# Patient Record
Sex: FEMALE | Race: WHITE | NOT HISPANIC OR LATINO | Employment: UNEMPLOYED | URBAN - METROPOLITAN AREA
[De-identification: names, ages, dates, MRNs, and addresses within clinical notes are randomized per-mention and may not be internally consistent; named-entity substitution may affect disease eponyms.]

---

## 2021-11-13 ENCOUNTER — HOSPITAL ENCOUNTER (EMERGENCY)
Facility: HOSPITAL | Age: 46
Discharge: HOME | End: 2021-11-13
Attending: EMERGENCY MEDICINE
Payer: COMMERCIAL

## 2021-11-13 VITALS
WEIGHT: 155 LBS | DIASTOLIC BLOOD PRESSURE: 81 MMHG | OXYGEN SATURATION: 99 % | HEART RATE: 89 BPM | TEMPERATURE: 97.8 F | SYSTOLIC BLOOD PRESSURE: 137 MMHG | RESPIRATION RATE: 20 BRPM

## 2021-11-13 DIAGNOSIS — F41.9 ANXIETY: Primary | ICD-10-CM

## 2021-11-13 PROCEDURE — 99284 EMERGENCY DEPT VISIT MOD MDM: CPT | Performed by: EMERGENCY MEDICINE

## 2021-11-13 PROCEDURE — 99283 EMERGENCY DEPT VISIT LOW MDM: CPT

## 2021-11-13 RX ORDER — LORAZEPAM 1 MG/1
1 TABLET ORAL ONCE
Status: COMPLETED | OUTPATIENT
Start: 2021-11-13 | End: 2021-11-13

## 2021-11-13 RX ORDER — BUPROPION HYDROCHLORIDE 300 MG/1
300 TABLET ORAL DAILY
Status: ON HOLD | COMMUNITY
End: 2022-01-05 | Stop reason: SDUPTHER

## 2021-11-13 RX ORDER — SERTRALINE HYDROCHLORIDE 100 MG/1
TABLET, FILM COATED ORAL DAILY
COMMUNITY
End: 2022-01-05 | Stop reason: HOSPADM

## 2021-11-13 RX ORDER — BUPRENORPHINE 2 MG/1
2 TABLET SUBLINGUAL DAILY
COMMUNITY
End: 2022-01-05 | Stop reason: HOSPADM

## 2021-11-13 RX ADMIN — LORAZEPAM 1 MG: 1 TABLET ORAL at 14:23

## 2021-12-22 ENCOUNTER — HOSPITAL ENCOUNTER (EMERGENCY)
Facility: HOSPITAL | Age: 46
End: 2021-12-23
Attending: EMERGENCY MEDICINE | Admitting: EMERGENCY MEDICINE
Payer: COMMERCIAL

## 2021-12-22 ENCOUNTER — APPOINTMENT (EMERGENCY)
Dept: RADIOLOGY | Facility: HOSPITAL | Age: 46
End: 2021-12-22
Payer: COMMERCIAL

## 2021-12-22 ENCOUNTER — HOSPITAL ENCOUNTER (EMERGENCY)
Facility: HOSPITAL | Age: 46
Discharge: HOME | End: 2021-12-22
Attending: EMERGENCY MEDICINE | Admitting: EMERGENCY MEDICINE
Payer: COMMERCIAL

## 2021-12-22 VITALS
SYSTOLIC BLOOD PRESSURE: 121 MMHG | RESPIRATION RATE: 16 BRPM | OXYGEN SATURATION: 99 % | TEMPERATURE: 98.1 F | DIASTOLIC BLOOD PRESSURE: 79 MMHG | HEART RATE: 81 BPM

## 2021-12-22 DIAGNOSIS — F32.A DEPRESSION: ICD-10-CM

## 2021-12-22 DIAGNOSIS — F41.9 ANXIETY: Primary | ICD-10-CM

## 2021-12-22 DIAGNOSIS — R45.851 SUICIDAL IDEATION: ICD-10-CM

## 2021-12-22 DIAGNOSIS — Z00.8 MEDICAL CLEARANCE FOR PSYCHIATRIC ADMISSION: ICD-10-CM

## 2021-12-22 DIAGNOSIS — Z59.00 HOMELESS SINGLE PERSON: ICD-10-CM

## 2021-12-22 LAB
AMPHETAMINES SERPL QL SCN: NEGATIVE
ANION GAP SERPL CALCULATED.3IONS-SCNC: 9 MMOL/L (ref 4–13)
ATRIAL RATE: 80 BPM
BARBITURATES UR QL: NEGATIVE
BASOPHILS # BLD AUTO: 0.06 THOUSANDS/ΜL (ref 0–0.1)
BASOPHILS NFR BLD AUTO: 1 % (ref 0–1)
BENZODIAZ UR QL: POSITIVE
BUN SERPL-MCNC: 15 MG/DL (ref 5–25)
CALCIUM SERPL-MCNC: 8.4 MG/DL (ref 8.3–10.1)
CARDIAC TROPONIN I PNL SERPL HS: <2 NG/L
CHLORIDE SERPL-SCNC: 107 MMOL/L (ref 100–108)
CK MB SERPL-MCNC: 1.6 % (ref 0–2.5)
CK MB SERPL-MCNC: 12.5 NG/ML (ref 0–5)
CK SERPL-CCNC: 789 U/L (ref 26–192)
CO2 SERPL-SCNC: 24 MMOL/L (ref 21–32)
COCAINE UR QL: NEGATIVE
CREAT SERPL-MCNC: 0.75 MG/DL (ref 0.6–1.3)
EOSINOPHIL # BLD AUTO: 0 THOUSAND/ΜL (ref 0–0.61)
EOSINOPHIL NFR BLD AUTO: 0 % (ref 0–6)
ERYTHROCYTE [DISTWIDTH] IN BLOOD BY AUTOMATED COUNT: 12.4 % (ref 11.6–15.1)
EXT PREG TEST URINE: NORMAL
EXT. CONTROL ED NAV: NORMAL
FLUAV RNA RESP QL NAA+PROBE: NEGATIVE
FLUBV RNA RESP QL NAA+PROBE: NEGATIVE
GFR SERPL CREATININE-BSD FRML MDRD: 97 ML/MIN/1.73SQ M
GLUCOSE SERPL-MCNC: 126 MG/DL (ref 65–140)
HCT VFR BLD AUTO: 36.6 % (ref 34.8–46.1)
HGB BLD-MCNC: 12.4 G/DL (ref 11.5–15.4)
IMM GRANULOCYTES # BLD AUTO: 0.01 THOUSAND/UL (ref 0–0.2)
IMM GRANULOCYTES NFR BLD AUTO: 0 % (ref 0–2)
LYMPHOCYTES # BLD AUTO: 2.2 THOUSANDS/ΜL (ref 0.6–4.47)
LYMPHOCYTES NFR BLD AUTO: 33 % (ref 14–44)
MCH RBC QN AUTO: 30.4 PG (ref 26.8–34.3)
MCHC RBC AUTO-ENTMCNC: 33.9 G/DL (ref 31.4–37.4)
MCV RBC AUTO: 90 FL (ref 82–98)
METHADONE UR QL: NEGATIVE
MONOCYTES # BLD AUTO: 0.55 THOUSAND/ΜL (ref 0.17–1.22)
MONOCYTES NFR BLD AUTO: 8 % (ref 4–12)
NEUTROPHILS # BLD AUTO: 3.84 THOUSANDS/ΜL (ref 1.85–7.62)
NEUTS SEG NFR BLD AUTO: 58 % (ref 43–75)
NRBC BLD AUTO-RTO: 0 /100 WBCS
OPIATES UR QL SCN: NEGATIVE
OXYCODONE+OXYMORPHONE UR QL SCN: NEGATIVE
P AXIS: 59 DEGREES
PCP UR QL: NEGATIVE
PLATELET # BLD AUTO: 244 THOUSANDS/UL (ref 149–390)
PMV BLD AUTO: 8.9 FL (ref 8.9–12.7)
POTASSIUM SERPL-SCNC: 4.2 MMOL/L (ref 3.5–5.3)
PR INTERVAL: 192 MS
QRS AXIS: 66 DEGREES
QRSD INTERVAL: 90 MS
QT INTERVAL: 388 MS
QTC INTERVAL: 412 MS
RBC # BLD AUTO: 4.08 MILLION/UL (ref 3.81–5.12)
RSV RNA RESP QL NAA+PROBE: NEGATIVE
SARS-COV-2 RNA RESP QL NAA+PROBE: NEGATIVE
SODIUM SERPL-SCNC: 140 MMOL/L (ref 136–145)
T WAVE AXIS: 32 DEGREES
THC UR QL: NEGATIVE
VENTRICULAR RATE: 68 BPM
WBC # BLD AUTO: 6.66 THOUSAND/UL (ref 4.31–10.16)

## 2021-12-22 PROCEDURE — 81025 URINE PREGNANCY TEST: CPT | Performed by: EMERGENCY MEDICINE

## 2021-12-22 PROCEDURE — 99283 EMERGENCY DEPT VISIT LOW MDM: CPT

## 2021-12-22 PROCEDURE — 82550 ASSAY OF CK (CPK): CPT | Performed by: EMERGENCY MEDICINE

## 2021-12-22 PROCEDURE — 82553 CREATINE MB FRACTION: CPT | Performed by: EMERGENCY MEDICINE

## 2021-12-22 PROCEDURE — 93005 ELECTROCARDIOGRAM TRACING: CPT

## 2021-12-22 PROCEDURE — 99285 EMERGENCY DEPT VISIT HI MDM: CPT | Performed by: EMERGENCY MEDICINE

## 2021-12-22 PROCEDURE — 0241U HB NFCT DS VIR RESP RNA 4 TRGT: CPT | Performed by: EMERGENCY MEDICINE

## 2021-12-22 PROCEDURE — 80307 DRUG TEST PRSMV CHEM ANLYZR: CPT | Performed by: EMERGENCY MEDICINE

## 2021-12-22 PROCEDURE — 85025 COMPLETE CBC W/AUTO DIFF WBC: CPT | Performed by: EMERGENCY MEDICINE

## 2021-12-22 PROCEDURE — 99284 EMERGENCY DEPT VISIT MOD MDM: CPT | Performed by: EMERGENCY MEDICINE

## 2021-12-22 PROCEDURE — 80048 BASIC METABOLIC PNL TOTAL CA: CPT | Performed by: EMERGENCY MEDICINE

## 2021-12-22 PROCEDURE — 93010 ELECTROCARDIOGRAM REPORT: CPT | Performed by: INTERNAL MEDICINE

## 2021-12-22 PROCEDURE — 71045 X-RAY EXAM CHEST 1 VIEW: CPT

## 2021-12-22 PROCEDURE — 36415 COLL VENOUS BLD VENIPUNCTURE: CPT | Performed by: EMERGENCY MEDICINE

## 2021-12-22 PROCEDURE — 96372 THER/PROPH/DIAG INJ SC/IM: CPT

## 2021-12-22 PROCEDURE — 84484 ASSAY OF TROPONIN QUANT: CPT | Performed by: EMERGENCY MEDICINE

## 2021-12-22 PROCEDURE — 99285 EMERGENCY DEPT VISIT HI MDM: CPT

## 2021-12-22 RX ORDER — TRAZODONE HYDROCHLORIDE 100 MG/1
100 TABLET ORAL
COMMUNITY
Start: 2021-12-14 | End: 2022-01-05 | Stop reason: HOSPADM

## 2021-12-22 RX ORDER — ZIPRASIDONE MESYLATE 20 MG/ML
20 INJECTION, POWDER, LYOPHILIZED, FOR SOLUTION INTRAMUSCULAR ONCE
Status: COMPLETED | OUTPATIENT
Start: 2021-12-22 | End: 2021-12-22

## 2021-12-22 RX ORDER — BUPRENORPHINE 2 MG/1
2 TABLET SUBLINGUAL DAILY
Status: DISCONTINUED | OUTPATIENT
Start: 2021-12-23 | End: 2021-12-23

## 2021-12-22 RX ORDER — DIAZEPAM 5 MG/ML
10 INJECTION, SOLUTION INTRAMUSCULAR; INTRAVENOUS ONCE
Status: DISCONTINUED | OUTPATIENT
Start: 2021-12-22 | End: 2021-12-22

## 2021-12-22 RX ORDER — MECLIZINE HYDROCHLORIDE 25 MG/1
25 TABLET ORAL 3 TIMES DAILY PRN
COMMUNITY
Start: 2021-12-14 | End: 2022-01-05 | Stop reason: HOSPADM

## 2021-12-22 RX ORDER — BUPRENORPHINE HYDROCHLORIDE AND NALOXONE HYDROCHLORIDE DIHYDRATE 8; 2 MG/1; MG/1
1 TABLET SUBLINGUAL 2 TIMES DAILY
COMMUNITY
Start: 2021-12-14 | End: 2022-01-05 | Stop reason: HOSPADM

## 2021-12-22 RX ORDER — BUPROPION HYDROCHLORIDE 150 MG/1
300 TABLET ORAL DAILY
Status: DISCONTINUED | OUTPATIENT
Start: 2021-12-23 | End: 2021-12-23 | Stop reason: HOSPADM

## 2021-12-22 RX ORDER — CLONAZEPAM 0.5 MG/1
2 TABLET ORAL 2 TIMES DAILY
Status: DISCONTINUED | OUTPATIENT
Start: 2021-12-22 | End: 2021-12-23 | Stop reason: HOSPADM

## 2021-12-22 RX ORDER — ALPRAZOLAM 0.5 MG/1
2 TABLET ORAL ONCE
Status: COMPLETED | OUTPATIENT
Start: 2021-12-22 | End: 2021-12-22

## 2021-12-22 RX ORDER — ALPRAZOLAM 0.5 MG/1
4 TABLET ORAL ONCE
Status: COMPLETED | OUTPATIENT
Start: 2021-12-22 | End: 2021-12-22

## 2021-12-22 RX ORDER — LORAZEPAM 2 MG/ML
2 INJECTION INTRAMUSCULAR ONCE
Status: COMPLETED | OUTPATIENT
Start: 2021-12-22 | End: 2021-12-22

## 2021-12-22 RX ADMIN — NICOTINE POLACRILEX 2 MG: 2 GUM, CHEWING BUCCAL at 12:12

## 2021-12-22 RX ADMIN — LORAZEPAM 2 MG: 2 INJECTION INTRAMUSCULAR; INTRAVENOUS at 15:30

## 2021-12-22 RX ADMIN — ZIPRASIDONE MESYLATE 20 MG: 20 INJECTION, POWDER, LYOPHILIZED, FOR SOLUTION INTRAMUSCULAR at 15:10

## 2021-12-22 RX ADMIN — ALPRAZOLAM 4 MG: 0.5 TABLET ORAL at 12:11

## 2021-12-22 RX ADMIN — ALPRAZOLAM 2 MG: 0.5 TABLET ORAL at 00:54

## 2021-12-22 RX ADMIN — WATER 1.2 ML: 1 INJECTION INTRAMUSCULAR; INTRAVENOUS; SUBCUTANEOUS at 15:10

## 2021-12-22 SDOH — ECONOMIC STABILITY - HOUSING INSECURITY: HOMELESSNESS UNSPECIFIED: Z59.00

## 2021-12-23 ENCOUNTER — HOSPITAL ENCOUNTER (INPATIENT)
Facility: HOSPITAL | Age: 46
LOS: 13 days | Discharge: HOME | DRG: 753 | End: 2022-01-05
Attending: STUDENT IN AN ORGANIZED HEALTH CARE EDUCATION/TRAINING PROGRAM | Admitting: STUDENT IN AN ORGANIZED HEALTH CARE EDUCATION/TRAINING PROGRAM
Payer: MEDICAID

## 2021-12-23 VITALS
SYSTOLIC BLOOD PRESSURE: 113 MMHG | TEMPERATURE: 98.7 F | DIASTOLIC BLOOD PRESSURE: 67 MMHG | RESPIRATION RATE: 16 BRPM | OXYGEN SATURATION: 100 % | HEART RATE: 73 BPM

## 2021-12-23 DIAGNOSIS — R10.13 DYSPEPSIA: ICD-10-CM

## 2021-12-23 DIAGNOSIS — F31.10 BIPOLAR AFFECTIVE DISORDER, CURRENT EPISODE MANIC (HCC): Primary | ICD-10-CM

## 2021-12-23 DIAGNOSIS — F11.90 OPIOID USE DISORDER: ICD-10-CM

## 2021-12-23 DIAGNOSIS — M79.673 FOOT PAIN: ICD-10-CM

## 2021-12-23 DIAGNOSIS — Z00.8 MEDICAL CLEARANCE FOR PSYCHIATRIC ADMISSION: ICD-10-CM

## 2021-12-23 PROBLEM — M62.82 RHABDOMYOLYSIS: Status: ACTIVE | Noted: 2021-12-23

## 2021-12-23 LAB — ETHANOL EXG-MCNC: 0 MG/DL

## 2021-12-23 PROCEDURE — 99252 IP/OBS CONSLTJ NEW/EST SF 35: CPT | Performed by: INTERNAL MEDICINE

## 2021-12-23 PROCEDURE — 82075 ASSAY OF BREATH ETHANOL: CPT | Performed by: EMERGENCY MEDICINE

## 2021-12-23 RX ORDER — IBUPROFEN 600 MG/1
600 TABLET ORAL EVERY 8 HOURS PRN
Status: DISCONTINUED | OUTPATIENT
Start: 2021-12-23 | End: 2021-12-25

## 2021-12-23 RX ORDER — OLANZAPINE 10 MG/1
10 INJECTION, POWDER, LYOPHILIZED, FOR SOLUTION INTRAMUSCULAR
Status: DISCONTINUED | OUTPATIENT
Start: 2021-12-23 | End: 2022-01-05 | Stop reason: HOSPADM

## 2021-12-23 RX ORDER — IBUPROFEN 400 MG/1
400 TABLET ORAL ONCE
Status: COMPLETED | OUTPATIENT
Start: 2021-12-23 | End: 2021-12-23

## 2021-12-23 RX ORDER — OLANZAPINE 2.5 MG/1
2.5 TABLET ORAL
Status: DISCONTINUED | OUTPATIENT
Start: 2021-12-23 | End: 2022-01-05 | Stop reason: HOSPADM

## 2021-12-23 RX ORDER — OLANZAPINE 5 MG/1
5 TABLET ORAL
Status: CANCELLED | OUTPATIENT
Start: 2021-12-23

## 2021-12-23 RX ORDER — ALPRAZOLAM 0.5 MG/1
2 TABLET ORAL ONCE
Status: COMPLETED | OUTPATIENT
Start: 2021-12-23 | End: 2021-12-23

## 2021-12-23 RX ORDER — MINERAL OIL AND PETROLATUM 150; 830 MG/G; MG/G
1 OINTMENT OPHTHALMIC
Status: DISCONTINUED | OUTPATIENT
Start: 2021-12-23 | End: 2022-01-05 | Stop reason: HOSPADM

## 2021-12-23 RX ORDER — OLANZAPINE 10 MG/1
5 INJECTION, POWDER, LYOPHILIZED, FOR SOLUTION INTRAMUSCULAR
Status: CANCELLED | OUTPATIENT
Start: 2021-12-23

## 2021-12-23 RX ORDER — BENZTROPINE MESYLATE 1 MG/1
1 TABLET ORAL
Status: DISCONTINUED | OUTPATIENT
Start: 2021-12-23 | End: 2022-01-05 | Stop reason: HOSPADM

## 2021-12-23 RX ORDER — AMOXICILLIN 250 MG
1 CAPSULE ORAL DAILY PRN
Status: CANCELLED | OUTPATIENT
Start: 2021-12-23

## 2021-12-23 RX ORDER — HYDROXYZINE HYDROCHLORIDE 25 MG/1
100 TABLET, FILM COATED ORAL
Status: CANCELLED | OUTPATIENT
Start: 2021-12-23

## 2021-12-23 RX ORDER — OLANZAPINE 5 MG/1
5 TABLET ORAL
Status: DISCONTINUED | OUTPATIENT
Start: 2021-12-23 | End: 2022-01-05 | Stop reason: HOSPADM

## 2021-12-23 RX ORDER — BUPRENORPHINE AND NALOXONE 8; 2 MG/1; MG/1
8 FILM, SOLUBLE BUCCAL; SUBLINGUAL 2 TIMES DAILY
Status: DISCONTINUED | OUTPATIENT
Start: 2021-12-23 | End: 2021-12-27

## 2021-12-23 RX ORDER — POLYETHYLENE GLYCOL 3350 17 G/17G
17 POWDER, FOR SOLUTION ORAL DAILY PRN
Status: CANCELLED | OUTPATIENT
Start: 2021-12-23

## 2021-12-23 RX ORDER — HYDROXYZINE 50 MG/1
100 TABLET, FILM COATED ORAL
Status: DISCONTINUED | OUTPATIENT
Start: 2021-12-23 | End: 2022-01-05 | Stop reason: HOSPADM

## 2021-12-23 RX ORDER — MINERAL OIL AND PETROLATUM 150; 830 MG/G; MG/G
1 OINTMENT OPHTHALMIC
Status: CANCELLED | OUTPATIENT
Start: 2021-12-23

## 2021-12-23 RX ORDER — HYDROXYZINE 50 MG/1
50 TABLET, FILM COATED ORAL
Status: DISCONTINUED | OUTPATIENT
Start: 2021-12-23 | End: 2022-01-05 | Stop reason: HOSPADM

## 2021-12-23 RX ORDER — IBUPROFEN 400 MG/1
400 TABLET ORAL EVERY 6 HOURS PRN
Status: CANCELLED | OUTPATIENT
Start: 2021-12-23

## 2021-12-23 RX ORDER — POLYETHYLENE GLYCOL 3350 17 G/17G
17 POWDER, FOR SOLUTION ORAL DAILY PRN
Status: DISCONTINUED | OUTPATIENT
Start: 2021-12-23 | End: 2022-01-05 | Stop reason: HOSPADM

## 2021-12-23 RX ORDER — OLANZAPINE 10 MG/1
10 INJECTION, POWDER, LYOPHILIZED, FOR SOLUTION INTRAMUSCULAR
Status: CANCELLED | OUTPATIENT
Start: 2021-12-23

## 2021-12-23 RX ORDER — LORAZEPAM 2 MG/ML
2 INJECTION INTRAMUSCULAR EVERY 6 HOURS PRN
Status: DISCONTINUED | OUTPATIENT
Start: 2021-12-23 | End: 2021-12-28

## 2021-12-23 RX ORDER — NICOTINE 21 MG/24HR
14 PATCH, TRANSDERMAL 24 HOURS TRANSDERMAL ONCE
Status: DISCONTINUED | OUTPATIENT
Start: 2021-12-23 | End: 2021-12-23 | Stop reason: HOSPADM

## 2021-12-23 RX ORDER — BENZTROPINE MESYLATE 1 MG/ML
1 INJECTION INTRAMUSCULAR; INTRAVENOUS
Status: DISCONTINUED | OUTPATIENT
Start: 2021-12-23 | End: 2022-01-05 | Stop reason: HOSPADM

## 2021-12-23 RX ORDER — HYDROXYZINE HYDROCHLORIDE 25 MG/1
25 TABLET, FILM COATED ORAL
Status: DISCONTINUED | OUTPATIENT
Start: 2021-12-23 | End: 2022-01-05 | Stop reason: HOSPADM

## 2021-12-23 RX ORDER — IBUPROFEN 600 MG/1
600 TABLET ORAL EVERY 8 HOURS PRN
Status: CANCELLED | OUTPATIENT
Start: 2021-12-23

## 2021-12-23 RX ORDER — BUPRENORPHINE AND NALOXONE 8; 2 MG/1; MG/1
8 FILM, SOLUBLE BUCCAL; SUBLINGUAL 2 TIMES DAILY
Status: DISCONTINUED | OUTPATIENT
Start: 2021-12-23 | End: 2021-12-23 | Stop reason: HOSPADM

## 2021-12-23 RX ORDER — MAGNESIUM HYDROXIDE/ALUMINUM HYDROXICE/SIMETHICONE 120; 1200; 1200 MG/30ML; MG/30ML; MG/30ML
30 SUSPENSION ORAL EVERY 4 HOURS PRN
Status: DISCONTINUED | OUTPATIENT
Start: 2021-12-23 | End: 2022-01-05 | Stop reason: HOSPADM

## 2021-12-23 RX ORDER — OLANZAPINE 10 MG/1
5 INJECTION, POWDER, LYOPHILIZED, FOR SOLUTION INTRAMUSCULAR
Status: DISCONTINUED | OUTPATIENT
Start: 2021-12-23 | End: 2022-01-05 | Stop reason: HOSPADM

## 2021-12-23 RX ORDER — LORAZEPAM 1 MG/1
2 TABLET ORAL ONCE
Status: DISCONTINUED | OUTPATIENT
Start: 2021-12-23 | End: 2021-12-23

## 2021-12-23 RX ORDER — ACETAMINOPHEN 325 MG/1
650 TABLET ORAL EVERY 6 HOURS PRN
Status: DISCONTINUED | OUTPATIENT
Start: 2021-12-23 | End: 2021-12-24

## 2021-12-23 RX ORDER — BUPRENORPHINE AND NALOXONE 8; 2 MG/1; MG/1
8 FILM, SOLUBLE BUCCAL; SUBLINGUAL 2 TIMES DAILY
Status: CANCELLED | OUTPATIENT
Start: 2021-12-23

## 2021-12-23 RX ORDER — BENZTROPINE MESYLATE 1 MG/1
1 TABLET ORAL
Status: CANCELLED | OUTPATIENT
Start: 2021-12-23

## 2021-12-23 RX ORDER — NICOTINE 21 MG/24HR
1 PATCH, TRANSDERMAL 24 HOURS TRANSDERMAL DAILY
Status: CANCELLED | OUTPATIENT
Start: 2021-12-23

## 2021-12-23 RX ORDER — DIPHENHYDRAMINE HYDROCHLORIDE 50 MG/ML
50 INJECTION INTRAMUSCULAR; INTRAVENOUS EVERY 6 HOURS PRN
Status: DISCONTINUED | OUTPATIENT
Start: 2021-12-23 | End: 2022-01-05 | Stop reason: HOSPADM

## 2021-12-23 RX ORDER — OLANZAPINE 5 MG/1
10 TABLET ORAL
Status: CANCELLED | OUTPATIENT
Start: 2021-12-23

## 2021-12-23 RX ORDER — IBUPROFEN 400 MG/1
400 TABLET ORAL EVERY 6 HOURS PRN
Status: DISCONTINUED | OUTPATIENT
Start: 2021-12-23 | End: 2021-12-25

## 2021-12-23 RX ORDER — ACETAMINOPHEN 325 MG/1
650 TABLET ORAL EVERY 6 HOURS PRN
Status: CANCELLED | OUTPATIENT
Start: 2021-12-23

## 2021-12-23 RX ORDER — HYDROXYZINE HYDROCHLORIDE 25 MG/1
25 TABLET, FILM COATED ORAL
Status: CANCELLED | OUTPATIENT
Start: 2021-12-23

## 2021-12-23 RX ORDER — GABAPENTIN 300 MG/1
600 CAPSULE ORAL ONCE
Status: COMPLETED | OUTPATIENT
Start: 2021-12-23 | End: 2021-12-23

## 2021-12-23 RX ORDER — OLANZAPINE 10 MG/1
10 TABLET ORAL
Status: DISCONTINUED | OUTPATIENT
Start: 2021-12-23 | End: 2022-01-05 | Stop reason: HOSPADM

## 2021-12-23 RX ORDER — DIPHENHYDRAMINE HYDROCHLORIDE 50 MG/ML
50 INJECTION INTRAMUSCULAR; INTRAVENOUS EVERY 6 HOURS PRN
Status: CANCELLED | OUTPATIENT
Start: 2021-12-23

## 2021-12-23 RX ORDER — LORAZEPAM 2 MG/ML
2 INJECTION INTRAMUSCULAR EVERY 6 HOURS PRN
Status: CANCELLED | OUTPATIENT
Start: 2021-12-23

## 2021-12-23 RX ORDER — LANOLIN ALCOHOL/MO/W.PET/CERES
3 CREAM (GRAM) TOPICAL
Status: CANCELLED | OUTPATIENT
Start: 2021-12-23

## 2021-12-23 RX ORDER — NICOTINE 21 MG/24HR
1 PATCH, TRANSDERMAL 24 HOURS TRANSDERMAL DAILY
Status: DISCONTINUED | OUTPATIENT
Start: 2021-12-24 | End: 2021-12-24

## 2021-12-23 RX ORDER — HYDROXYZINE HYDROCHLORIDE 25 MG/1
50 TABLET, FILM COATED ORAL
Status: CANCELLED | OUTPATIENT
Start: 2021-12-23

## 2021-12-23 RX ORDER — OLANZAPINE 5 MG/1
2.5 TABLET ORAL
Status: CANCELLED | OUTPATIENT
Start: 2021-12-23

## 2021-12-23 RX ORDER — AMOXICILLIN 250 MG
1 CAPSULE ORAL DAILY PRN
Status: DISCONTINUED | OUTPATIENT
Start: 2021-12-23 | End: 2021-12-28

## 2021-12-23 RX ORDER — LANOLIN ALCOHOL/MO/W.PET/CERES
3 CREAM (GRAM) TOPICAL
Status: DISCONTINUED | OUTPATIENT
Start: 2021-12-23 | End: 2022-01-05 | Stop reason: HOSPADM

## 2021-12-23 RX ORDER — MAGNESIUM HYDROXIDE/ALUMINUM HYDROXICE/SIMETHICONE 120; 1200; 1200 MG/30ML; MG/30ML; MG/30ML
30 SUSPENSION ORAL EVERY 4 HOURS PRN
Status: CANCELLED | OUTPATIENT
Start: 2021-12-23

## 2021-12-23 RX ORDER — BENZTROPINE MESYLATE 1 MG/ML
1 INJECTION INTRAMUSCULAR; INTRAVENOUS
Status: CANCELLED | OUTPATIENT
Start: 2021-12-23

## 2021-12-23 RX ADMIN — GABAPENTIN 600 MG: 300 CAPSULE ORAL at 14:07

## 2021-12-23 RX ADMIN — ALPRAZOLAM 2 MG: 0.5 TABLET ORAL at 08:44

## 2021-12-23 RX ADMIN — BUPRENORPHINE AND NALOXONE 8 MG: 8; 2 FILM BUCCAL; SUBLINGUAL at 10:34

## 2021-12-23 RX ADMIN — IBUPROFEN 400 MG: 400 TABLET, FILM COATED ORAL at 13:08

## 2021-12-23 RX ADMIN — NICOTINE POLACRILEX 2 MG: 2 GUM, CHEWING BUCCAL at 09:54

## 2021-12-23 RX ADMIN — Medication 14 MG: at 13:11

## 2021-12-23 RX ADMIN — ALPRAZOLAM 2 MG: 0.5 TABLET ORAL at 13:08

## 2021-12-23 RX ADMIN — CLONAZEPAM 2 MG: 0.5 TABLET ORAL at 08:45

## 2021-12-23 RX ADMIN — OLANZAPINE 10 MG: 10 INJECTION, POWDER, FOR SOLUTION INTRAMUSCULAR at 20:10

## 2021-12-23 RX ADMIN — CLONAZEPAM 2 MG: 0.5 TABLET ORAL at 18:11

## 2021-12-23 RX ADMIN — BUPROPION HYDROCHLORIDE 300 MG: 150 TABLET, FILM COATED, EXTENDED RELEASE ORAL at 08:45

## 2021-12-24 PROBLEM — Z51.81 ENCOUNTER FOR MONITORING OPIOID MAINTENANCE THERAPY: Status: ACTIVE | Noted: 2021-12-24

## 2021-12-24 PROBLEM — Z79.891 ENCOUNTER FOR MONITORING OPIOID MAINTENANCE THERAPY: Status: ACTIVE | Noted: 2021-12-24

## 2021-12-24 PROBLEM — F31.10 BIPOLAR AFFECTIVE DISORDER, CURRENT EPISODE MANIC (HCC): Status: ACTIVE | Noted: 2021-12-24

## 2021-12-24 PROBLEM — Z72.0 TOBACCO ABUSE: Status: ACTIVE | Noted: 2021-12-24

## 2021-12-24 PROCEDURE — 99222 1ST HOSP IP/OBS MODERATE 55: CPT | Performed by: PSYCHIATRY & NEUROLOGY

## 2021-12-24 RX ORDER — ONDANSETRON 4 MG/1
4 TABLET, ORALLY DISINTEGRATING ORAL EVERY 6 HOURS PRN
Status: DISCONTINUED | OUTPATIENT
Start: 2021-12-24 | End: 2022-01-05 | Stop reason: HOSPADM

## 2021-12-24 RX ORDER — RISPERIDONE 1 MG/1
1 TABLET, ORALLY DISINTEGRATING ORAL DAILY
Status: DISCONTINUED | OUTPATIENT
Start: 2021-12-25 | End: 2021-12-25

## 2021-12-24 RX ORDER — FLUOXETINE HYDROCHLORIDE 20 MG/1
20 CAPSULE ORAL DAILY
Status: DISCONTINUED | OUTPATIENT
Start: 2021-12-25 | End: 2021-12-24

## 2021-12-24 RX ORDER — ACETAMINOPHEN 325 MG/1
650 TABLET ORAL EVERY 4 HOURS PRN
Status: DISCONTINUED | OUTPATIENT
Start: 2021-12-24 | End: 2022-01-05 | Stop reason: HOSPADM

## 2021-12-24 RX ORDER — CHLORPROMAZINE HYDROCHLORIDE 25 MG/ML
50 INJECTION INTRAMUSCULAR EVERY 4 HOURS PRN
Status: DISCONTINUED | OUTPATIENT
Start: 2021-12-24 | End: 2022-01-05 | Stop reason: HOSPADM

## 2021-12-24 RX ORDER — TOPIRAMATE 25 MG/1
75 TABLET ORAL
Status: DISCONTINUED | OUTPATIENT
Start: 2021-12-24 | End: 2021-12-29

## 2021-12-24 RX ORDER — BENZTROPINE MESYLATE 1 MG/1
1 TABLET ORAL 2 TIMES DAILY
Status: DISCONTINUED | OUTPATIENT
Start: 2021-12-24 | End: 2022-01-05 | Stop reason: HOSPADM

## 2021-12-24 RX ORDER — BENZTROPINE MESYLATE 1 MG/1
1 TABLET ORAL 2 TIMES DAILY
Status: DISCONTINUED | OUTPATIENT
Start: 2021-12-24 | End: 2021-12-24

## 2021-12-24 RX ORDER — ACETAMINOPHEN 325 MG/1
975 TABLET ORAL EVERY 6 HOURS PRN
Status: DISCONTINUED | OUTPATIENT
Start: 2021-12-24 | End: 2022-01-05 | Stop reason: HOSPADM

## 2021-12-24 RX ORDER — FLUOXETINE 10 MG/1
10 CAPSULE ORAL ONCE
Status: DISCONTINUED | OUTPATIENT
Start: 2021-12-24 | End: 2021-12-27

## 2021-12-24 RX ORDER — RISPERIDONE 2 MG/1
2 TABLET, ORALLY DISINTEGRATING ORAL
Status: DISCONTINUED | OUTPATIENT
Start: 2021-12-24 | End: 2021-12-25

## 2021-12-24 RX ORDER — NICOTINE 21 MG/24HR
1 PATCH, TRANSDERMAL 24 HOURS TRANSDERMAL DAILY
Status: DISCONTINUED | OUTPATIENT
Start: 2021-12-24 | End: 2022-01-05 | Stop reason: HOSPADM

## 2021-12-24 RX ORDER — TRAZODONE HYDROCHLORIDE 50 MG/1
50 TABLET ORAL
Status: DISCONTINUED | OUTPATIENT
Start: 2021-12-24 | End: 2021-12-30

## 2021-12-24 RX ORDER — ACETAMINOPHEN 325 MG/1
650 TABLET ORAL EVERY 6 HOURS PRN
Status: DISCONTINUED | OUTPATIENT
Start: 2021-12-24 | End: 2022-01-05 | Stop reason: HOSPADM

## 2021-12-24 RX ORDER — FLUOXETINE 10 MG/1
10 CAPSULE ORAL DAILY
Status: DISCONTINUED | OUTPATIENT
Start: 2021-12-25 | End: 2021-12-25

## 2021-12-24 RX ORDER — GABAPENTIN 400 MG/1
400 CAPSULE ORAL 3 TIMES DAILY
Status: DISCONTINUED | OUTPATIENT
Start: 2021-12-24 | End: 2021-12-25

## 2021-12-24 RX ORDER — CHLORPROMAZINE HYDROCHLORIDE 100 MG/1
100 TABLET, FILM COATED ORAL EVERY 4 HOURS PRN
Status: DISCONTINUED | OUTPATIENT
Start: 2021-12-24 | End: 2022-01-05 | Stop reason: HOSPADM

## 2021-12-24 RX ADMIN — IBUPROFEN 600 MG: 600 TABLET, FILM COATED ORAL at 09:42

## 2021-12-24 RX ADMIN — GABAPENTIN 400 MG: 400 CAPSULE ORAL at 21:48

## 2021-12-24 RX ADMIN — CHLORPROMAZINE HYDROCHLORIDE 50 MG: 25 INJECTION INTRAMUSCULAR at 14:20

## 2021-12-24 RX ADMIN — BENZTROPINE MESYLATE 1 MG: 1 TABLET ORAL at 17:08

## 2021-12-24 RX ADMIN — BUPRENORPHINE AND NALOXONE 8 MG: 8; 2 FILM BUCCAL; SUBLINGUAL at 09:28

## 2021-12-24 RX ADMIN — DIPHENHYDRAMINE HYDROCHLORIDE 50 MG: 50 INJECTION, SOLUTION INTRAMUSCULAR; INTRAVENOUS at 22:00

## 2021-12-24 RX ADMIN — TRAZODONE HYDROCHLORIDE 50 MG: 50 TABLET ORAL at 21:48

## 2021-12-24 RX ADMIN — GABAPENTIN 400 MG: 400 CAPSULE ORAL at 17:08

## 2021-12-24 RX ADMIN — LORAZEPAM 2 MG: 2 INJECTION INTRAMUSCULAR; INTRAVENOUS at 13:42

## 2021-12-24 RX ADMIN — OLANZAPINE 10 MG: 10 INJECTION, POWDER, FOR SOLUTION INTRAMUSCULAR at 22:00

## 2021-12-24 RX ADMIN — NICOTINE 1 PATCH: 14 PATCH, EXTENDED RELEASE TRANSDERMAL at 09:28

## 2021-12-24 RX ADMIN — TOPIRAMATE 75 MG: 25 TABLET, FILM COATED ORAL at 21:48

## 2021-12-24 RX ADMIN — BUPRENORPHINE AND NALOXONE 8 MG: 8; 2 FILM BUCCAL; SUBLINGUAL at 21:48

## 2021-12-24 RX ADMIN — OLANZAPINE 10 MG: 10 INJECTION, POWDER, FOR SOLUTION INTRAMUSCULAR at 07:52

## 2021-12-24 NOTE — PLAN OF CARE
Problem: DEPRESSION  Goal: Will be euthymic at discharge  Description: INTERVENTIONS:  - Administer medication as ordered  - Provide emotional support via 1:1 interaction with staff  - Encourage involvement in milieu/groups/activities  - Monitor for social isolation  Outcome: Not Progressing     Problem: ANXIETY  Goal: Will report anxiety at manageable levels  Description: INTERVENTIONS:  - Administer medication as ordered  - Teach and encourage coping skills  - Provide emotional support  - Assess patient/family for anxiety and ability to cope  Outcome: Not Progressing     Problem: ANXIETY  Goal: By discharge: Patient will verbalize 2 strategies to deal with anxiety  Description: Interventions:  - Identify any obvious source/trigger to anxiety  - Staff will assist patient in applying identified coping technique/skills  - Encourage attendance of scheduled groups and activities  Outcome: Not Progressing     Problem: INVOLUNTARY ADMIT  Goal: Will cooperate with staff recommendations and doctor's orders and will demonstrate appropriate behavior  Description: INTERVENTIONS:  - Treat underlying conditions and offer medication as ordered  - Educate regarding involuntary admission procedures and rules  - Utilize positive consistent limit setting strategies to support patient and staff safety  Outcome: Not Progressing

## 2021-12-24 NOTE — QUICK NOTE
Attempted to see patient this evening for medical clearance consult, however patient is restrained at this time  Thorough chart review performed  Patient with h/o opioid use disorder, currently on suboxone 8 mg BID  PDMP reviewed:   12/15/2021  2  12/14/2021  BUPRENORPHINE-NALOX 8-2 MG TAB  28 0  14  AB LET  [id number]  PENNS (1007)  0  16 0 mg  [place]      U admission labs pending  Labs from 12/22/2021 reviewed  CK noted to be elevated at 789 (reflex 12 5), likely rhabdo  Patient has subsequently been restrained on multiple occasions, will continue to monitor and repeat CK in AM  Encourage fluids  Medical to re-attempt consult tomorrow

## 2021-12-24 NOTE — RESTRAINT FACE TO FACE
Restraint Face to Face   Evelyn Middleton 42 y o  female MRN: [id number]  Unit/Bed#: [facility] 342-02 Encounter: [id number]      Physical Evaluation Unremarkable  Purpose for Restraints/ Seclusion High risk for self harm and High risk for harm to others  Patient's reaction to the intervention: Verbally aggressive towards staff members, screaming  Patient's medical condition Stable  Patient's Behavioral condition Labile, aggressive, poor insight, unable to be redirected  Restraints to be Continued

## 2021-12-24 NOTE — H&P
Psychiatric Evaluation - Behavioral Health     Identification Data:Carey Quinn 42 y o  female MRN: [id number]  Unit/Bed#: [facility] 342-02 Encounter: [id number]    Chief Complaint: depression, anxiety, manic symptoms, unstable mood, agitation and inability to care for self    History of Present Illness     Joan Blunt is a 42 y o  female with a history of Bipolar Disorder and substance use who was admitted to the inpatient psychiatric unit on a involuntary 302 commitment basis due to mixed symptoms of bipolar disorder, unstable mood, increased agitation and inability to care for self because of mental illness  Symptoms prior to admission included feeling depressed, mood swings, increased irritability, decreased need for sleep, erratic behavior, difficulty controlling anger, agitation, disorganized thinking process and obsessive thoughts  Onset of symptoms was gradual starting several days ago with progressively worsening course since that time  Stressors preceding admission included anniversary of father's death  On initial evaluation after admission to the inpatient psychiatric unit the patient  was  Initially cooperating well was interview, she admitted having self harmful but not lethal acts of cutting 2 weeks ago because of feeling angry, she stated that her anger was related to the fact that the  in the program she had been staying, did not pay respect to other clients  Patient also admitted that anniversary of her father death year ago contributed to her depressive as she self describes state, the patient stated that she agreed with diagnosis of mood disorder, however she denied taking any mood stabilizers or FDA approved 2nd generation antipsychotics medications to treat her mood disorder    When we discussed the previous treatment, the patient stated that the Zyprexa and Seroquel made her gain weight, and the same was true for Depakote, Tegretol also led to some side effects and the patient did not want to "experiment" with new FDA approved medications to treat bipolar mood disorder such as Reynold Fithian which is not associated with weight gain  The patient was not the best historian, because of her flight of idea and dysphoric mood with frequent but short lived  S of crying when she talked about her father and angry feelings when she describes the recent events in her life  The the patient stated she was on several antidepressants such as Prozac Wellbutrin, Mary Eckert also stated she was on Neurontin 600 mg twice a day, and she admitted having history of opioid dependence but telling Suboxone 8 mg twice a day help her to stay clean  The as the interview progressed, the patient more more started to concentrate on her Gavi Magic to receive benzodiazepines including clonazepam   The writer reviews the [facility] our record and indeed the patient prescribed clonazepam for 1 months by an outpatient provider, there was also history she was prescribed Adderall and Suboxone  The writer had concerns about several antidepressants prescribed to the patient's was symptoms of mixed affect most likely associated with bipolar disorder, and also ex had concerns about clonazepam that was prescribed to gather with Suboxone  The writer suggested to reduce number of antidepressants to Prozac just 1 antidepressant and provide lower dose, at the same time starting mood stabilizers  As a response the patient became more and more hyperverbal, at times agitated, intrusive, demanding clonazepam   And necessity for p r n  medications and seclusion was evident because of the patient starts screaming, yelling, running in the whole disruptive environment of our treatment unit, and after providing with p r n  Zyprexa and after that Thorazine the patient condition significantly improved       the patient stated that she had her mother living in [place] that she herself used to live in [place] and recently moved to [facility]  There are not some any available records in our electronic medical system in relation to patient's history of bipolar mood disorder  However the patient knowledge of multiple mood stabilizers and antipsychotics showed that the patient most likely had multiple previous at episodes of her mood instability and treatment with a variety of medications helping patients was bipolar disorder  Psychiatric Review Of Systems:   Unobtainable due to patient's acute manic state with dysphoric mood    Historical Information     Past Psychiatric History:  Very limited because of the patient he was a poor historian  Substance Abuse History:  Social History     Tobacco History     Smoking Status  Current Every Day Smoker Smoking Frequency  1 pack/day Smoking Tobacco Type  Cigarettes    Smokeless Tobacco Use  Current User          Alcohol History     Alcohol Use Status  Never          Drug Use     Drug Use Status  Not Currently Comment  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             Sexual Activity     Sexually Active  Not Asked          Activities of Daily Living    Not Asked                 I have assessed this patient for substance use within the past 12 months    History of Inpatient/Outpatient rehabilitation program: yes  Smoking history: 1 pack per day    Family Psychiatric History:     Psychiatric Illness:  Sister - mental illness and autistic disorder  Substance Abuse:  unknown  Suicide Attempts:  patient denies    Social History:    Education: high school diploma/GED  Marital History: single      Traumatic History:     Abuse: not willing to provide details    Past Medical History:    History of Seizures: no    Past Medical History:   Diagnosis Date    Lower back pain      Past Surgical History:   Procedure Laterality Date    CHOLECYSTECTOMY         Medical Review Of Systems:    EFO Review Of Systems: Review of systems not obtained due to patient factors  Allergies: Allergies   Allergen Reactions    Haloperidol Other (See Comments)     oculogyr crisis       Medications: All current active medications have been reviewed  Objective     Vital signs in last 24 hours:    Temp:  [98 8 °F (37 1 °C)] 98 8 °F (37 1 °C)  HR:  [73-74] 74  Resp:  [16] 16  BP: (109-113)/(62-67) 109/62    No intake or output data in the 24 hours ending 12/24/21 1732     Mental Status Evaluation:      Appearance:  dressed in hospital attire   Behavior:  pleasant   Mood:  improved, depressed, dysphoric   Affect: constricted    Speech:  increased rate, pressured, hypertalkative   Language: appropriate   Thought Process:  flight of ideas, illogical and loose associations   Associations: loose associations, flight of ideas   Thought Content:  paranoid ideation   Perceptual Disturbances: no auditory hallucinations, no visual hallucinations, denies auditory hallucinations when asked, does not appear responding to internal stimuli   Risk Potential: Suicidal ideation - None  Homicidal ideation - None  Potential for aggression - Yes, due to agitation   Sensorium:  oriented to person and place only   Memory:  recent and remote memory grossly intact   Consciousness:  alert and awake   Attention: decreased attention span   Fund of Knowledge: past history: unable to assess due to lack of cooperation   Insight:  poor   Judgment: poor   Muscle Tone: normal   Gait/Station: normal balance   Motor Activity: no abnormal movements               Laboratory Results:   I have personally reviewed all pertinent laboratory/tests results    Most Recent Labs:   Lab Results   Component Value Date    WBC 6 66 12/22/2021    RBC 4 08 12/22/2021    HGB 12 4 12/22/2021    HCT 36 6 12/22/2021     12/22/2021    RDW 12 4 12/22/2021    NEUTROABS 3 84 12/22/2021    SODIUM 140 12/22/2021    K 4 2 12/22/2021     12/22/2021    CO2 24 12/22/2021    BUN 15 12/22/2021    CREATININE 0 75 12/22/2021    GLUC 126 12/22/2021    CALCIUM 8 4 12/22/2021    PREGUR Negative (-) 12/22/2021       Imaging Studies: XR chest 1 view portable    Result Date: 12/22/2021  Narrative: CHEST INDICATION:   chest pain  COMPARISON:  None EXAM PERFORMED/VIEWS:  XR CHEST PORTABLE FINDINGS: Cardiomediastinal silhouette appears unremarkable  Low lung volumes with left greater than right groundglass opacity  No effusion or pneumothorax  Osseous structures appear within normal limits for patient age  Impression: Low lung volumes with left greater than right groundglass opacity  While this may in part be due to vascular crowding from suboptimal inspiration, pulmonary edema or atypical pneumonia cannot be excluded  Workstation performed: [id number]       Code Status: Level 1 - Full Code    Assessment/Plan   Principal Problem:    Bipolar affective disorder, current episode manic ([facility] [address] )  Active Problems:    Medical clearance for psychiatric admission    Rhabdomyolysis      Treatment Plan:     Planned Treatment and Medication Changes: All current active medications have been reviewed  Encourage group therapy, milieu therapy and occupational therapy  Behavioral Health checks every 7 minutes    Carey's most recent urinary drug screen was positive for benzodiazepines and negative for opioids, reflecting the fact that the the patient Suboxone help her with opioid dependence  Prescribing of Suboxone and benzodiazepines  is not considered to be a safe combination, the writer made a decision to continue patient's Neurontin instead of prescribing clonazepam because Neurontin may help with potential benzodiazepine withdrawal, including benzodiazepine withdrawal related seizure and because Neurontin  Is not associated with severe addiction that may be related to misuse or overuse of benzodiazepines  Risperidone was selected as FDA approved medications to treat patient's manic state    Patient did not report any side effects associated with risperidone  Because of the patient history of acute dystonic reaction associated with Haldol, Cogentin will be started twice a day to prevent potential side effects associated with antipsychotics and Haldol will not be prescribed to the patient even as p r n       Current Facility-Administered Medications   Medication Dose Route Frequency Provider Last Rate    acetaminophen  650 mg Oral Q6H PRN Eri Carvajal MD      aluminum-magnesium hydroxide-simethicone  30 mL Oral Q4H PRN Eri Carvajal MD      artificial tear  1 application Both Eyes X5N PRN Eri Carvajal MD      benztropine  1 mg Intramuscular Q4H PRN Max 6/day Eri Carvajal MD      benztropine  1 mg Oral Q4H PRN Max 6/day Eri Carvajal MD      benztropine  1 mg Oral BID Miguel Aparicio MD      buprenorphine-naloxone  8 mg Sublingual BID Eri Carvajal MD      chlorproMAZINE  50 mg Intramuscular Q4H PRN Miguel Aparicio MD      chlorproMAZINE  100 mg Oral Q4H PRN Miguel Aparicio MD      hydrOXYzine HCL  50 mg Oral Q6H PRN Max 4/day Eri Carvajal MD      Or   Josh Bedoya diphenhydrAMINE  50 mg Intramuscular Q6H PRN Eri Carvajal MD      FLUoxetine  10 mg Oral Once MD Lexy Linares ON 12/25/2021] FLUoxetine  10 mg Oral Daily Miguel Aparicio MD      gabapentin  400 mg Oral TID Miguel Aparicio MD      hydrOXYzine HCL  100 mg Oral Q6H PRN Max 4/day Eri Carvajal MD      Or   Josh Bedoya LORazepam  2 mg Intramuscular Q6H PRN Eri Carvajal MD      hydrOXYzine HCL  25 mg Oral Q6H PRN Max 4/day Eri Carvajal MD      ibuprofen  400 mg Oral Q6H PRN Eri Carvajal MD      ibuprofen  600 mg Oral Q8H PRN Eri Carvajal MD      melatonin  3 mg Oral HS PRN Eri Carvajal MD      nicotine  1 patch Transdermal Daily Eri Carvajal MD      nicotine polacrilex  4 mg Oral Q2H PRN Miguel Aparicio MD      OLANZapine  10 mg Oral Q3H PRN Max 3/day Eri Carvajal MD      Or    OLANZapine  10 mg Intramuscular Q3H PRN Max 3/day Eri Carvajal MD      OLANZapine  5 mg Oral Q3H PRN Max 6/day Ana Fong MD      Or    OLANZapine  5 mg Intramuscular Q3H PRN Max 6/day Ana Fong MD      OLANZapine  2 5 mg Oral Q3H PRN Max 8/day Ana Fong MD      ondansetron  4 mg Oral Q6H PRN Griselda Quinones MD      polyethylene glycol  17 g Oral Daily PRN Ana Fong MD     Eloina Gladys Madrid ON 12/25/2021] risperiDONE  1 mg Oral Daily Griselda Quinones MD      risperiDONE  2 mg Oral HS Griselda Quinones MD      senna-docusate sodium  1 tablet Oral Daily PRN Ana Fong MD      sterile water           sterile water           topiramate  75 mg Oral HS Griselda Quinones MD      traZODone  50 mg Oral HS Griselda Quinones MD         Risks / Benefits of Treatment:    Risks, benefits, and possible side effects of medications explained to patient  Patient has limited understanding of risks and benefits of treatment at this time, but agrees to take medications as prescribed  Counseling / Coordination of Care:    Patient's presentation on admission and proposed treatment plan discussed with staff  Diagnosis, medication changes and treatment plan reviewed with patient  Inpatient Psychiatric Certification:    Estimated length of stay: 7 midnights    Based upon physical, mental and social evaluations, I certify that inpatient psychiatric services are medically necessary for this patient for a duration of 7 midnights for the treatment of Bipolar affective disorder, current episode manic ([address] )    Available alternative community resources do not meet the patient's mental health care needs  I further attest that an established written individualized plan of care has been implemented and is outlined in the patient's medical records  ** Please Note: This note has been constructed using a voice recognition system   **

## 2021-12-24 NOTE — PROGRESS NOTES
12/24/21 1000   Activity/Group Checklist   Group Other (Comment)  (OPEN STUDIO Art Therapy/Social, Free-Expression)   Attendance Attended   Attendance Duration (min) Greater than 60   Interactions Interacted appropriately   Affect/Mood Appropriate   Goals Achieved Able to listen to others; Able to engage in interactions

## 2021-12-24 NOTE — NURSING NOTE
Pt in behavior control and out in milieu  Can be demanding and entitled but is redirectable  Pt preoccupied with belongings, techs did allow pt to go through them at this time  Pt speech is rapid, pressured and disorganized at times  Pt fixated on klonopin  Denies psych symptoms at this time, reports depression and is tearful at times  Denies any other unmet needs or complaints at this time  Will monitor

## 2021-12-24 NOTE — ASSESSMENT & PLAN NOTE
Patient seen and examined today, medically clear for admission to [address] [place] [address] morning labs, will review results  ECG was reviewed

## 2021-12-24 NOTE — RESTRAINT FACE TO FACE
Restraint Face to Face   Horacio Celaya 42 y o  female MRN: [id number]  Unit/Bed#: [facility] 342-02 Encounter: [id number]      Physical Evaluation: Unremarkable, non distress noted  Purpose for Restraints/ Seclusion High risk for self harm, High risk for causing significant disruption of treatment environment  and High risk for harm to others  Patient's reaction to the intervention Patient screamed at staff, attempted to bite staff, blaming others  Patient's medical condition Stable  Patient's Behavioral condition: Screaming, tearful, patient screamed at this RN upon first meeting her  Restraints to be Continued

## 2021-12-24 NOTE — NURSING NOTE
Patient was in 4 point restraints at 2014 and the restraints were removed at 2053 following a debriefing

## 2021-12-24 NOTE — NURSING NOTE
Patient is calm and reading  Patient is able to verbalize how her actions affect the unit and herself  Patient is also able to verbalize the reasons for the seclusion room and how to prevent the behavioral issues in the future

## 2021-12-24 NOTE — NURSING NOTE
Patient admitted on a 36 from [facility] ER  She was arguing, demanding, accusing and angry as soon as she arrived  She escalated further as soon as her possessions were given to staff and when she was directed to her room she was throwing objects around, not able to be verbally redirected, refused po meds and ultimately with security present she was given IM Zyprexa  She became combative and was placed in 4 point restraints for aggressive, violent behavior

## 2021-12-24 NOTE — ED NOTES
Insurance Authorization for admission:   Phone call received from [employer]  Phone number: [phone]  Spoke to [employer]  7 days approved, LCD 12/29  Level of care: IP  Review on 12/29  Concurrent review will likely be Terry Hanley  Authorization # [id number]

## 2021-12-24 NOTE — CONSULTS
[address] [facility], [place] 1975, 42 y o  female MRN: [id number]  Unit/Bed#: Joe Alford 953-30 Encounter: [id number]  Primary Care Provider: No primary care provider on file  Date and time admitted to hospital: 12/23/2021  7:22 PM    Inpatient consult for Medical Clearance for [facility] patient  Consult performed by: Trini Romero PA-C  Consult ordered by: Maikol Camarillo MD          Medical clearance for psychiatric admission  Assessment & Plan  Patient seen and examined today, medically clear for admission to [address] [place] [address] labs, will review results  ECG was reviewed    Rhabdomyolysis  Assessment & Plan  · CK 12/22/2021 789, reflex 12 5  · Likely rhabdo  · Patient has been restrained on multiple occasions  · Continue to monitor, repeat CK in AM  · Encourage fluids    Encounter for monitoring opioid maintenance therapy  Assessment & Plan  · PDMP reviewed  · Continue Suboxone 8mg BID    Tobacco abuse  Assessment & Plan  · Current everyday smoker  · Supplementation with nicotine patch  · Encouraged cessation    * Bipolar affective disorder, current episode manic ([facility] [address] )  Assessment & Plan  · 302 signed for inpatient admission  · Patient currently in seclusion room for aggressive behavior  · Management per Psychiatry    ECT Clearance:   History of recent seizure or stroke:  No   History of pheochromocytoma:  No   History of active bleeding (Intracranial hemorrhage, aneurysm or AVM):  No   History of metallic implants in the head or neck:  No   History of increased intracranial pressure with mass effect:  No   Does the patient have a current arrhythmia? No      Based on above criteria, Patient is medically cleared for ECT should it be recommended  Counseling / Coordination of Care Time: 45 minutes  Greater than 50% of total time spent on patient counseling and coordination of care  Collaboration of Care:  Were Recommendations Directly Discussed with Primary Treatment Team? - Yes     History of Present Illness:    Susan Chowdhury is a 42 y o  female who is originally admitted to the psychiatry service due to increasing anxiety and jeff  We are consulted for medical clearance for admission to [facility] Unit and treatment of underlying psychiatric illness  Patient has a past medical history significant for bipolar affective disorder, tobacco abuse, opioid maintenance therapy  On evaluation patient denies any physical complaints such as headaches, dizziness, chest pain, shortness of breath, nausea/vomiting/diarrhea, urinary symptoms at this time  Patient appears very manic with rapid and pressured speech  She was very concerned about her anxiety issues  Patient was nonviolent during time of examination and was redirectable to answer examiners questions appropriately  During examination patient was in the seclusion room due to earlier aggressive behavior towards self and staff  Inpatient admission for psychiatric evaluation at this time  Morning labs ordered, will review results  ECG was reviewed  Review of Systems:    Review of Systems   Constitutional: Negative for chills and fever  HENT: Negative for congestion, ear pain, rhinorrhea and sore throat  Eyes: Negative for pain and visual disturbance  Respiratory: Negative for cough, shortness of breath and wheezing  Cardiovascular: Negative for chest pain and palpitations  Gastrointestinal: Negative for abdominal pain, constipation, diarrhea, nausea and vomiting  Genitourinary: Negative for dysuria, hematuria and urgency  Musculoskeletal: Negative for arthralgias, back pain and myalgias  Skin: Negative for color change and rash  Neurological: Negative for dizziness, seizures, syncope and headaches  Psychiatric/Behavioral: Positive for agitation  The patient is nervous/anxious  All other systems reviewed and are negative        Past Medical and Surgical History:     Past Medical History: Diagnosis Date    Lower back pain        Past Surgical History:   Procedure Laterality Date    CHOLECYSTECTOMY         Meds/Allergies:    all medications and allergies reviewed    Allergies: Allergies   Allergen Reactions    Haloperidol Other (See Comments)     oculogyr crisis       Social History:     Marital Status: Single    Substance Use History:   Social History     Substance and Sexual Activity   Alcohol Use Never     Social History     Tobacco Use   Smoking Status Current Every Day Smoker    Packs/day: 1 00    Types: Cigarettes   Smokeless Tobacco Current User     Social History     Substance and Sexual Activity   Drug Use Not Currently    Comment: 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  Family History:    History reviewed  No pertinent family history  Physical Exam:     Vitals:   Blood Pressure: 109/62 (12/23/21 1957)  Pulse: 74 (12/23/21 1957)  Temperature: 98 8 °F (37 1 °C) (12/23/21 1957)  Temp Source: Temporal (12/23/21 1957)  Respirations: 16 (12/23/21 1957)  Height: 5' 1" (154 9 cm) (12/23/21 1957)  SpO2: 97 % (12/23/21 1957)    Physical Exam  Vitals reviewed  Constitutional:       General: She is not in acute distress  HENT:      Head: Normocephalic and atraumatic  Nose: No congestion or rhinorrhea  Mouth/Throat:      Mouth: Mucous membranes are moist       Pharynx: Oropharynx is clear  Eyes:      General: No scleral icterus  Pupils: Pupils are equal, round, and reactive to light  Cardiovascular:      Rate and Rhythm: Normal rate and regular rhythm  Pulses: Normal pulses  Heart sounds: No murmur heard  Pulmonary:      Effort: Pulmonary effort is normal       Breath sounds: Normal breath sounds  No wheezing or rales  Abdominal:      General: Bowel sounds are normal  There is no distension  Palpations: Abdomen is soft  Tenderness: There is no abdominal tenderness  Musculoskeletal:         General: Normal range of motion        Right lower leg: No edema  Left lower leg: No edema  Skin:     General: Skin is warm and dry  Findings: No rash  Neurological:      Mental Status: She is alert  Psychiatric:         Mood and Affect: Mood is anxious  Affect is tearful  Speech: Speech is rapid and pressured  Behavior: Behavior is agitated  Additional Data:     Lab Results: I have personally reviewed pertinent reports  Results from last 7 days   Lab Units 12/22/21  0930   WBC Thousand/uL 6 66   HEMOGLOBIN g/dL 12 4   HEMATOCRIT % 36 6   PLATELETS Thousands/uL 244   NEUTROS PCT % 58   LYMPHS PCT % 33   MONOS PCT % 8   EOS PCT % 0     Results from last 7 days   Lab Units 12/22/21  0930   SODIUM mmol/L 140   POTASSIUM mmol/L 4 2   CHLORIDE mmol/L 107   CO2 mmol/L 24   BUN mg/dL 15   CREATININE mg/dL 0 75   ANION GAP mmol/L 9   CALCIUM mg/dL 8 4   GLUCOSE RANDOM mg/dL 126             No results found for: HGBA1C        EKG, Pathology, and Other Studies Reviewed on Admission:   · EKG (12/22/21): NSR  · CXR (12/22/21): Low lung volumes with left greater than right ground-glass opacity  Of this may in part be due to vascular crowding from suboptimal inspiration, pulmonary edema or atypical pneumonia cannot be excluded  ** Please Note: This note has been constructed using a voice recognition system   **

## 2021-12-24 NOTE — ASSESSMENT & PLAN NOTE
· CK 12/22/2021 789, reflex 12 5  · Likely rhabdo  · Patient has been restrained on multiple occasions  · Continue to monitor, repeat CK in AM  · Encourage fluids

## 2021-12-24 NOTE — NURSING NOTE
Pt taken out of restraints at 0857  Debriefing occurred at this time, pt was able to verbalize why they'd been put in restraints and stated she is now able to remain in behavior control  PRN zyprexa IM 10mg effective  Pt offered breakfast and morning medications

## 2021-12-24 NOTE — RESTRAINT FACE TO FACE
Restraint Face to Face   [place] 42 y o  female MRN: [id number]  Unit/Bed#: [facility] 342-02 Encounter: [id number]      Physical Evaluation - Agitated, screaming, threatening  Purpose for Restraints/ Seclusion High risk for self harm, High risk for causing significant disruption of treatment environment  and High risk for harm to others  Patient's reaction to the intervention - escalated agitation  Patient's medical condition stable  Patient's Behavioral condition - angry, agitated, paranoid, accusing  Restraints to be Continued

## 2021-12-24 NOTE — NURSING NOTE
Patient making statements about cutting her throat  Patient also stating "I cut mu throat and I got 13 stiches  If I had locked the bathroom door my boyfriend wouldn't have been able to come in and I would be dead! Patient continued talking about harming herself by " "  Patient continued to make suicidal threats  Patient was placed in paper scrubs and seclusion room

## 2021-12-24 NOTE — RESTRAINT FACE TO FACE
Restraint Face to Face   Susan Chowdhury 42 y o  female MRN: [id number]  Unit/Bed#: [facility] 342-02 Encounter: [id number]      Physical Evaluation: stable, pt in no acute physical distress  Respirations wnl     Purpose for Restraints/ Seclusion High risk for self harm, High risk for causing significant disruption of treatment environment  and High risk for harm to others  Patient's reaction to the intervention: tolerated  Patient's medical condition: stable, unchanged  Patient's Behavioral condition: agitated, yelling  Restraints to be Continued

## 2021-12-24 NOTE — NURSING NOTE
Patient refused blood work  Patient educated in importance of blood work  Patient continue to refused

## 2021-12-24 NOTE — NURSING NOTE
Pt came up to nurses station yelling she needed "everything you have for anxiety" Pt offered PRN Zyprexa 10 mg po and atarax 100 mg, she then threw these medications at this writer yelling "that's not what I nabil asked for I need my klonopin"  Pt redirected to room and security called to floor to assist  Pt initially refused to take PO meds, but then agreed  During mouth check pt spit the medications at this writer  IM zyprexa 10 mg given in R deltoid @ 3516  Pt began threatening staff and their families yelling "you will all die in a fire, I hope your kids die, you will all burn in hell for what youre doing here"  Pt trying to bite security guards and attempting to [address] [facility] staff  Pt placed in 4 point restraints @ [phone]  Physician notified by tiger text  Attempted to debrief with pt but she was unwilling to speak with this writer  Continued to threaten violence against staff and yell racial slurs  Will attempt to debrief at a later time

## 2021-12-24 NOTE — NURSING NOTE
Pt out on unit following physician around demanding medications while doctor was speaking with other patients, waiting outside the door yelling about medications  Pt not redirectable and began cursing at staff threatening staff's family members stating "I hope all your kids and families die so you will know what I am going through" pt demanding klonopin  Throwing belongings around dayroom  Redirected to room where pt began stomping feet and screaming at staff that "this is a group home, my dad build [employer], hes a millionaire I will own all your asses!" IM ativan 2mg given in L deltoid @ 1342, not effective  PRN thorazine 50mg IM given in R thigh  Pt slamming chair, and desk drawers trying to push past staff  Pt continued to be verbally threatening, Not able to follow directions  Pt placed in seclusion @ 1420  Physician notified  Unable to debrief with patient at this time, will attempt again later

## 2021-12-24 NOTE — TREATMENT PLAN
TREATMENT PLAN REVIEW - [address] 42 y o  1975 female MRN: [id number]    [address] [address] Room / Bed: Reginal Duverney 033-67 Encounter: [id number]          Admit Date/Time:  12/23/2021  7:22 PM    Treatment Team: Attending Provider: Ana Fong MD; Patient Care Technician: Carol Bell; Patient Care Assistant: Estella Juan;  Patient Care Assistant: Gisella Salvador; Registered Nurse: Geena Welsh RN; Patient Care Assistant: Crystal Sands; : Shannan Jacques    Diagnosis: Principal Problem:    Bipolar affective disorder, current episode manic [facility]  Active Problems:    Medical clearance for psychiatric admission    Rhabdomyolysis    Patient Strengths: average or above intelligence, capable of independent living, communication skills, family ties, general fund of knowledge, motivation for treatment/growth, negotiates basic needs     Patient Barriers: difficulty adapting, limited support system    Short Term Goals: decrease in depressive symptoms, decrease in manic symptoms, decrease in frequency of aggressive outbursts, ability to stay safe on the unit, ability to stay free from restraints, improvement in impulse control, tolerate medications, mood stabilization, increase in socialization with peers on the unit, acceptance of need for psychiatric treatment, acceptance of psychiatric medications    Long Term Goals: stabilization of mood, free of suicidal thoughts, free of homicidal thoughts, no self abusive behavior, improved impulse control, improved insight, no agitation on the unit, no aggressive behavior on the unit, acceptance of need for psychiatric medications, acceptance of need for psychiatric treatment, acceptance of need for psychiatric follow up after discharge, acceptance of psychiatric diagnosis, adequate self care, adequate sleep, adequate appetite, adequate oral intake, appropriate interaction with peers    Progress Towards Goals: starting psychitric medications as prescribed, continue psychiatric medications as prescribed    Recommended Treatment: medication management, patient medication education, group therapy, milieu therapy, continued Behavioral Health psychiatric evaluation/assessment process     Treatment Frequency: daily medication monitoring, group and milieu therapy daily, monitoring through interdisciplinary rounds, monitoring through weekly patient care conferences    Expected Discharge Date:  tbd    Discharge Plan: referral for outpatient medication management with a psychiatrist, referral for outpatient psychotherapy    Treatment Plan Created/Updated By: Stefani Torres MD

## 2021-12-25 LAB
25(OH)D3 SERPL-MCNC: 22.6 NG/ML (ref 30–100)
ALBUMIN SERPL BCP-MCNC: 4 G/DL (ref 3–5.2)
ALP SERPL-CCNC: 68 U/L (ref 43–122)
ALT SERPL W P-5'-P-CCNC: 37 U/L
ANION GAP SERPL CALCULATED.3IONS-SCNC: 4 MMOL/L (ref 5–14)
AST SERPL W P-5'-P-CCNC: 52 U/L (ref 14–36)
BASOPHILS # BLD AUTO: 0 THOUSANDS/ΜL (ref 0–0.1)
BASOPHILS NFR BLD AUTO: 0 % (ref 0–1)
BILIRUB SERPL-MCNC: 0.84 MG/DL
BUN SERPL-MCNC: 10 MG/DL (ref 5–25)
CALCIUM SERPL-MCNC: 8.6 MG/DL (ref 8.4–10.2)
CHLORIDE SERPL-SCNC: 108 MMOL/L (ref 97–108)
CHOLEST SERPL-MCNC: 196 MG/DL
CK MB SERPL-MCNC: 6.7 NG/ML (ref 0–2.4)
CK MB SERPL-MCNC: <1 % (ref 0–2.5)
CK SERPL-CCNC: 903 U/L (ref 30–135)
CO2 SERPL-SCNC: 25 MMOL/L (ref 22–30)
CREAT SERPL-MCNC: 0.77 MG/DL (ref 0.6–1.2)
EOSINOPHIL # BLD AUTO: 0 THOUSAND/ΜL (ref 0–0.4)
EOSINOPHIL NFR BLD AUTO: 0 % (ref 0–6)
ERYTHROCYTE [DISTWIDTH] IN BLOOD BY AUTOMATED COUNT: 13.3 %
EST. AVERAGE GLUCOSE BLD GHB EST-MCNC: 103 MG/DL
GFR SERPL CREATININE-BSD FRML MDRD: 94 ML/MIN/1.73SQ M
GLUCOSE P FAST SERPL-MCNC: 123 MG/DL (ref 70–99)
GLUCOSE SERPL-MCNC: 123 MG/DL (ref 70–99)
HBA1C MFR BLD: 5.2 %
HCT VFR BLD AUTO: 42.3 % (ref 36–46)
HDLC SERPL-MCNC: 43 MG/DL
HGB BLD-MCNC: 14 G/DL (ref 12–16)
LDLC SERPL CALC-MCNC: 135 MG/DL
LYMPHOCYTES # BLD AUTO: 2 THOUSANDS/ΜL (ref 0.5–4)
LYMPHOCYTES NFR BLD AUTO: 26 % (ref 25–45)
MAGNESIUM SERPL-MCNC: 2.1 MG/DL (ref 1.6–2.3)
MCH RBC QN AUTO: 29.1 PG (ref 26–34)
MCHC RBC AUTO-ENTMCNC: 33 G/DL (ref 31–36)
MCV RBC AUTO: 88 FL (ref 80–100)
MONOCYTES # BLD AUTO: 0.6 THOUSAND/ΜL (ref 0.2–0.9)
MONOCYTES NFR BLD AUTO: 8 % (ref 1–10)
NEUTROPHILS # BLD AUTO: 5.1 THOUSANDS/ΜL (ref 1.8–7.8)
NEUTS SEG NFR BLD AUTO: 66 % (ref 45–65)
NONHDLC SERPL-MCNC: 153 MG/DL
PHOSPHATE SERPL-MCNC: 3.3 MG/DL (ref 2.5–4.8)
PLATELET # BLD AUTO: 264 THOUSANDS/UL (ref 150–450)
PMV BLD AUTO: 6.8 FL (ref 8.9–12.7)
POTASSIUM SERPL-SCNC: 3.6 MMOL/L (ref 3.6–5)
PROT SERPL-MCNC: 7.4 G/DL (ref 5.9–8.4)
RBC # BLD AUTO: 4.8 MILLION/UL (ref 4–5.2)
SODIUM SERPL-SCNC: 137 MMOL/L (ref 137–147)
TRIGL SERPL-MCNC: 92 MG/DL
TSH SERPL DL<=0.05 MIU/L-ACNC: 2.63 UIU/ML (ref 0.47–4.68)
WBC # BLD AUTO: 7.8 THOUSAND/UL (ref 4.5–11)

## 2021-12-25 PROCEDURE — 84100 ASSAY OF PHOSPHORUS: CPT | Performed by: STUDENT IN AN ORGANIZED HEALTH CARE EDUCATION/TRAINING PROGRAM

## 2021-12-25 PROCEDURE — 80053 COMPREHEN METABOLIC PANEL: CPT | Performed by: STUDENT IN AN ORGANIZED HEALTH CARE EDUCATION/TRAINING PROGRAM

## 2021-12-25 PROCEDURE — 80061 LIPID PANEL: CPT | Performed by: STUDENT IN AN ORGANIZED HEALTH CARE EDUCATION/TRAINING PROGRAM

## 2021-12-25 PROCEDURE — 82550 ASSAY OF CK (CPK): CPT | Performed by: STUDENT IN AN ORGANIZED HEALTH CARE EDUCATION/TRAINING PROGRAM

## 2021-12-25 PROCEDURE — 84443 ASSAY THYROID STIM HORMONE: CPT | Performed by: STUDENT IN AN ORGANIZED HEALTH CARE EDUCATION/TRAINING PROGRAM

## 2021-12-25 PROCEDURE — 83735 ASSAY OF MAGNESIUM: CPT | Performed by: STUDENT IN AN ORGANIZED HEALTH CARE EDUCATION/TRAINING PROGRAM

## 2021-12-25 PROCEDURE — 83036 HEMOGLOBIN GLYCOSYLATED A1C: CPT | Performed by: STUDENT IN AN ORGANIZED HEALTH CARE EDUCATION/TRAINING PROGRAM

## 2021-12-25 PROCEDURE — NC001 PR NO CHARGE: Performed by: STUDENT IN AN ORGANIZED HEALTH CARE EDUCATION/TRAINING PROGRAM

## 2021-12-25 PROCEDURE — 82306 VITAMIN D 25 HYDROXY: CPT | Performed by: STUDENT IN AN ORGANIZED HEALTH CARE EDUCATION/TRAINING PROGRAM

## 2021-12-25 PROCEDURE — 85025 COMPLETE CBC W/AUTO DIFF WBC: CPT | Performed by: STUDENT IN AN ORGANIZED HEALTH CARE EDUCATION/TRAINING PROGRAM

## 2021-12-25 PROCEDURE — 86592 SYPHILIS TEST NON-TREP QUAL: CPT | Performed by: STUDENT IN AN ORGANIZED HEALTH CARE EDUCATION/TRAINING PROGRAM

## 2021-12-25 PROCEDURE — 82553 CREATINE MB FRACTION: CPT | Performed by: STUDENT IN AN ORGANIZED HEALTH CARE EDUCATION/TRAINING PROGRAM

## 2021-12-25 PROCEDURE — 99232 SBSQ HOSP IP/OBS MODERATE 35: CPT | Performed by: PSYCHIATRY & NEUROLOGY

## 2021-12-25 RX ORDER — RISPERIDONE 2 MG/1
2 TABLET, ORALLY DISINTEGRATING ORAL DAILY
Status: DISCONTINUED | OUTPATIENT
Start: 2021-12-26 | End: 2021-12-27

## 2021-12-25 RX ORDER — RISPERIDONE 2 MG/1
2 TABLET, ORALLY DISINTEGRATING ORAL
Status: DISCONTINUED | OUTPATIENT
Start: 2021-12-25 | End: 2021-12-27

## 2021-12-25 RX ORDER — OLANZAPINE 10 MG/1
10 INJECTION, POWDER, LYOPHILIZED, FOR SOLUTION INTRAMUSCULAR
Status: DISCONTINUED | OUTPATIENT
Start: 2021-12-25 | End: 2021-12-27

## 2021-12-25 RX ORDER — OLANZAPINE 10 MG/1
10 INJECTION, POWDER, LYOPHILIZED, FOR SOLUTION INTRAMUSCULAR DAILY
Status: DISCONTINUED | OUTPATIENT
Start: 2021-12-26 | End: 2021-12-27

## 2021-12-25 RX ORDER — IBUPROFEN 600 MG/1
600 TABLET ORAL EVERY 8 HOURS PRN
Status: DISCONTINUED | OUTPATIENT
Start: 2021-12-25 | End: 2022-01-05 | Stop reason: HOSPADM

## 2021-12-25 RX ORDER — IBUPROFEN 400 MG/1
400 TABLET ORAL EVERY 6 HOURS PRN
Status: DISCONTINUED | OUTPATIENT
Start: 2021-12-25 | End: 2022-01-05 | Stop reason: HOSPADM

## 2021-12-25 RX ADMIN — HYDROXYZINE HYDROCHLORIDE 100 MG: 50 TABLET, FILM COATED ORAL at 17:30

## 2021-12-25 RX ADMIN — NICOTINE POLACRILEX 4 MG: 4 GUM, CHEWING ORAL at 23:43

## 2021-12-25 RX ADMIN — TOPIRAMATE 75 MG: 25 TABLET, FILM COATED ORAL at 23:27

## 2021-12-25 RX ADMIN — ACETAMINOPHEN 975 MG: 325 TABLET ORAL at 07:47

## 2021-12-25 RX ADMIN — RISPERIDONE 2 MG: 2 TABLET, ORALLY DISINTEGRATING ORAL at 23:28

## 2021-12-25 RX ADMIN — CHLORPROMAZINE HYDROCHLORIDE 50 MG: 25 INJECTION INTRAMUSCULAR at 18:02

## 2021-12-25 RX ADMIN — OLANZAPINE 10 MG: 10 TABLET, FILM COATED ORAL at 17:30

## 2021-12-25 RX ADMIN — FLUOXETINE 10 MG: 10 CAPSULE ORAL at 08:33

## 2021-12-25 RX ADMIN — BUPRENORPHINE AND NALOXONE 8 MG: 8; 2 FILM BUCCAL; SUBLINGUAL at 08:32

## 2021-12-25 RX ADMIN — TRAZODONE HYDROCHLORIDE 50 MG: 50 TABLET ORAL at 23:27

## 2021-12-25 RX ADMIN — DIPHENHYDRAMINE HYDROCHLORIDE 50 MG: 50 INJECTION, SOLUTION INTRAMUSCULAR; INTRAVENOUS at 18:02

## 2021-12-25 RX ADMIN — LORAZEPAM 2 MG: 2 INJECTION INTRAMUSCULAR; INTRAVENOUS at 13:37

## 2021-12-25 RX ADMIN — BUPRENORPHINE AND NALOXONE 8 MG: 8; 2 FILM BUCCAL; SUBLINGUAL at 23:28

## 2021-12-25 RX ADMIN — IBUPROFEN 600 MG: 600 TABLET, FILM COATED ORAL at 23:42

## 2021-12-25 RX ADMIN — GABAPENTIN 400 MG: 400 CAPSULE ORAL at 08:33

## 2021-12-25 RX ADMIN — GABAPENTIN 500 MG: 400 CAPSULE ORAL at 23:27

## 2021-12-25 RX ADMIN — OLANZAPINE 10 MG: 10 INJECTION, POWDER, FOR SOLUTION INTRAMUSCULAR at 11:31

## 2021-12-25 RX ADMIN — DIPHENHYDRAMINE HYDROCHLORIDE 50 MG: 50 INJECTION, SOLUTION INTRAMUSCULAR; INTRAVENOUS at 11:31

## 2021-12-25 RX ADMIN — NICOTINE POLACRILEX 4 MG: 4 GUM, CHEWING ORAL at 14:55

## 2021-12-25 RX ADMIN — GABAPENTIN 500 MG: 400 CAPSULE ORAL at 15:00

## 2021-12-25 NOTE — NURSING NOTE
Pt continues to pull at restrained limbs and intermittently yell paranoid and threatening comments at continual observation staff, disrupting unit  When nurse approaches pt, she is able to be redirected from behavior temporarily and lays back down to rest  The behavior repeats again periodically  Pt actions show little evidence of learning

## 2021-12-25 NOTE — NURSING NOTE
PRNs partially effective  Pt remains agitated and threatening upon approach, but is no longer excessively loud or violent

## 2021-12-25 NOTE — NURSING NOTE
Pt decreased down to alternating locked limb restraints  Pt continues to be agitated, labile, yelling, but less so  Pt demanding suboxone which writer observed her taking earlier as scheduled  Sitter in place

## 2021-12-25 NOTE — PROGRESS NOTES
SECOND OPINION FOR INJECTABLE PSYCHIATRIC MEDICATIONS    Madeleine Coello 42 y o  female MRN: [id number]  Unit/Bed#: [facility] 912-42 Encounter: [id number]      Reason for Admission/Current Symptoms:    Madeleine Coello is a 42 y o   female with a history of Bipolar Disorder and substance abuse (on Suboxone) who was admitted to the inpatient psychiatric unit on a involuntary 302 commitment basis due to unstable mood, agitation and inability to function safely in a less structured setting  The patient presented irritable, agitated, w/ poor insight and judgement, and required several IM STAT meds over past 24 hours including Zyprexa 10 mg IM yesterday at 752 and 2200  The patient has been non-compliant with her schedule po mood stabilizers  Second opinion for injectable psychotropic medications was requested by Dr Deion Ghosh due to Carey's refusal to take oral medications  On evaluation today Simran Benz presented irritable w/ limited cooperation, dressed in hospital attire, disheveled, w/ dysphoric and irritable mood, hyperintense and at times labile affect, pressured speech and perseverative, w/ circumstantial thought process and ruminating on receiving Ativan or Klonopin, grandiose ideations, limited insight, impaired judgement and poor impulse control  The patient is a poor historian, and was not able to provide a linear history  She reported that his father who was "the creator of [employer]"  last  due to [place]", and theb she has been very anxious and stressed out as getting close to the [place]  She denied A/VH, and denied SI/HI, intent or plan at this time  She noted that "only Klonopin and Ativan" works for her and she is not willing to take any other medication  Psycho-education regarding indications, benefits, risks, side effects, and alternative options provided to the patient, and the importance of the compliance with psychiatric treatment reiterated   The patient does not have a factual understanding on her current sxs, and the information provided to her w/ limited insight and poor judgement, and then became more irritable, loud, walked out the interview room, became loud and agitated, asking for PRN medication  Mental Status Evaluation:  Appearance and attitude: appeared as stated age, disheveled, dressed in hospital attire  Eye contact: fair  Motor Function: within normal limits, intact gait, No PMA/PMR  Gait/station: normal gait/station and normal balance  Speech: talking loud and pressured speech  Language: No overt abnormality  Mood/affect: dysphoric, irritable / Affect was labile, hyper-intense  Thought Processes: disorganized, racing of thoughts, preoccupied with benzodiazpines  Thought content: denied suicidal ideations or homicidal ideations, paranoid ideation, grandiose ideas, preoccupied with meds  Associations: circumstantial associations, perseverative  Perceptual disturbances: denies Auditory/Visual/Tactile Hallucinations  Orientation: oriented to place and person  Cognitive Function: intact  Memory: not cooperative with formal MMSE  Intellect: unable to assess  Fund of knowledge: aware of past history and vocabulary average  Impulse control: poor  Insight/judgment: limited/impaired      Vital signs in last 24 hours:    Temp:  [97 1 °F (36 2 °C)] 97 1 °F (36 2 °C)  HR:  [94] 94  Resp:  [16] 16  BP: (116)/(74) 116/74    Labs: I have personally reviewed all pertinent laboratory/tests results  Medications: All current active medications have been reviewed      Current medications:  Current Facility-Administered Medications   Medication Dose Route Frequency Provider Last Rate    acetaminophen  650 mg Oral Q6H PRN Wei Raygoza PA-C      acetaminophen  650 mg Oral Q4H PRN Wei Raygoza PA-C      acetaminophen  975 mg Oral Q6H PRN Anabelle Hernandez PA-C      aluminum-magnesium hydroxide-simethicone  30 mL Oral Q4H PRN Tegan Ellsworth MD      artificial tear  1 application Both Eyes Q3H PRN Paula Solorio MD      benztropine  1 mg Intramuscular Q4H PRN Max 6/day Paula Solorio MD      benztropine  1 mg Oral Q4H PRN Max 6/day Paula Solorio MD      benztropine  1 mg Oral BID Benjamin Pang MD      buprenorphine-naloxone  8 mg Sublingual BID Paula Solorio MD      chlorproMAZINE  50 mg Intramuscular Q4H PRN Benjamin Pang MD      chlorproMAZINE  100 mg Oral Q4H PRN Benjamin Pang MD      Diclofenac Sodium  2 g Topical 4x Daily Anabelle Hernandez PA-C      hydrOXYzine HCL  50 mg Oral Q6H PRN Max 4/day Paula Solorio MD      Or   [facility] diphenhydrAMINE  50 mg Intramuscular Q6H PRN Paula Solorio MD      FLUoxetine  10 mg Oral Once Benjamin Pang MD      FLUoxetine  10 mg Oral Daily Benjamin Pang MD      gabapentin  400 mg Oral TID Benjamin Pang MD      hydrOXYzine HCL  100 mg Oral Q6H PRN Max 4/day Paula Solorio MD      Or   [facility] LORazepam  2 mg Intramuscular Q6H PRN Paula Solorio MD      hydrOXYzine HCL  25 mg Oral Q6H PRN Max 4/day Paula Solorio MD      ibuprofen  400 mg Oral Q6H PRN Paula Solorio MD      ibuprofen  600 mg Oral Q8H PRN Paula Solorio MD      melatonin  3 mg Oral HS PRN Paula Solorio MD      nicotine  1 patch Transdermal Daily Manju Marin PA-C      nicotine polacrilex  4 mg Oral Q2H PRN Benjamin Pang MD      OLANZapine  10 mg Oral Q3H PRN Max 3/day Paula Solorio MD      Or    OLANZapine  10 mg Intramuscular Q3H PRN Max 3/day Paula Solorio MD      OLANZapine  5 mg Oral Q3H PRN Max 6/day Paula Solorio MD      Or    OLANZapine  5 mg Intramuscular Q3H PRN Max 6/day Paula Solorio MD      OLANZapine  2 5 mg Oral Q3H PRN Max 8/day Paula Solorio MD      ondansetron  4 mg Oral Q6H PRN Benjamin Pang MD      polyethylene glycol  17 g Oral Daily PRN Paula Solorio MD      risperiDONE  1 mg Oral Daily Benjamin Pang MD      risperiDONE  2 mg Oral HS Benjamin Pang MD      senna-docusate sodium  1 tablet Oral Daily PRN Paula Solorio MD      sterile water           sterile water           sterile water           topiramate  75 mg Oral HS Orlando Davis MD      traZODone  50 mg Oral HS Orlando Davis MD         Assessment/Plan     Principal Problem:    Bipolar affective disorder, current episode manic [facility])  Active Problems:    Medical clearance for psychiatric admission    Rhabdomyolysis    Tobacco abuse    Encounter for monitoring opioid maintenance therapy      Recommended Treatment:     I agree with the need for injectable antipsychotic medications if she refuses oral medications  At this time, the patient does not have the capacity to refuse po medication due to lack of factual understanding of her current presentation, treatment options, benefits / risks, alternative options and indications of the proposed medications, and is not able to function safely and appropriately in a less structured setting which warrants inpatient psychiatric care, and medication over objection in case of refusing the po meds       Laurel Millard MD 12/25/21

## 2021-12-25 NOTE — RESTRAINT FACE TO FACE
Restraint Face to Face   Merly Gomes 42 y o  female MRN: [id number]  Unit/Bed#: Juliette Short 342-02 Encounter: [id number]      Physical Evaluation: Patient remains combative and tries to hit staff while in restraints  Purpose for Restraints/ Seclusion High risk for causing significant disruption of treatment environment , High risk for harm to others and high risk for flight  Patient's reaction to the intervention: Combative and unable to maintain behavioral control as seen by her attempts to hit staff    Patient's medical condition: Medically Stable  Patient's Behavioral condition:Psychotic/ Combative  Restraints to be Continued

## 2021-12-25 NOTE — NURSING NOTE
Patient verbalized understanding  Restraints were d/c at this time  Pt was given snacks, fluids and vital signs were taken and are stable

## 2021-12-25 NOTE — ASSESSMENT & PLAN NOTE
· 302 signed for inpatient admission  · Patient currently in seclusion room for aggressive behavior  · Management per Psychiatry

## 2021-12-25 NOTE — NURSING NOTE
Patient remains agitated, labile, and is having difficulty concentrating  She is pacing the unit and yelling demanding to be given klonopin or xanax  She is focused on benzos  Pt would not follow redirection today and continued to curse at the doctors and demand to be transferred to another facility  Security was called for a walkthrough and pt was given IM Zyprexa 10 mg and IM benadryl 50 mg which was tolerated  Will continue to monitor closely

## 2021-12-25 NOTE — PLAN OF CARE
Problem: SAFETY, RESTRAINT - VIOLENT/SELF-DESTRUCTIVE  Goal: Remains free of harm/injury from restraints (Restraint for Violent/Self-Destructive Behavior)  Description: INTERVENTIONS:  - Instruct patient/family regarding restraint use   - Assess and monitor physiologic and psychological status   - Provide interventions and comfort measures to meet assessed patient needs   - Ensure continuous in person monitoring is provided   - Identify and implement measures to help patient regain control  - Assess readiness for release of restraint  12/25/2021 1813 by Neva East RN  Outcome: Not Progressing  12/25/2021 1149 by Neva East RN  Outcome: Not Progressing  Goal: Returns to optimal restraint-free functioning  Description: INTERVENTIONS:  - Assess the patient's behavior and symptoms that indicate continued need for restraint  - Identify and implement measures to help patient regain control  - Assess readiness for release of restraint   12/25/2021 1813 by Neva East RN  Outcome: Not Progressing  12/25/2021 1149 by Neva East RN  Outcome: Not Progressing

## 2021-12-25 NOTE — NURSING NOTE
Pt was screaming threats of HI toward staff and demanding medications and "guns to shoot myself in the head " Pt refused PO zyprexa  Pt was attempting to break window in seclusion room  Pt unable to be redirected or de-escalated  Pt placed in 4-point locked limb restraints at 1000 with security assist  Pt refusing education presently, screaming "fuck you, I hope your family dies!" Order obtained from Dr Venkat Castanon via [id number] doo  PRN IM zyprexa and benadryl given  Pt appears in no outward distress, circulation adequate, breathing normal, regular, and non-labored  Continual observation staff present, nursing round frequency increased  Pt continues to be restless, agitated, and screaming intermittently

## 2021-12-25 NOTE — NURSING NOTE
PRN ativan 2 mg IM given for severe anxiety  Pt refused po medication to assist with anxiety   Will monitor

## 2021-12-25 NOTE — RESTRAINT FACE TO FACE
Restraint Face to Face   Lyn Due 42 y o  female MRN: [id number]  Unit/Bed#: [facility] 342-02 Encounter: [id number]      Physical Evaluation unremarkable   Purpose for Restraints/ Seclusion high risk for self harm and high risk for harm other   Patient's reaction to the intervention patient verbally aggressive towards staff member   Patient's medical condition Stable   Patient's Behavioral condition labil aggressive poor insight, unable to be redirected   Restraints to be continued

## 2021-12-25 NOTE — NURSING NOTE
Patient became increasingly agitated during dinner and began to posture at [facility] on her continual observation  Pt threatened [facility] and would not follow redirection or take any po prn medications offered  IM thorazine 50 mg given and 50 mg of benadryl IM given  Medication was tolerated  Pt was placed in seclusion room for safety of pt and staff  Will monitor closely and reassess medications

## 2021-12-25 NOTE — PLAN OF CARE
Problem: DEPRESSION  Goal: Will be euthymic at discharge  Description: INTERVENTIONS:  - Administer medication as ordered  - Provide emotional support via 1:1 interaction with staff  - Encourage involvement in milieu/groups/activities  - Monitor for social isolation  Outcome: Not Progressing     Problem: ANXIETY  Goal: Will report anxiety at manageable levels  Description: INTERVENTIONS:  - Administer medication as ordered  - Teach and encourage coping skills  - Provide emotional support  - Assess patient/family for anxiety and ability to cope  Outcome: Not Progressing  Goal: By discharge: Patient will verbalize 2 strategies to deal with anxiety  Description: Interventions:  - Identify any obvious source/trigger to anxiety  - Staff will assist patient in applying identified coping technique/skills  - Encourage attendance of scheduled groups and activities  Outcome: Not Progressing     Problem: INVOLUNTARY ADMIT  Goal: Will cooperate with staff recommendations and doctor's orders and will demonstrate appropriate behavior  Description: INTERVENTIONS:  - Treat underlying conditions and offer medication as ordered  - Educate regarding involuntary admission procedures and rules  - Utilize positive consistent limit setting strategies to support patient and staff safety  Outcome: Not Progressing     Problem: SAFETY, RESTRAINT - VIOLENT/SELF-DESTRUCTIVE  Goal: Remains free of harm/injury from restraints (Restraint for Violent/Self-Destructive Behavior)  Description: INTERVENTIONS:  - Instruct patient/family regarding restraint use   - Assess and monitor physiologic and psychological status   - Provide interventions and comfort measures to meet assessed patient needs   - Ensure continuous in person monitoring is provided   - Identify and implement measures to help patient regain control  - Assess readiness for release of restraint  Outcome: Not Progressing  Goal: Returns to optimal restraint-free functioning  Description: INTERVENTIONS:  - Assess the patient's behavior and symptoms that indicate continued need for restraint  - Identify and implement measures to help patient regain control  - Assess readiness for release of restraint   Outcome: Not Progressing     Problem: DISCHARGE PLANNING  Goal: Discharge to home or other facility with appropriate resources  Description: INTERVENTIONS:  - Identify barriers to discharge w/patient and caregiver  - Arrange for needed discharge resources and transportation as appropriate  - Identify discharge learning needs (meds, wound care, etc )  - Refer to Case Management Department for coordinating discharge planning if the patient needs post-hospital services based on physician/advanced practitioner order or complex needs related to functional status, cognitive ability, or social support system  Outcome: Not Progressing

## 2021-12-26 LAB — RPR SER QL: NORMAL

## 2021-12-26 PROCEDURE — 99232 SBSQ HOSP IP/OBS MODERATE 35: CPT | Performed by: PSYCHIATRY & NEUROLOGY

## 2021-12-26 RX ADMIN — RISPERIDONE 2 MG: 2 TABLET, ORALLY DISINTEGRATING ORAL at 08:41

## 2021-12-26 RX ADMIN — MELATONIN TAB 3 MG 3 MG: 3 TAB at 20:47

## 2021-12-26 RX ADMIN — ONDANSETRON 4 MG: 4 TABLET, ORALLY DISINTEGRATING ORAL at 12:00

## 2021-12-26 RX ADMIN — OLANZAPINE 10 MG: 10 TABLET, FILM COATED ORAL at 15:10

## 2021-12-26 RX ADMIN — LORAZEPAM 2 MG: 2 INJECTION INTRAMUSCULAR; INTRAVENOUS at 20:48

## 2021-12-26 RX ADMIN — GABAPENTIN 500 MG: 400 CAPSULE ORAL at 21:11

## 2021-12-26 RX ADMIN — BUPRENORPHINE AND NALOXONE 8 MG: 8; 2 FILM BUCCAL; SUBLINGUAL at 21:10

## 2021-12-26 RX ADMIN — NICOTINE POLACRILEX 4 MG: 4 GUM, CHEWING ORAL at 15:50

## 2021-12-26 RX ADMIN — TOPIRAMATE 75 MG: 25 TABLET, FILM COATED ORAL at 21:10

## 2021-12-26 RX ADMIN — NICOTINE POLACRILEX 4 MG: 4 GUM, CHEWING ORAL at 11:51

## 2021-12-26 RX ADMIN — BENZTROPINE MESYLATE 1 MG: 1 TABLET ORAL at 08:41

## 2021-12-26 RX ADMIN — RISPERIDONE 2 MG: 2 TABLET, ORALLY DISINTEGRATING ORAL at 21:11

## 2021-12-26 RX ADMIN — GABAPENTIN 500 MG: 400 CAPSULE ORAL at 15:49

## 2021-12-26 RX ADMIN — HYDROXYZINE HYDROCHLORIDE 100 MG: 50 TABLET, FILM COATED ORAL at 04:15

## 2021-12-26 RX ADMIN — GABAPENTIN 500 MG: 400 CAPSULE ORAL at 08:42

## 2021-12-26 RX ADMIN — TRAZODONE HYDROCHLORIDE 50 MG: 50 TABLET ORAL at 21:10

## 2021-12-26 RX ADMIN — BUPRENORPHINE AND NALOXONE 8 MG: 8; 2 FILM BUCCAL; SUBLINGUAL at 08:42

## 2021-12-26 RX ADMIN — NICOTINE POLACRILEX 4 MG: 4 GUM, CHEWING ORAL at 19:45

## 2021-12-26 RX ADMIN — LORAZEPAM 2 MG: 2 INJECTION INTRAMUSCULAR; INTRAVENOUS at 10:36

## 2021-12-26 NOTE — NURSING NOTE
Patient awake  Stated she is starving and requested a snack and drink  Both given  Patient requested a prn for anxiety  Patient became very upset about not having a blanket  Pillow cases were already given  No paper sheets available  [employer] given to patient, as she is on a 1:1  Patient contracted for safety "I swear to God I am not going to try anything  I am freezing  This is not fair "    Patient heard snoring and observed sleeping by 0427  Will monitor

## 2021-12-26 NOTE — PLAN OF CARE
Problem: DEPRESSION  Goal: Will be euthymic at discharge  Description: INTERVENTIONS:  - Administer medication as ordered  - Provide emotional support via 1:1 interaction with staff  - Encourage involvement in milieu/groups/activities  - Monitor for social isolation  Outcome: Progressing     Problem: ANXIETY  Goal: Will report anxiety at manageable levels  Description: INTERVENTIONS:  - Administer medication as ordered  - Teach and encourage coping skills  - Provide emotional support  - Assess patient/family for anxiety and ability to cope  Outcome: Progressing  Goal: By discharge: Patient will verbalize 2 strategies to deal with anxiety  Description: Interventions:  - Identify any obvious source/trigger to anxiety  - Staff will assist patient in applying identified coping technique/skills  - Encourage attendance of scheduled groups and activities  Outcome: Progressing     Problem: INVOLUNTARY ADMIT  Goal: Will cooperate with staff recommendations and doctor's orders and will demonstrate appropriate behavior  Description: INTERVENTIONS:  - Treat underlying conditions and offer medication as ordered  - Educate regarding involuntary admission procedures and rules  - Utilize positive consistent limit setting strategies to support patient and staff safety  Outcome: Progressing     Problem: SAFETY, RESTRAINT - VIOLENT/SELF-DESTRUCTIVE  Goal: Remains free of harm/injury from restraints (Restraint for Violent/Self-Destructive Behavior)  Description: INTERVENTIONS:  - Instruct patient/family regarding restraint use   - Assess and monitor physiologic and psychological status   - Provide interventions and comfort measures to meet assessed patient needs   - Ensure continuous in person monitoring is provided   - Identify and implement measures to help patient regain control  - Assess readiness for release of restraint  Outcome: Progressing  Goal: Returns to optimal restraint-free functioning  Description: INTERVENTIONS:  - Assess the patient's behavior and symptoms that indicate continued need for restraint  - Identify and implement measures to help patient regain control  - Assess readiness for release of restraint   Outcome: Progressing     Problem: DISCHARGE PLANNING  Goal: Discharge to home or other facility with appropriate resources  Description: INTERVENTIONS:  - Identify barriers to discharge w/patient and caregiver  - Arrange for needed discharge resources and transportation as appropriate  - Identify discharge learning needs (meds, wound care, etc )  - Refer to Case Management Department for coordinating discharge planning if the patient needs post-hospital services based on physician/advanced practitioner order or complex needs related to functional status, cognitive ability, or social support system  Outcome: Progressing

## 2021-12-26 NOTE — PROGRESS NOTES
Progress Note - Behavioral Health [facility] 42 y o  female MRN: @MRN   Unit/Bed#: [facility] [place] 052-80 Encounter: [id number]      Report from staff regarding this patient received and discussed, and records reviewed prior to seeing this patient  Behavior over the last 24 hours: minimal improvement  The patient initially was agitated in the morning but after received p r n  medications was calming down, when evaluated by the writer in early afternoon she still had disorganized pattern of thoughts, fast speech, still concentrated on benzodiazepines but could be redirected temporary when the writer explained to her that Suboxone and benzodiazepine together not indicated and may even be harmful  The because of the patient refusal to take FDA approved medication risperidone that the patient did not have allergy to, the writer asked for 2nd opinion to start medication over objection  Because of the patient poor insight such medication over objection is required to help the patient with manic state with aggressive acts and frequent agitations which create danger to herself and potentially other people  Patient remains agitated at times, anxious, argumentative, bizarre, distracted and distressed today      Sleep: insomnia  Appetite: fair  Medication side effects: No     Mental Status Evaluation:    Appearance:  dressed in hospital attire   Mood:  dysphoric, anxious   Affect: constricted    Speech:  increased rate, pressured, hypertalkative   Thought Content:  paranoid ideation, intrusive thoughts, negative thinking   Perceptual Disturbances: does not appear responding to internal stimuli   Risk Potential: Suicidal ideation - None, contracts for safety on the unit  Homicidal ideation - angry, hostile feelings with no homicidal plan  Potential for aggression - Yes, due to agitation   Insight:  poor   Judgment: poor   Motor Activity: no abnormal movements         Laboratory results:  I have personally reviewed all pertinent laboratory results  Progress Toward Goals: no significant improvement today    Assessment/Plan   Principal Problem:    Bipolar affective disorder, current episode manic (HCC)  Active Problems:    Medical clearance for psychiatric admission    Rhabdomyolysis    Tobacco abuse    Encounter for monitoring opioid maintenance therapy    Recommended Treatment:    Will initiate medication objection protocol  The patient will be receiving initial dose 2 mg of risperidone twice a day and if the patient refuse p o  risperidone IM Zyprexa will be provided  Planned medication and treatment changes: All current active medications have been reviewed  Continue treatment with group therapy, milieu therapy, occupational therapy and medication management  ** Please Note: This note has been constructed using a voice recognition system   **      BMP:   Recent Labs     12/25/21  0809   K 3 6      CO2 25   BUN 10     Vitals:    12/25/21 1546   BP: 116/80   Pulse: (!) 115   Resp: 16   Temp: 97 9 °F (36 6 °C)   SpO2: 100%        Medication Administration - last 24 hours from 12/24/2021 2133 to 12/25/2021 2133       Date/Time Order Dose Route Action Action by     12/25/2021 1802 hydrOXYzine HCL (ATARAX) tablet 50 mg   Oral See Alternative Aidee Conception, RN     12/25/2021 1131 hydrOXYzine HCL (ATARAX) tablet 50 mg   Oral See Alternative Aidee Conception, RN     12/24/2021 2200 hydrOXYzine HCL (ATARAX) tablet 50 mg   Oral See Alternative Fernando Saeed, RN     12/25/2021 1802 diphenhydrAMINE (BENADRYL) injection 50 mg 50 mg Intramuscular Given Aidee Conception, RN     12/25/2021 1131 diphenhydrAMINE (BENADRYL) injection 50 mg 50 mg Intramuscular Given Aidee Conception, RN     12/24/2021 2200 diphenhydrAMINE (BENADRYL) injection 50 mg 50 mg Intramuscular Given Fernando Cashing, RN     12/25/2021 1730 hydrOXYzine HCL (ATARAX) tablet 100 mg 100 mg Oral Given Aidee Conception, RN     12/25/2021 1337 hydrOXYzine HCL (ATARAX) tablet 100 mg   Oral See Alternative Gem Cervantes, PORFIRIO     12/25/2021 1730 LORazepam (ATIVAN) injection 2 mg   Intramuscular See Alternative Gem Cervantes, PORFIRIO     12/25/2021 1337 LORazepam (ATIVAN) injection 2 mg 2 mg Intramuscular Given Gem Cervantes, RN     12/25/2021 1730 OLANZapine (ZyPREXA) tablet 10 mg 10 mg Oral Given Gem Cervantes, PORFIRIO     12/25/2021 1131 OLANZapine (ZyPREXA) tablet 10 mg   Oral See Alternative Gem Cervantes, RN     12/25/2021 0712 OLANZapine (ZyPREXA) tablet 10 mg 10 mg Oral Not Given Reyes Seitz, LPN     44/39/1823 0844 OLANZapine (ZyPREXA) tablet 10 mg   Oral See Alternative Bharati Lang, PORFIRIO     12/24/2021 2157 OLANZapine (ZyPREXA) tablet 10 mg 10 mg Oral Not Given Bharati Lang, PORFIRIO     12/25/2021 1730 OLANZapine (ZyPREXA) IM injection 10 mg   Intramuscular See Alternative Gem Cervantes, PORFIRIO     12/25/2021 1131 OLANZapine (ZyPREXA) IM injection 10 mg 10 mg Intramuscular Given Gem Cervantes, RN     12/25/2021 0712 OLANZapine (ZyPREXA) IM injection 10 mg   Intramuscular See Alternative Reyes Seitz, [facility]     82/51/2657 3011 OLANZapine (ZyPREXA) IM injection 10 mg 10 mg Intramuscular Given Bharati Lang, PORFIRIO     12/24/2021 2157 OLANZapine (ZyPREXA) IM injection 10 mg   Intramuscular See Alternative Bharati Lang RN     12/25/2021 8319 buprenorphine-naloxone (Suboxone) film 8 mg 8 mg Sublingual Given Gem Cervantes, PORFIRIO     12/24/2021 2148 buprenorphine-naloxone (Suboxone) film 8 mg 8 mg Sublingual Given Bharati Lang RN     12/24/2021 2148 topiramate (TOPAMAX) tablet 75 mg 75 mg Oral Given Bharati Lang, PORFIRIO     12/25/2021 1455 nicotine polacrilex (NICORETTE) gum 4 mg 4 mg Oral Given Gem Cervantes, RN     12/25/2021 6576 gabapentin (NEURONTIN) capsule 400 mg 400 mg Oral Given Gem Cervanets RN     12/24/2021 2148 gabapentin (NEURONTIN) capsule 400 mg 400 mg Oral Given Bharati Lang RN     12/24/2021 2148 traZODone (DESYREL) tablet 50 mg 50 mg Oral Given [facility] Renny Kussmaul, RN     12/24/2021 2239 risperiDONE (RisperDAL M-TAB) disintegrating tablet 2 mg 2 mg Oral Not Given Melissa Walker, RN     12/25/2021 6193 FLUoxetine (PROzac) capsule 10 mg 10 mg Oral Given Anna Slater, RN     12/25/2021 1802 chlorproMAZINE (THORAZINE) injection SOLN 50 mg 50 mg Intramuscular Given Anna Slater, RN     12/25/2021 1717 benztropine (COGENTIN) tablet 1 mg 1 mg Oral Refused Anna Slater, RN     12/25/2021 0002 benztropine (COGENTIN) tablet 1 mg 1 mg Oral Refused Anna Slater, RN     12/25/2021 0233 risperiDONE (RisperDAL M-TAB) disintegrating tablet 1 mg 1 mg Oral Refused Anna Slater, RN     12/25/2021 0747 acetaminophen (TYLENOL) tablet 975 mg 975 mg Oral Given Anna Slater, RN     12/25/2021 0834 nicotine (NICODERM CQ) 14 mg/24hr TD 24 hr patch 1 patch 1 patch Transdermal Not Given Anna Slater, RN     12/24/2021 2201 nicotine (NICODERM CQ) 14 mg/24hr TD 24 hr patch 1 patch 1 patch Transdermal Not Given Melissa Walker, RN     12/24/2021 2200 nicotine (NICODERM CQ) 14 mg/24hr TD 24 hr patch 1 patch 1 patch Transdermal Patch Removed Melissa Walker, RN     12/25/2021 1717 Diclofenac Sodium (VOLTAREN) 1 % topical gel 2 g 2 g Topical Refused Anna Slatre, RN     12/25/2021 1131 Diclofenac Sodium (VOLTAREN) 1 % topical gel 2 g 2 g Topical Refused Anna Slater, RN     12/25/2021 0812 Diclofenac Sodium (VOLTAREN) 1 % topical gel 2 g 2 g Topical Refused Anna Slater, RN     12/24/2021 2200 Diclofenac Sodium (VOLTAREN) 1 % topical gel 2 g 2 g Topical Not Given Melissa Walker, RN     12/25/2021 1500 gabapentin (NEURONTIN) capsule 500 mg 500 mg Oral Given Anna Slater, RN

## 2021-12-26 NOTE — PLAN OF CARE
Problem: DEPRESSION  Goal: Will be euthymic at discharge  Description: INTERVENTIONS:  - Administer medication as ordered  - Provide emotional support via 1:1 interaction with staff  - Encourage involvement in milieu/groups/activities  - Monitor for social isolation  12/26/2021 1147 by Albert Oliva RN  Outcome: Not Progressing  12/26/2021 1139 by Albert Oliva RN       Problem: ANXIETY  Goal: Will report anxiety at manageable levels  Description: INTERVENTIONS:  - Administer medication as ordered  - Teach and encourage coping skills  - Provide emotional support  - Assess patient/family for anxiety and ability to cope  12/26/2021 1147 by Albert Oliva RN  Outcome: Not Progressing     Problem: ANXIETY  Goal: By discharge: Patient will verbalize 2 strategies to deal with anxiety  Description: Interventions:  - Identify any obvious source/trigger to anxiety  - Staff will assist patient in applying identified coping technique/skills  - Encourage attendance of scheduled groups and activities  12/26/2021 1147 by Albert Oliva RN  Outcome: Not Progressing     Problem: INVOLUNTARY ADMIT  Goal: Will cooperate with staff recommendations and doctor's orders and will demonstrate appropriate behavior  Description: INTERVENTIONS:  - Treat underlying conditions and offer medication as ordered  - Educate regarding involuntary admission procedures and rules  - Utilize positive consistent limit setting strategies to support patient and staff safety  12/26/2021 1147 by Albert Oliva RN  Outcome: Not Progressing     Problem: SAFETY, RESTRAINT - VIOLENT/SELF-DESTRUCTIVE  Goal: Returns to optimal restraint-free functioning  Description: INTERVENTIONS:  - Assess the patient's behavior and symptoms that indicate continued need for restraint  - Identify and implement measures to help patient regain control  - Assess readiness for release of restraint   12/26/2021 1147 by Albert Oliva RN  Outcome: Not Progressing  12/26/2021 1139 by Yamilex Baugh RN  Outcome: Progressing     Problem: SAFETY, RESTRAINT - VIOLENT/SELF-DESTRUCTIVE  Goal: Remains free of harm/injury from restraints (Restraint for Violent/Self-Destructive Behavior)  Description: INTERVENTIONS:  - Instruct patient/family regarding restraint use   - Assess and monitor physiologic and psychological status   - Provide interventions and comfort measures to meet assessed patient needs   - Ensure continuous in person monitoring is provided   - Identify and implement measures to help patient regain control  - Assess readiness for release of restraint  12/26/2021 1147 by Yamilex Baugh RN  Outcome: Not Progressing     Problem: DISCHARGE PLANNING  Goal: Discharge to home or other facility with appropriate resources  Description: INTERVENTIONS:  - Identify barriers to discharge w/patient and caregiver  - Arrange for needed discharge resources and transportation as appropriate  - Identify discharge learning needs (meds, wound care, etc )  - Refer to Case Management Department for coordinating discharge planning if the patient needs post-hospital services based on physician/advanced practitioner order or complex needs related to functional status, cognitive ability, or social support system  12/26/2021 1147 by Yamilex Baugh RN  Outcome: Not Progressing    Problem: DISCHARGE PLANNING  Goal: Discharge to home or other facility with appropriate resources  Description: INTERVENTIONS:  - Identify barriers to discharge w/patient and caregiver  - Arrange for needed discharge resources and transportation as appropriate  - Identify discharge learning needs (meds, wound care, etc )  - Refer to Case Management Department for coordinating discharge planning if the patient needs post-hospital services based on physician/advanced practitioner order or complex needs related to functional status, cognitive ability, or social support system  12/26/2021 1147 by Virginia Evans Ted Dan, RN  Outcome: Not Progressing

## 2021-12-26 NOTE — NURSING NOTE
Pt is accusing staff members of stealing her belongings (pt's belongings are charted and in her locker)  She is agitated and continues to accuse others

## 2021-12-26 NOTE — NURSING NOTE
Patient reported 10/10 back pain  Motrin requested and recived  Compliant with all scheduled HS medications

## 2021-12-26 NOTE — NURSING NOTE
Patient is agitated, labile, uncooperative at times and has poor concentration  She has been following redirection better today  Pt received IM ativan today at 10:36 for severe anxiety and refused po alt  Denies SI,HI,AH,VH  Pt was cooperative with all AM medications including the risperidone (meds over obj)  Pt was also taken off continual observation  Will continue to monitor

## 2021-12-26 NOTE — NURSING NOTE
Patient's asleep and restraints (seclusion) d/c at this time  Continual 1:1 close proximity continues  Staff unable to assess at this time  Will continue to monitor

## 2021-12-26 NOTE — NURSING NOTE
Patient is pleasant and cooperative  She reports improvement in mood  Denies SI, HI, AH, VH  Med and meal complaint  Pt Is hopeful for discharge this week  Does not report any unmet needs

## 2021-12-26 NOTE — NURSING NOTE
Patient received PRN Zyprexa 10 mg po which was effective  She has been less agitated in the evening and more cooperative  Pt is in the milieu socializing with select peers

## 2021-12-26 NOTE — NURSING NOTE
Patient was hiding make up supplies in her pocket  When [facility] asked pt to return item after using it, she became agitated and stated "she hid personal belongings in her room" Security was called to assist with room check and body search  Pt complied

## 2021-12-27 PROCEDURE — 99232 SBSQ HOSP IP/OBS MODERATE 35: CPT | Performed by: PSYCHIATRY & NEUROLOGY

## 2021-12-27 RX ORDER — FLUOXETINE 10 MG/1
10 CAPSULE ORAL DAILY
Status: DISCONTINUED | OUTPATIENT
Start: 2021-12-27 | End: 2021-12-31

## 2021-12-27 RX ORDER — OLANZAPINE 10 MG/1
10 INJECTION, POWDER, LYOPHILIZED, FOR SOLUTION INTRAMUSCULAR
Status: DISCONTINUED | OUTPATIENT
Start: 2021-12-27 | End: 2022-01-05 | Stop reason: HOSPADM

## 2021-12-27 RX ORDER — BUPRENORPHINE AND NALOXONE 8; 2 MG/1; MG/1
8 FILM, SOLUBLE BUCCAL; SUBLINGUAL 2 TIMES DAILY
Status: DISCONTINUED | OUTPATIENT
Start: 2021-12-27 | End: 2022-01-05 | Stop reason: HOSPADM

## 2021-12-27 RX ORDER — OLANZAPINE 10 MG/1
10 TABLET ORAL DAILY
Status: DISCONTINUED | OUTPATIENT
Start: 2021-12-28 | End: 2022-01-05 | Stop reason: HOSPADM

## 2021-12-27 RX ORDER — OLANZAPINE 10 MG/1
10 TABLET ORAL
Status: DISCONTINUED | OUTPATIENT
Start: 2021-12-27 | End: 2022-01-05 | Stop reason: HOSPADM

## 2021-12-27 RX ORDER — OLANZAPINE 10 MG/1
10 INJECTION, POWDER, LYOPHILIZED, FOR SOLUTION INTRAMUSCULAR DAILY
Status: DISCONTINUED | OUTPATIENT
Start: 2021-12-28 | End: 2022-01-05 | Stop reason: HOSPADM

## 2021-12-27 RX ADMIN — TOPIRAMATE 75 MG: 25 TABLET, FILM COATED ORAL at 21:42

## 2021-12-27 RX ADMIN — GABAPENTIN 500 MG: 400 CAPSULE ORAL at 08:51

## 2021-12-27 RX ADMIN — DICLOFENAC SODIUM 2 G: 10 GEL TOPICAL at 08:58

## 2021-12-27 RX ADMIN — DICLOFENAC SODIUM 2 G: 10 GEL TOPICAL at 12:17

## 2021-12-27 RX ADMIN — NICOTINE POLACRILEX 4 MG: 4 GUM, CHEWING ORAL at 04:48

## 2021-12-27 RX ADMIN — BUPRENORPHINE AND NALOXONE 8 MG: 8; 2 FILM BUCCAL; SUBLINGUAL at 17:00

## 2021-12-27 RX ADMIN — NICOTINE POLACRILEX 4 MG: 4 GUM, CHEWING ORAL at 15:52

## 2021-12-27 RX ADMIN — IBUPROFEN 600 MG: 600 TABLET, FILM COATED ORAL at 10:03

## 2021-12-27 RX ADMIN — NICOTINE 1 PATCH: 14 PATCH, EXTENDED RELEASE TRANSDERMAL at 08:51

## 2021-12-27 RX ADMIN — HYDROXYZINE HYDROCHLORIDE 100 MG: 50 TABLET, FILM COATED ORAL at 04:48

## 2021-12-27 RX ADMIN — BENZTROPINE MESYLATE 1 MG: 1 TABLET ORAL at 17:00

## 2021-12-27 RX ADMIN — OLANZAPINE 10 MG: 10 TABLET, FILM COATED ORAL at 21:42

## 2021-12-27 RX ADMIN — NICOTINE POLACRILEX 4 MG: 4 GUM, CHEWING ORAL at 21:28

## 2021-12-27 RX ADMIN — GABAPENTIN 500 MG: 400 CAPSULE ORAL at 21:41

## 2021-12-27 RX ADMIN — GABAPENTIN 500 MG: 400 CAPSULE ORAL at 15:52

## 2021-12-27 RX ADMIN — DICLOFENAC SODIUM 2 G: 10 GEL TOPICAL at 21:40

## 2021-12-27 RX ADMIN — FLUOXETINE 10 MG: 10 CAPSULE ORAL at 11:30

## 2021-12-27 RX ADMIN — BUPRENORPHINE AND NALOXONE 8 MG: 8; 2 FILM BUCCAL; SUBLINGUAL at 08:52

## 2021-12-27 RX ADMIN — RISPERIDONE 2 MG: 2 TABLET, ORALLY DISINTEGRATING ORAL at 08:51

## 2021-12-27 RX ADMIN — LORAZEPAM 2 MG: 2 INJECTION INTRAMUSCULAR; INTRAVENOUS at 09:44

## 2021-12-27 RX ADMIN — BENZTROPINE MESYLATE 1 MG: 1 TABLET ORAL at 08:51

## 2021-12-27 RX ADMIN — NICOTINE POLACRILEX 4 MG: 4 GUM, CHEWING ORAL at 10:02

## 2021-12-27 RX ADMIN — SENNOSIDES AND DOCUSATE SODIUM 1 TABLET: 8.6; 5 TABLET ORAL at 10:31

## 2021-12-27 RX ADMIN — TRAZODONE HYDROCHLORIDE 50 MG: 50 TABLET ORAL at 21:41

## 2021-12-27 NOTE — NURSING NOTE
Patient approached the nurses station and requested prn atarax for severe anxiety and gum  Will monitor

## 2021-12-27 NOTE — CASE MANAGEMENT
Psychosocial Assessment 1:1   Cm met with pt privately in consult room; reviewed role of CM  Pt was cooperative although unable to provide all intake info needed due to rapid and tangential speech with disorganized thoughts  Pt ended meeting quickly as she remained focused on her need to find out status of her food stamps  Cm attempted to review housing options upon d/c and aftercare MH treatment  Pt reports unable to discuss that due to needing to know status of food stamps  Pt reports "I'll take care of that when I get out "     Admission / Details: 302 admission from [facility] ED, [facility]  Pt admitted due to increased anxiety, rapid and tangential speech, increased depression related to one year anniversary of pt's father's death due to [facility]: [place]  Commitment Status: 302  Insurance: Misc Medicaid, [employer]  Rx coverage: Medicaid  Marital Status: single  Children: denies  Family: mother Anh May, tel# [phone]  Pt refused to sign MINNA  Residence: reports being homeless and has been "couch surfing"  Level of Ed: reports some college  Work History: denies  Income/Source: reports she has not applied for disability  Pt reports she is supposed to be getting food stamps  Pt focused on contacting "the center off [facility]" to check on status of her food stamps  Pt reports her address on chart is the [place] off [facility]  Pt unable to provide any further information  Mandaeism: unale to answer  Transportation: walks  Legal Issues: Reports awaiting charges to be filed in [address] [place] [address] 289 for possession of drugs and paraphernalia   Pharmacy:  unable to answer   Tx HX: unable to answer due to focused on discussing food stamps  Trauma HX:  unable to answer due to focused on discussing food stamps    Family hx:  unable to answer due to focused on discussing food stamps    D&A HX:  Suboxone maintenance through [facility]  Pt unable to recall location of clinic   Reports having been to [facility] in [place]; unable to state when attended rehab  Pt reports being in multiple rehab facilities but refused to discuss stating "too many to talk about  If it didn't happen in this year then it's not relevant "  Medical: Rhabdomyolysis  DME: none  Tobacco: smokes daily   HX: denies  Access to firearms:denies  UDS Results: + Benzos   Pt currently on suboxone maintenance  PCP: no PCP listed; pt unable to provide info  Psych: unknown, pt unable to provide info  Therapist: unknown, pt unable to provide info  ICM/ACT:  unknown, pt unable to provide info  Community Supports: reports no supports  Stressors: homelessness and no income  Strengths:  unknown, pt unable to provide info  Coping Skills: unknown, pt unable to provide info  ROIS Signed: refused to sign  Treatment Plan Signed: yes  IMM Signed: NA  Upcoming Appointments: unknown  Covid vaccine received: unknown, pt unable to provide info

## 2021-12-27 NOTE — NURSING NOTE
Pt denies SI/HI/AH/VH but appears internally preoccupied  Pt observed talking to herself while walking in the milieu  Pt approaching nursing station frequenrly and crying about needing all of her supplies ( permanent, markers, make up, etc)  Informed pt staff would look for it once we have time later this morning  Pt then approached multiple other staff and began asking the same questions  Pt became upset about losing a make up bag  Pt was not able to deescalate  Pt refused PO when offered  Pt requested Ativan  0944 PRN Ativan 2 mg IM given in left ventrogluteal region per request  Pt c/o 9/10 back pain  1003 PRN Ibuprofen 600 mg Po given  Pt c/o constipation  PRN Senokot Given at 1031  Pt requested Bisacodyl after taking medication  Informed pt that she received a laxative  Pt then stated " great now I am going to get sick"  Pt contineud to approach nurses station demanding that her Dr put in the medication the way she asked to have it put in  Reassured pt that I would let her know once he puts the orders in  Pt became upset and began demanding that the Dr put them in right now  Pt educated on coping skills and was then redirected away from nursing station  1103 Pt states PRN Ibuprofen was effective  Pt appears more relaxed this afternoon  Pt remained in behavioral control  Will continue to monitor

## 2021-12-27 NOTE — NURSING NOTE
Patient's at the nurses station agitated, labile, irritated, tearful over her make up bag taking away  Patient requested and given prn IM ativan 2 mg at 2048 for severe anxiety and refused po  Denies all  Patient's compliant with meds  Will continue to monitor

## 2021-12-27 NOTE — TREATMENT TEAM
12/27/21 1550   Team Meeting   Meeting Type Tx Team Meeting   Initial Conference Date 12/27/21   Team Members Present   Team Members Present Physician;Nurse;   Physician Team Member Dr Jade Rendon Team Member [id number] Management Team Member Lubna Marie   Patient/Family Present   Patient Present Yes   Patient's Family Present No     Treatment plan and goals reviewed with pt  Pt in agreement and signed treatment plan

## 2021-12-27 NOTE — NURSING NOTE
Pt intrusive and liable  Pt is very focused on her food stamps and wanting case workers to " find out where my food stamps are because they belong to me '  Pt also very focused on Subuxone   Pt also suspicious about the medications and needed to see medications coming out from the package before she takes it   Pt denies SI/HI and AVH

## 2021-12-27 NOTE — PROGRESS NOTES
Progress Note - Behavioral Health [facility] 42 y o  female MRN: @MRN   Unit/Bed#: [facility] 699-60 Encounter: [id number]      Report from staff regarding this patient received and discussed, and records reviewed prior to seeing this patient  Behavior over the last 24 hours: minimal improvement  Patient was less aggressive less agitated,  No longer demand clonazepam, but still has significant symptoms of manic state with rapid mood changes, fast although slightly slowing speech, and residual symptoms of psychomotor agitation  The patient was more redirectable today  She did not have any inappropriate actions and 1 on 1 was not continued  The patient had concerns regarding the writer's decision to stop Prozac and writer explained to her that in her state now Prozac is not indicated because it may contribute to manic state  The patient had poor insight into her condition and unlikely accepted the writer explanation but did not become more aggressive or agitated as a result our discussion  She stated her sleep improved which may be 1st sign of her improvement  Patient remains hypertalkative, irritable, labile, manic, somatically preoccupied, uncooperative with session and unmotivated today      Sleep: decreased  Appetite: normal  Medication side effects: No     Mental Status Evaluation:    Appearance:  dressed in hospital attire   Mood:  dysphoric, anxious, irritable, angry   Affect: reactive, labile    Speech:  pressured, hypertalkative   Thought Content:  paranoid ideation, grandiose ideas, obsessions, intrusive thoughts   Perceptual Disturbances: denies auditory hallucinations when asked, does not appear responding to internal stimuli   Risk Potential: Suicidal ideation - None  Homicidal ideation - None  Potential for aggression - Yes, due to poor impulse control   Insight:  poor   Judgment: poor   Motor Activity: no abnormal movements         Laboratory results:  I have personally reviewed all pertinent laboratory results  Progress Toward Goals: no significant improvement today    Assessment/Plan   Principal Problem:    Bipolar affective disorder, current episode manic (HCC)  Active Problems:    Medical clearance for psychiatric admission    Rhabdomyolysis    Tobacco abuse    Encounter for monitoring opioid maintenance therapy    Recommended Treatment:  The patient is on medication over objection, will continue risperidone 2 mg twice a day  Risperidone was selected because it is less associated with weight gain than Zyprexa Seroquel the patient was on before, and also indicated her and approved by the FDA to treat manic state that the patient experienced presently  Will not make a changes in the patient does because there was some 1st sign of decreasing intensity of patient's manic symptoms  Planned medication and treatment changes: All current active medications have been reviewed  Continue treatment with group therapy, milieu therapy, occupational therapy and medication management  ** Please Note: This note has been constructed using a voice recognition system   **      BMP:   Recent Labs     12/25/21  0809   K 3 6      CO2 25   BUN 10     Vitals:    12/26/21 1615   BP: 105/64   Pulse: (!) 107   Resp: 18   Temp: 97 5 °F (36 4 °C)   SpO2: 97%        Medication Administration - last 24 hours from 12/25/2021 1929 to 12/26/2021 1929       Date/Time Order Dose Route Action Action by     12/26/2021 1036 hydrOXYzine HCL (ATARAX) tablet 100 mg   Oral See Alternative Andrew Thibodeaux RN     12/26/2021 0415 hydrOXYzine HCL (ATARAX) tablet 100 mg 100 mg Oral Given Cherelle Watt LPN     57/15/2071 7130 LORazepam (ATIVAN) injection 2 mg 2 mg Intramuscular Given Andrew Thibodeaux RN     12/26/2021 0415 LORazepam (ATIVAN) injection 2 mg   Intramuscular See Alternative Cherelle Watt LPN     46/23/4487 2341 OLANZapine (ZyPREXA) tablet 10 mg 10 mg Oral Given Papi Vasques RN     12/26/2021 6540 OLANZapine (ZyPREXA) IM injection 10 mg   Intramuscular See Alternative Jillian Wade RN     12/26/2021 6545 buprenorphine-naloxone (Suboxone) film 8 mg 8 mg Sublingual Given Jillian Wade RN     12/25/2021 2328 buprenorphine-naloxone (Suboxone) film 8 mg 8 mg Sublingual Given Ernestine Ruiz RN     12/25/2021 2245 buprenorphine-naloxone (Suboxone) film 8 mg 8 mg Sublingual Not Given Jonna Christopher RN     12/25/2021 2327 topiramate (TOPAMAX) tablet 75 mg 75 mg Oral Given Ernestine Ruiz RN     12/25/2021 2246 topiramate (TOPAMAX) tablet 75 mg 75 mg Oral Not Given Jonna Christopher RN     12/26/2021 1550 nicotine polacrilex (NICORETTE) gum 4 mg 4 mg Oral Given Jillian Wade RN     12/26/2021 1151 nicotine polacrilex (NICORETTE) gum 4 mg 4 mg Oral Given Jillian Wade RN     12/25/2021 2343 nicotine polacrilex (NICORETTE) gum 4 mg 4 mg Oral Given Ernestine Ruiz RN     12/26/2021 1200 ondansetron (ZOFRAN-ODT) dispersible tablet 4 mg 4 mg Oral Given Jillian Wade RN     12/25/2021 2327 traZODone (DESYREL) tablet 50 mg 50 mg Oral Given Ernestine Ruiz RN     12/25/2021 2247 traZODone (DESYREL) tablet 50 mg 50 mg Oral Not Given Jonna Christopher RN     12/26/2021 1824 benztropine (COGENTIN) tablet 1 mg 1 mg Oral Refused Jillian Wade RN     12/26/2021 0841 benztropine (COGENTIN) tablet 1 mg 1 mg Oral Given Jillian Wade RN     12/26/2021 1732 acetaminophen (TYLENOL) tablet 975 mg   Oral Canceled Entry Jillian Wade RN     12/26/2021 0813 nicotine (NICODERM CQ) 14 mg/24hr TD 24 hr patch 1 patch 1 patch Transdermal Not Given Jillina Wade RN     12/26/2021 1824 Diclofenac Sodium (VOLTAREN) 1 % topical gel 2 g 2 g Topical Refused Jillian Wade RN     12/26/2021 1218 Diclofenac Sodium (VOLTAREN) 1 % topical gel 2 g 2 g Topical Refused Jillian Wade RN     12/26/2021 5066 Diclofenac Sodium (VOLTAREN) 1 % topical gel 2 g 2 g Topical Refused Jillian Wade RN     12/25/2021 2249 Diclofenac Sodium (VOLTAREN) 1 % topical gel 2 g 2 g Topical Not Given Mo Laureano, RN     12/26/2021 1549 gabapentin (NEURONTIN) capsule 500 mg 500 mg Oral Given Leidy Smith, RN     12/26/2021 8956 gabapentin (NEURONTIN) capsule 500 mg 500 mg Oral Given Leidy Smith, RN     12/25/2021 2327 gabapentin (NEURONTIN) capsule 500 mg 500 mg Oral Given Gina Ramirez, RN     12/25/2021 2245 gabapentin (NEURONTIN) capsule 500 mg 500 mg Oral Not Given Mo Laureano, RN     12/25/2021 2342 ibuprofen (MOTRIN) tablet 600 mg 600 mg Oral Given Gina Ramirez, RN     12/25/2021 2328 risperiDONE (RisperDAL M-TAB) disintegrating tablet 2 mg 2 mg Oral Given Gina Ramirez, RN     12/25/2021 2246 risperiDONE (RisperDAL M-TAB) disintegrating tablet 2 mg 2 mg Oral Not Given Mo Laureano, RN     12/25/2021 2328 OLANZapine (ZyPREXA) IM injection 10 mg   Intramuscular See Alternative Gina Ramirez, RN     12/25/2021 2246 OLANZapine (ZyPREXA) IM injection 10 mg   Intramuscular See Alternative Mo Laureano, RN     12/26/2021 0841 risperiDONE (RisperDAL M-TAB) disintegrating tablet 2 mg 2 mg Oral Given Leidy Smith, RN     12/26/2021 0841 OLANZapine (ZyPREXA) IM injection 10 mg   Intramuscular See Alternative Leidy Smith, RN

## 2021-12-27 NOTE — PROGRESS NOTES
Progress Note - Behavioral Health     Violetta Castanon 42 y o  female MRN: [id number]   Unit/Bed#: [place] 172924-46 Encounter: [id number]    Patient was seen and evaluated for continuity of care  On review of records and as per treatment team report, patient is a 46-year-old  female with a history of bipolar disorder and substance use who was admitted to the inpatient psychiatric unit on an involuntary 302 commitment due to mixed symptoms of bipolar disorder, unstable mood, increased agitation, and inability to care take care of self  On admission on 12/24/2021 at 8:21 a m , patient attempted to bite staff and began screaming at staff and was subsequently placed into 4 point restraints  Patient later in the afternoon at 2:44 p m  went into restraints for agitation and significant disruption of the milieu  Then at 6:11 p m, patient went into restraints for being aggressive towards staff, screaming and being unable to be redirected  Patient then went into restraints at 8:00 p m for being verbally aggressive towards staff  Patient is currently medications over objection and 2nd opinion was provided by Dr Adelaida Elias  Patient states that she has been having emotional problems, since the death of her father 1 year ago  She remains focused on telling this writer that her father was the [address] [place] [address]  "Patient states that she has been out of medications, which led up to her current hospitalization  She remains focused on being prescribed lithium, however, patient is currently not on lithium and has not been prescribed lithium, but she remains convinced that the "white pill they gave me is lithium, I have been through this before with Gabapentin, they make a 500mg  "During the interview today, patient was difficult to follow in terms of her thought process and was rapid, bordering on pressured in terms of her speech    During the interview today, patient describes her mood as angry, and notes that she has been having difficulty overall in terms of her motivation, energy, and appetite  Patient does report a slight improvement to her sleep but is unable to state how many hours she slept overnight  She denies SI/HI/AVH/delusions and denies any plan or intent to harm herself or others  Patient did have multiple requests during the interview in terms of her medications  She was in agreement with being switched to Zyprexa (as the patient states that the Risperdal has been making her feel more agitated), which will be changed to Zyprexa 10 mg BID for mood and started on Prozac 10 mg daily for anxiety/racing thoughts  I discussed with the patient to monitor for mood symptoms with being started on Prozac and was explained that her manic symptoms could potentially get worse with Prozac and she expressed understanding of this  She denies SI/HI/AVH/delusions and denies any plan or intent to harm herself or others  She continues to have paranoid ideation towards staff and continues to be intrusive and demanding with nursing  Patient will have a 303 hearing tomorrow      Sleep: slept better  Appetite: decreased  Medication side effects: No   ROS: no complaints, all other systems are negative    Mental Status Evaluation:    Appearance:   female, dressed casually, disheveled, no acute distress, appears older than stated age   Behavior:  guarded, psychomotor agitation, restless and fidgety, limited cooperativity   Speech:  increased rate, articulation error, talkative, at times difficult to interrupt   Mood:  "angry"   Affect:  constricted, irritable   Thought Process:  disorganized, tangential, bordering on flight of ideas   Associations: tangential associations   Thought Content:  some paranoia   Perceptual Disturbances: no auditory hallucinations, no visual hallucinations, denies auditory hallucinations when asked, does not appear responding to internal stimuli, appears distracted, appears preoccupied   Risk Potential: Suicidal ideation - None  Homicidal ideation - None  Potential for aggression - Yes, due to poor impulse control   Sensorium:  unable to assess   Memory:  unable to assess   Consciousness:  alert and awake   Attention/Concentration: decreased concentration and decreased attention span   Insight:  poor   Judgment: poor   Gait/Station: normal gait/station   Motor Activity: no abnormal movements     Vital signs in last 24 hours:    Temp:  [97 5 °F (36 4 °C)-98 4 °F (36 9 °C)] 98 4 °F (36 9 °C)  HR:  [] 99  Resp:  [16-18] 16  BP: ()/(59-64) 99/59    Laboratory results: I have personally reviewed all pertinent laboratory/tests results    Most Recent Labs:   Lab Results   Component Value Date    WBC 7 80 12/25/2021    RBC 4 80 12/25/2021    HGB 14 0 12/25/2021    HCT 42 3 12/25/2021     12/25/2021    RDW 13 3 12/25/2021    NEUTROABS 5 10 12/25/2021    SODIUM 137 12/25/2021    K 3 6 12/25/2021     12/25/2021    CO2 25 12/25/2021    BUN 10 12/25/2021    CREATININE 0 77 12/25/2021    GLUC 123 (H) 12/25/2021    CALCIUM 8 6 12/25/2021    AST 52 (H) 12/25/2021    ALT 37 (H) 12/25/2021    ALKPHOS 68 12/25/2021    TP 7 4 12/25/2021    ALB 4 0 12/25/2021    TBILI 0 84 12/25/2021    CHOLESTEROL 196 12/25/2021    HDL 43 (L) 12/25/2021    TRIG 92 12/25/2021    LDLCALC 135 (H) 12/25/2021    NONHDLC 153 12/25/2021    [id number] 2 630 12/25/2021    PREGUR Negative (-) 12/22/2021    RPR Non-Reactive 12/25/2021    HGBA1C 5 2 12/25/2021     12/25/2021     Progress Toward Goals: progressing slowly    Assessment/Plan   Principal Problem:    Bipolar affective disorder, current episode manic ([address] )  Active Problems:    Medical clearance for psychiatric admission    Rhabdomyolysis    Tobacco abuse    Encounter for monitoring opioid maintenance therapy      Recommended Treatment:      Planned medication and treatment changes:     All current active medications have been reviewed  Encourage group therapy, milieu therapy and occupational therapy  Behavioral Health checks every 7 minutes    1) Switch from Risperdal 2mg BID to Zyprexa 10mg BID for mood stabilization as patient has been reporting agitation/side effects with Zyprexa (Patient is medications over objection-Zyprexa 10mg BID IM)  2) Start Prozac 10mg daily for patient's symptoms of decreased appetite, motivation, and energy; although this medication can cause mood changes/mood swings/induction of hypomania/jeff, patient is currently on mood stabilizing agent of Zyprexa, which will reduce the risk of manic switch, however, I will continue to monitor    3) Continue Suboxone 8mg BID for opioid withdrawal (swithced to 6pm dosing per patient request)  4) Continue Trazodone 50mg HS for insomnia  5) Continue Topamax 75mg HS as adjunct to mood  6) Encourage groups  7) Encourage patient to be visible on unit  8) Encourage patient to communicate thoughts and feelings with nursing/staff  9) CM to coordinate care  10) Continue with SLIM medical management  11) 303 hearing scheduled for tomorrow    Current Facility-Administered Medications   Medication Dose Route Frequency Provider Last Rate    acetaminophen  650 mg Oral Q6H PRN Ethan Rodriguez PA-C      acetaminophen  650 mg Oral Q4H PRN Ethan Rodriguez PA-C      acetaminophen  975 mg Oral Q6H PRN Anabelle Hernandez PA-C      aluminum-magnesium hydroxide-simethicone  30 mL Oral Q4H PRN Ana Fong MD      artificial tear  1 application Both Eyes C3P PRN Ana Fong MD      benztropine  1 mg Intramuscular Q4H PRN Max 6/day Ana Fong MD      benztropine  1 mg Oral Q4H PRN Max 6/day Ana Fong MD      benztropine  1 mg Oral BID Griselda Quinones MD      buprenorphine-naloxone  8 mg Sublingual BID Aashish Morrow MD      chlorproMAZINE  50 mg Intramuscular Q4H PRN Griselda Quinones MD      chlorproMAZINE  100 mg Oral Q4H PRN Griselda Quinones MD      Diclofenac Sodium  2 g Topical 4x Daily Ethan Rodriguez PA-C      hydrOXYzine HCL  50 mg Oral Q6H PRN Max 4/day uclorena Paris MD      Or    diphenhydrAMINE  50 mg Intramuscular Q6H PRN ucilla Wellington, MD      FLUoxetine  10 mg Oral Daily Odessa Walker MD      gabapentin  500 mg Oral TID Hailey Merlos, MD      hydrOXYzine HCL  100 mg Oral Q6H PRN Max 4/day Drucilla Wellington, MD      Or   Alla Hilario LORazepam  2 mg Intramuscular Q6H PRN Drucilla Grain, MD      hydrOXYzine HCL  25 mg Oral Q6H PRN Max 4/day Drucilla Grain, MD      ibuprofen  400 mg Oral Q6H PRN Drucilla Grain, MD      ibuprofen  600 mg Oral Q8H PRN Drucilla Grain, MD      melatonin  3 mg Oral HS PRN Drucilla Grain, MD      nicotine  1 patch Transdermal Daily Pansy Rushing, PA-C      nicotine polacrilex  4 mg Oral Q2H PRN Hailey Merlos MD      OLANZapine  10 mg Oral Q3H PRN Max 3/day Edwin Paris MD      Or    OLANZapine  10 mg Intramuscular Q3H PRN Max 3/day uclorena Grain, MD      OLANZapine  10 mg Oral HS Odessa Walker MD      Or    OLANZapine  10 mg Intramuscular HS Odessa Walker MD      [START ON 12/28/2021] OLANZapine  10 mg Oral Daily Odessa Walker MD      Or   Exie [place] Whitingham Shivers ON 12/28/2021] OLANZapine  10 mg Intramuscular Daily Odessa Walker MD      OLANZapine  5 mg Oral Q3H PRN Max 6/day Edwin Paris MD      Or    OLANZapine  5 mg Intramuscular Q3H PRN Max 6/day Edwin Paris MD      OLANZapine  2 5 mg Oral Q3H PRN Max 8/day Drucilla Grain, MD      ondansetron  4 mg Oral Q6H PRN Hailey Merlos MD      polyethylene glycol  17 g Oral Daily PRN Druclorena Grain, MD      senna-docusate sodium  1 tablet Oral Daily PRN Edwin Grain, MD      sterile water           sterile water           sterile water           sterile water           topiramate  75 mg Oral HS Hailey Merlos MD      traZODone  50 mg Oral HS Hailey Merlos MD       Risks / Benefits of Treatment:    Risks, benefits, and possible side effects of medications explained to patient   Patient has limited understanding of risks and benefits of treatment at this time, but agrees to take medications as prescribed  Counseling / Coordination of Care: Total floor / unit time spent today 20 minutes  Greater than 50% of total time was spent with the patient and / or family counseling and / or coordination of care  A description of counseling / coordination of care:  Patient's progress discussed with staff in treatment team meeting  Medications, treatment progress and treatment plan reviewed with patient  Importance of medication and treatment compliance reviewed with patient  Reassurance and supportive therapy provided  Encouraged participation in milieu and group therapy on the unit      Mary Rosales MD 12/27/21

## 2021-12-27 NOTE — PLAN OF CARE
GARCIA Group Note  Pt attended to groups and was respectful while peers spoke, but when she was asked to speak became pressured and tangential and required Max redirection      Problem: Ineffective Coping  Goal: Participates in unit activities  Description: Interventions:  - Provide therapeutic environment   - Provide required programming   - Redirect inappropriate behaviors   Outcome: Progressing

## 2021-12-27 NOTE — PROGRESS NOTES
12/27/21 1300   Activity/Group Checklist   Group   (recovery group)   Attendance Attended   Attendance Duration (min) 46-60   Interactions Disorganized interaction   Affect/Mood Appropriate   Goals Achieved Able to self-disclose; Able to engage in interactions   Topic: recovery principles- patient has flight of ideas and has difficulty following the discussion

## 2021-12-27 NOTE — PROGRESS NOTES
12/27/21 1122   Team Meeting   Meeting Type Daily Rounds   Team Members Present   Team Members Present Physician;Nurse;   Physician Team Member Dr Jonny Davidson Team Member APPLE KAHN [facility] Management Team Member Adriel   Patient/Family Present   Patient Present No   Patient's Family Present No   Pt is a new admission from 12/23  Pt was in [facility] for severe anxiety  Pt tangential and with pressured speech  Pt was in restraints several times over the weekend  Pt is medication over objection    Readmit score 23

## 2021-12-27 NOTE — PLAN OF CARE
303 hearing scheduled for tomorrow 12/28 at 8:00am      Problem: DISCHARGE PLANNING  Goal: Discharge to home or other facility with appropriate resources  Description: INTERVENTIONS:  - Identify barriers to discharge w/patient and caregiver  - Arrange for needed discharge resources and transportation as appropriate  - Identify discharge learning needs (meds, wound care, etc )  - Refer to Case Management Department for coordinating discharge planning if the patient needs post-hospital services based on physician/advanced practitioner order or complex needs related to functional status, cognitive ability, or social support system  Outcome: Progressing

## 2021-12-27 NOTE — NURSING NOTE
Medicine not effective  Patient's still tearful, hyperverbal, drowsy, intrusive, unsteady gait and disorganized  This nurse asked staff to sit with patient until she's asleep for precautions  Patient's accusatory of staff losing her belongings (make up bag) and peer of pushing her  Patient's reminded that her belongings are locked away in her locker on the unit  Also witnessed her peer helped carried her books to the front of her room and not pushed her  Patient quickly stated "that's not what I meant" while tapping her forehead and becoming increasingly agitated  Sat with pt until she's calmed and verbal encouragement given  Will continue to monitor

## 2021-12-28 LAB
FLUAV RNA RESP QL NAA+PROBE: NEGATIVE
FLUBV RNA RESP QL NAA+PROBE: NEGATIVE
RSV RNA RESP QL NAA+PROBE: NEGATIVE
SARS-COV-2 RNA RESP QL NAA+PROBE: NEGATIVE

## 2021-12-28 PROCEDURE — 99232 SBSQ HOSP IP/OBS MODERATE 35: CPT | Performed by: PSYCHIATRY & NEUROLOGY

## 2021-12-28 PROCEDURE — 0241U HB NFCT DS VIR RESP RNA 4 TRGT: CPT

## 2021-12-28 RX ORDER — DOCUSATE SODIUM 100 MG/1
100 CAPSULE, LIQUID FILLED ORAL 2 TIMES DAILY PRN
Status: DISCONTINUED | OUTPATIENT
Start: 2021-12-28 | End: 2022-01-05 | Stop reason: HOSPADM

## 2021-12-28 RX ADMIN — TOPIRAMATE 75 MG: 25 TABLET, FILM COATED ORAL at 21:20

## 2021-12-28 RX ADMIN — OLANZAPINE 10 MG: 10 TABLET, FILM COATED ORAL at 08:27

## 2021-12-28 RX ADMIN — DICLOFENAC SODIUM 2 G: 10 GEL TOPICAL at 21:22

## 2021-12-28 RX ADMIN — DICLOFENAC SODIUM 2 G: 10 GEL TOPICAL at 08:37

## 2021-12-28 RX ADMIN — DICLOFENAC SODIUM 2 G: 10 GEL TOPICAL at 17:12

## 2021-12-28 RX ADMIN — GABAPENTIN 500 MG: 400 CAPSULE ORAL at 08:27

## 2021-12-28 RX ADMIN — ACETAMINOPHEN 650 MG: 325 TABLET ORAL at 21:31

## 2021-12-28 RX ADMIN — NICOTINE POLACRILEX 4 MG: 4 GUM, CHEWING ORAL at 20:01

## 2021-12-28 RX ADMIN — DOCUSATE SODIUM 100 MG: 100 CAPSULE, LIQUID FILLED ORAL at 14:01

## 2021-12-28 RX ADMIN — HYDROXYZINE HYDROCHLORIDE 100 MG: 50 TABLET, FILM COATED ORAL at 00:25

## 2021-12-28 RX ADMIN — NICOTINE POLACRILEX 4 MG: 4 GUM, CHEWING ORAL at 11:00

## 2021-12-28 RX ADMIN — BENZTROPINE MESYLATE 1 MG: 1 TABLET ORAL at 17:06

## 2021-12-28 RX ADMIN — TRAZODONE HYDROCHLORIDE 50 MG: 50 TABLET ORAL at 21:20

## 2021-12-28 RX ADMIN — OLANZAPINE 10 MG: 10 TABLET, FILM COATED ORAL at 12:58

## 2021-12-28 RX ADMIN — ACETAMINOPHEN 650 MG: 325 TABLET ORAL at 06:09

## 2021-12-28 RX ADMIN — GABAPENTIN 500 MG: 400 CAPSULE ORAL at 21:20

## 2021-12-28 RX ADMIN — NICOTINE POLACRILEX 4 MG: 4 GUM, CHEWING ORAL at 00:26

## 2021-12-28 RX ADMIN — BENZTROPINE MESYLATE 1 MG: 1 TABLET ORAL at 08:27

## 2021-12-28 RX ADMIN — NICOTINE POLACRILEX 4 MG: 4 GUM, CHEWING ORAL at 06:09

## 2021-12-28 RX ADMIN — OLANZAPINE 10 MG: 10 TABLET, FILM COATED ORAL at 21:20

## 2021-12-28 RX ADMIN — BUPRENORPHINE AND NALOXONE 8 MG: 8; 2 FILM BUCCAL; SUBLINGUAL at 17:06

## 2021-12-28 RX ADMIN — BUPRENORPHINE AND NALOXONE 8 MG: 8; 2 FILM BUCCAL; SUBLINGUAL at 08:28

## 2021-12-28 RX ADMIN — NICOTINE 1 PATCH: 14 PATCH, EXTENDED RELEASE TRANSDERMAL at 08:29

## 2021-12-28 RX ADMIN — FLUOXETINE 10 MG: 10 CAPSULE ORAL at 08:27

## 2021-12-28 RX ADMIN — HYDROXYZINE HYDROCHLORIDE 100 MG: 50 TABLET, FILM COATED ORAL at 13:02

## 2021-12-28 RX ADMIN — OLANZAPINE 10 MG: 10 INJECTION, POWDER, FOR SOLUTION INTRAMUSCULAR at 15:10

## 2021-12-28 NOTE — NURSING NOTE
Patient medication and meal compliant  Patient questioning medications and appears paranoid   Patient's speech is pressured, but can carry a conversation

## 2021-12-28 NOTE — NURSING NOTE
1505-pt became agitated after conversation with staff member and stated "im going to cut myself with the toothpaste"  Room searched completed  Agitation score 47-Pt refused PO medication and PRN IM Zyprexa 10 mg administered in R deltoid  Will monitor for effectiveness

## 2021-12-28 NOTE — PLAN OF CARE
303 hearing was upheld to 15 days       Problem: DISCHARGE PLANNING - CARE MANAGEMENT  Goal: Discharge to post-acute care or home with appropriate resources  Description: INTERVENTIONS:  - Conduct assessment to determine patient/family and health care team treatment goals, and need for post-acute services based on payer coverage, community resources, and patient preferences, and barriers to discharge  - Address psychosocial, clinical, and financial barriers to discharge as identified in assessment in conjunction with the patient/family and health care team  - Arrange appropriate level of post-acute services according to patients   needs and preference and payer coverage in collaboration with the physician and health care team  - Communicate with and update the patient/family, physician, and health care team regarding progress on the discharge plan  - Arrange appropriate transportation to post-acute venues  Outcome: Progressing

## 2021-12-28 NOTE — NURSING NOTE
Pt approached nurse's station saying: "I feel drugged up  I'm seeing trails right now " Pt seeking Tylenol and Nicorette at this time  Pt also woke up with her menstrual cycle this AM, which is causing headaches  She was given 650mg of Tylenol  Pt disorganized and needy  Speefch is rapid and garbled  Pt rambling

## 2021-12-28 NOTE — NURSING NOTE
Pt came up to nursing station, upset over not being able to sleep due to anxiety about being here  Pt demanded IM Ativan, this writer explained patient must take PO Atarax first as per order  Pt received PO Atarax 100mg  Pt directed to go lay down and let medication work

## 2021-12-28 NOTE — PLAN OF CARE
Problem: DEPRESSION  Goal: Will be euthymic at discharge  Description: INTERVENTIONS:  - Administer medication as ordered  - Provide emotional support via 1:1 interaction with staff  - Encourage involvement in milieu/groups/activities  - Monitor for social isolation  Outcome: Progressing     Problem: ANXIETY  Goal: Will report anxiety at manageable levels  Description: INTERVENTIONS:  - Administer medication as ordered  - Teach and encourage coping skills  - Provide emotional support  - Assess patient/family for anxiety and ability to cope  Outcome: Progressing  Goal: By discharge: Patient will verbalize 2 strategies to deal with anxiety  Description: Interventions:  - Identify any obvious source/trigger to anxiety  - Staff will assist patient in applying identified coping technique/skills  - Encourage attendance of scheduled groups and activities  Outcome: Progressing     Problem: INVOLUNTARY ADMIT  Goal: Will cooperate with staff recommendations and doctor's orders and will demonstrate appropriate behavior  Description: INTERVENTIONS:  - Treat underlying conditions and offer medication as ordered  - Educate regarding involuntary admission procedures and rules  - Utilize positive consistent limit setting strategies to support patient and staff safety  Outcome: Progressing     Problem: SAFETY, RESTRAINT - VIOLENT/SELF-DESTRUCTIVE  Goal: Remains free of harm/injury from restraints (Restraint for Violent/Self-Destructive Behavior)  Description: INTERVENTIONS:  - Instruct patient/family regarding restraint use   - Assess and monitor physiologic and psychological status   - Provide interventions and comfort measures to meet assessed patient needs   - Ensure continuous in person monitoring is provided   - Identify and implement measures to help patient regain control  - Assess readiness for release of restraint  Outcome: Progressing  Goal: Returns to optimal restraint-free functioning  Description: INTERVENTIONS:  - Assess the patient's behavior and symptoms that indicate continued need for restraint  - Identify and implement measures to help patient regain control  - Assess readiness for release of restraint   Outcome: Progressing     Problem: DISCHARGE PLANNING  Goal: Discharge to home or other facility with appropriate resources  Description: INTERVENTIONS:  - Identify barriers to discharge w/patient and caregiver  - Arrange for needed discharge resources and transportation as appropriate  - Identify discharge learning needs (meds, wound care, etc )  - Refer to Case Management Department for coordinating discharge planning if the patient needs post-hospital services based on physician/advanced practitioner order or complex needs related to functional status, cognitive ability, or social support system  Outcome: Progressing     Problem: DISCHARGE PLANNING - CARE MANAGEMENT  Goal: Discharge to post-acute care or home with appropriate resources  Description: INTERVENTIONS:  - Conduct assessment to determine patient/family and health care team treatment goals, and need for post-acute services based on payer coverage, community resources, and patient preferences, and barriers to discharge  - Address psychosocial, clinical, and financial barriers to discharge as identified in assessment in conjunction with the patient/family and health care team  - Arrange appropriate level of post-acute services according to patients   needs and preference and payer coverage in collaboration with the physician and health care team  - Communicate with and update the patient/family, physician, and health care team regarding progress on the discharge plan  - Arrange appropriate transportation to post-acute venues  Outcome: Progressing

## 2021-12-28 NOTE — PLAN OF CARE
GARCIA Group Note    Problem: Ineffective Coping  Goal: Participates in unit activities  Description: Interventions:  - Provide therapeutic environment   - Provide required programming   - Redirect inappropriate behaviors   Outcome: Progressing

## 2021-12-28 NOTE — PROGRESS NOTES
12/28/21 0949   Team Meeting   Meeting Type Daily Rounds   Team Members Present   Team Members Present Physician;Nurse;   Physician Team Member Dr Rafael Boyd Team Member APPLE KAHN [facility] Management Team Member Adriel   Patient/Family Present   Patient Present No   Patient's Family Present No   Pt medication compliant  Pt had 303 hearing this morning  Pt tangential, intrusive, internally preoccupied  Discharge to be determined

## 2021-12-28 NOTE — NURSING NOTE
Pt is constantly seeking out IM Ativan  Pt said she would refuse all of her nightly meds if it meant getting IM Ativan  Pt did end up taking HS meds

## 2021-12-28 NOTE — PROGRESS NOTES
Progress Note - Behavioral Health     Gregor Gill 42 y o  female MRN: [id number]   Unit/Bed#: [facility] 874-54 Encounter: [id number]    Patient was seen and evaluated for continuity of care  As per staff last evening, patient told nurse that she would refuse all of her nightly medications in order to obtain IM Ativan  As per nursing, patient ended up taking HS medications last evening  Patient received Atarax at 12:29 a m  At 6:07 a m , patient reported needing Tylenol for headache  Patient was noted to be disorganized and by nursing this morning  During the interview today, patient describes her mood as emotional, and she did discuss her circumstances leading up to hospitalization  She continues to be difficult to follow in terms of her thought process and continues to be tangential and disorganized  Patient continues to be rapid in terms of her speech but is slightly more interruptible today  Patient states that she slept approximately 5-6 hours with 4-5 nighttime awakenings  She states that she is going to groups and plans on going to groups later on today  She notes some improvement to her appetite  She denies SI/HI/AVH/delusions and denies any plan or intent to harm herself or others  I recommend discontinuing the patient's Ativan IM PRN for now  Patient reports being medication adherent and tolerating the switch from Risperdal to Zyprexa without issues  She is requesting Colace for constipation  As per nursing today, patient appeared to be paranoid and during the interview today, she continues to be focused on her belongings and appears to be distracted and preoccupied during session      Sleep: frequent awakenings, slept 5-6 hours  Appetite: improving  Medication side effects: No   ROS: all other systems are negative, but does report constipation, requesting Colace    Mental Status Evaluation:    Appearance:   female, disheveled, casually dressed, appears to be older than stated age Behavior:  guarded, restless and fidgety, slightly more cooperative today   Speech:  clear, coherent, talkative, rapid, more interruptible today   Mood:  "emotional"   Affect:  constricted, irritable at times   Thought Process:  disorganized, tangential   Associations: tangential associations   Thought Content:  mild paranoia, preoccupied   Perceptual Disturbances: no auditory hallucinations, no visual hallucinations, denies auditory hallucinations when asked, does not appear responding to internal stimuli, appears distracted, appears preoccupied   Risk Potential: Suicidal ideation - None  Homicidal ideation - None  Potential for aggression - Yes, due to poor impulse control   Sensorium:  unable to assess   Memory:  recent and remote memory grossly intact   Consciousness:  alert and awake   Attention/Concentration: decreased concentration and decreased attention span   Insight:  Poor but starting to improve slightly   Judgment: poor but starting to improve slightly   Gait/Station: normal gait/station   Motor Activity: no abnormal movements     Vital signs in last 24 hours:    Temp:  [97 7 °F (36 5 °C)-98 5 °F (36 9 °C)] 97 7 °F (36 5 °C)  HR:  [66-92] 66  Resp:  [16] 16  BP: (106-124)/(55-84) 106/55    Laboratory results: I have personally reviewed all pertinent laboratory/tests results    Most Recent Labs:   Lab Results   Component Value Date    WBC 7 80 12/25/2021    RBC 4 80 12/25/2021    HGB 14 0 12/25/2021    HCT 42 3 12/25/2021     12/25/2021    RDW 13 3 12/25/2021    NEUTROABS 5 10 12/25/2021    SODIUM 137 12/25/2021    K 3 6 12/25/2021     12/25/2021    CO2 25 12/25/2021    BUN 10 12/25/2021    CREATININE 0 77 12/25/2021    GLUC 123 (H) 12/25/2021    CALCIUM 8 6 12/25/2021    AST 52 (H) 12/25/2021    ALT 37 (H) 12/25/2021    ALKPHOS 68 12/25/2021    TP 7 4 12/25/2021    ALB 4 0 12/25/2021    TBILI 0 84 12/25/2021    CHOLESTEROL 196 12/25/2021    HDL 43 (L) 12/25/2021    TRIG 92 12/25/2021 [address] 135 (H) 12/25/2021    [place] 153 12/25/2021    [id number] 2 630 12/25/2021    PREGUR Negative (-) 12/22/2021    RPR Non-Reactive 12/25/2021    HGBA1C 5 2 12/25/2021     12/25/2021     Progress Toward Goals: Patient continues to be disorganized with rapid speech but appears to be more interruptible today    Assessment/Plan   Principal Problem:    Bipolar affective disorder, current episode manic ([address] )  Active Problems:    Medical clearance for psychiatric admission    Rhabdomyolysis    Tobacco abuse    Encounter for monitoring opioid maintenance therapy      Recommended Treatment:     Planned medication and treatment changes:     All current active medications have been reviewed  Encourage group therapy, milieu therapy and occupational therapy  Behavioral Health checks every 7 minutes    1) Continue Zyprexa 10mg BID for mood stabilization (Patient is medications over objection-Zyprexa 10mg BID IM for mood)  2) Continue Prozac 10mg daily for anxiety/mood (no mood swings noted)  3) Continue Suboxone 8mg BID for opioid withdrawal   4) Continue Trazodone 50mg HS for insomnia  5) Continue Topamax 75mg HS as adjunct to mood  6) Continue encouraging patient to attend groups  7) Continue encouraging patient to stay visible in milieu  8) Continue encouraging patient to verbalize needs and emotions for staff/nusring  9) CM to continue coordinating patient care  10) Continue SLIM medical recommendations  11) 303 completed today    Current Facility-Administered Medications   Medication Dose Route Frequency Provider Last Rate    acetaminophen  650 mg Oral Q6H PRN Saintclair Levee, PA-C      acetaminophen  650 mg Oral Q4H PRN Saintclair Levee, PA-C      acetaminophen  975 mg Oral Q6H PRN Anabelle Hernandez PA-C      aluminum-magnesium hydroxide-simethicone  30 mL Oral Q4H PRN Cris Sumner MD      artificial tear  1 application Both Eyes P8L PRN Cris Sumner MD      benztropine  1 mg Intramuscular Q4H PRN Max 6/day Cammy Rivas Lori Bravo MD      benztropine  1 mg Oral Q4H PRN Max 6/day Bill Hall MD      benztropine  1 mg Oral BID Tez Peña MD      buprenorphine-naloxone  8 mg Sublingual BID Connie Silverman MD      chlorproMAZINE  50 mg Intramuscular Q4H PRN Tez Peña MD      chlorproMAZINE  100 mg Oral Q4H PRN Tez Peña MD      Diclofenac Sodium  2 g Topical 4x Daily Anabelle Hernandez PA-C      hydrOXYzine HCL  50 mg Oral Q6H PRN Max 4/day Bill Hall MD      Or   [facility] diphenhydrAMINE  50 mg Intramuscular Q6H PRN Bill Hall MD      docusate sodium  100 mg Oral BID PRN Connie Silverman MD      FLUoxetine  10 mg Oral Daily Connie Silverman MD      gabapentin  500 mg Oral TID Tez Peña MD      hydrOXYzine HCL  100 mg Oral Q6H PRN Max 4/day Bill Hall MD      hydrOXYzine HCL  25 mg Oral Q6H PRN Max 4/day Bill Hall MD      ibuprofen  400 mg Oral Q6H PRN Bill Hall MD      ibuprofen  600 mg Oral Q8H PRN Bill Hall MD      melatonin  3 mg Oral HS PRN Bill Hall MD      nicotine  1 patch Transdermal Daily Virginia Walker PA-C      nicotine polacrilex  4 mg Oral Q2H PRN Tez Peña MD      OLANZapine  10 mg Oral Q3H PRN Max 3/day Bill Hall MD      Or    OLANZapine  10 mg Intramuscular Q3H PRN Max 3/day Bill Hall MD      OLANZapine  10 mg Oral HS Connie Silverman MD      Or   [facility] OLANZapine  10 mg Intramuscular HS Connie Silverman MD      OLANZapine  10 mg Oral Daily Connie Silverman MD      Or    OLANZapine  10 mg Intramuscular Daily Connie Silverman MD      OLANZapine  5 mg Oral Q3H PRN Max 6/day Bill Hall MD      Or    OLANZapine  5 mg Intramuscular Q3H PRN Max 6/day Bill Hall MD      OLANZapine  2 5 mg Oral Q3H PRN Max 8/day Bill Hall MD      ondansetron  4 mg Oral Q6H PRN Tez Peña MD      polyethylene glycol  17 g Oral Daily PRN Bill Hall MD      sterile water           sterile water           sterile water           sterile water           topiramate 75 mg Oral HS Malgorzata Florence MD      traZODone  50 mg Oral HS Malgorzata Florence MD       Risks / Benefits of Treatment:    Risks, benefits, and possible side effects of medications explained to patient  Patient has limited understanding of risks and benefits of treatment at this time, but agrees to take medications as prescribed  Counseling / Coordination of Care: Total floor / unit time spent today 20 minutes  Greater than 50% of total time was spent with the patient and / or family counseling and / or coordination of care  A description of counseling / coordination of care:  Patient's progress discussed with staff in treatment team meeting  Medications, treatment progress and treatment plan reviewed with patient  Importance of medication and treatment compliance reviewed with patient  Reassurance and supportive therapy provided  Encouraged participation in milieu and group therapy on the unit      Magy Lorenzo MD 12/28/21

## 2021-12-29 PROCEDURE — 99232 SBSQ HOSP IP/OBS MODERATE 35: CPT | Performed by: PSYCHIATRY & NEUROLOGY

## 2021-12-29 RX ORDER — TOPIRAMATE 100 MG/1
100 TABLET, FILM COATED ORAL
Status: DISCONTINUED | OUTPATIENT
Start: 2021-12-29 | End: 2022-01-05 | Stop reason: HOSPADM

## 2021-12-29 RX ORDER — LORAZEPAM 0.5 MG/1
0.5 TABLET ORAL DAILY PRN
Status: DISCONTINUED | OUTPATIENT
Start: 2021-12-29 | End: 2022-01-02

## 2021-12-29 RX ADMIN — TOPIRAMATE 100 MG: 100 TABLET, FILM COATED ORAL at 21:06

## 2021-12-29 RX ADMIN — GABAPENTIN 500 MG: 400 CAPSULE ORAL at 17:05

## 2021-12-29 RX ADMIN — LORAZEPAM 0.5 MG: 0.5 TABLET ORAL at 11:17

## 2021-12-29 RX ADMIN — MELATONIN TAB 3 MG 3 MG: 3 TAB at 21:33

## 2021-12-29 RX ADMIN — BUPRENORPHINE AND NALOXONE 8 MG: 8; 2 FILM BUCCAL; SUBLINGUAL at 17:06

## 2021-12-29 RX ADMIN — ACETAMINOPHEN 975 MG: 325 TABLET ORAL at 19:29

## 2021-12-29 RX ADMIN — HYDROXYZINE HYDROCHLORIDE 100 MG: 50 TABLET, FILM COATED ORAL at 21:33

## 2021-12-29 RX ADMIN — OLANZAPINE 10 MG: 10 TABLET, FILM COATED ORAL at 21:06

## 2021-12-29 RX ADMIN — DICLOFENAC SODIUM 2 G: 10 GEL TOPICAL at 08:27

## 2021-12-29 RX ADMIN — IBUPROFEN 600 MG: 600 TABLET, FILM COATED ORAL at 08:31

## 2021-12-29 RX ADMIN — NICOTINE POLACRILEX 4 MG: 4 GUM, CHEWING ORAL at 19:29

## 2021-12-29 RX ADMIN — DICLOFENAC SODIUM 2 G: 10 GEL TOPICAL at 21:10

## 2021-12-29 RX ADMIN — BENZTROPINE MESYLATE 1 MG: 1 TABLET ORAL at 08:25

## 2021-12-29 RX ADMIN — TRAZODONE HYDROCHLORIDE 50 MG: 50 TABLET ORAL at 21:06

## 2021-12-29 RX ADMIN — NICOTINE 1 PATCH: 14 PATCH, EXTENDED RELEASE TRANSDERMAL at 08:26

## 2021-12-29 RX ADMIN — HYDROXYZINE HYDROCHLORIDE 100 MG: 50 TABLET, FILM COATED ORAL at 09:14

## 2021-12-29 RX ADMIN — GABAPENTIN 500 MG: 400 CAPSULE ORAL at 08:25

## 2021-12-29 RX ADMIN — GABAPENTIN 500 MG: 400 CAPSULE ORAL at 21:06

## 2021-12-29 RX ADMIN — FLUOXETINE 10 MG: 10 CAPSULE ORAL at 08:25

## 2021-12-29 RX ADMIN — NICOTINE POLACRILEX 4 MG: 4 GUM, CHEWING ORAL at 09:14

## 2021-12-29 RX ADMIN — DICLOFENAC SODIUM 2 G: 10 GEL TOPICAL at 17:06

## 2021-12-29 RX ADMIN — BENZTROPINE MESYLATE 1 MG: 1 TABLET ORAL at 17:05

## 2021-12-29 RX ADMIN — NICOTINE POLACRILEX 4 MG: 4 GUM, CHEWING ORAL at 15:25

## 2021-12-29 RX ADMIN — IBUPROFEN 600 MG: 600 TABLET, FILM COATED ORAL at 16:25

## 2021-12-29 RX ADMIN — BUPRENORPHINE AND NALOXONE 8 MG: 8; 2 FILM BUCCAL; SUBLINGUAL at 08:25

## 2021-12-29 RX ADMIN — DICLOFENAC SODIUM 2 G: 10 GEL TOPICAL at 11:05

## 2021-12-29 RX ADMIN — OLANZAPINE 10 MG: 10 TABLET, FILM COATED ORAL at 08:25

## 2021-12-29 NOTE — PROGRESS NOTES
12/29/21 0935   Team Meeting   Meeting Type Daily Rounds   Team Members Present   Team Members Present Physician;Nurse;   Physician Team Member Dr Juanjose Sanchez Team Member APPLE KAHN [facility] Management Team Member Adriel   Patient/Family Present   Patient Present No   Patient's Family Present No   Pt medication compliant  Pt was placed in paper clothes yesterday after being found with spool of thread  Pt pressured, tangential and internally preoccupied  Discharge to be determined

## 2021-12-29 NOTE — PROGRESS NOTES
Progress Note - Behavioral Health     Jarad Harper 42 y o  female MRN: [id number]   Unit/Bed#: [facility] 023-84 Encounter: [id number]    Patient was seen and evaluated for continuity of care  As per staff yesterday in the afternoon, patient became agitated after conversation with the staff member and reported that she was going to cut herself with toothpaste  Her a room search was completed and was found to have a spinal of threat in her drawer     She refused p o  Medication for agitation and was given Zyprexa 10 mg IM  COVID test was performed and was negative  As per nursing overnight, patient appeared to be paranoid about staff members and her belongings  She requested that her belongings be returned immediately with nursing  She was explained regarding safety protocols by nursing  During the interview today, patient states that she is appalled, and was irritable throughout the interview  Patient was perseverative about her circumstances leading up to hospitalization and express her irritability and frustration about her room search and having her be switched to paper scrubs and paper sheets  I reviewed the hospital policy with the patient and encouraged to maintain behavioral control  During the interview today, patient states that she got 3-4 hours of sleep  She states that she has been staying mostly to herself and was observed by this writer using her tablet  She describes her energy level as ok" and reports that she did not eat breakfast today  She did report having dinner last evening  She denies SI/HI/AVH/delusions and denies any plan or intent to harm herself or others  She did report paranoia about staff as well as her belongings during the interview today  Patient continues to have rapid speech and is disorganized and tangential in her thought process  She continues to be preoccupied regarding her belongings as well as with paranoia towards staff    She continues to appear distracted and preoccupied during the interview  She continues to be constricted and irritable in terms of her affect  We reviewed medication options and treatment alternatives during the appointment today  Patient is in agreement to have her Topamax increased to 100 mg HS to better control mood  She is also in agreement to have Ativan 0 5 mg daily PRN only for severe anxiety  Patient did abruptly and interview today and left interview room      Sleep: decreased, slept 3-4 hours  Appetite: did not eat breakfast today but ate dinner last night  Medication side effects: No   ROS: no complaints, all other systems are negative    Mental Status Evaluation:    Appearance:   female, disheveled, wearing hospital scrubs, and no acute distress, appears to be older than stated age   Behavior:  limited cooperation in interview, restless and fidgety, guarded   Speech:  Less rapid overall today but continues to be talkative, clear, coherent   Mood:  "appalled"   Affect:  constricted, anxious, irritable   Thought Process:  disorganized, tangential   Associations: tangential associations   Thought Content:  paranoid ideation, preoccupied   Perceptual Disturbances: no auditory hallucinations, no visual hallucinations, denies auditory hallucinations when asked, does not appear responding to internal stimuli, appears distracted, appears preoccupied   Risk Potential: Suicidal ideation - None  Homicidal ideation - None  Potential for aggression - Yes, due to poor impulse control   Sensorium:  unable to assess   Memory:  recent and remote memory: unable to assess due to lack of cooperation   Consciousness:  alert and awake   Attention/Concentration: decreased concentration and decreased attention span   Insight:  Poor but improved overall since admission   Judgment: Poor but improved overall since admission   Gait/Station: normal gait/station   Motor Activity: no abnormal movements     Vital signs in last 24 hours:    HR:  [76] 76  Resp:  [16] 16  BP: (125)/(60) 125/60    Laboratory results: I have personally reviewed all pertinent laboratory/tests results    Most Recent Labs:   Lab Results   Component Value Date    WBC 7 80 12/25/2021    RBC 4 80 12/25/2021    HGB 14 0 12/25/2021    HCT 42 3 12/25/2021     12/25/2021    RDW 13 3 12/25/2021    NEUTROABS 5 10 12/25/2021    SODIUM 137 12/25/2021    K 3 6 12/25/2021     12/25/2021    CO2 25 12/25/2021    BUN 10 12/25/2021    CREATININE 0 77 12/25/2021    GLUC 123 (H) 12/25/2021    CALCIUM 8 6 12/25/2021    AST 52 (H) 12/25/2021    ALT 37 (H) 12/25/2021    ALKPHOS 68 12/25/2021    TP 7 4 12/25/2021    ALB 4 0 12/25/2021    TBILI 0 84 12/25/2021    CHOLESTEROL 196 12/25/2021    HDL 43 (L) 12/25/2021    TRIG 92 12/25/2021    LDLCALC 135 (H) 12/25/2021    NONHDLC 153 12/25/2021    [id number] 2 630 12/25/2021    PREGUR Negative (-) 12/22/2021    RPR Non-Reactive 12/25/2021    HGBA1C 5 2 12/25/2021     12/25/2021     Progress Toward Goals: Continues to be rapid in terms of her speech, continues to be paranoid about staff and preoccupied with her belongings, mostly unchanged overnight but continues to be medication adherent    Assessment/Plan   Principal Problem:    Bipolar affective disorder, current episode manic ([address] )  Active Problems:    Medical clearance for psychiatric admission    Rhabdomyolysis    Tobacco abuse    Encounter for monitoring opioid maintenance therapy      Recommended Treatment:     Planned medication and treatment changes:     All current active medications have been reviewed  Encourage group therapy, milieu therapy and occupational therapy  Behavioral Health checks every 7 minutes    1) Continue Zyprexa 10mg BID for mood stabilization (Patient is medications over objection-Zyprexa 10mg BID IM for mood)  2) Continue Prozac 10mg daily for anxiety/mood  3) Continue Suboxone 8mg BID for opioid withdrawal   4) Continue Trazodone 50mg HS for insomnia  5) Increase Topamax to 100mg HS to better control mood  6) Continue to encourage patient for group attendance  7) Continue to encourage patient to remain visible on the unit  8) Continue to encourage patient to communicate thoughts and feelings to nursing/staff  9) CM to continue care coordination for patient  10) Continue medical management as per SLIM  11) Start Ativan 0 5mg daily PRN severe anxiety    Current Facility-Administered Medications   Medication Dose Route Frequency Provider Last Rate    acetaminophen  650 mg Oral Q6H PRN Silvio Camara PA-C      acetaminophen  650 mg Oral Q4H PRN Silvio Camara PA-C      acetaminophen  975 mg Oral Q6H PRN Anabelle Hernandez PA-C      aluminum-magnesium hydroxide-simethicone  30 mL Oral Q4H PRN Ho East MD      artificial tear  1 application Both Eyes Z4H PRN Ho East MD      benztropine  1 mg Intramuscular Q4H PRN Max 6/day Ho East MD      benztropine  1 mg Oral Q4H PRN Max 6/day Ho East MD      benztropine  1 mg Oral BID Prateek Dent MD      buprenorphine-naloxone  8 mg Sublingual BID Peewee Muir MD      chlorproMAZINE  50 mg Intramuscular Q4H PRN Prateek Dent MD      chlorproMAZINE  100 mg Oral Q4H PRN Prateek Dent MD      Diclofenac Sodium  2 g Topical 4x Daily Anabelle Hernandez PA-C      hydrOXYzine HCL  50 mg Oral Q6H PRN Max 4/day MD Dimitri Pratt diphenhydrAMINE  50 mg Intramuscular Q6H PRN Ho East MD      docusate sodium  100 mg Oral BID PRN Peewee Muir MD      FLUoxetine  10 mg Oral Daily Peewee Muir MD      gabapentin  500 mg Oral TID Prateek Dent MD      hydrOXYzine HCL  100 mg Oral Q6H PRN Max 4/day Ho East MD      hydrOXYzine HCL  25 mg Oral Q6H PRN Max 4/day Ho East MD      ibuprofen  400 mg Oral Q6H PRN Ho East MD      ibuprofen  600 mg Oral Q8H PRN Ho East MD      LORazepam  0 5 mg Oral Daily PRN Peewee Muir MD      melatonin  3 mg Oral HS PRN Ho East MD  nicotine  1 patch Transdermal Daily Anabelle Hernandez PA-C      nicotine polacrilex  4 mg Oral Q2H PRN Arden Cotter MD      OLANZapine  10 mg Oral Q3H PRN Max 3/day Jalil Banda MD      Or    OLANZapine  10 mg Intramuscular Q3H PRN Max 3/day Jalil Banda MD      OLANZapine  10 mg Oral HS Lesa Almeida MD      Or   Ankit Pascual OLANZapine  10 mg Intramuscular HS Lesa Almeida MD      OLANZapine  10 mg Oral Daily Lesa Almeida MD      Or    OLANZapine  10 mg Intramuscular Daily Lesa Almeida MD      OLANZapine  5 mg Oral Q3H PRN Max 6/day Jalil Banda MD      Or    OLANZapine  5 mg Intramuscular Q3H PRN Max 6/day Jalil Banda MD      OLANZapine  2 5 mg Oral Q3H PRN Max 8/day Jalil Banda MD      ondansetron  4 mg Oral Q6H PRN Arden Cotter MD      polyethylene glycol  17 g Oral Daily PRN Jalil Banda MD      sterile water           sterile water           sterile water           sterile water           sterile water           topiramate  100 mg Oral HS Lesa Almeida MD      traZODone  50 mg Oral HS Arden Cotter MD       Risks / Benefits of Treatment:    Risks, benefits, and possible side effects of medications explained to patient  Patient has limited understanding of risks and benefits of treatment at this time, but agrees to take medications as prescribed  Counseling / Coordination of Care: Total floor / unit time spent today 20 minutes  Greater than 50% of total time was spent with the patient and / or family counseling and / or coordination of care  A description of counseling / coordination of care:  Patient's progress discussed with staff in treatment team meeting  Medications, treatment progress and treatment plan reviewed with patient  Importance of medication and treatment compliance reviewed with patient  Reassurance and supportive therapy provided  Encouraged participation in milieu and group therapy on the unit      Lesa Almeida MD 12/29/21

## 2021-12-29 NOTE — NURSING NOTE
Pt denies SI/HI/AVH  Pt med compliant without issue  Pt appears paranoid about staff members stating that "You are all out for me, you magically find that spool of thread today and think that gives you the right to treat me like this  I want my stuff back now" explained to pt when inappropriate items are found in patients room there are safety protocols in place  Pt stated "Yeah you might have your protocols but you also have your favorites"  Pt demonstrates persecutory delusions

## 2021-12-29 NOTE — NURSING NOTE
Patient is calm, frequents nurses station  She denies SI/HI/AVH at this time  Pt appears paranoid, speech is rapid and garbled  She endorses severe anxiety and depression  Requested and received PRN Atarax 100mg at 0914  Pt c/o back pain 9/10, requested and received PRN Motrin at 0831  She is visible on the unit, watching TV  Compliant with scheduled medications and meals  Patient remains in paper scrubs for patient safety, patient is compliant  No behavioral issues to be noted  Will monitor

## 2021-12-29 NOTE — PLAN OF CARE
Problem: DEPRESSION  Goal: Will be euthymic at discharge  Description: INTERVENTIONS:  - Administer medication as ordered  - Provide emotional support via 1:1 interaction with staff  - Encourage involvement in milieu/groups/activities  - Monitor for social isolation  12/29/2021 1513 by Jessica Ratliff LPN  Outcome: Progressing  12/29/2021 0927 by Jessica Ratliff LPN  Outcome: Progressing     Problem: ANXIETY  Goal: Will report anxiety at manageable levels  Description: INTERVENTIONS:  - Administer medication as ordered  - Teach and encourage coping skills  - Provide emotional support  - Assess patient/family for anxiety and ability to cope  12/29/2021 1513 by Jessica Ratliff LPN  Outcome: Progressing  12/29/2021 0927 by Jessica Ratliff LPN  Outcome: Progressing  Goal: By discharge: Patient will verbalize 2 strategies to deal with anxiety  Description: Interventions:  - Identify any obvious source/trigger to anxiety  - Staff will assist patient in applying identified coping technique/skills  - Encourage attendance of scheduled groups and activities  12/29/2021 1513 by Jessica Ratliff LPN  Outcome: Progressing  12/29/2021 0927 by Jessica Ratliff LPN  Outcome: Progressing     Problem: INVOLUNTARY ADMIT  Goal: Will cooperate with staff recommendations and doctor's orders and will demonstrate appropriate behavior  Description: INTERVENTIONS:  - Treat underlying conditions and offer medication as ordered  - Educate regarding involuntary admission procedures and rules  - Utilize positive consistent limit setting strategies to support patient and staff safety  12/29/2021 1513 by Jessica Ratliff LPN  Outcome: Progressing  12/29/2021 0927 by Jessica Ratliff LPN  Outcome: Progressing     Problem: SAFETY, RESTRAINT - VIOLENT/SELF-DESTRUCTIVE  Goal: Remains free of harm/injury from restraints (Restraint for Violent/Self-Destructive Behavior)  Description: INTERVENTIONS:  - Instruct patient/family regarding restraint use   - Assess and monitor physiologic and psychological status   - Provide interventions and comfort measures to meet assessed patient needs   - Ensure continuous in person monitoring is provided   - Identify and implement measures to help patient regain control  - Assess readiness for release of restraint  12/29/2021 1513 by Patricia Chand LPN  Outcome: Progressing  12/29/2021 0927 by Patricia Chand LPN  Outcome: Progressing  Goal: Returns to optimal restraint-free functioning  Description: INTERVENTIONS:  - Assess the patient's behavior and symptoms that indicate continued need for restraint  - Identify and implement measures to help patient regain control  - Assess readiness for release of restraint   12/29/2021 1513 by Patricia Chand LPN  Outcome: Progressing  12/29/2021 0927 by Patricia Chand LPN  Outcome: Progressing

## 2021-12-29 NOTE — PROGRESS NOTES
12/29/21 1000   Activity/Group Checklist   Group Other (Comment)  (Group Art Therapy/Psychodynamic, Open Choice)   Attendance Attended   Attendance Duration (min) 46-60   Interactions Interacted appropriately   Affect/Mood Appropriate   Goals Achieved Able to listen to others; Able to engage in interactions; Able to recieve feedback; Able to give feedback to another  (Able to engage materials; partial participation)

## 2021-12-29 NOTE — NURSING NOTE
Patient states PRN Atarax was ineffective, continues to c/o severe anxiety  PRN Ativan 0 5mg requested and received at 1117  Will monitor for effectiveness of medication

## 2021-12-30 PROCEDURE — 99232 SBSQ HOSP IP/OBS MODERATE 35: CPT

## 2021-12-30 RX ORDER — TRAZODONE HYDROCHLORIDE 100 MG/1
100 TABLET ORAL
Status: DISCONTINUED | OUTPATIENT
Start: 2021-12-30 | End: 2022-01-05 | Stop reason: HOSPADM

## 2021-12-30 RX ORDER — IBUPROFEN 600 MG/1
600 TABLET ORAL EVERY 6 HOURS SCHEDULED
Status: DISCONTINUED | OUTPATIENT
Start: 2021-12-30 | End: 2022-01-05 | Stop reason: HOSPADM

## 2021-12-30 RX ADMIN — OLANZAPINE 10 MG: 10 TABLET, FILM COATED ORAL at 08:08

## 2021-12-30 RX ADMIN — TOPIRAMATE 100 MG: 100 TABLET, FILM COATED ORAL at 21:04

## 2021-12-30 RX ADMIN — FLUOXETINE 10 MG: 10 CAPSULE ORAL at 08:08

## 2021-12-30 RX ADMIN — NICOTINE POLACRILEX 4 MG: 4 GUM, CHEWING ORAL at 15:31

## 2021-12-30 RX ADMIN — GABAPENTIN 500 MG: 400 CAPSULE ORAL at 08:08

## 2021-12-30 RX ADMIN — GABAPENTIN 500 MG: 400 CAPSULE ORAL at 15:31

## 2021-12-30 RX ADMIN — NICOTINE 1 PATCH: 14 PATCH, EXTENDED RELEASE TRANSDERMAL at 08:09

## 2021-12-30 RX ADMIN — ACETAMINOPHEN 975 MG: 325 TABLET ORAL at 08:29

## 2021-12-30 RX ADMIN — TRAZODONE HYDROCHLORIDE 100 MG: 100 TABLET ORAL at 21:04

## 2021-12-30 RX ADMIN — DICLOFENAC SODIUM 2 G: 10 GEL TOPICAL at 17:00

## 2021-12-30 RX ADMIN — GABAPENTIN 500 MG: 400 CAPSULE ORAL at 21:04

## 2021-12-30 RX ADMIN — MELATONIN TAB 3 MG 3 MG: 3 TAB at 21:09

## 2021-12-30 RX ADMIN — NICOTINE POLACRILEX 4 MG: 4 GUM, CHEWING ORAL at 11:25

## 2021-12-30 RX ADMIN — HYDROXYZINE HYDROCHLORIDE 100 MG: 50 TABLET, FILM COATED ORAL at 04:03

## 2021-12-30 RX ADMIN — LORAZEPAM 0.5 MG: 0.5 TABLET ORAL at 13:44

## 2021-12-30 RX ADMIN — DICLOFENAC SODIUM 2 G: 10 GEL TOPICAL at 13:27

## 2021-12-30 RX ADMIN — HYDROXYZINE HYDROCHLORIDE 100 MG: 50 TABLET, FILM COATED ORAL at 21:09

## 2021-12-30 RX ADMIN — BENZTROPINE MESYLATE 1 MG: 1 TABLET ORAL at 08:08

## 2021-12-30 RX ADMIN — OLANZAPINE 10 MG: 10 TABLET, FILM COATED ORAL at 21:04

## 2021-12-30 RX ADMIN — IBUPROFEN 600 MG: 600 TABLET, FILM COATED ORAL at 15:34

## 2021-12-30 RX ADMIN — BUPRENORPHINE AND NALOXONE 8 MG: 8; 2 FILM BUCCAL; SUBLINGUAL at 08:08

## 2021-12-30 RX ADMIN — HYDROXYZINE HYDROCHLORIDE 100 MG: 50 TABLET, FILM COATED ORAL at 12:53

## 2021-12-30 RX ADMIN — NICOTINE POLACRILEX 4 MG: 4 GUM, CHEWING ORAL at 04:03

## 2021-12-30 RX ADMIN — DICLOFENAC SODIUM 2 G: 10 GEL TOPICAL at 08:10

## 2021-12-30 RX ADMIN — DOCUSATE SODIUM 100 MG: 100 CAPSULE, LIQUID FILLED ORAL at 14:27

## 2021-12-30 RX ADMIN — BUPRENORPHINE AND NALOXONE 8 MG: 8; 2 FILM BUCCAL; SUBLINGUAL at 17:00

## 2021-12-30 RX ADMIN — POLYETHYLENE GLYCOL 3350 17 G: 17 POWDER, FOR SOLUTION ORAL at 17:24

## 2021-12-30 RX ADMIN — NICOTINE POLACRILEX 4 MG: 4 GUM, CHEWING ORAL at 18:50

## 2021-12-30 NOTE — PLAN OF CARE
Pt remains tangential and disorganized  Discharge to be determined       Problem: DISCHARGE PLANNING - CARE MANAGEMENT  Goal: Discharge to post-acute care or home with appropriate resources  Description: INTERVENTIONS:  - Conduct assessment to determine patient/family and health care team treatment goals, and need for post-acute services based on payer coverage, community resources, and patient preferences, and barriers to discharge  - Address psychosocial, clinical, and financial barriers to discharge as identified in assessment in conjunction with the patient/family and health care team  - Arrange appropriate level of post-acute services according to patients   needs and preference and payer coverage in collaboration with the physician and health care team  - Communicate with and update the patient/family, physician, and health care team regarding progress on the discharge plan  - Arrange appropriate transportation to post-acute venues  Outcome: Progressing

## 2021-12-30 NOTE — NURSING NOTE
Patient compliant with morning medications  Mouth check complete  Remains tangential, labile, paranoid  Disorganized  At the nurses station frequently and requires redirection  Patient preoccupied with taking the Zyprexa and having weight gain  " I lost 40 pounds and I'm going to gain it all back"

## 2021-12-30 NOTE — NURSING NOTE
Patient reports that PRN of Atarax 100 mg po has not been effective  Given PRN of Ativan 0 5 mg po  Will monitor

## 2021-12-30 NOTE — PLAN OF CARE
Nephrology Problem: DEPRESSION  Goal: Will be euthymic at discharge  Description: INTERVENTIONS:  - Administer medication as ordered  - Provide emotional support via 1:1 interaction with staff  - Encourage involvement in milieu/groups/activities  - Monitor for social isolation  Outcome: Progressing     Problem: ANXIETY  Goal: Will report anxiety at manageable levels  Description: INTERVENTIONS:  - Administer medication as ordered  - Teach and encourage coping skills  - Provide emotional support  - Assess patient/family for anxiety and ability to cope  Outcome: Progressing  Goal: By discharge: Patient will verbalize 2 strategies to deal with anxiety  Description: Interventions:  - Identify any obvious source/trigger to anxiety  - Staff will assist patient in applying identified coping technique/skills  - Encourage attendance of scheduled groups and activities  Outcome: Progressing     Problem: INVOLUNTARY ADMIT  Goal: Will cooperate with staff recommendations and doctor's orders and will demonstrate appropriate behavior  Description: INTERVENTIONS:  - Treat underlying conditions and offer medication as ordered  - Educate regarding involuntary admission procedures and rules  - Utilize positive consistent limit setting strategies to support patient and staff safety  Outcome: Progressing     Problem: SAFETY, RESTRAINT - VIOLENT/SELF-DESTRUCTIVE  Goal: Remains free of harm/injury from restraints (Restraint for Violent/Self-Destructive Behavior)  Description: INTERVENTIONS:  - Instruct patient/family regarding restraint use   - Assess and monitor physiologic and psychological status   - Provide interventions and comfort measures to meet assessed patient needs   - Ensure continuous in person monitoring is provided   - Identify and implement measures to help patient regain control  - Assess readiness for release of restraint  Outcome: Progressing  Goal: Returns to optimal restraint-free functioning  Description: INTERVENTIONS:  - Assess the patient's behavior and symptoms that indicate continued need for restraint  - Identify and implement measures to help patient regain control  - Assess readiness for release of restraint   Outcome: Progressing     Problem: DISCHARGE PLANNING  Goal: Discharge to home or other facility with appropriate resources  Description: INTERVENTIONS:  - Identify barriers to discharge w/patient and caregiver  - Arrange for needed discharge resources and transportation as appropriate  - Identify discharge learning needs (meds, wound care, etc )  - Refer to Case Management Department for coordinating discharge planning if the patient needs post-hospital services based on physician/advanced practitioner order or complex needs related to functional status, cognitive ability, or social support system  Outcome: Progressing     Problem: DISCHARGE PLANNING - CARE MANAGEMENT  Goal: Discharge to post-acute care or home with appropriate resources  Description: INTERVENTIONS:  - Conduct assessment to determine patient/family and health care team treatment goals, and need for post-acute services based on payer coverage, community resources, and patient preferences, and barriers to discharge  - Address psychosocial, clinical, and financial barriers to discharge as identified in assessment in conjunction with the patient/family and health care team  - Arrange appropriate level of post-acute services according to patients   needs and preference and payer coverage in collaboration with the physician and health care team  - Communicate with and update the patient/family, physician, and health care team regarding progress on the discharge plan  - Arrange appropriate transportation to post-acute venues  Outcome: Progressing     Problem: Ineffective Coping  Goal: Participates in unit activities  Description: Interventions:  - Provide therapeutic environment   - Provide required programming   - Redirect inappropriate behaviors   Outcome: Progressing

## 2021-12-30 NOTE — PROGRESS NOTES
Progress Note - Behavioral Health     Lyn Gan 42 y o  female MRN: [id number]   Unit/Bed#: [facility] 887-87 Encounter: [id number]    Behavior over the last 24 hours: slowly improving  [facility] was seen today for follow up evaluation with Dr Librado Telles  Patient was seen in the conference room  She appeared less irritable, less labile, and speech was less rapid than yesterday  She reports "I am ok" and complains of foot pain with her chronic bunions and poor sleep  She estimates she got 6 hours of sleep last night with multiple awakenings  She states her nicotine use is a contributing factor for her poor sleep as she is used to waking up early in the morning around 5am for a cigarette  She has been utilizing her PRN nicotine patch and gum  She also reports "vivid dreams" but denies they are negative in nature/ nightmares  Patient reports nighttime cravings and complains of weight gain with her current medication  She researched and made a list of alternative antipsychotic medications that would be less likely to gain weight  Patient remains circumferential and required redirection throughout the interview  She denies suicidal ideations, homicidal ideations, auditory/ visual hallucinations and does not appear to be responding to internal stimuli  She remains preoccupied with receiving her clothes and belongings back after they were taken away 2 days ago for finding a spool of thread on a room search  Per staff report, patient is tangential, labile, paranoid, disorganized, and required frequent redirection at the nurses station  She has been utilizing PRN Atarax 3x/ 24hrs and Ativan 0 5mg p o  x1 for severe anxiety  She is attending groups and is medication compliant  Patient agrees with plan to increase HS Trazodone to 100mg for sleep disturbance  Patient informed that she will be added to the medical list to address her shoulder pain and constipation  She was in agreement with this      Sleep: slept off and on, frequent awakenings, vivid dreams, slept 6 hours  Appetite: slightly increased, patient reports cravings at night, reports weight gain  Medication side effects: Yes - constipation   ROS: reports constipation, dry mouth and foot pain, all other systems are negative    Mental Status Evaluation:    Appearance:   female, age appropriate, wearing hospital scrubs, disheveled, no acute distress   Behavior:  guarded, good eye contact, restless and fidgety, anxious   Speech:  increased rate but slightly less rapid than previous days, clear, coherent, spontaneous   Mood:  "ok"   Affect:  less labile, irritable, constricted, anxious   Thought Process:  disorganized, circumstantial, perseverative   Associations: circumstantial associations, perseverative   Thought Content:  paranoid ideation, preoccupied   Perceptual Disturbances: no auditory hallucinations, no visual hallucinations, does not appear responding to internal stimuli, appears preoccupied, denies auditory or visual hallucinations when asked   Risk Potential: Suicidal ideation - None  Homicidal ideation - None  Potential for aggression - Not at present    Sensorium:  oriented to person, place and situation   Memory:  recent and remote memory grossly intact   Consciousness:  alert and awake   Attention/Concentration: decreased concentration and decreased attention span   Insight:  poor but overall improving   Judgment: poor but overall improving   Gait/Station: normal gait/station   Motor Activity: no abnormal movements     Vital signs in last 24 hours:    Temp:  [97 5 °F (36 4 °C)-99 °F (37 2 °C)] 97 5 °F (36 4 °C)  HR:  [65-80] 65  Resp:  [16-18] 16  BP: (112-113)/(51-63) 113/51    Laboratory results: I have personally reviewed all pertinent laboratory/tests results    Most Recent Labs:   Lab Results   Component Value Date    WBC 7 80 12/25/2021    RBC 4 80 12/25/2021    HGB 14 0 12/25/2021    HCT 42 3 12/25/2021     12/25/2021    RDW 13 3 12/25/2021    NEUTROABS 5 10 12/25/2021    SODIUM 137 12/25/2021    K 3 6 12/25/2021     12/25/2021    CO2 25 12/25/2021    BUN 10 12/25/2021    CREATININE 0 77 12/25/2021    GLUC 123 (H) 12/25/2021    CALCIUM 8 6 12/25/2021    AST 52 (H) 12/25/2021    ALT 37 (H) 12/25/2021    ALKPHOS 68 12/25/2021    TP 7 4 12/25/2021    ALB 4 0 12/25/2021    TBILI 0 84 12/25/2021    CHOLESTEROL 196 12/25/2021    HDL 43 (L) 12/25/2021    TRIG 92 12/25/2021    LDLCALC 135 (H) 12/25/2021    NONHDLC 153 12/25/2021    [id number] 2 630 12/25/2021    PREGUR Negative (-) 12/22/2021    RPR Non-Reactive 12/25/2021    HGBA1C 5 2 12/25/2021     12/25/2021     CBC:   Lab Results   Component Value Date    WBC 7 80 12/25/2021    RBC 4 80 12/25/2021    HGB 14 0 12/25/2021    HCT 42 3 12/25/2021    MCV 88 12/25/2021     12/25/2021    MCH 29 1 12/25/2021    MCHC 33 0 12/25/2021    RDW 13 3 12/25/2021    MPV 6 8 (L) 12/25/2021    NEUTROABS 5 10 12/25/2021     CMP:   Lab Results   Component Value Date    SODIUM 137 12/25/2021    K 3 6 12/25/2021     12/25/2021    CO2 25 12/25/2021    AGAP 4 (L) 12/25/2021    BUN 10 12/25/2021    CREATININE 0 77 12/25/2021    GLUC 123 (H) 12/25/2021    GLUF 123 (H) 12/25/2021    CALCIUM 8 6 12/25/2021    AST 52 (H) 12/25/2021    ALT 37 (H) 12/25/2021    ALKPHOS 68 12/25/2021    TP 7 4 12/25/2021    ALB 4 0 12/25/2021    TBILI 0 84 12/25/2021    EGFR 94 12/25/2021       Progress Toward Goals: progressing, mood is stabilizing slowly    Assessment/Plan   Principal Problem:    Bipolar affective disorder, current episode manic ([facility] [address] )  Active Problems:    Medical clearance for psychiatric admission    Rhabdomyolysis    Tobacco abuse    Encounter for monitoring opioid maintenance therapy      Recommended Treatment:     Planned medication and treatment changes:     All current active medications have been reviewed  Encourage group therapy, milieu therapy and occupational therapy  [facility] checks every 7 minutes  Mouth checks to assure compliance with medications     Continue current medications:    1  Increase Trazodone to 100mg p o  HS for insomnia   2  Podiatry consult for chronic bunions and foot pain  3  SLIM consult for constipation and right shoulder pain  4  Discussed with nursing staff patient may have clothing back and shower  5  Continue Zyprexa 10mg p o  BID for mood stability (Patient is medications over objection Zyprexa 10mg BID IM)  6  Continue Prozac 10 mg p o  daily for anxiety/mood  7  Continue Suboxone 8mg p o  BID for opioid withdrawal  8  Continue Topamax 100mg p o  HS as adjunct for mood  9  Continue to encourage individual and group therapy  10  Continue to communicate feelings with nursing/ staff   11   CM to continue patient care coordination      Current Facility-Administered Medications   Medication Dose Route Frequency Provider Last Rate    acetaminophen  650 mg Oral Q6H PRN Virginia Lighter, KALEIGH      acetaminophen  650 mg Oral Q4H PRN Virginia LighterKALEIGH      acetaminophen  975 mg Oral Q6H PRN Anabelle Hernandez PA-C      aluminum-magnesium hydroxide-simethicone  30 mL Oral Q4H PRN Bill Hall MD      artificial tear  1 application Both Eyes I8N PRN Bill Hall MD      benztropine  1 mg Intramuscular Q4H PRN Max 6/day Bill Hall MD      benztropine  1 mg Oral Q4H PRN Max 6/day Bill Hall MD      benztropine  1 mg Oral BID Tez Peña MD      buprenorphine-naloxone  8 mg Sublingual BID Connie Silverman MD      chlorproMAZINE  50 mg Intramuscular Q4H PRN Tez Peña MD      chlorproMAZINE  100 mg Oral Q4H PRN Tez Peña MD      Diclofenac Sodium  2 g Topical 4x Daily Anabelle Hernandez PA-C      hydrOXYzine HCL  50 mg Oral Q6H PRN Max 4/day MD Dimitri Lal diphenhydrAMINE  50 mg Intramuscular Q6H PRN Bill Hall MD      docusate sodium  100 mg Oral BID PRN Connie Silverman MD      FLUoxetine  10 mg Oral Daily MD Maliha Valadez gabapentin  500 mg Oral TID Arden Cotter MD      hydrOXYzine HCL  100 mg Oral Q6H PRN Max 4/day Jalil Banda MD      hydrOXYzine HCL  25 mg Oral Q6H PRN Max 4/day Jalil Banda MD      ibuprofen  400 mg Oral Q6H PRN Jalil Banda MD      ibuprofen  600 mg Oral Q8H PRN Jalil Banda MD      LORazepam  0 5 mg Oral Daily PRN Lesa Almeida MD      melatonin  3 mg Oral HS PRN Jalil Banda MD      nicotine  1 patch Transdermal Daily Vinayak Covington PA-C      nicotine polacrilex  4 mg Oral Q2H PRN Arden Cotter MD      OLANZapine  10 mg Oral Q3H PRN Max 3/day Jalil Banda MD      Or    OLANZapine  10 mg Intramuscular Q3H PRN Max 3/day Jalil Banda MD      OLANZapine  10 mg Oral HS Lesa Almeida MD      Or    OLANZapine  10 mg Intramuscular HS Lesa Almeida MD      OLANZapine  10 mg Oral Daily Lesa Almeida MD      Or    OLANZapine  10 mg Intramuscular Daily Lesa Almeida MD      OLANZapine  5 mg Oral Q3H PRN Max 6/day Jalil Banda MD      Or    OLANZapine  5 mg Intramuscular Q3H PRN Max 6/day Jalil Banda MD      OLANZapine  2 5 mg Oral Q3H PRN Max 8/day Jalil Banda MD      ondansetron  4 mg Oral Q6H PRN Arden Cotter MD      polyethylene glycol  17 g Oral Daily PRN Jalil Banda MD      sterile water           sterile water           sterile water           sterile water           sterile water           topiramate  100 mg Oral HS Lesa Almeida MD      traZODone  100 mg Oral HS NETTE Bowens       Risks / Benefits of Treatment:    Risks, benefits, and possible side effects of medications explained to patient and patient verbalizes understanding and agreement for treatment  Counseling / Coordination of Care: Total floor / unit time spent today 20 minutes  Greater than 50% of total time was spent with the patient and / or family counseling and / or coordination of care   A description of counseling / coordination of care:  Patient's progress discussed with staff in treatment team meeting  Medications, treatment progress and treatment plan reviewed with patient  Medication changes discussed with patient  Importance of medication and treatment compliance reviewed with patient  Supportive therapy provided to patient  Reassurance and supportive therapy provided  Group attendance encouraged  Encouraged participation in milieu and group therapy on the unit      [address] 12/30/21

## 2021-12-30 NOTE — PROGRESS NOTES
12/30/21 1300   Activity/Group Checklist   Group   (recovery group)   Attendance Attended   Attendance Duration (min) 31-45   Interactions Disorganized interaction   Affect/Mood Appropriate   Goals Achieved Able to listen to others; Able to engage in interactions; Able to self-disclose   Patient verbalized feeling angry  Her thoughts were tangential and had difficulty staying on the topic   Chose to separate herself from the group process

## 2021-12-30 NOTE — NURSING NOTE
Patient states no relief from PRN colace  Requested and received PRN Miralax at 1724  States she has not had a BM for one week  Pt fixated on weight and weight gain  Refused to get weighed, offered blind weight and pt still refused  She states "I'm 160lbs, just put that, I cant do it,  I cant, I am gaining too much weight " Pt refused 1800 dose of cogentin, states she does not want to take any medications until she has a BM  Will monitor

## 2021-12-30 NOTE — CONSULTS
Consult Note- Podiatry   Essie Tirado 42 y o  female MRN: [id number]  Unit/Bed#: [facility] 454-57 Encounter: [id number]    Assessment/Plan     Assessment:  1  Pain foot b/l  2  Hallux valgus b/l     Plan:  - -pt eval and managed    - Number and complexity of problems addressed:  1 undiagnosed new problem with uncertain prognosis   as shown    - Amount/complexity of data reviewed and analyzed:     Category 1: prior patient notes were analyzed today before evaluating and managing patient  All PMH were discussed with pt today  - Risk of complications: moderate risk of morbidity from additional testing or treatment involved with this patient, which includes but not limited to:    - discussed anatomy, condition, treatment plan and options  They were instructed on proper foot care  The patient was seen today for greater than total of  45-59 minutes     This is total time spent today involving both face-to-face time and non face-to-face time  This time spent includes  reviewing their past medical history  , performing a medically appropriate examination and evaluation of the patient, counseling and educating the patient,  documenting all findings in EMR, and independently interpreting results and communicating results with  patient     and discussing their condition and treatment options, risks, and potential complications  I have discussed the findings of this examination with the patient  The discussion included a complete verbal explanation of the examination results, diagnosis and planned treatment(s)  A schedule for future care needs was explained  The patient has verbalized the understanding of these instructions at this time  If any questions should arise after returning home I have encouraged the patient to feel free to call the office     - we will start patient on anti-inflammatory for discomfort  - pt to follow up with Podiatry as outpt for proper conservative or surgical management     - Podiatry signing off, thank you for the consult  History of Present Illness     HPI:  Madeleine Coello is a 42 y o  female who presents with painful feet on both sides  Pt states pain is to big toe joints  No trauma  Has had this for several years  Pt states she does have bunions  Pain worse with shoe gear  Only crocks are comfortable  Pain is 4/10  Inpatient consult to Podiatry  Consult performed by: Danae Damian DPM  Consult ordered by: NETTE May        Review of Systems   Constitutional: Negative  HENT: Negative  Eyes: Negative  Respiratory: Negative  Cardiovascular: Negative  Gastrointestinal: Negative  Musculoskeletal: b/l foot pain  Skin:Negatvie  Neurological: Negative  Historical Information   Past Medical History:   Diagnosis Date    Lower back pain      Past Surgical History:   Procedure Laterality Date    CHOLECYSTECTOMY       Social History   Social History     Substance and Sexual Activity   Alcohol Use Never     Social History     Substance and Sexual Activity   Drug Use Not Currently    Comment: 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  Social History     Tobacco Use   Smoking Status Current Every Day Smoker    Packs/day: 1 00    Types: Cigarettes   Smokeless Tobacco Current User     Family History: History reviewed  No pertinent family history      Meds/Allergies   Medications Prior to Admission   Medication    Aspirin-Acetaminophen-Caffeine (EXCEDRIN PO)    buprenorphine (SUBUTEX) 2 mg    [] buprenorphine-naloxone (SUBOXONE) 8-2 mg per SL tablet    buPROPion (WELLBUTRIN XL) 300 mg 24 hr tablet    meclizine (ANTIVERT) 25 mg tablet    sertraline (ZOLOFT) 100 mg tablet    traZODone (DESYREL) 100 mg tablet     Allergies   Allergen Reactions    Haloperidol Other (See Comments)     oculogyr crisis       Objective   First Vitals:   Blood Pressure: 109/62 (21)  Pulse: 74 (21)  Temperature: 98 8 °F (37 1 °C) (21)  Temp Source: Temporal (12/23/21 1957)  Respirations: 16 (12/23/21 1957)  Height: 5' 1" (154 9 cm) (12/23/21 1957)  SpO2: 97 % (12/23/21 1957)    Current Vitals:   Blood Pressure: 113/51 (12/30/21 0741)  Pulse: 65 (12/30/21 0741)  Temperature: 97 5 °F (36 4 °C) (12/30/21 0741)  Temp Source: Temporal (12/30/21 0741)  Respirations: 16 (12/30/21 0741)  Height: 5' 1" (154 9 cm) (12/23/21 1957)  SpO2: 98 % (12/30/21 0741)    /51 (BP Location: Left arm)   Pulse 65   Temp 97 5 °F (36 4 °C) (Temporal)   Resp 16   Ht 5' 1" (1 549 m)   LMP 12/01/2021   SpO2 98%   BMI 29 29 kg/m²     General Appearance:    Alert, cooperative, no distress   Head:    Normocephalic, without obvious abnormality, atraumatic   Eyes:    PERRL, conjunctiva/corneas clear, EOM's intact            Nose:   Moist mucous membranes, no drainage or sinus tenderness   Throat:   No tenderness, no exudates   Neck:   Supple, symmetrical, trachea midline, no JVD   Back:     Symmetric, no CVA tenderness   Lungs:     Clear to auscultation bilaterally, respirations unlabored   Chest wall:    No tenderness or deformity   Heart:    Regular rate and rhythm, S1 and S2 normal, no murmur, rub   or gallop   Abdomen:     Soft, non-tender, bowel sounds active all four quadrants,     no masses, no organomegaly     Extremities:   MMT is 4/5 to all compartments of the LE, +1/4 edema B/L, Digital ROM is intact,       B/l bunion deformity with diminished ROM of b/l 1st MPJ with crepitus noted     Pulses:   R DP is +2/4, R PT is +2/4, L DP is +2/4, L PT is +2/4, CFT< 3sec to all digits  Skin:    No open Lesions  Neurologic:   CNII-XII intact  Normal strength, sensation and reflexes       Throughout  Gross sensation is intact   Protective sensation is intact       Lab Results:   Admission on 12/23/2021   Component Date Value    WBC 12/25/2021 7 80     RBC 12/25/2021 4 80     Hemoglobin 12/25/2021 14 0     Hematocrit 12/25/2021 42 3     MCV 12/25/2021 88     MCH 12/25/2021 29 1     MCHC 12/25/2021 33 0     RDW 12/25/2021 13 3     MPV 12/25/2021 6 8*    Platelets 21/31/2586 264     Neutrophils Relative 12/25/2021 66*    Lymphocytes Relative 12/25/2021 26     Monocytes Relative 12/25/2021 8     Eosinophils Relative 12/25/2021 0     Basophils Relative 12/25/2021 0     Neutrophils Absolute 12/25/2021 5 10     Lymphocytes Absolute 12/25/2021 2 00     Monocytes Absolute 12/25/2021 0 60     Eosinophils Absolute 12/25/2021 0 00     Basophils Absolute 12/25/2021 0 00     Total CK 12/25/2021 903*    Sodium 12/25/2021 137     Potassium 12/25/2021 3 6     Chloride 12/25/2021 108     CO2 12/25/2021 25     ANION GAP 12/25/2021 4*    BUN 12/25/2021 10     Creatinine 12/25/2021 0 77     Glucose 12/25/2021 123*    Glucose, Fasting 12/25/2021 123*    Calcium 12/25/2021 8 6     AST 12/25/2021 52*    ALT 12/25/2021 37*    Alkaline Phosphatase 12/25/2021 68     Total Protein 12/25/2021 7 4     Albumin 12/25/2021 4 0     Total Bilirubin 12/25/2021 0 84     eGFR 12/25/2021 94     Hemoglobin A1C 12/25/2021 5 2     EAG 12/25/2021 103     Cholesterol 12/25/2021 196     Triglycerides 12/25/2021 92     HDL, Direct 12/25/2021 43*    LDL Calculated 12/25/2021 135*    Non-HDL-Chol (CHOL-HDL) 12/25/2021 153     Magnesium 12/25/2021 2 1     Phosphorus 12/25/2021 3 3     RPR 12/25/2021 Non-Reactive     TSH 3RD GENERATON 12/25/2021 2 630     Vit D, 25-Hydroxy 12/25/2021 22 6*    CK-MB Index 12/25/2021 <1 0     CK-MB 12/25/2021 6 7*    SARS-CoV-2 12/28/2021 Negative     INFLUENZA A PCR 12/28/2021 Negative     INFLUENZA B PCR 12/28/2021 Negative     RSV PCR 12/28/2021 Negative      Imaging: I have personally reviewed pertinent films in PACS  EKG, Pathology, and Other Studies: I have personally reviewed pertinent reports        Code Status: Level 1 - Full Code  Advance Directive and Living Will:      Power of :    POLST:

## 2021-12-30 NOTE — NURSING NOTE
Patient PRN Tylenol effective  Patient requested PRN for severe anxiety and sleep aide  Patient speech very fast and patient still upset about shoes and personal belongings  Patient pacing up and down the hallway really fast  This nurse administered PRN Atarax 100mg and PRN melatonin 3mg @2133  Patient calmed and went into her room

## 2021-12-30 NOTE — PROGRESS NOTES
12/30/21 6422   Team Meeting   Meeting Type Daily Rounds   Team Members Present   Team Members Present Physician;Nurse;   Physician Team Member Dr Gisele Hannah Team Member [address] Management Team Member Adriel   Patient/Family Present   Patient Present No   Patient's Family Present No   Pt medication compliant  Pt still disorganized and internally preoccupied  Pt focused on her belongings  Discharge to be determined

## 2021-12-30 NOTE — NURSING NOTE
1252-pt severly anxious requesting PRN medication  Anxiety score 25  PRN Atarax 100 mg administered  Will monitor for effectiveness

## 2021-12-30 NOTE — NURSING NOTE
Patient request PRN Tylenol for severe back pain 9/10  Patient also request nicotine gum 4mg  Patient rambling about shoes and facility taking them  Upset about not having them, stating " this is illegal I should be be able to have my shoes"  Patient interacting with others, appears calm

## 2021-12-31 PROCEDURE — 99232 SBSQ HOSP IP/OBS MODERATE 35: CPT | Performed by: PSYCHIATRY & NEUROLOGY

## 2021-12-31 RX ORDER — BUPROPION HYDROCHLORIDE 150 MG/1
150 TABLET ORAL DAILY
Status: DISCONTINUED | OUTPATIENT
Start: 2021-12-31 | End: 2022-01-03

## 2021-12-31 RX ADMIN — IBUPROFEN 600 MG: 600 TABLET, FILM COATED ORAL at 16:56

## 2021-12-31 RX ADMIN — NICOTINE POLACRILEX 4 MG: 4 GUM, CHEWING ORAL at 06:43

## 2021-12-31 RX ADMIN — NICOTINE POLACRILEX 4 MG: 4 GUM, CHEWING ORAL at 01:15

## 2021-12-31 RX ADMIN — HYDROXYZINE HYDROCHLORIDE 100 MG: 50 TABLET, FILM COATED ORAL at 12:22

## 2021-12-31 RX ADMIN — NICOTINE 1 PATCH: 14 PATCH, EXTENDED RELEASE TRANSDERMAL at 08:21

## 2021-12-31 RX ADMIN — IBUPROFEN 600 MG: 600 TABLET, FILM COATED ORAL at 12:22

## 2021-12-31 RX ADMIN — BUPRENORPHINE AND NALOXONE 8 MG: 8; 2 FILM BUCCAL; SUBLINGUAL at 16:56

## 2021-12-31 RX ADMIN — ACETAMINOPHEN 975 MG: 325 TABLET ORAL at 21:23

## 2021-12-31 RX ADMIN — OLANZAPINE 10 MG: 10 TABLET, FILM COATED ORAL at 08:22

## 2021-12-31 RX ADMIN — MELATONIN TAB 3 MG 3 MG: 3 TAB at 21:16

## 2021-12-31 RX ADMIN — IBUPROFEN 600 MG: 600 TABLET, FILM COATED ORAL at 05:28

## 2021-12-31 RX ADMIN — BUPRENORPHINE AND NALOXONE 8 MG: 8; 2 FILM BUCCAL; SUBLINGUAL at 08:21

## 2021-12-31 RX ADMIN — TOPIRAMATE 100 MG: 100 TABLET, FILM COATED ORAL at 21:14

## 2021-12-31 RX ADMIN — NICOTINE POLACRILEX 4 MG: 4 GUM, CHEWING ORAL at 20:06

## 2021-12-31 RX ADMIN — IBUPROFEN 400 MG: 400 TABLET ORAL at 01:14

## 2021-12-31 RX ADMIN — ALUMINUM HYDROXIDE, MAGNESIUM HYDROXIDE, AND SIMETHICONE 30 ML: 200; 200; 20 SUSPENSION ORAL at 19:29

## 2021-12-31 RX ADMIN — BENZTROPINE MESYLATE 1 MG: 1 TABLET ORAL at 16:56

## 2021-12-31 RX ADMIN — ONDANSETRON 4 MG: 4 TABLET, ORALLY DISINTEGRATING ORAL at 08:42

## 2021-12-31 RX ADMIN — FLUOXETINE 10 MG: 10 CAPSULE ORAL at 08:21

## 2021-12-31 RX ADMIN — GABAPENTIN 500 MG: 400 CAPSULE ORAL at 21:14

## 2021-12-31 RX ADMIN — ACETAMINOPHEN 975 MG: 325 TABLET ORAL at 08:42

## 2021-12-31 RX ADMIN — LORAZEPAM 0.5 MG: 0.5 TABLET ORAL at 17:45

## 2021-12-31 RX ADMIN — TRAZODONE HYDROCHLORIDE 100 MG: 100 TABLET ORAL at 21:14

## 2021-12-31 RX ADMIN — GABAPENTIN 500 MG: 400 CAPSULE ORAL at 08:22

## 2021-12-31 RX ADMIN — NICOTINE POLACRILEX 4 MG: 4 GUM, CHEWING ORAL at 23:53

## 2021-12-31 RX ADMIN — HYDROXYZINE HYDROCHLORIDE 100 MG: 50 TABLET, FILM COATED ORAL at 21:23

## 2021-12-31 RX ADMIN — BUPROPION HYDROCHLORIDE 150 MG: 150 TABLET, FILM COATED, EXTENDED RELEASE ORAL at 12:22

## 2021-12-31 RX ADMIN — GABAPENTIN 500 MG: 400 CAPSULE ORAL at 16:54

## 2021-12-31 RX ADMIN — OLANZAPINE 10 MG: 10 TABLET, FILM COATED ORAL at 21:14

## 2021-12-31 RX ADMIN — BENZTROPINE MESYLATE 1 MG: 1 TABLET ORAL at 08:22

## 2021-12-31 NOTE — NURSING NOTE
Patient appears less labile and agitated  Speech is less rapid and pressured  She is visible in the milieu and social with some peers and staff  Denies SI, HI, AH, VH  Med and meal complaint  Does not report any unmet needs

## 2021-12-31 NOTE — PROGRESS NOTES
12/30/21 1000   Activity/Group Checklist   Group Other (Comment)  (Group Art Therapy/Psychodynamic, Recognizing Options)   Attendance Attended   Attendance Duration (min) 31-45  (patient arrived late)   Interactions Interacted appropriately   Affect/Mood Appropriate   Goals Achieved Able to engage in interactions; Able to listen to others; Able to recieve feedback; Able to give feedback to another  (Able to engage materials; full participation)

## 2021-12-31 NOTE — PLAN OF CARE
Problem: DEPRESSION  Goal: Will be euthymic at discharge  Description: INTERVENTIONS:  - Administer medication as ordered  - Provide emotional support via 1:1 interaction with staff  - Encourage involvement in milieu/groups/activities  - Monitor for social isolation  Outcome: Progressing     Problem: ANXIETY  Goal: Will report anxiety at manageable levels  Description: INTERVENTIONS:  - Administer medication as ordered  - Teach and encourage coping skills  - Provide emotional support  - Assess patient/family for anxiety and ability to cope  Outcome: Progressing  Goal: By discharge: Patient will verbalize 2 strategies to deal with anxiety  Description: Interventions:  - Identify any obvious source/trigger to anxiety  - Staff will assist patient in applying identified coping technique/skills  - Encourage attendance of scheduled groups and activities  Outcome: Progressing     Problem: INVOLUNTARY ADMIT  Goal: Will cooperate with staff recommendations and doctor's orders and will demonstrate appropriate behavior  Description: INTERVENTIONS:  - Treat underlying conditions and offer medication as ordered  - Educate regarding involuntary admission procedures and rules  - Utilize positive consistent limit setting strategies to support patient and staff safety  Outcome: Progressing     Problem: SAFETY, RESTRAINT - VIOLENT/SELF-DESTRUCTIVE  Goal: Remains free of harm/injury from restraints (Restraint for Violent/Self-Destructive Behavior)  Description: INTERVENTIONS:  - Instruct patient/family regarding restraint use   - Assess and monitor physiologic and psychological status   - Provide interventions and comfort measures to meet assessed patient needs   - Ensure continuous in person monitoring is provided   - Identify and implement measures to help patient regain control  - Assess readiness for release of restraint  Outcome: Progressing  Goal: Returns to optimal restraint-free functioning  Description: INTERVENTIONS:  - Assess the patient's behavior and symptoms that indicate continued need for restraint  - Identify and implement measures to help patient regain control  - Assess readiness for release of restraint   Outcome: Progressing     Problem: DISCHARGE PLANNING  Goal: Discharge to home or other facility with appropriate resources  Description: INTERVENTIONS:  - Identify barriers to discharge w/patient and caregiver  - Arrange for needed discharge resources and transportation as appropriate  - Identify discharge learning needs (meds, wound care, etc )  - Refer to Case Management Department for coordinating discharge planning if the patient needs post-hospital services based on physician/advanced practitioner order or complex needs related to functional status, cognitive ability, or social support system  Outcome: Progressing     Problem: DISCHARGE PLANNING - CARE MANAGEMENT  Goal: Discharge to post-acute care or home with appropriate resources  Description: INTERVENTIONS:  - Conduct assessment to determine patient/family and health care team treatment goals, and need for post-acute services based on payer coverage, community resources, and patient preferences, and barriers to discharge  - Address psychosocial, clinical, and financial barriers to discharge as identified in assessment in conjunction with the patient/family and health care team  - Arrange appropriate level of post-acute services according to patients   needs and preference and payer coverage in collaboration with the physician and health care team  - Communicate with and update the patient/family, physician, and health care team regarding progress on the discharge plan  - Arrange appropriate transportation to post-acute venues  Outcome: Progressing     Problem: Ineffective Coping  Goal: Participates in unit activities  Description: Interventions:  - Provide therapeutic environment   - Provide required programming   - Redirect inappropriate behaviors   Outcome: Progressing

## 2021-12-31 NOTE — PROGRESS NOTES
12/31/21 1000   Activity/Group Checklist   Group Other (Comment)  (OPEN STUDIO Art Therapy/Social, Free-Expression)   Attendance Attended   Attendance Duration (min) 46-60   Interactions Interacted appropriately   Affect/Mood Appropriate   Goals Achieved Able to listen to others; Able to engage in interactions

## 2021-12-31 NOTE — PROGRESS NOTES
Progress Note - Behavioral Health     Amalia [facility] 42 y o  female MRN: [id number]   Unit/Bed#: [facility] [place] [address] Encounter: [id number]    Patient was seen and evaluated for continuity of care  As per staff yesterday, patient received Atarax yesterday in the afternoon which was not effective and then was given Ativan 0 5 mg p o  For anxiety  Patient also complained of constipation yesterday in the afternoon and was given PRN of Colace  Patient was also seen by podiatrist and was recommended to be started on anti-inflammatories for discomfort and was referred to follow up with Podiatry as outpatient  As per staff, patient refused evening Cogentin as she did not want to take any medications until she has a bowel movement  As per staff last evening, patient was noted to be visible on the unit and was noted to be disorganized, rambling, and fixated on her weight  As per nursing today, patient appeared to be less labile and less agitated  Here her speech was noted to be less rapid and pressured  She was noted by nursing during the day today to be visible in the milieu and social with some peers and staff  During the interview today, patient describes her mood as ok" and states that she did get 4 hours of sleep last night and felt as though she sleep walked    I allowed the patient to vent her feelings and emotions in the session as I addressed her affect  She agrees to continue monitoring this  Patient states that she has been attending groups and was able to play on the tablet on the unit  She describes her appetite as ok" but continues to be concerned that she is not passing her stools  I discussed with the patient that I will have medical follow-up with her and she was in agreement with this  She denies SI/HI/AVH/delusions and denies any plan or intent to harm herself or others  We reviewed medication options and treatment alternatives during the appointment    Patient requested to be switched off Prozac and started on Wellbutrin  mg daily as she reports that has been more helpful for her cravings for smoking and mood  She agrees to switch and I will make this switch for her  She agrees to continue with her other medications as prescribed and was in agreement to be evaluated by medical today  Patient added to medical list with nursing  Patient appears to be less irritable, less labile, less agitated, and less rapid in terms of her speech today  She was more cooperative overall during session today  Patient denies any medication side effects and reports tolerating the increase in trazodone without issues; will continue to monitor patient's sleepwalking      Sleep: slept 4 hours  Appetite: "ok"  Medication side effects: No   ROS: all other systems are negative, constipation    Mental Status Evaluation:    Appearance:   female, dressed casually, fair hygiene, no acute distress   Behavior:  less guarded and less restless, more cooperative, less anxious appearing   Speech:  less rapid, clear, coherent, spontaneous   Mood:  "ok"   Affect:  constricted, anxious   Thought Process:  circumstantial, perseverative, less disorganized than previous days   Associations: circumstantial associations   Thought Content:  mild paranoia, preoccupied   Perceptual Disturbances: no auditory hallucinations, no visual hallucinations, denies auditory hallucinations when asked, does not appear responding to internal stimuli, appears distracted, appears less preoccupied overall   Risk Potential: Suicidal ideation - None  Homicidal ideation - None  Potential for aggression - No   Sensorium:  oriented to person, place and time/date   Memory:  recent and remote memory grossly intact   Consciousness:  alert and awake   Attention/Concentration: attention span and concentration appear shorter than expected for age   Insight:  Poor but improving   Judgment: Poor but improving   Gait/Station: normal gait/station   Motor Activity: no abnormal movements     Vital signs in last 24 hours:    Temp:  [97 4 °F (36 3 °C)-98 °F (36 7 °C)] 97 4 °F (36 3 °C)  HR:  [70-96] 70  Resp:  [18] 18  BP: (111-137)/(62-74) 111/62    Laboratory results: I have personally reviewed all pertinent laboratory/tests results    Most Recent Labs:   Lab Results   Component Value Date    WBC 7 80 12/25/2021    RBC 4 80 12/25/2021    HGB 14 0 12/25/2021    HCT 42 3 12/25/2021     12/25/2021    RDW 13 3 12/25/2021    NEUTROABS 5 10 12/25/2021    SODIUM 137 12/25/2021    K 3 6 12/25/2021     12/25/2021    CO2 25 12/25/2021    BUN 10 12/25/2021    CREATININE 0 77 12/25/2021    GLUC 123 (H) 12/25/2021    CALCIUM 8 6 12/25/2021    AST 52 (H) 12/25/2021    ALT 37 (H) 12/25/2021    ALKPHOS 68 12/25/2021    TP 7 4 12/25/2021    ALB 4 0 12/25/2021    TBILI 0 84 12/25/2021    CHOLESTEROL 196 12/25/2021    HDL 43 (L) 12/25/2021    TRIG 92 12/25/2021    LDLCALC 135 (H) 12/25/2021    NONHDLC 153 12/25/2021    BVR4CIXDLNYJ 2 630 12/25/2021    PREGUR Negative (-) 12/22/2021    RPR Non-Reactive 12/25/2021    HGBA1C 5 2 12/25/2021     12/25/2021     Progress Toward Goals: progressing slowly    Assessment/Plan   Principal Problem:    Bipolar affective disorder, current episode manic ([address] )  Active Problems:    Medical clearance for psychiatric admission    Rhabdomyolysis    Tobacco abuse    Encounter for monitoring opioid maintenance therapy      Recommended Treatment:     Planned medication and treatment changes:     All current active medications have been reviewed  Encourage group therapy, milieu therapy and occupational therapy  Behavioral Health checks every 7 minutes    1) Continue Zyprexa 10mg BID for mood stabilization (Patient is medications over objection-Zyprexa 10mg BID IM for mood)  2) D/C Prozac and begin Wellbutrin  mg daily for mood and smoking cravings  3) Continue Suboxone 8mg BID for opioid withdrawal   4) Continue Trazodone to 100mg HS for insomnia  5) Continue Topamax 100mg HS for mood  6) Continue encouraging patient to attend groups  7) Continue encouraging patient to stay visible while on the unit  8) Continue encouraging patient to verbalize needs and feelings to staff/nursing  9) CM to continue coordinating patient care  10) Continue SLIM medical recommendations   11) Podiatry consult completed 12/30/2021    Current Facility-Administered Medications   Medication Dose Route Frequency Provider Last Rate    acetaminophen  650 mg Oral Q6H PRN Otis Adams PA-C      acetaminophen  650 mg Oral Q4H PRN Otis Adams PA-C      acetaminophen  975 mg Oral Q6H PRN Anabelle Hernandez PA-C      aluminum-magnesium hydroxide-simethicone  30 mL Oral Q4H PRN Darrel Calvo MD      artificial tear  1 application Both Eyes K9T PRN Darrel Calvo MD      benztropine  1 mg Intramuscular Q4H PRN Max 6/day Darrel Calvo MD      benztropine  1 mg Oral Q4H PRN Max 6/day Darrel Calvo MD      benztropine  1 mg Oral BID Rg Espinoza MD      buprenorphine-naloxone  8 mg Sublingual BID Adele Means MD      buPROPion  150 mg Oral Daily Adele Means MD      chlorproMAZINE  50 mg Intramuscular Q4H PRN Rg Espinoza MD      chlorproMAZINE  100 mg Oral Q4H PRN Rg Espinoza MD      Diclofenac Sodium  2 g Topical 4x Daily Anabelle Hernandez PA-C      hydrOXYzine HCL  50 mg Oral Q6H PRN Max 4/day MD Dimitri Mtz diphenhydrAMINE  50 mg Intramuscular Q6H PRN Darrel Calvo MD      docusate sodium  100 mg Oral BID PRN Adele Means MD      gabapentin  500 mg Oral TID Rg Espinoza MD      hydrOXYzine HCL  100 mg Oral Q6H PRN Max 4/day Darrel Calvo MD      hydrOXYzine HCL  25 mg Oral Q6H PRN Max 4/day Darrel Calvo MD      ibuprofen  400 mg Oral Q6H PRN Darrel Calvo MD      ibuprofen  600 mg Oral Q8H PRN Darrel Calvo MD      ibuprofen  600 mg Oral Q6H [address] DPM      LORazepam  0 5 mg Oral Daily PRN Adele Means MD      melatonin 3 mg Oral HS PRN Jensen Loomis MD      nicotine  1 patch Transdermal Daily Selwyn Castellon PA-C      nicotine polacrilex  4 mg Oral Q2H PRN López Snowden MD      OLANZapine  10 mg Oral Q3H PRN Max 3/day Jensen Loomis MD      Or    OLANZapine  10 mg Intramuscular Q3H PRN Max 3/day Jensen Loomis MD      OLANZapine  10 mg Oral HS Jeronimo Burger MD      Or    OLANZapine  10 mg Intramuscular HS Jeronimo Burger MD      OLANZapine  10 mg Oral Daily Jeronimo Burger MD      Or    OLANZapine  10 mg Intramuscular Daily Jeronimo Burger MD      OLANZapine  5 mg Oral Q3H PRN Max 6/day Jensen Loomis MD      Or    OLANZapine  5 mg Intramuscular Q3H PRN Max 6/day Jensen Loomis MD      OLANZapine  2 5 mg Oral Q3H PRN Max 8/day Jensen Loomis MD      ondansetron  4 mg Oral Q6H PRN López Snowden MD      polyethylene glycol  17 g Oral Daily PRN Jensen Loomis MD      sterile water           sterile water           sterile water           sterile water           sterile water           topiramate  100 mg Oral HS Jeronimo Burger MD      traZODone  100 mg Oral HS NETTE Valera       Risks / Benefits of Treatment:    Risks, benefits, and possible side effects of medications explained to patient and patient verbalizes understanding and agreement for treatment  Counseling / Coordination of Care: Total floor / unit time spent today 20 minutes  Greater than 50% of total time was spent with the patient and / or family counseling and / or coordination of care  A description of counseling / coordination of care:  Patient's progress discussed with staff in treatment team meeting  Medications, treatment progress and treatment plan reviewed with patient  Importance of medication and treatment compliance reviewed with patient  Reassurance and supportive therapy provided  Encouraged participation in milieu and group therapy on the unit      Jeronimo Burger MD 12/31/21

## 2021-12-31 NOTE — QUICK NOTE
Patient complaining of constipation  States she has not had a bowel movement in 3 days  Requesting dulcolax  On physical exam, no abdominal distention or tenderness   Bowel sounds x 4  Will order dulcolax

## 2021-12-31 NOTE — NURSING NOTE
Patient's visible on the unit and social with all peers  Patient's labile, disorganized, rambling and fixated on her wt  Patient states "I'm just getting fat in here  I used to be able to see my collar bones (while pulling her shirt down) to show rn her neck  Now look at me and i'll be damn before gaining all these wts around  I'll start vomiting all over this place  Nursing encouragement given and was able to settle down  Compliant with all HS meds  addititional prn atarax 100 mg and melatonin given  Denies all  Will monitor

## 2022-01-01 PROCEDURE — 99232 SBSQ HOSP IP/OBS MODERATE 35: CPT | Performed by: PSYCHIATRY & NEUROLOGY

## 2022-01-01 RX ADMIN — OLANZAPINE 10 MG: 10 TABLET, FILM COATED ORAL at 08:59

## 2022-01-01 RX ADMIN — DICLOFENAC SODIUM 2 G: 10 GEL TOPICAL at 09:22

## 2022-01-01 RX ADMIN — IBUPROFEN 600 MG: 600 TABLET, FILM COATED ORAL at 17:12

## 2022-01-01 RX ADMIN — GABAPENTIN 500 MG: 400 CAPSULE ORAL at 08:58

## 2022-01-01 RX ADMIN — OLANZAPINE 10 MG: 10 TABLET, FILM COATED ORAL at 21:04

## 2022-01-01 RX ADMIN — NICOTINE POLACRILEX 4 MG: 4 GUM, CHEWING ORAL at 11:58

## 2022-01-01 RX ADMIN — ONDANSETRON 4 MG: 4 TABLET, ORALLY DISINTEGRATING ORAL at 12:18

## 2022-01-01 RX ADMIN — IBUPROFEN 600 MG: 600 TABLET, FILM COATED ORAL at 23:42

## 2022-01-01 RX ADMIN — BENZTROPINE MESYLATE 1 MG: 1 TABLET ORAL at 17:11

## 2022-01-01 RX ADMIN — NICOTINE POLACRILEX 4 MG: 4 GUM, CHEWING ORAL at 20:17

## 2022-01-01 RX ADMIN — ACETAMINOPHEN 975 MG: 325 TABLET ORAL at 10:41

## 2022-01-01 RX ADMIN — GABAPENTIN 500 MG: 400 CAPSULE ORAL at 21:05

## 2022-01-01 RX ADMIN — HYDROXYZINE HYDROCHLORIDE 100 MG: 50 TABLET, FILM COATED ORAL at 18:28

## 2022-01-01 RX ADMIN — IBUPROFEN 600 MG: 600 TABLET, FILM COATED ORAL at 11:31

## 2022-01-01 RX ADMIN — TOPIRAMATE 100 MG: 100 TABLET, FILM COATED ORAL at 21:05

## 2022-01-01 RX ADMIN — NICOTINE 1 PATCH: 14 PATCH, EXTENDED RELEASE TRANSDERMAL at 08:58

## 2022-01-01 RX ADMIN — NICOTINE POLACRILEX 4 MG: 4 GUM, CHEWING ORAL at 05:56

## 2022-01-01 RX ADMIN — BUPRENORPHINE AND NALOXONE 8 MG: 8; 2 FILM BUCCAL; SUBLINGUAL at 08:58

## 2022-01-01 RX ADMIN — IBUPROFEN 600 MG: 600 TABLET, FILM COATED ORAL at 05:53

## 2022-01-01 RX ADMIN — HYDROXYZINE HYDROCHLORIDE 100 MG: 50 TABLET, FILM COATED ORAL at 09:43

## 2022-01-01 RX ADMIN — BUPRENORPHINE AND NALOXONE 8 MG: 8; 2 FILM BUCCAL; SUBLINGUAL at 17:11

## 2022-01-01 RX ADMIN — BUPROPION HYDROCHLORIDE 150 MG: 150 TABLET, FILM COATED, EXTENDED RELEASE ORAL at 08:58

## 2022-01-01 RX ADMIN — DICLOFENAC SODIUM 2 G: 10 GEL TOPICAL at 21:03

## 2022-01-01 RX ADMIN — DICLOFENAC SODIUM 2 G: 10 GEL TOPICAL at 17:56

## 2022-01-01 RX ADMIN — TRAZODONE HYDROCHLORIDE 100 MG: 100 TABLET ORAL at 21:04

## 2022-01-01 RX ADMIN — NICOTINE POLACRILEX 4 MG: 4 GUM, CHEWING ORAL at 17:56

## 2022-01-01 RX ADMIN — NICOTINE POLACRILEX 4 MG: 4 GUM, CHEWING ORAL at 15:18

## 2022-01-01 RX ADMIN — NICOTINE POLACRILEX 4 MG: 4 GUM, CHEWING ORAL at 23:45

## 2022-01-01 RX ADMIN — LORAZEPAM 0.5 MG: 0.5 TABLET ORAL at 11:31

## 2022-01-01 RX ADMIN — GABAPENTIN 500 MG: 400 CAPSULE ORAL at 15:18

## 2022-01-01 RX ADMIN — BISACODYL 5 MG: 5 TABLET, COATED ORAL at 11:56

## 2022-01-01 RX ADMIN — HYDROXYZINE HYDROCHLORIDE 100 MG: 50 TABLET, FILM COATED ORAL at 05:54

## 2022-01-01 NOTE — POST FALL NOTE
Post Fall Note    Date of Fall: 01/01/22  Observer/Reported time of Fall: 1020  Name of Provider Notified: Javon Haynes Provider Notified: [id number]  Assessment of Patient Injury: No injury noted  Post Fall Interventions: Physician notified; Circumstances of fall reviewed and documented; Need for additional safety measures intiated if necessary  Family/Next of kin notified?: No      Brief Description of Events  Pt sat down on table in dayroom  Table flipped to its side, resulting in patient landing on floor on her buttocks  Pt observed laughing, waving hands, and saying "I'm okay I'm okay, it was my fault " Scene secured, engineering and medical provider notified  Last Recorded Vitals  Blood pressure 120/57, pulse 71, temperature 98 7 °F (37 1 °C), temperature source Temporal, resp  rate 18, height 5' 1" (1 549 m), weight 72 6 kg (160 lb), SpO2 97 %        Principal Problem:    Bipolar affective disorder, current episode manic ([facility] [address] )  Active Problems:    Medical clearance for psychiatric admission    Rhabdomyolysis    Tobacco abuse    Encounter for monitoring opioid maintenance therapy

## 2022-01-01 NOTE — NURSING NOTE
Pt's speech is rapid and pressured  Pt labile at times, but has been pleasant  She is visible and social in the milieu  Med/meal compliant

## 2022-01-01 NOTE — PLAN OF CARE
Problem: DEPRESSION  Goal: Will be euthymic at discharge  Description: INTERVENTIONS:  - Administer medication as ordered  - Provide emotional support via 1:1 interaction with staff  - Encourage involvement in milieu/groups/activities  - Monitor for social isolation  Outcome: Progressing     Problem: INVOLUNTARY ADMIT  Goal: Will cooperate with staff recommendations and doctor's orders and will demonstrate appropriate behavior  Description: INTERVENTIONS:  - Treat underlying conditions and offer medication as ordered  - Educate regarding involuntary admission procedures and rules  - Utilize positive consistent limit setting strategies to support patient and staff safety  Outcome: Progressing     Problem: SAFETY, RESTRAINT - VIOLENT/SELF-DESTRUCTIVE  Goal: Remains free of harm/injury from restraints (Restraint for Violent/Self-Destructive Behavior)  Description: INTERVENTIONS:  - Instruct patient/family regarding restraint use   - Assess and monitor physiologic and psychological status   - Provide interventions and comfort measures to meet assessed patient needs   - Ensure continuous in person monitoring is provided   - Identify and implement measures to help patient regain control  - Assess readiness for release of restraint  Outcome: Progressing  Goal: Returns to optimal restraint-free functioning  Description: INTERVENTIONS:  - Assess the patient's behavior and symptoms that indicate continued need for restraint  - Identify and implement measures to help patient regain control  - Assess readiness for release of restraint   Outcome: Progressing     Problem: DISCHARGE PLANNING  Goal: Discharge to home or other facility with appropriate resources  Description: INTERVENTIONS:  - Identify barriers to discharge w/patient and caregiver  - Arrange for needed discharge resources and transportation as appropriate  - Identify discharge learning needs (meds, wound care, etc )  - Refer to Case Management Department for coordinating discharge planning if the patient needs post-hospital services based on physician/advanced practitioner order or complex needs related to functional status, cognitive ability, or social support system  Outcome: Progressing     Problem: DISCHARGE PLANNING - CARE MANAGEMENT  Goal: Discharge to post-acute care or home with appropriate resources  Description: INTERVENTIONS:  - Conduct assessment to determine patient/family and health care team treatment goals, and need for post-acute services based on payer coverage, community resources, and patient preferences, and barriers to discharge  - Address psychosocial, clinical, and financial barriers to discharge as identified in assessment in conjunction with the patient/family and health care team  - Arrange appropriate level of post-acute services according to patients   needs and preference and payer coverage in collaboration with the physician and health care team  - Communicate with and update the patient/family, physician, and health care team regarding progress on the discharge plan  - Arrange appropriate transportation to post-acute venues  Outcome: Progressing     Problem: Ineffective Coping  Goal: Participates in unit activities  Description: Interventions:  - Provide therapeutic environment   - Provide required programming   - Redirect inappropriate behaviors   Outcome: Progressing     Problem: ANXIETY  Goal: Will report anxiety at manageable levels  Description: INTERVENTIONS:  - Administer medication as ordered  - Teach and encourage coping skills  - Provide emotional support  - Assess patient/family for anxiety and ability to cope  Outcome: Not Progressing  Goal: By discharge: Patient will verbalize 2 strategies to deal with anxiety  Description: Interventions:  - Identify any obvious source/trigger to anxiety  - Staff will assist patient in applying identified coping technique/skills  - Encourage attendance of scheduled groups and activities  Outcome: Not Progressing

## 2022-01-01 NOTE — NURSING NOTE
Pt approached nurse's station reporting heightened anxiety and agitation  Offered Zyprexa 5mg PO for moderate agitation, however, pt refused "because it is too close to haldol and I lock up and I am just afraid that it will do the same thing " Administered Atarax 100mg PO at 1828 for severe anxiety  Pt has otherwise been cooperative and compliant with staff  She is impulsive and loud  Has been visible on the unit  Labile  Preoccupied with the consistency of her bowel movements  Has been easily redirectable  Denies any other needs at this time

## 2022-01-01 NOTE — NURSING NOTE
Pt approached nurses station, reporting that she now has pain in back  PRN Tylenol 975 given at 1041

## 2022-01-01 NOTE — NURSING NOTE
Patient tangential and loud  Reports anxiety and can be tearful at times  Following peers around and loudly telling them stories about her past  Lacks insight  Pt scored 26 on Lundberg Anxiety Scale  Administered Atarax 100mg at 0943, was ineffective  Ativan 0 5mg administered PO at 1131 for same  Pt currently calmly speaking with peers  PRN appears effective

## 2022-01-01 NOTE — PROGRESS NOTES
Progress Note - Behavioral Health     Jose Barron 42 y o  female MRN: [id number]   Unit/Bed#: [facility]-39 Encounter: [id number]    Patient was seen and evaluated for continuity of care  As per staff, patient was given Atarax yesterday in the afternoon which was ineffective and then given Ativan 0 5 mg for severe anxiety  Patient was seen by medical and reported that she had not had a bowel movement in 3 days; Dulcolax was ordered by medical   As per staff on evening shift, nursing reported that patient's speech is rapid and pressured and she was noted to be labile at times  Nursing reported however that she has been pleasant, visible, and social in the milieu  As per staff today at 10:20 a m , patient sat down on the table in the day room and of table flipped to [place], resulting in the patient landing on her buttocks  Patient was observed laughing and waving her hands stating to nursing that she was okay  Medical was notified by nursing  During the interview today, patient describes her mood as ok" and states that she got 6 hours of sleep overnight  She states that she has been spend GERD time reading, talking, and completing art work  She states that she has been doing better overall in terms of her energy level and appetite  She continues to complain of constipation and now back pain due to falling and landing on her buttocks as above  Medical was notified by nursing and patient is in agreement to follow up with the medical team   She denies SI/HI/AVH/delusions and denies any plan or intent to harm herself or others  She is less pressured and less talkative during the interview and appears to be more organized in terms of her thought process  She reports tolerating the start of Wellbutrin and discontinuation of Prozac without any issues  She agrees to continue taking her medications as prescribed      Sleep: slept better, slept 6 hours  Appetite: normal  Medication side effects: No   ROS: all other systems are negative, but patient complains of constipation and lower back pain    Mental Status Evaluation:    Appearance:   female, dressed casually, made up, improved hygiene, no acute distress   Behavior:  Less anxious appearing overall, less guarded, less restless, cooperative   Speech:  Spontaneous, clear, coherent, easier to interrupt, and less rapid overall   Mood:  "ok"   Affect:  anxious, constricted, slightly more reactive   Thought Process:  perseverative, less circumstantial, more organized   Associations: circumstantial associations   Thought Content:  mild paranoia   Perceptual Disturbances: no auditory hallucinations, no visual hallucinations, denies auditory hallucinations when asked, does not appear responding to internal stimuli, appears less distracted and less preoccupied   Risk Potential: Suicidal ideation - None  Homicidal ideation - None  Potential for aggression - No   Sensorium:  oriented to person, place, time/date and situation   Memory:  recent and remote memory grossly intact   Consciousness:  alert and awake   Attention/Concentration: attention span and concentration appear shorter than expected for age   Insight:  limited   Judgment: limited   Gait/Station: normal gait/station   Motor Activity: no abnormal movements     Vital signs in last 24 hours:    Temp:  [97 6 °F (36 4 °C)-98 7 °F (37 1 °C)] 98 4 °F (36 9 °C)  HR:  [71-82] 82  Resp:  [16-18] 16  BP: (108-120)/(57-74) 120/60    Laboratory results: I have personally reviewed all pertinent laboratory/tests results    Most Recent Labs:   Lab Results   Component Value Date    WBC 7 80 12/25/2021    RBC 4 80 12/25/2021    HGB 14 0 12/25/2021    HCT 42 3 12/25/2021     12/25/2021    RDW 13 3 12/25/2021    NEUTROABS 5 10 12/25/2021    SODIUM 137 12/25/2021    K 3 6 12/25/2021     12/25/2021    CO2 25 12/25/2021    BUN 10 12/25/2021    CREATININE 0 77 12/25/2021    GLUC 123 (H) 12/25/2021    CALCIUM 8 6 12/25/2021    AST 52 (H) 12/25/2021    ALT 37 (H) 12/25/2021    ALKPHOS 68 12/25/2021    TP 7 4 12/25/2021    ALB 4 0 12/25/2021    TBILI 0 84 12/25/2021    CHOLESTEROL 196 12/25/2021    HDL 43 (L) 12/25/2021    TRIG 92 12/25/2021    LDLCALC 135 (H) 12/25/2021    NONHDLC 153 12/25/2021    [id number] 2 630 12/25/2021    PREGUR Negative (-) 12/22/2021    RPR Non-Reactive 12/25/2021    HGBA1C 5 2 12/25/2021     12/25/2021     Progress Toward Goals: progressing slowly    Assessment/Plan   Principal Problem:    Bipolar affective disorder, current episode manic ([facility] [address] )  Active Problems:    Medical clearance for psychiatric admission    Rhabdomyolysis    Tobacco abuse    Encounter for monitoring opioid maintenance therapy      Recommended Treatment:     Planned medication and treatment changes:     All current active medications have been reviewed  Encourage group therapy, milieu therapy and occupational therapy  Behavioral Health checks every 7 minutes    1) Continue Zyprexa 10mg BID for mood stabilization (Patient is medications over objection-Zyprexa 10mg BID IM for mood)  2) Continue Wellbutrin  mg daily for mood and smoking cravings (tolerating well)  3) Continue Suboxone 8mg BID for opioid withdrawal   4) Continue Trazodone to 100mg HS for insomnia  5) Continue Topamax 100mg HS for mood  6) Continue to encourage patient for group attendance  7) Continue to encourage patient to remain visible in the milieu  8) Continue to encourage patient to communicate thoughts and emotions with nursing/staff  9) CM to continue care coordination for patient  10) Continue medical management as per SLIM    Current Facility-Administered Medications   Medication Dose Route Frequency Provider Last Rate    acetaminophen  650 mg Oral Q6H PRN Wei Raygoza PA-C      acetaminophen  650 mg Oral Q4H PRN Wei Raygoza PA-C      acetaminophen  975 mg Oral Q6H PRN Wei Raygoza PA-C      aluminum-magnesium hydroxide-simethicone 30 mL Oral Q4H PRN Lindy Valladares MD      artificial tear  1 application Both Eyes S4Q PRN Lindy Valladares MD      benztropine  1 mg Intramuscular Q4H PRN Max 6/day Lindy Valladares MD      benztropine  1 mg Oral Q4H PRN Max 6/day Lindy Valladares MD      benztropine  1 mg Oral BID Di MD Kandi      buprenorphine-naloxone  8 mg Sublingual BID Wang Pollock MD      buPROPion  150 mg Oral Daily Wang Pollock MD      chlorproMAZINE  50 mg Intramuscular Q4H PRN Hina Chau MD      chlorproMAZINE  100 mg Oral Q4H PRN Hina Chau MD      Diclofenac Sodium  2 g Topical 4x Daily Anabelle Hernandez PA-C      hydrOXYzine HCL  50 mg Oral Q6H PRN Max 4/day Lindy Valladares MD      Or   Ingris Oliver diphenhydrAMINE  50 mg Intramuscular Q6H PRN Lindy Valladares MD      docusate sodium  100 mg Oral BID PRN Wang Pollock MD      gabapentin  500 mg Oral TID Di MD Kandi      hydrOXYzine HCL  100 mg Oral Q6H PRN Max 4/day Lindy Valladares MD      hydrOXYzine HCL  25 mg Oral Q6H PRN Max 4/day Lindy Valladares MD      ibuprofen  400 mg Oral Q6H PRN Lindy Valladares MD      ibuprofen  600 mg Oral Q8H PRN Lindy Valladares MD      ibuprofen  600 mg Oral Q6H [address] Eric Arce, DPM      LORazepam  0 5 mg Oral Daily PRN Wang Pollock MD      melatonin  3 mg Oral HS PRN Lindy Valladares MD      nicotine  1 patch Transdermal Daily [facility] PA-C      nicotine polacrilex  4 mg Oral Q2H PRN Hina Chau MD      OLANZapine  10 mg Oral Q3H PRN Max 3/day Lindy Valladares MD      Or    OLANZapine  10 mg Intramuscular Q3H PRN Max 3/day Lindy Valladares MD      OLANZapine  10 mg Oral HS Wang Pollock MD      Or   Ingris Oliver OLANZapine  10 mg Intramuscular HS Wang Pollock MD      OLANZapine  10 mg Oral Daily Wang Pollock MD      Or    OLANZapine  10 mg Intramuscular Daily Wang Pollock MD      OLANZapine  5 mg Oral Q3H PRN Max 6/day Lindy Valladares MD      Or    OLANZapine  5 mg Intramuscular Q3H PRN Max 6/day Lindy Valladares MD      OLANZapine  2 5 mg Oral Q3H PRN Max 8/day Ines Lawson MD      ondansetron  4 mg Oral Q6H PRN Abhi Thorpe MD      polyethylene glycol  17 g Oral Daily PRN Iens Lawson MD      sterile water           sterile water           sterile water           sterile water           sterile water           topiramate  100 mg Oral HS Theresa Velasco MD      traZODone  100 mg Oral HS NETTE Rueda       Risks / Benefits of Treatment:    Risks, benefits, and possible side effects of medications explained to patient and patient verbalizes understanding and agreement for treatment  Counseling / Coordination of Care: Total floor / unit time spent today 20 minutes  Greater than 50% of total time was spent with the patient and / or family counseling and / or coordination of care  A description of counseling / coordination of care:  Patient's progress discussed with staff in treatment team meeting  Medications, treatment progress and treatment plan reviewed with patient  Importance of medication and treatment compliance reviewed with patient  Reassurance and supportive therapy provided  Encouraged participation in milieu and group therapy on the unit      Theresa Velasco MD 01/01/22

## 2022-01-01 NOTE — NURSING NOTE
Pt attempted to walk away from medication administration with cogentin in hand  States that she was planning to throw it away  Cogentin was disposed of and pt was reminded to let nursing staff know that she is refusing meications if she does not want to take them

## 2022-01-02 PROBLEM — M62.838 MUSCLE SPASM OF SHOULDER REGION: Status: ACTIVE | Noted: 2022-01-02

## 2022-01-02 PROBLEM — R10.13 DYSPEPSIA: Status: ACTIVE | Noted: 2022-01-02

## 2022-01-02 PROCEDURE — 99232 SBSQ HOSP IP/OBS MODERATE 35: CPT | Performed by: PSYCHIATRY & NEUROLOGY

## 2022-01-02 PROCEDURE — 99231 SBSQ HOSP IP/OBS SF/LOW 25: CPT | Performed by: PHYSICIAN ASSISTANT

## 2022-01-02 RX ORDER — LORAZEPAM 1 MG/1
1 TABLET ORAL DAILY PRN
Status: DISCONTINUED | OUTPATIENT
Start: 2022-01-02 | End: 2022-01-05 | Stop reason: HOSPADM

## 2022-01-02 RX ORDER — CYCLOBENZAPRINE HCL 10 MG
5 TABLET ORAL 2 TIMES DAILY PRN
Status: DISCONTINUED | OUTPATIENT
Start: 2022-01-02 | End: 2022-01-05 | Stop reason: HOSPADM

## 2022-01-02 RX ORDER — FAMOTIDINE 20 MG/1
20 TABLET, FILM COATED ORAL 2 TIMES DAILY
Status: DISCONTINUED | OUTPATIENT
Start: 2022-01-02 | End: 2022-01-05 | Stop reason: HOSPADM

## 2022-01-02 RX ADMIN — NICOTINE POLACRILEX 4 MG: 4 GUM, CHEWING ORAL at 17:47

## 2022-01-02 RX ADMIN — LORAZEPAM 1 MG: 1 TABLET ORAL at 14:25

## 2022-01-02 RX ADMIN — HYDROXYZINE HYDROCHLORIDE 100 MG: 50 TABLET, FILM COATED ORAL at 01:54

## 2022-01-02 RX ADMIN — NICOTINE POLACRILEX 4 MG: 4 GUM, CHEWING ORAL at 12:19

## 2022-01-02 RX ADMIN — CYCLOBENZAPRINE HYDROCHLORIDE 5 MG: 10 TABLET, FILM COATED ORAL at 16:37

## 2022-01-02 RX ADMIN — BUPROPION HYDROCHLORIDE 150 MG: 150 TABLET, FILM COATED, EXTENDED RELEASE ORAL at 08:32

## 2022-01-02 RX ADMIN — GABAPENTIN 500 MG: 400 CAPSULE ORAL at 16:37

## 2022-01-02 RX ADMIN — TRAZODONE HYDROCHLORIDE 100 MG: 100 TABLET ORAL at 21:18

## 2022-01-02 RX ADMIN — BENZTROPINE MESYLATE 1 MG: 1 TABLET ORAL at 17:46

## 2022-01-02 RX ADMIN — NICOTINE 1 PATCH: 14 PATCH, EXTENDED RELEASE TRANSDERMAL at 08:33

## 2022-01-02 RX ADMIN — IBUPROFEN 600 MG: 600 TABLET, FILM COATED ORAL at 06:20

## 2022-01-02 RX ADMIN — OLANZAPINE 10 MG: 10 TABLET, FILM COATED ORAL at 09:24

## 2022-01-02 RX ADMIN — NICOTINE POLACRILEX 4 MG: 4 GUM, CHEWING ORAL at 09:30

## 2022-01-02 RX ADMIN — BUPRENORPHINE AND NALOXONE 8 MG: 8; 2 FILM BUCCAL; SUBLINGUAL at 08:33

## 2022-01-02 RX ADMIN — DICLOFENAC SODIUM 2 G: 10 GEL TOPICAL at 12:19

## 2022-01-02 RX ADMIN — OLANZAPINE 10 MG: 10 TABLET, FILM COATED ORAL at 21:16

## 2022-01-02 RX ADMIN — TOPIRAMATE 100 MG: 100 TABLET, FILM COATED ORAL at 21:18

## 2022-01-02 RX ADMIN — NICOTINE POLACRILEX 4 MG: 4 GUM, CHEWING ORAL at 01:54

## 2022-01-02 RX ADMIN — BENZTROPINE MESYLATE 1 MG: 1 TABLET ORAL at 09:24

## 2022-01-02 RX ADMIN — GABAPENTIN 500 MG: 400 CAPSULE ORAL at 21:16

## 2022-01-02 RX ADMIN — BUPRENORPHINE AND NALOXONE 8 MG: 8; 2 FILM BUCCAL; SUBLINGUAL at 17:06

## 2022-01-02 RX ADMIN — MELATONIN TAB 3 MG 3 MG: 3 TAB at 21:18

## 2022-01-02 RX ADMIN — DICLOFENAC SODIUM 2 G: 10 GEL TOPICAL at 08:33

## 2022-01-02 RX ADMIN — MELATONIN TAB 3 MG 3 MG: 3 TAB at 01:54

## 2022-01-02 RX ADMIN — NICOTINE POLACRILEX 4 MG: 4 GUM, CHEWING ORAL at 20:03

## 2022-01-02 RX ADMIN — FAMOTIDINE 20 MG: 20 TABLET ORAL at 17:06

## 2022-01-02 RX ADMIN — GABAPENTIN 500 MG: 400 CAPSULE ORAL at 08:32

## 2022-01-02 RX ADMIN — IBUPROFEN 600 MG: 600 TABLET, FILM COATED ORAL at 09:24

## 2022-01-02 RX ADMIN — NICOTINE POLACRILEX 4 MG: 4 GUM, CHEWING ORAL at 04:55

## 2022-01-02 RX ADMIN — IBUPROFEN 600 MG: 600 TABLET, FILM COATED ORAL at 12:19

## 2022-01-02 RX ADMIN — HYDROXYZINE HYDROCHLORIDE 100 MG: 50 TABLET, FILM COATED ORAL at 11:04

## 2022-01-02 NOTE — PROGRESS NOTES
Progress Note - Behavioral Health     Lynne Fernando 42 y o  female MRN: [id number]   Unit/Bed#: [facility]22 Encounter: [id number]    Patient was seen and evaluated for continuity of care  As per staff, patient yesterday in the afternoon was noted to be tangential on loud and was reporting anxiety to nursing  She received Atarax 100 mg at 9:43 a m  and then was given Ativan 0 5 mg at 11:31 a m  Patient also received Atarax 100 mg at 628 for anxiety  She was noted by nursing on evening shift to be visible on the unit but impulsive and loud  She was noted to be easily redirected  She continues to be focused on her bowel movements  During the interview today, patient describes her mood as ok" and reports that she did have difficulty with her sleep  She states that she got approximately 4 hours of sleep last night and notes multiple nighttime awakenings  She states that she has been attending groups and socializing with other patients  She describes her energy as nervous energy    She denies SI/HI/AVH/delusions and denies any plan or intent to harm herself or others  She continues to be circumstantial at times in terms of her thought process but is less rapid overall in her speech  She continues to be more easily redirected  We reviewed medication options and treatment alternatives during the session  Patient agrees to continue taking her medications as prescribed  Patient is focused on receiving Ativan and finds that helps her eat and stay calm throughout the day  I discussed with her that I will increase it to Ativan 1 mg daily p r n  Only for severe anxiety to which the patient was in agreement      Sleep: decreased, slept 3-4 hours  Appetite: normal  Medication side effects: No   ROS: no complaints, all other systems are negative    Mental Status Evaluation:    Appearance:  CF, dressed casually, improved hygiene, no acute distress, sitting comfortably   Behavior:  cooperative, calmer, less restless, less guarded   Speech:  clear, coherent, spontaneous, talkative but less rapid overall   Mood:  "ok"   Affect:  constricted, more reactive, anxious   Thought Process:  perseverative, circumstantial at times, continues to be more organized   Associations: intact associations, perseverative   Thought Content:  normal, no overt delusions   Perceptual Disturbances: no auditory hallucinations, no visual hallucinations, denies auditory hallucinations when asked, does not appear responding to internal stimuli, appears less distracted today    Risk Potential: Suicidal ideation - None  Homicidal ideation - None  Potential for aggression - No   Sensorium:  oriented to person, place, time/date and situation   Memory:  recent and remote memory grossly intact   Consciousness:  alert and awake   Attention/Concentration: attention span and concentration appear shorter than expected for age   Insight:  limited, improving slightly   Judgment: limited, improving slightly   Gait/Station: normal gait/station   Motor Activity: no abnormal movements     Vital signs in last 24 hours:    Temp:  [97 8 °F (36 6 °C)-98 9 °F (37 2 °C)] 98 9 °F (37 2 °C)  HR:  [72-75] 75  Resp:  [16] 16  BP: (108-115)/(52-73) 115/73    Laboratory results: I have personally reviewed all pertinent laboratory/tests results    Most Recent Labs:   Lab Results   Component Value Date    WBC 7 80 12/25/2021    RBC 4 80 12/25/2021    HGB 14 0 12/25/2021    HCT 42 3 12/25/2021     12/25/2021    RDW 13 3 12/25/2021    NEUTROABS 5 10 12/25/2021    SODIUM 137 12/25/2021    K 3 6 12/25/2021     12/25/2021    CO2 25 12/25/2021    BUN 10 12/25/2021    CREATININE 0 77 12/25/2021    GLUC 123 (H) 12/25/2021    CALCIUM 8 6 12/25/2021    AST 52 (H) 12/25/2021    ALT 37 (H) 12/25/2021    ALKPHOS 68 12/25/2021    TP 7 4 12/25/2021    ALB 4 0 12/25/2021    TBILI 0 84 12/25/2021    CHOLESTEROL 196 12/25/2021    HDL 43 (L) 12/25/2021    TRIG 92 12/25/2021 [address] 135 (H) 12/25/2021    [place] 153 12/25/2021    [id number] 2 630 12/25/2021    PREGUR Negative (-) 12/22/2021    RPR Non-Reactive 12/25/2021    HGBA1C 5 2 12/25/2021     12/25/2021     Progress Toward Goals: progressing gradually    Assessment/Plan   Principal Problem:    Bipolar affective disorder, current episode manic ([address] )  Active Problems:    Medical clearance for psychiatric admission    Rhabdomyolysis    Tobacco abuse    Encounter for monitoring opioid maintenance therapy      Recommended Treatment:     Planned medication and treatment changes:     All current active medications have been reviewed  Encourage group therapy, milieu therapy and occupational therapy  Behavioral Health checks every 7 minutes    1) Continue Zyprexa 10mg BID for mood stabilization (Patient is medications over objection-Zyprexa 10mg BID IM for mood)  2) Continue Wellbutrin  mg daily for mood and smoking cravings   3) Continue Suboxone 8mg BID for opioid withdrawal   4) Continue Trazodone 100mg HS for insomnia  5) Continue Topamax 100mg HS for mood  6) Continue encouraging patient to attend groups  7) Continue encouraging patient to stay visible on unit  8) Continue encourage patient to verbalize needs and feelings with staff/nursing  9) CM to continue patient care coordination   10) Continue SLIM medical recommendations  11) Increase Ativan to 1mg daily PRN ONLY FOR SEVERE ANXIETY    Current Facility-Administered Medications   Medication Dose Route Frequency Provider Last Rate    acetaminophen  650 mg Oral Q6H PRN Jeffry Fischer PA-C      acetaminophen  650 mg Oral Q4H PRN Jeffry MooseKALEIGH      acetaminophen  975 mg Oral Q6H PRN Anabelle Hernandez PA-C      aluminum-magnesium hydroxide-simethicone  30 mL Oral Q4H PRN Raine Wiggins MD      artificial tear  1 application Both Eyes D7U PRN Raine Wiggins MD      benztropine  1 mg Intramuscular Q4H PRN Max 6/day Raine Wiggins MD      benztropine  1 mg Oral Q4H PRN Max 6/day Cris Sumner MD      benztropine  1 mg Oral BID Malgorzata Florence MD      buprenorphine-naloxone  8 mg Sublingual BID Magy Lorenzo MD      buPROPion  150 mg Oral Daily Magy Lorenzo MD      chlorproMAZINE  50 mg Intramuscular Q4H PRN Malgorzata Florence MD      chlorproMAZINE  100 mg Oral Q4H PRN Malgorzata Florence MD      Diclofenac Sodium  2 g Topical 4x Daily Anabelle Hernandez PA-C      hydrOXYzine HCL  50 mg Oral Q6H PRN Max 4/day Cris Sumner MD      Or   [place] diphenhydrAMINE  50 mg Intramuscular Q6H PRN Cris Sumner MD      docusate sodium  100 mg Oral BID PRN Magy Lorenzo MD      gabapentin  500 mg Oral TID Malgorzata Florence MD      hydrOXYzine HCL  100 mg Oral Q6H PRN Max 4/day Cris Sumner MD      hydrOXYzine HCL  25 mg Oral Q6H PRN Max 4/day Cris Sumner MD      ibuprofen  400 mg Oral Q6H PRN Cris Sumner MD      ibuprofen  600 mg Oral Q8H PRN Cris Sumner MD      ibuprofen  600 mg Oral Q6H [address] Eric Yazmin, DPM      LORazepam  1 mg Oral Daily PRN Magy Lorenzo MD      melatonin  3 mg Oral HS PRN Cris Sumner MD      nicotine  1 patch Transdermal Daily Saintclair Levee, PA-C      nicotine polacrilex  4 mg Oral Q2H PRN Malgorzata Florence MD      OLANZapine  10 mg Oral Q3H PRN Max 3/day Cris Sumner MD      Or    OLANZapine  10 mg Intramuscular Q3H PRN Max 3/day Cris Sumner MD      OLANZapine  10 mg Oral HS Magy Lorenzo MD      Or   [place] Bone OLANZapine  10 mg Intramuscular HS Magy Lorenzo MD      OLANZapine  10 mg Oral Daily Magy Lorenzo MD      Or    OLANZapine  10 mg Intramuscular Daily Magy Lorenzo MD      OLANZapine  5 mg Oral Q3H PRN Max 6/day Cris Sumner MD      Or    OLANZapine  5 mg Intramuscular Q3H PRN Max 6/day Cris Sumner MD      OLANZapine  2 5 mg Oral Q3H PRN Max 8/day Cris Sumner MD      ondansetron  4 mg Oral Q6H PRN Malgorzata Florence MD      polyethylene glycol  17 g Oral Daily PRN Cris Sumner MD      sterile water           sterile water          [place] sterile water           sterile water           sterile water           topiramate  100 mg Oral HS Peewee Muir MD      traZODone  100 mg Oral HS NETTE Wilder       Risks / Benefits of Treatment:    Risks, benefits, and possible side effects of medications explained to patient and patient verbalizes understanding and agreement for treatment  Counseling / Coordination of Care: Total floor / unit time spent today 20 minutes  Greater than 50% of total time was spent with the patient and / or family counseling and / or coordination of care  A description of counseling / coordination of care:  Patient's progress discussed with staff in treatment team meeting  Medications, treatment progress and treatment plan reviewed with patient  Importance of medication and treatment compliance reviewed with patient  Reassurance and supportive therapy provided  Encouraged participation in milieu and group therapy on the unit      Peewee Muir MD 01/02/22

## 2022-01-02 NOTE — NURSING NOTE
Pt requested and given Ativan prn for c/o severe anxiety at 1425  Prn effective for use  Calmer upon reassessment

## 2022-01-02 NOTE — NURSING NOTE
Pt with rapid, pressured speech  Rambling and tangential  Mood has been good with no irritability  Compliant with scheduled medications  Behaviorally appropriate  No SI/HI  Hesitant to take psychiatric medications in AM  Asked to take after breakfast, then did not eat breakfast and requested to take meds later  Pt then said she would take them and was compliant with meds as well as mouth check  Focused on GI complaints of dyspepsia (attritutes to nicotine gum but prefers to continue with prn gum) and the consistency of her stools  Requesting to see medical provider for complaints  Received prn Motrin at [phone] for c/o back pain with good effect  Requested and given Atarax prn at 1104 for severe anxiety  Pt states prn was ineffective  No further complaints at this time

## 2022-01-02 NOTE — PROGRESS NOTES
[facility]  Progress Note Kourtney Calabrese 1975, 42 y o  female MRN: [id number]  Unit/Bed#: [place] [address] Encounter: [id number]  Primary Care Provider: No primary care provider on file  Date and time admitted to hospital: 2021  7:22 PM    Dyspepsia  Assessment & Plan  · Patient complaining of "heart burn"  · Has used pepcid in the past  · Will order Pepcid 20mg BID    Muscle spasm of shoulder region  Assessment & Plan  · Patient complaining of muscle pain/spasms of right shoulder - chronic  · Has used flexeril in the past  · Will order flexeril 5mg bid prn      Nurse Coordination of Care Discussion: Discussed assessment and plan with primary RN    Discussions with Specialists or Other Care Team Provider: None    Education and Discussions with Family / Patient: Answered patients questions to the best of my abilities    Time Spent for Care: 15 minutes  More than 50% of total time spent on counseling and coordination of care as described above  Current Length of Stay: 10 day(s)    Code Status: Level 1 - Full Code      Subjective:   Patient complaining of dyspepsia and chronic right shoulder pain with spasms  States she feels "heartburn" is related to change in diet here in the hospital  Has used prevacid in the past but has never been on long term PPI  Also complaining of right shoulder pain  States the pain is chronic  States pain/tingling radiates down the right upper extremity  Has used flexeril in the past with significant relief  Objective:     Vitals:   Temp (24hrs), Av 4 °F (36 9 °C), Min:97 8 °F (36 6 °C), Max:98 9 °F (37 2 °C)    Temp:  [97 8 °F (36 6 °C)-98 9 °F (37 2 °C)] 98 9 °F (37 2 °C)  HR:  [72-75] 75  Resp:  [16] 16  BP: (108-115)/(52-73) 115/73  SpO2:  [98 %-99 %] 98 %  Body mass index is 30 23 kg/m²  Physical Exam:     Physical Exam  Constitutional:       General: She is not in acute distress  Appearance: Normal appearance  Musculoskeletal:         General: Normal range of motion  Neurological:      General: No focal deficit present  Mental Status: She is alert  Additional Data:     Labs:                            * I Have Reviewed All Lab Data Listed Above  * Additional Pertinent Lab Tests Reviewed:  All Labs Within Last 24 Hours Reviewed    Imaging:    Imaging Reports Reviewed Today Include: No imaging reviewed today      Last 24 Hours Medication List:   Current Facility-Administered Medications   Medication Dose Route Frequency Provider Last Rate    acetaminophen  650 mg Oral Q6H PRN [facility], PA-C      acetaminophen  650 mg Oral Q4H PRN [facility]      acetaminophen  975 mg Oral Q6H PRN JAE Purvis-C      aluminum-magnesium hydroxide-simethicone  30 mL Oral Q4H PRN Nghia Roberson MD      artificial tear  1 application Both Eyes L0A PRN Nghia Roberson MD      benztropine  1 mg Intramuscular Q4H PRN Max 6/day Nghia Roberson MD      benztropine  1 mg Oral Q4H PRN Max 6/day Nghia Roberson MD      benztropine  1 mg Oral BID Adonay Lopez MD      buprenorphine-naloxone  8 mg Sublingual BID Burt Graham MD      buPROPion  150 mg Oral Daily Burt Graham MD      chlorproMAZINE  50 mg Intramuscular Q4H PRN Adonay Lopez MD      chlorproMAZINE  100 mg Oral Q4H PRN Adonay Lopez MD      cyclobenzaprine  5 mg Oral BID PRN JAE Jones-SIDNEY      Diclofenac Sodium  2 g Topical 4x Daily Anabelle Hernandez PA-C      hydrOXYzine HCL  50 mg Oral Q6H PRN Max 4/day Nghia Roberson MD      Or    diphenhydrAMINE  50 mg Intramuscular Q6H PRN Nghia Roberson MD      docusate sodium  100 mg Oral BID PRN Burt Graham MD      famotidine  20 mg Oral BID MAIKEL JonesC      gabapentin  500 mg Oral TID Adonay Lopez MD      hydrOXYzine HCL  100 mg Oral Q6H PRN Max 4/day Nghia Roberson MD      hydrOXYzine HCL  25 mg Oral Q6H PRN Max 4/day Nghia Roberson MD      ibuprofen  400 mg Oral Q6H PRN Micky Beltrán MD      ibuprofen  600 mg Oral Q8H PRN Micky Beltrán MD      ibuprofen  600 mg Oral Q6H [address] Katemichael Watt, DPM      LORazepam  1 mg Oral Daily PRN Maikel Jara MD      melatonin  3 mg Oral HS PRN Micky Beltrán MD      nicotine  1 patch Transdermal Daily Oneal Escalante PA-C      nicotine polacrilex  4 mg Oral Q2H PRN Yunior Freire MD      OLANZapine  10 mg Oral Q3H PRN Max 3/day Micky Beltrán MD      Or    OLANZapine  10 mg Intramuscular Q3H PRN Max 3/day Micky Beltrán MD      OLANZapine  10 mg Oral HS Maikel Jara MD      Or    OLANZapine  10 mg Intramuscular HS Maikel Jara MD      OLANZapine  10 mg Oral Daily Maikel Jara MD      Or    OLANZapine  10 mg Intramuscular Daily Maikel Jara MD      OLANZapine  5 mg Oral Q3H PRN Max 6/day Micky Beltrán MD      Or    OLANZapine  5 mg Intramuscular Q3H PRN Max 6/day Micky Beltrán MD      OLANZapine  2 5 mg Oral Q3H PRN Max 8/day Micky Beltrán MD      ondansetron  4 mg Oral Q6H PRN Yunior Freire MD      polyethylene glycol  17 g Oral Daily PRN Micky Beltrán MD      sterile water           sterile water           sterile water           sterile water           sterile water           topiramate  100 mg Oral HS Maikel Jara MD      traZODone  100 mg Oral HS NETTE Dee          Today, Patient Was Seen By: Lynette Malin PA-C      ** Please Note: Dictation voice to text software may have been used in the creation of this document   **

## 2022-01-02 NOTE — NURSING NOTE
Patient requested some medication to help with her severe anxiety and her inability to sleep  Patient received PRN hydroxyzine 100mg for anxiety and melatonin 3mg for sleep

## 2022-01-02 NOTE — NURSING NOTE
Patient's visible on the unit and social with peers  Compliant with her night meds  Denies all  Will monitor

## 2022-01-03 PROCEDURE — 99232 SBSQ HOSP IP/OBS MODERATE 35: CPT | Performed by: PSYCHIATRY & NEUROLOGY

## 2022-01-03 RX ORDER — BUPROPION HYDROCHLORIDE 300 MG/1
300 TABLET ORAL DAILY
Status: DISCONTINUED | OUTPATIENT
Start: 2022-01-04 | End: 2022-01-05 | Stop reason: HOSPADM

## 2022-01-03 RX ADMIN — GABAPENTIN 500 MG: 400 CAPSULE ORAL at 15:29

## 2022-01-03 RX ADMIN — HYDROXYZINE HYDROCHLORIDE 100 MG: 50 TABLET, FILM COATED ORAL at 13:40

## 2022-01-03 RX ADMIN — NICOTINE POLACRILEX 4 MG: 4 GUM, CHEWING ORAL at 10:17

## 2022-01-03 RX ADMIN — GABAPENTIN 500 MG: 400 CAPSULE ORAL at 21:00

## 2022-01-03 RX ADMIN — NICOTINE POLACRILEX 4 MG: 4 GUM, CHEWING ORAL at 16:56

## 2022-01-03 RX ADMIN — GABAPENTIN 500 MG: 400 CAPSULE ORAL at 08:09

## 2022-01-03 RX ADMIN — BENZTROPINE MESYLATE 1 MG: 1 TABLET ORAL at 08:09

## 2022-01-03 RX ADMIN — NICOTINE POLACRILEX 4 MG: 4 GUM, CHEWING ORAL at 22:31

## 2022-01-03 RX ADMIN — OLANZAPINE 10 MG: 10 TABLET, FILM COATED ORAL at 21:00

## 2022-01-03 RX ADMIN — IBUPROFEN 600 MG: 600 TABLET, FILM COATED ORAL at 05:41

## 2022-01-03 RX ADMIN — NICOTINE POLACRILEX 4 MG: 4 GUM, CHEWING ORAL at 15:29

## 2022-01-03 RX ADMIN — DICLOFENAC SODIUM 2 G: 10 GEL TOPICAL at 17:02

## 2022-01-03 RX ADMIN — IBUPROFEN 600 MG: 600 TABLET, FILM COATED ORAL at 10:16

## 2022-01-03 RX ADMIN — DICLOFENAC SODIUM 2 G: 10 GEL TOPICAL at 08:10

## 2022-01-03 RX ADMIN — FAMOTIDINE 20 MG: 20 TABLET ORAL at 17:02

## 2022-01-03 RX ADMIN — FAMOTIDINE 20 MG: 20 TABLET ORAL at 08:09

## 2022-01-03 RX ADMIN — OLANZAPINE 10 MG: 10 TABLET, FILM COATED ORAL at 08:09

## 2022-01-03 RX ADMIN — NICOTINE POLACRILEX 4 MG: 4 GUM, CHEWING ORAL at 04:14

## 2022-01-03 RX ADMIN — BUPRENORPHINE AND NALOXONE 8 MG: 8; 2 FILM BUCCAL; SUBLINGUAL at 08:10

## 2022-01-03 RX ADMIN — ONDANSETRON 4 MG: 4 TABLET, ORALLY DISINTEGRATING ORAL at 12:30

## 2022-01-03 RX ADMIN — NICOTINE POLACRILEX 4 MG: 4 GUM, CHEWING ORAL at 06:24

## 2022-01-03 RX ADMIN — TRAZODONE HYDROCHLORIDE 100 MG: 100 TABLET ORAL at 21:00

## 2022-01-03 RX ADMIN — NICOTINE 1 PATCH: 14 PATCH, EXTENDED RELEASE TRANSDERMAL at 08:02

## 2022-01-03 RX ADMIN — NICOTINE POLACRILEX 4 MG: 4 GUM, CHEWING ORAL at 20:36

## 2022-01-03 RX ADMIN — BUPRENORPHINE AND NALOXONE 8 MG: 8; 2 FILM BUCCAL; SUBLINGUAL at 17:01

## 2022-01-03 RX ADMIN — NICOTINE POLACRILEX 4 MG: 4 GUM, CHEWING ORAL at 00:36

## 2022-01-03 RX ADMIN — LORAZEPAM 1 MG: 1 TABLET ORAL at 15:29

## 2022-01-03 RX ADMIN — BUPROPION HYDROCHLORIDE 150 MG: 150 TABLET, FILM COATED, EXTENDED RELEASE ORAL at 08:09

## 2022-01-03 RX ADMIN — BENZTROPINE MESYLATE 1 MG: 1 TABLET ORAL at 17:02

## 2022-01-03 RX ADMIN — TOPIRAMATE 100 MG: 100 TABLET, FILM COATED ORAL at 21:00

## 2022-01-03 RX ADMIN — MELATONIN TAB 3 MG 3 MG: 3 TAB at 20:59

## 2022-01-03 RX ADMIN — ALUMINUM HYDROXIDE, MAGNESIUM HYDROXIDE, AND SIMETHICONE 30 ML: 200; 200; 20 SUSPENSION ORAL at 05:40

## 2022-01-03 RX ADMIN — IBUPROFEN 600 MG: 600 TABLET, FILM COATED ORAL at 17:01

## 2022-01-03 NOTE — DISCHARGE INSTR - APPOINTMENTS
Eyad Palmer RN, our [employer], will be calling you after your discharge, on the phone number that you provided  She will be available as an additional support, if needed  If you wish to speak with her, you may contact Conrado Irwin at [phone]

## 2022-01-03 NOTE — PROGRESS NOTES
Progress Note - Behavioral Health   Gregor Gill 42 y o  female MRN: [id number]  Unit/Bed#: U 342-02 Encounter: [id number]    Assessment/Plan   Principal Problem:    Bipolar affective disorder, current episode manic ([address] )  Active Problems:    Medical clearance for psychiatric admission    Rhabdomyolysis    Tobacco abuse    Encounter for monitoring opioid maintenance therapy    Muscle spasm of shoulder region    Dyspepsia      Recommended Treatment:     1  Continue with medications:  a  Zyprexa 10mg BID for mood stabilization  b  Trazodone 100mg QHS for insomnia  c  Topalaz 100mg QHS for mood  d  Suboxone 8mg BID for opioid withdrawal  e  Ativan 1mg daily PRN for severe anxiety   2  Increase Wellbutrin XL 300mg daily for mood/cravings  3  Continue with group therapy, milieu therapy and occupational therapy  4  Continue frequent safety checks and vitals per unit protocol  5  Continue with SLIM medical management as indicated    Case discussed with treatment team   Risks, benefits and possible side effects of Medications: Risks, benefits, and possible side effects of medications have previously been explained  No new medications at this time  Legal Status: 303  Diet: Regular  Disposition: pending, coordinating with case management    ------------------------------------------------------------    Subjective: Per nursing report, Judah Tanner has been cooperative on the unit and compliant with medications  Overnight, she has been visible on the unit, social with peers, and has remained in behavioral control  Her speech has been less rapid than in previous days  On evaluation, Judah Tanner is talkative but interruptible, and less disorganized than noted previously  She states her mood is "alright " She states she has been spending her time reading, watching movies, and socializing  She reports sleeping has been "sporadic" and believes she has about 5 hours of sleep last night   She states she has been craving cigarettes and this has been disrupting her sleep  Her energy level today is "low"  Her appetite has been adequate and she reports eating enough  She denies any adverse effects from medications  She appears focused on discharge and trying to plan for discharge, and appears focused on cigarettes  Today, Simran Benz is daina for safety on the unit  She denies suicidal ideation, homicidal ideation, auditory hallucinations, and visual hallucinations  Patient reports feeling safe on the unit  PRNs overnight: mylanta , ibuprofen 600mg, nicorette gum  VS: stable    Progress Toward Goals: slow improvement    Psychiatric Review of Systems:  Behavior over the last 24 hours: improved  Sleep: baseline low  Appetite: adequate  Medication side effects: none verbalized  ROS: Complete review of systems is negative except as noted above      Vital signs in last 24 hours:  Temp:  [97 6 °F (36 4 °C)-97 9 °F (36 6 °C)] 97 6 °F (36 4 °C)  HR:  [70-75] 70  Resp:  [16] 16  BP: (107-126)/(63-74) 126/74    Mental Status Exam:  Appearance:  alert, good eye contact, appears stated age, casually dressed, appropriate grooming and hygiene and with visible shallow horizontal scars on left wrist,  female with red hair   Behavior:  cooperative and sitting comfortably, mostly calm but anxious appearing at times   Motor: no abnormal movements and normal gait and balance   Speech:  spontaneous, coherent and talkative but interruptible   Mood:  "alright"   Affect:  appropriate range and euthymic   Thought Process:  generally linear and goal-directed but perseverative at times   Thought Content: no verbalized delusions or overt paranoia   Perceptual disturbances: no reported hallucinations and does not appear to be responding to internal stimuli at this time   Risk Potential: No active or passive suicidal or homicidal ideation was verbalized during interview   Cognition: oriented to self and situation, appears to be of average intelligence and cognition not formally tested   Insight:  Limited but improving   Judgment: Limited but improving     Current Medications:  Current Facility-Administered Medications   Medication Dose Route Frequency Provider Last Rate    acetaminophen  650 mg Oral Q6H PRN Lexx Malin PA-C      acetaminophen  650 mg Oral Q4H PRN Lexx Malin PA-C      acetaminophen  975 mg Oral Q6H PRN Anabelle Hernandez PA-C      aluminum-magnesium hydroxide-simethicone  30 mL Oral Q4H PRN Vola Ace, MD      artificial tear  1 application Both Eyes X8Z PRN Vola Ace, MD      benztropine  1 mg Intramuscular Q4H PRN Max 6/day Vola Ace, MD      benztropine  1 mg Oral Q4H PRN Max 6/day Vola Ace, MD      benztropine  1 mg Oral BID Oralia Sagastume MD      buprenorphine-naloxone  8 mg Sublingual BID Mildred Bonilla MD      buPROPion  150 mg Oral Daily Mildred Bonilla MD      chlorproMAZINE  50 mg Intramuscular Q4H PRN Oralia Sagastume MD      chlorproMAZINE  100 mg Oral Q4H PRN Oralia Sagastume MD      cyclobenzaprine  5 mg Oral BID PRN Javan Castro PA-C      Diclofenac Sodium  2 g Topical 4x Daily Anabelle Hernandez PA-C      hydrOXYzine HCL  50 mg Oral Q6H PRN Max 4/day Vola MD Dimitri Reynolds diphenhydrAMINE  50 mg Intramuscular Q6H PRN Cristiana MD Rodolfo      docusate sodium  100 mg Oral BID PRN Mildred Bonilla MD      famotidine  20 mg Oral BID Javan Castro PA-C      gabapentin  500 mg Oral TID Oralia Sagastume MD      hydrOXYzine HCL  100 mg Oral Q6H PRN Max 4/day Vola Ace, MD      hydrOXYzine HCL  25 mg Oral Q6H PRN Max 4/day Vola Ace, MD      ibuprofen  400 mg Oral Q6H PRN Vola Ace, MD      ibuprofen  600 mg Oral Q8H PRN Vola Ace, MD      ibuprofen  600 mg Oral Q6H [address] Ar[address] DPM      LORazepam  1 mg Oral Daily PRN Mildred Bonilla MD      melatonin  3 mg Oral HS PRN Cristiana Ace, MD      nicotine  1 patch Transdermal Daily Lexx Malin PA-C      nicotine polacrilex 4 mg Oral Q2H PRN Debbie Brown MD      OLANZapine  10 mg Oral Q3H PRN Max 3/day Almaz Kaufman MD      Or    OLANZapine  10 mg Intramuscular Q3H PRN Max 3/day Almza Breath, MD      OLANZapine  10 mg Oral HS Yola Rooney MD      Or    OLANZapine  10 mg Intramuscular HS Yola Rooney MD      OLANZapine  10 mg Oral Daily Massievilledada Rooney MD      Or    OLANZapine  10 mg Intramuscular Daily [place] MD Toribio      OLANZapine  5 mg Oral Q3H PRN Max 6/day Parthenia Breath, MD      Or    OLANZapine  5 mg Intramuscular Q3H PRN Max 6/day Parthenia Breath, MD      OLANZapine  2 5 mg Oral Q3H PRN Max 8/day Parthenia Breath, MD      ondansetron  4 mg Oral Q6H PRN Debbie Brown MD      polyethylene glycol  17 g Oral Daily PRN Partreid Breath, MD      sterile water           sterile water           sterile water           sterile water           sterile water           topiramate  100 mg Oral HS Yola Rooney MD      traZODone  100 mg Oral HS NETTE Underwood         Behavioral Health Medications: all current active meds have been reviewed  Changes as in plan section above  Laboratory results:  I have personally reviewed all pertinent laboratory/tests results  No results found for this or any previous visit (from the past 48 hour(s))  This note has been constructed using a voice recognition system  There may be translation, syntax, or grammatical errors  If you have any questions, please contact the dictating provider      Xander Ireland MD

## 2022-01-03 NOTE — SOCIAL WORK
Worker spoke with pt regarding discharge for Wednesday, she plans to return to the [facility] and follow-up with Jade Mcadams  Pt signed [facility] for [facility] for outpatient mental health

## 2022-01-03 NOTE — PLAN OF CARE
Worker emailed Zainab Simeon at [facility] regarding intake, pt has an intake on 1/10 at 1400 virtually        Problem: DISCHARGE PLANNING - CARE MANAGEMENT  Goal: Discharge to post-acute care or home with appropriate resources  Description: INTERVENTIONS:  - Conduct assessment to determine patient/family and health care team treatment goals, and need for post-acute services based on payer coverage, community resources, and patient preferences, and barriers to discharge  - Address psychosocial, clinical, and financial barriers to discharge as identified in assessment in conjunction with the patient/family and health care team  - Arrange appropriate level of post-acute services according to patients   needs and preference and payer coverage in collaboration with the physician and health care team  - Communicate with and update the patient/family, physician, and health care team regarding progress on the discharge plan  - Arrange appropriate transportation to post-acute venues  Outcome: Progressing

## 2022-01-03 NOTE — NURSING NOTE
Patient about the unit  Denies symptoms as of this time and is hopeful for discharge soon  In behavioral control  Speech is less rapid, less labile  Compliant with morning medications and mouth checks

## 2022-01-03 NOTE — PROGRESS NOTES
01/03/22 0816   Team Meeting   Meeting Type Daily Rounds   Team Members Present   Team Members Present Physician;Nurse;   Physician Team Member Dr Susan Barone Team Member [address] Management Team Member Adriel   Patient/Family Present   Patient Present No   Patient's Family Present No   Pt medication compliant  Pt still disorganized and labile, but improving  Pt received PRN medication for severe anxiety  Discharge pending for Wednesday/Thursday

## 2022-01-03 NOTE — NURSING NOTE
Pt c/o severe anxiety and stated " I tried to calm myself down but I cant  With all the activity this morning I am stressing out "  Lundberg anxiety scaled was 35 and PO PRN 1 MG Ativan adm  Will monitor effectiveness  Pt denies SI/HI and AVH  Pt Is visible and social in the milieu

## 2022-01-03 NOTE — NURSING NOTE
Patient's visible on the unit and social with peers  Compliant with her night meds  Requested and given prn gum  multiple times  Denies all  Will monitor

## 2022-01-03 NOTE — DISCHARGE INSTR - OTHER ORDERS
Crisis Information  If you are experiencing a mental health emergency, you may call the [address] [facility] 24 hours a day, 7 days per week at [phone]  In [place], call [phone]  When you need someone to listen, the [place] is available for 16 hours a day, 7 days a week, from the time of 7-10am and 2pm-2am   It is not available from the hours of 2am-6am and 10am-2pm  A representative can be reached at [phone] [address]  Season runs November 1, 2021 through April 30, 2022  From November 1 through November 30   shelter will only be operating when it is 32 degrees or below  Visit website for status update  Winter shelter for single men and single women  Registration 7:00pm to 9:00pm (Only employed   guests with written proof of employment may enter station later)  First come, first served  Lights   out at 10:30pm  Lights on at 5:45am  Meals will be served Monday through Friday for clients   staying there only  Clients are required to wear a mask with the exception of eating and sleeping  Address:  [address] [place] [address], [address] [place] [address]  Eligibility: Homeless single men and women 18 years and older who have no other shelter   options; Unable to serve families Must have no open criminal warrants or sexual offender status   found on background check through [employer],  and [employer]  Hours: Monday through Sunday, 7:00pm to 7:00am  Phone: [phone]      [address] [place], [place] [address]  Operates November 15, 2021 - April 15, 2022 Provides overnight shelter, evening meal served   5:30pm to 7:30pm, and breakfast to go  Guests are required to wear masks at all times  Showers   will be available daily  Address:  [address] [facility], [address]  Eligibility: Homeless men and women who have no other shelter options;  Unable to serve families  Hours: Monday through Sunday, 5:00pm to 7:00am  Phone: [phone]  Website: www [facility]  [address]  December 1, 2021 through March 31, 2022 An emergency, cold-weather shelter for adult men and   women, available on a walk-in basis before posted curfew hours  Snacks served, hot beverages,   hygiene kits, clothing closet, showers, case management available  Covid-19 Protocols include   temperature screenings, testing if symptoms present, quarantine protocols and masking protocols  Address:  [address], [address] [employer]  Eligibility: Homeless men and women who have no other shelter options; unable to serve families  Hours: Every day of the week  Opens: 7:00pm Closes: 7:00am (December 1, 2021 through March 31, 2022)  No entry past 9:00pm   Phone: [phone]  Website: www [employer]     STREET MEDICINE RESOURCE GUIDE     Sasha Mejia is the main contact from [address] and her direct number is [phone], her email contact is [email or web address]  MEDICAL CLINIC OPERATIONS:    [facility]  [facility], [address] [place] [address]  Medical Clinic Operations: 4th Monday of the month  12:30-1:30 PM     [address]  [address] [facility], [address] [place] [address]  Enter on the left of the [facility] building off [place] [address] Operations : Every Wednesday of the month 11:00am-2:00 pm     21 [facility] 212 [facility], [address] [facility]  Medical Clinic Operations : 1st and 3rd Monday of the month from [address] [facility], [address]  Medical Clinic Operations 2nd and 4th Tuesday of the month from 11:00am-2:00pm     [facility] [address] Operations 2nd and 4th Friday of the month from 11:00am-2:00pm  AND every Tuesday 6-8 pm        HOW TO GET SUBSTANCE ABUSE HELP:  If you or someone you know has a drug or alcohol problem, there is help:  [address]: [address]: [phone]  An assessment is the first step  In addition to those listed there are other programs available in the area but assessment is best to determine an appropriate level of care  If you [address], an assessment can be scheduled at one of these providers:  [place] [facility]  [address] , [place], [facility]  [address]  [address], [address] [place]  [address]  D&A Intake Unit  [address] [place] [address] [address] , 1st Floor, [place], [facility] [address]  [phone] [address], 300 [facility],6Th Floor, [place], [address] [place] [address] [place] [address] Pending sale to [facility]  [address] , [place], [address] [place]  [address]  [address]  [facility]  [phone]   [facility])  [address]  57 [facility], [place], [address]  197 [facility]  2 [facility], 105 [facility]   [address] , [place], [facility]  [address] - [facility]  [address] [facility] , [place], [facility]  [address] , [address], [place], 10 [facility]

## 2022-01-04 PROCEDURE — 99232 SBSQ HOSP IP/OBS MODERATE 35: CPT | Performed by: PSYCHIATRY & NEUROLOGY

## 2022-01-04 PROCEDURE — U0005 INFEC AGEN DETEC AMPLI PROBE: HCPCS | Performed by: EMERGENCY MEDICINE

## 2022-01-04 PROCEDURE — U0003 INFECTIOUS AGENT DETECTION BY NUCLEIC ACID (DNA OR RNA); SEVERE ACUTE RESPIRATORY SYNDROME CORONAVIRUS 2 (SARS-COV-2) (CORONAVIRUS DISEASE [COVID-19]), AMPLIFIED PROBE TECHNIQUE, MAKING USE OF HIGH THROUGHPUT TECHNOLOGIES AS DESCRIBED BY CMS-2020-01-R: HCPCS | Performed by: EMERGENCY MEDICINE

## 2022-01-04 RX ADMIN — IBUPROFEN 600 MG: 600 TABLET, FILM COATED ORAL at 11:06

## 2022-01-04 RX ADMIN — LORAZEPAM 1 MG: 1 TABLET ORAL at 11:20

## 2022-01-04 RX ADMIN — BENZTROPINE MESYLATE 1 MG: 1 TABLET ORAL at 08:09

## 2022-01-04 RX ADMIN — NICOTINE POLACRILEX 4 MG: 4 GUM, CHEWING ORAL at 14:11

## 2022-01-04 RX ADMIN — IBUPROFEN 600 MG: 600 TABLET, FILM COATED ORAL at 05:00

## 2022-01-04 RX ADMIN — CYCLOBENZAPRINE HYDROCHLORIDE 5 MG: 10 TABLET, FILM COATED ORAL at 15:54

## 2022-01-04 RX ADMIN — GABAPENTIN 500 MG: 400 CAPSULE ORAL at 08:09

## 2022-01-04 RX ADMIN — NICOTINE 1 PATCH: 14 PATCH, EXTENDED RELEASE TRANSDERMAL at 08:10

## 2022-01-04 RX ADMIN — NICOTINE POLACRILEX 4 MG: 4 GUM, CHEWING ORAL at 11:06

## 2022-01-04 RX ADMIN — NICOTINE POLACRILEX 4 MG: 4 GUM, CHEWING ORAL at 17:17

## 2022-01-04 RX ADMIN — NICOTINE POLACRILEX 4 MG: 4 GUM, CHEWING ORAL at 06:44

## 2022-01-04 RX ADMIN — FAMOTIDINE 20 MG: 20 TABLET ORAL at 18:02

## 2022-01-04 RX ADMIN — IBUPROFEN 600 MG: 600 TABLET, FILM COATED ORAL at 18:01

## 2022-01-04 RX ADMIN — ALUMINUM HYDROXIDE, MAGNESIUM HYDROXIDE, AND SIMETHICONE 30 ML: 200; 200; 20 SUSPENSION ORAL at 06:54

## 2022-01-04 RX ADMIN — DICLOFENAC SODIUM 2 G: 10 GEL TOPICAL at 08:10

## 2022-01-04 RX ADMIN — TRAZODONE HYDROCHLORIDE 100 MG: 100 TABLET ORAL at 21:05

## 2022-01-04 RX ADMIN — NICOTINE POLACRILEX 4 MG: 4 GUM, CHEWING ORAL at 01:48

## 2022-01-04 RX ADMIN — DICLOFENAC SODIUM 2 G: 10 GEL TOPICAL at 21:09

## 2022-01-04 RX ADMIN — GABAPENTIN 500 MG: 400 CAPSULE ORAL at 21:05

## 2022-01-04 RX ADMIN — NICOTINE POLACRILEX 4 MG: 4 GUM, CHEWING ORAL at 09:11

## 2022-01-04 RX ADMIN — NICOTINE POLACRILEX 4 MG: 4 GUM, CHEWING ORAL at 17:18

## 2022-01-04 RX ADMIN — NICOTINE POLACRILEX 4 MG: 4 GUM, CHEWING ORAL at 20:26

## 2022-01-04 RX ADMIN — HYDROXYZINE HYDROCHLORIDE 50 MG: 50 TABLET, FILM COATED ORAL at 06:49

## 2022-01-04 RX ADMIN — BUPROPION HYDROCHLORIDE 300 MG: 300 TABLET, EXTENDED RELEASE ORAL at 08:09

## 2022-01-04 RX ADMIN — FAMOTIDINE 20 MG: 20 TABLET ORAL at 08:09

## 2022-01-04 RX ADMIN — BUPRENORPHINE AND NALOXONE 8 MG: 8; 2 FILM BUCCAL; SUBLINGUAL at 17:17

## 2022-01-04 RX ADMIN — MELATONIN TAB 3 MG 3 MG: 3 TAB at 21:05

## 2022-01-04 RX ADMIN — OLANZAPINE 10 MG: 10 TABLET, FILM COATED ORAL at 08:09

## 2022-01-04 RX ADMIN — OLANZAPINE 10 MG: 10 TABLET, FILM COATED ORAL at 21:04

## 2022-01-04 RX ADMIN — TOPIRAMATE 100 MG: 100 TABLET, FILM COATED ORAL at 21:05

## 2022-01-04 RX ADMIN — HYDROXYZINE HYDROCHLORIDE 50 MG: 50 TABLET, FILM COATED ORAL at 18:36

## 2022-01-04 RX ADMIN — NICOTINE POLACRILEX 4 MG: 4 GUM, CHEWING ORAL at 04:01

## 2022-01-04 RX ADMIN — GABAPENTIN 500 MG: 400 CAPSULE ORAL at 15:48

## 2022-01-04 RX ADMIN — DICLOFENAC SODIUM 2 G: 10 GEL TOPICAL at 18:07

## 2022-01-04 RX ADMIN — BUPRENORPHINE AND NALOXONE 8 MG: 8; 2 FILM BUCCAL; SUBLINGUAL at 08:09

## 2022-01-04 NOTE — PROGRESS NOTES
Progress Note - Behavioral Health   Lynne Fernando 42 y o  female MRN: [id number]  Unit/Bed#: [facility] 342-02 Encounter: [id number]    Assessment/Plan   Principal Problem:    Bipolar affective disorder, current episode manic ([address] )  Active Problems:    Medical clearance for psychiatric admission    Rhabdomyolysis    Tobacco abuse    Encounter for monitoring opioid maintenance therapy    Muscle spasm of shoulder region    Dyspepsia      Subjective: Patient was seen, chart reviewed and case discussed with team    Pt seen in room  She is mostly calm and cooperative  She is focused on getting ativan prescription  Says that psychiatrist told her she wont be getting prescription from here  Pt has been requiring ativan PRNs as atarax not as helpful  Pt reports that she is doing well overall  She is with rapid and pressured at times  Pt seems to thing she did not her increase in Wellbutrin to 300 mg and would like it increased  Pt informed that it was increased and she got it this morning  Pt had been more social with peers  Pt does not endorse any thoughts to harm self or others  Denies any AH/VH  Pt has been meal and medication compliant and without any serious side effects    No overt delusions noted or reported                Psychiatric Review of Systems:  Behavior over the last 24 hours:  improving  Sleep: normal  Appetite: adequate  Medication side effects: No  ROS: no complaints, all others negative    Current Medications:  Current Facility-Administered Medications   Medication Dose Route Frequency    acetaminophen (TYLENOL) tablet 650 mg  650 mg Oral Q6H PRN    acetaminophen (TYLENOL) tablet 650 mg  650 mg Oral Q4H PRN    acetaminophen (TYLENOL) tablet 975 mg  975 mg Oral Q6H PRN    aluminum-magnesium hydroxide-simethicone (MYLANTA) oral suspension 30 mL  30 mL Oral Q4H PRN    artificial tear (LUBRIFRESH P M ) ophthalmic ointment 1 application  1 application Both Eyes X1P PRN    benztropine (COGENTIN) injection 1 mg  1 mg Intramuscular Q4H PRN Max 6/day    benztropine (COGENTIN) tablet 1 mg  1 mg Oral Q4H PRN Max 6/day    benztropine (COGENTIN) tablet 1 mg  1 mg Oral BID    buprenorphine-naloxone (Suboxone) film 8 mg  8 mg Sublingual BID    buPROPion (WELLBUTRIN XL) 24 hr tablet 300 mg  300 mg Oral Daily    chlorproMAZINE (THORAZINE) injection SOLN 50 mg  50 mg Intramuscular Q4H PRN    chlorproMAZINE (THORAZINE) tablet 100 mg  100 mg Oral Q4H PRN    cyclobenzaprine (FLEXERIL) tablet 5 mg  5 mg Oral BID PRN    Diclofenac Sodium (VOLTAREN) 1 % topical gel 2 g  2 g Topical 4x Daily    hydrOXYzine HCL (ATARAX) tablet 50 mg  50 mg Oral Q6H PRN Max 4/day    Or    diphenhydrAMINE (BENADRYL) injection 50 mg  50 mg Intramuscular Q6H PRN    docusate sodium (COLACE) capsule 100 mg  100 mg Oral BID PRN    famotidine (PEPCID) tablet 20 mg  20 mg Oral BID    gabapentin (NEURONTIN) capsule 500 mg  500 mg Oral TID    hydrOXYzine HCL (ATARAX) tablet 100 mg  100 mg Oral Q6H PRN Max 4/day    hydrOXYzine HCL (ATARAX) tablet 25 mg  25 mg Oral Q6H PRN Max 4/day    ibuprofen (MOTRIN) tablet 400 mg  400 mg Oral Q6H PRN    ibuprofen (MOTRIN) tablet 600 mg  600 mg Oral Q8H PRN    ibuprofen (MOTRIN) tablet 600 mg  600 mg Oral Q6H [address]    LORazepam (ATIVAN) tablet 1 mg  1 mg Oral Daily PRN    melatonin tablet 3 mg  3 mg Oral HS PRN    nicotine (NICODERM CQ) 14 mg/24hr TD 24 hr patch 1 patch  1 patch Transdermal Daily    nicotine polacrilex (NICORETTE) gum 4 mg  4 mg Oral Q2H PRN    OLANZapine (ZyPREXA) tablet 10 mg  10 mg Oral Q3H PRN Max 3/day    Or    OLANZapine (ZyPREXA) IM injection 10 mg  10 mg Intramuscular Q3H PRN Max 3/day    OLANZapine (ZyPREXA) tablet 10 mg  10 mg Oral HS    Or    OLANZapine (ZyPREXA) IM injection 10 mg  10 mg Intramuscular HS    OLANZapine (ZyPREXA) tablet 10 mg  10 mg Oral Daily    Or    OLANZapine (ZyPREXA) IM injection 10 mg  10 mg Intramuscular Daily    OLANZapine (ZyPREXA) tablet 5 mg  5 mg Oral Q3H PRN Max 6/day    Or    OLANZapine (ZyPREXA) IM injection 5 mg  5 mg Intramuscular Q3H PRN Max 6/day    OLANZapine (ZyPREXA) tablet 2 5 mg  2 5 mg Oral Q3H PRN Max 8/day    ondansetron (ZOFRAN-ODT) dispersible tablet 4 mg  4 mg Oral Q6H PRN    polyethylene glycol (MIRALAX) packet 17 g  17 g Oral Daily PRN    sterile water injection **ADS Override Pull**        sterile water injection **ADS Override Pull**        sterile water injection **ADS Override Pull**        sterile water injection **ADS Override Pull**        sterile water injection **ADS Override Pull**        topiramate (TOPAMAX) tablet 100 mg  100 mg Oral HS    traZODone (DESYREL) tablet 100 mg  100 mg Oral HS       Behavioral Health Medications: all current active meds have been reviewed  Vitals:  Vitals:    01/04/22 0735   BP: 120/69   Pulse: 67   Resp: 16   Temp: (!) 97 3 °F (36 3 °C)   SpO2: 100%       Laboratory results:    I have personally reviewed all pertinent laboratory/tests results    Most Recent Labs:   Lab Results   Component Value Date    WBC 7 80 12/25/2021    RBC 4 80 12/25/2021    HGB 14 0 12/25/2021    HCT 42 3 12/25/2021     12/25/2021    RDW 13 3 12/25/2021    NEUTROABS 5 10 12/25/2021    SODIUM 137 12/25/2021    K 3 6 12/25/2021     12/25/2021    CO2 25 12/25/2021    BUN 10 12/25/2021    CREATININE 0 77 12/25/2021    GLUC 123 (H) 12/25/2021    GLUF 123 (H) 12/25/2021    CALCIUM 8 6 12/25/2021    AST 52 (H) 12/25/2021    ALT 37 (H) 12/25/2021    ALKPHOS 68 12/25/2021    TP 7 4 12/25/2021    ALB 4 0 12/25/2021    TBILI 0 84 12/25/2021    CHOLESTEROL 196 12/25/2021    HDL 43 (L) 12/25/2021    TRIG 92 12/25/2021    LDLCALC 135 (H) 12/25/2021    NONHDLC 153 12/25/2021    XBW8OXUWDODB 2 630 12/25/2021    PREGUR Negative (-) 12/22/2021    RPR Non-Reactive 12/25/2021    HGBA1C 5 2 12/25/2021     12/25/2021       Mental Status Evaluation:  Appearance:  casually dressed and adequate grooming and hygiene Behavior:  restless and fidgety and calm mostly and cooperative but seem anxious at times  Speech:  talkative   Mood:  anxious   Affect:  mood-congruent   Language Appropriate   Thought Process:  goal directed and perserverative   Thought Content:  no overt delusions    Perceptual Disturbances: None   Risk Potential: Suicidal Ideations none and Homicidal Ideations none   Sensorium:  person, place and time/date   Cognition:  recent and remote memory grossly intact   Consciousness:  alert and awake    Attention: attention span appeared shorter than expected for age   Insight:  limited   Judgment: limited   Gait/Station: normal gait/station and normal balance   Motor Activity: no abnormal movements     Progress Toward Goals: progressing    Recommended Treatment: Continue with pharmacotherapy, group therapy, milieu therapy and occupational therapy  1   Continue current medications and treatments for now  2   DC planning    Risks, benefits and possible side effects of Medications:   Risks, benefits, and possible side effects of medications explained to patient and patient verbalizes understanding  Risks of medications in pregnancy explained if female patient  Patient verbalizes understanding and agrees to notify her doctor if she becomes pregnant

## 2022-01-04 NOTE — NURSING NOTE
Patient about the unit  Intrusive and loud at times; follows redirection  Speech remains rapid and pressured at times  Denies symptoms  Compliant with medications; mouth check complete  Hopeful for discharge tomorrow

## 2022-01-04 NOTE — PROGRESS NOTES
01/04/22 0902   Team Meeting   Meeting Type Daily Rounds   Team Members Present   Team Members Present Physician;Nurse;   Physician Team Member Dr Francisco Javier Fay Team Member [address] Management Team Member Adriel   Patient/Family Present   Patient Present No   Patient's Family Present No   Pt medication compliant  Pt denies symptoms  Discharge pending for tomorrow

## 2022-01-05 ENCOUNTER — HOSPITAL ENCOUNTER (EMERGENCY)
Facility: HOSPITAL | Age: 47
Discharge: HOME | End: 2022-01-05
Attending: EMERGENCY MEDICINE
Payer: COMMERCIAL

## 2022-01-05 VITALS
WEIGHT: 160 LBS | SYSTOLIC BLOOD PRESSURE: 113 MMHG | HEART RATE: 62 BPM | TEMPERATURE: 97.5 F | BODY MASS INDEX: 30.21 KG/M2 | RESPIRATION RATE: 16 BRPM | HEIGHT: 61 IN | DIASTOLIC BLOOD PRESSURE: 63 MMHG | OXYGEN SATURATION: 98 %

## 2022-01-05 VITALS
HEART RATE: 97 BPM | RESPIRATION RATE: 16 BRPM | TEMPERATURE: 98.6 F | SYSTOLIC BLOOD PRESSURE: 134 MMHG | OXYGEN SATURATION: 97 % | DIASTOLIC BLOOD PRESSURE: 87 MMHG

## 2022-01-05 DIAGNOSIS — F41.9 ANXIETY: Primary | ICD-10-CM

## 2022-01-05 LAB — SARS-COV-2 RNA RESP QL NAA+PROBE: NEGATIVE

## 2022-01-05 PROCEDURE — 99238 HOSP IP/OBS DSCHRG MGMT 30/<: CPT | Performed by: PSYCHIATRY & NEUROLOGY

## 2022-01-05 PROCEDURE — 99284 EMERGENCY DEPT VISIT MOD MDM: CPT | Performed by: EMERGENCY MEDICINE

## 2022-01-05 PROCEDURE — 99282 EMERGENCY DEPT VISIT SF MDM: CPT

## 2022-01-05 RX ORDER — BUPROPION HYDROCHLORIDE 300 MG/1
300 TABLET ORAL DAILY
Qty: 30 TABLET | Refills: 1 | Status: SHIPPED | OUTPATIENT
Start: 2022-01-05 | End: 2022-01-20

## 2022-01-05 RX ORDER — CLONAZEPAM 0.5 MG/1
1 TABLET ORAL
Qty: 5 TABLET | Refills: 0 | Status: SHIPPED | OUTPATIENT
Start: 2022-01-05

## 2022-01-05 RX ORDER — IBUPROFEN 600 MG/1
600 TABLET ORAL EVERY 6 HOURS SCHEDULED
Qty: 41 TABLET | Refills: 0 | Status: SHIPPED | OUTPATIENT
Start: 2022-01-05 | End: 2022-01-20

## 2022-01-05 RX ORDER — GABAPENTIN 100 MG/1
100 CAPSULE ORAL 3 TIMES DAILY
Qty: 90 CAPSULE | Refills: 1 | Status: SHIPPED | OUTPATIENT
Start: 2022-01-05 | End: 2022-01-20

## 2022-01-05 RX ORDER — GABAPENTIN 100 MG/1
500 CAPSULE ORAL 3 TIMES DAILY
Qty: 90 CAPSULE | Refills: 1 | Status: SHIPPED | OUTPATIENT
Start: 2022-01-05 | End: 2022-01-05

## 2022-01-05 RX ORDER — BUPRENORPHINE AND NALOXONE 8; 2 MG/1; MG/1
8 FILM, SOLUBLE BUCCAL; SUBLINGUAL 2 TIMES DAILY
Qty: 20 FILM | Refills: 0 | Status: SHIPPED | OUTPATIENT
Start: 2022-01-05 | End: 2022-01-15

## 2022-01-05 RX ORDER — TRAZODONE HYDROCHLORIDE 100 MG/1
100 TABLET ORAL
Qty: 30 TABLET | Refills: 1 | Status: SHIPPED | OUTPATIENT
Start: 2022-01-05

## 2022-01-05 RX ORDER — TOPIRAMATE 100 MG/1
100 TABLET, FILM COATED ORAL
Qty: 30 TABLET | Refills: 1 | Status: SHIPPED | OUTPATIENT
Start: 2022-01-05

## 2022-01-05 RX ORDER — CLONAZEPAM 0.5 MG/1
1 TABLET ORAL
Qty: 5 TABLET | Refills: 0 | Status: SHIPPED | OUTPATIENT
Start: 2022-01-05 | End: 2022-01-05 | Stop reason: ALTCHOICE

## 2022-01-05 RX ORDER — BENZTROPINE MESYLATE 1 MG/1
1 TABLET ORAL 2 TIMES DAILY
Qty: 60 TABLET | Refills: 1 | Status: SHIPPED | OUTPATIENT
Start: 2022-01-05 | End: 2022-01-20

## 2022-01-05 RX ORDER — GABAPENTIN 400 MG/1
400 CAPSULE ORAL 3 TIMES DAILY
Qty: 90 CAPSULE | Refills: 1 | Status: SHIPPED | OUTPATIENT
Start: 2022-01-05 | End: 2022-03-06

## 2022-01-05 RX ORDER — OLANZAPINE 20 MG/1
10 TABLET ORAL 2 TIMES DAILY
Qty: 30 TABLET | Refills: 1 | Status: SHIPPED | OUTPATIENT
Start: 2022-01-05 | End: 2022-01-20

## 2022-01-05 RX ORDER — CLONAZEPAM 0.5 MG/1
1 TABLET ORAL ONCE
Status: COMPLETED | OUTPATIENT
Start: 2022-01-05 | End: 2022-01-05

## 2022-01-05 RX ORDER — FAMOTIDINE 20 MG/1
20 TABLET, FILM COATED ORAL 2 TIMES DAILY
Qty: 60 TABLET | Refills: 1 | Status: SHIPPED | OUTPATIENT
Start: 2022-01-05 | End: 2022-01-20

## 2022-01-05 RX ADMIN — BUPROPION HYDROCHLORIDE 300 MG: 300 TABLET, EXTENDED RELEASE ORAL at 08:11

## 2022-01-05 RX ADMIN — NICOTINE POLACRILEX 4 MG: 4 GUM, CHEWING ORAL at 01:51

## 2022-01-05 RX ADMIN — OLANZAPINE 10 MG: 10 TABLET, FILM COATED ORAL at 08:11

## 2022-01-05 RX ADMIN — BUPRENORPHINE AND NALOXONE 8 MG: 8; 2 FILM BUCCAL; SUBLINGUAL at 08:11

## 2022-01-05 RX ADMIN — NICOTINE POLACRILEX 4 MG: 4 GUM, CHEWING ORAL at 08:45

## 2022-01-05 RX ADMIN — NICOTINE 1 PATCH: 14 PATCH, EXTENDED RELEASE TRANSDERMAL at 08:12

## 2022-01-05 RX ADMIN — NICOTINE POLACRILEX 4 MG: 4 GUM, CHEWING ORAL at 06:31

## 2022-01-05 RX ADMIN — BENZTROPINE MESYLATE 1 MG: 1 TABLET ORAL at 08:11

## 2022-01-05 RX ADMIN — HYDROXYZINE HYDROCHLORIDE 100 MG: 50 TABLET, FILM COATED ORAL at 03:39

## 2022-01-05 RX ADMIN — CLONAZEPAM 1 MG: 0.5 TABLET ORAL at 17:07

## 2022-01-05 RX ADMIN — HYDROXYZINE HYDROCHLORIDE 100 MG: 50 TABLET, FILM COATED ORAL at 11:05

## 2022-01-05 RX ADMIN — GABAPENTIN 500 MG: 400 CAPSULE ORAL at 08:11

## 2022-01-05 RX ADMIN — NICOTINE POLACRILEX 4 MG: 4 GUM, CHEWING ORAL at 11:37

## 2022-01-05 RX ADMIN — LORAZEPAM 1 MG: 1 TABLET ORAL at 12:09

## 2022-01-05 RX ADMIN — IBUPROFEN 600 MG: 600 TABLET, FILM COATED ORAL at 06:31

## 2022-01-05 RX ADMIN — IBUPROFEN 600 MG: 600 TABLET, FILM COATED ORAL at 11:05

## 2022-01-05 RX ADMIN — FAMOTIDINE 20 MG: 20 TABLET ORAL at 08:11

## 2022-01-05 NOTE — NURSING NOTE
Patient present within the unit throughout this shift,  Talkative with peer and staff, cooperative/pleasuant on approached, PRN medication seeking, as  PRN Atarax 50mg administered per levels of anxiety scale, when PT stated "That will not be ok, it will do nothing they always given me 2 tables", writer went on to educate PT that, the med was given base on the scale received from PT and encouraged PT to relax to give med Time to work  PT denies SI/HI/AH/VH, anxiety about Discharge on 01/05/22  Will continue to monitor at this time

## 2022-01-05 NOTE — CASE MANAGEMENT
Transportation were called and scheduled discharge Lyft to [facility] located at [address] [place] [address]

## 2022-01-05 NOTE — SOCIAL WORK
Pt is being discharged today, escorted via Lyft to [facility]  Discharge address [address] [facility], [address] [place] [address]  Pt has an intake at [facility] on 1/10 at 2:00pm via phone  Pt to follow-up with [facility] for Suboxone maintenance  Pt left with medications  Pt denies all symptoms and in agreement with discharge

## 2022-01-05 NOTE — NURSING NOTE
ANTICOAGULATION FOLLOW-UP CLINIC VISIT    Patient Name:  Babak Moran  Date:  10/22/2020  Contact Type:  Face to Face    SUBJECTIVE:  Patient Findings     Positives:  Change in medications (completed antibiotic 10 days ago)        Clinical Outcomes     Negatives:  Major bleeding event, Thromboembolic event, Anticoagulation-related hospital admission, Anticoagulation-related ED visit, Anticoagulation-related fatality           OBJECTIVE    Recent labs: (last 7 days)     10/22/20   INR 2.6*       ASSESSMENT / PLAN  INR assessment THER    Recheck INR In: 3 WEEKS    INR Location Clinic      Anticoagulation Summary  As of 10/22/2020    INR goal:  2.0-3.0   TTR:  58.4 % (1 y)   INR used for dosin.6 (10/22/2020)   Warfarin maintenance plan:  5 mg (5 mg x 1) every Mon; 7.5 mg (5 mg x 1.5) all other days   Full warfarin instructions:  5 mg every Mon; 7.5 mg all other days   Weekly warfarin total:  50 mg   Plan last modified:  Shelley Mercado RN (2020)   Next INR check:  2020   Priority:  Maintenance   Target end date:  Indefinite    Indications    Unspecified atrial fibrillation (Resolved) [I48.91]  Anticoagulation monitoring  INR range 2-3 [Z79.01]  Atrial fibrillation with RVR (H) [I48.91]             Anticoagulation Episode Summary     INR check location:      Preferred lab:      Send INR reminders to:  [facility]    Comments:  Babak prefers to be closer to 3.0 d/t previous CVA check Q 4 weeks.Declines to reduce dose when slightly over 3.0  Needs to change PCP      Anticoagulation Care Providers     Provider Role Specialty Phone number    Teo Funes MD Referring [facility] [phone]            See the Encounter Report to view Anticoagulation Flowsheet and Dosing Calendar (Go to Encounters tab in chart review, and find the Anticoagulation Therapy Visit)        Xochitl Pavon RN                  Patient c/o anxiety  Given PRN of Atarax 100 mg po  Will monitor

## 2022-01-05 NOTE — BH TRANSITION RECORD
Contact Information: If you have any questions, concerns, pended studies, tests and/or procedures, or emergencies regarding your inpatient behavioral health visit  Please contact [facility] behavioral health unit 3B [phone] and ask to speak to a , nurse or physician  A contact is available 24 hours/ 7 days a week at this number  Summary of Procedures Performed During your Stay:  Below is a list of major procedures performed during your hospital stay and a summary of results:  - No major procedures performed  If studies are pending at discharge, follow up with your PCP and/or referring provider

## 2022-01-05 NOTE — DISCHARGE INSTR - LAB
If you smoke, use tobacco or nicotine, and/or are exposed to second hand smoke, you are encouraged to stop to improve your health    If you need help quitting, please talk to your health care provider or call:  Lai Burns ([phone])  [facility] [phone] Fernandez Mims ([phone])

## 2022-01-05 NOTE — PLAN OF CARE
Problem: DEPRESSION  Goal: Will be euthymic at discharge  Description: INTERVENTIONS:  - Administer medication as ordered  - Provide emotional support via 1:1 interaction with staff  - Encourage involvement in milieu/groups/activities  - Monitor for social isolation  Outcome: Completed     Problem: ANXIETY  Goal: Will report anxiety at manageable levels  Description: INTERVENTIONS:  - Administer medication as ordered  - Teach and encourage coping skills  - Provide emotional support  - Assess patient/family for anxiety and ability to cope  Outcome: Completed  Goal: By discharge: Patient will verbalize 2 strategies to deal with anxiety  Description: Interventions:  - Identify any obvious source/trigger to anxiety  - Staff will assist patient in applying identified coping technique/skills  - Encourage attendance of scheduled groups and activities  Outcome: Completed     Problem: INVOLUNTARY ADMIT  Goal: Will cooperate with staff recommendations and doctor's orders and will demonstrate appropriate behavior  Description: INTERVENTIONS:  - Treat underlying conditions and offer medication as ordered  - Educate regarding involuntary admission procedures and rules  - Utilize positive consistent limit setting strategies to support patient and staff safety  Outcome: Completed     Problem: SAFETY, RESTRAINT - VIOLENT/SELF-DESTRUCTIVE  Goal: Remains free of harm/injury from restraints (Restraint for Violent/Self-Destructive Behavior)  Description: INTERVENTIONS:  - Instruct patient/family regarding restraint use   - Assess and monitor physiologic and psychological status   - Provide interventions and comfort measures to meet assessed patient needs   - Ensure continuous in person monitoring is provided   - Identify and implement measures to help patient regain control  - Assess readiness for release of restraint  Outcome: Completed  Goal: Returns to optimal restraint-free functioning  Description: INTERVENTIONS:  - Assess the patient's behavior and symptoms that indicate continued need for restraint  - Identify and implement measures to help patient regain control  - Assess readiness for release of restraint   Outcome: Completed     Problem: DISCHARGE PLANNING  Goal: Discharge to home or other facility with appropriate resources  Description: INTERVENTIONS:  - Identify barriers to discharge w/patient and caregiver  - Arrange for needed discharge resources and transportation as appropriate  - Identify discharge learning needs (meds, wound care, etc )  - Refer to Case Management Department for coordinating discharge planning if the patient needs post-hospital services based on physician/advanced practitioner order or complex needs related to functional status, cognitive ability, or social support system  Outcome: Completed     Problem: DISCHARGE PLANNING - CARE MANAGEMENT  Goal: Discharge to post-acute care or home with appropriate resources  Description: INTERVENTIONS:  - Conduct assessment to determine patient/family and health care team treatment goals, and need for post-acute services based on payer coverage, community resources, and patient preferences, and barriers to discharge  - Address psychosocial, clinical, and financial barriers to discharge as identified in assessment in conjunction with the patient/family and health care team  - Arrange appropriate level of post-acute services according to patients   needs and preference and payer coverage in collaboration with the physician and health care team  - Communicate with and update the patient/family, physician, and health care team regarding progress on the discharge plan  - Arrange appropriate transportation to post-acute venues  Outcome: Completed     Problem: Ineffective Coping  Goal: Participates in unit activities  Description: Interventions:  - Provide therapeutic environment   - Provide required programming   - Redirect inappropriate behaviors   Outcome: Completed

## 2022-01-05 NOTE — NURSING NOTE
Patient approached the nursing station around 0335 stating that she was feeling anxious and requested her PRN of atarax  Patient received her PRN of atarax around 1101  PRN appeared to be effective

## 2022-01-05 NOTE — NURSING NOTE
Patient about the unit  Pleasant and cooperative  Denies symptoms and is looking forward to discharge today

## 2022-01-05 NOTE — ED PROVIDER NOTES
History  Chief Complaint   Patient presents with    Medication Refill     pt reports was seen here for "mental breakdown" Pt reports father  hill [place] and "fell apart" - Pt reports just got out of [facility] facility today - Pt reports doctor cant see her until next monday and is out of clozepam     Anxiety     53-year-old female with anxiety  She was discharged from the [facility] hospital today on new meds but she says that they discontinued her Klonopin  She usually takes Klonopin 2 milligrams by mouth at night  She has not had and she is feeling very anxious now  No chest pain, no shortness of breath, no SI/HI          Prior to Admission Medications   Prescriptions Last Dose Informant Patient Reported? Taking?    Diclofenac Sodium (VOLTAREN) 1 %   No No   Sig: Apply 2 g topically 4 (four) times a day   OLANZapine (ZyPREXA) 20 MG tablet   No No   Sig: Take 0 5 tablets (10 mg total) by mouth 2 (two) times a day   benztropine (COGENTIN) 1 mg tablet   No No   Sig: Take 1 tablet (1 mg total) by mouth 2 (two) times a day   buPROPion (WELLBUTRIN XL) 300 mg 24 hr tablet   No No   Sig: Take 1 tablet (300 mg total) by mouth daily   buprenorphine-naloxone (Suboxone) 8-2 mg   No No   Sig: Place 1 Film (8 mg total) under the tongue 2 (two) times a day for 10 days   famotidine (PEPCID) 20 mg tablet   No No   Sig: Take 1 tablet (20 mg total) by mouth 2 (two) times a day   gabapentin (NEURONTIN) 400 mg capsule   No No   Sig: Take 1 capsule (400 mg total) by mouth 3 (three) times a day   gabapentin (Neurontin) 100 mg capsule   No No   Sig: Take 1 capsule (100 mg total) by mouth 3 (three) times a day   ibuprofen (MOTRIN) 600 mg tablet   No No   Sig: Take 1 tablet (600 mg total) by mouth every 6 (six) hours for 41 doses   topiramate (TOPAMAX) 100 mg tablet   No No   Sig: Take 1 tablet (100 mg total) by mouth daily at bedtime   traZODone (DESYREL) 100 mg tablet   No No   Sig: Take 1 tablet (100 mg total) by mouth daily at bedtime      Facility-Administered Medications: None       Past Medical History:   Diagnosis Date    Lower back pain        Past Surgical History:   Procedure Laterality Date    CHOLECYSTECTOMY         History reviewed  No pertinent family history  I have reviewed and agree with the history as documented  E-Cigarette/Vaping    E-Cigarette Use Never User      E-Cigarette/Vaping Substances    Nicotine No     THC No     CBD No     Flavoring No     Other No     Unknown No      Social History     Tobacco Use    Smoking status: Current Every Day Smoker     Packs/day: 1 00     Types: Cigarettes    Smokeless tobacco: Current User   Vaping Use    Vaping Use: Never used   Substance Use Topics    Alcohol use: Never    Drug use: Not Currently     Comment: 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  Review of Systems   Constitutional: Negative for appetite change, fatigue and fever  HENT: Negative for rhinorrhea and sore throat  Respiratory: Negative for cough, shortness of breath and wheezing  Cardiovascular: Negative for chest pain and leg swelling  Gastrointestinal: Negative for abdominal pain, diarrhea and vomiting  Genitourinary: Negative for dysuria and flank pain  Musculoskeletal: Negative for back pain and neck pain  Skin: Negative for rash  Neurological: Negative for syncope and headaches  Psychiatric/Behavioral:        Anxious, no SI/HI       Physical Exam  Physical Exam  Vitals and nursing note reviewed  Constitutional:       Appearance: She is well-developed  HENT:      Head: Normocephalic and atraumatic  Right Ear: External ear normal       Left Ear: External ear normal    Eyes:      General: No scleral icterus  Extraocular Movements: Extraocular movements intact  Cardiovascular:      Rate and Rhythm: Normal rate and regular rhythm  Pulmonary:      Effort: Pulmonary effort is normal  No respiratory distress  Breath sounds: Normal breath sounds     Abdominal: Palpations: Abdomen is soft  Tenderness: There is no abdominal tenderness  Musculoskeletal:         General: No deformity or signs of injury  Normal range of motion  Cervical back: Normal range of motion and neck supple  Skin:     General: Skin is warm and dry  Coloration: Skin is not jaundiced or pale  Neurological:      General: No focal deficit present  Mental Status: She is alert and oriented to person, place, and time  Psychiatric:         Behavior: Behavior normal       Comments: Anxious, no SI/HI         Vital Signs  ED Triage Vitals [01/05/22 1609]   Temperature Pulse Respirations Blood Pressure SpO2   98 6 °F (37 °C) 97 16 134/87 97 %      Temp Source Heart Rate Source Patient Position - Orthostatic VS BP Location FiO2 (%)   Oral Monitor Sitting Right arm --      Pain Score       --           Vitals:    01/05/22 1609   BP: 134/87   Pulse: 97   Patient Position - Orthostatic VS: Sitting         Visual Acuity      ED Medications  Medications   clonazePAM (KlonoPIN) tablet 1 mg (has no administration in time range)       Diagnostic Studies  Results Reviewed     None                 No orders to display              Procedures  Procedures         ED Course                                             MDM    Disposition  Final diagnoses:   Anxiety     Time reflects when diagnosis was documented in both MDM as applicable and the Disposition within this note     Time User Action Codes Description Comment    1/5/2022  4:48 PM Donovan Hutchins Add [F41 9] Anxiety       ED Disposition     ED Disposition Condition Date/Time Comment    Discharge Stable Wed Jan 5, 2022  4:48 PM Evalene Cuff discharge to home/self care              Follow-up Information     Follow up With Specialties Details Why Contact Info Additional [address]   [address], [address] [facility], [address], [address] [facility], [place], [place], [address]          Patient's Medications   Discharge Prescriptions    CLONAZEPAM (KLONOPIN) 0 5 MG TABLET    Take 2 tablets (1 mg total) by mouth daily at bedtime       Start Date: 1/5/2022  End Date: --       Order Dose: 1 mg       Quantity: 5 tablet    Refills: 0       No discharge procedures on file      PDMP Review       Value Time User    PDMP Reviewed  Yes 1/5/2022  9:26 AM Eliana Torres MD          ED Provider  Electronically Signed by           Ivon Oro MD  01/05/22 0981

## 2022-01-19 ENCOUNTER — HOSPITAL ENCOUNTER (EMERGENCY)
Facility: HOSPITAL | Age: 47
End: 2022-01-20
Attending: EMERGENCY MEDICINE
Payer: COMMERCIAL

## 2022-01-19 DIAGNOSIS — F31.10 BIPOLAR AFFECTIVE DISORDER, CURRENT EPISODE MANIC (HCC): ICD-10-CM

## 2022-01-19 DIAGNOSIS — R45.851 SUICIDAL IDEATIONS: Primary | ICD-10-CM

## 2022-01-19 LAB
AMPHETAMINES SERPL QL SCN: NEGATIVE
BARBITURATES UR QL: NEGATIVE
BENZODIAZ UR QL: NEGATIVE
COCAINE UR QL: NEGATIVE
ETHANOL EXG-MCNC: 0 MG/DL
EXT PREG TEST URINE: NEGATIVE
EXT. CONTROL ED NAV: NORMAL
FLUAV RNA RESP QL NAA+PROBE: NEGATIVE
FLUBV RNA RESP QL NAA+PROBE: NEGATIVE
METHADONE UR QL: NEGATIVE
OPIATES UR QL SCN: NEGATIVE
OXYCODONE+OXYMORPHONE UR QL SCN: NEGATIVE
PCP UR QL: NEGATIVE
RSV RNA RESP QL NAA+PROBE: NEGATIVE
SARS-COV-2 RNA RESP QL NAA+PROBE: NEGATIVE
THC UR QL: NEGATIVE

## 2022-01-19 PROCEDURE — 99285 EMERGENCY DEPT VISIT HI MDM: CPT

## 2022-01-19 PROCEDURE — 80307 DRUG TEST PRSMV CHEM ANLYZR: CPT | Performed by: PHYSICIAN ASSISTANT

## 2022-01-19 PROCEDURE — 0241U HB NFCT DS VIR RESP RNA 4 TRGT: CPT | Performed by: PHYSICIAN ASSISTANT

## 2022-01-19 PROCEDURE — 82075 ASSAY OF BREATH ETHANOL: CPT | Performed by: PHYSICIAN ASSISTANT

## 2022-01-19 PROCEDURE — 99284 EMERGENCY DEPT VISIT MOD MDM: CPT | Performed by: PHYSICIAN ASSISTANT

## 2022-01-19 PROCEDURE — 81025 URINE PREGNANCY TEST: CPT | Performed by: PHYSICIAN ASSISTANT

## 2022-01-19 RX ORDER — BACITRACIN, NEOMYCIN, POLYMYXIN B 400; 3.5; 5 [USP'U]/G; MG/G; [USP'U]/G
1 OINTMENT TOPICAL ONCE
Status: COMPLETED | OUTPATIENT
Start: 2022-01-19 | End: 2022-01-19

## 2022-01-19 RX ORDER — ALPRAZOLAM 0.5 MG/1
2 TABLET ORAL ONCE
Status: COMPLETED | OUTPATIENT
Start: 2022-01-19 | End: 2022-01-19

## 2022-01-19 RX ORDER — LORAZEPAM 1 MG/1
1 TABLET ORAL ONCE
Status: DISCONTINUED | OUTPATIENT
Start: 2022-01-19 | End: 2022-01-19

## 2022-01-19 RX ADMIN — POLYMYXIN B SULFATE, BACITRACIN ZINC, NEOMYCIN SULFATE 1 LARGE APPLICATION: 5000; 3.5; 4 OINTMENT TOPICAL at 21:25

## 2022-01-19 RX ADMIN — ALPRAZOLAM 2 MG: 0.5 TABLET ORAL at 21:57

## 2022-01-20 VITALS
BODY MASS INDEX: 34.7 KG/M2 | DIASTOLIC BLOOD PRESSURE: 78 MMHG | RESPIRATION RATE: 18 BRPM | WEIGHT: 183.64 LBS | OXYGEN SATURATION: 98 % | SYSTOLIC BLOOD PRESSURE: 124 MMHG | TEMPERATURE: 97.8 F | HEART RATE: 76 BPM

## 2022-01-20 RX ORDER — ALPRAZOLAM 0.5 MG/1
1 TABLET ORAL ONCE
Status: COMPLETED | OUTPATIENT
Start: 2022-01-20 | End: 2022-01-20

## 2022-01-20 RX ORDER — BUPROPION HYDROCHLORIDE 300 MG/1
300 TABLET ORAL EVERY MORNING
COMMUNITY

## 2022-01-20 RX ORDER — BUPROPION HYDROCHLORIDE 150 MG/1
150 TABLET ORAL DAILY
Status: DISCONTINUED | OUTPATIENT
Start: 2022-01-20 | End: 2022-01-20 | Stop reason: HOSPADM

## 2022-01-20 RX ORDER — BUPRENORPHINE AND NALOXONE 8; 2 MG/1; MG/1
8 FILM, SOLUBLE BUCCAL; SUBLINGUAL DAILY
Status: DISCONTINUED | OUTPATIENT
Start: 2022-01-20 | End: 2022-01-20 | Stop reason: HOSPADM

## 2022-01-20 RX ORDER — IBUPROFEN 400 MG/1
400 TABLET ORAL ONCE
Status: COMPLETED | OUTPATIENT
Start: 2022-01-20 | End: 2022-01-20

## 2022-01-20 RX ORDER — BUPRENORPHINE HYDROCHLORIDE AND NALOXONE HYDROCHLORIDE DIHYDRATE 8; 2 MG/1; MG/1
1 TABLET SUBLINGUAL DAILY
COMMUNITY

## 2022-01-20 RX ADMIN — IBUPROFEN 400 MG: 400 TABLET, FILM COATED ORAL at 10:08

## 2022-01-20 RX ADMIN — GABAPENTIN 400 MG: 300 CAPSULE ORAL at 09:04

## 2022-01-20 RX ADMIN — ALPRAZOLAM 1 MG: 0.5 TABLET ORAL at 09:38

## 2022-01-20 RX ADMIN — ALPRAZOLAM 1 MG: 0.5 TABLET ORAL at 09:05

## 2022-01-20 RX ADMIN — BUPRENORPHINE AND NALOXONE 8 MG: 8; 2 FILM BUCCAL; SUBLINGUAL at 11:51

## 2022-01-20 RX ADMIN — NICOTINE POLACRILEX 2 MG: 2 GUM, CHEWING BUCCAL at 10:08

## 2022-01-20 NOTE — ED NOTES
Patient signed 12, rights explained- Original on chart- Faxed to [address] [facility]  Worker, [facility]  01/19/22

## 2022-01-20 NOTE — ED PROVIDER NOTES
History  Chief Complaint   Patient presents with    Psychiatric Evaluation     Pt states she is having bad anxiety and cut herself to attempt suicide  Patient is a 41 y/o female with hx of bipolar affective disorder, anxiety, presenting to the ED for evaluation of suicidal ideations  Patient with previous admission to psychiatric inpatient, most recent 12/23/21  Pt states this helped her a lot, but once she got discharged she began with worsening anxiety and thoughts of harming herself  Pt states recently the anniversary of her father's death, which caused her to feel this way  Pt states she has plans to cut her throat, but states she cut her wrists yesterday instead  Pt with multiple superficial lacerations to left wrist  Pt denies drug or alcohol use  Pt states she believes she needs help again, willing to sign 201  Pt states she lives in [facility]  Pt reports her family does not care about her  Pt denies chest pain, SOB, abdominal pain, lightheadedness/dizziness  Prior to Admission Medications   Prescriptions Last Dose Informant Patient Reported? Taking?    Diclofenac Sodium (VOLTAREN) 1 % 1/19/2022 at Unknown time  No Yes   Sig: Apply 2 g topically 4 (four) times a day   OLANZapine (ZyPREXA) 20 MG tablet Unknown at Unknown time  No No   Sig: Take 0 5 tablets (10 mg total) by mouth 2 (two) times a day   benztropine (COGENTIN) 1 mg tablet Not Taking at Unknown time  No No   Sig: Take 1 tablet (1 mg total) by mouth 2 (two) times a day   Patient not taking: Reported on 1/19/2022    buPROPion (WELLBUTRIN XL) 300 mg 24 hr tablet Unknown at Unknown time  No No   Sig: Take 1 tablet (300 mg total) by mouth daily   Patient not taking: Reported on 1/19/2022    clonazePAM (KlonoPIN) 0 5 mg tablet 1/19/2022 at Unknown time  No Yes   Sig: Take 2 tablets (1 mg total) by mouth daily at bedtime   famotidine (PEPCID) 20 mg tablet 1/19/2022 at Unknown time  No Yes   Sig: Take 1 tablet (20 mg total) by mouth 2 (two) times a day   gabapentin (NEURONTIN) 400 mg capsule 1/19/2022 at Unknown time  No Yes   Sig: Take 1 capsule (400 mg total) by mouth 3 (three) times a day   gabapentin (Neurontin) 100 mg capsule 1/19/2022 at Unknown time  No Yes   Sig: Take 1 capsule (100 mg total) by mouth 3 (three) times a day   ibuprofen (MOTRIN) 600 mg tablet   No No   Sig: Take 1 tablet (600 mg total) by mouth every 6 (six) hours for 41 doses   topiramate (TOPAMAX) 100 mg tablet 1/18/2022 at Unknown time  No Yes   Sig: Take 1 tablet (100 mg total) by mouth daily at bedtime   traZODone (DESYREL) 100 mg tablet 1/18/2022 at Unknown time  No Yes   Sig: Take 1 tablet (100 mg total) by mouth daily at bedtime      Facility-Administered Medications: None       Past Medical History:   Diagnosis Date    Lower back pain        Past Surgical History:   Procedure Laterality Date    CHOLECYSTECTOMY         No family history on file  I have reviewed and agree with the history as documented  E-Cigarette/Vaping    E-Cigarette Use Never User      E-Cigarette/Vaping Substances    Nicotine No     THC No     CBD No     Flavoring No     Other No     Unknown No      Social History     Tobacco Use    Smoking status: Current Every Day Smoker     Packs/day: 1 00     Types: Cigarettes    Smokeless tobacco: Current User   Vaping Use    Vaping Use: Never used   Substance Use Topics    Alcohol use: Never    Drug use: Not Currently     Comment: 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  Review of Systems   Constitutional: Negative for chills and fever  HENT: Negative for ear pain and sore throat  Eyes: Negative for visual disturbance  Respiratory: Negative for cough and shortness of breath  Cardiovascular: Negative for chest pain, palpitations and leg swelling  Gastrointestinal: Negative for abdominal pain, diarrhea, nausea and vomiting  Genitourinary: Negative for dysuria and hematuria  Musculoskeletal: Negative for back pain and neck pain  Skin: Positive for wound  Neurological: Negative for speech difficulty and headaches  Psychiatric/Behavioral: Positive for dysphoric mood, self-injury, sleep disturbance and suicidal ideas  Negative for hallucinations  The patient is nervous/anxious  The patient is not hyperactive  Physical Exam  Physical Exam  Constitutional:       General: She is not in acute distress  Appearance: She is well-developed  She is not ill-appearing or toxic-appearing  HENT:      Head: Normocephalic and atraumatic  Right Ear: External ear normal       Left Ear: External ear normal       Nose: Nose normal       Mouth/Throat:      Lips: Pink  Mouth: Mucous membranes are moist    Eyes:      Conjunctiva/sclera: Conjunctivae normal    Cardiovascular:      Rate and Rhythm: Normal rate and regular rhythm  Pulmonary:      Effort: Pulmonary effort is normal       Breath sounds: Normal breath sounds  No decreased breath sounds, wheezing, rhonchi or rales  Musculoskeletal:         General: Normal range of motion  Cervical back: Normal range of motion and neck supple  Skin:     General: Skin is warm and dry  Capillary Refill: Capillary refill takes less than 2 seconds  Neurological:      Mental Status: She is alert and oriented to person, place, and time  Psychiatric:         Mood and Affect: Mood is anxious and depressed  Affect is tearful  Speech: Speech normal          Behavior: Behavior normal  Behavior is cooperative  Thought Content: Thought content includes suicidal ideation  Thought content does not include homicidal ideation  Thought content includes suicidal plan  Thought content does not include homicidal plan           Vital Signs  ED Triage Vitals   Temperature Pulse Respirations Blood Pressure SpO2   01/19/22 1949 01/19/22 1949 01/19/22 1949 01/19/22 1949 01/19/22 1949   98 2 °F (36 8 °C) 78 20 128/69 100 %      Temp Source Heart Rate Source Patient Position - Orthostatic VS BP Location FiO2 (%)   01/19/22 1949 01/19/22 1949 01/19/22 1949 01/20/22 0000 --   Oral Monitor Sitting Right arm       Pain Score       --                  Vitals:    01/19/22 1949 01/20/22 0000   BP: 128/69 (!) 89/64   Pulse: 78 64   Patient Position - Orthostatic VS: Sitting Lying         Visual Acuity      ED Medications  Medications   neomycin-bacitracin-polymyxin b (NEOSPORIN) ointment 1 large application (1 large application Topical Given 1/19/22 2125)   ALPRAZolam (XANAX) tablet 2 mg (2 mg Oral Given 1/19/22 2157)       Diagnostic Studies  Results Reviewed     Procedure Component Value Units Date/Time    COVID/FLU/RSV - 2 hour TAT [[id number]]  (Normal) Collected: 01/19/22 2106    Lab Status: Final result Specimen: Nasopharyngeal Swab Updated: 01/19/22 2158     SARS-CoV-2 Negative     INFLUENZA A PCR Negative     INFLUENZA B PCR Negative     RSV PCR Negative    Narrative:      FOR PEDIATRIC PATIENTS - copy/paste COVID Guidelines URL to browser: [email or web address]  [employer]    SARS-CoV-2 assay is a Nucleic Acid Amplification assay intended for the  qualitative detection of nucleic acid from SARS-CoV-2 in nasopharyngeal  swabs  Results are for the presumptive identification of SARS-CoV-2 RNA  Positive results are indicative of infection with SARS-CoV-2, the virus  causing COVID-19, but do not rule out bacterial infection or co-infection  with other viruses  Laboratories within the [place] and its  territories are required to report all positive results to the appropriate  public health authorities  Negative results do not preclude SARS-CoV-2  infection and should not be used as the sole basis for treatment or other  patient management decisions  Negative results must be combined with  clinical observations, patient history, and epidemiological information  This test has not been FDA cleared or approved      This test has been authorized by FDA under an Emergency Use Authorization  (EUA)  This test is only authorized for the duration of time the  declaration that circumstances exist justifying the authorization of the  emergency use of an in vitro diagnostic tests for detection of SARS-CoV-2  virus and/or diagnosis of COVID-19 infection under section 564(b)(1) of  the Act, 21 U  S C  972PWT-7(E)(1), unless the authorization is terminated  or revoked sooner  The test has been validated but independent review by FDA  and CLIA is pending  Test performed using [employer] GeneXpert: This RT-PCR assay targets N2,  a region unique to SARS-CoV-2  A conserved region in the E-gene was chosen  for pan-Sarbecovirus detection which includes SARS-CoV-2  Rapid drug screen, urine [[id number]]  (Normal) Collected: 01/19/22 2106    Lab Status: Final result Specimen: Urine, Clean Catch Updated: 01/19/22 2138     Amph/Meth UR Negative     Barbiturate Ur Negative     Benzodiazepine Urine Negative     Cocaine Urine Negative     Methadone Urine Negative     Opiate Urine Negative     PCP Ur Negative     THC Urine Negative     Oxycodone Urine Negative    Narrative:      FOR MEDICAL PURPOSES ONLY  IF CONFIRMATION NEEDED PLEASE CONTACT THE LAB WITHIN 5 DAYS      Drug Screen Cutoff Levels:  AMPHETAMINE/METHAMPHETAMINES  1000 ng/mL  BARBITURATES     200 ng/mL  BENZODIAZEPINES     200 ng/mL  COCAINE      300 ng/mL  METHADONE      300 ng/mL  OPIATES      300 ng/mL  PHENCYCLIDINE     25 ng/mL  THC       50 ng/mL  OXYCODONE      100 ng/mL    POCT pregnancy, urine [[id number]]  (Normal) Resulted: 01/19/22 2125    Lab Status: Final result Updated: 01/19/22 2125     EXT PREG TEST UR (Ref: Negative) negative     Control valid    POCT alcohol breath test [[id number]]  (Normal) Resulted: 01/19/22 2050    Lab Status: Final result Updated: 01/19/22 2050     EXTBreath Alcohol 0 00                 No orders to display              Procedures  Procedures         ED Course  ED Course as of 01/20/22 0055   Wed Jan 19, 2022   2325 Patient signed 12                               SBIRT 20yo+      Most Recent Value   SBIRT (23 yo +)    In order to provide better care to our patients, we are screening all of our patients for alcohol and drug use  Would it be okay to ask you these screening questions? Yes Filed at: 01/19/2022 2133   Initial Alcohol Screen: US AUDIT-C     1  How often do you have a drink containing alcohol? 0 Filed at: 01/19/2022 2133   2  How many drinks containing alcohol do you have on a typical day you are drinking? 0 Filed at: 01/19/2022 2133   3a  Male UNDER 65: How often do you have five or more drinks on one occasion? 0 Filed at: 01/19/2022 2133   3b  FEMALE Any Age, or MALE 65+: How often do you have 4 or more drinks on one occassion? 0 Filed at: 01/19/2022 2133   Audit-C Score 0 Filed at: 01/19/2022 2133   LYNN: How many times in the past year have you    Used an illegal drug or used a prescription medication for non-medical reasons? Never Filed at: 01/19/2022 2133                    MDM  Number of Diagnoses or Management Options  Diagnosis management comments: Patient is a 41 y/o female with hx of bipolar affective disorder, anxiety, presenting to the ED for evaluation of suicidal ideations       Patient medically cleared for psychiatric evaluation  Patient agitated, anxious and tearful, states she does take xanax PRN - reviewed chart from previous visits, patient has got this medication in past  Patient calm and cooperative  Patient signed 12  Sign out to Whitley FARRIS pending bed placement       Disposition  Final diagnoses:   Bipolar affective disorder, current episode manic ([facility] [address] )   Suicidal ideations     Time reflects when diagnosis was documented in both MDM as applicable and the Disposition within this note     Time User Action Codes Description Comment    1/20/2022 12:55 AM Ev Pierce Add [F31 10] Bipolar affective disorder, current episode manic ([facility] [address] ) 1/20/2022 12:55 AM Belita Corolla Add [R45 851] Suicidal ideations     1/20/2022 12:55 AM Belita Corolla Modify [F31 10] Bipolar affective disorder, current episode manic ([facility] [address] )     1/20/2022 12:55 AM Belita Corolla Modify [Z20 127] Suicidal ideations       ED Disposition     None      Follow-up Information    None         Patient's Medications   Discharge Prescriptions    No medications on file       No discharge procedures on file      PDMP Review       Value Time User    PDMP Reviewed  Yes 1/5/2022  9:26 AM Quan Leal MD          ED Provider  Electronically Signed by           Briana Mo PA-C  01/20/22 4266

## 2022-01-20 NOTE — ED NOTES
Patient presents to the Emergency Department with suicidal ideation with a plan and recent self harm  Patient reports she was recently discharged from inpatient mental health treatment, and things have spiraled out of control since then  Patient reports suicidal ideation with a plan to cut her own throat  Patient presents with several self inflicted lacerations on her left wrist  Patient reports feelings of despair, helplessness and hopelessness  Patient reports having bouts of extreme anger that present without cause  Patient reports she has not been taking her medications as of late  Patient reports recent anniversary of the loss of her father as a trigger to the way she has been feeling, as well as a lack of support from her family  Patient does not currently have a permanent residence  Patient feels she needs inpatient mental health treatment at this time and is willing to sign a 201      Wale Let [employer]  Crisis Worker, [place]  01/19/22

## 2022-01-20 NOTE — ED NOTES
Patient refused ativan, she states it does not work  RN asked if she was refusing medication and she stated "yes"  Pt states she takes xanax 2mg and that is the only thing that works for her  Provider notified        Bradford Major RN  01/19/22 4183

## 2022-01-20 NOTE — EMTALA/ACUTE CARE TRANSFER
[facility] [address]  [address] [facility] [address]  Dept: [phone]      EMTALA TRANSFER CONSENT    NAME Ileana Hunter                                         1975                              MRN [id number]    I have been informed of my rights regarding examination, treatment, and transfer   by Dr Tan Hamlin DO    Benefits: Specialized equipment and/or services available at the receiving facility (Include comment)________________________    Risks: Potential for delay in receiving treatment,Potential deterioration of medical condition,Loss of IV,Possible worsening of condition or death during transfer,Increased discomfort during transfer      Consent for Transfer:  I acknowledge that my medical condition has been evaluated and explained to me by the emergency department physician or other qualified medical person and/or my attending physician, who has recommended that I be transferred to the service of    at    The above potential benefits of such transfer, the potential risks associated with such transfer, and the probable risks of not being transferred have been explained to me, and I fully understand them  The doctor has explained that, in my case, the benefits of transfer outweigh the risks  I agree to be transferred  I authorize the performance of emergency medical procedures and treatments upon me in both transit and upon arrival at the receiving facility  Additionally, I authorize the release of any and all medical records to the receiving facility and request they be transported with me, if possible  I understand that the safest mode of transportation during a medical emergency is an ambulance and that the Hospital advocates the use of this mode of transport   Risks of traveling to the receiving facility by car, including absence of medical control, life sustaining equipment, such as oxygen, and medical personnel has been explained to me and I fully understand them  (BRUCE CORRECT BOX BELOW)  [  ]  I consent to the stated transfer and to be transported by ambulance/helicopter  [  ]  I consent to the stated transfer, but refuse transportation by ambulance and accept full responsibility for my transportation by car  I understand the risks of non-ambulance transfers and I exonerate the Hospital and its staff from any deterioration in my condition that results from this refusal     X___________________________________________    DATE  22  TIME________  Signature of patient or legally responsible individual signing on patient behalf           RELATIONSHIP TO PATIENT_________________________          Provider Certification    NAME Cindy SANTOS 1975                              MRN [id number]    A medical screening exam was performed on the above named patient  Based on the examination:    Condition Necessitating Transfer The primary encounter diagnosis was Suicidal ideations  A diagnosis of Bipolar affective disorder, current episode manic (HCC) was also pertinent to this visit      Patient Condition: The patient has been stabilized such that within reasonable medical probability, no material deterioration of the patient condition or the condition of the unborn child(ricky) is likely to result from the transfer    Reason for Transfer: Level of Care needed not available at this facility    Transfer Requirements: Facility     · Space available and qualified personnel available for treatment as acknowledged by    · Agreed to accept transfer and to provide appropriate medical treatment as acknowledged by          · Appropriate medical records of the examination and treatment of the patient are provided at the time of transfer   [address], [address] _______  · Transfer will be performed by qualified personnel from    and appropriate transfer equipment as required, including the use of necessary and appropriate life support measures  Provider Certification: I have examined the patient and explained the following risks and benefits of being transferred/refusing transfer to the patient/family:         Based on these reasonable risks and benefits to the patient and/or the unborn child(ricky), and based upon the information available at the time of the patients examination, I certify that the medical benefits reasonably to be expected from the provision of appropriate medical treatments at another medical facility outweigh the increasing risks, if any, to the individuals medical condition, and in the case of labor to the unborn child, from effecting the transfer      X____________________________________________ DATE 01/20/22        TIME_______      ORIGINAL - SEND TO MEDICAL RECORDS   COPY - SEND WITH PATIENT DURING TRANSFER

## 2022-01-20 NOTE — ED NOTES
All of patient belongings placed behind zone 2 nurse station at this time     [facility]  01/19/22 4177

## 2022-01-20 NOTE — ED NOTES
Assumed care of patient at this time  Patient resting comfortably, 1:1 observation continued        Magdalena Fam RN  01/20/22 0465

## 2022-01-20 NOTE — ED TRIAGE NOTES
Pt poor historian - she changes topics frequently and is difficult to get a full story out of her  Pt has superficial lacerations on left forearm, bleeding controlled at this time  Pt rocking back and forth  Denies homicidal ideations at this time

## 2022-01-20 NOTE — ED NOTES
Insurance Authorization:   Phone call placed to [facility]  Phone number: [phone]     Spoke to: Ronaldo Munson approved-3  Level of care: Inpatient treatment  Review on 1/22/22  Authorization # Facility to call on arrival      EVS (Eligibility Verification System) called [phone]  Automated system indicates: [facility]

## 2022-01-20 NOTE — ED NOTES
Patient is accepted at [facility]  Patient is accepted by Dr Meza Rack is arranged with [facility]   Transportation is scheduled for TBD

## 2022-09-21 ENCOUNTER — HOSPITAL ENCOUNTER (INPATIENT)
Facility: HOSPITAL | Age: 47
DRG: 720 | End: 2022-09-21
Attending: EMERGENCY MEDICINE
Payer: COMMERCIAL

## 2022-09-21 ENCOUNTER — APPOINTMENT (EMERGENCY)
Dept: CT IMAGING | Facility: HOSPITAL | Age: 47
DRG: 720 | End: 2022-09-21
Payer: COMMERCIAL

## 2022-09-21 ENCOUNTER — APPOINTMENT (OUTPATIENT)
Dept: RADIOLOGY | Facility: HOSPITAL | Age: 47
DRG: 720 | End: 2022-09-21
Payer: COMMERCIAL

## 2022-09-21 DIAGNOSIS — J96.90 RESPIRATORY FAILURE (HCC): ICD-10-CM

## 2022-09-21 DIAGNOSIS — J96.01 ACUTE RESPIRATORY FAILURE WITH HYPOXIA (HCC): ICD-10-CM

## 2022-09-21 DIAGNOSIS — R09.02 HYPOXIA: Primary | ICD-10-CM

## 2022-09-21 DIAGNOSIS — Z00.8 MEDICAL CLEARANCE FOR PSYCHIATRIC ADMISSION: ICD-10-CM

## 2022-09-21 DIAGNOSIS — J18.9 BILATERAL PNEUMONIA: ICD-10-CM

## 2022-09-21 DIAGNOSIS — F31.9 BIPOLAR AFFECTIVE DISORDER, CURRENTLY ACTIVE (HCC): ICD-10-CM

## 2022-09-21 PROBLEM — R41.82 ALTERED MENTAL STATUS: Status: ACTIVE | Noted: 2022-09-21

## 2022-09-21 PROBLEM — R79.89 ELEVATED LFTS: Status: ACTIVE | Noted: 2022-09-21

## 2022-09-21 PROBLEM — F17.200 HEAVY TOBACCO SMOKER: Status: ACTIVE | Noted: 2022-09-21

## 2022-09-21 PROBLEM — A41.9 SEPSIS (HCC): Status: ACTIVE | Noted: 2022-09-21

## 2022-09-21 PROBLEM — D64.9 ANEMIA: Status: ACTIVE | Noted: 2022-09-21

## 2022-09-21 PROBLEM — F19.10 SUBSTANCE ABUSE (HCC): Status: ACTIVE | Noted: 2022-09-21

## 2022-09-21 LAB
2HR DELTA HS TROPONIN: -3 NG/L
4HR DELTA HS TROPONIN: -1 NG/L
ALBUMIN SERPL BCP-MCNC: 2.6 G/DL (ref 3.5–5)
ALP SERPL-CCNC: 105 U/L (ref 46–116)
ALT SERPL W P-5'-P-CCNC: 142 U/L (ref 12–78)
AMPHETAMINES SERPL QL SCN: NEGATIVE
ANION GAP SERPL CALCULATED.3IONS-SCNC: 12 MMOL/L (ref 4–13)
APAP SERPL-MCNC: <2 UG/ML (ref 10–20)
ARTERIAL PATENCY WRIST A: YES
AST SERPL W P-5'-P-CCNC: 101 U/L (ref 5–45)
ATRIAL RATE: 83 BPM
ATRIAL RATE: 88 BPM
BACTERIA UR QL AUTO: ABNORMAL /HPF
BARBITURATES UR QL: NEGATIVE
BASE EX.OXY STD BLDV CALC-SCNC: 85.6 % (ref 60–80)
BASE EXCESS BLDA CALC-SCNC: -5.8 MMOL/L
BASE EXCESS BLDV CALC-SCNC: -6.4 MMOL/L
BASOPHILS # BLD MANUAL: 0 THOUSAND/UL (ref 0–0.1)
BASOPHILS NFR MAR MANUAL: 0 % (ref 0–1)
BENZODIAZ UR QL: NEGATIVE
BILIRUB SERPL-MCNC: 1.1 MG/DL (ref 0.2–1)
BILIRUB UR QL STRIP: ABNORMAL
BUN SERPL-MCNC: 17 MG/DL (ref 5–25)
CALCIUM ALBUM COR SERPL-MCNC: 9.5 MG/DL (ref 8.3–10.1)
CALCIUM SERPL-MCNC: 8.4 MG/DL (ref 8.3–10.1)
CARDIAC TROPONIN I PNL SERPL HS: 10 NG/L
CARDIAC TROPONIN I PNL SERPL HS: 7 NG/L
CARDIAC TROPONIN I PNL SERPL HS: 9 NG/L
CHLORIDE SERPL-SCNC: 108 MMOL/L (ref 96–108)
CLARITY UR: CLEAR
CO2 SERPL-SCNC: 21 MMOL/L (ref 21–32)
COCAINE UR QL: NEGATIVE
COLOR UR: ABNORMAL
CREAT SERPL-MCNC: 0.77 MG/DL (ref 0.6–1.3)
D DIMER PPP FEU-MCNC: 1.98 UG/ML FEU
EOSINOPHIL # BLD MANUAL: 0 THOUSAND/UL (ref 0–0.4)
EOSINOPHIL NFR BLD MANUAL: 0 % (ref 0–6)
ERYTHROCYTE [DISTWIDTH] IN BLOOD BY AUTOMATED COUNT: 12.5 % (ref 11.6–15.1)
ETHANOL SERPL-MCNC: <3 MG/DL (ref 0–3)
EXT PREG TEST URINE: NEGATIVE
EXT. CONTROL ED NAV: NORMAL
FERRITIN SERPL-MCNC: 104 NG/ML (ref 8–388)
FLUAV RNA RESP QL NAA+PROBE: NEGATIVE
FLUBV RNA RESP QL NAA+PROBE: NEGATIVE
FOLATE SERPL-MCNC: 6.2 NG/ML (ref 3.1–17.5)
GFR SERPL CREATININE-BSD FRML MDRD: 93 ML/MIN/1.73SQ M
GLUCOSE SERPL-MCNC: 95 MG/DL (ref 65–140)
GLUCOSE UR STRIP-MCNC: NEGATIVE MG/DL
HCO3 BLDA-SCNC: 18.8 MMOL/L (ref 22–28)
HCO3 BLDV-SCNC: 19.1 MMOL/L (ref 24–30)
HCT VFR BLD AUTO: 32.2 % (ref 34.8–46.1)
HFNC FLOW LPM: 55
HGB BLD-MCNC: 10.4 G/DL (ref 11.5–15.4)
HGB UR QL STRIP.AUTO: ABNORMAL
IRON SATN MFR SERPL: 6 % (ref 15–50)
IRON SERPL-MCNC: 16 UG/DL (ref 50–170)
KETONES UR STRIP-MCNC: ABNORMAL MG/DL
LACTATE SERPL-SCNC: 1.1 MMOL/L (ref 0.5–2)
LEUKOCYTE ESTERASE UR QL STRIP: NEGATIVE
LYMPHOCYTES # BLD AUTO: 0.34 THOUSAND/UL (ref 0.6–4.47)
LYMPHOCYTES # BLD AUTO: 2 % (ref 14–44)
MCH RBC QN AUTO: 29.6 PG (ref 26.8–34.3)
MCHC RBC AUTO-ENTMCNC: 32.3 G/DL (ref 31.4–37.4)
MCV RBC AUTO: 92 FL (ref 82–98)
METHADONE UR QL: NEGATIVE
MONOCYTES # BLD AUTO: 0.51 THOUSAND/UL (ref 0–1.22)
MONOCYTES NFR BLD: 3 % (ref 4–12)
NEUTROPHILS # BLD MANUAL: 16.21 THOUSAND/UL (ref 1.85–7.62)
NEUTS BAND NFR BLD MANUAL: 12 % (ref 0–8)
NEUTS SEG NFR BLD AUTO: 83 % (ref 43–75)
NITRITE UR QL STRIP: NEGATIVE
NON VENT HFNC FIO2: 100
NON VENT TYPE HFNC: ABNORMAL
NON-SQ EPI CELLS URNS QL MICRO: ABNORMAL /HPF
NT-PROBNP SERPL-MCNC: 2492 PG/ML
O2 CT BLDA-SCNC: 18.6 ML/DL (ref 16–23)
O2 CT BLDV-SCNC: 12.6 ML/DL
OPIATES UR QL SCN: NEGATIVE
OXYCODONE+OXYMORPHONE UR QL SCN: NEGATIVE
OXYHGB MFR BLDA: 91.1 % (ref 94–97)
P AXIS: 27 DEGREES
P AXIS: 71 DEGREES
PCO2 BLDA: 34.6 MM HG (ref 36–44)
PCO2 BLDV: 37.8 MM HG (ref 42–50)
PCP UR QL: NEGATIVE
PH BLDA: 7.35 [PH] (ref 7.35–7.45)
PH BLDV: 7.32 [PH] (ref 7.3–7.4)
PH UR STRIP.AUTO: 7 [PH] (ref 4.5–8)
PLATELET # BLD AUTO: 248 THOUSANDS/UL (ref 149–390)
PLATELET BLD QL SMEAR: ADEQUATE
PMV BLD AUTO: 8.6 FL (ref 8.9–12.7)
PO2 BLDA: 73.7 MM HG (ref 75–129)
PO2 BLDV: 59.5 MM HG (ref 35–45)
POTASSIUM SERPL-SCNC: 3.8 MMOL/L (ref 3.5–5.3)
PR INTERVAL: 156 MS
PR INTERVAL: 170 MS
PROCALCITONIN SERPL-MCNC: 3.79 NG/ML
PROT SERPL-MCNC: 6.7 G/DL (ref 6.4–8.4)
PROT UR STRIP-MCNC: ABNORMAL MG/DL
QRS AXIS: 78 DEGREES
QRS AXIS: 79 DEGREES
QRSD INTERVAL: 88 MS
QRSD INTERVAL: 90 MS
QT INTERVAL: 332 MS
QT INTERVAL: 342 MS
QTC INTERVAL: 401 MS
QTC INTERVAL: 401 MS
RBC # BLD AUTO: 3.51 MILLION/UL (ref 3.81–5.12)
RBC #/AREA URNS AUTO: ABNORMAL /HPF
RBC MORPH BLD: NORMAL
RSV RNA RESP QL NAA+PROBE: NEGATIVE
S PNEUM AG UR QL: NEGATIVE
SALICYLATES SERPL-MCNC: 5.7 MG/DL (ref 3–20)
SARS-COV-2 RNA RESP QL NAA+PROBE: NEGATIVE
SODIUM SERPL-SCNC: 141 MMOL/L (ref 135–147)
SP GR UR STRIP.AUTO: 1.02 (ref 1–1.03)
SPECIMEN SOURCE: ABNORMAL
T WAVE AXIS: -29 DEGREES
T WAVE AXIS: 4 DEGREES
THC UR QL: NEGATIVE
TIBC SERPL-MCNC: 249 UG/DL (ref 250–450)
UROBILINOGEN UR QL STRIP.AUTO: >=8 E.U./DL
VENTRICULAR RATE: 83 BPM
VENTRICULAR RATE: 88 BPM
VIT B12 SERPL-MCNC: 387 PG/ML (ref 100–900)
WBC # BLD AUTO: 17.06 THOUSAND/UL (ref 4.31–10.16)
WBC #/AREA URNS AUTO: ABNORMAL /HPF

## 2022-09-21 PROCEDURE — 71045 X-RAY EXAM CHEST 1 VIEW: CPT

## 2022-09-21 PROCEDURE — 71275 CT ANGIOGRAPHY CHEST: CPT

## 2022-09-21 PROCEDURE — 87389 HIV-1 AG W/HIV-1&-2 AB AG IA: CPT

## 2022-09-21 PROCEDURE — G1004 CDSM NDSC: HCPCS

## 2022-09-21 PROCEDURE — 80143 DRUG ASSAY ACETAMINOPHEN: CPT

## 2022-09-21 PROCEDURE — 83540 ASSAY OF IRON: CPT

## 2022-09-21 PROCEDURE — 80053 COMPREHEN METABOLIC PANEL: CPT | Performed by: PHYSICIAN ASSISTANT

## 2022-09-21 PROCEDURE — 87340 HEPATITIS B SURFACE AG IA: CPT

## 2022-09-21 PROCEDURE — 96365 THER/PROPH/DIAG IV INF INIT: CPT

## 2022-09-21 PROCEDURE — 83550 IRON BINDING TEST: CPT

## 2022-09-21 PROCEDURE — 86430 RHEUMATOID FACTOR TEST QUAL: CPT

## 2022-09-21 PROCEDURE — 36600 WITHDRAWAL OF ARTERIAL BLOOD: CPT

## 2022-09-21 PROCEDURE — 86037 ANCA TITER EACH ANTIBODY: CPT

## 2022-09-21 PROCEDURE — 82607 VITAMIN B-12: CPT

## 2022-09-21 PROCEDURE — 82077 ASSAY SPEC XCP UR&BREATH IA: CPT

## 2022-09-21 PROCEDURE — 81001 URINALYSIS AUTO W/SCOPE: CPT

## 2022-09-21 PROCEDURE — 83520 IMMUNOASSAY QUANT NOS NONAB: CPT

## 2022-09-21 PROCEDURE — 86803 HEPATITIS C AB TEST: CPT

## 2022-09-21 PROCEDURE — 81025 URINE PREGNANCY TEST: CPT | Performed by: PHYSICIAN ASSISTANT

## 2022-09-21 PROCEDURE — 74177 CT ABD & PELVIS W/CONTRAST: CPT

## 2022-09-21 PROCEDURE — 0241U HB NFCT DS VIR RESP RNA 4 TRGT: CPT | Performed by: PHYSICIAN ASSISTANT

## 2022-09-21 PROCEDURE — 99291 CRITICAL CARE FIRST HOUR: CPT | Performed by: PHYSICIAN ASSISTANT

## 2022-09-21 PROCEDURE — 86704 HEP B CORE ANTIBODY TOTAL: CPT

## 2022-09-21 PROCEDURE — 80179 DRUG ASSAY SALICYLATE: CPT

## 2022-09-21 PROCEDURE — 84145 PROCALCITONIN (PCT): CPT

## 2022-09-21 PROCEDURE — 82728 ASSAY OF FERRITIN: CPT

## 2022-09-21 PROCEDURE — 93010 ELECTROCARDIOGRAM REPORT: CPT | Performed by: INTERNAL MEDICINE

## 2022-09-21 PROCEDURE — 85027 COMPLETE CBC AUTOMATED: CPT | Performed by: PHYSICIAN ASSISTANT

## 2022-09-21 PROCEDURE — 99285 EMERGENCY DEPT VISIT HI MDM: CPT

## 2022-09-21 PROCEDURE — 83605 ASSAY OF LACTIC ACID: CPT | Performed by: PHYSICIAN ASSISTANT

## 2022-09-21 PROCEDURE — 86705 HEP B CORE ANTIBODY IGM: CPT

## 2022-09-21 PROCEDURE — 83880 ASSAY OF NATRIURETIC PEPTIDE: CPT

## 2022-09-21 PROCEDURE — 86431 RHEUMATOID FACTOR QUANT: CPT

## 2022-09-21 PROCEDURE — 85007 BL SMEAR W/DIFF WBC COUNT: CPT | Performed by: PHYSICIAN ASSISTANT

## 2022-09-21 PROCEDURE — 93005 ELECTROCARDIOGRAM TRACING: CPT

## 2022-09-21 PROCEDURE — 85025 COMPLETE CBC W/AUTO DIFF WBC: CPT | Performed by: PHYSICIAN ASSISTANT

## 2022-09-21 PROCEDURE — 87040 BLOOD CULTURE FOR BACTERIA: CPT | Performed by: PHYSICIAN ASSISTANT

## 2022-09-21 PROCEDURE — 87449 NOS EACH ORGANISM AG IA: CPT

## 2022-09-21 PROCEDURE — 82746 ASSAY OF FOLIC ACID SERUM: CPT

## 2022-09-21 PROCEDURE — 85379 FIBRIN DEGRADATION QUANT: CPT | Performed by: PHYSICIAN ASSISTANT

## 2022-09-21 PROCEDURE — 36415 COLL VENOUS BLD VENIPUNCTURE: CPT | Performed by: PHYSICIAN ASSISTANT

## 2022-09-21 PROCEDURE — 82805 BLOOD GASES W/O2 SATURATION: CPT

## 2022-09-21 PROCEDURE — 80307 DRUG TEST PRSMV CHEM ANLYZR: CPT

## 2022-09-21 PROCEDURE — NC001 PR NO CHARGE: Performed by: INTERNAL MEDICINE

## 2022-09-21 PROCEDURE — 86038 ANTINUCLEAR ANTIBODIES: CPT

## 2022-09-21 PROCEDURE — 84484 ASSAY OF TROPONIN QUANT: CPT | Performed by: PHYSICIAN ASSISTANT

## 2022-09-21 RX ORDER — DEXMEDETOMIDINE HYDROCHLORIDE 4 UG/ML
.1-.7 INJECTION, SOLUTION INTRAVENOUS
Status: DISCONTINUED | OUTPATIENT
Start: 2022-09-21 | End: 2022-09-25

## 2022-09-21 RX ORDER — ACETAMINOPHEN 325 MG/1
650 TABLET ORAL EVERY 6 HOURS PRN
Status: DISCONTINUED | OUTPATIENT
Start: 2022-09-21 | End: 2022-10-05 | Stop reason: HOSPADM

## 2022-09-21 RX ORDER — BENZONATATE 100 MG/1
100 CAPSULE ORAL 3 TIMES DAILY PRN
Status: DISCONTINUED | OUTPATIENT
Start: 2022-09-21 | End: 2022-09-25

## 2022-09-21 RX ORDER — ALBUTEROL SULFATE 2.5 MG/3ML
5 SOLUTION RESPIRATORY (INHALATION) ONCE
Status: COMPLETED | OUTPATIENT
Start: 2022-09-21 | End: 2022-09-21

## 2022-09-21 RX ORDER — BUPRENORPHINE AND NALOXONE 8; 2 MG/1; MG/1
8 FILM, SOLUBLE BUCCAL; SUBLINGUAL 2 TIMES DAILY
Status: DISCONTINUED | OUTPATIENT
Start: 2022-09-21 | End: 2022-09-26

## 2022-09-21 RX ORDER — ALPRAZOLAM 0.5 MG/1
2 TABLET ORAL ONCE
Status: COMPLETED | OUTPATIENT
Start: 2022-09-21 | End: 2022-09-21

## 2022-09-21 RX ORDER — FUROSEMIDE 10 MG/ML
20 INJECTION INTRAMUSCULAR; INTRAVENOUS ONCE
Status: DISCONTINUED | OUTPATIENT
Start: 2022-09-21 | End: 2022-09-21

## 2022-09-21 RX ORDER — SODIUM CHLORIDE, SODIUM GLUCONATE, SODIUM ACETATE, POTASSIUM CHLORIDE, MAGNESIUM CHLORIDE, SODIUM PHOSPHATE, DIBASIC, AND POTASSIUM PHOSPHATE .53; .5; .37; .037; .03; .012; .00082 G/100ML; G/100ML; G/100ML; G/100ML; G/100ML; G/100ML; G/100ML
1000 INJECTION, SOLUTION INTRAVENOUS ONCE
Status: COMPLETED | OUTPATIENT
Start: 2022-09-21 | End: 2022-09-22

## 2022-09-21 RX ORDER — ENOXAPARIN SODIUM 100 MG/ML
40 INJECTION SUBCUTANEOUS DAILY
Status: DISCONTINUED | OUTPATIENT
Start: 2022-09-22 | End: 2022-10-05 | Stop reason: HOSPADM

## 2022-09-21 RX ORDER — NICOTINE 21 MG/24HR
21 PATCH, TRANSDERMAL 24 HOURS TRANSDERMAL DAILY
Status: DISCONTINUED | OUTPATIENT
Start: 2022-09-22 | End: 2022-10-05 | Stop reason: HOSPADM

## 2022-09-21 RX ADMIN — BENZONATATE 100 MG: 100 CAPSULE ORAL at 21:45

## 2022-09-21 RX ADMIN — ALBUTEROL SULFATE 5 MG: 2.5 SOLUTION RESPIRATORY (INHALATION) at 12:23

## 2022-09-21 RX ADMIN — IPRATROPIUM BROMIDE 0.5 MG: 0.5 SOLUTION RESPIRATORY (INHALATION) at 12:24

## 2022-09-21 RX ADMIN — DEXMEDETOMIDINE HYDROCHLORIDE 0.2 MCG/KG/HR: 400 INJECTION INTRAVENOUS at 21:45

## 2022-09-21 RX ADMIN — ALPRAZOLAM 2 MG: 0.5 TABLET ORAL at 15:03

## 2022-09-21 RX ADMIN — IOHEXOL 85 ML: 350 INJECTION, SOLUTION INTRAVENOUS at 14:53

## 2022-09-21 RX ADMIN — BUPRENORPHINE AND NALOXONE 8 MG: 8; 2 FILM BUCCAL; SUBLINGUAL at 18:09

## 2022-09-21 RX ADMIN — AZITHROMYCIN MONOHYDRATE 500 MG: 500 INJECTION, POWDER, LYOPHILIZED, FOR SOLUTION INTRAVENOUS at 18:53

## 2022-09-21 RX ADMIN — SODIUM CHLORIDE, SODIUM GLUCONATE, SODIUM ACETATE, POTASSIUM CHLORIDE, MAGNESIUM CHLORIDE, SODIUM PHOSPHATE, DIBASIC, AND POTASSIUM PHOSPHATE 1000 ML: .53; .5; .37; .037; .03; .012; .00082 INJECTION, SOLUTION INTRAVENOUS at 18:28

## 2022-09-21 RX ADMIN — ACETAMINOPHEN 650 MG: 325 TABLET, FILM COATED ORAL at 19:59

## 2022-09-21 RX ADMIN — DEXTROSE MONOHYDRATE 2000 MG: 5 INJECTION, SOLUTION INTRAVENOUS at 14:10

## 2022-09-21 NOTE — ASSESSMENT & PLAN NOTE
· Currently smokes pack or more daily  · Ordered nicotine patch  · Encouraged cessation when appropriate

## 2022-09-21 NOTE — ASSESSMENT & PLAN NOTE
· AST: 101/ALT: 142     · Endorse alcohol use in ED - unclear if she has a history of such  · Were not elevated on 09/16/2022  · No cirrhotic changes on CT scan  · COMA panel pending  · Will trend CMP daily

## 2022-09-21 NOTE — ASSESSMENT & PLAN NOTE
· New onset yesterday with worsening SOB on  - endorsed pleuritic chest pain, reported bloody sputum at home and calf pain  · Oxygen sats on presentation were 50% on 4L  · CXR: Development of bilateral opacities likely representing multifocal pneumonia  · CT Chest:  Negative for PE, Extensive groundglass airspace opacity in the lungs with peripheral sparing  Mediastinal and bilateral hilar lymphadenopathy - concerns for noncardiogenic pulmonary edema    · COVID, RSV, flu panel - negative  · Patient placed on high-flow however, after ambulation she was unable to reach even oxygen saturations greater than 83% on max high-flow settings - patient is able to have complete conversation without conversational dyspnea however pulse ox reading still remained in the 80s on examination  · VB 32/37 8/SVO2: 85 6  · ABG ordered  · ECHO pending    · Pro-BNP ordered  · Procal pending  · Lactic acid: 1 1- WBC: 17 0  · Continue ceftriaxone q 24hrs   · Wean hiflo as able to maintain pulse ox > 90%   · ANCA, MARYLIN, Hep C, HIV panel - pending

## 2022-09-21 NOTE — ASSESSMENT & PLAN NOTE
· Patient has unstable housing, presented to the ED with hypoxia, shortness of breath, and cough  · See plan as noted above in Sepsis

## 2022-09-21 NOTE — ED NOTES
Pt requesting anxiety medication  Pt appears restless and anxious  Dr Brinda Badillo made aware        Howard Johnson, RN  09/21/22 9700

## 2022-09-21 NOTE — H&P
Emanuel 35 1975, 41 y o  female MRN: [id number]  Unit/Bed#: ICU 01 Encounter: [id number]  Primary Care Provider: No primary care provider on file  Date and time admitted to hospital: 2022 11:50 AM    * Acute respiratory failure with hypoxia ([facility] )  Assessment & Plan  · New onset yesterday with worsening SOB on  - endorsed pleuritic chest pain, reported bloody sputum at home and calf pain  · Oxygen sats on presentation were 50% on 4L  · CXR: Development of bilateral opacities likely representing multifocal pneumonia  · CT Chest:  Negative for PE, Extensive groundglass airspace opacity in the lungs with peripheral sparing  Mediastinal and bilateral hilar lymphadenopathy - concerns for noncardiogenic pulmonary edema    · COVID, RSV, flu panel - negative  · Patient placed on high-flow however, after ambulation she was unable to reach even oxygen saturations greater than 83% on max high-flow settings - patient is able to have complete conversation without conversational dyspnea however pulse ox reading still remained in the 80s on examination  · VB 32/37 8/SVO2: 85 6  · ABG ordered  · ECHO pending    · Pro-BNP ordered  · Procal pending  · Lactic acid: 1 1- WBC: 17 0  · Continue ceftriaxone q 24hrs   · Wean hiflo as able to maintain pulse ox > 90%   · ANCA, M[place], Hep C, HIV panel - pending     Sepsis ([facility] )  Assessment & Plan  · Criteria on Admission: Tachycardia, tachypnea, hypoxia requiring BiPAP, elevated wbc's with 12% bandemia  · Continuing ceftriaxone adding azithromycin  · Blood cultures x2 pending  · UA:  Negative for leukocytes or nitrates, 0-1 wbc's, positive for blood  · Did not reflex to culture  · Sputum culture pending  · Legionella urine, strep pneumonia pending  · Lactic: 1 1  · Procal pending  · Heme hypotensive upon presentation to the ICU - fluid resuscitation initiated    Altered mental status  Assessment & Plan  · Patient appropriately answers questions and then has running thoughts that are inappropriate to the conversation  · Notable history for bipolar  · Unclear if change in mental status is hypoxia or in relation to her bipolar episodes  · Pending COMA panel and UDS   · q 2 neuro check   · Awaiting ABG results     R/O CAP (community acquired pneumonia)  Assessment & Plan  · Patient has unstable housing, presented to the ED with hypoxia, shortness of breath, and cough  · See plan as noted above in Sepsis     Anemia  Assessment & Plan  · Baseline hemoglobin-12  · Iron panel, folate, B12 pending  · Trend hemoglobin and hematocrit  · Patient did endorse coughing up blood however, cough in the ED was nonproductive  · Monitor for signs of bleeding  · Denies bloody emesis or diarrhea    Elevated LFTs  Assessment & Plan  · AST: 101/ALT: 142     · Endorse alcohol use in ED - unclear if she has a history of such  · Were not elevated on 09/16/2022  · No cirrhotic changes on CT scan  · COMA panel pending  · Will trend CMP daily    Substance Abuse Disorder   Assessment & Plan  · Currently on Suboxone therapy  · Will reorder to continue during admission  · Pending UDS  · Patient also endorsed alcohol use - unclear how much     Heavy tobacco smoker  Assessment & Plan  · Currently smokes pack or more daily  · Ordered nicotine patch  · Encouraged cessation when appropriate    Bipolar affective disorder, currently active ([facility] )  Assessment & Plan  · Last street medicine note reports on Wellbutrin daily and trazodone HS  · PRN Clonazepam for anxiety  · Holding medication in the setting of NPO  · Might require Precedex drip initiation     -------------------------------------------------------------------------------------------------------------  Chief Complaint: Shortness of breath    History of Present Illness   HX and PE limited by: Mental status, which is confused, tangential, poor historian  Yu Raygoza is a 41 y o  female who presents with SOB starting the day prior to presentation, significantly worsened today prompting her to seek care  C/o SOB w cough, pleuritic CP, two discrete episodes of hemoptysis but generally non-productive cough  Endorses subjective fever last night, denies chills, denies sweats  Current everyday smoker, endorses 1 PPD, current marijuana smoker  Endorses history of poly-substance abuse and EtOH abuse, reports she is s/p incarceration and rehab, denies current or recent substance use  Does endorse having "a sip" of beer yesterday  Pt's baseline mentation is unknown, but current mental status is notably abnormal, as obtaining an answer to any question is complicated by confusion, tangential thought process, mumbled speech, frequent cursing  Attempted to obtain additional history from pt's mother over phone, who reports that pt sounded "very good" when she spoke to her last on Friday or Saturday of the previous week  Otherwise reports a history of mental health and substance abuse problems, in and out of rehab, believes she is currently taking Suboxone and Prozac  Unable to provide additional details  In the ED, pt satting 60% on RA  Sats improved to mid 80s on max HFNC  Pulse , RR 19-24, normotensive  COVID/Flu/RSV negative, WBC 17 06, LA wnl, D-dimer elevated, CT PE study C/A/P w con negative for PE, did demonstrate extensive ground-glass airspace opacity, mediastinal bilateral hilar lymphadenopathy  Received albuterol and ipratropium nebulizers, ceftriaxone, and alprazolam     History obtained from chart review and the patient   -------------------------------------------------------------------------------------------------------------  Dispo:  Admit to Critical Care     Code Status: Level 1 - Full Code  --------------------------------------------------------------------------------------------------------------  Review of Systems   Unable to perform ROS: Mental status change   Pt is alert but confused, tangential speech, poor historian  Baseline mentation unknown  Review of systems was unable to be performed secondary to mental status  Physical Exam  Vitals and nursing note reviewed  Constitutional:       General: She is in acute distress  Appearance: She is obese  She is ill-appearing  She is not toxic-appearing or diaphoretic  HENT:      Head: Normocephalic and atraumatic  Eyes:      General: No scleral icterus  Right eye: No discharge  Left eye: No discharge  Conjunctiva/sclera: Conjunctivae normal    Cardiovascular:      Rate and Rhythm: Regular rhythm  Tachycardia present  Pulses: Normal pulses  Heart sounds: Normal heart sounds  No murmur heard  No friction rub  No gallop  Pulmonary:      Effort: No respiratory distress  Breath sounds: No stridor  Rales (coarse rales on inspiration and expiration) present  No wheezing or rhonchi  Comments: RR 40  Chest:      Chest wall: No tenderness  Abdominal:      General: Bowel sounds are normal  There is no distension  Palpations: Abdomen is soft  Tenderness: There is no abdominal tenderness  There is no guarding or rebound  Musculoskeletal:      Right lower leg: No edema  Left lower leg: No edema  Skin:     General: Skin is warm and dry  Neurological:      Mental Status: She is alert        Comments: Confusion/tangential thoughts   Psychiatric:      Comments: Tangential pattern of thoughts, confusion, agitation       --------------------------------------------------------------------------------------------------------------  Vitals:   Vitals:    09/21/22 1430 09/21/22 1515 09/21/22 1545 09/21/22 1712   BP: 103/61 107/59 106/63 (!) 87/56   BP Location: Right arm   Right arm   Pulse: 94 84 104 86   Resp: 20 (!) 24 (!) 24 (!) 33   Temp:    98 1 °F (36 7 °C)   TempSrc:    Tympanic   SpO2: 93% 97% (!) 85% 96%   Weight:    74 2 kg (163 lb 9 3 oz)   Height:    5' 2" (1 575 m)     Temp  Min: 98 1 °F (36 7 °C)  Max: 98 4 °F (36 9 °C)     Height: 5' 2" (157 5 cm)  Body mass index is 29 92 kg/m²  Laboratory and Diagnostics:  Results from last 7 days   Lab Units 09/21/22  1215   WBC Thousand/uL 17 06*   HEMOGLOBIN g/dL 10 4*   HEMATOCRIT % 32 2*   PLATELETS Thousands/uL 248   BANDS PCT % 12*   MONO PCT % 3*     Results from last 7 days   Lab Units 09/21/22  1215   SODIUM mmol/L 141   POTASSIUM mmol/L 3 8   CHLORIDE mmol/L 108   CO2 mmol/L 21   ANION GAP mmol/L 12   BUN mg/dL 17   CREATININE mg/dL 0 77   CALCIUM mg/dL 8 4   GLUCOSE RANDOM mg/dL 95   ALT U/L 142*   AST U/L 101*   ALK PHOS U/L 105   ALBUMIN g/dL 2 6*   TOTAL BILIRUBIN mg/dL 1 10*                   Results from last 7 days   Lab Units 09/21/22  1233   LACTIC ACID mmol/L 1 1     ABG:  Results from last 7 days   Lab Units 09/21/22  1710   PH ART  7 353   PCO2 ART mm Hg 34 6*   PO2 ART mm Hg 73 7*   HCO3 ART mmol/L 18 8*   BASE EXC ART mmol/L -5 8   ABG SOURCE  Radial, Right     VBG:  Results from last 7 days   Lab Units 09/21/22  1710 09/21/22  1623   PH LILIAM   --  7 322   PCO2 LILIAM mm Hg  --  37 8*   PO2 LILIAM mm Hg  --  59 5*   HCO3 LILIAM mmol/L  --  19 1*   BASE EXC LILIAM mmol/L  --  -6 4   ABG SOURCE  Radial, Right  --            Micro:        EKG: NSR HR 83, T wave inversion V2 and V3  Imaging: I have personally reviewed pertinent reports  and I have personally reviewed pertinent films in PACS      Historical Information   Past Medical History:   Diagnosis Date    Lower back pain      Past Surgical History:   Procedure Laterality Date    CHOLECYSTECTOMY       Social History   Social History     Substance and Sexual Activity   Alcohol Use Never     Social History     Substance and Sexual Activity   Drug Use Not Currently    Comment: 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        Social History     Tobacco Use   Smoking Status Current Every Day Smoker    Packs/day: 1 00    Types: Cigarettes   Smokeless Tobacco Current User     Exercise History: Not assessed  Family History:   Unable to assess family history, as pt is currently poor historian  Medications:  Current Facility-Administered Medications   Medication Dose Route Frequency    azithromycin (ZITHROMAX) 500 mg in sodium chloride 0 9% 250mL IVPB 500 mg  500 mg Intravenous Q24H    buprenorphine-naloxone (Suboxone) film 8 mg  8 mg Sublingual BID    [START ON 9/22/2022] ceftriaxone (ROCEPHIN) 2 g/50 mL in dextrose IVPB  2,000 mg Intravenous Q24H    [START ON 9/22/2022] enoxaparin (LOVENOX) subcutaneous injection 40 mg  40 mg Subcutaneous Daily    multi-electrolyte (ISOLYTE-S PH 7 4) bolus 1,000 mL  1,000 mL Intravenous Once    [START ON 9/22/2022] nicotine (NICODERM CQ) 21 mg/24 hr TD 24 hr patch 21 mg  21 mg Transdermal Daily     Home medications:  Prior to Admission Medications   Prescriptions Last Dose Informant Patient Reported? Taking?    Diclofenac Sodium (VOLTAREN) 1 % 9/21/2022 at Unknown time  No Yes   Sig: Apply 2 g topically 4 (four) times a day   buPROPion (WELLBUTRIN XL) 300 mg 24 hr tablet 9/21/2022 at Unknown time  Yes Yes   Sig: Take 300 mg by mouth every morning   buprenorphine-naloxone (SUBOXONE) 8-2 mg per SL tablet 9/21/2022 at Unknown time Pharmacy (Specify) Yes Yes   Sig: Place 1 tablet under the tongue daily   clonazePAM (KlonoPIN) 0 5 mg tablet 9/20/2022 at Unknown time  No Yes   Sig: Take 2 tablets (1 mg total) by mouth daily at bedtime   gabapentin (NEURONTIN) 400 mg capsule 9/21/2022 at Unknown time Self No Yes   Sig: Take 1 capsule (400 mg total) by mouth 3 (three) times a day   Patient taking differently: Take 400 mg by mouth 2 (two) times a day   topiramate (TOPAMAX) 100 mg tablet 9/21/2022 at Unknown time  No Yes   Sig: Take 1 tablet (100 mg total) by mouth daily at bedtime   traZODone (DESYREL) 100 mg tablet 9/20/2022 at Unknown time  No Yes   Sig: Take 1 tablet (100 mg total) by mouth daily at bedtime      Facility-Administered Medications: None Allergies: Allergies   Allergen Reactions    Haloperidol Other (See Comments)     oculogyr crisis       ------------------------------------------------------------------------------------------------------------  Advance Directive and Living Will:      Power of :    POLST:    ------------------------------------------------------------------------------------------------------------  Anticipated Length of Stay is > 2 midnights    Care Time Delivered:   No Critical Care time spent       Dell Agosto MD        Portions of the record may have been created with voice recognition software  Occasional wrong word or "sound a like" substitutions may have occurred due to the inherent limitations of voice recognition software    Read the chart carefully and recognize, using context, where substitutions have occurred 7

## 2022-09-21 NOTE — SEPSIS NOTE
Sepsis Note   Susan Chowdhury 41 y o  female MRN: [id number]  Unit/Bed#: ICU 01 Encounter: [id number]       qSOFA     Row Name 09/21/22 1712 09/21/22 1545 09/21/22 1515 09/21/22 1430 09/21/22 1358    Altered mental status GCS < 15 -- -- -- -- --    Respiratory Rate > / =22 1 1 1 0 0    Systolic BP < / =453 1 0 0 0 0    Q Sofa Score 2 1 1 0 0    Row Name 09/21/22 1147                Altered mental status GCS < 15 --        Respiratory Rate > / =63 0        Systolic BP < / =794 0        Q Sofa Score 0                   Initial Sepsis Screening     Row Name 09/21/22 0716                Is the patient's history suggestive of a new or worsening infection? Yes (Proceed)  -SS        Suspected source of infection pneumonia  -SS        Are two or more of the following signs & symptoms of infection both present and new to the patient? Yes (Proceed)  -SS        Indicate SIRS criteria Leukocytosis (WBC > 06863 IJL); Tachypnea > 20 resp per min  -SS        If the answer is yes to both questions, suspicion of sepsis is present --        If severe sepsis is present AND tissue hypoperfusion perists in the hour after fluid resuscitation or lactate > 4, the patient meets criteria for SEPTIC SHOCK --        Are any of the following organ dysfunction criteria present within 6 hours of suspected infection and SIRS criteria that are NOT considered to be chronic conditions? Yes  -SS        Organ dysfunction Acute respiratory failure (new need for invasive or non-invasive mechanical ventilation)  -SS        Date of presentation of severe sepsis 09/21/22  -SS        Time of presentation of severe sepsis 1600  -SS        Tissue hypoperfusion persists in the hour after crystalloid fluid administration, evidenced, by either: --        Was hypotension present within one hour of the conclusion of crystalloid fluid administration?  No  -SS        Date of presentation of septic shock --        Time of presentation of septic shock -- User Key  (r) = Recorded By, (t) = Taken By, (c) = Cosigned By    [address] Name Provider Type    Huong August Nurse Practitioner

## 2022-09-21 NOTE — ED PROVIDER NOTES
History  Chief Complaint   Patient presents with    Shortness of Breath     Pt reports SOB and cough since yesterday  Denies fevers, N/V/D     Pt presents to the ED not feeling well, states she feels SOB and wheezsing and chest tightness  No fevers  But has some body aches, had calf pain earlier this week  Feeling weak  Admits to smoking a lot  No hx of asthma or COPD  Prior to Admission Medications   Prescriptions Last Dose Informant Patient Reported? Taking? Diclofenac Sodium (VOLTAREN) 1 % 9/21/2022 at Unknown time  No Yes   Sig: Apply 2 g topically 4 (four) times a day   buPROPion (WELLBUTRIN XL) 300 mg 24 hr tablet 9/21/2022 at Unknown time  Yes Yes   Sig: Take 300 mg by mouth every morning   buprenorphine-naloxone (SUBOXONE) 8-2 mg per SL tablet 9/21/2022 at Unknown time Pharmacy ([address] [place] [address]) Yes Yes   Sig: Place 1 tablet under the tongue daily   clonazePAM (KlonoPIN) 0 5 mg tablet 9/20/2022 at Unknown time  No Yes   Sig: Take 2 tablets (1 mg total) by mouth daily at bedtime   gabapentin (NEURONTIN) 400 mg capsule 9/21/2022 at Unknown time Self No Yes   Sig: Take 1 capsule (400 mg total) by mouth 3 (three) times a day   Patient taking differently: Take 400 mg by mouth 2 (two) times a day   topiramate (TOPAMAX) 100 mg tablet 9/21/2022 at Unknown time  No Yes   Sig: Take 1 tablet (100 mg total) by mouth daily at bedtime   traZODone (DESYREL) 100 mg tablet 9/20/2022 at Unknown time  No Yes   Sig: Take 1 tablet (100 mg total) by mouth daily at bedtime      Facility-Administered Medications: None       Past Medical History:   Diagnosis Date    Lower back pain        Past Surgical History:   Procedure Laterality Date    CHOLECYSTECTOMY         History reviewed  No pertinent family history  I have reviewed and agree with the history as documented      E-Cigarette/Vaping    E-Cigarette Use Never User      E-Cigarette/Vaping Substances    Nicotine No     THC No     CBD No     Flavoring No     Other No  Unknown No      Social History     Tobacco Use    Smoking status: Current Every Day Smoker     Packs/day: 1 00     Types: Cigarettes    Smokeless tobacco: Current User   Vaping Use    Vaping Use: Never used   Substance Use Topics    Alcohol use: Never    Drug use: Not Currently     Comment: 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  Review of Systems   Constitutional: Negative for fever  HENT: Positive for congestion  Respiratory: Positive for cough, shortness of breath and wheezing  Cardiovascular: Positive for chest pain  Gastrointestinal: Negative  Genitourinary: Negative  Neurological: Positive for weakness  All other systems reviewed and are negative  Physical Exam  Physical Exam  Vitals and nursing note reviewed  Constitutional:       Appearance: She is well-developed  HENT:      Head: Normocephalic and atraumatic  Right Ear: External ear normal       Left Ear: External ear normal    Eyes:      Conjunctiva/sclera: Conjunctivae normal    Cardiovascular:      Rate and Rhythm: Normal rate and regular rhythm  Heart sounds: Normal heart sounds  Pulmonary:      Effort: Pulmonary effort is normal       Breath sounds: Wheezing present  Abdominal:      General: Bowel sounds are normal       Palpations: Abdomen is soft  Musculoskeletal:         General: Normal range of motion  Cervical back: Neck supple  Lymphadenopathy:      Cervical: No cervical adenopathy  Skin:     General: Skin is warm  Findings: No rash  Neurological:      Mental Status: She is alert     Psychiatric:         Behavior: Behavior normal          Vital Signs  ED Triage Vitals [09/21/22 1147]   Temperature Pulse Respirations Blood Pressure SpO2   98 4 °F (36 9 °C) 92 19 115/58 (S) (!) 60 %      Temp Source Heart Rate Source Patient Position - Orthostatic VS BP Location FiO2 (%)   Oral Monitor Sitting Left arm --      Pain Score       --           Vitals:    09/21/22 1358 09/21/22 1430 09/21/22 1515 09/21/22 1545   BP: 115/70 103/61 107/59 106/63   Pulse: 88 94 84 104   Patient Position - Orthostatic VS: Sitting Lying           Visual Acuity      ED Medications  Medications   albuterol inhalation solution 5 mg (5 mg Nebulization Given 9/21/22 1223)   ipratropium (ATROVENT) 0 02 % inhalation solution 0 5 mg (0 5 mg Nebulization Given 9/21/22 1224)   ceftriaxone (ROCEPHIN) 2 g/50 mL in dextrose IVPB (0 mg Intravenous Stopped 9/21/22 1440)   ALPRAZolam (XANAX) tablet 2 mg (2 mg Oral Given 9/21/22 1503)   iohexol (OMNIPAQUE) 350 MG/ML injection (MULTI-DOSE) 85 mL (85 mL Intravenous Given 9/21/22 1453)       Diagnostic Studies  Results Reviewed     Procedure Component Value Units Date/Time    Sputum culture and Gram stain [[id number]]     Lab Status: No result Specimen: Expectorated Sputum     Rapid drug screen, urine [[id number]]     Lab Status: No result Specimen: Urine     Blood gas, venous [[id number]]     Lab Status: No result Specimen: Blood     Blood gas, arterial [[id number]]     Lab Status: No result Specimen: Blood, Arterial     Ethanol [[id number]]     Lab Status: No result Specimen: Blood     Salicylate level [[id number]]     Lab Status: No result Specimen: Blood     Acetaminophen level-If concentration is detectable, please discuss with medical  on call   [[id number]]     Lab Status: No result Specimen: Blood     HS Troponin I 2hr [[id number]]  (Normal) Collected: 09/21/22 1421    Lab Status: Final result Specimen: Blood from Arm, Left Updated: 09/21/22 1453     hs TnI 2hr 7 ng/L      Delta 2hr hsTnI -3 ng/L     HS Troponin I 4hr [[id number]]     Lab Status: No result Specimen: Blood     Urine Microscopic [[id number]]  (Abnormal) Collected: 09/21/22 1325    Lab Status: Final result Specimen: Urine, Clean Catch Updated: 09/21/22 1408     RBC, UA 2-4 /hpf      WBC, UA 0-1 /hpf      Epithelial Cells Occasional /hpf      Bacteria, UA Occasional /hpf     FLU/RSV/COVID - if FLU/RSV clinically relevant [[id number]]  (Normal) Collected: 09/21/22 1233    Lab Status: Final result Specimen: Nares from Nose Updated: 09/21/22 1332     SARS-CoV-2 Negative     INFLUENZA A PCR Negative     INFLUENZA B PCR Negative     RSV PCR Negative    Narrative:      FOR PEDIATRIC PATIENTS - copy/paste COVID Guidelines URL to browser: [email or web address]    SARS-CoV-2 assay is a Nucleic Acid Amplification assay intended for the  qualitative detection of nucleic acid from SARS-CoV-2 in nasopharyngeal  swabs  Results are for the presumptive identification of SARS-CoV-2 RNA  Positive results are indicative of infection with SARS-CoV-2, the virus  causing COVID-19, but do not rule out bacterial infection or co-infection  with other viruses  Laboratories within the [place] and its  territories are required to report all positive results to the appropriate  public health authorities  Negative results do not preclude SARS-CoV-2  infection and should not be used as the sole basis for treatment or other  patient management decisions  Negative results must be combined with  clinical observations, patient history, and epidemiological information  This test has not been FDA cleared or approved  This test has been authorized by FDA under an Emergency Use Authorization  (EUA)  This test is only authorized for the duration of time the  declaration that circumstances exist justifying the authorization of the  emergency use of an in vitro diagnostic tests for detection of SARS-CoV-2  virus and/or diagnosis of COVID-19 infection under section 564(b)(1) of  the Act, 21 U  S C  732RAE-2(K)(2), unless the authorization is terminated  or revoked sooner  The test has been validated but independent review by FDA  and CLIA is pending  Test performed using [employer]: This RT-PCR assay targets N2,  a region unique to SARS-CoV-2   A conserved region in the E-gene was chosen  for pan-Sarbecovirus detection which includes SARS-CoV-2  According to CMS-2020-01-R, this platform meets the definition of high-throughput technology  POCT pregnancy, urine [[id number]]  (Normal) Resulted: 09/21/22 1328    Lab Status: Final result Updated: 09/21/22 1328     EXT PREG TEST UR (Ref: Negative) negative     Control valid    Urine Macroscopic, POC [[id number]]  (Abnormal) Collected: 09/21/22 1325    Lab Status: Final result Specimen: Urine Updated: 09/21/22 1327     Color, UA Leilani     Clarity, UA Clear     pH, UA 7 0     Leukocytes, UA Negative     Nitrite, UA Negative     Protein,  (2+) mg/dl      Glucose, UA Negative mg/dl      Ketones, UA 15 (1+) mg/dl      Urobilinogen, UA >=8 0 E U /dl      Bilirubin, UA Small     Occult Blood, UA Moderate     Specific Gravity, UA 1 025    Narrative:      CLINITEK RESULT    Lactic acid [[id number]]  (Normal) Collected: 09/21/22 1233    Lab Status: Final result Specimen: Blood from Hand, Right Updated: 09/21/22 1314     LACTIC ACID 1 1 mmol/L     Narrative:      Result may be elevated if tourniquet was used during collection      Manual Differential(PHLEBS Do Not Order) [[id number]]  (Abnormal) Collected: 09/21/22 1215    Lab Status: Final result Specimen: Blood from Hand, Left Updated: 09/21/22 1309     Segmented % 83 %      Bands % 12 %      Lymphocytes % 2 %      Monocytes % 3 %      Eosinophils, % 0 %      Basophils % 0 %      Absolute Neutrophils 16 21 Thousand/uL      Lymphocytes Absolute 0 34 Thousand/uL      Monocytes Absolute 0 51 Thousand/uL      Eosinophils Absolute 0 00 Thousand/uL      Basophils Absolute 0 00 Thousand/uL      Total Counted --     RBC Morphology Normal     Platelet Estimate Adequate    HS Troponin 0hr (reflex protocol) [[id number]]  (Normal) Collected: 09/21/22 1215    Lab Status: Final result Specimen: Blood from Hand, Left Updated: 09/21/22 1253     hs TnI 0hr 10 ng/L     CBC and differential [[id number]]  (Abnormal) Collected: 09/21/22 1215    Lab Status: Final result Specimen: Blood from Hand, Left Updated: 09/21/22 1249     WBC 17 06 Thousand/uL      RBC 3 51 Million/uL      Hemoglobin 10 4 g/dL      Hematocrit 32 2 %      MCV 92 fL      MCH 29 6 pg      MCHC 32 3 g/dL      RDW 12 5 %      MPV 8 6 fL      Platelets 356 Thousands/uL     Narrative: This is an appended report  These results have been appended to a previously verified report  Blood culture #2 [[id number]] Collected: 09/21/22 1233    Lab Status: In process Specimen: Blood from Hand, Right Updated: 09/21/22 1248    Blood culture #1 [[id number]] Collected: 09/21/22 1233    Lab Status:  In process Specimen: Blood from Hand, Left Updated: 09/21/22 1248    Comprehensive metabolic panel [[id number]]  (Abnormal) Collected: 09/21/22 1215    Lab Status: Final result Specimen: Blood from Hand, Left Updated: 09/21/22 1247     Sodium 141 mmol/L      Potassium 3 8 mmol/L      Chloride 108 mmol/L      CO2 21 mmol/L      ANION GAP 12 mmol/L      BUN 17 mg/dL      Creatinine 0 77 mg/dL      Glucose 95 mg/dL      Calcium 8 4 mg/dL      Corrected Calcium 9 5 mg/dL       U/L       U/L      Alkaline Phosphatase 105 U/L      Total Protein 6 7 g/dL      Albumin 2 6 g/dL      Total Bilirubin 1 10 mg/dL      eGFR 93 ml/min/1 73sq m     Narrative:      [place] guidelines for Chronic Kidney Disease (CKD):     Stage 1 with normal or high GFR (GFR > 90 mL/min/1 73 square meters)    Stage 2 Mild CKD (GFR = 60-89 mL/min/1 73 square meters)    Stage 3A Moderate CKD (GFR = 45-59 mL/min/1 73 square meters)    Stage 3B Moderate CKD (GFR = 30-44 mL/min/1 73 square meters)    Stage 4 Severe CKD (GFR = 15-29 mL/min/1 73 square meters)    Stage 5 End Stage CKD (GFR <15 mL/min/1 73 square meters)  Note: GFR calculation is accurate only with a steady state creatinine    D-dimer, quantitative [[id number]]  (Abnormal) Collected: 09/21/22 1215    Lab Status: Final result Specimen: Blood from Arm, Left Updated: 09/21/22 1246     D-Dimer, Quant 1 98 ug/ml FEU                  PE Study with CT Abdomen and Pelvis with contrast   Final Result by Zahra Maldonado MD (09/21 1540)      No pulmonary embolus  Extensive groundglass airspace opacity in the lungs with peripheral sparing  Mediastinal and bilateral hilar lymphadenopathy  The findings are most suspicious for noncardiogenic pulmonary edema  Acute diffuse alveolar hemorrhage could produce this appearance but would be expected to present with severe hemoptysis  Pulmonary vascular congestive changes related to heart failure is also possible but would be expected to present with pleural    effusions  No acute abnormality in the abdomen or pelvis  This examination was marked "immediate notification" in Epic in order to begin the standard process by which the radiology reading room liaison alerts the referring practitioner                 Workstation performed: [id number]         XR chest 1 view portable   Final Result by Gerhardt Brier, MD (09/21 1305)      Development of bilateral opacities likely representing multifocal pneumonia                  Workstation performed: [id number]                    Procedures  ECG 12 Lead Documentation Only    Date/Time: 9/21/2022 2:38 PM  Performed by: Emliy Barnes PA-C  Authorized by: Emily Barnes PA-C     Patient location:  ED  Previous ECG:     Previous ECG:  Compared to current    Comparison ECG info:  Dec 22, 21    Similarity:  Changes noted  Rate:     ECG rate:  83    ECG rate assessment: normal    Rhythm:     Rhythm: sinus rhythm    Ectopy:     Ectopy: none    QRS:     QRS axis:  Normal  Conduction:     Conduction: normal    ST segments:     ST segments:  Normal  T waves:     T waves: inverted      Inverted:  V2 and V3  CriticalCare Time  Performed by: Emily Barnes PA-C  Authorized by: Emily Barnes PA-C     Critical care provider statement:     Critical care time (minutes):  45    Critical care was necessary to treat or prevent imminent or life-threatening deterioration of the following conditions:  Respiratory failure    Critical care was time spent personally by me on the following activities:  Obtaining history from patient or surrogate, development of treatment plan with patient or surrogate, discussions with consultants, evaluation of patient's response to treatment, examination of patient, review of old charts, ordering and review of radiographic studies, re-evaluation of patient's condition, ordering and performing treatments and interventions and ordering and review of laboratory studies  Comments:      DR Camilo Dooley also saw pt  And I discussed case              ED Course  ED Course as of 09/21/22 1603   Wed Sep 21, 2022   1200 Hypoxia 56-60 on 4 L nasal canula - increased to 10 L - and now at 83% - will place on high flow nasal canula   1226 Now on mid flow and albuterol oxygen continues to increase 88-92%- then rapidly dropped back into the 70s unable to maintain in the upper 80s and so will place on high flow   1311 On high flow nasal canula and pt is now oxygneating in the mid 90s  1436 Pt is on 55 L high flow nasal canula - and maintaining it mid 90s   1440 Going to CT now   1543 Critical care AP at bedside     1558 Will need BIPAP - she got up on her own with out telling anyone and unable to get over 80% on room air - going up to the unit                HEART Risk Score    Flowsheet Row Most Recent Value   Heart Score Risk Calculator    History 0 Filed at: 09/21/2022 1253   ECG 0 Filed at: 09/21/2022 1253   Age 0 Filed at: 09/21/2022 1253   Risk Factors 1 Filed at: 09/21/2022 1253   Troponin 0 Filed at: 09/21/2022 1253   HEART Score 1 Filed at: 09/21/2022 1253                        SBIRT 22yo+    Flowsheet Row Most Recent Value   SBIRT (23 yo +)    In order to provide better care to our patients, we are screening all of our patients for alcohol and drug use  Would it be okay to ask you these screening questions? No Filed at: 09/21/2022 1147          Wells' Criteria for PE    Flowsheet Row Most Recent Value   Wells' Criteria for PE    Clinical signs and symptoms of DVT 0 Filed at: 09/21/2022 1253   PE is primary diagnosis or equally likely 3 Filed at: 09/21/2022 1253   HR >100 0 Filed at: 09/21/2022 1253   Immobilization at least 3 days or Surgery in the previous 4 weeks 0 Filed at: 09/21/2022 1253   Previous, objectively diagnosed PE or DVT 0 Filed at: 09/21/2022 1253   Hemoptysis 1 Filed at: 09/21/2022 1253   Malignancy with treatment within 6 months or palliative 0 Filed at: 09/21/2022 1253   Filippo Zendejas Criteria Total 4 Filed at: 09/21/2022 1253                MDM  Number of Diagnoses or Management Options  Bilateral pneumonia: new and requires workup  Hypoxia: new and requires workup  Respiratory failure ([address] ): new and requires workup  Diagnosis management comments: Patient was started on room oxygen transitioned to inflamed and then high-flow nasal cannula at 55 L will patient is sitting she is stable and not hypoxic with the high-flow nasal cannula  Patient with how asking for help her high flow nasal cannula pain loss she to 10 use to be hypoxic mass sitting out in the 80s critical care is at bedside will start her on BiPAP reviewed CT with her care  They will continue to manage her and she is going to the unit  And S2 patient to now be NPO on she may end up needing to be intubated  Amount and/or Complexity of Data Reviewed  Clinical lab tests: reviewed  Tests in the radiology section of CPT®: reviewed  Discuss the patient with other providers: yes (DR Colin, and Critical care- Giovany Sesay and DR Jose Eduardo Childers)  Independent visualization of images, tracings, or specimens: yes    Risk of Complications, Morbidity, and/or Mortality  General comments: DR colin also saw pt           Disposition  Final diagnoses:   Hypoxia   Bilateral pneumonia   Respiratory failure ([facility] [address] )     Time reflects when diagnosis was documented in both MDM as applicable and the Disposition within this note     Time User Action Codes Description Comment    9/21/2022  3:35 PM Shikha Tony [R09 02] Hypoxia     9/21/2022  3:36 PM Shikha Tony [J18 9] Bilateral pneumonia     9/21/2022  3:36 PM Shikha Tony [J96 90] Respiratory failure [facility])       ED Disposition     ED Disposition   Admit    Condition   Stable    Date/Time   Wed Sep 21, 2022  4:00 PM    Comment   Case was discussed with [facility] and the patient's admission status was agreed to be admision to the service of Dr Waite           Follow-up Information    None         Patient's Medications   Discharge Prescriptions    No medications on file       No discharge procedures on file      PDMP Review       Value Time User    PDMP Reviewed  Yes 1/5/2022  9:26 AM Salomon Michaud MD          ED Provider  Electronically Signed by           Emily Barnes PA-C  09/21/22 1991

## 2022-09-21 NOTE — ASSESSMENT & PLAN NOTE
· Baseline hemoglobin-12  · Iron panel, folate, B12 pending  · Trend hemoglobin and hematocrit  · Patient did endorse coughing up blood however, cough in the ED was nonproductive  · Monitor for signs of bleeding  · Denies bloody emesis or diarrhea

## 2022-09-21 NOTE — ASSESSMENT & PLAN NOTE
· Last street medicine note reports on Wellbutrin daily and trazodone HS  · PRN Clonazepam for anxiety  · Holding medication in the setting of NPO  · Might require Precedex drip initiation

## 2022-09-21 NOTE — ED ATTENDING ATTESTATION
9/21/2022  I, Narcisa James DO, saw and evaluated the patient  I have discussed the patient with the resident/non-physician practitioner and agree with the resident's/non-physician practitioner's findings, Plan of Care, and MDM as documented in the resident's/non-physician practitioner's note, except where noted  All available labs and Radiology studies were reviewed  I was present for key portions of any procedure(s) performed by the resident/non-physician practitioner and I was immediately available to provide assistance  At this point I agree with the current assessment done in the Emergency Department  I have conducted an independent evaluation of this patient a history and physical is as follows:    42 yo F smoker presenting for evaluation of SOB  States sx started 2 days ago  Progressive, severe SOB  Chest discomfort when trying to breathe  Cough and 1 episode of small amount of blood in sputum today  No known fevers  No known chronic lung disease     O2 sat 60% on arrival- initially on midflow NC then escalated to HFNC    MDM: 42 yo F with cough/SOB/hypoxia- O2 supplementation, cardiac workup, ddimer, septic workup, CXR        ED Course         Critical Care Time  Procedures

## 2022-09-21 NOTE — ASSESSMENT & PLAN NOTE
· Currently on Suboxone therapy  · Will reorder to continue during admission  · Pending UDS  · Patient also endorsed alcohol use - unclear how much

## 2022-09-21 NOTE — ASSESSMENT & PLAN NOTE
· Patient appropriately answers questions and then has running thoughts that are inappropriate to the conversation  · Notable history for bipolar  · Unclear if change in mental status is hypoxia or in relation to her bipolar episodes  · Pending COMA panel and UDS   · q 2 neuro check   · Awaiting ABG results

## 2022-09-21 NOTE — ASSESSMENT & PLAN NOTE
· Criteria on Admission: Tachycardia, tachypnea, hypoxia requiring BiPAP, elevated wbc's with 12% bandemia  · Continuing ceftriaxone adding azithromycin  · Blood cultures x2 pending  · UA:  Negative for leukocytes or nitrates, 0-1 wbc's, positive for blood  · Did not reflex to culture  · Sputum culture pending  · Legionella urine, strep pneumonia pending  · Lactic: 1 1  · Procal pending  · Heme hypotensive upon presentation to the ICU - fluid resuscitation initiated

## 2022-09-22 ENCOUNTER — APPOINTMENT (INPATIENT)
Dept: NON INVASIVE DIAGNOSTICS | Facility: HOSPITAL | Age: 47
DRG: 720 | End: 2022-09-22
Payer: COMMERCIAL

## 2022-09-22 ENCOUNTER — APPOINTMENT (OUTPATIENT)
Dept: RADIOLOGY | Facility: HOSPITAL | Age: 47
DRG: 720 | End: 2022-09-22
Payer: COMMERCIAL

## 2022-09-22 LAB
ALBUMIN SERPL BCP-MCNC: 2.2 G/DL (ref 3.5–5)
ALP SERPL-CCNC: 107 U/L (ref 46–116)
ALT SERPL W P-5'-P-CCNC: 97 U/L (ref 12–78)
ANION GAP SERPL CALCULATED.3IONS-SCNC: 10 MMOL/L (ref 4–13)
AORTIC ROOT: 2.6 CM
APICAL FOUR CHAMBER EJECTION FRACTION: 66 %
ARTERIAL PATENCY WRIST A: YES
ASCENDING AORTA: 3 CM
AST SERPL W P-5'-P-CCNC: 62 U/L (ref 5–45)
B PARAP IS1001 DNA NPH QL NAA+NON-PROBE: NOT DETECTED
B PERT.PT PRMT NPH QL NAA+NON-PROBE: NOT DETECTED
BASE EXCESS BLDA CALC-SCNC: -3.4 MMOL/L
BILIRUB SERPL-MCNC: 0.82 MG/DL (ref 0.2–1)
BUN SERPL-MCNC: 13 MG/DL (ref 5–25)
C PNEUM DNA NPH QL NAA+NON-PROBE: NOT DETECTED
CA-I BLD-SCNC: 1.05 MMOL/L (ref 1.12–1.32)
CALCIUM ALBUM COR SERPL-MCNC: 9.2 MG/DL (ref 8.3–10.1)
CALCIUM SERPL-MCNC: 7.8 MG/DL (ref 8.3–10.1)
CHLORIDE SERPL-SCNC: 106 MMOL/L (ref 96–108)
CO2 SERPL-SCNC: 22 MMOL/L (ref 21–32)
CREAT SERPL-MCNC: 0.72 MG/DL (ref 0.6–1.3)
CRYOGLOB RF SER-ACNC: ABNORMAL [IU]/ML
E WAVE DECELERATION TIME: 173 MS
E/A RATIO: 1.32
ERYTHROCYTE [DISTWIDTH] IN BLOOD BY AUTOMATED COUNT: 12.7 % (ref 11.6–15.1)
FLUAV RNA NPH QL NAA+NON-PROBE: NOT DETECTED
FLUBV RNA NPH QL NAA+NON-PROBE: NOT DETECTED
FRACTIONAL SHORTENING: 38 (ref 28–44)
GFR SERPL CREATININE-BSD FRML MDRD: 101 ML/MIN/1.73SQ M
GLUCOSE SERPL-MCNC: 106 MG/DL (ref 65–140)
HADV DNA NPH QL NAA+NON-PROBE: NOT DETECTED
HBV CORE AB SER QL: ABNORMAL
HBV CORE IGM SER QL: ABNORMAL
HBV SURFACE AG SER QL: ABNORMAL
HCO3 BLDA-SCNC: 21.4 MMOL/L (ref 22–28)
HCOV 229E RNA NPH QL NAA+NON-PROBE: NOT DETECTED
HCOV HKU1 RNA NPH QL NAA+NON-PROBE: NOT DETECTED
HCOV NL63 RNA NPH QL NAA+NON-PROBE: NOT DETECTED
HCOV OC43 RNA NPH QL NAA+NON-PROBE: NOT DETECTED
HCT VFR BLD AUTO: 29 % (ref 34.8–46.1)
HCV AB SER QL: ABNORMAL
HGB BLD-MCNC: 9 G/DL (ref 11.5–15.4)
HIV 1+2 AB+HIV1 P24 AG SERPL QL IA: NORMAL
HMPV RNA NPH QL NAA+NON-PROBE: NOT DETECTED
HPIV1 RNA NPH QL NAA+NON-PROBE: NOT DETECTED
HPIV2 RNA NPH QL NAA+NON-PROBE: NOT DETECTED
HPIV3 RNA NPH QL NAA+NON-PROBE: NOT DETECTED
HPIV4 RNA NPH QL NAA+NON-PROBE: NOT DETECTED
INTERVENTRICULAR SEPTUM IN DIASTOLE (PARASTERNAL SHORT AXIS VIEW): 0.9 CM
INTERVENTRICULAR SEPTUM: 0.9 CM (ref 0.6–1.1)
IPAP: 15
L PNEUMO1 AG UR QL IA.RAPID: NEGATIVE
LAAS-AP2: 19 CM2
LAAS-AP4: 14.1 CM2
LEFT ATRIUM AREA SYSTOLE SINGLE PLANE A4C: 13.3 CM2
LEFT ATRIUM SIZE: 3.1 CM
LEFT ATRIUM VOLUME INDEX (MOD BIPLANE): 17.6
LEFT INTERNAL DIMENSION IN SYSTOLE: 2.6 CM (ref 2.1–4)
LEFT VENTRICULAR INTERNAL DIMENSION IN DIASTOLE: 4.2 CM (ref 3.5–6)
LEFT VENTRICULAR POSTERIOR WALL IN END DIASTOLE: 1 CM
LEFT VENTRICULAR STROKE VOLUME: 55 ML
LVSV (TEICH): 55 ML
M PNEUMO DNA NPH QL NAA+NON-PROBE: NOT DETECTED
MAGNESIUM SERPL-MCNC: 2.2 MG/DL (ref 1.6–2.6)
MCH RBC QN AUTO: 29.3 PG (ref 26.8–34.3)
MCHC RBC AUTO-ENTMCNC: 31 G/DL (ref 31.4–37.4)
MCV RBC AUTO: 95 FL (ref 82–98)
MV E'TISSUE VEL-LAT: 20 CM/S
MV E'TISSUE VEL-SEP: 16 CM/S
MV PEAK A VEL: 0.95 M/S
MV PEAK E VEL: 125 CM/S
MV STENOSIS PRESSURE HALF TIME: 50 MS
MV VALVE AREA P 1/2 METHOD: 4.4
NON VENT- BIPAP: ABNORMAL
NRBC BLD AUTO-RTO: 0 /100 WBCS
O2 CT BLDA-SCNC: 15.3 ML/DL (ref 16–23)
OXYHGB MFR BLDA: 96.2 % (ref 94–97)
PA SYSTOLIC PRESSURE: 50 MMHG
PCO2 BLDA: 37.6 MM HG (ref 36–44)
PEEP MAX SETTING VENT: 6 CM[H2O]
PH BLDA: 7.37 [PH] (ref 7.35–7.45)
PHOSPHATE SERPL-MCNC: 3.2 MG/DL (ref 2.7–4.5)
PLATELET # BLD AUTO: 230 THOUSANDS/UL (ref 149–390)
PMV BLD AUTO: 9.3 FL (ref 8.9–12.7)
PO2 BLDA: 101.3 MM HG (ref 75–129)
POTASSIUM SERPL-SCNC: 3.6 MMOL/L (ref 3.5–5.3)
PROT SERPL-MCNC: 6.2 G/DL (ref 6.4–8.4)
RBC # BLD AUTO: 3.07 MILLION/UL (ref 3.81–5.12)
RHEUMATOID FACT SER QL LA: POSITIVE
RIGHT ATRIUM AREA SYSTOLE A4C: 12.9 CM2
RIGHT VENTRICLE ID DIMENSION: 3.9 CM
RSV RNA NPH QL NAA+NON-PROBE: NOT DETECTED
RV+EV RNA NPH QL NAA+NON-PROBE: NOT DETECTED
S PNEUM AG UR QL: NEGATIVE
SARS-COV-2 RNA NPH QL NAA+NON-PROBE: NOT DETECTED
SL CV LEFT ATRIUM LENGTH A2C: 5 CM
SL CV LV EF: 60
SL CV PED ECHO LEFT VENTRICLE DIASTOLIC VOLUME (MOD BIPLANE) 2D: 79 ML
SL CV PED ECHO LEFT VENTRICLE SYSTOLIC VOLUME (MOD BIPLANE) 2D: 24 ML
SODIUM SERPL-SCNC: 138 MMOL/L (ref 135–147)
SPECIMEN SOURCE: ABNORMAL
TR MAX PG: 36 MMHG
TR PEAK VELOCITY: 3 M/S
TRICUSPID VALVE PEAK REGURGITATION VELOCITY: 3.01 M/S
VENT BIPAP FIO2: 100 %
WBC # BLD AUTO: 15.32 THOUSAND/UL (ref 4.31–10.16)

## 2022-09-22 PROCEDURE — 84100 ASSAY OF PHOSPHORUS: CPT

## 2022-09-22 PROCEDURE — 87449 NOS EACH ORGANISM AG IA: CPT

## 2022-09-22 PROCEDURE — 36600 WITHDRAWAL OF ARTERIAL BLOOD: CPT

## 2022-09-22 PROCEDURE — 71045 X-RAY EXAM CHEST 1 VIEW: CPT

## 2022-09-22 PROCEDURE — 99291 CRITICAL CARE FIRST HOUR: CPT | Performed by: INTERNAL MEDICINE

## 2022-09-22 PROCEDURE — 85027 COMPLETE CBC AUTOMATED: CPT

## 2022-09-22 PROCEDURE — 82595 ASSAY OF CRYOGLOBULIN: CPT | Performed by: PHYSICIAN ASSISTANT

## 2022-09-22 PROCEDURE — 94002 VENT MGMT INPAT INIT DAY: CPT

## 2022-09-22 PROCEDURE — 83735 ASSAY OF MAGNESIUM: CPT

## 2022-09-22 PROCEDURE — 80053 COMPREHEN METABOLIC PANEL: CPT

## 2022-09-22 PROCEDURE — 93306 TTE W/DOPPLER COMPLETE: CPT | Performed by: INTERNAL MEDICINE

## 2022-09-22 PROCEDURE — 0202U NFCT DS 22 TRGT SARS-COV-2: CPT | Performed by: INTERNAL MEDICINE

## 2022-09-22 PROCEDURE — 82330 ASSAY OF CALCIUM: CPT

## 2022-09-22 PROCEDURE — 87449 NOS EACH ORGANISM AG IA: CPT | Performed by: PHYSICIAN ASSISTANT

## 2022-09-22 PROCEDURE — 93306 TTE W/DOPPLER COMPLETE: CPT

## 2022-09-22 PROCEDURE — 82805 BLOOD GASES W/O2 SATURATION: CPT | Performed by: PHYSICIAN ASSISTANT

## 2022-09-22 RX ORDER — FUROSEMIDE 10 MG/ML
40 INJECTION INTRAMUSCULAR; INTRAVENOUS ONCE
Status: COMPLETED | OUTPATIENT
Start: 2022-09-22 | End: 2022-09-22

## 2022-09-22 RX ORDER — BUPROPION HYDROCHLORIDE 150 MG/1
300 TABLET ORAL DAILY
Status: DISCONTINUED | OUTPATIENT
Start: 2022-09-23 | End: 2022-09-27

## 2022-09-22 RX ORDER — GABAPENTIN 400 MG/1
400 CAPSULE ORAL 3 TIMES DAILY
Status: DISCONTINUED | OUTPATIENT
Start: 2022-09-22 | End: 2022-10-03

## 2022-09-22 RX ORDER — FLUOXETINE HYDROCHLORIDE 20 MG/1
40 CAPSULE ORAL DAILY
Status: DISCONTINUED | OUTPATIENT
Start: 2022-09-23 | End: 2022-09-27

## 2022-09-22 RX ORDER — GABAPENTIN 400 MG/1
400 CAPSULE ORAL 2 TIMES DAILY
Status: DISCONTINUED | OUTPATIENT
Start: 2022-09-22 | End: 2022-09-22

## 2022-09-22 RX ORDER — FLUOXETINE HYDROCHLORIDE 20 MG/1
40 CAPSULE ORAL DAILY
Status: DISCONTINUED | OUTPATIENT
Start: 2022-09-23 | End: 2022-09-22

## 2022-09-22 RX ORDER — LORAZEPAM 2 MG/ML
1 INJECTION INTRAMUSCULAR ONCE
Status: COMPLETED | OUTPATIENT
Start: 2022-09-22 | End: 2022-09-22

## 2022-09-22 RX ORDER — BUMETANIDE 0.25 MG/ML
1 INJECTION, SOLUTION INTRAMUSCULAR; INTRAVENOUS ONCE
Status: COMPLETED | OUTPATIENT
Start: 2022-09-22 | End: 2022-09-22

## 2022-09-22 RX ORDER — TOPIRAMATE 25 MG/1
100 TABLET ORAL 2 TIMES DAILY
Status: DISCONTINUED | OUTPATIENT
Start: 2022-09-22 | End: 2022-09-29

## 2022-09-22 RX ADMIN — LORAZEPAM 1 MG: 2 INJECTION INTRAMUSCULAR; INTRAVENOUS at 13:29

## 2022-09-22 RX ADMIN — BUPRENORPHINE AND NALOXONE 8 MG: 8; 2 FILM BUCCAL; SUBLINGUAL at 08:06

## 2022-09-22 RX ADMIN — DEXTROSE MONOHYDRATE 2000 MG: 5 INJECTION, SOLUTION INTRAVENOUS at 16:15

## 2022-09-22 RX ADMIN — BENZONATATE 100 MG: 100 CAPSULE ORAL at 04:54

## 2022-09-22 RX ADMIN — DEXMEDETOMIDINE HYDROCHLORIDE 0.7 MCG/KG/HR: 400 INJECTION INTRAVENOUS at 08:03

## 2022-09-22 RX ADMIN — ENOXAPARIN SODIUM 40 MG: 40 INJECTION SUBCUTANEOUS at 08:41

## 2022-09-22 RX ADMIN — DEXMEDETOMIDINE HYDROCHLORIDE 0.7 MCG/KG/HR: 400 INJECTION INTRAVENOUS at 17:06

## 2022-09-22 RX ADMIN — BUMETANIDE 1 MG: 0.25 INJECTION INTRAMUSCULAR; INTRAVENOUS at 17:06

## 2022-09-22 RX ADMIN — TOPIRAMATE 100 MG: 100 TABLET, FILM COATED ORAL at 17:51

## 2022-09-22 RX ADMIN — FUROSEMIDE 40 MG: 10 INJECTION, SOLUTION INTRAVENOUS at 08:41

## 2022-09-22 RX ADMIN — BUPRENORPHINE AND NALOXONE 8 MG: 8; 2 FILM BUCCAL; SUBLINGUAL at 17:12

## 2022-09-22 RX ADMIN — GABAPENTIN 400 MG: 400 CAPSULE ORAL at 17:51

## 2022-09-22 RX ADMIN — ACETAMINOPHEN 650 MG: 325 TABLET, FILM COATED ORAL at 17:12

## 2022-09-22 RX ADMIN — AZITHROMYCIN MONOHYDRATE 500 MG: 500 INJECTION, POWDER, LYOPHILIZED, FOR SOLUTION INTRAVENOUS at 17:51

## 2022-09-22 RX ADMIN — Medication 21 MG: at 08:07

## 2022-09-22 NOTE — ASSESSMENT & PLAN NOTE
Recent Labs     09/21/22  1215 09/22/22  0450   HGB 10 4* 9 0*     Lab Results   Component Value Date    IRON 16 (L) 09/21/2022    FERRITIN 104 09/21/2022    TIBC 249 (L) 09/21/2022    CONCFE 6 (L) 09/21/2022     · Patient did endorsed coughing up blood however, cough in the ED was nonproductive  · Monitor for signs of bleeding  · Denied bloody emesis or diarrhea

## 2022-09-22 NOTE — ASSESSMENT & PLAN NOTE
· Currently on Suboxone therapy, continue  · UDS negative   · Patient also endorsed alcohol use - unclear how much

## 2022-09-22 NOTE — PROGRESS NOTES
[address]  Progress Note Macel Loser 1975, 41 y o  female MRN: [id number]  Unit/Bed#: ICU 01 Encounter: [id number]  Primary Care Provider: No primary care provider on file  Date and time admitted to hospital: 9/21/2022 11:50 AM    * Acute respiratory failure with hypoxia ([address] )  Assessment & Plan  · New onset yesterday with worsening SOB on 9/21 - endorsed pleuritic chest pain, reported bloody sputum at home and calf pain  · Oxygen sats on presentation were 50% on 4L  · CT Chest:  Negative for PE, Extensive groundglass airspace opacity in the lungs with peripheral sparing  Mediastinal and bilateral hilar lymphadenopathy - concerns for noncardiogenic pulmonary edema  · COVID, RSV, flu panel - negative  · CXR: Development of bilateral opacities likely representing multifocal pneumonia  Repeat CXR on 9/22 showed worsening of the opacities in the bilateral lungs   · ABG showed metabolic acidosis with compensated respiratory alkalosis   · ECHO pending , NT pro PNP: 2 493, patient does not look to be fluid overload, could not appreciate JVD patient being very tachypnic    · ANCA, [place], Hep C, HIV panel - pending  RF positive, RF quantitative 20 ( non specific)   · Differentials include ARDS(2/2 to sepsis or vapping ?), diffuse alveolar hemorrhage, vasculitis( workup In process), less likely acute cardiogenic pulmonary edema  · Patient currently tachepnic with RR 36 r/min, oxygen saturation 90-96% but desaturates in the mid 80s when she talks  On HFNC and NRB       Plan:   · Might need to be intubated this am if fails Bipap  · Continue antibiotics   · Lasix 20 mg iv once trial ?  · F/U [place], ANCA, hep C, HIV, echo     R/O CAP (community acquired pneumonia)  Assessment & Plan  · Patient has unstable housing, presented to the ED with hypoxia, shortness of breath, and cough  · See plan as noted above in Sepsis     Sepsis [facility])  Assessment & Plan  · Criteria on Admission: Tachycardia, tachypnea, elevated wbc's with 12% bandemia, source suspected to be pnumonia?  procalcitonin 3 7  Lactic: 1 1  · Blood cultures x2 pending  · UA:  Negative for leukocytes or nitrates, 0-1 wbc's, positive for blood  · Did not reflex to culture  · Sputum culture was not collected   · Legionella urine, strep pneumonia not collected   · Was hypotensive upon presentation to the ICU - fluid resuscitation initiated, BP improved     Plan:   · Continue ceftriaxone and azithromycin  · F/u blood culture   · Monitor VS, trend WBC     Anemia  Assessment & Plan  Recent Labs     09/21/22  1215 09/22/22  0450   HGB 10 4* 9 0*     Lab Results   Component Value Date    IRON 16 (L) 09/21/2022    FERRITIN 104 09/21/2022    TIBC 249 (L) 09/21/2022    CONCFE 6 (L) 09/21/2022     · Patient did endorsed coughing up blood however, cough in the ED was nonproductive  · Monitor for signs of bleeding  · Denied bloody emesis or diarrhea    Altered mental status  Assessment & Plan  · Patient appropriately answered questions and then wad running thoughts that are inappropriate to the conversation  Notable history for bipolar  · Coma panel and UDS negative   · Might be hypoxia associated, per nurse she is doing worse mentation wise when her oxygen is low     · Patient responded appropriately to my questions this am      Substance Abuse Disorder   Assessment & Plan  · Currently on Suboxone therapy, continue  · UDS negative   · Patient also endorsed alcohol use - unclear how much     Heavy tobacco smoker  Assessment & Plan  · Currently smokes pack or more daily  · Ordered nicotine patch  · Encouraged cessation when appropriate    Elevated LFTs  Assessment & Plan  Recent Labs     09/21/22  1215 09/22/22  0450   * 62*   * 97*   ALKPHOS 105 107   TBILI 1 10* 0 82     · Endorse alcohol use in ED - unclear if she has a history of such  · Were not elevated on 09/16/2022  · No cirrhotic changes on CT scan  · Might be related to ARDS?     Bipolar affective disorder, currently active [facility])  Assessment & Plan  · Last medicine note reports on Wellbutrin daily and trazodone HS  · PRN Clonazepam for anxiety  · Holding medication in the setting of NPO  · Required Precedex drip overnight as she was screaming  This am she asked for xanax as she is very anxious       ----------------------------------------------------------------------------------------  HPI/24hr events: was agitated overnight and screaming, was started on precedex  Patient appropriate for transfer out of the ICU today?: No  Disposition: Continue Critical Care   Code Status: Level 1 - Full Code  ---------------------------------------------------------------------------------------  SUBJECTIVE      Review of Systems   Respiratory: Positive for cough and shortness of breath  Gastrointestinal: Negative for abdominal pain, constipation, diarrhea and nausea  Genitourinary: Negative for difficulty urinating  Psychiatric/Behavioral: The patient is nervous/anxious  All other systems reviewed and are negative  Review of systems was reviewed and negative unless stated above in HPI/24-hour events   ---------------------------------------------------------------------------------------  OBJECTIVE    Vitals   Vitals:    22 0252 22 0500 22 0600 22 0700   BP:  100/64     Pulse:  76     Resp:  (!) 36     Temp: 98 2 °F (36 8 °C)   97 9 °F (36 6 °C)   TempSrc: Tympanic   Tympanic   SpO2:  96%     Weight:   74 3 kg (163 lb 12 8 oz)    Height:         Temp (24hrs), Av 1 °F (36 7 °C), Min:97 1 °F (36 2 °C), Max:98 7 °F (37 1 °C)  Current: Temperature: 97 9 °F (36 6 °C)          Respiratory:  SpO2 Device: O2 Device: High flow nasal cannula       Invasive/non-invasive ventilation settings   Respiratory  Report   Lab Data (Last 4 hours)    None         O2/Vent Data (Last 4 hours)    None                Physical Exam  Vitals reviewed     Constitutional: Appearance: She is ill-appearing  Comments: tachypnic    Eyes:      General:         Right eye: No discharge  Left eye: No discharge  Cardiovascular:      Rate and Rhythm: Normal rate and regular rhythm  Pulmonary:      Effort: Respiratory distress present  Breath sounds: No stridor  Rales present  No wheezing  Abdominal:      General: There is no distension  Palpations: Abdomen is soft  Tenderness: There is no abdominal tenderness  There is no guarding  Musculoskeletal:      Right lower leg: No edema  Left lower leg: No edema  Skin:     General: Skin is warm  Coloration: Skin is pale  Neurological:      Mental Status: She is alert  Psychiatric:         Mood and Affect: Mood is anxious               Laboratory and Diagnostics:  Results from last 7 days   Lab Units 09/22/22  0450 09/21/22  1215   WBC Thousand/uL 15 32* 17 06*   HEMOGLOBIN g/dL 9 0* 10 4*   HEMATOCRIT % 29 0* 32 2*   PLATELETS Thousands/uL 230 248   BANDS PCT %  --  12*   MONO PCT %  --  3*     Results from last 7 days   Lab Units 09/22/22  0450 09/21/22  1215   SODIUM mmol/L 138 141   POTASSIUM mmol/L 3 6 3 8   CHLORIDE mmol/L 106 108   CO2 mmol/L 22 21   ANION GAP mmol/L 10 12   BUN mg/dL 13 17   CREATININE mg/dL 0 72 0 77   CALCIUM mg/dL 7 8* 8 4   GLUCOSE RANDOM mg/dL 106 95   ALT U/L 97* 142*   AST U/L 62* 101*   ALK PHOS U/L 107 105   ALBUMIN g/dL 2 2* 2 6*   TOTAL BILIRUBIN mg/dL 0 82 1 10*     Results from last 7 days   Lab Units 09/22/22  0450   MAGNESIUM mg/dL 2 2   PHOSPHORUS mg/dL 3 2               Results from last 7 days   Lab Units 09/21/22  1233   LACTIC ACID mmol/L 1 1     ABG:  Results from last 7 days   Lab Units 09/21/22  1710   PH ART  7 353   PCO2 ART mm Hg 34 6*   PO2 ART mm Hg 73 7*   HCO3 ART mmol/L 18 8*   BASE EXC ART mmol/L -5 8   ABG SOURCE  Radial, Right     VBG:  Results from last 7 days   Lab Units 09/21/22  1710 09/21/22  1623   PH LILIAM   --  7 322   PCO2 LILIAM mm Hg  -- 37 8*   PO2 LILIAM mm Hg  --  59 5*   HCO3 LILIAM mmol/L  --  19 1*   BASE EXC LILIAM mmol/L  --  -6 4   ABG SOURCE  Radial, Right  --      Results from last 7 days   Lab Units 09/21/22  1215   PROCALCITONIN ng/ml 3 79*       Micro  Results from last 7 days   Lab Units 09/21/22  1325 09/21/22  1233   BLOOD CULTURE   --  Received in Microbiology Lab  Culture in Progress  Received in Microbiology Lab  Culture in Progress  STREP PNEUMONIAE ANTIGEN, URINE  Negative  --        EKG: sinus rhythm 73 bpm  Imaging: I have personally reviewed pertinent reports  and I have personally reviewed pertinent films in PACS    Intake and Output  I/O       09/20 0701 09/21 0700 09/21 0701 09/22 0700 09/22 0701 09/23 0700    IV Piggyback  300     Total Intake(mL/kg)  300 (4)     Urine (mL/kg/hr)  450     Total Output  450     Net  -150                  Height and Weights   Height: 5' 2" (157 5 cm)     Body mass index is 29 96 kg/m²    Weight (last 2 days)     Date/Time Weight    09/22/22 0600 74 3 (163 8)    09/21/22 1712 74 2 (163 58)            Nutrition       Diet Orders   (From admission, onward)             Start     Ordered    09/21/22 1905  Diet NPO  (ED Bridging Orders Panel)  Diet effective now        References:    Nutrtion Support Algorithm Enteral vs  Parenteral   Question:  Diet Type  Answer:  NPO    09/21/22 1904                  Active Medications  Scheduled Meds:  Current Facility-Administered Medications   Medication Dose Route Frequency Provider Last Rate    acetaminophen  650 mg Oral Q6H PRN Chiid Reasons, CRNP      azithromycin  500 mg Intravenous Q24H NETTE Batista Stopped (09/21/22 2000)    benzonatate  100 mg Oral TID PRN Chidi Reasons, CRNP      buprenorphine-naloxone  8 mg Sublingual BID NETTE Batista      cefTRIAXone  2,000 mg Intravenous Q24H Ardena Peaks Choletria, CRNP      dexmedetomidine  0 1-0 7 mcg/kg/hr Intravenous Titrated Chidi Reasons, CRNP 0 6 mcg/kg/hr (09/22/22 0626)  enoxaparin  40 mg Subcutaneous Daily NETTE Vázquez      nicotine  21 mg Transdermal Daily NETTE Callahan       Continuous Infusions:  dexmedetomidine, 0 1-0 7 mcg/kg/hr, Last Rate: 0 6 mcg/kg/hr (09/22/22 5924)      PRN Meds:   acetaminophen, 650 mg, Q6H PRN  benzonatate, 100 mg, TID PRN        Invasive Devices Review  Invasive Devices  Report    Peripheral Intravenous Line  Duration           Peripheral IV 09/21/22 Left Antecubital <1 day    Peripheral IV 09/21/22 Left Hand <1 day                Rationale for remaining devices: medical necessities   ---------------------------------------------------------------------------------------  Advance Directive and Living Will:      Power of :    POLST:    ---------------------------------------------------------------------------------------  Care Time Delivered:   defer to my attending       Oralee MD Justine      Portions of the record may have been created with voice recognition software  Occasional wrong word or "sound a like" substitutions may have occurred due to the inherent limitations of voice recognition software    Read the chart carefully and recognize, using context, where substitutions have occurred

## 2022-09-22 NOTE — ASSESSMENT & PLAN NOTE
Recent Labs     09/21/22  1215 09/22/22  0450   * 62*   * 97*   ALKPHOS 105 107   TBILI 1 10* 0 82     · Endorse alcohol use in ED - unclear if she has a history of such  · Were not elevated on 09/16/2022  · No cirrhotic changes on CT scan  · Might be related to ARDS?

## 2022-09-22 NOTE — QUICK NOTE
Called patient's mother with updates after I asked patient's permission  Updated her and discussed with her our assessment and plan including intubation if needed  She verbalized understanding  Answered the questions mother had  she asked to be called with updates after intubation( if will be intubated)

## 2022-09-22 NOTE — UTILIZATION REVIEW
Initial Clinical Review    Admission: Date/Time/Statement:   Admission Orders (From admission, onward)     Ordered        09/21/22 1600  INPATIENT ADMISSION  Once                      Orders Placed This Encounter   Procedures    INPATIENT ADMISSION     Standing Status:   Standing     Number of Occurrences:   1     Order Specific Question:   Level of Care     Answer:   Critical Care [15]     Order Specific Question:   Estimated length of stay     Answer:   More than 2 Midnights     Order Specific Question:   Certification     Answer:   I certify that inpatient services are medically necessary for this patient for a duration of greater than two midnights  See H&P and MD Progress Notes for additional information about the patient's course of treatment  ED Arrival Information     Expected   -    Arrival   9/21/2022 11:38    Acuity   Emergent            Means of arrival   Wheelchair    Escorted by   Family Member    Service   Critical Care/ICU    Admission type   Emergency            Arrival complaint   sob,dizziness           Chief Complaint   Patient presents with    Shortness of Breath     Pt reports SOB and cough since yesterday  Denies fevers, N/V/D       Initial Presentation: 41 y o  female presents to ED from home with shortness of breath since the day prior to admission, worsened the day of admission  C/o SOB w cough, pleuritic CP, two discrete episodes of hemoptysis but generally non-productive cough  Endorses subjective fever last night, denies chills, denies sweats  Smokes  1 PPD   + marijuana      Has  H/O  polysubstance and  Alcohol use, S/P  Incarceration and rehab  Denies any  Current use  Had a  " sip" of beer the day prior to this  Confused, tangenital,  Unknown  Baseline mental status  Mental status notably abnormal, cursing, mumbled  Speech  Mother states  Patient  Sounded  " very good"  When she spoke  To her  Several days ago     Otherwise reports a history of mental health and substance abuse problems, in and out of rehab, believes she is currently taking Suboxone and Prozac  Unable to provide additional details  Sats  60  %  RA in ED, improved to the 80's  On  HFNC  HR  88- 104,  RR 19-24, normotensive  COVID/FLU/RSV negative  Labs reveal WBC  17 06,  D dimer elevated  CTA shows  No PE,  GGO  Given nebs, BHASKAR and xanax in ED  Met Sepsis criteria with tachycardia, tachypnea, elevated  WBC and hypoxia  Admit  Ip  ICU LOC with Acute respiratory failure with hypoxia,  Sepsis, Altered mental status, anemia and R/O pneumonia and plan is   BHASKAR, monitor labs, blood cultures, sputum cultures, HFNC, neuro checks Q 2 hrs, 2 DE, close monitoring  Date:       9/22        Day 2:   CXR worse today  Remains on  HFNC and NRB,  sats  90 - 96 %, desats to the mid  80's when speaking  May require intubation if fails BiPap  Continue  BHASKAR  Continue neuro checks Q 2 hrs  Differentials include ARDS, ( 2/2 sepsis  Vs  Vaping?),  RF positive  F/U cultures  UDS negative  Agitated during the night, screaming   And tarted on precedex  Now requesting xanax, very anxious  Continue current meds/treatment plan        ED Triage Vitals   Temperature Pulse Respirations Blood Pressure SpO2   09/21/22 1147 09/21/22 1147 09/21/22 1147 09/21/22 1147 09/21/22 1147   98 4 °F (36 9 °C) 92 19 115/58 (S) (!) 60 %      Temp Source Heart Rate Source Patient Position - Orthostatic VS BP Location FiO2 (%)   09/21/22 1147 09/21/22 1147 09/21/22 1147 09/21/22 1147 09/21/22 1712   Oral Monitor Sitting Left arm 100      Pain Score       09/21/22 1712       No Pain          Wt Readings from Last 1 Encounters:   09/22/22 73 9 kg (163 lb)     Additional Vital Signs:   /22/22 0723 -- 78 35 Abnormal  96/54 71 88 % Abnormal  -- -- High flow nasal cannula  -- --   O2 Device: w/ NRB 15L over at 09/22/22 0723   09/22/22 0700 97 9 °F (36 6 °C) 74 40 Abnormal  -- -- 88 % Abnormal  -- -- -- -- --   09/22/22 0500 -- 76 36 Abnormal  100/64 83 96 % -- -- -- -- --   09/22/22 0252 98 2 °F (36 8 °C) -- -- -- -- -- -- -- -- -- --   09/22/22 0231 -- 78 31 Abnormal  90/62 78 96 % -- -- -- -- --   09/22/22 0200 -- 80 27 Abnormal  100/59 82 95 % -- -- -- -- --   09/22/22 0130 -- -- -- -- -- -- -- -- -- HFNC prongs --   09/22/22 0100 -- 78 29 Abnormal  93/62 84 97 % -- -- -- -- --   09/22/22 0000 -- 80 27 Abnormal  82/67 Abnormal  72 97 % -- -- -- -- --       09/21/22 2000 -- 88 36 Abnormal  104/62 73 88 % Abnormal  -- -- -- -- --   09/21/22 1935 97 1 °F (36 2 °C) Abnormal  -- -- -- -- -- -- -- -- -- --   09/21/22 1906 -- -- -- -- -- -- -- -- -- HFNC prongs --   09/21/22 1900 -- 92 42 Abnormal  83/61 Abnormal  69 84 % Abnormal  -- -- -- -- --   09/21/22 1830 -- 86 34 Abnormal  97/49 Abnormal  61 Abnormal  94 % -- -- -- -- --   09/21/22 1815 -- 88 33 Abnormal  84/54 Abnormal  69 94 % -- -- -- -- --   09/21/22 1745 -- 82 31 Abnormal  85/49 Abnormal  62 Abnormal  95 % -- -- -- -- --   09/21/22 1730 -- -- -- 97/57 70 -- -- -- -- -- --   09/21/22 1712 98 1 °F (36 7 °C) 86 33 Abnormal  87/56 Abnormal  66 96 % 100 55 L/min High flow nasal cannula -- Lying   09/21/22 1545 -- 104 24 Abnormal  106/63 -- 85 % Abnormal  -- 55 L/min High flow nasal cannula -- --   09/21/22 1515 -- 84 24 Abnormal  107/59 79 97 % -- -- -- -- --   09/21/22 1430 -- 94 20 103/61 77 93 % -- 55 L/min High flow nasal cannula -- Lying   09/21/22 1358 -- 88 20 115/70 -- 98 % -- -- High flow nasal cannula -- Sitting   09/21/22 1255 -- -- -- -- -- 91 % -- -- -- HFNC prongs --   09/21/22 1245 -- 90 -- -- -- 88 % Abnormal  -- 15 L/min Mid flow nasal cannula -- --   09/21/22 1227 -- -- -- -- -- 88 % Abnormal  -- 10 L/min High flow nasal cannula -- --   09/21/22 1225 -- -- -- -- -- -- -- -- Mid flow nasal cannula -- --   09/21/22 1147 98 4 °F (36 9 °C) 92 19 115/58 -- 60 % Abnormal   -- -- None (Room air) --        Pertinent Labs/Diagnostic Test Results:   EKG   ( 9/21)   NSR     HR  83  Inverted T waves  V2 and V3     Normal QRS  XR chest portable ICU   Final Result by Tien Boyle MD (09/22 0831)      Severe bilateral airspace consolidation, markedly increased from one day earlier  Workstation performed: [id number]         PE Study with CT Abdomen and Pelvis with contrast   Final Result by Emily Braun MD (09/21 1540)      No pulmonary embolus  Extensive groundglass airspace opacity in the lungs with peripheral sparing  Mediastinal and bilateral hilar lymphadenopathy  The findings are most suspicious for noncardiogenic pulmonary edema  Acute diffuse alveolar hemorrhage could produce this appearance but would be expected to present with severe hemoptysis  Pulmonary vascular congestive changes related to heart failure is also possible but would be expected to present with pleural    effusions  No acute abnormality in the abdomen or pelvis  This examination was marked "immediate notification" in Epic in order to begin the standard process by which the radiology reading room liaison alerts the referring practitioner                 Workstation performed: [id number]         XR chest 1 view portable   Final Result by Elena Arevalo MD (09/21 1305)      Development of bilateral opacities likely representing multifocal pneumonia                  Workstation performed: [id number]           Results from last 7 days   Lab Units 09/21/22  1233   SARS-COV-2  Negative     Results from last 7 days   Lab Units 09/22/22  0450 09/21/22  1215   WBC Thousand/uL 15 32* 17 06*   HEMOGLOBIN g/dL 9 0* 10 4*   HEMATOCRIT % 29 0* 32 2*   PLATELETS Thousands/uL 230 248   BANDS PCT %  --  12*         Results from last 7 days   Lab Units 09/22/22  0450 09/21/22  1215   SODIUM mmol/L 138 141   POTASSIUM mmol/L 3 6 3 8   CHLORIDE mmol/L 106 108   CO2 mmol/L 22 21   ANION GAP mmol/L 10 12   BUN mg/dL 13 17   CREATININE mg/dL 0 72 0 77   EGFR ml/min/1 73sq m 101 93   CALCIUM mg/dL 7 8* 8 4 CALCIUM, IONIZED mmol/L 1 05*  --    MAGNESIUM mg/dL 2 2  --    PHOSPHORUS mg/dL 3 2  --      Results from last 7 days   Lab Units 09/22/22  0450 09/21/22  1215   AST U/L 62* 101*   ALT U/L 97* 142*   ALK PHOS U/L 107 105   TOTAL PROTEIN g/dL 6 2* 6 7   ALBUMIN g/dL 2 2* 2 6*   TOTAL BILIRUBIN mg/dL 0 82 1 10*         Results from last 7 days   Lab Units 09/22/22  0450 09/21/22  1215   GLUCOSE RANDOM mg/dL 106 95          Results from last 7 days   Lab Units 09/21/22  1710   PH ART  7 353   PCO2 ART mm Hg 34 6*   PO2 ART mm Hg 73 7*   HCO3 ART mmol/L 18 8*   BASE EXC ART mmol/L -5 8   O2 CONTENT ART mL/dL 18 6   O2 HGB, ARTERIAL % 91 1*   ABG SOURCE  Radial, Right   NON VENT TYPE HFNC  HFNC Flow   HFNC FLOW LPM  55     Results from last 7 days   Lab Units 09/21/22  1623   PH LILIAM  7 322   PCO2 LILIAM mm Hg 37 8*   PO2 LILIAM mm Hg 59 5*   HCO3 LILIAM mmol/L 19 1*   BASE EXC LILIAM mmol/L -6 4   O2 CONTENT LILIAM ml/dL 12 6   O2 HGB, VENOUS % 85 6*             Results from last 7 days   Lab Units 09/21/22  1623 09/21/22  1421 09/21/22  1215   HS TNI 0HR ng/L  --   --  10   HS TNI 2HR ng/L  --  7  --    HSTNI D2 ng/L  --  -3  --    HS TNI 4HR ng/L 9  --   --    HSTNI D4 ng/L -1  --   --      Results from last 7 days   Lab Units 09/21/22  1215   D-DIMER QUANTITATIVE ug/ml FEU 1 98*             Results from last 7 days   Lab Units 09/21/22  1215   PROCALCITONIN ng/ml 3 79*     Results from last 7 days   Lab Units 09/21/22  1233   LACTIC ACID mmol/L 1 1             Results from last 7 days   Lab Units 09/21/22  1215   NT-PRO BNP pg/mL 2,492*     Results from last 7 days   Lab Units 09/21/22  1623   FERRITIN ng/mL 104                         Results from last 7 days   Lab Units 09/21/22  1325   CLARITY UA  Clear   COLOR UA  Leilani   SPEC GRAV UA  1 025   PH UA  7 0   GLUCOSE UA mg/dl Negative   KETONES UA mg/dl 15 (1+)*   BLOOD UA  Moderate*   PROTEIN UA mg/dl 100 (2+)*   NITRITE UA  Negative   BILIRUBIN UA  Small*   UROBILINOGEN UA E U /dl >=8 0*   LEUKOCYTES UA  Negative   WBC UA /hpf 0-1*   RBC UA /hpf 2-4   BACTERIA UA /hpf Occasional   EPITHELIAL CELLS WET PREP /hpf Occasional     Results from last 7 days   Lab Units 09/21/22  1325 09/21/22  1233   STREP PNEUMONIAE ANTIGEN, URINE  Negative  --    INFLUENZA A PCR   --  Negative   INFLUENZA B PCR   --  Negative   RSV PCR   --  Negative         Results from last 7 days   Lab Units 09/21/22  1327   AMPH/METH  Negative   BARBITURATE UR  Negative   BENZODIAZEPINE UR  Negative   COCAINE UR  Negative   METHADONE URINE  Negative   OPIATE UR  Negative   PCP UR  Negative   THC UR  Negative     Results from last 7 days   Lab Units 09/21/22  1623   ETHANOL LVL mg/dL <3   ACETAMINOPHEN LVL ug/mL <2*   SALICYLATE LVL mg/dL 5 7                 Results from last 7 days   Lab Units 09/21/22  1233   BLOOD CULTURE  Received in Microbiology Lab  Culture in Progress  Received in Microbiology Lab  Culture in Progress                 ED Treatment:   Medication Administration from 09/21/2022 1137 to 09/21/2022 1705       Date/Time Order Dose Route Action Comments     09/21/2022 1223 albuterol inhalation solution 5 mg 5 mg Nebulization Given      09/21/2022 1224 ipratropium (ATROVENT) 0 02 % inhalation solution 0 5 mg 0 5 mg Nebulization Given      09/21/2022 1440 ceftriaxone (ROCEPHIN) 2 g/50 mL in dextrose IVPB 0 mg Intravenous Stopped      09/21/2022 1410 ceftriaxone (ROCEPHIN) 2 g/50 mL in dextrose IVPB 2,000 mg Intravenous New Bag      09/21/2022 1503 ALPRAZolam (XANAX) tablet 2 mg 2 mg Oral Given      09/21/2022 1453 iohexol (OMNIPAQUE) 350 MG/ML injection (MULTI-DOSE) 85 mL 85 mL Intravenous Given         Present on Admission:   Bipolar affective disorder, currently active [facility])      Admitting Diagnosis: Respiratory failure ([facility] [address] ) [J96 90]  Hypoxia [R09 02]  Bilateral pneumonia [J18 9]  Acute respiratory failure with hypoxia ([facility] [address] ) [J96 01]  Age/Sex: 41 y o  female  Admission Orders:  Scheduled Medications:  azithromycin, 500 mg, Intravenous, Q24H  buprenorphine-naloxone, 8 mg, Sublingual, BID  cefTRIAXone, 2,000 mg, Intravenous, Q24H  enoxaparin, 40 mg, Subcutaneous, Daily  nicotine, 21 mg, Transdermal, Daily      Continuous IV Infusions:  dexmedetomidine, 0 1-0 7 mcg/kg/hr, Intravenous, Titrated      PRN Meds:  acetaminophen, 650 mg, Oral, Q6H PRN  benzonatate, 100 mg, Oral, TID PRN    Fall precautions  Dysphagia eval  Neuro checks q 2 hrs    IP CONSULT TO CASE MANAGEMENT    Network Utilization Review Department  ATTENTION: Please call with any questions or concerns to [phone] and carefully listen to the prompts so that you are directed to the right person  All voicemails are confidential   Eugenia Tee all requests for admission clinical reviews, approved or denied determinations and any other requests to dedicated fax number below belonging to the campus where the patient is receiving treatment   List of dedicated fax numbers for the Facilities:  [address] DENIALS (Administrative/Medical Necessity) [phone]   [address] (Maternity/NICU/Pediatrics) [phone]   [address] [place] [address]  [address] Medical [place] [address] [address] [place] [address] [address] [facility] [place] [address] [phone]

## 2022-09-22 NOTE — UTILIZATION REVIEW
Inpatient Admission Authorization Request   NOTIFICATION OF INPATIENT ADMISSION/INPATIENT AUTHORIZATION REQUEST   SERVICING FACILITY:   [address] [place] [address], [facility], [facility] [place] [facility]  Tax ID: [id number]  NPI: [id number]  Place of Service: Inpatient [address] [facility]  [address]  Place of Service Code: 24     ATTENDING PROVIDER:  Attending Name and NPI#: Katelin Wall [place] [[id number]]  Address: [address] [place] [address], [facility], [facility] [place] [facility]  Phone: [phone]     UTILIZATION REVIEW CONTACT:  Nathanael Cooper Utilization   Network Utilization Review Department  Phone: [phone]  Fax: [phone]  Email: [email or web address]     PHYSICIAN ADVISORY SERVICES:  FOR [id number] REVIEW - MEDICAL NECESSITY DENIAL  Phone: [phone]  Fax: [phone]  Email: [email or web address]     TYPE OF REQUEST:  Inpatient Status     ADMISSION INFORMATION:  ADMISSION DATE/TIME: 9/21/22  4:00 PM  PATIENT DIAGNOSIS CODE/DESCRIPTION:  Respiratory failure (HCC) [J96 90]  Hypoxia [R09 02]  Bilateral pneumonia [J18 9]  Acute respiratory failure with hypoxia ([address] ) [J96 01]  DISCHARGE DATE/TIME: No discharge date for patient encounter  IMPORTANT INFORMATION:  Please contact Nathanael Cooper directly with any questions or concerns regarding this request  Department voicemails are confidential     Send requests for admission clinical reviews, concurrent reviews, approvals, and administrative denials due to lack of clinical to fax [phone]

## 2022-09-22 NOTE — ASSESSMENT & PLAN NOTE
· Last medicine note reports on Wellbutrin daily and trazodone HS  · PRN Clonazepam for anxiety  · Holding medication in the setting of NPO  · Required Precedex drip overnight as she was screaming   This am she asked for xanax as she is very anxious

## 2022-09-22 NOTE — ASSESSMENT & PLAN NOTE
· Criteria on Admission: Tachycardia, tachypnea, elevated wbc's with 12% bandemia, source suspected to be pnumonia?  procalcitonin 3 7   Lactic: 1 1  · Blood cultures x2 pending  · UA:  Negative for leukocytes or nitrates, 0-1 wbc's, positive for blood  · Did not reflex to culture  · Sputum culture was not collected   · Legionella urine, strep pneumonia not collected   · Was hypotensive upon presentation to the ICU - fluid resuscitation initiated, BP improved     Plan:   · Continue ceftriaxone and azithromycin  · F/u blood culture   · Monitor VS, trend WBC

## 2022-09-22 NOTE — ASSESSMENT & PLAN NOTE
· New onset yesterday with worsening SOB on 9/21 - endorsed pleuritic chest pain, reported bloody sputum at home and calf pain  · Oxygen sats on presentation were 50% on 4L  · CT Chest:  Negative for PE, Extensive groundglass airspace opacity in the lungs with peripheral sparing  Mediastinal and bilateral hilar lymphadenopathy - concerns for noncardiogenic pulmonary edema  · COVID, RSV, flu panel - negative  · CXR: Development of bilateral opacities likely representing multifocal pneumonia  Repeat CXR on 9/22 showed worsening of the opacities in the bilateral lungs   · ABG showed metabolic acidosis with compensated respiratory alkalosis   · ECHO pending , NT pro PNP: 2 493, patient does not look to be fluid overload, could not appreciate JVD patient being very tachypnic    · ANCA, MARYLIN, Hep C, HIV panel - pending  RF positive, RF quantitative 20 ( non specific)   · Differentials include ARDS(2/2 to sepsis or vapping ?), diffuse alveolar hemorrhage, vasculitis( workup In process), less likely acute cardiogenic pulmonary edema  · Patient currently tachepnic with RR 36 r/min, oxygen saturation 90-96% but desaturates in the mid 80s when she talks  On HFNC and NRB       Plan:   · Will intubate this am   · Continue antibiotics   · Lasix 20 mg iv once trial ?  · F/U MARYLIN, ANCA, hep C, HIV, echo

## 2022-09-22 NOTE — ASSESSMENT & PLAN NOTE
· Patient appropriately answered questions and then wad running thoughts that are inappropriate to the conversation  Notable history for bipolar  · Coma panel and UDS negative   · Might be hypoxia associated, per nurse she is doing worse mentation wise when her oxygen is low     · Patient responded appropriately to my questions this am

## 2022-09-23 ENCOUNTER — APPOINTMENT (OUTPATIENT)
Dept: RADIOLOGY | Facility: HOSPITAL | Age: 47
DRG: 720 | End: 2022-09-23
Payer: COMMERCIAL

## 2022-09-23 LAB
ALBUMIN SERPL BCP-MCNC: 2.1 G/DL (ref 3.5–5)
ALP SERPL-CCNC: 113 U/L (ref 46–116)
ALT SERPL W P-5'-P-CCNC: 71 U/L (ref 12–78)
ANION GAP SERPL CALCULATED.3IONS-SCNC: 14 MMOL/L (ref 4–13)
AST SERPL W P-5'-P-CCNC: 34 U/L (ref 5–45)
BASOPHILS # BLD AUTO: 0.05 THOUSANDS/ΜL (ref 0–0.1)
BASOPHILS NFR BLD AUTO: 0 % (ref 0–1)
BILIRUB SERPL-MCNC: 0.66 MG/DL (ref 0.2–1)
BUN SERPL-MCNC: 18 MG/DL (ref 5–25)
CALCIUM ALBUM COR SERPL-MCNC: 10.3 MG/DL (ref 8.3–10.1)
CALCIUM SERPL-MCNC: 8.8 MG/DL (ref 8.3–10.1)
CHLORIDE SERPL-SCNC: 105 MMOL/L (ref 96–108)
CO2 SERPL-SCNC: 21 MMOL/L (ref 21–32)
CREAT SERPL-MCNC: 0.7 MG/DL (ref 0.6–1.3)
EOSINOPHIL # BLD AUTO: 0.01 THOUSAND/ΜL (ref 0–0.61)
EOSINOPHIL NFR BLD AUTO: 0 % (ref 0–6)
ERYTHROCYTE [DISTWIDTH] IN BLOOD BY AUTOMATED COUNT: 12.1 % (ref 11.6–15.1)
GFR SERPL CREATININE-BSD FRML MDRD: 104 ML/MIN/1.73SQ M
GLUCOSE SERPL-MCNC: 110 MG/DL (ref 65–140)
GLUCOSE SERPL-MCNC: 124 MG/DL (ref 65–140)
GLUCOSE SERPL-MCNC: 156 MG/DL (ref 65–140)
HCT VFR BLD AUTO: 29.4 % (ref 34.8–46.1)
HGB BLD-MCNC: 9.3 G/DL (ref 11.5–15.4)
IMM GRANULOCYTES # BLD AUTO: 0.16 THOUSAND/UL (ref 0–0.2)
IMM GRANULOCYTES NFR BLD AUTO: 1 % (ref 0–2)
LYMPHOCYTES # BLD AUTO: 0.75 THOUSANDS/ΜL (ref 0.6–4.47)
LYMPHOCYTES NFR BLD AUTO: 5 % (ref 14–44)
MAGNESIUM SERPL-MCNC: 2.2 MG/DL (ref 1.6–2.6)
MCH RBC QN AUTO: 29.3 PG (ref 26.8–34.3)
MCHC RBC AUTO-ENTMCNC: 31.6 G/DL (ref 31.4–37.4)
MCV RBC AUTO: 93 FL (ref 82–98)
MONOCYTES # BLD AUTO: 0.73 THOUSAND/ΜL (ref 0.17–1.22)
MONOCYTES NFR BLD AUTO: 5 % (ref 4–12)
NEUTROPHILS # BLD AUTO: 12.97 THOUSANDS/ΜL (ref 1.85–7.62)
NEUTS SEG NFR BLD AUTO: 89 % (ref 43–75)
NRBC BLD AUTO-RTO: 0 /100 WBCS
PLATELET # BLD AUTO: 310 THOUSANDS/UL (ref 149–390)
PMV BLD AUTO: 8.6 FL (ref 8.9–12.7)
POTASSIUM SERPL-SCNC: 3.4 MMOL/L (ref 3.5–5.3)
PROT SERPL-MCNC: 6.8 G/DL (ref 6.4–8.4)
RBC # BLD AUTO: 3.17 MILLION/UL (ref 3.81–5.12)
RYE IGE QN: NEGATIVE
SODIUM SERPL-SCNC: 140 MMOL/L (ref 135–147)
WBC # BLD AUTO: 14.67 THOUSAND/UL (ref 4.31–10.16)

## 2022-09-23 PROCEDURE — 87522 HEPATITIS C REVRS TRNSCRPJ: CPT

## 2022-09-23 PROCEDURE — 80053 COMPREHEN METABOLIC PANEL: CPT | Performed by: INTERNAL MEDICINE

## 2022-09-23 PROCEDURE — 94660 CPAP INITIATION&MGMT: CPT

## 2022-09-23 PROCEDURE — 99291 CRITICAL CARE FIRST HOUR: CPT | Performed by: INTERNAL MEDICINE

## 2022-09-23 PROCEDURE — 71045 X-RAY EXAM CHEST 1 VIEW: CPT

## 2022-09-23 PROCEDURE — 83735 ASSAY OF MAGNESIUM: CPT | Performed by: INTERNAL MEDICINE

## 2022-09-23 PROCEDURE — 85025 COMPLETE CBC W/AUTO DIFF WBC: CPT | Performed by: INTERNAL MEDICINE

## 2022-09-23 PROCEDURE — 82948 REAGENT STRIP/BLOOD GLUCOSE: CPT

## 2022-09-23 RX ORDER — POTASSIUM CHLORIDE 20 MEQ/1
40 TABLET, EXTENDED RELEASE ORAL ONCE
Status: COMPLETED | OUTPATIENT
Start: 2022-09-23 | End: 2022-09-23

## 2022-09-23 RX ORDER — METHYLPREDNISOLONE SODIUM SUCCINATE 40 MG/ML
40 INJECTION, POWDER, LYOPHILIZED, FOR SOLUTION INTRAMUSCULAR; INTRAVENOUS EVERY 12 HOURS SCHEDULED
Status: DISCONTINUED | OUTPATIENT
Start: 2022-09-23 | End: 2022-09-27

## 2022-09-23 RX ORDER — OXAZEPAM 15 MG/1
15 CAPSULE ORAL DAILY
Status: DISCONTINUED | OUTPATIENT
Start: 2022-09-23 | End: 2022-09-23

## 2022-09-23 RX ORDER — LORAZEPAM 2 MG/ML
1 INJECTION INTRAMUSCULAR ONCE
Status: COMPLETED | OUTPATIENT
Start: 2022-09-23 | End: 2022-09-23

## 2022-09-23 RX ORDER — FAMOTIDINE 20 MG/1
20 TABLET, FILM COATED ORAL 2 TIMES DAILY PRN
Status: DISCONTINUED | OUTPATIENT
Start: 2022-09-23 | End: 2022-09-28

## 2022-09-23 RX ORDER — METHYLPREDNISOLONE SODIUM SUCCINATE 40 MG/ML
40 INJECTION, POWDER, LYOPHILIZED, FOR SOLUTION INTRAMUSCULAR; INTRAVENOUS DAILY
Status: DISCONTINUED | OUTPATIENT
Start: 2022-09-23 | End: 2022-09-23

## 2022-09-23 RX ORDER — TRAZODONE HYDROCHLORIDE 50 MG/1
50 TABLET ORAL
Status: DISCONTINUED | OUTPATIENT
Start: 2022-09-23 | End: 2022-10-05 | Stop reason: HOSPADM

## 2022-09-23 RX ORDER — OXAZEPAM 15 MG/1
15 CAPSULE ORAL DAILY
Status: DISCONTINUED | OUTPATIENT
Start: 2022-09-23 | End: 2022-09-25

## 2022-09-23 RX ADMIN — LORAZEPAM 1 MG: 2 INJECTION INTRAMUSCULAR; INTRAVENOUS at 08:34

## 2022-09-23 RX ADMIN — BENZONATATE 100 MG: 100 CAPSULE ORAL at 21:11

## 2022-09-23 RX ADMIN — GABAPENTIN 400 MG: 400 CAPSULE ORAL at 15:40

## 2022-09-23 RX ADMIN — BUPRENORPHINE AND NALOXONE 8 MG: 8; 2 FILM BUCCAL; SUBLINGUAL at 08:00

## 2022-09-23 RX ADMIN — TOPIRAMATE 100 MG: 100 TABLET, FILM COATED ORAL at 17:15

## 2022-09-23 RX ADMIN — METHYLPREDNISOLONE SODIUM SUCCINATE 40 MG: 40 INJECTION, POWDER, FOR SOLUTION INTRAMUSCULAR; INTRAVENOUS at 08:11

## 2022-09-23 RX ADMIN — POTASSIUM CHLORIDE 40 MEQ: 1500 TABLET, EXTENDED RELEASE ORAL at 08:34

## 2022-09-23 RX ADMIN — DEXTROSE MONOHYDRATE 2000 MG: 5 INJECTION, SOLUTION INTRAVENOUS at 15:44

## 2022-09-23 RX ADMIN — FAMOTIDINE 20 MG: 20 TABLET ORAL at 21:11

## 2022-09-23 RX ADMIN — ACETAMINOPHEN 650 MG: 325 TABLET, FILM COATED ORAL at 08:06

## 2022-09-23 RX ADMIN — METHYLPREDNISOLONE SODIUM SUCCINATE 40 MG: 40 INJECTION, POWDER, FOR SOLUTION INTRAMUSCULAR; INTRAVENOUS at 20:49

## 2022-09-23 RX ADMIN — GABAPENTIN 400 MG: 400 CAPSULE ORAL at 20:49

## 2022-09-23 RX ADMIN — BUPRENORPHINE AND NALOXONE 8 MG: 8; 2 FILM BUCCAL; SUBLINGUAL at 17:15

## 2022-09-23 RX ADMIN — GABAPENTIN 400 MG: 400 CAPSULE ORAL at 08:00

## 2022-09-23 RX ADMIN — TOPIRAMATE 100 MG: 100 TABLET, FILM COATED ORAL at 08:06

## 2022-09-23 RX ADMIN — OXAZEPAM 15 MG: 15 CAPSULE, GELATIN COATED ORAL at 10:31

## 2022-09-23 RX ADMIN — DEXMEDETOMIDINE HYDROCHLORIDE 0.7 MCG/KG/HR: 400 INJECTION INTRAVENOUS at 18:49

## 2022-09-23 RX ADMIN — Medication 21 MG: at 08:02

## 2022-09-23 RX ADMIN — TRAZODONE HYDROCHLORIDE 50 MG: 50 TABLET ORAL at 21:11

## 2022-09-23 RX ADMIN — DEXMEDETOMIDINE HYDROCHLORIDE 0.7 MCG/KG/HR: 400 INJECTION INTRAVENOUS at 10:31

## 2022-09-23 RX ADMIN — DEXMEDETOMIDINE HYDROCHLORIDE 0.7 MCG/KG/HR: 400 INJECTION INTRAVENOUS at 00:59

## 2022-09-23 RX ADMIN — AZITHROMYCIN MONOHYDRATE 500 MG: 500 INJECTION, POWDER, LYOPHILIZED, FOR SOLUTION INTRAVENOUS at 17:15

## 2022-09-23 RX ADMIN — ENOXAPARIN SODIUM 40 MG: 40 INJECTION SUBCUTANEOUS at 08:01

## 2022-09-23 NOTE — PLAN OF CARE
Problem: MOBILITY - ADULT  Goal: Maintain or return to baseline ADL function  Description: INTERVENTIONS:  -  Assess patient's ability to carry out ADLs; assess patient's baseline for ADL function and identify physical deficits which impact ability to perform ADLs (bathing, care of mouth/teeth, toileting, grooming, dressing, etc )  - Assess/evaluate cause of self-care deficits   - Assess range of motion  - Assess patient's mobility; develop plan if impaired  - Assess patient's need for assistive devices and provide as appropriate  - Encourage maximum independence but intervene and supervise when necessary  - Involve family in performance of ADLs  - Assess for home care needs following discharge   - Consider OT consult to assist with ADL evaluation and planning for discharge  - Provide patient education as appropriate  Outcome: Progressing  Goal: Maintains/Returns to pre admission functional level  Description: INTERVENTIONS:  - Perform BMAT or MOVE assessment daily    - Set and communicate daily mobility goal to care team and patient/family/caregiver     - Collaborate with rehabilitation services on mobility goals if consulted  - Out of bed for toileting  - Record patient progress and toleration of activity level   Outcome: Progressing     Problem: Prexisting or High Potential for Compromised Skin Integrity  Goal: Skin integrity is maintained or improved  Description: INTERVENTIONS:  - Identify patients at risk for skin breakdown  - Assess and monitor skin integrity  - Assess and monitor nutrition and hydration status  - Monitor labs   - Assess for incontinence   - Turn and reposition patient  - Assist with mobility/ambulation  - Relieve pressure over bony prominences  - Avoid friction and shearing  - Provide appropriate hygiene as needed including keeping skin clean and dry  - Evaluate need for skin moisturizer/barrier cream  - Collaborate with interdisciplinary team   - Patient/family teaching  - Consider wound care consult   Outcome: Progressing     Problem: Potential for Falls  Goal: Patient will remain free of falls  Description: INTERVENTIONS:  - Educate patient/family on patient safety including physical limitations  - Instruct patient to call for assistance with activity   - Consult OT/PT to assist with strengthening/mobility   - Keep Call bell within reach  - Keep bed low and locked with side rails adjusted as appropriate  - Keep care items and personal belongings within reach  - Initiate and maintain comfort rounds  - Make Fall Risk Sign visible to staff  - Apply yellow socks and bracelet for high fall risk patients  - Consider moving patient to room near nurses station  Outcome: Progressing     Problem: PAIN - ADULT  Goal: Verbalizes/displays adequate comfort level or baseline comfort level  Description: Interventions:  - Encourage patient to monitor pain and request assistance  - Assess pain using appropriate pain scale  - Administer analgesics based on type and severity of pain and evaluate response  - Implement non-pharmacological measures as appropriate and evaluate response  - Consider cultural and social influences on pain and pain management  - Notify physician/advanced practitioner if interventions unsuccessful or patient reports new pain  Outcome: Progressing     Problem: INFECTION - ADULT  Goal: Absence or prevention of progression during hospitalization  Description: INTERVENTIONS:  - Assess and monitor for signs and symptoms of infection  - Monitor lab/diagnostic results  - Monitor all insertion sites, i e  indwelling lines, tubes, and drains  - Monitor endotracheal if appropriate and nasal secretions for changes in amount and color  - [place] appropriate cooling/warming therapies per order  - Administer medications as ordered  - Instruct and encourage patient and family to use good hand hygiene technique  - Identify and instruct in appropriate isolation precautions for identified infection/condition  Outcome: Progressing  Goal: Absence of fever/infection during neutropenic period  Description: INTERVENTIONS:  - Monitor WBC    Outcome: Progressing     Problem: SAFETY ADULT  Goal: Maintain or return to baseline ADL function  Description: INTERVENTIONS:  -  Assess patient's ability to carry out ADLs; assess patient's baseline for ADL function and identify physical deficits which impact ability to perform ADLs (bathing, care of mouth/teeth, toileting, grooming, dressing, etc )  - Assess/evaluate cause of self-care deficits   - Assess range of motion  - Assess patient's mobility; develop plan if impaired  - Assess patient's need for assistive devices and provide as appropriate  - Encourage maximum independence but intervene and supervise when necessary  - Involve family in performance of ADLs  - Assess for home care needs following discharge   - Consider OT consult to assist with ADL evaluation and planning for discharge  - Provide patient education as appropriate  Outcome: Progressing  Goal: Maintains/Returns to pre admission functional level  Description: INTERVENTIONS:  - Perform BMAT or MOVE assessment daily    - Set and communicate daily mobility goal to care team and patient/family/caregiver     - Collaborate with rehabilitation services on mobility goals if consulted  - Out of bed for toileting  - Record patient progress and toleration of activity level   Outcome: Progressing  Goal: Patient will remain free of falls  Description: INTERVENTIONS:  - Educate patient/family on patient safety including physical limitations  - Instruct patient to call for assistance with activity   - Consult OT/PT to assist with strengthening/mobility   - Keep Call bell within reach  - Keep bed low and locked with side rails adjusted as appropriate  - Keep care items and personal belongings within reach  - Initiate and maintain comfort rounds  - Make Fall Risk Sign visible to staff  - Apply yellow socks and bracelet for high fall risk patients  - Consider moving patient to room near nurses station  Outcome: Progressing     Problem: DISCHARGE PLANNING  Goal: Discharge to home or other facility with appropriate resources  Description: INTERVENTIONS:  - Identify barriers to discharge w/patient and caregiver  - Arrange for needed discharge resources and transportation as appropriate  - Identify discharge learning needs (meds, wound care, etc )  - Arrange for interpretive services to assist at discharge as needed  - Refer to Case Management Department for coordinating discharge planning if the patient needs post-hospital services based on physician/advanced practitioner order or complex needs related to functional status, cognitive ability, or social support system  Outcome: Progressing     Problem: Knowledge Deficit  Goal: Patient/family/caregiver demonstrates understanding of disease process, treatment plan, medications, and discharge instructions  Description: Complete learning assessment and assess knowledge base    Interventions:  - Provide teaching at level of understanding  - Provide teaching via preferred learning methods  Outcome: Progressing

## 2022-09-23 NOTE — PLAN OF CARE
Problem: MOBILITY - ADULT  Goal: Maintain or return to baseline ADL function  Description: INTERVENTIONS:  -  Assess patient's ability to carry out ADLs; assess patient's baseline for ADL function and identify physical deficits which impact ability to perform ADLs (bathing, care of mouth/teeth, toileting, grooming, dressing, etc )  - Assess/evaluate cause of self-care deficits   - Assess range of motion  - Assess patient's mobility; develop plan if impaired  - Assess patient's need for assistive devices and provide as appropriate  - Encourage maximum independence but intervene and supervise when necessary  - Involve family in performance of ADLs  - Assess for home care needs following discharge   - Consider OT consult to assist with ADL evaluation and planning for discharge  - Provide patient education as appropriate  Outcome: Progressing  Goal: Maintains/Returns to pre admission functional level  Description: INTERVENTIONS:  - Perform BMAT or MOVE assessment daily    - Set and communicate daily mobility goal to care team and patient/family/caregiver     - Collaborate with rehabilitation services on mobility goals if consulted  - Perform Range of Motion   - Reposition patient   - Out of bed for toileting  - Record patient progress and toleration of activity level   Outcome: Progressing     Problem: Prexisting or High Potential for Compromised Skin Integrity  Goal: Skin integrity is maintained or improved  Description: INTERVENTIONS:  - Identify patients at risk for skin breakdown  - Assess and monitor skin integrity  - Assess and monitor nutrition and hydration status  - Monitor labs   - Assess for incontinence   - Turn and reposition patient  - Assist with mobility/ambulation  - Relieve pressure over bony prominences  - Avoid friction and shearing  - Provide appropriate hygiene as needed including keeping skin clean and dry  - Evaluate need for skin moisturizer/barrier cream  - Collaborate with interdisciplinary team   - Patient/family teaching  - Consider wound care consult   Outcome: Progressing     Problem: Potential for Falls  Goal: Patient will remain free of falls  Description: INTERVENTIONS:  - Educate patient/family on patient safety including physical limitations  - Instruct patient to call for assistance with activity   - Consult OT/PT to assist with strengthening/mobility   - Keep Call bell within reach  - Keep bed low and locked with side rails adjusted as appropriate  - Keep care items and personal belongings within reach  - Initiate and maintain comfort rounds  - Make Fall Risk Sign visible to staff  - Apply yellow socks and bracelet for high fall risk patients  - Consider moving patient to room near nurses station  Outcome: Progressing     Problem: PAIN - ADULT  Goal: Verbalizes/displays adequate comfort level or baseline comfort level  Description: Interventions:  - Encourage patient to monitor pain and request assistance  - Assess pain using appropriate pain scale  - Administer analgesics based on type and severity of pain and evaluate response  - Implement non-pharmacological measures as appropriate and evaluate response  - Consider cultural and social influences on pain and pain management  - Notify physician/advanced practitioner if interventions unsuccessful or patient reports new pain  Outcome: Progressing     Problem: INFECTION - ADULT  Goal: Absence or prevention of progression during hospitalization  Description: INTERVENTIONS:  - Assess and monitor for signs and symptoms of infection  - Monitor lab/diagnostic results  - Monitor all insertion sites, i e  indwelling lines, tubes, and drains  - Monitor endotracheal if appropriate and nasal secretions for changes in amount and color  - [place] appropriate cooling/warming therapies per order  - Administer medications as ordered  - Instruct and encourage patient and family to use good hand hygiene technique  - Identify and instruct in appropriate isolation precautions for identified infection/condition  Outcome: Progressing  Goal: Absence of fever/infection during neutropenic period  Description: INTERVENTIONS:  - Monitor WBC    Outcome: Progressing     Problem: SAFETY ADULT  Goal: Maintain or return to baseline ADL function  Description: INTERVENTIONS:  -  Assess patient's ability to carry out ADLs; assess patient's baseline for ADL function and identify physical deficits which impact ability to perform ADLs (bathing, care of mouth/teeth, toileting, grooming, dressing, etc )  - Assess/evaluate cause of self-care deficits   - Assess range of motion  - Assess patient's mobility; develop plan if impaired  - Assess patient's need for assistive devices and provide as appropriate  - Encourage maximum independence but intervene and supervise when necessary  - Involve family in performance of ADLs  - Assess for home care needs following discharge   - Consider OT consult to assist with ADL evaluation and planning for discharge  - Provide patient education as appropriate  Outcome: Progressing  Goal: Maintains/Returns to pre admission functional level  Description: INTERVENTIONS:  - Perform BMAT or MOVE assessment daily    - Set and communicate daily mobility goal to care team and patient/family/caregiver     - Collaborate with rehabilitation services on mobility goals if consulted  - Perform Range of Motion   - Reposition patient   - Out of bed for toileting  - Record patient progress and toleration of activity level   Outcome: Progressing  Goal: Patient will remain free of falls  Description: INTERVENTIONS:  - Educate patient/family on patient safety including physical limitations  - Instruct patient to call for assistance with activity   - Consult OT/PT to assist with strengthening/mobility   - Keep Call bell within reach  - Keep bed low and locked with side rails adjusted as appropriate  - Keep care items and personal belongings within reach  - Initiate and maintain comfort rounds  - Make Fall Risk Sign visible to staff  - Apply yellow socks and bracelet for high fall risk patients  - Consider moving patient to room near nurses station  Outcome: Progressing     Problem: DISCHARGE PLANNING  Goal: Discharge to home or other facility with appropriate resources  Description: INTERVENTIONS:  - Identify barriers to discharge w/patient and caregiver  - Arrange for needed discharge resources and transportation as appropriate  - Identify discharge learning needs (meds, wound care, etc )  - Arrange for interpretive services to assist at discharge as needed  - Refer to Case Management Department for coordinating discharge planning if the patient needs post-hospital services based on physician/advanced practitioner order or complex needs related to functional status, cognitive ability, or social support system  Outcome: Progressing     Problem: Knowledge Deficit  Goal: Patient/family/caregiver demonstrates understanding of disease process, treatment plan, medications, and discharge instructions  Description: Complete learning assessment and assess knowledge base    Interventions:  - Provide teaching at level of understanding  - Provide teaching via preferred learning methods  Outcome: Progressing

## 2022-09-23 NOTE — ASSESSMENT & PLAN NOTE
· Per chart review ([facility] notes) patient is on Wellbutrin, Fluoxetine and Topamax   Also, was taking Oxazepam prn   · Home medication was resumed yesterday  · Currently on Precedex drip and received 1 mg Ativan yesterday for anxiety   · Wean off precedex drip today

## 2022-09-23 NOTE — ASSESSMENT & PLAN NOTE
Recent Labs     09/21/22  1215 09/22/22  0450   * 62*   * 97*   ALKPHOS 105 107   TBILI 1 10* 0 82     · Endorse alcohol use in ED - unclear if she has a history of such  · Were not elevated on 09/16/2022  · No cirrhotic changes on CT scan

## 2022-09-23 NOTE — ASSESSMENT & PLAN NOTE
· Criteria on Admission: Tachycardia, tachypnea, elevated wbc's with 12% bandemia, source suspected to be pnumonia?  procalcitonin 3 7   Lactic: 1 1  · UA:  Negative for leukocytes or nitrates, 0-1 wbc's, positive for blood  · Did not reflex to culture  · Sputum culture was not collected   · Blood cultures x2 negative; Legionella urine, strep pneumonia - negative   · Leucocytosis slightly improved, patient is afebrile and tachycardia resolved     Plan:   · Continue ceftriaxone and azithromycin- day 3 today   · Monitor VS, trend WBC

## 2022-09-23 NOTE — ASSESSMENT & PLAN NOTE
· Patient appropriately answered questions and then wad running thoughts that are inappropriate to the conversation   Notable history for bipolar  · Coma panel and UDS negative   · Likely hypoxia related, mentation improved

## 2022-09-23 NOTE — ASSESSMENT & PLAN NOTE
Recent Labs     09/21/22  1215 09/22/22  0450 09/23/22  0515   HGB 10 4* 9 0* 9 3*     Lab Results   Component Value Date    IRON 16 (L) 09/21/2022    FERRITIN 104 09/21/2022    TIBC 249 (L) 09/21/2022    CONCFE 6 (L) 09/21/2022     · Patient did endorsed coughing up blood however, cough in the ED was nonproductive  No hematemesis during the hospitalization   Had 2-4 RBC in the urine but she is at menses     · Monitor for signs of bleeding and worsening Hb

## 2022-09-23 NOTE — PROGRESS NOTES
[address] [facility]  Progress Note Kelli Pulse 1975, 41 y o  female MRN: [id number]  Unit/Bed#: ICU 01 Encounter: [id number]  Primary Care Provider: No primary care provider on file  Date and time admitted to hospital: 9/21/2022 11:50 AM    * Acute respiratory failure with hypoxia ([facility] [address] )  Assessment & Plan  · New onset with worsening SOB on 9/21 - endorsed pleuritic chest pain, reported bloody sputum at home  · Oxygen sats on presentation were 50% on 4L  · CT Chest:  Negative for PE, Extensive groundglass airspace opacity in the lungs with peripheral sparing  Mediastinal and bilateral hilar lymphadenopathy - concerns for noncardiogenic pulmonary edema  · COVID, RSV, flu panel - negative   · CXR: Development of bilateral opacities likely representing multifocal pneumonia  Repeat CXR on 9/22 showed worsening of the opacities in the bilateral lungs   · ECHO: LVEF 82%, no diastolic dysfunction, IVC dilated with moderate pulmonary hypertension( looks acute likely ARDS related as right ventricle is not dilated), NT pro PNP: 2 493  · Differentials include ARDS(2/2 to sepsis or vapping), diffuse alveolar hemorrhage(less likely as the patient is improving and hemoglobin is trending up), vasculitis( workup has been negative so far, except positive rheumatoid factor which is nonspecific), eosinophilic pneumonia( eosinophiles negative so far), acute pulmonary edema  · Status post 40 iv Lasix yesterday and 1 mg of iv Bumex, - 2 5 L output/24 h  Patient's tachypnea improved, oxygen saturation 99% on BiPAP  15/6 FiO2 65( was 100% yesterday)  Patient also reported improvement     Plan:   · Likely will wean off BiPAP today and transition to HFNC  Recheck CXR   1 mg Bumex today   · Continue antibiotics, check procalcitonin tomorrow   · F/U MARYLIN, ANCA, CCP, cryoglobulin  · Monitor eosinophiles   · Monitor VS     R/O CAP (community acquired pneumonia)  Assessment & Plan  · Patient has unstable housing, presented to the ED with hypoxia, shortness of breath, and cough  · See plan as noted above in Sepsis     Sepsis [facility])  Assessment & Plan  · Criteria on Admission: Tachycardia, tachypnea, elevated wbc's with 12% bandemia, source suspected to be pnumonia?  procalcitonin 3 7  Lactic: 1 1  · UA:  Negative for leukocytes or nitrates, 0-1 wbc's, positive for blood  · Did not reflex to culture  · Sputum culture was not collected   · Blood cultures x2 negative; Legionella urine, strep pneumonia - negative   · Leucocytosis slightly improved, patient is afebrile and tachycardia resolved     Plan:   · Continue ceftriaxone and azithromycin- day 3 today   · Monitor VS, trend WBC     Anemia  Assessment & Plan  Recent Labs     09/21/22  1215 09/22/22  0450 09/23/22  0515   HGB 10 4* 9 0* 9 3*     Lab Results   Component Value Date    IRON 16 (L) 09/21/2022    FERRITIN 104 09/21/2022    TIBC 249 (L) 09/21/2022    CONCFE 6 (L) 09/21/2022     · Patient did endorsed coughing up blood however, cough in the ED was nonproductive  No hematemesis during the hospitalization  Had 2-4 RBC in the urine but she is at menses     · Monitor for signs of bleeding and worsening Hb     Altered mental status  Assessment & Plan  · Patient appropriately answered questions and then wad running thoughts that are inappropriate to the conversation   Notable history for bipolar  · Coma panel and UDS negative   · Likely hypoxia related, mentation improved     Substance Abuse Disorder   Assessment & Plan  · Currently on Suboxone therapy, continue  · UDS negative   · Patient also endorsed alcohol use - unclear how much     Heavy tobacco smoker  Assessment & Plan  · Currently smokes pack or more daily  · Ordered nicotine patch  · Encouraged cessation when appropriate    Elevated LFTs  Assessment & Plan  Recent Labs     09/21/22  1215 09/22/22  0450   * 62*   * 97*   ALKPHOS 105 107   TBILI 1 10* 0 82     · Endorse alcohol use in ED - unclear if she has a history of such  · Were not elevated on 2022  · No cirrhotic changes on CT scan      Bipolar affective disorder, currently active [facility])  Assessment & Plan  · Per chart review ([facility] notes) patient is on Wellbutrin, Fluoxetine and Topamax  Also, was taking Oxazepam prn   · Home medication was resumed yesterday  · Currently on Precedex drip and received 1 mg Ativan yesterday for anxiety   · Wean off precedex drip today    ----------------------------------------------------------------------------------------  HPI/24hr events: patient showed improvement since yesterday  Currently on BiPAP 65% FiO2 from 100% yesterday, tachypnea improved also patient reported improvement        Patient appropriate for transfer out of the ICU today?: No  Disposition: Continue Critical Care   Code Status: Level 1 - Full Code  ---------------------------------------------------------------------------------------  SUBJECTIVE  Patient alert and oriented, reported nasal pain secondary to BiPAP mask otherwise she had no complaints    Review of Systems   Respiratory: Positive for shortness of breath (Improved)  Gastrointestinal: Negative for abdominal pain, constipation and diarrhea  Genitourinary: Negative for difficulty urinating  Psychiatric/Behavioral: The patient is not nervous/anxious  All other systems reviewed and are negative      Review of systems was reviewed and negative unless stated above in HPI/24-hour events   ---------------------------------------------------------------------------------------  OBJECTIVE    Vitals   Vitals:    22 0540 22 0600 22 0700 22 0728   BP:  112/74 109/77    BP Location:   Right arm    Pulse:  66 68    Resp:  (!) 31 (!) 28    Temp:    (!) 97 1 °F (36 2 °C)   TempSrc:    Tympanic   SpO2:  98% 98%    Weight: 73 1 kg (161 lb 2 5 oz)      Height:         Temp (24hrs), Av °F (36 7 °C), Min:97 1 °F (36 2 °C), Max:99 9 °F (37 7 °C)  Current: Temperature: (!) 97 1 °F (36 2 °C)          Respiratory:  SpO2: SpO2: 98 %       Invasive/non-invasive ventilation settings   Respiratory  Report   Lab Data (Last 4 hours)    None         O2/Vent Data (Last 4 hours)      09/23 0742          Non-Invasive Ventilation Mode HFNC (High flow)                   Physical Exam  Vitals reviewed  Constitutional:       General: She is not in acute distress  Appearance: She is not diaphoretic  Eyes:      General: No scleral icterus  Right eye: No discharge  Left eye: No discharge  Cardiovascular:      Rate and Rhythm: Normal rate and regular rhythm  Pulmonary:      Effort: No respiratory distress  Breath sounds: No stridor  Rhonchi and rales present  No wheezing  Abdominal:      General: There is no distension  Palpations: Abdomen is soft  Tenderness: There is no abdominal tenderness  There is no guarding or rebound  Genitourinary:     Comments: Ja   Musculoskeletal:      Right lower leg: No edema  Left lower leg: No edema  Skin:     General: Skin is warm  Coloration: Skin is pale  Neurological:      Mental Status: She is alert  Psychiatric:         Mood and Affect: Mood normal          Behavior: Behavior normal          Thought Content:  Thought content normal          Judgment: Judgment normal              Laboratory and Diagnostics:  Results from last 7 days   Lab Units 09/23/22  0515 09/22/22  0450 09/21/22  1215   WBC Thousand/uL 14 67* 15 32* 17 06*   HEMOGLOBIN g/dL 9 3* 9 0* 10 4*   HEMATOCRIT % 29 4* 29 0* 32 2*   PLATELETS Thousands/uL 310 230 248   NEUTROS PCT % 89*  --   --    BANDS PCT %  --   --  12*   MONOS PCT % 5  --   --    MONO PCT %  --   --  3*     Results from last 7 days   Lab Units 09/23/22  0515 09/22/22  0450 09/21/22  1215   SODIUM mmol/L 140 138 141   POTASSIUM mmol/L 3 4* 3 6 3 8   CHLORIDE mmol/L 105 106 108   CO2 mmol/L 21 22 21   ANION GAP mmol/L 14* 10 12   BUN mg/dL 18 13 17 CREATININE mg/dL 0 70 0 72 0 77   CALCIUM mg/dL 8 8 7 8* 8 4   GLUCOSE RANDOM mg/dL 110 106 95   ALT U/L 71 97* 142*   AST U/L 34 62* 101*   ALK PHOS U/L 113 107 105   ALBUMIN g/dL 2 1* 2 2* 2 6*   TOTAL BILIRUBIN mg/dL 0 66 0 82 1 10*     Results from last 7 days   Lab Units 09/22/22  0450   MAGNESIUM mg/dL 2 2   PHOSPHORUS mg/dL 3 2               Results from last 7 days   Lab Units 09/21/22  1233   LACTIC ACID mmol/L 1 1     ABG:  Results from last 7 days   Lab Units 09/22/22  1027   PH ART  7 373   PCO2 ART mm Hg 37 6   PO2 ART mm Hg 101 3   HCO3 ART mmol/L 21 4*   BASE EXC ART mmol/L -3 4   ABG SOURCE  Radial, Right     VBG:  Results from last 7 days   Lab Units 09/22/22  1027 09/21/22  1710 09/21/22  1623   PH LILIAM   --   --  7 322   PCO2 LILIAM mm Hg  --   --  37 8*   PO2 LILIAM mm Hg  --   --  59 5*   HCO3 LILIAM mmol/L  --   --  19 1*   BASE EXC LILIAM mmol/L  --   --  -6 4   ABG SOURCE  Radial, Right   < >  --     < > = values in this interval not displayed  Results from last 7 days   Lab Units 09/21/22  1215   PROCALCITONIN ng/ml 3 79*       Micro  Results from last 7 days   Lab Units 09/22/22  1757 09/22/22  1234 09/21/22  1325 09/21/22  1233   BLOOD CULTURE   --   --   --  No Growth at 24 hrs  No Growth at 24 hrs  LEGIONELLA URINARY ANTIGEN  Negative  --   --   --    STREP PNEUMONIAE ANTIGEN, URINE   --  Negative Negative  --        EKG:  Sinus rhythm, 71  Imaging: I have personally reviewed pertinent reports  Intake and Output  I/O       09/21 0701 09/22 0700 09/22 0701 09/23 0700 09/23 0701 09/24 0700    I V  (mL/kg)  357 7 (4 9)     IV Piggyback 300 300     Total Intake(mL/kg) 300 (4) 657 7 (9)     Urine (mL/kg/hr) 450 2564 (1 5)     Total Output 450 2564     Net -150 -1906 3            Unmeasured Urine Occurrence  1 x           Height and Weights   Height: 5' 2" (157 5 cm)     Body mass index is 29 48 kg/m²    Weight (last 2 days)     Date/Time Weight    09/23/22 0540 73 1 (161 16)    09/22/22 0815 73 9 (163)    09/22/22 0600 74 3 (163 8)    09/21/22 1712 74 2 (163 58)            Nutrition       Diet Orders   (From admission, onward)             Start     Ordered    09/21/22 1905  Diet NPO  (ED Bridging Orders Panel)  Diet effective now        References:    Nutrtion Support Algorithm Enteral vs  Parenteral   Question:  Diet Type  Answer:  NPO    09/21/22 1904                  Active Medications  Scheduled Meds:  Current Facility-Administered Medications   Medication Dose Route Frequency Provider Last Rate    acetaminophen  650 mg Oral Q6H PRN NETTE Lemus      azithromycin  500 mg Intravenous Q24H NETTE Barron 0 mg (09/21/22 2000)    benzonatate  100 mg Oral TID PRN NETTE Lemus      buprenorphine-naloxone  8 mg Sublingual BID NETTE Cano      buPROPion  300 mg Oral Daily Remedios Dooley MD      cefTRIAXone  2,000 mg Intravenous Q24H Andrew JohnMENDOZA meridaNP 2,000 mg (09/22/22 1615)    dexmedetomidine  0 1-0 7 mcg/kg/hr Intravenous Titrated NETTE Lemus 0 7 mcg/kg/hr (09/23/22 0059)    enoxaparin  40 mg Subcutaneous Daily NETTE Barron      FLUoxetine  40 mg Oral Daily Remedios Dooley MD      gabapentin  400 mg Oral TID Remedios Dooley MD      LORazepam  1 mg Intravenous Once Remedios Dooley MD      methylPREDNISolone sodium succinate  40 mg Intravenous Daily Remedios Dooley MD      nicotine  21 mg Transdermal Daily NETTE Barron      potassium chloride  40 mEq Oral Once Remedios Dooley MD      topiramate  100 mg Oral BID Remedios Dooley MD       Continuous Infusions:  dexmedetomidine, 0 1-0 7 mcg/kg/hr, Last Rate: 0 7 mcg/kg/hr (09/23/22 0059)      PRN Meds:   acetaminophen, 650 mg, Q6H PRN  benzonatate, 100 mg, TID PRN        Invasive Devices Review  Invasive Devices  Report    Peripheral Intravenous Line  Duration           Peripheral IV 09/21/22 Left Antecubital 1 day Peripheral IV 09/21/22 Left Hand 1 day          Drain  Duration           Urethral Catheter Temperature probe 16 Fr  <1 day                Rationale for remaining devices:  Medical necessity  ---------------------------------------------------------------------------------------  Advance Directive and Living Will:      Power of :    POLST:    ---------------------------------------------------------------------------------------  Care Time Delivered:   Deferred to my attending      Eddie Duran MD      Portions of the record may have been created with voice recognition software  Occasional wrong word or "sound a like" substitutions may have occurred due to the inherent limitations of voice recognition software    Read the chart carefully and recognize, using context, where substitutions have occurred

## 2022-09-23 NOTE — ASSESSMENT & PLAN NOTE
· New onset with worsening SOB on 9/21 - endorsed pleuritic chest pain, reported bloody sputum at home  · Oxygen sats on presentation were 50% on 4L  · CT Chest:  Negative for PE, Extensive groundglass airspace opacity in the lungs with peripheral sparing  Mediastinal and bilateral hilar lymphadenopathy - concerns for noncardiogenic pulmonary edema  · COVID, RSV, flu panel - negative   · CXR: Development of bilateral opacities likely representing multifocal pneumonia  Repeat CXR on 9/22 showed worsening of the opacities in the bilateral lungs   · ECHO: LVEF 19%, no diastolic dysfunction, IVC dilated with moderate pulmonary hypertension( looks acute likely ARDS related as right ventricle is not dilated), NT pro PNP: 2 493  · Differentials include ARDS(2/2 to sepsis or vapping), diffuse alveolar hemorrhage(less likely as the patient is improving and hemoglobin is trending up), vasculitis( workup has been negative so far, except positive rheumatoid factor which is nonspecific), eosinophilic pneumonia( eosinophiles negative so far), acute pulmonary edema  · Status post 40 iv Lasix yesterday and 1 mg of iv Bumex, - 2 5 L output/24 h  Patient's tachypnea improved, oxygen saturation 99% on BiPAP  15/6 FiO2 65( was 100% yesterday)  Patient also reported improvement     Plan:   · Likely will wean off BiPAP today and transition to HFNC  Recheck CXR   1 mg Bumex today   · Continue antibiotics, check procalcitonin tomorrow   · F/U MARYLIN, ANCA, CCP, cryoglobulin  · Monitor eosinophiles   · Monitor VS

## 2022-09-24 LAB
ANION GAP SERPL CALCULATED.3IONS-SCNC: 11 MMOL/L (ref 4–13)
ARTERIAL PATENCY WRIST A: YES
BASE EXCESS BLDA CALC-SCNC: -3.4 MMOL/L
BASOPHILS # BLD MANUAL: 0 THOUSAND/UL (ref 0–0.1)
BASOPHILS NFR MAR MANUAL: 0 % (ref 0–1)
BODY TEMPERATURE: 98.5 DEGREES FEHRENHEIT
BUN SERPL-MCNC: 21 MG/DL (ref 5–25)
CALCIUM SERPL-MCNC: 9.2 MG/DL (ref 8.3–10.1)
CHLORIDE SERPL-SCNC: 106 MMOL/L (ref 96–108)
CO2 SERPL-SCNC: 19 MMOL/L (ref 21–32)
CREAT SERPL-MCNC: 0.43 MG/DL (ref 0.6–1.3)
EOSINOPHIL # BLD MANUAL: 0 THOUSAND/UL (ref 0–0.4)
EOSINOPHIL NFR BLD MANUAL: 0 % (ref 0–6)
ERYTHROCYTE [DISTWIDTH] IN BLOOD BY AUTOMATED COUNT: 12.1 % (ref 11.6–15.1)
GFR SERPL CREATININE-BSD FRML MDRD: 123 ML/MIN/1.73SQ M
GLUCOSE SERPL-MCNC: 158 MG/DL (ref 65–140)
GLUCOSE SERPL-MCNC: 183 MG/DL (ref 65–140)
GLUCOSE SERPL-MCNC: 197 MG/DL (ref 65–140)
HCO3 BLDA-SCNC: 20.9 MMOL/L (ref 22–28)
HCT VFR BLD AUTO: 29.8 % (ref 34.8–46.1)
HFNC FLOW LPM: 40
HGB BLD-MCNC: 9.6 G/DL (ref 11.5–15.4)
LYMPHOCYTES # BLD AUTO: 0.68 THOUSAND/UL (ref 0.6–4.47)
LYMPHOCYTES # BLD AUTO: 6 % (ref 14–44)
MAGNESIUM SERPL-MCNC: 2.2 MG/DL (ref 1.6–2.6)
MCH RBC QN AUTO: 29.3 PG (ref 26.8–34.3)
MCHC RBC AUTO-ENTMCNC: 32.2 G/DL (ref 31.4–37.4)
MCV RBC AUTO: 91 FL (ref 82–98)
MONOCYTES # BLD AUTO: 0.11 THOUSAND/UL (ref 0–1.22)
MONOCYTES NFR BLD: 1 % (ref 4–12)
NEUTROPHILS # BLD MANUAL: 10.54 THOUSAND/UL (ref 1.85–7.62)
NEUTS SEG NFR BLD AUTO: 93 % (ref 43–75)
NON VENT HFNC FIO2: 65
NON VENT TYPE HFNC: ABNORMAL
O2 CT BLDA-SCNC: 12.4 ML/DL (ref 16–23)
OXYHGB MFR BLDA: 86.2 % (ref 94–97)
PCO2 BLDA: 34.6 MM HG (ref 36–44)
PH BLDA: 7.4 [PH] (ref 7.35–7.45)
PLATELET # BLD AUTO: 309 THOUSANDS/UL (ref 149–390)
PLATELET BLD QL SMEAR: ADEQUATE
PMV BLD AUTO: 8.2 FL (ref 8.9–12.7)
PO2 BLDA: 53.1 MM HG (ref 75–129)
POTASSIUM SERPL-SCNC: 5.1 MMOL/L (ref 3.5–5.3)
PROCALCITONIN SERPL-MCNC: 1.9 NG/ML
RBC # BLD AUTO: 3.28 MILLION/UL (ref 3.81–5.12)
RBC MORPH BLD: NORMAL
SODIUM SERPL-SCNC: 136 MMOL/L (ref 135–147)
SPECIMEN SOURCE: ABNORMAL
WBC # BLD AUTO: 11.33 THOUSAND/UL (ref 4.31–10.16)

## 2022-09-24 PROCEDURE — 94660 CPAP INITIATION&MGMT: CPT

## 2022-09-24 PROCEDURE — 85027 COMPLETE CBC AUTOMATED: CPT | Performed by: INTERNAL MEDICINE

## 2022-09-24 PROCEDURE — 83735 ASSAY OF MAGNESIUM: CPT | Performed by: INTERNAL MEDICINE

## 2022-09-24 PROCEDURE — 82805 BLOOD GASES W/O2 SATURATION: CPT

## 2022-09-24 PROCEDURE — 99233 SBSQ HOSP IP/OBS HIGH 50: CPT | Performed by: INTERNAL MEDICINE

## 2022-09-24 PROCEDURE — 84145 PROCALCITONIN (PCT): CPT | Performed by: INTERNAL MEDICINE

## 2022-09-24 PROCEDURE — 82948 REAGENT STRIP/BLOOD GLUCOSE: CPT

## 2022-09-24 PROCEDURE — 80048 BASIC METABOLIC PNL TOTAL CA: CPT | Performed by: INTERNAL MEDICINE

## 2022-09-24 PROCEDURE — 85007 BL SMEAR W/DIFF WBC COUNT: CPT | Performed by: INTERNAL MEDICINE

## 2022-09-24 PROCEDURE — 93005 ELECTROCARDIOGRAM TRACING: CPT

## 2022-09-24 PROCEDURE — 86200 CCP ANTIBODY: CPT | Performed by: INTERNAL MEDICINE

## 2022-09-24 RX ORDER — ALPRAZOLAM 0.25 MG/1
0.25 TABLET ORAL ONCE
Status: COMPLETED | OUTPATIENT
Start: 2022-09-24 | End: 2022-09-24

## 2022-09-24 RX ORDER — ONDANSETRON 2 MG/ML
4 INJECTION INTRAMUSCULAR; INTRAVENOUS ONCE
Status: COMPLETED | OUTPATIENT
Start: 2022-09-24 | End: 2022-09-24

## 2022-09-24 RX ORDER — BUMETANIDE 0.25 MG/ML
1 INJECTION, SOLUTION INTRAMUSCULAR; INTRAVENOUS ONCE
Status: COMPLETED | OUTPATIENT
Start: 2022-09-24 | End: 2022-09-24

## 2022-09-24 RX ADMIN — TRAZODONE HYDROCHLORIDE 50 MG: 50 TABLET ORAL at 21:08

## 2022-09-24 RX ADMIN — BUMETANIDE 1 MG: 0.25 INJECTION INTRAMUSCULAR; INTRAVENOUS at 08:02

## 2022-09-24 RX ADMIN — GABAPENTIN 400 MG: 400 CAPSULE ORAL at 08:02

## 2022-09-24 RX ADMIN — BUPRENORPHINE AND NALOXONE 8 MG: 8; 2 FILM BUCCAL; SUBLINGUAL at 08:01

## 2022-09-24 RX ADMIN — AZITHROMYCIN MONOHYDRATE 500 MG: 500 INJECTION, POWDER, LYOPHILIZED, FOR SOLUTION INTRAVENOUS at 17:05

## 2022-09-24 RX ADMIN — DEXMEDETOMIDINE HYDROCHLORIDE 0.7 MCG/KG/HR: 400 INJECTION INTRAVENOUS at 12:04

## 2022-09-24 RX ADMIN — ONDANSETRON 4 MG: 2 INJECTION INTRAMUSCULAR; INTRAVENOUS at 16:33

## 2022-09-24 RX ADMIN — OXAZEPAM 15 MG: 15 CAPSULE, GELATIN COATED ORAL at 08:02

## 2022-09-24 RX ADMIN — DEXMEDETOMIDINE HYDROCHLORIDE 0.7 MCG/KG/HR: 400 INJECTION INTRAVENOUS at 19:33

## 2022-09-24 RX ADMIN — DEXMEDETOMIDINE HYDROCHLORIDE 0.7 MCG/KG/HR: 400 INJECTION INTRAVENOUS at 06:34

## 2022-09-24 RX ADMIN — TOPIRAMATE 100 MG: 100 TABLET, FILM COATED ORAL at 08:02

## 2022-09-24 RX ADMIN — GABAPENTIN 400 MG: 400 CAPSULE ORAL at 16:32

## 2022-09-24 RX ADMIN — TOPIRAMATE 100 MG: 100 TABLET, FILM COATED ORAL at 16:33

## 2022-09-24 RX ADMIN — METHYLPREDNISOLONE SODIUM SUCCINATE 40 MG: 40 INJECTION, POWDER, FOR SOLUTION INTRAMUSCULAR; INTRAVENOUS at 08:01

## 2022-09-24 RX ADMIN — DEXTROSE MONOHYDRATE 2000 MG: 5 INJECTION, SOLUTION INTRAVENOUS at 16:45

## 2022-09-24 RX ADMIN — BUPRENORPHINE AND NALOXONE 8 MG: 8; 2 FILM BUCCAL; SUBLINGUAL at 16:33

## 2022-09-24 RX ADMIN — METHYLPREDNISOLONE SODIUM SUCCINATE 40 MG: 40 INJECTION, POWDER, FOR SOLUTION INTRAMUSCULAR; INTRAVENOUS at 21:08

## 2022-09-24 RX ADMIN — Medication 21 MG: at 08:01

## 2022-09-24 RX ADMIN — ENOXAPARIN SODIUM 40 MG: 40 INJECTION SUBCUTANEOUS at 08:01

## 2022-09-24 RX ADMIN — ACETAMINOPHEN 650 MG: 325 TABLET, FILM COATED ORAL at 12:44

## 2022-09-24 RX ADMIN — ALPRAZOLAM 0.25 MG: 0.25 TABLET ORAL at 21:08

## 2022-09-24 RX ADMIN — GABAPENTIN 400 MG: 400 CAPSULE ORAL at 21:08

## 2022-09-24 NOTE — ASSESSMENT & PLAN NOTE
· Criteria on Admission: Tachycardia, tachypnea, elevated wbc's with 12% bandemia, source suspected to be pnumonia?  procalcitonin 3 7   Lactic: 1 1  · UA:  Negative for leukocytes or nitrates, 0-1 wbc's, positive for blood  · Did not reflex to culture  · Sputum culture was not collected   · Blood cultures x2 negative; Legionella urine, strep pneumonia - negative   · Leucocytosis slightly improved, patient is afebrile and tachycardia resolved   · Procalctionin trending down    Plan:   · Continue ceftriaxone and azithromycin- day 4 today   · Monitor VS, trend WBC

## 2022-09-24 NOTE — ASSESSMENT & PLAN NOTE
· Patient appropriately answered questions and then was running thoughts that are inappropriate to the conversation   Notable history for bipolar  · Coma panel and UDS negative   · Likely hypoxia related, mentation improved

## 2022-09-24 NOTE — ASSESSMENT & PLAN NOTE
Recent Labs     09/21/22  1215 09/22/22  0450 09/23/22  0515   * 62* 34   * 97* 71   ALKPHOS 105 107 113   TBILI 1 10* 0 82 0 66     · Endorse alcohol use in ED - unclear if she has a history of such  · Were not elevated on 09/16/2022  · No cirrhotic changes on CT scan  · Now resolved

## 2022-09-24 NOTE — ASSESSMENT & PLAN NOTE
Recent Labs     09/22/22  0450 09/23/22  0515 09/24/22  0519   HGB 9 0* 9 3* 9 6*     Lab Results   Component Value Date    IRON 16 (L) 09/21/2022    FERRITIN 104 09/21/2022    TIBC 249 (L) 09/21/2022    CONCFE 6 (L) 09/21/2022     · Patient did endorsed coughing up blood however, cough in the ED was nonproductive  No hematemesis during the hospitalization   Had 2-4 RBC in the urine but she is at menses     · Hb now improving  · Continue monitoring for signs of alveolar hemorrhage

## 2022-09-24 NOTE — PLAN OF CARE
Problem: MOBILITY - ADULT  Goal: Maintain or return to baseline ADL function  Description: INTERVENTIONS:  -  Assess patient's ability to carry out ADLs; assess patient's baseline for ADL function and identify physical deficits which impact ability to perform ADLs (bathing, care of mouth/teeth, toileting, grooming, dressing, etc )  - Assess/evaluate cause of self-care deficits   - Assess range of motion  - Assess patient's mobility; develop plan if impaired  - Assess patient's need for assistive devices and provide as appropriate  - Encourage maximum independence but intervene and supervise when necessary  - Involve family in performance of ADLs  - Assess for home care needs following discharge   - Consider OT consult to assist with ADL evaluation and planning for discharge  - Provide patient education as appropriate  Outcome: Progressing     Problem: Potential for Falls  Goal: Patient will remain free of falls  Description: INTERVENTIONS:  - Educate patient/family on patient safety including physical limitations  - Instruct patient to call for assistance with activity   - Consult OT/PT to assist with strengthening/mobility   - Keep Call bell within reach  - Keep bed low and locked with side rails adjusted as appropriate  - Keep care items and personal belongings within reach  - Initiate and maintain comfort rounds  - Make Fall Risk Sign visible to staff  - Offer Toileting every 2 Hours, in advance of need  - Apply yellow socks and bracelet for high fall risk patients  - Consider moving patient to room near nurses station  Outcome: Progressing     Problem: PAIN - ADULT  Goal: Verbalizes/displays adequate comfort level or baseline comfort level  Description: Interventions:  - Encourage patient to monitor pain and request assistance  - Assess pain using appropriate pain scale  - Administer analgesics based on type and severity of pain and evaluate response  - Implement non-pharmacological measures as appropriate and evaluate response  - Consider cultural and social influences on pain and pain management  - Notify physician/advanced practitioner if interventions unsuccessful or patient reports new pain  Outcome: Progressing     Problem: SAFETY ADULT  Goal: Maintain or return to baseline ADL function  Description: INTERVENTIONS:  -  Assess patient's ability to carry out ADLs; assess patient's baseline for ADL function and identify physical deficits which impact ability to perform ADLs (bathing, care of mouth/teeth, toileting, grooming, dressing, etc )  - Assess/evaluate cause of self-care deficits   - Assess range of motion  - Assess patient's mobility; develop plan if impaired  - Assess patient's need for assistive devices and provide as appropriate  - Encourage maximum independence but intervene and supervise when necessary  - Involve family in performance of ADLs  - Assess for home care needs following discharge   - Consider OT consult to assist with ADL evaluation and planning for discharge  - Provide patient education as appropriate  Outcome: Progressing  Goal: Patient will remain free of falls  Description: INTERVENTIONS:  - Educate patient/family on patient safety including physical limitations  - Instruct patient to call for assistance with activity   - Consult OT/PT to assist with strengthening/mobility   - Keep Call bell within reach  - Keep bed low and locked with side rails adjusted as appropriate  - Keep care items and personal belongings within reach  - Initiate and maintain comfort rounds  - Make Fall Risk Sign visible to staff  - Offer Toileting every 2 Hours, in advance of need  - Apply yellow socks and bracelet for high fall risk patients  - Consider moving patient to room near nurses station  Outcome: Progressing     Problem: Knowledge Deficit  Goal: Patient/family/caregiver demonstrates understanding of disease process, treatment plan, medications, and discharge instructions  Description: Complete learning assessment and assess knowledge base    Interventions:  - Provide teaching at level of understanding  - Provide teaching via preferred learning methods  Outcome: Progressing

## 2022-09-24 NOTE — NUTRITION
09/24/22 1158   PES Statement   Problem Intake   Oral or Nutritional Support Intake (2) Inadequate oral intake NI-2 1   Related to Catabolic illness   As evidenced by: NPO/CL > 3 days   Recommendations/Interventions   Malnutrition/BMI Present No   Summary Pt currently consuming/tolerating clear liquid diet  Pt did state some nausea with no emesis  Offered Ensure clear, however, pt declined  Pt is amenable to trial gelatein BID to aid in meeting estimated needs  Per nursing, pt is being monitored for respiratory distress  Intubation may be warranted  Appreciate dietary consult for EN rec's as needed  Will continue to monitor pt for po intake/acceptance of supplements/need for alternate nutrition  Interventions/Recommendations Diet to advance; Supplement initiate   Education Assessment   Education Education not indicated at this time   Patient Nutrition Goals   Goal Tolerate PO diet;Meet PO needs   Goal Status Initiated   Timeframe to complete goal by next f/u   Nutrition Complexity Risk   Nutrition complexity level High risk   Follow up date 09/28/22  (or as otherwise consulted by MD)

## 2022-09-24 NOTE — ASSESSMENT & PLAN NOTE
Recent Labs     09/23/22  0515 09/24/22  0519 09/25/22  0510   HGB 9 3* 9 6* 10 2*     Lab Results   Component Value Date    IRON 16 (L) 09/21/2022    FERRITIN 104 09/21/2022    TIBC 249 (L) 09/21/2022    CONCFE 6 (L) 09/21/2022     · Patient did endorsed coughing up blood however, cough in the ED was nonproductive  No hematemesis during the hospitalization   Had 2-4 RBC in the urine but she is at menses     · Hb now improving

## 2022-09-24 NOTE — ASSESSMENT & PLAN NOTE
· New onset with worsening SOB on 9/21 - endorsed pleuritic chest pain, reported bloody sputum at home  · Oxygen sats on presentation were 50% on 4L  · CT Chest:  Negative for PE, Extensive groundglass airspace opacity in the lungs with peripheral sparing  Mediastinal and bilateral hilar lymphadenopathy - concerns for noncardiogenic pulmonary edema  · COVID, RSV, flu panel - negative   · CXR: Development of bilateral opacities likely representing multifocal pneumonia  Repeat CXR on 9/22 showed worsening of the opacities in the bilateral lungs   · ECHO: LVEF 05%, no diastolic dysfunction, IVC dilated with moderate pulmonary hypertension( looks acute likely ARDS related as right ventricle is not dilated), NT pro PNP: 2 493  · Differentials include ARDS(2/2 to sepsis or vapping), diffuse alveolar hemorrhage(less likely as the patient is improving and hemoglobin is trending up), vasculitis( workup has been negative so far, except positive rheumatoid factor which is nonspecific), eosinophilic pneumonia( eosinophiles negative so far), acute pulmonary edema  · Tolerated BiPAP overnight at 15/6 45%   Now on HFNC 50L 65%    Plan:   · 1mg Bumex today   · Continue antibiotics  · Continue SoluMedrol 40mg q12h  · Wean O2 as tolerated, maintain SpO2 >90%  · F/U MARYLIN, ANCA, CCP, cryoglobulin - still pending  · Monitor eosinophils   · Monitor VS

## 2022-09-24 NOTE — ASSESSMENT & PLAN NOTE
· Criteria on Admission: Tachycardia, tachypnea, elevated wbc's with 12% bandemia, source suspected to be pnumonia?  procalcitonin 3 7   Lactic: 1 1  · UA:  Negative for leukocytes or nitrates, 0-1 wbc's, positive for blood  · Did not reflex to culture  · Sputum culture was not collected   · Blood cultures x2 negative; Legionella urine, strep pneumonia - negative   · Leucocytosis slightly worsened compared to yesterday but she is also on steroids, patient is afebrile and tachycardia resolved   · Procalctionin trending down    Plan:   · Continue ceftriaxone and azithromycin- today the last day( 5th day)  · Monitor VS, trend WBC

## 2022-09-24 NOTE — ASSESSMENT & PLAN NOTE
· Per chart review ([facility] notes) patient is on Wellbutrin, Fluoxetine and Topamax   Also, was taking Oxazepam prn   · Continue home medications

## 2022-09-24 NOTE — ASSESSMENT & PLAN NOTE
Recent Labs     09/23/22  0515 09/25/22  0510   AST 34 12   ALT 71 38   ALKPHOS 113 96   TBILI 0 66 0 30     · Endorse alcohol use in ED - unclear if she has a history of such  · Were not elevated on 09/16/2022  · No cirrhotic changes on CT scan  · Now resolved

## 2022-09-24 NOTE — PROGRESS NOTES
[facility]  Progress Note Meg Macias 1975, 41 y o  female MRN: [id number]  Unit/Bed#: ICU 01 Encounter: [id number]  Primary Care Provider: No primary care provider on file  Date and time admitted to hospital: 9/21/2022 11:50 AM    * Acute respiratory failure with hypoxia ([facility] )  Assessment & Plan  · New onset with worsening SOB on 9/21 - endorsed pleuritic chest pain, reported bloody sputum at home  · Oxygen sats on presentation were 50% on 4L  · CT Chest:  Negative for PE, Extensive groundglass airspace opacity in the lungs with peripheral sparing  Mediastinal and bilateral hilar lymphadenopathy - concerns for noncardiogenic pulmonary edema  · COVID, RSV, flu panel - negative   · CXR: Development of bilateral opacities likely representing multifocal pneumonia  Repeat CXR on 9/22 showed worsening of the opacities in the bilateral lungs   · ECHO: LVEF 27%, no diastolic dysfunction, IVC dilated with moderate pulmonary hypertension( looks acute likely ARDS related as right ventricle is not dilated), NT pro PNP: 2 493  · Differentials include ARDS(2/2 to sepsis or vapping), diffuse alveolar hemorrhage(less likely as the patient is improving and hemoglobin is trending up), vasculitis( workup has been negative so far, except positive rheumatoid factor which is nonspecific), eosinophilic pneumonia( eosinophiles negative so far), acute pulmonary edema  · Eosinophils remain at 0% today  · Tolerated BiPAP overnight at 15/6 45%  Now on HFNC 50L 65%    Plan:   · 1mg Bumex today   · Continue antibiotics  · Continue SoluMedrol 40mg q12h  · Wean O2 as tolerated, maintain SpO2 >90%  · F/U MARYLIN, ANCA, CCP, cryoglobulin - still pending  · Monitor VS     Sepsis ([facility] )  Assessment & Plan  · Criteria on Admission: Tachycardia, tachypnea, elevated wbc's with 12% bandemia, source suspected to be pnumonia?  procalcitonin 3 7   Lactic: 1 1  · UA:  Negative for leukocytes or nitrates, 0-1 wbc's, positive for blood  · Did not reflex to culture  · Sputum culture was not collected   · Blood cultures x2 negative; Legionella urine, strep pneumonia - negative   · Leucocytosis slightly improved, patient is afebrile and tachycardia resolved   · Procalctionin trending down    Plan:   · Continue ceftriaxone and azithromycin- day 4 today   · Monitor VS, trend WBC     Elevated LFTs  Assessment & Plan  Recent Labs     09/21/22  1215 09/22/22  0450 09/23/22  0515   * 62* 34   * 97* 71   ALKPHOS 105 107 113   TBILI 1 10* 0 82 0 66     · Endorse alcohol use in ED - unclear if she has a history of such  · Were not elevated on 09/16/2022  · No cirrhotic changes on CT scan  · Now resolved  Anemia  Assessment & Plan  Recent Labs     09/22/22  0450 09/23/22  0515 09/24/22  0519   HGB 9 0* 9 3* 9 6*     Lab Results   Component Value Date    IRON 16 (L) 09/21/2022    FERRITIN 104 09/21/2022    TIBC 249 (L) 09/21/2022    CONCFE 6 (L) 09/21/2022     · Patient did endorsed coughing up blood however, cough in the ED was nonproductive  No hematemesis during the hospitalization  Had 2-4 RBC in the urine but she is at menses     · Hb now improving  · Continue monitoring for signs of alveolar hemorrhage    R/O CAP (community acquired pneumonia)  Assessment & Plan  · Patient has unstable housing, presented to the ED with hypoxia, shortness of breath, and cough  · See plan as noted above in Sepsis     Altered mental status  Assessment & Plan  · Patient appropriately answered questions and then was running thoughts that are inappropriate to the conversation   Notable history for bipolar  · Coma panel and UDS negative   · Likely hypoxia related, mentation improved     Substance Abuse Disorder   Assessment & Plan  · Currently on Suboxone therapy, continue  · UDS negative   · Patient also endorsed alcohol use - unclear how much     Heavy tobacco smoker  Assessment & Plan  · Currently smokes pack or more daily  · Ordered nicotine patch  · Encouraged cessation when appropriate    Bipolar affective disorder, currently active [facility])  Assessment & Plan  · Per chart review ([facility] notes) patient is on Wellbutrin, Fluoxetine and Topamax  Also, was taking Oxazepam prn   · Continue home medications      ----------------------------------------------------------------------------------------  HPI/24hr events: No overnight events, patient tolerated BiPAP well overnight at 15/6 and FiO2 45%  Transitioned to HFNC 50L 65%  Patient appropriate for transfer out of the ICU today?: No  Disposition: Continue Critical Care   Code Status: Level 1 - Full Code  ---------------------------------------------------------------------------------------  SUBJECTIVE  Patient found laying in bed sleeping, easily rousable, not in acute distress at the time of my evaluation  Denied shortness of breath, fever, chills, dizziness, lightheadedness, chest pain, palpitations, abdominal tenderness or distension, changes in urinary or bowel habits  She had no other complaints       Review of Systems  Review of systems was reviewed and negative unless stated above in HPI/24-hour events   ---------------------------------------------------------------------------------------  OBJECTIVE    Vitals   Vitals:    22 0142 22 0200 22 0242 22 0329   BP: 119/76  115/76    BP Location:       Pulse: (!) 52  (!) 52    Resp: (!) 28  (!) 41    Temp:    98 2 °F (36 8 °C)   TempSrc:    Tympanic   SpO2: 99% 100% 100%    Weight:       Height:         Temp (24hrs), Av 3 °F (36 8 °C), Min:98 °F (36 7 °C), Max:98 7 °F (37 1 °C)  Current: Temperature: 98 2 °F (36 8 °C)          Respiratory:  SpO2: SpO2: 100 %       Invasive/non-invasive ventilation settings   Respiratory  Report   Lab Data (Last 4 hours)       0814            pH, Arterial       7 399             pCO2, Arterial       34 6             pO2, Arterial       53 1             HCO3, Arterial       20 9 Base Excess, Arterial       -3 4                  O2/Vent Data           Most Recent        Non-Invasive Ventilation Mode   HFNC (High flow)                  Physical Exam  Vitals and nursing note reviewed  Constitutional:       General: She is not in acute distress  Appearance: Normal appearance  She is ill-appearing  She is not toxic-appearing or diaphoretic  HENT:      Head: Normocephalic and atraumatic  Nose: Nose normal       Mouth/Throat:      Mouth: Mucous membranes are moist       Pharynx: Oropharynx is clear  Eyes:      General: No scleral icterus  Right eye: No discharge  Left eye: No discharge  Extraocular Movements: Extraocular movements intact  Conjunctiva/sclera: Conjunctivae normal    Cardiovascular:      Rate and Rhythm: Normal rate and regular rhythm  Pulses: Normal pulses  Heart sounds: Normal heart sounds  No murmur heard  Pulmonary:      Effort: Pulmonary effort is normal  No respiratory distress  Breath sounds: Examination of the right-upper field reveals rhonchi  Examination of the left-upper field reveals rhonchi  Examination of the right-middle field reveals rhonchi  Examination of the left-middle field reveals rhonchi  Examination of the right-lower field reveals rhonchi  Examination of the left-lower field reveals rhonchi  Rhonchi present  No wheezing or rales  Abdominal:      General: Abdomen is flat  Bowel sounds are normal  There is no distension  Palpations: Abdomen is soft  Tenderness: There is no abdominal tenderness  There is no guarding or rebound  Musculoskeletal:      Cervical back: Normal range of motion and neck supple  Right lower leg: No edema  Left lower leg: No edema  Skin:     General: Skin is warm and dry  Coloration: Skin is not jaundiced or pale  Neurological:      Mental Status: She is alert and oriented to person, place, and time  Mental status is at baseline  Psychiatric:         Mood and Affect: Mood normal          Behavior: Behavior normal          Thought Content: Thought content normal          Judgment: Judgment normal              Laboratory and Diagnostics:  Results from last 7 days   Lab Units 09/24/22  0519 09/23/22  0515 09/22/22  0450 09/21/22  1215   WBC Thousand/uL 11 33* 14 67* 15 32* 17 06*   HEMOGLOBIN g/dL 9 6* 9 3* 9 0* 10 4*   HEMATOCRIT % 29 8* 29 4* 29 0* 32 2*   PLATELETS Thousands/uL 309 310 230 248   NEUTROS PCT %  --  89*  --   --    BANDS PCT %  --   --   --  12*   MONOS PCT %  --  5  --   --    MONO PCT % 1*  --   --  3*     Results from last 7 days   Lab Units 09/24/22  0519 09/23/22  0515 09/22/22  0450 09/21/22  1215   SODIUM mmol/L 136 140 138 141   POTASSIUM mmol/L 5 1 3 4* 3 6 3 8   CHLORIDE mmol/L 106 105 106 108   CO2 mmol/L 19* 21 22 21   ANION GAP mmol/L 11 14* 10 12   BUN mg/dL 21 18 13 17   CREATININE mg/dL 0 43* 0 70 0 72 0 77   CALCIUM mg/dL 9 2 8 8 7 8* 8 4   GLUCOSE RANDOM mg/dL 158* 110 106 95   ALT U/L  --  71 97* 142*   AST U/L  --  34 62* 101*   ALK PHOS U/L  --  113 107 105   ALBUMIN g/dL  --  2 1* 2 2* 2 6*   TOTAL BILIRUBIN mg/dL  --  0 66 0 82 1 10*     Results from last 7 days   Lab Units 09/24/22  0519 09/23/22  0515 09/22/22  0450   MAGNESIUM mg/dL 2 2 2 2 2 2   PHOSPHORUS mg/dL  --   --  3 2               Results from last 7 days   Lab Units 09/21/22  1233   LACTIC ACID mmol/L 1 1     ABG:  Results from last 7 days   Lab Units 09/24/22  0814   PH ART  7 399   PCO2 ART mm Hg 34 6*   PO2 ART mm Hg 53 1*   HCO3 ART mmol/L 20 9*   BASE EXC ART mmol/L -3 4   ABG SOURCE  Radial, Left     VBG:  Results from last 7 days   Lab Units 09/24/22  0814 09/21/22  1710 09/21/22  1623   PH LILIAM   --   --  7 322   PCO2 LILIAM mm Hg  --   --  37 8*   PO2 LILIAM mm Hg  --   --  59 5*   HCO3 LILIAM mmol/L  --   --  19 1*   BASE EXC LILIAM mmol/L  --   --  -6 4   ABG SOURCE  Radial, Left   < >  --     < > = values in this interval not displayed  Results from last 7 days   Lab Units 09/24/22  0519 09/21/22  1215   PROCALCITONIN ng/ml 1 90* 3 79*       Micro  Results from last 7 days   Lab Units 09/22/22  1757 09/22/22  1234 09/21/22  1325 09/21/22  1233   BLOOD CULTURE   --   --   --  No Growth at 48 hrs  No Growth at 48 hrs  LEGIONELLA URINARY ANTIGEN  Negative  --   --   --    STREP PNEUMONIAE ANTIGEN, URINE   --  Negative Negative  --        EKG: NSR, HR 69  Imaging: I have personally reviewed pertinent reports  and I have personally reviewed pertinent films in PACS    Intake and Output  I/O       09/22 0701 09/23 0700 09/23 0701 09/24 0700 09/24 0701 09/25 0700    P  O   1020     I V  (mL/kg) 357 7 (4 9) 253 8 (3 5)     IV Piggyback 300      Total Intake(mL/kg) 657 7 (9) 1273 8 (17 4)     Urine (mL/kg/hr) 2564 (1 5) 1160 (0 7)     Total Output 2564 1160     Net -1906 3 +113 8            Unmeasured Urine Occurrence 1 x            Height and Weights   Height: 5' 2" (157 5 cm)     Body mass index is 29 48 kg/m²    Weight (last 2 days)     Date/Time Weight    09/23/22 0540 73 1 (161 16)    09/22/22 0815 73 9 (163)    09/22/22 0600 74 3 (163 8)            Nutrition       Diet Orders   (From admission, onward)             Start     Ordered    09/21/22 1905  Diet NPO  (ED Bridging Orders Panel)  Diet effective now        References:    Nutrtion Support Algorithm Enteral vs  Parenteral   Question:  Diet Type  Answer:  NPO    09/21/22 1904                  Active Medications  Scheduled Meds:  Current Facility-Administered Medications   Medication Dose Route Frequency Provider Last Rate    acetaminophen  650 mg Oral Q6H PRN NETTE Mares      azithromycin  500 mg Intravenous Q24H NETTE Cruz 0 mg (09/21/22 2000)    benzonatate  100 mg Oral TID PRN NETTE Mares      buprenorphine-naloxone  8 mg Sublingual BID NETTE Barron      buPROPion  300 mg Oral Daily Holbrook MD [facility]      cefTRIAXone  2,000 mg Intravenous Q24H NETTE Donovan 2,000 mg (09/23/22 1544)    dexmedetomidine  0 1-0 7 mcg/kg/hr Intravenous Titrated Tamica Prudent, CRNP 0 7 mcg/kg/hr (09/24/22 1383)    enoxaparin  40 mg Subcutaneous Daily NETTE Cano      famotidine  20 mg Oral BID PRN Douglas Rey MD      FLUoxetine  40 mg Oral Daily Raquel Mueller MD      gabapentin  400 mg Oral TID Raquel Mueller MD      methylPREDNISolone sodium succinate  40 mg Intravenous Q12H [facility] Raquel Mueller MD      nicotine  21 mg Transdermal Daily NETTE Barron      oxazepam  15 mg Oral Daily Uriel Shah MD      topiramate  100 mg Oral BID Raquel Mueller MD      traZODone  50 mg Oral HS Raquel Mueller MD       Continuous Infusions:  dexmedetomidine, 0 1-0 7 mcg/kg/hr, Last Rate: 0 7 mcg/kg/hr (09/24/22 0634)      PRN Meds:   acetaminophen, 650 mg, Q6H PRN  benzonatate, 100 mg, TID PRN  famotidine, 20 mg, BID PRN        Invasive Devices Review  Invasive Devices  Report    Peripheral Intravenous Line  Duration           Peripheral IV 09/21/22 Left Hand 2 days    Peripheral IV 09/24/22 Right Hand <1 day          Drain  Duration           External Urinary Catheter <1 day                Rationale for remaining devices: Need for peripheral access   ---------------------------------------------------------------------------------------  Advance Directive and Living Will:      Power of :    POLST:    ---------------------------------------------------------------------------------------  Care Time Delivered:   No Critical Care time spent       Uriel Shah MD      Portions of the record may have been created with voice recognition software  Occasional wrong word or "sound a like" substitutions may have occurred due to the inherent limitations of voice recognition software    Read the chart carefully and recognize, using context, where substitutions have occurred

## 2022-09-24 NOTE — ASSESSMENT & PLAN NOTE
· New onset with worsening SOB on 9/21 - endorsed pleuritic chest pain, reported bloody sputum at home  · Oxygen sats on presentation were 50% on 4L  · CT Chest:  Negative for PE, Extensive groundglass airspace opacity in the lungs with peripheral sparing  Mediastinal and bilateral hilar lymphadenopathy - concerns for noncardiogenic pulmonary edema  · COVID, RSV, flu panel - negative   · CXR: Development of bilateral opacities likely representing multifocal pneumonia  Repeat CXR on 9/22 showed worsening of the opacities in the bilateral lungs   · ECHO: LVEF 97%, no diastolic dysfunction, IVC dilated with moderate pulmonary hypertension( looks acute likely ARDS related as right ventricle is not dilated), NT pro PNP: 2 493  · Differentials include ARDS(2/2 to sepsis or vapping), diffuse alveolar hemorrhage(less likely as the patient is improving and hemoglobin is trending up), vasculitis( workup has been negative so far, except positive rheumatoid factor which is nonspecific), eosinophilic pneumonia( eosinophiles negative so far), acute pulmonary edema  · Eosinophils remains within normal limits   · Tolerated HFNC 45L FiO2 65%  · Received 1 mg of Bumex yesterday  · Patient reported she is breathing much better and is bringing up phlegm- like productive cough     Plan:   · Repeat CXR   Hold on any diuretics for now   · Continue antibiotics- last day   · Continue SoluMedrol 40mg q12h- day 3   · Flutter valve, Mucinex   · Wean O2 as tolerated, maintain SpO2 >90%  · F/U ANCA, CCP, cryoglobulin - still pending  · Monitor VS

## 2022-09-24 NOTE — ASSESSMENT & PLAN NOTE
· Per chart review ([facility] notes) patient is on Wellbutrin, Fluoxetine and Topamax  Also, was taking Oxazepam prn   · Was refusing Wellbutrin and fluoxetine since it was resumed

## 2022-09-25 ENCOUNTER — APPOINTMENT (OUTPATIENT)
Dept: RADIOLOGY | Facility: HOSPITAL | Age: 47
DRG: 720 | End: 2022-09-25
Payer: COMMERCIAL

## 2022-09-25 PROBLEM — R00.1 BRADYCARDIA: Status: ACTIVE | Noted: 2022-09-25

## 2022-09-25 PROBLEM — R79.89 ELEVATED LFTS: Status: RESOLVED | Noted: 2022-09-21 | Resolved: 2022-09-25

## 2022-09-25 PROBLEM — R41.82 ALTERED MENTAL STATUS: Status: RESOLVED | Noted: 2022-09-21 | Resolved: 2022-09-25

## 2022-09-25 LAB
ALBUMIN SERPL BCP-MCNC: 2 G/DL (ref 3.5–5)
ALP SERPL-CCNC: 96 U/L (ref 46–116)
ALT SERPL W P-5'-P-CCNC: 38 U/L (ref 12–78)
ANION GAP SERPL CALCULATED.3IONS-SCNC: 11 MMOL/L (ref 4–13)
AST SERPL W P-5'-P-CCNC: 12 U/L (ref 5–45)
ATRIAL RATE: 51 BPM
BASOPHILS # BLD AUTO: 0.03 THOUSANDS/ΜL (ref 0–0.1)
BASOPHILS NFR BLD AUTO: 0 % (ref 0–1)
BILIRUB SERPL-MCNC: 0.3 MG/DL (ref 0.2–1)
BUN SERPL-MCNC: 22 MG/DL (ref 5–25)
CALCIUM ALBUM COR SERPL-MCNC: 10.5 MG/DL (ref 8.3–10.1)
CALCIUM SERPL-MCNC: 8.9 MG/DL (ref 8.3–10.1)
CHLORIDE SERPL-SCNC: 105 MMOL/L (ref 96–108)
CO2 SERPL-SCNC: 24 MMOL/L (ref 21–32)
CREAT SERPL-MCNC: 0.57 MG/DL (ref 0.6–1.3)
EOSINOPHIL # BLD AUTO: 0.24 THOUSAND/ΜL (ref 0–0.61)
EOSINOPHIL NFR BLD AUTO: 2 % (ref 0–6)
ERYTHROCYTE [DISTWIDTH] IN BLOOD BY AUTOMATED COUNT: 12.1 % (ref 11.6–15.1)
GFR SERPL CREATININE-BSD FRML MDRD: 112 ML/MIN/1.73SQ M
GLUCOSE SERPL-MCNC: 119 MG/DL (ref 65–140)
GLUCOSE SERPL-MCNC: 144 MG/DL (ref 65–140)
GLUCOSE SERPL-MCNC: 164 MG/DL (ref 65–140)
GLUCOSE SERPL-MCNC: 173 MG/DL (ref 65–140)
HCT VFR BLD AUTO: 32 % (ref 34.8–46.1)
HGB BLD-MCNC: 10.2 G/DL (ref 11.5–15.4)
IMM GRANULOCYTES # BLD AUTO: 0.22 THOUSAND/UL (ref 0–0.2)
IMM GRANULOCYTES NFR BLD AUTO: 2 % (ref 0–2)
LYMPHOCYTES # BLD AUTO: 1.22 THOUSANDS/ΜL (ref 0.6–4.47)
LYMPHOCYTES NFR BLD AUTO: 10 % (ref 14–44)
MAGNESIUM SERPL-MCNC: 1.9 MG/DL (ref 1.6–2.6)
MCH RBC QN AUTO: 29.1 PG (ref 26.8–34.3)
MCHC RBC AUTO-ENTMCNC: 31.9 G/DL (ref 31.4–37.4)
MCV RBC AUTO: 91 FL (ref 82–98)
MONOCYTES # BLD AUTO: 0.76 THOUSAND/ΜL (ref 0.17–1.22)
MONOCYTES NFR BLD AUTO: 6 % (ref 4–12)
NEUTROPHILS # BLD AUTO: 10.18 THOUSANDS/ΜL (ref 1.85–7.62)
NEUTS SEG NFR BLD AUTO: 80 % (ref 43–75)
NRBC BLD AUTO-RTO: 0 /100 WBCS
P AXIS: 18 DEGREES
PHOSPHATE SERPL-MCNC: 3.9 MG/DL (ref 2.7–4.5)
PLATELET # BLD AUTO: 434 THOUSANDS/UL (ref 149–390)
PMV BLD AUTO: 8.8 FL (ref 8.9–12.7)
POTASSIUM SERPL-SCNC: 3.9 MMOL/L (ref 3.5–5.3)
PR INTERVAL: 167 MS
PROCALCITONIN SERPL-MCNC: 0.94 NG/ML
PROT SERPL-MCNC: 6.2 G/DL (ref 6.4–8.4)
QRS AXIS: 68 DEGREES
QRSD INTERVAL: 92 MS
QT INTERVAL: 458 MS
QTC INTERVAL: 422 MS
RBC # BLD AUTO: 3.51 MILLION/UL (ref 3.81–5.12)
SODIUM SERPL-SCNC: 140 MMOL/L (ref 135–147)
T WAVE AXIS: 61 DEGREES
VENTRICULAR RATE: 51 BPM
WBC # BLD AUTO: 12.65 THOUSAND/UL (ref 4.31–10.16)

## 2022-09-25 PROCEDURE — 87205 SMEAR GRAM STAIN: CPT

## 2022-09-25 PROCEDURE — 83735 ASSAY OF MAGNESIUM: CPT

## 2022-09-25 PROCEDURE — 71045 X-RAY EXAM CHEST 1 VIEW: CPT

## 2022-09-25 PROCEDURE — 85025 COMPLETE CBC W/AUTO DIFF WBC: CPT

## 2022-09-25 PROCEDURE — 82948 REAGENT STRIP/BLOOD GLUCOSE: CPT

## 2022-09-25 PROCEDURE — 94640 AIRWAY INHALATION TREATMENT: CPT

## 2022-09-25 PROCEDURE — 84145 PROCALCITONIN (PCT): CPT

## 2022-09-25 PROCEDURE — 87106 FUNGI IDENTIFICATION YEAST: CPT

## 2022-09-25 PROCEDURE — 99291 CRITICAL CARE FIRST HOUR: CPT | Performed by: INTERNAL MEDICINE

## 2022-09-25 PROCEDURE — 80053 COMPREHEN METABOLIC PANEL: CPT

## 2022-09-25 PROCEDURE — 94003 VENT MGMT INPAT SUBQ DAY: CPT

## 2022-09-25 PROCEDURE — 93010 ELECTROCARDIOGRAM REPORT: CPT | Performed by: INTERNAL MEDICINE

## 2022-09-25 PROCEDURE — 87070 CULTURE OTHR SPECIMN AEROBIC: CPT

## 2022-09-25 PROCEDURE — 84100 ASSAY OF PHOSPHORUS: CPT

## 2022-09-25 RX ORDER — LEVALBUTEROL 1.25 MG/.5ML
1.25 SOLUTION, CONCENTRATE RESPIRATORY (INHALATION)
Status: DISCONTINUED | OUTPATIENT
Start: 2022-09-25 | End: 2022-09-25

## 2022-09-25 RX ORDER — CLONAZEPAM 0.5 MG/1
1 TABLET ORAL
Status: DISCONTINUED | OUTPATIENT
Start: 2022-09-25 | End: 2022-09-26

## 2022-09-25 RX ORDER — SODIUM CHLORIDE FOR INHALATION 3 %
4 VIAL, NEBULIZER (ML) INHALATION EVERY 8 HOURS
Status: DISCONTINUED | OUTPATIENT
Start: 2022-09-25 | End: 2022-09-30

## 2022-09-25 RX ORDER — IPRATROPIUM BROMIDE AND ALBUTEROL SULFATE 2.5; .5 MG/3ML; MG/3ML
3 SOLUTION RESPIRATORY (INHALATION)
Status: DISCONTINUED | OUTPATIENT
Start: 2022-09-25 | End: 2022-09-28

## 2022-09-25 RX ORDER — GUAIFENESIN 600 MG/1
600 TABLET, EXTENDED RELEASE ORAL EVERY 12 HOURS SCHEDULED
Status: DISCONTINUED | OUTPATIENT
Start: 2022-09-25 | End: 2022-09-25

## 2022-09-25 RX ORDER — ACETYLCYSTEINE 200 MG/ML
3 SOLUTION ORAL; RESPIRATORY (INHALATION)
Status: DISCONTINUED | OUTPATIENT
Start: 2022-09-25 | End: 2022-09-25

## 2022-09-25 RX ADMIN — NICOTINE POLACRILEX 2 MG: 2 GUM, CHEWING ORAL at 20:55

## 2022-09-25 RX ADMIN — TOPIRAMATE 100 MG: 100 TABLET, FILM COATED ORAL at 16:15

## 2022-09-25 RX ADMIN — TRAZODONE HYDROCHLORIDE 50 MG: 50 TABLET ORAL at 20:54

## 2022-09-25 RX ADMIN — BUPRENORPHINE AND NALOXONE 8 MG: 8; 2 FILM BUCCAL; SUBLINGUAL at 16:15

## 2022-09-25 RX ADMIN — FAMOTIDINE 20 MG: 20 TABLET ORAL at 16:13

## 2022-09-25 RX ADMIN — SODIUM CHLORIDE SOLN NEBU 3% 4 ML: 3 NEBU SOLN at 14:18

## 2022-09-25 RX ADMIN — GABAPENTIN 400 MG: 400 CAPSULE ORAL at 09:03

## 2022-09-25 RX ADMIN — GABAPENTIN 400 MG: 400 CAPSULE ORAL at 16:13

## 2022-09-25 RX ADMIN — DEXTROSE MONOHYDRATE 2000 MG: 5 INJECTION, SOLUTION INTRAVENOUS at 18:36

## 2022-09-25 RX ADMIN — TOPIRAMATE 100 MG: 100 TABLET, FILM COATED ORAL at 09:09

## 2022-09-25 RX ADMIN — METHYLPREDNISOLONE SODIUM SUCCINATE 40 MG: 40 INJECTION, POWDER, FOR SOLUTION INTRAMUSCULAR; INTRAVENOUS at 20:56

## 2022-09-25 RX ADMIN — IPRATROPIUM BROMIDE AND ALBUTEROL SULFATE 3 ML: 2.5; .5 SOLUTION RESPIRATORY (INHALATION) at 19:24

## 2022-09-25 RX ADMIN — ACETAMINOPHEN 650 MG: 325 TABLET, FILM COATED ORAL at 13:35

## 2022-09-25 RX ADMIN — METHYLPREDNISOLONE SODIUM SUCCINATE 40 MG: 40 INJECTION, POWDER, FOR SOLUTION INTRAMUSCULAR; INTRAVENOUS at 09:03

## 2022-09-25 RX ADMIN — ACETAMINOPHEN 650 MG: 325 TABLET, FILM COATED ORAL at 20:54

## 2022-09-25 RX ADMIN — SODIUM CHLORIDE SOLN NEBU 3% 4 ML: 3 NEBU SOLN at 19:24

## 2022-09-25 RX ADMIN — CLONAZEPAM 1 MG: 0.5 TABLET ORAL at 22:13

## 2022-09-25 RX ADMIN — AZITHROMYCIN MONOHYDRATE 500 MG: 500 INJECTION, POWDER, LYOPHILIZED, FOR SOLUTION INTRAVENOUS at 17:38

## 2022-09-25 RX ADMIN — NICOTINE POLACRILEX 2 MG: 2 GUM, CHEWING ORAL at 15:20

## 2022-09-25 RX ADMIN — Medication 21 MG: at 09:06

## 2022-09-25 RX ADMIN — IPRATROPIUM BROMIDE AND ALBUTEROL SULFATE 3 ML: 2.5; .5 SOLUTION RESPIRATORY (INHALATION) at 14:18

## 2022-09-25 RX ADMIN — GABAPENTIN 400 MG: 400 CAPSULE ORAL at 20:55

## 2022-09-25 RX ADMIN — BUPRENORPHINE AND NALOXONE 8 MG: 8; 2 FILM BUCCAL; SUBLINGUAL at 09:03

## 2022-09-25 RX ADMIN — ENOXAPARIN SODIUM 40 MG: 40 INJECTION SUBCUTANEOUS at 09:03

## 2022-09-25 RX ADMIN — BENZONATATE 100 MG: 100 CAPSULE ORAL at 07:27

## 2022-09-25 RX ADMIN — FAMOTIDINE 20 MG: 20 TABLET ORAL at 07:59

## 2022-09-25 RX ADMIN — OXAZEPAM 15 MG: 15 CAPSULE, GELATIN COATED ORAL at 09:03

## 2022-09-25 NOTE — PROGRESS NOTES
[address] [facility]  Progress Note Sridhar Maldonado 1975, 41 y o  female MRN: [id number]  Unit/Bed#: ICU 01 Encounter: [id number]  Primary Care Provider: No primary care provider on file  Date and time admitted to hospital: 9/21/2022 11:50 AM    * Acute respiratory failure with hypoxia ([address] )  Assessment & Plan  · New onset with worsening SOB on 9/21 - endorsed pleuritic chest pain, reported bloody sputum at home  · Oxygen sats on presentation were 50% on 4L  · CT Chest:  Negative for PE, Extensive groundglass airspace opacity in the lungs with peripheral sparing  Mediastinal and bilateral hilar lymphadenopathy - concerns for noncardiogenic pulmonary edema  · COVID, RSV, flu panel - negative   · CXR: Development of bilateral opacities likely representing multifocal pneumonia  Repeat CXR on 9/22 showed worsening of the opacities in the bilateral lungs   · ECHO: LVEF 29%, no diastolic dysfunction, IVC dilated with moderate pulmonary hypertension( looks acute likely ARDS related as right ventricle is not dilated), NT pro PNP: 2 493  · Differentials include ARDS(2/2 to sepsis or vapping), diffuse alveolar hemorrhage(less likely as the patient is improving and hemoglobin is trending up), vasculitis( workup has been negative so far, except positive rheumatoid factor which is nonspecific), eosinophilic pneumonia( eosinophiles negative so far), acute pulmonary edema  · Eosinophils remains within normal limits   · Tolerated HFNC 45L FiO2 65%  · Received 1 mg of Bumex yesterday  · Patient reported she is breathing much better and is bringing up phlegm- like productive cough     Plan:   · Repeat CXR   Hold on any diuretics for now   · Continue antibiotics- last day   · Continue SoluMedrol 40mg q12h- day 3   · Flutter valve, Mucinex   · Wean O2 as tolerated, maintain SpO2 >90%  · F/U ANCA, CCP, cryoglobulin - still pending  · Monitor VS     Bradycardia  Assessment & Plan  · Sinus bradycardia, new since yesterday pm, HR persistent between 42-52 b/m overnight  · Likely iatrogenic, medication induced ( Precedex, Topamax)  Discontinue Precedex, caution with benzodiazepine  · Monitor VS     R/O CAP (community acquired pneumonia)  Assessment & Plan  · Patient has unstable housing, presented to the ED with hypoxia, shortness of breath, and cough  · See plan as noted above in Sepsis     Sepsis ([facility] [address] )  Assessment & Plan  · Criteria on Admission: Tachycardia, tachypnea, elevated wbc's with 12% bandemia, source suspected to be pnumonia?  procalcitonin 3 7  Lactic: 1 1  · UA:  Negative for leukocytes or nitrates, 0-1 wbc's, positive for blood  · Did not reflex to culture  · Sputum culture was not collected   · Blood cultures x2 negative; Legionella urine, strep pneumonia - negative   · Leucocytosis slightly worsened compared to yesterday but she is also on steroids, patient is afebrile and tachycardia resolved   · Procalctionin trending down    Plan:   · Continue ceftriaxone and azithromycin- today the last day( 5th day)  · Monitor VS, trend WBC     Anemia  Assessment & Plan  Recent Labs     09/23/22  0515 09/24/22  0519 09/25/22  0510   HGB 9 3* 9 6* 10 2*     Lab Results   Component Value Date    IRON 16 (L) 09/21/2022    FERRITIN 104 09/21/2022    TIBC 249 (L) 09/21/2022    CONCFE 6 (L) 09/21/2022     · Patient did endorsed coughing up blood however, cough in the ED was nonproductive  No hematemesis during the hospitalization   Had 2-4 RBC in the urine but she is at menses     · Hb now improving      Substance Abuse Disorder   Assessment & Plan  · Currently on Suboxone therapy, continue  · UDS negative   · Patient also endorsed alcohol use - unclear how much     Heavy tobacco smoker  Assessment & Plan  · Currently smokes pack or more daily  · Ordered nicotine patch  · Encouraged cessation when appropriate    Bipolar affective disorder, currently active [facility])  Assessment & Plan  · Per chart review ([facility] notes) patient is on Wellbutrin, Fluoxetine and Topamax  Also, was taking Oxazepam prn   · Was refusing Wellbutrin and fluoxetine since it was resumed  Altered mental status-resolved as of 9/25/2022  Assessment & Plan  · Patient appropriately answered questions and then was running thoughts that are inappropriate to the conversation  Notable history for bipolar  · Coma panel and UDS negative   · Likely hypoxia related, mentation improved     Elevated LFTs-resolved as of 9/25/2022  Assessment & Plan  Recent Labs     09/23/22  0515 09/25/22  0510   AST 34 12   ALT 71 38   ALKPHOS 113 96   TBILI 0 66 0 30     · Endorse alcohol use in ED - unclear if she has a history of such  · Were not elevated on 09/16/2022  · No cirrhotic changes on CT scan  · Now resolved         ----------------------------------------------------------------------------------------  HPI/24hr events: patient asked for anxiety medication yesterday saying that her family will visit her today and she didn't see them in the past 5 years  She asked for ativan 4 mg, but eventually received 0 25 mg alprazolam  Was bradycardic since yesterday afternoon, and the Precedex drip was stopped yesterday at 22:45  Was persistent bradycardic overnight, but HR increased this am after she woke up  Patient appropriate for transfer out of the ICU today?: No  Disposition: Continue Critical Care   Code Status: Level 1 - Full Code  ---------------------------------------------------------------------------------------  SUBJECTIVE  Patient was resting in bed  Mentioned that her breathing is much improved, was able to sleep overnight and that the only discomfort she was having is the nose  Said that is coughing more often and is bringing up mucus, phlegm like without blood  Mentioned that would like to try to eat more and maybe more solid food, as she is not coughing or choking while eating  Had no BM since admission but was NPO also    Denied chest pain, abdominal pain, nausea, vomiting, chills, fever, lightheadededness  Review of Systems   Constitutional: Negative for chills and fatigue  Respiratory: Positive for cough and shortness of breath  Negative for choking and chest tightness  Cardiovascular: Negative for chest pain  Gastrointestinal: Positive for constipation  Negative for abdominal pain, diarrhea, nausea and vomiting  Neurological: Negative for light-headedness  Psychiatric/Behavioral: Negative for sleep disturbance  The patient is not nervous/anxious  All other systems reviewed and are negative  Review of systems was reviewed and negative unless stated above in HPI/24-hour events   ---------------------------------------------------------------------------------------  OBJECTIVE    Vitals   Vitals:    22 0400 22 0500 22 0600 22 0726   BP: 98/62   105/65   BP Location:    Right arm   Pulse: (!) 54 (!) 42 (!) 42 72   Resp: (!) 25 13 15    Temp:   98 °F (36 7 °C) (!) 97 °F (36 1 °C)   TempSrc:   Tympanic Tympanic   SpO2: 100% 100% 100% 99%   Weight:       Height:         Temp (24hrs), Av 8 °F (36 6 °C), Min:96 7 °F (35 9 °C), Max:98 5 °F (36 9 °C)  Current: Temperature: (!) 97 °F (36 1 °C)          Respiratory:  SpO2 Activity: SpO2 Activity: At Rest       Invasive/non-invasive ventilation settings   Respiratory  Report   Lab Data (Last 4 hours)    None         O2/Vent Data (Last 4 hours)    None                Physical Exam  Vitals and nursing note reviewed  Constitutional:       General: She is not in acute distress  Appearance: She is not toxic-appearing or diaphoretic  HENT:      Mouth/Throat:      Mouth: Mucous membranes are dry  Eyes:      General:         Right eye: No discharge  Left eye: No discharge  Cardiovascular:      Rate and Rhythm: Normal rate and regular rhythm  Pulmonary:      Effort: No tachypnea, bradypnea or respiratory distress  Breath sounds: No stridor   Examination of the right-upper field reveals rhonchi  Examination of the left-upper field reveals rhonchi  Examination of the right-middle field reveals rhonchi  Examination of the left-middle field reveals rhonchi  Examination of the right-lower field reveals rhonchi  Examination of the left-lower field reveals rhonchi  Rhonchi present  No wheezing or rales  Abdominal:      General: There is no distension  Palpations: Abdomen is soft  Tenderness: There is no abdominal tenderness  There is no guarding  Musculoskeletal:      Right lower leg: No edema  Left lower leg: No edema  Skin:     General: Skin is warm  Coloration: Skin is pale  Neurological:      Mental Status: She is alert and oriented to person, place, and time  Psychiatric:         Mood and Affect: Mood normal          Behavior: Behavior normal          Thought Content:  Thought content normal          Judgment: Judgment normal              Laboratory and Diagnostics:  Results from last 7 days   Lab Units 09/25/22  0510 09/24/22  0519 09/23/22  0515 09/22/22  0450 09/21/22  1215   WBC Thousand/uL 12 65* 11 33* 14 67* 15 32* 17 06*   HEMOGLOBIN g/dL 10 2* 9 6* 9 3* 9 0* 10 4*   HEMATOCRIT % 32 0* 29 8* 29 4* 29 0* 32 2*   PLATELETS Thousands/uL 434* 309 310 230 248   NEUTROS PCT % 80*  --  89*  --   --    BANDS PCT %  --   --   --   --  12*   MONOS PCT % 6  --  5  --   --    MONO PCT %  --  1*  --   --  3*     Results from last 7 days   Lab Units 09/25/22  0510 09/24/22  0519 09/23/22  0515 09/22/22  0450 09/21/22  1215   SODIUM mmol/L 140 136 140 138 141   POTASSIUM mmol/L 3 9 5 1 3 4* 3 6 3 8   CHLORIDE mmol/L 105 106 105 106 108   CO2 mmol/L 24 19* 21 22 21   ANION GAP mmol/L 11 11 14* 10 12   BUN mg/dL 22 21 18 13 17   CREATININE mg/dL 0 57* 0 43* 0 70 0 72 0 77   CALCIUM mg/dL 8 9 9 2 8 8 7 8* 8 4   GLUCOSE RANDOM mg/dL 173* 158* 110 106 95   ALT U/L 38  --  71 97* 142*   AST U/L 12  --  34 62* 101*   ALK PHOS U/L 96  --  113 107 105   ALBUMIN g/dL 2 0*  --  2 1* 2 2* 2 6*   TOTAL BILIRUBIN mg/dL 0 30  --  0 66 0 82 1 10*     Results from last 7 days   Lab Units 09/25/22  0510 09/24/22  0519 09/23/22  0515 09/22/22  0450   MAGNESIUM mg/dL 1 9 2 2 2 2 2 2   PHOSPHORUS mg/dL 3 9  --   --  3 2               Results from last 7 days   Lab Units 09/21/22  1233   LACTIC ACID mmol/L 1 1     ABG:  Results from last 7 days   Lab Units 09/24/22  0814   PH ART  7 399   PCO2 ART mm Hg 34 6*   PO2 ART mm Hg 53 1*   HCO3 ART mmol/L 20 9*   BASE EXC ART mmol/L -3 4   ABG SOURCE  Radial, Left     VBG:  Results from last 7 days   Lab Units 09/24/22  0814 09/21/22  1710 09/21/22  1623   PH LILIAM   --   --  7 322   PCO2 LILIAM mm Hg  --   --  37 8*   PO2 LILIAM mm Hg  --   --  59 5*   HCO3 LILIAM mmol/L  --   --  19 1*   BASE EXC LILIAM mmol/L  --   --  -6 4   ABG SOURCE  Radial, Left   < >  --     < > = values in this interval not displayed  Results from last 7 days   Lab Units 09/25/22  0510 09/24/22  0519 09/21/22  1215   PROCALCITONIN ng/ml 0 94* 1 90* 3 79*       Micro  Results from last 7 days   Lab Units 09/22/22  1757 09/22/22  1234 09/21/22  1325 09/21/22  1233   BLOOD CULTURE   --   --   --  No Growth at 72 hrs  No Growth at 72 hrs  LEGIONELLA URINARY ANTIGEN  Negative  --   --   --    STREP PNEUMONIAE ANTIGEN, URINE   --  Negative Negative  --        EKG: sinus rhythm   Imaging: I have personally reviewed pertinent reports  and I have personally reviewed pertinent films in PACS    Intake and Output  I/O       09/23 0701 09/24 0700 09/24 0701 09/25 0700 09/25 0701 09/26 0700    P  O  1020 500     I V  (mL/kg) 253 8 (3 5) 278 5 (3 8)     IV Piggyback       Total Intake(mL/kg) 1273 8 (17 4) 778 5 (10 6)     Urine (mL/kg/hr) 1160 (0 7) 3250 (1 9)     Total Output 1160 3250     Net +113 8 -2471 5                  Height and Weights   Height: 5' 2" (157 5 cm)     Body mass index is 29 48 kg/m²    Weight (last 2 days)     Date/Time Weight    09/23/22 0540 73 1 (161 16) Nutrition       Diet Orders   (From admission, onward)             Start     Ordered    09/25/22 0714  Diet Dysphagia/Modified Consistency; Dysphagia 1-Pureed; Dysphagia 1-Pureed; Pudding Thick Liquid  Diet effective now        References:    Nutrtion Support Algorithm Enteral vs  Parenteral   Question Answer Comment   Diet Type Dysphagia/Modified Consistency    Dysphagia/Modified Consistency Dysphagia 1-Pureed    Other Restriction(s): Dysphagia 1-Pureed    Liquid Modifier Pudding Thick Liquid    Special Instructions Aspiration precautions    RD to adjust diet per protocol?  Yes        09/25/22 0714    09/24/22 1147  Dietary nutrition supplements  Once        Question Answer Comment   Select Supplement: [place] - PRO/90 KCALS    Frequency Breakfast        09/24/22 1147    09/24/22 1147  Dietary nutrition supplements  Once        Question Answer Comment   Select Supplement: Gelatein - Fruit Punch - PRO/90 KCALS    Frequency Dinner        09/24/22 1147                  Active Medications  Scheduled Meds:  Current Facility-Administered Medications   Medication Dose Route Frequency Provider Last Rate    acetaminophen  650 mg Oral Q6H PRN NETTE Lemus      azithromycin  500 mg Intravenous Q24H NETTE Rashid 0 mg (09/21/22 2000)    benzonatate  100 mg Oral TID PRN NETTE Lemus      buprenorphine-naloxone  8 mg Sublingual BID NETTE Barron      buPROPion  300 mg Oral Daily Remedios Dooley MD      cefTRIAXone  2,000 mg Intravenous Q24H NETTE Rashid 2,000 mg (09/24/22 1645)    dexmedetomidine  0 1-0 7 mcg/kg/hr Intravenous Titrated NETTE Lemus Stopped (09/24/22 2245)    enoxaparin  40 mg Subcutaneous Daily NETTE Barron      famotidine  20 mg Oral BID PRN Deana Garcia MD      FLUoxetine  40 mg Oral Daily Remedios Dooley MD      gabapentin  400 mg Oral TID Remedios Dooley MD      guaiFENesin  600 mg Oral Q12H [facility] Piyush Fletcher MD      methylPREDNISolone sodium succinate  40 mg Intravenous Q12H [facility] Piyush Fletcher MD      nicotine  21 mg Transdermal Daily NETTE Lindsay      oxazepam  15 mg Oral Daily Enrico Harper MD      topiramate  100 mg Oral BID Piyush Fletcher MD      traZODone  50 mg Oral HS Piyush Fletcher MD       Continuous Infusions:  dexmedetomidine, 0 1-0 7 mcg/kg/hr, Last Rate: Stopped (09/24/22 1561)      PRN Meds:   acetaminophen, 650 mg, Q6H PRN  benzonatate, 100 mg, TID PRN  famotidine, 20 mg, BID PRN        Invasive Devices Review  Invasive Devices  Report    Peripheral Intravenous Line  Duration           Peripheral IV 09/24/22 Right Hand 1 day                Rationale for remaining devices: medical necessities   ---------------------------------------------------------------------------------------  Advance Directive and Living Will:      Power of :    POLST:    ---------------------------------------------------------------------------------------  Care Time Delivered:   defer to my attending       Piyush Fletcher MD      Portions of the record may have been created with voice recognition software  Occasional wrong word or "sound a like" substitutions may have occurred due to the inherent limitations of voice recognition software    Read the chart carefully and recognize, using context, where substitutions have occurred

## 2022-09-25 NOTE — PLAN OF CARE
Problem: MOBILITY - ADULT  Goal: Maintain or return to baseline ADL function  Description: INTERVENTIONS:  -  Assess patient's ability to carry out ADLs; assess patient's baseline for ADL function and identify physical deficits which impact ability to perform ADLs (bathing, care of mouth/teeth, toileting, grooming, dressing, etc )  - Assess/evaluate cause of self-care deficits   - Assess range of motion  - Assess patient's mobility; develop plan if impaired  - Assess patient's need for assistive devices and provide as appropriate  - Encourage maximum independence but intervene and supervise when necessary  - Involve family in performance of ADLs  - Assess for home care needs following discharge   - Consider OT consult to assist with ADL evaluation and planning for discharge  - Provide patient education as appropriate  Outcome: Progressing  Goal: Maintains/Returns to pre admission functional level  Description: INTERVENTIONS:  - Perform BMAT or MOVE assessment daily    - Set and communicate daily mobility goal to care team and patient/family/caregiver  - Collaborate with rehabilitation services on mobility goals if consulted  - Perform Range of Motion 4 times a day  - Reposition patient every 2 hours    - Dangle patient 2 times a day  - Stand patient 2 times a day  - Ambulate patient 2 times a day  - Out of bed to chair 2 times a day   - Out of bed for meals 2 times a day  - Out of bed for toileting  - Record patient progress and toleration of activity level   Outcome: Progressing     Problem: Prexisting or High Potential for Compromised Skin Integrity  Goal: Skin integrity is maintained or improved  Description: INTERVENTIONS:  - Identify patients at risk for skin breakdown  - Assess and monitor skin integrity  - Assess and monitor nutrition and hydration status  - Monitor labs   - Assess for incontinence   - Turn and reposition patient  - Assist with mobility/ambulation  - Relieve pressure over bony prominences  - Avoid friction and shearing  - Provide appropriate hygiene as needed including keeping skin clean and dry  - Evaluate need for skin moisturizer/barrier cream  - Collaborate with interdisciplinary team   - Patient/family teaching  - Consider wound care consult   Outcome: Progressing     Problem: Potential for Falls  Goal: Patient will remain free of falls  Description: INTERVENTIONS:  - Educate patient/family on patient safety including physical limitations  - Instruct patient to call for assistance with activity   - Consult OT/PT to assist with strengthening/mobility   - Keep Call bell within reach  - Keep bed low and locked with side rails adjusted as appropriate  - Keep care items and personal belongings within reach  - Initiate and maintain comfort rounds  - Make Fall Risk Sign visible to staff  - Offer Toileting in advance of need  - Initiate/Maintain bed alarm  - Obtain necessary fall risk management equipment  - Apply yellow socks and bracelet for high fall risk patients  - Consider moving patient to room near nurses station  Outcome: Progressing     Problem: PAIN - ADULT  Goal: Verbalizes/displays adequate comfort level or baseline comfort level  Description: Interventions:  - Encourage patient to monitor pain and request assistance  - Assess pain using appropriate pain scale  - Administer analgesics based on type and severity of pain and evaluate response  - Implement non-pharmacological measures as appropriate and evaluate response  - Consider cultural and social influences on pain and pain management  - Notify physician/advanced practitioner if interventions unsuccessful or patient reports new pain  Outcome: Progressing     Problem: INFECTION - ADULT  Goal: Absence or prevention of progression during hospitalization  Description: INTERVENTIONS:  - Assess and monitor for signs and symptoms of infection  - Monitor lab/diagnostic results  - Monitor all insertion sites, i e  indwelling lines, tubes, and drains  - Monitor endotracheal if appropriate and nasal secretions for changes in amount and color  - [place] appropriate cooling/warming therapies per order  - Administer medications as ordered  - Instruct and encourage patient and family to use good hand hygiene technique  - Identify and instruct in appropriate isolation precautions for identified infection/condition  Outcome: Progressing  Goal: Absence of fever/infection during neutropenic period  Description: INTERVENTIONS:  - Monitor WBC    Outcome: Progressing     Problem: SAFETY ADULT  Goal: Maintain or return to baseline ADL function  Description: INTERVENTIONS:  -  Assess patient's ability to carry out ADLs; assess patient's baseline for ADL function and identify physical deficits which impact ability to perform ADLs (bathing, care of mouth/teeth, toileting, grooming, dressing, etc )  - Assess/evaluate cause of self-care deficits   - Assess range of motion  - Assess patient's mobility; develop plan if impaired  - Assess patient's need for assistive devices and provide as appropriate  - Encourage maximum independence but intervene and supervise when necessary  - Involve family in performance of ADLs  - Assess for home care needs following discharge   - Consider OT consult to assist with ADL evaluation and planning for discharge  - Provide patient education as appropriate  Outcome: Progressing  Goal: Maintains/Returns to pre admission functional level  Description: INTERVENTIONS:  - Perform BMAT or MOVE assessment daily    - Set and communicate daily mobility goal to care team and patient/family/caregiver  - Collaborate with rehabilitation services on mobility goals if consulted  - Perform Range of Motion 4 times a day  - Reposition patient every 2 hours    - Dangle patient 2 times a day  - Stand patient 2 times a day  - Ambulate patient 2 times a day  - Out of bed to chair 2 times a day   - Out of bed for meals 2 times a day  - Out of bed for toileting  - Record patient progress and toleration of activity level   Outcome: Progressing  Goal: Patient will remain free of falls  Description: INTERVENTIONS:  - Educate patient/family on patient safety including physical limitations  - Instruct patient to call for assistance with activity   - Consult OT/PT to assist with strengthening/mobility   - Keep Call bell within reach  - Keep bed low and locked with side rails adjusted as appropriate  - Keep care items and personal belongings within reach  - Initiate and maintain comfort rounds  - Make Fall Risk Sign visible to staff  - Offer Toileting in advance of need  - Initiate/Maintain bed alarm  - Obtain necessary fall risk management equipment  - Apply yellow socks and bracelet for high fall risk patients  - Consider moving patient to room near nurses station  Outcome: Progressing     Problem: Nutrition/Hydration-ADULT  Goal: Nutrient/Hydration intake appropriate for improving, restoring or maintaining nutritional needs  Description: Monitor and assess patient's nutrition/hydration status for malnutrition  Collaborate with interdisciplinary team and initiate plan and interventions as ordered  Monitor patient's weight and dietary intake as ordered or per policy  Utilize nutrition screening tool and intervene as necessary  Determine patient's food preferences and provide high-protein, high-caloric foods as appropriate       INTERVENTIONS:  - Monitor oral intake, urinary output, labs, and treatment plans  - Assess nutrition and hydration status and recommend course of action  - Evaluate amount of meals eaten  - Assist patient with eating if necessary   - Allow adequate time for meals  - Recommend/ encourage appropriate diets, oral nutritional supplements, and vitamin/mineral supplements  - Order, calculate, and assess calorie counts as needed  - Recommend, monitor, and adjust tube feedings and TPN/PPN based on assessed needs  - Assess need for intravenous fluids  - Provide specific nutrition/hydration education as appropriate  - Include patient/family/caregiver in decisions related to nutrition  Outcome: Progressing

## 2022-09-25 NOTE — ASSESSMENT & PLAN NOTE
· Sinus bradycardia, new since yesterday pm, HR persistent between 42-52 b/m overnight  · Likely iatrogenic, medication induced ( Precedex, Topamax)  Discontinue Precedex, caution with benzodiazepine     · Monitor VS

## 2022-09-25 NOTE — PLAN OF CARE
Problem: MOBILITY - ADULT  Goal: Maintain or return to baseline ADL function  Description: INTERVENTIONS:  -  Assess patient's ability to carry out ADLs; assess patient's baseline for ADL function and identify physical deficits which impact ability to perform ADLs (bathing, care of mouth/teeth, toileting, grooming, dressing, etc )  - Assess/evaluate cause of self-care deficits   - Assess range of motion  - Assess patient's mobility; develop plan if impaired  - Assess patient's need for assistive devices and provide as appropriate  - Encourage maximum independence but intervene and supervise when necessary  - Involve family in performance of ADLs  - Assess for home care needs following discharge   - Consider OT consult to assist with ADL evaluation and planning for discharge  - Provide patient education as appropriate  Outcome: Progressing  Goal: Maintains/Returns to pre admission functional level  Description: INTERVENTIONS:  - Perform BMAT or MOVE assessment daily    - Set and communicate daily mobility goal to care team and patient/family/caregiver  - Collaborate with rehabilitation services on mobility goals if consulted  - Perform Range of Motion 4 times a day  - Reposition patient every 2 hours    - Dangle patient 2 times a day  - Stand patient 2 times a day  - Ambulate patient 2 times a day  - Out of bed to chair 2 times a day   - Out of bed for meals 2 times a day  - Out of bed for toileting  - Record patient progress and toleration of activity level   Outcome: Progressing     Problem: Prexisting or High Potential for Compromised Skin Integrity  Goal: Skin integrity is maintained or improved  Description: INTERVENTIONS:  - Identify patients at risk for skin breakdown  - Assess and monitor skin integrity  - Assess and monitor nutrition and hydration status  - Monitor labs   - Assess for incontinence   - Turn and reposition patient  - Assist with mobility/ambulation  - Relieve pressure over bony prominences  - Avoid friction and shearing  - Provide appropriate hygiene as needed including keeping skin clean and dry  - Evaluate need for skin moisturizer/barrier cream  - Collaborate with interdisciplinary team   - Patient/family teaching  - Consider wound care consult   Outcome: Progressing     Problem: Potential for Falls  Goal: Patient will remain free of falls  Description: INTERVENTIONS:  - Educate patient/family on patient safety including physical limitations  - Instruct patient to call for assistance with activity   - Consult OT/PT to assist with strengthening/mobility   - Keep Call bell within reach  - Keep bed low and locked with side rails adjusted as appropriate  - Keep care items and personal belongings within reach  - Initiate and maintain comfort rounds  - Make Fall Risk Sign visible to staff    - Apply yellow socks and bracelet for high fall risk patients  - Consider moving patient to room near nurses station  Outcome: Progressing     Problem: PAIN - ADULT  Goal: Verbalizes/displays adequate comfort level or baseline comfort level  Description: Interventions:  - Encourage patient to monitor pain and request assistance  - Assess pain using appropriate pain scale  - Administer analgesics based on type and severity of pain and evaluate response  - Implement non-pharmacological measures as appropriate and evaluate response  - Consider cultural and social influences on pain and pain management  - Notify physician/advanced practitioner if interventions unsuccessful or patient reports new pain  Outcome: Progressing     Problem: INFECTION - ADULT  Goal: Absence or prevention of progression during hospitalization  Description: INTERVENTIONS:  - Assess and monitor for signs and symptoms of infection  - Monitor lab/diagnostic results  - Monitor all insertion sites, i e  indwelling lines, tubes, and drains  - Monitor endotracheal if appropriate and nasal secretions for changes in amount and color  - [place] appropriate cooling/warming therapies per order  - Administer medications as ordered  - Instruct and encourage patient and family to use good hand hygiene technique  - Identify and instruct in appropriate isolation precautions for identified infection/condition  Outcome: Progressing  Goal: Absence of fever/infection during neutropenic period  Description: INTERVENTIONS:  - Monitor WBC    Outcome: Progressing     Problem: DISCHARGE PLANNING  Goal: Discharge to home or other facility with appropriate resources  Description: INTERVENTIONS:  - Identify barriers to discharge w/patient and caregiver  - Arrange for needed discharge resources and transportation as appropriate  - Identify discharge learning needs (meds, wound care, etc )  - Arrange for interpretive services to assist at discharge as needed  - Refer to Case Management Department for coordinating discharge planning if the patient needs post-hospital services based on physician/advanced practitioner order or complex needs related to functional status, cognitive ability, or social support system  Outcome: Progressing     Problem: SAFETY ADULT  Goal: Maintain or return to baseline ADL function  Description: INTERVENTIONS:  -  Assess patient's ability to carry out ADLs; assess patient's baseline for ADL function and identify physical deficits which impact ability to perform ADLs (bathing, care of mouth/teeth, toileting, grooming, dressing, etc )  - Assess/evaluate cause of self-care deficits   - Assess range of motion  - Assess patient's mobility; develop plan if impaired  - Assess patient's need for assistive devices and provide as appropriate  - Encourage maximum independence but intervene and supervise when necessary  - Involve family in performance of ADLs  - Assess for home care needs following discharge   - Consider OT consult to assist with ADL evaluation and planning for discharge  - Provide patient education as appropriate  Outcome: Progressing  Goal: Maintains/Returns to pre admission functional level  Description: INTERVENTIONS:  - Perform BMAT or MOVE assessment daily    - Set and communicate daily mobility goal to care team and patient/family/caregiver  - Collaborate with rehabilitation services on mobility goals if consulted  - Perform Range of Motion 4 times a day  - Reposition patient every 2 hours  - Dangle patient 2 times a day  - Stand patient 2 times a day  - Ambulate patient 2 times a day  - Out of bed to chair 2 times a day   - Out of bed for meals 2 times a day  - Out of bed for toileting  - Record patient progress and toleration of activity level   Outcome: Progressing  Goal: Patient will remain free of falls  Description: INTERVENTIONS:  - Educate patient/family on patient safety including physical limitations  - Instruct patient to call for assistance with activity   - Consult OT/PT to assist with strengthening/mobility   - Keep Call bell within reach  - Keep bed low and locked with side rails adjusted as appropriate  - Keep care items and personal belongings within reach  - Initiate and maintain comfort rounds  - Make Fall Risk Sign visible to staff    - Apply yellow socks and bracelet for high fall risk patients  - Consider moving patient to room near nurses station  Outcome: Progressing     Problem: Knowledge Deficit  Goal: Patient/family/caregiver demonstrates understanding of disease process, treatment plan, medications, and discharge instructions  Description: Complete learning assessment and assess knowledge base  Interventions:  - Provide teaching at level of understanding  - Provide teaching via preferred learning methods  Outcome: Progressing     Problem: Nutrition/Hydration-ADULT  Goal: Nutrient/Hydration intake appropriate for improving, restoring or maintaining nutritional needs  Description: Monitor and assess patient's nutrition/hydration status for malnutrition   Collaborate with interdisciplinary team and initiate plan and interventions as ordered  Monitor patient's weight and dietary intake as ordered or per policy  Utilize nutrition screening tool and intervene as necessary  Determine patient's food preferences and provide high-protein, high-caloric foods as appropriate       INTERVENTIONS:  - Monitor oral intake, urinary output, labs, and treatment plans  - Assess nutrition and hydration status and recommend course of action  - Evaluate amount of meals eaten  - Assist patient with eating if necessary   - Allow adequate time for meals  - Recommend/ encourage appropriate diets, oral nutritional supplements, and vitamin/mineral supplements  - Order, calculate, and assess calorie counts as needed  - Recommend, monitor, and adjust tube feedings and TPN/PPN based on assessed needs  - Assess need for intravenous fluids  - Provide specific nutrition/hydration education as appropriate  - Include patient/family/caregiver in decisions related to nutrition  Outcome: Progressing

## 2022-09-26 LAB
BACTERIA BLD CULT: NORMAL
BACTERIA BLD CULT: NORMAL
CRYOGLOB SER QL 1D COLD INC: NORMAL
HCV RNA SERPL NAA+PROBE-ACNC: NORMAL IU/ML
TEST INFORMATION: NORMAL

## 2022-09-26 PROCEDURE — 99233 SBSQ HOSP IP/OBS HIGH 50: CPT | Performed by: INTERNAL MEDICINE

## 2022-09-26 PROCEDURE — 94640 AIRWAY INHALATION TREATMENT: CPT

## 2022-09-26 PROCEDURE — NC001 PR NO CHARGE: Performed by: INTERNAL MEDICINE

## 2022-09-26 RX ORDER — ONDANSETRON 4 MG/1
4 TABLET, FILM COATED ORAL EVERY 12 HOURS PRN
COMMUNITY
Start: 2022-09-16 | End: 2022-10-11

## 2022-09-26 RX ORDER — ONDANSETRON 2 MG/ML
4 INJECTION INTRAMUSCULAR; INTRAVENOUS ONCE
Status: COMPLETED | OUTPATIENT
Start: 2022-09-26 | End: 2022-09-26

## 2022-09-26 RX ORDER — ONDANSETRON 2 MG/ML
4 INJECTION INTRAMUSCULAR; INTRAVENOUS EVERY 6 HOURS PRN
Status: DISCONTINUED | OUTPATIENT
Start: 2022-09-26 | End: 2022-09-27

## 2022-09-26 RX ORDER — PREDNISONE 20 MG/1
80 TABLET ORAL DAILY
Status: DISCONTINUED | OUTPATIENT
Start: 2022-09-27 | End: 2022-10-04

## 2022-09-26 RX ORDER — GABAPENTIN 400 MG/1
400 CAPSULE ORAL 3 TIMES DAILY
COMMUNITY
End: 2022-10-11

## 2022-09-26 RX ORDER — OXAZEPAM 15 MG/1
15 CAPSULE ORAL DAILY PRN
COMMUNITY
Start: 2022-09-16 | End: 2022-10-11

## 2022-09-26 RX ORDER — FLUOXETINE HYDROCHLORIDE 40 MG/1
40 CAPSULE ORAL DAILY
COMMUNITY
Start: 2022-08-22 | End: 2022-10-11

## 2022-09-26 RX ORDER — BUPRENORPHINE AND NALOXONE 8; 2 MG/1; MG/1
8 FILM, SOLUBLE BUCCAL; SUBLINGUAL 2 TIMES DAILY
Status: DISCONTINUED | OUTPATIENT
Start: 2022-09-26 | End: 2022-10-05 | Stop reason: HOSPADM

## 2022-09-26 RX ORDER — BUPRENORPHINE AND NALOXONE 8; 2 MG/1; MG/1
8 FILM, SOLUBLE BUCCAL; SUBLINGUAL 2 TIMES DAILY
Status: ON HOLD | COMMUNITY
End: 2022-10-11 | Stop reason: SDUPTHER

## 2022-09-26 RX ORDER — CLONAZEPAM 1 MG/1
1 TABLET ORAL 2 TIMES DAILY
COMMUNITY
End: 2022-10-11

## 2022-09-26 RX ORDER — TRIAMCINOLONE ACETONIDE 1 MG/G
1 CREAM TOPICAL 2 TIMES DAILY
COMMUNITY
Start: 2022-09-02 | End: 2022-10-11

## 2022-09-26 RX ORDER — OXAZEPAM 15 MG/1
15 CAPSULE ORAL
Status: DISCONTINUED | OUTPATIENT
Start: 2022-09-26 | End: 2022-09-27

## 2022-09-26 RX ORDER — IBUPROFEN 600 MG/1
600 TABLET ORAL EVERY 6 HOURS PRN
COMMUNITY
Start: 2022-09-16 | End: 2022-10-11

## 2022-09-26 RX ORDER — CLONAZEPAM 1 MG/1
1 TABLET ORAL 2 TIMES DAILY
Status: DISCONTINUED | OUTPATIENT
Start: 2022-09-26 | End: 2022-10-01

## 2022-09-26 RX ORDER — NALOXONE HYDROCHLORIDE 4 MG/.1ML
4 SPRAY NASAL
COMMUNITY
Start: 2022-09-02

## 2022-09-26 RX ADMIN — IPRATROPIUM BROMIDE AND ALBUTEROL SULFATE 3 ML: 2.5; .5 SOLUTION RESPIRATORY (INHALATION) at 13:23

## 2022-09-26 RX ADMIN — ONDANSETRON 4 MG: 2 INJECTION INTRAMUSCULAR; INTRAVENOUS at 08:12

## 2022-09-26 RX ADMIN — CLONAZEPAM 1 MG: 1 TABLET ORAL at 12:13

## 2022-09-26 RX ADMIN — TRAZODONE HYDROCHLORIDE 50 MG: 50 TABLET ORAL at 22:36

## 2022-09-26 RX ADMIN — NICOTINE POLACRILEX 2 MG: 2 GUM, CHEWING ORAL at 12:17

## 2022-09-26 RX ADMIN — METHYLPREDNISOLONE SODIUM SUCCINATE 40 MG: 40 INJECTION, POWDER, FOR SOLUTION INTRAMUSCULAR; INTRAVENOUS at 08:02

## 2022-09-26 RX ADMIN — OXAZEPAM 15 MG: 15 CAPSULE, GELATIN COATED ORAL at 22:37

## 2022-09-26 RX ADMIN — GABAPENTIN 400 MG: 400 CAPSULE ORAL at 16:29

## 2022-09-26 RX ADMIN — SODIUM CHLORIDE SOLN NEBU 3%: 3 NEBU SOLN at 01:59

## 2022-09-26 RX ADMIN — SODIUM CHLORIDE SOLN NEBU 3% 4 ML: 3 NEBU SOLN at 13:23

## 2022-09-26 RX ADMIN — NICOTINE POLACRILEX 2 MG: 2 GUM, CHEWING ORAL at 17:43

## 2022-09-26 RX ADMIN — ACETAMINOPHEN 650 MG: 325 TABLET, FILM COATED ORAL at 08:07

## 2022-09-26 RX ADMIN — TOPIRAMATE 100 MG: 100 TABLET, FILM COATED ORAL at 08:05

## 2022-09-26 RX ADMIN — CLONAZEPAM 1 MG: 1 TABLET ORAL at 18:21

## 2022-09-26 RX ADMIN — IPRATROPIUM BROMIDE AND ALBUTEROL SULFATE 3 ML: 2.5; .5 SOLUTION RESPIRATORY (INHALATION) at 02:00

## 2022-09-26 RX ADMIN — Medication 21 MG: at 08:04

## 2022-09-26 RX ADMIN — ENOXAPARIN SODIUM 40 MG: 40 INJECTION SUBCUTANEOUS at 08:02

## 2022-09-26 RX ADMIN — GABAPENTIN 400 MG: 400 CAPSULE ORAL at 08:02

## 2022-09-26 RX ADMIN — SODIUM CHLORIDE SOLN NEBU 3% 4 ML: 3 NEBU SOLN at 07:34

## 2022-09-26 RX ADMIN — BUPRENORPHINE AND NALOXONE 8 MG: 8; 2 FILM BUCCAL; SUBLINGUAL at 08:02

## 2022-09-26 RX ADMIN — TOPIRAMATE 100 MG: 100 TABLET, FILM COATED ORAL at 18:21

## 2022-09-26 RX ADMIN — BUPRENORPHINE AND NALOXONE 8 MG: 8; 2 FILM BUCCAL; SUBLINGUAL at 15:07

## 2022-09-26 RX ADMIN — GABAPENTIN 400 MG: 400 CAPSULE ORAL at 22:37

## 2022-09-26 RX ADMIN — ONDANSETRON 4 MG: 2 INJECTION INTRAMUSCULAR; INTRAVENOUS at 16:29

## 2022-09-26 RX ADMIN — NICOTINE POLACRILEX 2 MG: 2 GUM, CHEWING ORAL at 09:00

## 2022-09-26 RX ADMIN — ACETAMINOPHEN 650 MG: 325 TABLET, FILM COATED ORAL at 17:36

## 2022-09-26 RX ADMIN — IPRATROPIUM BROMIDE AND ALBUTEROL SULFATE 3 ML: 2.5; .5 SOLUTION RESPIRATORY (INHALATION) at 07:29

## 2022-09-26 NOTE — ASSESSMENT & PLAN NOTE
· Criteria on Admission: Tachycardia, tachypnea, elevated wbc's with 12% bandemia, source suspected to be pnumonia?  procalcitonin 3 7   Lactic: 1 1  · UA:  Negative for leukocytes or nitrates, 0-1 wbc's, positive for blood  · Did not reflex to culture  · Sputum culture was not collected   · Blood cultures x2 negative; Legionella urine, strep pneumonia - negative   · Leucocytosis slightly worsened compared to yesterday but she is also on steroids, patient is afebrile and tachycardia resolved   · Procalctionin trending down    Plan:   · Continue ceftriaxone and azithromycin- today the last day( 5th day)  · 5 days abx

## 2022-09-26 NOTE — ASSESSMENT & PLAN NOTE
· New onset with worsening SOB on 9/21 - endorsed pleuritic chest pain, reported bloody sputum at home  · Began following vaping a new flavor solution  · Oxygen sats on presentation were 50% on 4L   · CT Chest:  Negative for PE, Extensive groundglass airspace opacity in the lungs with peripheral sparing  Mediastinal and bilateral hilar lymphadenopathy - concerns for noncardiogenic pulmonary edema  · COVID, RSV, flu panel - negative   · CXR: Development of bilateral opacities likely representing multifocal pneumonia  Repeat CXR on 9/22 showed worsening of the opacities in the bilateral lungs- possible lopoid pneumonia  · ECHO: LVEF 76%, no diastolic dysfunction, IVC dilated with moderate pulmonary hypertension( looks acute likely ARDS related as right ventricle is not dilated), NT pro PNP: 2 493  · Differentials include ARDS(2/2 to sepsis or vapping), diffuse alveolar hemorrhage(less likely as the patient is improving and hemoglobin is trending up), vasculitis( workup has been negative so far, except positive rheumatoid factor which is nonspecific), eosinophilic pneumonia( eosinophiles negative so far), acute pulmonary edema, lopoid pneumonia   · Able to wean down her FIO2    Plan:   · Completed zithromax, remains on ceftriaxone for now  · Continue SoluMedrol 40mg q12h- day 4   Will change to prednisone 1mg/kg x 10 days then taper over 10 days  · Flutter valve, Mucinex   · Wean O2 as tolerated, maintain SpO2 >90%  · F/U ANCA, CCP, cryoglobulin - still pending  · Monitor VS

## 2022-09-26 NOTE — ASSESSMENT & PLAN NOTE
· Sinus bradycardia, new since yesterday pm, HR persistent between 42-52 b/m overnight  · Likely iatrogenic, medication induced ( Precedex, Topamax)  Discontinue Precedex, caution with benzodiazepine  · Monitor VS   · Improved today   No bradycardia noted today

## 2022-09-26 NOTE — PROGRESS NOTES
Transfer Note - ICU/Stepdown Transfer to [facility] tele   Pegge Locker [address] y o  female MRN: [id number]  [address] [facility]   Unit/Bed#: ICU 01 Encounter: [id number]    Code Status: Level 1 - Full Code    Reason for ICU/Stepdown admission: 39yo F with sob on bibap weaned to hfnc and now 6L nc for acute respiratory failure with hypoxia  Active problems:     * Acute respiratory failure with hypoxia (HCC)  Assessment & Plan  · New onset with worsening SOB on 9/21 - endorsed pleuritic chest pain, reported bloody sputum at home  · Began following vaping a new flavor solution  · Oxygen sats on presentation were 50% on 4L   · CT Chest:  Negative for PE, Extensive groundglass airspace opacity in the lungs with peripheral sparing  Mediastinal and bilateral hilar lymphadenopathy - concerns for noncardiogenic pulmonary edema  · COVID, RSV, flu panel - negative   · CXR: Development of bilateral opacities likely representing multifocal pneumonia  Repeat CXR on 9/22 showed worsening of the opacities in the bilateral lungs- possible lopoid pneumonia  · ECHO: LVEF 51%, no diastolic dysfunction, IVC dilated with moderate pulmonary hypertension( looks acute likely ARDS related as right ventricle is not dilated), NT pro PNP: 2 493  · Differentials include ARDS(2/2 to sepsis or vapping), diffuse alveolar hemorrhage(less likely as the patient is improving and hemoglobin is trending up), vasculitis( workup has been negative so far, except positive rheumatoid factor which is nonspecific), eosinophilic pneumonia( eosinophiles negative so far), acute pulmonary edema, lopoid pneumonia   · Able to wean down her FIO2    Plan:   · Completed zithromax, remains on ceftriaxone for now  · Continue SoluMedrol 40mg q12h- day 4   Will change to prednisone 1mg/kg x 10 days then taper over 10 days  · Flutter valve, Mucinex   · Wean O2 as tolerated, maintain SpO2 >90%  · F/U ANCA, CCP, cryoglobulin - still pending  · Monitor VS R/O CAP (community acquired pneumonia)  Assessment & Plan  · Patient has unstable housing, presented to the ED with hypoxia, shortness of breath, and cough  · See plan as noted above in Sepsis  · Finished abx     Sepsis ([address] )  Assessment & Plan  · Criteria on Admission: Tachycardia, tachypnea, elevated wbc's with 12% bandemia, source suspected to be pnumonia?  procalcitonin 3 7  Lactic: 1 1  · UA:  Negative for leukocytes or nitrates, 0-1 wbc's, positive for blood  · Did not reflex to culture  · Sputum culture was not collected   · Blood cultures x2 negative; Legionella urine, strep pneumonia - negative   · Leucocytosis slightly worsened compared to yesterday but she is also on steroids, patient is afebrile and tachycardia resolved   · Procalctionin trending down    Plan:   · Continue ceftriaxone and azithromycin- today the last day( 5th day)  · 5 days abx    Bipolar affective disorder, currently active [facility])  Assessment & Plan  · Per chart review ([facility] notes) patient is on Wellbutrin, Fluoxetine and Topamax  Also, was taking Oxazepam prn   · Was refusing Wellbutrin and fluoxetine since it was resumed    Substance Abuse Disorder   Assessment & Plan  · Currently on Suboxone therapy, continue  · UDS negative   · Patient also endorsed alcohol use - unclear how muc     Heavy tobacco smoker  Assessment & Plan  · Currently smokes pack or more daily  · Ordered nicotine patch  · Encouraged cessation       Anemia  Assessment & Plan  Recent Labs     09/24/22  0519 09/25/22  0510   HGB 9 6* 10 2*     Lab Results   Component Value Date    IRON 16 (L) 09/21/2022    FERRITIN 104 09/21/2022    TIBC 249 (L) 09/21/2022    CONCFE 6 (L) 09/21/2022     · Patient did endorsed coughing up blood however, cough in the ED was nonproductive  No hematemesis during the hospitalization  Had 2-4 RBC in the urine but she is at menses     · Hb now improving   No labs drawn today  · Will trend      Bradycardia  Assessment & Plan  · Sinus bradycardia, new since yesterday pm, HR persistent between 42-52 b/m overnight  · Likely iatrogenic, medication induced ( Precedex, Topamax)  Discontinue Precedex, caution with benzodiazepine  · Monitor VS   · Improved today  No bradycardia noted today        Consultants:   · Will need possible pulmonary consult    History of Present Illness/Summary of clinical course:     Please refer to today's progress note for further clinical details  Recent or scheduled procedures: none    Outstanding/pending diagnostics: anca and hcv quant, ccp, cryoglobulins       Mobilization Plan: as able    Nutrition Plan: regular    Discharge Plan: Patient should be ready for discharge to [facility] on or after 9/27     Specific Diagnosis Plan:    Sepsis: Source (cultures): pulm, Antibiotics: cefrtiaxone, azithro - finished  , Length:  5d; Need for Infectious Disease consult: no        [  x] Family aware of transfer out of critical care: yes   [  ] Home medications that are not reordered and reason why: they are updated      Spoke with Dr Nancy briceno regarding transfer @ [phone]  Patient accepted to their service      Beverley Cortez PA-C

## 2022-09-26 NOTE — PLAN OF CARE
Problem: RESPIRATORY - ADULT  Goal: Achieves optimal ventilation and oxygenation  Description: INTERVENTIONS:  - Assess for changes in respiratory status  - Assess for changes in mentation and behavior  - Position to facilitate oxygenation and minimize respiratory effort  - Oxygen administered by appropriate delivery if ordered  - Initiate smoking cessation education as indicated  - Encourage broncho-pulmonary hygiene including cough, deep breathe, Incentive Spirometry  - Assess the need for suctioning and aspirate as needed  - Assess and instruct to report SOB or any respiratory difficulty  - Respiratory Therapy support as indicated  Outcome: Progressing     Problem: INFECTION - ADULT  Goal: Absence or prevention of progression during hospitalization  Description: INTERVENTIONS:  - Assess and monitor for signs and symptoms of infection  - Monitor lab/diagnostic results  - Monitor all insertion sites, i e  indwelling lines, tubes, and drains  - Monitor endotracheal if appropriate and nasal secretions for changes in amount and color  - [place] appropriate cooling/warming therapies per order  - Administer medications as ordered  - Instruct and encourage patient and family to use good hand hygiene technique  - Identify and instruct in appropriate isolation precautions for identified infection/condition  Outcome: Progressing     Problem: INFECTION - ADULT  Goal: Absence of fever/infection during neutropenic period  Description: INTERVENTIONS:  - Monitor WBC    Outcome: Progressing

## 2022-09-26 NOTE — CASE MANAGEMENT
Case Management Assessment & Discharge Planning Note    Patient name Merly Gomes  Location ICU ICU  MRN [id number]  : 1975 Date 2022       Current Admission Date: 2022  Current Admission Diagnosis:Acute respiratory failure with hypoxia [facility])   Patient Active Problem List    Diagnosis Date Noted    Bradycardia 2022    Acute respiratory failure with hypoxia ([facility] [address] ) 2022    Heavy tobacco smoker 2022    Substance Abuse Disorder  2022    Anemia 2022    Sepsis ([facility] [address] ) 2022    R/O CAP (community acquired pneumonia) 2022    Muscle spasm of shoulder region 2022    Dyspepsia 2022    Bipolar affective disorder, currently active ([place] [address] ) 2021    Tobacco abuse 2021    Encounter for monitoring opioid maintenance therapy 2021    Medical clearance for psychiatric admission 2021    Rhabdomyolysis 2021      LOS (days): 5  Geometric Mean LOS (GMLOS) (days): 4 80  Days to GMLOS:-0 2     OBJECTIVE:    Risk of Unplanned Readmission Score: 16 97         Current admission status: Inpatient  Referral Reason: Other (Disposition planning)    Preferred Pharmacy:   [address] [place] [address], [address] [place] [address]  [address] [place] [address]  Phone: [phone] Fax: [phone] [address] [facility],[address], [address] [facility]  [facility] [address]  Phone: [phone] Fax: [address] [place], [place] - [address] ,  [address] ,  [facility] [address]  Phone: [phone] Fax: [phone]    Primary Care Provider: No primary care provider on file      Primary Insurance: Yemi ROSEN  Secondary Insurance:     ASSESSMENT:  Active Health Care Proxies     [facility] [address] Representative - Mother   Primary Phone: [phone] (Mobile)               Advance Directives  Does patient have a [facility] POA?: No  Was patient offered paperwork?: Yes (5 Wishes)  Does patient currently have a Health Care decision maker?: Yes, please see Health Care Proxy section  Does patient have Advance Directives?: No  Was patient offered paperwork?: Yes (5 Wishes)  Primary Contact: Mother Leana Hill         Readmission Root Cause  30 Day Readmission: No    Patient Information  Admitted from[de-identified] Home  Mental Status: Alert  During Assessment patient was accompanied by: Not accompanied during assessment  Assessment information provided by[de-identified] Patient  Primary Caregiver: Self  Support Systems: Friend, Parent  [place] of Residence: Other (specify in comment box) [employer] - previously, reports she has now moved to [place])  What city do you live in?: Patient stated she will be living with a friend at [place]; does not have address but will get it from her friend when he comes to visit this afternoon  Home entry access options   Select all that apply : Stairs  Number of steps to enter home : 6  Do the steps have railings?: No  Type of Current Residence: 3 story home (Has a room on the second floor of a house with her friend)  Upon entering residence, is there a bedroom on the main floor (no further steps)?: No  A bedroom is located on the following floor levels of residence (select all that apply):: 2nd Floor  Upon entering residence, is there a bathroom on the main floor (no further steps)?: Yes  Number of steps to 2nd floor from main floor: One Flight  In the last 12 months, was there a time when you were not able to pay the mortgage or rent on time?: No (Patient states she has not had rent or mortgage payments in the last year)  In the last 12 months, how many places have you lived?: 4  In the last 12 months, was there a time when you did not have a steady place to sleep or slept in a shelter (including now)?: Yes  Homeless/housing insecurity resource given?: Yes  Living Arrangements: Lives w/ Friend    Activities of Daily Living Prior to Admission  Functional Status: Independent  Completes ADLs independently?: Yes  Ambulates independently?: Yes  Does patient use assisted devices?: No  Does patient currently own DME?: No  Does patient have a history of Outpatient Therapy (PT/OT)?: No  Does the patient have a history of Short-Term Rehab?: No  Does patient have a history of HHC?: No  Does patient currently have [place]?: No         Patient Information Continued  Income Source: Food stamps (Unemployed - has SNAP but not SSI; reports having SSI in [place] prior to moving   States she has number to call to re-apply)  Does patient have prescription coverage?: Yes  Within the past 12 months, you worried that your food would run out before you got the money to buy more : Sometimes true ("yes")  Within the past 12 months, the food you bought just didn't last and you didn't have money to get more : Sometimes true ("yes")  Food insecurity resource given?: Yes  Does patient receive dialysis treatments?: No  Does patient have a history of substance abuse?: Yes  Historical substance use preference: Other (Tramadol)  History of Withdrawal Symptoms: Denies past symptoms  Is patient currently in treatment for substance abuse?: Yes (On Suboxone for last 12 years - sees Dr hCristen Posadas w/ [address] [place] [address])  Does patient have a history of Mental Health Diagnosis?: Yes (Depression)  Is patient receiving treatment for mental health?: Yes (Wellbutrin, Prozac through [address] [place] [address])  Has patient received inpatient treatment related to mental health in the last 2 years?: Yes ([facility] 1 year ago following the passing of her father)         Means of [employer] of Transport to [employer][de-identified] Friends  In the past 12 months, has lack of transportation kept you from medical appointments or from getting medications?: Yes  In the past 12 months, has lack of transportation kept you from meetings, work, or from getting things needed for daily living?: Yes  Was application for public transport provided?: Yes        DISCHARGE DETAILS:    Discharge planning discussed with[de-identified] Patient        CM contacted family/caregiver?: No- see comments (Patient declined)                            Other Referral/Resources/Interventions Provided:  Interventions: Geo, [address], Transportation, Shelter  Referral Comments: Food virk, 5 Wishes, Alt[employer] Chau, homeless resources    Would you like to participate in our [address] service program?  : Yes    Treatment Team Recommendation: Home  Discharge Destination Plan[de-identified] Home  Transport at Discharge : Auto with designated  ([employer] (roll.to) Shannon to drive at [employer])                       Accompanied by: Ivanna Hassan

## 2022-09-26 NOTE — PROGRESS NOTES
This RN transferred pt over to [place] [address] on 5L NC  ICU RN Swathi Christianson called report to [place] [address] RN prior to pt transfer  Pt sent with all personal belongings and stable at time of transfer

## 2022-09-26 NOTE — UTILIZATION REVIEW
Initial Clinical Review    Admission: Date/Time/Statement:   Admission Orders (From admission, onward)     Ordered        09/21/22 1600  INPATIENT ADMISSION  Once                      Orders Placed This Encounter   Procedures    INPATIENT ADMISSION     Standing Status:   Standing     Number of Occurrences:   1     Order Specific Question:   Level of Care     Answer:   Critical Care [15]     Order Specific Question:   Estimated length of stay     Answer:   More than 2 Midnights     Order Specific Question:   Certification     Answer:   I certify that inpatient services are medically necessary for this patient for a duration of greater than two midnights  See H&P and MD Progress Notes for additional information about the patient's course of treatment  ED Arrival Information     Expected   -    Arrival   9/21/2022 11:38    Acuity   Emergent            Means of arrival   Wheelchair    Escorted by   Family Member    Service   Critical Care/ICU    Admission type   Emergency            Arrival complaint   sob,dizziness           Chief Complaint   Patient presents with    Shortness of Breath     Pt reports SOB and cough since yesterday   Denies fevers, N/V/D       Initial Presentation: 41 y o  female ***    Date: ***   Day 2: ***    ED Triage Vitals   Temperature Pulse Respirations Blood Pressure SpO2   09/21/22 1147 09/21/22 1147 09/21/22 1147 09/21/22 1147 09/21/22 1147   98 4 °F (36 9 °C) 92 19 115/58 (S) (!) 60 %      Temp Source Heart Rate Source Patient Position - Orthostatic VS BP Location FiO2 (%)   09/21/22 1147 09/21/22 1147 09/21/22 1147 09/21/22 1147 09/21/22 1712   Oral Monitor Sitting Left arm 100      Pain Score       09/21/22 1712       No Pain          Wt Readings from Last 1 Encounters:   09/23/22 73 1 kg (161 lb 2 5 oz)     Additional Vital Signs: ***  Pertinent Labs/Diagnostic Test Results:   XR chest portable ICU   Final Result by Shiloh Iniguez MD (09/25 1140)      Much improved but persistent moderate bilateral pulmonary infiltrates  Workstation performed: [id number]         XR chest portable ICU   Final Result by Joe Kinsey MD (09/23 1010)      Severe bilateral consolidations are not significantly changed  Workstation performed: [id number]         XR chest portable ICU   Final Result by Katie Hamm MD (09/22 1551)      No change in severe bilateral consolidation from earlier today  Workstation performed: [id number]         XR chest portable ICU   Final Result by Katie Hamm MD (09/22 0831)      Severe bilateral airspace consolidation, markedly increased from one day earlier  Workstation performed: [id number]         PE Study with CT Abdomen and Pelvis with contrast   Final Result by Margareth Busby MD (09/21 1540)      No pulmonary embolus  Extensive groundglass airspace opacity in the lungs with peripheral sparing  Mediastinal and bilateral hilar lymphadenopathy  The findings are most suspicious for noncardiogenic pulmonary edema  Acute diffuse alveolar hemorrhage could produce this appearance but would be expected to present with severe hemoptysis  Pulmonary vascular congestive changes related to heart failure is also possible but would be expected to present with pleural    effusions  No acute abnormality in the abdomen or pelvis  This examination was marked "immediate notification" in Epic in order to begin the standard process by which the radiology reading room liaison alerts the referring practitioner                 Workstation performed: [id number]         XR chest 1 view portable   Final Result by Ej Aggarwla MD (09/21 1305)      Development of bilateral opacities likely representing multifocal pneumonia                  Workstation performed: [id number]           Results from last 7 days   Lab Units 09/22/22  1234 09/21/22  1233   SARS-COV-2  Not Detected Negative     Results from last 7 days   Lab Units 09/25/22  0510 09/24/22  0519 09/23/22  0515 09/22/22  0450 09/21/22  1215   WBC Thousand/uL 12 65* 11 33* 14 67* 15 32* 17 06*   HEMOGLOBIN g/dL 10 2* 9 6* 9 3* 9 0* 10 4*   HEMATOCRIT % 32 0* 29 8* 29 4* 29 0* 32 2*   PLATELETS Thousands/uL 434* 309 310 230 248   NEUTROS ABS Thousands/µL 10 18*  --  12 97*  --   --    BANDS PCT %  --   --   --   --  12*         Results from last 7 days   Lab Units 09/25/22  0510 09/24/22  0519 09/23/22  0515 09/22/22  0450 09/21/22  1215   SODIUM mmol/L 140 136 140 138 141   POTASSIUM mmol/L 3 9 5 1 3 4* 3 6 3 8   CHLORIDE mmol/L 105 106 105 106 108   CO2 mmol/L 24 19* 21 22 21   ANION GAP mmol/L 11 11 14* 10 12   BUN mg/dL 22 21 18 13 17   CREATININE mg/dL 0 57* 0 43* 0 70 0 72 0 77   EGFR ml/min/1 73sq m 112 123 104 101 93   CALCIUM mg/dL 8 9 9 2 8 8 7 8* 8 4   CALCIUM, IONIZED mmol/L  --   --   --  1 05*  --    MAGNESIUM mg/dL 1 9 2 2 2 2 2 2  --    PHOSPHORUS mg/dL 3 9  --   --  3 2  --      Results from last 7 days   Lab Units 09/25/22  0510 09/23/22  0515 09/22/22  0450 09/21/22  1215   AST U/L 12 34 62* 101*   ALT U/L 38 71 97* 142*   ALK PHOS U/L 96 113 107 105   TOTAL PROTEIN g/dL 6 2* 6 8 6 2* 6 7   ALBUMIN g/dL 2 0* 2 1* 2 2* 2 6*   TOTAL BILIRUBIN mg/dL 0 30 0 66 0 82 1 10*     Results from last 7 days   Lab Units 09/25/22  1207 09/25/22  0621 09/25/22  0008 09/24/22  1831 09/24/22  1207 09/23/22  1717 09/23/22  1344   POC GLUCOSE mg/dl 119 144* 164* 183* 197* 156* 124     Results from last 7 days   Lab Units 09/25/22  0510 09/24/22  0519 09/23/22  0515 09/22/22  0450 09/21/22  1215   GLUCOSE RANDOM mg/dL 173* 158* 110 106 95             No results found for: BETA-HYDROXYBUTYRATE   Results from last 7 days   Lab Units 09/24/22  0814 09/22/22  1027 09/21/22  1710   PH ART  7 399 7 373 7 353   PCO2 ART mm Hg 34 6* 37 6 34 6*   PO2 ART mm Hg 53 1* 101 3 73 7*   HCO3 ART mmol/L 20 9* 21 4* 18 8*   BASE EXC ART mmol/L -3 4 -3 4 -5 8   O2 CONTENT ART mL/dL 12 4* 15 3* 18 6   O2 HGB, ARTERIAL % 86 2* 96 2 91 1*   ABG SOURCE  Radial, Left Radial, Right Radial, Right   NON VENT TYPE HFNC  HFNC Flow  --  HFNC Flow   HFNC FLOW LPM  40  --  55     Results from last 7 days   Lab Units 09/21/22  1623   PH LILIAM  7 322   PCO2 LILIAM mm Hg 37 8*   PO2 LILIAM mm Hg 59 5*   HCO3 LILIAM mmol/L 19 1*   BASE EXC LILIAM mmol/L -6 4   O2 CONTENT LILIAM ml/dL 12 6   O2 HGB, VENOUS % 85 6*             Results from last 7 days   Lab Units 09/21/22  1623 09/21/22  1421 09/21/22  1215   HS TNI 0HR ng/L  --   --  10   HS TNI 2HR ng/L  --  7  --    HSTNI D2 ng/L  --  -3  --    HS TNI 4HR ng/L 9  --   --    HSTNI D4 ng/L -1  --   --      Results from last 7 days   Lab Units 09/21/22  1215   D-DIMER QUANTITATIVE ug/ml FEU 1 98*             Results from last 7 days   Lab Units 09/25/22  0510 09/24/22  0519 09/21/22  1215   PROCALCITONIN ng/ml 0 94* 1 90* 3 79*     Results from last 7 days   Lab Units 09/21/22  1233   LACTIC ACID mmol/L 1 1             Results from last 7 days   Lab Units 09/21/22  1215   NT-PRO BNP pg/mL 2,492*     Results from last 7 days   Lab Units 09/21/22  1623   FERRITIN ng/mL 104     Results from last 7 days   Lab Units 09/21/22  1815   HEP B S AG  Non-reactive   HEP C AB  High Reactive*   HEP B C IGM  Non-reactive   HEP B C TOTAL AB  Non-reactive                     Results from last 7 days   Lab Units 09/21/22  1325   CLARITY UA  Clear   COLOR UA  Leilani   SPEC GRAV UA  1 025   PH UA  7 0   GLUCOSE UA mg/dl Negative   KETONES UA mg/dl 15 (1+)*   BLOOD UA  Moderate*   PROTEIN UA mg/dl 100 (2+)*   NITRITE UA  Negative   BILIRUBIN UA  Small*   UROBILINOGEN UA E U /dl >=8 0*   LEUKOCYTES UA  Negative   WBC UA /hpf 0-1*   RBC UA /hpf 2-4   BACTERIA UA /hpf Occasional   EPITHELIAL CELLS WET PREP /hpf Occasional     Results from last 7 days   Lab Units 09/22/22  1757 09/22/22  1234 09/21/22  1325 09/21/22  1233   STREP PNEUMONIAE ANTIGEN, URINE   --  Negative Negative  --    LEGIONELLA URINARY ANTIGEN  Negative  --   --   --    INFLUENZA A PCR   --   --   --  Negative   INFLUENZA B PCR   --   --   --  Negative   RSV PCR   --   --   --  Negative   RESPIRATORY SYNCYTIAL VIRUS   --  Not Detected  --   --      Results from last 7 days   Lab Units 09/22/22  1234   ADENOVIRUS  Not Detected   BORDETELLA PARAPERTUSSIS  Not Detected   BORDETELLA PERTUSSIS  Not Detected   CHLAMYDIA PNEUMONIAE  Not Detected   CORONAVIRUS 229E  Not Detected   CORONAVIRUS HKU1  Not Detected   CORONAVIRUS NL63  Not Detected   CORONAVIRUS OC43  Not Detected   METAPNEUMOVIRUS  Not Detected   RHINOVIRUS  Not Detected   MYCOPLASMA PNEUMONIAE  Not Detected   PARAINFLUENZA 1  Not Detected   PARAINFLUENZA 2  Not Detected   PARAINFLUENZA 3  Not Detected   PARAINFLUENZA 4  Not Detected     Results from last 7 days   Lab Units 09/21/22  1327   AMPH/METH  Negative   BARBITURATE UR  Negative   BENZODIAZEPINE UR  Negative   COCAINE UR  Negative   METHADONE URINE  Negative   OPIATE UR  Negative   PCP UR  Negative   THC UR  Negative     Results from last 7 days   Lab Units 09/21/22  1623   ETHANOL LVL mg/dL <3   ACETAMINOPHEN LVL ug/mL <2*   SALICYLATE LVL mg/dL 5 7                 Results from last 7 days   Lab Units 09/21/22  1233   BLOOD CULTURE  No Growth at 72 hrs  No Growth at 72 hrs                 ED Treatment:   Medication Administration from 09/21/2022 1137 to 09/21/2022 1705       Date/Time Order Dose Route Action Action by Comments     09/21/2022 1223 albuterol inhalation solution 5 mg 5 mg Nebulization Given Alice Ascencio      09/21/2022 1224 ipratropium (ATROVENT) 0 02 % inhalation solution 0 5 mg 0 5 mg Nebulization Given Alexa Savage      09/21/2022 1440 ceftriaxone (ROCEPHIN) 2 g/50 mL in dextrose IVPB 0 mg Intravenous Stopped Alice Ascencio      09/21/2022 1410 ceftriaxone (ROCEPHIN) 2 g/50 mL in dextrose IVPB 2,000 mg Intravenous New Bag Alice Ascencio      09/21/2022 1503 ALPRAZolam Blanca Buys) tablet 2 mg 2 mg Oral Given Alice Jameson      09/21/2022 1453 iohexol (OMNIPAQUE) 350 MG/ML injection (MULTI-DOSE) 85 mL 85 mL Intravenous Given Hemanth         Past Medical History:   Diagnosis Date    Altered mental status 9/21/2022    Elevated LFTs 9/21/2022    Lower back pain      Present on Admission:   Bipolar affective disorder, currently active [facility])      Admitting Diagnosis: Respiratory failure ([facility] [address] ) [J96 90]  Hypoxia [R09 02]  Bilateral pneumonia [J18 9]  Acute respiratory failure with hypoxia ([facility] [address] ) [J96 01]  Age/Sex: 41 y o  female  Admission Orders:  Scheduled Medications:  buprenorphine-naloxone, 8 mg, Sublingual, BID  buPROPion, 300 mg, Oral, Daily  enoxaparin, 40 mg, Subcutaneous, Daily  FLUoxetine, 40 mg, Oral, Daily  gabapentin, 400 mg, Oral, TID  ipratropium-albuterol, 3 mL, Nebulization, Q6H  methylPREDNISolone sodium succinate, 40 mg, Intravenous, Q12H [address]  nicotine, 21 mg, Transdermal, Daily  oxazepam, 15 mg, Oral, Daily  sodium chloride, 4 mL, Nebulization, Q8H  topiramate, 100 mg, Oral, BID  traZODone, 50 mg, Oral, HS      Continuous IV Infusions:     PRN Meds:  acetaminophen, 650 mg, Oral, Q6H PRN  famotidine, 20 mg, Oral, BID PRN  nicotine polacrilex, 2 mg, Oral, Q2H PRN        IP CONSULT TO CASE MANAGEMENT    Network Utilization Review Department  ATTENTION: Please call with any questions or concerns to [phone] and carefully listen to the prompts so that you are directed to the right person  All voicemails are confidential   [facility] all requests for admission clinical reviews, approved or denied determinations and any other requests to dedicated fax number below belonging to the campus where the patient is receiving treatment   List of dedicated fax numbers for the Facilities:  [address] DENIALS (Administrative/Medical Necessity) [phone]   [address] (Maternity/NICU/Pediatrics) 261 [facility] 40 125 [facility]  [phone]   [facility] 50 150 [facility] [place] [address] [facility] [address] [address] [place] [address] Brayden Adair [facility] [address] [facility] [phone]

## 2022-09-26 NOTE — PROGRESS NOTES
Patient transported to [facility] 2 room 251 by Ana Luisa Norman RN with 4L NC  Belongings sent with patient  Patient's family made aware of transfer by patient  IV intact and patent

## 2022-09-26 NOTE — PROGRESS NOTES
Pastoral Care Progress Note    2022  Patient: Iqra Ott : 1975  Admission Date & Time: 2022 1150  MRN: [id number] [facility]: [id number]                     Chaplaincy Interventions Utilized:   Empowerment: Clarified, confirmed, or reviewed information from treatment team     Exploration: Explored emotional needs & resources    Collaboration: Encouraged adherence to treatment plan    Relationship Building: Cultivated a relationship of care and support and Listened empathically    Chaplaincy Outcomes Achieved:  Expressed gratitude

## 2022-09-26 NOTE — ASSESSMENT & PLAN NOTE
· Currently on Suboxone therapy, continue  · UDS negative   · Patient also endorsed alcohol use - unclear how muc

## 2022-09-26 NOTE — PROGRESS NOTES
[address] [facility]  Progress Note Asa Nessa 1975, 41 y o  female MRN: [id number]  Unit/Bed#: ICU 01 Encounter: [id number]  Primary Care Provider: No primary care provider on file  Date and time admitted to hospital: 9/21/2022 11:50 AM    * [facility] with [facility]  Assessment & Plan  · New onset with worsening SOB on 9/21 - endorsed pleuritic chest pain, reported bloody sputum at home  · Began following vaping a new flavor solution  · Oxygen sats on presentation were 50% on 4L  · CT Chest:  Negative for PE, Extensive groundglass airspace opacity in the lungs with peripheral sparing  Mediastinal and bilateral hilar lymphadenopathy - concerns for noncardiogenic pulmonary edema  · COVID, RSV, flu panel - negative   · CXR: Development of bilateral opacities likely representing multifocal pneumonia  Repeat CXR on 9/22 showed worsening of the opacities in the bilateral lungs- possible lopoid pneumonia  · ECHO: LVEF 74%, no diastolic dysfunction, IVC dilated with moderate pulmonary hypertension( looks acute likely ARDS related as right ventricle is not dilated), NT pro PNP: 2 493  · Differentials include ARDS(2/2 to sepsis or vapping), diffuse alveolar hemorrhage(less likely as the patient is improving and hemoglobin is trending up), vasculitis( workup has been negative so far, except positive rheumatoid factor which is nonspecific), eosinophilic pneumonia( eosinophiles negative so far), acute pulmonary edema, lopoid pneumonia   · Able to wean down her FIO2    Plan:   · Completed zithromax, remains on ceftriaxone for now  · Continue SoluMedrol 40mg q12h- day 4  · Flutter valve, Mucinex   · Wean O2 as tolerated, maintain SpO2 >90%  · F/U ANCA, CCP, cryoglobulin - still pending  · Monitor VS     Sepsis ([facility] )  Assessment & Plan  · Criteria on Admission: Tachycardia, tachypnea, elevated wbc's with 12% bandemia, source suspected to be pnumonia?  procalcitonin 3 7  Lactic: 1 1  · UA:  Negative for leukocytes or nitrates, 0-1 wbc's, positive for blood  · Did not reflex to culture  · Sputum culture was not collected   · Blood cultures x2 negative; Legionella urine, strep pneumonia - negative   · Leucocytosis slightly worsened compared to yesterday but she is also on steroids, patient is afebrile and tachycardia resolved   · Procalctionin trending down    Plan:   · Continue ceftriaxone and azithromycin- today the last day( 5th day)  · Monitor VS, trend WBC     R/O CAP (community acquired pneumonia)  Assessment & Plan  · Patient has unstable housing, presented to the ED with hypoxia, shortness of breath, and cough  · See plan as noted above in Sepsis     Bradycardia  Assessment & Plan  · Sinus bradycardia, new since yesterday pm, HR persistent between 42-52 b/m overnight  · Likely iatrogenic, medication induced ( Precedex, Topamax)  Discontinue Precedex, caution with benzodiazepine  · Monitor VS     Anemia  Assessment & Plan  Recent Labs     09/23/22  0515 09/24/22  0519 09/25/22  0510   HGB 9 3* 9 6* 10 2*     Lab Results   Component Value Date    IRON 16 (L) 09/21/2022    FERRITIN 104 09/21/2022    TIBC 249 (L) 09/21/2022    CONCFE 6 (L) 09/21/2022     · Patient did endorsed coughing up blood however, cough in the ED was nonproductive  No hematemesis during the hospitalization  Had 2-4 RBC in the urine but she is at menses     · Hb now improving  · Will trend      Substance Abuse Disorder   Assessment & Plan  · Currently on Suboxone therapy, continue  · UDS negative   · Patient also endorsed alcohol use - unclear how much     Heavy tobacco smoker  Assessment & Plan  · Currently smokes pack or more daily  · Ordered nicotine patch  · Encouraged cessation when appropriate    Bipolar affective disorder, currently active [facility])  Assessment & Plan  · Per chart review ([facility] notes) patient is on Wellbutrin, Fluoxetine and Topamax   Also, was taking Oxazepam prn   · Was refusing Wellbutrin and fluoxetine since it was resumed  ----------------------------------------------------------------------------------------  HPI/24hr events: No events overnight, denies SOB    Patient appropriate for transfer out of the ICU today?: Patient does not meet criteria for ICU Follow-up Clinic; referral has not been made  Disposition: Transfer to Med-Surg   Code Status: Level 1 - Full Code  ---------------------------------------------------------------------------------------  SUBJECTIVE  Feels improved    Review of Systems  Review of systems was reviewed and negative unless stated above in HPI/24-hour events   ---------------------------------------------------------------------------------------  OBJECTIVE    Vitals   Vitals:    22 2300 22 0100 22 0200 22 0300   BP: 99/61 102/65  109/65   BP Location: Right arm      Pulse: 70 (!) 54  68   Resp: 17 15  14   Temp: 98 5 °F (36 9 °C)      TempSrc: Tympanic      SpO2: 97% 97% 90% 96%   Weight:       Height:         Temp (24hrs), Av 8 °F (36 6 °C), Min:97 °F (36 1 °C), Max:98 6 °F (37 °C)  Current: Temperature: 98 5 °F (36 9 °C)          Respiratory:  SpO2: SpO2: 96 %, SpO2 Activity: SpO2 Activity: At Rest, SpO2 Device: O2 Device: Mid flow nasal cannula       Invasive/non-invasive ventilation settings   Respiratory  Report   Lab Data (Last 4 hours)    None         O2/Vent Data (Last 4 hours)    None                Physical Exam  Constitutional:       Appearance: She is ill-appearing  HENT:      Head: Normocephalic  Comments: Left forehead abrasion     Mouth/Throat:      Mouth: Mucous membranes are dry  Eyes:      Pupils: Pupils are equal, round, and reactive to light  Cardiovascular:      Rate and Rhythm: Normal rate  Pulmonary:      Effort: Pulmonary effort is normal       Breath sounds: Normal breath sounds  Abdominal:      General: There is no distension  Tenderness: There is no abdominal tenderness  Musculoskeletal:         General: No swelling  Cervical back: Neck supple  Lymphadenopathy:      Cervical: No cervical adenopathy  Skin:     General: Skin is warm and dry  Neurological:      General: No focal deficit present  Mental Status: She is alert  Mental status is at baseline               Laboratory and Diagnostics:  Results from last 7 days   Lab Units 09/25/22  0510 09/24/22  0519 09/23/22  0515 09/22/22  0450 09/21/22  1215   WBC Thousand/uL 12 65* 11 33* 14 67* 15 32* 17 06*   HEMOGLOBIN g/dL 10 2* 9 6* 9 3* 9 0* 10 4*   HEMATOCRIT % 32 0* 29 8* 29 4* 29 0* 32 2*   PLATELETS Thousands/uL 434* 309 310 230 248   NEUTROS PCT % 80*  --  89*  --   --    BANDS PCT %  --   --   --   --  12*   MONOS PCT % 6  --  5  --   --    MONO PCT %  --  1*  --   --  3*     Results from last 7 days   Lab Units 09/25/22  0510 09/24/22  0519 09/23/22  0515 09/22/22  0450 09/21/22  1215   SODIUM mmol/L 140 136 140 138 141   POTASSIUM mmol/L 3 9 5 1 3 4* 3 6 3 8   CHLORIDE mmol/L 105 106 105 106 108   CO2 mmol/L 24 19* 21 22 21   ANION GAP mmol/L 11 11 14* 10 12   BUN mg/dL 22 21 18 13 17   CREATININE mg/dL 0 57* 0 43* 0 70 0 72 0 77   CALCIUM mg/dL 8 9 9 2 8 8 7 8* 8 4   GLUCOSE RANDOM mg/dL 173* 158* 110 106 95   ALT U/L 38  --  71 97* 142*   AST U/L 12  --  34 62* 101*   ALK PHOS U/L 96  --  113 107 105   ALBUMIN g/dL 2 0*  --  2 1* 2 2* 2 6*   TOTAL BILIRUBIN mg/dL 0 30  --  0 66 0 82 1 10*     Results from last 7 days   Lab Units 09/25/22  0510 09/24/22  0519 09/23/22  0515 09/22/22  0450   MAGNESIUM mg/dL 1 9 2 2 2 2 2 2   PHOSPHORUS mg/dL 3 9  --   --  3 2               Results from last 7 days   Lab Units 09/21/22  1233   LACTIC ACID mmol/L 1 1     ABG:  Results from last 7 days   Lab Units 09/24/22  0814   PH ART  7 399   PCO2 ART mm Hg 34 6*   PO2 ART mm Hg 53 1*   HCO3 ART mmol/L 20 9*   BASE EXC ART mmol/L -3 4   ABG SOURCE  Radial, Left     VBG:  Results from last 7 days   Lab Units 09/24/22  7771 09/21/22  1710 09/21/22  1623   PH LILIAM   --   --  7 322   PCO2 LILIAM mm Hg  --   --  37 8*   PO2 LILIAM mm Hg  --   --  59 5*   HCO3 LILIAM mmol/L  --   --  19 1*   BASE EXC LILIAM mmol/L  --   --  -6 4   ABG SOURCE  Radial, Left   < >  --     < > = values in this interval not displayed  Results from last 7 days   Lab Units 09/25/22  0510 09/24/22  0519 09/21/22  1215   PROCALCITONIN ng/ml 0 94* 1 90* 3 79*       Micro  Results from last 7 days   Lab Units 09/22/22  1757 09/22/22  1234 09/21/22  1325 09/21/22  1233   BLOOD CULTURE   --   --   --  No Growth After 4 Days  No Growth After 4 Days  LEGIONELLA URINARY ANTIGEN  Negative  --   --   --    STREP PNEUMONIAE ANTIGEN, URINE   --  Negative Negative  --        EKG:   Imaging: I have personally reviewed pertinent reports  and I have personally reviewed pertinent films in PACS    Intake and Output  I/O       09/24 0701  09/25 0700 09/25 0701 09/26 0700    P  O  500 780    I V  (mL/kg) 278 5 (3 8) 0 (0)    IV Piggyback  250    Total Intake(mL/kg) 778 5 (10 6) 1030 (14 1)    Urine (mL/kg/hr) 3250 (1 9) 2200 (1 8)    Total Output 3250 2200    Net -2471 5 -1170                Height and Weights   Height: 5' 2" (157 5 cm)     Body mass index is 29 48 kg/m²  Weight (last 2 days)     None            Nutrition       Diet Orders   (From admission, onward)             Start     Ordered    09/25/22 0851  Diet Regular; Regular House  Diet effective now        References:    Nutrtion Support Algorithm Enteral vs  Parenteral   Question Answer Comment   Diet Type Regular    Regular Regular House    Special Instructions Aspiration precautions    RD to adjust diet per protocol?  Yes        09/25/22 0850    09/24/22 1147  Dietary nutrition supplements  Once        Question Answer Comment   Select Supplement: [place] - PRO/90 KCALS    Frequency Breakfast        09/24/22 1147    09/24/22 1147  Dietary nutrition supplements  Once        Question Answer Comment   Select Supplement: Gelatein - Fruit Punch - PRO/90 KCALS    Frequency Dinner        09/24/22 1147                  Active Medications  Scheduled Meds:  Current Facility-Administered Medications   Medication Dose Route Frequency Provider Last Rate    acetaminophen  650 mg Oral Q6H PRN NETTE Frost      buprenorphine-naloxone  8 mg Sublingual BID NETTE Ramírez      buPROPion  300 mg Oral Daily Candi Stark MD      clonazePAM  1 mg Oral HS NETTE Murphy      enoxaparin  40 mg Subcutaneous Daily NETTE Cano      famotidine  20 mg Oral BID PRN David Petit MD      FLUoxetine  40 mg Oral Daily Candi Stark MD      gabapentin  400 mg Oral TID Candi Stark MD      ipratropium-albuterol  3 mL Nebulization Q6H Candi Stark MD      methylPREDNISolone sodium succinate  40 mg Intravenous Q12H [address] Candi Stark MD      nicotine  21 mg Transdermal Daily NETTE Ramírez      nicotine polacrilex  2 mg Oral Q2H PRN Joelle Avila PA-C      sodium chloride  4 mL Nebulization Q8H Candi Stark MD      topiramate  100 mg Oral BID Candi Stark MD      traZODone  50 mg Oral HS Candi Stark MD       Continuous Infusions:     PRN Meds:   acetaminophen, 650 mg, Q6H PRN  famotidine, 20 mg, BID PRN  nicotine polacrilex, 2 mg, Q2H PRN        Invasive Devices Review  Invasive Devices  Report    Peripheral Intravenous Line  Duration           Peripheral IV 09/24/22 Right Hand 1 day                Rationale for remaining devices:   ---------------------------------------------------------------------------------------  Advance Directive and Living Will:      Power of :    POLST:    ---------------------------------------------------------------------------------------  Care Time Delivered:         NETTE Murphy      Portions of the record may have been created with voice recognition software  Occasional wrong word or "sound a like" substitutions may have occurred due to the inherent limitations of voice recognition software    Read the chart carefully and recognize, using context, where substitutions have occurred

## 2022-09-26 NOTE — PROGRESS NOTES
09/26/22 1200   Clinical Encounter Type   Visited With Patient   Routine Visit Introduction   Continue Visiting Yes

## 2022-09-26 NOTE — PROGRESS NOTES
This RN gave report to PORFIRIO Schreiber, who will be assuming care of pt at this time  Pt updated on POC and stable

## 2022-09-26 NOTE — ASSESSMENT & PLAN NOTE
Recent Labs     09/23/22  0515 09/24/22  0519 09/25/22  0510   HGB 9 3* 9 6* 10 2*     Lab Results   Component Value Date    IRON 16 (L) 09/21/2022    FERRITIN 104 09/21/2022    TIBC 249 (L) 09/21/2022    CONCFE 6 (L) 09/21/2022     · Patient did endorsed coughing up blood however, cough in the ED was nonproductive  No hematemesis during the hospitalization   Had 2-4 RBC in the urine but she is at menses     · Hb now improving  · Will trend

## 2022-09-26 NOTE — ASSESSMENT & PLAN NOTE
Recent Labs     09/24/22  0519 09/25/22  0510   HGB 9 6* 10 2*     Lab Results   Component Value Date    IRON 16 (L) 09/21/2022    FERRITIN 104 09/21/2022    TIBC 249 (L) 09/21/2022    CONCFE 6 (L) 09/21/2022     · Patient did endorsed coughing up blood however, cough in the ED was nonproductive  No hematemesis during the hospitalization  Had 2-4 RBC in the urine but she is at menses     · Hb now improving   No labs drawn today  · Will trend

## 2022-09-26 NOTE — PLAN OF CARE
Problem: MOBILITY - ADULT  Goal: Maintain or return to baseline ADL function  Description: INTERVENTIONS:  -  Assess patient's ability to carry out ADLs; assess patient's baseline for ADL function and identify physical deficits which impact ability to perform ADLs (bathing, care of mouth/teeth, toileting, grooming, dressing, etc )  - Assess/evaluate cause of self-care deficits   - Assess range of motion  - Assess patient's mobility; develop plan if impaired  - Assess patient's need for assistive devices and provide as appropriate  - Encourage maximum independence but intervene and supervise when necessary  - Involve family in performance of ADLs  - Assess for home care needs following discharge   - Consider OT consult to assist with ADL evaluation and planning for discharge  - Provide patient education as appropriate  Outcome: Progressing  Goal: Maintains/Returns to pre admission functional level  Description: INTERVENTIONS:  - Perform BMAT or MOVE assessment daily    - Set and communicate daily mobility goal to care team and patient/family/caregiver  - Collaborate with rehabilitation services on mobility goals if consulted  - Perform Range of Motion 4 times a day  - Reposition patient every 2 hours    - Dangle patient 2 times a day  - Stand patient 2 times a day  - Ambulate patient 2 times a day  - Out of bed to chair 2 times a day   - Out of bed for meals 2 times a day  - Out of bed for toileting  - Record patient progress and toleration of activity level   Outcome: Progressing     Problem: Prexisting or High Potential for Compromised Skin Integrity  Goal: Skin integrity is maintained or improved  Description: INTERVENTIONS:  - Identify patients at risk for skin breakdown  - Assess and monitor skin integrity  - Assess and monitor nutrition and hydration status  - Monitor labs   - Assess for incontinence   - Turn and reposition patient  - Assist with mobility/ambulation  - Relieve pressure over bony prominences  - Avoid friction and shearing  - Provide appropriate hygiene as needed including keeping skin clean and dry  - Evaluate need for skin moisturizer/barrier cream  - Collaborate with interdisciplinary team   - Patient/family teaching  - Consider wound care consult   Outcome: Progressing     Problem: Potential for Falls  Goal: Patient will remain free of falls  Description: INTERVENTIONS:  - Educate patient/family on patient safety including physical limitations  - Instruct patient to call for assistance with activity   - Consult OT/PT to assist with strengthening/mobility   - Keep Call bell within reach  - Keep bed low and locked with side rails adjusted as appropriate  - Keep care items and personal belongings within reach  - Initiate and maintain comfort rounds  - Make Fall Risk Sign visible to staff  - Apply yellow socks and bracelet for high fall risk patients  - Consider moving patient to room near nurses station  Outcome: Progressing     Problem: PAIN - ADULT  Goal: Verbalizes/displays adequate comfort level or baseline comfort level  Description: Interventions:  - Encourage patient to monitor pain and request assistance  - Assess pain using appropriate pain scale  - Administer analgesics based on type and severity of pain and evaluate response  - Implement non-pharmacological measures as appropriate and evaluate response  - Consider cultural and social influences on pain and pain management  - Notify physician/advanced practitioner if interventions unsuccessful or patient reports new pain  Outcome: Progressing     Problem: INFECTION - ADULT  Goal: Absence or prevention of progression during hospitalization  Description: INTERVENTIONS:  - Assess and monitor for signs and symptoms of infection  - Monitor lab/diagnostic results  - Monitor all insertion sites, i e  indwelling lines, tubes, and drains  - Monitor endotracheal if appropriate and nasal secretions for changes in amount and color  - [place] appropriate cooling/warming therapies per order  - Administer medications as ordered  - Instruct and encourage patient and family to use good hand hygiene technique  - Identify and instruct in appropriate isolation precautions for identified infection/condition  Outcome: Progressing  Goal: Absence of fever/infection during neutropenic period  Description: INTERVENTIONS:  - Monitor WBC    Outcome: Progressing     Problem: SAFETY ADULT  Goal: Maintain or return to baseline ADL function  Description: INTERVENTIONS:  -  Assess patient's ability to carry out ADLs; assess patient's baseline for ADL function and identify physical deficits which impact ability to perform ADLs (bathing, care of mouth/teeth, toileting, grooming, dressing, etc )  - Assess/evaluate cause of self-care deficits   - Assess range of motion  - Assess patient's mobility; develop plan if impaired  - Assess patient's need for assistive devices and provide as appropriate  - Encourage maximum independence but intervene and supervise when necessary  - Involve family in performance of ADLs  - Assess for home care needs following discharge   - Consider OT consult to assist with ADL evaluation and planning for discharge  - Provide patient education as appropriate  Outcome: Progressing  Goal: Maintains/Returns to pre admission functional level  Description: INTERVENTIONS:  - Perform BMAT or MOVE assessment daily    - Set and communicate daily mobility goal to care team and patient/family/caregiver  - Collaborate with rehabilitation services on mobility goals if consulted  - Perform Range of Motion 4 times a day  - Reposition patient every 2 hours    - Dangle patient 2 times a day  - Stand patient 2 times a day  - Ambulate patient 2 times a day  - Out of bed to chair 2 times a day   - Out of bed for meals 2 times a day  - Out of bed for toileting  - Record patient progress and toleration of activity level   Outcome: Progressing  Goal: Patient will remain free of falls  Description: INTERVENTIONS:  - Educate patient/family on patient safety including physical limitations  - Instruct patient to call for assistance with activity   - Consult OT/PT to assist with strengthening/mobility   - Keep Call bell within reach  - Keep bed low and locked with side rails adjusted as appropriate  - Keep care items and personal belongings within reach  - Initiate and maintain comfort rounds  - Make Fall Risk Sign visible to staff  - Apply yellow socks and bracelet for high fall risk patients  - Consider moving patient to room near nurses station  Outcome: Progressing     Problem: DISCHARGE PLANNING  Goal: Discharge to home or other facility with appropriate resources  Description: INTERVENTIONS:  - Identify barriers to discharge w/patient and caregiver  - Arrange for needed discharge resources and transportation as appropriate  - Identify discharge learning needs (meds, wound care, etc )  - Arrange for interpretive services to assist at discharge as needed  - Refer to Case Management Department for coordinating discharge planning if the patient needs post-hospital services based on physician/advanced practitioner order or complex needs related to functional status, cognitive ability, or social support system  Outcome: Progressing     Problem: Knowledge Deficit  Goal: Patient/family/caregiver demonstrates understanding of disease process, treatment plan, medications, and discharge instructions  Description: Complete learning assessment and assess knowledge base  Interventions:  - Provide teaching at level of understanding  - Provide teaching via preferred learning methods  Outcome: Progressing     Problem: Nutrition/Hydration-ADULT  Goal: Nutrient/Hydration intake appropriate for improving, restoring or maintaining nutritional needs  Description: Monitor and assess patient's nutrition/hydration status for malnutrition   Collaborate with interdisciplinary team and initiate plan and interventions as ordered  Monitor patient's weight and dietary intake as ordered or per policy  Utilize nutrition screening tool and intervene as necessary  Determine patient's food preferences and provide high-protein, high-caloric foods as appropriate       INTERVENTIONS:  - Monitor oral intake, urinary output, labs, and treatment plans  - Assess nutrition and hydration status and recommend course of action  - Evaluate amount of meals eaten  - Assist patient with eating if necessary   - Allow adequate time for meals  - Recommend/ encourage appropriate diets, oral nutritional supplements, and vitamin/mineral supplements  - Order, calculate, and assess calorie counts as needed  - Recommend, monitor, and adjust tube feedings and TPN/PPN based on assessed needs  - Assess need for intravenous fluids  - Provide specific nutrition/hydration education as appropriate  - Include patient/family/caregiver in decisions related to nutrition  Outcome: Progressing     Problem: RESPIRATORY - ADULT  Goal: Achieves optimal ventilation and oxygenation  Description: INTERVENTIONS:  - Assess for changes in respiratory status  - Assess for changes in mentation and behavior  - Position to facilitate oxygenation and minimize respiratory effort  - Oxygen administered by appropriate delivery if ordered  - Initiate smoking cessation education as indicated  - Encourage broncho-pulmonary hygiene including cough, deep breathe, Incentive Spirometry  - Assess the need for suctioning and aspirate as needed  - Assess and instruct to report SOB or any respiratory difficulty  - Respiratory Therapy support as indicated  Outcome: Progressing

## 2022-09-26 NOTE — ASSESSMENT & PLAN NOTE
· Patient has unstable housing, presented to the ED with hypoxia, shortness of breath, and cough  · See plan as noted above in Sepsis  · Finished abx

## 2022-09-26 NOTE — ASSESSMENT & PLAN NOTE
· New onset with worsening SOB on 9/21 - endorsed pleuritic chest pain, reported bloody sputum at home  · Began following vaping a new flavor solution  · Oxygen sats on presentation were 50% on 4L  · CT Chest:  Negative for PE, Extensive groundglass airspace opacity in the lungs with peripheral sparing  Mediastinal and bilateral hilar lymphadenopathy - concerns for noncardiogenic pulmonary edema  · COVID, RSV, flu panel - negative   · CXR: Development of bilateral opacities likely representing multifocal pneumonia   Repeat CXR on 9/22 showed worsening of the opacities in the bilateral lungs- possible lopoid pneumonia  · ECHO: LVEF 86%, no diastolic dysfunction, IVC dilated with moderate pulmonary hypertension( looks acute likely ARDS related as right ventricle is not dilated), NT pro PNP: 2 493  · Differentials include ARDS(2/2 to sepsis or vapping), diffuse alveolar hemorrhage(less likely as the patient is improving and hemoglobin is trending up), vasculitis( workup has been negative so far, except positive rheumatoid factor which is nonspecific), eosinophilic pneumonia( eosinophiles negative so far), acute pulmonary edema, lopoid pneumonia   · Able to wean down her FIO2    Plan:   · Completed zithromax, remains on ceftriaxone for now  · Continue SoluMedrol 40mg q12h- day 4  · Flutter valve, Mucinex   · Wean O2 as tolerated, maintain SpO2 >90%  · F/U ANCA, CCP, cryoglobulin - still pending  · Monitor VS

## 2022-09-27 LAB
ANION GAP SERPL CALCULATED.3IONS-SCNC: 9 MMOL/L (ref 4–13)
BASOPHILS # BLD MANUAL: 0 THOUSAND/UL (ref 0–0.1)
BASOPHILS NFR MAR MANUAL: 0 % (ref 0–1)
BUN SERPL-MCNC: 15 MG/DL (ref 5–25)
C-ANCA TITR SER IF: NORMAL TITER
CALCIUM SERPL-MCNC: 8.5 MG/DL (ref 8.3–10.1)
CCP AB SER IA-ACNC: 0.6
CHLORIDE SERPL-SCNC: 107 MMOL/L (ref 96–108)
CO2 SERPL-SCNC: 26 MMOL/L (ref 21–32)
CREAT SERPL-MCNC: 0.68 MG/DL (ref 0.6–1.3)
EOSINOPHIL # BLD MANUAL: 0 THOUSAND/UL (ref 0–0.4)
EOSINOPHIL NFR BLD MANUAL: 0 % (ref 0–6)
ERYTHROCYTE [DISTWIDTH] IN BLOOD BY AUTOMATED COUNT: 12.4 % (ref 11.6–15.1)
GFR SERPL CREATININE-BSD FRML MDRD: 105 ML/MIN/1.73SQ M
GLUCOSE SERPL-MCNC: 87 MG/DL (ref 65–140)
HCT VFR BLD AUTO: 33.7 % (ref 34.8–46.1)
HGB BLD-MCNC: 10.8 G/DL (ref 11.5–15.4)
LYMPHOCYTES # BLD AUTO: 3.63 THOUSAND/UL (ref 0.6–4.47)
LYMPHOCYTES # BLD AUTO: 30 % (ref 14–44)
MCH RBC QN AUTO: 29.4 PG (ref 26.8–34.3)
MCHC RBC AUTO-ENTMCNC: 32 G/DL (ref 31.4–37.4)
MCV RBC AUTO: 92 FL (ref 82–98)
METAMYELOCYTES NFR BLD MANUAL: 2 % (ref 0–1)
MONOCYTES # BLD AUTO: 0.6 THOUSAND/UL (ref 0–1.22)
MONOCYTES NFR BLD: 5 % (ref 4–12)
MYELOCYTES NFR BLD MANUAL: 3 % (ref 0–1)
MYELOPEROXIDASE AB SER IA-ACNC: <0.2 UNITS (ref 0–0.9)
NEUTROPHILS # BLD MANUAL: 7.25 THOUSAND/UL (ref 1.85–7.62)
NEUTS SEG NFR BLD AUTO: 57 % (ref 43–75)
P-ANCA ATYPICAL TITR SER IF: NORMAL TITER
P-ANCA TITR SER IF: NORMAL TITER
PLATELET # BLD AUTO: 363 THOUSANDS/UL (ref 149–390)
PLATELET BLD QL SMEAR: ADEQUATE
PMV BLD AUTO: 8.7 FL (ref 8.9–12.7)
POTASSIUM SERPL-SCNC: 3.4 MMOL/L (ref 3.5–5.3)
PROTEINASE3 AB SER IA-ACNC: <0.2 UNITS (ref 0–0.9)
RBC # BLD AUTO: 3.67 MILLION/UL (ref 3.81–5.12)
RBC MORPH BLD: NORMAL
SODIUM SERPL-SCNC: 142 MMOL/L (ref 135–147)
WBC # BLD AUTO: 12.09 THOUSAND/UL (ref 4.31–10.16)

## 2022-09-27 PROCEDURE — 94760 N-INVAS EAR/PLS OXIMETRY 1: CPT

## 2022-09-27 PROCEDURE — 99232 SBSQ HOSP IP/OBS MODERATE 35: CPT | Performed by: INTERNAL MEDICINE

## 2022-09-27 PROCEDURE — 99232 SBSQ HOSP IP/OBS MODERATE 35: CPT | Performed by: NURSE PRACTITIONER

## 2022-09-27 PROCEDURE — 85007 BL SMEAR W/DIFF WBC COUNT: CPT | Performed by: PHYSICIAN ASSISTANT

## 2022-09-27 PROCEDURE — 94640 AIRWAY INHALATION TREATMENT: CPT

## 2022-09-27 PROCEDURE — 80048 BASIC METABOLIC PNL TOTAL CA: CPT | Performed by: PHYSICIAN ASSISTANT

## 2022-09-27 PROCEDURE — 85027 COMPLETE CBC AUTOMATED: CPT | Performed by: PHYSICIAN ASSISTANT

## 2022-09-27 RX ORDER — ONDANSETRON 2 MG/ML
4 INJECTION INTRAMUSCULAR; INTRAVENOUS EVERY 4 HOURS PRN
Status: DISCONTINUED | OUTPATIENT
Start: 2022-09-27 | End: 2022-09-29

## 2022-09-27 RX ORDER — OXAZEPAM 15 MG/1
15 CAPSULE ORAL 2 TIMES DAILY PRN
Status: DISCONTINUED | OUTPATIENT
Start: 2022-09-27 | End: 2022-10-03

## 2022-09-27 RX ADMIN — TRAZODONE HYDROCHLORIDE 50 MG: 50 TABLET ORAL at 21:33

## 2022-09-27 RX ADMIN — TOPIRAMATE 100 MG: 100 TABLET, FILM COATED ORAL at 08:23

## 2022-09-27 RX ADMIN — TOPIRAMATE 100 MG: 100 TABLET, FILM COATED ORAL at 17:52

## 2022-09-27 RX ADMIN — ENOXAPARIN SODIUM 40 MG: 40 INJECTION SUBCUTANEOUS at 08:24

## 2022-09-27 RX ADMIN — ONDANSETRON 4 MG: 2 INJECTION INTRAMUSCULAR; INTRAVENOUS at 20:21

## 2022-09-27 RX ADMIN — IPRATROPIUM BROMIDE AND ALBUTEROL SULFATE 3 ML: 2.5; .5 SOLUTION RESPIRATORY (INHALATION) at 07:30

## 2022-09-27 RX ADMIN — GABAPENTIN 400 MG: 400 CAPSULE ORAL at 20:21

## 2022-09-27 RX ADMIN — GABAPENTIN 400 MG: 400 CAPSULE ORAL at 08:24

## 2022-09-27 RX ADMIN — CLONAZEPAM 1 MG: 1 TABLET ORAL at 08:24

## 2022-09-27 RX ADMIN — BUPRENORPHINE AND NALOXONE 8 MG: 8; 2 FILM BUCCAL; SUBLINGUAL at 17:51

## 2022-09-27 RX ADMIN — OXAZEPAM 15 MG: 15 CAPSULE, GELATIN COATED ORAL at 14:04

## 2022-09-27 RX ADMIN — PREDNISONE 80 MG: 20 TABLET ORAL at 08:23

## 2022-09-27 RX ADMIN — OXAZEPAM 15 MG: 15 CAPSULE, GELATIN COATED ORAL at 20:20

## 2022-09-27 RX ADMIN — BUPRENORPHINE AND NALOXONE 8 MG: 8; 2 FILM BUCCAL; SUBLINGUAL at 08:24

## 2022-09-27 RX ADMIN — CLONAZEPAM 1 MG: 1 TABLET ORAL at 17:52

## 2022-09-27 RX ADMIN — ACETAMINOPHEN 650 MG: 325 TABLET, FILM COATED ORAL at 20:21

## 2022-09-27 RX ADMIN — SODIUM CHLORIDE SOLN NEBU 3% 4 ML: 3 NEBU SOLN at 07:30

## 2022-09-27 RX ADMIN — Medication 21 MG: at 08:29

## 2022-09-27 RX ADMIN — GABAPENTIN 400 MG: 400 CAPSULE ORAL at 17:52

## 2022-09-27 RX ADMIN — ACETAMINOPHEN 650 MG: 325 TABLET, FILM COATED ORAL at 06:27

## 2022-09-27 NOTE — ASSESSMENT & PLAN NOTE
POA evidence by tachycardia, tachypnea, leukocytosis  Suspect superimposed pneumonia on setting of vape lung injury  Patient completed course of antibiotics

## 2022-09-27 NOTE — ASSESSMENT & PLAN NOTE
History of bipolar affective disorder  Maintained on Wellbutrin, fluoxetine, clonazepam, Topamax, Serax p r n    Patient is refusing Wellbutrin and fluoxetine

## 2022-09-27 NOTE — PLAN OF CARE
Problem: MOBILITY - ADULT  Goal: Maintain or return to baseline ADL function  Description: INTERVENTIONS:  -  Assess patient's ability to carry out ADLs; assess patient's baseline for ADL function and identify physical deficits which impact ability to perform ADLs (bathing, care of mouth/teeth, toileting, grooming, dressing, etc )  - Assess/evaluate cause of self-care deficits   - Assess range of motion  - Assess patient's mobility; develop plan if impaired  - Assess patient's need for assistive devices and provide as appropriate  - Encourage maximum independence but intervene and supervise when necessary  - Involve family in performance of ADLs  - Assess for home care needs following discharge   - Consider OT consult to assist with ADL evaluation and planning for discharge  - Provide patient education as appropriate  Outcome: Progressing  Goal: Maintains/Returns to pre admission functional level  Description: INTERVENTIONS:  - Perform BMAT or MOVE assessment daily    - Set and communicate daily mobility goal to care team and patient/family/caregiver  - Collaborate with rehabilitation services on mobility goals if consulted  - Perform Range of Motion 4 times a day  - Reposition patient every 2 hours    - Dangle patient 2 times a day  - Stand patient 2 times a day  - Ambulate patient 2 times a day  - Out of bed to chair 2 times a day   - Out of bed for meals 2 times a day  - Out of bed for toileting  - Record patient progress and toleration of activity level   Outcome: Progressing     Problem: Prexisting or High Potential for Compromised Skin Integrity  Goal: Skin integrity is maintained or improved  Description: INTERVENTIONS:  - Identify patients at risk for skin breakdown  - Assess and monitor skin integrity  - Assess and monitor nutrition and hydration status  - Monitor labs   - Assess for incontinence   - Turn and reposition patient  - Assist with mobility/ambulation  - Relieve pressure over bony prominences  - Avoid friction and shearing  - Provide appropriate hygiene as needed including keeping skin clean and dry  - Evaluate need for skin moisturizer/barrier cream  - Collaborate with interdisciplinary team   - Patient/family teaching  - Consider wound care consult   Outcome: Progressing     Problem: Potential for Falls  Goal: Patient will remain free of falls  Description: INTERVENTIONS:  - Educate patient/family on patient safety including physical limitations  - Instruct patient to call for assistance with activity   - Consult OT/PT to assist with strengthening/mobility   - Keep Call bell within reach  - Keep bed low and locked with side rails adjusted as appropriate  - Keep care items and personal belongings within reach  - Initiate and maintain comfort rounds  - Make Fall Risk Sign visible to staff  - Offer Toileting everyHours, in advance of need  - Initiate/Maintain larm  - Obtain necessary fall risk management equipment:  - Apply yellow socks and bracelet for high fall risk patients  - Consider moving patient to room near nurses station  Outcome: Progressing     Problem: PAIN - ADULT  Goal: Verbalizes/displays adequate comfort level or baseline comfort level  Description: Interventions:  - Encourage patient to monitor pain and request assistance  - Assess pain using appropriate pain scale  - Administer analgesics based on type and severity of pain and evaluate response  - Implement non-pharmacological measures as appropriate and evaluate response  - Consider cultural and social influences on pain and pain management  - Notify physician/advanced practitioner if interventions unsuccessful or patient reports new pain  Outcome: Progressing     Problem: INFECTION - ADULT  Goal: Absence or prevention of progression during hospitalization  Description: INTERVENTIONS:  - Assess and monitor for signs and symptoms of infection  - Monitor lab/diagnostic results  - Monitor all insertion sites, i e  indwelling lines, tubes, and drains  - Monitor endotracheal if appropriate and nasal secretions for changes in amount and color  - [place]gs appropriate cooling/warming therapies per order  - Administer medications as ordered  - Instruct and encourage patient and family to use good hand hygiene technique  - Identify and instruct in appropriate isolation precautions for identified infection/condition  Outcome: Progressing  Goal: Absence of fever/infection during neutropenic period  Description: INTERVENTIONS:  - Monitor WBC    Outcome: Progressing

## 2022-09-27 NOTE — PROGRESS NOTES
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 MRN: [id number]  Unit/Bed#: E2 -01 Encounter: [id number]      Assessment/Plan:    1  Acute hypoxic respiratory failure  1  Currently on 5 L NC-reduced from 6 L   2  No baseline requirements  3  Titrate oxygen to maintain SpO2>/=88%  4  Pulmonary toilet: IS, cough deep breathe  2  ARDS-improved  1  Continue steroid taper: prednisone 1mg/kg x 10 days and then reduce by 10 mg every 3 days  2  Pulmonary follow up at discharge  3  Suspected VAPE induced lung injury (reports to using a vape pen without knowledge of contents)  1  Steroid taper as indicated above  2  Pulmonary follow up at discharge-CXR in 6 weeks for resolution  3  Complete cessation encouraged  4  Sepsis  5  Tobacco abuse disorder  1  Complete cessation encouraged  2  NRT  6  Chronic opioid use disorder  1  Management per IM  7  Bipolar disorder  1  Management per IM  8  Positive RF  1  Suspect low positive is clinically insignificant  9  Hep C  1  Management per IM        Subjective:     Jarad Whyte was seen in bed upon entering the room  Reports anxiety continues to be a daily issue  Breathing/cough overall is improving  Denies: chest pain, fevers, chills or hemoptysis    Objective:         Vitals: Blood pressure 110/70, pulse 78, temperature (!) 97 2 °F (36 2 °C), temperature source Temporal, resp  rate 18, height 5' 2" (1 575 m), weight 71 8 kg (158 lb 4 6 oz), last menstrual period 09/21/2022, SpO2 94 %  , 5L NC, Body mass index is 28 95 kg/m²        Intake/Output Summary (Last 24 hours) at 9/27/2022 1436  Last data filed at 9/26/2022 1601  Gross per 24 hour   Intake --   Output 250 ml   Net -250 ml         Physical Exam  Gen: Awake, alert, oriented x 3, no acute distress  HEENT: Mucous membranes moist, no oral lesions, no thrush  NECK: no accessory muscle use, JVP not elevated  Cardiac: Regular, single S1, single S2, no murmurs, no rubs, no gallops  Lungs: decreased clear breath sounds  Abdomen: normoactive bowel sounds, soft nontender, nondistended, no rebound or rigidity, no guarding  Extremities: no cyanosis, no clubbing, no edema    Labs: I have personally reviewed pertinent lab results  , CBC:   Lab Results   Component Value Date    WBC 12 09 (H) 09/27/2022    HGB 10 8 (L) 09/27/2022    HCT 33 7 (L) 09/27/2022    MCV 92 09/27/2022     09/27/2022    MCH 29 4 09/27/2022    MCHC 32 0 09/27/2022    RDW 12 4 09/27/2022    MPV 8 7 (L) 09/27/2022   , CMP:   Lab Results   Component Value Date    SODIUM 142 09/27/2022    K 3 4 (L) 09/27/2022     09/27/2022    CO2 26 09/27/2022    BUN 15 09/27/2022    CREATININE 0 68 09/27/2022    CALCIUM 8 5 09/27/2022    EGFR 105 09/27/2022     Imaging and other studies: none      NETTE Duffy

## 2022-09-27 NOTE — PROGRESS NOTES
[address] [place] [address]  Progress Note Kourtney Calabrese 1975, 41 y o  female MRN: [id number]  Unit/Bed#: E2 -01 Encounter: [id number]  Primary Care Provider: No primary care provider on file  Date and time admitted to hospital: 9/21/2022 11:50 AM    * Acute respiratory failure with hypoxia [facility])  Assessment & Plan  Patient presented with shortness of breath 9/21 and hypoxia  Concern for vape related lung injury  CT chest with extensive ground-glass opacities in the lungs with peripheral sparing  Most recent chest x-ray with improved but persistent bilateral infiltrates  Echocardiogram with preserved EF, no regional wall motion abnormalities  Patient completed 5 day course of antibiotics  Patient was treated with IV steroids and now transition to high-dose oral steroids with 10 day taper  Appreciate pulmonology recommendations  Currently on 5 L nasal cannula, continue to wean to maintain saturations greater than 88%    Bradycardia  Assessment & Plan  Observed in ICU while on Precedex  No further events    R/O CAP (community acquired pneumonia)  Assessment & Plan  Chest x-ray with multifocal infiltrates with concern for superimposed bacterial pneumonia  Completed 5 day course of antibiotics      Sepsis ([address] )  Assessment & Plan  POA evidence by tachycardia, tachypnea, leukocytosis  Suspect superimposed pneumonia on setting of vape lung injury  Patient completed course of antibiotics    Anemia  Assessment & Plan  Iron deficiency anemia  Hemoglobin has remained stable  Oral iron supplementation    Substance Abuse Disorder   Assessment & Plan  Maintained on Suboxone    Bipolar affective disorder, currently active [facility])  Assessment & Plan  History of bipolar affective disorder  Maintained on Wellbutrin, fluoxetine, clonazepam, Topamax, Serax p r n    Patient is refusing Wellbutrin and fluoxetine        VTE Pharmacologic Prophylaxis:  Lovenox    Patient Centered Rounds:  Patient care rounds were performed with nursing    Time Spent for Care: 30  More than 50% of total time spent on counseling and coordination of care as described above  Current Length of Stay: 6 day(s)    Current Patient Status: Inpatient   Certification Statement: The patient will continue to require additional inpatient hospital stay due to management of acute hypoxic respiratory failure    Discharge Plan:  Potential discharge in next 24-48 hours pending improvement in acute hypoxic respiratory failure    Code Status: Level 1 - Full Code      Subjective:   Patient seen and evaluated at bedside  She did have an episode of shortness of breath overnight when her nasal cannula fell out of her nose  Objective:     Vitals:   Temp (24hrs), Av 7 °F (36 5 °C), Min:97 2 °F (36 2 °C), Max:98 °F (36 7 °C)    Temp:  [97 2 °F (36 2 °C)-98 °F (36 7 °C)] 97 7 °F (36 5 °C)  HR:  [63-86] 67  Resp:  [18-22] 18  BP: (102-114)/(47-71) 112/64  SpO2:  [92 %-98 %] 98 %  Body mass index is 28 95 kg/m²  Input and Output Summary (last 24 hours): Intake/Output Summary (Last 24 hours) at 2022 1536  Last data filed at 2022 1601  Gross per 24 hour   Intake --   Output 250 ml   Net -250 ml       Physical Exam:     Physical Exam  Vitals reviewed  Constitutional:       General: She is not in acute distress  Appearance: She is well-developed  She is not ill-appearing, toxic-appearing or diaphoretic  HENT:      Head: Normocephalic and atraumatic  Eyes:      General: No scleral icterus  Extraocular Movements: Extraocular movements intact  Conjunctiva/sclera: Conjunctivae normal    Cardiovascular:      Rate and Rhythm: Normal rate and regular rhythm  Heart sounds: Normal heart sounds  Pulmonary:      Effort: Pulmonary effort is normal       Comments: Coarse breath sounds bilaterally  Abdominal:      General: There is no distension  Palpations: Abdomen is soft  Tenderness: There is no abdominal tenderness  There is no guarding or rebound  Musculoskeletal:         General: No swelling, tenderness or deformity  Skin:     General: Skin is warm and dry  Neurological:      General: No focal deficit present  Mental Status: She is alert  Mental status is at baseline  Psychiatric:      Comments: Pressured speech         Additional Data:     Labs: I have reviewed pertinent results     Results from last 7 days   Lab Units 09/27/22  0444 09/25/22  0510 09/22/22  0450 09/21/22  1215   WBC Thousand/uL 12 09* 12 65*   < > 17 06*   HEMOGLOBIN g/dL 10 8* 10 2*   < > 10 4*   HEMATOCRIT % 33 7* 32 0*   < > 32 2*   PLATELETS Thousands/uL 363 434*   < > 248   BANDS PCT %  --   --   --  12*   NEUTROS PCT %  --  80*   < >  --    LYMPHS PCT %  --  10*   < >  --    LYMPHO PCT % 30  --    < > 2*   MONOS PCT %  --  6   < >  --    MONO PCT % 5  --    < > 3*   EOS PCT % 0 2   < > 0    < > = values in this interval not displayed       Results from last 7 days   Lab Units 09/27/22  0444 09/25/22  0510   SODIUM mmol/L 142 140   POTASSIUM mmol/L 3 4* 3 9   CHLORIDE mmol/L 107 105   CO2 mmol/L 26 24   BUN mg/dL 15 22   CREATININE mg/dL 0 68 0 57*   ANION GAP mmol/L 9 11   CALCIUM mg/dL 8 5 8 9   ALBUMIN g/dL  --  2 0*   TOTAL BILIRUBIN mg/dL  --  0 30   ALK PHOS U/L  --  96   ALT U/L  --  38   AST U/L  --  12   GLUCOSE RANDOM mg/dL 87 173*         Results from last 7 days   Lab Units 09/25/22  1207 09/25/22  0621 09/25/22  0008 09/24/22  1831 09/24/22  1207 09/23/22  1717 09/23/22  1344   POC GLUCOSE mg/dl 119 144* 164* 183* 197* 156* 124         Results from last 7 days   Lab Units 09/25/22  0510 09/24/22  0519 09/21/22  1233 09/21/22  1215   LACTIC ACID mmol/L  --   --  1 1  --    PROCALCITONIN ng/ml 0 94* 1 90*  --  3 79*         Imaging: I have reviewed pertinent imaging       Recent Cultures (last 7 days):     Results from last 7 days   Lab Units 09/25/22  2250 09/22/22  1757 09/21/22  1233   BLOOD CULTURE   --   --  No Growth After 5 Days  No Growth After 5 Days  SPUTUM CULTURE  Culture results to follow  --   --    GRAM STAIN RESULT  1+ Epithelial cells per low power field  No Polys or Bacteria seen  --   --    LEGIONELLA URINARY ANTIGEN   --  Negative  --        Last 24 Hours Medication List:   Current Facility-Administered Medications   Medication Dose Route Frequency Provider Last Rate    acetaminophen  650 mg Oral Q6H PRN Junious Clutter, PA-C      buprenorphine-naloxone  8 mg Sublingual BID Junious Clutter, PA-C      clonazePAM  1 mg Oral BID Junious Clutter, PA-C      enoxaparin  40 mg Subcutaneous Daily Leetta Buerger Zam[place], [place]      famotidine  20 mg Oral BID PRN Junious Clutter, PA-C      gabapentin  400 mg Oral TID Junious Clutter, PA-C      ipratropium-albuterol  3 mL Nebulization Q6H [facility], [place]      nicotine  21 mg Transdermal Daily Leetta Buerger [place], [place]      nicotine polacrilex  2 mg Oral Q2H PRN Junious Clutter, PA-C      ondansetron  4 mg Intravenous Q4H PRN Shanti Murray DO      oxazepam  15 mg Oral BID PRN Shanti Murray DO      predniSONE  80 mg Oral Daily [place], [place]      sodium chloride  4 mL Nebulization Q8H Junious Clutter, PA-C      topiramate  100 mg Oral BID Junious Clutter, PA-C      traZODone  50 mg Oral HS Junious Clutter, PAJosueC          Today, Patient Was Seen By: Shanti Murray DO    ** Please Note: Dictation voice to text software may have been used in the creation of this document   **

## 2022-09-27 NOTE — ASSESSMENT & PLAN NOTE
Chest x-ray with multifocal infiltrates with concern for superimposed bacterial pneumonia  Completed 5 day course of antibiotics

## 2022-09-27 NOTE — APP STUDENT NOTE
[facility] STUDENT  Inpatient Progress Note for TRAINING ONLY  Not Part of Legal Medical Record     Progress Note - Morgan Goldman 41 y o  female MRN: [id number]  Unit/Bed#: E2 -01 Encounter: [id number]      Assessment/Plan:  1  Acute respiratory failure with hypoxia  · Presented with SOB on 9/21 with pleuritic chest pain and hemoptysis following trying new vape  O2 sats were 50% on 4L  · CT chest:Negative for PE, Extensive groundglass airspace opacity in the lungs with peripheral sparing   Mediastinal and bilateral hilar lymphadenopathy - concerns for noncardiogenic pulmonary edema  · COVID, RSV, flu panel - negative   · CXR: Development of bilateral opacities likely representing multifocal pneumonia  Repeat CXR on 9/22 showed worsening of the opacities in the bilateral lungs- possible lopoid pneumonia  · ECHO: LVEF 08%, no diastolic dysfunction, IVC dilated with moderate pulmonary hypertension( looks acute likely ARDS related as right ventricle is not dilated), NT pro PNP: 2 493  · Azithromycin and ceftriaxone complete  · Discontinued solumedrol 40mg q12h, on day 5  Changed to prednisone 1mg/kg x 10 days then taper over additional 10 days  · Wean O2, as tolerated, maintain O2 >90%  · Ordered ANCA study pending  Cryoglobulin, CCP negative  · Ipratropium-albuterol (Duo-Neb) q6 hrs  Sodium chloride 3% nebulized q 8 hrs  2  Sepsis  · Completed antibiotics yesterday 9/26, see above  3  Bipolar affective disorder, currently active  · Outpatient on bupropion (Wellbutrin), Fluoxetine (Prozac), topiramate (Topamax) daily  Clonazepam 1mg BID and gabapentin (Neurontin) 400 mg TID  Oxazepam PRN anxiety  · Patient follows up with [facility]  · Patient took herself off of bupropion and fluoxetine, made her "excited"  · Currently on topiramate (Topamax) 100 mg BID, trazadone 50 mg QHS, Clonazepam (Klonipin) 1mg BID and gabapentin (Neurontin) 400 mg TID while inpatient     4  Substance Abuse Disorder  · Continue Suboxone therapy  · UDS negative  5  Heavy tobacco smoker  · Continue nicotine patch, 21 mg q 24h    VTE Pharmacologic Prophylaxis:   Pharmacologic: Enoxaparin (Lovenox)  Mechanical VTE Prophylaxis in Place: No    Patient Centered Rounds: I have performed bedside rounds with nursing staff today  Discussions with Specialists or Other Care Team Provider: Discussed plan of care with Dr Nadya Gutierres,     Time Spent for Care: 30 minutes  More than 50% of total time spent on counseling and coordination of care as described above  Current Length of Stay: 6 day(s)    Current Patient Status: Inpatient   Certification Statement: The patient will continue to require additional inpatient hospital stay due to pneumonia treatment    Discharge Plan: TBD    Code Status: Level 1 - Full Code    Subjective:   Marisela Styles is a 40 yo female with PMH of substance use disorder, bipolar disorder and depression presents on hospital day #6 after transfer from ICU for acute respiratory failure with hypoxia secondary to bilateral multifocal pneumonia  Patient is seen sitting upright in bed, consistently changing positions  No overnight events  Sleeping as to be expected in hospital setting  Planning to try ambulating around [place] today  Patient ws excited to order her meals, has an appetite  Complaint of continued chest tightness, that does not improve or worsen with changes in position  Patient got tearful when describing her living situation and requested not to be sent back to her apartment which she is sharing with a friend  Her family is not open to accepting the patient post hospital discharge  Patient is open to working with case management to find a safe alternative for her to live at after discharge       Objective:     Vitals:   Temp (24hrs), Av °F (36 7 °C), Min:97 7 °F (36 5 °C), Max:98 3 °F (36 8 °C)    Temp:  [97 7 °F (36 5 °C)-98 3 °F (36 8 °C)] 97 7 °F (36 5 °C)  HR:  [52-86] 75  Resp:  [18-29] 20  BP: (102-122)/(47-71) 102/52  SpO2:  [92 %-97 %] 92 %  Body mass index is 28 95 kg/m²  Input and Output Summary (last 24 hours): Intake/Output Summary (Last 24 hours) at 9/27/2022 1025  Last data filed at 9/26/2022 1601  Gross per 24 hour   Intake --   Output 250 ml   Net -250 ml       Physical Exam:     Physical Exam  Constitutional:       General: She is in acute distress  Appearance: Normal appearance  She is normal weight  She is not toxic-appearing  HENT:      Head: Normocephalic and atraumatic  Comments: Healed abrasion to left forehead  Eyes:      General: No scleral icterus  Extraocular Movements: Extraocular movements intact  Cardiovascular:      Rate and Rhythm: Normal rate and regular rhythm  Pulses: Normal pulses  Heart sounds: Normal heart sounds  No murmur heard  No friction rub  No gallop  Pulmonary:      Effort: Pulmonary effort is normal       Breath sounds: Rhonchi present  Abdominal:      General: Abdomen is flat  There is no distension  Palpations: Abdomen is soft  Tenderness: There is no abdominal tenderness  Musculoskeletal:      Cervical back: Normal range of motion  Skin:     General: Skin is warm and dry  Neurological:      Mental Status: She is alert  Psychiatric:         Attention and Perception: Perception normal  She is attentive  Mood and Affect: Affect is labile  Speech: Speech is tangential          Behavior: Behavior is hyperactive  Behavior is cooperative  Thought Content:  Thought content normal          Cognition and Memory: Cognition and memory normal          Historical Information   Past Medical History:   Diagnosis Date    Altered mental status 9/21/2022    Elevated LFTs 9/21/2022    Lower back pain      Past Surgical History:   Procedure Laterality Date    CHOLECYSTECTOMY       Social History   Social History     Substance and Sexual Activity   Alcohol Use Never     Social History Substance and Sexual Activity   Drug Use Not Currently    Comment: 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  Social History     Tobacco Use   Smoking Status Current Every Day Smoker    Packs/day: 1 00    Types: Cigarettes   Smokeless Tobacco Current User       Meds/Allergies   all medications and allergies reviewed  Allergies   Allergen Reactions    Haloperidol Other (See Comments)     oculogyr crisis       Additional Data:     Labs:    Results from last 7 days   Lab Units 09/27/22  0444 09/25/22  0510 09/22/22  0450 09/21/22  1215   WBC Thousand/uL 12 09* 12 65*   < > 17 06*   HEMOGLOBIN g/dL 10 8* 10 2*   < > 10 4*   HEMATOCRIT % 33 7* 32 0*   < > 32 2*   PLATELETS Thousands/uL 363 434*   < > 248   BANDS PCT %  --   --   --  12*   NEUTROS PCT %  --  80*   < >  --    LYMPHS PCT %  --  10*   < >  --    LYMPHO PCT % 30  --    < > 2*   MONOS PCT %  --  6   < >  --    MONO PCT % 5  --    < > 3*   EOS PCT % 0 2   < > 0    < > = values in this interval not displayed  Results from last 7 days   Lab Units 09/27/22  0444 09/25/22  0510   SODIUM mmol/L 142 140   POTASSIUM mmol/L 3 4* 3 9   CHLORIDE mmol/L 107 105   CO2 mmol/L 26 24   BUN mg/dL 15 22   CREATININE mg/dL 0 68 0 57*   ANION GAP mmol/L 9 11   CALCIUM mg/dL 8 5 8 9   ALBUMIN g/dL  --  2 0*   TOTAL BILIRUBIN mg/dL  --  0 30   ALK PHOS U/L  --  96   ALT U/L  --  38   AST U/L  --  12   GLUCOSE RANDOM mg/dL 87 173*         Results from last 7 days   Lab Units 09/25/22  1207 09/25/22  0621 09/25/22  0008 09/24/22  1831 09/24/22  1207 09/23/22  1717 09/23/22  1344   POC GLUCOSE mg/dl 119 144* 164* 183* 197* 156* 124         Results from last 7 days   Lab Units 09/25/22  0510 09/24/22  0519 09/21/22  1233 09/21/22  1215   LACTIC ACID mmol/L  --   --  1 1  --    PROCALCITONIN ng/ml 0 94* 1 90*  --  3 79*         * I Have Reviewed All Lab Data Listed Above  * Additional Pertinent Lab Tests Reviewed:  All Labs Within Last 24 Hours Reviewed    Imaging:    Imaging Reports Reviewed Today Include: XR chest from 9/23, 9/25    Recent Cultures (last 7 days):     Results from last 7 days   Lab Units 09/25/22  2250 09/22/22  1757 09/21/22  1233   BLOOD CULTURE   --   --  No Growth After 5 Days  No Growth After 5 Days     GRAM STAIN RESULT  1+ Epithelial cells per low power field  No Polys or Bacteria seen  --   --    LEGIONELLA URINARY ANTIGEN   --  Negative  --        Last 24 Hours Medication List:   Current Facility-Administered Medications   Medication Dose Route Frequency Provider Last Rate    acetaminophen  650 mg Oral Q6H PRN Tanika Mariee, KALEIGH      buprenorphine-naloxone  8 mg Sublingual BID Tanikagolden MandujanosKALEIGH      clonazePAM  1 mg Oral BID Tanika Mariee PA-C      enoxaparin  40 mg Subcutaneous Daily [place], [place]      famotidine  20 mg Oral BID PRN Tanika Mariee PA-C      gabapentin  400 mg Oral TID Tanika Mariee PA-C      ipratropium-albuterol  3 mL Nebulization Q6H [place], [place]      nicotine  21 mg Transdermal Daily [place], [place]      nicotine polacrilex  2 mg Oral Q2H PRN Tanika Mariee PA-C      ondansetron  4 mg Intravenous Q4H PRN Erick Tran,       oxazepam  15 mg Oral BID PRN Erick Tran, DO      predniSONE  80 mg Oral Daily [place], [place]      sodium chloride  4 mL Nebulization Q8H Tanika Mariee PA-C      topiramate  100 mg Oral BID Tanika Mariee PA-C      traZODone  50 mg Oral HS Tanika Mariee PA-C          Today, Patient Was Seen By: Mee Escalante PAJosueS

## 2022-09-27 NOTE — PLAN OF CARE
Problem: Potential for Falls  Goal: Patient will remain free of falls  Description: INTERVENTIONS:  - Educate patient/family on patient safety including physical limitations  - Instruct patient to call for assistance with activity   - Consult OT/PT to assist with strengthening/mobility   - Keep Call bell within reach  - Keep bed low and locked with side rails adjusted as appropriate  - Keep care items and personal belongings within reach  - Initiate and maintain comfort rounds  - Make Fall Risk Sign visible to staff  - Offer Toileting every 2 Hours, in advance of need  - Initiate/Maintain bed alarm  - Apply yellow socks and bracelet for high fall risk patients  - Consider moving patient to room near nurses station  Outcome: Progressing     Problem: PAIN - ADULT  Goal: Verbalizes/displays adequate comfort level or baseline comfort level  Description: Interventions:  - Encourage patient to monitor pain and request assistance  - Assess pain using appropriate pain scale  - Administer analgesics based on type and severity of pain and evaluate response  - Implement non-pharmacological measures as appropriate and evaluate response  - Consider cultural and social influences on pain and pain management  - Notify physician/advanced practitioner if interventions unsuccessful or patient reports new pain  Outcome: Progressing     Problem: RESPIRATORY - ADULT  Goal: Achieves optimal ventilation and oxygenation  Description: INTERVENTIONS:  - Assess for changes in respiratory status  - Assess for changes in mentation and behavior  - Position to facilitate oxygenation and minimize respiratory effort  - Oxygen administered by appropriate delivery if ordered  - Initiate smoking cessation education as indicated  - Encourage broncho-pulmonary hygiene including cough, deep breathe, Incentive Spirometry  - Assess the need for suctioning and aspirate as needed  - Assess and instruct to report SOB or any respiratory difficulty  - Respiratory Therapy support as indicated  Outcome: Progressing

## 2022-09-27 NOTE — ASSESSMENT & PLAN NOTE
Patient presented with shortness of breath 9/21 and hypoxia  Concern for vape related lung injury  CT chest with extensive ground-glass opacities in the lungs with peripheral sparing  Most recent chest x-ray with improved but persistent bilateral infiltrates  Echocardiogram with preserved EF, no regional wall motion abnormalities  Patient completed 5 day course of antibiotics  Patient was treated with IV steroids and now transition to high-dose oral steroids with 10 day taper  Appreciate pulmonology recommendations  Currently on 5 L nasal cannula, continue to wean to maintain saturations greater than 88%

## 2022-09-28 LAB
ANION GAP SERPL CALCULATED.3IONS-SCNC: 9 MMOL/L (ref 4–13)
BACTERIA SPT RESP CULT: ABNORMAL
BACTERIA SPT RESP CULT: ABNORMAL
BUN SERPL-MCNC: 19 MG/DL (ref 5–25)
CALCIUM SERPL-MCNC: 9.2 MG/DL (ref 8.3–10.1)
CHLORIDE SERPL-SCNC: 107 MMOL/L (ref 96–108)
CO2 SERPL-SCNC: 27 MMOL/L (ref 21–32)
CREAT SERPL-MCNC: 0.79 MG/DL (ref 0.6–1.3)
ERYTHROCYTE [DISTWIDTH] IN BLOOD BY AUTOMATED COUNT: 12.4 % (ref 11.6–15.1)
GFR SERPL CREATININE-BSD FRML MDRD: 90 ML/MIN/1.73SQ M
GLUCOSE SERPL-MCNC: 102 MG/DL (ref 65–140)
GRAM STN SPEC: ABNORMAL
GRAM STN SPEC: ABNORMAL
HCT VFR BLD AUTO: 34.1 % (ref 34.8–46.1)
HGB BLD-MCNC: 10.4 G/DL (ref 11.5–15.4)
MCH RBC QN AUTO: 29 PG (ref 26.8–34.3)
MCHC RBC AUTO-ENTMCNC: 30.5 G/DL (ref 31.4–37.4)
MCV RBC AUTO: 95 FL (ref 82–98)
PLATELET # BLD AUTO: 412 THOUSANDS/UL (ref 149–390)
PMV BLD AUTO: 9.3 FL (ref 8.9–12.7)
POTASSIUM SERPL-SCNC: 3.3 MMOL/L (ref 3.5–5.3)
RBC # BLD AUTO: 3.59 MILLION/UL (ref 3.81–5.12)
SODIUM SERPL-SCNC: 143 MMOL/L (ref 135–147)
WBC # BLD AUTO: 14.65 THOUSAND/UL (ref 4.31–10.16)

## 2022-09-28 PROCEDURE — 99232 SBSQ HOSP IP/OBS MODERATE 35: CPT | Performed by: INTERNAL MEDICINE

## 2022-09-28 PROCEDURE — 94640 AIRWAY INHALATION TREATMENT: CPT

## 2022-09-28 PROCEDURE — 80048 BASIC METABOLIC PNL TOTAL CA: CPT | Performed by: INTERNAL MEDICINE

## 2022-09-28 PROCEDURE — 85027 COMPLETE CBC AUTOMATED: CPT | Performed by: INTERNAL MEDICINE

## 2022-09-28 RX ORDER — IPRATROPIUM BROMIDE AND ALBUTEROL SULFATE 2.5; .5 MG/3ML; MG/3ML
3 SOLUTION RESPIRATORY (INHALATION)
Status: DISCONTINUED | OUTPATIENT
Start: 2022-09-29 | End: 2022-10-05 | Stop reason: HOSPADM

## 2022-09-28 RX ORDER — ALPRAZOLAM 0.5 MG/1
2 TABLET ORAL ONCE
Status: COMPLETED | OUTPATIENT
Start: 2022-09-28 | End: 2022-09-28

## 2022-09-28 RX ORDER — METHOCARBAMOL 500 MG/1
750 TABLET, FILM COATED ORAL EVERY 6 HOURS PRN
Status: DISCONTINUED | OUTPATIENT
Start: 2022-09-28 | End: 2022-10-05 | Stop reason: HOSPADM

## 2022-09-28 RX ORDER — POTASSIUM CHLORIDE 20 MEQ/1
40 TABLET, EXTENDED RELEASE ORAL ONCE
Status: COMPLETED | OUTPATIENT
Start: 2022-09-28 | End: 2022-09-28

## 2022-09-28 RX ORDER — HYDROXYZINE HYDROCHLORIDE 25 MG/1
50 TABLET, FILM COATED ORAL EVERY 6 HOURS PRN
Status: DISCONTINUED | OUTPATIENT
Start: 2022-09-28 | End: 2022-10-05 | Stop reason: HOSPADM

## 2022-09-28 RX ORDER — FERROUS SULFATE 325(65) MG
325 TABLET ORAL
Status: DISCONTINUED | OUTPATIENT
Start: 2022-09-29 | End: 2022-10-05 | Stop reason: HOSPADM

## 2022-09-28 RX ORDER — FAMOTIDINE 20 MG/1
20 TABLET, FILM COATED ORAL 2 TIMES DAILY
Status: DISCONTINUED | OUTPATIENT
Start: 2022-09-28 | End: 2022-10-05 | Stop reason: HOSPADM

## 2022-09-28 RX ORDER — PANTOPRAZOLE SODIUM 40 MG/1
40 TABLET, DELAYED RELEASE ORAL
Status: DISCONTINUED | OUTPATIENT
Start: 2022-09-28 | End: 2022-10-05 | Stop reason: HOSPADM

## 2022-09-28 RX ADMIN — GABAPENTIN 400 MG: 400 CAPSULE ORAL at 17:09

## 2022-09-28 RX ADMIN — CLONAZEPAM 1 MG: 1 TABLET ORAL at 17:09

## 2022-09-28 RX ADMIN — PANTOPRAZOLE SODIUM 40 MG: 40 TABLET, DELAYED RELEASE ORAL at 10:36

## 2022-09-28 RX ADMIN — BUPRENORPHINE AND NALOXONE 8 MG: 8; 2 FILM BUCCAL; SUBLINGUAL at 09:00

## 2022-09-28 RX ADMIN — CLONAZEPAM 1 MG: 1 TABLET ORAL at 09:00

## 2022-09-28 RX ADMIN — IPRATROPIUM BROMIDE AND ALBUTEROL SULFATE 3 ML: 2.5; .5 SOLUTION RESPIRATORY (INHALATION) at 13:48

## 2022-09-28 RX ADMIN — TOPIRAMATE 100 MG: 100 TABLET, FILM COATED ORAL at 17:09

## 2022-09-28 RX ADMIN — OXAZEPAM 15 MG: 15 CAPSULE, GELATIN COATED ORAL at 03:33

## 2022-09-28 RX ADMIN — ACETAMINOPHEN 650 MG: 325 TABLET, FILM COATED ORAL at 08:40

## 2022-09-28 RX ADMIN — GABAPENTIN 400 MG: 400 CAPSULE ORAL at 09:00

## 2022-09-28 RX ADMIN — NICOTINE POLACRILEX 2 MG: 2 GUM, CHEWING ORAL at 10:38

## 2022-09-28 RX ADMIN — NICOTINE POLACRILEX 2 MG: 2 GUM, CHEWING ORAL at 14:58

## 2022-09-28 RX ADMIN — OXAZEPAM 15 MG: 15 CAPSULE, GELATIN COATED ORAL at 14:58

## 2022-09-28 RX ADMIN — TOPIRAMATE 100 MG: 100 TABLET, FILM COATED ORAL at 09:00

## 2022-09-28 RX ADMIN — NICOTINE POLACRILEX 2 MG: 2 GUM, CHEWING ORAL at 22:25

## 2022-09-28 RX ADMIN — IPRATROPIUM BROMIDE AND ALBUTEROL SULFATE 3 ML: 2.5; .5 SOLUTION RESPIRATORY (INHALATION) at 07:54

## 2022-09-28 RX ADMIN — BUPRENORPHINE AND NALOXONE 8 MG: 8; 2 FILM BUCCAL; SUBLINGUAL at 17:10

## 2022-09-28 RX ADMIN — FAMOTIDINE 20 MG: 20 TABLET ORAL at 17:09

## 2022-09-28 RX ADMIN — GABAPENTIN 400 MG: 400 CAPSULE ORAL at 21:44

## 2022-09-28 RX ADMIN — PREDNISONE 80 MG: 20 TABLET ORAL at 09:00

## 2022-09-28 RX ADMIN — Medication 21 MG: at 10:37

## 2022-09-28 RX ADMIN — ENOXAPARIN SODIUM 40 MG: 40 INJECTION SUBCUTANEOUS at 09:00

## 2022-09-28 RX ADMIN — TRAZODONE HYDROCHLORIDE 50 MG: 50 TABLET ORAL at 21:44

## 2022-09-28 RX ADMIN — ALPRAZOLAM 2 MG: 0.5 TABLET ORAL at 21:44

## 2022-09-28 RX ADMIN — POTASSIUM CHLORIDE 40 MEQ: 1500 TABLET, EXTENDED RELEASE ORAL at 10:36

## 2022-09-28 NOTE — PLAN OF CARE
Problem: MOBILITY - ADULT  Goal: Maintain or return to baseline ADL function  Description: INTERVENTIONS:  -  Assess patient's ability to carry out ADLs; assess patient's baseline for ADL function and identify physical deficits which impact ability to perform ADLs (bathing, care of mouth/teeth, toileting, grooming, dressing, etc )  - Assess/evaluate cause of self-care deficits   - Assess range of motion  - Assess patient's mobility; develop plan if impaired  - Assess patient's need for assistive devices and provide as appropriate  - Encourage maximum independence but intervene and supervise when necessary  - Involve family in performance of ADLs  - Assess for home care needs following discharge   - Consider OT consult to assist with ADL evaluation and planning for discharge  - Provide patient education as appropriate  Outcome: Progressing  Goal: Maintains/Returns to pre admission functional level  Description: INTERVENTIONS:  - Perform BMAT or MOVE assessment daily    - Set and communicate daily mobility goal to care team and patient/family/caregiver  - Collaborate with rehabilitation services on mobility goals if consulted  - Perform Range of Motion 4 times a day  - Reposition patient every 2 hours    - Dangle patient 2 times a day  - Stand patient 2 times a day  - Ambulate patient 2 times a day  - Out of bed to chair 2 times a day   - Out of bed for meals 2 times a day  - Out of bed for toileting  - Record patient progress and toleration of activity level   Outcome: Progressing     Problem: Prexisting or High Potential for Compromised Skin Integrity  Goal: Skin integrity is maintained or improved  Description: INTERVENTIONS:  - Identify patients at risk for skin breakdown  - Assess and monitor skin integrity  - Assess and monitor nutrition and hydration status  - Monitor labs   - Assess for incontinence   - Turn and reposition patient  - Assist with mobility/ambulation  - Relieve pressure over bony prominences  - Avoid friction and shearing  - Provide appropriate hygiene as needed including keeping skin clean and dry  - Evaluate need for skin moisturizer/barrier cream  - Collaborate with interdisciplinary team   - Patient/family teaching  - Consider wound care consult   Outcome: Progressing     Problem: Potential for Falls  Goal: Patient will remain free of falls  Description: INTERVENTIONS:  - Educate patient/family on patient safety including physical limitations  - Instruct patient to call for assistance with activity   - Consult OT/PT to assist with strengthening/mobility   - Keep Call bell within reach  - Keep bed low and locked with side rails adjusted as appropriate  - Keep care items and personal belongings within reach  - Initiate and maintain comfort rounds  - Make Fall Risk Sign visible to staff  - Offer Toileting everHours, in advance of need  - Initiate/Maintain alarm  - Obtain necessary fall risk management equipment: - Apply yellow socks and bracelet for high fall risk patients  - Consider moving patient to room near nurses station  Outcome: Progressing     Problem: PAIN - ADULT  Goal: Verbalizes/displays adequate comfort level or baseline comfort level  Description: Interventions:  - Encourage patient to monitor pain and request assistance  - Assess pain using appropriate pain scale  - Administer analgesics based on type and severity of pain and evaluate response  - Implement non-pharmacological measures as appropriate and evaluate response  - Consider cultural and social influences on pain and pain management  - Notify physician/advanced practitioner if interventions unsuccessful or patient reports new pain  Outcome: Progressing     Problem: INFECTION - ADULT  Goal: Absence or prevention of progression during hospitalization  Description: INTERVENTIONS:  - Assess and monitor for signs and symptoms of infection  - Monitor lab/diagnostic results  - Monitor all insertion sites, i e  indwelling lines, tubes, and drains  - Monitor endotracheal if appropriate and nasal secretions for changes in amount and color  - [place] appropriate cooling/warming therapies per order  - Administer medications as ordered  - Instruct and encourage patient and family to use good hand hygiene technique  - Identify and instruct in appropriate isolation precautions for identified infection/condition  Outcome: Progressing  Goal: Absence of fever/infection during neutropenic period  Description: INTERVENTIONS:  - Monitor WBC    Outcome: Progressing     Problem: SAFETY ADULT  Goal: Maintain or return to baseline ADL function  Description: INTERVENTIONS:  -  Assess patient's ability to carry out ADLs; assess patient's baseline for ADL function and identify physical deficits which impact ability to perform ADLs (bathing, care of mouth/teeth, toileting, grooming, dressing, etc )  - Assess/evaluate cause of self-care deficits   - Assess range of motion  - Assess patient's mobility; develop plan if impaired  - Assess patient's need for assistive devices and provide as appropriate  - Encourage maximum independence but intervene and supervise when necessary  - Involve family in performance of ADLs  - Assess for home care needs following discharge   - Consider OT consult to assist with ADL evaluation and planning for discharge  - Provide patient education as appropriate  Outcome: Progressing  Goal: Maintains/Returns to pre admission functional level  Description: INTERVENTIONS:  - Perform BMAT or MOVE assessment daily    - Set and communicate daily mobility goal to care team and patient/family/caregiver  - Collaborate with rehabilitation services on mobility goals if consulted  - Perform Range of Motion 4 times a day  - Reposition patient every 2 hours    - Dangle patient 2 times a day  - Stand patient 2 times a day  - Ambulate patient 2 times a day  - Out of bed to chair 2 times a day   - Out of bed for meals 2 times a day  - Out of bed for toileting  - Record patient progress and toleration of activity level   Outcome: Progressing  Goal: Patient will remain free of falls  Description: INTERVENTIONS:  - Educate patient/family on patient safety including physical limitations  - Instruct patient to call for assistance with activity   - Consult OT/PT to assist with strengthening/mobility   - Keep Call bell within reach  - Keep bed low and locked with side rails adjusted as appropriate  - Keep care items and personal belongings within reach  - Initiate and maintain comfort rounds  - Make Fall Risk Sign visible to staff  - Offer Toileting every Hours, in advance of need  - Initiate/Maintainalarm  - Obtain necessary fall risk management equipment:  - Apply yellow socks and bracelet for high fall risk patients  - Consider moving patient to room near nurses station  Outcome: Progressing     Problem: DISCHARGE PLANNING  Goal: Discharge to home or other facility with appropriate resources  Description: INTERVENTIONS:  - Identify barriers to discharge w/patient and caregiver  - Arrange for needed discharge resources and transportation as appropriate  - Identify discharge learning needs (meds, wound care, etc )  - Arrange for interpretive services to assist at discharge as needed  - Refer to Case Management Department for coordinating discharge planning if the patient needs post-hospital services based on physician/advanced practitioner order or complex needs related to functional status, cognitive ability, or social support system  Outcome: Progressing     Problem: DISCHARGE PLANNING  Goal: Discharge to home or other facility with appropriate resources  Description: INTERVENTIONS:  - Identify barriers to discharge w/patient and caregiver  - Arrange for needed discharge resources and transportation as appropriate  - Identify discharge learning needs (meds, wound care, etc )  - Arrange for interpretive services to assist at discharge as needed  - Refer to Case Management Department for coordinating discharge planning if the patient needs post-hospital services based on physician/advanced practitioner order or complex needs related to functional status, cognitive ability, or social support system  Outcome: Progressing

## 2022-09-28 NOTE — ASSESSMENT & PLAN NOTE
Patient presented with shortness of breath 9/21 and hypoxia  Concern for vape related lung injury  CT chest with extensive ground-glass opacities in the lungs with peripheral sparing  Most recent chest x-ray with improved but persistent bilateral infiltrates  Echocardiogram with preserved EF, no regional wall motion abnormalities  Patient completed 5 day course of antibiotics  Patient was treated with IV steroids and now transition to high-dose oral steroids with 10 day taper  Appreciate pulmonology recommendations  Currently on 5 L nasal cannula, continue to wean to maintain saturations greater than 88%  Plan for home O2 evaluation tomorrow

## 2022-09-28 NOTE — PROGRESS NOTES
Progress Note - Pulmonary   Horacio Graft 41 y o  female MRN: [id number]  Unit/Bed#: E2 -01 Encounter: [id number]    Assessment/Plan:    1  Acute hypoxic respiratory failure likely multifaceted as listed below        -   Down to 3L- 96%,  Patient does not wear home O2        -   Continue saturations greater than 80%        -   Pulmonary toileting:  Deep breathing cough, OOB  As tolerated, IS Q 1 hr        -   Will need home O2 evaluation prior to discharge    2  ARDS w/  Lymphadenopathy ( likely inflammatory)       -  9/25/2022-  Completed 5 days azithromycin/ ceftriaxone       -   procalcitonin- 3 79-- 1 90-- 0 94       -   Will need repeat imaging 4-6 weeks    3  EVALI       -   Reports H/O of vaping w/out knowing contemts       -    Day # 2/10-  80 mg prednisone (1mg/kg),  Reduced by 10 mg q 3 days       -   Recommend formal PFT test       -   Will need close pulmonary follow-up    4  Tobacco abuse        -   Encourage in educated on tobacco cessation        -   Patient appears in contemplation stage        -   NRT  Per primary team    5  Bipolar disorder and hep C        -   Managed per primary team        - outpatient follow-up per discharge instructions  -  pulmonary will sign off        Chief Complaint:    "I am feeling better"    Subjective:    Gabbi Iraheta  Was comfortably sitting in her bed  She reports she overall is feeling better since admission  No significant overnight events reported  Patient currently denying any fevers, chills, hemoptysis, headaches, night sweats, pleuritic chest pain, or palpitations  Objective:    Vitals: Blood pressure 112/58, pulse 71, temperature 98 3 °F (36 8 °C), temperature source Temporal, resp  rate 20, height 5' 2" (1 575 m), weight 72 2 kg (159 lb 2 8 oz), last menstrual period 09/21/2022, SpO2 96 %  5,Body mass index is 29 11 kg/m²      No intake or output data in the 24 hours ending 09/28/22 0612    Invasive Devices  Report    Peripheral Intravenous Line  Duration           Peripheral IV 09/27/22 Left;Ventral (anterior) Hand <1 day                Physical Exam:   Physical Exam  Constitutional:       General: She is not in acute distress  Appearance: Normal appearance  She is normal weight  She is not ill-appearing  HENT:      Head: Normocephalic and atraumatic  Nose: Nose normal  No congestion or rhinorrhea  Mouth/Throat:      Mouth: Mucous membranes are dry  Pharynx: No oropharyngeal exudate or posterior oropharyngeal erythema  Cardiovascular:      Rate and Rhythm: Normal rate and regular rhythm  Pulses: Normal pulses  Heart sounds: Normal heart sounds  No murmur heard  No friction rub  No gallop  Pulmonary:      Effort: Pulmonary effort is normal  No tachypnea, bradypnea, accessory muscle usage or respiratory distress  Breath sounds: Decreased air movement present  No stridor  Decreased breath sounds present  No wheezing, rhonchi or rales  Comments: Some scattered rales and wheezing  Chest:      Chest wall: No tenderness  Abdominal:      General: Abdomen is flat  Bowel sounds are normal       Palpations: Abdomen is soft  Musculoskeletal:         General: No swelling or tenderness  Normal range of motion  Cervical back: Normal range of motion  No rigidity or tenderness  Skin:     General: Skin is warm and dry  Coloration: Skin is not jaundiced or pale  Neurological:      General: No focal deficit present  Mental Status: She is alert and oriented to person, place, and time  Mental status is at baseline  Psychiatric:         Mood and Affect: Mood normal          Behavior: Behavior normal          Labs:    I have personally reviewed pertinent lab results CBC:   Lab Results   Component Value Date    WBC 14 65 (H) 09/28/2022    HGB 10 4 (L) 09/28/2022    HCT 34 1 (L) 09/28/2022    MCV 95 09/28/2022     (H) 09/28/2022    MCH 29 0 09/28/2022    MCHC 30 5 (L) 09/28/2022    RDW 12 4 09/28/2022    MPV 9 3 09/28/2022       Imaging and other studies: I have personally reviewed pertinent films in PACS       Chest x-ray 09/25/2022- persistent moderate bilateral pulmonary infiltrates

## 2022-09-28 NOTE — APP STUDENT NOTE
[facility] STUDENT  Inpatient Progress Note for TRAINING ONLY  Not Part of Legal Medical Record     Progress Note - Lam Dee 41 y o  female MRN: [id number]  Unit/Bed#: E2 -01 Encounter: [id number]         Assessment/Plan:  1  Acute respiratory failure with hypoxia  Presented with SOB on 9/21 with pleuritic chest pain and hemoptysis following trying new vape  O2 sats were 50% on 4L  CT chest:Negative for PE, Extensive groundglass airspace opacity in the lungs with peripheral sparing  Mediastinal and bilateral hilar lymphadenopathy - concerns for noncardiogenic pulmonary edema  COVID, RSV, flu panel - negative   CXR: Development of bilateral opacities likely representing multifocal pneumonia  Repeat CXR on 9/22 showed worsening of the opacities in the bilateral lungs- possible lopoid pneumonia  ECHO: LVEF 39%, no diastolic dysfunction, IVC dilated with moderate pulmonary hypertension( looks acute likely ARDS related as right ventricle is not dilated), NT pro PNP: 2 493  Azithromycin and ceftriaxone complete  Continue Prednisone 1mg/kg x 10 days, on day 2/10, then taper over additional 10 days  Wean O2, as tolerated, maintain O2 >90%  Patient currently on 2 5 L O2 via nasal cannula  Cryoglobulin, CCP, ANCA negative  Ipratropium-albuterol (Duo-Neb) q6 hrs  Sodium chloride 3% nebulized q 8 hrs  2  Sepsis  Completed antibiotics yesterday 9/26, see above  3  Bipolar affective disorder, currently active  Outpatient on bupropion (Wellbutrin), Fluoxetine (Prozac), topiramate (Topamax) daily  Clonazepam 1mg BID and gabapentin (Neurontin) 400 mg TID  Oxazepam PRN anxiety  Patient follows up with [facility]  Patient took herself off of bupropion and fluoxetine, made her "excited"  Currently on topiramate (Topamax) 100 mg BID, trazadone 50 mg QHS, Clonazepam (Klonipin) 1mg BID and gabapentin (Neurontin) 400 mg TID while inpatient  4  Substance Abuse Disorder  Continue Suboxone therapy     UDS negative  5  Heavy tobacco smoker  Continue nicotine patch, 21 mg q 24h      VTE Pharmacologic Prophylaxis:   Pharmacologic: Enoxaparin (Lovenox)  Mechanical VTE Prophylaxis in Place: No    Patient Centered Rounds: I have performed bedside rounds with nursing staff today  Discussions with Specialists or Other Care Team Provider: Discussed plan of care with Dr Williams Lockhart DO    Time Spent for Care: 30 minutes  More than 50% of total time spent on counseling and coordination of care as described above  Current Length of Stay: 7 day(s)    Current Patient Status: Inpatient     Discharge Plan: Anticipate discharge in 24- 48 hours  Code Status: Level 1 - Full Code    Subjective:   Adithya Pham is a 42 yo female with PMH of substance use disorder, bipolar disorder and depression presents on hospital day #7 after transfer from ICU for acute respiratory failure with hypoxia secondary to bilateral multifocal pneumonia  Patient is seen sitting upright in bed, consistently changing positions  No overnight events  Ambulating, eating, drinking, urinating as expected  Now using 2 5L O2 via nasal cannula  No chest pain or shortness of breath reported  Objective:     Vitals:   Temp (24hrs), Av 7 °F (36 5 °C), Min:97 4 °F (36 3 °C), Max:98 3 °F (36 8 °C)    Temp:  [97 4 °F (36 3 °C)-98 3 °F (36 8 °C)] 97 8 °F (36 6 °C)  HR:  [67-78] 68  Resp:  [18-20] 18  BP: (102-112)/(46-64) 107/62  SpO2:  [91 %-98 %] 95 %  Body mass index is 29 11 kg/m²  Input and Output Summary (last 24 hours):     No intake or output data in the 24 hours ending 22 1225    Physical Exam:     Physical Exam  Constitutional:       Appearance: She is normal weight  HENT:      Head: Normocephalic and atraumatic  Eyes:      General: No scleral icterus  Extraocular Movements: Extraocular movements intact  Cardiovascular:      Rate and Rhythm: Normal rate and regular rhythm  Pulses: Normal pulses        Heart sounds: Normal heart sounds  No murmur heard  No friction rub  No gallop  Pulmonary:      Effort: Pulmonary effort is normal  No respiratory distress  Breath sounds: No wheezing, rhonchi or rales  Chest:      Chest wall: No tenderness  Abdominal:      General: Abdomen is flat  Bowel sounds are normal  There is no distension  Palpations: Abdomen is soft  Tenderness: There is no abdominal tenderness  There is no guarding  Skin:     General: Skin is warm and dry  Neurological:      Mental Status: She is alert  Psychiatric:         Mood and Affect: Affect is labile  Speech: Speech is rapid and pressured and tangential          Behavior: Behavior is hyperactive  Behavior is cooperative  Historical Information   Past Medical History:   Diagnosis Date    Altered mental status 9/21/2022    Elevated LFTs 9/21/2022    Lower back pain      Past Surgical History:   Procedure Laterality Date    CHOLECYSTECTOMY       Social History   Social History     Substance and Sexual Activity   Alcohol Use Never     Social History     Substance and Sexual Activity   Drug Use Not Currently    Comment: 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        Social History     Tobacco Use   Smoking Status Current Every Day Smoker    Packs/day: 1 00    Types: Cigarettes   Smokeless Tobacco Current User       Meds/Allergies   all medications and allergies reviewed  Allergies   Allergen Reactions    Haloperidol Other (See Comments)     oculogyr crisis       Additional Data:     Labs:    Results from last 7 days   Lab Units 09/28/22 0442 09/27/22 0444 09/25/22  0510   WBC Thousand/uL 14 65* 12 09* 12 65*   HEMOGLOBIN g/dL 10 4* 10 8* 10 2*   HEMATOCRIT % 34 1* 33 7* 32 0*   PLATELETS Thousands/uL 412* 363 434*   NEUTROS PCT %  --   --  80*   LYMPHS PCT %  --   --  10*   LYMPHO PCT %  --  30  --    MONOS PCT %  --   --  6   MONO PCT %  --  5  --    EOS PCT %  --  0 2     Results from last 7 days   Lab Units 09/28/22 0442 09/27/22  0444 09/25/22  0510   SODIUM mmol/L 143   < > 140   POTASSIUM mmol/L 3 3*   < > 3 9   CHLORIDE mmol/L 107   < > 105   CO2 mmol/L 27   < > 24   BUN mg/dL 19   < > 22   CREATININE mg/dL 0 79   < > 0 57*   ANION GAP mmol/L 9   < > 11   CALCIUM mg/dL 9 2   < > 8 9   ALBUMIN g/dL  --   --  2 0*   TOTAL BILIRUBIN mg/dL  --   --  0 30   ALK PHOS U/L  --   --  96   ALT U/L  --   --  38   AST U/L  --   --  12   GLUCOSE RANDOM mg/dL 102   < > 173*    < > = values in this interval not displayed  Results from last 7 days   Lab Units 09/25/22  1207 09/25/22  0621 09/25/22  0008 09/24/22  1831 09/24/22  1207 09/23/22  1717 09/23/22  1344   POC GLUCOSE mg/dl 119 144* 164* 183* 197* 156* 124         Results from last 7 days   Lab Units 09/25/22  0510 09/24/22  0519 09/21/22  1233   LACTIC ACID mmol/L  --   --  1 1   PROCALCITONIN ng/ml 0 94* 1 90*  --          * I Have Reviewed All Lab Data Listed Above  * Additional Pertinent Lab Tests Reviewed: All Labs Within Last 24 Hours Reviewed    Imaging:    Imaging Reports Reviewed Today Include: none  Recent Cultures (last 7 days):     Results from last 7 days   Lab Units 09/25/22  2250 09/22/22  1757 09/21/22  1233   BLOOD CULTURE   --   --  No Growth After 5 Days  No Growth After 5 Days  SPUTUM CULTURE  Culture results to follow    --   --    GRAM STAIN RESULT  1+ Epithelial cells per low power field  No Polys or Bacteria seen  --   --    LEGIONELLA URINARY ANTIGEN   --  Negative  --        Last 24 Hours Medication List:   Current Facility-Administered Medications   Medication Dose Route Frequency Provider Last Rate    acetaminophen  650 mg Oral Q6H PRN Kendrick Barefoot, PA-C      buprenorphine-naloxone  8 mg Sublingual BID [place] Barefoot, PA-C      clonazePAM  1 mg Oral BID [place] Barefoot, PA-C      enoxaparin  40 mg Subcutaneous Daily [place] Barefoot, PA-C      famotidine  20 mg Oral BID Norm Dallas, DO      gabapentin  400 mg Oral TID Fabian Malling, PA-C      ipratropium-albuterol  3 mL Nebulization Q6H [facility] [place], [place]      methocarbamol  750 mg Oral Q6H PRN Sharyle Marisa, DO      nicotine  21 mg Transdermal Daily [place], [place]      nicotine polacrilex  2 mg Oral Q2H PRN Fabian Malling, PA-C      ondansetron  4 mg Intravenous Q4H PRN Sharyle Marisa, DO      oxazepam  15 mg Oral BID PRN Sharyle Marisa, DO      pantoprazole  40 mg Oral Early Morning Sharyle Marisa, DO      predniSONE  80 mg Oral Daily Fabian Malling, PA-C      sodium chloride  4 mL Nebulization Q8H Fabian Malling, PA-C      topiramate  100 mg Oral BID Fabian Malling, PA-C      traZODone  50 mg Oral HS Fabian Malling, PA-C          Today, Patient Was Seen By: COLE Berg    ** Please Note: Dictation voice to text software may have been used in the creation of this document   **

## 2022-09-28 NOTE — PLAN OF CARE
Problem: Prexisting or High Potential for Compromised Skin Integrity  Goal: Skin integrity is maintained or improved  Description: INTERVENTIONS:  - Identify patients at risk for skin breakdown  - Assess and monitor skin integrity  - Assess and monitor nutrition and hydration status  - Monitor labs   - Assess for incontinence   - Turn and reposition patient  - Assist with mobility/ambulation  - Relieve pressure over bony prominences  - Avoid friction and shearing  - Provide appropriate hygiene as needed including keeping skin clean and dry  - Evaluate need for skin moisturizer/barrier cream  - Collaborate with interdisciplinary team   - Patient/family teaching  - Consider wound care consult   Outcome: Progressing     Problem: PAIN - ADULT  Goal: Verbalizes/displays adequate comfort level or baseline comfort level  Description: Interventions:  - Encourage patient to monitor pain and request assistance  - Assess pain using appropriate pain scale  - Administer analgesics based on type and severity of pain and evaluate response  - Implement non-pharmacological measures as appropriate and evaluate response  - Consider cultural and social influences on pain and pain management  - Notify physician/advanced practitioner if interventions unsuccessful or patient reports new pain  Outcome: Progressing     Problem: Potential for Falls  Goal: Patient will remain free of falls  Description: INTERVENTIONS:  - Educate patient/family on patient safety including physical limitations  - Instruct patient to call for assistance with activity   - Consult OT/PT to assist with strengthening/mobility   - Keep Call bell within reach  - Keep bed low and locked with side rails adjusted as appropriate  - Keep care items and personal belongings within reach  - Initiate and maintain comfort rounds  - Make Fall Risk Sign visible to staff  - Offer Toileting every 2 Hours, in advance of need  - Initiate/Maintain bed alarm  - Apply yellow socks and bracelet for high fall risk patients  - Consider moving patient to room near nurses station  Outcome: Progressing     Problem: RESPIRATORY - ADULT  Goal: Achieves optimal ventilation and oxygenation  Description: INTERVENTIONS:  - Assess for changes in respiratory status  - Assess for changes in mentation and behavior  - Position to facilitate oxygenation and minimize respiratory effort  - Oxygen administered by appropriate delivery if ordered  - Initiate smoking cessation education as indicated  - Encourage broncho-pulmonary hygiene including cough, deep breathe, Incentive Spirometry  - Assess the need for suctioning and aspirate as needed  - Assess and instruct to report SOB or any respiratory difficulty  - Respiratory Therapy support as indicated  Outcome: Progressing

## 2022-09-28 NOTE — PROGRESS NOTES
[facility] [facility]  Progress Note Mge Macias 1975, 41 y o  female MRN: [id number]  Unit/Bed#: E2 -01 Encounter: [id number]  Primary Care Provider: No primary care provider on file  Date and time admitted to hospital: 9/21/2022 11:50 AM    * Acute respiratory failure with hypoxia [facility])  Assessment & Plan  Patient presented with shortness of breath 9/21 and hypoxia  Concern for vape related lung injury  CT chest with extensive ground-glass opacities in the lungs with peripheral sparing  Most recent chest x-ray with improved but persistent bilateral infiltrates  Echocardiogram with preserved EF, no regional wall motion abnormalities  Patient completed 5 day course of antibiotics  Patient was treated with IV steroids and now transition to high-dose oral steroids with 10 day taper  Appreciate pulmonology recommendations  Currently on 5 L nasal cannula, continue to wean to maintain saturations greater than 88%  Plan for home O2 evaluation tomorrow    Bradycardia  Assessment & Plan  Observed in ICU while on Precedex  No further events    R/O CAP (community acquired pneumonia)  Assessment & Plan  Chest x-ray with multifocal infiltrates with concern for superimposed bacterial pneumonia  Completed 5 day course of antibiotics      Sepsis ([address] )  Assessment & Plan  POA evidence by tachycardia, tachypnea, leukocytosis  Suspect superimposed pneumonia on setting of vape lung injury  Patient completed course of antibiotics    Anemia  Assessment & Plan  Iron deficiency anemia  Hemoglobin has remained stable  Oral iron supplementation    Substance Abuse Disorder   Assessment & Plan  Maintained on Suboxone    Bipolar affective disorder, currently active [facility])  Assessment & Plan  History of bipolar affective disorder  Maintained on Wellbutrin, fluoxetine, clonazepam, Topamax, Serax p r n    Patient is refusing Wellbutrin and fluoxetine        VTE Pharmacologic Prophylaxis:  Lovenox    Patient Centered Rounds:  Patient care rounds were performed with nursing    Time Spent for Care: 30  More than 50% of total time spent on counseling and coordination of care as described above  Current Length of Stay: 7 day(s)    Current Patient Status: Inpatient   Certification Statement: The patient will continue to require additional inpatient hospital stay due to ongoing management of acute hypoxic respiratory failure    Discharge Plan:  Hopeful discharge next 24 hours pending improvement    Code Status: Level 1 - Full Code      Subjective:   Patient seen and evaluated at bedside  Feels improved but still getting shortness of breath with ambulation  Objective:     Vitals:   Temp (24hrs), Av 7 °F (36 5 °C), Min:97 4 °F (36 3 °C), Max:98 3 °F (36 8 °C)    Temp:  [97 4 °F (36 3 °C)-98 3 °F (36 8 °C)] 97 8 °F (36 6 °C)  HR:  [67-78] 68  Resp:  [18-20] 18  BP: (102-112)/(46-64) 107/62  SpO2:  [91 %-98 %] 95 %  Body mass index is 29 11 kg/m²  Input and Output Summary (last 24 hours):     No intake or output data in the 24 hours ending 22 1307    Physical Exam:     Physical Exam  Vitals reviewed  Constitutional:       General: She is not in acute distress  Appearance: She is well-developed  She is not ill-appearing, toxic-appearing or diaphoretic  HENT:      Head: Normocephalic and atraumatic  Mouth/Throat:      Mouth: Mucous membranes are moist    Eyes:      General: No scleral icterus  Extraocular Movements: Extraocular movements intact  Cardiovascular:      Rate and Rhythm: Normal rate and regular rhythm  Heart sounds: Normal heart sounds  Pulmonary:      Effort: Pulmonary effort is normal  No respiratory distress  Breath sounds: No wheezing or rales  Comments: Coarse breath sounds b/l   Abdominal:      General: There is no distension  Palpations: Abdomen is soft  Tenderness: There is no abdominal tenderness  There is no guarding or rebound  Musculoskeletal:         General: No swelling, tenderness or deformity  Skin:     General: Skin is warm and dry  Neurological:      General: No focal deficit present  Mental Status: She is alert  Mental status is at baseline  Psychiatric:         Mood and Affect: Mood normal          Behavior: Behavior normal       Comments: Pressured speech         Additional Data:     Labs: I have reviewed pertinent results     Results from last 7 days   Lab Units 09/28/22  0442 09/27/22  0444 09/25/22  0510   WBC Thousand/uL 14 65* 12 09* 12 65*   HEMOGLOBIN g/dL 10 4* 10 8* 10 2*   HEMATOCRIT % 34 1* 33 7* 32 0*   PLATELETS Thousands/uL 412* 363 434*   NEUTROS PCT %  --   --  80*   LYMPHS PCT %  --   --  10*   LYMPHO PCT %  --  30  --    MONOS PCT %  --   --  6   MONO PCT %  --  5  --    EOS PCT %  --  0 2     Results from last 7 days   Lab Units 09/28/22  0442 09/27/22  0444 09/25/22  0510   SODIUM mmol/L 143   < > 140   POTASSIUM mmol/L 3 3*   < > 3 9   CHLORIDE mmol/L 107   < > 105   CO2 mmol/L 27   < > 24   BUN mg/dL 19   < > 22   CREATININE mg/dL 0 79   < > 0 57*   ANION GAP mmol/L 9   < > 11   CALCIUM mg/dL 9 2   < > 8 9   ALBUMIN g/dL  --   --  2 0*   TOTAL BILIRUBIN mg/dL  --   --  0 30   ALK PHOS U/L  --   --  96   ALT U/L  --   --  38   AST U/L  --   --  12   GLUCOSE RANDOM mg/dL 102   < > 173*    < > = values in this interval not displayed  Results from last 7 days   Lab Units 09/25/22  1207 09/25/22  0621 09/25/22  0008 09/24/22  1831 09/24/22  1207 09/23/22  1717 09/23/22  1344   POC GLUCOSE mg/dl 119 144* 164* 183* 197* 156* 124         Results from last 7 days   Lab Units 09/25/22  0510 09/24/22  0519   PROCALCITONIN ng/ml 0 94* 1 90*         Imaging: I have reviewed pertinent imaging       Recent Cultures (last 7 days):     Results from last 7 days   Lab Units 09/25/22  2250 09/22/22  1757   SPUTUM CULTURE  Culture results to follow    --    GRAM STAIN RESULT  1+ Epithelial cells per low power field  No Polys or Bacteria seen  --    LEGIONELLA URINARY ANTIGEN   --  Negative       Last 24 Hours Medication List:   Current Facility-Administered Medications   Medication Dose Route Frequency Provider Last Rate    acetaminophen  650 mg Oral Q6H PRN George Tapia PA-C      buprenorphine-naloxone  8 mg Sublingual BID George Tapia PA-C      clonazePAM  1 mg Oral BID George Tapia PA-C      enoxaparin  40 mg Subcutaneous Daily [place]      famotidine  20 mg Oral BID Maurice Quiñones, DO      gabapentin  400 mg Oral TID George Tapia PA-C      ipratropium-albuterol  3 mL Nebulization Q6H [place]      methocarbamol  750 mg Oral Q6H PRN Maurice Quiñones, DO      nicotine  21 mg Transdermal Daily Jose Alejandro Dang      nicotine polacrilex  2 mg Oral Q2H PRN George Tapia PA-C      ondansetron  4 mg Intravenous Q4H PRN Maurice Quiñones, DO      oxazepam  15 mg Oral BID PRN Maurice Quiñones, DO      pantoprazole  40 mg Oral Early Morning Maurice Quiñones, DO      predniSONE  80 mg Oral Daily [facility], [place]      sodium chloride  4 mL Nebulization Q8H George Tapia PA-C      topiramate  100 mg Oral BID George Tapia PA-C      traZODone  50 mg Oral HS George Tapia PA-C          Today, Patient Was Seen By: Maurice Quiñones    ** Please Note: Dictation voice to text software may have been used in the creation of this document   **

## 2022-09-29 PROCEDURE — 94760 N-INVAS EAR/PLS OXIMETRY 1: CPT

## 2022-09-29 PROCEDURE — G0425 INPT/ED TELECONSULT30: HCPCS | Performed by: PSYCHIATRY & NEUROLOGY

## 2022-09-29 PROCEDURE — 99232 SBSQ HOSP IP/OBS MODERATE 35: CPT | Performed by: INTERNAL MEDICINE

## 2022-09-29 PROCEDURE — 94640 AIRWAY INHALATION TREATMENT: CPT

## 2022-09-29 PROCEDURE — 94761 N-INVAS EAR/PLS OXIMETRY MLT: CPT

## 2022-09-29 RX ORDER — ALBUTEROL SULFATE 90 UG/1
2 AEROSOL, METERED RESPIRATORY (INHALATION) EVERY 4 HOURS PRN
Status: DISCONTINUED | OUTPATIENT
Start: 2022-09-29 | End: 2022-10-05 | Stop reason: HOSPADM

## 2022-09-29 RX ORDER — ONDANSETRON 4 MG/1
4 TABLET, ORALLY DISINTEGRATING ORAL EVERY 6 HOURS PRN
Status: DISCONTINUED | OUTPATIENT
Start: 2022-09-29 | End: 2022-10-05 | Stop reason: HOSPADM

## 2022-09-29 RX ADMIN — BUPRENORPHINE AND NALOXONE 8 MG: 8; 2 FILM BUCCAL; SUBLINGUAL at 16:13

## 2022-09-29 RX ADMIN — FAMOTIDINE 20 MG: 20 TABLET ORAL at 17:42

## 2022-09-29 RX ADMIN — PANTOPRAZOLE SODIUM 40 MG: 40 TABLET, DELAYED RELEASE ORAL at 05:24

## 2022-09-29 RX ADMIN — TOPIRAMATE 100 MG: 100 TABLET, FILM COATED ORAL at 17:42

## 2022-09-29 RX ADMIN — CLONAZEPAM 1 MG: 1 TABLET ORAL at 09:16

## 2022-09-29 RX ADMIN — NICOTINE POLACRILEX 2 MG: 2 GUM, CHEWING ORAL at 23:01

## 2022-09-29 RX ADMIN — GABAPENTIN 400 MG: 400 CAPSULE ORAL at 16:13

## 2022-09-29 RX ADMIN — BUPRENORPHINE AND NALOXONE 8 MG: 8; 2 FILM BUCCAL; SUBLINGUAL at 09:15

## 2022-09-29 RX ADMIN — GABAPENTIN 400 MG: 400 CAPSULE ORAL at 20:39

## 2022-09-29 RX ADMIN — SODIUM CHLORIDE SOLN NEBU 3% 4 ML: 3 NEBU SOLN at 07:22

## 2022-09-29 RX ADMIN — OXAZEPAM 15 MG: 15 CAPSULE, GELATIN COATED ORAL at 20:39

## 2022-09-29 RX ADMIN — FAMOTIDINE 20 MG: 20 TABLET ORAL at 09:16

## 2022-09-29 RX ADMIN — GABAPENTIN 400 MG: 400 CAPSULE ORAL at 09:16

## 2022-09-29 RX ADMIN — PREDNISONE 80 MG: 20 TABLET ORAL at 09:16

## 2022-09-29 RX ADMIN — CLONAZEPAM 1 MG: 1 TABLET ORAL at 17:42

## 2022-09-29 RX ADMIN — NICOTINE POLACRILEX 2 MG: 2 GUM, CHEWING ORAL at 09:19

## 2022-09-29 RX ADMIN — TOPIRAMATE 100 MG: 100 TABLET, FILM COATED ORAL at 09:16

## 2022-09-29 RX ADMIN — OXAZEPAM 15 MG: 15 CAPSULE, GELATIN COATED ORAL at 13:53

## 2022-09-29 RX ADMIN — TRAZODONE HYDROCHLORIDE 50 MG: 50 TABLET ORAL at 21:12

## 2022-09-29 RX ADMIN — Medication 21 MG: at 09:15

## 2022-09-29 RX ADMIN — IPRATROPIUM BROMIDE AND ALBUTEROL SULFATE 3 ML: 2.5; .5 SOLUTION RESPIRATORY (INHALATION) at 07:22

## 2022-09-29 RX ADMIN — IPRATROPIUM BROMIDE AND ALBUTEROL SULFATE 3 ML: 2.5; .5 SOLUTION RESPIRATORY (INHALATION) at 14:25

## 2022-09-29 RX ADMIN — Medication 325 MG: at 08:27

## 2022-09-29 RX ADMIN — ACETAMINOPHEN 650 MG: 325 TABLET, FILM COATED ORAL at 14:50

## 2022-09-29 RX ADMIN — ENOXAPARIN SODIUM 40 MG: 40 INJECTION SUBCUTANEOUS at 09:15

## 2022-09-29 RX ADMIN — ONDANSETRON 4 MG: 4 TABLET, ORALLY DISINTEGRATING ORAL at 17:41

## 2022-09-29 NOTE — ASSESSMENT & PLAN NOTE
History of bipolar affective disorder  Maintained on Wellbutrin, fluoxetine, clonazepam, Topamax, Serax p r n  Patient is refusing Wellbutrin and fluoxetine  Patient appears hypomanic with pressured speech  Likely exacerbated by high-dose steroids  Concern that this may affect her health given plan for prolonged taper    Will ask Psychiatry to evaluate medication regimen

## 2022-09-29 NOTE — RESPIRATORY THERAPY NOTE
Home Oxygen Qualifying Test     Patient name: Lam Dee        : 1975   Date of Test:  2022  Diagnosis:    Home Oxygen Test:    **Medicare Guidelines require item(s) 1-5 on all ambulatory patients or 1 and 2 on non-ambulatory patients  1  Baseline SPO2 on Room Air at rest 91%   a  If <= 88% on Room Air add O2 via NC to obtain SpO2 >=88%  If LPM needed, document LPMneeded to reach =>88%    2  SPO2 during exertion on Room Air 86 %  a  During exertion monitor SPO2  If SPO2 increases >=89%, do not add supplemental oxygen    3  SPO2 on Oxygen at Rest n/a    4  SPO2 during exertion on Oxygen 91% at 2 LPM    5  Test performed during exertion activity  Home oxygen qualifying test     [x]  Supplemental Home Oxygen is indicated  []  Client does not qualify for home oxygen  Respiratory Additional Notes- Patient ambulated with minimal short breaks without assistance for greater than 6 minutes  2 LPM supplemental oxygen was required to maintain saturations greater than 88%      Trudy Rangel, RT

## 2022-09-29 NOTE — PLAN OF CARE
Problem: Prexisting or High Potential for Compromised Skin Integrity  Goal: Skin integrity is maintained or improved  Description: INTERVENTIONS:  - Identify patients at risk for skin breakdown  - Assess and monitor skin integrity  - Assess and monitor nutrition and hydration status  - Monitor labs   - Assess for incontinence   - Turn and reposition patient  - Assist with mobility/ambulation  - Relieve pressure over bony prominences  - Avoid friction and shearing  - Provide appropriate hygiene as needed including keeping skin clean and dry  - Evaluate need for skin moisturizer/barrier cream  - Collaborate with interdisciplinary team   - Patient/family teaching  - Consider wound care consult   Outcome: Progressing     Problem: Potential for Falls  Goal: Patient will remain free of falls  Description: INTERVENTIONS:  - Educate patient/family on patient safety including physical limitations  - Instruct patient to call for assistance with activity   - Consult OT/PT to assist with strengthening/mobility   - Keep Call bell within reach  - Keep bed low and locked with side rails adjusted as appropriate  - Keep care items and personal belongings within reach  - Initiate and maintain comfort rounds  - Make Fall Risk Sign visible to staff  - Offer Toileting every 2 Hours, in advance of need  - Initiate/Maintain bed alarm  - Apply yellow socks and bracelet for high fall risk patients  - Consider moving patient to room near nurses station  Outcome: Progressing     Problem: PAIN - ADULT  Goal: Verbalizes/displays adequate comfort level or baseline comfort level  Description: Interventions:  - Encourage patient to monitor pain and request assistance  - Assess pain using appropriate pain scale  - Administer analgesics based on type and severity of pain and evaluate response  - Implement non-pharmacological measures as appropriate and evaluate response  - Consider cultural and social influences on pain and pain management  - Notify physician/advanced practitioner if interventions unsuccessful or patient reports new pain  Outcome: Progressing     Problem: RESPIRATORY - ADULT  Goal: Achieves optimal ventilation and oxygenation  Description: INTERVENTIONS:  - Assess for changes in respiratory status  - Assess for changes in mentation and behavior  - Position to facilitate oxygenation and minimize respiratory effort  - Oxygen administered by appropriate delivery if ordered  - Initiate smoking cessation education as indicated  - Encourage broncho-pulmonary hygiene including cough, deep breathe, Incentive Spirometry  - Assess the need for suctioning and aspirate as needed  - Assess and instruct to report SOB or any respiratory difficulty  - Respiratory Therapy support as indicated  Outcome: Progressing     Problem: Nutrition/Hydration-ADULT  Goal: Nutrient/Hydration intake appropriate for improving, restoring or maintaining nutritional needs  Description: Monitor and assess patient's nutrition/hydration status for malnutrition  Collaborate with interdisciplinary team and initiate plan and interventions as ordered  Monitor patient's weight and dietary intake as ordered or per policy  Utilize nutrition screening tool and intervene as necessary  Determine patient's food preferences and provide high-protein, high-caloric foods as appropriate       INTERVENTIONS:  - Monitor oral intake, urinary output, labs, and treatment plans  - Assess nutrition and hydration status and recommend course of action  - Evaluate amount of meals eaten  - Assist patient with eating if necessary   - Allow adequate time for meals  - Recommend/ encourage appropriate diets, oral nutritional supplements, and vitamin/mineral supplements  - Order, calculate, and assess calorie counts as needed  - Recommend, monitor, and adjust tube feedings and TPN/PPN based on assessed needs  - Assess need for intravenous fluids  - Provide specific nutrition/hydration education as appropriate  - Include patient/family/caregiver in decisions related to nutrition  Outcome: Progressing

## 2022-09-29 NOTE — CASE MANAGEMENT
Case Management Progress Note    Patient name Calvin Ear  Location [facility] /E2 -* MRN [id number]  : 1975 Date 2022       LOS (days): 8  Geometric Mean LOS (GMLOS) (days): 4 80  Days to GMLOS:-3        OBJECTIVE:        Current admission status: Inpatient  Preferred Pharmacy:   [address] [place] [address] - [address]  [facility]  [address]  [address] [facility] [address]  Phone: [phone] Fax: [phone] [address] [facility],[address], [address]  [facility] [address] [address]  Phone: [phone] Fax: [phone] [facility], [address] ,  [address] ,  [facility] [address] [address]  Phone: [phone] Fax: [phone]    Primary Care Provider: No primary care provider on file  Primary Insurance: [employer]  Secondary Insurance:     PROGRESS NOTE:    CM met with pt at the bedside  Pt reports inability to return to her friend, Jermaine, home due to the living conditions  She reports that he has two cats both with fleas and the house being unkempt with cat feces and urine throughout the home  She did report that there was a time where he did not have running water but that has been fixed per the pt  She did report that there is consistent issues with the sewage being backed up resulting in the inability to use the toilets in the home  Pt is interested in speaking with someone within psych due to the numerous stressors within her life  MD aware and psych consult placed  CM did speak with pt's mother Jacqueline Gutierres, who reports that pt is unable to reside with her due to her living in an assist living  When this was addressed with the pt, pt reported that her mother does not live in an assisted living  Mother also reported that her and her  "seperated" but pt reports that her father   Both parties have had inconsistency to their stories and reported information has not matched  Mother was adamant in pt being unable to reside with her and her sister  Home O2 was completed and pt is in need of supplemental oxygen on ambulation  Awaiting psych evaluation

## 2022-09-29 NOTE — PROGRESS NOTES
[address] [place] [address]  Progress Note Asif An 1975, 41 y o  female MRN: [id number]  Unit/Bed#: E2 -01 Encounter: [id number]  Primary Care Provider: No primary care provider on file  Date and time admitted to hospital: 9/21/2022 11:50 AM    * Acute respiratory failure with hypoxia [facility])  Assessment & Plan  Patient presented with shortness of breath 9/21 and hypoxia  Concern for vape related lung injury  CT chest with extensive ground-glass opacities in the lungs with peripheral sparing  Most recent chest x-ray with improved but persistent bilateral infiltrates  Echocardiogram with preserved EF, no regional wall motion abnormalities  Patient completed 5 day course of antibiotics  Patient was treated with IV steroids and now transition to high-dose oral steroids with 10 day taper  Appreciate pulmonology recommendations  Home O2 evaluation patient needs 2 L with ambulation  Case management to assist with safe discharge planning    Bradycardia  Assessment & Plan  Observed in ICU while on Precedex  No further events    R/O CAP (community acquired pneumonia)  Assessment & Plan  Chest x-ray with multifocal infiltrates with concern for superimposed bacterial pneumonia  Completed 5 day course of antibiotics      Sepsis ([address] )  Assessment & Plan  POA evidence by tachycardia, tachypnea, leukocytosis  Suspect superimposed pneumonia on setting of vape lung injury  Patient completed course of antibiotics    Anemia  Assessment & Plan  Iron deficiency anemia  Hemoglobin has remained stable  Oral iron supplementation    Substance Abuse Disorder   Assessment & Plan  Maintained on Suboxone    Bipolar affective disorder, currently active [facility])  Assessment & Plan  History of bipolar affective disorder  Maintained on Wellbutrin, fluoxetine, clonazepam, Topamax, Serax p r n  Patient is refusing Wellbutrin and fluoxetine  Patient appears hypomanic with pressured speech    Likely exacerbated by high-dose steroids  Concern that this may affect her health given plan for prolonged taper  Will ask Psychiatry to evaluate medication regimen        VTE Pharmacologic Prophylaxis:  Lovenox    Patient Centered Rounds:  Patient care rounds were performed with nursing    Time Spent for Care: 30  More than 50% of total time spent on counseling and coordination of care as described above  Current Length of Stay: 8 day(s)    Current Patient Status: Inpatient   Certification Statement: The patient will continue to require additional inpatient hospital stay due to acute hypoxic respiratory failure    Discharge Plan:  Pending psychiatry evaluation, safe discharge planning    Code Status: Level 1 - Full Code      Subjective:   Patient seen and evaluated  Breathing appears improved    Objective:     Vitals:   Temp (24hrs), Av 5 °F (36 4 °C), Min:97 1 °F (36 2 °C), Max:98 3 °F (36 8 °C)    Temp:  [97 1 °F (36 2 °C)-98 3 °F (36 8 °C)] 97 1 °F (36 2 °C)  HR:  [64-74] 74  Resp:  [16-20] 18  BP: ()/(51-68) 116/68  SpO2:  [94 %-98 %] 98 %  Body mass index is 29 31 kg/m²  Input and Output Summary (last 24 hours):     No intake or output data in the 24 hours ending 22 1603    Physical Exam:     Physical Exam  Vitals reviewed  Constitutional:       General: She is not in acute distress  Appearance: She is well-developed  She is not ill-appearing, toxic-appearing or diaphoretic  HENT:      Head: Normocephalic and atraumatic  Mouth/Throat:      Mouth: Mucous membranes are moist    Eyes:      General: No scleral icterus  Extraocular Movements: Extraocular movements intact  Cardiovascular:      Rate and Rhythm: Normal rate and regular rhythm  Heart sounds: Normal heart sounds  Pulmonary:      Effort: Pulmonary effort is normal  No respiratory distress  Breath sounds: Normal breath sounds  No wheezing or rales  Abdominal:      General: There is no distension        Palpations: Abdomen is soft  Tenderness: There is no abdominal tenderness  There is no guarding or rebound  Musculoskeletal:         General: No swelling, tenderness or deformity  Skin:     General: Skin is warm and dry  Neurological:      General: No focal deficit present  Mental Status: She is alert  Mental status is at baseline  Psychiatric:      Comments: Pressured speech          Additional Data:     Labs: I have reviewed pertinent results     Results from last 7 days   Lab Units 09/28/22  0442 09/27/22  0444 09/25/22  0510   WBC Thousand/uL 14 65* 12 09* 12 65*   HEMOGLOBIN g/dL 10 4* 10 8* 10 2*   HEMATOCRIT % 34 1* 33 7* 32 0*   PLATELETS Thousands/uL 412* 363 434*   NEUTROS PCT %  --   --  80*   LYMPHS PCT %  --   --  10*   LYMPHO PCT %  --  30  --    MONOS PCT %  --   --  6   MONO PCT %  --  5  --    EOS PCT %  --  0 2     Results from last 7 days   Lab Units 09/28/22  0442 09/27/22  0444 09/25/22  0510   SODIUM mmol/L 143   < > 140   POTASSIUM mmol/L 3 3*   < > 3 9   CHLORIDE mmol/L 107   < > 105   CO2 mmol/L 27   < > 24   BUN mg/dL 19   < > 22   CREATININE mg/dL 0 79   < > 0 57*   ANION GAP mmol/L 9   < > 11   CALCIUM mg/dL 9 2   < > 8 9   ALBUMIN g/dL  --   --  2 0*   TOTAL BILIRUBIN mg/dL  --   --  0 30   ALK PHOS U/L  --   --  96   ALT U/L  --   --  38   AST U/L  --   --  12   GLUCOSE RANDOM mg/dL 102   < > 173*    < > = values in this interval not displayed           Results from last 7 days   Lab Units 09/25/22  1207 09/25/22  0621 09/25/22  0008 09/24/22  1831 09/24/22  1207 09/23/22  1717 09/23/22  1344   POC GLUCOSE mg/dl 119 144* 164* 183* 197* 156* 124         Results from last 7 days   Lab Units 09/25/22  0510 09/24/22  0519   PROCALCITONIN ng/ml 0 94* 1 90*         Imaging: I have reviewed pertinent imaging       Recent Cultures (last 7 days):     Results from last 7 days   Lab Units 09/25/22  2250 09/22/22  9347   SPUTUM CULTURE  3+ Growth of Tea dubliniensis*  1+ Growth of   -- GRAM STAIN RESULT  1+ Epithelial cells per low power field  No Polys or Bacteria seen  --    LEGIONELLA URINARY ANTIGEN   --  Negative       Last 24 Hours Medication List:   Current Facility-Administered Medications   Medication Dose Route Frequency Provider Last Rate    acetaminophen  650 mg Oral Q6H PRN Junious Clutter, PA-C      buprenorphine-naloxone  8 mg Sublingual BID Junious Clutter, PA-C      clonazePAM  1 mg Oral BID Junious Clutter, PA-C      enoxaparin  40 mg Subcutaneous Daily [facility] [place]      famotidine  20 mg Oral BID Shanti Murray, DO      ferrous sulfate  325 mg Oral Daily With Breakfast Shanti Murray, DO      gabapentin  400 mg Oral TID Junious Clutter, PA-C      hydrOXYzine HCL  50 mg Oral Q6H PRN NETTE Puckett      ipratropium-albuterol  3 mL Nebulization TID Shanti Murray, DO      methocarbamol  750 mg Oral Q6H PRN Shanti Murray, DO      nicotine  21 mg Transdermal Daily [facility] [place]      nicotine polacrilex  2 mg Oral Q2H PRN Junious Clutter, PA-C      ondansetron  4 mg Intravenous Q4H PRN Shanti Murray, DO      oxazepam  15 mg Oral BID PRN Shanti Murray, DO      pantoprazole  40 mg Oral Early Morning Shanti Murray, DO      predniSONE  80 mg Oral Daily [facility] [place]      sodium chloride  4 mL Nebulization Q8H Junious Clutter, PA-C      topiramate  100 mg Oral BID Junious Clutter, PA-C      traZODone  50 mg Oral HS Junious Clutter, PA-C          Today, Patient Was Seen By: Shanti Murray    ** Please Note: Dictation voice to text software may have been used in the creation of this document   **

## 2022-09-29 NOTE — ASSESSMENT & PLAN NOTE
Patient presented with shortness of breath 9/21 and hypoxia  Concern for vape related lung injury  CT chest with extensive ground-glass opacities in the lungs with peripheral sparing  Most recent chest x-ray with improved but persistent bilateral infiltrates  Echocardiogram with preserved EF, no regional wall motion abnormalities  Patient completed 5 day course of antibiotics  Patient was treated with IV steroids and now transition to high-dose oral steroids with 10 day taper  Appreciate pulmonology recommendations  Home O2 evaluation patient needs 2 L with ambulation  Case management to assist with safe discharge planning

## 2022-09-29 NOTE — TELEMEDICINE
TeleConsultation - [address] [facility] 41 y o  female MRN: [id number]  Unit/Bed#: E2 -01 Encounter: [id number]        REQUIRED DOCUMENTATION:     1  This service was provided via Telemedicine  2  Provider located at [facility]   3  TeleMed provider: Isabel Kemp MD   4  Identify all parties in room with patient during tele consult:  pt  5  Patient was then informed that this was a Telemedicine visit and that the exam was being conducted confidentially over secure lines  My office door was closed  No one else was in the room  Patient acknowledged consent and understanding of privacy and security of the Telemedicine visit, and gave us permission to have the assistant stay in the room in order to assist with the history and to conduct the exam   I informed the patient that I have reviewed their record in Epic and presented the opportunity for them to ask any questions regarding the visit today  The patient agreed to participate  Assessment/Plan     Principal Problem:    Acute respiratory failure with hypoxia (HCC)  Active Problems:    Bipolar affective disorder, currently active ([facility] [address] )    Heavy tobacco smoker    Substance Abuse Disorder     Anemia    [facility] )    R/O CAP (community acquired pneumonia)    Bradycardia    Assessment:  Unspecified mood disorder; rule out bipolar disorder manic phase; rule out prednisone induced mood disorder    Treatment Plan:  Consider increasing Topamax to 125 mg p o  twice daily for additional mood stabilization  Recommend against restarting Wellbutrin and Prozac as these will likely further exacerbate jeff  May also consider increasing Klonopin to 1 mg p o  3 times daily for jeff as well as anxiety  Upon discharge the patient should resume outpatient psychiatric follow-up for medication management as well as psychotherapy/counseling component  Re-consult Psychiatry as needed  Suicide precautions are not indicated at this time      Current Medications: Current Facility-Administered Medications   Medication Dose Route Frequency Provider Last Rate    acetaminophen  650 mg Oral Q6H PRN Theodor Homykel, PA-SIDNEY      buprenorphine-naloxone  8 mg [facility]lingual BID [facility]      clonazePAM  1 mg Oral BID [facility], PA-[facility]      enoxaparin  40 mg Subcutaneous Daily [place], [place]      famotidine  20 mg Oral BID [facility],       ferrous sulfate  325 mg Oral Daily With Breakfast [facility], DO      gabapentin  400 mg Oral TID [facility]      hydrOXYzine HCL  50 mg Oral Q6H PRN NETTE Gaitan      ipratropium-albuterol  3 mL Nebulization TID [facility],       methocarbamol  750 mg Oral Q6H PRN [facility], DO      nicotine  21 mg Transdermal Daily [place], [place]      nicotine polacrilex  2 mg Oral Q2H PRN [facility], [facility]      ondansetron  4 mg Oral Q6H PRN [facility], DO      oxazepam  15 mg Oral BID PRN [facility], DO      pantoprazole  40 mg Oral Early Morning [facility], DO      predniSONE  80 mg Oral Daily [facility] [place]      sodium chloride  4 mL Nebulization Q8H Themichelleor mykel, PA-SIDNEY      topiramate  100 mg Oral BID [facility], [facility]      traZODone  50 mg Oral HS [facility], PA[facility]         Risks / Benefits of Treatment:    Risks, benefits, and possible side effects of medications explained to patient and patient verbalizes understanding        Inpatient consult to Psychiatry  Consult performed by: Jefferson Pham MD  Consult ordered by: Kendrick Bejarano DO        Physician Requesting Consult: Kendrick Bejarano DO  Principal Problem:Acute respiratory failure with hypoxia [facility])    Reason for Consult:  Ct      History of Present Illness      Patient is a 41 y o  female who presented to the ED where the provider documented the following:    Shortness of Breath        Pt reports SOB and cough since yesterday  Denies fevers, N/V/D      Pt presents to the ED not feeling well, states she feels SOB and wheezsing and chest tightness  No fevers  But has some body aches, had calf pain earlier this week  Feeling weak  Admits to smoking a lot  No hx of asthma or COPD              Prior to Admission Medications   Prescriptions Last Dose Informant Patient Reported? Taking? Diclofenac Sodium (VOLTAREN) 1 % 9/21/2022 at Unknown time   No Yes   Sig: Apply 2 g topically 4 (four) times a day   buPROPion (WELLBUTRIN XL) 300 mg 24 hr tablet 9/21/2022 at Unknown time   Yes Yes   Sig: Take 300 mg by mouth every morning   buprenorphine-naloxone (SUBOXONE) 8-2 mg per SL tablet 9/21/2022 at Unknown time Pharmacy ([address] [place] [address]) Yes Yes   Sig: Place 1 tablet under the tongue daily   clonazePAM (KlonoPIN) 0 5 mg tablet 9/20/2022 at Unknown time   No Yes   Sig: Take 2 tablets (1 mg total) by mouth daily at bedtime   gabapentin (NEURONTIN) 400 mg capsule 9/21/2022 at Unknown time Self No Yes   Sig: Take 1 capsule (400 mg total) by mouth 3 (three) times a day   Patient taking differently: Take 400 mg by mouth 2 (two) times a day   topiramate (TOPAMAX) 100 mg tablet 9/21/2022 at Unknown time   No Yes   Sig: Take 1 tablet (100 mg total) by mouth daily at bedtime   traZODone (DESYREL) 100 mg tablet 9/20/2022 at Unknown time   No Yes   Sig: Take 1 tablet (100 mg total) by mouth daily at bedtime      Facility-Administered Medications: None         Medical History[]Expand by Default        Past Medical History:   Diagnosis Date    Lower back pain              Surgical History[]Expand by Default         Past Surgical History:   Procedure Laterality Date    CHOLECYSTECTOMY                Family History[]Expand by Default   History reviewed  No pertinent family history       I have reviewed and agree with the history as documented            E-Cigarette/Vaping    E-Cigarette Use Never User       E-Cigarette/Vaping Substances    Nicotine No      THC No      CBD No      Flavoring No      Other No      Unknown No        Social History            Tobacco Use    Smoking status: Current Every Day Smoker       Packs/day: 1 00       Types: Cigarettes    Smokeless tobacco: Current User   Vaping Use    Vaping Use: Never used   Substance Use Topics    Alcohol use: Never    Drug use: Not Currently       Comment: 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           Review of Systems   Constitutional: Negative for fever  HENT: Positive for congestion  Respiratory: Positive for cough, shortness of breath and wheezing  Cardiovascular: Positive for chest pain  Gastrointestinal: Negative  Genitourinary: Negative  Neurological: Positive for weakness  All other systems reviewed and are negative  Past psychiatric history:  The patient reports history of psychiatric treatment for anxiety, depression and bipolar disorder     Social history:  The patient is homeless  She is single and has no children  She is not employed this time  She reports no abuse history  Family history:  Uncle committed suicide when the patient was 5to 3years old  Substance use history:  As above     Mental status examination:  The patient is alert well oriented all spheres  She made good eye contact  She is pleasant cooperative with the evaluation  Speech was pressured  She was circumstantial at times and occasionally tangential   She denies suicidal homicidal ideation  She appears to be of average intelligence by her use vocabulary, general fund knowledge, sentence structure and syntax  She denies hallucinations and other psychotic features  Insight is fair  Judgment is intact      Past Medical History:   Diagnosis Date    Altered mental status 9/21/2022    Elevated LFTs 9/21/2022    Lower back pain        Medical Review Of Systems:    Review of Systems    Meds/Allergies     all current active meds have been reviewed  Allergies   Allergen Reactions    Haloperidol Other (See Comments)     oculogyr crisis       Objective     Vital signs in last 24 hours:  Temp:  [97 1 °F (36 2 °C)-98 3 °F (36 8 °C)] 97 1 °F (36 2 °C)  HR:  [64-74] 74  Resp:  [16-20] 18  BP: ()/(51-68) 116/68    No intake or output data in the 24 hours ending 09/29/22 6090      Lab Results: I have personally reviewed all pertinent laboratory/tests results  Imaging Studies: XR chest 1 view portable    Result Date: 9/21/2022  Narrative: CHEST INDICATION:   sob  COMPARISON:  12/22/2021 EXAM PERFORMED/VIEWS:  XR CHEST PORTABLE Single view FINDINGS: Development of diffuse bilateral opacities likely representing multifocal pneumonia, right greater than left Cardiomediastinal silhouette appears unremarkable  No pneumothorax or pleural effusion  Osseous structures appear within normal limits for patient age  Impression: Development of bilateral opacities likely representing multifocal pneumonia Workstation performed: [id number]     PE Study with CT Abdomen and Pelvis with contrast    Result Date: 9/21/2022  Narrative: CT PULMONARY ANGIOGRAM OF THE CHEST AND CT ABDOMEN AND PELVIS WITH INTRAVENOUS CONTRAST INDICATION:   hypoxia, hemoptysis and abnormally elevated d dimmer  COMPARISON:  Chest x-ray performed earlier the same day  TECHNIQUE:  CT examination of the chest, abdomen and pelvis was performed  Thin section CT angiographic technique was used in the chest in order to evaluate for pulmonary embolus and coronal 3D MIP postprocessing was performed on the acquisition scanner  Axial, sagittal, and coronal 2D reformatted images were created from the source data and submitted for interpretation  Radiation dose length product (DLP) for this visit:  595 mGy-cm     This examination, like all CT scans performed in the [facility], was performed utilizing techniques to minimize radiation dose exposure, including the use of iterative reconstruction and automated exposure control  IV Contrast:  85 mL of iohexol (OMNIPAQUE) Enteric Contrast:  Enteric contrast was not administered  FINDINGS: CHEST PULMONARY ARTERIAL TREE:  No pulmonary embolus is seen  LUNGS:  Groundglass changes noted throughout the lung parenchyma bilaterally with sparing of lung periphery  Patchy areas of more dense consolidative opacity are seen anteriorly and in the upper lobes, especially in the apices  Scattered areas of septal lobular thickening are seen in the areas of groundglass airspace disease  The lungs are neither hyperinflated nor underinflated  No bronchial wall thickening or bronchiectasis  No nodularity  No dominant pulmonary parenchymal mass  No tracheal or endobronchial lesion  PLEURA:  Trace left pleural fluid layering in the posterior mid chest   No significant pleural effusion  HEART/AORTA:  There is portal prominence of left ventricle with questionable [myocardial thickening  In addition, there is very high density contrast in the right heart and pulmonary arteries with low to intermediate density contrast in the left heart  No thoracic aortic aneurysm  MEDIASTINUM AND MEGAN:  Amorphous bilateral hilar lymphadenopathy is noted  Amorphous subcarinal nodes measure up to 13 mm in short axis on image 94 series 5  Right hilar nodes measure up to 17 mm on image 82 of series 5  Lower left hilar nodes measure  up to 11 mm on image 94 series 5  CHEST WALL AND LOWER NECK:  Unremarkable  ABDOMEN LIVER/BILIARY TREE:  Elongation of right hepatic lobe consistent with anatomic variation (Riedel's lobe  No hepatic mass  No biliary dilatation  Normal hepatic contours  GALLBLADDER:  Gallbladder is surgically absent  SPLEEN:  Unremarkable  PANCREAS:  Unremarkable  ADRENAL GLANDS:  Unremarkable  KIDNEYS/URETERS:  Unremarkable  No hydronephrosis  STOMACH AND BOWEL:  Unremarkable  APPENDIX:  No findings to suggest appendicitis   ABDOMINOPELVIC CAVITY:  There is a small volume of free pelvic fluid, likely physiologic given the patient's age and gender  No pneumoperitoneum  No lymphadenopathy  VESSELS:  Unremarkable for patient's age  PELVIS REPRODUCTIVE ORGANS:  Uterus is retroverted  There is a suspected subserosal anterior fundal fibroid estimated at 3 3 cm  URINARY BLADDER:  Unremarkable  ABDOMINAL WALL/INGUINAL REGIONS:  Unremarkable  OSSEOUS STRUCTURES:  No acute fracture or destructive osseous lesion  Left convex curvature lumbar spine, apex at L2  Lumbar degenerative changes  Impression: No pulmonary embolus  Extensive groundglass airspace opacity in the lungs with peripheral sparing  Mediastinal and bilateral hilar lymphadenopathy  The findings are most suspicious for noncardiogenic pulmonary edema  Acute diffuse alveolar hemorrhage could produce this appearance but would be expected to present with severe hemoptysis  Pulmonary vascular congestive changes related to heart failure is also possible but would be expected to present with pleural effusions  No acute abnormality in the abdomen or pelvis  This examination was marked "immediate notification" in Epic in order to begin the standard process by which the radiology reading room liaison alerts the referring practitioner  Workstation performed: [id number]     XR chest portable ICU    Result Date: 9/25/2022  Narrative: CHEST INDICATION:   interval improvement  COMPARISON:  None EXAM PERFORMED/VIEWS:  XR CHEST PORTABLE ICU FINDINGS: Cardiomediastinal silhouette appears unremarkable  Much improved but persistent moderate bilateral pulmonary infiltrates  Osseous structures appear within normal limits for patient age  Impression: Much improved but persistent moderate bilateral pulmonary infiltrates  Workstation performed: [id number]     XR chest portable ICU    Result Date: 9/23/2022  Narrative: CHEST INDICATION:   recheck after diuretics   COMPARISON:  Chest radiograph September 22, 2022 at 12:44 EXAM PERFORMED/VIEWS:  XR CHEST PORTABLE ICU FINDINGS: Cardiomediastinal silhouette appears unremarkable  Severe bilateral consolidations are not significantly changed  No pneumothorax  Osseous structures appear within normal limits for patient age  Impression: Severe bilateral consolidations are not significantly changed  Workstation performed: [id number]     XR chest portable ICU    Result Date: 9/22/2022  Narrative: CHEST INDICATION:   recheck after lasix  COMPARISON:  CXR 9/22/2022 and chest CT 9/21/2022  EXAM PERFORMED/VIEWS:  XR CHEST PORTABLE ICU FINDINGS: Cardiomediastinal silhouette appears unremarkable  No change in severe bilateral consolidation from earlier today  No effusion or pneumothorax  Osseous structures appear within normal limits for patient age  Impression: No change in severe bilateral consolidation from earlier today  Workstation performed: [id number]     XR chest portable ICU    Result Date: 9/22/2022  Narrative: CHEST INDICATION:   hypoxia  COMPARISON:  CXR and CT 9/21/2022  EXAM PERFORMED/VIEWS:  XR CHEST PORTABLE ICU FINDINGS: Cardiomediastinal silhouette appears unremarkable  Marked worsening of bilateral airspace disease  No definite effusion  No pneumothorax  Osseous structures appear within normal limits for patient age  Impression: Severe bilateral airspace consolidation, markedly increased from one day earlier  Workstation performed: [id number]     Echo complete w/ contrast if indicated    Result Date: 9/22/2022  Narrative: [facility]  Left Ventricle: Left ventricular cavity size is normal  Wall thickness is normal  The left ventricular ejection fraction is 60%  Systolic function is normal  Wall motion is normal  Diastolic function is normal    IVC/SVC: The inferior vena cava is dilated  Respirophasic changes were blunted (less than 50% variation), indicating elevated right atrial pressures    Pulmonary Artery: The estimated pulmonary artery systolic pressure is 97 7 mmHg   The pulmonary artery systolic pressure is mildly increased  EKG/Pathology/Other Studies:   Lab Results   Component Value Date    VENTRATE 51 09/24/2022    ATRIALRATE 51 09/24/2022    PRINT 167 09/24/2022    QRSDINT 92 09/24/2022    QTINT 458 09/24/2022    QTCINT 422 09/24/2022    PAXIS 18 09/24/2022    QRSAXIS 68 09/24/2022    TWAVEAXIS 61 09/24/2022        Code Status: Level 1 - Full Code  Advance Directive and Living Will:      Power of :    POLST:      Counseling / Coordination of Care: Total floor / unit time spent today 30 minutes  Greater than 50% of total time was spent with the patient and / or family counseling and / or coordination of care  A description of the counseling / coordination of care:  Chart review, patient evaluation, coordination and communication with staff, nursing and provider

## 2022-09-29 NOTE — UTILIZATION REVIEW
Continued Stay Review    Date:   9/29/22                          Current Patient Class:    Inpatient  Current Level of Care:   Med surg    HPI:47 y o  female initially admitted on    9/21  With   Acute respiratory failure with hypoxia    Assessment/Plan:   9/29  Breathing  Appears improved  Remains on  O2  2L  NC, likely  Needs home  O2  Pschy consult  Pending, admits to multiple life stressors  Unsafe  D/c plan  Continue current meds/treatment plan        Vital Signs:    97 1 °F (36 2 °C) Abnormal  74 18 116/68 86 98 % 28 2 L/min Nasal cannula Lying   09/29/22 1425 -- -- -- -- -- 95 % 28 2 L/min Nasal cannula --   09/29/22 0722 -- -- -- -- -- 94 % 28 2 L/min Nasal cannula --   09/29/22 0650 98 3 °F (36 8 °C) 72 20 111/62 -- 95 % -- -- Nasal cannula Lying         Pertinent Labs/Diagnostic Results:       Results from last 7 days   Lab Units 09/28/22  0442 09/27/22  0444 09/25/22  0510 09/24/22  0519 09/23/22  0515   WBC Thousand/uL 14 65* 12 09* 12 65* 11 33* 14 67*   HEMOGLOBIN g/dL 10 4* 10 8* 10 2* 9 6* 9 3*   HEMATOCRIT % 34 1* 33 7* 32 0* 29 8* 29 4*   PLATELETS Thousands/uL 412* 363 434* 309 310   NEUTROS ABS Thousands/µL  --   --  10 18*  --  12 97*         Results from last 7 days   Lab Units 09/28/22  0442 09/27/22  0444 09/25/22  0510 09/24/22  0519 09/23/22  0515   SODIUM mmol/L 143 142 140 136 140   POTASSIUM mmol/L 3 3* 3 4* 3 9 5 1 3 4*   CHLORIDE mmol/L 107 107 105 106 105   CO2 mmol/L 27 26 24 19* 21   ANION GAP mmol/L 9 9 11 11 14*   BUN mg/dL 19 15 22 21 18   CREATININE mg/dL 0 79 0 68 0 57* 0 43* 0 70   EGFR ml/min/1 73sq m 90 105 112 123 104   CALCIUM mg/dL 9 2 8 5 8 9 9 2 8 8   MAGNESIUM mg/dL  --   --  1 9 2 2 2 2   PHOSPHORUS mg/dL  --   --  3 9  --   --      Results from last 7 days   Lab Units 09/25/22  0510 09/23/22  0515   AST U/L 12 34   ALT U/L 38 71   ALK PHOS U/L 96 113   TOTAL PROTEIN g/dL 6 2* 6 8   ALBUMIN g/dL 2 0* 2 1*   TOTAL BILIRUBIN mg/dL 0 30 0 66     Results from last 7 days   Lab Units 09/25/22  1207 09/25/22  0621 09/25/22  0008 09/24/22  1831 09/24/22  1207 09/23/22  1717 09/23/22  1344   POC GLUCOSE mg/dl 119 144* 164* 183* 197* 156* 124     Results from last 7 days   Lab Units 09/28/22  0442 09/27/22  0444 09/25/22  0510 09/24/22  0519 09/23/22  0515   GLUCOSE RANDOM mg/dL 102 87 173* 158* 110                    Results from last 7 days   Lab Units 09/25/22  0510 09/24/22  0519   PROCALCITONIN ng/ml 0 94* 1 90*                   Results from last 7 days   Lab Units 09/25/22  2250   SPUTUM CULTURE  3+ Growth of Tea dubliniensis*  1+ Growth of    GRAM STAIN RESULT  1+ Epithelial cells per low power field  No Polys or Bacteria seen                 Medications:   Scheduled Medications:  buprenorphine-naloxone, 8 mg, Sublingual, BID  clonazePAM, 1 mg, Oral, BID  enoxaparin, 40 mg, Subcutaneous, Daily  famotidine, 20 mg, Oral, BID  ferrous sulfate, 325 mg, Oral, Daily With Breakfast  gabapentin, 400 mg, Oral, TID  ipratropium-albuterol, 3 mL, Nebulization, TID  nicotine, 21 mg, Transdermal, Daily  pantoprazole, 40 mg, Oral, Early Morning  predniSONE, 80 mg, Oral, Daily  sodium chloride, 4 mL, Nebulization, Q8H  topiramate, 100 mg, Oral, BID  traZODone, 50 mg, Oral, HS      Continuous IV Infusions:     PRN Meds:  acetaminophen, 650 mg, Oral, Q6H PRN  hydrOXYzine HCL, 50 mg, Oral, Q6H PRN  methocarbamol, 750 mg, Oral, Q6H PRN  nicotine polacrilex, 2 mg, Oral, Q2H PRN  ondansetron, 4 mg, Intravenous, Q4H PRN  oxazepam, 15 mg, Oral, BID PRN        Discharge Plan:   [facility]    Network Utilization Review Department  ATTENTION: Please call with any questions or concerns to [phone] and carefully listen to the prompts so that you are directed to the right person   All voicemails are confidential   [place] all requests for admission clinical reviews, approved or denied determinations and any other requests to dedicated fax number below belonging to the campus where the patient is receiving treatment   List of dedicated fax numbers for the Facilities:  [address] [facility] [address] DENIALS (Administrative/Medical Necessity) [phone]   [address] (Maternity/NICU/Pediatrics) [phone]   [address] [place] [address]  [address] [facility] [place] [address] [address] [place] [address] [address] [place] [facility] [address] [phone]

## 2022-09-30 PROBLEM — R76.8 HEPATITIS C ANTIBODY TEST POSITIVE: Status: ACTIVE | Noted: 2022-09-30

## 2022-09-30 LAB
ALBUMIN SERPL BCP-MCNC: 2.6 G/DL (ref 3.5–5)
ALP SERPL-CCNC: 85 U/L (ref 46–116)
ALT SERPL W P-5'-P-CCNC: 30 U/L (ref 12–78)
ANION GAP SERPL CALCULATED.3IONS-SCNC: 9 MMOL/L (ref 4–13)
AST SERPL W P-5'-P-CCNC: 19 U/L (ref 5–45)
BILIRUB SERPL-MCNC: 0.3 MG/DL (ref 0.2–1)
BUN SERPL-MCNC: 15 MG/DL (ref 5–25)
CALCIUM ALBUM COR SERPL-MCNC: 9.8 MG/DL (ref 8.3–10.1)
CALCIUM SERPL-MCNC: 8.7 MG/DL (ref 8.3–10.1)
CHLORIDE SERPL-SCNC: 102 MMOL/L (ref 96–108)
CO2 SERPL-SCNC: 23 MMOL/L (ref 21–32)
CREAT SERPL-MCNC: 0.74 MG/DL (ref 0.6–1.3)
GFR SERPL CREATININE-BSD FRML MDRD: 98 ML/MIN/1.73SQ M
GLUCOSE SERPL-MCNC: 127 MG/DL (ref 65–140)
POTASSIUM SERPL-SCNC: 3.9 MMOL/L (ref 3.5–5.3)
PROT SERPL-MCNC: 6.8 G/DL (ref 6.4–8.4)
SODIUM SERPL-SCNC: 134 MMOL/L (ref 135–147)

## 2022-09-30 PROCEDURE — 87340 HEPATITIS B SURFACE AG IA: CPT | Performed by: INTERNAL MEDICINE

## 2022-09-30 PROCEDURE — 94640 AIRWAY INHALATION TREATMENT: CPT

## 2022-09-30 PROCEDURE — 80053 COMPREHEN METABOLIC PANEL: CPT | Performed by: INTERNAL MEDICINE

## 2022-09-30 PROCEDURE — 86705 HEP B CORE ANTIBODY IGM: CPT | Performed by: INTERNAL MEDICINE

## 2022-09-30 PROCEDURE — 80074 ACUTE HEPATITIS PANEL: CPT | Performed by: INTERNAL MEDICINE

## 2022-09-30 PROCEDURE — 94760 N-INVAS EAR/PLS OXIMETRY 1: CPT

## 2022-09-30 PROCEDURE — 86803 HEPATITIS C AB TEST: CPT | Performed by: INTERNAL MEDICINE

## 2022-09-30 PROCEDURE — 87522 HEPATITIS C REVRS TRNSCRPJ: CPT | Performed by: INTERNAL MEDICINE

## 2022-09-30 PROCEDURE — 86704 HEP B CORE ANTIBODY TOTAL: CPT | Performed by: INTERNAL MEDICINE

## 2022-09-30 PROCEDURE — 99232 SBSQ HOSP IP/OBS MODERATE 35: CPT | Performed by: INTERNAL MEDICINE

## 2022-09-30 RX ADMIN — ENOXAPARIN SODIUM 40 MG: 40 INJECTION SUBCUTANEOUS at 08:10

## 2022-09-30 RX ADMIN — GABAPENTIN 400 MG: 400 CAPSULE ORAL at 20:32

## 2022-09-30 RX ADMIN — BUPRENORPHINE AND NALOXONE 8 MG: 8; 2 FILM BUCCAL; SUBLINGUAL at 08:10

## 2022-09-30 RX ADMIN — CLONAZEPAM 1 MG: 1 TABLET ORAL at 08:10

## 2022-09-30 RX ADMIN — TOPIRAMATE 125 MG: 100 TABLET, FILM COATED ORAL at 18:20

## 2022-09-30 RX ADMIN — TOPIRAMATE 125 MG: 100 TABLET, FILM COATED ORAL at 08:11

## 2022-09-30 RX ADMIN — OXAZEPAM 15 MG: 15 CAPSULE, GELATIN COATED ORAL at 20:32

## 2022-09-30 RX ADMIN — TRAZODONE HYDROCHLORIDE 50 MG: 50 TABLET ORAL at 22:21

## 2022-09-30 RX ADMIN — GABAPENTIN 400 MG: 400 CAPSULE ORAL at 08:10

## 2022-09-30 RX ADMIN — PREDNISONE 80 MG: 20 TABLET ORAL at 08:10

## 2022-09-30 RX ADMIN — HYDROXYZINE HYDROCHLORIDE 50 MG: 25 TABLET ORAL at 23:45

## 2022-09-30 RX ADMIN — FAMOTIDINE 20 MG: 20 TABLET ORAL at 17:00

## 2022-09-30 RX ADMIN — PANTOPRAZOLE SODIUM 40 MG: 40 TABLET, DELAYED RELEASE ORAL at 06:18

## 2022-09-30 RX ADMIN — CLONAZEPAM 1 MG: 1 TABLET ORAL at 17:00

## 2022-09-30 RX ADMIN — ACETAMINOPHEN 650 MG: 325 TABLET, FILM COATED ORAL at 09:06

## 2022-09-30 RX ADMIN — Medication 325 MG: at 08:10

## 2022-09-30 RX ADMIN — FAMOTIDINE 20 MG: 20 TABLET ORAL at 08:10

## 2022-09-30 RX ADMIN — NICOTINE POLACRILEX 2 MG: 2 GUM, CHEWING ORAL at 12:05

## 2022-09-30 RX ADMIN — GABAPENTIN 400 MG: 400 CAPSULE ORAL at 16:59

## 2022-09-30 RX ADMIN — ACETAMINOPHEN 650 MG: 325 TABLET, FILM COATED ORAL at 18:20

## 2022-09-30 RX ADMIN — OXAZEPAM 15 MG: 15 CAPSULE, GELATIN COATED ORAL at 13:12

## 2022-09-30 RX ADMIN — NICOTINE POLACRILEX 2 MG: 2 GUM, CHEWING ORAL at 20:35

## 2022-09-30 RX ADMIN — Medication 21 MG: at 08:12

## 2022-09-30 RX ADMIN — IPRATROPIUM BROMIDE AND ALBUTEROL SULFATE 3 ML: 2.5; .5 SOLUTION RESPIRATORY (INHALATION) at 14:22

## 2022-09-30 RX ADMIN — IPRATROPIUM BROMIDE AND ALBUTEROL SULFATE 3 ML: 2.5; .5 SOLUTION RESPIRATORY (INHALATION) at 07:15

## 2022-09-30 RX ADMIN — BUPRENORPHINE AND NALOXONE 8 MG: 8; 2 FILM BUCCAL; SUBLINGUAL at 17:00

## 2022-09-30 NOTE — NURSING NOTE
Received call from [address]  for dept of health in [address] [place] [address]  Contact phone number [phone] patient had Hep C screening had High reactive positive result  Gave contact info to Dr Beth Yeh to follow up with public health nurse  He stated plan is for further testing to confirm chronicity vs new dx

## 2022-09-30 NOTE — ASSESSMENT & PLAN NOTE
Received notification from [address] [place] [address],[address] services that patient's hepatitis screening was positive  Patient is aware of this and was told that she was exposed to hepatitis-C in the past   LFTs have been normal   Will recheck    Will confirm with acute, chronic hepatitis panel, hep C viral RNA quantitative  Serologies can be followed up by PCP

## 2022-09-30 NOTE — PLAN OF CARE
Problem: Prexisting or High Potential for Compromised Skin Integrity  Goal: Skin integrity is maintained or improved  Description: INTERVENTIONS:  - Identify patients at risk for skin breakdown  - Assess and monitor skin integrity  - Assess and monitor nutrition and hydration status  - Monitor labs   - Assess for incontinence   - Turn and reposition patient  - Assist with mobility/ambulation  - Relieve pressure over bony prominences  - Avoid friction and shearing  - Provide appropriate hygiene as needed including keeping skin clean and dry  - Evaluate need for skin moisturizer/barrier cream  - Collaborate with interdisciplinary team   - Patient/family teaching  - Consider wound care consult   Outcome: Progressing     Problem: Potential for Falls  Goal: Patient will remain free of falls  Description: INTERVENTIONS:  - Educate patient/family on patient safety including physical limitations  - Instruct patient to call for assistance with activity   - Consult OT/PT to assist with strengthening/mobility   - Keep Call bell within reach  - Keep bed low and locked with side rails adjusted as appropriate  - Keep care items and personal belongings within reach  - Initiate and maintain comfort rounds  - Make Fall Risk Sign visible to staff  - Offer Toileting every 2 Hours, in advance of need  - Initiate/Maintain bed alarm  - Apply yellow socks and bracelet for high fall risk patients  - Consider moving patient to room near nurses station  Outcome: Progressing     Problem: BEHAVIOR  Goal: Pt/Family maintain appropriate behavior and adhere to behavioral management agreement, if implemented  Description: INTERVENTIONS:  - Assess the family dynamic   - Encourage verbalization of thoughts and concerns in a socially appropriate manner  - Assess patient/family's coping skills and non-compliant behavior (including use of illegal substances)    - Utilize positive, consistent limit setting strategies supporting safety of patient, staff and others  - Initiate consult with Case Management, Spiritual Care or other ancillary services as appropriate  - If a patient's/visitor's behavior jeopardizes the safety of the patient, staff, or others, refer to organization procedure     - Notify Security of behavior or suspected illegal substances which indicate the need for search of the patient and/or belongings  - Encourage participation in the decision making process about a behavioral management agreement; implement if patient meets criteria  Outcome: Progressing     Problem: COPING  Goal: Will report anxiety at manageable levels  Description: INTERVENTIONS:  - Administer medication as ordered  - Teach and encourage coping skills  - Provide emotional support  - Assess patient/family for anxiety and ability to cope  Outcome: Progressing     Problem: RESPIRATORY - ADULT  Goal: Achieves optimal ventilation and oxygenation  Description: INTERVENTIONS:  - Assess for changes in respiratory status  - Assess for changes in mentation and behavior  - Position to facilitate oxygenation and minimize respiratory effort  - Oxygen administered by appropriate delivery if ordered  - Initiate smoking cessation education as indicated  - Encourage broncho-pulmonary hygiene including cough, deep breathe, Incentive Spirometry  - Assess the need for suctioning and aspirate as needed  - Assess and instruct to report SOB or any respiratory difficulty  - Respiratory Therapy support as indicated  Outcome: Progressing

## 2022-09-30 NOTE — ASSESSMENT & PLAN NOTE
History of bipolar affective disorder  Maintained on Wellbutrin, fluoxetine, clonazepam, Topamax, Serax p r n  Patient is refusing Wellbutrin and fluoxetine  Status post psychiatry evaluation-appreciate recommendations  Continue to monitor off Wellbutrin and fluoxetine  Increase Topamax to 125 mg b i d

## 2022-09-30 NOTE — PROGRESS NOTES
[facility]  Progress Note Vivian March 1975, 41 y o  female MRN: [id number]  Unit/Bed#: E2 -01 Encounter: [id number]  Primary Care Provider: No primary care provider on file  Date and time admitted to hospital: 9/21/2022 11:50 AM    * Acute respiratory failure with hypoxia [facility])  Assessment & Plan  Patient presented with shortness of breath 9/21 and hypoxia  Concern for vape related lung injury  CT chest with extensive ground-glass opacities in the lungs with peripheral sparing  Most recent chest x-ray with improved but persistent bilateral infiltrates  Echocardiogram with preserved EF, no regional wall motion abnormalities  Patient completed 5 day course of antibiotics  Patient was treated with IV steroids and now transition to high-dose oral steroids with 10 day taper  Appreciate pulmonology recommendations  Home O2 evaluation patient needs 2 L with ambulation  Case management to assist with safe discharge planning    Hepatitis C antibody test positive  Assessment & Plan  Received notification from [address] services that patient's hepatitis screening was positive  Patient is aware of this and was told that she was exposed to hepatitis-C in the past   LFTs have been normal   Will recheck    Will confirm with acute, chronic hepatitis panel, hep C viral RNA quantitative    Bradycardia  Assessment & Plan  Observed in ICU while on Precedex  No further events    R/O CAP (community acquired pneumonia)  Assessment & Plan  Chest x-ray with multifocal infiltrates with concern for superimposed bacterial pneumonia  Completed 5 day course of antibiotics      Sepsis ([address] )  Assessment & Plan  POA evidence by tachycardia, tachypnea, leukocytosis  Suspect superimposed pneumonia on setting of vape lung injury  Patient completed course of antibiotics    Anemia  Assessment & Plan  Iron deficiency anemia  Hemoglobin has remained stable  Oral iron supplementation    Substance Abuse Disorder   Assessment & Plan  Maintained on Suboxone    Bipolar affective disorder, currently active [facility])  Assessment & Plan  History of bipolar affective disorder  Maintained on Wellbutrin, fluoxetine, clonazepam, Topamax, Serax p r n  Patient is refusing Wellbutrin and fluoxetine  Status post psychiatry evaluation-appreciate recommendations  Continue to monitor off Wellbutrin and fluoxetine  Increase Topamax to 125 mg b i d       VTE Pharmacologic Prophylaxis:  Lovenox    Patient Centered Rounds:  Patient care rounds were performed with nursing    Time Spent for Care: 30  More than 50% of total time spent on counseling and coordination of care as described above  Current Length of Stay: 9 day(s)    Current Patient Status: Inpatient   Certification Statement: The patient will continue to require additional inpatient hospital stay due to    Discharge Plan:     Code Status: Level 1 - Full Code      Subjective:   Patient seen evaluated at bedside  She reports shortness of breath with exertion  She is fine at rest     Objective:     Vitals:   Temp (24hrs), Av 3 °F (36 3 °C), Min:97 °F (36 1 °C), Max:97 7 °F (36 5 °C)    Temp:  [97 °F (36 1 °C)-97 7 °F (36 5 °C)] 97 °F (36 1 °C)  HR:  [65-82] 65  Resp:  [18-20] 20  BP: (116-128)/(67-77) 128/77  SpO2:  [93 %-98 %] 94 %  Body mass index is 27 7 kg/m²  Input and Output Summary (last 24 hours):     No intake or output data in the 24 hours ending 22 1253    Physical Exam:     Physical Exam  Vitals reviewed  Constitutional:       General: She is not in acute distress  Appearance: She is well-developed  She is not ill-appearing, toxic-appearing or diaphoretic  HENT:      Head: Normocephalic and atraumatic  Mouth/Throat:      Mouth: Mucous membranes are moist    Eyes:      General: No scleral icterus  Extraocular Movements: Extraocular movements intact  Cardiovascular:      Rate and Rhythm: Normal rate and regular rhythm        Heart sounds: Normal heart sounds  Pulmonary:      Effort: Pulmonary effort is normal  No respiratory distress  Breath sounds: Normal breath sounds  No wheezing or rales  Abdominal:      General: There is no distension  Palpations: Abdomen is soft  Tenderness: There is no abdominal tenderness  There is no guarding or rebound  Musculoskeletal:         General: No swelling, tenderness or deformity  Skin:     General: Skin is warm and dry  Neurological:      General: No focal deficit present  Mental Status: She is alert  Mental status is at baseline  Psychiatric:      Comments: Anxious appearing         Additional Data:     Labs:  I have reviewed pertinent results     Results from last 7 days   Lab Units 09/28/22  0442 09/27/22  0444 09/25/22  0510   WBC Thousand/uL 14 65* 12 09* 12 65*   HEMOGLOBIN g/dL 10 4* 10 8* 10 2*   HEMATOCRIT % 34 1* 33 7* 32 0*   PLATELETS Thousands/uL 412* 363 434*   NEUTROS PCT %  --   --  80*   LYMPHS PCT %  --   --  10*   LYMPHO PCT %  --  30  --    MONOS PCT %  --   --  6   MONO PCT %  --  5  --    EOS PCT %  --  0 2     Results from last 7 days   Lab Units 09/30/22  1204   SODIUM mmol/L 134*   POTASSIUM mmol/L 3 9   CHLORIDE mmol/L 102   CO2 mmol/L 23   BUN mg/dL 15   CREATININE mg/dL 0 74   ANION GAP mmol/L 9   CALCIUM mg/dL 8 7   ALBUMIN g/dL 2 6*   TOTAL BILIRUBIN mg/dL 0 30   ALK PHOS U/L 85   ALT U/L 30   AST U/L 19   GLUCOSE RANDOM mg/dL 127         Results from last 7 days   Lab Units 09/25/22  1207 09/25/22  0621 09/25/22  0008 09/24/22  1831 09/24/22  1207 09/23/22  1717 09/23/22  1344   POC GLUCOSE mg/dl 119 144* 164* 183* 197* 156* 124         Results from last 7 days   Lab Units 09/25/22  0510 09/24/22  0519   PROCALCITONIN ng/ml 0 94* 1 90*         Imaging: I have reviewed pertinent imaging       Recent Cultures (last 7 days):     Results from last 7 days   Lab Units 09/25/22  2250   SPUTUM CULTURE  3+ Growth of Tea dubliniensis*  1+ Growth of GRAM STAIN RESULT  1+ Epithelial cells per low power field  No Polys or Bacteria seen       Last 24 Hours Medication List:   Current Facility-Administered Medications   Medication Dose Route Frequency Provider Last Rate    acetaminophen  650 mg Oral Q6H PRN Sagar Cortes PA-C      albuterol  2 puff Inhalation Q4H PRN Sadia Conch, DO      buprenorphine-naloxone  8 mg Sublingual BID Marsatish Cortes PA-C      clonazePAM  1 mg Oral BID [facility]      enoxaparin  40 mg Subcutaneous Daily [place], [place]      famotidine  20 mg Oral BID Sadia Conch, DO      ferrous sulfate  325 mg Oral Daily With Breakfast Sadiaramila Luna, DO      gabapentin  400 mg Oral TID Marsatish Cortes PA-C      hydrOXYzine HCL  50 mg Oral Q6H PRN NETTE Puckett      ipratropium-albuterol  3 mL Nebulization TID Sadiaramila Peterson, DO      methocarbamol  750 mg Oral Q6H PRN Sadia Conch, DO      nicotine  21 mg Transdermal Daily [place], [place]      nicotine polacrilex  2 mg Oral Q2H PRN [facility]      ondansetron  4 mg Oral Q6H PRN Sadia Conch, DO      oxazepam  15 mg Oral BID PRN Sadia Conch, DO      pantoprazole  40 mg Oral Early Morning Sadia Conch, DO      predniSONE  80 mg Oral Daily [place], [place]      topiramate  125 mg Oral BID Sadia Jeremy, DO      traZODone  50 mg Oral HS Sagar Cortes PA-C          Today, Patient Was Seen By: Sadia Luna    ** Please Note: Dictation voice to text software may have been used in the creation of this document   **

## 2022-10-01 LAB
HAV IGM SER QL: ABNORMAL
HBV CORE AB SER QL: ABNORMAL
HBV CORE IGM SER QL: ABNORMAL
HBV CORE IGM SER QL: ABNORMAL
HBV SURFACE AG SER QL: ABNORMAL
HBV SURFACE AG SER QL: ABNORMAL
HCV AB SER QL: ABNORMAL
HCV AB SER QL: ABNORMAL

## 2022-10-01 PROCEDURE — 99232 SBSQ HOSP IP/OBS MODERATE 35: CPT | Performed by: INTERNAL MEDICINE

## 2022-10-01 PROCEDURE — 94640 AIRWAY INHALATION TREATMENT: CPT

## 2022-10-01 RX ORDER — CLONAZEPAM 1 MG/1
1 TABLET ORAL 3 TIMES DAILY
Status: DISCONTINUED | OUTPATIENT
Start: 2022-10-01 | End: 2022-10-05 | Stop reason: HOSPADM

## 2022-10-01 RX ADMIN — PREDNISONE 80 MG: 20 TABLET ORAL at 09:18

## 2022-10-01 RX ADMIN — GABAPENTIN 400 MG: 400 CAPSULE ORAL at 09:19

## 2022-10-01 RX ADMIN — TRAZODONE HYDROCHLORIDE 50 MG: 50 TABLET ORAL at 21:28

## 2022-10-01 RX ADMIN — BUPRENORPHINE AND NALOXONE 8 MG: 8; 2 FILM BUCCAL; SUBLINGUAL at 09:19

## 2022-10-01 RX ADMIN — FAMOTIDINE 20 MG: 20 TABLET ORAL at 09:19

## 2022-10-01 RX ADMIN — ENOXAPARIN SODIUM 40 MG: 40 INJECTION SUBCUTANEOUS at 09:19

## 2022-10-01 RX ADMIN — TOPIRAMATE 125 MG: 100 TABLET, FILM COATED ORAL at 17:42

## 2022-10-01 RX ADMIN — ACETAMINOPHEN 650 MG: 325 TABLET, FILM COATED ORAL at 18:15

## 2022-10-01 RX ADMIN — CLONAZEPAM 1 MG: 1 TABLET ORAL at 21:28

## 2022-10-01 RX ADMIN — IPRATROPIUM BROMIDE AND ALBUTEROL SULFATE 3 ML: 2.5; .5 SOLUTION RESPIRATORY (INHALATION) at 14:09

## 2022-10-01 RX ADMIN — IPRATROPIUM BROMIDE AND ALBUTEROL SULFATE 3 ML: 2.5; .5 SOLUTION RESPIRATORY (INHALATION) at 07:22

## 2022-10-01 RX ADMIN — OXAZEPAM 15 MG: 15 CAPSULE, GELATIN COATED ORAL at 13:56

## 2022-10-01 RX ADMIN — BUPRENORPHINE AND NALOXONE 8 MG: 8; 2 FILM BUCCAL; SUBLINGUAL at 16:36

## 2022-10-01 RX ADMIN — CLONAZEPAM 1 MG: 1 TABLET ORAL at 09:19

## 2022-10-01 RX ADMIN — FAMOTIDINE 20 MG: 20 TABLET ORAL at 17:43

## 2022-10-01 RX ADMIN — NICOTINE POLACRILEX 2 MG: 2 GUM, CHEWING ORAL at 18:16

## 2022-10-01 RX ADMIN — TOPIRAMATE 125 MG: 100 TABLET, FILM COATED ORAL at 09:18

## 2022-10-01 RX ADMIN — Medication 325 MG: at 09:18

## 2022-10-01 RX ADMIN — Medication 21 MG: at 09:21

## 2022-10-01 RX ADMIN — PANTOPRAZOLE SODIUM 40 MG: 40 TABLET, DELAYED RELEASE ORAL at 06:02

## 2022-10-01 RX ADMIN — GABAPENTIN 400 MG: 400 CAPSULE ORAL at 21:28

## 2022-10-01 RX ADMIN — GABAPENTIN 400 MG: 400 CAPSULE ORAL at 16:36

## 2022-10-01 RX ADMIN — NICOTINE POLACRILEX 2 MG: 2 GUM, CHEWING ORAL at 09:24

## 2022-10-01 RX ADMIN — CLONAZEPAM 1 MG: 1 TABLET ORAL at 16:36

## 2022-10-01 NOTE — PLAN OF CARE
Problem: MOBILITY - ADULT  Goal: Maintain or return to baseline ADL function  Description: INTERVENTIONS:  -  Assess patient's ability to carry out ADLs; assess patient's baseline for ADL function and identify physical deficits which impact ability to perform ADLs (bathing, care of mouth/teeth, toileting, grooming, dressing, etc )  - Assess/evaluate cause of self-care deficits   - Assess range of motion  - Assess patient's mobility; develop plan if impaired  - Assess patient's need for assistive devices and provide as appropriate  - Encourage maximum independence but intervene and supervise when necessary  - Involve family in performance of ADLs  - Assess for home care needs following discharge   - Consider OT consult to assist with ADL evaluation and planning for discharge  - Provide patient education as appropriate  Outcome: Progressing  Goal: Maintains/Returns to pre admission functional level  Description: INTERVENTIONS:  - Perform BMAT or MOVE assessment daily    - Set and communicate daily mobility goal to care team and patient/family/caregiver  - Collaborate with rehabilitation services on mobility goals if consulted  - Perform Range of Motion 4 times a day  - Reposition patient every 2 hours    - Dangle patient 2 times a day  - Stand patient 2 times a day  - Ambulate patient 2 times a day  - Out of bed to chair 2 times a day   - Out of bed for meals 2 times a day  - Out of bed for toileting  - Record patient progress and toleration of activity level   Outcome: Progressing     Problem: Prexisting or High Potential for Compromised Skin Integrity  Goal: Skin integrity is maintained or improved  Description: INTERVENTIONS:  - Identify patients at risk for skin breakdown  - Assess and monitor skin integrity  - Assess and monitor nutrition and hydration status  - Monitor labs   - Assess for incontinence   - Turn and reposition patient  - Assist with mobility/ambulation  - Relieve pressure over bony prominences  - Avoid friction and shearing  - Provide appropriate hygiene as needed including keeping skin clean and dry  - Evaluate need for skin moisturizer/barrier cream  - Collaborate with interdisciplinary team   - Patient/family teaching  - Consider wound care consult   Outcome: Progressing     Problem: Potential for Falls  Goal: Patient will remain free of falls  Description: INTERVENTIONS:  - Educate patient/family on patient safety including physical limitations  - Instruct patient to call for assistance with activity   - Consult OT/PT to assist with strengthening/mobility   - Keep Call bell within reach  - Keep bed low and locked with side rails adjusted as appropriate  - Keep care items and personal belongings within reach  - Initiate and maintain comfort rounds  - Make Fall Risk Sign visible to staff    - Apply yellow socks and bracelet for high fall risk patients  - Consider moving patient to room near nurses station  Outcome: Progressing     Problem: PAIN - ADULT  Goal: Verbalizes/displays adequate comfort level or baseline comfort level  Description: Interventions:  - Encourage patient to monitor pain and request assistance  - Assess pain using appropriate pain scale  - Administer analgesics based on type and severity of pain and evaluate response  - Implement non-pharmacological measures as appropriate and evaluate response  - Consider cultural and social influences on pain and pain management  - Notify physician/advanced practitioner if interventions unsuccessful or patient reports new pain  Outcome: Progressing     Problem: INFECTION - ADULT  Goal: Absence or prevention of progression during hospitalization  Description: INTERVENTIONS:  - Assess and monitor for signs and symptoms of infection  - Monitor lab/diagnostic results  - Monitor all insertion sites, i e  indwelling lines, tubes, and drains  - Monitor endotracheal if appropriate and nasal secretions for changes in amount and color  - [place] appropriate cooling/warming therapies per order  - Administer medications as ordered  - Instruct and encourage patient and family to use good hand hygiene technique  - Identify and instruct in appropriate isolation precautions for identified infection/condition  Outcome: Progressing  Goal: Absence of fever/infection during neutropenic period  Description: INTERVENTIONS:  - Monitor WBC    Outcome: Progressing     Problem: SAFETY ADULT  Goal: Maintain or return to baseline ADL function  Description: INTERVENTIONS:  -  Assess patient's ability to carry out ADLs; assess patient's baseline for ADL function and identify physical deficits which impact ability to perform ADLs (bathing, care of mouth/teeth, toileting, grooming, dressing, etc )  - Assess/evaluate cause of self-care deficits   - Assess range of motion  - Assess patient's mobility; develop plan if impaired  - Assess patient's need for assistive devices and provide as appropriate  - Encourage maximum independence but intervene and supervise when necessary  - Involve family in performance of ADLs  - Assess for home care needs following discharge   - Consider OT consult to assist with ADL evaluation and planning for discharge  - Provide patient education as appropriate  Outcome: Progressing  Goal: Maintains/Returns to pre admission functional level  Description: INTERVENTIONS:  - Perform BMAT or MOVE assessment daily    - Set and communicate daily mobility goal to care team and patient/family/caregiver  - Collaborate with rehabilitation services on mobility goals if consulted  - Perform Range of Motion 4 times a day  - Reposition patient every 2 hours    - Dangle patient 2 times a day  - Stand patient 2 times a day  - Ambulate patient 2 times a day  - Out of bed to chair 2 times a day   - Out of bed for meals 2 times a day  - Out of bed for toileting  - Record patient progress and toleration of activity level   Outcome: Progressing  Goal: Patient will remain free of falls  Description: INTERVENTIONS:  - Educate patient/family on patient safety including physical limitations  - Instruct patient to call for assistance with activity   - Consult OT/PT to assist with strengthening/mobility   - Keep Call bell within reach  - Keep bed low and locked with side rails adjusted as appropriate  - Keep care items and personal belongings within reach  - Initiate and maintain comfort rounds  - Make Fall Risk Sign visible to staff    - Apply yellow socks and bracelet for high fall risk patients  - Consider moving patient to room near nurses station  Outcome: Progressing     Problem: DISCHARGE PLANNING  Goal: Discharge to home or other facility with appropriate resources  Description: INTERVENTIONS:  - Identify barriers to discharge w/patient and caregiver  - Arrange for needed discharge resources and transportation as appropriate  - Identify discharge learning needs (meds, wound care, etc )  - Arrange for interpretive services to assist at discharge as needed  - Refer to Case Management Department for coordinating discharge planning if the patient needs post-hospital services based on physician/advanced practitioner order or complex needs related to functional status, cognitive ability, or social support system  Outcome: Progressing     Problem: Knowledge Deficit  Goal: Patient/family/caregiver demonstrates understanding of disease process, treatment plan, medications, and discharge instructions  Description: Complete learning assessment and assess knowledge base  Interventions:  - Provide teaching at level of understanding  - Provide teaching via preferred learning methods  Outcome: Progressing     Problem: Nutrition/Hydration-ADULT  Goal: Nutrient/Hydration intake appropriate for improving, restoring or maintaining nutritional needs  Description: Monitor and assess patient's nutrition/hydration status for malnutrition   Collaborate with interdisciplinary team and initiate plan and interventions as ordered  Monitor patient's weight and dietary intake as ordered or per policy  Utilize nutrition screening tool and intervene as necessary  Determine patient's food preferences and provide high-protein, high-caloric foods as appropriate       INTERVENTIONS:  - Monitor oral intake, urinary output, labs, and treatment plans  - Assess nutrition and hydration status and recommend course of action  - Evaluate amount of meals eaten  - Assist patient with eating if necessary   - Allow adequate time for meals  - Recommend/ encourage appropriate diets, oral nutritional supplements, and vitamin/mineral supplements  - Order, calculate, and assess calorie counts as needed  - Recommend, monitor, and adjust tube feedings and TPN/PPN based on assessed needs  - Assess need for intravenous fluids  - Provide specific nutrition/hydration education as appropriate  - Include patient/family/caregiver in decisions related to nutrition  Outcome: Progressing     Problem: RESPIRATORY - ADULT  Goal: Achieves optimal ventilation and oxygenation  Description: INTERVENTIONS:  - Assess for changes in respiratory status  - Assess for changes in mentation and behavior  - Position to facilitate oxygenation and minimize respiratory effort  - Oxygen administered by appropriate delivery if ordered  - Initiate smoking cessation education as indicated  - Encourage broncho-pulmonary hygiene including cough, deep breathe, Incentive Spirometry  - Assess the need for suctioning and aspirate as needed  - Assess and instruct to report SOB or any respiratory difficulty  - Respiratory Therapy support as indicated  Outcome: Progressing     Problem: COPING  Goal: Pt/Family able to verbalize concerns and demonstrate effective coping strategies  Description: INTERVENTIONS:  - Assist patient/family to identify coping skills, available support systems and cultural and spiritual values  - Provide emotional support, including active listening and acknowledgement of concerns of patient and caregivers  - Reduce environmental stimuli, as able  - Provide patient education  - Assess for spiritual pain/suffering and initiate spiritual care, including notification of Pastoral Care or kathy based community as needed  - Assess effectiveness of coping strategies  Outcome: Progressing  Goal: Will report anxiety at manageable levels  Description: INTERVENTIONS:  - Administer medication as ordered  - Teach and encourage coping skills  - Provide emotional support  - Assess patient/family for anxiety and ability to cope  Outcome: Progressing     Problem: BEHAVIOR  Goal: Pt/Family maintain appropriate behavior and adhere to behavioral management agreement, if implemented  Description: INTERVENTIONS:  - Assess the family dynamic   - Encourage verbalization of thoughts and concerns in a socially appropriate manner  - Assess patient/family's coping skills and non-compliant behavior (including use of illegal substances)  - Utilize positive, consistent limit setting strategies supporting safety of patient, staff and others  - Initiate consult with Case Management, Spiritual Care or other ancillary services as appropriate  - If a patient's/visitor's behavior jeopardizes the safety of the patient, staff, or others, refer to organization procedure     - Notify Security of behavior or suspected illegal substances which indicate the need for search of the patient and/or belongings  - Encourage participation in the decision making process about a behavioral management agreement; implement if patient meets criteria  Outcome: Progressing

## 2022-10-01 NOTE — ASSESSMENT & PLAN NOTE
Received notification from [address] [place] [address],[address] services that patient's hepatitis screening was positive    Patient is aware of this and was told that she was exposed to hepatitis-C in the past   LFTs normal  Follow-up hep C viral RNA quantitative

## 2022-10-01 NOTE — ASSESSMENT & PLAN NOTE
History of bipolar affective disorder  Maintained on Wellbutrin, fluoxetine, clonazepam, Topamax, Serax p r n  Patient is refusing Wellbutrin and fluoxetine  Status post psychiatry evaluation-appreciate recommendations  Continue to monitor off Wellbutrin and fluoxetine  Increase Topamax to 125 mg b i d    Increase clonazepam to 1 mg t i d

## 2022-10-01 NOTE — PROGRESS NOTES
[address] [place] [address]  Progress Note Lou Vivar 1975, 41 y o  female MRN: [id number]  Unit/Bed#: E2 -01 Encounter: [id number]  Primary Care Provider: No primary care provider on file  Date and time admitted to hospital: 9/21/2022 11:50 AM    * Acute respiratory failure with hypoxia [facility])  Assessment & Plan  Patient presented with shortness of breath 9/21 and hypoxia  Concern for vape related lung injury  CT chest with extensive ground-glass opacities in the lungs with peripheral sparing  Most recent chest x-ray with improved but persistent bilateral infiltrates  Echocardiogram with preserved EF, no regional wall motion abnormalities  Patient completed 5 day course of antibiotics  Patient was treated with IV steroids and now transition to high-dose oral steroids with 10 day taper  Appreciate pulmonology recommendations  Home O2 evaluation patient needs 2 L with ambulation  Case management to assist with safe discharge planning    Hepatitis C antibody test positive  Assessment & Plan  Received notification from [address] [place] [address],[address] services that patient's hepatitis screening was positive    Patient is aware of this and was told that she was exposed to hepatitis-C in the past   LFTs normal  Follow-up hep C viral RNA quantitative    R/O CAP (community acquired pneumonia)  Assessment & Plan  Chest x-ray with multifocal infiltrates with concern for superimposed bacterial pneumonia  Completed 5 day course of antibiotics      Sepsis ([address] )  Assessment & Plan  POA evidence by tachycardia, tachypnea, leukocytosis  Suspect superimposed pneumonia on setting of vape lung injury  Patient completed course of antibiotics    Anemia  Assessment & Plan  Iron deficiency anemia  Hemoglobin has remained stable  Oral iron supplementation    Substance Abuse Disorder   Assessment & Plan  Maintained on Suboxone    Bipolar affective disorder, currently active [facility])  Assessment & Plan  History of bipolar affective disorder  Maintained on Wellbutrin, fluoxetine, clonazepam, Topamax, Serax p r n  Patient is refusing Wellbutrin and fluoxetine  Status post psychiatry evaluation-appreciate recommendations  Continue to monitor off Wellbutrin and fluoxetine  Increase Topamax to 125 mg b i d  Increase clonazepam to 1 mg t i d         VTE Pharmacologic Prophylaxis:  Lovenox    Patient Centered Rounds:  Patient care rounds were performed with nursing    Time Spent for Care: 30  More than 50% of total time spent on counseling and coordination of care as described above  Current Length of Stay: 10 day(s)    Current Patient Status: Inpatient   Certification Statement: The patient will continue to require additional inpatient hospital stay due to ongoing management acute hypoxic respiratory failure    Discharge Plan: Will need safe discharge planning with case management to establish oxygen at home    Code Status: Level 1 - Full Code      Subjective:   Patient seen evaluated at bedside  No overnight events  Reports that she had episode of nausea and vomiting last night after talking to her ex  Objective:     Vitals:   Temp (24hrs), Av 4 °F (36 3 °C), Min:96 4 °F (35 8 °C), Max:98 6 °F (37 °C)    Temp:  [96 4 °F (35 8 °C)-98 6 °F (37 °C)] 97 2 °F (36 2 °C)  HR:  [70-79] 70  Resp:  [18-20] 18  BP: (104-106)/(55-57) 106/57  SpO2:  [91 %-94 %] 94 %  Body mass index is 27 5 kg/m²  Input and Output Summary (last 24 hours):     No intake or output data in the 24 hours ending 10/01/22 1202    Physical Exam:     Physical Exam  Vitals reviewed  Constitutional:       General: She is not in acute distress  Appearance: She is well-developed  She is not ill-appearing, toxic-appearing or diaphoretic  HENT:      Head: Normocephalic and atraumatic  Mouth/Throat:      Mouth: Mucous membranes are moist    Eyes:      General: No scleral icterus  Extraocular Movements: Extraocular movements intact  Cardiovascular:      Rate and Rhythm: Normal rate and regular rhythm  Heart sounds: Normal heart sounds  Pulmonary:      Effort: Pulmonary effort is normal  No respiratory distress  Breath sounds: Normal breath sounds  No wheezing or rales  Abdominal:      General: There is no distension  Palpations: Abdomen is soft  Tenderness: There is no abdominal tenderness  There is no guarding or rebound  Musculoskeletal:         General: No swelling, tenderness or deformity  Skin:     General: Skin is warm and dry  Neurological:      General: No focal deficit present  Mental Status: She is alert  Mental status is at baseline  Psychiatric:      Comments: Pressured speech, tangential thought process         Additional Data:     Labs:  I have reviewed pertinent results     Results from last 7 days   Lab Units 09/28/22  0442 09/27/22  0444 09/25/22  0510   WBC Thousand/uL 14 65* 12 09* 12 65*   HEMOGLOBIN g/dL 10 4* 10 8* 10 2*   HEMATOCRIT % 34 1* 33 7* 32 0*   PLATELETS Thousands/uL 412* 363 434*   NEUTROS PCT %  --   --  80*   LYMPHS PCT %  --   --  10*   LYMPHO PCT %  --  30  --    MONOS PCT %  --   --  6   MONO PCT %  --  5  --    EOS PCT %  --  0 2     Results from last 7 days   Lab Units 09/30/22  1204   SODIUM mmol/L 134*   POTASSIUM mmol/L 3 9   CHLORIDE mmol/L 102   CO2 mmol/L 23   BUN mg/dL 15   CREATININE mg/dL 0 74   ANION GAP mmol/L 9   CALCIUM mg/dL 8 7   ALBUMIN g/dL 2 6*   TOTAL BILIRUBIN mg/dL 0 30   ALK PHOS U/L 85   ALT U/L 30   AST U/L 19   GLUCOSE RANDOM mg/dL 127         Results from last 7 days   Lab Units 09/25/22  1207 09/25/22  0621 09/25/22  0008 09/24/22  1831 09/24/22  1207   POC GLUCOSE mg/dl 119 144* 164* 183* 197*         Results from last 7 days   Lab Units 09/25/22  0510   PROCALCITONIN ng/ml 0 94*         Imaging: I have reviewed pertinent imaging       Recent Cultures (last 7 days):     Results from last 7 days   Lab Units 09/25/22  2250   SPUTUM CULTURE 3+ Growth of Candida dubliniensis*  1+ Growth of    GRAM STAIN RESULT  1+ Epithelial cells per low power field  No Polys or Bacteria seen       Last 24 Hours Medication List:   Current Facility-Administered Medications   Medication Dose Route Frequency Provider Last Rate    acetaminophen  650 mg Oral Q6H PRN Marsatish Cortes PA-C      albuterol  2 puff Inhalation Q4H PRN Sadia Jeremy, DO      buprenorphine-naloxone  8 mg Sublingual BID [facility] KALEIGH Cortes      clonazePAM  1 mg Oral TID Sadia Conc, DO      enoxaparin  40 mg Subcutaneous Daily [place], [place]      famotidine  20 mg Oral BID Sadia Conch, DO      ferrous sulfate  325 mg Oral Daily With Breakfast Sadiaramila Luna, DO      gabapentin  400 mg Oral TID [facility] KALEIGH Cortes      hydrOXYzine HCL  50 mg Oral Q6H PRN NETTE Puckett      ipratropium-albuterol  3 mL Nebulization TID Sadia Luna, DO      methocarbamol  750 mg Oral Q6H PRN Sadia Conch, DO      nicotine  21 mg Transdermal Daily [place], [place]      nicotine polacrilex  2 mg Oral Q2H PRN Marsatish Cortes PA-C      ondansetron  4 mg Oral Q6H PRN Sadia Conch, DO      oxazepam  15 mg Oral BID PRN Sadia Conch, DO      pantoprazole  40 mg Oral Early Morning Sadia Nicholas, DO      predniSONE  80 mg Oral Daily [place], [place]      topiramate  125 mg Oral BID Sadia Jeremy, DO      traZODone  50 mg Oral HS Sagar Cortes PA-C          Today, Patient Was Seen By: Sadia Luna    ** Please Note: Dictation voice to text software may have been used in the creation of this document   **

## 2022-10-02 VITALS
BODY MASS INDEX: 27.79 KG/M2 | SYSTOLIC BLOOD PRESSURE: 100 MMHG | DIASTOLIC BLOOD PRESSURE: 64 MMHG | WEIGHT: 151.01 LBS | HEART RATE: 76 BPM | OXYGEN SATURATION: 93 % | RESPIRATION RATE: 18 BRPM | TEMPERATURE: 98.6 F | HEIGHT: 62 IN

## 2022-10-02 PROCEDURE — 94760 N-INVAS EAR/PLS OXIMETRY 1: CPT

## 2022-10-02 PROCEDURE — 99232 SBSQ HOSP IP/OBS MODERATE 35: CPT | Performed by: INTERNAL MEDICINE

## 2022-10-02 PROCEDURE — 94640 AIRWAY INHALATION TREATMENT: CPT

## 2022-10-02 RX ORDER — HYDROXYZINE HYDROCHLORIDE 25 MG/1
50 TABLET, FILM COATED ORAL ONCE
Status: DISCONTINUED | OUTPATIENT
Start: 2022-10-02 | End: 2022-10-02

## 2022-10-02 RX ADMIN — ONDANSETRON 4 MG: 4 TABLET, ORALLY DISINTEGRATING ORAL at 13:10

## 2022-10-02 RX ADMIN — IPRATROPIUM BROMIDE AND ALBUTEROL SULFATE 3 ML: 2.5; .5 SOLUTION RESPIRATORY (INHALATION) at 07:37

## 2022-10-02 RX ADMIN — FAMOTIDINE 20 MG: 20 TABLET ORAL at 17:34

## 2022-10-02 RX ADMIN — Medication 325 MG: at 09:37

## 2022-10-02 RX ADMIN — NICOTINE POLACRILEX 2 MG: 2 GUM, CHEWING ORAL at 09:36

## 2022-10-02 RX ADMIN — HYDROXYZINE HYDROCHLORIDE 50 MG: 25 TABLET ORAL at 20:53

## 2022-10-02 RX ADMIN — OXAZEPAM 15 MG: 15 CAPSULE, GELATIN COATED ORAL at 13:10

## 2022-10-02 RX ADMIN — ACETAMINOPHEN 650 MG: 325 TABLET, FILM COATED ORAL at 15:37

## 2022-10-02 RX ADMIN — GABAPENTIN 400 MG: 400 CAPSULE ORAL at 15:37

## 2022-10-02 RX ADMIN — PREDNISONE 80 MG: 20 TABLET ORAL at 09:37

## 2022-10-02 RX ADMIN — CLONAZEPAM 1 MG: 1 TABLET ORAL at 15:36

## 2022-10-02 RX ADMIN — NICOTINE POLACRILEX 2 MG: 2 GUM, CHEWING ORAL at 20:10

## 2022-10-02 RX ADMIN — CLONAZEPAM 1 MG: 1 TABLET ORAL at 09:37

## 2022-10-02 RX ADMIN — FAMOTIDINE 20 MG: 20 TABLET ORAL at 09:37

## 2022-10-02 RX ADMIN — TOPIRAMATE 125 MG: 100 TABLET, FILM COATED ORAL at 09:37

## 2022-10-02 RX ADMIN — OXAZEPAM 15 MG: 15 CAPSULE, GELATIN COATED ORAL at 20:09

## 2022-10-02 RX ADMIN — GABAPENTIN 400 MG: 400 CAPSULE ORAL at 09:37

## 2022-10-02 RX ADMIN — IPRATROPIUM BROMIDE AND ALBUTEROL SULFATE 3 ML: 2.5; .5 SOLUTION RESPIRATORY (INHALATION) at 13:43

## 2022-10-02 RX ADMIN — PANTOPRAZOLE SODIUM 40 MG: 40 TABLET, DELAYED RELEASE ORAL at 05:21

## 2022-10-02 RX ADMIN — ACETAMINOPHEN 650 MG: 325 TABLET, FILM COATED ORAL at 09:37

## 2022-10-02 RX ADMIN — Medication 21 MG: at 09:37

## 2022-10-02 RX ADMIN — ENOXAPARIN SODIUM 40 MG: 40 INJECTION SUBCUTANEOUS at 09:36

## 2022-10-02 RX ADMIN — CLONAZEPAM 1 MG: 1 TABLET ORAL at 20:10

## 2022-10-02 RX ADMIN — TOPIRAMATE 125 MG: 100 TABLET, FILM COATED ORAL at 17:35

## 2022-10-02 RX ADMIN — BUPRENORPHINE AND NALOXONE 8 MG: 8; 2 FILM BUCCAL; SUBLINGUAL at 15:37

## 2022-10-02 RX ADMIN — BUPRENORPHINE AND NALOXONE 8 MG: 8; 2 FILM BUCCAL; SUBLINGUAL at 09:37

## 2022-10-02 NOTE — CASE MANAGEMENT
Case Management Progress Note    Patient name Lynne Fernando  Location East 2 /E2 [facility] 251-* MRN [id number]  : 1975 Date 10/2/2022       LOS (days): 11  Geometric Mean LOS (GMLOS) (days): 4 80  Days to GMLOS:-6        OBJECTIVE:        Current admission status: Inpatient  Preferred Pharmacy:   [address] [facility], [address] [facility],[address]  [address] [address]  Phone: [phone] Fax: [phone] [address] [facility],[address], [address]  [facility] [address]  Phone: [phone] Fax: [address] [place], [place] - [facility] [address] ,  [address] ,  [address] [address]  Phone: [phone] Fax: [phone]    Primary Care Provider: No primary care provider on file  Primary Insurance: Refugio ROSEN  Secondary Insurance:     PROGRESS NOTE:    CM in discussion with SLIM regarding Pts discharge  Pt is currently   "homeless", with no where to stay following her discharge  Significant behavior health Hx noted  Pt was found to be pocketing and stashing anxiety medications - this reported by Pts RN  CM in discussion with ALMAZ regarding a psych reconsult and possibility of Pt signing a 201 for IP Tx- 28day program, as an alternate plan for discharge  Ultimately this will be decided by SLIM and based on Psych's recommendation- previous of which is out patient psychiatric Tx/follow up /   CM and SLIM also discussing Pts need for O2- re-eval from RT is indicated  CM following for further direction from SLIM

## 2022-10-02 NOTE — PROGRESS NOTES
[address] [place] [address]  Progress Note Mari Lovett 1975, 41 y o  female MRN: [id number]  Unit/Bed#: E2 -01 Encounter: [id number]  Primary Care Provider: No primary care provider on file  Date and time admitted to hospital: 9/21/2022 11:50 AM    Hepatitis C antibody test positive  Assessment & Plan  Received notification from [address] services that patient's hepatitis screening was positive  Patient is aware of this and was told that she was exposed to hepatitis-C in the past   LFTs normal  Follow-up hep C viral RNA quantitative    Bradycardia  Assessment & Plan  Observed in ICU while on Precedex  No further events    R/O CAP (community acquired pneumonia)  Assessment & Plan  Chest x-ray with multifocal infiltrates with concern for superimposed bacterial pneumonia  Completed 5 day course of antibiotics      Sepsis ([facility] [address] )  Assessment & Plan  POA evidence by tachycardia, tachypnea, leukocytosis  Suspect superimposed pneumonia on setting of vape lung injury  Patient completed course of antibiotics    Anemia  Assessment & Plan  Iron deficiency anemia  Hemoglobin has remained stable  Oral iron supplementation    Substance Abuse Disorder   Assessment & Plan  Maintained on Suboxone    Bipolar affective disorder, currently active [facility])  Assessment & Plan  History of bipolar affective disorder  Maintained on Wellbutrin, fluoxetine, clonazepam, Topamax, Serax p r n  Patient is refusing Wellbutrin and fluoxetine  Status post psychiatry evaluation-appreciate recommendations  Continue to monitor off Wellbutrin and fluoxetine  Increase Topamax to 125 mg b i d    Increase clonazepam to 1 mg t i d       * Acute respiratory failure with hypoxia [facility])  Assessment & Plan  Patient presented with shortness of breath 9/21 and hypoxia  Concern for vape related lung injury  CT chest with extensive ground-glass opacities in the lungs with peripheral sparing  Most recent chest x-ray with improved but persistent bilateral infiltrates  Echocardiogram with preserved EF, no regional wall motion abnormalities  Patient completed 5 day course of antibiotics  Patient was treated with IV steroids and now transition to high-dose oral steroids with 10 day taper  Appreciate pulmonology recommendations  Home O2 evaluation patient needs 2 L with ambulation  Case management to assist with safe discharge planning    10/2/22: Will re-evaluate pulse ox with ambulation and home oxygen testing to see if patient still requires home oxygen on discharge            VTE Pharmacologic Prophylaxis: VTE Score: 6 High Risk (Score >/= 5) - Pharmacological DVT Prophylaxis Ordered: enoxaparin (Lovenox)  Sequential Compression Devices Ordered  Patient Centered Rounds: I performed bedside rounds with nursing staff today  Discussions with Specialists or Other Care Team Provider: n/a    Education and Discussions with Family / Patient: Patient declined call to   Time Spent for Care: 30 minutes  More than 50% of total time spent on counseling and coordination of care as described above  Current Length of Stay: 11 day(s)  Current Patient Status: Inpatient   Certification Statement: The patient will continue to require additional inpatient hospital stay due to Pending oxygen evaluation, discharge planning  Discharge Plan: Anticipate discharge in 48 hrs to Home versus inpatient psych    Code Status: Level 1 - Full Code    Subjective:   Patient currently reports having trouble breathing this morning, although she is saturating fine a when taken off oxygen at rest   Agreeable to ambulatory testing  Objective:     Vitals:   Temp (24hrs), Av °F (36 1 °C), Min:97 °F (36 1 °C), Max:97 °F (36 1 °C)    Temp:  [97 °F (36 1 °C)] 97 °F (36 1 °C)  HR:  [70-85] 85  Resp:  [18] 18  BP: (101-112)/(58-65) 112/65  SpO2:  [92 %-96 %] 93 %  Body mass index is 27 62 kg/m²       Input and Output Summary (last 24 hours):   No intake or output data in the 24 hours ending 10/02/22 1232    Physical Exam:   Physical Exam  Vitals and nursing note reviewed  Constitutional:       General: She is not in acute distress  Appearance: She is well-developed  She is not ill-appearing, toxic-appearing or diaphoretic  HENT:      Head: Normocephalic and atraumatic  Eyes:      General: No scleral icterus  Conjunctiva/sclera: Conjunctivae normal    Cardiovascular:      Rate and Rhythm: Normal rate and regular rhythm  Heart sounds: No murmur heard  No friction rub  No gallop  Pulmonary:      Effort: Pulmonary effort is normal  No respiratory distress  Breath sounds: Normal breath sounds  No stridor  No wheezing, rhonchi or rales  Comments: Saturating over 95% on room air at rest  Chest:      Chest wall: No tenderness  Abdominal:      General: There is no distension  Palpations: Abdomen is soft  There is no mass  Tenderness: There is no abdominal tenderness  There is no guarding or rebound  Hernia: No hernia is present  Musculoskeletal:         General: No swelling, tenderness, deformity or signs of injury  Cervical back: Neck supple  Skin:     General: Skin is warm and dry  Coloration: Skin is not jaundiced or pale  Findings: No bruising or erythema  Neurological:      Mental Status: She is alert     Psychiatric:      Comments: Patient appears very anxious, tangential thought process            Additional Data:     Labs:  Results from last 7 days   Lab Units 09/28/22  0442 09/27/22  0444   WBC Thousand/uL 14 65* 12 09*   HEMOGLOBIN g/dL 10 4* 10 8*   HEMATOCRIT % 34 1* 33 7*   PLATELETS Thousands/uL 412* 363   LYMPHO PCT %  --  30   MONO PCT %  --  5   EOS PCT %  --  0     Results from last 7 days   Lab Units 09/30/22  1204   SODIUM mmol/L 134*   POTASSIUM mmol/L 3 9   CHLORIDE mmol/L 102   CO2 mmol/L 23   BUN mg/dL 15   CREATININE mg/dL 0 74   ANION GAP mmol/L 9   CALCIUM mg/dL 8 7   ALBUMIN g/dL 2 6*   TOTAL BILIRUBIN mg/dL 0 30   ALK PHOS U/L 85   ALT U/L 30   AST U/L 19   GLUCOSE RANDOM mg/dL 127                       Lines/Drains:  Invasive Devices  Report    None                       Imaging: No pertinent imaging reviewed      Recent Cultures (last 7 days):   Results from last 7 days   Lab Units 09/25/22  2250   SPUTUM CULTURE  3+ Growth of Tea dubliniensis*  1+ Growth of    GRAM STAIN RESULT  1+ Epithelial cells per low power field  No Polys or Bacteria seen       Last 24 Hours Medication List:   Current Facility-Administered Medications   Medication Dose Route Frequency Provider Last Rate    acetaminophen  650 mg Oral Q6H PRN Juan Carlos Million, PA-SIDNEY      albuterol  2 puff Inhalation Q4H PRN Chanda Stager, DO      buprenorphine-naloxone  8 mg Sublingual BID Juan Carlos Million, KALEIGH      clonazePAM  1 mg Oral TID Chanda Stager, DO      enoxaparin  40 mg Subcutaneous Daily [place], [place]      famotidine  20 mg Oral BID Chanda Stager, DO      ferrous sulfate  325 mg Oral Daily With Breakfast Chanda Stager, DO      gabapentin  400 mg Oral TID [facility], KALEIGH      hydrOXYzine HCL  50 mg Oral Q6H PRN NETTE Puckett      ipratropium-albuterol  3 mL Nebulization TID Chanda Stager, DO      methocarbamol  750 mg Oral Q6H PRN Chanda Stager, DO      nicotine  21 mg Transdermal Daily [place], [place]      nicotine polacrilex  2 mg Oral Q2H PRN Juan Carlos Million, KALEIGH      ondansetron  4 mg Oral Q6H PRN Chanda Stager, DO      oxazepam  15 mg Oral BID PRN Chanda Stager, DO      pantoprazole  40 mg Oral Early Morning Chanda Stager, DO      predniSONE  80 mg Oral Daily [place], [place]      topiramate  125 mg Oral BID Chanda Stager, DO      traZODone  50 mg Oral HS [facility], KALEIGH          Today, Patient Was Seen By: Deanna Cruz    **Please Note: This note may have been constructed using a voice recognition system  **

## 2022-10-02 NOTE — NURSING NOTE
Patient ambulated in sarmiento with nurse  Patient walked 260 ft with continuous oximetry on  Patient SATs were between 93 and 96 on room air during her walk  Patient took periods of rest as she stated her bunions were hurting her  Patient comfortable in her room at this time

## 2022-10-02 NOTE — ASSESSMENT & PLAN NOTE
Patient presented with shortness of breath 9/21 and hypoxia  Concern for vape related lung injury  CT chest with extensive ground-glass opacities in the lungs with peripheral sparing  Most recent chest x-ray with improved but persistent bilateral infiltrates  Echocardiogram with preserved EF, no regional wall motion abnormalities  Patient completed 5 day course of antibiotics  Patient was treated with IV steroids and now transition to high-dose oral steroids with 10 day taper  Appreciate pulmonology recommendations  Home O2 evaluation patient needs 2 L with ambulation  Case management to assist with safe discharge planning    10/2/22:   Will re-evaluate pulse ox with ambulation and home oxygen testing to see if patient still requires home oxygen on discharge

## 2022-10-02 NOTE — PLAN OF CARE
Problem: MOBILITY - ADULT  Goal: Maintain or return to baseline ADL function  Description: INTERVENTIONS:  -  Assess patient's ability to carry out ADLs; assess patient's baseline for ADL function and identify physical deficits which impact ability to perform ADLs (bathing, care of mouth/teeth, toileting, grooming, dressing, etc )  - Assess/evaluate cause of self-care deficits   - Assess range of motion  - Assess patient's mobility; develop plan if impaired  - Assess patient's need for assistive devices and provide as appropriate  - Encourage maximum independence but intervene and supervise when necessary  - Involve family in performance of ADLs  - Assess for home care needs following discharge   - Consider OT consult to assist with ADL evaluation and planning for discharge  - Provide patient education as appropriate  Outcome: Progressing     Problem: Potential for Falls  Goal: Patient will remain free of falls  Description: INTERVENTIONS:  - Educate patient/family on patient safety including physical limitations  - Instruct patient to call for assistance with activity   - Consult OT/PT to assist with strengthening/mobility   - Keep Call bell within reach  - Keep bed low and locked with side rails adjusted as appropriate  - Keep care items and personal belongings within reach  - Initiate and maintain comfort rounds  - Make Fall Risk Sign visible to staff  - Offer Toileting every 2 Hours, in advance of need  - Initiate/Maintain bed alarm  - Obtain necessary fall risk management equipment: walker  - Apply yellow socks and bracelet for high fall risk patients  - Consider moving patient to room near nurses station  Outcome: Progressing     Problem: PAIN - ADULT  Goal: Verbalizes/displays adequate comfort level or baseline comfort level  Description: Interventions:  - Encourage patient to monitor pain and request assistance  - Assess pain using appropriate pain scale  - Administer analgesics based on type and severity of pain and evaluate response  - Implement non-pharmacological measures as appropriate and evaluate response  - Consider cultural and social influences on pain and pain management  - Notify physician/advanced practitioner if interventions unsuccessful or patient reports new pain  Outcome: Progressing

## 2022-10-02 NOTE — ASSESSMENT & PLAN NOTE
Received notification from [address] [place] [address],[address] services that patient's hepatitis screening was positive    Patient is aware of this and was told that she was exposed to hepatitis-C in the past   LFTs normal  Follow-up hep C viral RNA quantitative

## 2022-10-02 NOTE — PLAN OF CARE
Problem: MOBILITY - ADULT  Goal: Maintain or return to baseline ADL function  Description: INTERVENTIONS:  -  Assess patient's ability to carry out ADLs; assess patient's baseline for ADL function and identify physical deficits which impact ability to perform ADLs (bathing, care of mouth/teeth, toileting, grooming, dressing, etc )  - Assess/evaluate cause of self-care deficits   - Assess range of motion  - Assess patient's mobility; develop plan if impaired  - Assess patient's need for assistive devices and provide as appropriate  - Encourage maximum independence but intervene and supervise when necessary  - Involve family in performance of ADLs  - Assess for home care needs following discharge   - Consider OT consult to assist with ADL evaluation and planning for discharge  - Provide patient education as appropriate  Outcome: Progressing  Goal: Maintains/Returns to pre admission functional level  Description: INTERVENTIONS:  - Perform BMAT or MOVE assessment daily    - Set and communicate daily mobility goal to care team and patient/family/caregiver  - Collaborate with rehabilitation services on mobility goals if consulted  - Perform Range of Motion 4 times a day  - Reposition patient every 2 hours    - Dangle patient 2 times a day  - Stand patient 2 times a day  - Ambulate patient 2 times a day  - Out of bed to chair 2 times a day   - Out of bed for meals 2 times a day  - Out of bed for toileting  - Record patient progress and toleration of activity level   Outcome: Progressing     Problem: Prexisting or High Potential for Compromised Skin Integrity  Goal: Skin integrity is maintained or improved  Description: INTERVENTIONS:  - Identify patients at risk for skin breakdown  - Assess and monitor skin integrity  - Assess and monitor nutrition and hydration status  - Monitor labs   - Assess for incontinence   - Turn and reposition patient  - Assist with mobility/ambulation  - Relieve pressure over bony prominences  - Avoid friction and shearing  - Provide appropriate hygiene as needed including keeping skin clean and dry  - Evaluate need for skin moisturizer/barrier cream  - Collaborate with interdisciplinary team   - Patient/family teaching  - Consider wound care consult   Outcome: Progressing     Problem: Potential for Falls  Goal: Patient will remain free of falls  Description: INTERVENTIONS:  - Educate patient/family on patient safety including physical limitations  - Instruct patient to call for assistance with activity   - Consult OT/PT to assist with strengthening/mobility   - Keep Call bell within reach  - Keep bed low and locked with side rails adjusted as appropriate  - Keep care items and personal belongings within reach  - Initiate and maintain comfort rounds  - Make Fall Risk Sign visible to staff      - Apply yellow socks and bracelet for high fall risk patients  - Consider moving patient to room near nurses station  Outcome: Progressing     Problem: PAIN - ADULT  Goal: Verbalizes/displays adequate comfort level or baseline comfort level  Description: Interventions:  - Encourage patient to monitor pain and request assistance  - Assess pain using appropriate pain scale  - Administer analgesics based on type and severity of pain and evaluate response  - Implement non-pharmacological measures as appropriate and evaluate response  - Consider cultural and social influences on pain and pain management  - Notify physician/advanced practitioner if interventions unsuccessful or patient reports new pain  Outcome: Progressing     Problem: INFECTION - ADULT  Goal: Absence or prevention of progression during hospitalization  Description: INTERVENTIONS:  - Assess and monitor for signs and symptoms of infection  - Monitor lab/diagnostic results  - Monitor all insertion sites, i e  indwelling lines, tubes, and drains  - Monitor endotracheal if appropriate and nasal secretions for changes in amount and color  - [place] appropriate cooling/warming therapies per order  - Administer medications as ordered  - Instruct and encourage patient and family to use good hand hygiene technique  - Identify and instruct in appropriate isolation precautions for identified infection/condition  Outcome: Progressing  Goal: Absence of fever/infection during neutropenic period  Description: INTERVENTIONS:  - Monitor WBC    Outcome: Progressing     Problem: SAFETY ADULT  Goal: Maintain or return to baseline ADL function  Description: INTERVENTIONS:  -  Assess patient's ability to carry out ADLs; assess patient's baseline for ADL function and identify physical deficits which impact ability to perform ADLs (bathing, care of mouth/teeth, toileting, grooming, dressing, etc )  - Assess/evaluate cause of self-care deficits   - Assess range of motion  - Assess patient's mobility; develop plan if impaired  - Assess patient's need for assistive devices and provide as appropriate  - Encourage maximum independence but intervene and supervise when necessary  - Involve family in performance of ADLs  - Assess for home care needs following discharge   - Consider OT consult to assist with ADL evaluation and planning for discharge  - Provide patient education as appropriate  Outcome: Progressing  Goal: Maintains/Returns to pre admission functional level  Description: INTERVENTIONS:  - Perform BMAT or MOVE assessment daily    - Set and communicate daily mobility goal to care team and patient/family/caregiver  - Collaborate with rehabilitation services on mobility goals if consulted  - Perform Range of Motion 4 times a day  - Reposition patient every 2 hours    - Dangle patient 2 times a day  - Stand patient 2 times a day  - Ambulate patient 2 times a day  - Out of bed to chair 2 times a day   - Out of bed for meals 2 times a day  - Out of bed for toileting  - Record patient progress and toleration of activity level   Outcome: Progressing  Goal: Patient will remain free of falls  Description: INTERVENTIONS:  - Educate patient/family on patient safety including physical limitations  - Instruct patient to call for assistance with activity   - Consult OT/PT to assist with strengthening/mobility   - Keep Call bell within reach  - Keep bed low and locked with side rails adjusted as appropriate  - Keep care items and personal belongings within reach  - Initiate and maintain comfort rounds  - Make Fall Risk Sign visible to staff    - Obtain necessary fall risk management equipment: comfort rounds  - Apply yellow socks and bracelet for high fall risk patients  - Consider moving patient to room near nurses station  Outcome: Progressing     Problem: DISCHARGE PLANNING  Goal: Discharge to home or other facility with appropriate resources  Description: INTERVENTIONS:  - Identify barriers to discharge w/patient and caregiver  - Arrange for needed discharge resources and transportation as appropriate  - Identify discharge learning needs (meds, wound care, etc )  - Arrange for interpretive services to assist at discharge as needed  - Refer to Case Management Department for coordinating discharge planning if the patient needs post-hospital services based on physician/advanced practitioner order or complex needs related to functional status, cognitive ability, or social support system  Outcome: Progressing     Problem: Knowledge Deficit  Goal: Patient/family/caregiver demonstrates understanding of disease process, treatment plan, medications, and discharge instructions  Description: Complete learning assessment and assess knowledge base    Interventions:  - Provide teaching at level of understanding  - Provide teaching via preferred learning methods  Outcome: Progressing     Problem: Nutrition/Hydration-ADULT  Goal: Nutrient/Hydration intake appropriate for improving, restoring or maintaining nutritional needs  Description: Monitor and assess patient's nutrition/hydration status for malnutrition  Collaborate with interdisciplinary team and initiate plan and interventions as ordered  Monitor patient's weight and dietary intake as ordered or per policy  Utilize nutrition screening tool and intervene as necessary  Determine patient's food preferences and provide high-protein, high-caloric foods as appropriate       INTERVENTIONS:  - Monitor oral intake, urinary output, labs, and treatment plans  - Assess nutrition and hydration status and recommend course of action  - Evaluate amount of meals eaten  - Assist patient with eating if necessary   - Allow adequate time for meals  - Recommend/ encourage appropriate diets, oral nutritional supplements, and vitamin/mineral supplements  - Order, calculate, and assess calorie counts as needed  - Recommend, monitor, and adjust tube feedings and TPN/PPN based on assessed needs  - Assess need for intravenous fluids  - Provide specific nutrition/hydration education as appropriate  - Include patient/family/caregiver in decisions related to nutrition  Outcome: Progressing     Problem: RESPIRATORY - ADULT  Goal: Achieves optimal ventilation and oxygenation  Description: INTERVENTIONS:  - Assess for changes in respiratory status  - Assess for changes in mentation and behavior  - Position to facilitate oxygenation and minimize respiratory effort  - Oxygen administered by appropriate delivery if ordered  - Initiate smoking cessation education as indicated  - Encourage broncho-pulmonary hygiene including cough, deep breathe, Incentive Spirometry  - Assess the need for suctioning and aspirate as needed  - Assess and instruct to report SOB or any respiratory difficulty  - Respiratory Therapy support as indicated  Outcome: Progressing     Problem: COPING  Goal: Pt/Family able to verbalize concerns and demonstrate effective coping strategies  Description: INTERVENTIONS:  - Assist patient/family to identify coping skills, available support systems and cultural and spiritual values  - Provide emotional support, including active listening and acknowledgement of concerns of patient and caregivers  - Reduce environmental stimuli, as able  - Provide patient education  - Assess for spiritual pain/suffering and initiate spiritual care, including notification of Pastoral Care or kathy based community as needed  - Assess effectiveness of coping strategies  Outcome: Progressing  Goal: Will report anxiety at manageable levels  Description: INTERVENTIONS:  - Administer medication as ordered  - Teach and encourage coping skills  - Provide emotional support  - Assess patient/family for anxiety and ability to cope  Outcome: Progressing     Problem: BEHAVIOR  Goal: Pt/Family maintain appropriate behavior and adhere to behavioral management agreement, if implemented  Description: INTERVENTIONS:  - Assess the family dynamic   - Encourage verbalization of thoughts and concerns in a socially appropriate manner  - Assess patient/family's coping skills and non-compliant behavior (including use of illegal substances)  - Utilize positive, consistent limit setting strategies supporting safety of patient, staff and others  - Initiate consult with Case Management, Spiritual Care or other ancillary services as appropriate  - If a patient's/visitor's behavior jeopardizes the safety of the patient, staff, or others, refer to organization procedure     - Notify Security of behavior or suspected illegal substances which indicate the need for search of the patient and/or belongings  - Encourage participation in the decision making process about a behavioral management agreement; implement if patient meets criteria  Outcome: Progressing

## 2022-10-02 NOTE — NURSING NOTE
Nurse went into patients room to administer evening medications  Patient showed nurse purell bottle half full  Patient requested more hand   Nurse advised patient she has half a bottle of hand  as more would not be needed at this time  Nurse advised patient she can use soap and water to wash her hands and or there is the hand  on the wall if she needs some  Patient verbalized understanding  Nurse exited the room and went to the next patient, nurse went to grab medications for the next patient and walked past patients room  Patient was seen filling her coffee cup with the hand  on the wall  Concerned with the patients use of hand , Dr Nila Robbins contacted and advised of situation  Order placed for continual observation  Supervisor notified

## 2022-10-02 NOTE — NURSING NOTE
During medication administration, patient attempted to pocket her clonazepam  Nurse handed patient cup of medication  Patient turned around and took a pill out of her cup then turned back around hiding her hand with her sweat shirt  Nurse asked patient if she removed a pill out of the cup prior to putting the cup of pills in her mouth  Patient stated "What"? Nurse asked patient to please show her, her hand  Patient opened her hand and had a small green pill in her hand  Patient states " I was just going to save it for later"  Nurse advised patient she is not able to save pills for later  All medications administered need to be taken as soon as administered  Patient was advised if she does not want the medication at this time, I can discard and we can give it to her later if she prefers  Patient states " no I didn't realize I could not do this, I'll just take it now"  Nurse reiterated how medication administration works and the dangers of not taking the medications appropriately  Patient verbalized understanding  Nurse witnessed patient take medication at this point

## 2022-10-03 LAB
ANION GAP SERPL CALCULATED.3IONS-SCNC: 11 MMOL/L (ref 4–13)
BUN SERPL-MCNC: 16 MG/DL (ref 5–25)
CALCIUM SERPL-MCNC: 8.8 MG/DL (ref 8.3–10.1)
CHLORIDE SERPL-SCNC: 104 MMOL/L (ref 96–108)
CO2 SERPL-SCNC: 25 MMOL/L (ref 21–32)
CREAT SERPL-MCNC: 1.02 MG/DL (ref 0.6–1.3)
GFR SERPL CREATININE-BSD FRML MDRD: 66 ML/MIN/1.73SQ M
GLUCOSE SERPL-MCNC: 105 MG/DL (ref 65–140)
HCV RNA SERPL NAA+PROBE-ACNC: NORMAL IU/ML
POTASSIUM SERPL-SCNC: 3.5 MMOL/L (ref 3.5–5.3)
SODIUM SERPL-SCNC: 140 MMOL/L (ref 135–147)
TEST INFORMATION: NORMAL

## 2022-10-03 PROCEDURE — 80048 BASIC METABOLIC PNL TOTAL CA: CPT | Performed by: INTERNAL MEDICINE

## 2022-10-03 PROCEDURE — 99221 1ST HOSP IP/OBS SF/LOW 40: CPT | Performed by: PSYCHIATRY & NEUROLOGY

## 2022-10-03 PROCEDURE — 94640 AIRWAY INHALATION TREATMENT: CPT

## 2022-10-03 PROCEDURE — 94760 N-INVAS EAR/PLS OXIMETRY 1: CPT

## 2022-10-03 PROCEDURE — 99232 SBSQ HOSP IP/OBS MODERATE 35: CPT | Performed by: INTERNAL MEDICINE

## 2022-10-03 RX ORDER — OXAZEPAM 15 MG/1
30 CAPSULE ORAL 2 TIMES DAILY PRN
Status: DISCONTINUED | OUTPATIENT
Start: 2022-10-03 | End: 2022-10-05 | Stop reason: HOSPADM

## 2022-10-03 RX ORDER — IBUPROFEN 400 MG/1
400 TABLET ORAL EVERY 6 HOURS PRN
Status: DISCONTINUED | OUTPATIENT
Start: 2022-10-03 | End: 2022-10-04

## 2022-10-03 RX ORDER — ALPRAZOLAM 0.5 MG/1
0.5 TABLET ORAL ONCE
Status: COMPLETED | OUTPATIENT
Start: 2022-10-03 | End: 2022-10-03

## 2022-10-03 RX ORDER — GABAPENTIN 300 MG/1
600 CAPSULE ORAL 3 TIMES DAILY
Status: DISCONTINUED | OUTPATIENT
Start: 2022-10-03 | End: 2022-10-05 | Stop reason: HOSPADM

## 2022-10-03 RX ADMIN — PREDNISONE 80 MG: 20 TABLET ORAL at 08:33

## 2022-10-03 RX ADMIN — OXAZEPAM 15 MG: 15 CAPSULE, GELATIN COATED ORAL at 11:09

## 2022-10-03 RX ADMIN — GABAPENTIN 600 MG: 300 CAPSULE ORAL at 21:35

## 2022-10-03 RX ADMIN — CLONAZEPAM 1 MG: 1 TABLET ORAL at 16:33

## 2022-10-03 RX ADMIN — GABAPENTIN 400 MG: 400 CAPSULE ORAL at 08:32

## 2022-10-03 RX ADMIN — NICOTINE POLACRILEX 2 MG: 2 GUM, CHEWING ORAL at 08:48

## 2022-10-03 RX ADMIN — Medication 325 MG: at 08:32

## 2022-10-03 RX ADMIN — ACETAMINOPHEN 650 MG: 325 TABLET, FILM COATED ORAL at 08:49

## 2022-10-03 RX ADMIN — IPRATROPIUM BROMIDE AND ALBUTEROL SULFATE 3 ML: 2.5; .5 SOLUTION RESPIRATORY (INHALATION) at 07:38

## 2022-10-03 RX ADMIN — GABAPENTIN 400 MG: 400 CAPSULE ORAL at 16:33

## 2022-10-03 RX ADMIN — CLONAZEPAM 1 MG: 1 TABLET ORAL at 08:33

## 2022-10-03 RX ADMIN — GABAPENTIN 400 MG: 400 CAPSULE ORAL at 02:32

## 2022-10-03 RX ADMIN — TOPIRAMATE 125 MG: 100 TABLET, FILM COATED ORAL at 17:02

## 2022-10-03 RX ADMIN — OXAZEPAM 30 MG: 15 CAPSULE ORAL at 19:51

## 2022-10-03 RX ADMIN — BUPRENORPHINE AND NALOXONE 8 MG: 8; 2 FILM BUCCAL; SUBLINGUAL at 08:33

## 2022-10-03 RX ADMIN — Medication 21 MG: at 08:33

## 2022-10-03 RX ADMIN — FAMOTIDINE 20 MG: 20 TABLET ORAL at 08:33

## 2022-10-03 RX ADMIN — ENOXAPARIN SODIUM 40 MG: 40 INJECTION SUBCUTANEOUS at 08:32

## 2022-10-03 RX ADMIN — PANTOPRAZOLE SODIUM 40 MG: 40 TABLET, DELAYED RELEASE ORAL at 05:45

## 2022-10-03 RX ADMIN — ACETAMINOPHEN 650 MG: 325 TABLET, FILM COATED ORAL at 18:27

## 2022-10-03 RX ADMIN — CLONAZEPAM 1 MG: 1 TABLET ORAL at 21:35

## 2022-10-03 RX ADMIN — TOPIRAMATE 125 MG: 100 TABLET, FILM COATED ORAL at 08:34

## 2022-10-03 RX ADMIN — ALPRAZOLAM 0.5 MG: 0.5 TABLET ORAL at 00:57

## 2022-10-03 RX ADMIN — FAMOTIDINE 20 MG: 20 TABLET ORAL at 17:02

## 2022-10-03 RX ADMIN — BUPRENORPHINE AND NALOXONE 8 MG: 8; 2 FILM BUCCAL; SUBLINGUAL at 16:33

## 2022-10-03 RX ADMIN — TRAZODONE HYDROCHLORIDE 50 MG: 50 TABLET ORAL at 02:32

## 2022-10-03 NOTE — ASSESSMENT & PLAN NOTE
· History of bipolar affective disorder  · Maintained on Wellbutrin, fluoxetine, clonazepam, Topamax, Serax p r n  · Patient is refusing Wellbutrin and fluoxetine  · Status post psychiatry evaluation-appreciate recommendations  · Continue to monitor off Wellbutrin and fluoxetine  · Continue Topamax 125 mg b i d , clonazepam 1 mg t i d , Psychiatry recommend to increase gabapentin to 600 mg t i d    Psychiatry recommending inpatient psych admission

## 2022-10-03 NOTE — PLAN OF CARE
Problem: Knowledge Deficit  Goal: Patient/family/caregiver demonstrates understanding of disease process, treatment plan, medications, and discharge instructions  Description: Complete learning assessment and assess knowledge base  Interventions:  - Provide teaching at level of understanding  - Provide teaching via preferred learning methods  Outcome: Progressing     Problem: DISCHARGE PLANNING  Goal: Discharge to home or other facility with appropriate resources  Description: INTERVENTIONS:  - Identify barriers to discharge w/patient and caregiver  - Arrange for needed discharge resources and transportation as appropriate  - Identify discharge learning needs (meds, wound care, etc )  - Arrange for interpretive services to assist at discharge as needed  - Refer to Case Management Department for coordinating discharge planning if the patient needs post-hospital services based on physician/advanced practitioner order or complex needs related to functional status, cognitive ability, or social support system  Outcome: Progressing     Problem: BEHAVIOR  Goal: Pt/Family maintain appropriate behavior and adhere to behavioral management agreement, if implemented  Description: INTERVENTIONS:  - Assess the family dynamic   - Encourage verbalization of thoughts and concerns in a socially appropriate manner  - Assess patient/family's coping skills and non-compliant behavior (including use of illegal substances)  - Utilize positive, consistent limit setting strategies supporting safety of patient, staff and others  - Initiate consult with Case Management, Spiritual Care or other ancillary services as appropriate  - If a patient's/visitor's behavior jeopardizes the safety of the patient, staff, or others, refer to organization procedure     - Notify Security of behavior or suspected illegal substances which indicate the need for search of the patient and/or belongings  - Encourage participation in the decision making process about a behavioral management agreement; implement if patient meets criteria  Outcome: Progressing     Problem: COPING  Goal: Pt/Family able to verbalize concerns and demonstrate effective coping strategies  Description: INTERVENTIONS:  - Assist patient/family to identify coping skills, available support systems and cultural and spiritual values  - Provide emotional support, including active listening and acknowledgement of concerns of patient and caregivers  - Reduce environmental stimuli, as able  - Provide patient education  - Assess for spiritual pain/suffering and initiate spiritual care, including notification of Pastoral Care or kathy based community as needed  - Assess effectiveness of coping strategies  Outcome: Progressing  Goal: Will report anxiety at manageable levels  Description: INTERVENTIONS:  - Administer medication as ordered  - Teach and encourage coping skills  - Provide emotional support  - Assess patient/family for anxiety and ability to cope  Outcome: Progressing

## 2022-10-03 NOTE — ASSESSMENT & PLAN NOTE
· Received notification from [address] [place] [address],[address] services that patient's hepatitis screening was positive    Patient is aware of this and was told that she was exposed to hepatitis-C in the past   · LFTs normal  ·  hep C viral RNA quantitative not detected

## 2022-10-03 NOTE — TELEMEDICINE
TeleConsultation - [facility] 41 y o  female MRN: [id number]  Unit/Bed#: E2 -01 Encounter: [id number]        REQUIRED DOCUMENTATION:     1  This service was provided via Telemedicine  2  Provider located at [facility]   3  TeleMed provider: Piyush Butelr MD   4  Identify all parties in room with patient during tele consult:  pt  5  Patient was then informed that this was a Telemedicine visit and that the exam was being conducted confidentially over secure lines  My office door was closed  No one else was in the room  Patient acknowledged consent and understanding of privacy and security of the Telemedicine visit, and gave us permission to have the assistant stay in the room in order to assist with the history and to conduct the exam   I informed the patient that I have reviewed their record in Epic and presented the opportunity for them to ask any questions regarding the visit today  The patient agreed to participate  Assessment/Plan     Principal Problem:    Acute respiratory failure with hypoxia (HCC)  Active Problems:    Bipolar affective disorder, currently active ([facility] [address] )    Heavy tobacco smoker    Substance Abuse Disorder     Anemia    Sepsis ([facility] )    R/O CAP (community acquired pneumonia)    Bradycardia    Hepatitis C antibody test positive    Assessment:    Unspecified mood disorder; rule out prednisone induced mood disorder; rule out bipolar disorder manic phase    Treatment Plan:  Upon medical clearance, inpatient psychiatric treatment is indicated for provision of precautions, further diagnostic evaluation and treatment stabilization  The patient is requesting to sign 201 voluntary paperwork and is appropriate to do so  In the meantime may consider increasing gabapentin to 600 mg p o  3 times daily as mood stabilizer and for anxiety  Continue current precautions  Re-consult Psychiatry as needed      Current Medications:     Current Facility-Administered Medications Medication Dose Route Frequency Provider Last Rate    acetaminophen  650 mg Oral Q6H PRN Beverley Cortez PA-C      albuterol  2 puff Inhalation Q4H PRN Triston Jalloh,       buprenorphine-naloxone  8 mg Sublingual BID Beverley Cortez PA-C      clonazePAM  1 mg Oral TID Triston Jalloh,       enoxaparin  40 mg Subcutaneous Daily Beverley Cortez, [place]      famotidine  20 mg Oral BID Triston Jalloh, DO      ferrous sulfate  325 mg Oral Daily With Breakfast Triston Jalloh, DO      gabapentin  400 mg Oral TID Beverley Cortez PA-C      hydrOXYzine HCL  50 mg Oral Q6H PRN NETTE Thomas      ipratropium-albuterol  3 mL Nebulization TID Triston Jalloh, DO      methocarbamol  750 mg Oral Q6H PRN Triston Jalloh, DO      nicotine  21 mg Transdermal Daily Beverleymoreno Cortez, [place]      nicotine polacrilex  2 mg Oral Q2H PRN Beverley Cortez PA-C      ondansetron  4 mg Oral Q6H PRN Triston Jalloh, DO      oxazepam  15 mg Oral BID PRN Triston Jalloh, DO      pantoprazole  40 mg Oral Early Morning Triston Jalloh, DO      predniSONE  80 mg Oral Daily [facility] Ram[facility]rez [place], [place]      topiramate  125 mg Oral BID Triston Jalloh, DO      traZODone  50 mg Oral HS Beverley Cortez PA-C         Risks / Benefits of Treatment:    Risks, benefits, and possible side effects of medications explained to patient and patient verbalizes understanding  Inpatient consult to Psychiatry  Consult performed by: Oma Coyle MD  Consult ordered by: Shania Marrufo MD        Physician Requesting Consult: Shania Marrufo MD  Principal Problem:Acute respiratory failure with hypoxia [facility])    Reason for Consult:  Ct      History of Present Illness      This is a follow-up psychiatry consult to initial consult provided September 29, 2022  Please refer to that consult for details      Hospital course since last consult:  Nursing reports that patient has been demonstrating significant jeff  There has been concern that she may have been drinking hand   She is on constant observation safety precautions at this time  The patient has continued to complain of extreme anxiety  Patient has been requesting inpatient psychiatric stabilization upon medical clearance  Mental status examination:  The patient is alert and well oriented in all spheres  She made good eye contact and was cooperative with the evaluation  Speech was somewhat rapid  She showed that she recognizes she is experiencing significant mood dysregulation with anxiety at this time and is requesting inpatient psychiatric treatment  She denies suicidal homicidal ideation  She denies hallucinations and other psychotic features  She has demonstrated impaired insight judgment are intact to the extent that she recognizes the need for inpatient psychiatric treatment         Past Medical History:   Diagnosis Date    Altered mental status 9/21/2022    Elevated LFTs 9/21/2022    Lower back pain        Medical Review Of Systems:    Review of Systems    Meds/Allergies     all current active meds have been reviewed  Allergies   Allergen Reactions    Haloperidol Other (See Comments)     oculogyr crisis       Objective     Vital signs in last 24 hours:  Temp:  [97 3 °F (36 3 °C)-98 6 °F (37 °C)] 97 3 °F (36 3 °C)  HR:  [70-76] 70  Resp:  [18] 18  BP: ()/(63-64) 96/63    No intake or output data in the 24 hours ending 10/03/22 1556        Lab Results: I have personally reviewed all pertinent laboratory/tests results  Imaging Studies: XR chest 1 view portable    Result Date: 9/21/2022  Narrative: CHEST INDICATION:   sob  COMPARISON:  12/22/2021 EXAM PERFORMED/VIEWS:  XR CHEST PORTABLE Single view FINDINGS: Development of diffuse bilateral opacities likely representing multifocal pneumonia, right greater than left Cardiomediastinal silhouette appears unremarkable   No pneumothorax or pleural effusion  Osseous structures appear within normal limits for patient age  Impression: Development of bilateral opacities likely representing multifocal pneumonia Workstation performed: [id number]     PE Study with CT Abdomen and Pelvis with contrast    Result Date: 9/21/2022  Narrative: CT PULMONARY ANGIOGRAM OF THE CHEST AND CT ABDOMEN AND PELVIS WITH INTRAVENOUS CONTRAST INDICATION:   hypoxia, hemoptysis and abnormally elevated d dimmer  COMPARISON:  Chest x-ray performed earlier the same day  TECHNIQUE:  CT examination of the chest, abdomen and pelvis was performed  Thin section CT angiographic technique was used in the chest in order to evaluate for pulmonary embolus and coronal 3D MIP postprocessing was performed on the acquisition scanner  Axial, sagittal, and coronal 2D reformatted images were created from the source data and submitted for interpretation  Radiation dose length product (DLP) for this visit:  595 mGy-cm   This examination, like all CT scans performed in the [facility], was performed utilizing techniques to minimize radiation dose exposure, including the use of iterative reconstruction and automated exposure control  IV Contrast:  85 mL of iohexol (OMNIPAQUE) Enteric Contrast:  Enteric contrast was not administered  FINDINGS: CHEST PULMONARY ARTERIAL TREE:  No pulmonary embolus is seen  LUNGS:  Groundglass changes noted throughout the lung parenchyma bilaterally with sparing of lung periphery  Patchy areas of more dense consolidative opacity are seen anteriorly and in the upper lobes, especially in the apices  Scattered areas of septal lobular thickening are seen in the areas of groundglass airspace disease  The lungs are neither hyperinflated nor underinflated  No bronchial wall thickening or bronchiectasis  No nodularity  No dominant pulmonary parenchymal mass  No tracheal or endobronchial lesion   PLEURA:  Trace left pleural fluid layering in the posterior mid chest   No significant pleural effusion  HEART/AORTA:  There is portal prominence of left ventricle with questionable [myocardial thickening  In addition, there is very high density contrast in the right heart and pulmonary arteries with low to intermediate density contrast in the left heart  No thoracic aortic aneurysm  MEDIASTINUM AND MEGAN:  Amorphous bilateral hilar lymphadenopathy is noted  Amorphous subcarinal nodes measure up to 13 mm in short axis on image 94 series 5  Right hilar nodes measure up to 17 mm on image 82 of series 5  Lower left hilar nodes measure  up to 11 mm on image 94 series 5  CHEST WALL AND LOWER NECK:  Unremarkable  ABDOMEN LIVER/BILIARY TREE:  Elongation of right hepatic lobe consistent with anatomic variation (Riedel's lobe  No hepatic mass  No biliary dilatation  Normal hepatic contours  GALLBLADDER:  Gallbladder is surgically absent  SPLEEN:  Unremarkable  PANCREAS:  Unremarkable  ADRENAL GLANDS:  Unremarkable  KIDNEYS/URETERS:  Unremarkable  No hydronephrosis  STOMACH AND BOWEL:  Unremarkable  APPENDIX:  No findings to suggest appendicitis  ABDOMINOPELVIC CAVITY:  There is a small volume of free pelvic fluid, likely physiologic given the patient's age and gender  No pneumoperitoneum  No lymphadenopathy  VESSELS:  Unremarkable for patient's age  PELVIS REPRODUCTIVE ORGANS:  Uterus is retroverted  There is a suspected subserosal anterior fundal fibroid estimated at 3 3 cm  URINARY BLADDER:  Unremarkable  ABDOMINAL WALL/INGUINAL REGIONS:  Unremarkable  OSSEOUS STRUCTURES:  No acute fracture or destructive osseous lesion  Left convex curvature lumbar spine, apex at L2  Lumbar degenerative changes  Impression: No pulmonary embolus  Extensive groundglass airspace opacity in the lungs with peripheral sparing  Mediastinal and bilateral hilar lymphadenopathy  The findings are most suspicious for noncardiogenic pulmonary edema   Acute diffuse alveolar hemorrhage could produce this appearance but would be expected to present with severe hemoptysis  Pulmonary vascular congestive changes related to heart failure is also possible but would be expected to present with pleural effusions  No acute abnormality in the abdomen or pelvis  This examination was marked "immediate notification" in Epic in order to begin the standard process by which the radiology reading room liaison alerts the referring practitioner  Workstation performed: [id number]     XR chest portable ICU    Result Date: 9/25/2022  Narrative: CHEST INDICATION:   interval improvement  COMPARISON:  None EXAM PERFORMED/VIEWS:  XR CHEST PORTABLE ICU FINDINGS: Cardiomediastinal silhouette appears unremarkable  Much improved but persistent moderate bilateral pulmonary infiltrates  Osseous structures appear within normal limits for patient age  Impression: Much improved but persistent moderate bilateral pulmonary infiltrates  Workstation performed: [id number]     XR chest portable ICU    Result Date: 9/23/2022  Narrative: CHEST INDICATION:   recheck after diuretics  COMPARISON:  Chest radiograph September 22, 2022 at 12:44 EXAM PERFORMED/VIEWS:  XR CHEST PORTABLE ICU FINDINGS: Cardiomediastinal silhouette appears unremarkable  Severe bilateral consolidations are not significantly changed  No pneumothorax  Osseous structures appear within normal limits for patient age  Impression: Severe bilateral consolidations are not significantly changed  Workstation performed: [id number]     XR chest portable ICU    Result Date: 9/22/2022  Narrative: CHEST INDICATION:   recheck after lasix  COMPARISON:  CXR 9/22/2022 and chest CT 9/21/2022  EXAM PERFORMED/VIEWS:  XR CHEST PORTABLE ICU FINDINGS: Cardiomediastinal silhouette appears unremarkable  No change in severe bilateral consolidation from earlier today  No effusion or pneumothorax  Osseous structures appear within normal limits for patient age       Impression: No change in severe bilateral consolidation from earlier today  Workstation performed: [id number]     XR chest portable ICU    Result Date: 9/22/2022  Narrative: CHEST INDICATION:   hypoxia  COMPARISON:  CXR and CT 9/21/2022  EXAM PERFORMED/VIEWS:  XR CHEST PORTABLE ICU FINDINGS: Cardiomediastinal silhouette appears unremarkable  Marked worsening of bilateral airspace disease  No definite effusion  No pneumothorax  Osseous structures appear within normal limits for patient age  Impression: Severe bilateral airspace consolidation, markedly increased from one day earlier  Workstation performed: [id number]     Echo complete w/ contrast if indicated    Result Date: 9/22/2022  Narrative: Shira De Paz  Left Ventricle: Left ventricular cavity size is normal  Wall thickness is normal  The left ventricular ejection fraction is 60%  Systolic function is normal  Wall motion is normal  Diastolic function is normal    IVC/SVC: The inferior vena cava is dilated  Respirophasic changes were blunted (less than 50% variation), indicating elevated right atrial pressures    Pulmonary Artery: The estimated pulmonary artery systolic pressure is 78 9 mmHg  The pulmonary artery systolic pressure is mildly increased  EKG/Pathology/Other Studies:   Lab Results   Component Value Date    VENTRATE 51 09/24/2022    ATRIALRATE 51 09/24/2022    PRINT 167 09/24/2022    QRSDINT 92 09/24/2022    QTINT 458 09/24/2022    QTCINT 422 09/24/2022    PAXIS 18 09/24/2022    QRSAXIS 68 09/24/2022    TWAVEAXIS 61 09/24/2022        Code Status: Level 1 - Full Code  Advance Directive and Living Will:      Power of :    POLST:      Counseling / Coordination of Care: Total floor / unit time spent today 30 minutes  Greater than 50% of total time was spent with the patient and / or family counseling and / or coordination of care   A description of the counseling / coordination of care:  Chart review, patient evaluation, coordination communication with staff, nursing and provider

## 2022-10-03 NOTE — CASE MANAGEMENT
Case Management Progress Note    Patient name Demarcus Donaldson  Location East 2 /E2 [address] 251-* MRN [id number]  : 1975 Date 10/3/2022       LOS (days): 12  Geometric Mean LOS (GMLOS) (days): 4 80  Days to GMLOS:-7 2        OBJECTIVE:        Current admission status: Inpatient  Preferred Pharmacy:   [address] [place] [address] - [address]  [facility]  [address]  [address] [facility] [address]  Phone: [phone] Fax: [phone] [address] [facility],[address], [address]  [facility] [address]  Phone: [phone] Fax: [address] [place], [place] - [facility] [address] ,  [facility] [address] ,  [facility] [address]  Phone: [phone] Fax: [phone]    Primary Care Provider: No primary care provider on file  Primary Insurance: JAMES ROSEN  Secondary Insurance:     PROGRESS NOTE:    Gemma Mendez ([address] [phone]) came to the hospital to see the pt  Crystal reported concern regarding pt's mental health  Pt appears to be manic at this time and is not taking her medications as prescribed here at the hospital  Previous notes stated that pt had been seeing pocketing a medication and educated on how medication administration works while here in the hospital  Pt has been put on a virtual 1:1 due to possible ingestion of hand  while here in the hospital  This CM spoke with the MD about having psych reconsulted as pt would benefit from further medication management  Psych consult placed  Pt has been reporting to staff and visitors that she would like to go for additional help inpatient after this hospitalization  MD made aware  Psych re-consult pending  CM will continue to follow

## 2022-10-03 NOTE — ASSESSMENT & PLAN NOTE
· Patient presented with shortness of breath 9/21 and hypoxia  · Concern for vape related lung injury  · CT chest with extensive ground-glass opacities in the lungs with peripheral sparing  · Most recent chest x-ray with improved but persistent bilateral infiltrates  · Echocardiogram with preserved EF, no regional wall motion abnormalities  · Patient completed 5 day course of antibiotics  · Patient was treated with IV steroids and now transition to high-dose oral steroids with 10 day taper  · Appreciate pulmonology recommendations  · Home O2 evaluation patient needs 2 L with ambulation  Currently satting well on room air    Psychiatry re-evaluation recommending inpatient psychiatry admission, patient agreeable

## 2022-10-03 NOTE — ASSESSMENT & PLAN NOTE
· POA evidence by tachycardia, tachypnea, leukocytosis  · Suspect superimposed pneumonia on setting of vape lung injury  · Patient completed course of antibiotics

## 2022-10-03 NOTE — PROGRESS NOTES
[facility]  Progress Note Ainsley Wilkins 1975, 41 y o  female MRN: [id number]  Unit/Bed#: E2 -01 Encounter: [id number]  Primary Care Provider: No primary care provider on file  Date and time admitted to hospital: 9/21/2022 11:50 AM    * Acute respiratory failure with hypoxia ([facility] [address] )  Assessment & Plan  · Patient presented with shortness of breath 9/21 and hypoxia  · Concern for vape related lung injury  · CT chest with extensive ground-glass opacities in the lungs with peripheral sparing  · Most recent chest x-ray with improved but persistent bilateral infiltrates  · Echocardiogram with preserved EF, no regional wall motion abnormalities  · Patient completed 5 day course of antibiotics  · Patient was treated with IV steroids and now transition to high-dose oral steroids with 10 day taper  · Appreciate pulmonology recommendations  · Home O2 evaluation patient needs 2 L with ambulation  Currently satting well on room air    Psychiatry re-evaluation recommending inpatient psychiatry admission, patient agreeable    Hepatitis C antibody test positive  Assessment & Plan  · Received notification from [address] [place] [address],[address] services that patient's hepatitis screening was positive    Patient is aware of this and was told that she was exposed to hepatitis-C in the past   · LFTs normal  ·  hep C viral RNA quantitative not detected    Bradycardia  Assessment & Plan  Observed in ICU while on Precedex  No further events    R/O CAP (community acquired pneumonia)  Assessment & Plan  Chest x-ray with multifocal infiltrates with concern for superimposed bacterial pneumonia  Completed 5 day course of antibiotics      Sepsis ([facility] [address] )  Assessment & Plan  · POA evidence by tachycardia, tachypnea, leukocytosis  · Suspect superimposed pneumonia on setting of vape lung injury  · Patient completed course of antibiotics    Anemia  Assessment & Plan  · Iron deficiency anemia  · Hemoglobin has remained stable  · Oral iron supplementation    Substance Abuse Disorder   Assessment & Plan  Maintained on Suboxone    Bipolar affective disorder, currently active [facility])  Assessment & Plan  · History of bipolar affective disorder  · Maintained on Wellbutrin, fluoxetine, clonazepam, Topamax, Serax p r n  · Patient is refusing Wellbutrin and fluoxetine  · Status post psychiatry evaluation-appreciate recommendations  · Continue to monitor off Wellbutrin and fluoxetine  · Continue Topamax 125 mg b i d , clonazepam 1 mg t i d , Psychiatry recommend to increase gabapentin to 600 mg t i d  Psychiatry recommending inpatient psych admission            VTE Pharmacologic Prophylaxis:   Pharmacologic:  Lovenox    Patient Centered Rounds: I have performed bedside rounds with nursing staff today  Discussions with Specialists or Other Care Team Provider:  Psychiatry    Education and Discussions with Family / Patient:  Patient    Time Spent for Care: 20 minutes  More than 50% of total time spent on counseling and coordination of care as described above  Current Length of Stay: 12 day(s)    Current Patient Status: Inpatient   Certification Statement: The patient will continue to require additional inpatient hospital stay due to Inpatient psychiatry    Discharge Plan / Estimated Discharge Date: TBD    Code Status: Level 1 - Full Code      Subjective:   Patient seen and examined at bedside, restless, pacing around and trying to fix the bed  Feeling anxious asking for medications to be increased  Also asking for ibuprofen  Objective:     Vitals:   Temp (24hrs), Av 8 °F (36 6 °C), Min:97 3 °F (36 3 °C), Max:98 6 °F (37 °C)    Temp:  [97 3 °F (36 3 °C)-98 6 °F (37 °C)] 97 3 °F (36 3 °C)  HR:  [70-76] 70  Resp:  [18] 18  BP: ()/(63-64) 96/63  SpO2:  [94 %-97 %] 96 %  Body mass index is 27 26 kg/m²       Input and Output Summary (last 24 hours):     No intake or output data in the 24 hours ending 10/03/22 0959    Physical Exam:    Constitutional: Patient is in no acute distress  HEENT:  Normocephalic, atraumatic  Cardiovascular: Normal S1S2, RRR, No murmurs/rubs/gallops appreciated  Pulmonary:  Bilateral air entry, No rhonchi/rales/wheezing appreciated  Abdominal: Soft, Bowel sounds present, Non-tender, Non-distended  Extremities:  No cyanosis, clubbing or edema  Neurological: awake, alert  Skin:  Warm, dry    Additional Data:     Labs:    Results from last 7 days   Lab Units 09/28/22  0442 09/27/22  0444   WBC Thousand/uL 14 65* 12 09*   HEMOGLOBIN g/dL 10 4* 10 8*   HEMATOCRIT % 34 1* 33 7*   PLATELETS Thousands/uL 412* 363   LYMPHO PCT %  --  30   MONO PCT %  --  5   EOS PCT %  --  0     Results from last 7 days   Lab Units 10/03/22  0801 09/30/22  1204   POTASSIUM mmol/L 3 5 3 9   CHLORIDE mmol/L 104 102   CO2 mmol/L 25 23   BUN mg/dL 16 15   CREATININE mg/dL 1 02 0 74   CALCIUM mg/dL 8 8 8 7   ALK PHOS U/L  --  85   ALT U/L  --  30   AST U/L  --  19            I Have Reviewed All Lab Data Listed Above      Invasive Devices  Report    None                      Recent Cultures (last 7 days):           Last 24 Hours Medication List:   Current Facility-Administered Medications   Medication Dose Route Frequency Provider Last Rate    acetaminophen  650 mg Oral Q6H PRN Tamela Meng PA-C      albuterol  2 puff Inhalation Q4H PRN Pervis Scale, DO      buprenorphine-naloxone  8 mg Sublingual BID Tamela Meng PA-C      clonazePAM  1 mg Oral TID Pervis Scale, DO      enoxaparin  40 mg Subcutaneous Daily Jose Alejandro Little      famotidine  20 mg Oral BID Pervis Scale, DO      ferrous sulfate  325 mg Oral Daily With Breakfast Pervis Scale, DO      gabapentin  600 mg Oral TID Vira Casillas MD      hydrOXYzine HCL  50 mg Oral Q6H PRN NETTE Puckett      ibuprofen  400 mg Oral Q6H PRN Vira Casillas MD      ipratropium-albuterol  3 mL Nebulization TID Pervis Scale, DO     Rafaela Perez methocarbamol  750 mg Oral Q6H PRN Theadore Fam, DO      nicotine  21 mg Transdermal Daily Jose Alejandro Kong      nicotine polacrilex  2 mg Oral Q2H PRN Chuck Bryant PA-C      ondansetron  4 mg Oral Q6H PRN Theadore Fam, DO      oxazepam  30 mg Oral BID PRN Ghada Looney MD      pantoprazole  40 mg Oral Early Morning Theadore Fam, DO      predniSONE  80 mg Oral Daily [place], [place]      topiramate  125 mg Oral BID Theadore Fam, DO      traZODone  50 mg Oral HS Chuck Bryant PA-C          Today, Patient Was Seen By: Ghada Looney

## 2022-10-03 NOTE — UTILIZATION REVIEW
Continued Stay Review    Date:    10/3/22                          Current Patient Class:    Inpatient  Current Level of Care:     Med surg    HPI:47 y o  female initially admitted on   9/21  With Acute respiratory failure with hypoxia     10/2    5: 35 PM  Nurse went into patients room to administer evening medications  Patient showed nurse purell bottle half full  Patient requested more hand   Nurse advised patient she has half a bottle of hand  as more would not be needed at this time  Nurse advised patient she can use soap and water to wash her hands and or there is the hand  on the wall if she needs some  Patient verbalized understanding  Nurse exited the room and went to the next patient, nurse went to grab medications for the next patient and walked past patients room  Patient was seen filling her coffee cup with the hand  on the wall  Concerned with the patients use of hand , Dr Gayle Toussaint contacted and advised of situation  Order placed for continual observation  Supervisor notified  Assessment/Plan:     10/3    Complains of  Trouble breathing, O2  sats  96  % RA  Remains on  Po steroids  Remains on  1:1  Observation  Continue current [facility]  Behavioral Health Consult   ( 10/3)     Patient requesting   Voluntary Ip   Treatment when medically cleared  Plan to increase neurontin dose  Continue current  Treatment plan        Vital Signs:   97 3 °F (36 3 °C) Abnormal  70 18 96/63 -- 96 % -- -- None (Room air) Sitting    10/03/22 0738 -- -- -- -- -- 94 % -- -- -- --   10/03/22 0730 97 5 °F (36 4 °C) 70 18 103/64 -- 97 % -- -- Simple mask Lying         Pertinent Labs/Diagnostic Results:       Results from last 7 days   Lab Units 09/28/22  0442 09/27/22  0444   WBC Thousand/uL 14 65* 12 09*   HEMOGLOBIN g/dL 10 4* 10 8*   HEMATOCRIT % 34 1* 33 7*   PLATELETS Thousands/uL 412* 363         Results from last 7 days   Lab Units 10/03/22  0801 09/30/22  1204 09/28/22  0442 09/27/22  0444   SODIUM mmol/L 140 134* 143 142   POTASSIUM mmol/L 3 5 3 9 3 3* 3 4*   CHLORIDE mmol/L 104 102 107 107   CO2 mmol/L 25 23 27 26   ANION GAP mmol/L 11 9 9 9   BUN mg/dL 16 15 19 15   CREATININE mg/dL 1 02 0 74 0 79 0 68   EGFR ml/min/1 73sq m 66 98 90 105   CALCIUM mg/dL 8 8 8 7 9 2 8 5     Results from last 7 days   Lab Units 09/30/22  1204   AST U/L 19   ALT U/L 30   ALK PHOS U/L 85   TOTAL PROTEIN g/dL 6 8   ALBUMIN g/dL 2 6*   TOTAL BILIRUBIN mg/dL 0 30         Results from last 7 days   Lab Units 10/03/22  0801 09/30/22  1204 09/28/22  0442 09/27/22  0444   GLUCOSE RANDOM mg/dL 105 127 102 87                   Results from last 7 days   Lab Units 09/30/22  1153   HEP B S AG  Non-reactive  Non-reactive   HEP C AB  High Reactive*  High Reactive*   HEP B C IGM  Non-reactive  Non-reactive   HEP B C TOTAL AB  Non-reactive         Medications:   Scheduled Medications:  buprenorphine-naloxone, 8 mg, Sublingual, BID  clonazePAM, 1 mg, Oral, TID  enoxaparin, 40 mg, Subcutaneous, Daily  famotidine, 20 mg, Oral, BID  ferrous sulfate, 325 mg, Oral, Daily With Breakfast  gabapentin, 400 mg, Oral, TID  ipratropium-albuterol, 3 mL, Nebulization, TID  nicotine, 21 mg, Transdermal, Daily  pantoprazole, 40 mg, Oral, Early Morning  predniSONE, 80 mg, Oral, Daily  topiramate, 125 mg, Oral, BID  traZODone, 50 mg, Oral, HS      Continuous IV Infusions:     PRN Meds:  acetaminophen, 650 mg, Oral, Q6H PRN  albuterol, 2 puff, Inhalation, Q4H PRN  hydrOXYzine HCL, 50 mg, Oral, Q6H PRN  methocarbamol, 750 mg, Oral, Q6H PRN  nicotine polacrilex, 2 mg, Oral, Q2H PRN  ondansetron, 4 mg, Oral, Q6H PRN  oxazepam, 15 mg, Oral, BID PRN        Discharge Plan:    IP pschy      voluntary    Network Utilization Review Department  ATTENTION: Please call with any questions or concerns to [phone] and carefully listen to the prompts so that you are directed to the right person   All voicemails are confidential   Marielle España all requests for admission clinical reviews, approved or denied determinations and any other requests to dedicated fax number below belonging to the campus where the patient is receiving treatment   List of dedicated fax numbers for the Facilities:  [address] DENIALS (Administrative/Medical Necessity) [phone]   1000  [facility] (Maternity/NICU/Pediatrics) [phone]   [address]  [address] [address] [place] [address] [address] [facility] [place] [address] [phone]

## 2022-10-04 PROCEDURE — 94640 AIRWAY INHALATION TREATMENT: CPT

## 2022-10-04 PROCEDURE — 94760 N-INVAS EAR/PLS OXIMETRY 1: CPT

## 2022-10-04 PROCEDURE — 94761 N-INVAS EAR/PLS OXIMETRY MLT: CPT

## 2022-10-04 PROCEDURE — 99232 SBSQ HOSP IP/OBS MODERATE 35: CPT | Performed by: INTERNAL MEDICINE

## 2022-10-04 RX ORDER — PREDNISONE 20 MG/1
60 TABLET ORAL DAILY
Status: DISCONTINUED | OUTPATIENT
Start: 2022-10-08 | End: 2022-10-05 | Stop reason: HOSPADM

## 2022-10-04 RX ORDER — IBUPROFEN 400 MG/1
400 TABLET ORAL EVERY 6 HOURS PRN
Status: DISCONTINUED | OUTPATIENT
Start: 2022-10-04 | End: 2022-10-05 | Stop reason: HOSPADM

## 2022-10-04 RX ORDER — PREDNISONE 20 MG/1
40 TABLET ORAL DAILY
Status: DISCONTINUED | OUTPATIENT
Start: 2022-10-14 | End: 2022-10-05 | Stop reason: HOSPADM

## 2022-10-04 RX ADMIN — TOPIRAMATE 125 MG: 100 TABLET, FILM COATED ORAL at 08:24

## 2022-10-04 RX ADMIN — GABAPENTIN 600 MG: 300 CAPSULE ORAL at 16:20

## 2022-10-04 RX ADMIN — IBUPROFEN 400 MG: 400 TABLET, FILM COATED ORAL at 20:01

## 2022-10-04 RX ADMIN — BUPRENORPHINE AND NALOXONE 8 MG: 8; 2 FILM BUCCAL; SUBLINGUAL at 08:30

## 2022-10-04 RX ADMIN — Medication 21 MG: at 08:23

## 2022-10-04 RX ADMIN — PANTOPRAZOLE SODIUM 40 MG: 40 TABLET, DELAYED RELEASE ORAL at 06:52

## 2022-10-04 RX ADMIN — FAMOTIDINE 20 MG: 20 TABLET ORAL at 08:22

## 2022-10-04 RX ADMIN — OXAZEPAM 30 MG: 15 CAPSULE ORAL at 14:43

## 2022-10-04 RX ADMIN — CLONAZEPAM 1 MG: 1 TABLET ORAL at 08:22

## 2022-10-04 RX ADMIN — TOPIRAMATE 125 MG: 100 TABLET, FILM COATED ORAL at 18:13

## 2022-10-04 RX ADMIN — FAMOTIDINE 20 MG: 20 TABLET ORAL at 18:12

## 2022-10-04 RX ADMIN — Medication 325 MG: at 08:03

## 2022-10-04 RX ADMIN — BUPRENORPHINE AND NALOXONE 8 MG: 8; 2 FILM BUCCAL; SUBLINGUAL at 16:20

## 2022-10-04 RX ADMIN — ENOXAPARIN SODIUM 40 MG: 40 INJECTION SUBCUTANEOUS at 08:21

## 2022-10-04 RX ADMIN — TRAZODONE HYDROCHLORIDE 50 MG: 50 TABLET ORAL at 01:05

## 2022-10-04 RX ADMIN — CLONAZEPAM 1 MG: 1 TABLET ORAL at 20:01

## 2022-10-04 RX ADMIN — GABAPENTIN 600 MG: 300 CAPSULE ORAL at 20:01

## 2022-10-04 RX ADMIN — IPRATROPIUM BROMIDE AND ALBUTEROL SULFATE 3 ML: 2.5; .5 SOLUTION RESPIRATORY (INHALATION) at 14:22

## 2022-10-04 RX ADMIN — CLONAZEPAM 1 MG: 1 TABLET ORAL at 16:20

## 2022-10-04 RX ADMIN — IPRATROPIUM BROMIDE AND ALBUTEROL SULFATE 3 ML: 2.5; .5 SOLUTION RESPIRATORY (INHALATION) at 07:23

## 2022-10-04 RX ADMIN — PREDNISONE 80 MG: 20 TABLET ORAL at 08:22

## 2022-10-04 RX ADMIN — OXAZEPAM 30 MG: 15 CAPSULE ORAL at 11:46

## 2022-10-04 RX ADMIN — GABAPENTIN 600 MG: 300 CAPSULE ORAL at 08:22

## 2022-10-04 NOTE — CASE MANAGEMENT
Case Management Progress Note    Patient name Nadya Green  Location East 2 /E2 [address] 251-* MRN [id number]  : 1975 Date 10/4/2022       LOS (days): 13  Geometric Mean LOS (GMLOS) (days): 4 80  Days to GMLOS:-8 1        OBJECTIVE:        Current admission status: Inpatient  Preferred Pharmacy:   [address], [address]  [address]  [address] [facility] [address]  Phone: [phone] Fax: [phone] [address], [address]  [facility] [address]  Phone: [phone] Fax: [address] [place], [place] - [facility] [address] ,  [facility] [address] ,  [facility] [address]  Phone: [phone] Fax: [phone]    Primary Care Provider: No primary care provider on file  Primary Insurance: Juan Luis ROSEN  Secondary Insurance:     PROGRESS NOTE:    62 referrals placed for IP psych via [facility]     Dual Programs  · [place]  · Unable to accept due to medical complexity  Pt would also need to be able to provide suboxone along with a note from her OP prescriber for the suboxone  · Thony Rubio / Jennifer Walker - Fax failed x2  · [facility]  · No female bed  Pt would also need to have a letter from her OP provider for her suboxone  Requested check-in tomorrow  · [address]  · Insurance is OON with facility  Unable to accept  · [facility]  · Under review  Requested check-in tomorrow  · [address]  · Under review  Check back tomorrow  · [address] [facility]  · Closed IP psych about a month ago  · [address]  · No female bed  Check back tomorrow  Able to accept suboxone  · [facility]  · Under review  Check back tomorrow  · Just [address] [facility] [address] [facility] [address] Fax failed x2  · [employer]  · No suboxone  · [address]   · OON with facility  Not actually a dual  Only drug and alcohol rehab    · [address]  · Placed call  No answer  VM left  Will try tomorrow  · [place] [facility]  · Fax resent to [phone] per admissions request   · [facility]  · [address]   · [place]   · [address] @ [place] - Fax failed x2  · [address]  · [address] Fax failed x2  · [employer]  · [facility] in [facility]  · [address] in [place]  · [employer]  · [address]  · [facility]   · [address]  · Received phone call requesting a call if pt still in need of bed  Returned call x2 with no answer  Unable to leave VM  Number called [phone]  · [address] in [place] - Fax failed x2  · [facility] in [place] - Fax failed x2  · [facility]  · Facility is requesting insurance card and medical clearance note  Insurance card faxed to facility  OON with insurance  Cost would $1,200/day  · [facility] - Fax failed x2  · [place] in [address]  · [address] failed x2  [facility]  · Not taking referrals today  Check-in tomorrow  · [facility]  · OP mental health clinic not IP  · [facility] - Fax failed x2  · [facility]  · No bed  Not able to accept with suboxone  · [place]  · No beds  Not able to accept with suboxone  · [address]  · Admission team will call back with an update  Check-in tomorrow  · [address]  · [address]  · Unable to accept due to medical complexity   Recommend dual program due to D&A  · [facility]  · Unable to accept due to medical complexity   · [facility]  · [address] - Fax failed x2  · [address]  · [facility]  · Aaliyah Torres  · [address]  · [address] - Fax failed x2  · [facility]  [facility]  · [facility] - Fax failed x2  · [address]  · Currently no beds in network as of 10/4  · [address]   · [facility]   · [facility]  · [address] [facility]       CM to reach out to [facility] with street medicine regarding letter from prescribe ensuring they will continue to provide the suboxone OP as some of the psych facilities have requested that

## 2022-10-04 NOTE — ASSESSMENT & PLAN NOTE
· Patient presented with shortness of breath 9/21 and hypoxia  · Concern for vape related lung injury  · CT chest with extensive ground-glass opacities in the lungs with peripheral sparing  · Most recent chest x-ray with improved but persistent bilateral infiltrates  · Echocardiogram with preserved EF, no regional wall motion abnormalities  · Patient completed 5 day course of antibiotics  · Patient was treated with IV steroids and now transition to taper decrease by 10 mg every 3 days  · Appreciate pulmonology recommendations  · Home O2 evaluation patient needs 2 L with ambulation  Currently satting well on room air    Psychiatry re-evaluation recommending inpatient psychiatry admission, patient agreeable

## 2022-10-04 NOTE — RESPIRATORY THERAPY NOTE
Home Oxygen Qualifying Test     Patient name: Juliet Alfonso        : 1975   Date of Test:  2022  Diagnosis:    Home Oxygen Test:    **Medicare Guidelines require item(s) 1-5 on all ambulatory patients or 1 and 2 on non-ambulatory patients  1  Baseline SPO2 on Room Air at rest 96 %   a  If <= 88% on Room Air add O2 via NC to obtain SpO2 >=88%  If LPM needed, document LPM  needed to reach =>88%    2  SPO2 during exertion on Room Air 90  %  a  During exertion monitor SPO2  If SPO2 increases >=89%, do not add supplemental oxygen    3  SPO2 on Oxygen at Rest 97 % at 1 LPM    4  SPO2 during exertion on Oxygen N/A % at N/A LPM    5  Test performed during exertion activity  WALKED WITH ASSIST X 6 MINUTES      []  Supplemental Home Oxygen is indicated  [x]  Client does not qualify for home oxygen  Respiratory Additional Notes- pt was dyspneic and stated she was having anxiety, asked if she can use nasal cannula, at end of evaluation told her she could    Nursing aware of results    Patrick Smith, RT

## 2022-10-04 NOTE — PROGRESS NOTES
[address] [place] [address]  Progress Note Tutu Player 1975, 41 y o  female MRN: [id number]  Unit/Bed#: E2 -01 Encounter: [id number]  Primary Care Provider: No primary care provider on file  Date and time admitted to hospital: 9/21/2022 11:50 AM    * Acute respiratory failure with hypoxia (HCC)  Assessment & Plan  · Patient presented with shortness of breath 9/21 and hypoxia  · Concern for vape related lung injury  · CT chest with extensive ground-glass opacities in the lungs with peripheral sparing  · Most recent chest x-ray with improved but persistent bilateral infiltrates  · Echocardiogram with preserved EF, no regional wall motion abnormalities  · Patient completed 5 day course of antibiotics  · Patient was treated with IV steroids and now transition to taper decrease by 10 mg every 3 days  · Appreciate pulmonology recommendations  · Home O2 evaluation patient needs 2 L with ambulation  Currently satting well on room air    Psychiatry re-evaluation recommending inpatient psychiatry admission, patient agreeable    Hepatitis C antibody test positive  Assessment & Plan  · Received notification from [address] services that patient's hepatitis screening was positive    Patient is aware of this and was told that she was exposed to hepatitis-C in the past   · LFTs normal  ·  hep C viral RNA quantitative not detected    Bradycardia  Assessment & Plan  Observed in ICU while on Precedex  No further events    R/O CAP (community acquired pneumonia)  Assessment & Plan  Chest x-ray with multifocal infiltrates with concern for superimposed bacterial pneumonia  Completed 5 day course of antibiotics      Sepsis ([facility] [address] )  Assessment & Plan  · POA evidence by tachycardia, tachypnea, leukocytosis  · Suspect superimposed pneumonia on setting of vape lung injury  · Patient completed course of antibiotics    Anemia  Assessment & Plan  · Iron deficiency anemia  · Hemoglobin has remained stable  · Oral iron supplementation    Substance Abuse Disorder   Assessment & Plan  Maintained on Suboxone    Bipolar affective disorder, currently active [facility])  Assessment & Plan  · History of bipolar affective disorder  · Maintained on Wellbutrin, fluoxetine, clonazepam, Topamax, Serax p r n  · Patient is refusing Wellbutrin and fluoxetine  · Status post psychiatry evaluation-appreciate recommendations  · Continue to monitor off Wellbutrin and fluoxetine  · Continue Topamax 125 mg b i d , clonazepam 1 mg t i d , Psychiatry recommend to increase gabapentin to 600 mg t i d  Psychiatry recommending inpatient psych admission          VTE Pharmacologic Prophylaxis:   Pharmacologic:  Lovenox    Patient Centered Rounds: I have performed bedside rounds with nursing staff today  Education and Discussions with Family / Patient:  Patient    Time Spent for Care: 20 minutes  More than 50% of total time spent on counseling and coordination of care as described above  Current Length of Stay: 13 day(s)    Current Patient Status: Inpatient   Certification Statement: The patient will continue to require additional inpatient hospital stay due to Awaiting psych placement    Discharge Plan / Estimated Discharge Date: TBD    Code Status: Level 1 - Full Code      Subjective:   Patient seen and examined at bedside, restless, pacing the room    Objective:     Vitals:   Temp (24hrs), Av 4 °F (36 3 °C), Min:97 2 °F (36 2 °C), Max:97 5 °F (36 4 °C)    Temp:  [97 2 °F (36 2 °C)-97 5 °F (36 4 °C)] 97 4 °F (36 3 °C)  HR:  [64-87] 87  Resp:  [18-20] 20  BP: ()/(52-74) 112/67  SpO2:  [92 %-97 %] 92 %  Body mass index is 27 26 kg/m²  Input and Output Summary (last 24 hours):        Intake/Output Summary (Last 24 hours) at 10/4/2022 1637  Last data filed at 10/4/2022 0800  Gross per 24 hour   Intake 400 ml   Output --   Net 400 ml       Physical Exam:    Constitutional: Patient is oriented to person, place and time, no acute distress  HEENT:  Normocephalic, atraumatic  Cardiovascular: Normal S1S2, RRR, No murmurs/rubs/gallops appreciated  Pulmonary:  Bilateral air entry, No rhonchi/rales/wheezing appreciated  Abdominal: Soft, Bowel sounds present, Non-tender, Non-distended  Extremities:  No cyanosis, clubbing or edema  Neurological:  Awake, alert  Skin:  Warm, dry    Additional Data:     Labs:    Results from last 7 days   Lab Units 09/28/22  0442   WBC Thousand/uL 14 65*   HEMOGLOBIN g/dL 10 4*   HEMATOCRIT % 34 1*   PLATELETS Thousands/uL 412*     Results from last 7 days   Lab Units 10/03/22  0801 09/30/22  1204   POTASSIUM mmol/L 3 5 3 9   CHLORIDE mmol/L 104 102   CO2 mmol/L 25 23   BUN mg/dL 16 15   CREATININE mg/dL 1 02 0 74   CALCIUM mg/dL 8 8 8 7   ALK PHOS U/L  --  85   ALT U/L  --  30   AST U/L  --  19            I Have Reviewed All Lab Data Listed Above      Invasive Devices  Report    None                      Recent Cultures (last 7 days):           Last 24 Hours Medication List:   Current Facility-Administered Medications   Medication Dose Route Frequency Provider Last Rate    acetaminophen  650 mg Oral Q6H PRN Gustavo Kyle PA-C      albuterol  2 puff Inhalation Q4H PRN Xander To DO      buprenorphine-naloxone  8 mg Sublingual BID Gustavo Kyle PA-C      clonazePAM  1 mg Oral TID Xander To DO      enoxaparin  40 mg Subcutaneous Daily Gustavo Kyle [place]      famotidine  20 mg Oral BID Xander To DO      ferrous sulfate  325 mg Oral Daily With Breakfast Xander To DO      gabapentin  600 mg Oral TID Orestes Mann MD      hydrOXYzine HCL  50 mg Oral Q6H PRN NETTE Puckett      ibuprofen  400 mg Oral Q6H PRN Orestes Mann MD      ipratropium-albuterol  3 mL Nebulization TID Xander To DO      methocarbamol  750 mg Oral Q6H PRN Xander To DO      nicotine  21 mg Transdermal Daily Gustavo Kyle PA-C      nicotine polacrilex  2 mg Oral Q2H PRN George Tapia PA-C      ondansetron  4 mg Oral Q6H PRN Maurice Quiñones, DO      oxazepam  30 mg Oral BID PRN Jose Alejandro Harkins MD      pantoprazole  40 mg Oral Early Morning Maurice Quiñones DO      [START ON 10/8/2022] predniSONE  60 mg Oral Daily Jose Alejandro Harkins MD      Followed by   Audi Specking ON 10/11/2022] predniSONE  50 mg Oral Daily Jose Alejandro Harkins MD      Followed by   Audi Specking ON 10/14/2022] predniSONE  40 mg Oral Daily Jose Alejandro Harkins MD      Followed by   Audi Specking ON 10/17/2022] predniSONE  30 mg Oral Daily Jose Alejandro Harkins MD      [START ON 10/5/2022] predniSONE  70 mg Oral Daily Jose Alejandor Harkins MD      topiramate  125 mg Oral BID Maurice Quiñones, DO      traZODone  50 mg Oral HS George Tapia PA-C          Today, Patient Was Seen By: Jose Alejandro Harkins

## 2022-10-04 NOTE — PLAN OF CARE
Problem: MOBILITY - ADULT  Goal: Maintain or return to baseline ADL function  Description: INTERVENTIONS:  -  Assess patient's ability to carry out ADLs; assess patient's baseline for ADL function and identify physical deficits which impact ability to perform ADLs (bathing, care of mouth/teeth, toileting, grooming, dressing, etc )  - Assess/evaluate cause of self-care deficits   - Assess range of motion  - Assess patient's mobility; develop plan if impaired  - Assess patient's need for assistive devices and provide as appropriate  - Encourage maximum independence but intervene and supervise when necessary  - Involve family in performance of ADLs  - Assess for home care needs following discharge   - Consider OT consult to assist with ADL evaluation and planning for discharge  - Provide patient education as appropriate  Outcome: Progressing     Problem: Prexisting or High Potential for Compromised Skin Integrity  Goal: Skin integrity is maintained or improved  Description: INTERVENTIONS:  - Identify patients at risk for skin breakdown  - Assess and monitor skin integrity  - Assess and monitor nutrition and hydration status  - Monitor labs   - Assess for incontinence   - Turn and reposition patient  - Assist with mobility/ambulation  - Relieve pressure over bony prominences  - Avoid friction and shearing  - Provide appropriate hygiene as needed including keeping skin clean and dry  - Evaluate need for skin moisturizer/barrier cream  - Collaborate with interdisciplinary team   - Patient/family teaching  - Consider wound care consult   Outcome: Progressing     Problem: Potential for Falls  Goal: Patient will remain free of falls  Description: INTERVENTIONS:  - Educate patient/family on patient safety including physical limitations  - Instruct patient to call for assistance with activity   - Consult OT/PT to assist with strengthening/mobility   - Keep Call bell within reach  - Keep bed low and locked with side rails adjusted as appropriate  - Keep care items and personal belongings within reach  - Initiate and maintain comfort rounds  - Apply yellow socks and bracelet for high fall risk patients  - Consider moving patient to room near nurses station  Outcome: Progressing     Problem: PAIN - ADULT  Goal: Verbalizes/displays adequate comfort level or baseline comfort level  Description: Interventions:  - Encourage patient to monitor pain and request assistance  - Assess pain using appropriate pain scale  - Administer analgesics based on type and severity of pain and evaluate response  - Implement non-pharmacological measures as appropriate and evaluate response  - Consider cultural and social influences on pain and pain management  - Notify physician/advanced practitioner if interventions unsuccessful or patient reports new pain  Outcome: Progressing     Problem: INFECTION - ADULT  Goal: Absence or prevention of progression during hospitalization  Description: INTERVENTIONS:  - Assess and monitor for signs and symptoms of infection  - Monitor lab/diagnostic results  - Monitor all insertion sites, i e  indwelling lines, tubes, and drains  - Monitor endotracheal if appropriate and nasal secretions for changes in amount and color  - [place] appropriate cooling/warming therapies per order  - Administer medications as ordered  - Instruct and encourage patient and family to use good hand hygiene technique  - Identify and instruct in appropriate isolation precautions for identified infection/condition  Outcome: Progressing

## 2022-10-04 NOTE — ASSESSMENT & PLAN NOTE
· Received notification from [address] [place] [address],[address] services that patient's hepatitis screening was positive    Patient is aware of this and was told that she was exposed to hepatitis-C in the past   · LFTs normal  ·  hep C viral RNA quantitative not detected

## 2022-10-05 ENCOUNTER — HOSPITAL ENCOUNTER (INPATIENT)
Facility: HOSPITAL | Age: 47
LOS: 6 days | Discharge: HOME | DRG: 753 | End: 2022-10-11
Attending: STUDENT IN AN ORGANIZED HEALTH CARE EDUCATION/TRAINING PROGRAM | Admitting: PSYCHIATRY & NEUROLOGY
Payer: COMMERCIAL

## 2022-10-05 VITALS
SYSTOLIC BLOOD PRESSURE: 105 MMHG | TEMPERATURE: 97.2 F | HEART RATE: 77 BPM | HEIGHT: 62 IN | RESPIRATION RATE: 20 BRPM | WEIGHT: 154.76 LBS | BODY MASS INDEX: 28.48 KG/M2 | DIASTOLIC BLOOD PRESSURE: 61 MMHG | OXYGEN SATURATION: 95 %

## 2022-10-05 DIAGNOSIS — G47.00 INSOMNIA: ICD-10-CM

## 2022-10-05 DIAGNOSIS — F41.9 ANXIETY DISORDER, UNSPECIFIED: ICD-10-CM

## 2022-10-05 DIAGNOSIS — D50.8 IRON DEFICIENCY ANEMIA SECONDARY TO INADEQUATE DIETARY IRON INTAKE: ICD-10-CM

## 2022-10-05 DIAGNOSIS — Z00.8 MEDICAL CLEARANCE FOR PSYCHIATRIC ADMISSION: ICD-10-CM

## 2022-10-05 DIAGNOSIS — E88.09 SERUM ALBUMIN DECREASED: ICD-10-CM

## 2022-10-05 DIAGNOSIS — R10.13 DYSPEPSIA: ICD-10-CM

## 2022-10-05 DIAGNOSIS — E44.1 MILD PROTEIN-CALORIE MALNUTRITION (HCC): ICD-10-CM

## 2022-10-05 DIAGNOSIS — M62.838 MUSCLE SPASM OF SHOULDER REGION: ICD-10-CM

## 2022-10-05 DIAGNOSIS — J96.01 ACUTE RESPIRATORY FAILURE WITH HYPOXIA (HCC): ICD-10-CM

## 2022-10-05 DIAGNOSIS — F11.20 OPIOID USE DISORDER, SEVERE, ON MAINTENANCE THERAPY (HCC): ICD-10-CM

## 2022-10-05 DIAGNOSIS — F31.9 BIPOLAR DISORDER, UNSPECIFIED (HCC): Primary | ICD-10-CM

## 2022-10-05 LAB — SARS-COV-2 RNA RESP QL NAA+PROBE: NEGATIVE

## 2022-10-05 PROCEDURE — U0003 INFECTIOUS AGENT DETECTION BY NUCLEIC ACID (DNA OR RNA); SEVERE ACUTE RESPIRATORY SYNDROME CORONAVIRUS 2 (SARS-COV-2) (CORONAVIRUS DISEASE [COVID-19]), AMPLIFIED PROBE TECHNIQUE, MAKING USE OF HIGH THROUGHPUT TECHNOLOGIES AS DESCRIBED BY CMS-2020-01-R: HCPCS | Performed by: INTERNAL MEDICINE

## 2022-10-05 PROCEDURE — 94640 AIRWAY INHALATION TREATMENT: CPT

## 2022-10-05 PROCEDURE — U0005 INFEC AGEN DETEC AMPLI PROBE: HCPCS | Performed by: INTERNAL MEDICINE

## 2022-10-05 PROCEDURE — 99239 HOSP IP/OBS DSCHRG MGMT >30: CPT | Performed by: INTERNAL MEDICINE

## 2022-10-05 RX ORDER — OLANZAPINE 5 MG/1
5 TABLET ORAL
Status: CANCELLED | OUTPATIENT
Start: 2022-10-05

## 2022-10-05 RX ORDER — OLANZAPINE 10 MG/1
10 TABLET ORAL
Status: DISCONTINUED | OUTPATIENT
Start: 2022-10-05 | End: 2022-10-11 | Stop reason: HOSPADM

## 2022-10-05 RX ORDER — ALBUTEROL SULFATE 90 UG/1
2 AEROSOL, METERED RESPIRATORY (INHALATION) EVERY 4 HOURS PRN
Status: DISCONTINUED | OUTPATIENT
Start: 2022-10-05 | End: 2022-10-11 | Stop reason: HOSPADM

## 2022-10-05 RX ORDER — LORAZEPAM 2 MG/ML
1 INJECTION INTRAMUSCULAR EVERY 4 HOURS PRN
Status: CANCELLED | OUTPATIENT
Start: 2022-10-05

## 2022-10-05 RX ORDER — ALPRAZOLAM 0.5 MG/1
1 TABLET ORAL ONCE
Status: DISCONTINUED | OUTPATIENT
Start: 2022-10-05 | End: 2022-10-05

## 2022-10-05 RX ORDER — OLANZAPINE 5 MG/1
2.5 TABLET ORAL
Status: CANCELLED | OUTPATIENT
Start: 2022-10-05

## 2022-10-05 RX ORDER — IPRATROPIUM BROMIDE AND ALBUTEROL SULFATE 2.5; .5 MG/3ML; MG/3ML
3 SOLUTION RESPIRATORY (INHALATION)
Status: DISCONTINUED | OUTPATIENT
Start: 2022-10-05 | End: 2022-10-06

## 2022-10-05 RX ORDER — FAMOTIDINE 20 MG/1
20 TABLET, FILM COATED ORAL 2 TIMES DAILY
Status: CANCELLED | OUTPATIENT
Start: 2022-10-05

## 2022-10-05 RX ORDER — TRAZODONE HYDROCHLORIDE 50 MG/1
50 TABLET ORAL
Status: DISCONTINUED | OUTPATIENT
Start: 2022-10-06 | End: 2022-10-11 | Stop reason: HOSPADM

## 2022-10-05 RX ORDER — OLANZAPINE 10 MG/1
10 INJECTION, POWDER, LYOPHILIZED, FOR SOLUTION INTRAMUSCULAR
Status: DISCONTINUED | OUTPATIENT
Start: 2022-10-05 | End: 2022-10-11 | Stop reason: HOSPADM

## 2022-10-05 RX ORDER — FERROUS SULFATE 325(65) MG
325 TABLET ORAL
Status: DISCONTINUED | OUTPATIENT
Start: 2022-10-06 | End: 2022-10-11 | Stop reason: HOSPADM

## 2022-10-05 RX ORDER — PREDNISONE 10 MG/1
TABLET ORAL
Refills: 0
Start: 2022-10-05 | End: 2022-10-11

## 2022-10-05 RX ORDER — PREDNISONE 20 MG/1
40 TABLET ORAL DAILY
Status: CANCELLED | OUTPATIENT
Start: 2022-10-14 | End: 2022-10-17

## 2022-10-05 RX ORDER — PREDNISONE 20 MG/1
40 TABLET ORAL DAILY
Status: DISCONTINUED | OUTPATIENT
Start: 2022-10-14 | End: 2022-10-11 | Stop reason: HOSPADM

## 2022-10-05 RX ORDER — ALPRAZOLAM 0.5 MG/1
2 TABLET ORAL ONCE
Status: COMPLETED | OUTPATIENT
Start: 2022-10-05 | End: 2022-10-05

## 2022-10-05 RX ORDER — PREDNISONE 20 MG/1
60 TABLET ORAL DAILY
Status: COMPLETED | OUTPATIENT
Start: 2022-10-08 | End: 2022-10-10

## 2022-10-05 RX ORDER — IBUPROFEN 400 MG/1
400 TABLET ORAL EVERY 6 HOURS PRN
Status: DISCONTINUED | OUTPATIENT
Start: 2022-10-05 | End: 2022-10-11 | Stop reason: HOSPADM

## 2022-10-05 RX ORDER — HYDROXYZINE 50 MG/1
50 TABLET, FILM COATED ORAL
Status: DISCONTINUED | OUTPATIENT
Start: 2022-10-05 | End: 2022-10-11 | Stop reason: HOSPADM

## 2022-10-05 RX ORDER — BUPRENORPHINE AND NALOXONE 8; 2 MG/1; MG/1
8 FILM, SOLUBLE BUCCAL; SUBLINGUAL 2 TIMES DAILY
Status: CANCELLED | OUTPATIENT
Start: 2022-10-05

## 2022-10-05 RX ORDER — CLONAZEPAM 1 MG/1
1 TABLET ORAL 3 TIMES DAILY
Status: CANCELLED | OUTPATIENT
Start: 2022-10-05

## 2022-10-05 RX ORDER — PANTOPRAZOLE SODIUM 40 MG/1
40 TABLET, DELAYED RELEASE ORAL
Refills: 0 | Status: ON HOLD
Start: 2022-10-06 | End: 2022-10-11 | Stop reason: SDUPTHER

## 2022-10-05 RX ORDER — TRAZODONE HYDROCHLORIDE 50 MG/1
50 TABLET ORAL
Status: CANCELLED | OUTPATIENT
Start: 2022-10-05

## 2022-10-05 RX ORDER — LORAZEPAM 2 MG/ML
1 INJECTION INTRAMUSCULAR EVERY 4 HOURS PRN
Status: DISCONTINUED | OUTPATIENT
Start: 2022-10-05 | End: 2022-10-11 | Stop reason: HOSPADM

## 2022-10-05 RX ORDER — OLANZAPINE 10 MG/1
5 INJECTION, POWDER, LYOPHILIZED, FOR SOLUTION INTRAMUSCULAR ONCE
Status: DISCONTINUED | OUTPATIENT
Start: 2022-10-05 | End: 2022-10-05

## 2022-10-05 RX ORDER — OLANZAPINE 5 MG/1
5 TABLET ORAL
Status: DISCONTINUED | OUTPATIENT
Start: 2022-10-05 | End: 2022-10-11 | Stop reason: HOSPADM

## 2022-10-05 RX ORDER — LORAZEPAM 1 MG/1
1 TABLET ORAL
Status: DISCONTINUED | OUTPATIENT
Start: 2022-10-05 | End: 2022-10-07

## 2022-10-05 RX ORDER — OLANZAPINE 10 MG/1
5 INJECTION, POWDER, LYOPHILIZED, FOR SOLUTION INTRAMUSCULAR
Status: DISCONTINUED | OUTPATIENT
Start: 2022-10-05 | End: 2022-10-11 | Stop reason: HOSPADM

## 2022-10-05 RX ORDER — OLANZAPINE 5 MG/1
10 TABLET ORAL
Status: CANCELLED | OUTPATIENT
Start: 2022-10-05

## 2022-10-05 RX ORDER — PANTOPRAZOLE SODIUM 40 MG/1
40 TABLET, DELAYED RELEASE ORAL
Status: DISCONTINUED | OUTPATIENT
Start: 2022-10-06 | End: 2022-10-11 | Stop reason: HOSPADM

## 2022-10-05 RX ORDER — FERROUS SULFATE 325(65) MG
325 TABLET ORAL
Status: CANCELLED | OUTPATIENT
Start: 2022-10-06

## 2022-10-05 RX ORDER — LORAZEPAM 2 MG/ML
2 INJECTION INTRAMUSCULAR
Status: CANCELLED | OUTPATIENT
Start: 2022-10-05

## 2022-10-05 RX ORDER — OLANZAPINE 10 MG/1
10 INJECTION, POWDER, LYOPHILIZED, FOR SOLUTION INTRAMUSCULAR
Status: CANCELLED | OUTPATIENT
Start: 2022-10-05

## 2022-10-05 RX ORDER — PREDNISONE 20 MG/1
60 TABLET ORAL DAILY
Status: CANCELLED | OUTPATIENT
Start: 2022-10-08 | End: 2022-10-11

## 2022-10-05 RX ORDER — METHOCARBAMOL 750 MG/1
750 TABLET, FILM COATED ORAL EVERY 6 HOURS PRN
Status: DISCONTINUED | OUTPATIENT
Start: 2022-10-05 | End: 2022-10-11 | Stop reason: HOSPADM

## 2022-10-05 RX ORDER — HYDROXYZINE HYDROCHLORIDE 25 MG/1
50 TABLET, FILM COATED ORAL EVERY 6 HOURS PRN
Status: CANCELLED | OUTPATIENT
Start: 2022-10-05

## 2022-10-05 RX ORDER — LANOLIN ALCOHOL/MO/W.PET/CERES
3 CREAM (GRAM) TOPICAL
Status: DISCONTINUED | OUTPATIENT
Start: 2022-10-05 | End: 2022-10-11 | Stop reason: HOSPADM

## 2022-10-05 RX ORDER — HYDROXYZINE HYDROCHLORIDE 25 MG/1
25 TABLET, FILM COATED ORAL
Status: DISCONTINUED | OUTPATIENT
Start: 2022-10-05 | End: 2022-10-11 | Stop reason: HOSPADM

## 2022-10-05 RX ORDER — CLONAZEPAM 1 MG/1
1 TABLET ORAL 3 TIMES DAILY
Status: DISCONTINUED | OUTPATIENT
Start: 2022-10-06 | End: 2022-10-08

## 2022-10-05 RX ORDER — ACETAMINOPHEN 325 MG/1
650 TABLET ORAL EVERY 6 HOURS PRN
Status: DISCONTINUED | OUTPATIENT
Start: 2022-10-05 | End: 2022-10-11 | Stop reason: HOSPADM

## 2022-10-05 RX ORDER — OLANZAPINE 2.5 MG/1
2.5 TABLET ORAL
Status: DISCONTINUED | OUTPATIENT
Start: 2022-10-05 | End: 2022-10-11 | Stop reason: HOSPADM

## 2022-10-05 RX ORDER — LORAZEPAM 1 MG/1
1 TABLET ORAL
Status: CANCELLED | OUTPATIENT
Start: 2022-10-05

## 2022-10-05 RX ORDER — NICOTINE 21 MG/24HR
21 PATCH, TRANSDERMAL 24 HOURS TRANSDERMAL DAILY
Status: CANCELLED | OUTPATIENT
Start: 2022-10-06

## 2022-10-05 RX ORDER — BENZTROPINE MESYLATE 1 MG/1
1 TABLET ORAL
Status: DISCONTINUED | OUTPATIENT
Start: 2022-10-05 | End: 2022-10-11 | Stop reason: HOSPADM

## 2022-10-05 RX ORDER — LANOLIN ALCOHOL/MO/W.PET/CERES
3 CREAM (GRAM) TOPICAL
Status: CANCELLED | OUTPATIENT
Start: 2022-10-05

## 2022-10-05 RX ORDER — BENZTROPINE MESYLATE 1 MG/ML
1 INJECTION INTRAMUSCULAR; INTRAVENOUS
Status: CANCELLED | OUTPATIENT
Start: 2022-10-05

## 2022-10-05 RX ORDER — IBUPROFEN 400 MG/1
400 TABLET ORAL EVERY 6 HOURS PRN
Status: CANCELLED | OUTPATIENT
Start: 2022-10-05

## 2022-10-05 RX ORDER — AMOXICILLIN 250 MG
1 CAPSULE ORAL DAILY PRN
Status: CANCELLED | OUTPATIENT
Start: 2022-10-05

## 2022-10-05 RX ORDER — ACETAMINOPHEN 325 MG/1
650 TABLET ORAL EVERY 6 HOURS PRN
Status: CANCELLED | OUTPATIENT
Start: 2022-10-05

## 2022-10-05 RX ORDER — BUPRENORPHINE AND NALOXONE 8; 2 MG/1; MG/1
8 FILM, SOLUBLE BUCCAL; SUBLINGUAL 2 TIMES DAILY
Status: DISCONTINUED | OUTPATIENT
Start: 2022-10-06 | End: 2022-10-11 | Stop reason: HOSPADM

## 2022-10-05 RX ORDER — PANTOPRAZOLE SODIUM 40 MG/1
40 TABLET, DELAYED RELEASE ORAL
Status: CANCELLED | OUTPATIENT
Start: 2022-10-06

## 2022-10-05 RX ORDER — BENZTROPINE MESYLATE 1 MG/1
1 TABLET ORAL
Status: CANCELLED | OUTPATIENT
Start: 2022-10-05

## 2022-10-05 RX ORDER — PREDNISONE 20 MG/1
50 TABLET ORAL DAILY
Status: DISCONTINUED | OUTPATIENT
Start: 2022-10-11 | End: 2022-10-11 | Stop reason: HOSPADM

## 2022-10-05 RX ORDER — LORAZEPAM 2 MG/ML
1 INJECTION INTRAMUSCULAR ONCE
Status: DISCONTINUED | OUTPATIENT
Start: 2022-10-05 | End: 2022-10-05

## 2022-10-05 RX ORDER — PREDNISONE 20 MG/1
30 TABLET ORAL DAILY
Status: DISCONTINUED | OUTPATIENT
Start: 2022-10-17 | End: 2022-10-11 | Stop reason: HOSPADM

## 2022-10-05 RX ORDER — PREDNISONE 20 MG/1
70 TABLET ORAL DAILY
Status: COMPLETED | OUTPATIENT
Start: 2022-10-06 | End: 2022-10-07

## 2022-10-05 RX ORDER — METHOCARBAMOL 500 MG/1
750 TABLET, FILM COATED ORAL EVERY 6 HOURS PRN
Status: CANCELLED | OUTPATIENT
Start: 2022-10-05

## 2022-10-05 RX ORDER — OLANZAPINE 10 MG/1
5 INJECTION, POWDER, LYOPHILIZED, FOR SOLUTION INTRAMUSCULAR
Status: CANCELLED | OUTPATIENT
Start: 2022-10-05

## 2022-10-05 RX ORDER — ALBUTEROL SULFATE 90 UG/1
2 AEROSOL, METERED RESPIRATORY (INHALATION) EVERY 4 HOURS PRN
Status: CANCELLED | OUTPATIENT
Start: 2022-10-05

## 2022-10-05 RX ORDER — LORAZEPAM 2 MG/ML
2 INJECTION INTRAMUSCULAR
Status: DISCONTINUED | OUTPATIENT
Start: 2022-10-05 | End: 2022-10-07

## 2022-10-05 RX ORDER — GABAPENTIN 300 MG/1
600 CAPSULE ORAL 3 TIMES DAILY
Status: CANCELLED | OUTPATIENT
Start: 2022-10-05

## 2022-10-05 RX ORDER — HYDROXYZINE HYDROCHLORIDE 25 MG/1
50 TABLET, FILM COATED ORAL
Status: CANCELLED | OUTPATIENT
Start: 2022-10-05

## 2022-10-05 RX ORDER — IPRATROPIUM BROMIDE AND ALBUTEROL SULFATE 2.5; .5 MG/3ML; MG/3ML
3 SOLUTION RESPIRATORY (INHALATION)
Status: CANCELLED | OUTPATIENT
Start: 2022-10-05

## 2022-10-05 RX ORDER — BENZTROPINE MESYLATE 1 MG/ML
1 INJECTION INTRAMUSCULAR; INTRAVENOUS
Status: DISCONTINUED | OUTPATIENT
Start: 2022-10-05 | End: 2022-10-11 | Stop reason: HOSPADM

## 2022-10-05 RX ORDER — NICOTINE 21 MG/24HR
21 PATCH, TRANSDERMAL 24 HOURS TRANSDERMAL DAILY
Status: DISCONTINUED | OUTPATIENT
Start: 2022-10-06 | End: 2022-10-11 | Stop reason: HOSPADM

## 2022-10-05 RX ORDER — HYDROXYZINE HYDROCHLORIDE 25 MG/1
25 TABLET, FILM COATED ORAL
Status: CANCELLED | OUTPATIENT
Start: 2022-10-05

## 2022-10-05 RX ORDER — FAMOTIDINE 20 MG/1
20 TABLET, FILM COATED ORAL 2 TIMES DAILY
Status: DISCONTINUED | OUTPATIENT
Start: 2022-10-06 | End: 2022-10-11 | Stop reason: HOSPADM

## 2022-10-05 RX ORDER — ONDANSETRON 4 MG/1
4 TABLET, ORALLY DISINTEGRATING ORAL EVERY 6 HOURS PRN
Status: DISCONTINUED | OUTPATIENT
Start: 2022-10-05 | End: 2022-10-11 | Stop reason: HOSPADM

## 2022-10-05 RX ORDER — AMOXICILLIN 250 MG
1 CAPSULE ORAL DAILY PRN
Status: DISCONTINUED | OUTPATIENT
Start: 2022-10-05 | End: 2022-10-11 | Stop reason: HOSPADM

## 2022-10-05 RX ORDER — GABAPENTIN 300 MG/1
600 CAPSULE ORAL 3 TIMES DAILY
Status: DISCONTINUED | OUTPATIENT
Start: 2022-10-06 | End: 2022-10-11 | Stop reason: HOSPADM

## 2022-10-05 RX ORDER — ONDANSETRON 4 MG/1
4 TABLET, ORALLY DISINTEGRATING ORAL EVERY 6 HOURS PRN
Status: CANCELLED | OUTPATIENT
Start: 2022-10-05

## 2022-10-05 RX ADMIN — CLONAZEPAM 1 MG: 1 TABLET ORAL at 16:09

## 2022-10-05 RX ADMIN — NICOTINE POLACRILEX 2 MG: 2 GUM, CHEWING ORAL at 16:10

## 2022-10-05 RX ADMIN — HYDROXYZINE HYDROCHLORIDE 50 MG: 50 TABLET, FILM COATED ORAL at 22:56

## 2022-10-05 RX ADMIN — OXAZEPAM 30 MG: 15 CAPSULE ORAL at 05:29

## 2022-10-05 RX ADMIN — GABAPENTIN 600 MG: 300 CAPSULE ORAL at 16:08

## 2022-10-05 RX ADMIN — IPRATROPIUM BROMIDE AND ALBUTEROL SULFATE 3 ML: 2.5; .5 SOLUTION RESPIRATORY (INHALATION) at 07:53

## 2022-10-05 RX ADMIN — OXAZEPAM 30 MG: 15 CAPSULE ORAL at 21:22

## 2022-10-05 RX ADMIN — ALPRAZOLAM 2 MG: 0.5 TABLET ORAL at 10:46

## 2022-10-05 RX ADMIN — Medication 325 MG: at 07:25

## 2022-10-05 RX ADMIN — PREDNISONE 70 MG: 20 TABLET ORAL at 08:29

## 2022-10-05 RX ADMIN — PANTOPRAZOLE SODIUM 40 MG: 40 TABLET, DELAYED RELEASE ORAL at 05:29

## 2022-10-05 RX ADMIN — FAMOTIDINE 20 MG: 20 TABLET ORAL at 17:39

## 2022-10-05 RX ADMIN — CLONAZEPAM 1 MG: 1 TABLET ORAL at 21:19

## 2022-10-05 RX ADMIN — FAMOTIDINE 20 MG: 20 TABLET ORAL at 08:38

## 2022-10-05 RX ADMIN — TRAZODONE HYDROCHLORIDE 50 MG: 50 TABLET ORAL at 00:13

## 2022-10-05 RX ADMIN — MELATONIN TAB 3 MG 3 MG: 3 TAB at 22:56

## 2022-10-05 RX ADMIN — BUPRENORPHINE AND NALOXONE 8 MG: 8; 2 FILM BUCCAL; SUBLINGUAL at 08:28

## 2022-10-05 RX ADMIN — NICOTINE POLACRILEX 2 MG: 2 GUM, CHEWING ORAL at 09:08

## 2022-10-05 RX ADMIN — GABAPENTIN 600 MG: 300 CAPSULE ORAL at 08:29

## 2022-10-05 RX ADMIN — CLONAZEPAM 1 MG: 1 TABLET ORAL at 08:37

## 2022-10-05 RX ADMIN — Medication 21 MG: at 09:00

## 2022-10-05 RX ADMIN — HYDROXYZINE HYDROCHLORIDE 50 MG: 25 TABLET ORAL at 00:13

## 2022-10-05 RX ADMIN — ENOXAPARIN SODIUM 40 MG: 40 INJECTION SUBCUTANEOUS at 08:28

## 2022-10-05 RX ADMIN — HYDROXYZINE HYDROCHLORIDE 50 MG: 25 TABLET ORAL at 16:08

## 2022-10-05 RX ADMIN — BUPRENORPHINE AND NALOXONE 8 MG: 8; 2 FILM BUCCAL; SUBLINGUAL at 16:14

## 2022-10-05 RX ADMIN — TOPIRAMATE 125 MG: 100 TABLET, FILM COATED ORAL at 08:30

## 2022-10-05 RX ADMIN — GABAPENTIN 600 MG: 300 CAPSULE ORAL at 21:19

## 2022-10-05 NOTE — NURSING NOTE
This am was contacted by patients mother who is her  emergency contact stating that she thought someone in our gift shop had used her credit card to purchase vaping equipment  Informed her that her daughter had been seen vaping and assured her that  we would investigate the matter  Security was called and nurse manager, myself and security requested permission to search her room  Patient granted permission and belongings were searched  Bulge noted in front of patients pants  Found vaping device in front of pants  Patient threatening to leave AMA Dr Miguel Angel Estrada and  made aware  Virtual 1:1 continued and in person sitter assigned to room

## 2022-10-05 NOTE — PLAN OF CARE
Problem: MOBILITY - ADULT  Goal: Maintain or return to baseline ADL function  Description: INTERVENTIONS:  -  Assess patient's ability to carry out ADLs; assess patient's baseline for ADL function and identify physical deficits which impact ability to perform ADLs (bathing, care of mouth/teeth, toileting, grooming, dressing, etc )  - Assess/evaluate cause of self-care deficits   - Assess range of motion  - Assess patient's mobility; develop plan if impaired  - Assess patient's need for assistive devices and provide as appropriate  - Encourage maximum independence but intervene and supervise when necessary  - Involve family in performance of ADLs  - Assess for home care needs following discharge   - Consider OT consult to assist with ADL evaluation and planning for discharge  - Provide patient education as appropriate  Outcome: Progressing     Problem: PAIN - ADULT  Goal: Verbalizes/displays adequate comfort level or baseline comfort level  Description: Interventions:  - Encourage patient to monitor pain and request assistance  - Assess pain using appropriate pain scale  - Administer analgesics based on type and severity of pain and evaluate response  - Implement non-pharmacological measures as appropriate and evaluate response  - Consider cultural and social influences on pain and pain management  - Notify physician/advanced practitioner if interventions unsuccessful or patient reports new pain  Outcome: Progressing     Problem: Knowledge Deficit  Goal: Patient/family/caregiver demonstrates understanding of disease process, treatment plan, medications, and discharge instructions  Description: Complete learning assessment and assess knowledge base    Interventions:  - Provide teaching at level of understanding  - Provide teaching via preferred learning methods  Outcome: Progressing

## 2022-10-05 NOTE — PLAN OF CARE
Problem: MOBILITY - ADULT  Goal: Maintain or return to baseline ADL function  Description: INTERVENTIONS:  -  Assess patient's ability to carry out ADLs; assess patient's baseline for ADL function and identify physical deficits which impact ability to perform ADLs (bathing, care of mouth/teeth, toileting, grooming, dressing, etc )  - Assess/evaluate cause of self-care deficits   - Assess range of motion  - Assess patient's mobility; develop plan if impaired  - Assess patient's need for assistive devices and provide as appropriate  - Encourage maximum independence but intervene and supervise when necessary  - Involve family in performance of ADLs  - Assess for home care needs following discharge   - Consider OT consult to assist with ADL evaluation and planning for discharge  - Provide patient education as appropriate  Outcome: Progressing     Problem: Prexisting or High Potential for Compromised Skin Integrity  Goal: Skin integrity is maintained or improved  Description: INTERVENTIONS:  - Identify patients at risk for skin breakdown  - Assess and monitor skin integrity  - Assess and monitor nutrition and hydration status  - Monitor labs   - Assess for incontinence   - Turn and reposition patient  - Assist with mobility/ambulation  - Relieve pressure over bony prominences  - Avoid friction and shearing  - Provide appropriate hygiene as needed including keeping skin clean and dry  - Evaluate need for skin moisturizer/barrier cream  - Collaborate with interdisciplinary team   - Patient/family teaching  - Consider wound care consult   Outcome: Progressing     Problem: Potential for Falls  Goal: Patient will remain free of falls  Description: INTERVENTIONS:  - Educate patient/family on patient safety including physical limitations  - Instruct patient to call for assistance with activity   - Consult OT/PT to assist with strengthening/mobility   - Keep Call bell within reach  - Keep bed low and locked with side rails adjusted as appropriate  - Keep care items and personal belongings within reach  - Initiate and maintain comfort rounds  - Apply yellow socks and bracelet for high fall risk patients  - Consider moving patient to room near nurses station  Outcome: Progressing     Problem: PAIN - ADULT  Goal: Verbalizes/displays adequate comfort level or baseline comfort level  Description: Interventions:  - Encourage patient to monitor pain and request assistance  - Assess pain using appropriate pain scale  - Administer analgesics based on type and severity of pain and evaluate response  - Implement non-pharmacological measures as appropriate and evaluate response  - Consider cultural and social influences on pain and pain management  - Notify physician/advanced practitioner if interventions unsuccessful or patient reports new pain  Outcome: Progressing     Problem: INFECTION - ADULT  Goal: Absence or prevention of progression during hospitalization  Description: INTERVENTIONS:  - Assess and monitor for signs and symptoms of infection  - Monitor lab/diagnostic results  - Monitor all insertion sites, i e  indwelling lines, tubes, and drains  - Monitor endotracheal if appropriate and nasal secretions for changes in amount and color  - [place] appropriate cooling/warming therapies per order  - Administer medications as ordered  - Instruct and encourage patient and family to use good hand hygiene technique  - Identify and instruct in appropriate isolation precautions for identified infection/condition  Outcome: Progressing

## 2022-10-05 NOTE — CASE MANAGEMENT
Case Management Discharge Planning Note    Patient name Bobo Moffett  Location [address] [address] /[address] 251-* MRN [id number]  : 1975 Date 10/5/2022       Current Admission Date: 2022  Current Admission Diagnosis:Acute respiratory failure with hypoxia [facility])   Patient Active Problem List    Diagnosis Date Noted    Hepatitis C antibody test positive 2022    Bradycardia 2022    Acute respiratory failure with hypoxia ([facility] [address] ) 2022    Heavy tobacco smoker 2022    Substance Abuse Disorder  2022    Anemia 2022    Sepsis ([facility] [address] ) 2022    R/O CAP (community acquired pneumonia) 2022    Muscle spasm of shoulder region 2022    Dyspepsia 2022    Bipolar affective disorder, currently active ([facility] ) 2021    Tobacco abuse 2021    Encounter for monitoring opioid maintenance therapy 2021    Medical clearance for psychiatric admission 2021    Rhabdomyolysis 2021      LOS (days): 14  Geometric Mean LOS (GMLOS) (days): 4 80  Days to GMLOS:-9 2     OBJECTIVE:  Risk of Unplanned Readmission Score: 16 91         Current admission status: Inpatient   Preferred Pharmacy:   [address] [place] [address], [facility] [address] [facility],4Th Floor [facility]  5 W  [address] [facility] [address]  Phone: [phone] Fax: [phone]    [facility],[address], [address] [facility] [address] [facility] [address] [address]  Phone: [phone] Fax: [address] [place] [address], [facility] [address] ,  [address] ,  [place] [address] [address]  Phone: [phone] Fax: [phone]    Primary Care Provider: No primary care provider on file  Primary Insurance: Oseas ROSEN  Secondary Insurance:     DISCHARGE DETAILS:    Discharge planning discussed with[de-identified] Patient, Sakshi Founds, and mother  Freedom of Choice: Yes  Comments - Freedom of Choice: Pt signed 602 for voluntary admission to  psych   [facility] bed  CM contacted family/caregiver?: Yes Pratima Rose and mother, Anh May)  Were Treatment Team discharge recommendations reviewed with patient/caregiver?: Yes  Did patient/caregiver verbalize understanding of patient care needs?: N/A- going to facility  Were patient/caregiver advised of the risks associated with not following Treatment Team discharge recommendations?: Yes    Contacts  Patient Contacts: Kosta Woodard / Anh Clark  Relationship to Patient[de-identified] Family  Contact Method: Phone, In Person  Phone Number: Anh Clark ([phone]) / Kosta Woodard (in person)  Reason/Outcome: Continuity of Care, Discharge [address]         Is the patient interested in [facility] at discharge?: No    DME Referral Provided  Referral made for DME?: No    Other Referral/Resources/Interventions Provided:  Interventions: Inpatient Behavioral Health         Treatment Team Recommendation: Inpatient Behavioral Health  Discharge Destination Plan[de-identified] Inpatient [address]  Transport at Discharge : BLS Ambulance        Transported by [employer] and Unit #): [facility]  ETA of Transport (Date): 10/05/22  ETA of Transport (Time): 2130     Transfer Mode: Stretcher  Accompanied by: EMS personnel              Additional Comments: CM met with pt and AJ at the bedside  Provided information on pt being able to transfer to [address] psych today  Pt reported not being happy about where she would be going but continues to remain a 201 and reports that she will go  AJ present in room and agreed with discharge plan  CM contacted pt's mother, Anh May, to update her on the discharge plan  Pt's mother in agreement with plan for [facility] [address] [facility] [address] [facility]  [facility] to provide BLS transport tonight at 2130      [facility] : [address]  Receiving Facility/Agency Phone Number: [phone]

## 2022-10-05 NOTE — NURSING NOTE
Around 0230 virtual monitoring notified RN that patient appeared to be vaping in room  RN confronted patient and patient denied  RN and charge RN performed room search  No evidence was found  Nursing supervisor notified of situation  Patient was educated on hospital policy and encouraged to ask RN for nicotine gum as needed  Patient now sleeping in bed comfortably, virtual 1:1 in place

## 2022-10-05 NOTE — ASSESSMENT & PLAN NOTE
· Received notification from [address] [place] [address],6Th Floor services that patient's hepatitis screening was positive    Patient is aware of this and was told that she was exposed to hepatitis-C in the past   · LFTs normal  ·  hep C viral RNA quantitative not detected

## 2022-10-05 NOTE — CASE MANAGEMENT
Case Management Progress Note    Patient name Gwen Quiroz  Location East [address] /[address] 251-* MRN [id number]  : 1975 Date 10/5/2022       LOS (days): 14  Geometric Mean LOS (GMLOS) (days): 4 80  Days to GMLOS:-9 1        OBJECTIVE:        Current admission status: Inpatient  Preferred Pharmacy:   [address], [address]  [address]  [address] [facility] [address]  Phone: [phone] Fax: [phone] [address] [facility],[address], [address]  [facility] [address]  Phone: [phone] Fax: [address] [place] [place] [facility] [address] ,  [facility] [address] ,  [facility] [address]  Phone: [phone] Fax: [phone]    Primary Care Provider: No primary care provider on file  Primary Insurance: [employer] [facility]  Secondary Insurance:     PROGRESS NOTE:    All referrals were placed on 10/4    Dual Programs  · [place]  ? 10/4: Unable to accept due to medical complexity  Pt would also need to be able to provide suboxone along with a note from her OP prescriber for the suboxone  · Amy Lalisseth / Rickie Shjanee - Fax failed x2  · Desi Cruz  ? No female bed  Pt would also need to have a letter from her OP provider for her suboxone  Requested check-in tomorrow  · [facility]  ? 10/4: Insurance is OON with facility  Unable to accept  · [facility]  ? Under review since 10/4  No beds today (10/5)  · [address]  ? Under review since 10/4  No beds today (10/5)  · [facility]  ? 10/4: Closed IP psych about a month ago  · [address]  ? 10/5: Still not reviewed  Able to accept suboxone  · [facility]  ? 10/5: Representative reporting fax has not been received  Resent twice today  · Just [address] [facility] [address] Fax failed x2  · [facility]  ? 10/4: No suboxone  · [address]   ? 10/4: OON with facility   Not actually a dual  Only drug and alcohol rehab  · [address]  ? 10/5: Unable to accept due to higher psych need  · [facility]  ? 10/5: No MA  · [address]  · [address]  · 10/5: No suboxone     [place] IP Psych   · [address]   · [address] @ [address] [facility] - Fax failed x2  · [address]  · [address] Fax failed x2  · [address]  · [employer] in [facility]  · [address] in [place]  · [employer]  · [address] [facility] [address]  · [facility]   · [address]  ? 10/5: Received phone call requesting a call if pt still in need of bed  Returned call x2 with no answer  Unable to leave VM  Number called [phone]  · [address] in [place] - Fax failed x2  · [facility] in [place] - Fax failed x2  · [facility]  ? 10/4: Facility is requesting insurance card and medical clearance note  Insurance card faxed to facility  OON with insurance  Cost would $1,200/day  · [facility] - Fax failed x2  · [place] in [address]   · [address] failed x2  · [facility]  ? 10/5: No bed   · [facility]  ? 10/4: OP mental health clinic not IP  · [facility]) - Fax failed x2  · [facility]  ? 10/4: Not able to accept with suboxone  · [place]  ? 10/4: Not able to accept with suboxone  · [address]  ? Admission team will call back with an update  Check-in tomorrow  · [address]  · 10/5: No bed  · [address]  ? 10/4: Unable to accept due to medical complexity   Recommend dual program due to D&A  · [facility]  ? 10/4: Unable to accept due to medical complexity   · [facility]  · 10/5: No bed  · [address] - Fax failed x2  · [facility]  · 10/5: After medical review, pt deemed to medically complex  · [facility]  · 10/4: No suboxone  · Anu Garrido  · 10/5: No suboxone  · [address]  · [address] - Fax failed x2  · H  C  [facility]  · 10/5: No beds  · [facility] - Fax failed x2  · [facility]  ? 10/5: Bed was available at [facility] deemed to medically complex  Only able to be accepted at [facility]  · [address]  · 10/5: OON with insurance      · [facility]  · 10/5: No beds   · [facility]  · 10/5: No beds   · [facility]

## 2022-10-05 NOTE — DISCHARGE SUMMARY
[address] [facility]  Discharge- Sally Spencer 1975, 41 y o  female MRN: [id number]  Unit/Bed#: E2 -01 Encounter: [id number]  Primary Care Provider: No primary care provider on file  Date and time admitted to hospital: 9/21/2022 11:50 AM    * Acute respiratory failure with hypoxia (HCC)  Assessment & Plan  · Patient presented with shortness of breath 9/21 and hypoxia  · Concern for vape related lung injury  · CT chest with extensive ground-glass opacities in the lungs with peripheral sparing  · Most recent chest x-ray with improved but persistent bilateral infiltrates  · Echocardiogram with preserved EF, no regional wall motion abnormalities  · Patient completed 5 day course of antibiotics  · Patient was treated with IV steroids and now transition to taper decrease by 10 mg every 3 days  · Appreciate pulmonology recommendations  Currently satting well on room air    Psychiatry re-evaluation recommending inpatient psychiatry admission, patient agreeable  Will be discharged to inpatient psych at [facility] today    Hepatitis C antibody test positive  Assessment & Plan  · Received notification from [address] services that patient's hepatitis screening was positive    Patient is aware of this and was told that she was exposed to hepatitis-C in the past   · LFTs normal  ·  hep C viral RNA quantitative not detected    Bradycardia  Assessment & Plan  Observed in ICU while on Precedex  No further events    R/O CAP (community acquired pneumonia)  Assessment & Plan  Chest x-ray with multifocal infiltrates with concern for superimposed bacterial pneumonia  Completed 5 day course of antibiotics      Sepsis ([address] )  Assessment & Plan  · POA evidence by tachycardia, tachypnea, leukocytosis  · Suspect superimposed pneumonia on setting of vape lung injury  · Patient completed course of antibiotics    Anemia  Assessment & Plan  · Iron deficiency anemia  · Hemoglobin has remained stable  · Oral iron supplementation    Substance Abuse Disorder   Assessment & Plan  Maintained on Suboxone    Bipolar affective disorder, currently active [facility])  Assessment & Plan  · History of bipolar affective disorder  · Maintained on Wellbutrin, fluoxetine, clonazepam, Topamax, Serax p r n  · Patient is refusing Wellbutrin and fluoxetine  · Status post psychiatry evaluation-appreciate recommendations  · Continue to monitor off Wellbutrin and fluoxetine  · Continue Topamax 125 mg b i d , clonazepam 1 mg t i d , Psychiatry recommend to increase gabapentin to 600 mg t i d  Psychiatry recommending inpatient psych admission          Transition of Care Discharge Summary - [facility]    Patient Information: Ileana Hunter 41 y o  female MRN: [id number]  Unit/Bed#: E2 -01 Encounter: [id number]    Discharging Physician / Practitioner: Nemo Silverman  PCP: No primary care provider on file  Admission Date: 9/21/2022  Discharge Date: 10/05/22    Disposition:      Other: Inpatient psych at [facility]      Reason for Admission:  Acute respiratory failure with hypoxia    Discharge Diagnoses:     Principal Problem:    Acute respiratory failure with hypoxia ([facility] [address] )  Active Problems:    Bipolar affective disorder, currently active ([facility] [address] )    Heavy tobacco smoker    Substance Abuse Disorder     Anemia    Sepsis ([facility] [address] )    R/O CAP (community acquired pneumonia)    Bradycardia    Hepatitis C antibody test positive  Resolved Problems:    Elevated LFTs    Altered mental status      Consultations During Hospital Stay:  · IP CONSULT TO CASE MANAGEMENT  · IP CONSULT TO PSYCHIATRY  · IP CONSULT TO PSYCHIATRY  · IP CONSULT TO PSYCHIATRY      Procedures Performed:     · none    Medication Adjustments and Discharge Medications:  · Medication Dosing Tapers - Please refer to Discharge Medication List for details on any medication dosing tapers (if applicable to patient)    · Discharge Medication List: See after visit summary for reconciled discharge medications  Wound Care Recommendations:  When applicable, please see wound care section of After Visit Summary  Diet Recommendations at Discharge:  Diet -        Diet Orders   (From admission, onward)             Start     Ordered    09/25/22 0851  Diet Regular; Regular House  Diet effective now        References:    Nutrtion Support Algorithm Enteral vs  Parenteral   Question Answer Comment   Diet Type Regular    Regular Regular House    Special Instructions Aspiration precautions    RD to adjust diet per protocol? Yes        09/25/22 0850    09/24/22 1147  Dietary nutrition supplements  Once        Question Answer Comment   Select SupplementArron Haymaker - PRO/90 KCALS    Frequency Breakfast        09/24/22 1147    09/24/22 1147  Dietary nutrition supplements  Once        Question Answer Comment   Select Supplement: Gelatein - Fruit Punch - PRO/90 KCALS    Frequency Dinner        09/24/22 1147              Fluid Restriction - No Fluid Restriction at Discharge  Significant Findings / Test Results:     XR chest 1 view portable    Result Date: 9/21/2022  Impression: Development of bilateral opacities likely representing multifocal pneumonia Workstation performed: [id number]     PE Study with CT Abdomen and Pelvis with contrast    Result Date: 9/21/2022  Impression: No pulmonary embolus  Extensive groundglass airspace opacity in the lungs with peripheral sparing  Mediastinal and bilateral hilar lymphadenopathy  The findings are most suspicious for noncardiogenic pulmonary edema  Acute diffuse alveolar hemorrhage could produce this appearance but would be expected to present with severe hemoptysis  Pulmonary vascular congestive changes related to heart failure is also possible but would be expected to present with pleural effusions  No acute abnormality in the abdomen or pelvis   This examination was marked "immediate notification" in Epic in order to begin the standard process by which the radiology reading room liaison alerts the referring practitioner  Workstation performed: [id number]     XR chest portable ICU    Result Date: 9/25/2022  Impression: Much improved but persistent moderate bilateral pulmonary infiltrates  Workstation performed: [id number]     XR chest portable ICU    Result Date: 9/23/2022  Impression: Severe bilateral consolidations are not significantly changed  Workstation performed: [id number]     XR chest portable ICU    Result Date: 9/22/2022  Impression: No change in severe bilateral consolidation from earlier today  Workstation performed: [id number]     XR chest portable ICU    Result Date: 9/22/2022  · Impression: Severe bilateral airspace consolidation, markedly increased from one day earlier  Workstation performed: [id number]       Hospital Course:     Lyn Gan is a 41 y o  female patient who originally presented to the hospital on 9/21/2022 due to altered mental status, dyspnea  Patient was admitted to ICU initially for acute hypoxic respiratory failure possibly secondary to vaping induced lung injury was treated with high-dose steroids  Patient also completed antibiotic course for possible superimposed infection  She will be maintained on a slow steroid taper  Patient had difficulty being discharged as she required home oxygen and is homeless  Subsequently patient became more manic psychiatry consult and recommended inpatient psych evaluation, patient agreeably sign 201  She will be discharged to inpatient psych at [facility] today  Please see above problem list for further details        Condition at Discharge: good     Discharge Day Visit / Exam:     Subjective:  Patient was seen and examined myself, agitated and pacing around the room    Vitals: Blood Pressure: 105/61 (10/05/22 0713)  Pulse: 77 (10/05/22 0713)  Temperature: (!) 97 2 °F (36 2 °C) (10/05/22 0713)  Temp Source: Temporal (10/05/22 0713)  Respirations: 20 (10/05/22 0324)  Height: 5' 2" (157 5 cm) (10/05/22 2172)  Weight - Scale: 70 2 kg (154 lb 12 2 oz) (10/05/22 0536)  SpO2: 95 % (10/05/22 1100)    Physical Exam:    Constitutional: Patient is oriented to person, place and time, no acute distress  HEENT:  Normocephalic, atraumatic  Cardiovascular: Normal S1S2, RRR, No murmurs/rubs/gallops appreciated  Pulmonary:  Bilateral air entry, No rhonchi/rales/wheezing appreciated  Abdominal: Soft, Bowel sounds present, Non-tender, Non-distended  Extremities:  No cyanosis, clubbing or edema  Neurological: Cranial nerves II-XII grossly intact, sensation intact, otherwise no focal neurological symptoms  Discharge instructions/Information to patient and family:   See after visit summary section titled Discharge Instructions for information provided to patient and family  Planned Readmission: no     Discharge Statement:  I spent 35 minutes discharging the patient  This time was spent on the day of discharge  I had direct contact with the patient on the day of discharge  Greater than 50% of the total time was spent examining patient, answering all patient questions, arranging and discussing plan of care with patient as well as directly providing post-discharge instructions  Additional time then spent on discharge activities      ** Please Note: This note has been constructed using a voice recognition system **

## 2022-10-06 PROBLEM — F39 MOOD DISORDER (HCC): Status: ACTIVE | Noted: 2021-12-24

## 2022-10-06 PROBLEM — F41.9 ANXIETY DISORDER, UNSPECIFIED: Status: ACTIVE | Noted: 2022-10-06

## 2022-10-06 PROBLEM — F11.20 OPIOID USE DISORDER, SEVERE, ON MAINTENANCE THERAPY (HCC): Status: ACTIVE | Noted: 2022-10-06

## 2022-10-06 PROCEDURE — 99253 IP/OBS CNSLTJ NEW/EST LOW 45: CPT | Performed by: PHYSICIAN ASSISTANT

## 2022-10-06 PROCEDURE — 99223 1ST HOSP IP/OBS HIGH 75: CPT | Performed by: STUDENT IN AN ORGANIZED HEALTH CARE EDUCATION/TRAINING PROGRAM

## 2022-10-06 RX ADMIN — CLONAZEPAM 1 MG: 1 TABLET ORAL at 16:08

## 2022-10-06 RX ADMIN — CLONAZEPAM 1 MG: 1 TABLET ORAL at 08:23

## 2022-10-06 RX ADMIN — FAMOTIDINE 20 MG: 20 TABLET ORAL at 17:00

## 2022-10-06 RX ADMIN — PREDNISONE 70 MG: 20 TABLET ORAL at 08:24

## 2022-10-06 RX ADMIN — TRAZODONE HYDROCHLORIDE 50 MG: 50 TABLET ORAL at 21:03

## 2022-10-06 RX ADMIN — TOPIRAMATE 125 MG: 100 TABLET, FILM COATED ORAL at 17:00

## 2022-10-06 RX ADMIN — FERROUS SULFATE TAB 325 MG (65 MG ELEMENTAL FE) 325 MG: 325 (65 FE) TAB at 08:24

## 2022-10-06 RX ADMIN — NICOTINE POLACRILEX 4 MG: 4 GUM, CHEWING BUCCAL at 10:30

## 2022-10-06 RX ADMIN — NICOTINE POLACRILEX 4 MG: 4 GUM, CHEWING BUCCAL at 20:45

## 2022-10-06 RX ADMIN — LORAZEPAM 1 MG: 1 TABLET ORAL at 14:33

## 2022-10-06 RX ADMIN — BUPRENORPHINE AND NALOXONE 8 MG: 8; 2 FILM BUCCAL; SUBLINGUAL at 08:23

## 2022-10-06 RX ADMIN — MELATONIN TAB 3 MG 3 MG: 3 TAB at 21:03

## 2022-10-06 RX ADMIN — NICOTINE POLACRILEX 4 MG: 4 GUM, CHEWING BUCCAL at 14:28

## 2022-10-06 RX ADMIN — NICOTINE 21 MG: 21 PATCH, EXTENDED RELEASE TRANSDERMAL at 08:23

## 2022-10-06 RX ADMIN — CLONAZEPAM 1 MG: 1 TABLET ORAL at 21:03

## 2022-10-06 RX ADMIN — IBUPROFEN 400 MG: 400 TABLET ORAL at 17:00

## 2022-10-06 RX ADMIN — GABAPENTIN 600 MG: 300 CAPSULE ORAL at 21:03

## 2022-10-06 RX ADMIN — BUPRENORPHINE AND NALOXONE 8 MG: 8; 2 FILM BUCCAL; SUBLINGUAL at 16:08

## 2022-10-06 RX ADMIN — PANTOPRAZOLE SODIUM 40 MG: 40 TABLET, DELAYED RELEASE ORAL at 08:23

## 2022-10-06 RX ADMIN — GABAPENTIN 600 MG: 300 CAPSULE ORAL at 16:08

## 2022-10-06 RX ADMIN — GABAPENTIN 600 MG: 300 CAPSULE ORAL at 08:23

## 2022-10-06 RX ADMIN — TOPIRAMATE 125 MG: 100 TABLET, FILM COATED ORAL at 08:24

## 2022-10-06 NOTE — PROGRESS NOTES
10/06/22 1300   Activity/Group Checklist   Group   (recovery group)   Attendance Attended  (came late)   Attendance Duration (min) 16-30   Interactions Interacted appropriately   Affect/Mood Appropriate   Goals Achieved Able to listen to others; Able to engage in interactions; Able to self-disclose; Able to recieve feedback; Discussed safety plan;Discussed coping strategies

## 2022-10-06 NOTE — NURSING NOTE
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, Hep C, bipolar affective disorder with manic episodes, KATY, depression, SI and self harm by cutting L-wrist  Wrist scars prominent but fully healed  Pt is on suboxone and has extensive history of polysub abuse including cocaine, crack, heroin, LSD, THC, benzos, and ETOH  Pt smoked 1 5 pack cigarettes/day and vapes 2 cartridges of flavored nicotine; does not want to quit  UDS negative, but pt is highly focused on medications  Pt was admitted to unit 1/2022 as 302 for similar SS  Upon admission, pt is visibly drowsy but demanding "something strong," labile, entitled, demanding, disorganized, and  verbally abusive toward staff and pts in hallway exclaiming loudly, "oh isn't this great, they're all druggies!" Pt making negative remarks about the other pts, food, facility, staff, doctors, medications and more  During skin assessment, pt pulls tissue out of underwear and throws it in sink saying, "Here, this is for you " Per pt "I will not eat this garbage here!"  demanding candy she brought in, educated on unit policy  Pt soon after asked for, and was given, the same snacks she refused  Pt inconsistent historically and frequently contradicts her own demands  Demands to use phone, then proceeded to "leave a message"  after not dialing a full number  Pt denies SI/HI/AH/VH, poor insight and insists she is here "because they thought I didn't have anywhere else to go, but I do!" Pt oriented to unit   PTA meds sent to pharmacy   removed from milieu

## 2022-10-06 NOTE — NURSING NOTE
Patient about the unit  Labile, intrusive, demanding  At the nurses station frequently with a number of requests  Thoughts appear disorganized at times  Speech pressured  Feels that she doesn't need to be here  " I'm going home"  "These fuckin medications don't even help me"
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
## 2022-10-06 NOTE — PLAN OF CARE
Problem: MOBILITY - ADULT  Goal: Maintain or return to baseline ADL function  Description: INTERVENTIONS:  -  Assess patient's ability to carry out ADLs; assess patient's baseline for ADL function and identify physical deficits which impact ability to perform ADLs (bathing, care of mouth/teeth, toileting, grooming, dressing, etc )  - Assess/evaluate cause of self-care deficits   - Assess range of motion  - Assess patient's mobility; develop plan if impaired  - Assess patient's need for assistive devices and provide as appropriate  - Encourage maximum independence but intervene and supervise when necessary  - Involve family in performance of ADLs  - Assess for home care needs following discharge   - Consider OT consult to assist with ADL evaluation and planning for discharge  - Provide patient education as appropriate  Outcome: Adequate for Discharge  Goal: Maintains/Returns to pre admission functional level  Description: INTERVENTIONS:  - Perform BMAT or MOVE assessment daily    - Set and communicate daily mobility goal to care team and patient/family/caregiver  - Collaborate with rehabilitation services on mobility goals if consulted  - Perform Range of Motion 4 times a day  - Reposition patient every 2 hours    - Dangle patient 2 times a day  - Stand patient 2 times a day  - Ambulate patient 2 times a day  - Out of bed to chair 2 times a day   - Out of bed for meals 2 times a day  - Out of bed for toileting  - Record patient progress and toleration of activity level   Outcome: Adequate for Discharge

## 2022-10-06 NOTE — PLAN OF CARE
Problem: DISCHARGE PLANNING  Goal: Discharge to home or other facility with appropriate resources  Description: INTERVENTIONS:  - Identify barriers to discharge w/patient and caregiver  - Arrange for needed discharge resources and transportation as appropriate  - Identify discharge learning needs (meds, wound care, etc )  - Arrange for interpretive services to assist at discharge as needed  - Refer to Case Management Department for coordinating discharge planning if the patient needs post-hospital services based on physician/advanced practitioner order or complex needs related to functional status, cognitive ability, or social support system  Outcome: Not Progressing     Problem: Alteration in Thoughts and Perception  Goal: Attend and participate in unit activities, including therapeutic, recreational, and educational groups  Description: Interventions:  -Encourage Visitation and family involvement in care  Outcome: Not Progressing     Problem: SELF HARM/SUICIDALITY  Goal: Will have no self-injury during hospital stay  Description: INTERVENTIONS:  - Q 15 MINUTES: Routine safety checks  - Q WAKING SHIFT & PRN: Assess risk to determine if routine checks are adequate to maintain patient safety  - Encourage patient to participate actively in care by formulating a plan to combat response to suicidal ideation, identify supports and resources  Outcome: Not Progressing     Problem: DEPRESSION  Goal: Will be euthymic at discharge  Description: INTERVENTIONS:  - Administer medication as ordered  - Provide emotional support via 1:1 interaction with staff  - Encourage involvement in milieu/groups/activities  - Monitor for social isolation  Outcome: Not Progressing     Problem: LUCÍA  Goal: Will exhibit normal sleep and speech and no impulsivity  Description: INTERVENTIONS:  - Administer medication as ordered  - Set limits on impulsive behavior  - Make attempts to decrease external stimuli as possible  Outcome: Not Progressing     Problem: ANXIETY  Goal: Will report anxiety at manageable levels  Description: INTERVENTIONS:  - Administer medication as ordered  - Teach and encourage coping skills  - Provide emotional support  - Assess patient/family for anxiety and ability to cope  Outcome: Not Progressing  Goal: By discharge: Patient will verbalize 2 strategies to deal with anxiety  Description: Interventions:  - Identify any obvious source/trigger to anxiety  - Staff will assist patient in applying identified coping technique/skills  - Encourage attendance of scheduled groups and activities  Outcome: Not Progressing

## 2022-10-06 NOTE — SOCIAL WORK
Pt requested assistance with obtaining phone numbers out of her phone in order to provide them to AYAZ  Pt's phone was located and SW sat with Pt to obtain phone numbers  AYAZ advised Pt the notebook she requested was not in with her belongings and per chart review was actually sent home with her friend  Pt became agitated stating that her wallet was in her backpack and it should have been brought to the hospital and not home with her friend  Pt continued to escalate and was not receptive to verbal redirection from AYAZ  Pt screaming that she is going to call the police because she does not want her friend to have her belongings  AYAZ again attempted verbal redirection and requested assistance from nursing  Pt continued to escalate with presence of nursing, stating she is going to call the police and stating she would like to leave the hospital now  Pt educated on 67 hour notice policy and Pt stating she doesn't want to sign a 72 hour notice because she doesn't want to be here "for a month"  Nursing to continue meeting with pt to obtain phone numbers from her phone and security present on unit

## 2022-10-06 NOTE — ASSESSMENT & PLAN NOTE
Patient is medically cleared for admission to the [facility] for treatment of the underlying psychiatric illness

## 2022-10-06 NOTE — NURSING NOTE
Patient was standing in front of the nurses station when she bumped into the wall and lowered herself to the ground  " The wall ran into me"  No injury noted; VS /79, HR 66  Dr Narcisa Reese as well as Margo Alexis notified  No further treatment required at this time

## 2022-10-06 NOTE — NURSING NOTE
Pt c/o anxiety despite appearing mildly drowsy  Pt demanding benzos "something strong!" nurse educated pt as pt was just given oxazepam an hour before  Pt increasingly upset and agitated  Pt refusing PRN zyprexa and atarax stating "I don't take those, they do nothing!" Pt then accepted PRN atarax which was effective

## 2022-10-06 NOTE — NURSING NOTE
Home medications sent to pharmacy via hospital supervisor  Bag #[id number]    suboxone (13 films), plexaderm (4 blisters), dimenhydrinate (4 tab), gabapentin 400mg (4 cap), diclofenac sodium gel (partial tube), oxazepam 15mg (4 cap)

## 2022-10-06 NOTE — NURSING NOTE
Denies SI/HI/SIB  Denies AVH but can be seen and heard responding to internal stimuli  Appears internally preoccupied often  Continues with labile mood, intrusiveness and frequent demands  Disorganized  Disheveled  Visible on unit  Offers no complaints

## 2022-10-06 NOTE — SOCIAL WORK
Patient Intake   Living Arrangement With a friend   Can patient return home Yes   Address to discharge to [facility] [place]   Patient's Telephone Number [phone]   Patient's e-mail Address [email or web address]   Access to firearms Denies   Type of work [employer]    School grade/year [address]   Marital Status/Children Single, No children   Spirituality/Church Spiritual   Transportation Bus, walking   Preferred [address] [place] [address]   Admission Status    Status of admission [address]    Patient History   Stressor/Trigger Pt reporting increased anxiety, depression  Treatment History [facility]O - 2022  [facility] 21-22  Pyramid -    Current psychiatrist/therapist "CHRISTIAN" Pt stating she does not go to [employer]   Suicide Attempts Denies   Family History of Mental Health Paternal Cousing - Depression and NICHOLAS -  by OD   ACT/ICM States she does through [address] [employer] Denies   Substance Abuse UDS: (-)  Audit Score: 0  Nicotine/Tobacco: 1 5PPD     Trauma/Losses Reports an extensive history but did not wish to discuss details  Releases of Information  Pt became agitated and refused to sign MINNA's for anyone

## 2022-10-06 NOTE — CONSULTS
[address] [facility] 1975, 41 y o  female MRN: [id number]  Unit/Bed#: [facility] 341-01 Encounter: [id number]  Primary Care Provider: No primary care provider on file  Date and time admitted to hospital: 10/5/2022 10:32 PM    Inpatient consult for Medical Clearance for [facility] patient  Consult performed by: Jacob Dumont PA-C  Consult ordered by: Ruthell Hint Holter,           Medical clearance for psychiatric admission  Assessment & Plan  Patient is medically cleared for admission to the [facility] for treatment of the underlying psychiatric illness    Opioid use disorder, severe, on maintenance therapy [facility])  Assessment & Plan  · Management per primary team    Anxiety disorder, unspecified  Assessment & Plan  · Management per primary team    Anemia  Assessment & Plan  · Last hgb on 09/28/22 at 10 4  Last PTA hgb on 12/21 was 14  · MCV 95  · Iron panel on 09/21/22 reveals:  · Iron saturation 6  · Iron 16  · TBIC 249  · Continue ferrous supplementation daily    Heavy tobacco smoker  Assessment & Plan  · Encouraged cessation  · Offer NRT    Acute respiratory failure with hypoxia ([facility] )  Assessment & Plan  · Patient reported to [facility] with shortness of breath on 09/21/22  · Concern for vape related lung injury  · Was seen by pulmonology - appreciate recommendations  · Completed 5 day course of abx   · Transitioned from IV steroids to PO prednisone taper  Currently receiving prednisone 70mg daily and will decrease by 10mg every three days  Expected to complete taper on 10/19/22  · Currently on room air and saturating well - 99%  · Can use albuterol prn    Dyspepsia  Assessment & Plan  · Continue pre hospital pantoprazole 40mg daily and pepcid 20mg bid    * Bipolar disorder, unspecified ([facility] )  Assessment & Plan  · Management per primary team        Counseling / Coordination of Care Time: 45 minutes    Greater than 50% of total time spent on patient counseling and coordination of care  Collaboration of Care: Were Recommendations Directly Discussed with Primary Treatment Team? - No     History of Present Illness:    Jose Barron is a 41 y o  female who is originally admitted to the psychiatry service due to jeff  We are consulted for medical clearance for admission to [address] [place] [address] Unit and treatment of underlying psychiatric illness  This patient was tranferred from [facility]  after being treated for ARF possibly 2/2 to vape related lung injury  She was treated with high dose steroids and completed a course of abx for possible superimposed infection  Patient was manic which increase throughout her admission and psychiatry was consulted and recommended inpatient psychiatric treatment  She was subsequently transferred to the [facility] on 201 voluntary commitment  On evaluation patient denies any physical complaints such as headache, dizziness, chest pain, shortness of breath, nausea/vomiting/diarrhea at this time  Review of Systems:    Review of Systems   Constitutional: Negative for activity change, chills, diaphoresis and fever  HENT: Negative for congestion, rhinorrhea, sinus pressure, sinus pain and sore throat  Eyes: Negative for visual disturbance  Respiratory: Negative for cough, shortness of breath and wheezing  Cardiovascular: Negative for chest pain and palpitations  Gastrointestinal: Negative for abdominal distention, abdominal pain, constipation, diarrhea, nausea and vomiting  Genitourinary: Negative for dysuria, frequency, hematuria and urgency  Musculoskeletal: Negative for arthralgias, back pain and myalgias  Skin: Negative for rash  Neurological: Negative for dizziness, weakness, light-headedness and headaches         Past Medical and Surgical History:     Past Medical History:   Diagnosis Date   • Addiction to drug [facility])    • Alcohol abuse    • Alcoholism ([facility] )    • Altered mental status 09/21/2022   • Elevated LFTs 09/21/2022   • Lower back pain    • Self-injurious behavior    • Substance abuse [facility])        Past Surgical History:   Procedure Laterality Date   • CHOLECYSTECTOMY         Meds/Allergies:    all medications and allergies reviewed    Allergies: Allergies   Allergen Reactions   • Haloperidol Other (See Comments)     oculogyr crisis       Social History:     Marital Status: Single    Substance Use History:   Social History     Substance and Sexual Activity   Alcohol Use Not Currently    Comment: MAGDALENO, pt historically inconsistent  Drinking  per [place] [address]     Social History     Tobacco Use   Smoking Status Current Every Day Smoker   • Packs/day: 1 50   • Years: 20 00   • Pack years: 30 00   • Types: Cigarettes   Smokeless Tobacco Current User     Social History     Substance and Sexual Activity   Drug Use Not Currently   • Types: Heroin, "Crack" cocaine, Cocaine, LSD, Benzodiazepines, Marijuana    Comment: Pt unreliable historian       Family History:    non-contributory    Physical Exam:     Vitals:   Blood Pressure: 114/79 (10/06/22 1015)  Pulse: 66 (10/06/22 1015)  Temperature: (!) 97 °F (36 1 °C) (10/05/22 2232)  Temp Source: Temporal (10/05/22 2232)  Respirations: 16 (10/06/22 1015)  Height: 5' 2" (157 5 cm) (10/05/22 2232)  Weight - Scale: 70 3 kg (155 lb) (10/05/22 2232)  SpO2: 99 % (10/05/22 2232)    Physical Exam  Constitutional:       General: She is not in acute distress  Appearance: Normal appearance  She is not ill-appearing, toxic-appearing or diaphoretic  HENT:      Head: Normocephalic and atraumatic  Nose: Nose normal  No congestion or rhinorrhea  Mouth/Throat:      Mouth: Mucous membranes are moist    Eyes:      General: No scleral icterus  Extraocular Movements: Extraocular movements intact  Cardiovascular:      Rate and Rhythm: Normal rate and regular rhythm  Heart sounds: Normal heart sounds  No murmur heard    Pulmonary:      Effort: Pulmonary effort is normal  No respiratory distress  Breath sounds: Wheezing present  Abdominal:      General: Abdomen is flat  Bowel sounds are normal  There is no distension  Palpations: Abdomen is soft  Tenderness: There is no abdominal tenderness  Musculoskeletal:      Right lower leg: No edema  Left lower leg: No edema  Skin:     General: Skin is warm and dry  Coloration: Skin is not jaundiced  Neurological:      General: No focal deficit present  Mental Status: She is alert and oriented to person, place, and time  Psychiatric:         Speech: Speech is rapid and pressured  Additional Data:     Lab Results: I have personally reviewed pertinent reports  Results from last 7 days   Lab Units 10/03/22  0801 09/30/22  1204   SODIUM mmol/L 140 134*   POTASSIUM mmol/L 3 5 3 9   CHLORIDE mmol/L 104 102   CO2 mmol/L 25 23   BUN mg/dL 16 15   CREATININE mg/dL 1 02 0 74   ANION GAP mmol/L 11 9   CALCIUM mg/dL 8 8 8 7   ALBUMIN g/dL  --  2 6*   TOTAL BILIRUBIN mg/dL  --  0 30   ALK PHOS U/L  --  85   ALT U/L  --  30   AST U/L  --  19   GLUCOSE RANDOM mg/dL 105 127             Lab Results   Component Value Date/Time    HGBA1C 5 2 12/25/2021 08:09 AM           EKG, Pathology, and Other Studies Reviewed on Admission:   · EKG: Last ekg on 09/24/22 shows sinus bradycardia  HR 51, QTc 422    ** Please Note: This note has been constructed using a voice recognition system   **

## 2022-10-06 NOTE — PLAN OF CARE
Problem: DISCHARGE PLANNING  Goal: Discharge to home or other facility with appropriate resources  Description: INTERVENTIONS:  - Identify barriers to discharge w/patient and caregiver  - Arrange for needed discharge resources and transportation as appropriate  - Identify discharge learning needs (meds, wound care, etc )  - Arrange for interpretive services to assist at discharge as needed  - Refer to Case Management Department for coordinating discharge planning if the patient needs post-hospital services based on physician/advanced practitioner order or complex needs related to functional status, cognitive ability, or social support system  Outcome: Progressing     Problem: Alteration in Thoughts and Perception  Goal: Attend and participate in unit activities, including therapeutic, recreational, and educational groups  Description: Interventions:  -Encourage Visitation and family involvement in care  Outcome: Progressing     Problem: SELF HARM/SUICIDALITY  Goal: Will have no self-injury during hospital stay  Description: INTERVENTIONS:  - Q 15 MINUTES: Routine safety checks  - Q WAKING SHIFT & PRN: Assess risk to determine if routine checks are adequate to maintain patient safety  - Encourage patient to participate actively in care by formulating a plan to combat response to suicidal ideation, identify supports and resources  Outcome: Progressing     Problem: DEPRESSION  Goal: Will be euthymic at discharge  Description: INTERVENTIONS:  - Administer medication as ordered  - Provide emotional support via 1:1 interaction with staff  - Encourage involvement in milieu/groups/activities  - Monitor for social isolation  Outcome: Progressing     Problem: LUCÍA  Goal: Will exhibit normal sleep and speech and no impulsivity  Description: INTERVENTIONS:  - Administer medication as ordered  - Set limits on impulsive behavior  - Make attempts to decrease external stimuli as possible  Outcome: Progressing     Problem: ANXIETY  Goal: Will report anxiety at manageable levels  Description: INTERVENTIONS:  - Administer medication as ordered  - Teach and encourage coping skills  - Provide emotional support  - Assess patient/family for anxiety and ability to cope  Outcome: Progressing  Goal: By discharge: Patient will verbalize 2 strategies to deal with anxiety  Description: Interventions:  - Identify any obvious source/trigger to anxiety  - Staff will assist patient in applying identified coping technique/skills  - Encourage attendance of scheduled groups and activities  Outcome: Progressing

## 2022-10-06 NOTE — ASSESSMENT & PLAN NOTE
· Last hgb on 09/28/22 at 10 4   Last PTA hgb on 12/21 was 14  · MCV 95  · Iron panel on 09/21/22 reveals:  · Iron saturation 6  · Iron 16  · TBIC 249  · Continue ferrous supplementation daily

## 2022-10-06 NOTE — PROGRESS NOTES
GARCIA Group Note     10/06/22 1415   Activity/Group Checklist   Group Other (Comment)  (Open discussion about mental health issues and stigmas)   Attendance Attended   Attendance Duration (min) 16-30  (in and out of group)   Interactions Other (Comment)  (Somewhat disorganized and verbally scant, but participated in discussion dealing with coping skills)   Affect/Mood Other (Comment)  (Liable with irritable edge)   Goals Achieved Discussed coping strategies; Able to listen to others; Able to engage in interactions; Able to reflect/comment on own behavior;Able to self-disclose; Able to recieve feedback; Able to give feedback to another

## 2022-10-06 NOTE — PROGRESS NOTES
Called and spoke to patient. Patient stated he had a Mohs procedure done on 12/20, and is worried he may have an infection. Patient stated he is not experiencing pain, drainage, or any kind of smell from surgical site. Scheduled future wound check appointment. Patient voiced understanding.    Arlene RN-BSN  [facility]  [phone]   Patient discharged, her visitor took all belongings, medications that were at pharmacy were given to transport  Visitor took belongings that were brought up from security   vape that was in med box was given to him as well

## 2022-10-06 NOTE — TREATMENT PLAN
TREATMENT PLAN REVIEW - [address] 41 y o  1975 female MRN: [id number]    [address] [address] Room / Bed: [place] [address]/[facility] 341-01 Encounter: [id number]          Admit Date/Time:  10/5/2022 10:32 PM    Treatment Team: Attending Provider: Lindy Valladares MD; Consulting Physician: Isabel Grewal PA-C; Care Manager: Renee Santos, [facility];  Registered Nurse: Alverto Benavides RN; Registered Nurse: Sofia Graham RN; Patient Care Assistant: Javed Edmondson; : Rodney Moreno; Medical Student: Ann Alfaro; Patient Care Technician: Nichole Goss; Care Coordinator: Elida Gleason RN    Diagnosis: Principal Problem:    Bipolar disorder, unspecified ([facility] )  Active Problems:    Medical clearance for psychiatric admission    Dyspepsia    Acute respiratory failure with hypoxia ([facility] )    Heavy tobacco smoker    Anxiety disorder, unspecified    Opioid use disorder, severe, on maintenance therapy [facility])      Patient Strengths/Assets: communication skills, negotiates basic needs, patient is on a voluntary commitment, work skills    Patient Barriers/Limitations: difficulty adapting, patient is unwilling to work on problems, poor interpersonal skills, resistance to treatment, substance abuse    Short Term Goals: decrease in anxiety symptoms, decrease in level of agitation, ability to stay safe on the unit, improvement in insight    Long Term Goals: improvement in anxiety, stabilization of mood, free of suicidal thoughts, free of homicidal thoughts, improvement in reality testing, improved insight, acceptance of need for psychiatric medications, acceptance of need for psychiatric treatment    Progress Towards Goals: continue psychiatric medications as prescribed    Recommended Treatment: medication management, patient medication education, group therapy, milieu therapy, continued Behavioral Health psychiatric evaluation/assessment process    Treatment Frequency: daily medication monitoring, group and milieu therapy daily, monitoring through interdisciplinary rounds, monitoring through weekly patient care conferences    Expected Discharge Date:  5 days    Discharge Plan: referral for outpatient medication management with a psychiatrist, referral for outpatient psychotherapy    Treatment Plan Created/Updated By: Micky Beltrán MD

## 2022-10-06 NOTE — ASSESSMENT & PLAN NOTE
· Patient reported to [facility] with shortness of breath on 09/21/22  · Concern for vape related lung injury  · Was seen by pulmonology - appreciate recommendations  · Completed 5 day course of abx   · Transitioned from IV steroids to PO prednisone taper  Currently receiving prednisone 70mg daily and will decrease by 10mg every three days  Expected to complete taper on 10/19/22    · Currently on room air and saturating well - 99%  · Can use albuterol prn

## 2022-10-06 NOTE — PROGRESS NOTES
Met with Tatianna Andres to complete her admission self assessment twice  The first time she was irritable demanding to leave and stating other patients are rapist  She was obsessed about her belongings  The second time she was more pleasant focused on wanting to create a program for autistic non verbal children talking about her niece, and her passions in life  She does not quite answer the questions instead changes them to fit her answers  While we were talking she appeared paranoid with psychosis  She also has the tendency to focus on one thought and have difficulty letting go of it

## 2022-10-07 PROBLEM — F13.10 SEDATIVE OR HYPNOTIC ABUSE (HCC): Status: ACTIVE | Noted: 2022-10-07

## 2022-10-07 LAB
AMPHETAMINES SERPL QL SCN: NEGATIVE
ANION GAP SERPL CALCULATED.3IONS-SCNC: 6 MMOL/L (ref 5–14)
BARBITURATES UR QL: NEGATIVE
BASOPHILS # BLD AUTO: 0.04 THOUSANDS/ΜL (ref 0–0.1)
BASOPHILS NFR BLD AUTO: 0 % (ref 0–1)
BENZODIAZ UR QL: POSITIVE
BUN SERPL-MCNC: 13 MG/DL (ref 5–25)
CALCIUM SERPL-MCNC: 8.6 MG/DL (ref 8.4–10.2)
CHLORIDE SERPL-SCNC: 103 MMOL/L (ref 96–108)
CHOLEST SERPL-MCNC: 233 MG/DL
CO2 SERPL-SCNC: 27 MMOL/L (ref 21–32)
COCAINE UR QL: NEGATIVE
CREAT SERPL-MCNC: 0.82 MG/DL (ref 0.6–1.2)
EOSINOPHIL # BLD AUTO: 0.03 THOUSAND/ΜL (ref 0–0.61)
EOSINOPHIL NFR BLD AUTO: 0 % (ref 0–6)
ERYTHROCYTE [DISTWIDTH] IN BLOOD BY AUTOMATED COUNT: 13.2 % (ref 11.6–15.1)
GFR SERPL CREATININE-BSD FRML MDRD: 86 ML/MIN/1.73SQ M
GLUCOSE P FAST SERPL-MCNC: 96 MG/DL (ref 70–99)
GLUCOSE SERPL-MCNC: 96 MG/DL (ref 70–99)
HCT VFR BLD AUTO: 35.8 % (ref 34.8–46.1)
HDLC SERPL-MCNC: 58 MG/DL
HGB BLD-MCNC: 10.6 G/DL (ref 11.5–15.4)
IMM GRANULOCYTES # BLD AUTO: 0.19 THOUSAND/UL (ref 0–0.2)
IMM GRANULOCYTES NFR BLD AUTO: 1 % (ref 0–2)
LDLC SERPL CALC-MCNC: 132 MG/DL
LYMPHOCYTES # BLD AUTO: 4.81 THOUSANDS/ΜL (ref 0.6–4.47)
LYMPHOCYTES NFR BLD AUTO: 29 % (ref 14–44)
MCH RBC QN AUTO: 27.9 PG (ref 26.8–34.3)
MCHC RBC AUTO-ENTMCNC: 29.6 G/DL (ref 31.4–37.4)
MCV RBC AUTO: 94 FL (ref 82–98)
METHADONE UR QL: NEGATIVE
MONOCYTES # BLD AUTO: 1.07 THOUSAND/ΜL (ref 0.17–1.22)
MONOCYTES NFR BLD AUTO: 6 % (ref 4–12)
NEUTROPHILS # BLD AUTO: 10.68 THOUSANDS/ΜL (ref 1.85–7.62)
NEUTS SEG NFR BLD AUTO: 64 % (ref 43–75)
NONHDLC SERPL-MCNC: 175 MG/DL
NRBC BLD AUTO-RTO: 0 /100 WBCS
OPIATES UR QL SCN: NEGATIVE
OXYCODONE+OXYMORPHONE UR QL SCN: NEGATIVE
PCP UR QL: NEGATIVE
PLATELET # BLD AUTO: 414 THOUSANDS/UL (ref 149–390)
PMV BLD AUTO: 8.5 FL (ref 8.9–12.7)
POTASSIUM SERPL-SCNC: 3.7 MMOL/L (ref 3.5–5.3)
RBC # BLD AUTO: 3.8 MILLION/UL (ref 3.81–5.12)
SODIUM SERPL-SCNC: 136 MMOL/L (ref 135–147)
THC UR QL: NEGATIVE
TRIGL SERPL-MCNC: 214 MG/DL
TSH SERPL DL<=0.05 MIU/L-ACNC: 4.35 UIU/ML (ref 0.45–4.5)
WBC # BLD AUTO: 16.82 THOUSAND/UL (ref 4.31–10.16)

## 2022-10-07 PROCEDURE — 85025 COMPLETE CBC W/AUTO DIFF WBC: CPT | Performed by: STUDENT IN AN ORGANIZED HEALTH CARE EDUCATION/TRAINING PROGRAM

## 2022-10-07 PROCEDURE — 80048 BASIC METABOLIC PNL TOTAL CA: CPT | Performed by: STUDENT IN AN ORGANIZED HEALTH CARE EDUCATION/TRAINING PROGRAM

## 2022-10-07 PROCEDURE — 80061 LIPID PANEL: CPT | Performed by: STUDENT IN AN ORGANIZED HEALTH CARE EDUCATION/TRAINING PROGRAM

## 2022-10-07 PROCEDURE — 84443 ASSAY THYROID STIM HORMONE: CPT | Performed by: STUDENT IN AN ORGANIZED HEALTH CARE EDUCATION/TRAINING PROGRAM

## 2022-10-07 PROCEDURE — 80307 DRUG TEST PRSMV CHEM ANLYZR: CPT

## 2022-10-07 PROCEDURE — 99232 SBSQ HOSP IP/OBS MODERATE 35: CPT | Performed by: STUDENT IN AN ORGANIZED HEALTH CARE EDUCATION/TRAINING PROGRAM

## 2022-10-07 RX ORDER — ARIPIPRAZOLE 15 MG/1
15 TABLET ORAL DAILY
Status: DISCONTINUED | OUTPATIENT
Start: 2022-10-07 | End: 2022-10-11 | Stop reason: HOSPADM

## 2022-10-07 RX ORDER — VENLAFAXINE HYDROCHLORIDE 37.5 MG/1
37.5 CAPSULE, EXTENDED RELEASE ORAL DAILY
Status: DISCONTINUED | OUTPATIENT
Start: 2022-10-07 | End: 2022-10-11 | Stop reason: HOSPADM

## 2022-10-07 RX ADMIN — ARIPIPRAZOLE 15 MG: 15 TABLET ORAL at 10:36

## 2022-10-07 RX ADMIN — MELATONIN TAB 3 MG 3 MG: 3 TAB at 21:26

## 2022-10-07 RX ADMIN — NICOTINE POLACRILEX 4 MG: 4 GUM, CHEWING BUCCAL at 02:31

## 2022-10-07 RX ADMIN — FAMOTIDINE 20 MG: 20 TABLET ORAL at 08:10

## 2022-10-07 RX ADMIN — FERROUS SULFATE TAB 325 MG (65 MG ELEMENTAL FE) 325 MG: 325 (65 FE) TAB at 08:10

## 2022-10-07 RX ADMIN — PANTOPRAZOLE SODIUM 40 MG: 40 TABLET, DELAYED RELEASE ORAL at 08:10

## 2022-10-07 RX ADMIN — NICOTINE POLACRILEX 4 MG: 4 GUM, CHEWING BUCCAL at 16:01

## 2022-10-07 RX ADMIN — VENLAFAXINE HYDROCHLORIDE 37.5 MG: 37.5 CAPSULE, EXTENDED RELEASE ORAL at 10:36

## 2022-10-07 RX ADMIN — TOPIRAMATE 125 MG: 100 TABLET, FILM COATED ORAL at 08:10

## 2022-10-07 RX ADMIN — BUPRENORPHINE AND NALOXONE 8 MG: 8; 2 FILM BUCCAL; SUBLINGUAL at 16:01

## 2022-10-07 RX ADMIN — PREDNISONE 70 MG: 20 TABLET ORAL at 08:09

## 2022-10-07 RX ADMIN — TOPIRAMATE 125 MG: 100 TABLET, FILM COATED ORAL at 17:27

## 2022-10-07 RX ADMIN — LORAZEPAM 1 MG: 1 TABLET ORAL at 10:39

## 2022-10-07 RX ADMIN — GABAPENTIN 600 MG: 300 CAPSULE ORAL at 21:26

## 2022-10-07 RX ADMIN — TRAZODONE HYDROCHLORIDE 50 MG: 50 TABLET ORAL at 21:26

## 2022-10-07 RX ADMIN — CLONAZEPAM 1 MG: 1 TABLET ORAL at 16:01

## 2022-10-07 RX ADMIN — GABAPENTIN 600 MG: 300 CAPSULE ORAL at 08:08

## 2022-10-07 RX ADMIN — NICOTINE 21 MG: 21 PATCH, EXTENDED RELEASE TRANSDERMAL at 08:08

## 2022-10-07 RX ADMIN — CLONAZEPAM 1 MG: 1 TABLET ORAL at 21:26

## 2022-10-07 RX ADMIN — NICOTINE POLACRILEX 4 MG: 4 GUM, CHEWING BUCCAL at 10:42

## 2022-10-07 RX ADMIN — BUPRENORPHINE AND NALOXONE 8 MG: 8; 2 FILM BUCCAL; SUBLINGUAL at 08:08

## 2022-10-07 RX ADMIN — NICOTINE POLACRILEX 4 MG: 4 GUM, CHEWING BUCCAL at 08:08

## 2022-10-07 RX ADMIN — GABAPENTIN 600 MG: 300 CAPSULE ORAL at 16:01

## 2022-10-07 RX ADMIN — FAMOTIDINE 20 MG: 20 TABLET ORAL at 17:27

## 2022-10-07 RX ADMIN — CLONAZEPAM 1 MG: 1 TABLET ORAL at 08:09

## 2022-10-07 NOTE — NURSING NOTE
Pt visibly anxious with severe anxiety, exhibiting tangential thinking, pressured speech and racing thoughts  1mg of PO ativan administered  Given first dose of effexor and abilify, education provided  Nicotine gum given for cigarette cravings  Now in group, will continue to monitor

## 2022-10-07 NOTE — PROGRESS NOTES
10/07/22 1209   Team Meeting   Meeting Type Daily Rounds   Team Members Present   Team Members Present Physician;Nurse;   Physician Team Member Madan Crabtree   [facility]   Care Management Team Member Hasrha   Patient/Family Present   Patient Present No   Patient's Family Present No   Pt is medication compliant  Remains disorganized, manic and reporting increased anxiety  Pt received PRN Ativan  Discharge to be determined

## 2022-10-07 NOTE — NURSING NOTE
Remains labile  Requires frequent redirection  Talking about staff as well as the hospital and peers on the unit  Medication focused  Disorganized  Observed talking to self while standing in the hallway  Compliant with first dose of Abilify and Effexor

## 2022-10-07 NOTE — PROGRESS NOTES
Progress Note - Behavioral Health   Calvin Ear 41 y o  female MRN: [id number]  Unit/Bed#: [facility] 341-01 Encounter: [id number]    Assessment/Plan   Principal Problem:    Bipolar disorder, unspecified ([place] [id number] )  Active Problems:    Anxiety disorder, unspecified    Heavy tobacco smoker    Opioid use disorder, severe, on maintenance therapy (HCC)    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[place] [id number] )    Medical clearance for psychiatric admission    Dyspepsia    Acute respiratory failure with hypoxia ([place] [id number] )    Anemia    Recommended Treatment:   Start Abilify 15 mg daily for mood stabilization  Start Effexor XR 37 5 mg daily for anxiety and mood  Suboxone 8mg BID for OUD  Klonopin 1mg TID for anxiety  Gabapentin 600mg TID for anxiety  Topamax 125mg BID for mood  Trazodone 50mg HS for insomnia  Nutrition consult placed  All current active medications have been reviewed  Encourage group therapy, milieu therapy and occupational therapy  Behavioral Health checks every 7 minutes  Medical management per SLIM    Legal Status: 201  ----------------------------------------      Subjective:  Per nursing report, yesterday patient was labile, intrusive, demanding, derogatory and making frequent requests at the nursing station  She had discussion with social Work yesterday and she became agitated, yelling about calling the police because she did not have specific belongings and stated that she wanted to leave the hospital   She did receive Ativan 1 mg p o  for anxiety  In the evening time she appeared sedated and was slurring words, made statements about having “something in her room"; a toxicology screen was ordered and urine drug screen was positive for benzodiazepines  On examination, patient continues with rapid speech, circumstantial thought process  She is difficult to follow in requires redirection  She is restless, often pacing about her room    She reports that she had some decreased sleep last night, reports that this was mostly due to back pain and requests having a medical bed here on the unit  It was explained that it is unlikely that this could be accommodated  She claims that she slept for approximately half an hour last night and feels tired and unrested today  She reports her appetite is decreased, requesting dietary consultation and reporting that she must eat a specific amount of raisins combined with cinnamon/brown sugar  She is very perseverative on appetite and weight gain  She remains demanding, intrusive, and disparaging at times  Reporting that the books on this unit are “smut" because they are all Mary [employer] Kellee  She denies any SI, HI, AVH  Reports she is future oriented, was able to get in contact with her social workers yesterday and reports that 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 for which she is very excited about  We discussed the benefit of adding a mood stabilizing medication or antipsychotic for her manic symptoms  Ideally she would be a good candidate for Caplyta or Abilify but she is so opposed to anything that has potential for weight gain  She stated she was willing to try Effexor for anxiety symptoms and I advised that this would only be provided if she agreed to a mood stabilizing medication  She did agree to taking Abilify after this and the risk/benefit profile was described  She still is bargaining about increased benzodiazepine dosing but eventually agreed to keep her benzodiazepines at the same dose      Behavior over the last 24 hours:  unchanged  Sleep: insomnia  Appetite: poor  Medication side effects: No  ROS: no complaints    Mental Status Evaluation:  Appearance:  adequate grooming, dressed in hospital attire, looks older than stated age   Behavior:  demanding, restless and fidgety, sarcastic, responds to redirection   Speech:  increased rate, hypertalkative   Mood:  anxious   Affect:  increased in intensity   Thought Process:  circumstantial, increased rate of thoughts   Associations: circumstantial associations   Thought Content:  mild paranoia   Perceptual Disturbances: no auditory hallucinations, no visual hallucinations, does not appear responding to internal stimuli   Risk Potential: Suicidal ideation - None at present  Homicidal ideation - None at present  Potential for aggression - No   Sensorium:  oriented to person, place and time/date   Memory:  recent and remote memory grossly intact   Consciousness:  alert and awake   Attention/Concentration: decreased concentration and decreased attention span   Insight:  limited   Judgment: limited   Gait/Station: normal gait/station   Motor Activity: no abnormal movements     Medications: all current active meds have been reviewed and continue current psychiatric medications    Current Facility-Administered Medications   Medication Dose Route Frequency Provider Last Rate   • acetaminophen  650 mg Oral Q6H PRN [facility] Holter, DO     • albuterol  2 puff Inhalation Q4H PRN [facility] Holter, DO     • ARIPiprazole  15 mg Oral Daily Lennox Norton, MD     • benztropine  1 mg Intramuscular Q4H PRN Max 6/day [facility] Holter, DO     • benztropine  1 mg Oral Q4H PRN Max 6/day [facility] Holter, DO     • buprenorphine-naloxone  8 mg Sublingual BID [facility] Holter, DO     • clonazePAM  1 mg Oral TID [facility] Holter, DO     • famotidine  20 mg Oral BID [facility] Holter, DO     • ferrous sulfate  325 mg Oral Daily With Breakfast [facility] Holter, DO     • gabapentin  600 mg Oral TID [facility] Holter, DO     • hydrOXYzine HCL  25 mg Oral Q6H PRN Max 4/day [facility] Holter, DO     • hydrOXYzine HCL  50 mg Oral Q4H PRN Max 4/day [facility] Holter, DO      Or   • LORazepam  1 mg Intramuscular Q4H PRN [facility] Holter, DO     • ibuprofen  400 mg Oral Q6H PRN [facility] Holter, DO     • LORazepam  1 mg Oral Q4H PRN Max 6/day [facility] Holter, DO      Or   • LORazepam  2 mg Intramuscular Q6H PRN Max 3/day [facility] Holter, DO     • melatonin  3 mg Oral HS [facility] Holter, DO     • methocarbamol  750 mg Oral Q6H PRN [facility] Holter, DO     • nicotine  21 mg Transdermal Daily [facility] Holter, DO     • nicotine polacrilex  4 mg Oral Q2H PRN Rafael Malone MD     • OLANZapine  10 mg Oral Q3H PRN Max 3/day [facility] Holter, DO      Or   • OLANZapine  10 mg Intramuscular Q3H PRN Max 3/day [facility] Holter, DO     • OLANZapine  5 mg Oral Q3H PRN Max 6/day [facility] Holter, DO      Or   • OLANZapine  5 mg Intramuscular Q3H PRN Max 6/day [facility] Holter, DO     • OLANZapine  2 5 mg Oral Q3H PRN Max 8/day [facility] Holter, DO     • ondansetron  4 mg Oral Q6H PRN [facility] Holter, DO     • pantoprazole  40 mg Oral Early Morning [facility] Holter, DO     • [START ON 10/8/2022] predniSONE  60 mg Oral Daily [facility] Holter, DO      Followed by   • [START ON 10/11/2022] predniSONE  50 mg Oral Daily [facility] Holter, DO      Followed by   • [START ON 10/14/2022] predniSONE  40 mg Oral Daily [facility] Holter, DO      Followed by   • [START ON 10/17/2022] predniSONE  30 mg Oral Daily [facility] Holter, DO     • senna-docusate sodium  1 tablet Oral Daily PRN [facility] Holter, DO     • topiramate  125 mg Oral BID [facility] Holter, DO     • traZODone  50 mg Oral HS [facility] Holter, DO     • venlafaxine  37 5 mg Oral Daily Rafael Malone MD         Labs:  I have personally reviewed all pertinent laboratory/tests results  Most Recent Labs:   Lab Results   Component Value Date    WBC 16 82 (H) 10/07/2022    RBC 3 80 (L) 10/07/2022    HGB 10 6 (L) 10/07/2022    HCT 35 8 10/07/2022     (H) 10/07/2022    RDW 13 2 10/07/2022    NEUTROABS 10 68 (H) 10/07/2022    TOTANEUTABS 7 25 09/27/2022    SODIUM 136 10/07/2022    K 3 7 10/07/2022     10/07/2022    CO2 27 10/07/2022    BUN 13 10/07/2022    CREATININE 0 82 10/07/2022    GLUC 96 10/07/2022    CALCIUM 8 6 10/07/2022    AST 19 09/30/2022    ALT 30 09/30/2022    ALKPHOS 85 09/30/2022    TP 6 8 09/30/2022    ALB 2 6 (L) 09/30/2022    TBILI 0 30 09/30/2022 CHOLESTEROL 233 (H) 10/07/2022    HDL 58 10/07/2022    TRIG 214 (H) 10/07/2022    LDLCALC 132 (H) 10/07/2022    NONHDLC 175 10/07/2022    XOJ3DXSXRYHT 4 350 10/07/2022    PREGUR negative 09/21/2022    RPR Non-Reactive 12/25/2021    HGBA1C 5 2 12/25/2021     12/25/2021     Drug Screen:   Lab Results   Component Value Date    AMPMETHUR Negative 10/07/2022    BARBTUR Negative 10/07/2022    BDZUR Positive (A) 10/07/2022    THCUR Negative 10/07/2022    COCAINEUR Negative 10/07/2022    METHADONEUR Negative 10/07/2022    OPIATEUR Negative 10/07/2022    PCPUR Negative 10/07/2022       Progress Toward Goals: no significant progress    Risks / Benefits of Treatment:    Risks, benefits, and possible side effects of medications explained to patient and patient verbalizes understanding and agreement for treatment  Counseling / Coordination of Care: Total floor / unit time spent today 40 minutes  Greater than 50% of total time was spent with the patient and / or family counseling and / or coordination of care  A description of counseling / coordination of care:  Patient's progress discussed with staff in treatment team meeting  Medications, treatment progress and treatment plan reviewed with patient  Recent stressors including drug and alcohol use problems, family problems, financial problems and health issues discussed with patient  Educated on importance of medication and treatment compliance  Reassurance and supportive therapy provided  Encouraged participation in milieu and group therapy on the unit      Cris Sumner MD 10/07/22

## 2022-10-07 NOTE — NURSING NOTE
Patient complaining the hospital has no cinnamein and demanding somebody go to the [place] or to the store to get her some  Patient states "I only eat raisins and cinnamein and Im not going to eat  Eating is a chore and takes to much energy "  Patient is tangential with pressured speech, irritable, and exhibits flight of ideas

## 2022-10-07 NOTE — PROGRESS NOTES
10/07/22 1518   Team Meeting   Meeting Type Tx Team Meeting   Initial Conference Date 10/07/22   Team Members Present   Team Members Present Physician;Nurse;   Physician Team Member Francis Bowman   Nursing Team Member Miya   Care Management Team Member Harsha   Patient/Family Present   Patient Present Yes   Patient's Family Present No   Tx plan was reviewed and discussed with Pt  Pt was encouraged to attend groups  Medication was discussed with Pt  Pt refused to sign Tx plan

## 2022-10-07 NOTE — MALNUTRITION/BMI
This medical record reflects one or more clinical indicators suggestive of malnutrition     Malnutrition Findings:   Adult Malnutrition type: Unknown (comment)  Adult Degree of Malnutrition: Malnutrition of mild degree  Malnutrition Characteristics: Inadequate energy, Weight loss         360 Statement: Pt presents with mild malnutrition related to psychiatric illness as evidenced by 15 3% weight loss x9 months (183# 1/19/22, 155# 10/7/22), intake meeting <75% of estimated needs; to be treated with regular diet and addition of Gelatein protein supplement TID with meals    BMI Findings: Body mass index is 28 35 kg/m²  See Nutrition note dated 10/7/22 for additional details  Completed nutrition assessment is viewable in the nutrition documentation

## 2022-10-07 NOTE — NURSING NOTE
Pt approached nursing station and appeared sedated  Staggering, slurring words and drifting to sleep while speaking  Pt was asked to return to her room d/t her unsteady gait  Previous shift MHT reported overhearing pt speaking of having "something" in her room she would get in trouble for  Pt escorted to room and two person belongings/ room check completed  On call provider notified of pt behaviors  N/O for toxicology screen and daily labs completed at this time  Pt compliant with search

## 2022-10-07 NOTE — PROGRESS NOTES
10/07/22 1020   Activity/Group Checklist   Group Other (Comment)  (OPEN STUDIO Art Therapy/Social Group, Free Expression)   Attendance Attended   Attendance Duration (min) 46-60   Interactions Disorganized interaction  (Tangential, reactive and regressive interactions)   Affect/Mood Wide  (Emotionally unreasonable)   Goals Achieved Able to listen to others; Able to engage in interactions

## 2022-10-08 PROBLEM — E44.1 MILD PROTEIN-CALORIE MALNUTRITION (HCC): Status: ACTIVE | Noted: 2022-10-08

## 2022-10-08 PROCEDURE — 99232 SBSQ HOSP IP/OBS MODERATE 35: CPT | Performed by: PSYCHIATRY & NEUROLOGY

## 2022-10-08 RX ORDER — CLONAZEPAM 1 MG/1
1 TABLET ORAL 3 TIMES DAILY
Status: DISCONTINUED | OUTPATIENT
Start: 2022-10-08 | End: 2022-10-11 | Stop reason: HOSPADM

## 2022-10-08 RX ORDER — CLONAZEPAM 1 MG/1
1 TABLET ORAL 3 TIMES DAILY
Status: DISCONTINUED | OUTPATIENT
Start: 2022-10-08 | End: 2022-10-08

## 2022-10-08 RX ADMIN — VENLAFAXINE HYDROCHLORIDE 37.5 MG: 37.5 CAPSULE, EXTENDED RELEASE ORAL at 08:52

## 2022-10-08 RX ADMIN — NICOTINE POLACRILEX 4 MG: 4 GUM, CHEWING BUCCAL at 16:44

## 2022-10-08 RX ADMIN — FAMOTIDINE 20 MG: 20 TABLET ORAL at 08:53

## 2022-10-08 RX ADMIN — GABAPENTIN 600 MG: 300 CAPSULE ORAL at 15:01

## 2022-10-08 RX ADMIN — TOPIRAMATE 125 MG: 100 TABLET, FILM COATED ORAL at 17:10

## 2022-10-08 RX ADMIN — CLONAZEPAM 1 MG: 1 TABLET ORAL at 15:00

## 2022-10-08 RX ADMIN — BUPRENORPHINE AND NALOXONE 8 MG: 8; 2 FILM BUCCAL; SUBLINGUAL at 08:52

## 2022-10-08 RX ADMIN — PANTOPRAZOLE SODIUM 40 MG: 40 TABLET, DELAYED RELEASE ORAL at 06:52

## 2022-10-08 RX ADMIN — CLONAZEPAM 1 MG: 1 TABLET ORAL at 21:05

## 2022-10-08 RX ADMIN — PREDNISONE 60 MG: 20 TABLET ORAL at 08:52

## 2022-10-08 RX ADMIN — NICOTINE 21 MG: 21 PATCH, EXTENDED RELEASE TRANSDERMAL at 08:52

## 2022-10-08 RX ADMIN — FERROUS SULFATE TAB 325 MG (65 MG ELEMENTAL FE) 325 MG: 325 (65 FE) TAB at 08:53

## 2022-10-08 RX ADMIN — GABAPENTIN 600 MG: 300 CAPSULE ORAL at 08:52

## 2022-10-08 RX ADMIN — METHOCARBAMOL 750 MG: 750 TABLET, FILM COATED ORAL at 17:20

## 2022-10-08 RX ADMIN — MELATONIN TAB 3 MG 3 MG: 3 TAB at 21:05

## 2022-10-08 RX ADMIN — TOPIRAMATE 125 MG: 100 TABLET, FILM COATED ORAL at 08:53

## 2022-10-08 RX ADMIN — TRAZODONE HYDROCHLORIDE 50 MG: 50 TABLET ORAL at 21:05

## 2022-10-08 RX ADMIN — NICOTINE POLACRILEX 4 MG: 4 GUM, CHEWING BUCCAL at 11:57

## 2022-10-08 RX ADMIN — GABAPENTIN 600 MG: 300 CAPSULE ORAL at 21:05

## 2022-10-08 RX ADMIN — BUPRENORPHINE AND NALOXONE 8 MG: 8; 2 FILM BUCCAL; SUBLINGUAL at 15:01

## 2022-10-08 RX ADMIN — CLONAZEPAM 1 MG: 1 TABLET ORAL at 09:26

## 2022-10-08 RX ADMIN — IBUPROFEN 400 MG: 400 TABLET ORAL at 17:20

## 2022-10-08 RX ADMIN — NICOTINE POLACRILEX 4 MG: 4 GUM, CHEWING BUCCAL at 19:19

## 2022-10-08 RX ADMIN — FAMOTIDINE 20 MG: 20 TABLET ORAL at 17:10

## 2022-10-08 RX ADMIN — ARIPIPRAZOLE 15 MG: 15 TABLET ORAL at 08:53

## 2022-10-08 NOTE — NURSING NOTE
PT observed labile, demanding PRN  Medications, but heavy dowry while attempting to eat her dinner, frequent waking PT to finish her meal  PT denies SI/HI/VH/AH, meds and meal compliant

## 2022-10-08 NOTE — PLAN OF CARE
Problem: DISCHARGE PLANNING  Goal: Discharge to home or other facility with appropriate resources  Description: INTERVENTIONS:  - Identify barriers to discharge w/patient and caregiver  - Arrange for needed discharge resources and transportation as appropriate  - Identify discharge learning needs (meds, wound care, etc )  - Arrange for interpretive services to assist at discharge as needed  - Refer to Case Management Department for coordinating discharge planning if the patient needs post-hospital services based on physician/advanced practitioner order or complex needs related to functional status, cognitive ability, or social support system  Outcome: Progressing     Problem: SELF HARM/SUICIDALITY  Goal: Will have no self-injury during hospital stay  Description: INTERVENTIONS:  - Q 15 MINUTES: Routine safety checks  - Q WAKING SHIFT & PRN: Assess risk to determine if routine checks are adequate to maintain patient safety  - Encourage patient to participate actively in care by formulating a plan to combat response to suicidal ideation, identify supports and resources  Outcome: Progressing     Problem: ANXIETY  Goal: Will report anxiety at manageable levels  Description: INTERVENTIONS:  - Administer medication as ordered  - Teach and encourage coping skills  - Provide emotional support  - Assess patient/family for anxiety and ability to cope  Outcome: Progressing  Goal: By discharge: Patient will verbalize 2 strategies to deal with anxiety  Description: Interventions:  - Identify any obvious source/trigger to anxiety  - Staff will assist patient in applying identified coping technique/skills  - Encourage attendance of scheduled groups and activities  Outcome: Progressing     Problem: Nutrition/Hydration-ADULT  Goal: Nutrient/Hydration intake appropriate for improving, restoring or maintaining nutritional needs  Description: Monitor and assess patient's nutrition/hydration status for malnutrition   Collaborate with interdisciplinary team and initiate plan and interventions as ordered  Monitor patient's weight and dietary intake as ordered or per policy  Utilize nutrition screening tool and intervene as necessary  Determine patient's food preferences and provide high-protein, high-caloric foods as appropriate       INTERVENTIONS:  - Monitor oral intake, urinary output, labs, and treatment plans  - Assess nutrition and hydration status and recommend course of action  - Evaluate amount of meals eaten  - Assist patient with eating if necessary   - Allow adequate time for meals  - Recommend/ encourage appropriate diets, oral nutritional supplements, and vitamin/mineral supplements  - Order, calculate, and assess calorie counts as needed  - Recommend, monitor, and adjust tube feedings and TPN/PPN based on assessed needs  - Assess need for intravenous fluids  - Provide specific nutrition/hydration education as appropriate  - Include patient/family/caregiver in decisions related to nutrition  Outcome: Progressing     Problem: Alteration in Thoughts and Perception  Goal: Attend and participate in unit activities, including therapeutic, recreational, and educational groups  Description: Interventions:  -Encourage Visitation and family involvement in care  Outcome: Not Progressing     Problem: DEPRESSION  Goal: Will be euthymic at discharge  Description: INTERVENTIONS:  - Administer medication as ordered  - Provide emotional support via 1:1 interaction with staff  - Encourage involvement in milieu/groups/activities  - Monitor for social isolation  Outcome: Not Progressing     Problem: LUCÍA  Goal: Will exhibit normal sleep and speech and no impulsivity  Description: INTERVENTIONS:  - Administer medication as ordered  - Set limits on impulsive behavior  - Make attempts to decrease external stimuli as possible  Outcome: Not Progressing     Problem: Ineffective Coping  Goal: Participates in unit activities  Description: Interventions:  - Provide therapeutic environment   - Provide required programming   - Redirect inappropriate behaviors   Outcome: Not Progressing

## 2022-10-08 NOTE — NURSING NOTE
Wendyjanee Mew is superficially pleasant upon approach  She depression, and anxiety  Denies HI/SI/AH/VH  Tangential and disorganized thought process  Behaviors appropriate early this shift  Therapeutic support given  Pt appears disinterested and guarded in conversation  States "I'm doing fine " Pressured speech  Pt is encouraged to come to nursing with any issues or complaints  denies current overt complaints and unmet needs

## 2022-10-08 NOTE — PROGRESS NOTES
Progress Note - Behavioral Health   Madeleine Coello 41 y o  female MRN: [id number]  Unit/Bed#: [facility] 341-01 Encounter: [id number]    Assessment/Plan   Principal Problem:    Bipolar disorder, unspecified ([place] [id number] )  Active Problems:    Medical clearance for psychiatric admission    Dyspepsia    Acute respiratory failure with hypoxia ([facility])    Heavy tobacco smoker    Anemia    Anxiety disorder, unspecified    Opioid use disorder, severe, on maintenance therapy ([facility])    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[place] [id number] )    Mild protein-calorie malnutrition ([facility])      Recommended Treatment:   • Continue Abilify 15 mg daily for mood stabilization  • Continue Effexor XR 37 5 mg for mood  • Continue Topamax 125 mg BID for mood stabilization  • Continue Klonopin 1 mg TID for anxiety  • Continue Gabapentin 600 mg TID for anxiety  • Continue Suboxone 8 mg BID for opiate use disorder  • Continue Trazodone 50 mg QHS for sleep  • Continue Melatonin 3 mg QHS for sleep  • Continue with group therapy, milieu therapy and occupational therapy  • Continue frequent safety checks and vitals per unit protocol  • Continue medication management per SLIM as indicated  o Continue Pepcid 20 mg BID for GERD symptoms  o Continue Protonix EC 40 mg daily for GERD symptoms  o Continue ferrous sulfate 325 mg for supplementation  o Continue prednisone taper ending 10/19/22  • Case discussed with treatment team   • Discharge disposition: to be determined  • Legal status: patient is currently on a 201 voluntary admission    Risks, benefits and possible side effects of Medications: Risks, benefits, and possible side effects of medications have previously been explained  No new medications at this time  ------------------------------------------------------------    Subjective: Patient seen and evaluated at bedside with attending psychiatrist  Patient approached this writer and attending psychiatrist in the hallway to talk about adjustments to her diet    The patient was preoccupied about eating raisins, cinnamon, and brown sugar  Patient was able to be verbally redirected to her room for further evaluation  Per nursing report, Simran Benz has been labile, requiring frequent redirection, disorganized, and hyperverbal   Patient has been demanding and intrusive  Patient has been compliant with medications, has had poor oral intake  Patient required PRN Ativan 1 mg at 1039 and nicorette gum x4 yesterday  Today, Simran Benz is consenting for safety on the unit  She reports frustration over ordering “5 packages of raisins, 5 packages of brown sugar, and cinnamon," and only receiving 1 package of raisins/brown sugar and having to ask nursing for cinnamon after her meal was delivered  Patient is preoccupied about her diet and seeing nutrition  Provided support and confirmed with patient a nutrition consult was pending  The patient is also preoccupied discussing her back pain and needing an “egg shell,” topper for her mattresses for comfort  Discussed PRN pain management options available  Patient also requesting PRN Zofran, though endorsing nausea only after suboxone is given  Denies nausea at time of evaluation  The patient reports her back pain disrupts her sleep and endorses a decreased appetite  The patient reported her mood as “it's okay,” “a little depressed ”  She reported she is depressed because she is in the hospital and does not feel she needs to be here  She does feel “hopeful,” and begins to discuss she has supports outpatient available to her including [employer] and [facility] medicine team who she looks forward to working with on discharge  The patient is hyperverbal and pressured on evaluation, requiring frequent redirections by this writer    The patient becomes tangential talking about getting a job and having a place to live with a male roommate, talking about his cats saying they Sally Flip their territory, everything smells ” She discusses how she utilized her mother's credit card without her mother's permission to change the floors in said room  The patient does endorse she “shops a lot,” and identifies it as a coping skill along with reading and taking walks  The patient denied side effects of medications at this time  The patient denied suicidal or homicidal ideations  The patient denied auditory or visual hallucinations  The patient reports she can approach staff with needs or concerns  Progress Toward Goals: slow improvement    Psychiatric Review of Systems:  Behavior over the last 24 hours: mild improvement  Sleep: improving  Appetite: decreased from baseline  Medication side effects: none verbalized  ROS: back pain, Complete review of systems is negative except as noted above      Vital signs in last 24 hours:  Temp:  [97 6 °F (36 4 °C)-98 2 °F (36 8 °C)] 98 2 °F (36 8 °C)  HR:  [66-84] 84  Resp:  [16] 16  BP: ()/(52-69) 110/69    Mental Status Exam:  Appearance:  alert, good eye contact, appears stated age, casually dressed and appropriate grooming and hygiene   Behavior:  cooperative and sitting on edge of bed - bed has two mattress atop it   Motor: restless and fidgety and normal gait and balance   Speech:  spontaneous, clear, pressured, hyperverbal and coherent   Mood:  "it's okay, a little depressed"   Affect:  expansive and anxious   Thought Process:  disorganized, tangential, racing of thoughts   Thought Content: no verbalized delusions or overt paranoia, negative thoughts, preoccupied with diet   Perceptual disturbances: no reported hallucinations and does not appear to be responding to internal stimuli at this time   Risk Potential: No active or passive suicidal or homicidal ideation was verbalized during interview   Cognition: oriented to self and situation, appears to be of average intelligence, attention span appeared shorter than expected for age and cognition not formally tested   Insight:  Poor   Judgment: Poor     Current Medications:  Current Facility-Administered Medications   Medication Dose Route Frequency Provider Last Rate   • acetaminophen  650 mg Oral Q6H PRN [facility] Holter, DO     • albuterol  2 puff Inhalation Q4H PRN [facility] Holter, DO     • ARIPiprazole  15 mg Oral Daily Paula Solorio MD     • benztropine  1 mg Intramuscular Q4H PRN Max 6/day [facility] Holter, DO     • benztropine  1 mg Oral Q4H PRN Max 6/day [facility] Holter, DO     • buprenorphine-naloxone  8 mg Sublingual BID [facility] Holter, DO     • clonazePAM  1 mg Oral TID Danielle Schwarz, DO     • famotidine  20 mg Oral BID [facility] Holter, DO     • ferrous sulfate  325 mg Oral Daily With Breakfast [facility] Holter, DO     • gabapentin  600 mg Oral TID [facility] Holter, DO     • hydrOXYzine HCL  25 mg Oral Q6H PRN Max 4/day [facility] Holter, DO     • hydrOXYzine HCL  50 mg Oral Q4H PRN Max 4/day [facility] Holter, DO      Or   • LORazepam  1 mg Intramuscular Q4H PRN [facility] Holter, DO     • ibuprofen  400 mg Oral Q6H PRN [facility] Holter, DO     • melatonin  3 mg Oral HS [facility] Holter, DO     • methocarbamol  750 mg Oral Q6H PRN [facility] Holter, DO     • nicotine  21 mg Transdermal Daily [facility] Holter, DO     • nicotine polacrilex  4 mg Oral Q2H PRN Paula Solorio MD     • OLANZapine  10 mg Oral Q3H PRN Max 3/day [facility] Holter, DO      Or   • OLANZapine  10 mg Intramuscular Q3H PRN Max 3/day [facility] Holter, DO     • OLANZapine  5 mg Oral Q3H PRN Max 6/day [facility] Holter, DO      Or   • OLANZapine  5 mg Intramuscular Q3H PRN Max 6/day [facility] Holter, DO     • OLANZapine  2 5 mg Oral Q3H PRN Max 8/day [facility] Holter, DO     • ondansetron  4 mg Oral Q6H PRN [facility] Holter, DO     • pantoprazole  40 mg Oral Early Morning [facility] Holter, DO     • predniSONE  60 mg Oral Daily [facility] Holter, DO      Followed by   • [START ON 10/11/2022] predniSONE  50 mg Oral Daily [facility] Holter, DO      Followed by   • [START ON 10/14/2022] predniSONE  40 mg Oral Daily [facility] C Holter, DO Followed by   • [START ON 10/17/2022] predniSONE  30 mg Oral Daily [place] [facility] Holter, DO     • senna-docusate sodium  1 tablet Oral Daily PRN [place] [facility] Holter, DO     • topiramate  125 mg Oral BID [place] [facility] Holter, DO     • traZODone  50 mg Oral HS [facility] Holter, DO     • venlafaxine  37 5 mg Oral Daily Imani Roland MD         Behavioral Health Medications: all current active meds have been reviewed  Changes as in plan section above  Laboratory results:  I have personally reviewed all pertinent laboratory/tests results    Recent Results (from the past 48 hour(s))   Rapid drug screen, urine    Collection Time: 10/07/22  2:30 AM   Result Value Ref Range    Amph/Meth UR Negative Negative    Barbiturate Ur Negative Negative    Benzodiazepine Urine Positive (A) Negative    Cocaine Urine Negative Negative    Methadone Urine Negative Negative    Opiate Urine Negative Negative    PCP Ur Negative Negative    THC Urine Negative Negative    Oxycodone Urine Negative Negative   TSH, 3rd generation with Free T4 reflex    Collection Time: 10/07/22  2:30 AM   Result Value Ref Range    TSH 3RD GENERATON 4 350 0 450 - 4 500 uIU/mL   Lipid panel    Collection Time: 10/07/22  2:30 AM   Result Value Ref Range    Cholesterol 233 (H) See Comment mg/dL    Triglycerides 214 (H) See Comment mg/dL    HDL, Direct 58 >=50 mg/dL    LDL Calculated 132 (H) <130 mg/dL    Non-HDL-Chol (CHOL-HDL) 175 mg/dl   CBC and differential    Collection Time: 10/07/22  2:30 AM   Result Value Ref Range    WBC 16 82 (H) 4 31 - 10 16 Thousand/uL    RBC 3 80 (L) 3 81 - 5 12 Million/uL    Hemoglobin 10 6 (L) 11 5 - 15 4 g/dL    Hematocrit 35 8 34 8 - 46 1 %    MCV 94 82 - 98 fL    MCH 27 9 26 8 - 34 3 pg    MCHC 29 6 (L) 31 4 - 37 4 g/dL    RDW 13 2 11 6 - 15 1 %    MPV 8 5 (L) 8 9 - 12 7 fL    Platelets 361 (H) 972 - 390 Thousands/uL    nRBC 0 /100 WBCs    Neutrophils Relative 64 43 - 75 %    Immat GRANS % 1 0 - 2 %    Lymphocytes Relative 29 14 - 44 %    Monocytes Relative 6 4 - 12 %    Eosinophils Relative 0 0 - 6 %    Basophils Relative 0 0 - 1 %    Neutrophils Absolute 10 68 (H) 1 85 - 7 62 Thousands/µL    Immature Grans Absolute 0 19 0 00 - 0 20 Thousand/uL    Lymphocytes Absolute 4 81 (H) 0 60 - 4 47 Thousands/µL    Monocytes Absolute 1 07 0 17 - 1 22 Thousand/µL    Eosinophils Absolute 0 03 0 00 - 0 61 Thousand/µL    Basophils Absolute 0 04 0 00 - 0 10 Thousands/µL   Basic metabolic panel    Collection Time: 10/07/22  2:30 AM   Result Value Ref Range    Sodium 136 135 - 147 mmol/L    Potassium 3 7 3 5 - 5 3 mmol/L    Chloride 103 96 - 108 mmol/L    CO2 27 21 - 32 mmol/L    ANION GAP 6 5 - 14 mmol/L    BUN 13 5 - 25 mg/dL    Creatinine 0 82 0 60 - 1 20 mg/dL    Glucose 96 70 - 99 mg/dL    Glucose, Fasting 96 70 - 99 mg/dL    Calcium 8 6 8 4 - 10 2 mg/dL    eGFR 86 ml/min/1 73sq m        [place]

## 2022-10-08 NOTE — NURSING NOTE
Patient denies SI, HI, AH's and VH's  She denies anxiety and depression  Patient is visible on the unit  She is intrusive with staff and has made multiple demands, has stated that she will stop taking her medication if she does not get things such as nail files  She speaks at a quick pace and forcefully  She is medication and meal compliant  She denies any needs at this time

## 2022-10-09 PROCEDURE — 99232 SBSQ HOSP IP/OBS MODERATE 35: CPT | Performed by: PSYCHIATRY & NEUROLOGY

## 2022-10-09 RX ADMIN — VENLAFAXINE HYDROCHLORIDE 37.5 MG: 37.5 CAPSULE, EXTENDED RELEASE ORAL at 08:13

## 2022-10-09 RX ADMIN — BUPRENORPHINE AND NALOXONE 8 MG: 8; 2 FILM BUCCAL; SUBLINGUAL at 08:13

## 2022-10-09 RX ADMIN — NICOTINE POLACRILEX 4 MG: 4 GUM, CHEWING BUCCAL at 20:54

## 2022-10-09 RX ADMIN — NICOTINE 21 MG: 21 PATCH, EXTENDED RELEASE TRANSDERMAL at 08:14

## 2022-10-09 RX ADMIN — GABAPENTIN 600 MG: 300 CAPSULE ORAL at 15:53

## 2022-10-09 RX ADMIN — PREDNISONE 60 MG: 20 TABLET ORAL at 08:13

## 2022-10-09 RX ADMIN — METHOCARBAMOL 750 MG: 750 TABLET, FILM COATED ORAL at 12:59

## 2022-10-09 RX ADMIN — PANTOPRAZOLE SODIUM 40 MG: 40 TABLET, DELAYED RELEASE ORAL at 06:20

## 2022-10-09 RX ADMIN — TOPIRAMATE 125 MG: 100 TABLET, FILM COATED ORAL at 18:11

## 2022-10-09 RX ADMIN — NICOTINE POLACRILEX 4 MG: 4 GUM, CHEWING BUCCAL at 09:39

## 2022-10-09 RX ADMIN — BUPRENORPHINE AND NALOXONE 8 MG: 8; 2 FILM BUCCAL; SUBLINGUAL at 15:53

## 2022-10-09 RX ADMIN — GABAPENTIN 600 MG: 300 CAPSULE ORAL at 08:13

## 2022-10-09 RX ADMIN — TRAZODONE HYDROCHLORIDE 50 MG: 50 TABLET ORAL at 21:01

## 2022-10-09 RX ADMIN — GABAPENTIN 600 MG: 300 CAPSULE ORAL at 21:01

## 2022-10-09 RX ADMIN — NICOTINE POLACRILEX 4 MG: 4 GUM, CHEWING BUCCAL at 18:11

## 2022-10-09 RX ADMIN — IBUPROFEN 400 MG: 400 TABLET ORAL at 12:59

## 2022-10-09 RX ADMIN — FAMOTIDINE 20 MG: 20 TABLET ORAL at 08:13

## 2022-10-09 RX ADMIN — NICOTINE POLACRILEX 4 MG: 4 GUM, CHEWING BUCCAL at 13:57

## 2022-10-09 RX ADMIN — CLONAZEPAM 1 MG: 1 TABLET ORAL at 09:39

## 2022-10-09 RX ADMIN — CLONAZEPAM 1 MG: 1 TABLET ORAL at 13:00

## 2022-10-09 RX ADMIN — CLONAZEPAM 1 MG: 1 TABLET ORAL at 20:54

## 2022-10-09 RX ADMIN — FERROUS SULFATE TAB 325 MG (65 MG ELEMENTAL FE) 325 MG: 325 (65 FE) TAB at 08:14

## 2022-10-09 RX ADMIN — ARIPIPRAZOLE 15 MG: 15 TABLET ORAL at 08:13

## 2022-10-09 RX ADMIN — MELATONIN TAB 3 MG 3 MG: 3 TAB at 21:01

## 2022-10-09 RX ADMIN — FAMOTIDINE 20 MG: 20 TABLET ORAL at 18:11

## 2022-10-09 RX ADMIN — TOPIRAMATE 125 MG: 100 TABLET, FILM COATED ORAL at 08:14

## 2022-10-09 NOTE — PROGRESS NOTES
Progress Note - Behavioral Health   Essie Tirado 41 y o  female MRN: [id number]  Unit/Bed#: [facility] 341-01 Encounter: [id number]    Assessment/Plan   Principal Problem:    Bipolar disorder, unspecified ([place] [id number] )  Active Problems:    Medical clearance for psychiatric admission    Dyspepsia    Acute respiratory failure with hypoxia ([facility])    Heavy tobacco smoker    Anemia    Anxiety disorder, unspecified    Opioid use disorder, severe, on maintenance therapy ([facility])    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[place] [address] )    Mild protein-calorie malnutrition ([facility])      Recommended Treatment:   • Continue Abilify 15 mg daily for mood stabilization  o Recommended increase in Abilify, patient declined  • Continue Effexor XR 37 5 mg daily for mood  • Continue Topamax 125 mg BID for mood stabilization  • Continue Klonopin 1 mg TID for anxiety  • Continue Gabapentin 600 mg TID for anxiety  • Continue Suboxone 8 mg BID for opiate use disorder  • Continue Trazodone 50 mg QHS for sleep  • Continue Melatonin 3 mg QHS for sleep  • Continue with group therapy, milieu therapy and occupational therapy  • Continue frequent safety checks and vitals per unit protocol  • Continue medication management per SLIM as indicated  o Continue Pepcid 20 mg BID for GERD symptoms  o Continue Protonix EC 40 mg BID for GERD symptoms  o Continue ferrous sulfate 325 mg daily for supplementation  o Continue prednisone taper ending 10/19/22  • Case discussed with treatment team   • Discharge disposition: to be determined  • Legal status: patient is currently on a 201 voluntary admission    Risks, benefits and possible side effects of Medications: Risks, benefits, and possible side effects of medications have previously been explained  No new medications at this time  ------------------------------------------------------------    Subjective:  Patient seen and evaluated for continuity of care    Patient seen at bedside with attending psychiatrist, patient in no acute distress  Per nursing report, Christopher has appeared superficially pleasant with nursing staff, she has demonstrated a tangential and disorganized thought process when speaking with staff  The patient has been less intrusive  The patient has continued to be demanding regarding her diet, she requests multiple packages of raisins with every meal   Patient has been compliant with medications  Patient has been more compliant with meals and did eat chicken and some vegetables yesterday with dinner, overall decreased appetite  Patient required PRN ibuprofen 400 mg and methocarbamol 750 mg at 1720 and Niccorette gum x3 yesterday    Today, Christopher is consenting for safety on the unit  She reports her mood is “hopeful ” The patient endorses she slept well, she states she slept 4-5 hours and feels rested  The patient discusses she did feel drowsy upon awakening, discussed with patient option of adjusting trazodone and melatonin  Patient declined at this time and stated she wanted to continue this regimen for "1 more night," and reassess tomorrow  The patient endorsed her appetite is “passable ” She continues to be preoccupied on receiving 5 packages of raisins with each meal along with 5 packages of brown sugar and cinnamon  Discussed with the patient a nutrition consult is placed and nutrition team reviewed her chart  They are recommending dietary supplements at this time, the patient reported she will only eat gelatin protein supplements  The patient denied suicidal ideations or homicidal ideations at time of evaluation  The patient denied auditory or visual hallucinations and did not appear to be responding to internal stimuli  The patient denied side effects of her medications and notes she believes they are working well  Discussed with patient and increase in her medications, notably Abilify, can help with her mood stabilization  Patient did not agree to increase in medications at this time    The patient endorsed that she can approach staff with any needs or concerns  Progress Toward Goals:  Slow improvement    Psychiatric Review of Systems:  Behavior over the last 24 hours: Some improvement  Sleep: improving  Appetite: decreased from baseline  Medication side effects: none verbalized  ROS: back pain, Complete review of systems is negative except as noted above      Vital signs in last 24 hours:  Temp:  [97 °F (36 1 °C)-97 4 °F (36 3 °C)] 97 4 °F (36 3 °C)  HR:  [60-93] 93  Resp:  [14-16] 16  BP: (102-125)/(56-75) 125/75    Mental Status Exam:  Appearance:  alert, good eye contact, appears stated age, casually dressed and appropriate grooming and hygiene   Behavior:  cooperative and sitting comfortably   Motor: restless and fidgety and normal gait and balance   Speech:  spontaneous, clear, hyperverbal, coherent and Less pressured compared to prior   Mood:  "Hopeful"   Affect:  anxious and More constricted today compared to prior   Thought Process:  disorganized, tangential, racing of thoughts   Thought Content: no verbalized delusions or overt paranoia, negative thoughts, Preoccupied with diet   Perceptual disturbances: no reported hallucinations and does not appear to be responding to internal stimuli at this time   Risk Potential: No active or passive suicidal or homicidal ideation was verbalized during interview   Cognition: oriented to self and situation, appears to be of average intelligence, attention span appeared shorter than expected for age and cognition not formally tested   Insight:  Poor   Judgment: Poor     Current Medications:  Current Facility-Administered Medications   Medication Dose Route Frequency Provider Last Rate   • acetaminophen  650 mg Oral Q6H PRN [place] [facility] Holter, DO     • albuterol  2 puff Inhalation Q4H PRN [place] [facility] Holter, DO     • ARIPiprazole  15 mg Oral Daily Tegan Ellsworth MD     • benztropine  1 mg Intramuscular Q4H PRN Max 6/day [facility] Holter, DO     • benztropine  1 mg Oral Q4H PRN Max 6/day [facility] Holter, DO     • buprenorphine-naloxone  8 mg Sublingual BID [facility] Holter, DO     • clonazePAM  1 mg Oral TID Danielle Schwarz, DO     • famotidine  20 mg Oral BID [facility] Holter, DO     • ferrous sulfate  325 mg Oral Daily With Breakfast [facility] Holter, DO     • gabapentin  600 mg Oral TID [facility] Holter, DO     • hydrOXYzine HCL  25 mg Oral Q6H PRN Max 4/day [facility] Holter, DO     • hydrOXYzine HCL  50 mg Oral Q4H PRN Max 4/day [facility] Holter, DO      Or   • LORazepam  1 mg Intramuscular Q4H PRN [facility] Holter, DO     • ibuprofen  400 mg Oral Q6H PRN [facility] Holter, DO     • melatonin  3 mg Oral HS [facility] Holter, DO     • methocarbamol  750 mg Oral Q6H PRN [facility] Holter, DO     • nicotine  21 mg Transdermal Daily [facility] Holter, DO     • nicotine polacrilex  4 mg Oral Q2H PRN Ines Lawson MD     • OLANZapine  10 mg Oral Q3H PRN Max 3/day [facility] Holter, DO      Or   • OLANZapine  10 mg Intramuscular Q3H PRN Max 3/day [facility] Holter, DO     • OLANZapine  5 mg Oral Q3H PRN Max 6/day [facility] Holter, DO      Or   • OLANZapine  5 mg Intramuscular Q3H PRN Max 6/day [facility] Holter, DO     • OLANZapine  2 5 mg Oral Q3H PRN Max 8/day [facility] Holter, DO     • ondansetron  4 mg Oral Q6H PRN [facility] Holter, DO     • pantoprazole  40 mg Oral Early Morning [facility] Holter, DO     • predniSONE  60 mg Oral Daily [facility] Holter, DO      Followed by   • [START ON 10/11/2022] predniSONE  50 mg Oral Daily [facility] Holter, DO      Followed by   • [START ON 10/14/2022] predniSONE  40 mg Oral Daily [facility] Holter, DO      Followed by   • [START ON 10/17/2022] predniSONE  30 mg Oral Daily [facility] Holter, DO     • senna-docusate sodium  1 tablet Oral Daily PRN [facility] Holter, DO     • topiramate  125 mg Oral BID [facility] Holter, DO     • traZODone  50 mg Oral HS [facility] Holter, DO     • venlafaxine  37 5 mg Oral Daily Ines Lawsno MD         Behavioral Health Medications: all current active meds have been reviewed  Changes as in plan section above  Laboratory results:  I have personally reviewed all pertinent laboratory/tests results  No results found for this or any previous visit (from the past 48 hour(s))       Marichuy Sapp

## 2022-10-09 NOTE — NURSING NOTE
Patient denies SI, HI, AH;s and VH's  Pt reported anxiety at breakfast, but was agreeable to try her scheduled medications before any PRN's  She continues to have pressured speech and can be tangential  She is visible on the unit and social with peers  Had active participation in nursing group  She is medication and meal compliant  She denies any needs at this time

## 2022-10-09 NOTE — NURSING NOTE
Horacio Graft is pleasant upon approach  Denies depression, reports anxiety r/t still being in the hospital  Denies HI/SI/AH/VH  Pressured speech and tangential  Visible on the unit, but appears to isolate to self  Behaviors appropriate this shift  Therapeutic support given  Pt is encouraged to come to nursing with any issues or complaints  denies current overt complaints and unmet needs

## 2022-10-09 NOTE — NURSING NOTE
Pt appeared to be sleeping on the toilet  She was approached 2 times and appeared to fall back asleep both times  On the third approach, pt became irritable and stated she "wasn't sleeping " She was "waiting to pee " Reports difficulty initiating a stream, but is able to go when she "forces it " Pt able to urinate and then went to bed  Denied other urinary symptoms  Pt will be placed on medical list for tomorrow

## 2022-10-09 NOTE — PLAN OF CARE
Problem: DISCHARGE PLANNING  Goal: Discharge to home or other facility with appropriate resources  Description: INTERVENTIONS:  - Identify barriers to discharge w/patient and caregiver  - Arrange for needed discharge resources and transportation as appropriate  - Identify discharge learning needs (meds, wound care, etc )  - Arrange for interpretive services to assist at discharge as needed  - Refer to Case Management Department for coordinating discharge planning if the patient needs post-hospital services based on physician/advanced practitioner order or complex needs related to functional status, cognitive ability, or social support system  Outcome: Progressing     Problem: Alteration in Thoughts and Perception  Goal: Attend and participate in unit activities, including therapeutic, recreational, and educational groups  Description: Interventions:  -Encourage Visitation and family involvement in care  Outcome: Progressing     Problem: SELF HARM/SUICIDALITY  Goal: Will have no self-injury during hospital stay  Description: INTERVENTIONS:  - Q 15 MINUTES: Routine safety checks  - Q WAKING SHIFT & PRN: Assess risk to determine if routine checks are adequate to maintain patient safety  - Encourage patient to participate actively in care by formulating a plan to combat response to suicidal ideation, identify supports and resources  Outcome: Progressing     Problem: DEPRESSION  Goal: Will be euthymic at discharge  Description: INTERVENTIONS:  - Administer medication as ordered  - Provide emotional support via 1:1 interaction with staff  - Encourage involvement in milieu/groups/activities  - Monitor for social isolation  Outcome: Progressing     Problem: ANXIETY  Goal: Will report anxiety at manageable levels  Description: INTERVENTIONS:  - Administer medication as ordered  - Teach and encourage coping skills  - Provide emotional support  - Assess patient/family for anxiety and ability to cope  Outcome: Progressing  Goal: By discharge: Patient will verbalize 2 strategies to deal with anxiety  Description: Interventions:  - Identify any obvious source/trigger to anxiety  - Staff will assist patient in applying identified coping technique/skills  - Encourage attendance of scheduled groups and activities  Outcome: Progressing     Problem: Nutrition/Hydration-ADULT  Goal: Nutrient/Hydration intake appropriate for improving, restoring or maintaining nutritional needs  Description: Monitor and assess patient's nutrition/hydration status for malnutrition  Collaborate with interdisciplinary team and initiate plan and interventions as ordered  Monitor patient's weight and dietary intake as ordered or per policy  Utilize nutrition screening tool and intervene as necessary  Determine patient's food preferences and provide high-protein, high-caloric foods as appropriate       INTERVENTIONS:  - Monitor oral intake, urinary output, labs, and treatment plans  - Assess nutrition and hydration status and recommend course of action  - Evaluate amount of meals eaten  - Assist patient with eating if necessary   - Allow adequate time for meals  - Recommend/ encourage appropriate diets, oral nutritional supplements, and vitamin/mineral supplements  - Order, calculate, and assess calorie counts as needed  - Recommend, monitor, and adjust tube feedings and TPN/PPN based on assessed needs  - Assess need for intravenous fluids  - Provide specific nutrition/hydration education as appropriate  - Include patient/family/caregiver in decisions related to nutrition  Outcome: Progressing     Problem: Ineffective Coping  Goal: Participates in unit activities  Description: Interventions:  - Provide therapeutic environment   - Provide required programming   - Redirect inappropriate behaviors   Outcome: Progressing     Problem: LUCÍA  Goal: Will exhibit normal sleep and speech and no impulsivity  Description: INTERVENTIONS:  - Administer medication as ordered  - Set limits on impulsive behavior  - Make attempts to decrease external stimuli as possible  Outcome: Not Progressing

## 2022-10-10 PROCEDURE — 99232 SBSQ HOSP IP/OBS MODERATE 35: CPT | Performed by: STUDENT IN AN ORGANIZED HEALTH CARE EDUCATION/TRAINING PROGRAM

## 2022-10-10 RX ADMIN — CLONAZEPAM 1 MG: 1 TABLET ORAL at 21:00

## 2022-10-10 RX ADMIN — FAMOTIDINE 20 MG: 20 TABLET ORAL at 08:05

## 2022-10-10 RX ADMIN — VENLAFAXINE HYDROCHLORIDE 37.5 MG: 37.5 CAPSULE, EXTENDED RELEASE ORAL at 08:05

## 2022-10-10 RX ADMIN — FAMOTIDINE 20 MG: 20 TABLET ORAL at 17:08

## 2022-10-10 RX ADMIN — PANTOPRAZOLE SODIUM 40 MG: 40 TABLET, DELAYED RELEASE ORAL at 06:42

## 2022-10-10 RX ADMIN — NICOTINE POLACRILEX 4 MG: 4 GUM, CHEWING BUCCAL at 12:24

## 2022-10-10 RX ADMIN — NICOTINE POLACRILEX 4 MG: 4 GUM, CHEWING BUCCAL at 08:05

## 2022-10-10 RX ADMIN — NICOTINE POLACRILEX 4 MG: 4 GUM, CHEWING BUCCAL at 13:28

## 2022-10-10 RX ADMIN — TRAZODONE HYDROCHLORIDE 50 MG: 50 TABLET ORAL at 21:00

## 2022-10-10 RX ADMIN — HYDROXYZINE HYDROCHLORIDE 50 MG: 50 TABLET, FILM COATED ORAL at 12:22

## 2022-10-10 RX ADMIN — MELATONIN TAB 3 MG 3 MG: 3 TAB at 21:00

## 2022-10-10 RX ADMIN — FERROUS SULFATE TAB 325 MG (65 MG ELEMENTAL FE) 325 MG: 325 (65 FE) TAB at 08:05

## 2022-10-10 RX ADMIN — TOPIRAMATE 125 MG: 100 TABLET, FILM COATED ORAL at 17:08

## 2022-10-10 RX ADMIN — NICOTINE POLACRILEX 4 MG: 4 GUM, CHEWING BUCCAL at 18:16

## 2022-10-10 RX ADMIN — BUPRENORPHINE AND NALOXONE 8 MG: 8; 2 FILM BUCCAL; SUBLINGUAL at 16:13

## 2022-10-10 RX ADMIN — PREDNISONE 60 MG: 20 TABLET ORAL at 08:05

## 2022-10-10 RX ADMIN — BUPRENORPHINE AND NALOXONE 8 MG: 8; 2 FILM BUCCAL; SUBLINGUAL at 08:04

## 2022-10-10 RX ADMIN — NICOTINE 21 MG: 21 PATCH, EXTENDED RELEASE TRANSDERMAL at 08:05

## 2022-10-10 RX ADMIN — GABAPENTIN 600 MG: 300 CAPSULE ORAL at 21:00

## 2022-10-10 RX ADMIN — ARIPIPRAZOLE 15 MG: 15 TABLET ORAL at 08:05

## 2022-10-10 RX ADMIN — CLONAZEPAM 1 MG: 1 TABLET ORAL at 13:28

## 2022-10-10 RX ADMIN — GABAPENTIN 600 MG: 300 CAPSULE ORAL at 16:12

## 2022-10-10 RX ADMIN — TOPIRAMATE 125 MG: 100 TABLET, FILM COATED ORAL at 08:05

## 2022-10-10 RX ADMIN — GABAPENTIN 600 MG: 300 CAPSULE ORAL at 08:05

## 2022-10-10 RX ADMIN — CLONAZEPAM 1 MG: 1 TABLET ORAL at 08:05

## 2022-10-10 NOTE — DISCHARGE INSTR - OTHER ORDERS
If you are experiencing a mental health emergency, you may call the [address] [facility] 24 hours a day, 7 days per week at [phone]  [facility] call [phone]  When you need someone to listen, the aDrius Lindsay is available for 16 hours a day, 7 days a week, from the time of 7-10am and 2pm-2am   It is not available from the hours of 2am-6am and 10am-2pm  A representative can be reached at [phone]  HOW TO GET SUBSTANCE ABUSE HELP:  If you or someone you know has a drug or alcohol problem, there is help:  [address]: [address]: [phone]  An assessment is the first step  In addition to those listed there are other programs available in the area but assessment is best to determine an appropriate level of care  If you [address], an assessment can be scheduled at one of these providers:  [place] [facility]  [address] , [place], 98 [facility]  [address]  [address], [address] [place]  [address]  D&A Intake Unit  [address] [facility] [address] , [address]st Floor, [place], [address]  [phone]  [address], 300 [facility],6Th Floor, [facility], [address] [place] [address] [address] [facility]  [address] , [place], [address] [place]  [facility] [address]  [facility] [address]  [facility]  [phone]   NET ([facility])  [address]  [address] [facility], [place], [address]  [address] [facility]  2 [facility], 105 [facility]   [address] , [place], 98 [facility]  [facility]  [address] [facility] , [place], [address] [facility]  [address] , [address], [place], 10 [facility]

## 2022-10-10 NOTE — NURSING NOTE
Patient about the unit  Making negative comments to staff  Also reports being "extremely unhappy" with herself for eating 2 pieces of cake yesterday  " I should have never done that"  Patient denies all symptoms  Compliant with medications  Hopeful for discharge soon

## 2022-10-10 NOTE — PROGRESS NOTES
Progress Note - Behavioral Health   Jocy Hassan 41 y o  female MRN: [id number]  Unit/Bed#: U 341-01 Encounter: [id number]    Assessment/Plan   Principal Problem:    Bipolar disorder, unspecified ([place] [address] )  Active Problems:    Anxiety disorder, unspecified    Heavy tobacco smoker    Opioid use disorder, severe, on maintenance therapy (HCC)    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[place] [address] )    Medical clearance for psychiatric admission    Dyspepsia    Acute respiratory failure with hypoxia ([place] [address] )    Anemia    Mild protein-calorie malnutrition ([place] [address] )    Recommended Treatment:   Abilify 15 mg daily for mood stabilization  Effexor XR 37 5 mg daily for anxiety and mood  Suboxone 8mg BID for OUD  Klonopin 1mg TID for anxiety  Gabapentin 600mg TID for anxiety  Topamax 125mg BID for mood  Trazodone 50mg HS for insomnia  All current active medications have been reviewed  Encourage group therapy, milieu therapy and occupational therapy  Behavioral Health checks every 7 minutes  Medical management per [facility] for discharge tomorrow  Legal Status: 201  ----------------------------------------      Subjective:  Per nursing report and discussion with weekend physician Dr Nitza Corona -  over the weekend patient was demanding, intrusive, tangential at times in her thought process  She was in good behavior control and appropriate  No issues of aggression or agitation  She states her mood today as “frustrated”  She is preoccupied with her bed, learned that she may be obtaining a roommate tomorrow and this has led her to be more frustrated  She demands to have double mattresses as she feels this is more comfortable for her to sleep on  She reports that she had some difficulty sleeping last night, was noted to be falling asleep on the toilet earlier this morning although she reports that she has been doing this because she has bladder issues related to her spinal conditions    Overall she feels that sleep has improved compared to admission and she states that she thinks she slept 4-5 hours last night  Her energy levels today are increased although manageable  Her appetite is stable, she is still is preoccupied and fixated on foods and seems to have restrictive food behaviors though has been eating 75 - 100% of meals per documentation review  Overall, her speech still remains rapid although less pressured  She is more redirectable  Thought process is still circumstantial at times  She denies any suicidal or homicidal ideations  Denies any auditory or visual hallucinations  She is tolerating the Abilify and Effexor well, feels that these medications are helpful  She is future oriented and goal-directed, states that she wishes to be discharged and wants to reside at her friend PASTORA's house  She feels she has good support in him and her mother  She also is planning to establish psychiatric services with [facility] and wishes to follow with Dr Pawan Baker      Behavior over the last 24 hours:  improved  Sleep: fair  Appetite: fair  Medication side effects: No  ROS: no complaints    Mental Status Evaluation:  Appearance:  casually dressed, adequate grooming, looks older than stated age   Behavior:  demanding, more redirectable, restless   Speech:  increased rate, hypertalkative, less pressured   Mood:  "frustrated"   Affect:  reactive   Thought Process:  circumstantial   Associations: circumstantial associations   Thought Content:  no overt delusions, preoccupied   Perceptual Disturbances: no auditory hallucinations, no visual hallucinations, does not appear responding to internal stimuli   Risk Potential: Suicidal ideation - None at present  Homicidal ideation - None at present  Potential for aggression - No   Sensorium:  oriented to person, place and time/date   Memory:  recent and remote memory grossly intact   Consciousness:  alert and awake   Attention/Concentration: attention span and concentration appear shorter than expected for age Insight:  fair   Judgment: fair   Gait/Station: normal gait/station   Motor Activity: no abnormal movements     Medications: all current active meds have been reviewed and continue current psychiatric medications    Current Facility-Administered Medications   Medication Dose Route Frequency Provider Last Rate   • acetaminophen  650 mg Oral Q6H PRN [facility] Holter, DO     • albuterol  2 puff Inhalation Q4H PRN [facility] Holter, DO     • ARIPiprazole  15 mg Oral Daily Bill Hall MD     • benztropine  1 mg Intramuscular Q4H PRN Max 6/day [facility] Holter, DO     • benztropine  1 mg Oral Q4H PRN Max 6/day [facility] Holter, DO     • buprenorphine-naloxone  8 mg Sublingual BID [facility] Holter, DO     • clonazePAM  1 mg Oral TID Danielle Schwarz, DO     • famotidine  20 mg Oral BID [facility] Holter, DO     • ferrous sulfate  325 mg Oral Daily With Breakfast [facility] Holter, DO     • gabapentin  600 mg Oral TID [facility] Holter, DO     • hydrOXYzine HCL  25 mg Oral Q6H PRN Max 4/day [facility] Holter, DO     • hydrOXYzine HCL  50 mg Oral Q4H PRN Max 4/day [facility] Holter, DO      Or   • LORazepam  1 mg Intramuscular Q4H PRN [facility] Holter, DO     • ibuprofen  400 mg Oral Q6H PRN [facility] Holter, DO     • melatonin  3 mg Oral HS [facility] Holter, DO     • methocarbamol  750 mg Oral Q6H PRN [facility] Holter, DO     • nicotine  21 mg Transdermal Daily [facility] Holter, DO     • nicotine polacrilex  4 mg Oral Q2H PRN Bill Hall MD     • OLANZapine  10 mg Oral Q3H PRN Max 3/day [facility] Holter, DO      Or   • OLANZapine  10 mg Intramuscular Q3H PRN Max 3/day [facility] Holter, DO     • OLANZapine  5 mg Oral Q3H PRN Max 6/day [facility] Holter, DO      Or   • OLANZapine  5 mg Intramuscular Q3H PRN Max 6/day [facility] Holter, DO     • OLANZapine  2 5 mg Oral Q3H PRN Max 8/day [facility] Holter, DO     • ondansetron  4 mg Oral Q6H PRN [facility] Holter, DO     • pantoprazole  40 mg Oral Early Morning [facility] Holter, DO     • [START ON 10/11/2022] predniSONE  50 mg Oral Daily [place] C Holter, DO      Followed by   • [START ON 10/14/2022] predniSONE  40 mg Oral Daily Jordan C Holter, DO      Followed by   • [START ON 10/17/2022] predniSONE  30 mg Oral Daily [place] C Holter, DO     • senna-docusate sodium  1 tablet Oral Daily PRN [place] C Holter, DO     • topiramate  125 mg Oral BID [place] C Holter, DO     • traZODone  50 mg Oral HS [facility] Holter, DO     • venlafaxine  37 5 mg Oral Daily Herman Warner MD         Labs: I have personally reviewed all pertinent laboratory/tests results  Results from the past 24 hours: No results found for this or any previous visit (from the past 24 hour(s))  Progress Toward Goals: progressing    Risks / Benefits of Treatment:    Risks, benefits, and possible side effects of medications explained to patient and patient verbalizes understanding and agreement for treatment  Counseling / Coordination of Care: Total floor / unit time spent today 30 minutes  Greater than 50% of total time was spent with the patient and / or family counseling and / or coordination of care  A description of counseling / coordination of care:  Patient's progress discussed with staff in treatment team meeting  Medications, treatment progress and treatment plan reviewed with patient  Recent stressors including family conflict, financial problems, limited support and social difficulties discussed with patient  Educated on importance of medication and treatment compliance  Reassurance and supportive therapy provided  Encouraged participation in milieu and group therapy on the unit      Herman Warner MD 10/10/22

## 2022-10-10 NOTE — PROGRESS NOTES
10/10/22 0956   Team Meeting   Meeting Type Daily Rounds   Team Members Present   Team Members Present Physician;Nurse;   Physician Team Member Igor   [facility]   Care Management Team Member Harsha   Patient/Family Present   Patient Present No   Patient's Family Present No   Pt is medication compliant  Slowly improving and denying symptoms  Remains disorganized, intrusive with staff and is superficially pleasant  Discharge to be determined

## 2022-10-10 NOTE — PROGRESS NOTES
10/10/22 1300   Activity/Group Checklist   Group   (recovery group-diagnosis)   Attendance Attended   Attendance Duration (min) 46-60   Interactions Interacted appropriately   Affect/Mood Appropriate   Goals Achieved Able to listen to others; Able to engage in interactions; Able to reflect/comment on own behavior;Able to self-disclose; Able to recieve feedback

## 2022-10-10 NOTE — DISCHARGE INSTR - APPOINTMENTS
Nicolasa Gutierrez or Rossy, our Parker and Roni, will be calling you after your discharge, on the phone number that you provided  They will be available as an additional support, if needed  If you wish to speak with one of them, you may contact Tamara Gomez at [phone] or Swathi Mcdonnell at [phone]

## 2022-10-10 NOTE — NURSING NOTE
Denies SI/HI/SIB/AVH  Visible on unit  Calm and in control  Cooperative  Med compliant  Offers no complaints at this time  Socializing with staff and peers

## 2022-10-10 NOTE — PLAN OF CARE
AYAZ spoke with Pt who signed MINNA's for [facility]  SW scheduled appointment for [facility] on 10/17 at 11am  AYAZ spoke with Pt's José Miguel Myers, who advised she does not have Pt's phone number  AYAZ provided phone number and requested appointment  CRS stated she doesn't know when she can schedule appointment for and stated Pt should call her upon discharge  AYAZ called Pt's PCP office and was advised they will not schedule appointment as address [facility] has on file does not match the address in their system  AYAZ provided alternative address provided by Pt and was advised this is still not correct  Pt to call in after discharge to schedule appointment

## 2022-10-10 NOTE — PLAN OF CARE
Problem: DISCHARGE PLANNING  Goal: Discharge to home or other facility with appropriate resources  Description: INTERVENTIONS:  - Identify barriers to discharge w/patient and caregiver  - Arrange for needed discharge resources and transportation as appropriate  - Identify discharge learning needs (meds, wound care, etc )  - Arrange for interpretive services to assist at discharge as needed  - Refer to Case Management Department for coordinating discharge planning if the patient needs post-hospital services based on physician/advanced practitioner order or complex needs related to functional status, cognitive ability, or social support system  Outcome: Progressing     Problem: Alteration in Thoughts and Perception  Goal: Attend and participate in unit activities, including therapeutic, recreational, and educational groups  Description: Interventions:  -Encourage Visitation and family involvement in care  Outcome: Progressing     Problem: SELF HARM/SUICIDALITY  Goal: Will have no self-injury during hospital stay  Description: INTERVENTIONS:  - Q 15 MINUTES: Routine safety checks  - Q WAKING SHIFT & PRN: Assess risk to determine if routine checks are adequate to maintain patient safety  - Encourage patient to participate actively in care by formulating a plan to combat response to suicidal ideation, identify supports and resources  Outcome: Progressing     Problem: DEPRESSION  Goal: Will be euthymic at discharge  Description: INTERVENTIONS:  - Administer medication as ordered  - Provide emotional support via 1:1 interaction with staff  - Encourage involvement in milieu/groups/activities  - Monitor for social isolation  Outcome: Progressing     Problem: LUCÍA  Goal: Will exhibit normal sleep and speech and no impulsivity  Description: INTERVENTIONS:  - Administer medication as ordered  - Set limits on impulsive behavior  - Make attempts to decrease external stimuli as possible  Outcome: Progressing     Problem: ANXIETY  Goal: Will report anxiety at manageable levels  Description: INTERVENTIONS:  - Administer medication as ordered  - Teach and encourage coping skills  - Provide emotional support  - Assess patient/family for anxiety and ability to cope  Outcome: Progressing  Goal: By discharge: Patient will verbalize 2 strategies to deal with anxiety  Description: Interventions:  - Identify any obvious source/trigger to anxiety  - Staff will assist patient in applying identified coping technique/skills  - Encourage attendance of scheduled groups and activities  Outcome: Progressing     Problem: Nutrition/Hydration-ADULT  Goal: Nutrient/Hydration intake appropriate for improving, restoring or maintaining nutritional needs  Description: Monitor and assess patient's nutrition/hydration status for malnutrition  Collaborate with interdisciplinary team and initiate plan and interventions as ordered  Monitor patient's weight and dietary intake as ordered or per policy  Utilize nutrition screening tool and intervene as necessary  Determine patient's food preferences and provide high-protein, high-caloric foods as appropriate       INTERVENTIONS:  - Monitor oral intake, urinary output, labs, and treatment plans  - Assess nutrition and hydration status and recommend course of action  - Evaluate amount of meals eaten  - Assist patient with eating if necessary   - Allow adequate time for meals  - Recommend/ encourage appropriate diets, oral nutritional supplements, and vitamin/mineral supplements  - Order, calculate, and assess calorie counts as needed  - Recommend, monitor, and adjust tube feedings and TPN/PPN based on assessed needs  - Assess need for intravenous fluids  - Provide specific nutrition/hydration education as appropriate  - Include patient/family/caregiver in decisions related to nutrition  Outcome: Progressing     Problem: Ineffective Coping  Goal: Participates in unit activities  Description: Interventions:  - Provide therapeutic environment   - Provide required programming   - Redirect inappropriate behaviors   Outcome: Progressing

## 2022-10-11 VITALS
HEIGHT: 62 IN | DIASTOLIC BLOOD PRESSURE: 59 MMHG | TEMPERATURE: 97.4 F | SYSTOLIC BLOOD PRESSURE: 104 MMHG | RESPIRATION RATE: 16 BRPM | OXYGEN SATURATION: 96 % | HEART RATE: 75 BPM | BODY MASS INDEX: 28.52 KG/M2 | WEIGHT: 155 LBS

## 2022-10-11 PROBLEM — J96.01 ACUTE RESPIRATORY FAILURE WITH HYPOXIA (HCC): Status: RESOLVED | Noted: 2022-09-21 | Resolved: 2022-10-11

## 2022-10-11 PROBLEM — Z00.8 MEDICAL CLEARANCE FOR PSYCHIATRIC ADMISSION: Status: RESOLVED | Noted: 2021-12-23 | Resolved: 2022-10-11

## 2022-10-11 PROCEDURE — 99238 HOSP IP/OBS DSCHRG MGMT 30/<: CPT | Performed by: STUDENT IN AN ORGANIZED HEALTH CARE EDUCATION/TRAINING PROGRAM

## 2022-10-11 RX ORDER — TRAZODONE HYDROCHLORIDE 50 MG/1
50 TABLET ORAL
Qty: 30 TABLET | Refills: 1 | Status: SHIPPED | OUTPATIENT
Start: 2022-10-11 | End: 2022-11-10

## 2022-10-11 RX ORDER — PANTOPRAZOLE SODIUM 40 MG/1
40 TABLET, DELAYED RELEASE ORAL
Qty: 30 TABLET | Refills: 1 | Status: SHIPPED | OUTPATIENT
Start: 2022-10-11 | End: 2022-11-10

## 2022-10-11 RX ORDER — BUPRENORPHINE AND NALOXONE 8; 2 MG/1; MG/1
8 FILM, SOLUBLE BUCCAL; SUBLINGUAL 2 TIMES DAILY
Qty: 20 FILM | Refills: 0 | Status: SHIPPED | OUTPATIENT
Start: 2022-10-11 | End: 2022-10-21

## 2022-10-11 RX ORDER — VENLAFAXINE HYDROCHLORIDE 37.5 MG/1
37.5 CAPSULE, EXTENDED RELEASE ORAL DAILY
Qty: 30 CAPSULE | Refills: 1 | Status: SHIPPED | OUTPATIENT
Start: 2022-10-11 | End: 2022-11-10

## 2022-10-11 RX ORDER — FERROUS SULFATE 325(65) MG
325 TABLET ORAL
Qty: 30 TABLET | Refills: 1 | Status: SHIPPED | OUTPATIENT
Start: 2022-10-11 | End: 2022-11-10

## 2022-10-11 RX ORDER — IBUPROFEN 600 MG/1
600 TABLET ORAL EVERY 6 HOURS PRN
Qty: 30 TABLET | Refills: 1 | Status: SHIPPED | OUTPATIENT
Start: 2022-10-11 | End: 2022-11-10

## 2022-10-11 RX ORDER — GABAPENTIN 300 MG/1
600 CAPSULE ORAL 3 TIMES DAILY
Qty: 180 CAPSULE | Refills: 1 | Status: SHIPPED | OUTPATIENT
Start: 2022-10-11 | End: 2022-11-10

## 2022-10-11 RX ORDER — PREDNISONE 10 MG/1
30 TABLET ORAL DAILY
Qty: 9 TABLET | Refills: 0 | Status: SHIPPED | OUTPATIENT
Start: 2022-10-17 | End: 2022-10-20

## 2022-10-11 RX ORDER — CLONAZEPAM 1 MG/1
1 TABLET ORAL 3 TIMES DAILY
Qty: 90 TABLET | Refills: 0 | Status: SHIPPED | OUTPATIENT
Start: 2022-10-11 | End: 2022-10-12

## 2022-10-11 RX ORDER — TOPIRAMATE 25 MG/1
25 TABLET ORAL 2 TIMES DAILY
Qty: 60 TABLET | Refills: 1 | Status: SHIPPED | OUTPATIENT
Start: 2022-10-11 | End: 2022-11-10

## 2022-10-11 RX ORDER — PREDNISONE 20 MG/1
40 TABLET ORAL DAILY
Qty: 6 TABLET | Refills: 0 | Status: SHIPPED | OUTPATIENT
Start: 2022-10-14 | End: 2022-10-17

## 2022-10-11 RX ORDER — LANOLIN ALCOHOL/MO/W.PET/CERES
3 CREAM (GRAM) TOPICAL
Qty: 30 TABLET | Refills: 1 | Status: SHIPPED | OUTPATIENT
Start: 2022-10-11 | End: 2022-11-10

## 2022-10-11 RX ORDER — PREDNISONE 10 MG/1
50 TABLET ORAL DAILY
Qty: 15 TABLET | Refills: 0 | Status: SHIPPED | OUTPATIENT
Start: 2022-10-11 | End: 2022-10-14

## 2022-10-11 RX ORDER — TOPIRAMATE 100 MG/1
100 TABLET, FILM COATED ORAL 2 TIMES DAILY
Qty: 60 TABLET | Refills: 1 | Status: SHIPPED | OUTPATIENT
Start: 2022-10-11 | End: 2022-11-10

## 2022-10-11 RX ORDER — ARIPIPRAZOLE 15 MG/1
15 TABLET ORAL DAILY
Qty: 30 TABLET | Refills: 1 | Status: SHIPPED | OUTPATIENT
Start: 2022-10-11 | End: 2022-10-25

## 2022-10-11 RX ORDER — FAMOTIDINE 20 MG/1
20 TABLET, FILM COATED ORAL 2 TIMES DAILY
Qty: 60 TABLET | Refills: 0 | Status: SHIPPED | OUTPATIENT
Start: 2022-10-11 | End: 2022-11-10

## 2022-10-11 RX ADMIN — CLONAZEPAM 1 MG: 1 TABLET ORAL at 13:28

## 2022-10-11 RX ADMIN — CLONAZEPAM 1 MG: 1 TABLET ORAL at 08:10

## 2022-10-11 RX ADMIN — TOPIRAMATE 125 MG: 100 TABLET, FILM COATED ORAL at 08:09

## 2022-10-11 RX ADMIN — IBUPROFEN 400 MG: 400 TABLET ORAL at 11:18

## 2022-10-11 RX ADMIN — NICOTINE POLACRILEX 4 MG: 4 GUM, CHEWING BUCCAL at 08:12

## 2022-10-11 RX ADMIN — GABAPENTIN 600 MG: 300 CAPSULE ORAL at 08:09

## 2022-10-11 RX ADMIN — NICOTINE POLACRILEX 4 MG: 4 GUM, CHEWING BUCCAL at 11:06

## 2022-10-11 RX ADMIN — NICOTINE 21 MG: 21 PATCH, EXTENDED RELEASE TRANSDERMAL at 08:12

## 2022-10-11 RX ADMIN — GABAPENTIN 600 MG: 300 CAPSULE ORAL at 15:06

## 2022-10-11 RX ADMIN — PREDNISONE 50 MG: 20 TABLET ORAL at 08:09

## 2022-10-11 RX ADMIN — HYDROXYZINE HYDROCHLORIDE 50 MG: 50 TABLET, FILM COATED ORAL at 11:18

## 2022-10-11 RX ADMIN — VENLAFAXINE HYDROCHLORIDE 37.5 MG: 37.5 CAPSULE, EXTENDED RELEASE ORAL at 08:09

## 2022-10-11 RX ADMIN — FERROUS SULFATE TAB 325 MG (65 MG ELEMENTAL FE) 325 MG: 325 (65 FE) TAB at 08:10

## 2022-10-11 RX ADMIN — PANTOPRAZOLE SODIUM 40 MG: 40 TABLET, DELAYED RELEASE ORAL at 05:21

## 2022-10-11 RX ADMIN — BUPRENORPHINE AND NALOXONE 8 MG: 8; 2 FILM BUCCAL; SUBLINGUAL at 15:05

## 2022-10-11 RX ADMIN — ARIPIPRAZOLE 15 MG: 15 TABLET ORAL at 08:09

## 2022-10-11 RX ADMIN — BUPRENORPHINE AND NALOXONE 8 MG: 8; 2 FILM BUCCAL; SUBLINGUAL at 08:49

## 2022-10-11 RX ADMIN — FAMOTIDINE 20 MG: 20 TABLET ORAL at 08:10

## 2022-10-11 NOTE — BH TRANSITION RECORD
Contact Information: If you have any questions, concerns, pended studies, tests and/or procedures, or emergencies regarding your inpatient behavioral health visit  Please contact [address] [place] [address] behavioral health unit 3B [phone]  and ask to speak to a , nurse or physician  A contact is available 24 hours/ 7 days a week at this number  Summary of Procedures Performed During your Stay:  Below is a list of major procedures performed during your hospital stay and a summary of results:  - No major procedures performed  Pending Studies (From admission, onward)    None        Please follow up on the above pending studies with your PCP and/or referring provider

## 2022-10-11 NOTE — SOCIAL WORK
Pt to D/C today  Pt denies SI/HI/AVH  Pt oriented x3  Pt to d/c to her friend's home via lyft Pt to follow up with [facility] on 10/17  Scripts sent to preferred pharmacy       Discharge Address:   Phone: [phone]
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
## 2022-10-11 NOTE — PLAN OF CARE
Problem: DISCHARGE PLANNING  Goal: Discharge to home or other facility with appropriate resources  Description: INTERVENTIONS:  - Identify barriers to discharge w/patient and caregiver  - Arrange for needed discharge resources and transportation as appropriate  - Identify discharge learning needs (meds, wound care, etc )  - Arrange for interpretive services to assist at discharge as needed  - Refer to Case Management Department for coordinating discharge planning if the patient needs post-hospital services based on physician/advanced practitioner order or complex needs related to functional status, cognitive ability, or social support system  10/11/2022 1115 by Francisco J Suárez RN  Outcome: Adequate for Discharge  10/11/2022 0801 by Francisco J Suárez RN  Outcome: Progressing     Problem: Alteration in Thoughts and Perception  Goal: Attend and participate in unit activities, including therapeutic, recreational, and educational groups  Description: Interventions:  -Encourage Visitation and family involvement in care  10/11/2022 1115 by Francisco J Suárez RN  Outcome: Adequate for Discharge  10/11/2022 0801 by Francisco J Suárez RN  Outcome: Progressing     Problem: SELF HARM/SUICIDALITY  Goal: Will have no self-injury during hospital stay  Description: INTERVENTIONS:  - Q 15 MINUTES: Routine safety checks  - Q WAKING SHIFT & PRN: Assess risk to determine if routine checks are adequate to maintain patient safety  - Encourage patient to participate actively in care by formulating a plan to combat response to suicidal ideation, identify supports and resources  10/11/2022 1115 by Francisco J Suárez RN  Outcome: Adequate for Discharge  10/11/2022 0801 by Francisco J Suárez RN  Outcome: Progressing     Problem: DEPRESSION  Goal: Will be euthymic at discharge  Description: INTERVENTIONS:  - Administer medication as ordered  - Provide emotional support via 1:1 interaction with staff  - Encourage involvement in milieu/groups/activities  - Monitor for social isolation  10/11/2022 1115 by Tessie Olvera RN  Outcome: Adequate for Discharge  10/11/2022 0801 by Tessie Olvera, RN  Outcome: Progressing     Problem: LUCÍA  Goal: Will exhibit normal sleep and speech and no impulsivity  Description: INTERVENTIONS:  - Administer medication as ordered  - Set limits on impulsive behavior  - Make attempts to decrease external stimuli as possible  10/11/2022 1115 by Tessie Olvera, RN  Outcome: Adequate for Discharge  10/11/2022 0801 by Tessie Olvera, RN  Outcome: Progressing     Problem: ANXIETY  Goal: Will report anxiety at manageable levels  Description: INTERVENTIONS:  - Administer medication as ordered  - Teach and encourage coping skills  - Provide emotional support  - Assess patient/family for anxiety and ability to cope  10/11/2022 1115 by Tessie Olvera RN  Outcome: Adequate for Discharge  10/11/2022 0801 by Tessie Olvera, RN  Outcome: Progressing  Goal: By discharge: Patient will verbalize 2 strategies to deal with anxiety  Description: Interventions:  - Identify any obvious source/trigger to anxiety  - Staff will assist patient in applying identified coping technique/skills  - Encourage attendance of scheduled groups and activities  10/11/2022 1115 by Tessie Olvera RN  Outcome: Adequate for Discharge  10/11/2022 0801 by Tessie Olvera RN  Outcome: Progressing     Problem: Nutrition/Hydration-ADULT  Goal: Nutrient/Hydration intake appropriate for improving, restoring or maintaining nutritional needs  Description: Monitor and assess patient's nutrition/hydration status for malnutrition  Collaborate with interdisciplinary team and initiate plan and interventions as ordered  Monitor patient's weight and dietary intake as ordered or per policy  Utilize nutrition screening tool and intervene as necessary   Determine patient's food preferences and provide high-protein, high-caloric foods as appropriate       INTERVENTIONS:  - Monitor oral intake, urinary output, labs, and treatment plans  - Assess nutrition and hydration status and recommend course of action  - Evaluate amount of meals eaten  - Assist patient with eating if necessary   - Allow adequate time for meals  - Recommend/ encourage appropriate diets, oral nutritional supplements, and vitamin/mineral supplements  - Order, calculate, and assess calorie counts as needed  - Recommend, monitor, and adjust tube feedings and TPN/PPN based on assessed needs  - Assess need for intravenous fluids  - Provide specific nutrition/hydration education as appropriate  - Include patient/family/caregiver in decisions related to nutrition  10/11/2022 1115 by Anai Aceves RN  Outcome: Adequate for Discharge  10/11/2022 0801 by Anai Aceves RN  Outcome: Progressing     Problem: Ineffective Coping  Goal: Participates in unit activities  Description: Interventions:  - Provide therapeutic environment   - Provide required programming   - Redirect inappropriate behaviors   Outcome: Adequate for Discharge

## 2022-10-11 NOTE — NURSING NOTE
Patient awoke and completed all morning routines  Patient is looking forward to their upcoming discharge  Patient was medication and meal complaint this AM, and denies all psych symptoms  All discharge information was discussed with the patient, and patient stated that they will follow through with all discharge plans  Discharge medication was discussed with the patient, and patient stated that they would continue to take their medications as prescribed  All questions were answered at the time of discharge, and contact information was provided to the patient for any questions that may arise after discharge  When discussing smoking cessation patient stated that they are not ready to quit, and that they are aware of the resources available to them if they chooses to quit smoking  Patient was discharged to home at [phone], and all patient belongings were returned to the patient at the time of discharge

## 2022-10-11 NOTE — UTILIZATION REVIEW
Notification of Discharge   This is a Notification of Discharge from our facility [address]  Please be advised that this patient has been discharge from our facility  Below you will find the admission and discharge date and time including the patient’s disposition  UTILIZATION REVIEW CONTACT:  Chris Wallace  Utilization   Network Utilization Review Department  Phone: [phone] x carefully listen to the prompts  All voicemails are confidential   Email: [email or web address]     PHYSICIAN ADVISORY SERVICES:  FOR JQAM-ZT-FOTA REVIEW - MEDICAL NECESSITY DENIAL  Phone: [phone]  Fax: [phone]  Email: [email or web address]     PRESENTATION DATE: 9/21/2022 11:50 AM    INPATIENT ADMISSION DATE: 9/21/22  4:00 PM   DISCHARGE DATE: 10/5/2022 10:31 PM  DISPOSITION: [address]       IMPORTANT INFORMATION:  Send all requests for admission clinical reviews, approved or denied determinations and any other requests to dedicated fax number below belonging to the campus where the patient is receiving treatment   List of dedicated fax numbers:  [address] DENIALS (Administrative/Medical Necessity) [phone]   [address] (Maternity/NICU/Pediatrics) [phone]   [place] [place] [facility] [phone]   [facility] [phone]   [address] [phone]   220 [facility] [phone]   [address] [place] [address] [phone]   [address] [place] [address] 119 [phone]   [facility]  [phone]   [facility] [phone]   412 [facility] [address] [facility] [phone]

## 2022-10-11 NOTE — PROGRESS NOTES
GARCIA Group Note     10/10/22 1413   Activity/Group Checklist   Group Life Skills  (Values)   Attendance Attended   Attendance Duration (min) 0-15  (arrived at the end of group)   Interactions Interacted appropriately   Affect/Mood Appropriate  (Wide at times, but redirectable)   Goals Achieved Identified feelings; Discussed coping strategies; Discussed discharge plans; Discussed self-esteem issues; Able to listen to others; Able to engage in interactions; Able to reflect/comment on own behavior;Able to self-disclose; Able to recieve feedback; Able to give feedback to another

## 2022-10-11 NOTE — PLAN OF CARE
Problem: DISCHARGE PLANNING  Goal: Discharge to home or other facility with appropriate resources  Description: INTERVENTIONS:  - Identify barriers to discharge w/patient and caregiver  - Arrange for needed discharge resources and transportation as appropriate  - Identify discharge learning needs (meds, wound care, etc )  - Arrange for interpretive services to assist at discharge as needed  - Refer to Case Management Department for coordinating discharge planning if the patient needs post-hospital services based on physician/advanced practitioner order or complex needs related to functional status, cognitive ability, or social support system  Outcome: Progressing     Problem: Alteration in Thoughts and Perception  Goal: Attend and participate in unit activities, including therapeutic, recreational, and educational groups  Description: Interventions:  -Encourage Visitation and family involvement in care  Outcome: Progressing     Problem: SELF HARM/SUICIDALITY  Goal: Will have no self-injury during hospital stay  Description: INTERVENTIONS:  - Q 15 MINUTES: Routine safety checks  - Q WAKING SHIFT & PRN: Assess risk to determine if routine checks are adequate to maintain patient safety  - Encourage patient to participate actively in care by formulating a plan to combat response to suicidal ideation, identify supports and resources  Outcome: Progressing     Problem: DEPRESSION  Goal: Will be euthymic at discharge  Description: INTERVENTIONS:  - Administer medication as ordered  - Provide emotional support via 1:1 interaction with staff  - Encourage involvement in milieu/groups/activities  - Monitor for social isolation  Outcome: Progressing     Problem: LUCÍA  Goal: Will exhibit normal sleep and speech and no impulsivity  Description: INTERVENTIONS:  - Administer medication as ordered  - Set limits on impulsive behavior  - Make attempts to decrease external stimuli as possible  Outcome: Progressing     Problem: ANXIETY  Goal: Will report anxiety at manageable levels  Description: INTERVENTIONS:  - Administer medication as ordered  - Teach and encourage coping skills  - Provide emotional support  - Assess patient/family for anxiety and ability to cope  Outcome: Progressing  Goal: By discharge: Patient will verbalize 2 strategies to deal with anxiety  Description: Interventions:  - Identify any obvious source/trigger to anxiety  - Staff will assist patient in applying identified coping technique/skills  - Encourage attendance of scheduled groups and activities  Outcome: Progressing     Problem: Nutrition/Hydration-ADULT  Goal: Nutrient/Hydration intake appropriate for improving, restoring or maintaining nutritional needs  Description: Monitor and assess patient's nutrition/hydration status for malnutrition  Collaborate with interdisciplinary team and initiate plan and interventions as ordered  Monitor patient's weight and dietary intake as ordered or per policy  Utilize nutrition screening tool and intervene as necessary  Determine patient's food preferences and provide high-protein, high-caloric foods as appropriate       INTERVENTIONS:  - Monitor oral intake, urinary output, labs, and treatment plans  - Assess nutrition and hydration status and recommend course of action  - Evaluate amount of meals eaten  - Assist patient with eating if necessary   - Allow adequate time for meals  - Recommend/ encourage appropriate diets, oral nutritional supplements, and vitamin/mineral supplements  - Order, calculate, and assess calorie counts as needed  - Recommend, monitor, and adjust tube feedings and TPN/PPN based on assessed needs  - Assess need for intravenous fluids  - Provide specific nutrition/hydration education as appropriate  - Include patient/family/caregiver in decisions related to nutrition  Outcome: Progressing

## 2022-10-11 NOTE — PROGRESS NOTES
Met with Aiden Mas completing her relapse prevention  We reviewed the community supports  She did identify her symptoms, warning signs, coping strategies and support persons  Patient is looking forward to discharge and attending outpatient therapy

## 2022-10-12 DIAGNOSIS — F41.9 ANXIETY DISORDER, UNSPECIFIED: ICD-10-CM

## 2022-10-12 RX ORDER — CLONAZEPAM 1 MG/1
1 TABLET ORAL 2 TIMES DAILY
Qty: 60 TABLET | Refills: 0 | Status: SHIPPED | OUTPATIENT
Start: 2022-10-12 | End: 2022-10-20 | Stop reason: SDUPTHER

## 2022-10-20 ENCOUNTER — HOSPITAL ENCOUNTER (EMERGENCY)
Facility: HOSPITAL | Age: 47
Discharge: HOME | End: 2022-10-20
Attending: EMERGENCY MEDICINE
Payer: COMMERCIAL

## 2022-10-20 ENCOUNTER — APPOINTMENT (EMERGENCY)
Dept: RADIOLOGY | Facility: HOSPITAL | Age: 47
End: 2022-10-20
Payer: COMMERCIAL

## 2022-10-20 ENCOUNTER — APPOINTMENT (EMERGENCY)
Dept: CT IMAGING | Facility: HOSPITAL | Age: 47
End: 2022-10-20
Payer: COMMERCIAL

## 2022-10-20 VITALS
RESPIRATION RATE: 19 BRPM | SYSTOLIC BLOOD PRESSURE: 117 MMHG | TEMPERATURE: 98.5 F | HEART RATE: 105 BPM | DIASTOLIC BLOOD PRESSURE: 78 MMHG | OXYGEN SATURATION: 100 %

## 2022-10-20 DIAGNOSIS — S00.83XA CONTUSION OF FACE, INITIAL ENCOUNTER: ICD-10-CM

## 2022-10-20 DIAGNOSIS — F41.9 ANXIETY: ICD-10-CM

## 2022-10-20 DIAGNOSIS — F41.9 ANXIETY DISORDER, UNSPECIFIED: ICD-10-CM

## 2022-10-20 DIAGNOSIS — M25.561 KNEE PAIN, RIGHT: ICD-10-CM
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
## 2022-10-20 LAB
EXT PREG TEST URINE: NEGATIVE
EXT. CONTROL ED NAV: NORMAL

## 2022-10-20 PROCEDURE — 99285 EMERGENCY DEPT VISIT HI MDM: CPT | Performed by: EMERGENCY MEDICINE

## 2022-10-20 PROCEDURE — G1004 CDSM NDSC: HCPCS

## 2022-10-20 PROCEDURE — 81025 URINE PREGNANCY TEST: CPT | Performed by: EMERGENCY MEDICINE

## 2022-10-20 PROCEDURE — 99284 EMERGENCY DEPT VISIT MOD MDM: CPT

## 2022-10-20 PROCEDURE — 70486 CT MAXILLOFACIAL W/O DYE: CPT

## 2022-10-20 PROCEDURE — 70450 CT HEAD/BRAIN W/O DYE: CPT

## 2022-10-20 PROCEDURE — 73564 X-RAY EXAM KNEE 4 OR MORE: CPT

## 2022-10-20 PROCEDURE — 72125 CT NECK SPINE W/O DYE: CPT

## 2022-10-20 RX ORDER — ACETAMINOPHEN 325 MG/1
650 TABLET ORAL ONCE
Status: COMPLETED | OUTPATIENT
Start: 2022-10-20 | End: 2022-10-20

## 2022-10-20 RX ORDER — CLONAZEPAM 1 MG/1
1 TABLET ORAL 2 TIMES DAILY
Qty: 4 TABLET | Refills: 0 | Status: SHIPPED | OUTPATIENT
Start: 2022-10-20 | End: 2022-10-22

## 2022-10-20 RX ORDER — CLONAZEPAM 1 MG/1
1 TABLET ORAL ONCE
Status: COMPLETED | OUTPATIENT
Start: 2022-10-20 | End: 2022-10-20

## 2022-10-20 RX ORDER — HYDROXYZINE HYDROCHLORIDE 25 MG/1
25 TABLET, FILM COATED ORAL EVERY 6 HOURS
Qty: 12 TABLET | Refills: 0 | Status: SHIPPED | OUTPATIENT
Start: 2022-10-20

## 2022-10-20 RX ADMIN — ACETAMINOPHEN 650 MG: 325 TABLET, FILM COATED ORAL at 19:47

## 2022-10-20 RX ADMIN — CLONAZEPAM 1 MG: 1 TABLET ORAL at 19:47
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
## 2022-10-21 NOTE — ED NOTES
Pt ambulatory with pt liaison to get coffee from vending machine        Greg Ludwig RN  10/20/22 2034
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
## 2022-10-21 NOTE — RESULT ENCOUNTER NOTE
Spoke with mom ([facility] care proxy) who notes she was very impressed with the care pt received during her recent hospitalization  I informed her of the arthritic changes to the left knee and advised to have pt f/u with PCP or ortho for continued pain

## 2022-10-21 NOTE — DISCHARGE INSTRUCTIONS
Please use the following pain medications as prescribed:  - Tylenol 650mg every 6 hours  - Motrin 400mg every 6 hours  They work in different ways so can be used together at the same time  Follow up with your primary care physician, return to the ED if you develop any of the symptoms that I discussed

## 2022-10-25 ENCOUNTER — TELEPHONE (OUTPATIENT)
Dept: PULMONOLOGY | Facility: CLINIC | Age: 47
End: 2022-10-25

## 2022-10-25 NOTE — TELEPHONE ENCOUNTER
Left a message with the patient's mom informing I am trying to obtain a referral for the upcoming appointment due to the patient's insurance plan requiring one to be seen by a specialist  Mom stated she will try to get in contact with her daughter & I informed failure to obtain the referral may result in a bill

## 2022-10-28 ENCOUNTER — OFFICE VISIT (OUTPATIENT)
Dept: PULMONOLOGY | Facility: CLINIC | Age: 47
End: 2022-10-28

## 2022-10-28 VITALS
BODY MASS INDEX: 29.44 KG/M2 | WEIGHT: 160 LBS | HEART RATE: 90 BPM | HEIGHT: 62 IN | SYSTOLIC BLOOD PRESSURE: 120 MMHG | OXYGEN SATURATION: 97 % | RESPIRATION RATE: 18 BRPM | DIASTOLIC BLOOD PRESSURE: 68 MMHG

## 2022-10-28 DIAGNOSIS — Z72.0 TOBACCO ABUSE: ICD-10-CM

## 2022-10-28 DIAGNOSIS — J18.9 PNEUMONITIS: Primary | ICD-10-CM

## 2022-10-28 PROBLEM — J98.4 PNEUMONITIS: Status: ACTIVE | Noted: 2022-10-28

## 2022-10-28 PROBLEM — F17.200 HEAVY TOBACCO SMOKER: Status: RESOLVED | Noted: 2022-09-21 | Resolved: 2022-10-28

## 2022-10-28 RX ORDER — NICOTINE 21 MG/24HR
1 PATCH, TRANSDERMAL 24 HOURS TRANSDERMAL EVERY 24 HOURS
Qty: 28 PATCH | Refills: 0 | Status: SHIPPED | OUTPATIENT
Start: 2022-10-28
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
## 2022-10-28 NOTE — ASSESSMENT & PLAN NOTE
Dx with a pneumonitis during most recent hospitalization in September due to vaping    She was unable to identify what she used in vape pen    Discharged on prednisone taper which has been completed    Plan: obtain CXR for [facility]

## 2022-10-28 NOTE — ASSESSMENT & PLAN NOTE
At times smokes up to 2 ppd    recently stopped smoking for 3 days and reports: anxiety, anger and strong cravings    Plan: NRT-will trial nicotine patches-prefers to start at a lower dose for nicotine patches and use nicotine gum-this has been ordered and sent to pharmacy on file     Reports daily cough and difficulty clearing secretions lately  No signs of hyperinflated lungs on most recent chest CTA but will proceed with PFTs-hold on inhaler regimen at this time as she denies dyspnea or additional limitations due to her breathing

## 2022-11-11 ENCOUNTER — HOSPITAL ENCOUNTER (EMERGENCY)
Facility: HOSPITAL | Age: 47
Discharge: HOME | End: 2022-11-11
Attending: EMERGENCY MEDICINE

## 2022-11-11 VITALS
OXYGEN SATURATION: 100 % | HEART RATE: 80 BPM | BODY MASS INDEX: 28.1 KG/M2 | WEIGHT: 153.66 LBS | SYSTOLIC BLOOD PRESSURE: 120 MMHG | RESPIRATION RATE: 17 BRPM | DIASTOLIC BLOOD PRESSURE: 82 MMHG | TEMPERATURE: 97.6 F

## 2022-11-11 DIAGNOSIS — F41.9 ANXIETY: ICD-10-CM

## 2022-11-11 DIAGNOSIS — R00.2 PALPITATIONS: Primary | ICD-10-CM

## 2022-11-11 LAB
ANION GAP SERPL CALCULATED.3IONS-SCNC: 7 MMOL/L (ref 4–13)
ATRIAL RATE: 75 BPM
BASOPHILS # BLD AUTO: 0.08 THOUSANDS/ÂΜL (ref 0–0.1)
BASOPHILS NFR BLD AUTO: 1 % (ref 0–1)
BUN SERPL-MCNC: 8 MG/DL (ref 5–25)
CALCIUM SERPL-MCNC: 8.8 MG/DL (ref 8.3–10.1)
CHLORIDE SERPL-SCNC: 103 MMOL/L (ref 96–108)
CO2 SERPL-SCNC: 29 MMOL/L (ref 21–32)
CREAT SERPL-MCNC: 0.59 MG/DL (ref 0.6–1.3)
EOSINOPHIL # BLD AUTO: 0 THOUSAND/ÂΜL (ref 0–0.61)
EOSINOPHIL NFR BLD AUTO: 0 % (ref 0–6)
ERYTHROCYTE [DISTWIDTH] IN BLOOD BY AUTOMATED COUNT: 14.6 % (ref 11.6–15.1)
GFR SERPL CREATININE-BSD FRML MDRD: 111 ML/MIN/1.73SQ M
GLUCOSE SERPL-MCNC: 79 MG/DL (ref 65–140)
HCT VFR BLD AUTO: 37.3 % (ref 34.8–46.1)
HGB BLD-MCNC: 11.8 G/DL (ref 11.5–15.4)
IMM GRANULOCYTES # BLD AUTO: 0.04 THOUSAND/UL (ref 0–0.2)
IMM GRANULOCYTES NFR BLD AUTO: 1 % (ref 0–2)
LYMPHOCYTES # BLD AUTO: 2.86 THOUSANDS/ÂΜL (ref 0.6–4.47)
LYMPHOCYTES NFR BLD AUTO: 33 % (ref 14–44)
MCH RBC QN AUTO: 29 PG (ref 26.8–34.3)
MCHC RBC AUTO-ENTMCNC: 31.6 G/DL (ref 31.4–37.4)
MCV RBC AUTO: 92 FL (ref 82–98)
MONOCYTES # BLD AUTO: 0.54 THOUSAND/ÂΜL (ref 0.17–1.22)
MONOCYTES NFR BLD AUTO: 6 % (ref 4–12)
NEUTROPHILS # BLD AUTO: 5.07 THOUSANDS/ÂΜL (ref 1.85–7.62)
NEUTS SEG NFR BLD AUTO: 59 % (ref 43–75)
NRBC BLD AUTO-RTO: 0 /100 WBCS
P AXIS: -42 DEGREES
PLATELET # BLD AUTO: 385 THOUSANDS/UL (ref 149–390)
PMV BLD AUTO: 8.3 FL (ref 8.9–12.7)
POTASSIUM SERPL-SCNC: 4.2 MMOL/L (ref 3.5–5.3)
PR INTERVAL: 150 MS
QRS AXIS: 71 DEGREES
QRSD INTERVAL: 80 MS
QT INTERVAL: 360 MS
QTC INTERVAL: 402 MS
RBC # BLD AUTO: 4.07 MILLION/UL (ref 3.81–5.12)
SODIUM SERPL-SCNC: 139 MMOL/L (ref 135–147)
T WAVE AXIS: 68 DEGREES
TSH SERPL DL<=0.05 MIU/L-ACNC: 1.99 UIU/ML (ref 0.45–4.5)
VENTRICULAR RATE: 75 BPM
WBC # BLD AUTO: 8.59 THOUSAND/UL (ref 4.31–10.16)

## 2022-11-11 RX ORDER — ONDANSETRON 4 MG/1
4 TABLET, ORALLY DISINTEGRATING ORAL ONCE
Status: COMPLETED | OUTPATIENT
Start: 2022-11-11 | End: 2022-11-11

## 2022-11-11 RX ORDER — CLONAZEPAM 1 MG/1
1 TABLET ORAL ONCE
Status: COMPLETED | OUTPATIENT
Start: 2022-11-11 | End: 2022-11-11

## 2022-11-11 RX ORDER — ONDANSETRON 4 MG/1
4 TABLET, ORALLY DISINTEGRATING ORAL EVERY 6 HOURS PRN
Qty: 8 TABLET | Refills: 0 | Status: SHIPPED | OUTPATIENT
Start: 2022-11-11

## 2022-11-11 RX ORDER — HYDROXYZINE PAMOATE 25 MG/1
25 CAPSULE ORAL EVERY 6 HOURS PRN
Qty: 30 CAPSULE | Refills: 0 | Status: SHIPPED | OUTPATIENT
Start: 2022-11-11

## 2022-11-11 RX ADMIN — CLONAZEPAM 1 MG: 1 TABLET ORAL at 16:15

## 2022-11-11 RX ADMIN — ONDANSETRON 4 MG: 4 TABLET, ORALLY DISINTEGRATING ORAL at 16:47

## 2022-11-11 NOTE — ED PROVIDER NOTES
History  Chief Complaint   Patient presents with   • Anxiety     Pt reports she has been having anxiety, worse recently  States she feels sob  40 yo F c/o anxiety attacks occurring regularly for several days, most prominent at night, keeping her awake, but also becoming ongoing during the day, associated with sense of doom, palpitations, without associated chest pain, N/V, nor diaphoresis  She has been treated for anxiety, with various medications, including clonazepam and oxazepam   She told me currently she is not taking anything  She also has documented h/o alcohol abuse, is on suboxone for h/o opiate abuse  I reviewed PDMP and she was prescribed a total of 47 days of clonazepam 1mg with overlapping prescriptions, which I pointed out to her, asking if she wished to revise her assertion she has not been recently prescribed any benzodiazepines  History provided by:  Patient      Prior to Admission Medications   Prescriptions Last Dose Informant Patient Reported? Taking?    ARIPiprazole (ABILIFY) 15 mg tablet   No No   Sig: Take 1 tablet (15 mg total) by mouth daily for 14 days   clonazePAM (KlonoPIN) 1 mg tablet   No No   Sig: Take 1 tablet (1 mg total) by mouth 2 (two) times a day for 2 days   famotidine (PEPCID) 20 mg tablet   No No   Sig: Take 1 tablet (20 mg total) by mouth 2 (two) times a day   ferrous sulfate 325 (65 Fe) mg tablet   No No   Sig: Take 1 tablet (325 mg total) by mouth daily with breakfast   gabapentin (NEURONTIN) 300 mg capsule   No No   Sig: Take 2 capsules (600 mg total) by mouth 3 (three) times a day   hydrOXYzine HCL (ATARAX) 25 mg tablet   No No   Sig: Take 1 tablet (25 mg total) by mouth every 6 (six) hours   ibuprofen (MOTRIN) 600 mg tablet   No No   Sig: Take 1 tablet (600 mg total) by mouth every 6 (six) hours as needed for moderate pain   melatonin 3 mg   No No   Sig: Take 1 tablet (3 mg total) by mouth daily at bedtime   naloxone (NARCAN) 4 mg/0 1 mL nasal spray   Yes No Si mg into each nostril   nicotine (NICODERM CQ) 14 mg/24hr TD 24 hr patch   No No   Sig: Place 1 patch on the skin every 24 hours   nicotine polacrilex (NICORETTE) 4 mg gum   No No   Sig: Chew 1 each (4 mg total) as needed for smoking cessation   pantoprazole (PROTONIX) 40 mg tablet   No No   Sig: Take 1 tablet (40 mg total) by mouth daily in the early morning   topiramate (TOPAMAX) 100 mg tablet   No No   Sig: Take 1 tablet (100 mg total) by mouth 2 (two) times a day   Take in combination with 25mg for total daily dose of 125mg twice daily  topiramate (Topamax) 25 mg tablet   No No   Sig: Take 1 tablet (25 mg total) by mouth 2 (two) times a day   Take in combination with 100mg for total daily dose of 125mg twice daily  traZODone (DESYREL) 50 mg tablet   No No   Sig: Take 1 tablet (50 mg total) by mouth daily at bedtime   venlafaxine (EFFEXOR-XR) 37 5 mg 24 hr capsule   No No   Sig: Take 1 capsule (37 5 mg total) by mouth daily      Facility-Administered Medications: None       Past Medical History:   Diagnosis Date   • Addiction to drug [facility])    • Alcohol abuse    • Alcoholism ([facility] [address] )    • Altered mental status 2022   • Elevated LFTs 2022   • Lower back pain    • Self-injurious behavior    • Substance abuse [facility])        Past Surgical History:   Procedure Laterality Date   • CHOLECYSTECTOMY         History reviewed  No pertinent family history  I have reviewed and agree with the history as documented      E-Cigarette/Vaping   • E-Cigarette Use Current Every Day User    • Cartridges/Day 2      E-Cigarette/Vaping Substances   • Nicotine Yes    • THC No    • CBD No    • Flavoring Yes    • Other No    • Unknown No      Social History     Tobacco Use   • Smoking status: Current Every Day Smoker     Packs/day: 1 50     Years: 20 00     Pack years: 30 00     Types: Cigarettes   • Smokeless tobacco: Current User   Vaping Use   • Vaping Use: Every day   • Substances: Nicotine, Flavoring   Substance Use Topics   • Alcohol use: Not Currently     Comment: jeanna DOLAN historically inconsistent  Drinking  per [facility] 2   • Drug use: Not Currently     Types: Heroin, "Crack" cocaine, Cocaine, LSD, Benzodiazepines, Marijuana     Comment: Pt unreliable historian       Review of Systems   Constitutional: Negative for appetite change, chills and fever  HENT: Negative for sore throat  Respiratory: Negative for cough, shortness of breath and wheezing  Cardiovascular: Negative for chest pain and palpitations  Gastrointestinal: Negative for abdominal pain, diarrhea, nausea and vomiting  Genitourinary: Negative for dysuria and hematuria  Musculoskeletal: Negative for neck pain  Skin: Negative for rash  Neurological: Negative for dizziness, weakness and headaches  Psychiatric/Behavioral: Negative for suicidal ideas  The patient is nervous/anxious  All other systems reviewed and are negative  Physical Exam  Physical Exam  Vitals and nursing note reviewed  Constitutional:       Appearance: She is well-developed  She is not toxic-appearing or diaphoretic  HENT:      Head: Normocephalic and atraumatic  Right Ear: Tympanic membrane and external ear normal       Left Ear: Tympanic membrane and external ear normal       Nose: Nose normal    Eyes:      Conjunctiva/sclera: Conjunctivae normal       Pupils: Pupils are equal, round, and reactive to light  Neck:      Meningeal: Brudzinski's sign and Kernig's sign absent  Cardiovascular:      Rate and Rhythm: Normal rate and regular rhythm  Pulses: Normal pulses  Heart sounds: Normal heart sounds  No murmur heard  Pulmonary:      Effort: Pulmonary effort is normal  No tachypnea or respiratory distress  Breath sounds: Normal breath sounds  No wheezing  Abdominal:      General: Bowel sounds are normal  There is no distension  Palpations: Abdomen is soft  Abdomen is not rigid  Tenderness: There is no abdominal tenderness  There is no guarding or rebound  Musculoskeletal:         General: Normal range of motion  Cervical back: Full passive range of motion without pain, normal range of motion and neck supple  Right lower leg: No swelling  Left lower leg: No swelling  Lymphadenopathy:      Cervical: No cervical adenopathy  Skin:     General: Skin is warm and dry  Capillary Refill: Capillary refill takes less than 2 seconds  Coloration: Skin is not pale  Findings: No rash  Neurological:      Mental Status: She is alert and oriented to person, place, and time  GCS: GCS eye subscore is 4  GCS verbal subscore is 5  GCS motor subscore is 6  Cranial Nerves: No cranial nerve deficit  Sensory: No sensory deficit  Coordination: Coordination normal       Gait: Gait normal       Deep Tendon Reflexes: Reflexes are normal and symmetric  Psychiatric:         Speech: Speech normal          Behavior: Behavior normal          Thought Content:  Thought content normal          Judgment: Judgment normal          Vital Signs  ED Triage Vitals   Temperature Pulse Respirations Blood Pressure SpO2   11/11/22 1427 11/11/22 1427 11/11/22 1427 11/11/22 1427 11/11/22 1427   97 6 °F (36 4 °C) 85 16 96/75 99 %      Temp Source Heart Rate Source Patient Position - Orthostatic VS BP Location FiO2 (%)   11/11/22 1427 11/11/22 1427 11/11/22 1703 11/11/22 1703 --   Oral Monitor Sitting Left arm       Pain Score       11/11/22 1703       No Pain           Vitals:    11/11/22 1427 11/11/22 1703   BP: 96/75 120/82   Pulse: 85 80   Patient Position - Orthostatic VS:  Sitting         Visual Acuity      ED Medications  Medications   clonazePAM (KlonoPIN) tablet 1 mg (1 mg Oral Given 11/11/22 1615)   ondansetron (ZOFRAN-ODT) dispersible tablet 4 mg (4 mg Oral Given 11/11/22 1647)       Diagnostic Studies  Results Reviewed     Procedure Component Value Units Date/Time    Basic metabolic panel [[id number]]  (Abnormal) Collected: 11/11/22 1610    Lab Status: Final result Specimen: Blood from Arm, Right Updated: 11/11/22 1646     Sodium 139 mmol/L      Potassium 4 2 mmol/L      Chloride 103 mmol/L      CO2 29 mmol/L      ANION GAP 7 mmol/L      BUN 8 mg/dL      Creatinine 0 59 mg/dL      Glucose 79 mg/dL      Calcium 8 8 mg/dL      eGFR 111 ml/min/1 73sq m     Narrative:      [place] guidelines for Chronic Kidney Disease (CKD):   •  Stage 1 with normal or high GFR (GFR > 90 mL/min/1 73 square meters)  •  Stage 2 Mild CKD (GFR = 60-89 mL/min/1 73 square meters)  •  Stage 3A Moderate CKD (GFR = 45-59 mL/min/1 73 square meters)  •  Stage 3B Moderate CKD (GFR = 30-44 mL/min/1 73 square meters)  •  Stage 4 Severe CKD (GFR = 15-29 mL/min/1 73 square meters)  •  Stage 5 End Stage CKD (GFR <15 mL/min/1 73 square meters)  Note: GFR calculation is accurate only with a steady state creatinine    TSH [[id number]]  (Normal) Collected: 11/11/22 1610    Lab Status: Final result Specimen: Blood from Arm, Right Updated: 11/11/22 1646     TSH 3RD GENERATON 1 991 uIU/mL     Narrative:      Patients undergoing fluorescein dye angiography may retain small amounts of fluorescein in the body for 48-72 hours post procedure  Samples containing fluorescein can produce falsely depressed TSH values  If the patient had this procedure,a specimen should be resubmitted post fluorescein clearance        CBC and differential [[id number]]  (Abnormal) Collected: 11/11/22 1610    Lab Status: Final result Specimen: Blood from Arm, Right Updated: 11/11/22 1620     WBC 8 59 Thousand/uL      RBC 4 07 Million/uL      Hemoglobin 11 8 g/dL      Hematocrit 37 3 %      MCV 92 fL      MCH 29 0 pg      MCHC 31 6 g/dL      RDW 14 6 %      MPV 8 3 fL      Platelets 232 Thousands/uL      nRBC 0 /100 WBCs      Neutrophils Relative 59 %      Immat GRANS % 1 %      Lymphocytes Relative 33 %      Monocytes Relative 6 %      Eosinophils Relative 0 % Basophils Relative 1 %      Neutrophils Absolute 5 07 Thousands/µL      Immature Grans Absolute 0 04 Thousand/uL      Lymphocytes Absolute 2 86 Thousands/µL      Monocytes Absolute 0 54 Thousand/µL      Eosinophils Absolute 0 00 Thousand/µL      Basophils Absolute 0 08 Thousands/µL                  No orders to display              Procedures  ECG 12 Lead Documentation Only    Date/Time: 11/11/2022 4:24 PM  Performed by: Janes Bañuelos MD  Authorized by: Janes Bañuelos MD     Rate:     ECG rate:  75  Rhythm:     Rhythm: sinus rhythm    Ectopy:     Ectopy: none    QRS:     QRS axis:  Normal    QRS intervals:  Normal  Conduction:     Conduction: normal    ST segments:     ST segments:  Normal  T waves:     T waves: normal               ED Course  ED Course as of 11/11/22 1720   Fri Nov 11, 2022   1647 Reviewed results with patient at bedside and updated on the plan  ED workup is reassuring, and she affirmed she has follow up next week  She did ask me for additional clonazepam or oxazepam   She did revise and affirmed she was prescribed the clonazepam, but one was "taken from me, so I just need a couple days worth "  I explained that it would be irregular for me to prescribed overlapping prescription of BZD as that can be a sign of medication overuse and dangerous if used in higher doses than recommended  I did agree to nonnarcotic anxiolytic                                                MDM    Disposition  Final diagnoses:   Palpitations   Anxiety     Time reflects when diagnosis was documented in both MDM as applicable and the Disposition within this note     Time User Action Codes Description Comment    11/11/2022  4:35 PM Maxwell Adams Add [R00 2] Palpitations     11/11/2022  4:35 PM Maxwell Adams Add [F41 9] Anxiety       ED Disposition     ED Disposition   Discharge    Condition   Good    Date/Time   Fri Nov 11, 2022  4:55 PM    Comment   Patricia Vance discharge to home/self care                Follow-up Information     Follow up With Specialties Details Why Contact Info    [address]  Call   [address] [place], [facility] [address] [phone]          Discharge Medication List as of 11/11/2022  4:56 PM      START taking these medications    Details   hydrOXYzine pamoate (VISTARIL) 25 mg capsule Take 1 capsule (25 mg total) by mouth every 6 (six) hours as needed for anxiety, Starting Fri 11/11/2022, Normal      ondansetron (ZOFRAN-ODT) 4 mg disintegrating tablet Take 1 tablet (4 mg total) by mouth every 6 (six) hours as needed for nausea or vomiting, Starting Fri 11/11/2022, Normal         CONTINUE these medications which have NOT CHANGED    Details   ARIPiprazole (ABILIFY) 15 mg tablet Take 1 tablet (15 mg total) by mouth daily for 14 days, Starting Tue 10/11/2022, Until Tue 10/25/2022, Normal      clonazePAM (KlonoPIN) 1 mg tablet Take 1 tablet (1 mg total) by mouth 2 (two) times a day for 2 days, Starting Thu 10/20/2022, Until Sat 10/22/2022, Normal      famotidine (PEPCID) 20 mg tablet Take 1 tablet (20 mg total) by mouth 2 (two) times a day, Starting Tue 10/11/2022, Until Thu 11/10/2022, Normal      ferrous sulfate 325 (65 Fe) mg tablet Take 1 tablet (325 mg total) by mouth daily with breakfast, Starting Tue 10/11/2022, Until Thu 11/10/2022, Normal      gabapentin (NEURONTIN) 300 mg capsule Take 2 capsules (600 mg total) by mouth 3 (three) times a day, Starting Tue 10/11/2022, Until Thu 11/10/2022, Normal      hydrOXYzine HCL (ATARAX) 25 mg tablet Take 1 tablet (25 mg total) by mouth every 6 (six) hours, Starting Thu 10/20/2022, Normal      ibuprofen (MOTRIN) 600 mg tablet Take 1 tablet (600 mg total) by mouth every 6 (six) hours as needed for moderate pain, Starting Tue 10/11/2022, Until Thu 11/10/2022 at 2359, Normal      naloxone (NARCAN) 4 mg/0 1 mL nasal spray 4 mg into each nostril, Starting Fri 9/2/2022, Historical Med      nicotine (NICODERM CQ) 14 mg/24hr TD 24 hr patch Place 1 patch on the skin every 24 hours, Starting Fri 10/28/2022, Normal      nicotine polacrilex (NICORETTE) 4 mg gum Chew 1 each (4 mg total) as needed for smoking cessation, Starting Fri 10/28/2022, Normal      pantoprazole (PROTONIX) 40 mg tablet Take 1 tablet (40 mg total) by mouth daily in the early morning, Starting Tue 10/11/2022, Until Thu 11/10/2022, Normal      topiramate (TOPAMAX) 100 mg tablet Take 1 tablet (100 mg total) by mouth 2 (two) times a day   Take in combination with 25mg for total daily dose of 125mg twice daily  , Starting Tue 10/11/2022, Until Thu 11/10/2022, Normal      topiramate (Topamax) 25 mg tablet Take 1 tablet (25 mg total) by mouth 2 (two) times a day   Take in combination with 100mg for total daily dose of 125mg twice daily  , Starting Tue 10/11/2022, Until Thu 11/10/2022, Normal      traZODone (DESYREL) 50 mg tablet Take 1 tablet (50 mg total) by mouth daily at bedtime, Starting Tue 10/11/2022, Until Thu 11/10/2022, Normal      venlafaxine (EFFEXOR-XR) 37 5 mg 24 hr capsule Take 1 capsule (37 5 mg total) by mouth daily, Starting Tue 10/11/2022, Until Thu 11/10/2022, Normal         STOP taking these medications       melatonin 3 mg Comments:   Reason for Stopping:               No discharge procedures on file      PDMP Review       Value Time User    PDMP Reviewed  Yes 10/12/2022  9:39 AM Priscilla Cordova MD          ED Provider  Electronically Signed by           Camille Rock MD  11/11/22 5602

## 2022-11-22 ENCOUNTER — HOSPITAL ENCOUNTER (EMERGENCY)
Facility: HOSPITAL | Age: 47
Discharge: HOME | End: 2022-11-22
Attending: EMERGENCY MEDICINE

## 2022-11-22 VITALS
RESPIRATION RATE: 18 BRPM | DIASTOLIC BLOOD PRESSURE: 64 MMHG | SYSTOLIC BLOOD PRESSURE: 108 MMHG | HEART RATE: 74 BPM | WEIGHT: 155.2 LBS | TEMPERATURE: 99.2 F | OXYGEN SATURATION: 100 % | BODY MASS INDEX: 28.39 KG/M2

## 2022-11-22 DIAGNOSIS — F41.9 ANXIETY: Primary | ICD-10-CM

## 2022-11-22 DIAGNOSIS — B34.9 VIRAL SYNDROME: ICD-10-CM

## 2022-11-22 LAB
EXT PREGNANCY TEST URINE: NEGATIVE
EXT. CONTROL: NORMAL
FLUAV RNA RESP QL NAA+PROBE: NEGATIVE
FLUBV RNA RESP QL NAA+PROBE: NEGATIVE
RSV RNA RESP QL NAA+PROBE: NEGATIVE
SARS-COV-2 RNA RESP QL NAA+PROBE: NEGATIVE

## 2022-11-22 RX ORDER — LORAZEPAM 1 MG/1
1 TABLET ORAL ONCE
Status: COMPLETED | OUTPATIENT
Start: 2022-11-22 | End: 2022-11-22

## 2022-11-22 RX ADMIN — LORAZEPAM 1 MG: 1 TABLET ORAL at 15:16

## 2022-11-22 NOTE — ED PROVIDER NOTES
Pt Name: Freedom Goss  MRN: [id number]  [place] 1975  Age/Sex: 41 y o  female  Date of evaluation: 11/22/2022  PCP: No primary care provider on file  CHIEF COMPLAINT    Chief Complaint   Patient presents with   • Anxiety     Pt c/o increased anxiety for the past 2 days keeping her in her room  Pt has been taking her klonopin  but doesn't seem to be helping  Does not have a therapist but would like to get started with one Denies any thoughts of SI or HI    • COVID-19 Swab Only     Pt would like a covid test she has been having cough and runny nose for over a week unsure of fever          HPI    Tian Dai presents to the Emergency Department complaining of anxiety attacks for the past two days  She has not left her bedroom and is just crying off and on  She takes klonopin but does not feel that it is helping  She has also been feeling sick with congestion and cough for about a week and maybe this is overall making her feel worse  No other complaints  No SI/HI or psychosis  HPI      Past Medical and Surgical History    Past Medical History:   Diagnosis Date   • Addiction to drug [facility])    • Alcohol abuse    • Alcoholism ([facility] [address] )    • Altered mental status 09/21/2022   • Elevated LFTs 09/21/2022   • Lower back pain    • Self-injurious behavior    • Substance abuse [facility])        Past Surgical History:   Procedure Laterality Date   • CHOLECYSTECTOMY         History reviewed  No pertinent family history  Social History     Tobacco Use   • Smoking status: Every Day     Packs/day: 0 50     Years: 20 00     Pack years: 10 00     Types: Cigarettes   • Smokeless tobacco: Current   Vaping Use   • Vaping Use: Every day   Substance Use Topics   • Alcohol use: Not Currently     Comment: MAGDALENO pt historically inconsistent   Drinking  per [facility] 2   • Drug use: Not Currently     Types: Heroin, "Crack" cocaine, Cocaine, LSD, Benzodiazepines, Marijuana     Comment: Pt unreliable historian      Allergies    Allergies   Allergen Reactions   • Haloperidol Other (See Comments)     oculogyr crisis       Home Medications    Prior to Admission medications    Medication Sig Start Date End Date Taking?  Authorizing Provider   ARIPiprazole (ABILIFY) 15 mg tablet Take 1 tablet (15 mg total) by mouth daily for 14 days 10/11/22 10/25/22  Eloise Casas MD   clonazePAM (KlonoPIN) 1 mg tablet Take 1 tablet (1 mg total) by mouth 2 (two) times a day for 2 days 10/20/22 10/22/22  Melvin Potter MD   famotidine (PEPCID) 20 mg tablet Take 1 tablet (20 mg total) by mouth 2 (two) times a day 10/11/22 11/10/22  Eloise Casas MD   ferrous sulfate 325 (65 Fe) mg tablet Take 1 tablet (325 mg total) by mouth daily with breakfast 10/11/22 11/10/22  Eloise Casas MD   gabapentin (NEURONTIN) 300 mg capsule Take 2 capsules (600 mg total) by mouth 3 (three) times a day 10/11/22 11/10/22  Eloise Casas MD   hydrOXYzine HCL (ATARAX) 25 mg tablet Take 1 tablet (25 mg total) by mouth every 6 (six) hours 10/20/22   Unique Szymanski DO   hydrOXYzine pamoate (VISTARIL) 25 mg capsule Take 1 capsule (25 mg total) by mouth every 6 (six) hours as needed for anxiety 11/11/22   Kenia Carlisle MD   ibuprofen (MOTRIN) 600 mg tablet Take 1 tablet (600 mg total) by mouth every 6 (six) hours as needed for moderate pain 10/11/22 11/10/22  Eloise Casas MD   naloxone [facility]) 4 mg/0 1 mL nasal spray 4 mg into each nostril 9/2/22   Historical Provider, MD   nicotine (NICODERM CQ) 14 mg/24hr TD 24 hr patch Place 1 patch on the skin every 24 hours 10/28/22   NETTE Altamirano   nicotine polacrilex (NICORETTE) 4 mg gum Chew 1 each (4 mg total) as needed for smoking cessation 10/28/22   NETTE Altamirano   ondansetron (ZOFRAN-ODT) 4 mg disintegrating tablet Take 1 tablet (4 mg total) by mouth every 6 (six) hours as needed for nausea or vomiting 11/11/22   Kenia Carlisle MD   pantoprazole (PROTONIX) 40 mg tablet Take 1 tablet (40 mg total) by mouth daily in the early morning 10/11/22 11/10/22  Dany Guzman MD   topiramate (TOPAMAX) 100 mg tablet Take 1 tablet (100 mg total) by mouth 2 (two) times a day   Take in combination with 25mg for total daily dose of 125mg twice daily  10/11/22 11/10/22  Dany Guzman MD   topiramate (Topamax) 25 mg tablet Take 1 tablet (25 mg total) by mouth 2 (two) times a day   Take in combination with 100mg for total daily dose of 125mg twice daily  10/11/22 11/10/22  Dany Guzman MD   traZODone (DESYREL) 50 mg tablet Take 1 tablet (50 mg total) by mouth daily at bedtime 10/11/22 11/10/22  Dany Guzman MD   venlafaxine [facility] 37 5 mg 24 hr capsule Take 1 capsule (37 5 mg total) by mouth daily 10/11/22 11/10/22  Dany Guzman MD           Review of Systems    Review of Systems   Constitutional: Positive for appetite change  Negative for chills and fever  HENT: Positive for congestion  Negative for ear pain and sore throat  Eyes: Negative for pain and visual disturbance  Respiratory: Positive for cough  Negative for shortness of breath  Cardiovascular: Negative for chest pain and palpitations  Gastrointestinal: Negative for abdominal pain and vomiting  Genitourinary: Negative for dysuria and hematuria  Musculoskeletal: Negative for arthralgias and back pain  Skin: Negative for color change and rash  Neurological: Negative for seizures and syncope  Psychiatric/Behavioral: The patient is nervous/anxious  All other systems reviewed and are negative          Physical Exam      ED Triage Vitals   Temperature Pulse Respirations Blood Pressure SpO2   11/22/22 1420 11/22/22 1426 11/22/22 1426 11/22/22 1426 11/22/22 1426   97 6 °F (36 4 °C) 78 18 129/84 99 %      Temp src Heart Rate Source Patient Position - Orthostatic VS BP Location FiO2 (%)   -- 11/22/22 1523 11/22/22 1426 11/22/22 1426 --    Monitor Sitting Right arm       Pain Score       --                      Physical Exam  Vitals and nursing note reviewed  Constitutional:       General: She is not in acute distress  Appearance: She is well-developed  HENT:      Head: Normocephalic and atraumatic  Eyes:      Conjunctiva/sclera: Conjunctivae normal    Cardiovascular:      Rate and Rhythm: Normal rate and regular rhythm  Heart sounds: No murmur heard  Pulmonary:      Effort: Pulmonary effort is normal  No respiratory distress  Breath sounds: Normal breath sounds  Abdominal:      Palpations: Abdomen is soft  Tenderness: There is no abdominal tenderness  Musculoskeletal:         General: No swelling  Cervical back: Neck supple  Skin:     General: Skin is warm and dry  Capillary Refill: Capillary refill takes less than 2 seconds  Neurological:      Mental Status: She is alert  Psychiatric:         Mood and Affect: Mood normal                 Assessment and Plan    Mario Justice is a 41 y o  female who presents with anxiety and cold symptoms  Physical examination remarkable for congestion and cough  Differential diagnosis (not completely inclusive) includes viral syndrome  Plan will be to perform diagnostic testing and treat symptomatically        MDM    Diagnostic Results        Labs:    Results for orders placed or performed during the hospital encounter of 11/22/22   FLU/RSV/COVID - if FLU/RSV clinically relevant    Specimen: Nasopharyngeal Swab; Nares   Result Value Ref Range    SARS-CoV-2 Negative Negative    INFLUENZA A PCR Negative Negative    INFLUENZA B PCR Negative Negative    RSV PCR Negative Negative   POCT pregnancy, urine   Result Value Ref Range    EXT Preg Test, Ur Negative     Control Valid        All labs reviewed and utilized in the medical decision making process    Radiology:    No orders to display       All radiology studies independently viewed by me and interpreted by the radiologist     Procedure    Procedures      ED Course of Care and Re-Assessments      Medications   LORazepam (ATIVAN) tablet 1 mg (1 mg Oral Given 11/22/22 1516)     Patient was given a dose of ativan, viral swab and a list of outpatient resources for mental health  FINAL IMPRESSION    Final diagnoses:   Anxiety   Viral syndrome         DISPOSITION/PLAN    Time reflects when diagnosis was documented in both MDM as applicable and the Disposition within this note     Time User Action Codes Description Comment    11/22/2022  3:22 PM Danbury Risser Add [F41 9] Anxiety     11/22/2022  3:22 PM Danbury Risser Add [B34 9] Viral syndrome       ED Disposition     ED Disposition   Discharge    Condition   Stable    Date/Time   Tue Nov 22, 2022  3:22 PM    [address] discharge to home/self care  Follow-up Information    None           PATIENT REFERRED TO:    No follow-up provider specified  DISCHARGE MEDICATIONS:    Discharge Medication List as of 11/22/2022  3:23 PM      CONTINUE these medications which have NOT CHANGED    Details   naloxone (NARCAN) 4 mg/0 1 mL nasal spray 4 mg into each nostril, Starting Fri 9/2/2022, Historical Med      nicotine (NICODERM CQ) 14 mg/24hr TD 24 hr patch Place 1 patch on the skin every 24 hours, Starting Fri 10/28/2022, Normal      nicotine polacrilex (NICORETTE) 4 mg gum Chew 1 each (4 mg total) as needed for smoking cessation, Starting Fri 10/28/2022, Normal      ondansetron (ZOFRAN-ODT) 4 mg disintegrating tablet Take 1 tablet (4 mg total) by mouth every 6 (six) hours as needed for nausea or vomiting, Starting Fri 11/11/2022, Normal      topiramate (Topamax) 25 mg tablet Take 1 tablet (25 mg total) by mouth 2 (two) times a day   Take in combination with 100mg for total daily dose of 125mg twice daily  , Starting Tue 10/11/2022, Until Tue 11/22/2022, Normal      traZODone (DESYREL) 50 mg tablet Take 1 tablet (50 mg total) by mouth daily at bedtime, Starting Tue 10/11/2022, Until Tue 11/22/2022, Normal      venlafaxine (EFFEXOR-XR) 37 5 mg 24 hr capsule Take 1 capsule (37 5 mg total) by mouth daily, Starting Tue 10/11/2022, Until Tue 11/22/2022, Normal      ARIPiprazole (ABILIFY) 15 mg tablet Take 1 tablet (15 mg total) by mouth daily for 14 days, Starting Tue 10/11/2022, Until Tue 10/25/2022, Normal      clonazePAM (KlonoPIN) 1 mg tablet Take 1 tablet (1 mg total) by mouth 2 (two) times a day for 2 days, Starting Thu 10/20/2022, Until Sat 10/22/2022, Normal      famotidine (PEPCID) 20 mg tablet Take 1 tablet (20 mg total) by mouth 2 (two) times a day, Starting Tue 10/11/2022, Until Thu 11/10/2022, Normal      ferrous sulfate 325 (65 Fe) mg tablet Take 1 tablet (325 mg total) by mouth daily with breakfast, Starting Tue 10/11/2022, Until Thu 11/10/2022, Normal      gabapentin (NEURONTIN) 300 mg capsule Take 2 capsules (600 mg total) by mouth 3 (three) times a day, Starting Tue 10/11/2022, Until Thu 11/10/2022, Normal      hydrOXYzine HCL (ATARAX) 25 mg tablet Take 1 tablet (25 mg total) by mouth every 6 (six) hours, Starting Thu 10/20/2022, Normal      hydrOXYzine pamoate (VISTARIL) 25 mg capsule Take 1 capsule (25 mg total) by mouth every 6 (six) hours as needed for anxiety, Starting Fri 11/11/2022, Normal      ibuprofen (MOTRIN) 600 mg tablet Take 1 tablet (600 mg total) by mouth every 6 (six) hours as needed for moderate pain, Starting Tue 10/11/2022, Until Thu 11/10/2022 at 2359, Normal      pantoprazole (PROTONIX) 40 mg tablet Take 1 tablet (40 mg total) by mouth daily in the early morning, Starting Tue 10/11/2022, Until Thu 11/10/2022, Normal             No discharge procedures on file           Lopez Byrd, [address] [place] [address], DO  11/22/22 1611

## 2022-12-04 PROBLEM — A41.9 SEPSIS (HCC): Status: RESOLVED | Noted: 2022-09-21 | Resolved: 2022-12-04

## 2023-03-25 NOTE — PROGRESS NOTES
01/05/22 0816   Team Meeting   Meeting Type Daily Rounds   Team Members Present   Team Members Present Physician;Nurse;   Physician Team Member Dr Morgan Velez Team Member [id number] Management Team Member Adriel   Patient/Family Present   Patient Present No   Patient's Family Present No   Pt to be discharged today  SW called pt to discuss and assist with follow up care.  Pt is a 70 y/o male presented to ED for lower leg pain.  Pt reports that pt is still having pain and has scheduled appt with primary Laz Moore on 3/27/23.  SW did not identify any other concerns and encourage pt to call SW if further assistance is needed.

## 2023-03-27 ENCOUNTER — APPOINTMENT (EMERGENCY)
Dept: CT IMAGING | Facility: HOSPITAL | Age: 48
End: 2023-03-27

## 2023-03-27 ENCOUNTER — HOSPITAL ENCOUNTER (EMERGENCY)
Facility: HOSPITAL | Age: 48
Discharge: HOME | End: 2023-03-27
Attending: EMERGENCY MEDICINE

## 2023-03-27 VITALS
TEMPERATURE: 98.4 F | HEART RATE: 66 BPM | OXYGEN SATURATION: 97 % | DIASTOLIC BLOOD PRESSURE: 65 MMHG | SYSTOLIC BLOOD PRESSURE: 120 MMHG | RESPIRATION RATE: 20 BRPM

## 2023-03-27 DIAGNOSIS — K59.00 CONSTIPATION: Primary | ICD-10-CM

## 2023-03-27 LAB
ALBUMIN SERPL BCP-MCNC: 3.8 G/DL (ref 3.5–5)
ALP SERPL-CCNC: 59 U/L (ref 34–104)
ALT SERPL W P-5'-P-CCNC: 8 U/L (ref 7–52)
ANION GAP SERPL CALCULATED.3IONS-SCNC: 5 MMOL/L (ref 4–13)
AST SERPL W P-5'-P-CCNC: 12 U/L (ref 13–39)
BACTERIA UR QL AUTO: ABNORMAL /HPF
BASOPHILS # BLD AUTO: 0.01 THOUSANDS/ÂΜL (ref 0–0.1)
BASOPHILS NFR BLD AUTO: 0 % (ref 0–1)
BILIRUB SERPL-MCNC: 0.57 MG/DL (ref 0.2–1)
BILIRUB UR QL STRIP: NEGATIVE
BUN SERPL-MCNC: 6 MG/DL (ref 5–25)
CALCIUM SERPL-MCNC: 8.7 MG/DL (ref 8.4–10.2)
CHLORIDE SERPL-SCNC: 106 MMOL/L (ref 96–108)
CLARITY UR: CLEAR
CO2 SERPL-SCNC: 26 MMOL/L (ref 21–32)
COLOR UR: YELLOW
CREAT SERPL-MCNC: 0.62 MG/DL (ref 0.6–1.3)
EOSINOPHIL # BLD AUTO: 0 THOUSAND/ÂΜL (ref 0–0.61)
EOSINOPHIL NFR BLD AUTO: 0 % (ref 0–6)
ERYTHROCYTE [DISTWIDTH] IN BLOOD BY AUTOMATED COUNT: 14 % (ref 11.6–15.1)
EXT PREGNANCY TEST URINE: NEGATIVE
EXT. CONTROL: NORMAL
GFR SERPL CREATININE-BSD FRML MDRD: 109 ML/MIN/1.73SQ M
GLUCOSE SERPL-MCNC: 71 MG/DL (ref 65–140)
GLUCOSE UR STRIP-MCNC: NEGATIVE MG/DL
HCT VFR BLD AUTO: 35.5 % (ref 34.8–46.1)
HGB BLD-MCNC: 11.6 G/DL (ref 11.5–15.4)
HGB UR QL STRIP.AUTO: ABNORMAL
IMM GRANULOCYTES # BLD AUTO: 0.03 THOUSAND/UL (ref 0–0.2)
IMM GRANULOCYTES NFR BLD AUTO: 0 % (ref 0–2)
KETONES UR STRIP-MCNC: NEGATIVE MG/DL
LEUKOCYTE ESTERASE UR QL STRIP: ABNORMAL
LIPASE SERPL-CCNC: 9 U/L (ref 11–82)
LYMPHOCYTES # BLD AUTO: 2.08 THOUSANDS/ÂΜL (ref 0.6–4.47)
LYMPHOCYTES NFR BLD AUTO: 30 % (ref 14–44)
MCH RBC QN AUTO: 28.9 PG (ref 26.8–34.3)
MCHC RBC AUTO-ENTMCNC: 32.7 G/DL (ref 31.4–37.4)
MCV RBC AUTO: 89 FL (ref 82–98)
MONOCYTES # BLD AUTO: 0.43 THOUSAND/ÂΜL (ref 0.17–1.22)
MONOCYTES NFR BLD AUTO: 6 % (ref 4–12)
NEUTROPHILS # BLD AUTO: 4.42 THOUSANDS/ÂΜL (ref 1.85–7.62)
NEUTS SEG NFR BLD AUTO: 64 % (ref 43–75)
NITRITE UR QL STRIP: NEGATIVE
NON-SQ EPI CELLS URNS QL MICRO: ABNORMAL /HPF
NRBC BLD AUTO-RTO: 0 /100 WBCS
OTHER STN SPEC: ABNORMAL
PH UR STRIP.AUTO: 6 [PH] (ref 4.5–8)
PLATELET # BLD AUTO: 229 THOUSANDS/UL (ref 149–390)
PMV BLD AUTO: 8.5 FL (ref 8.9–12.7)
POTASSIUM SERPL-SCNC: 4.3 MMOL/L (ref 3.5–5.3)
PROT SERPL-MCNC: 6.2 G/DL (ref 6.4–8.4)
PROT UR STRIP-MCNC: NEGATIVE MG/DL
RBC # BLD AUTO: 4.01 MILLION/UL (ref 3.81–5.12)
RBC #/AREA URNS AUTO: ABNORMAL /HPF
SODIUM SERPL-SCNC: 137 MMOL/L (ref 135–147)
SP GR UR STRIP.AUTO: 1.01 (ref 1–1.03)
UROBILINOGEN UR QL STRIP.AUTO: 0.2 E.U./DL
WBC # BLD AUTO: 6.97 THOUSAND/UL (ref 4.31–10.16)
WBC #/AREA URNS AUTO: ABNORMAL /HPF

## 2023-03-27 RX ORDER — LORAZEPAM 2 MG/ML
1 INJECTION INTRAMUSCULAR ONCE
Status: COMPLETED | OUTPATIENT
Start: 2023-03-27 | End: 2023-03-27

## 2023-03-27 RX ORDER — KETOROLAC TROMETHAMINE 30 MG/ML
15 INJECTION, SOLUTION INTRAMUSCULAR; INTRAVENOUS ONCE
Status: COMPLETED | OUTPATIENT
Start: 2023-03-27 | End: 2023-03-27

## 2023-03-27 RX ORDER — IBUPROFEN 400 MG/1
400 TABLET ORAL EVERY 6 HOURS PRN
Qty: 40 TABLET | Refills: 0 | Status: SHIPPED | OUTPATIENT
Start: 2023-03-27 | End: 2023-04-06

## 2023-03-27 RX ORDER — DOCUSATE SODIUM 100 MG/1
100 CAPSULE, LIQUID FILLED ORAL EVERY 12 HOURS
Qty: 30 CAPSULE | Refills: 0 | Status: SHIPPED | OUTPATIENT
Start: 2023-03-27 | End: 2023-04-11

## 2023-03-27 RX ORDER — ONDANSETRON 2 MG/ML
4 INJECTION INTRAMUSCULAR; INTRAVENOUS ONCE
Status: COMPLETED | OUTPATIENT
Start: 2023-03-27 | End: 2023-03-27

## 2023-03-27 RX ORDER — LORAZEPAM 2 MG/ML
0.5 INJECTION INTRAMUSCULAR ONCE
Status: COMPLETED | OUTPATIENT
Start: 2023-03-27 | End: 2023-03-27

## 2023-03-27 RX ORDER — ACETAMINOPHEN 325 MG/1
650 TABLET ORAL EVERY 6 HOURS PRN
Qty: 40 TABLET | Refills: 0 | Status: SHIPPED | OUTPATIENT
Start: 2023-03-27 | End: 2023-04-06

## 2023-03-27 RX ORDER — IBUPROFEN 800 MG/1
800 TABLET ORAL 3 TIMES DAILY
Qty: 30 TABLET | Refills: 0 | Status: SHIPPED | OUTPATIENT
Start: 2023-03-27 | End: 2023-03-27

## 2023-03-27 RX ORDER — ACETAMINOPHEN 325 MG/1
975 TABLET ORAL ONCE
Status: COMPLETED | OUTPATIENT
Start: 2023-03-27 | End: 2023-03-27

## 2023-03-27 RX ADMIN — LORAZEPAM 1 MG: 2 INJECTION INTRAMUSCULAR; INTRAVENOUS at 18:04

## 2023-03-27 RX ADMIN — SODIUM CHLORIDE 1000 ML: 0.9 INJECTION, SOLUTION INTRAVENOUS at 15:08

## 2023-03-27 RX ADMIN — KETOROLAC TROMETHAMINE 15 MG: 30 INJECTION, SOLUTION INTRAMUSCULAR; INTRAVENOUS at 15:10

## 2023-03-27 RX ADMIN — KETOROLAC TROMETHAMINE 15 MG: 30 INJECTION, SOLUTION INTRAMUSCULAR; INTRAVENOUS at 17:05

## 2023-03-27 RX ADMIN — IOHEXOL 100 ML: 350 INJECTION, SOLUTION INTRAVENOUS at 17:43

## 2023-03-27 RX ADMIN — ACETAMINOPHEN 325MG 975 MG: 325 TABLET ORAL at 18:03

## 2023-03-27 RX ADMIN — LORAZEPAM 0.5 MG: 2 INJECTION INTRAMUSCULAR; INTRAVENOUS at 16:16

## 2023-03-27 RX ADMIN — ONDANSETRON 4 MG: 2 INJECTION INTRAMUSCULAR; INTRAVENOUS at 15:09

## 2023-03-27 NOTE — DISCHARGE INSTRUCTIONS
1 - Take Metamucil 2 times daily for constipation relief  2 - Ibuprofen can cause worsening constipation, use this in moderation for pain relief  I have also prescribed Tylenol for further pain relief that lessens risk of constipation  3 - Gastroenterology referral has been placed for them to further evaluate  4 - Return to the ED if you experience fevers, passing out, severe pain, worsening symptoms, unable to urinate, unable to pass stool, vomiting, diarrhea

## 2023-03-27 NOTE — ED PROVIDER NOTES
"History  Chief Complaint   Patient presents with   • Flank Pain     Pt c/o right sided flank pain that radiates into her abdomin with urinary frequency  Pain started today and the pain is getting worse      Anthony Perkins is a 41year old female PMHx substance abuse, alcohol abuse, generalized anxiety disorder, cholecystectomy presenting to the ED for right sided abdominal pain that \"feels like something is gripping me from the inside, a squeezing sensation\" x earlier today  Reports a gradual onset with waves of extreme pain  Last normal bowel movement was last night  Took Zofran and Excedrin today without relief  Has never had this before  Is currently on suboxone  Prior to Admission Medications   Prescriptions Last Dose Informant Patient Reported? Taking?    ARIPiprazole (ABILIFY) 15 mg tablet   No No   Sig: Take 1 tablet (15 mg total) by mouth daily for 14 days   clonazePAM (KlonoPIN) 1 mg tablet   No No   Sig: Take 1 tablet (1 mg total) by mouth 2 (two) times a day for 2 days   famotidine (PEPCID) 20 mg tablet   No No   Sig: Take 1 tablet (20 mg total) by mouth 2 (two) times a day   ferrous sulfate 325 (65 Fe) mg tablet   No No   Sig: Take 1 tablet (325 mg total) by mouth daily with breakfast   gabapentin (NEURONTIN) 300 mg capsule   No No   Sig: Take 2 capsules (600 mg total) by mouth 3 (three) times a day   hydrOXYzine HCL (ATARAX) 25 mg tablet   No No   Sig: Take 1 tablet (25 mg total) by mouth every 6 (six) hours   Patient not taking: Reported on 2022   hydrOXYzine pamoate (VISTARIL) 25 mg capsule   No No   Sig: Take 1 capsule (25 mg total) by mouth every 6 (six) hours as needed for anxiety   Patient not taking: Reported on 2022   ibuprofen (MOTRIN) 600 mg tablet   No No   Sig: Take 1 tablet (600 mg total) by mouth every 6 (six) hours as needed for moderate pain   naloxone (NARCAN) 4 mg/0 1 mL nasal spray   Yes No   Si mg into each nostril   nicotine (NICODERM CQ) 14 mg/24hr TD 24 hr patch   " No No   Sig: Place 1 patch on the skin every 24 hours   nicotine polacrilex (NICORETTE) 4 mg gum   No No   Sig: Chew 1 each (4 mg total) as needed for smoking cessation   ondansetron (ZOFRAN-ODT) 4 mg disintegrating tablet   No No   Sig: Take 1 tablet (4 mg total) by mouth every 6 (six) hours as needed for nausea or vomiting   pantoprazole (PROTONIX) 40 mg tablet   No No   Sig: Take 1 tablet (40 mg total) by mouth daily in the early morning   topiramate (TOPAMAX) 100 mg tablet   No No   Sig: Take 1 tablet (100 mg total) by mouth 2 (two) times a day   Take in combination with 25mg for total daily dose of 125mg twice daily  topiramate (Topamax) 25 mg tablet   No No   Sig: Take 1 tablet (25 mg total) by mouth 2 (two) times a day   Take in combination with 100mg for total daily dose of 125mg twice daily  traZODone (DESYREL) 50 mg tablet   No No   Sig: Take 1 tablet (50 mg total) by mouth daily at bedtime   venlafaxine (EFFEXOR-XR) 37 5 mg 24 hr capsule   No No   Sig: Take 1 capsule (37 5 mg total) by mouth daily      Facility-Administered Medications: None     Past Medical History:   Diagnosis Date   • Addiction to drug [facility])    • Alcohol abuse    • Alcoholism ([facility] [address] )    • Altered mental status 09/21/2022   • Elevated LFTs 09/21/2022   • Lower back pain    • Self-injurious behavior    • Substance abuse [facility])      Past Surgical History:   Procedure Laterality Date   • CHOLECYSTECTOMY       History reviewed  No pertinent family history  I have reviewed and agree with the history as documented      E-Cigarette/Vaping   • E-Cigarette Use Current Every Day User    • Cartridges/Day 2      E-Cigarette/Vaping Substances   • Nicotine No    • THC No    • CBD No    • Flavoring No    • Other No    • Unknown No      Social History     Tobacco Use   • Smoking status: Every Day     Packs/day: 0 50     Years: 20 00     Pack years: 10 00     Types: Cigarettes   • Smokeless tobacco: Current   Vaping Use   • Vaping Use: Every day "  Substance Use Topics   • Alcohol use: Not Currently     Comment: MAGDALENO, pt historically inconsistent  Drinking  per [facility] 2   • Drug use: Not Currently     Types: Heroin, \"Crack\" cocaine, Cocaine, LSD, Benzodiazepines, Marijuana     Comment: Pt unreliable historian     Review of Systems   Constitutional: Negative for chills and fever  HENT: Negative for congestion, rhinorrhea and sore throat  Eyes: Negative for photophobia and visual disturbance  Respiratory: Negative for cough and shortness of breath  Cardiovascular: Negative for chest pain and palpitations  Gastrointestinal: Positive for abdominal pain, nausea and vomiting  Negative for constipation and diarrhea  Genitourinary: Positive for decreased urine volume, frequency and urgency  Negative for dysuria, flank pain, hematuria, vaginal bleeding and vaginal discharge  Musculoskeletal: Negative for back pain, myalgias, neck pain and neck stiffness  Skin: Negative for rash  Neurological: Positive for headaches (normal for pt)  Negative for light-headedness  Psychiatric/Behavioral: Negative for self-injury and suicidal ideas  The patient is nervous/anxious  Physical Exam  Physical Exam  Vitals reviewed  Constitutional:       General: She is not in acute distress  Appearance: Normal appearance  She is ill-appearing  She is not toxic-appearing or diaphoretic  HENT:      Head: Normocephalic and atraumatic  Right Ear: External ear normal       Left Ear: External ear normal       Nose: Nose normal       Mouth/Throat:      Mouth: Mucous membranes are moist       Pharynx: Oropharynx is clear  No oropharyngeal exudate or posterior oropharyngeal erythema  Eyes:      General: No scleral icterus  Right eye: No discharge  Left eye: No discharge  Extraocular Movements: Extraocular movements intact  Conjunctiva/sclera: Conjunctivae normal       Pupils: Pupils are equal, round, and reactive to light   " Cardiovascular:      Rate and Rhythm: Normal rate and regular rhythm  Pulses: Normal pulses  Heart sounds: Normal heart sounds  Pulmonary:      Effort: Pulmonary effort is normal       Breath sounds: Normal breath sounds  Abdominal:      General: Bowel sounds are normal       Palpations: Abdomen is soft  Tenderness: There is abdominal tenderness in the right lower quadrant  There is rebound  There is no right CVA tenderness, left CVA tenderness or guarding  Positive signs include Rovsing's sign  Negative signs include McBurney's sign, psoas sign and obturator sign  Musculoskeletal:      Cervical back: Normal range of motion and neck supple  No rigidity or tenderness  Lymphadenopathy:      Cervical: No cervical adenopathy  Skin:     General: Skin is warm and dry  Capillary Refill: Capillary refill takes less than 2 seconds  Neurological:      General: No focal deficit present  Mental Status: She is alert     Psychiatric:         Mood and Affect: Mood normal          Behavior: Behavior normal          Vital Signs  ED Triage Vitals   Temperature Pulse Respirations Blood Pressure SpO2   03/27/23 1330 03/27/23 1326 03/27/23 1326 03/27/23 1330 03/27/23 1326   98 4 °F (36 9 °C) 74 20 120/65 96 %      Temp src Heart Rate Source Patient Position - Orthostatic VS BP Location FiO2 (%)   -- 03/27/23 1618 -- -- --    Monitor         Pain Score       03/27/23 1618       10 - Worst Possible Pain         Vitals:    03/27/23 1326 03/27/23 1330 03/27/23 1618   BP:  120/65    Pulse: 74  66     Visual Acuity      ED Medications  Medications   sodium chloride 0 9 % bolus 1,000 mL (0 mL Intravenous Stopped 3/27/23 1705)   ketorolac (TORADOL) injection 15 mg (15 mg Intravenous Given 3/27/23 1510)   ondansetron (ZOFRAN) injection 4 mg (4 mg Intravenous Given 3/27/23 1509)   LORazepam (ATIVAN) injection 0 5 mg (0 5 mg Intravenous Given 3/27/23 1616)   ketorolac (TORADOL) injection 15 mg (15 mg Intravenous Given 3/27/23 1705)   iohexol (OMNIPAQUE) 350 MG/ML injection (SINGLE-DOSE) 100 mL (100 mL Intravenous Given 3/27/23 1743)   acetaminophen (TYLENOL) tablet 975 mg (975 mg Oral Given 3/27/23 1803)   LORazepam (ATIVAN) injection 1 mg (1 mg Intravenous Given 3/27/23 1804)       Diagnostic Studies  Results Reviewed     Procedure Component Value Units Date/Time    Urine Microscopic [[id number]]  (Abnormal) Collected: 03/27/23 1533    Lab Status: Final result Specimen: Urine, Clean Catch Updated: 03/27/23 1612     RBC, UA 1-2 /hpf      WBC, UA 2-4 /hpf      Epithelial Cells Occasional /hpf      Bacteria, UA None Seen /hpf      OTHER OBSERVATIONS WBCs Clumped    Comprehensive metabolic panel [[id number]]  (Abnormal) Collected: 03/27/23 1502    Lab Status: Final result Specimen: Blood from Arm, Right Updated: 03/27/23 1545     Sodium 137 mmol/L      Potassium 4 3 mmol/L      Chloride 106 mmol/L      CO2 26 mmol/L      ANION GAP 5 mmol/L      BUN 6 mg/dL      Creatinine 0 62 mg/dL      Glucose 71 mg/dL      Calcium 8 7 mg/dL      AST 12 U/L      ALT 8 U/L      Alkaline Phosphatase 59 U/L      Total Protein 6 2 g/dL      Albumin 3 8 g/dL      Total Bilirubin 0 57 mg/dL      eGFR 109 ml/min/1 73sq m     Narrative:      [place] guidelines for Chronic Kidney Disease (CKD):   •  Stage 1 with normal or high GFR (GFR > 90 mL/min/1 73 square meters)  •  Stage 2 Mild CKD (GFR = 60-89 mL/min/1 73 square meters)  •  Stage 3A Moderate CKD (GFR = 45-59 mL/min/1 73 square meters)  •  Stage 3B Moderate CKD (GFR = 30-44 mL/min/1 73 square meters)  •  Stage 4 Severe CKD (GFR = 15-29 mL/min/1 73 square meters)  •  Stage 5 End Stage CKD (GFR <15 mL/min/1 73 square meters)  Note: GFR calculation is accurate only with a steady state creatinine    Lipase [[id number]]  (Abnormal) Collected: 03/27/23 1502    Lab Status: Final result Specimen: Blood from Arm, Right Updated: 03/27/23 1545     Lipase 9 u/L     POCT pregnancy, urine [[id number]]  (Normal) Resulted: 03/27/23 1536    Lab Status: Final result Updated: 03/27/23 1536     EXT Preg Test, Ur Negative     Control Valid    POCT urinalysis dipstick [[id number]]  (Abnormal) Resulted: 03/27/23 1536    Lab Status: Final result Specimen: Urine Updated: 03/27/23 1536     Color, UA --     Clarity, UA --     EXT Leukocytes, UA --     Nitrite, UA --     Protein, UA -- mg/dl      Glucose, UA --     Ketones, UA -- mg/dl      EXT Urobilinogen, UA --      Bilirubin, UA --     Blood, UA --    Urine Macroscopic, POC [[id number]]  (Abnormal) Collected: 03/27/23 1533    Lab Status: Final result Specimen: Urine Updated: 03/27/23 1534     Color, UA Yellow     Clarity, UA Clear     pH, UA 6 0     Leukocytes, UA Trace     Nitrite, UA Negative     Protein, UA Negative mg/dl      Glucose, UA Negative mg/dl      Ketones, UA Negative mg/dl      Urobilinogen, UA 0 2 E U /dl      Bilirubin, UA Negative     Occult Blood, UA Small     Specific [place], UA 1 015    Narrative:      CLINITEK RESULT    CBC and differential [[id number]]  (Abnormal) Collected: 03/27/23 1502    Lab Status: Final result Specimen: Blood from Arm, Right Updated: 03/27/23 1518     WBC 6 97 Thousand/uL      RBC 4 01 Million/uL      Hemoglobin 11 6 g/dL      Hematocrit 35 5 %      MCV 89 fL      MCH 28 9 pg      MCHC 32 7 g/dL      RDW 14 0 %      MPV 8 5 fL      Platelets 129 Thousands/uL      nRBC 0 /100 WBCs      Neutrophils Relative 64 %      Immat GRANS % 0 %      Lymphocytes Relative 30 %      Monocytes Relative 6 %      Eosinophils Relative 0 %      Basophils Relative 0 %      Neutrophils Absolute 4 42 Thousands/µL      Immature Grans Absolute 0 03 Thousand/uL      Lymphocytes Absolute 2 08 Thousands/µL      Monocytes Absolute 0 43 Thousand/µL      Eosinophils Absolute 0 00 Thousand/µL      Basophils Absolute 0 01 Thousands/µL              CT abdomen pelvis with contrast   Final Result by Abel Saba MD (03/27 1852) No acute pathology  Workstation performed: [id number]                Procedures  Procedures     ED Course  ED Course as of 03/27/23 1957   Mon Mar 27, 2023   1604 Urine dipstick with positive leukocytes and blood     1606 CBC/CMP unremarkable   1612 Patient complaining of anxiety, Ativan IV provided  1616 Urine microscopic with 1-2 RBC, no bacteria noted  WBC 2-4   1703 Patient complaining of pain, Toradol ordered  [address] on patient, reports pain control but is very agitated with me  [address] on patient, requesting more pain relief and Xanax  Less agitated now  1857 CT abdomen pelvis with contrast  No acute pathology  Large fecal stasis in the colon noted  SBIRT 20yo+    Flowsheet Row Most Recent Value   SBIRT (25 yo +)    In order to provide better care to our patients, we are screening all of our patients for alcohol and drug use  Would it be okay to ask you these screening questions? No Filed at: 03/27/2023 1554   Initial Alcohol Screen: US AUDIT-C     1  How often do you have a drink containing alcohol? 0 Filed at: 03/27/2023 1554   2  How many drinks containing alcohol do you have on a typical day you are drinking? 0 Filed at: 03/27/2023 1554   3a  Male UNDER 65: How often do you have five or more drinks on one occasion? 0 Filed at: 03/27/2023 1554   3b  FEMALE Any Age, or MALE 65+: How often do you have 4 or more drinks on one occassion? 0 Filed at: 03/27/2023 1554   Audit-C Score 0 Filed at: 03/27/2023 1554   LYNN: How many times in the past year have you    Used an illegal drug or used a prescription medication for non-medical reasons? Never Filed at: 03/27/2023 1554                    Medical Decision Making  41year old female presenting with right sided abdominal pain x this morning  Physical exam with pain to palpation of right mid to low quadrant  No guarding  Patient immediately requesting Xanax for her anxiety   Appears uncomfortable and in pain  Attempted to relieve the anxiety by Toradol IV to control the pain  Patient continued to complain of anxiety, low dose Ativan provided  CBC, CMP, and Lipase unremarkable  Urine macroscopic with trace leukocytes and small occult blood, microscopic with 1-2 RBC and 2-4 WBC  No bacteria  Patient has history of rhabdomyolysis  Patient began requesting Xanax again, became agitated during the conversation as to why I did not give Xanax  Provided one more dose of Ativan for which patient felt moderately better  Another dose of Toradol given along with Tylenol  CT with fecal stasis in the colon but no acute pathology  Discussed ways to increase fiber in the diet, increasing water hydration, and provided a prescription for Colace with a referral to gastroenterology for further evaluation and chronic treatment  Patient is sitting comfortably at this time of update  At time of discharge, patient is requesting Xanax prescription from RN for which was denied  Ibuprofen, Tylenol prescriptions written as per requested by patient  At time of discharge, patient is stable and vital signs are reviewed  Questions answered  Strict instructions regarding ER return provided and understood by patient  Constipation:     Details: CT scan with fecal stasis  Amount and/or Complexity of Data Reviewed  External Data Reviewed: labs, radiology and notes  Labs: ordered  Decision-making details documented in ED Course  Radiology: ordered  Decision-making details documented in ED Course  Risk  OTC drugs  Prescription drug management            Disposition  Final diagnoses:   Constipation     Time reflects when diagnosis was documented in both MDM as applicable and the Disposition within this note     Time User Action Codes Description Comment    3/27/2023  7:10 PM Ke Maldonado Add [K59 00] Constipation       ED Disposition     ED Disposition   Discharge    Condition   Stable    Date/Time   Mon Mar 27, 2023  7:09 PM    Comment Yoel Mcclendon discharge to home/self care                 Follow-up Information     Follow up With Specialties Details [facility]  [facility] Emergency Medicine Go to  If symptoms worsen [facility] [id number] [address] [facility] Emergency Department, [address]  , [place], [place], [address]          Discharge Medication List as of 3/27/2023  7:17 PM      START taking these medications    Details   acetaminophen (TYLENOL) 325 mg tablet Take 2 tablets (650 mg total) by mouth every 6 (six) hours as needed for mild pain for up to 10 days, Starting Mon 3/27/2023, Until Thu 4/6/2023 at 2359, Normal      psyllium (METAMUCIL SMOOTH TEXTURE) 28 % packet Take 1 packet by mouth 2 (two) times a day for 15 days, Starting Mon 3/27/2023, Until Tue 4/11/2023, Normal         CONTINUE these medications which have CHANGED    Details   ibuprofen (MOTRIN) 400 mg tablet Take 1 tablet (400 mg total) by mouth every 6 (six) hours as needed for mild pain for up to 10 days, Starting Mon 3/27/2023, Until Thu 4/6/2023 at 2359, Normal         CONTINUE these medications which have NOT CHANGED    Details   ARIPiprazole (ABILIFY) 15 mg tablet Take 1 tablet (15 mg total) by mouth daily for 14 days, Starting Tue 10/11/2022, Until Tue 10/25/2022, Normal      clonazePAM (KlonoPIN) 1 mg tablet Take 1 tablet (1 mg total) by mouth 2 (two) times a day for 2 days, Starting Thu 10/20/2022, Until Sat 10/22/2022, Normal      famotidine (PEPCID) 20 mg tablet Take 1 tablet (20 mg total) by mouth 2 (two) times a day, Starting Tue 10/11/2022, Until Thu 11/10/2022, Normal      ferrous sulfate 325 (65 Fe) mg tablet Take 1 tablet (325 mg total) by mouth daily with breakfast, Starting Tue 10/11/2022, Until Thu 11/10/2022, Normal      gabapentin (NEURONTIN) 300 mg capsule Take 2 capsules (600 mg total) by mouth 3 (three) times a day, Starting Tue 10/11/2022, Until Thu 11/10/2022, Normal      hydrOXYzine HCL (ATARAX) 25 mg tablet Take 1 tablet (25 mg total) by mouth every 6 (six) hours, Starting Thu 10/20/2022, Normal      hydrOXYzine pamoate (VISTARIL) 25 mg capsule Take 1 capsule (25 mg total) by mouth every 6 (six) hours as needed for anxiety, Starting Fri 11/11/2022, Normal      naloxone (NARCAN) 4 mg/0 1 mL nasal spray 4 mg into each nostril, Starting Fri 9/2/2022, Historical Med      nicotine (NICODERM CQ) 14 mg/24hr TD 24 hr patch Place 1 patch on the skin every 24 hours, Starting Fri 10/28/2022, Normal      nicotine polacrilex (NICORETTE) 4 mg gum Chew 1 each (4 mg total) as needed for smoking cessation, Starting Fri 10/28/2022, Normal      ondansetron (ZOFRAN-ODT) 4 mg disintegrating tablet Take 1 tablet (4 mg total) by mouth every 6 (six) hours as needed for nausea or vomiting, Starting Fri 11/11/2022, Normal      pantoprazole (PROTONIX) 40 mg tablet Take 1 tablet (40 mg total) by mouth daily in the early morning, Starting Tue 10/11/2022, Until Thu 11/10/2022, Normal      topiramate (TOPAMAX) 100 mg tablet Take 1 tablet (100 mg total) by mouth 2 (two) times a day   Take in combination with 25mg for total daily dose of 125mg twice daily  , Starting Tue 10/11/2022, Until Thu 11/10/2022, Normal      topiramate (Topamax) 25 mg tablet Take 1 tablet (25 mg total) by mouth 2 (two) times a day   Take in combination with 100mg for total daily dose of 125mg twice daily  , Starting Tue 10/11/2022, Until Tue 11/22/2022, Normal      traZODone (DESYREL) 50 mg tablet Take 1 tablet (50 mg total) by mouth daily at bedtime, Starting Tue 10/11/2022, Until Tue 11/22/2022, Normal      venlafaxine (EFFEXOR-XR) 37 5 mg 24 hr capsule Take 1 capsule (37 5 mg total) by mouth daily, Starting Tue 10/11/2022, Until Tue 11/22/2022, Normal                 PDMP Review       Value Time User    PDMP Reviewed  Yes 10/12/2022  9:39 AM Azul Ibrahim MD          ED Provider  Electronically Signed by           Mindy Tavarez PA-C  03/27/23 1337

## 2023-03-29 ENCOUNTER — APPOINTMENT (EMERGENCY)
Dept: CT IMAGING | Facility: HOSPITAL | Age: 48
End: 2023-03-29

## 2023-03-29 ENCOUNTER — HOSPITAL ENCOUNTER (EMERGENCY)
Facility: HOSPITAL | Age: 48
Discharge: HOME | End: 2023-03-29
Attending: EMERGENCY MEDICINE

## 2023-03-29 VITALS
OXYGEN SATURATION: 95 % | SYSTOLIC BLOOD PRESSURE: 130 MMHG | TEMPERATURE: 98.7 F | RESPIRATION RATE: 18 BRPM | HEART RATE: 81 BPM | DIASTOLIC BLOOD PRESSURE: 73 MMHG

## 2023-03-29 DIAGNOSIS — R11.0 NAUSEA: ICD-10-CM

## 2023-03-29 DIAGNOSIS — K59.00 CONSTIPATION: ICD-10-CM

## 2023-03-29 DIAGNOSIS — R10.9 ABDOMINAL PAIN: Primary | ICD-10-CM

## 2023-03-29 LAB
ALBUMIN SERPL BCP-MCNC: 3.9 G/DL (ref 3.5–5)
ALP SERPL-CCNC: 62 U/L (ref 34–104)
ALT SERPL W P-5'-P-CCNC: 8 U/L (ref 7–52)
ANION GAP SERPL CALCULATED.3IONS-SCNC: 8 MMOL/L (ref 4–13)
AST SERPL W P-5'-P-CCNC: 14 U/L (ref 13–39)
BACTERIA UR QL AUTO: ABNORMAL /HPF
BASOPHILS # BLD AUTO: 0.02 THOUSANDS/ÂΜL (ref 0–0.1)
BASOPHILS NFR BLD AUTO: 0 % (ref 0–1)
BILIRUB SERPL-MCNC: 0.48 MG/DL (ref 0.2–1)
BILIRUB UR QL STRIP: NEGATIVE
BUN SERPL-MCNC: 7 MG/DL (ref 5–25)
CALCIUM SERPL-MCNC: 9.2 MG/DL (ref 8.4–10.2)
CHLORIDE SERPL-SCNC: 104 MMOL/L (ref 96–108)
CLARITY UR: ABNORMAL
CO2 SERPL-SCNC: 23 MMOL/L (ref 21–32)
COLOR UR: YELLOW
CREAT SERPL-MCNC: 0.76 MG/DL (ref 0.6–1.3)
EOSINOPHIL # BLD AUTO: 0 THOUSAND/ÂΜL (ref 0–0.61)
EOSINOPHIL NFR BLD AUTO: 0 % (ref 0–6)
ERYTHROCYTE [DISTWIDTH] IN BLOOD BY AUTOMATED COUNT: 13.8 % (ref 11.6–15.1)
EXT PREGNANCY TEST URINE: NEGATIVE
EXT. CONTROL: NORMAL
GFR SERPL CREATININE-BSD FRML MDRD: 95 ML/MIN/1.73SQ M
GLUCOSE SERPL-MCNC: 78 MG/DL (ref 65–140)
GLUCOSE UR STRIP-MCNC: NEGATIVE MG/DL
HCT VFR BLD AUTO: 39.7 % (ref 34.8–46.1)
HGB BLD-MCNC: 12.8 G/DL (ref 11.5–15.4)
HGB UR QL STRIP.AUTO: ABNORMAL
IMM GRANULOCYTES # BLD AUTO: 0.04 THOUSAND/UL (ref 0–0.2)
IMM GRANULOCYTES NFR BLD AUTO: 1 % (ref 0–2)
KETONES UR STRIP-MCNC: NEGATIVE MG/DL
LACTATE SERPL-SCNC: 0.5 MMOL/L (ref 0.5–2)
LEUKOCYTE ESTERASE UR QL STRIP: ABNORMAL
LIPASE SERPL-CCNC: 9 U/L (ref 11–82)
LYMPHOCYTES # BLD AUTO: 2.18 THOUSANDS/ÂΜL (ref 0.6–4.47)
LYMPHOCYTES NFR BLD AUTO: 26 % (ref 14–44)
MCH RBC QN AUTO: 28.4 PG (ref 26.8–34.3)
MCHC RBC AUTO-ENTMCNC: 32.2 G/DL (ref 31.4–37.4)
MCV RBC AUTO: 88 FL (ref 82–98)
MONOCYTES # BLD AUTO: 0.49 THOUSAND/ÂΜL (ref 0.17–1.22)
MONOCYTES NFR BLD AUTO: 6 % (ref 4–12)
NEUTROPHILS # BLD AUTO: 5.59 THOUSANDS/ÂΜL (ref 1.85–7.62)
NEUTS SEG NFR BLD AUTO: 67 % (ref 43–75)
NITRITE UR QL STRIP: NEGATIVE
NON-SQ EPI CELLS URNS QL MICRO: ABNORMAL /HPF
NRBC BLD AUTO-RTO: 0 /100 WBCS
PH UR STRIP.AUTO: 7 [PH]
PLATELET # BLD AUTO: 260 THOUSANDS/UL (ref 149–390)
PMV BLD AUTO: 8.5 FL (ref 8.9–12.7)
POTASSIUM SERPL-SCNC: 4.1 MMOL/L (ref 3.5–5.3)
PROT SERPL-MCNC: 6.6 G/DL (ref 6.4–8.4)
PROT UR STRIP-MCNC: NEGATIVE MG/DL
RBC # BLD AUTO: 4.51 MILLION/UL (ref 3.81–5.12)
RBC #/AREA URNS AUTO: ABNORMAL /HPF
SODIUM SERPL-SCNC: 135 MMOL/L (ref 135–147)
SP GR UR STRIP.AUTO: 1.01 (ref 1–1.03)
UROBILINOGEN UR QL STRIP.AUTO: 0.2 E.U./DL
WBC # BLD AUTO: 8.32 THOUSAND/UL (ref 4.31–10.16)
WBC #/AREA URNS AUTO: ABNORMAL /HPF

## 2023-03-29 RX ORDER — ONDANSETRON 2 MG/ML
4 INJECTION INTRAMUSCULAR; INTRAVENOUS ONCE
Status: COMPLETED | OUTPATIENT
Start: 2023-03-29 | End: 2023-03-29

## 2023-03-29 RX ORDER — CLONAZEPAM 1 MG/1
1 TABLET ORAL ONCE
Status: COMPLETED | OUTPATIENT
Start: 2023-03-29 | End: 2023-03-29

## 2023-03-29 RX ORDER — KETOROLAC TROMETHAMINE 30 MG/ML
15 INJECTION, SOLUTION INTRAMUSCULAR; INTRAVENOUS ONCE
Status: COMPLETED | OUTPATIENT
Start: 2023-03-29 | End: 2023-03-29

## 2023-03-29 RX ORDER — ONDANSETRON 4 MG/1
4 TABLET, ORALLY DISINTEGRATING ORAL EVERY 6 HOURS PRN
Qty: 10 TABLET | Refills: 0 | Status: SHIPPED | OUTPATIENT
Start: 2023-03-29

## 2023-03-29 RX ADMIN — ONDANSETRON 4 MG: 2 INJECTION INTRAMUSCULAR; INTRAVENOUS at 16:47

## 2023-03-29 RX ADMIN — IOHEXOL 100 ML: 350 INJECTION, SOLUTION INTRAVENOUS at 17:50

## 2023-03-29 RX ADMIN — CLONAZEPAM 1 MG: 1 TABLET ORAL at 17:45

## 2023-03-29 RX ADMIN — KETOROLAC TROMETHAMINE 15 MG: 30 INJECTION, SOLUTION INTRAMUSCULAR; INTRAVENOUS at 16:46

## 2023-03-29 RX ADMIN — SODIUM CHLORIDE 1000 ML: 0.9 INJECTION, SOLUTION INTRAVENOUS at 16:43

## 2023-03-29 NOTE — ED PROVIDER NOTES
History  Chief Complaint   Patient presents with   • Abdominal Pain     Patient states R side abdominal pain for two days  Evaluated prior for same, told constipation  Patient is a 53-year-old female with a past medical history of alcohol abuse, anxiety and substance abuse (currently on Suboxone), presenting to the ED for evaluation of continued right lower quadrant abdominal pain  Patient was seen in the ED 2 days ago for this pain and was told that it was due to constipation  She states that she has had continued and worsening pain which prompted her to return to the ED today  Patient does not feel she is constipated; however, her last bowel movement was 2 days ago after taking a laxative and she has not had any bowel movements since that time  She reports associated nausea but no vomiting  She denies any fevers, chest pain, SOB, diarrhea, melena, hematochezia, urinary symptoms, vaginal discharge or abnormal vaginal bleeding  Abdominal surgeries include a cholecystectomy  Patient requesting something for her anxiety  Prior to Admission Medications   Prescriptions Last Dose Informant Patient Reported? Taking?    ARIPiprazole (ABILIFY) 15 mg tablet   No No   Sig: Take 1 tablet (15 mg total) by mouth daily for 14 days   acetaminophen (TYLENOL) 325 mg tablet   No No   Sig: Take 2 tablets (650 mg total) by mouth every 6 (six) hours as needed for mild pain for up to 10 days   clonazePAM (KlonoPIN) 1 mg tablet   No No   Sig: Take 1 tablet (1 mg total) by mouth 2 (two) times a day for 2 days   docusate sodium (COLACE) 100 mg capsule   No No   Sig: Take 1 capsule (100 mg total) by mouth every 12 (twelve) hours for 15 days   famotidine (PEPCID) 20 mg tablet   No No   Sig: Take 1 tablet (20 mg total) by mouth 2 (two) times a day   ferrous sulfate 325 (65 Fe) mg tablet   No No   Sig: Take 1 tablet (325 mg total) by mouth daily with breakfast   gabapentin (NEURONTIN) 300 mg capsule   No No   Sig: Take 2 capsules (600 mg total) by mouth 3 (three) times a day   hydrOXYzine HCL (ATARAX) 25 mg tablet   No No   Sig: Take 1 tablet (25 mg total) by mouth every 6 (six) hours   Patient not taking: Reported on 2022   hydrOXYzine pamoate (VISTARIL) 25 mg capsule   No No   Sig: Take 1 capsule (25 mg total) by mouth every 6 (six) hours as needed for anxiety   Patient not taking: Reported on 2022   ibuprofen (MOTRIN) 400 mg tablet   No No   Sig: Take 1 tablet (400 mg total) by mouth every 6 (six) hours as needed for mild pain for up to 10 days   ibuprofen (MOTRIN) 600 mg tablet   No No   Sig: Take 1 tablet (600 mg total) by mouth every 6 (six) hours as needed for moderate pain   naloxone (NARCAN) 4 mg/0 1 mL nasal spray   Yes No   Si mg into each nostril   nicotine (NICODERM CQ) 14 mg/24hr TD 24 hr patch   No No   Sig: Place 1 patch on the skin every 24 hours   nicotine polacrilex (NICORETTE) 4 mg gum   No No   Sig: Chew 1 each (4 mg total) as needed for smoking cessation   ondansetron (ZOFRAN-ODT) 4 mg disintegrating tablet   No No   Sig: Take 1 tablet (4 mg total) by mouth every 6 (six) hours as needed for nausea or vomiting   pantoprazole (PROTONIX) 40 mg tablet   No No   Sig: Take 1 tablet (40 mg total) by mouth daily in the early morning   topiramate (TOPAMAX) 100 mg tablet   No No   Sig: Take 1 tablet (100 mg total) by mouth 2 (two) times a day   Take in combination with 25mg for total daily dose of 125mg twice daily  topiramate (Topamax) 25 mg tablet   No No   Sig: Take 1 tablet (25 mg total) by mouth 2 (two) times a day   Take in combination with 100mg for total daily dose of 125mg twice daily     traZODone (DESYREL) 50 mg tablet   No No   Sig: Take 1 tablet (50 mg total) by mouth daily at bedtime   venlafaxine (EFFEXOR-XR) 37 5 mg 24 hr capsule   No No   Sig: Take 1 capsule (37 5 mg total) by mouth daily      Facility-Administered Medications: None       Past Medical History:   Diagnosis Date   • "Addiction to drug [facility])    • Alcohol abuse    • Alcoholism ([facility] [address] )    • Altered mental status 09/21/2022   • Elevated LFTs 09/21/2022   • Lower back pain    • Self-injurious behavior    • Substance abuse [facility])        Past Surgical History:   Procedure Laterality Date   • CHOLECYSTECTOMY         History reviewed  No pertinent family history  I have reviewed and agree with the history as documented  E-Cigarette/Vaping   • E-Cigarette Use Current Every Day User    • Cartridges/Day 2      E-Cigarette/Vaping Substances   • Nicotine No    • THC No    • CBD No    • Flavoring No    • Other No    • Unknown No      Social History     Tobacco Use   • Smoking status: Every Day     Packs/day: 0 50     Years: 20 00     Pack years: 10 00     Types: Cigarettes   • Smokeless tobacco: Current   Vaping Use   • Vaping Use: Every day   Substance Use Topics   • Alcohol use: Not Currently     Comment: MAGDALENO, pt historically inconsistent  Drinking  per [facility] 2   • Drug use: Not Currently     Types: Heroin, \"Crack\" cocaine, Cocaine, LSD, Benzodiazepines, Marijuana     Comment: Pt unreliable historian       Review of Systems   Constitutional: Negative for fatigue and fever  HENT: Negative for congestion, rhinorrhea, sinus pressure, sinus pain and sore throat  Eyes: Negative for photophobia and visual disturbance  Respiratory: Negative for cough, shortness of breath and wheezing  Cardiovascular: Negative for chest pain, palpitations and leg swelling  Gastrointestinal: Positive for abdominal pain and nausea  Negative for blood in stool, diarrhea and vomiting  Genitourinary: Negative for difficulty urinating, dysuria, flank pain, frequency, hematuria and urgency  Musculoskeletal: Negative for arthralgias, back pain, joint swelling, myalgias and neck pain  Neurological: Negative for dizziness, syncope, weakness, light-headedness and headaches  All other systems reviewed and are negative        Physical " Exam  Physical Exam  Vitals and nursing note reviewed  Constitutional:       General: She is awake  Appearance: Normal appearance  She is well-developed  She is not toxic-appearing or diaphoretic  HENT:      Head: Normocephalic and atraumatic  Right Ear: External ear normal       Left Ear: External ear normal       Nose: Nose normal       Mouth/Throat:      Lips: Pink  Mouth: Mucous membranes are moist    Eyes:      General: Lids are normal  No scleral icterus  Conjunctiva/sclera: Conjunctivae normal       Pupils: Pupils are equal, round, and reactive to light  Cardiovascular:      Rate and Rhythm: Normal rate and regular rhythm  Pulses: Normal pulses  Radial pulses are 2+ on the right side and 2+ on the left side  Heart sounds: Normal heart sounds, S1 normal and S2 normal    Pulmonary:      Effort: Pulmonary effort is normal  No accessory muscle usage  Breath sounds: Normal breath sounds  No stridor  No decreased breath sounds, wheezing, rhonchi or rales  Abdominal:      General: Abdomen is flat  Bowel sounds are normal  There is no distension  Palpations: Abdomen is soft  Tenderness: There is abdominal tenderness in the right lower quadrant  There is no right CVA tenderness, left CVA tenderness, guarding or rebound  Musculoskeletal:      Cervical back: Full passive range of motion without pain and neck supple  No signs of trauma  No pain with movement  Right lower leg: No edema  Left lower leg: No edema  Lymphadenopathy:      Cervical: No cervical adenopathy  Skin:     General: Skin is warm and dry  Capillary Refill: Capillary refill takes less than 2 seconds  Coloration: Skin is not cyanotic, jaundiced or pale  Neurological:      Mental Status: She is alert and oriented to person, place, and time  GCS: GCS eye subscore is 4  GCS verbal subscore is 5  GCS motor subscore is 6        Gait: Gait normal    Psychiatric: Mood and Affect: Mood normal          Speech: Speech normal          Behavior: Behavior is cooperative           Vital Signs  ED Triage Vitals   Temperature Pulse Respirations Blood Pressure SpO2   03/29/23 1653 03/29/23 1608 03/29/23 1608 03/29/23 1608 03/29/23 1608   98 7 °F (37 1 °C) 69 18 113/84 96 %      Temp Source Heart Rate Source Patient Position - Orthostatic VS BP Location FiO2 (%)   03/29/23 1653 03/29/23 1608 03/29/23 1608 03/29/23 1608 --   Oral Monitor Sitting Right arm       Pain Score       03/29/23 1608       8           Vitals:    03/29/23 1608 03/29/23 1825   BP: 113/84 130/73   Pulse: 69 81   Patient Position - Orthostatic VS: Sitting Sitting         Visual Acuity      ED Medications  Medications   ondansetron (ZOFRAN) injection 4 mg (4 mg Intravenous Given 3/29/23 1647)   ketorolac (TORADOL) injection 15 mg (15 mg Intravenous Given 3/29/23 1646)   sodium chloride 0 9 % bolus 1,000 mL (0 mL Intravenous Stopped 3/29/23 1746)   clonazePAM (KlonoPIN) tablet 1 mg (1 mg Oral Given 3/29/23 1745)   iohexol (OMNIPAQUE) 350 MG/ML injection (SINGLE-DOSE) 100 mL (100 mL Intravenous Given 3/29/23 1750)       Diagnostic Studies  Results Reviewed     Procedure Component Value Units Date/Time    Comprehensive metabolic panel [[id number]] Collected: 03/29/23 1640    Lab Status: Final result Specimen: Blood from Arm, Right Updated: 03/29/23 1708     Sodium 135 mmol/L      Potassium 4 1 mmol/L      Chloride 104 mmol/L      CO2 23 mmol/L      ANION GAP 8 mmol/L      BUN 7 mg/dL      Creatinine 0 76 mg/dL      Glucose 78 mg/dL      Calcium 9 2 mg/dL      AST 14 U/L      ALT 8 U/L      Alkaline Phosphatase 62 U/L      Total Protein 6 6 g/dL      Albumin 3 9 g/dL      Total Bilirubin 0 48 mg/dL      eGFR 95 ml/min/1 73sq m     Narrative:      [place] guidelines for Chronic Kidney Disease (CKD):   •  Stage 1 with normal or high GFR (GFR > 90 mL/min/1 73 square meters)  •  Stage 2 Mild CKD (GFR = 60-89 mL/min/1 73 square meters)  •  Stage 3A Moderate CKD (GFR = 45-59 mL/min/1 73 square meters)  •  Stage 3B Moderate CKD (GFR = 30-44 mL/min/1 73 square meters)  •  Stage 4 Severe CKD (GFR = 15-29 mL/min/1 73 square meters)  •  Stage 5 End Stage CKD (GFR <15 mL/min/1 73 square meters)  Note: GFR calculation is accurate only with a steady state creatinine    Lipase [[id number]]  (Abnormal) Collected: 03/29/23 1640    Lab Status: Final result Specimen: Blood from Arm, Right Updated: 03/29/23 1708     Lipase 9 u/L     Lactic acid [[id number]]  (Normal) Collected: 03/29/23 1640    Lab Status: Final result Specimen: Blood from Arm, Right Updated: 03/29/23 1708     LACTIC ACID 0 5 mmol/L     Narrative:      Result may be elevated if tourniquet was used during collection      Urine Microscopic [[id number]]  (Abnormal) Collected: 03/29/23 1642    Lab Status: Final result Specimen: Urine, Clean Catch Updated: 03/29/23 1704     RBC, UA 4-10 /hpf      WBC, UA 4-10 /hpf      Epithelial Cells Moderate /hpf      Bacteria, UA Moderate /hpf     UA w Reflex to Microscopic w Reflex to Culture [[id number]]  (Abnormal) Collected: 03/29/23 1642    Lab Status: Final result Specimen: Urine, Clean Catch Updated: 03/29/23 1652     Color, UA Yellow     Clarity, UA Slightly Cloudy     Specific [place], UA 1 010     pH, UA 7 0     Leukocytes, UA Moderate     Nitrite, UA Negative     Protein, UA Negative mg/dl      Glucose, UA Negative mg/dl      Ketones, UA Negative mg/dl      Urobilinogen, UA 0 2 E U /dl      Bilirubin, UA Negative     Occult Blood, UA Trace-Intact    CBC and differential [[id number]]  (Abnormal) Collected: 03/29/23 1640    Lab Status: Final result Specimen: Blood from Arm, Right Updated: 03/29/23 1646     WBC 8 32 Thousand/uL      RBC 4 51 Million/uL      Hemoglobin 12 8 g/dL      Hematocrit 39 7 %      MCV 88 fL      MCH 28 4 pg      MCHC 32 2 g/dL      RDW 13 8 %      MPV 8 5 fL      Platelets 021 Thousands/uL nRBC 0 /100 WBCs      Neutrophils Relative 67 %      Immat GRANS % 1 %      Lymphocytes Relative 26 %      Monocytes Relative 6 %      Eosinophils Relative 0 %      Basophils Relative 0 %      Neutrophils Absolute 5 59 Thousands/µL      Immature Grans Absolute 0 04 Thousand/uL      Lymphocytes Absolute 2 18 Thousands/µL      Monocytes Absolute 0 49 Thousand/µL      Eosinophils Absolute 0 00 Thousand/µL      Basophils Absolute 0 02 Thousands/µL     POCT pregnancy, urine [[id number]]  (Normal) Resulted: 03/29/23 1645    Lab Status: Final result Updated: 03/29/23 1645     EXT Preg Test, Ur Negative     Control Valid                 CT abdomen pelvis with contrast   Final Result by Hill Woodson MD (03/29 1925)   1  No acute findings with a normal appendix identified  2   Fecal stasis left colon similar to prior study  3   Fibroid uterus            Workstation performed: [id number]                    Procedures  Procedures         ED Course                               SBIRT 20yo+    Flowsheet Row Most Recent Value   SBIRT (23 yo +)    In order to provide better care to our patients, we are screening all of our patients for alcohol and drug use  Would it be okay to ask you these screening questions? Yes Filed at: 03/29/2023 1612   Initial Alcohol Screen: US AUDIT-C     1  How often do you have a drink containing alcohol? 0 Filed at: 03/29/2023 1612   2  How many drinks containing alcohol do you have on a typical day you are drinking? 0 Filed at: 03/29/2023 1612   3b  FEMALE Any Age, or MALE 65+: How often do you have 4 or more drinks on one occassion? 0 Filed at: 03/29/2023 1612   Audit-C Score 0 Filed at: 03/29/2023 1612   LYNN: How many times in the past year have you    Used an illegal drug or used a prescription medication for non-medical reasons?  Never Filed at: 03/29/2023 1612                    Medical Decision Making  Patient is a 27-year-old female with a past medical history of alcohol abuse, anxiety and substance abuse (currently on Suboxone), presenting to the ED for evaluation of continued right lower quadrant abdominal pain  Labs unremarkable  UA shows moderate bacteria, though it is contaminated and patient denies urinary symptoms so will hold off on antibiotics pending urine culture  Patient very anxious about her pain and requested a dose of her PRN Clonazepam; after review of patient's chart, this medication is prescribed for her to take up to 4x/day PRN for anxiety so a one time dose was administered in the ED  CT shows fecal stasis in the colon, similar to CT from 2 days ago  I discussed results with patient in detail  She states that she has tried stool softeners and Miralax without improvement  Discussed other options such as magnesium citrate, enemas, etc  We also discussed increased fiber and water intake and trying daily miralax until bowel movements are regular  Patient was advised to follow-up with her PCP and GI or return for any new/worsening symptoms  The management plan was discussed in detail with the patient at bedside and all questions were answered  Strict ED return instructions were discussed at bedside  Prior to discharge, both verbal and written instructions were provided  We discussed the signs and symptoms that should prompt the patient to return to the ED  All questions were answered and the patient was comfortable with the plan of care and discharged home  The patient agrees to return to the Emergency Department for concerns and/or progression of illness  Amount and/or Complexity of Data Reviewed  External Data Reviewed: labs and radiology  Details: CT/labs from 2 days ago reviewed  Labs: ordered  Radiology: ordered  Risk  Prescription drug management            Disposition  Final diagnoses:   Abdominal pain   Constipation   Nausea     Time reflects when diagnosis was documented in both MDM as applicable and the Disposition within this note     Time User Action Codes Description Comment    3/29/2023  7:37 PM Araceli Lay Add [R10 9] Abdominal pain     3/29/2023  7:37 PM 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     3/29/2023  7:37 PM Araceli Lay Remove [Z92 89] Status post fecal microbiota transplant     3/29/2023  7:37 PM Araceli Lay Add [K59 00] Constipation     3/29/2023  7:37 PM Araceli Lay Add [R11 0] Nausea       ED Disposition     ED Disposition   Discharge    Condition   Stable    Date/Time   Wed Mar 29, 2023  7:37 PM    Comment   Olivia Vargasdrew discharge to home/self care  Follow-up Information     Follow up With Specialties Details Why Contact Info Additional Nancy Zhou [facility] Gastro[facility]ology Schedule an appointment as soon as possible for a visit   [address]  [address] [facility] [address]  Brayden Nino [address] [facility], [address] [place] [address], [address], [facility], [place], [address]   [phone]    [address] [facility] Emergency Department Emergency Medicine  If symptoms worsen [facility] [id number]  [address] [facility] Emergency Department, [address] [facility] , [place], [place], [address]          Discharge Medication List as of 3/29/2023  7:38 PM      START taking these medications    Details   !! ondansetron (Zofran ODT) 4 mg disintegrating tablet Take 1 tablet (4 mg total) by mouth every 6 (six) hours as needed for nausea or vomiting, Starting Wed 3/29/2023, Normal       !! - Potential duplicate medications found  Please discuss with provider        CONTINUE these medications which have NOT CHANGED    Details   acetaminophen (TYLENOL) 325 mg tablet Take 2 tablets (650 mg total) by mouth every 6 (six) hours as needed for mild pain for up to 10 days, Starting Mon 3/27/2023, Until Thu 4/6/2023 at 2359, Normal      ARIPiprazole (ABILIFY) 15 mg tablet Take 1 tablet (15 mg total) by mouth daily for 14 days, Starting Tue 10/11/2022, Until Tue 10/25/2022, Normal      clonazePAM (KlonoPIN) 1 mg tablet Take 1 tablet (1 mg total) by mouth 2 (two) times a day for 2 days, Starting Thu 10/20/2022, Until Sat 10/22/2022, Normal      docusate sodium (COLACE) 100 mg capsule Take 1 capsule (100 mg total) by mouth every 12 (twelve) hours for 15 days, Starting Mon 3/27/2023, Until Tue 4/11/2023, Normal      famotidine (PEPCID) 20 mg tablet Take 1 tablet (20 mg total) by mouth 2 (two) times a day, Starting Tue 10/11/2022, Until Thu 11/10/2022, Normal      ferrous sulfate 325 (65 Fe) mg tablet Take 1 tablet (325 mg total) by mouth daily with breakfast, Starting Tue 10/11/2022, Until Thu 11/10/2022, Normal      gabapentin (NEURONTIN) 300 mg capsule Take 2 capsules (600 mg total) by mouth 3 (three) times a day, Starting Tue 10/11/2022, Until Thu 11/10/2022, Normal      hydrOXYzine HCL (ATARAX) 25 mg tablet Take 1 tablet (25 mg total) by mouth every 6 (six) hours, Starting Thu 10/20/2022, Normal      hydrOXYzine pamoate (VISTARIL) 25 mg capsule Take 1 capsule (25 mg total) by mouth every 6 (six) hours as needed for anxiety, Starting Fri 11/11/2022, Normal      ibuprofen (MOTRIN) 400 mg tablet Take 1 tablet (400 mg total) by mouth every 6 (six) hours as needed for mild pain for up to 10 days, Starting Mon 3/27/2023, Until Thu 4/6/2023 at 2359, Normal      naloxone (NARCAN) 4 mg/0 1 mL nasal spray 4 mg into each nostril, Starting Fri 9/2/2022, Historical Med      nicotine (NICODERM CQ) 14 mg/24hr TD 24 hr patch Place 1 patch on the skin every 24 hours, Starting Fri 10/28/2022, Normal      nicotine polacrilex (NICORETTE) 4 mg gum Chew 1 each (4 mg total) as needed for smoking cessation, Starting Fri 10/28/2022, Normal      !! ondansetron (ZOFRAN-ODT) 4 mg disintegrating tablet Take 1 tablet (4 mg total) by mouth every 6 (six) hours as needed for nausea or vomiting, Starting Fri 11/11/2022, Normal      pantoprazole (PROTONIX) 40 mg tablet Take 1 tablet (40 mg total) by mouth daily in the early morning, Starting Tue 10/11/2022, Until Thu 11/10/2022, Normal      topiramate (TOPAMAX) 100 mg tablet Take 1 tablet (100 mg total) by mouth 2 (two) times a day   Take in combination with 25mg for total daily dose of 125mg twice daily  , Starting Tue 10/11/2022, Until Thu 11/10/2022, Normal      topiramate (Topamax) 25 mg tablet Take 1 tablet (25 mg total) by mouth 2 (two) times a day   Take in combination with 100mg for total daily dose of 125mg twice daily  , Starting Tue 10/11/2022, Until Tue 11/22/2022, Normal      traZODone (DESYREL) 50 mg tablet Take 1 tablet (50 mg total) by mouth daily at bedtime, Starting Tue 10/11/2022, Until Tue 11/22/2022, Normal      venlafaxine (EFFEXOR-XR) 37 5 mg 24 hr capsule Take 1 capsule (37 5 mg total) by mouth daily, Starting Tue 10/11/2022, Until Tue 11/22/2022, Normal       !! - Potential duplicate medications found  Please discuss with provider                PDMP Review       Value Time User    PDMP Reviewed  Yes 10/12/2022  9:39 AM Ira May MD          ED Provider  Electronically Signed by           Juliette Oglesby PA-C  03/29/23 3875

## 2023-04-13 ENCOUNTER — HOSPITAL ENCOUNTER (INPATIENT)
Facility: HOSPITAL | Age: 48
LOS: 7 days | Discharge: HOME | End: 2023-04-20
Attending: EMERGENCY MEDICINE | Admitting: INTERNAL MEDICINE

## 2023-04-13 ENCOUNTER — APPOINTMENT (EMERGENCY)
Dept: CT IMAGING | Facility: HOSPITAL | Age: 48
End: 2023-04-13

## 2023-04-13 ENCOUNTER — APPOINTMENT (EMERGENCY)
Dept: RADIOLOGY | Facility: HOSPITAL | Age: 48
End: 2023-04-13

## 2023-04-13 DIAGNOSIS — F41.9 ANXIETY DISORDER, UNSPECIFIED: ICD-10-CM

## 2023-04-13 DIAGNOSIS — R93.89 ABNORMAL CHEST CT: ICD-10-CM

## 2023-04-13 DIAGNOSIS — R77.8 ELEVATED TROPONIN: ICD-10-CM

## 2023-04-13 DIAGNOSIS — R93.89 ABNORMAL CT OF THE CHEST: ICD-10-CM

## 2023-04-13 DIAGNOSIS — R09.02 HYPOXIA: Primary | ICD-10-CM

## 2023-04-13 PROBLEM — R79.89 ELEVATED TROPONIN: Status: ACTIVE | Noted: 2023-04-13

## 2023-04-13 LAB
2HR DELTA HS TROPONIN: -142 NG/L
4HR DELTA HS TROPONIN: -236 NG/L
ANION GAP SERPL CALCULATED.3IONS-SCNC: 11 MMOL/L (ref 4–13)
ATRIAL RATE: 74 BPM
ATRIAL RATE: 85 BPM
ATRIAL RATE: 90 BPM
BASOPHILS # BLD AUTO: 0.09 THOUSANDS/ΜL (ref 0–0.1)
BASOPHILS NFR BLD AUTO: 1 % (ref 0–1)
BNP SERPL-MCNC: 407 PG/ML (ref 0–100)
BUN SERPL-MCNC: 17 MG/DL (ref 5–25)
CALCIUM SERPL-MCNC: 8.8 MG/DL (ref 8.4–10.2)
CARDIAC TROPONIN I PNL SERPL HS: 439 NG/L
CARDIAC TROPONIN I PNL SERPL HS: 533 NG/L
CARDIAC TROPONIN I PNL SERPL HS: 675 NG/L
CHLORIDE SERPL-SCNC: 108 MMOL/L (ref 96–108)
CO2 SERPL-SCNC: 19 MMOL/L (ref 21–32)
CREAT SERPL-MCNC: 0.95 MG/DL (ref 0.6–1.3)
EOSINOPHIL # BLD AUTO: 0 THOUSAND/ΜL (ref 0–0.61)
EOSINOPHIL NFR BLD AUTO: 0 % (ref 0–6)
ERYTHROCYTE [DISTWIDTH] IN BLOOD BY AUTOMATED COUNT: 13.8 % (ref 11.6–15.1)
EXT PREGNANCY TEST URINE: NEGATIVE
EXT. CONTROL: NORMAL
FLUAV RNA RESP QL NAA+PROBE: NEGATIVE
FLUBV RNA RESP QL NAA+PROBE: NEGATIVE
GFR SERPL CREATININE-BSD FRML MDRD: 72 ML/MIN/1.73SQ M
GLUCOSE SERPL-MCNC: 99 MG/DL (ref 65–140)
HCT VFR BLD AUTO: 33.5 % (ref 34.8–46.1)
HGB BLD-MCNC: 11.1 G/DL (ref 11.5–15.4)
IMM GRANULOCYTES # BLD AUTO: 0.13 THOUSAND/UL (ref 0–0.2)
IMM GRANULOCYTES NFR BLD AUTO: 1 % (ref 0–2)
L PNEUMO1 AG UR QL IA.RAPID: NEGATIVE
LYMPHOCYTES # BLD AUTO: 1.99 THOUSANDS/ΜL (ref 0.6–4.47)
LYMPHOCYTES NFR BLD AUTO: 13 % (ref 14–44)
MCH RBC QN AUTO: 29.5 PG (ref 26.8–34.3)
MCHC RBC AUTO-ENTMCNC: 33.1 G/DL (ref 31.4–37.4)
MCV RBC AUTO: 89 FL (ref 82–98)
MONOCYTES # BLD AUTO: 0.7 THOUSAND/ΜL (ref 0.17–1.22)
MONOCYTES NFR BLD AUTO: 5 % (ref 4–12)
NEUTROPHILS # BLD AUTO: 12.69 THOUSANDS/ΜL (ref 1.85–7.62)
NEUTS SEG NFR BLD AUTO: 80 % (ref 43–75)
NRBC BLD AUTO-RTO: 0 /100 WBCS
P AXIS: 10 DEGREES
P AXIS: 17 DEGREES
P AXIS: 24 DEGREES
PLATELET # BLD AUTO: 239 THOUSANDS/UL (ref 149–390)
PLATELET # BLD AUTO: 295 THOUSANDS/UL (ref 149–390)
PMV BLD AUTO: 8.6 FL (ref 8.9–12.7)
PMV BLD AUTO: 8.7 FL (ref 8.9–12.7)
POTASSIUM SERPL-SCNC: 3.9 MMOL/L (ref 3.5–5.3)
PR INTERVAL: 170 MS
PR INTERVAL: 174 MS
PR INTERVAL: 176 MS
PROCALCITONIN SERPL-MCNC: 0.36 NG/ML
QRS AXIS: 64 DEGREES
QRS AXIS: 83 DEGREES
QRS AXIS: 89 DEGREES
QRSD INTERVAL: 88 MS
QT INTERVAL: 376 MS
QT INTERVAL: 382 MS
QT INTERVAL: 464 MS
QTC INTERVAL: 454 MS
QTC INTERVAL: 459 MS
QTC INTERVAL: 515 MS
RBC # BLD AUTO: 3.76 MILLION/UL (ref 3.81–5.12)
RSV RNA RESP QL NAA+PROBE: NEGATIVE
S PNEUM AG UR QL: NEGATIVE
SARS-COV-2 RNA RESP QL NAA+PROBE: NEGATIVE
SODIUM SERPL-SCNC: 138 MMOL/L (ref 135–147)
T WAVE AXIS: 21 DEGREES
T WAVE AXIS: 27 DEGREES
T WAVE AXIS: 47 DEGREES
VENTRICULAR RATE: 74 BPM
VENTRICULAR RATE: 85 BPM
VENTRICULAR RATE: 90 BPM
WBC # BLD AUTO: 15.6 THOUSAND/UL (ref 4.31–10.16)

## 2023-04-13 RX ORDER — BUDESONIDE AND FORMOTEROL FUMARATE DIHYDRATE 80; 4.5 UG/1; UG/1
2 AEROSOL RESPIRATORY (INHALATION) 2 TIMES DAILY
Status: DISCONTINUED | OUTPATIENT
Start: 2023-04-13 | End: 2023-04-14

## 2023-04-13 RX ORDER — ENOXAPARIN SODIUM 100 MG/ML
40 INJECTION SUBCUTANEOUS DAILY
Status: DISCONTINUED | OUTPATIENT
Start: 2023-04-14 | End: 2023-04-20 | Stop reason: HOSPADM

## 2023-04-13 RX ORDER — FAMOTIDINE 20 MG/1
20 TABLET, FILM COATED ORAL 2 TIMES DAILY
Status: DISCONTINUED | OUTPATIENT
Start: 2023-04-13 | End: 2023-04-20 | Stop reason: HOSPADM

## 2023-04-13 RX ORDER — ACETAMINOPHEN 325 MG/1
650 TABLET ORAL EVERY 6 HOURS PRN
Status: DISCONTINUED | OUTPATIENT
Start: 2023-04-13 | End: 2023-04-20 | Stop reason: HOSPADM

## 2023-04-13 RX ORDER — GABAPENTIN 400 MG/1
400 CAPSULE ORAL 3 TIMES DAILY
Status: DISCONTINUED | OUTPATIENT
Start: 2023-04-13 | End: 2023-04-20 | Stop reason: HOSPADM

## 2023-04-13 RX ORDER — CLONAZEPAM 1 MG/1
1 TABLET ORAL 4 TIMES DAILY PRN
Status: DISCONTINUED | OUTPATIENT
Start: 2023-04-13 | End: 2023-04-20 | Stop reason: HOSPADM

## 2023-04-13 RX ORDER — ONDANSETRON 4 MG/1
4 TABLET, ORALLY DISINTEGRATING ORAL EVERY 6 HOURS PRN
Status: DISCONTINUED | OUTPATIENT
Start: 2023-04-13 | End: 2023-04-20 | Stop reason: HOSPADM

## 2023-04-13 RX ORDER — FUROSEMIDE 40 MG/1
40 TABLET ORAL DAILY
Status: DISCONTINUED | OUTPATIENT
Start: 2023-04-13 | End: 2023-04-14

## 2023-04-13 RX ORDER — ALPRAZOLAM 0.5 MG/1
1 TABLET ORAL ONCE
Status: COMPLETED | OUTPATIENT
Start: 2023-04-13 | End: 2023-04-13

## 2023-04-13 RX ORDER — ALPRAZOLAM 0.5 MG/1
0.5 TABLET ORAL ONCE
Status: COMPLETED | OUTPATIENT
Start: 2023-04-13 | End: 2023-04-13

## 2023-04-13 RX ORDER — AZITHROMYCIN 250 MG/1
500 TABLET, FILM COATED ORAL EVERY 24 HOURS
Status: DISCONTINUED | OUTPATIENT
Start: 2023-04-13 | End: 2023-04-18 | Stop reason: ALTCHOICE

## 2023-04-13 RX ORDER — NICOTINE 21 MG/24HR
1 PATCH, TRANSDERMAL 24 HOURS TRANSDERMAL DAILY
Status: DISCONTINUED | OUTPATIENT
Start: 2023-04-14 | End: 2023-04-20 | Stop reason: HOSPADM

## 2023-04-13 RX ORDER — TOPIRAMATE 25 MG/1
50 TABLET ORAL 2 TIMES DAILY
Status: DISCONTINUED | OUTPATIENT
Start: 2023-04-13 | End: 2023-04-20 | Stop reason: HOSPADM

## 2023-04-13 RX ORDER — DOCUSATE SODIUM 100 MG/1
100 CAPSULE, LIQUID FILLED ORAL EVERY 12 HOURS
Status: DISCONTINUED | OUTPATIENT
Start: 2023-04-13 | End: 2023-04-20 | Stop reason: HOSPADM

## 2023-04-13 RX ORDER — SODIUM CHLORIDE 9 MG/ML
3 INJECTION INTRAVENOUS
Status: DISCONTINUED | OUTPATIENT
Start: 2023-04-13 | End: 2023-04-20 | Stop reason: HOSPADM

## 2023-04-13 RX ORDER — ASPIRIN 81 MG/1
324 TABLET, CHEWABLE ORAL ONCE
Status: COMPLETED | OUTPATIENT
Start: 2023-04-13 | End: 2023-04-13

## 2023-04-13 RX ORDER — TRAZODONE HYDROCHLORIDE 100 MG/1
100 TABLET ORAL
Status: DISCONTINUED | OUTPATIENT
Start: 2023-04-13 | End: 2023-04-20 | Stop reason: HOSPADM

## 2023-04-13 RX ORDER — FLUOXETINE HYDROCHLORIDE 20 MG/1
80 CAPSULE ORAL DAILY
Status: DISCONTINUED | OUTPATIENT
Start: 2023-04-14 | End: 2023-04-20 | Stop reason: HOSPADM

## 2023-04-13 RX ADMIN — CLONAZEPAM 1 MG: 1 TABLET ORAL at 20:26

## 2023-04-13 RX ADMIN — ACETAMINOPHEN 325MG 650 MG: 325 TABLET ORAL at 21:36

## 2023-04-13 RX ADMIN — ONDANSETRON 4 MG: 4 TABLET, ORALLY DISINTEGRATING ORAL at 20:26

## 2023-04-13 RX ADMIN — NICOTINE 7 MG/24 HR DAILY TRANSDERMAL PATCH 7 MG: at 15:56

## 2023-04-13 RX ADMIN — ALPRAZOLAM 1 MG: 0.5 TABLET ORAL at 15:56

## 2023-04-13 RX ADMIN — ALPRAZOLAM 0.5 MG: 0.5 TABLET ORAL at 12:50

## 2023-04-13 RX ADMIN — BUDESONIDE AND FORMOTEROL FUMARATE DIHYDRATE 2 PUFF: 80; 4.5 AEROSOL RESPIRATORY (INHALATION) at 18:17

## 2023-04-13 RX ADMIN — FAMOTIDINE 20 MG: 20 TABLET ORAL at 18:08

## 2023-04-13 RX ADMIN — CEFEPIME 2000 MG: 2 INJECTION, POWDER, FOR SOLUTION INTRAVENOUS at 12:52

## 2023-04-13 RX ADMIN — DOCUSATE SODIUM 100 MG: 100 CAPSULE, LIQUID FILLED ORAL at 18:08

## 2023-04-13 RX ADMIN — ASPIRIN 81 MG 324 MG: 81 TABLET ORAL at 13:38

## 2023-04-13 RX ADMIN — SODIUM CHLORIDE 500 ML: 0.9 INJECTION, SOLUTION INTRAVENOUS at 14:24

## 2023-04-13 RX ADMIN — IOHEXOL 85 ML: 350 INJECTION, SOLUTION INTRAVENOUS at 14:15

## 2023-04-13 RX ADMIN — GABAPENTIN 400 MG: 400 CAPSULE ORAL at 21:36

## 2023-04-13 RX ADMIN — AZITHROMYCIN MONOHYDRATE 500 MG: 250 TABLET ORAL at 18:00

## 2023-04-13 NOTE — PLAN OF CARE
Problem: Potential for Falls  Goal: Patient will remain free of falls  Description: INTERVENTIONS:  - Educate patient/family on patient safety including physical limitations  - Instruct patient to call for assistance with activity   - Consult OT/PT to assist with strengthening/mobility   - Keep Call bell within reach  - Keep bed low and locked with side rails adjusted as appropriate  - Keep care items and personal belongings within reach  - Initiate and maintain comfort rounds  - Make Fall Risk Sign visible to staff  - Offer Toileting every 2 Hours, in advance of need  - Initiate/Maintain bed alarm  - Obtain necessary fall risk management equipment: socks  - Apply yellow socks and bracelet for high fall risk patients  - Consider moving patient to room near nurses station  Outcome: Progressing     Problem: PAIN - ADULT  Goal: Verbalizes/displays adequate comfort level or baseline comfort level  Description: Interventions:  - Encourage patient to monitor pain and request assistance  - Assess pain using appropriate pain scale  - Administer analgesics based on type and severity of pain and evaluate response  - Implement non-pharmacological measures as appropriate and evaluate response  - Consider cultural and social influences on pain and pain management  - Notify physician/advanced practitioner if interventions unsuccessful or patient reports new pain  Outcome: Progressing     Problem: INFECTION - ADULT  Goal: Absence or prevention of progression during hospitalization  Description: INTERVENTIONS:  - Assess and monitor for signs and symptoms of infection  - Monitor lab/diagnostic results  - Monitor all insertion sites, i e  indwelling lines, tubes, and drains  - Monitor endotracheal if appropriate and nasal secretions for changes in amount and color  - [place] appropriate cooling/warming therapies per order  - Administer medications as ordered  - Instruct and encourage patient and family to use good hand hygiene technique  - Identify and instruct in appropriate isolation precautions for identified infection/condition  Outcome: Progressing  Goal: Absence of fever/infection during neutropenic period  Description: INTERVENTIONS:  - Monitor WBC    Outcome: Progressing     Problem: SAFETY ADULT  Goal: Patient will remain free of falls  Description: INTERVENTIONS:  - Educate patient/family on patient safety including physical limitations  - Instruct patient to call for assistance with activity   - Consult OT/PT to assist with strengthening/mobility   - Keep Call bell within reach  - Keep bed low and locked with side rails adjusted as appropriate  - Keep care items and personal belongings within reach  - Initiate and maintain comfort rounds  - Make Fall Risk Sign visible to staff  - Offer Toileting every 2 Hours, in advance of need  - Initiate/Maintain bed alarm  - Obtain necessary fall risk management equipment: socks  - Apply yellow socks and bracelet for high fall risk patients  - Consider moving patient to room near nurses station  Outcome: Progressing  Goal: Maintain or return to baseline ADL function  Description: INTERVENTIONS:  -  Assess patient's ability to carry out ADLs; assess patient's baseline for ADL function and identify physical deficits which impact ability to perform ADLs (bathing, care of mouth/teeth, toileting, grooming, dressing, etc )  - Assess/evaluate cause of self-care deficits   - Assess range of motion  - Assess patient's mobility; develop plan if impaired  - Assess patient's need for assistive devices and provide as appropriate  - Encourage maximum independence but intervene and supervise when necessary  - Involve family in performance of ADLs  - Assess for home care needs following discharge   - Consider OT consult to assist with ADL evaluation and planning for discharge  - Provide patient education as appropriate  Outcome: Progressing  Goal: Maintains/Returns to pre admission functional level  Description: INTERVENTIONS:  - Perform BMAT or MOVE assessment daily    - Set and communicate daily mobility goal to care team and patient/family/caregiver  - Collaborate with rehabilitation services on mobility goals if consulted  - Perform Range of Motion 3 times a day  - Reposition patient every 2 hours  - Dangle patient 3 times a day  - Stand patient 3 times a day  - Ambulate patient 3 times a day  - Out of bed to chair 3 times a day   - Out of bed for meals 3 times a day  - Out of bed for toileting  - Record patient progress and toleration of activity level   Outcome: Progressing     Problem: DISCHARGE PLANNING  Goal: Discharge to home or other facility with appropriate resources  Description: INTERVENTIONS:  - Identify barriers to discharge w/patient and caregiver  - Arrange for needed discharge resources and transportation as appropriate  - Identify discharge learning needs (meds, wound care, etc )  - Arrange for interpretive services to assist at discharge as needed  - Refer to Case Management Department for coordinating discharge planning if the patient needs post-hospital services based on physician/advanced practitioner order or complex needs related to functional status, cognitive ability, or social support system  Outcome: Progressing     Problem: Knowledge Deficit  Goal: Patient/family/caregiver demonstrates understanding of disease process, treatment plan, medications, and discharge instructions  Description: Complete learning assessment and assess knowledge base    Interventions:  - Provide teaching at level of understanding  - Provide teaching via preferred learning methods  Outcome: Progressing     Problem: CARDIOVASCULAR - ADULT  Goal: Maintains optimal cardiac output and hemodynamic stability  Description: INTERVENTIONS:  - Monitor I/O, vital signs and rhythm  - Monitor for S/S and trends of decreased cardiac output  - Administer and titrate ordered vasoactive medications to optimize hemodynamic stability  - Assess quality of pulses, skin color and temperature  - Assess for signs of decreased coronary artery perfusion  - Instruct patient to report change in severity of symptoms  Outcome: Progressing  Goal: Absence of cardiac dysrhythmias or at baseline rhythm  Description: INTERVENTIONS:  - Continuous cardiac monitoring, vital signs, obtain 12 lead EKG if ordered  - Administer antiarrhythmic and heart rate control medications as ordered  - Monitor electrolytes and administer replacement therapy as ordered  Outcome: Progressing     Problem: RESPIRATORY - ADULT  Goal: Achieves optimal ventilation and oxygenation  Description: INTERVENTIONS:  - Assess for changes in respiratory status  - Assess for changes in mentation and behavior  - Position to facilitate oxygenation and minimize respiratory effort  - Oxygen administered by appropriate delivery if ordered  - Initiate smoking cessation education as indicated  - Encourage broncho-pulmonary hygiene including cough, deep breathe, Incentive Spirometry  - Assess the need for suctioning and aspirate as needed  - Assess and instruct to report SOB or any respiratory difficulty  - Respiratory Therapy support as indicated  Outcome: Progressing     Problem: METABOLIC, FLUID AND ELECTROLYTES - ADULT  Goal: Electrolytes maintained within normal limits  Description: INTERVENTIONS:  - Monitor labs and assess patient for signs and symptoms of electrolyte imbalances  - Administer electrolyte replacement as ordered  - Monitor response to electrolyte replacements, including repeat lab results as appropriate  - Instruct patient on fluid and nutrition as appropriate  Outcome: Progressing  Goal: Fluid balance maintained  Description: INTERVENTIONS:  - Monitor labs   - Monitor I/O and WT  - Instruct patient on fluid and nutrition as appropriate  - Assess for signs & symptoms of volume excess or deficit  Outcome: Progressing

## 2023-04-13 NOTE — ASSESSMENT & PLAN NOTE
· PDMP and care everywhere reviewed    · She is maintained on  Suboxone 8-2 mg film, 2 5 films under the tongue daily

## 2023-04-13 NOTE — ED NOTES
Trialing PT off nonrebreather at this time  O2 at this time with nonrebreather is 99%   With nasal cannula 6L, O2 is 95%     Cassandra Olvera RN  04/13/23 Dwight Juan, PORFIRIO  04/13/23 0386

## 2023-04-13 NOTE — H&P
[address] [place] [address]  H&P  Name: Marie Gotti 41 y o  female I MRN: [id number]  Unit/Bed#: [facility] [place] I Date of Admission: 4/13/2023   Date of Service: 4/13/2023 I Hospital Day: 0      Assessment/Plan   * Acute respiratory failure with hypoxia (HCC)  Assessment & Plan  · Acute respiratory failure due to possible cardiogenic pulmonary edema versus noncardiogenic pulmonary edema versus atypical pneumonia/ pneumonitis  · Continue oxygen via cannula and/or mask to maintain saturation more than 90%  · Will need close monitoring and may need higher level of care if oxygen requirements increase    Abnormal CT of the chest  Assessment & Plan  · Abnormal CT of the chest with severe bilateral groundglass opacities and adenopathy  · Opacities suggest pulmonary edema  The adenopathy suggest sarcoidosis  · Interestingly she had a CT last year which showed similar finding  When she was admitted, there was concern for vape related lung injury  · We will start ceftriaxone and Zithromax for possible atypical pneumonia  · We will start Lasix for pulmonary edema  · Check echocardiogram  · Consult both cardiology and pulmonary    Elevated troponin  Assessment & Plan  · Elevated troponin with evaded BNP and abnormal CT chest showing pulmonary edema  · Possible new heart failure  · Check echocardiogram  · Consult cardiology  · Start Lasix 40 twice daily p o  (soft blood pressure)    Opioid use disorder, severe, on maintenance therapy ([facility] [address] )  Assessment & Plan  · PDMP and care everywhere reviewed  · She is maintained on  Suboxone 8-2 mg film, 2 5 films under the tongue daily    Anxiety disorder, unspecified  Assessment & Plan  · Anxiety disorder followed by the [address] [place]  · PDMP and care everywhere reviewed  · Confirmed that she is on clonazepam 1 mg 4 times daily as needed    Tobacco abuse  Assessment & Plan  · Placed on nicotine patch    Emphasized tobacco cessation    Bipolar disorder, unspecified [facility])  Assessment & Plan  · Bipolar disorder followed by [facility]  · Medications reviewed via care everywhere  · Continue Prozac 80 mg daily, trazodone 100 mg at bedtime, Neurontin 400 mg 3 times daily       VTE Pharmacologic Prophylaxis: VTE Score: 4 Moderate Risk (Score 3-4) - Pharmacological DVT Prophylaxis Ordered: enoxaparin (Lovenox)  Code Status: Prior full    Anticipated Length of Stay: Patient will be admitted on an inpatient basis with an anticipated length of stay of greater than 2 midnights secondary to respiratory failure  Chief Complaint: shortness of breath    History of Present Illness:  Marie Gotti is a 41 y o  female with a PMH of opioid dependence, bipolar disorder, anxiety who presents with shortness of breath  She was at her usual state of health until Tuesday, 4/11/2023 when she started having shortness of breath both at rest and on exertion, dry cough and fatigue  This surprised her because she recalled having a busy day on Monday, 4/10/2023  She recalled walking long distances and doing lots of chores without any shortness of breath  She also just saw her PCP on 4/7/2023    She continued to have shortness of breath, dry cough and  easy fatigability for the next few days  When asked her why she did not come to the hospital, she admitted feeling scared  On further questioning, she did admit to smoking rolled up tobacco several times a day  She denied using marijuana or any other drugs  She denied sick contacts at home or at work  She denied coughing up blood but did recall coughing up 1 huge blob of orange phlegm  Upon review of her chart, she was admitted in September 2022 for acute respiratory failure  At that time she was found to have abnormal CT of chest related to her current findings  There was concern for vape related lung injury    She was ultimately discharged to [facility] facility at that time but has not seen pulmonary as an outpatient  She was scheduled for PFT in December 1, 2022 but she did not show up  Review of Systems:  Review of Systems   Constitutional: Positive for activity change, fatigue and fever  Eyes: Negative  Respiratory: Positive for cough and shortness of breath  Cardiovascular: Negative  Gastrointestinal: Negative  Genitourinary: Negative  Musculoskeletal: Positive for myalgias  Psychiatric/Behavioral: Negative  All other systems reviewed and are negative  Past Medical and Surgical History:   Past Medical History:   Diagnosis Date   • Addiction to drug [facility])    • Alcohol abuse    • Alcoholism ([facility] [address] )    • Altered mental status 09/21/2022   • Elevated LFTs 09/21/2022   • Lower back pain    • Self-injurious behavior    • Substance abuse ([facility] [address] )        Past Surgical History:   Procedure Laterality Date   • CHOLECYSTECTOMY         Meds/Allergies:  Prior to Admission medications    Medication Sig Start Date End Date Taking?  Authorizing Provider   ARIPiprazole (ABILIFY) 15 mg tablet Take 1 tablet (15 mg total) by mouth daily for 14 days 10/11/22 10/25/22  Nico Cruz MD   clonazePAM (KlonoPIN) 1 mg tablet Take 1 tablet (1 mg total) by mouth 2 (two) times a day for 2 days 10/20/22 10/22/22  Sonia Narvaez MD   docusate sodium (COLACE) 100 mg capsule Take 1 capsule (100 mg total) by mouth every 12 (twelve) hours for 15 days 3/27/23 4/11/23  Heladio Tracey PA-C   famotidine (PEPCID) 20 mg tablet Take 1 tablet (20 mg total) by mouth 2 (two) times a day 10/11/22 11/10/22  Nico Cruz MD   ferrous sulfate 325 (65 Fe) mg tablet Take 1 tablet (325 mg total) by mouth daily with breakfast 10/11/22 11/10/22  Nioc Cruz MD   gabapentin (NEURONTIN) 300 mg capsule Take 2 capsules (600 mg total) by mouth 3 (three) times a day 10/11/22 11/10/22  Nico Cruz MD   hydrOXYzine HCL (ATARAX) 25 mg tablet Take 1 tablet (25 mg total) by mouth every 6 (six) hours  Patient not taking: Reported on 11/22/2022 10/20/22   Tye Floor, DO   hydrOXYzine pamoate (VISTARIL) 25 mg capsule Take 1 capsule (25 mg total) by mouth every 6 (six) hours as needed for anxiety  Patient not taking: Reported on 11/22/2022 11/11/22   Ana Anne MD   ibuprofen (MOTRIN) 400 mg tablet Take 1 tablet (400 mg total) by mouth every 6 (six) hours as needed for mild pain for up to 10 days 3/27/23 4/6/23  Rosette Houser PA-C   ibuprofen (MOTRIN) 600 mg tablet Take 1 tablet (600 mg total) by mouth every 6 (six) hours as needed for moderate pain 10/11/22 11/10/22  Ira May MD   naloxone [facility]) 4 mg/0 1 mL nasal spray 4 mg into each nostril 9/2/22   Historical Provider, MD   nicotine (NICODERM CQ) 14 mg/24hr TD 24 hr patch Place 1 patch on the skin every 24 hours 10/28/22   NETTE Ceja   nicotine polacrilex (NICORETTE) 4 mg gum Chew 1 each (4 mg total) as needed for smoking cessation 10/28/22   NETTE Ceja   ondansetron (Zofran ODT) 4 mg disintegrating tablet Take 1 tablet (4 mg total) by mouth every 6 (six) hours as needed for nausea or vomiting 3/29/23   Dinorah Garcias PA-C   ondansetron (ZOFRAN-ODT) 4 mg disintegrating tablet Take 1 tablet (4 mg total) by mouth every 6 (six) hours as needed for nausea or vomiting 11/11/22   Ana Anne MD   pantoprazole (PROTONIX) 40 mg tablet Take 1 tablet (40 mg total) by mouth daily in the early morning 10/11/22 11/10/22  Ira May MD   topiramate (TOPAMAX) 100 mg tablet Take 1 tablet (100 mg total) by mouth 2 (two) times a day   Take in combination with 25mg for total daily dose of 125mg twice daily  10/11/22 11/10/22  Ira May MD   topiramate (Topamax) 25 mg tablet Take 1 tablet (25 mg total) by mouth 2 (two) times a day   Take in combination with 100mg for total daily dose of 125mg twice daily   10/11/22 11/22/22  Ira May MD   traZODone (DESYREL) 50 mg tablet Take 1 tablet (50 mg total) by mouth daily at bedtime 10/11/22 "11/22/22  Jeanie Zaidi MD   venlafaxine [facility] ) 37 5 mg 24 hr capsule Take 1 capsule (37 5 mg total) by mouth daily 10/11/22 11/22/22  Jeanie Zaidi MD     I have reviewed home medications with a medical source (PCP, Pharmacy, other)  Allergies: Allergies   Allergen Reactions   • Haloperidol Other (See Comments)     oculogyr crisis       Social History:  Marital Status: Single   Occupation: Employed  Patient Pre-hospital Living Situation: Home  Patient Pre-hospital Level of Mobility: walks  Patient Pre-hospital Diet Restrictions: None  Substance Use History:   Social History     Substance and Sexual Activity   Alcohol Use Not Currently    Comment: MAGDALENO, pt historically inconsistent  Drinking  per [place] [id number]     Social History     Tobacco Use   Smoking Status Every Day   • Packs/day: 0 50   • Years: 20 00   • Pack years: 10 00   • Types: Cigarettes   Smokeless Tobacco Current     Social History     Substance and Sexual Activity   Drug Use Not Currently   • Types: Heroin, \"Crack\" cocaine, Cocaine, LSD, Benzodiazepines, Marijuana    Comment: Pt unreliable historian       Family History:  Family History   Problem Relation Age of Onset   • Heart disease Father    • Heart disease Maternal Grandmother        Physical Exam:     Vitals:   Blood Pressure: 109/63 (04/13/23 1430)  Pulse: 75 (04/13/23 1430)  Temperature: 97 5 °F (36 4 °C) (04/13/23 1202)  Temp Source: Oral (04/13/23 1202)  Respirations: (!) 29 (04/13/23 1345)  Weight - Scale: 71 4 kg (157 lb 6 5 oz) (04/13/23 1156)  SpO2: 95 % (04/13/23 1430)    Physical Exam  Vitals reviewed  Constitutional:       Appearance: She is ill-appearing  HENT:      Head: Normocephalic and atraumatic  Nose: No congestion or rhinorrhea  Eyes:      General: No scleral icterus  Cardiovascular:      Rate and Rhythm: Normal rate and regular rhythm  Heart sounds: No murmur heard    Pulmonary:      Comments: Coarse breath sounds  Abdominal:      General: There " is no distension  Palpations: Abdomen is soft  Tenderness: There is no abdominal tenderness  Musculoskeletal:      Cervical back: Neck supple  Right lower leg: No edema  Left lower leg: No edema  Skin:     General: Skin is warm and dry  Coloration: Skin is not jaundiced or pale  Neurological:      Mental Status: She is oriented to person, place, and time  Psychiatric:      Comments: Anxious        Additional Data:     Lab Results:  Results from last 7 days   Lab Units 04/13/23  1226   WBC Thousand/uL 15 60*   HEMOGLOBIN g/dL 11 1*   HEMATOCRIT % 33 5*   PLATELETS Thousands/uL 295   NEUTROS PCT % 80*   LYMPHS PCT % 13*   MONOS PCT % 5   EOS PCT % 0     Results from last 7 days   Lab Units 04/13/23  1226   SODIUM mmol/L 138   POTASSIUM mmol/L 3 9   CHLORIDE mmol/L 108   CO2 mmol/L 19*   BUN mg/dL 17   CREATININE mg/dL 0 95   ANION GAP mmol/L 11   CALCIUM mg/dL 8 8   GLUCOSE RANDOM mg/dL 99                       Lines/Drains:  Invasive Devices     Peripheral Intravenous Line  Duration           Peripheral IV 04/13/23 Right Forearm <1 day                    Imaging: Reviewed radiology reports from this admission including: chest xray and chest CT scan  CT chest with contrast   Final Result by Yordan Henry MD (04/13 2607)      Severe diffuse groundglass opacity and septal thickening with sparing of the lung periphery, similar to September 2022 with trace effusions  The distribution is suggestive of pulmonary edema although atypical infection is not excluded  Stable mediastinal and hilar lymphadenopathy  The distribution is suggestive of sarcoidosis  Right ventricular dilation  Workstation performed: [id number]         XR chest 1 view portable   Final Result by Christopher Ortega MD (04/13 6240)      Moderate diffuse groundglass opacities, suggesting pulmonary edema               Workstation performed: [id number]         Chest x-ray and CT images shown and discussed with patient and simple terms  EKG and Other Studies Reviewed on Admission:   · EKG: Normal sinus rhythm with low voltage QRS    ** Please Note: This note has been constructed using a voice recognition system   **

## 2023-04-13 NOTE — ASSESSMENT & PLAN NOTE
· Acute respiratory failure due to possible cardiogenic pulmonary edema versus noncardiogenic pulmonary edema versus atypical pneumonia/ pneumonitis  · Continue oxygen via cannula and/or mask to maintain saturation more than 90%  · Will need close monitoring and may need higher level of care if oxygen requirements increase

## 2023-04-13 NOTE — NURSING NOTE
Patient noted to have bottle of headache relief in backpack, instructed patient that it is our protocol to send it to pharmacy and she will get it back  Medication sent to pharmacy at this time (tag # [id number]) Patient then found to have 2 pieces of nicotine gum that were placed in her medication box  RN asked patient if she had any other medications, patient denied at this time

## 2023-04-13 NOTE — ED NOTES
Pt placed on mask for O2 at 6L   Pt O2 dropped to 88% on nasal cannula 6L     Antonio George RN  04/13/23 2309

## 2023-04-13 NOTE — ASSESSMENT & PLAN NOTE
· Abnormal CT of the chest with severe bilateral groundglass opacities and adenopathy  · Opacities suggest pulmonary edema  The adenopathy suggest sarcoidosis  · Interestingly she had a CT last year which showed similar finding    When she was admitted, there was concern for vape related lung injury  · We will start ceftriaxone and Zithromax for possible atypical pneumonia  · We will start Lasix for pulmonary edema  · Check echocardiogram  · Consult both cardiology and pulmonary

## 2023-04-13 NOTE — PLAN OF CARE
Problem: Potential for Falls  Goal: Patient will remain free of falls  Description: INTERVENTIONS:  - Educate patient/family on patient safety including physical limitations  - Instruct patient to call for assistance with activity   - Consult OT/PT to assist with strengthening/mobility   - Keep Call bell within reach  - Keep bed low and locked with side rails adjusted as appropriate  - Keep care items and personal belongings within reach  - Initiate and maintain comfort rounds  - Make Fall Risk Sign visible to staff  - Offer Toileting  - Initiate/Maintain alarm  - Obtain necessary fall risk management equipment:   - Apply yellow socks and bracelet for high fall risk patients  - Consider moving patient to room near nurses station  Outcome: Progressing     Problem: PAIN - ADULT  Goal: Verbalizes/displays adequate comfort level or baseline comfort level  Description: Interventions:  - Encourage patient to monitor pain and request assistance  - Assess pain using appropriate pain scale  - Administer analgesics based on type and severity of pain and evaluate response  - Implement non-pharmacological measures as appropriate and evaluate response  - Consider cultural and social influences on pain and pain management  - Notify physician/advanced practitioner if interventions unsuccessful or patient reports new pain  Outcome: Progressing     Problem: INFECTION - ADULT  Goal: Absence or prevention of progression during hospitalization  Description: INTERVENTIONS:  - Assess and monitor for signs and symptoms of infection  - Monitor lab/diagnostic results  - Monitor all insertion sites, i e  indwelling lines, tubes, and drains  - Monitor endotracheal if appropriate and nasal secretions for changes in amount and color  - [employer] appropriate cooling/warming therapies per order  - Administer medications as ordered  - Instruct and encourage patient and family to use good hand hygiene technique  - Identify and instruct in appropriate isolation precautions for identified infection/condition  Outcome: Progressing  Goal: Absence of fever/infection during neutropenic period  Description: INTERVENTIONS:  - Monitor WBC    Outcome: Progressing     Problem: SAFETY ADULT  Goal: Patient will remain free of falls  Description: INTERVENTIONS:  - Educate patient/family on patient safety including physical limitations  - Instruct patient to call for assistance with activity   - Consult OT/PT to assist with strengthening/mobility   - Keep Call bell within reach  - Keep bed low and locked with side rails adjusted as appropriate  - Keep care items and personal belongings within reach  - Initiate and maintain comfort rounds  - Make Fall Risk Sign visible to staff  - Offer Toileting   - Initiate/Maintain alarm  - Obtain necessary fall risk management equipment  - Apply yellow socks and bracelet for high fall risk patients  - Consider moving patient to room near nurses station  Outcome: Progressing  Goal: Maintain or return to baseline ADL function  Description: INTERVENTIONS:  -  Assess patient's ability to carry out ADLs; assess patient's baseline for ADL function and identify physical deficits which impact ability to perform ADLs (bathing, care of mouth/teeth, toileting, grooming, dressing, etc )  - Assess/evaluate cause of self-care deficits   - Assess range of motion  - Assess patient's mobility; develop plan if impaired  - Assess patient's need for assistive devices and provide as appropriate  - Encourage maximum independence but intervene and supervise when necessary  - Involve family in performance of ADLs  - Assess for home care needs following discharge   - Consider OT consult to assist with ADL evaluation and planning for discharge  - Provide patient education as appropriate  Outcome: Progressing  Goal: Maintains/Returns to pre admission functional level  Description: INTERVENTIONS:  - Perform BMAT or MOVE assessment daily    - Set and communicate daily mobility goal to care team and patient/family/caregiver  - Collaborate with rehabilitation services on mobility goals if consulted  - Perform Range of Motion   - Reposition patient  - Dangle patient  - Stand patient   - Ambulate patient  - Out of bed to chair  - Out of bed for meals   - Out of bed for toileting  - Record patient progress and toleration of activity level   Outcome: Progressing     Problem: DISCHARGE PLANNING  Goal: Discharge to home or other facility with appropriate resources  Description: INTERVENTIONS:  - Identify barriers to discharge w/patient and caregiver  - Arrange for needed discharge resources and transportation as appropriate  - Identify discharge learning needs (meds, wound care, etc )  - Arrange for interpretive services to assist at discharge as needed  - Refer to Case Management Department for coordinating discharge planning if the patient needs post-hospital services based on physician/advanced practitioner order or complex needs related to functional status, cognitive ability, or social support system  Outcome: Progressing     Problem: Knowledge Deficit  Goal: Patient/family/caregiver demonstrates understanding of disease process, treatment plan, medications, and discharge instructions  Description: Complete learning assessment and assess knowledge base    Interventions:  - Provide teaching at level of understanding  - Provide teaching via preferred learning methods  Outcome: Progressing     Problem: CARDIOVASCULAR - ADULT  Goal: Maintains optimal cardiac output and hemodynamic stability  Description: INTERVENTIONS:  - Monitor I/O, vital signs and rhythm  - Monitor for S/S and trends of decreased cardiac output  - Administer and titrate ordered vasoactive medications to optimize hemodynamic stability  - Assess quality of pulses, skin color and temperature  - Assess for signs of decreased coronary artery perfusion  - Instruct patient to report change in severity of symptoms  Outcome: Progressing  Goal: Absence of cardiac dysrhythmias or at baseline rhythm  Description: INTERVENTIONS:  - Continuous cardiac monitoring, vital signs, obtain 12 lead EKG if ordered  - Administer antiarrhythmic and heart rate control medications as ordered  - Monitor electrolytes and administer replacement therapy as ordered  Outcome: Progressing     Problem: RESPIRATORY - ADULT  Goal: Achieves optimal ventilation and oxygenation  Description: INTERVENTIONS:  - Assess for changes in respiratory status  - Assess for changes in mentation and behavior  - Position to facilitate oxygenation and minimize respiratory effort  - Oxygen administered by appropriate delivery if ordered  - Initiate smoking cessation education as indicated  - Encourage broncho-pulmonary hygiene including cough, deep breathe, Incentive Spirometry  - Assess the need for suctioning and aspirate as needed  - Assess and instruct to report SOB or any respiratory difficulty  - Respiratory Therapy support as indicated  Outcome: Progressing     Problem: METABOLIC, FLUID AND ELECTROLYTES - ADULT  Goal: Electrolytes maintained within normal limits  Description: INTERVENTIONS:  - Monitor labs and assess patient for signs and symptoms of electrolyte imbalances  - Administer electrolyte replacement as ordered  - Monitor response to electrolyte replacements, including repeat lab results as appropriate  - Instruct patient on fluid and nutrition as appropriate  Outcome: Progressing  Goal: Fluid balance maintained  Description: INTERVENTIONS:  - Monitor labs   - Monitor I/O and WT  - Instruct patient on fluid and nutrition as appropriate  - Assess for signs & symptoms of volume excess or deficit  Outcome: Progressing

## 2023-04-13 NOTE — ASSESSMENT & PLAN NOTE
· Anxiety disorder followed by the [address]  · PDMP and care everywhere reviewed    · Confirmed that she is on clonazepam 1 mg 4 times daily as needed

## 2023-04-13 NOTE — ASSESSMENT & PLAN NOTE
· Bipolar disorder followed by [facility]  · Medications reviewed via care everywhere    · Continue Prozac 80 mg daily, trazodone 100 mg at bedtime, Neurontin 400 mg 3 times daily

## 2023-04-13 NOTE — ED PROVIDER NOTES
History  Chief Complaint   Patient presents with   • Respiratory Distress     Pt arrives via EMS from home with SOB and chest discomfort for 2 days  When Ems arrived pt O2 in the 46s and she was cyanotic  Pt on nonrebreather and in the 90s ath this time  Pt hx of intubation September of last year due to asthma      43-year-old female presents with dyspnea  Prior to EMS patient was saturating in the 70s on room air prior to being placed on facemask  Patient states that she was in her usual state of health until a few days ago when she started to feel a bit lightheaded but yesterday woke up feeling short of breath  She states that normally she can climb a flight of stairs without issue but now is having significant difficulty  She also notes associated chest pain with climbing stairs  She endorses some sweats and increased cough  She notes some orange-colored phlegm recently  She admits to history of tobacco abuse as well as pneumonia requiring intubation      - No language barrier   - History obtained from patient without limitation  - Prior charting reviewed     ROS  Constitutional: denies fevers, chills  Eyes: denies eye pain  ENT: denies ear, sinus, throat pain  CV: chest pain  Resp: cough, SOB  GI: denies abdominal pain, nausea, vomiting, diarrhea, bloody or dark stool  : denies difficulty urinating, flank pain  MSK: denies neck or back pain  Neuro: denies headache, new numbness, tingling, weakness  Allergy/Immuno: denies recent illness, known sick contacts    Triage vital signs reviewed    Physical Exam  Const: alert, no acute distress, non-toxic appearing, no diaphoresis  Appears stated age, well-developed, appears somewhat uncomfortable  Eyes: no conjunctival injection, discharge or icterus  Head: normocephalic  Ears: auricles normal   Nose: normal without rhinorrhea  Mouth/throat: clear, moist   Neck: normal ROM, supple and without rigidity   CV: normal rate   Extremities warm and well-perfused Resp: breathing unlabored, non-stridulous  Questionable rales on the left  Abdomen: soft, non-tender, non-distended  MSK: no gross deformities appreciated  Skin: warm and dry with rapid capillary refill  Neuro: CNs II-XII grossly intact  Oriented x 4  Sensation and motor function of extremities grossly intact  Psych: pleasant, cooperative, anxious            Prior to Admission Medications   Prescriptions Last Dose Informant Patient Reported? Taking?    ARIPiprazole (ABILIFY) 15 mg tablet   No No   Sig: Take 1 tablet (15 mg total) by mouth daily for 14 days   clonazePAM (KlonoPIN) 1 mg tablet   No No   Sig: Take 1 tablet (1 mg total) by mouth 2 (two) times a day for 2 days   docusate sodium (COLACE) 100 mg capsule   No No   Sig: Take 1 capsule (100 mg total) by mouth every 12 (twelve) hours for 15 days   famotidine (PEPCID) 20 mg tablet   No No   Sig: Take 1 tablet (20 mg total) by mouth 2 (two) times a day   ferrous sulfate 325 (65 Fe) mg tablet   No No   Sig: Take 1 tablet (325 mg total) by mouth daily with breakfast   gabapentin (NEURONTIN) 300 mg capsule   No No   Sig: Take 2 capsules (600 mg total) by mouth 3 (three) times a day   hydrOXYzine HCL (ATARAX) 25 mg tablet   No No   Sig: Take 1 tablet (25 mg total) by mouth every 6 (six) hours   Patient not taking: Reported on 2022   hydrOXYzine pamoate (VISTARIL) 25 mg capsule   No No   Sig: Take 1 capsule (25 mg total) by mouth every 6 (six) hours as needed for anxiety   Patient not taking: Reported on 2022   ibuprofen (MOTRIN) 400 mg tablet   No No   Sig: Take 1 tablet (400 mg total) by mouth every 6 (six) hours as needed for mild pain for up to 10 days   ibuprofen (MOTRIN) 600 mg tablet   No No   Sig: Take 1 tablet (600 mg total) by mouth every 6 (six) hours as needed for moderate pain   naloxone (NARCAN) 4 mg/0 1 mL nasal spray   Yes No   Si mg into each nostril   nicotine (NICODERM CQ) 14 mg/24hr TD 24 hr patch   No No   Sig: Place 1 patch on the skin every 24 hours   nicotine polacrilex (NICORETTE) 4 mg gum   No No   Sig: Chew 1 each (4 mg total) as needed for smoking cessation   ondansetron (ZOFRAN-ODT) 4 mg disintegrating tablet   No No   Sig: Take 1 tablet (4 mg total) by mouth every 6 (six) hours as needed for nausea or vomiting   ondansetron (Zofran ODT) 4 mg disintegrating tablet   No No   Sig: Take 1 tablet (4 mg total) by mouth every 6 (six) hours as needed for nausea or vomiting   pantoprazole (PROTONIX) 40 mg tablet   No No   Sig: Take 1 tablet (40 mg total) by mouth daily in the early morning   topiramate (TOPAMAX) 100 mg tablet   No No   Sig: Take 1 tablet (100 mg total) by mouth 2 (two) times a day   Take in combination with 25mg for total daily dose of 125mg twice daily  topiramate (Topamax) 25 mg tablet   No No   Sig: Take 1 tablet (25 mg total) by mouth 2 (two) times a day   Take in combination with 100mg for total daily dose of 125mg twice daily  traZODone (DESYREL) 50 mg tablet   No No   Sig: Take 1 tablet (50 mg total) by mouth daily at bedtime   venlafaxine (EFFEXOR-XR) 37 5 mg 24 hr capsule   No No   Sig: Take 1 capsule (37 5 mg total) by mouth daily      Facility-Administered Medications: None       Past Medical History:   Diagnosis Date   • Addiction to drug [facility])    • Alcohol abuse    • Alcoholism ([facility] [address] )    • Altered mental status 09/21/2022   • Elevated LFTs 09/21/2022   • Lower back pain    • Self-injurious behavior    • Substance abuse [facility])        Past Surgical History:   Procedure Laterality Date   • CHOLECYSTECTOMY         Family History   Problem Relation Age of Onset   • Heart disease Father    • Heart disease Maternal Grandmother      I have reviewed and agree with the history as documented      E-Cigarette/Vaping   • E-Cigarette Use Current Every Day User    • Cartridges/Day 2      E-Cigarette/Vaping Substances   • Nicotine No    • THC No    • CBD No    • Flavoring No    • Other No    • Unknown No      Social History "    Tobacco Use   • Smoking status: Every Day     Packs/day: 0 50     Years: 20 00     Pack years: 10 00     Types: Cigarettes   • Smokeless tobacco: Current   Vaping Use   • Vaping Use: Every day   Substance Use Topics   • Alcohol use: Not Currently     Comment: MAGDALENO, pt historically inconsistent   Drinking  per [facility] 2   • Drug use: Not Currently     Types: Heroin, \"Crack\" cocaine, Cocaine, LSD, Benzodiazepines, Marijuana     Comment: Pt unreliable historian       Review of Systems    Physical Exam  Physical Exam    Vital Signs  ED Triage Vitals   Temperature Pulse Respirations Blood Pressure SpO2   04/13/23 1202 04/13/23 1156 04/13/23 1156 04/13/23 1156 04/13/23 1156   97 5 °F (36 4 °C) 90 (!) 32 108/71 99 %      Temp Source Heart Rate Source Patient Position - Orthostatic VS BP Location FiO2 (%)   04/13/23 1202 04/13/23 2115 04/13/23 1156 04/13/23 1156 --   Oral Monitor Sitting Left arm       Pain Score       04/13/23 1728       4           Vitals:    04/14/23 1537 04/14/23 1551 04/14/23 1720 04/14/23 1939   BP:   93/63 93/61   Pulse: 95 (!) 110 85 82   Patient Position - Orthostatic VS:             Visual Acuity      ED Medications  Medications   sodium chloride (PF) 0 9 % injection 3 mL (has no administration in time range)   clonazePAM (KlonoPIN) tablet 1 mg (1 mg Oral Given 4/14/23 2038)   docusate sodium (COLACE) capsule 100 mg (100 mg Oral Given 4/14/23 1715)   famotidine (PEPCID) tablet 20 mg (20 mg Oral Given 4/14/23 1715)   gabapentin (NEURONTIN) capsule 400 mg (400 mg Oral Given 4/14/23 2210)   ondansetron (ZOFRAN-ODT) dispersible tablet 4 mg (4 mg Oral Given 4/14/23 0819)   topiramate (TOPAMAX) tablet 50 mg (50 mg Oral Given 4/14/23 1715)   traZODone (DESYREL) tablet 100 mg (100 mg Oral Given 4/14/23 2210)   FLUoxetine (PROzac) capsule 80 mg (80 mg Oral Given 4/14/23 0805)   acetaminophen (TYLENOL) tablet 650 mg (650 mg Oral Given 4/14/23 2038)   nicotine (NICODERM CQ) 14 mg/24hr TD 24 hr " patch 1 patch (1 patch Transdermal Medication Applied 4/14/23 0805)   enoxaparin (LOVENOX) subcutaneous injection 40 mg (40 mg Subcutaneous Given 4/14/23 0804)   cefTRIAXone (ROCEPHIN) 1,000 mg in dextrose 5 % 50 mL IVPB (1,000 mg Intravenous New Bag 4/14/23 0813)   azithromycin (ZITHROMAX) tablet 500 mg (500 mg Oral Given 4/14/23 1715)   furosemide (LASIX) tablet 40 mg (0 mg Oral Hold 4/14/23 1715)   ketorolac (TORADOL) injection 30 mg (30 mg Intravenous Given 4/14/23 2221)   lidocaine (LIDODERM) 5 % patch 1 patch (1 patch Topical Patch Removed 4/14/23 2104)   guaiFENesin (MUCINEX) 12 hr tablet 600 mg (600 mg Oral Given 4/14/23 2038)   benzonatate (TESSALON PERLES) capsule 100 mg (has no administration in time range)   buprenorphine-naloxone (Suboxone) film 8 mg (has no administration in time range)     And   buprenorphine-naloxone (Suboxone) film 4 mg (has no administration in time range)   budesonide-formoterol (SYMBICORT) 160-4 5 mcg/act inhaler 2 puff (2 puffs Inhalation Given 4/14/23 2210)   levalbuterol (XOPENEX) inhalation solution 1 25 mg ( Nebulization Canceled Entry 4/14/23 1953)   sodium chloride 0 9 % inhalation solution 3 mL (3 mL Nebulization Given 4/14/23 1652)   methylPREDNISolone sodium succinate (Solu-MEDROL) injection 40 mg (40 mg Intravenous Given 4/14/23 2209)   ALPRAZolam (XANAX) tablet 0 5 mg (0 5 mg Oral Given 4/13/23 1250)   cefepime (MAXIPIME) 2 g/50 mL dextrose IVPB (0 mg Intravenous Stopped 4/13/23 1322)   aspirin chewable tablet 324 mg (324 mg Oral Given 4/13/23 1338)   sodium chloride 0 9 % bolus 500 mL (0 mL Intravenous Stopped 4/13/23 1624)   iohexol (OMNIPAQUE) 350 MG/ML injection (SINGLE-DOSE) 85 mL (85 mL Intravenous Given 4/13/23 1415)   nicotine (NICODERM CQ) 7 mg/24hr TD 24 hr patch 7 mg (7 mg Transdermal Patch Removed 4/14/23 1617)   ALPRAZolam (XANAX) tablet 1 mg (1 mg Oral Given 4/13/23 1556)   iohexol (OMNIPAQUE) 350 MG/ML injection (SINGLE-DOSE) 85 mL (85 mL Intravenous Given 4/14/23 2018)       Diagnostic Studies  Results Reviewed     Procedure Component Value Units Date/Time    Blood culture #1 [[id number]] Collected: 04/13/23 1200    Lab Status: Preliminary result Specimen: Blood from Arm, Right Updated: 04/14/23 2301     Blood Culture No Growth at 24 hrs  Blood culture #2 [[id number]] Collected: 04/13/23 1200    Lab Status: Preliminary result Specimen: Blood from Arm, Right Updated: 04/14/23 2301     Blood Culture No Growth at 24 hrs  HS Troponin I 4hr [[id number]]  (Abnormal) Collected: 04/13/23 1625    Lab Status: Final result Specimen: Blood from Arm, Right Updated: 04/13/23 1723     hs TnI 4hr 439 ng/L      Delta 4hr hsTnI -236 ng/L     HS Troponin I 2hr [[id number]]  (Abnormal) Collected: 04/13/23 1423    Lab Status: Final result Specimen: Blood from Arm, Right Updated: 04/13/23 1500     hs TnI 2hr 533 ng/L      Delta 2hr hsTnI -142 ng/L     POCT pregnancy, urine [[id number]]  (Normal) Resulted: 04/13/23 1415    Lab Status: Final result Updated: 04/13/23 1416     EXT Preg Test, Ur Negative     Control Valid    COVID/FLU/RSV [[id number]]  (Normal) Collected: 04/13/23 1249    Lab Status: Final result Specimen: Nares from Nasopharyngeal Swab Updated: 04/13/23 1357     SARS-CoV-2 Negative     INFLUENZA A PCR Negative     INFLUENZA B PCR Negative     RSV PCR Negative    Narrative:      FOR PEDIATRIC PATIENTS - copy/paste COVID Guidelines URL to browser: [email or web address]    SARS-CoV-2 assay is a Nucleic Acid Amplification assay intended for the  qualitative detection of nucleic acid from SARS-CoV-2 in nasopharyngeal  swabs  Results are for the presumptive identification of SARS-CoV-2 RNA  Positive results are indicative of infection with SARS-CoV-2, the virus  causing COVID-19, but do not rule out bacterial infection or co-infection  with other viruses   Laboratories within the [place] and its  territories are required to report all positive results to the appropriate  public health authorities  Negative results do not preclude SARS-CoV-2  infection and should not be used as the sole basis for treatment or other  patient management decisions  Negative results must be combined with  clinical observations, patient history, and epidemiological information  This test has not been FDA cleared or approved  This test has been authorized by FDA under an Emergency Use Authorization  (EUA)  This test is only authorized for the duration of time the  declaration that circumstances exist justifying the authorization of the  emergency use of an in vitro diagnostic tests for detection of SARS-CoV-2  virus and/or diagnosis of COVID-19 infection under section 564(b)(1) of  the Act, 21 U  S C  527YMW-4(Z)(5), unless the authorization is terminated  or revoked sooner  The test has been validated but independent review by FDA  and CLIA is pending  Test performed using [facility] GeneXpert: This RT-PCR assay targets N2,  a region unique to SARS-CoV-2  A conserved region in the E-gene was chosen  for pan-Sarbecovirus detection which includes SARS-CoV-2  According to CMS-2020-01-R, this platform meets the definition of high-throughput technology      HS Troponin 0hr (reflex protocol) [[id number]]  (Abnormal) Collected: 04/13/23 1226    Lab Status: Final result Specimen: Blood from Arm, Right Updated: 04/13/23 1258     hs TnI 0hr 675 ng/L     B-Type Natriuretic Peptide(BNP) [[id number]]  (Abnormal) Collected: 04/13/23 1226    Lab Status: Final result Specimen: Blood from Arm, Right Updated: 04/13/23 1256      pg/mL     Basic metabolic panel [[id number]]  (Abnormal) Collected: 04/13/23 1226    Lab Status: Final result Specimen: Blood from Arm, Right Updated: 04/13/23 1250     Sodium 138 mmol/L      Potassium 3 9 mmol/L      Chloride 108 mmol/L      CO2 19 mmol/L      ANION GAP 11 mmol/L      BUN 17 mg/dL      Creatinine 0 95 mg/dL Glucose 99 mg/dL      Calcium 8 8 mg/dL      eGFR 72 ml/min/1 73sq m     Narrative:      National Kidney Disease Foundation guidelines for Chronic Kidney Disease (CKD):   •  Stage 1 with normal or high GFR (GFR > 90 mL/min/1 73 square meters)  •  Stage 2 Mild CKD (GFR = 60-89 mL/min/1 73 square meters)  •  Stage 3A Moderate CKD (GFR = 45-59 mL/min/1 73 square meters)  •  Stage 3B Moderate CKD (GFR = 30-44 mL/min/1 73 square meters)  •  Stage 4 Severe CKD (GFR = 15-29 mL/min/1 73 square meters)  •  Stage 5 End Stage CKD (GFR <15 mL/min/1 73 square meters)  Note: GFR calculation is accurate only with a steady state creatinine    CBC and differential [[id number]]  (Abnormal) Collected: 04/13/23 1226    Lab Status: Final result Specimen: Blood from Arm, Right Updated: 04/13/23 1235     WBC 15 60 Thousand/uL      RBC 3 76 Million/uL      Hemoglobin 11 1 g/dL      Hematocrit 33 5 %      MCV 89 fL      MCH 29 5 pg      MCHC 33 1 g/dL      RDW 13 8 %      MPV 8 7 fL      Platelets 816 Thousands/uL      nRBC 0 /100 WBCs      Neutrophils Relative 80 %      Immat GRANS % 1 %      Lymphocytes Relative 13 %      Monocytes Relative 5 %      Eosinophils Relative 0 %      Basophils Relative 1 %      Neutrophils Absolute 12 69 Thousands/µL      Immature Grans Absolute 0 13 Thousand/uL      Lymphocytes Absolute 1 99 Thousands/µL      Monocytes Absolute 0 70 Thousand/µL      Eosinophils Absolute 0 00 Thousand/µL      Basophils Absolute 0 09 Thousands/µL                  CTA chest pe study   Final Result by Ottoniel Cruz MD (04/14 2058)      No evidence of pulmonary embolus      Worsening groundglass opacification throughout both lungs with interlobar septal thickening suggesting worsening pulmonary edema        Stable mediastinal and hilar adenopathy suggestive of sarcoidosis      Workstation performed: [id number]         CT chest with contrast   Final Result by Kody Mcmahan MD (04/13 1501)      Severe diffuse groundglass opacity and septal thickening with sparing of the lung periphery, similar to September 2022 with trace effusions  The distribution is suggestive of pulmonary edema although atypical infection is not excluded  Stable mediastinal and hilar lymphadenopathy  The distribution is suggestive of sarcoidosis  Right ventricular dilation  Workstation performed: [id number]         XR chest 1 view portable   Final Result by Sweta Gilbert MD (04/13 3785)      Moderate diffuse groundglass opacities, suggesting pulmonary edema  Workstation performed: [id number]                    Procedures  Procedures         ED Course  ED Course as of 04/15/23 0110   Thu Apr 13, 2023   1315 hs TnI 0hr(!): 675   1316 BNP(!): 407   1316 WBC(!): 15 60                               SBIRT 20yo+    Flowsheet Row Most Recent Value   Initial Alcohol Screen: US AUDIT-C     1  How often do you have a drink containing alcohol? 0 Filed at: 04/13/2023 1214   2  How many drinks containing alcohol do you have on a typical day you are drinking? 0 Filed at: 04/13/2023 1214   3a  Male UNDER 65: How often do you have five or more drinks on one occasion? 0 Filed at: 04/13/2023 1214   3b  FEMALE Any Age, or MALE 65+: How often do you have 4 or more drinks on one occassion? 0 Filed at: 04/13/2023 1214   Audit-C Score 0 Filed at: 04/13/2023 1214   LYNN: How many times in the past year have you    Used an illegal drug or used a prescription medication for non-medical reasons? Never Filed at: 04/13/2023 1214                    Medical Decision Making  EKG shows NSR, rate of 90, normal axis, normal intervals, no obvious ST elevations or depressions  Diffuse T wave flattening noted  Labs with troponin, chest x-ray  Chest x-ray reviewed concerning for possible pneumonia we will proceed with CT chest with contrast to better characterize    In the meantime will initiate cefepime empirically   Supplemental oxygen titrated down to 6 L nasal cannula saturating in the low to mid 90s  Amount and/or Complexity of Data Reviewed  Labs: ordered  Decision-making details documented in ED Course  Radiology: ordered  Risk  OTC drugs  Prescription drug management  Decision regarding hospitalization  Disposition  Final diagnoses:   Hypoxia   Abnormal chest CT   Elevated troponin     Time reflects when diagnosis was documented in both MDM as applicable and the Disposition within this note     Time User Action Codes Description Comment    4/13/2023  3:26 PM Karishma Hernandez Add [R09 02] Hypoxia     4/13/2023  3:26 PM Karishma Hernandez Add [R93 89] Abnormal chest CT     4/13/2023  3:26 PM Adolfoeta Hernandez Add [R77 8] Elevated troponin       ED Disposition     ED Disposition   Admit    Condition   Stable    Date/Time   u Apr 13, 2023  3:25 PM    Comment   Case was discussed with Dr Kirti Nevarez and the patient's admission status was agreed to be Admission Status: inpatient status to the service of Dr Kirti Nevarez              Follow-up Information    None         Current Discharge Medication List      CONTINUE these medications which have NOT CHANGED    Details   ARIPiprazole (ABILIFY) 15 mg tablet Take 1 tablet (15 mg total) by mouth daily for 14 days  Qty: 30 tablet, Refills: 1    Associated Diagnoses: Bipolar disorder, unspecified (HCC)      clonazePAM (KlonoPIN) 1 mg tablet Take 1 tablet (1 mg total) by mouth 2 (two) times a day for 2 days  Qty: 4 tablet, Refills: 0    Associated Diagnoses: Anxiety disorder, unspecified      docusate sodium (COLACE) 100 mg capsule Take 1 capsule (100 mg total) by mouth every 12 (twelve) hours for 15 days  Qty: 30 capsule, Refills: 0    Associated Diagnoses: Constipation      famotidine (PEPCID) 20 mg tablet Take 1 tablet (20 mg total) by mouth 2 (two) times a day  Qty: 60 tablet, Refills: 0    Associated Diagnoses: Dyspepsia      ferrous sulfate 325 (65 Fe) mg tablet Take 1 tablet (325 mg total) by mouth daily with breakfast  Qty: 30 tablet, Refills: 1    Associated Diagnoses: Iron deficiency anemia secondary to inadequate dietary iron intake      gabapentin (NEURONTIN) 300 mg capsule Take 2 capsules (600 mg total) by mouth 3 (three) times a day  Qty: 180 capsule, Refills: 1    Associated Diagnoses: Anxiety disorder, unspecified      hydrOXYzine HCL (ATARAX) 25 mg tablet Take 1 tablet (25 mg total) by mouth every 6 (six) hours  Qty: 12 tablet, Refills: 0    Associated Diagnoses: Anxiety;  Anxiety disorder, unspecified      hydrOXYzine pamoate (VISTARIL) 25 mg capsule Take 1 capsule (25 mg total) by mouth every 6 (six) hours as needed for anxiety  Qty: 30 capsule, Refills: 0    Associated Diagnoses: Anxiety      ibuprofen (MOTRIN) 400 mg tablet Take 1 tablet (400 mg total) by mouth every 6 (six) hours as needed for mild pain for up to 10 days  Qty: 40 tablet, Refills: 0    Associated Diagnoses: Constipation      ibuprofen (MOTRIN) 600 mg tablet Take 1 tablet (600 mg total) by mouth every 6 (six) hours as needed for moderate pain  Qty: 30 tablet, Refills: 1    Associated Diagnoses: Muscle spasm of shoulder region      naloxone (NARCAN) 4 mg/0 1 mL nasal spray 4 mg into each nostril      nicotine (NICODERM CQ) 14 mg/24hr TD 24 hr patch Place 1 patch on the skin every 24 hours  Qty: 28 patch, Refills: 0    Associated Diagnoses: Tobacco abuse      nicotine polacrilex (NICORETTE) 4 mg gum Chew 1 each (4 mg total) as needed for smoking cessation  Qty: 100 each, Refills: 0    Associated Diagnoses: Tobacco abuse      !! ondansetron (Zofran ODT) 4 mg disintegrating tablet Take 1 tablet (4 mg total) by mouth every 6 (six) hours as needed for nausea or vomiting  Qty: 10 tablet, Refills: 0    Associated Diagnoses: Nausea      !! ondansetron (ZOFRAN-ODT) 4 mg disintegrating tablet Take 1 tablet (4 mg total) by mouth every 6 (six) hours as needed for nausea or vomiting  Qty: 8 tablet, Refills: 0    Associated Diagnoses: Anxiety pantoprazole (PROTONIX) 40 mg tablet Take 1 tablet (40 mg total) by mouth daily in the early morning  Qty: 30 tablet, Refills: 1    Associated Diagnoses: Acute respiratory failure with hypoxia (HCC)      topiramate (TOPAMAX) 100 mg tablet Take 1 tablet (100 mg total) by mouth 2 (two) times a day   Take in combination with 25mg for total daily dose of 125mg twice daily  Qty: 60 tablet, Refills: 1    Associated Diagnoses: Bipolar disorder, unspecified (HCC)      topiramate (Topamax) 25 mg tablet Take 1 tablet (25 mg total) by mouth 2 (two) times a day   Take in combination with 100mg for total daily dose of 125mg twice daily  Qty: 60 tablet, Refills: 1    Associated Diagnoses: [facility]      traZODone (DESYREL) 50 mg tablet Take 1 tablet (50 mg total) by mouth daily at bedtime  Qty: 30 tablet, Refills: 1    Associated Diagnoses: Insomnia      venlafaxine (EFFEXOR-XR) 37 5 mg 24 hr capsule Take 1 capsule (37 5 mg total) by mouth daily  Qty: 30 capsule, Refills: 1    Associated Diagnoses: Anxiety disorder, unspecified       !! - Potential duplicate medications found  Please discuss with provider  No discharge procedures on file      PDMP Review       Value Time User    PDMP Reviewed  Yes 4/13/2023  3:47 PM Missy Wells MD          ED Provider  Electronically Signed by           Gonzales Navarro MD  04/15/23 9071

## 2023-04-14 ENCOUNTER — APPOINTMENT (INPATIENT)
Dept: CT IMAGING | Facility: HOSPITAL | Age: 48
End: 2023-04-14

## 2023-04-14 ENCOUNTER — APPOINTMENT (INPATIENT)
Dept: NON INVASIVE DIAGNOSTICS | Facility: HOSPITAL | Age: 48
End: 2023-04-14

## 2023-04-14 PROBLEM — I50.811 ACUTE RIGHT VENTRICULAR HEART FAILURE (HCC): Status: ACTIVE | Noted: 2023-04-14

## 2023-04-14 LAB
AORTIC ROOT: 2.9 CM
APICAL FOUR CHAMBER EJECTION FRACTION: 66 %
AV LVOT MEAN GRADIENT: 3 MMHG
AV LVOT PEAK GRADIENT: 5 MMHG
BNP SERPL-MCNC: 397 PG/ML (ref 0–100)
DOP CALC LVOT PEAK VEL VTI: 20.31 CM
DOP CALC LVOT PEAK VEL: 1.16 M/S
E WAVE DECELERATION TIME: 283 MS
E/A RATIO: 1.02
FRACTIONAL SHORTENING: 29 (ref 28–44)
INTERVENTRICULAR SEPTUM IN DIASTOLE (PARASTERNAL SHORT AXIS VIEW): 0.8 CM
INTERVENTRICULAR SEPTUM: 0.8 CM (ref 0.6–1.1)
LAAS-AP2: 14 CM2
LAAS-AP4: 14.5 CM2
LEFT ATRIUM SIZE: 3.5 CM
LEFT ATRIUM VOLUME INDEX (MOD BIPLANE): 22.8
LEFT INTERNAL DIMENSION IN SYSTOLE: 3.5 CM (ref 2.1–4)
LEFT VENTRICULAR INTERNAL DIMENSION IN DIASTOLE: 4.9 CM (ref 3.5–6)
LEFT VENTRICULAR POSTERIOR WALL IN END DIASTOLE: 0.8 CM
LEFT VENTRICULAR STROKE VOLUME: 61 ML
LVSV (TEICH): 61 ML
MV E'TISSUE VEL-SEP: 8 CM/S
MV PEAK A VEL: 0.88 M/S
MV PEAK E VEL: 90 CM/S
MV STENOSIS PRESSURE HALF TIME: 82 MS
MV VALVE AREA P 1/2 METHOD: 2.7
PROCALCITONIN SERPL-MCNC: 0.24 NG/ML
RIGHT ATRIUM AREA SYSTOLE A4C: 11.9 CM2
RIGHT VENTRICLE ID DIMENSION: 3.1 CM
SL CV LEFT ATRIUM LENGTH A2C: 4.1 CM
SL CV LV EF: 60
SL CV PED ECHO LEFT VENTRICLE DIASTOLIC VOLUME (MOD BIPLANE) 2D: 112 ML
SL CV PED ECHO LEFT VENTRICLE SYSTOLIC VOLUME (MOD BIPLANE) 2D: 51 ML
TR MAX PG: 28 MMHG
TR PEAK VELOCITY: 2.7 M/S
TRICUSPID ANNULAR PLANE SYSTOLIC EXCURSION: 2.2 CM
TRICUSPID VALVE PEAK REGURGITATION VELOCITY: 2.66 M/S

## 2023-04-14 RX ORDER — BENZONATATE 100 MG/1
100 CAPSULE ORAL 3 TIMES DAILY PRN
Status: DISCONTINUED | OUTPATIENT
Start: 2023-04-14 | End: 2023-04-19

## 2023-04-14 RX ORDER — METHYLPREDNISOLONE SODIUM SUCCINATE 40 MG/ML
40 INJECTION, POWDER, LYOPHILIZED, FOR SOLUTION INTRAMUSCULAR; INTRAVENOUS EVERY 6 HOURS SCHEDULED
Status: DISCONTINUED | OUTPATIENT
Start: 2023-04-15 | End: 2023-04-14

## 2023-04-14 RX ORDER — LEVALBUTEROL 1.25 MG/.5ML
1.25 SOLUTION, CONCENTRATE RESPIRATORY (INHALATION)
Status: DISCONTINUED | OUTPATIENT
Start: 2023-04-14 | End: 2023-04-16

## 2023-04-14 RX ORDER — SODIUM CHLORIDE FOR INHALATION 0.9 %
3 VIAL, NEBULIZER (ML) INHALATION
Status: DISCONTINUED | OUTPATIENT
Start: 2023-04-14 | End: 2023-04-16

## 2023-04-14 RX ORDER — BUPRENORPHINE AND NALOXONE 2; .5 MG/1; MG/1
4 FILM, SOLUBLE BUCCAL; SUBLINGUAL DAILY
Status: DISCONTINUED | OUTPATIENT
Start: 2023-04-15 | End: 2023-04-20 | Stop reason: HOSPADM

## 2023-04-14 RX ORDER — GUAIFENESIN 600 MG/1
600 TABLET, EXTENDED RELEASE ORAL EVERY 12 HOURS SCHEDULED
Status: DISCONTINUED | OUTPATIENT
Start: 2023-04-14 | End: 2023-04-18

## 2023-04-14 RX ORDER — BUPRENORPHINE AND NALOXONE 8; 2 MG/1; MG/1
8 FILM, SOLUBLE BUCCAL; SUBLINGUAL 2 TIMES DAILY
Status: DISCONTINUED | OUTPATIENT
Start: 2023-04-15 | End: 2023-04-20 | Stop reason: HOSPADM

## 2023-04-14 RX ORDER — METHYLPREDNISOLONE SODIUM SUCCINATE 40 MG/ML
40 INJECTION, POWDER, LYOPHILIZED, FOR SOLUTION INTRAMUSCULAR; INTRAVENOUS EVERY 6 HOURS SCHEDULED
Status: DISCONTINUED | OUTPATIENT
Start: 2023-04-14 | End: 2023-04-17

## 2023-04-14 RX ORDER — KETOROLAC TROMETHAMINE 30 MG/ML
30 INJECTION, SOLUTION INTRAMUSCULAR; INTRAVENOUS EVERY 6 HOURS PRN
Status: DISPENSED | OUTPATIENT
Start: 2023-04-14 | End: 2023-04-16

## 2023-04-14 RX ORDER — BUDESONIDE AND FORMOTEROL FUMARATE DIHYDRATE 160; 4.5 UG/1; UG/1
2 AEROSOL RESPIRATORY (INHALATION)
Status: DISCONTINUED | OUTPATIENT
Start: 2023-04-14 | End: 2023-04-20 | Stop reason: HOSPADM

## 2023-04-14 RX ORDER — LIDOCAINE 50 MG/G
1 PATCH TOPICAL DAILY
Status: DISCONTINUED | OUTPATIENT
Start: 2023-04-14 | End: 2023-04-20 | Stop reason: HOSPADM

## 2023-04-14 RX ORDER — FUROSEMIDE 40 MG/1
40 TABLET ORAL
Status: DISCONTINUED | OUTPATIENT
Start: 2023-04-14 | End: 2023-04-20 | Stop reason: HOSPADM

## 2023-04-14 RX ADMIN — ACETAMINOPHEN 325MG 650 MG: 325 TABLET ORAL at 05:06

## 2023-04-14 RX ADMIN — FAMOTIDINE 20 MG: 20 TABLET ORAL at 17:15

## 2023-04-14 RX ADMIN — GUAIFENESIN 600 MG: 600 TABLET, EXTENDED RELEASE ORAL at 09:52

## 2023-04-14 RX ADMIN — CLONAZEPAM 1 MG: 1 TABLET ORAL at 11:21

## 2023-04-14 RX ADMIN — KETOROLAC TROMETHAMINE 30 MG: 30 INJECTION, SOLUTION INTRAMUSCULAR; INTRAVENOUS at 22:21

## 2023-04-14 RX ADMIN — BUPRENORPHINE AND NALOXONE 20 MG: 8; 2 FILM BUCCAL; SUBLINGUAL at 08:05

## 2023-04-14 RX ADMIN — LIDOCAINE 5% 1 PATCH: 700 PATCH TOPICAL at 09:52

## 2023-04-14 RX ADMIN — AZITHROMYCIN MONOHYDRATE 500 MG: 250 TABLET ORAL at 17:15

## 2023-04-14 RX ADMIN — TRAZODONE HYDROCHLORIDE 100 MG: 100 TABLET ORAL at 22:10

## 2023-04-14 RX ADMIN — KETOROLAC TROMETHAMINE 30 MG: 30 INJECTION, SOLUTION INTRAMUSCULAR; INTRAVENOUS at 16:08

## 2023-04-14 RX ADMIN — LEVALBUTEROL 1.25 MG: 1.25 SOLUTION, CONCENTRATE RESPIRATORY (INHALATION) at 16:52

## 2023-04-14 RX ADMIN — DOCUSATE SODIUM 100 MG: 100 CAPSULE, LIQUID FILLED ORAL at 17:15

## 2023-04-14 RX ADMIN — ENOXAPARIN SODIUM 40 MG: 100 INJECTION SUBCUTANEOUS at 08:04

## 2023-04-14 RX ADMIN — BUDESONIDE AND FORMOTEROL FUMARATE DIHYDRATE 2 PUFF: 160; 4.5 AEROSOL RESPIRATORY (INHALATION) at 15:50

## 2023-04-14 RX ADMIN — DOCUSATE SODIUM 100 MG: 100 CAPSULE, LIQUID FILLED ORAL at 05:00

## 2023-04-14 RX ADMIN — METHYLPREDNISOLONE SODIUM SUCCINATE 40 MG: 40 INJECTION, POWDER, FOR SOLUTION INTRAMUSCULAR; INTRAVENOUS at 22:09

## 2023-04-14 RX ADMIN — KETOROLAC TROMETHAMINE 30 MG: 30 INJECTION, SOLUTION INTRAMUSCULAR; INTRAVENOUS at 09:52

## 2023-04-14 RX ADMIN — Medication 1 PATCH: at 08:05

## 2023-04-14 RX ADMIN — GABAPENTIN 400 MG: 400 CAPSULE ORAL at 08:04

## 2023-04-14 RX ADMIN — Medication 3 ML: at 16:52

## 2023-04-14 RX ADMIN — GUAIFENESIN 600 MG: 600 TABLET, EXTENDED RELEASE ORAL at 20:38

## 2023-04-14 RX ADMIN — FAMOTIDINE 20 MG: 20 TABLET ORAL at 08:04

## 2023-04-14 RX ADMIN — IOHEXOL 85 ML: 350 INJECTION, SOLUTION INTRAVENOUS at 20:18

## 2023-04-14 RX ADMIN — TOPIRAMATE 50 MG: 25 TABLET, FILM COATED ORAL at 17:15

## 2023-04-14 RX ADMIN — GABAPENTIN 400 MG: 400 CAPSULE ORAL at 17:15

## 2023-04-14 RX ADMIN — FLUOXETINE 80 MG: 20 CAPSULE ORAL at 08:05

## 2023-04-14 RX ADMIN — TOPIRAMATE 50 MG: 25 TABLET, FILM COATED ORAL at 08:04

## 2023-04-14 RX ADMIN — BUDESONIDE AND FORMOTEROL FUMARATE DIHYDRATE 2 PUFF: 80; 4.5 AEROSOL RESPIRATORY (INHALATION) at 08:04

## 2023-04-14 RX ADMIN — CEFTRIAXONE SODIUM 1000 MG: 10 INJECTION, POWDER, FOR SOLUTION INTRAVENOUS at 08:13

## 2023-04-14 RX ADMIN — CLONAZEPAM 1 MG: 1 TABLET ORAL at 20:38

## 2023-04-14 RX ADMIN — FUROSEMIDE 40 MG: 40 TABLET ORAL at 08:05

## 2023-04-14 RX ADMIN — CLONAZEPAM 1 MG: 1 TABLET ORAL at 05:06

## 2023-04-14 RX ADMIN — ACETAMINOPHEN 325MG 650 MG: 325 TABLET ORAL at 20:38

## 2023-04-14 RX ADMIN — GABAPENTIN 400 MG: 400 CAPSULE ORAL at 22:10

## 2023-04-14 RX ADMIN — ONDANSETRON 4 MG: 4 TABLET, ORALLY DISINTEGRATING ORAL at 08:19

## 2023-04-14 RX ADMIN — BUDESONIDE AND FORMOTEROL FUMARATE DIHYDRATE 2 PUFF: 160; 4.5 AEROSOL RESPIRATORY (INHALATION) at 22:10

## 2023-04-14 NOTE — CONSULTS
"Consultation - Pulmonary Medicine   Yessenia Maldonado 41 y o  female MRN: [id number]  Unit/Bed#: [facility] 68 2 -01 Encounter: [id number]      Assessment/Plan:    Acute hypoxic respiratory failure due to abnormal chest imaging with bilateral pulmonary opacities with mediastinal/hilar adenopathy   Titrate oxygen to maintain saturations greater than or equal to 89%  Activity as tolerated  Differential includes typical versus atypical infection versus pulmonary edema versus pneumonitis  Denies any exposures to suggest inhalation injury  Continue azithromycin and ceftriaxone for now  Procalcitonin is mildly elevated  Send sputum culture if able  Continue Symbicort (increase to 160 dosing) and add Xopenex every 6 hours  Send RP 2 panel  If no improvement with above treatment plan, may need to consider bronchoscopy; however, oxygen requirement precludes this without intubation at present  Adenopathy is stable since September 2022  Will need repeat CT of the chest and potential further work-up of this pending course  Acute CHF   Cardiology consult noted  Continuing on diuretics  Echocardiogram noted showing moderate dilation of the RV with mildly reduced systolic function  This is new since September 2022  Discussed with Dr Zeny Sharp   Management as listed  Nicotine dependence   Recommend complete cessation  Outpatient follow-up pending course  Discussed with primary team     History of Present Illness   Physician Requesting Consult: Mariella August MD  Reason for Consult / Principal Problem: Abnormal CT chest  Hx and PE limited by: None  Chief Complaint: \"I could not breathe  \"  HPI: Yessenia Maldonado is a 41 y o  female who presented to [address] [place] [address] with complaints of shortness of breath  She started with shortness of breath, dry cough and fatigue on Tuesday  She was of normal state of health on Monday despite being very active    She reports that she started with mild " "symptoms on Tuesday  She was supposed to learn how to use her lawnmower, but could not do so due to her symptoms  She had a rough night Tuesday into Wednesday with symptoms and became significantly worse Wednesday  She felt she was having fevers off and on with sweats overnight  She did not check her temperature  She has had some muscular chest pain and feels she pulled a muscle due to coughing  She does not have any hemoptysis, but does have some orange sputum per her report  She has not had any wheezing  She reports that she was using a white inhaler and a red inhaler with no improvement in her symptoms  She smokes about half a pack of cigarettes per day  She reports she has not vaped since December  She denies any other inhalants or exposures  Inpatient consult to Pulmonology  Consult performed by: NETTE Spivey  Consult ordered by: Desi Mckeon MD        Review of Systems   All other systems reviewed and are negative  A full 12-point review of systems was completed and is negative except for those outlined in the HPI  Historical Information   Past Medical History:   Diagnosis Date   • Addiction to drug [facility])    • Alcohol abuse    • Alcoholism ([facility] [address] )    • Altered mental status 09/21/2022   • Elevated LFTs 09/21/2022   • Lower back pain    • Self-injurious behavior    • Substance abuse [facility])      Past Surgical History:   Procedure Laterality Date   • CHOLECYSTECTOMY       Social History   Social History     Substance and Sexual Activity   Alcohol Use Not Currently    Comment: MAGDALENO, pt historically inconsistent   Drinking  per [facility] 2     Social History     Substance and Sexual Activity   Drug Use Not Currently   • Types: Heroin, \"Crack\" cocaine, Cocaine, LSD, Benzodiazepines, Marijuana    Comment: Pt unreliable historian     Social History     Tobacco Use   Smoking Status Every Day   • Packs/day: 0 50   • Years: 20 00   • Pack years: 10 00   • Types: Cigarettes " Smokeless Tobacco Current     E-Cigarette/Vaping   • E-Cigarette Use Current Every Day User    • Cartridges/Day 2      E-Cigarette/Vaping Substances   • Nicotine No    • THC No    • CBD No    • Flavoring No    • Other No    • Unknown No      Occupational History: Currently not working      Family History:   Family History   Problem Relation Age of Onset   • Heart disease Father    • Heart disease Maternal Grandmother        Meds/Allergies   all current active meds have been reviewed, pertinent pulmonary meds have been reviewed, current meds:   Current Facility-Administered Medications   Medication Dose Route Frequency   • acetaminophen (TYLENOL) tablet 650 mg  650 mg Oral Q6H PRN   • azithromycin (ZITHROMAX) tablet 500 mg  500 mg Oral Q24H   • benzonatate (TESSALON PERLES) capsule 100 mg  100 mg Oral TID PRN   • budesonide-formoterol (SYMBICORT) 80-4 5 MCG/ACT inhaler 2 puff  2 puff Inhalation BID   • buprenorphine-naloxone (Suboxone) film 20 mg  20 mg Sublingual Daily   • cefTRIAXone (ROCEPHIN) 1,000 mg in dextrose 5 % 50 mL IVPB  1,000 mg Intravenous Q24H   • clonazePAM (KlonoPIN) tablet 1 mg  1 mg Oral 4x Daily PRN   • docusate sodium (COLACE) capsule 100 mg  100 mg Oral Q12H   • enoxaparin (LOVENOX) subcutaneous injection 40 mg  40 mg Subcutaneous Daily   • famotidine (PEPCID) tablet 20 mg  20 mg Oral BID   • FLUoxetine (PROzac) capsule 80 mg  80 mg Oral Daily   • furosemide (LASIX) tablet 40 mg  40 mg Oral BID (diuretic)   • gabapentin (NEURONTIN) capsule 400 mg  400 mg Oral TID   • guaiFENesin (MUCINEX) 12 hr tablet 600 mg  600 mg Oral Q12H CHRISTIANE   • ketorolac (TORADOL) injection 30 mg  30 mg Intravenous Q6H PRN   • lidocaine (LIDODERM) 5 % patch 1 patch  1 patch Topical Daily   • nicotine (NICODERM CQ) 14 mg/24hr TD 24 hr patch 1 patch  1 patch Transdermal Daily   • nicotine (NICODERM CQ) 7 mg/24hr TD 24 hr patch 7 mg  7 mg Transdermal Once   • ondansetron (ZOFRAN-ODT) dispersible tablet 4 mg  4 mg Oral Q6H PRN   • sodium chloride (PF) 0 9 % injection 3 mL  3 mL Intravenous Q1H PRN   • topiramate (TOPAMAX) tablet 50 mg  50 mg Oral BID   • traZODone (DESYREL) tablet 100 mg  100 mg Oral HS    and PTA meds:   Prior to Admission Medications   Prescriptions Last Dose Informant Patient Reported? Taking?    ARIPiprazole (ABILIFY) 15 mg tablet   No No   Sig: Take 1 tablet (15 mg total) by mouth daily for 14 days   clonazePAM (KlonoPIN) 1 mg tablet   No No   Sig: Take 1 tablet (1 mg total) by mouth 2 (two) times a day for 2 days   docusate sodium (COLACE) 100 mg capsule   No No   Sig: Take 1 capsule (100 mg total) by mouth every 12 (twelve) hours for 15 days   famotidine (PEPCID) 20 mg tablet   No No   Sig: Take 1 tablet (20 mg total) by mouth 2 (two) times a day   ferrous sulfate 325 (65 Fe) mg tablet   No No   Sig: Take 1 tablet (325 mg total) by mouth daily with breakfast   gabapentin (NEURONTIN) 300 mg capsule   No No   Sig: Take 2 capsules (600 mg total) by mouth 3 (three) times a day   hydrOXYzine HCL (ATARAX) 25 mg tablet   No No   Sig: Take 1 tablet (25 mg total) by mouth every 6 (six) hours   Patient not taking: Reported on 2022   hydrOXYzine pamoate (VISTARIL) 25 mg capsule   No No   Sig: Take 1 capsule (25 mg total) by mouth every 6 (six) hours as needed for anxiety   Patient not taking: Reported on 2022   ibuprofen (MOTRIN) 400 mg tablet   No No   Sig: Take 1 tablet (400 mg total) by mouth every 6 (six) hours as needed for mild pain for up to 10 days   ibuprofen (MOTRIN) 600 mg tablet   No No   Sig: Take 1 tablet (600 mg total) by mouth every 6 (six) hours as needed for moderate pain   naloxone (NARCAN) 4 mg/0 1 mL nasal spray   Yes No   Si mg into each nostril   nicotine (NICODERM CQ) 14 mg/24hr TD 24 hr patch   No No   Sig: Place 1 patch on the skin every 24 hours   nicotine polacrilex (NICORETTE) 4 mg gum   No No   Sig: Chew 1 each (4 mg total) as needed for smoking cessation   ondansetron "(ZOFRAN-ODT) 4 mg disintegrating tablet   No No   Sig: Take 1 tablet (4 mg total) by mouth every 6 (six) hours as needed for nausea or vomiting   ondansetron (Zofran ODT) 4 mg disintegrating tablet   No No   Sig: Take 1 tablet (4 mg total) by mouth every 6 (six) hours as needed for nausea or vomiting   pantoprazole (PROTONIX) 40 mg tablet   No No   Sig: Take 1 tablet (40 mg total) by mouth daily in the early morning   topiramate (TOPAMAX) 100 mg tablet   No No   Sig: Take 1 tablet (100 mg total) by mouth 2 (two) times a day   Take in combination with 25mg for total daily dose of 125mg twice daily  topiramate (Topamax) 25 mg tablet   No No   Sig: Take 1 tablet (25 mg total) by mouth 2 (two) times a day   Take in combination with 100mg for total daily dose of 125mg twice daily  traZODone (DESYREL) 50 mg tablet   No No   Sig: Take 1 tablet (50 mg total) by mouth daily at bedtime   venlafaxine (EFFEXOR-XR) 37 5 mg 24 hr capsule   No No   Sig: Take 1 capsule (37 5 mg total) by mouth daily      Facility-Administered Medications: None       Allergies   Allergen Reactions   • Haloperidol Other (See Comments)     oculogyr crisis       Objective   Vitals: Blood pressure 103/69, pulse 67, temperature 98 1 °F (36 7 °C), resp  rate 16, height 5' 1\" (1 549 m), weight 71 2 kg (157 lb), last menstrual period 04/13/2023, SpO2 94 %  15L NC,Body mass index is 29 66 kg/m²  Intake/Output Summary (Last 24 hours) at 4/14/2023 1050  Last data filed at 4/13/2023 1624  Gross per 24 hour   Intake 550 ml   Output --   Net 550 ml     Invasive Devices     Peripheral Intravenous Line  Duration           Peripheral IV 04/13/23 Right Forearm <1 day                Physical Exam  Vitals reviewed  Constitutional:       General: She is not in acute distress  Appearance: She is well-developed  She is not toxic-appearing or diaphoretic  Interventions: Nasal cannula in place  HENT:      Head: Normocephalic and atraumatic     Eyes:      " General: No scleral icterus  Neck:      Trachea: No tracheal deviation  Cardiovascular:      Rate and Rhythm: Normal rate and regular rhythm  Heart sounds: S1 normal and S2 normal  No murmur heard  No friction rub  No gallop  Pulmonary:      Effort: Pulmonary effort is normal  No tachypnea, accessory muscle usage or respiratory distress  Breath sounds: No stridor  Decreased breath sounds present  No wheezing, rhonchi or rales  Chest:      Chest wall: No tenderness  Abdominal:      General: Bowel sounds are normal  There is no distension  Palpations: Abdomen is soft  Tenderness: There is no abdominal tenderness  Musculoskeletal:      Cervical back: Neck supple  Right lower leg: No edema  Left lower leg: No edema  Skin:     General: Skin is warm and dry  Findings: No rash  Neurological:      Mental Status: She is alert and oriented to person, place, and time  GCS: GCS eye subscore is 4  GCS verbal subscore is 5  GCS motor subscore is 6  Psychiatric:         Speech: Speech normal          Behavior: Behavior normal  Behavior is cooperative  Lab Results:   CBC:   Lab Results   Component Value Date    WBC 15 60 (H) 04/13/2023    HGB 11 1 (L) 04/13/2023    HCT 33 5 (L) 04/13/2023    MCV 89 04/13/2023     04/13/2023    MCH 29 5 04/13/2023    MCHC 33 1 04/13/2023    RDW 13 8 04/13/2023    MPV 8 6 (L) 04/13/2023    NRBC 0 04/13/2023   , CMP:   Lab Results   Component Value Date    SODIUM 138 04/13/2023    K 3 9 04/13/2023     04/13/2023    CO2 19 (L) 04/13/2023    BUN 17 04/13/2023    CREATININE 0 95 04/13/2023    CALCIUM 8 8 04/13/2023    EGFR 72 04/13/2023     Procalcitonin: 0 36    Flu/COVID/RSV PCR: Negative    Culture Data: Blood cultures x2 pending    Urinary antigens: Negative    BNP: 407    Imaging Studies: I have personally reviewed pertinent reports     and I have personally reviewed pertinent films in PACS   CT chest done yesterday "shows diffuse groundglass opacities and septal thickening with trace pleural effusions and stable mediastinal and hilar adenopathy with RV dilation  EKG, Pathology, and Other Studies: I have personally reviewed pertinent reports  Echocardiogram done today shows EF 60% with normal diastolic function  RV is moderately dilated with mildly reduced systolic function  Pulmonary Results (PFTs, PSG): I have personally reviewed pertinent reports  None    VTE Prophylaxis: Sequential compression device (Venodyne)  and Enoxaparin (Lovenox)    Code Status: Level 1 - Full Code    None    Portions of the record may have been created with voice recognition software  Occasional wrong word or \"sound a like\" substitutions may have occurred due to the inherent limitations of voice recognition software  Read the chart carefully and recognize, using context, where substitutions have occurred    "

## 2023-04-14 NOTE — ASSESSMENT & PLAN NOTE
· Anxiety disorder followed by the [address] [place]  · PDMP and care everywhere reviewed    · Confirmed that she is on clonazepam 1 mg 4 times daily as needed

## 2023-04-14 NOTE — UTILIZATION REVIEW
Initial Clinical Review    Admission: Date/Time/Statement:   Admission Orders (From admission, onward)     Ordered        04/13/23 1541  INPATIENT ADMISSION  Once                      Orders Placed This Encounter   Procedures   • INPATIENT ADMISSION     Standing Status:   Standing     Number of Occurrences:   1     Order Specific Question:   Level of Care     Answer:   Med Surg [16]     Order Specific Question:   Estimated length of stay     Answer:   More than 2 Midnights     Order Specific Question:   Certification     Answer:   I certify that inpatient services are medically necessary for this patient for a duration of greater than two midnights  See H&P and MD Progress Notes for additional information about the patient's course of treatment  ED Arrival Information     Expected   -    Arrival   4/13/2023 11:49    Acuity   Emergent            Means of arrival   Ambulance    Escorted by   [place] ([address])    Service   Hospitalist    Admission type   Emergency            Arrival complaint   Respiratory Distress           Chief Complaint   Patient presents with   • Respiratory Distress     Pt arrives via EMS from home with SOB and chest discomfort for 2 days  When Ems arrived pt O2 in the 46s and she was cyanotic  Pt on nonrebreather and in the 90s ath this time  Pt hx of intubation September of last year due to asthma        Initial Presentation: 41 y o  female presents to the ED via EMS from home with c/o SOB at rest and w/ exertion, cough, fatigue  PMH:  opioid dependence on Suboxone, bipolar disorder, anxiety, concern for vape related lung injury, noncompliant with pulm f/u  In the ED she was tachypneic  Labs - elevated troponins, BNP, procal, leukocytosis, CO2  Imaging - Severe diffuse groundglass opacity and septal thickening with sparing of the lung periphery,with trace effusions - similar to pulm edema, poss sarcoidosis, RV dilation  ECG - NSR    Treated with Xanax, IV antibiotics, ASA, IV fluids bolus 500 cc, PO Xanax x 2  On exam coarse breath sounds, anxious  She is admitted to INPATIENT status with Acute hypoxic resp failure - oxygen, close monitoring for worsening respiratory status  Abnormal CT Chest - IV antibiotics, consult Pulmonary  Elevated troponin - poss new heart failure - PO Lasix, Echo, cardio consult  Date: 4/14   Day 2:   Echo shows enlargement in R ventricle  4/14 Cardio Consult - acute hypoxic resp failure, acute CHF, nonischemic MI d/t hypoxia, CHF - IV Lasix with parameters d/t BP  Follow UO, daily wt, lytes mgmt, follow renal function  No ischemia on ECG, no CP, no need for ischemic work up  Negative urine antigens  4/14 Pulmonary Consult - Acute hypoxic respiratory failure due to abnormal chest imaging with bilateral pulmonary opacities with mediastinal/hilar adenopathy  Diff dx - typical vs atypical infection vs Pulm edema, vs Pneumonitis  Titrate oxygen, sputum culture if able, add Xopenex q 6 hr, RP 2 panel  Continue IV Diuretics for CHF  Complete smoking cessation          ED Triage Vitals   Temperature Pulse Respirations Blood Pressure SpO2   04/13/23 1202 04/13/23 1156 04/13/23 1156 04/13/23 1156 04/13/23 1156   97 5 °F (36 4 °C) 90 (!) 32 108/71 99 %      Temp Source Heart Rate Source Patient Position - Orthostatic VS BP Location FiO2 (%)   04/13/23 1202 04/13/23 2115 04/13/23 1156 04/13/23 1156 --   Oral Monitor Sitting Left arm       Pain Score       04/13/23 1728       4          Wt Readings from Last 1 Encounters:   04/14/23 71 2 kg (157 lb)     Additional Vital Signs:   14/23 0955 -- 67 -- 103/69 -- -- -- -- --   04/14/23 0805 -- -- -- -- -- -- 15 L/min Mid flow nasal cannula --   04/14/23 07:37:09 98 1 °F (36 7 °C) 76 16 103/69 80 94 % -- -- --   04/14/23 05:04:51 -- 84 -- 105/69 81 88 % Abnormal  -- -- --   04/13/23 2137 -- 84 -- 96/52 67 91 % -- -- --   04/13/23 21:30:27 -- 89 -- 96/52 67 83 % Abnormal  -- -- --   04/13/23 21:15:34 97 1 °F (36 2 °C) Abnormal  83 22 106/67 80 96 % 15 L/min Mid flow nasal cannula Lying   04/13/23 2000 -- 87 -- -- -- 95 % 15 L/min Mid flow nasal cannula --   04/13/23 1730 -- -- -- -- -- -- 10 L/min Simple mask --   04/13/23 16:54:31 97 7 °F (36 5 °C) -- 16 95/62 73 -- -- -- --   04/13/23 1430 -- 75 -- 109/63 79 95 % -- -- --   04/13/23 1400 -- 80 -- 84/56 Abnormal    65 94 % -- -- --   BP: Provider made aware at 04/13/23 1400   04/13/23 1345 -- 78 29 Abnormal  91/56 67 94 % -- Simple mask --     Pertinent Labs/Diagnostic Test Results:     4/13 ECG - Normal sinus rhythm  Low voltage QRS  Borderline ECG  4/13 ECG - Normal sinus rhythm  Low voltage QRS  Cannot rule out Anterior infarct , age undetermined  Abnormal ECG    4/14 Echo - •  Left Ventricle: Left ventricular cavity size is normal  Wall thickness is normal  The left ventricular ejection fraction is 60%  Systolic function is normal  Global longitudinal strain is low normal at -17%  Wall motion is normal  Diastolic function is normal   •  IVS: There is diastolic flattening of the interventricular septum consistent with right ventricle volume overload  •  Right Ventricle: Right ventricular cavity size is moderately dilated  Systolic function is mildly reduced  •  Right Atrium: The atrium is mildly dilated  •  Tricuspid Valve: There is mild regurgitation    Compared to prior echocardiogram dated 9/22/2022, right ventricular size appears to be larger with an increase in right atrial size as well          CT chest with contrast   Final Result by Swathi Le MD (04/13 2630)      Severe diffuse groundglass opacity and septal thickening with sparing of the lung periphery, similar to September 2022 with trace effusions  The distribution is suggestive of pulmonary edema although atypical infection is not excluded  Stable mediastinal and hilar lymphadenopathy  The distribution is suggestive of sarcoidosis  Right ventricular dilation  Workstation performed: [id number]         XR chest 1 view portable   Final Result by Duc Shipman MD (04/13 1545)      Moderate diffuse groundglass opacities, suggesting pulmonary edema  Workstation performed: [id number]           Results from last 7 days   Lab Units 04/13/23  1249   SARS-COV-2  Negative     Results from last 7 days   Lab Units 04/13/23  1819 04/13/23  1226   WBC Thousand/uL  --  15 60*   HEMOGLOBIN g/dL  --  11 1*   HEMATOCRIT %  --  33 5*   PLATELETS Thousands/uL 239 295   NEUTROS ABS Thousands/µL  --  12 69*         Results from last 7 days   Lab Units 04/13/23  1226   SODIUM mmol/L 138   POTASSIUM mmol/L 3 9   CHLORIDE mmol/L 108   CO2 mmol/L 19*   ANION GAP mmol/L 11   BUN mg/dL 17   CREATININE mg/dL 0 95   EGFR ml/min/1 73sq m 72   CALCIUM mg/dL 8 8             Results from last 7 days   Lab Units 04/13/23  1226   GLUCOSE RANDOM mg/dL 99     Results from last 7 days   Lab Units 04/13/23  1625 04/13/23  1423 04/13/23  1226   HS TNI 0HR ng/L  --   --  675*   HS TNI 2HR ng/L  --  533*  --    HSTNI D2 ng/L  --  -142  --    HS TNI 4HR ng/L 439*  --   --    HSTNI D4 ng/L -236  --   --                  Results from last 7 days   Lab Units 04/14/23  0500 04/13/23  1819   PROCALCITONIN ng/ml 0 24 0 36*     Results from last 7 days   Lab Units 04/13/23  1226   BNP pg/mL 407*     Results from last 7 days   Lab Units 04/13/23  1833 04/13/23  1249   STREP PNEUMONIAE ANTIGEN, URINE  Negative  --    LEGIONELLA URINARY ANTIGEN  Negative  --    INFLUENZA A PCR   --  Negative   INFLUENZA B PCR   --  Negative   RSV PCR   --  Negative     Results from last 7 days   Lab Units 04/13/23  1200   BLOOD CULTURE  Received in Microbiology Lab  Culture in Progress  Received in Microbiology Lab  Culture in Progress                 ED Treatment:   Medication Administration from 04/13/2023 1149 to 04/13/2023 1646       Date/Time Order Dose Route Action     04/13/2023 1250 EDT ALPRAZolam [facility] tablet 0 5 mg 0 5 mg Oral Given     04/13/2023 1252 EDT cefepime (MAXIPIME) 2 g/50 mL dextrose IVPB 2,000 mg Intravenous New Bag     04/13/2023 1338 EDT aspirin chewable tablet 324 mg 324 mg Oral Given     04/13/2023 1424 EDT sodium chloride 0 9 % bolus 500 mL 500 mL Intravenous New Bag     04/13/2023 1415 EDT iohexol (OMNIPAQUE) 350 MG/ML injection (SINGLE-DOSE) 85 mL 85 mL Intravenous Given     04/13/2023 1556 EDT nicotine (NICODERM CQ) 7 mg/24hr TD 24 hr patch 7 mg 7 mg Transdermal Medication Applied     04/13/2023 1556 EDT ALPRAZolam (XANAX) tablet 1 mg 1 mg Oral Given        Past Medical History:   Diagnosis Date   • Addiction to drug [facility])    • Alcohol abuse    • Alcoholism ([facility] [address] )    • Altered mental status 09/21/2022   • Elevated LFTs 09/21/2022   • Lower back pain    • Self-injurious behavior    • Substance abuse ([facility] [address] )      Present on Admission:  • Bipolar disorder, unspecified ([facility] [address] )  • Tobacco abuse  • Opioid use disorder, severe, on maintenance therapy ([facility])  • Anxiety disorder, unspecified      Admitting Diagnosis: Respiratory distress [R06 03]  Hypoxia [R09 02]  Abnormal chest CT [R93 89]  Elevated troponin [R77 8]  Age/Sex: 41 y o  female  Admission Orders:  Scheduled Medications:  azithromycin, 500 mg, Oral, Q24H  budesonide-formoterol, 2 puff, Inhalation, BID  buprenorphine-naloxone, 20 mg, Sublingual, Daily  cefTRIAXone, 1,000 mg, Intravenous, Q24H  docusate sodium, 100 mg, Oral, Q12H  enoxaparin, 40 mg, Subcutaneous, Daily  famotidine, 20 mg, Oral, BID  FLUoxetine, 80 mg, Oral, Daily  furosemide, 40 mg, Oral, BID (diuretic)  gabapentin, 400 mg, Oral, TID  guaiFENesin, 600 mg, Oral, Q12H CHRISTIANE  lidocaine, 1 patch, Topical, Daily  nicotine, 1 patch, Transdermal, Daily  nicotine, 7 mg, Transdermal, Once  topiramate, 50 mg, Oral, BID  traZODone, 100 mg, Oral, HS      Continuous IV Infusions:     PRN Meds:  acetaminophen, 650 mg, Oral, Q6H PRN - x 1 4/13, 4/14  benzonatate, 100 mg, Oral, TID PRN  clonazePAM, 1 mg, Oral, 4x Daily PRN - x 1 4/13, 4/14  ketorolac, 30 mg, Intravenous, Q6H PRN  ondansetron, 4 mg, Oral, Q6H PRN - x 1 4/13, 4/14  sodium chloride (PF), 3 mL, Intravenous, Q1H PRN    OOB  Incentive spirometry  Aqua K pad  Regular diet   IP CONSULT TO CARDIOLOGY  IP CONSULT TO PULMONOLOGY  IP CONSULT TO CASE MANAGEMENT    Network Utilization Review Department  ATTENTION: Please call with any questions or concerns to [phone] and carefully listen to the prompts so that you are directed to the right person  All voicemails are confidential   Tristan Cools all requests for admission clinical reviews, approved or denied determinations and any other requests to dedicated fax number below belonging to the campus where the patient is receiving treatment   List of dedicated fax numbers for the Facilities:  [address] DENIALS (Administrative/Medical Necessity) [phone]   [address] (Maternity/NICU/Pediatrics) [phone]   914 Elizabeth Kamara [phone]   [facility] 77 [phone]   [address] [place] [address] [facility] [phone]   1557 [facility] Mariano Barrios [facility] 134 815 [facility] [phone]

## 2023-04-14 NOTE — ASSESSMENT & PLAN NOTE
· Bipolar disorder followed by [facility] AT THE [facility]  · Medications reviewed via care everywhere    · Continue Prozac 80 mg daily, trazodone 100 mg at bedtime, Neurontin 400 mg 3 times daily

## 2023-04-14 NOTE — PLAN OF CARE
Problem: Potential for Falls  Goal: Patient will remain free of falls  Description: INTERVENTIONS:  - Educate patient/family on patient safety including physical limitations  - Instruct patient to call for assistance with activity   - Consult OT/PT to assist with strengthening/mobility   - Keep Call bell within reach  - Keep bed low and locked with side rails adjusted as appropriate  - Keep care items and personal belongings within reach  - Initiate and maintain comfort rounds  - Make Fall Risk Sign visible to staff  - Offer Toileting every 2 Hours, in advance of need  - Apply yellow socks and bracelet for high fall risk patients  - Consider moving patient to room near nurses station  Outcome: Progressing     Problem: PAIN - ADULT  Goal: Verbalizes/displays adequate comfort level or baseline comfort level  Description: Interventions:  - Encourage patient to monitor pain and request assistance  - Assess pain using appropriate pain scale  - Administer analgesics based on type and severity of pain and evaluate response  - Implement non-pharmacological measures as appropriate and evaluate response  - Consider cultural and social influences on pain and pain management  - Notify physician/advanced practitioner if interventions unsuccessful or patient reports new pain  Outcome: Progressing     Problem: INFECTION - ADULT  Goal: Absence or prevention of progression during hospitalization  Description: INTERVENTIONS:  - Assess and monitor for signs and symptoms of infection  - Monitor lab/diagnostic results  - Monitor all insertion sites, i e  indwelling lines, tubes, and drains  - Monitor endotracheal if appropriate and nasal secretions for changes in amount and color  - [place] appropriate cooling/warming therapies per order  - Administer medications as ordered  - Instruct and encourage patient and family to use good hand hygiene technique  - Identify and instruct in appropriate isolation precautions for identified infection/condition  Outcome: Progressing  Goal: Absence of fever/infection during neutropenic period  Description: INTERVENTIONS:  - Monitor WBC    Outcome: Progressing     Problem: SAFETY ADULT  Goal: Patient will remain free of falls  Description: INTERVENTIONS:  - Educate patient/family on patient safety including physical limitations  - Instruct patient to call for assistance with activity   - Consult OT/PT to assist with strengthening/mobility   - Keep Call bell within reach  - Keep bed low and locked with side rails adjusted as appropriate  - Keep care items and personal belongings within reach  - Initiate and maintain comfort rounds  - Make Fall Risk Sign visible to staff  - Offer Toileting every 2 Hours, in advance of need  - Apply yellow socks and bracelet for high fall risk patients  - Consider moving patient to room near nurses station  Outcome: Progressing  Goal: Maintain or return to baseline ADL function  Description: INTERVENTIONS:  -  Assess patient's ability to carry out ADLs; assess patient's baseline for ADL function and identify physical deficits which impact ability to perform ADLs (bathing, care of mouth/teeth, toileting, grooming, dressing, etc )  - Assess/evaluate cause of self-care deficits   - Assess range of motion  - Assess patient's mobility; develop plan if impaired  - Assess patient's need for assistive devices and provide as appropriate  - Encourage maximum independence but intervene and supervise when necessary  - Involve family in performance of ADLs  - Assess for home care needs following discharge   - Consider OT consult to assist with ADL evaluation and planning for discharge  - Provide patient education as appropriate  Outcome: Progressing  Goal: Maintains/Returns to pre admission functional level  Description: INTERVENTIONS:  - Perform BMAT or MOVE assessment daily    - Set and communicate daily mobility goal to care team and patient/family/caregiver     - Collaborate with rehabilitation services on mobility goals if consulted  - Perform Range of Motion 4 times a day  - Reposition patient every 2 hours  - Dangle patient 3 times a day  - Stand patient 3 times a day  - Ambulate patient 3 times a day  - Out of bed to chair 3 times a day   - Out of bed for meals 3 times a day  - Out of bed for toileting  - Record patient progress and toleration of activity level   Outcome: Progressing     Problem: DISCHARGE PLANNING  Goal: Discharge to home or other facility with appropriate resources  Description: INTERVENTIONS:  - Identify barriers to discharge w/patient and caregiver  - Arrange for needed discharge resources and transportation as appropriate  - Identify discharge learning needs (meds, wound care, etc )  - Arrange for interpretive services to assist at discharge as needed  - Refer to Case Management Department for coordinating discharge planning if the patient needs post-hospital services based on physician/advanced practitioner order or complex needs related to functional status, cognitive ability, or social support system  Outcome: Progressing     Problem: Knowledge Deficit  Goal: Patient/family/caregiver demonstrates understanding of disease process, treatment plan, medications, and discharge instructions  Description: Complete learning assessment and assess knowledge base    Interventions:  - Provide teaching at level of understanding  - Provide teaching via preferred learning methods  Outcome: Progressing     Problem: CARDIOVASCULAR - ADULT  Goal: Maintains optimal cardiac output and hemodynamic stability  Description: INTERVENTIONS:  - Monitor I/O, vital signs and rhythm  - Monitor for S/S and trends of decreased cardiac output  - Administer and titrate ordered vasoactive medications to optimize hemodynamic stability  - Assess quality of pulses, skin color and temperature  - Assess for signs of decreased coronary artery perfusion  - Instruct patient to report change in severity of symptoms  Outcome: Progressing  Goal: Absence of cardiac dysrhythmias or at baseline rhythm  Description: INTERVENTIONS:  - Continuous cardiac monitoring, vital signs, obtain 12 lead EKG if ordered  - Administer antiarrhythmic and heart rate control medications as ordered  - Monitor electrolytes and administer replacement therapy as ordered  Outcome: Progressing     Problem: RESPIRATORY - ADULT  Goal: Achieves optimal ventilation and oxygenation  Description: INTERVENTIONS:  - Assess for changes in respiratory status  - Assess for changes in mentation and behavior  - Position to facilitate oxygenation and minimize respiratory effort  - Oxygen administered by appropriate delivery if ordered  - Initiate smoking cessation education as indicated  - Encourage broncho-pulmonary hygiene including cough, deep breathe, Incentive Spirometry  - Assess the need for suctioning and aspirate as needed  - Assess and instruct to report SOB or any respiratory difficulty  - Respiratory Therapy support as indicated  Outcome: Progressing     Problem: METABOLIC, FLUID AND ELECTROLYTES - ADULT  Goal: Electrolytes maintained within normal limits  Description: INTERVENTIONS:  - Monitor labs and assess patient for signs and symptoms of electrolyte imbalances  - Administer electrolyte replacement as ordered  - Monitor response to electrolyte replacements, including repeat lab results as appropriate  - Instruct patient on fluid and nutrition as appropriate  Outcome: Progressing  Goal: Fluid balance maintained  Description: INTERVENTIONS:  - Monitor labs   - Monitor I/O and WT  - Instruct patient on fluid and nutrition as appropriate  - Assess for signs & symptoms of volume excess or deficit  Outcome: Progressing

## 2023-04-14 NOTE — ASSESSMENT & PLAN NOTE
· Elevated troponin with evaded BNP and abnormal CT chest showing pulmonary edema  · 2D Echo with evidence of right heart failure  · Cardiology input appreciated, continue Lasix, holding parameters adjusted due to marginal blood pressures  · Monitor volume status with daily weights, I's and O's  · Low Na diet

## 2023-04-14 NOTE — ASSESSMENT & PLAN NOTE
· As noted on 2D echo  · Started on Lasix  · And daily weights, I's and O's  · Cardiology consulted  · Will check CTA PE study

## 2023-04-14 NOTE — PROGRESS NOTES
[facility]  Progress Note  Name: Migel Henson  MRN: [id number]  Unit/Bed#: [place] 2 -01 I Date of Admission: 4/13/2023   Date of Service: 4/14/2023 I Hospital Day: 1    Assessment/Plan   Acute right ventricular heart failure (HCC)  Assessment & Plan  · As noted on 2D echo  · Started on Lasix  · And daily weights, I's and O's  · Cardiology consulted  · Will check CTA PE study    Elevated troponin  Assessment & Plan  · Elevated troponin with evaded BNP and abnormal CT chest showing pulmonary edema  · 2D Echo with evidence of right heart failure  · Cardiology input appreciated, continue Lasix, holding parameters adjusted due to marginal blood pressures  · Monitor volume status with daily weights, I's and O's  · Low Na diet     Opioid use disorder, severe, on maintenance therapy ([facility] )  Assessment & Plan  · PDMP and care everywhere reviewed  · She is maintained on  Suboxone TID 8mg-4mg-8mg     Anxiety disorder, unspecified  Assessment & Plan  · Anxiety disorder followed by the [address] [place] [address] [place]  · PDMP and care everywhere reviewed  · Confirmed that she is on clonazepam 1 mg 4 times daily as needed    Tobacco abuse  Assessment & Plan  · Placed on nicotine patch  · Smoking cessation counseling    Bipolar disorder, unspecified ([facility] )  Assessment & Plan  · Bipolar disorder followed by [facility]  · Medications reviewed via care everywhere    · Continue Prozac 80 mg daily, trazodone 100 mg at bedtime, Neurontin 400 mg 3 times daily    * Acute respiratory failure with hypoxia (HCC)  Assessment & Plan  · Acute respiratory failure due to possible cardiogenic pulmonary edema versus noncardiogenic pulmonary edema versus atypical pneumonia/ pneumonitis  · Continue oxygen via cannula and/or mask to maintain saturation more than 90%  · Will need close monitoring and may need higher level of care if oxygen requirements increase             VTE Pharmacologic Prophylaxis: VTE Score: 4 Moderate Risk (Score 3-4) - Pharmacological DVT Prophylaxis Ordered: enoxaparin (Lovenox)  Patient Centered Rounds: I performed bedside rounds with nursing staff today  Discussions with Specialists or Other Care Team Provider: Pulmonology     Education and Discussions with Family / Patient: Patient  Total Time Spent on Date of Encounter in care of patient: 45 minutes This time was spent on one or more of the following: performing physical exam; counseling and coordination of care; obtaining or reviewing history; documenting in the medical record; reviewing/ordering tests, medications or procedures; communicating with other healthcare professionals and discussing with patient's family/caregivers  Current Length of Stay: 1 day(s)  Current Patient Status: Inpatient   Certification Statement: The patient will continue to require additional inpatient hospital stay due to close monitoring  Discharge Plan: Anticipate discharge in 48-72 hrs to home  Code Status: Level 1 - Full Code    Subjective:   Patient seen and examined  She is reporting shortness of breath and pleuritic chest pain  Objective:     Vitals:   Temp (24hrs), Av 5 °F (36 4 °C), Min:97 1 °F (36 2 °C), Max:98 1 °F (36 7 °C)    Temp:  [97 1 °F (36 2 °C)-98 1 °F (36 7 °C)] 97 3 °F (36 3 °C)  HR:  [] 85  Resp:  [16-22] 18  BP: ()/(52-69) 93/63  SpO2:  [75 %-96 %] 92 %  Body mass index is 29 66 kg/m²  Input and Output Summary (last 24 hours):   No intake or output data in the 24 hours ending 23 2520    Physical Exam:   Physical Exam  Constitutional:       General: She is not in acute distress  HENT:      Mouth/Throat:      Pharynx: Oropharynx is clear  Cardiovascular:      Rate and Rhythm: Normal rate and regular rhythm  Heart sounds: No murmur heard  Pulmonary:      Effort: Respiratory distress present  Breath sounds: No wheezing or rales     Abdominal:      General: There is no distension  Tenderness: There is no abdominal tenderness  There is no guarding  Musculoskeletal:      Right lower leg: No edema  Left lower leg: No edema  Neurological:      Mental Status: She is oriented to person, place, and time  Additional Data:     Labs:  Results from last 7 days   Lab Units 04/13/23  1819 04/13/23  1226   WBC Thousand/uL  --  15 60*   HEMOGLOBIN g/dL  --  11 1*   HEMATOCRIT %  --  33 5*   PLATELETS Thousands/uL 239 295   NEUTROS PCT %  --  80*   LYMPHS PCT %  --  13*   MONOS PCT %  --  5   EOS PCT %  --  0     Results from last 7 days   Lab Units 04/13/23  1226   SODIUM mmol/L 138   POTASSIUM mmol/L 3 9   CHLORIDE mmol/L 108   CO2 mmol/L 19*   BUN mg/dL 17   CREATININE mg/dL 0 95   ANION GAP mmol/L 11   CALCIUM mg/dL 8 8   GLUCOSE RANDOM mg/dL 99                 Results from last 7 days   Lab Units 04/14/23  0500 04/13/23  1819   PROCALCITONIN ng/ml 0 24 0 36*       Lines/Drains:  Invasive Devices     Peripheral Intravenous Line  Duration           Peripheral IV 04/13/23 Right Forearm 1 day                    Imaging: Reviewed radiology reports from this admission including: chest CT scan and Personally reviewed the following imaging: chest CT scan    Recent Cultures (last 7 days):   Results from last 7 days   Lab Units 04/13/23  1833 04/13/23  1200   BLOOD CULTURE   --  Received in Microbiology Lab  Culture in Progress  Received in Microbiology Lab  Culture in Progress     LEGIONELLA URINARY ANTIGEN  Negative  --        Last 24 Hours Medication List:   Current Facility-Administered Medications   Medication Dose Route Frequency Provider Last Rate   • acetaminophen  650 mg Oral Q6H PRN Olga Conrad MD     • azithromycin  500 mg Oral Q24H Olga Conrad MD     • benzonatate  100 mg Oral TID PRN Brad Marroquin MD     • budesonide-formoterol  2 puff Inhalation BID NETTE Andrea     • [START ON 4/15/2023] buprenorphine-naloxone 8 mg Sublingual BID Yovana Chu MD      And   • [START ON 4/15/2023] buprenorphine-naloxone  4 mg Sublingual Daily Migel Diallo MD     • cefTRIAXone  1,000 mg Intravenous Q24H Solange Levine MD 1,000 mg (04/14/23 0813)   • clonazePAM  1 mg Oral 4x Daily PRN Solange Levine MD     • docusate sodium  100 mg Oral Q12H Solange Levine MD     • enoxaparin  40 mg Subcutaneous Daily Solange Levine MD     • famotidine  20 mg Oral BID Solange Levine MD     • FLUoxetine  80 mg Oral Daily Solange Levine MD     • furosemide  40 mg Oral BID (diuretic) Carolina Craig DO     • gabapentin  400 mg Oral TID Solange Levine MD     • guaiFENesin  600 mg Oral Q12H [address] Yovana Price MD     • ketorolac  30 mg Intravenous Q6H PRN Yovana Chu MD     • levalbuterol  1 25 mg Nebulization Q6H NETTE Montes     • lidocaine  1 patch Topical Daily Migel Diallo MD     • nicotine  1 patch Transdermal Daily Solange Levine MD     • ondansetron  4 mg Oral Q6H PRN Solange Levine MD     • sodium chloride (PF)  3 mL Intravenous Q1H PRN Kateryna Harris MD     • sodium chloride  3 mL Nebulization Q6H Yovana Chu MD     • topiramate  50 mg Oral BID Solange Levine MD     • traZODone  100 mg Oral HS Solange Levine MD          Today, Patient Was Seen By: Migel Diallo MD    **Please Note: This note may have been constructed using a voice recognition system  **

## 2023-04-14 NOTE — CASE MANAGEMENT
Case Management Discharge Planning Note    Patient name Patrica Living  Location [place] [address] 2 /[address] 2 Torrence Osler* MRN [id number]  : 1975 Date 2023       Current Admission Date: 2023  Current Admission Diagnosis:Acute respiratory failure with hypoxia [facility])   Patient Active Problem List    Diagnosis Date Noted   • Elevated troponin 2023   • Abnormal CT of the chest 2023   • Pneumonitis 10/28/2022   • Mild protein-calorie malnutrition ([address] ) 10/08/2022   • 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[address] ) 10/07/2022   • Anxiety disorder, unspecified 10/06/2022   • Opioid use disorder, severe, on maintenance therapy ([address] ) 10/06/2022   • Hepatitis C antibody test positive 2022   • Bradycardia 2022   • Acute respiratory failure with hypoxia ([address] ) 2022   • Substance Abuse Disorder  2022   • Anemia 2022   • Muscle spasm of shoulder region 2022   • Dyspepsia 2022   • Bipolar disorder, unspecified ([address] ) 2021   • Tobacco abuse 2021   • Encounter for monitoring opioid maintenance therapy 2021   • Rhabdomyolysis 2021      LOS (days): 1  Geometric Mean LOS (GMLOS) (days): 3 50  Days to GMLOS:2 6     OBJECTIVE:  Risk of Unplanned Readmission Score: 21 4         Current admission status: Inpatient   Preferred Pharmacy:   [facility] [address]  Phone: [phone] Fax: [phone]    [facility] [address]  [facility] [address]  Phone: [phone] Fax: [phone]    [address] [place] [address] - [address] ,  [address] [facility] [address]  Phone: [phone] Fax: [phone]    Primary Care Provider: No primary care provider on file      Primary Insurance: [employer] NATALIA ROSEN  Secondary Insurance:     DISCHARGE DETAILS:    Additional Comments: CM spoke with pt who confirms that she gets suboxone through [facility]  Pt reports that she has been clean for years and does not feel she needs a HOST referral at this time  Pt currently on 15L 02, not yet medically stable for discharge  No CM concerns or needs expressed or identified at this time, CM department to remain available

## 2023-04-14 NOTE — CONSULTS
Cardiology Consult  04/14/23    Referring Physian: MD ALMAZ Doan    Chief Complain/Reason for Referal:     IMPRESSION/RECOMMENDATIONS/DISCUSSION:  1  Acute hypoxic respiratory failure  2  Acute CHF  3  Nonischemic myocardial injury secondary to above  4  Bipolar disorder  5  Chronic opioid use disorder  6  Tobacco use disorder  7  Hepatitis C        · Agree with initiation of IV Lasix  Patient did get dose yesterday secondary to marginally low blood pressure  We will redose today with different parameters  Follow urine output, daily weights, electrolytes, renal function, blood pressure, clinical progress  · Repeat echocardiogram today  · Although troponin elevated, suspect this is nonischemic currently secondary to acute hypoxic respiratory failure and CHF  No ischemia on ECG  No anginal chest pain  Ischemic evaluation not recommended at this time  · Urinary pneumonia and strep antigens negative  Procalcitonin elevated which may point to concomitant infectious etiology  Pulmonary evaluation has been requested by primary service       ======================================================    HPI:  I am seeing this patient in cardiology consultation for: CHF    Rosina Galvez is a 41 y o  female hospitalized 9/2022 with acute hypoxic respiratory failure  She was diagnosed with ARDS and suspected vape induced lung injury  Subsequent she was discharged to behavioral health  On this background, she presented back to the hospital yesterday after having 3 days of shortness of breath  She states with any kind of exertion she would feel significantly short of breath, lightheaded, dizzy extensively fatigue  Upon presentation to the ER yesterday, chest x-ray with noted bilateral pulmonary infiltrates suggestive of pulmonary edema  CT chest confirmed the same  BNP was elevated at 407 pg/mL  High sensitive troponins have been positive although decreasing from [phone]  Sheree Lemme  ECG revealed no evidence of myocardial ischemia  At this time she continues to have shortness of breath  Her chest hurts when she takes a deep breath  She feels lightheaded and dizzy  She admits to mild abdominal discomfort with palpation  Prior echocardiogram 9/20/2020 did reveal left ejection fraction 60% with dilated IVC and mild to moderate pulm hypertension          Past Medical History:   Diagnosis Date   • Addiction to drug [facility])    • Alcohol abuse    • [facility]ism ([facility] )    • Altered mental status 09/21/2022   • Elevated LFTs 09/21/2022   • Lower back pain    • Self-injurious behavior    • Substance abuse ([facility] )          Scheduled Meds:  Current Facility-Administered Medications   Medication Dose Route Frequency Provider Last Rate   • acetaminophen  650 mg Oral Q6H PRN Zainab Tom MD     • azithromycin  500 mg Oral Q24H Zainab Tom MD     • budesonide-formoterol  2 puff Inhalation BID Zainab Tom MD     • buprenorphine-naloxone  20 mg Sublingual Daily Zainab Tom MD     • cefTRIAXone  1,000 mg Intravenous Q24H Zainab Tom MD 1,000 mg (04/14/23 0813)   • clonazePAM  1 mg Oral 4x Daily PRN Zainab Tom MD     • docusate sodium  100 mg Oral Q12H Zainab Tom MD     • enoxaparin  40 mg Subcutaneous Daily Zainab Tom MD     • famotidine  20 mg Oral BID Zainab Tom MD     • FLUoxetine  80 mg Oral Daily Zainab Tom MD     • furosemide  40 mg Oral BID (diuretic) Carolina Craig DO     • gabapentin  400 mg Oral TID Zainab Tom MD     • nicotine  1 patch Transdermal Daily Zainab Tom MD     • nicotine  7 mg Transdermal Once Chelsea Hagan MD     • ondansetron  4 mg Oral Q6H PRN Zainab Tom MD     • sodium chloride (PF)  3 mL Intravenous Q1H PRN Chelsea Hagan MD     • topiramate  50 mg Oral BID Shell Chilel Fer Byers MD     • traZODone  100 mg Oral HS Cristal Jang MD       Continuous Infusions:   PRN Meds: •  acetaminophen  •  clonazePAM  •  ondansetron  •  Insert peripheral IV **AND** sodium chloride (PF)  Allergies   Allergen Reactions   • Haloperidol Other (See Comments)     oculogyr crisis     I reviewed the Home Medication list in the chart  Family History   Problem Relation Age of Onset   • Heart disease Father    • Heart disease Maternal Grandmother        Social History     Socioeconomic History   • Marital status: Single     Spouse name: Not on file   • Number of children: Not on file   • Years of education: Not on file   • Highest education level: Not on file   Occupational History   • Not on file   Tobacco Use   • Smoking status: Every Day     Packs/day: 0 50     Years: 20 00     Pack years: 10 00     Types: Cigarettes   • Smokeless tobacco: Current   Vaping Use   • Vaping Use: Every day   Substance and Sexual Activity   • Alcohol use: Not Currently     Comment: MAGDALENO, pt historically inconsistent  Drinking  per [facility]   • Drug use: Not Currently     Types: Heroin, \"Crack\" cocaine, Cocaine, LSD, Benzodiazepines, Marijuana     Comment: Pt unreliable historian   • Sexual activity: Not on file   Other Topics Concern   • Not on file   Social History Narrative   • Not on file     Social Determinants of Health     Financial Resource Strain: Not on file   Food Insecurity: Food Insecurity Present   • Worried About [address] in the Last Year: Sometimes true   • Ran Out of Food in the Last Year: Sometimes true   Transportation Needs: Unmet Transportation Needs   • Lack of Transportation (Medical):  Yes   • Lack of Transportation (Non-Medical): Yes   Physical Activity: Not on file   Stress: Not on file   Social Connections: Not on file   Intimate Partner Violence: Not on file   Housing Stability: High Risk   • Unable to Pay for Housing in the Last Year: No   • Number of Places " Lived in the Last Year: 4   • Unstable Housing in the Last Year: Yes       Review of Systems - as per HPI, all others reviewed and negative  Vitals:    04/14/23 0737   BP: 103/69   Pulse: 76   Resp: 16   Temp: 98 1 °F (36 7 °C)   SpO2: 94%     I/O       04/12 0701 04/13 0700 04/13 0701 04/14 0700 04/14 0701  04/15 0700    IV Piggyback  550     Total Intake(mL/kg)  550 (7 7)     Net  +550                Weight (last 2 days)     Date/Time Weight    04/13/23 1156 71 4 (157 41)          GEN: NAD, Alert  HEENT: Mucus membranes moist, pink conjunctivae  EYES: Pupils equal, sclera anicteric  NECK: No JVD/HJR, no carotid bruit  CARDIOVASCULAR: RRR, No murmur, rub, gallops S1,S2, no parasternal heave/thrill  LUNGS: Clear To auscultation bilaterally  ABDOMEN: Soft, nondistended, no hepatic systolic pulsation  EXTREMITIES/VASCULAR: No edema  PSYCH: Normal Affect by limited examination  NEURO: Grossly intact by limited examination    HEME: No significant bleeding, bruising, petechia by limited examination  SKIN: No significant rashes by limited examination  MSK:  Normal upper extremity and trunk strengths by limited examination      EKG: Sinus rhythm  TELE: Off  CXR: Pulmonary edema      Results from last 7 days   Lab Units 04/13/23  1819 04/13/23  1226   WBC Thousand/uL  --  15 60*   HEMOGLOBIN g/dL  --  11 1*   HEMATOCRIT %  --  33 5*   PLATELETS Thousands/uL 239 295   NEUTROS PCT %  --  80*   MONOS PCT %  --  5     Results from last 7 days   Lab Units 04/13/23  1226   POTASSIUM mmol/L 3 9   CHLORIDE mmol/L 108   CO2 mmol/L 19*   BUN mg/dL 17   CREATININE mg/dL 0 95   CALCIUM mg/dL 8 8     Results from last 7 days   Lab Units 04/13/23  1226   POTASSIUM mmol/L 3 9   CHLORIDE mmol/L 108   CO2 mmol/L 19*   BUN mg/dL 17   CREATININE mg/dL 0 95   CALCIUM mg/dL 8 8     No results found for: TROPONINT                            I have personally reviewed the EKG, CXR and Telemetry images directly        Patient Active Problem List "   Diagnosis Date Noted   • Elevated troponin 04/13/2023   • Abnormal CT of the chest 04/13/2023   • Pneumonitis 10/28/2022   • Mild protein-calorie malnutrition ([facility] ) 10/08/2022   • 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[facility] ) 10/07/2022   • Anxiety disorder, unspecified 10/06/2022   • Opioid use disorder, severe, on maintenance therapy ([facility] ) 10/06/2022   • Hepatitis C antibody test positive 09/30/2022   • Bradycardia 09/25/2022   • Acute respiratory failure with hypoxia ([facility] ) 09/21/2022   • Substance Abuse Disorder  09/21/2022   • Anemia 09/21/2022   • Muscle spasm of shoulder region 01/02/2022   • Dyspepsia 01/02/2022   • Bipolar disorder, unspecified ([facility] ) 12/24/2021   • Tobacco abuse 12/24/2021   • Encounter for monitoring opioid maintenance therapy 12/24/2021   • Rhabdomyolysis 12/23/2021       Portions of the record may have been created with voice recognition software  Occasional wrong word or \"sound a like\" substitutions may have occurred due to the inherent limitations of voice recognition software  Read the chart carefully and recognize, using context, where substitutions have occurred          "

## 2023-04-15 LAB
ANA SER QL IA: NEGATIVE
ANION GAP SERPL CALCULATED.3IONS-SCNC: 10 MMOL/L (ref 4–13)
B PARAP IS1001 DNA NPH QL NAA+NON-PROBE: NOT DETECTED
B PERT.PT PRMT NPH QL NAA+NON-PROBE: NOT DETECTED
BASOPHILS # BLD AUTO: 0.03 THOUSANDS/ΜL (ref 0–0.1)
BASOPHILS NFR BLD AUTO: 0 % (ref 0–1)
BUN SERPL-MCNC: 17 MG/DL (ref 5–25)
C PNEUM DNA NPH QL NAA+NON-PROBE: NOT DETECTED
CALCIUM SERPL-MCNC: 8.7 MG/DL (ref 8.4–10.2)
CHLORIDE SERPL-SCNC: 105 MMOL/L (ref 96–108)
CO2 SERPL-SCNC: 23 MMOL/L (ref 21–32)
CREAT SERPL-MCNC: 0.77 MG/DL (ref 0.6–1.3)
EOSINOPHIL # BLD AUTO: 0 THOUSAND/ΜL (ref 0–0.61)
EOSINOPHIL NFR BLD AUTO: 0 % (ref 0–6)
ERYTHROCYTE [DISTWIDTH] IN BLOOD BY AUTOMATED COUNT: 13.6 % (ref 11.6–15.1)
FLUAV RNA NPH QL NAA+NON-PROBE: NOT DETECTED
FLUBV RNA NPH QL NAA+NON-PROBE: NOT DETECTED
GFR SERPL CREATININE-BSD FRML MDRD: 93 ML/MIN/1.73SQ M
GLUCOSE SERPL-MCNC: 146 MG/DL (ref 65–140)
HADV DNA NPH QL NAA+NON-PROBE: NOT DETECTED
HCOV 229E RNA NPH QL NAA+NON-PROBE: NOT DETECTED
HCOV HKU1 RNA NPH QL NAA+NON-PROBE: NOT DETECTED
HCOV NL63 RNA NPH QL NAA+NON-PROBE: NOT DETECTED
HCOV OC43 RNA NPH QL NAA+NON-PROBE: NOT DETECTED
HCT VFR BLD AUTO: 32.8 % (ref 34.8–46.1)
HGB BLD-MCNC: 10.3 G/DL (ref 11.5–15.4)
HMPV RNA NPH QL NAA+NON-PROBE: NOT DETECTED
HPIV1 RNA NPH QL NAA+NON-PROBE: NOT DETECTED
HPIV2 RNA NPH QL NAA+NON-PROBE: NOT DETECTED
HPIV3 RNA NPH QL NAA+NON-PROBE: NOT DETECTED
HPIV4 RNA NPH QL NAA+NON-PROBE: NOT DETECTED
IMM GRANULOCYTES # BLD AUTO: 0.04 THOUSAND/UL (ref 0–0.2)
IMM GRANULOCYTES NFR BLD AUTO: 0 % (ref 0–2)
LYMPHOCYTES # BLD AUTO: 0.5 THOUSANDS/ΜL (ref 0.6–4.47)
LYMPHOCYTES NFR BLD AUTO: 6 % (ref 14–44)
M PNEUMO DNA NPH QL NAA+NON-PROBE: NOT DETECTED
MCH RBC QN AUTO: 28.7 PG (ref 26.8–34.3)
MCHC RBC AUTO-ENTMCNC: 31.4 G/DL (ref 31.4–37.4)
MCV RBC AUTO: 91 FL (ref 82–98)
MONOCYTES # BLD AUTO: 0.12 THOUSAND/ΜL (ref 0.17–1.22)
MONOCYTES NFR BLD AUTO: 1 % (ref 4–12)
NEUTROPHILS # BLD AUTO: 8.35 THOUSANDS/ΜL (ref 1.85–7.62)
NEUTS SEG NFR BLD AUTO: 93 % (ref 43–75)
NRBC BLD AUTO-RTO: 0 /100 WBCS
PLATELET # BLD AUTO: 261 THOUSANDS/UL (ref 149–390)
PMV BLD AUTO: 8.7 FL (ref 8.9–12.7)
POTASSIUM SERPL-SCNC: 4.2 MMOL/L (ref 3.5–5.3)
RBC # BLD AUTO: 3.59 MILLION/UL (ref 3.81–5.12)
RSV RNA NPH QL NAA+NON-PROBE: NOT DETECTED
RV+EV RNA NPH QL NAA+NON-PROBE: NOT DETECTED
SARS-COV-2 RNA NPH QL NAA+NON-PROBE: NOT DETECTED
SODIUM SERPL-SCNC: 138 MMOL/L (ref 135–147)
WBC # BLD AUTO: 9.04 THOUSAND/UL (ref 4.31–10.16)

## 2023-04-15 RX ORDER — ALBUTEROL SULFATE 90 UG/1
2 AEROSOL, METERED RESPIRATORY (INHALATION) EVERY 4 HOURS PRN
Status: DISCONTINUED | OUTPATIENT
Start: 2023-04-15 | End: 2023-04-20 | Stop reason: HOSPADM

## 2023-04-15 RX ORDER — HYDROXYZINE HYDROCHLORIDE 25 MG/1
25 TABLET, FILM COATED ORAL EVERY 6 HOURS PRN
Status: DISCONTINUED | OUTPATIENT
Start: 2023-04-15 | End: 2023-04-20 | Stop reason: HOSPADM

## 2023-04-15 RX ADMIN — GUAIFENESIN 600 MG: 600 TABLET, EXTENDED RELEASE ORAL at 21:10

## 2023-04-15 RX ADMIN — CEFTRIAXONE SODIUM 1000 MG: 10 INJECTION, POWDER, FOR SOLUTION INTRAVENOUS at 09:10

## 2023-04-15 RX ADMIN — KETOROLAC TROMETHAMINE 30 MG: 30 INJECTION, SOLUTION INTRAMUSCULAR; INTRAVENOUS at 09:10

## 2023-04-15 RX ADMIN — METHYLPREDNISOLONE SODIUM SUCCINATE 40 MG: 40 INJECTION, POWDER, FOR SOLUTION INTRAMUSCULAR; INTRAVENOUS at 05:33

## 2023-04-15 RX ADMIN — ALBUTEROL SULFATE 2 PUFF: 90 AEROSOL, METERED RESPIRATORY (INHALATION) at 17:48

## 2023-04-15 RX ADMIN — GABAPENTIN 400 MG: 400 CAPSULE ORAL at 21:10

## 2023-04-15 RX ADMIN — AZITHROMYCIN MONOHYDRATE 500 MG: 250 TABLET ORAL at 17:45

## 2023-04-15 RX ADMIN — CLONAZEPAM 1 MG: 1 TABLET ORAL at 21:09

## 2023-04-15 RX ADMIN — TOPIRAMATE 50 MG: 25 TABLET, FILM COATED ORAL at 09:02

## 2023-04-15 RX ADMIN — FUROSEMIDE 40 MG: 40 TABLET ORAL at 09:02

## 2023-04-15 RX ADMIN — TOPIRAMATE 50 MG: 25 TABLET, FILM COATED ORAL at 17:45

## 2023-04-15 RX ADMIN — DOCUSATE SODIUM 100 MG: 100 CAPSULE, LIQUID FILLED ORAL at 05:33

## 2023-04-15 RX ADMIN — METHYLPREDNISOLONE SODIUM SUCCINATE 40 MG: 40 INJECTION, POWDER, FOR SOLUTION INTRAMUSCULAR; INTRAVENOUS at 17:45

## 2023-04-15 RX ADMIN — LIDOCAINE 5% 1 PATCH: 700 PATCH TOPICAL at 09:03

## 2023-04-15 RX ADMIN — METHYLPREDNISOLONE SODIUM SUCCINATE 40 MG: 40 INJECTION, POWDER, FOR SOLUTION INTRAMUSCULAR; INTRAVENOUS at 11:10

## 2023-04-15 RX ADMIN — Medication 3 ML: at 07:25

## 2023-04-15 RX ADMIN — ACETAMINOPHEN 325MG 650 MG: 325 TABLET ORAL at 21:13

## 2023-04-15 RX ADMIN — BUPRENORPHINE AND NALOXONE 8 MG: 8; 2 FILM BUCCAL; SUBLINGUAL at 21:10

## 2023-04-15 RX ADMIN — LEVALBUTEROL 1.25 MG: 1.25 SOLUTION, CONCENTRATE RESPIRATORY (INHALATION) at 07:25

## 2023-04-15 RX ADMIN — KETOROLAC TROMETHAMINE 30 MG: 30 INJECTION, SOLUTION INTRAMUSCULAR; INTRAVENOUS at 18:02

## 2023-04-15 RX ADMIN — FLUOXETINE 80 MG: 20 CAPSULE ORAL at 09:02

## 2023-04-15 RX ADMIN — TRAZODONE HYDROCHLORIDE 100 MG: 100 TABLET ORAL at 22:55

## 2023-04-15 RX ADMIN — GABAPENTIN 400 MG: 400 CAPSULE ORAL at 17:45

## 2023-04-15 RX ADMIN — ENOXAPARIN SODIUM 40 MG: 100 INJECTION SUBCUTANEOUS at 09:02

## 2023-04-15 RX ADMIN — FUROSEMIDE 40 MG: 40 TABLET ORAL at 17:45

## 2023-04-15 RX ADMIN — Medication 3 ML: at 19:04

## 2023-04-15 RX ADMIN — CLONAZEPAM 1 MG: 1 TABLET ORAL at 07:25

## 2023-04-15 RX ADMIN — BUDESONIDE AND FORMOTEROL FUMARATE DIHYDRATE 2 PUFF: 160; 4.5 AEROSOL RESPIRATORY (INHALATION) at 09:01

## 2023-04-15 RX ADMIN — LEVALBUTEROL 1.25 MG: 1.25 SOLUTION, CONCENTRATE RESPIRATORY (INHALATION) at 19:04

## 2023-04-15 RX ADMIN — BUPRENORPHINE AND NALOXONE 4 MG: 2; .5 FILM BUCCAL; SUBLINGUAL at 13:14

## 2023-04-15 RX ADMIN — CLONAZEPAM 1 MG: 1 TABLET ORAL at 13:43

## 2023-04-15 RX ADMIN — DOCUSATE SODIUM 100 MG: 100 CAPSULE, LIQUID FILLED ORAL at 17:45

## 2023-04-15 RX ADMIN — BUPRENORPHINE AND NALOXONE 8 MG: 8; 2 FILM BUCCAL; SUBLINGUAL at 09:02

## 2023-04-15 RX ADMIN — GABAPENTIN 400 MG: 400 CAPSULE ORAL at 09:02

## 2023-04-15 RX ADMIN — FAMOTIDINE 20 MG: 20 TABLET ORAL at 09:02

## 2023-04-15 RX ADMIN — Medication 1 PATCH: at 09:03

## 2023-04-15 RX ADMIN — FAMOTIDINE 20 MG: 20 TABLET ORAL at 17:45

## 2023-04-15 RX ADMIN — LEVALBUTEROL 1.25 MG: 1.25 SOLUTION, CONCENTRATE RESPIRATORY (INHALATION) at 12:57

## 2023-04-15 RX ADMIN — Medication 3 ML: at 12:57

## 2023-04-15 RX ADMIN — GUAIFENESIN 600 MG: 600 TABLET, EXTENDED RELEASE ORAL at 09:02

## 2023-04-15 RX ADMIN — BUDESONIDE AND FORMOTEROL FUMARATE DIHYDRATE 2 PUFF: 160; 4.5 AEROSOL RESPIRATORY (INHALATION) at 21:09

## 2023-04-15 NOTE — ASSESSMENT & PLAN NOTE
· Bipolar disorder followed by [facility]  · Medications reviewed via care everywhere    · Continue Prozac 80 mg daily, trazodone 100 mg at bedtime, Neurontin 400 mg 3 times daily

## 2023-04-15 NOTE — PROGRESS NOTES
[address] [place] [address]  Progress Note  Name: Phil Lagos  MRN: [id number]  Unit/Bed#: [facility] [facility] 2 -01 I Date of Admission: 4/13/2023   Date of Service: 4/15/2023 I Hospital Day: 2    Assessment/Plan   * Acute respiratory failure with hypoxia (HCC)  Assessment & Plan  · Acute respiratory failure due to possible cardiogenic pulmonary edema versus noncardiogenic pulmonary edema versus atypical pneumonia/ pneumonitis  · Continue oxygen via cannula and/or mask to maintain saturation more than 90%  · Will need close monitoring and may need higher level of care if oxygen requirements increase    Acute right ventricular heart failure (HCC)  Assessment & Plan  · As noted on 2D echo  · Started on Lasix  · And daily weights, I's and O's  · Cardiology consulted, appreciate input  · Chest CTA with no evidence of PE    Abnormal CT of the chest  Assessment & Plan  · Abnormal CT of the chest with severe bilateral groundglass opacities and adenopathy  · Opacities suggest pulmonary edema  The adenopathy suggest sarcoidosis  · CT last year which showed similar finding  When she was admitted, there was concern for vape related lung injury  Patient currently denies use of vape, admits to ongoing smoking, half a pack a day  · Continued on ceftriaxone and Zithromax for possible atypical pneumonia  · Started Lasix for pulmonary edema, right heart failure  · Appreciate pulmonology and cardiology input    Opioid use disorder, severe, on maintenance therapy [facility])  Assessment & Plan  · PDMP and care everywhere reviewed  · She is maintained on  Suboxone TID 8mg-4mg-8mg     Anxiety disorder, unspecified  Assessment & Plan  · Anxiety disorder followed by the [address]  · PDMP and care everywhere reviewed  · Confirmed that she is on clonazepam 1 mg 4 times daily as needed  · With persistent anxiety will add Atarax PRN    Tobacco abuse  Assessment & Plan  · Placed on nicotine patch     · Smoking cessation counseling    Bipolar disorder, unspecified ([facility] )  Assessment & Plan  · Bipolar disorder followed by [facility]  · Medications reviewed via care everywhere  · Continue Prozac 80 mg daily, trazodone 100 mg at bedtime, Neurontin 400 mg 3 times daily         VTE Pharmacologic Prophylaxis: VTE Score: 4 Moderate Risk (Score 3-4) - Pharmacological DVT Prophylaxis Ordered: enoxaparin (Lovenox)  Patient Centered Rounds: I performed bedside rounds with nursing staff today  Discussions with Specialists or Other Care Team Provider: We will discuss with cardiology, pulmonology    Education and Discussions with Family / Patient: Patient  Total Time Spent on Date of Encounter in care of patient: 35 minutes This time was spent on one or more of the following: performing physical exam; counseling and coordination of care; obtaining or reviewing history; documenting in the medical record; reviewing/ordering tests, medications or procedures; communicating with other healthcare professionals and discussing with patient's family/caregivers  Current Length of Stay: 2 day(s)  Current Patient Status: Inpatient   Certification Statement: The patient will continue to require additional inpatient hospital stay due to Close monitoring, respiratory support, IV treatments  Discharge Plan: Anticipate discharge in >72 hrs to home  Code Status: Level 1 - Full Code    Subjective:   Patient seen and examined  She reports anxiety, reports persistent shortness of breath  Objective:     Vitals:   Temp (24hrs), Av 1 °F (36 2 °C), Min:96 2 °F (35 7 °C), Max:97 9 °F (36 6 °C)    Temp:  [96 2 °F (35 7 °C)-97 9 °F (36 6 °C)] 96 2 °F (35 7 °C)  HR:  [] 53  Resp:  [18-19] 19  BP: ()/(61-64) 106/64  SpO2:  [75 %-96 %] 94 %  Body mass index is 29 44 kg/m²       Input and Output Summary (last 24 hours):   No intake or output data in the 24 hours ending 04/15/23 1116    Physical Exam:   Physical Exam  Constitutional:       General: She is not in acute distress  HENT:      Head: Normocephalic and atraumatic  Nose: No congestion  Eyes:      Conjunctiva/sclera: Conjunctivae normal    Cardiovascular:      Rate and Rhythm: Normal rate and regular rhythm  Pulmonary:      Breath sounds: Rhonchi present  Abdominal:      General: There is no distension  Tenderness: There is no abdominal tenderness  Musculoskeletal:      Right lower leg: No edema  Left lower leg: No edema  Skin:     General: Skin is warm and dry  Neurological:      Mental Status: She is oriented to person, place, and time  Psychiatric:         Mood and Affect: Mood normal           Additional Data:     Labs:  Results from last 7 days   Lab Units 04/15/23  0559   WBC Thousand/uL 9 04   HEMOGLOBIN g/dL 10 3*   HEMATOCRIT % 32 8*   PLATELETS Thousands/uL 261   NEUTROS PCT % 93*   LYMPHS PCT % 6*   MONOS PCT % 1*   EOS PCT % 0     Results from last 7 days   Lab Units 04/15/23  0559   SODIUM mmol/L 138   POTASSIUM mmol/L 4 2   CHLORIDE mmol/L 105   CO2 mmol/L 23   BUN mg/dL 17   CREATININE mg/dL 0 77   ANION GAP mmol/L 10   CALCIUM mg/dL 8 7   GLUCOSE RANDOM mg/dL 146*                 Results from last 7 days   Lab Units 04/14/23  0500 04/13/23  1819   PROCALCITONIN ng/ml 0 24 0 36*       Lines/Drains:  Invasive Devices     Peripheral Intravenous Line  Duration           Peripheral IV 04/13/23 Right Forearm 1 day                  Imaging: Reviewed radiology reports from this admission including: chest CT scan and Personally reviewed the following imaging: chest CT scan    Recent Cultures (last 7 days):   Results from last 7 days   Lab Units 04/13/23  1833 04/13/23  1200   BLOOD CULTURE   --  No Growth at 24 hrs  No Growth at 24 hrs     LEGIONELLA URINARY ANTIGEN  Negative  --        Last 24 Hours Medication List:   Current Facility-Administered Medications   Medication Dose Route Frequency Provider Last Rate   • acetaminophen  650 mg Oral Q6H  [address], MD     • azithromycin  500 mg Oral Q24H [address] MD Daniel     • benzonatate  100 mg Oral TID PRN Jimi Yo MD     • budesonide-formoterol  2 puff Inhalation BID NETTE Traylor     • buprenorphine-naloxone  8 mg Sublingual BID Jimi Yo MD      And   • buprenorphine-naloxone  4 mg Sublingual Daily Jimi Yo MD     • cefTRIAXone  1,000 mg Intravenous Q24H [address], MD 1,000 mg (04/15/23 0910)   • clonazePAM  1 mg Oral 4x Daily  [address], MD     • docusate sodium  100 mg Oral Q12H [address], MD     • enoxaparin  40 mg Subcutaneous Daily [address], MD     • famotidine  20 mg Oral  [address], MD     • FLUoxetine  80 mg Oral Daily [address], MD     • furosemide  40 mg Oral BID (diuretic) Carolina Craig DO     • gabapentin  400 mg Oral  [address], MD     • guaiFENesin  600 mg Oral Q12H [facility] Yovana Ryan MD     • hydrOXYzine HCL  25 mg Oral Q6H PRN Yovana Chu MD     • ketorolac  30 mg Intravenous Q6H PRN Yovana Chu MD     • levalbuterol  1 25 mg Nebulization Q6H NETTE Traylor     • lidocaine  1 patch Topical Daily Jimi Yo MD     • methylPREDNISolone sodium succinate  40 mg Intravenous Q6H [facility] Eva Guthrie DO     • nicotine  1 patch Transdermal Daily [address], MD     • ondansetron  4 mg Oral Q6H  [address], MD     • sodium chloride (PF)  3 mL Intravenous Q1H PRN Keith Ram MD     • sodium chloride  3 mL Nebulization Q6H Jimi Yo MD     • topiramate  50 mg Oral  [address], MD     • traZODone  100 mg Oral  [address], MD          Today, Patient Was Seen By: Jimi Yo MD    **Please Note: This note may have been constructed using a voice recognition system  **

## 2023-04-15 NOTE — PROGRESS NOTES
"Progress Note - Pulmonary   Susan Piedra 41 y o  female MRN: [id number]  Unit/Bed#: [place] [address] -01 Encounter: [id number]    Assessment/Plan:    1  Acute hypoxic respiratory failure  - Continue to wean oxygen as tolerated  Titrate to maintain oxygen saturations greater than 88%  Patient currently on 10 L mid flow  She does not wear home O2--at this time she is requesting that her oxygen stay at 10 L  - Pulmonary toilet: cough and deep breathe, incentive spirometer, OOB to chair as tolerated  -Will most likely require home oxygen eval prior to discharge    2  Abnormal CT of chest  -Severe diffuse groundglass opacity and septal thickening with sparing of the lung periphery with trace effusions  Stable mediastinal and hilar lymphadenopathy   -Etiology unclear-- DDx include atypical infection versus pulmonary edema versus pneumonitis versus inhalation injury  - RP2 negative, sputum culture pending   -Procalcitonin's are mildly elevated, continue azithromycin and ceftriaxone  -Continue IV Solu-Medrol 40 mg every 6 hours  -Continue Symbicort 2 puffs twice daily, Xopenex3 times daily, and sodium chloride 0 9% inhalation every 6 hours  -Plan for bronchoscopy on Monday, n p o  after midnight on Sunday    3  Cor pulmonale  -Echo completed 9/22/2022 shows a PASP of 50 00 mmHg  Repeat echocardiogram yesterday shows a right ventricular cavity size that is moderately dilated with a systolic function that is mildly reduced   -Cardiology following  -Continue diuresis with 40 mg Lasix by mouth twice daily    4  Nicotine dependence  -Patient had a similar encounter in September 2022 secondary to a vape related lung injury  -Patient continues to smoke half pack of cigarettes a day however she rolls her own cigarettes  -Discussed with patient that she will need to quit smoking  -NRT per primary team    Chief Complaint:    \"I am afraid I am going to die  \"    Subjective:    Patient seen and examined sitting up in the bed    " "She is complaining of chest pain when she breathes in and a cough that hurts  She continues to have shortness of breath with exertion  No overnight events reported  Patient currently has an oxygen saturation of 98% on 10 L mid flow, she does not want her oxygen turned down  She is afraid to be on steroids because she does not want to gain weight  Objective:    Vitals: Blood pressure 100/70, pulse 70, temperature (!) 97 2 °F (36 2 °C), resp  rate 22, height 5' 1\" (1 549 m), weight 70 7 kg (155 lb 12 8 oz), last menstrual period 04/13/2023, SpO2 (!) 89 %  8L midflow,Body mass index is 29 44 kg/m²  No intake or output data in the 24 hours ending 04/15/23 1528    Invasive Devices     Peripheral Intravenous Line  Duration           Peripheral IV 04/13/23 Right Forearm 2 days                Physical Exam  Vitals reviewed  Constitutional:       General: She is not in acute distress  Appearance: She is not ill-appearing or toxic-appearing  Interventions: Nasal cannula in place  HENT:      Head: Normocephalic and atraumatic  Nose: Nose normal       Mouth/Throat:      Mouth: Mucous membranes are moist    Eyes:      Conjunctiva/sclera: Conjunctivae normal    Cardiovascular:      Rate and Rhythm: Normal rate and regular rhythm  Heart sounds: Normal heart sounds  Pulmonary:      Effort: Pulmonary effort is normal  No tachypnea, accessory muscle usage, respiratory distress or retractions  Breath sounds: Normal air entry  No stridor or decreased air movement  Rales (fine crackles at b/l bases) present  No decreased breath sounds, wheezing or rhonchi  Chest:      Chest wall: No tenderness  Abdominal:      Palpations: Abdomen is soft  Musculoskeletal:         General: Normal range of motion  Cervical back: Normal range of motion  Right lower leg: No edema  Left lower leg: No edema  Skin:     General: Skin is warm and dry  Coloration: Skin is pale     Neurological:    " General: No focal deficit present  Mental Status: She is alert and oriented to person, place, and time  Psychiatric:         Mood and Affect: Mood is anxious  Speech: Speech is rapid and pressured  Behavior: Behavior is hyperactive  Labs: I have personally reviewed pertinent lab results  , CBC:   Lab Results   Component Value Date    WBC 9 04 04/15/2023    HGB 10 3 (L) 04/15/2023    HCT 32 8 (L) 04/15/2023    MCV 91 04/15/2023     04/15/2023    MCH 28 7 04/15/2023    MCHC 31 4 04/15/2023    RDW 13 6 04/15/2023    MPV 8 7 (L) 04/15/2023    NRBC 0 04/15/2023   , CMP:   Lab Results   Component Value Date    SODIUM 138 04/15/2023    K 4 2 04/15/2023     04/15/2023    CO2 23 04/15/2023    BUN 17 04/15/2023    CREATININE 0 77 04/15/2023    CALCIUM 8 7 04/15/2023    EGFR 93 04/15/2023       Procalcitonin 4/13-0 36, 4/14-0 24  RP2 (-)  Sputum in process    Imaging and other studies: I have personally reviewed pertinent reports  and I have personally reviewed pertinent films in PACS     4/14/23 CTA chest pe study: No evidence of pulmonary embolus  4/13/23 CT chest: Severe diffuse groundglass opacity and septal thickening with sparing of the lung periphery, similar to September 2022 with trace effusions  Stable mediastinal and hilar lymphadenopathy    Right ventricular dilation    4/13/23 CXR: Moderate diffuse groundglass opacities, suggesting pulmonary edema

## 2023-04-15 NOTE — PLAN OF CARE
Assessment:     1  Aftercare following surgery of the musculoskeletal system        Plan:  The patient was seen and examined by Dr Debra Jo and myself  Problem List Items Addressed This Visit        Other    Aftercare following surgery of the musculoskeletal system - Primary     Patient is status post right hip hemiarthroplasty  X-rays were reviewed with her  She has developed a cellulitis and contact dermatitis to the area surrounding the right hip incision  It appears the rehab facility has been putting an appointment on her skin in that area  Advised them to discontinue the appointment immediately  She was prescribed Keflex to take for 10 days  She was also prescribed Percocet to take for severe pain as needed  Follow-up in 2 weeks for wound check  Relevant Medications    cephalexin (KEFLEX) 500 mg capsule    oxyCODONE-acetaminophen (PERCOCET) 5-325 mg per tablet    Other Relevant Orders    XR hip/pelv 2-3 vws right if performed         Subjective:     Patient ID: Tang Nova is a 64 y o  female  Chief Complaint: This is a 79-year-old white female who is status post right hip hemiarthroplasty on September 23, 2019  She currently resides at the [facility] for rehab  She denies any fevers or chills  She states they are only giving her Tylenol for pain  They are putting in ointment on her incision, but she does not know what kind of ointment it is  She feels warmth in the area of the incision  She feels muscle spasms in her leg as well  She is ambulating with assistance from a walker  She is getting physical therapy at the rehab facility      Allergy:  Allergies   Allergen Reactions    Sulfa Antibiotics Rash and Itching     Medications:  all current active meds have been reviewed  Past Medical History:  Past Medical History:   Diagnosis Date    Anxiety     Hypertension     Pre-diabetes      Past Surgical History:  Past Surgical History:   Procedure Laterality Date    APPENDECTOMY Problem: Potential for Falls  Goal: Patient will remain free of falls  Description: INTERVENTIONS:  - Educate patient/family on patient safety including physical limitations  - Instruct patient to call for assistance with activity   - Consult OT/PT to assist with strengthening/mobility   - Keep Call bell within reach  - Keep bed low and locked with side rails adjusted as appropriate  - Keep care items and personal belongings within reach  - Initiate and maintain comfort rounds  - Make Fall Risk Sign visible to staff  - Offer Toileting every 2 Hours, in advance of need  - Apply yellow socks and bracelet for high fall risk patients  - Consider moving patient to room near nurses station  Outcome: Progressing     Problem: PAIN - ADULT  Goal: Verbalizes/displays adequate comfort level or baseline comfort level  Description: Interventions:  - Encourage patient to monitor pain and request assistance  - Assess pain using appropriate pain scale  - Administer analgesics based on type and severity of pain and evaluate response  - Implement non-pharmacological measures as appropriate and evaluate response  - Consider cultural and social influences on pain and pain management  - Notify physician/advanced practitioner if interventions unsuccessful or patient reports new pain  Outcome: Progressing     Problem: INFECTION - ADULT  Goal: Absence or prevention of progression during hospitalization  Description: INTERVENTIONS:  - Assess and monitor for signs and symptoms of infection  - Monitor lab/diagnostic results  - Monitor all insertion sites, i e  indwelling lines, tubes, and drains  - Monitor endotracheal if appropriate and nasal secretions for changes in amount and color  - [place] appropriate cooling/warming therapies per order  - Administer medications as ordered  - Instruct and encourage patient and family to use good hand hygiene technique  - Identify and instruct in appropriate isolation precautions for identified infection/condition  Outcome: Progressing  Goal: Absence of fever/infection during neutropenic period  Description: INTERVENTIONS:  - Monitor WBC    Outcome: Progressing     Problem: SAFETY ADULT  Goal: Patient will remain free of falls  Description: INTERVENTIONS:  - Educate patient/family on patient safety including physical limitations  - Instruct patient to call for assistance with activity   - Consult OT/PT to assist with strengthening/mobility   - Keep Call bell within reach  - Keep bed low and locked with side rails adjusted as appropriate  - Keep care items and personal belongings within reach  - Initiate and maintain comfort rounds  - Make Fall Risk Sign visible to staff  - Offer Toileting every 2 Hours, in advance of need  - Apply yellow socks and bracelet for high fall risk patients  - Consider moving patient to room near nurses station  Outcome: Progressing  Goal: Maintain or return to baseline ADL function  Description: INTERVENTIONS:  -  Assess patient's ability to carry out ADLs; assess patient's baseline for ADL function and identify physical deficits which impact ability to perform ADLs (bathing, care of mouth/teeth, toileting, grooming, dressing, etc )  - Assess/evaluate cause of self-care deficits   - Assess range of motion  - Assess patient's mobility; develop plan if impaired  - Assess patient's need for assistive devices and provide as appropriate  - Encourage maximum independence but intervene and supervise when necessary  - Involve family in performance of ADLs  - Assess for home care needs following discharge   - Consider OT consult to assist with ADL evaluation and planning for discharge  - Provide patient education as appropriate  Outcome: Progressing  Goal: Maintains/Returns to pre admission functional level  Description: INTERVENTIONS:  - Perform BMAT or MOVE assessment daily    - Set and communicate daily mobility goal to care team and patient/family/caregiver     - Collaborate with  SC PARTIAL HIP REPLACEMENT Right 9/23/2019    Procedure: HEMIARTHROPLASTY HIP (BIPOLAR); Surgeon: Monika Ruth MD;  Location:  MAIN OR;  Service: Orthopedics     Family History:  Family History   Problem Relation Age of Onset    Hypertension Mother     Substance Abuse Neg Hx     Mental illness Neg Hx      Social History:  Social History     Substance and Sexual Activity   Alcohol Use Never    Frequency: Never     Social History     Substance and Sexual Activity   Drug Use Never     Social History     Tobacco Use   Smoking Status Never Smoker   Smokeless Tobacco Never Used     Review of Systems   Constitutional: Negative  HENT: Negative  Eyes: Negative  Respiratory: Negative  Cardiovascular: Negative  Gastrointestinal: Negative  Endocrine: Negative  Genitourinary: Negative  Musculoskeletal: Positive for arthralgias and gait problem (Using a wheelchair)  Skin: Negative  Allergic/Immunologic: Negative  Hematological: Negative  Psychiatric/Behavioral: Negative  Objective:  BP Readings from Last 1 Encounters:   10/04/19 122/82      Wt Readings from Last 1 Encounters:   09/25/19 69 9 kg (154 lb 1 6 oz)      BMI:   Estimated body mass index is 23 99 kg/m² as calculated from the following:    Height as of 9/22/19: 5' 7 2" (1 707 m)  Weight as of 9/25/19: 69 9 kg (154 lb 1 6 oz)  BSA:   Estimated body surface area is 1 81 meters squared as calculated from the following:    Height as of 9/22/19: 5' 7 2" (1 707 m)  Weight as of 9/25/19: 69 9 kg (154 lb 1 6 oz)  Physical Exam   Constitutional: She is oriented to person, place, and time  She appears well-developed  HENT:   Head: Normocephalic and atraumatic  Eyes: Conjunctivae and EOM are normal    Neck: Neck supple  Neurological: She is alert and oriented to person, place, and time  Skin: Skin is warm  Psychiatric: She has a normal mood and affect  Nursing note and vitals reviewed      Right Hip Exam Other   Erythema: present (Erythema with contact dermatitis surrounding incision in a square shape)  Scars: present (Healing incision with no active drainage)  Sensation: normal  Pulse: present    Comments:  Thigh and calf soft, nontender  Good passive range of motion with no pain      Left Hip Exam   Left hip exam is normal             I have personally reviewed pertinent films in PACS and my interpretation is X-rays of the right hip reveal a well-aligned prosthesis with no evidence of loosening  rehabilitation services on mobility goals if consulted  - Perform Range of Motion 4 times a day  - Reposition patient every 2 hours  - Dangle patient 3 times a day  - Stand patient 3 times a day  - Ambulate patient 3 times a day  - Out of bed to chair 3 times a day   - Out of bed for meals 3 times a day  - Out of bed for toileting  - Record patient progress and toleration of activity level   Outcome: Progressing     Problem: DISCHARGE PLANNING  Goal: Discharge to home or other facility with appropriate resources  Description: INTERVENTIONS:  - Identify barriers to discharge w/patient and caregiver  - Arrange for needed discharge resources and transportation as appropriate  - Identify discharge learning needs (meds, wound care, etc )  - Arrange for interpretive services to assist at discharge as needed  - Refer to Case Management Department for coordinating discharge planning if the patient needs post-hospital services based on physician/advanced practitioner order or complex needs related to functional status, cognitive ability, or social support system  Outcome: Progressing     Problem: Knowledge Deficit  Goal: Patient/family/caregiver demonstrates understanding of disease process, treatment plan, medications, and discharge instructions  Description: Complete learning assessment and assess knowledge base    Interventions:  - Provide teaching at level of understanding  - Provide teaching via preferred learning methods  Outcome: Progressing     Problem: CARDIOVASCULAR - ADULT  Goal: Maintains optimal cardiac output and hemodynamic stability  Description: INTERVENTIONS:  - Monitor I/O, vital signs and rhythm  - Monitor for S/S and trends of decreased cardiac output  - Administer and titrate ordered vasoactive medications to optimize hemodynamic stability  - Assess quality of pulses, skin color and temperature  - Assess for signs of decreased coronary artery perfusion  - Instruct patient to report change in severity of symptoms  Outcome: Progressing  Goal: Absence of cardiac dysrhythmias or at baseline rhythm  Description: INTERVENTIONS:  - Continuous cardiac monitoring, vital signs, obtain 12 lead EKG if ordered  - Administer antiarrhythmic and heart rate control medications as ordered  - Monitor electrolytes and administer replacement therapy as ordered  Outcome: Progressing     Problem: RESPIRATORY - ADULT  Goal: Achieves optimal ventilation and oxygenation  Description: INTERVENTIONS:  - Assess for changes in respiratory status  - Assess for changes in mentation and behavior  - Position to facilitate oxygenation and minimize respiratory effort  - Oxygen administered by appropriate delivery if ordered  - Initiate smoking cessation education as indicated  - Encourage broncho-pulmonary hygiene including cough, deep breathe, Incentive Spirometry  - Assess the need for suctioning and aspirate as needed  - Assess and instruct to report SOB or any respiratory difficulty  - Respiratory Therapy support as indicated  Outcome: Progressing     Problem: METABOLIC, FLUID AND ELECTROLYTES - ADULT  Goal: Electrolytes maintained within normal limits  Description: INTERVENTIONS:  - Monitor labs and assess patient for signs and symptoms of electrolyte imbalances  - Administer electrolyte replacement as ordered  - Monitor response to electrolyte replacements, including repeat lab results as appropriate  - Instruct patient on fluid and nutrition as appropriate  Outcome: Progressing  Goal: Fluid balance maintained  Description: INTERVENTIONS:  - Monitor labs   - Monitor I/O and WT  - Instruct patient on fluid and nutrition as appropriate  - Assess for signs & symptoms of volume excess or deficit  Outcome: Progressing

## 2023-04-15 NOTE — ASSESSMENT & PLAN NOTE
· Abnormal CT of the chest with severe bilateral groundglass opacities and adenopathy  · Opacities suggest pulmonary edema  The adenopathy suggest sarcoidosis  · CT last year which showed similar finding  When she was admitted, there was concern for vape related lung injury    Patient currently denies use of vape, admits to ongoing smoking, half a pack a day  · Continued on ceftriaxone and Zithromax for possible atypical pneumonia  · Started Lasix for pulmonary edema, right heart failure  · Appreciate pulmonology and cardiology input

## 2023-04-15 NOTE — ASSESSMENT & PLAN NOTE
· As noted on 2D echo  · Started on Lasix  · And daily weights, I's and O's  · Cardiology consulted, appreciate input  · Chest CTA with no evidence of PE

## 2023-04-15 NOTE — POST FALL NOTE
"Post Fall Note    Date of Fall: 04/15/23  Observer/Reported time of Fall: 1700  Name of Provider Notified: Vlad Haynes Provider Notified: 0601  Assessment of Patient Injury: No injury noted  Post Fall Interventions: Physician notified; Circumstances of fall reviewed and documented; Fall risk precautions implemented/maitained; Comforts rounds continued  Family/Next of kin notified?: Yes      Brief Description of Events  Rn alerted by PCA that patient had fallen  Upon entering patient room, patient observed sitting up on floor with a visitor supporting her back  Patient assisted up x 2 people and placed back in bed  Patient denied hitting head  Patient states she got up and felt dizzy  Patient's visitor stated she got up out of bed, looked around the room, started to walk and looked like she was going to fall so he assisted her to the ground  Patient assessment post fall showed no injuries  Last Recorded Vitals  Blood pressure 112/68, pulse 69, temperature (!) 97 2 °F (36 2 °C), resp  rate 22, height 5' 1\" (1 549 m), weight 70 7 kg (155 lb 12 8 oz), last menstrual period 04/13/2023, SpO2 93 %        Principal Problem:    Acute respiratory failure with hypoxia (HCC)  Active Problems:    Bipolar disorder, unspecified ([facility] [place] [address] )    Tobacco abuse    Anxiety disorder, unspecified    Opioid use disorder, severe, on maintenance therapy (HCC)    Elevated troponin    Abnormal CT of the chest    Acute right ventricular heart failure ([facility] [place] [address] )         "

## 2023-04-15 NOTE — PLAN OF CARE
Problem: Potential for Falls  Goal: Patient will remain free of falls  Description: INTERVENTIONS:  - Educate patient/family on patient safety including physical limitations  - Instruct patient to call for assistance with activity   - Consult OT/PT to assist with strengthening/mobility   - Keep Call bell within reach  - Keep bed low and locked with side rails adjusted as appropriate  - Keep care items and personal belongings within reach  - Initiate and maintain comfort rounds  - Make Fall Risk Sign visible to staff  - Offer Toileting every 2 Hours, in advance of need  - Initiate/Maintain bed alarm  - Obtain necessary fall risk management equipment:   - Apply yellow socks and bracelet for high fall risk patients  - Consider moving patient to room near nurses station  Outcome: Progressing     Problem: PAIN - ADULT  Goal: Verbalizes/displays adequate comfort level or baseline comfort level  Description: Interventions:  - Encourage patient to monitor pain and request assistance  - Assess pain using appropriate pain scale  - Administer analgesics based on type and severity of pain and evaluate response  - Implement non-pharmacological measures as appropriate and evaluate response  - Consider cultural and social influences on pain and pain management  - Notify physician/advanced practitioner if interventions unsuccessful or patient reports new pain  Outcome: Progressing     Problem: INFECTION - ADULT  Goal: Absence or prevention of progression during hospitalization  Description: INTERVENTIONS:  - Assess and monitor for signs and symptoms of infection  - Monitor lab/diagnostic results  - Monitor all insertion sites, i e  indwelling lines, tubes, and drains  - Monitor endotracheal if appropriate and nasal secretions for changes in amount and color  - [place] appropriate cooling/warming therapies per order  - Administer medications as ordered  - Instruct and encourage patient and family to use good hand hygiene technique  - Identify and instruct in appropriate isolation precautions for identified infection/condition  Outcome: Progressing  Goal: Absence of fever/infection during neutropenic period  Description: INTERVENTIONS:  - Monitor WBC    Outcome: Progressing     Problem: SAFETY ADULT  Goal: Patient will remain free of falls  Description: INTERVENTIONS:  - Educate patient/family on patient safety including physical limitations  - Instruct patient to call for assistance with activity   - Consult OT/PT to assist with strengthening/mobility   - Keep Call bell within reach  - Keep bed low and locked with side rails adjusted as appropriate  - Keep care items and personal belongings within reach  - Initiate and maintain comfort rounds  - Make Fall Risk Sign visible to staff  - Offer Toileting every 2 Hours, in advance of need  - Initiate/Maintain bed alarm  - Obtain necessary fall risk management equipment:   - Apply yellow socks and bracelet for high fall risk patients  - Consider moving patient to room near nurses station  Outcome: Progressing  Goal: Maintain or return to baseline ADL function  Description: INTERVENTIONS:  -  Assess patient's ability to carry out ADLs; assess patient's baseline for ADL function and identify physical deficits which impact ability to perform ADLs (bathing, care of mouth/teeth, toileting, grooming, dressing, etc )  - Assess/evaluate cause of self-care deficits   - Assess range of motion  - Assess patient's mobility; develop plan if impaired  - Assess patient's need for assistive devices and provide as appropriate  - Encourage maximum independence but intervene and supervise when necessary  - Involve family in performance of ADLs  - Assess for home care needs following discharge   - Consider OT consult to assist with ADL evaluation and planning for discharge  - Provide patient education as appropriate  Outcome: Progressing  Goal: Maintains/Returns to pre admission functional level  Description: INTERVENTIONS:  - Perform BMAT or MOVE assessment daily    - Set and communicate daily mobility goal to care team and patient/family/caregiver  - Collaborate with rehabilitation services on mobility goals if consulted  - Perform Range of Motion 4 times a day  - Reposition patient every 2 hours  - Dangle patient 4 times a day  - Stand patient 4 times a day  - Ambulate patient 4 times a day  - Out of bed to chair 4 times a day   - Out of bed for meals 3 times a day  - Out of bed for toileting  - Record patient progress and toleration of activity level   Outcome: Progressing     Problem: DISCHARGE PLANNING  Goal: Discharge to home or other facility with appropriate resources  Description: INTERVENTIONS:  - Identify barriers to discharge w/patient and caregiver  - Arrange for needed discharge resources and transportation as appropriate  - Identify discharge learning needs (meds, wound care, etc )  - Arrange for interpretive services to assist at discharge as needed  - Refer to Case Management Department for coordinating discharge planning if the patient needs post-hospital services based on physician/advanced practitioner order or complex needs related to functional status, cognitive ability, or social support system  Outcome: Progressing     Problem: Knowledge Deficit  Goal: Patient/family/caregiver demonstrates understanding of disease process, treatment plan, medications, and discharge instructions  Description: Complete learning assessment and assess knowledge base    Interventions:  - Provide teaching at level of understanding  - Provide teaching via preferred learning methods  Outcome: Progressing     Problem: CARDIOVASCULAR - ADULT  Goal: Maintains optimal cardiac output and hemodynamic stability  Description: INTERVENTIONS:  - Monitor I/O, vital signs and rhythm  - Monitor for S/S and trends of decreased cardiac output  - Administer and titrate ordered vasoactive medications to optimize hemodynamic stability  - Assess quality of pulses, skin color and temperature  - Assess for signs of decreased coronary artery perfusion  - Instruct patient to report change in severity of symptoms  Outcome: Progressing  Goal: Absence of cardiac dysrhythmias or at baseline rhythm  Description: INTERVENTIONS:  - Continuous cardiac monitoring, vital signs, obtain 12 lead EKG if ordered  - Administer antiarrhythmic and heart rate control medications as ordered  - Monitor electrolytes and administer replacement therapy as ordered  Outcome: Progressing     Problem: RESPIRATORY - ADULT  Goal: Achieves optimal ventilation and oxygenation  Description: INTERVENTIONS:  - Assess for changes in respiratory status  - Assess for changes in mentation and behavior  - Position to facilitate oxygenation and minimize respiratory effort  - Oxygen administered by appropriate delivery if ordered  - Initiate smoking cessation education as indicated  - Encourage broncho-pulmonary hygiene including cough, deep breathe, Incentive Spirometry  - Assess the need for suctioning and aspirate as needed  - Assess and instruct to report SOB or any respiratory difficulty  - Respiratory Therapy support as indicated  Outcome: Progressing     Problem: METABOLIC, FLUID AND ELECTROLYTES - ADULT  Goal: Electrolytes maintained within normal limits  Description: INTERVENTIONS:  - Monitor labs and assess patient for signs and symptoms of electrolyte imbalances  - Administer electrolyte replacement as ordered  - Monitor response to electrolyte replacements, including repeat lab results as appropriate  - Instruct patient on fluid and nutrition as appropriate  Outcome: Progressing  Goal: Fluid balance maintained  Description: INTERVENTIONS:  - Monitor labs   - Monitor I/O and WT  - Instruct patient on fluid and nutrition as appropriate  - Assess for signs & symptoms of volume excess or deficit  Outcome: Progressing

## 2023-04-15 NOTE — UTILIZATION REVIEW
Continued Stay Review    Date: 4/14                          Current Patient Class: IP  Current Level of Care: Med/Surg    HPI:47 y o  female initially admitted on 4/13  Assessment/Plan: Reports ongoing SOB and pleuritic chest pain  On exam, respiratory distress  Plan: continue Lasix, DW, I&O, sodium restricted diet, continue other PTA meds, continuous pulse ox, Supplemental O2, weaned as tolerated  Vital Signs:   Date/Time Temp Pulse Resp BP MAP (mmHg) SpO2 O2 Flow Rate (L/min) O2 Device O2 Interface Device   04/14/23 2050 -- -- -- -- -- 95 % 15 L/min Mid flow nasal cannula --   04/14/23 19:39:13 97 9 °F (36 6 °C) 82 -- 93/61 72 91 % 15 L/min -- --   04/14/23 17:20:40 -- 85 -- 93/63 73 92 % -- High flow nasal cannula --   04/14/23 1654 -- -- -- -- -- 92 % -- -- --   04/14/23 1652 -- -- -- -- -- 94 % -- -- HFNC prongs   04/14/23 1635 -- -- -- -- -- -- -- Non-rebreather mask --   04/14/23 1551 -- 110   Abnormal  -- -- -- 91 % -- Non-rebreather mask --   04/14/23 1537 -- 95 -- -- -- 75 %   Abnormal  15 L/min -- --   04/14/23 15:31:35 97 3 °F (36 3 °C)   Abnormal  84 18 92/64 73 96 % -- -- --       Pertinent Labs/Diagnostic Results:   4/14 - CT Chest PE study  No evidence of pulmonary embolus   Worsening groundglass opacification throughout both lungs with interlobar septal thickening suggesting worsening pulmonary edema     Stable mediastinal and hilar adenopathy suggestive of sarcoidosis     Results from last 7 days   Lab Units 04/14/23  1239 04/13/23  1249   SARS-COV-2  Not Detected Negative     Results from last 7 days   Lab Units 04/15/23  0559 04/13/23  1819 04/13/23  1226   WBC Thousand/uL 9 04  --  15 60*   HEMOGLOBIN g/dL 10 3*  --  11 1*   HEMATOCRIT % 32 8*  --  33 5*   PLATELETS Thousands/uL 261 239 295   NEUTROS ABS Thousands/µL 8 35*  --  12 69*         Results from last 7 days   Lab Units 04/15/23  0559 04/13/23  1226   SODIUM mmol/L 138 138   POTASSIUM mmol/L 4 2 3 9   CHLORIDE mmol/L 105 108 CO2 mmol/L 23 19*   ANION GAP mmol/L 10 11   BUN mg/dL 17 17   CREATININE mg/dL 0 77 0 95   EGFR ml/min/1 73sq m 93 72   CALCIUM mg/dL 8 7 8 8             Results from last 7 days   Lab Units 04/15/23  0559 04/13/23  1226   GLUCOSE RANDOM mg/dL 146* 99       Results from last 7 days   Lab Units 04/13/23  1625 04/13/23  1423 04/13/23  1226   HS TNI 0HR ng/L  --   --  675*   HS TNI 2HR ng/L  --  533*  --    HSTNI D2 ng/L  --  -142  --    HS TNI 4HR ng/L 439*  --   --    HSTNI D4 ng/L -236  --   --                  Results from last 7 days   Lab Units 04/14/23  0500 04/13/23  1819   PROCALCITONIN ng/ml 0 24 0 36*                 Results from last 7 days   Lab Units 04/13/23  1819 04/13/23  1226   BNP pg/mL 397* 407*           Results from last 7 days   Lab Units 04/14/23  1239 04/13/23  1833 04/13/23  1249   STREP PNEUMONIAE ANTIGEN, URINE   --  Negative  --    LEGIONELLA URINARY ANTIGEN   --  Negative  --    INFLUENZA A PCR   --   --  Negative   INFLUENZA B PCR   --   --  Negative   RSV PCR   --   --  Negative   RESPIRATORY SYNCYTIAL VIRUS  Not Detected  --   --      Results from last 7 days   Lab Units 04/14/23  1239   ADENOVIRUS  Not Detected   BORDETELLA PARAPERTUSSIS  Not Detected   BORDETELLA PERTUSSIS  Not Detected   CHLAMYDIA PNEUMONIAE  Not Detected   CORONAVIRUS 229E  Not Detected   CORONAVIRUS HKU1  Not Detected   CORONAVIRUS NL63  Not Detected   CORONAVIRUS OC43  Not Detected   METAPNEUMOVIRUS  Not Detected   RHINOVIRUS  Not Detected   MYCOPLASMA PNEUMONIAE  Not Detected   PARAINFLUENZA 1  Not Detected   PARAINFLUENZA 2  Not Detected   PARAINFLUENZA 3  Not Detected   PARAINFLUENZA 4  Not Detected     Results from last 7 days   Lab Units 04/13/23  1200   BLOOD CULTURE  No Growth at 24 hrs  No Growth at 24 hrs           Medications:   Scheduled Medications:  azithromycin, 500 mg, Oral, Q24H  budesonide-formoterol, 2 puff, Inhalation, BID  buprenorphine-naloxone, 8 mg, Sublingual, BID And  buprenorphine-naloxone, 4 mg, Sublingual, Daily  cefTRIAXone, 1,000 mg, Intravenous, Q24H  docusate sodium, 100 mg, Oral, Q12H  enoxaparin, 40 mg, Subcutaneous, Daily  famotidine, 20 mg, Oral, BID  FLUoxetine, 80 mg, Oral, Daily  furosemide, 40 mg, Oral, BID (diuretic)  gabapentin, 400 mg, Oral, TID  guaiFENesin, 600 mg, Oral, Q12H CHRISTIANE  levalbuterol, 1 25 mg, Nebulization, Q6H  lidocaine, 1 patch, Topical, Daily  methylPREDNISolone sodium succinate, 40 mg, Intravenous, Q6H [facility]  nicotine, 1 patch, Transdermal, Daily  sodium chloride, 3 mL, Nebulization, Q6H  topiramate, 50 mg, Oral, BID  traZODone, 100 mg, Oral, HS    Continuous IV Infusions: none    PRN Meds:  acetaminophen, 650 mg, Oral, Q6H PRN; 4/14 x2  benzonatate, 100 mg, Oral, TID PRN  clonazePAM, 1 mg, Oral, 4x Daily PRN; 4/14 x3  ketorolac, 30 mg, Intravenous, Q6H PRN; 4/14 x3  ondansetron, 4 mg, Oral, Q6H PRN; 4/14 x1  sodium chloride (PF), 3 mL, Intravenous, Q1H PRN          Network Utilization Review Department  ATTENTION: Please call with any questions or concerns to [phone] and carefully listen to the prompts so that you are directed to the right person  All voicemails are confidential   Danielle Gleason all requests for admission clinical reviews, approved or denied determinations and any other requests to dedicated fax number below belonging to the campus where the patient is receiving treatment   List of dedicated fax numbers for the Facilities:  [address] DENIALS (Administrative/Medical Necessity) [phone]   [address] [place] [address] (Maternity/NICU/Pediatrics) [phone]   [address] Elizabeth Kamara [phone]   Carlos Harkins  [phone]   [address] [place] [id number] [place] [address] [address] [place] [address] [facility] 134 066 [facility] [phone]

## 2023-04-15 NOTE — ASSESSMENT & PLAN NOTE
· Anxiety disorder followed by the [address] [place]  · PDMP and care everywhere reviewed    · Confirmed that she is on clonazepam 1 mg 4 times daily as needed  · With persistent anxiety will add Atarax PRN

## 2023-04-15 NOTE — PROGRESS NOTES
"  Cardiology         Progress Note - Cardiology   [facility] 41 y o  female MRN: [id number]  Unit/Bed#: [place] 68 2 -01 Encounter: [id number]          Assessment/Recommendations/Discussion:     1  Acute hypoxic respiratory failure  2  Acute CHF  3  Nonischemic myocardial injury secondary to above  4  Bipolar disorder  5  Chronic opioid use disorder  6  Tobacco use disorder  7  Hepatitis C       · Stable renal function  Will assess response to IV Lasix  I do suspect there may be a noncardiogenic component of her pulmonary edema, as her left heart is with no evidence of diastolic dysfunction or left heart disease  At the same time, there is isolated right heart disease with moderate right ventricular dilatation with reduced function  This suggests a primary pulmonary process  However, will continue  Lasix for hopeful improvement in respiratory status  May consider repeating chest x-ray in 2 to 3 days to evaluate response    · Discussed with pulmonary medicine yesterday            Subjective: Patient seen and examined, resting comfortably                Physical Exam:  GEN:  NAD  HEENT:  MMM, NCAT, pink conjunctiva, EOMI, nonicteric sclera  CV:  NO JVD/HJR, RR, NO M/R/G, +S1/S2, NO PARASTERNAL HEAVE/THRILL, NO LE EDEMA, NO HEPATIC SYSTOLIC PULSATION, WARM EXTREMITIES  RESP:  CTAB/L  ABD:  SOFT, NT, NO GROSS ORGANOMEGALY        Vitals:   /64   Pulse (!) 53   Temp (!) 96 2 °F (35 7 °C)   Resp 19   Ht 5' 1\" (1 549 m)   Wt 70 7 kg (155 lb 12 8 oz)   LMP 04/13/2023 (Exact Date)   SpO2 94%   BMI 29 44 kg/m²   Vitals:    04/14/23 0955 04/15/23 0533   Weight: 71 2 kg (157 lb) 70 7 kg (155 lb 12 8 oz)     No intake or output data in the 24 hours ending 04/15/23 0953      Lab Results:  Results from last 7 days   Lab Units 04/15/23  0559   WBC Thousand/uL 9 04   HEMOGLOBIN g/dL 10 3*   HEMATOCRIT % 32 8*   PLATELETS Thousands/uL 261     Results from last 7 days   Lab Units 04/15/23  0559   POTASSIUM " mmol/L 4 2   CHLORIDE mmol/L 105   CO2 mmol/L 23   BUN mg/dL 17   CREATININE mg/dL 0 77   CALCIUM mg/dL 8 7     Results from last 7 days   Lab Units 04/15/23  0559   POTASSIUM mmol/L 4 2   CHLORIDE mmol/L 105   CO2 mmol/L 23   BUN mg/dL 17   CREATININE mg/dL 0 77   CALCIUM mg/dL 8 7           Medications:    Current Facility-Administered Medications:   •  acetaminophen (TYLENOL) tablet 650 mg, 650 mg, Oral, Q6H PRN, Leobardo Ramirez MD, 650 mg at 04/14/23 2038  •  azithromycin (ZITHROMAX) tablet 500 mg, 500 mg, Oral, Q24H, Leobardo Ramirez MD, 500 mg at 04/14/23 1715  •  benzonatate (TESSALON PERLES) capsule 100 mg, 100 mg, Oral, TID PRN, Polina Griselda Prude, MD  •  budesonide-formoterol (SYMBICORT) 160-4 5 mcg/act inhaler 2 puff, 2 puff, Inhalation, BID, Trinidad Patience, CRNP, 2 puff at 04/15/23 0901  •  buprenorphine-naloxone (Suboxone) film 8 mg, 8 mg, Sublingual, BID, 8 mg at 04/15/23 0902 **AND** buprenorphine-naloxone (Suboxone) film 4 mg, 4 mg, Sublingual, Daily, Yovana Chu MD  •  cefTRIAXone (ROCEPHIN) 1,000 mg in dextrose 5 % 50 mL IVPB, 1,000 mg, Intravenous, Q24H, Leobardo Ramirez MD, Last Rate: 100 mL/hr at 04/15/23 0910, 1,000 mg at 04/15/23 0910  •  clonazePAM (KlonoPIN) tablet 1 mg, 1 mg, Oral, 4x Daily PRN, Leobardo Ramirez MD, 1 mg at 04/15/23 0725  •  docusate sodium (COLACE) capsule 100 mg, 100 mg, Oral, Q12H, Leobardo Ramirez MD, 100 mg at 04/15/23 0533  •  enoxaparin (LOVENOX) subcutaneous injection 40 mg, 40 mg, Subcutaneous, Daily, Leobardo Ramirez MD, 40 mg at 04/15/23 1760  •  famotidine (PEPCID) tablet 20 mg, 20 mg, Oral, BID, Leobardo Ramirez MD, 20 mg at 04/15/23 9247  •  FLUoxetine (PROzac) capsule 80 mg, 80 mg, Oral, Daily, Leobardo Ramirez MD, 80 mg at 04/15/23 4880  •  furosemide (LASIX) tablet 40 mg, 40 mg, Oral, BID (diuretic), Carolina Craig DO, 40 mg at 04/15/23 0741  • gabapentin (NEURONTIN) capsule 400 mg, 400 mg, Oral, TID, Luis Fernando Prater MD, 400 mg at 04/15/23 0902  •  guaiFENesin (MUCINEX) 12 hr tablet 600 mg, 600 mg, Oral, Q12H [address], Yovana Chu MD, 600 mg at 04/15/23 0902  •  ketorolac (TORADOL) injection 30 mg, 30 mg, Intravenous, Q6H PRN, Yovana Urena MD, 30 mg at 04/15/23 0910  •  levalbuterol (XOPENEX) inhalation solution 1 25 mg, 1 25 mg, Nebulization, Q6H, NETTE Moralez, 1 25 mg at 04/15/23 0725  •  lidocaine (LIDODERM) 5 % patch 1 patch, 1 patch, Topical, Daily, Yovana Chu MD, 1 patch at 04/15/23 0903  •  methylPREDNISolone sodium succinate (Solu-MEDROL) injection 40 mg, 40 mg, Intravenous, Q6H [facility], Eva Guthrie, , 40 mg at 04/15/23 0533  •  nicotine (NICODERM CQ) 14 mg/24hr TD 24 hr patch 1 patch, 1 patch, Transdermal, Daily, Luis Fernando Prater MD, 1 patch at 04/15/23 425  •  ondansetron (ZOFRAN-ODT) dispersible tablet 4 mg, 4 mg, Oral, Q6H PRN, Luis Fernando Prater MD, 4 mg at 04/14/23 3375  •  Insert peripheral IV, , , Once **AND** sodium chloride (PF) 0 9 % injection 3 mL, 3 mL, Intravenous, Q1H PRN, Claven Prader, MD  •  sodium chloride 0 9 % inhalation solution 3 mL, 3 mL, Nebulization, Q6H, Yovana Chu MD, 3 mL at 04/15/23 0725  •  topiramate (TOPAMAX) tablet 50 mg, 50 mg, Oral, BID, Luis Fernando Prater MD, 50 mg at 04/15/23 3079  •  traZODone (DESYREL) tablet 100 mg, 100 mg, Oral, HS, Luis Fernando Prater MD, 100 mg at 04/14/23 2210    This note was completed in part utilizing M-Modal Fluency Direct Software  Grammatical errors, random word insertions, spelling mistakes, and incomplete sentences may be an occasional consequence of this system secondary to software limitations, ambient noise, and hardware issues    If you have any questions or concerns about the content, text, or information contained within the body of this dictation, please contact the provider for clarification

## 2023-04-16 LAB
ANION GAP SERPL CALCULATED.3IONS-SCNC: 10 MMOL/L (ref 4–13)
BACTERIA SPT RESP CULT: ABNORMAL
BASOPHILS # BLD AUTO: 0.04 THOUSANDS/ΜL (ref 0–0.1)
BASOPHILS NFR BLD AUTO: 0 % (ref 0–1)
BUN SERPL-MCNC: 23 MG/DL (ref 5–25)
CALCIUM SERPL-MCNC: 9.2 MG/DL (ref 8.4–10.2)
CHLORIDE SERPL-SCNC: 103 MMOL/L (ref 96–108)
CO2 SERPL-SCNC: 24 MMOL/L (ref 21–32)
CREAT SERPL-MCNC: 0.9 MG/DL (ref 0.6–1.3)
EOSINOPHIL # BLD AUTO: 0 THOUSAND/ΜL (ref 0–0.61)
EOSINOPHIL NFR BLD AUTO: 0 % (ref 0–6)
ERYTHROCYTE [DISTWIDTH] IN BLOOD BY AUTOMATED COUNT: 13.3 % (ref 11.6–15.1)
GFR SERPL CREATININE-BSD FRML MDRD: 77 ML/MIN/1.73SQ M
GLUCOSE SERPL-MCNC: 192 MG/DL (ref 65–140)
GRAM STN SPEC: ABNORMAL
HCT VFR BLD AUTO: 33.8 % (ref 34.8–46.1)
HGB BLD-MCNC: 10.8 G/DL (ref 11.5–15.4)
IMM GRANULOCYTES # BLD AUTO: 0.15 THOUSAND/UL (ref 0–0.2)
IMM GRANULOCYTES NFR BLD AUTO: 1 % (ref 0–2)
LYMPHOCYTES # BLD AUTO: 0.94 THOUSANDS/ΜL (ref 0.6–4.47)
LYMPHOCYTES NFR BLD AUTO: 6 % (ref 14–44)
MCH RBC QN AUTO: 28.9 PG (ref 26.8–34.3)
MCHC RBC AUTO-ENTMCNC: 32 G/DL (ref 31.4–37.4)
MCV RBC AUTO: 90 FL (ref 82–98)
MONOCYTES # BLD AUTO: 0.29 THOUSAND/ΜL (ref 0.17–1.22)
MONOCYTES NFR BLD AUTO: 2 % (ref 4–12)
NEUTROPHILS # BLD AUTO: 14.02 THOUSANDS/ΜL (ref 1.85–7.62)
NEUTS SEG NFR BLD AUTO: 91 % (ref 43–75)
NRBC BLD AUTO-RTO: 0 /100 WBCS
PLATELET # BLD AUTO: 372 THOUSANDS/UL (ref 149–390)
PMV BLD AUTO: 8.9 FL (ref 8.9–12.7)
POTASSIUM SERPL-SCNC: 3.8 MMOL/L (ref 3.5–5.3)
RBC # BLD AUTO: 3.74 MILLION/UL (ref 3.81–5.12)
SODIUM SERPL-SCNC: 137 MMOL/L (ref 135–147)
WBC # BLD AUTO: 15.44 THOUSAND/UL (ref 4.31–10.16)

## 2023-04-16 RX ORDER — SODIUM CHLORIDE FOR INHALATION 0.9 %
3 VIAL, NEBULIZER (ML) INHALATION
Status: DISCONTINUED | OUTPATIENT
Start: 2023-04-16 | End: 2023-04-19

## 2023-04-16 RX ORDER — LEVALBUTEROL 1.25 MG/.5ML
1.25 SOLUTION, CONCENTRATE RESPIRATORY (INHALATION)
Status: DISCONTINUED | OUTPATIENT
Start: 2023-04-16 | End: 2023-04-19

## 2023-04-16 RX ADMIN — BUPRENORPHINE AND NALOXONE 8 MG: 8; 2 FILM BUCCAL; SUBLINGUAL at 08:37

## 2023-04-16 RX ADMIN — DOCUSATE SODIUM 100 MG: 100 CAPSULE, LIQUID FILLED ORAL at 06:06

## 2023-04-16 RX ADMIN — FUROSEMIDE 40 MG: 40 TABLET ORAL at 17:21

## 2023-04-16 RX ADMIN — TOPIRAMATE 50 MG: 25 TABLET, FILM COATED ORAL at 17:21

## 2023-04-16 RX ADMIN — GUAIFENESIN 600 MG: 600 TABLET, EXTENDED RELEASE ORAL at 21:11

## 2023-04-16 RX ADMIN — LEVALBUTEROL 1.25 MG: 1.25 SOLUTION, CONCENTRATE RESPIRATORY (INHALATION) at 07:05

## 2023-04-16 RX ADMIN — ACETAMINOPHEN 325MG 650 MG: 325 TABLET ORAL at 23:07

## 2023-04-16 RX ADMIN — AZITHROMYCIN MONOHYDRATE 500 MG: 250 TABLET ORAL at 17:21

## 2023-04-16 RX ADMIN — Medication 3 ML: at 07:05

## 2023-04-16 RX ADMIN — GABAPENTIN 400 MG: 400 CAPSULE ORAL at 21:11

## 2023-04-16 RX ADMIN — BUDESONIDE AND FORMOTEROL FUMARATE DIHYDRATE 2 PUFF: 160; 4.5 AEROSOL RESPIRATORY (INHALATION) at 08:38

## 2023-04-16 RX ADMIN — GABAPENTIN 400 MG: 400 CAPSULE ORAL at 17:21

## 2023-04-16 RX ADMIN — CLONAZEPAM 1 MG: 1 TABLET ORAL at 21:11

## 2023-04-16 RX ADMIN — METHYLPREDNISOLONE SODIUM SUCCINATE 40 MG: 40 INJECTION, POWDER, FOR SOLUTION INTRAMUSCULAR; INTRAVENOUS at 00:14

## 2023-04-16 RX ADMIN — TOPIRAMATE 50 MG: 25 TABLET, FILM COATED ORAL at 08:37

## 2023-04-16 RX ADMIN — ACETAMINOPHEN 325MG 650 MG: 325 TABLET ORAL at 06:06

## 2023-04-16 RX ADMIN — CLONAZEPAM 1 MG: 1 TABLET ORAL at 06:06

## 2023-04-16 RX ADMIN — FAMOTIDINE 20 MG: 20 TABLET ORAL at 17:21

## 2023-04-16 RX ADMIN — GUAIFENESIN 600 MG: 600 TABLET, EXTENDED RELEASE ORAL at 08:37

## 2023-04-16 RX ADMIN — ENOXAPARIN SODIUM 40 MG: 100 INJECTION SUBCUTANEOUS at 08:37

## 2023-04-16 RX ADMIN — FLUOXETINE 80 MG: 20 CAPSULE ORAL at 08:37

## 2023-04-16 RX ADMIN — METHYLPREDNISOLONE SODIUM SUCCINATE 40 MG: 40 INJECTION, POWDER, FOR SOLUTION INTRAMUSCULAR; INTRAVENOUS at 23:20

## 2023-04-16 RX ADMIN — CEFTRIAXONE SODIUM 1000 MG: 10 INJECTION, POWDER, FOR SOLUTION INTRAVENOUS at 08:37

## 2023-04-16 RX ADMIN — LEVALBUTEROL 1.25 MG: 1.25 SOLUTION, CONCENTRATE RESPIRATORY (INHALATION) at 19:04

## 2023-04-16 RX ADMIN — TRAZODONE HYDROCHLORIDE 100 MG: 100 TABLET ORAL at 21:11

## 2023-04-16 RX ADMIN — KETOROLAC TROMETHAMINE 30 MG: 30 INJECTION, SOLUTION INTRAMUSCULAR; INTRAVENOUS at 00:16

## 2023-04-16 RX ADMIN — FAMOTIDINE 20 MG: 20 TABLET ORAL at 08:37

## 2023-04-16 RX ADMIN — LIDOCAINE 5% 1 PATCH: 700 PATCH TOPICAL at 08:37

## 2023-04-16 RX ADMIN — CLONAZEPAM 1 MG: 1 TABLET ORAL at 12:35

## 2023-04-16 RX ADMIN — BUPRENORPHINE AND NALOXONE 8 MG: 8; 2 FILM BUCCAL; SUBLINGUAL at 21:11

## 2023-04-16 RX ADMIN — Medication 1 PATCH: at 08:48

## 2023-04-16 RX ADMIN — METHYLPREDNISOLONE SODIUM SUCCINATE 40 MG: 40 INJECTION, POWDER, FOR SOLUTION INTRAMUSCULAR; INTRAVENOUS at 13:08

## 2023-04-16 RX ADMIN — METHYLPREDNISOLONE SODIUM SUCCINATE 40 MG: 40 INJECTION, POWDER, FOR SOLUTION INTRAMUSCULAR; INTRAVENOUS at 06:06

## 2023-04-16 RX ADMIN — BUPRENORPHINE AND NALOXONE 4 MG: 2; .5 FILM BUCCAL; SUBLINGUAL at 13:08

## 2023-04-16 RX ADMIN — GABAPENTIN 400 MG: 400 CAPSULE ORAL at 08:37

## 2023-04-16 RX ADMIN — BUDESONIDE AND FORMOTEROL FUMARATE DIHYDRATE 2 PUFF: 160; 4.5 AEROSOL RESPIRATORY (INHALATION) at 21:11

## 2023-04-16 RX ADMIN — DOCUSATE SODIUM 100 MG: 100 CAPSULE, LIQUID FILLED ORAL at 17:21

## 2023-04-16 RX ADMIN — METHYLPREDNISOLONE SODIUM SUCCINATE 40 MG: 40 INJECTION, POWDER, FOR SOLUTION INTRAMUSCULAR; INTRAVENOUS at 17:21

## 2023-04-16 RX ADMIN — ACETAMINOPHEN 325MG 650 MG: 325 TABLET ORAL at 12:37

## 2023-04-16 RX ADMIN — FUROSEMIDE 40 MG: 40 TABLET ORAL at 08:37

## 2023-04-16 RX ADMIN — Medication 3 ML: at 19:04

## 2023-04-16 RX ADMIN — KETOROLAC TROMETHAMINE 30 MG: 30 INJECTION, SOLUTION INTRAMUSCULAR; INTRAVENOUS at 06:09

## 2023-04-16 NOTE — ASSESSMENT & PLAN NOTE
· Anxiety disorder followed by the [address] [place]  · PDMP and care everywhere reviewed    · Confirmed that she is on clonazepam 1 mg 4 times daily as needed  · With persistent anxiety added Atarax PRN

## 2023-04-16 NOTE — ASSESSMENT & PLAN NOTE
· Abnormal CT of the chest with severe bilateral groundglass opacities and adenopathy  · Opacities suggest pulmonary edema  The adenopathy suggest sarcoidosis  · CT last year which showed similar finding  When she was admitted, there was concern for vape related lung injury  Patient currently denies use of vape, admits to ongoing smoking, half a pack a day  · Continued on ceftriaxone and Zithromax for possible atypical pneumonia    Systemic steroids added to therapy  · Started Lasix for pulmonary edema, right heart failure  · Appreciate pulmonology and cardiology input  · Plan for bronchoscopy tomorrow, 4/17/2023

## 2023-04-16 NOTE — PLAN OF CARE
Problem: Potential for Falls  Goal: Patient will remain free of falls  Description: INTERVENTIONS:  - Educate patient/family on patient safety including physical limitations  - Instruct patient to call for assistance with activity   - Consult OT/PT to assist with strengthening/mobility   - Keep Call bell within reach  - Keep bed low and locked with side rails adjusted as appropriate  - Keep care items and personal belongings within reach  - Initiate and maintain comfort rounds  - Make Fall Risk Sign visible to staff  - Offer Toileting every 2 Hours, in advance of need  - Initiate/Maintain bed alarm  - Obtain necessary fall risk management equipment:   - Apply yellow socks and bracelet for high fall risk patients  - Consider moving patient to room near nurses station  Outcome: Progressing     Problem: PAIN - ADULT  Goal: Verbalizes/displays adequate comfort level or baseline comfort level  Description: Interventions:  - Encourage patient to monitor pain and request assistance  - Assess pain using appropriate pain scale  - Administer analgesics based on type and severity of pain and evaluate response  - Implement non-pharmacological measures as appropriate and evaluate response  - Consider cultural and social influences on pain and pain management  - Notify physician/advanced practitioner if interventions unsuccessful or patient reports new pain  Outcome: Progressing     Problem: INFECTION - ADULT  Goal: Absence or prevention of progression during hospitalization  Description: INTERVENTIONS:  - Assess and monitor for signs and symptoms of infection  - Monitor lab/diagnostic results  - Monitor all insertion sites, i e  indwelling lines, tubes, and drains  - Monitor endotracheal if appropriate and nasal secretions for changes in amount and color  - [place] appropriate cooling/warming therapies per order  - Administer medications as ordered  - Instruct and encourage patient and family to use good hand hygiene technique  - Identify and instruct in appropriate isolation precautions for identified infection/condition  Outcome: Progressing  Goal: Absence of fever/infection during neutropenic period  Description: INTERVENTIONS:  - Monitor WBC    Outcome: Progressing     Problem: SAFETY ADULT  Goal: Patient will remain free of falls  Description: INTERVENTIONS:  - Educate patient/family on patient safety including physical limitations  - Instruct patient to call for assistance with activity   - Consult OT/PT to assist with strengthening/mobility   - Keep Call bell within reach  - Keep bed low and locked with side rails adjusted as appropriate  - Keep care items and personal belongings within reach  - Initiate and maintain comfort rounds  - Make Fall Risk Sign visible to staff  - Offer Toileting every 2 Hours, in advance of need  - Initiate/Maintain bed alarm  - Obtain necessary fall risk management equipment: yellow bracelet  - Apply yellow socks and bracelet for high fall risk patients  - Consider moving patient to room near nurses station  Outcome: Progressing  Goal: Maintain or return to baseline ADL function  Description: INTERVENTIONS:  -  Assess patient's ability to carry out ADLs; assess patient's baseline for ADL function and identify physical deficits which impact ability to perform ADLs (bathing, care of mouth/teeth, toileting, grooming, dressing, etc )  - Assess/evaluate cause of self-care deficits   - Assess range of motion  - Assess patient's mobility; develop plan if impaired  - Assess patient's need for assistive devices and provide as appropriate  - Encourage maximum independence but intervene and supervise when necessary  - Involve family in performance of ADLs  - Assess for home care needs following discharge   - Consider OT consult to assist with ADL evaluation and planning for discharge  - Provide patient education as appropriate  Outcome: Progressing  Goal: Maintains/Returns to pre admission functional level  Description: INTERVENTIONS:  - Perform BMAT or MOVE assessment daily    - Set and communicate daily mobility goal to care team and patient/family/caregiver  - Collaborate with rehabilitation services on mobility goals if consulted  - Perform Range of Motion 4 times a day  - Reposition patient every 2 hours  - Dangle patient 3 times a day  - Stand patient 3 times a day  - Ambulate patient 3 times a day  - Out of bed to chair 3 times a day   - Out of bed for meals 3 times a day  - Out of bed for toileting  - Record patient progress and toleration of activity level   Outcome: Progressing     Problem: DISCHARGE PLANNING  Goal: Discharge to home or other facility with appropriate resources  Description: INTERVENTIONS:  - Identify barriers to discharge w/patient and caregiver  - Arrange for needed discharge resources and transportation as appropriate  - Identify discharge learning needs (meds, wound care, etc )  - Arrange for interpretive services to assist at discharge as needed  - Refer to Case Management Department for coordinating discharge planning if the patient needs post-hospital services based on physician/advanced practitioner order or complex needs related to functional status, cognitive ability, or social support system  Outcome: Progressing     Problem: Knowledge Deficit  Goal: Patient/family/caregiver demonstrates understanding of disease process, treatment plan, medications, and discharge instructions  Description: Complete learning assessment and assess knowledge base    Interventions:  - Provide teaching at level of understanding  - Provide teaching via preferred learning methods  Outcome: Progressing     Problem: CARDIOVASCULAR - ADULT  Goal: Maintains optimal cardiac output and hemodynamic stability  Description: INTERVENTIONS:  - Monitor I/O, vital signs and rhythm  - Monitor for S/S and trends of decreased cardiac output  - Administer and titrate ordered vasoactive medications to optimize hemodynamic stability  - Assess quality of pulses, skin color and temperature  - Assess for signs of decreased coronary artery perfusion  - Instruct patient to report change in severity of symptoms  Outcome: Progressing  Goal: Absence of cardiac dysrhythmias or at baseline rhythm  Description: INTERVENTIONS:  - Continuous cardiac monitoring, vital signs, obtain 12 lead EKG if ordered  - Administer antiarrhythmic and heart rate control medications as ordered  - Monitor electrolytes and administer replacement therapy as ordered  Outcome: Progressing     Problem: RESPIRATORY - ADULT  Goal: Achieves optimal ventilation and oxygenation  Description: INTERVENTIONS:  - Assess for changes in respiratory status  - Assess for changes in mentation and behavior  - Position to facilitate oxygenation and minimize respiratory effort  - Oxygen administered by appropriate delivery if ordered  - Initiate smoking cessation education as indicated  - Encourage broncho-pulmonary hygiene including cough, deep breathe, Incentive Spirometry  - Assess the need for suctioning and aspirate as needed  - Assess and instruct to report SOB or any respiratory difficulty  - Respiratory Therapy support as indicated  Outcome: Progressing     Problem: METABOLIC, FLUID AND ELECTROLYTES - ADULT  Goal: Electrolytes maintained within normal limits  Description: INTERVENTIONS:  - Monitor labs and assess patient for signs and symptoms of electrolyte imbalances  - Administer electrolyte replacement as ordered  - Monitor response to electrolyte replacements, including repeat lab results as appropriate  - Instruct patient on fluid and nutrition as appropriate  Outcome: Progressing  Goal: Fluid balance maintained  Description: INTERVENTIONS:  - Monitor labs   - Monitor I/O and WT  - Instruct patient on fluid and nutrition as appropriate  - Assess for signs & symptoms of volume excess or deficit  Outcome: Progressing

## 2023-04-16 NOTE — ASSESSMENT & PLAN NOTE
· Bipolar disorder followed by [facility]  · Medications reviewed via care everywhere    · Continue Prozac 80 mg daily, trazodone 100 mg at bedtime, Neurontin 400 mg 3 times daily  · Patient's mother updated via telephone

## 2023-04-16 NOTE — ASSESSMENT & PLAN NOTE
· Elevated troponin with evaded BNP and abnormal CT chest showing pulmonary edema  · Nonischemic myocardial injury due to right heart strain  · 2D Echo with evidence of right heart failure  · Cardiology input appreciated, continue Lasix, holding parameters adjusted due to marginal blood pressures  · Monitor volume status with daily weights, I's and O's  · Low Na diet

## 2023-04-16 NOTE — PROGRESS NOTES
"  Cardiology         Progress Note - Cardiology   [facility] 41 y o  female MRN: [id number]  Unit/Bed#: [place] 68 2 -01 Encounter: [id number]          Assessment/Recommendations/Discussion:   1  Acute hypoxic respiratory failure  2  Acute CHF  3  Nonischemic myocardial injury secondary to above  4  Bipolar disorder  5  Chronic opioid use disorder  6  Tobacco use disorder  7  Hepatitis C        · Reasonable urine output with stable renal function  Continue IV Lasix  Clinically seems to be improving  Continues to suspect noncardiogenic component or pulmonary edema, in light of isolated right heart disease with moderate right ventricular dilatation and reduced right ventricular systolic function with normal left heart          Subjective: Patient seen and examined, feels better today                Physical Exam:  GEN:  NAD  HEENT:  MMM, NCAT, pink conjunctiva, EOMI, nonicteric sclera  CV:  NO JVD/HJR, RR, NO M/R/G, +S1/S2, NO PARASTERNAL HEAVE/THRILL, NO LE EDEMA, NO HEPATIC SYSTOLIC PULSATION, WARM EXTREMITIES  RESP: Mild crackles  ABD:  SOFT, NT, NO GROSS ORGANOMEGALY        Vitals:   /70   Pulse 71   Temp 98 2 °F (36 8 °C)   Resp 20   Ht 5' 1\" (1 549 m)   Wt 70 4 kg (155 lb 3 3 oz)   LMP 04/13/2023 (Exact Date)   SpO2 94%   BMI 29 33 kg/m²   Vitals:    04/15/23 0533 04/16/23 0600   Weight: 70 7 kg (155 lb 12 8 oz) 70 4 kg (155 lb 3 3 oz)       Intake/Output Summary (Last 24 hours) at 4/16/2023 1009  Last data filed at 4/16/2023 8603  Gross per 24 hour   Intake --   Output 700 ml   Net -700 ml         Lab Results:  Results from last 7 days   Lab Units 04/16/23  0555   WBC Thousand/uL 15 44*   HEMOGLOBIN g/dL 10 8*   HEMATOCRIT % 33 8*   PLATELETS Thousands/uL 372     Results from last 7 days   Lab Units 04/16/23  0555   POTASSIUM mmol/L 3 8   CHLORIDE mmol/L 103   CO2 mmol/L 24   BUN mg/dL 23   CREATININE mg/dL 0 90   CALCIUM mg/dL 9 2     Results from last 7 days   Lab Units 04/16/23  0555 " POTASSIUM mmol/L 3 8   CHLORIDE mmol/L 103   CO2 mmol/L 24   BUN mg/dL 23   CREATININE mg/dL 0 90   CALCIUM mg/dL 9 2           Medications:    Current Facility-Administered Medications:   •  acetaminophen (TYLENOL) tablet 650 mg, 650 mg, Oral, Q6H PRN, Olga Conrad MD, 650 mg at 04/16/23 0606  •  albuterol (PROVENTIL HFA,VENTOLIN HFA) inhaler 2 puff, 2 puff, Inhalation, Q4H PRN, Yovana Reyes MD, 2 puff at 04/15/23 1748  •  azithromycin (ZITHROMAX) tablet 500 mg, 500 mg, Oral, Q24H, Olga Conrad MD, 500 mg at 04/15/23 1745  •  benzonatate (TESSALON PERLES) capsule 100 mg, 100 mg, Oral, TID PRN, Yovana Reyes MD  •  budesonide-formoterol (SYMBICORT) 160-4 5 mcg/act inhaler 2 puff, 2 puff, Inhalation, BID, NETTE Andrea, 2 puff at 04/16/23 6042  •  buprenorphine-naloxone (Suboxone) film 8 mg, 8 mg, Sublingual, BID, 8 mg at 04/16/23 0837 **AND** buprenorphine-naloxone (Suboxone) film 4 mg, 4 mg, Sublingual, Daily, Yovana Chu MD, 4 mg at 04/15/23 1314  •  cefTRIAXone (ROCEPHIN) 1,000 mg in dextrose 5 % 50 mL IVPB, 1,000 mg, Intravenous, Q24H, Olga Conrad MD, Stopped at 04/16/23 4389  •  clonazePAM (KlonoPIN) tablet 1 mg, 1 mg, Oral, 4x Daily PRN, Olga Conrad MD, 1 mg at 04/16/23 0606  •  docusate sodium (COLACE) capsule 100 mg, 100 mg, Oral, Q12H, Olga Conrad MD, 100 mg at 04/16/23 0606  •  enoxaparin (LOVENOX) subcutaneous injection 40 mg, 40 mg, Subcutaneous, Daily, Olga Conrad MD, 40 mg at 04/16/23 2257  •  famotidine (PEPCID) tablet 20 mg, 20 mg, Oral, BID, Olga Conrad MD, 20 mg at 04/16/23 8324  •  FLUoxetine (PROzac) capsule 80 mg, 80 mg, Oral, Daily, Olga Conrad MD, 80 mg at 04/16/23 3835  •  furosemide (LASIX) tablet 40 mg, 40 mg, Oral, BID (diuretic), Carolina Craig DO, 40 mg at 04/16/23 4325  •  gabapentin (NEURONTIN) capsule 400 mg, 400 mg, Oral, TID, Yesica Braxton MD, 400 mg at 04/16/23 9482  •  guaiFENesin (MUCINEX) 12 hr tablet 600 mg, 600 mg, Oral, Q12H [address], Yovana Chu MD, 600 mg at 04/16/23 0837  •  hydrOXYzine HCL (ATARAX) tablet 25 mg, 25 mg, Oral, Q6H PRN, Yovana Erazo MD  •  levalbuterol (XOPENEX) inhalation solution 1 25 mg, 1 25 mg, Nebulization, TID, Yovana Chu MD, 1 25 mg at 04/16/23 0705  •  lidocaine (LIDODERM) 5 % patch 1 patch, 1 patch, Topical, Daily, Yovana Chu MD, 1 patch at 04/16/23 0837  •  methylPREDNISolone sodium succinate (Solu-MEDROL) injection 40 mg, 40 mg, Intravenous, Q6H [facility], Eva Guthrie, , 40 mg at 04/16/23 0606  •  nicotine (NICODERM CQ) 14 mg/24hr TD 24 hr patch 1 patch, 1 patch, Transdermal, Daily, Yesica Braxton MD, 1 patch at 04/16/23 0848  •  ondansetron (ZOFRAN-ODT) dispersible tablet 4 mg, 4 mg, Oral, Q6H PRN, Yesica Braxton MD, 4 mg at 04/14/23 9591  •  Insert peripheral IV, , , Once **AND** sodium chloride (PF) 0 9 % injection 3 mL, 3 mL, Intravenous, Q1H PRN, Rusty Avila MD  •  sodium chloride 0 9 % inhalation solution 3 mL, 3 mL, Nebulization, TID, Yovana Chu MD, 3 mL at 04/16/23 0705  •  topiramate (TOPAMAX) tablet 50 mg, 50 mg, Oral, BID, Yesica Braxton MD, 50 mg at 04/16/23 1504  •  traZODone (DESYREL) tablet 100 mg, 100 mg, Oral, HS, Yesica Braxton MD, 100 mg at 04/15/23 2255    This note was completed in part utilizing [employer] Direct Software  Grammatical errors, random word insertions, spelling mistakes, and incomplete sentences may be an occasional consequence of this system secondary to software limitations, ambient noise, and hardware issues  If you have any questions or concerns about the content, text, or information contained within the body of this dictation, please contact the provider for clarification

## 2023-04-16 NOTE — ASSESSMENT & PLAN NOTE
· Acute respiratory failure due to possible cardiogenic pulmonary edema versus noncardiogenic pulmonary edema versus atypical pneumonia/ pneumonitis  · Respiratory status has considerably improved with systemic steroids, Lasix -patient was helped to high flow nasal cannula now down to 5 L oxygen requirements  · For bronchoscopy tomorrow, 4/17/2023

## 2023-04-16 NOTE — PROGRESS NOTES
[address] [address]  Progress Note  Name: Nancy Mo  MRN: [id number]  Unit/Bed#: [place] 2 -01 I Date of Admission: 4/13/2023   Date of Service: 4/16/2023 I Hospital Day: 3    Assessment/Plan   * Acute respiratory failure with hypoxia [facility])  Assessment & Plan  · Acute respiratory failure due to possible cardiogenic pulmonary edema versus noncardiogenic pulmonary edema versus atypical pneumonia/ pneumonitis  · Respiratory status has considerably improved with systemic steroids, Lasix -patient was helped to high flow nasal cannula now down to 5 L oxygen requirements  · For bronchoscopy tomorrow, 4/17/2023    Acute right ventricular heart failure ([address] )  Assessment & Plan  · As noted on 2D echo  · Started on Lasix  · And daily weights, I's and O's  · Cardiology consulted, appreciate input  · Chest CTA with no evidence of PE    Abnormal CT of the chest  Assessment & Plan  · Abnormal CT of the chest with severe bilateral groundglass opacities and adenopathy  · Opacities suggest pulmonary edema  The adenopathy suggest sarcoidosis  · CT last year which showed similar finding  When she was admitted, there was concern for vape related lung injury  Patient currently denies use of vape, admits to ongoing smoking, half a pack a day  · Continued on ceftriaxone and Zithromax for possible atypical pneumonia    Systemic steroids added to therapy  · Started Lasix for pulmonary edema, right heart failure  · Appreciate pulmonology and cardiology input  · Plan for bronchoscopy tomorrow, 4/17/2023    Elevated troponin  Assessment & Plan  · Elevated troponin with evaded BNP and abnormal CT chest showing pulmonary edema  · Nonischemic myocardial injury due to right heart strain  · 2D Echo with evidence of right heart failure  · Cardiology input appreciated, continue Lasix, holding parameters adjusted due to marginal blood pressures  · Monitor volume status with daily weights, I's and O's  · Low Na diet Opioid use disorder, severe, on maintenance therapy [facility])  Assessment & Plan  · PDMP and care everywhere reviewed  · She is maintained on  Suboxone TID 8mg-4mg-8mg     Anxiety disorder, unspecified  Assessment & Plan  · Anxiety disorder followed by the [address] [place]  · PDMP and care everywhere reviewed  · Confirmed that she is on clonazepam 1 mg 4 times daily as needed  · With persistent anxiety added Atarax PRN    Tobacco abuse  Assessment & Plan  · Placed on nicotine patch  · Smoking cessation counseling    Bipolar disorder, unspecified ([facility] [address] )  Assessment & Plan  · Bipolar disorder followed by [facility]  · Medications reviewed via care everywhere  · Continue Prozac 80 mg daily, trazodone 100 mg at bedtime, Neurontin 400 mg 3 times daily  · Patient's mother updated via telephone           VTE Pharmacologic Prophylaxis: VTE Score: 4 Moderate Risk (Score 3-4) - Pharmacological DVT Prophylaxis Ordered: enoxaparin (Lovenox)  Patient Centered Rounds: I performed bedside rounds with nursing staff today  Discussions with Specialists or Other Care Team Provider: Pulmonology    Education and Discussions with Family / Patient: Updated  (mother) via phone  Total Time Spent on Date of Encounter in care of patient: 55 minutes This time was spent on one or more of the following: performing physical exam; counseling and coordination of care; obtaining or reviewing history; documenting in the medical record; reviewing/ordering tests, medications or procedures; communicating with other healthcare professionals and discussing with patient's family/caregivers  Current Length of Stay: 3 day(s)  Current Patient Status: Inpatient   Certification Statement: The patient will continue to require additional inpatient hospital stay due to Close monitoring, bronchoscopy, IV treatments  Discharge Plan: Anticipate discharge in 48-72 hrs to home      Code Status: Level 1 - Full Code    Subjective:   Patient seen and examined  She reports feeling mildly improved in regards to respiratory status  She is down to 5 L from previously high flow  She is ambulating around the room  Objective:     Vitals:   Temp (24hrs), Av 7 °F (36 5 °C), Min:97 2 °F (36 2 °C), Max:98 2 °F (36 8 °C)    Temp:  [97 2 °F (36 2 °C)-98 2 °F (36 8 °C)] 97 8 °F (36 6 °C)  HR:  [57-71] 61  Resp:  [18-20] 20  BP: (112-122)/(68-77) 122/77  SpO2:  [87 %-97 %] 94 %  Body mass index is 29 33 kg/m²  Input and Output Summary (last 24 hours): Intake/Output Summary (Last 24 hours) at 2023 1512  Last data filed at 2023 0608  Gross per 24 hour   Intake --   Output 700 ml   Net -700 ml       Physical Exam:   Physical Exam  Constitutional:       General: She is not in acute distress  HENT:      Head: Normocephalic  Mouth/Throat:      Pharynx: Oropharynx is clear  Eyes:      Conjunctiva/sclera: Conjunctivae normal    Cardiovascular:      Rate and Rhythm: Normal rate and regular rhythm  Heart sounds: No murmur heard  Pulmonary:      Breath sounds: Rhonchi present  Abdominal:      General: There is no distension  Tenderness: There is no abdominal tenderness  Musculoskeletal:      Right lower leg: No edema  Left lower leg: No edema  Skin:     General: Skin is warm and dry  Neurological:      Mental Status: She is oriented to person, place, and time     Psychiatric:         Mood and Affect: Mood normal           Additional Data:     Labs:  Results from last 7 days   Lab Units 23  0555   WBC Thousand/uL 15 44*   HEMOGLOBIN g/dL 10 8*   HEMATOCRIT % 33 8*   PLATELETS Thousands/uL 372   NEUTROS PCT % 91*   LYMPHS PCT % 6*   MONOS PCT % 2*   EOS PCT % 0     Results from last 7 days   Lab Units 23  0555   SODIUM mmol/L 137   POTASSIUM mmol/L 3 8   CHLORIDE mmol/L 103   CO2 mmol/L 24   BUN mg/dL 23   CREATININE mg/dL 0 90   ANION GAP mmol/L 10   CALCIUM mg/dL 9 2   GLUCOSE RANDOM mg/dL 192*                 Results from last 7 days   Lab Units 04/14/23  0500 04/13/23  1819   PROCALCITONIN ng/ml 0 24 0 36*       Lines/Drains:  Invasive Devices     Peripheral Intravenous Line  Duration           Peripheral IV 04/13/23 Right Forearm 3 days                  Imaging: no new    Recent Cultures (last 7 days):   Results from last 7 days   Lab Units 04/15/23  0643 04/13/23  1833 04/13/23  1200   BLOOD CULTURE   --   --  No Growth at 48 hrs  No Growth at 48 hrs  SPUTUM CULTURE  Test not performed  Suggest repeat specimen  --   --    GRAM STAIN RESULT  >10 squamous epithelial cells/lpf, indicating orophayngeal contamination  *  2+ Yeast*  2+ Gram positive cocci in pairs*  2+ Gram positive cocci in clusters*  2+ Gram negative rods*  --   --    LEGIONELLA URINARY ANTIGEN   --  Negative  --        Last 24 Hours Medication List:   Current Facility-Administered Medications   Medication Dose Route Frequency Provider Last Rate   • acetaminophen  650 mg Oral Q6H PRN Surinder Carmona MD     • albuterol  2 puff Inhalation Q4H PRN Yovana Hale MD     • azithromycin  500 mg Oral Q24H Surinder Carmona MD     • benzonatate  100 mg Oral TID PRN Yovana Hale MD     • budesonide-formoterol  2 puff Inhalation BID NETTE Ferrell     • buprenorphine-naloxone  8 mg Sublingual BID Yovana Chu MD      And   • buprenorphine-naloxone  4 mg Sublingual Daily Yovana Chu MD     • cefTRIAXone  1,000 mg Intravenous Q24H Surinder Carmona MD Stopped (04/16/23 4984)   • clonazePAM  1 mg Oral 4x Daily PRN Surinder Carmona MD     • docusate sodium  100 mg Oral Q12H Surinder Carmona MD     • enoxaparin  40 mg Subcutaneous Daily Surinder Carmona MD     • famotidine  20 mg Oral BID Surinder Carmona MD     • FLUoxetine  80 mg Oral Daily Surinder Carmona MD     • furosemide  40 mg Oral BID (diuretic) Carolina Craig DO     • gabapentin  400 mg Oral TID Delfino Navarro MD     • guaiFENesin  600 mg Oral Q12H [facility] Yovana Pena MD     • hydrOXYzine HCL  25 mg Oral Q6H PRN Yovana Chu MD     • levalbuterol  1 25 mg Nebulization TID Yovana Chu MD     • lidocaine  1 patch Topical Daily Ellen Streeter MD     • methylPREDNISolone sodium succinate  40 mg Intravenous Q6H [facility] Eva Guthrie DO     • nicotine  1 patch Transdermal Daily Delfino Navarro MD     • ondansetron  4 mg Oral Q6H PRN Delfino Navarro MD     • sodium chloride (PF)  3 mL Intravenous Q1H PRN Indira Maradiaga MD     • sodium chloride  3 mL Nebulization TID Ellen Streeter MD     • topiramate  50 mg Oral BID Delfino Navarro MD     • traZODone  100 mg Oral HS Delfino Navarro MD          Today, Patient Was Seen By: Ellen Streeter MD    **Please Note: This note may have been constructed using a voice recognition system  **

## 2023-04-16 NOTE — NURSING NOTE
Patient bed alarm set off, patient stating that she turned it off when RN arrived in the room  RN explained that patient should not turn off bed alarm and it is an intervention to prevent falls and for her safety  Patient also educated on use of call bell for assistance prior to getting up  Patient verbalized understanding  Bed alarm set  Call bell within reach

## 2023-04-16 NOTE — PLAN OF CARE
Problem: Potential for Falls  Goal: Patient will remain free of falls  Description: INTERVENTIONS:  - Educate patient/family on patient safety including physical limitations  - Instruct patient to call for assistance with activity   - Consult OT/PT to assist with strengthening/mobility   - Keep Call bell within reach  - Keep bed low and locked with side rails adjusted as appropriate  - Keep care items and personal belongings within reach  - Initiate and maintain comfort rounds  - Make Fall Risk Sign visible to staff  - Offer Toileting every 2 Hours, in advance of need  - Initiate/Maintain bed alarm  - Obtain necessary fall risk management equipment:   - Apply yellow socks and bracelet for high fall risk patients  - Consider moving patient to room near nurses station  Outcome: Progressing     Problem: PAIN - ADULT  Goal: Verbalizes/displays adequate comfort level or baseline comfort level  Description: Interventions:  - Encourage patient to monitor pain and request assistance  - Assess pain using appropriate pain scale  - Administer analgesics based on type and severity of pain and evaluate response  - Implement non-pharmacological measures as appropriate and evaluate response  - Consider cultural and social influences on pain and pain management  - Notify physician/advanced practitioner if interventions unsuccessful or patient reports new pain  Outcome: Progressing     Problem: INFECTION - ADULT  Goal: Absence or prevention of progression during hospitalization  Description: INTERVENTIONS:  - Assess and monitor for signs and symptoms of infection  - Monitor lab/diagnostic results  - Monitor all insertion sites, i e  indwelling lines, tubes, and drains  - Monitor endotracheal if appropriate and nasal secretions for changes in amount and color  - [place] appropriate cooling/warming therapies per order  - Administer medications as ordered  - Instruct and encourage patient and family to use good hand hygiene technique  - Identify and instruct in appropriate isolation precautions for identified infection/condition  Outcome: Progressing  Goal: Absence of fever/infection during neutropenic period  Description: INTERVENTIONS:  - Monitor WBC    Outcome: Progressing     Problem: SAFETY ADULT  Goal: Patient will remain free of falls  Description: INTERVENTIONS:  - Educate patient/family on patient safety including physical limitations  - Instruct patient to call for assistance with activity   - Consult OT/PT to assist with strengthening/mobility   - Keep Call bell within reach  - Keep bed low and locked with side rails adjusted as appropriate  - Keep care items and personal belongings within reach  - Initiate and maintain comfort rounds  - Make Fall Risk Sign visible to staff  - Offer Toileting every 2 Hours, in advance of need  - Initiate/Maintain bed alarm  - Obtain necessary fall risk management equipment:   - Apply yellow socks and bracelet for high fall risk patients  - Consider moving patient to room near nurses station  Outcome: Progressing  Goal: Maintain or return to baseline ADL function  Description: INTERVENTIONS:  -  Assess patient's ability to carry out ADLs; assess patient's baseline for ADL function and identify physical deficits which impact ability to perform ADLs (bathing, care of mouth/teeth, toileting, grooming, dressing, etc )  - Assess/evaluate cause of self-care deficits   - Assess range of motion  - Assess patient's mobility; develop plan if impaired  - Assess patient's need for assistive devices and provide as appropriate  - Encourage maximum independence but intervene and supervise when necessary  - Involve family in performance of ADLs  - Assess for home care needs following discharge   - Consider OT consult to assist with ADL evaluation and planning for discharge  - Provide patient education as appropriate  Outcome: Progressing  Goal: Maintains/Returns to pre admission functional level  Description: INTERVENTIONS:  - Perform BMAT or MOVE assessment daily    - Set and communicate daily mobility goal to care team and patient/family/caregiver  - Collaborate with rehabilitation services on mobility goals if consulted  - Perform Range of Motion 4 times a day  - Reposition patient every 2 hours  - Dangle patient 4 times a day  - Stand patient 4 times a day  - Ambulate patient 4 times a day  - Out of bed to chair 4 times a day   - Out of bed for meals 3 times a day  - Out of bed for toileting  - Record patient progress and toleration of activity level   Outcome: Progressing     Problem: DISCHARGE PLANNING  Goal: Discharge to home or other facility with appropriate resources  Description: INTERVENTIONS:  - Identify barriers to discharge w/patient and caregiver  - Arrange for needed discharge resources and transportation as appropriate  - Identify discharge learning needs (meds, wound care, etc )  - Arrange for interpretive services to assist at discharge as needed  - Refer to Case Management Department for coordinating discharge planning if the patient needs post-hospital services based on physician/advanced practitioner order or complex needs related to functional status, cognitive ability, or social support system  Outcome: Progressing     Problem: Knowledge Deficit  Goal: Patient/family/caregiver demonstrates understanding of disease process, treatment plan, medications, and discharge instructions  Description: Complete learning assessment and assess knowledge base    Interventions:  - Provide teaching at level of understanding  - Provide teaching via preferred learning methods  Outcome: Progressing     Problem: CARDIOVASCULAR - ADULT  Goal: Maintains optimal cardiac output and hemodynamic stability  Description: INTERVENTIONS:  - Monitor I/O, vital signs and rhythm  - Monitor for S/S and trends of decreased cardiac output  - Administer and titrate ordered vasoactive medications to optimize hemodynamic stability  - Assess quality of pulses, skin color and temperature  - Assess for signs of decreased coronary artery perfusion  - Instruct patient to report change in severity of symptoms  Outcome: Progressing  Goal: Absence of cardiac dysrhythmias or at baseline rhythm  Description: INTERVENTIONS:  - Continuous cardiac monitoring, vital signs, obtain 12 lead EKG if ordered  - Administer antiarrhythmic and heart rate control medications as ordered  - Monitor electrolytes and administer replacement therapy as ordered  Outcome: Progressing     Problem: RESPIRATORY - ADULT  Goal: Achieves optimal ventilation and oxygenation  Description: INTERVENTIONS:  - Assess for changes in respiratory status  - Assess for changes in mentation and behavior  - Position to facilitate oxygenation and minimize respiratory effort  - Oxygen administered by appropriate delivery if ordered  - Initiate smoking cessation education as indicated  - Encourage broncho-pulmonary hygiene including cough, deep breathe, Incentive Spirometry  - Assess the need for suctioning and aspirate as needed  - Assess and instruct to report SOB or any respiratory difficulty  - Respiratory Therapy support as indicated  Outcome: Progressing     Problem: METABOLIC, FLUID AND ELECTROLYTES - ADULT  Goal: Electrolytes maintained within normal limits  Description: INTERVENTIONS:  - Monitor labs and assess patient for signs and symptoms of electrolyte imbalances  - Administer electrolyte replacement as ordered  - Monitor response to electrolyte replacements, including repeat lab results as appropriate  - Instruct patient on fluid and nutrition as appropriate  Outcome: Progressing  Goal: Fluid balance maintained  Description: INTERVENTIONS:  - Monitor labs   - Monitor I/O and WT  - Instruct patient on fluid and nutrition as appropriate  - Assess for signs & symptoms of volume excess or deficit  Outcome: Progressing

## 2023-04-17 LAB
ACTIN IGG SERPL-ACNC: 2 UNITS (ref 0–19)
ANION GAP SERPL CALCULATED.3IONS-SCNC: 9 MMOL/L (ref 4–13)
BASOPHILS # BLD AUTO: 0.03 THOUSANDS/ΜL (ref 0–0.1)
BASOPHILS NFR BLD AUTO: 0 % (ref 0–1)
BUN SERPL-MCNC: 26 MG/DL (ref 5–25)
CALCIUM SERPL-MCNC: 9 MG/DL (ref 8.4–10.2)
CHLORIDE SERPL-SCNC: 103 MMOL/L (ref 96–108)
CO2 SERPL-SCNC: 27 MMOL/L (ref 21–32)
CREAT SERPL-MCNC: 0.81 MG/DL (ref 0.6–1.3)
DSDNA AB SER-ACNC: <1 IU/ML (ref 0–9)
ENA JO1 AB SER-ACNC: <0.2 AI (ref 0–0.9)
ENA SCL70 AB SER-ACNC: <0.2 AI (ref 0–0.9)
EOSINOPHIL # BLD AUTO: 0 THOUSAND/ΜL (ref 0–0.61)
EOSINOPHIL NFR BLD AUTO: 0 % (ref 0–6)
ERYTHROCYTE [DISTWIDTH] IN BLOOD BY AUTOMATED COUNT: 13.2 % (ref 11.6–15.1)
GFR SERPL CREATININE-BSD FRML MDRD: 87 ML/MIN/1.73SQ M
GLUCOSE SERPL-MCNC: 192 MG/DL (ref 65–140)
HCT VFR BLD AUTO: 33.2 % (ref 34.8–46.1)
HGB BLD-MCNC: 10.7 G/DL (ref 11.5–15.4)
IMM GRANULOCYTES # BLD AUTO: 0.21 THOUSAND/UL (ref 0–0.2)
IMM GRANULOCYTES NFR BLD AUTO: 1 % (ref 0–2)
LYMPHOCYTES # BLD AUTO: 1.15 THOUSANDS/ΜL (ref 0.6–4.47)
LYMPHOCYTES NFR BLD AUTO: 8 % (ref 14–44)
MCH RBC QN AUTO: 29.3 PG (ref 26.8–34.3)
MCHC RBC AUTO-ENTMCNC: 32.2 G/DL (ref 31.4–37.4)
MCV RBC AUTO: 91 FL (ref 82–98)
MITOCHONDRIA M2 IGG SER-ACNC: <20 UNITS (ref 0–20)
MONOCYTES # BLD AUTO: 0.55 THOUSAND/ΜL (ref 0.17–1.22)
MONOCYTES NFR BLD AUTO: 4 % (ref 4–12)
NEUTROPHILS # BLD AUTO: 12.61 THOUSANDS/ΜL (ref 1.85–7.62)
NEUTS SEG NFR BLD AUTO: 87 % (ref 43–75)
NRBC BLD AUTO-RTO: 0 /100 WBCS
PLATELET # BLD AUTO: 392 THOUSANDS/UL (ref 149–390)
PMV BLD AUTO: 8.6 FL (ref 8.9–12.7)
POTASSIUM SERPL-SCNC: 4.2 MMOL/L (ref 3.5–5.3)
RBC # BLD AUTO: 3.65 MILLION/UL (ref 3.81–5.12)
SODIUM SERPL-SCNC: 139 MMOL/L (ref 135–147)
WBC # BLD AUTO: 14.55 THOUSAND/UL (ref 4.31–10.16)

## 2023-04-17 RX ORDER — METHYLPREDNISOLONE SODIUM SUCCINATE 40 MG/ML
40 INJECTION, POWDER, LYOPHILIZED, FOR SOLUTION INTRAMUSCULAR; INTRAVENOUS EVERY 8 HOURS SCHEDULED
Status: DISCONTINUED | OUTPATIENT
Start: 2023-04-17 | End: 2023-04-18

## 2023-04-17 RX ADMIN — BUPRENORPHINE AND NALOXONE 8 MG: 8; 2 FILM BUCCAL; SUBLINGUAL at 20:31

## 2023-04-17 RX ADMIN — FUROSEMIDE 40 MG: 40 TABLET ORAL at 16:54

## 2023-04-17 RX ADMIN — TOPIRAMATE 50 MG: 25 TABLET, FILM COATED ORAL at 08:17

## 2023-04-17 RX ADMIN — BUDESONIDE AND FORMOTEROL FUMARATE DIHYDRATE 2 PUFF: 160; 4.5 AEROSOL RESPIRATORY (INHALATION) at 08:26

## 2023-04-17 RX ADMIN — METHYLPREDNISOLONE SODIUM SUCCINATE 40 MG: 40 INJECTION, POWDER, FOR SOLUTION INTRAMUSCULAR; INTRAVENOUS at 05:37

## 2023-04-17 RX ADMIN — BUPRENORPHINE AND NALOXONE 4 MG: 2; .5 FILM BUCCAL; SUBLINGUAL at 14:13

## 2023-04-17 RX ADMIN — CLONAZEPAM 1 MG: 1 TABLET ORAL at 21:55

## 2023-04-17 RX ADMIN — LEVALBUTEROL 1.25 MG: 1.25 SOLUTION, CONCENTRATE RESPIRATORY (INHALATION) at 13:37

## 2023-04-17 RX ADMIN — AZITHROMYCIN MONOHYDRATE 500 MG: 250 TABLET ORAL at 16:54

## 2023-04-17 RX ADMIN — Medication 1 PATCH: at 08:21

## 2023-04-17 RX ADMIN — CLONAZEPAM 1 MG: 1 TABLET ORAL at 05:37

## 2023-04-17 RX ADMIN — GABAPENTIN 400 MG: 400 CAPSULE ORAL at 08:17

## 2023-04-17 RX ADMIN — DOCUSATE SODIUM 100 MG: 100 CAPSULE, LIQUID FILLED ORAL at 16:55

## 2023-04-17 RX ADMIN — METHYLPREDNISOLONE SODIUM SUCCINATE 40 MG: 40 INJECTION, POWDER, FOR SOLUTION INTRAMUSCULAR; INTRAVENOUS at 21:54

## 2023-04-17 RX ADMIN — CLONAZEPAM 1 MG: 1 TABLET ORAL at 16:54

## 2023-04-17 RX ADMIN — FAMOTIDINE 20 MG: 20 TABLET ORAL at 16:55

## 2023-04-17 RX ADMIN — GUAIFENESIN 600 MG: 600 TABLET, EXTENDED RELEASE ORAL at 20:31

## 2023-04-17 RX ADMIN — Medication 3 ML: at 07:11

## 2023-04-17 RX ADMIN — FAMOTIDINE 20 MG: 20 TABLET ORAL at 08:17

## 2023-04-17 RX ADMIN — LEVALBUTEROL 1.25 MG: 1.25 SOLUTION, CONCENTRATE RESPIRATORY (INHALATION) at 19:53

## 2023-04-17 RX ADMIN — GABAPENTIN 400 MG: 400 CAPSULE ORAL at 20:30

## 2023-04-17 RX ADMIN — METHYLPREDNISOLONE SODIUM SUCCINATE 40 MG: 40 INJECTION, POWDER, FOR SOLUTION INTRAMUSCULAR; INTRAVENOUS at 14:13

## 2023-04-17 RX ADMIN — Medication 3 ML: at 13:37

## 2023-04-17 RX ADMIN — GUAIFENESIN 600 MG: 600 TABLET, EXTENDED RELEASE ORAL at 08:17

## 2023-04-17 RX ADMIN — Medication 3 ML: at 19:53

## 2023-04-17 RX ADMIN — LIDOCAINE 5% 1 PATCH: 700 PATCH TOPICAL at 08:21

## 2023-04-17 RX ADMIN — BUPRENORPHINE AND NALOXONE 8 MG: 8; 2 FILM BUCCAL; SUBLINGUAL at 08:16

## 2023-04-17 RX ADMIN — ENOXAPARIN SODIUM 40 MG: 100 INJECTION SUBCUTANEOUS at 08:16

## 2023-04-17 RX ADMIN — DOCUSATE SODIUM 100 MG: 100 CAPSULE, LIQUID FILLED ORAL at 05:37

## 2023-04-17 RX ADMIN — TOPIRAMATE 50 MG: 25 TABLET, FILM COATED ORAL at 16:55

## 2023-04-17 RX ADMIN — GABAPENTIN 400 MG: 400 CAPSULE ORAL at 16:54

## 2023-04-17 RX ADMIN — LEVALBUTEROL 1.25 MG: 1.25 SOLUTION, CONCENTRATE RESPIRATORY (INHALATION) at 07:11

## 2023-04-17 RX ADMIN — CLONAZEPAM 1 MG: 1 TABLET ORAL at 10:46

## 2023-04-17 RX ADMIN — FLUOXETINE 80 MG: 20 CAPSULE ORAL at 08:17

## 2023-04-17 RX ADMIN — CEFTRIAXONE SODIUM 1000 MG: 10 INJECTION, POWDER, FOR SOLUTION INTRAVENOUS at 08:26

## 2023-04-17 RX ADMIN — ACETAMINOPHEN 325MG 650 MG: 325 TABLET ORAL at 05:37

## 2023-04-17 RX ADMIN — FUROSEMIDE 40 MG: 40 TABLET ORAL at 08:17

## 2023-04-17 RX ADMIN — TRAZODONE HYDROCHLORIDE 100 MG: 100 TABLET ORAL at 21:55

## 2023-04-17 NOTE — PLAN OF CARE
Problem: Potential for Falls  Goal: Patient will remain free of falls  Description: INTERVENTIONS:  - Educate patient/family on patient safety including physical limitations  - Instruct patient to call for assistance with activity   - Consult OT/PT to assist with strengthening/mobility   - Keep Call bell within reach  - Keep bed low and locked with side rails adjusted as appropriate  - Keep care items and personal belongings within reach  - Initiate and maintain comfort rounds  - Make Fall Risk Sign visible to staff  - Offer Toileting every 2 Hours, in advance of need  - Initiate/Maintain bed alarm  - Obtain necessary fall risk management equipment:   - Apply yellow socks and bracelet for high fall risk patients  - Consider moving patient to room near nurses station  Outcome: Progressing     Problem: PAIN - ADULT  Goal: Verbalizes/displays adequate comfort level or baseline comfort level  Description: Interventions:  - Encourage patient to monitor pain and request assistance  - Assess pain using appropriate pain scale  - Administer analgesics based on type and severity of pain and evaluate response  - Implement non-pharmacological measures as appropriate and evaluate response  - Consider cultural and social influences on pain and pain management  - Notify physician/advanced practitioner if interventions unsuccessful or patient reports new pain  Outcome: Progressing     Problem: INFECTION - ADULT  Goal: Absence or prevention of progression during hospitalization  Description: INTERVENTIONS:  - Assess and monitor for signs and symptoms of infection  - Monitor lab/diagnostic results  - Monitor all insertion sites, i e  indwelling lines, tubes, and drains  - Monitor endotracheal if appropriate and nasal secretions for changes in amount and color  - [place] appropriate cooling/warming therapies per order  - Administer medications as ordered  - Instruct and encourage patient and family to use good hand hygiene technique  - Identify and instruct in appropriate isolation precautions for identified infection/condition  Outcome: Progressing  Goal: Absence of fever/infection during neutropenic period  Description: INTERVENTIONS:  - Monitor WBC    Outcome: Progressing     Problem: SAFETY ADULT  Goal: Patient will remain free of falls  Description: INTERVENTIONS:  - Educate patient/family on patient safety including physical limitations  - Instruct patient to call for assistance with activity   - Consult OT/PT to assist with strengthening/mobility   - Keep Call bell within reach  - Keep bed low and locked with side rails adjusted as appropriate  - Keep care items and personal belongings within reach  - Initiate and maintain comfort rounds  - Make Fall Risk Sign visible to staff  - Offer Toileting every 2 Hours, in advance of need  - Initiate/Maintain bed alarm  - Obtain necessary fall risk management equipment:   - Apply yellow socks and bracelet for high fall risk patients  - Consider moving patient to room near nurses station  Outcome: Progressing  Goal: Maintain or return to baseline ADL function  Description: INTERVENTIONS:  -  Assess patient's ability to carry out ADLs; assess patient's baseline for ADL function and identify physical deficits which impact ability to perform ADLs (bathing, care of mouth/teeth, toileting, grooming, dressing, etc )  - Assess/evaluate cause of self-care deficits   - Assess range of motion  - Assess patient's mobility; develop plan if impaired  - Assess patient's need for assistive devices and provide as appropriate  - Encourage maximum independence but intervene and supervise when necessary  - Involve family in performance of ADLs  - Assess for home care needs following discharge   - Consider OT consult to assist with ADL evaluation and planning for discharge  - Provide patient education as appropriate  Outcome: Progressing  Goal: Maintains/Returns to pre admission functional level  Description: INTERVENTIONS:  - Perform BMAT or MOVE assessment daily    - Set and communicate daily mobility goal to care team and patient/family/caregiver  - Collaborate with rehabilitation services on mobility goals if consulted  - Perform Range of Motion 4 times a day  - Reposition patient every 2 hours  - Dangle patient 4 times a day  - Stand patient 4 times a day  - Ambulate patient 4 times a day  - Out of bed to chair 4 times a day   - Out of bed for meals 3 times a day  - Out of bed for toileting  - Record patient progress and toleration of activity level   Outcome: Progressing     Problem: DISCHARGE PLANNING  Goal: Discharge to home or other facility with appropriate resources  Description: INTERVENTIONS:  - Identify barriers to discharge w/patient and caregiver  - Arrange for needed discharge resources and transportation as appropriate  - Identify discharge learning needs (meds, wound care, etc )  - Arrange for interpretive services to assist at discharge as needed  - Refer to Case Management Department for coordinating discharge planning if the patient needs post-hospital services based on physician/advanced practitioner order or complex needs related to functional status, cognitive ability, or social support system  Outcome: Progressing     Problem: Knowledge Deficit  Goal: Patient/family/caregiver demonstrates understanding of disease process, treatment plan, medications, and discharge instructions  Description: Complete learning assessment and assess knowledge base    Interventions:  - Provide teaching at level of understanding  - Provide teaching via preferred learning methods  Outcome: Progressing     Problem: CARDIOVASCULAR - ADULT  Goal: Maintains optimal cardiac output and hemodynamic stability  Description: INTERVENTIONS:  - Monitor I/O, vital signs and rhythm  - Monitor for S/S and trends of decreased cardiac output  - Administer and titrate ordered vasoactive medications to optimize hemodynamic stability  - Assess quality of pulses, skin color and temperature  - Assess for signs of decreased coronary artery perfusion  - Instruct patient to report change in severity of symptoms  Outcome: Progressing  Goal: Absence of cardiac dysrhythmias or at baseline rhythm  Description: INTERVENTIONS:  - Continuous cardiac monitoring, vital signs, obtain 12 lead EKG if ordered  - Administer antiarrhythmic and heart rate control medications as ordered  - Monitor electrolytes and administer replacement therapy as ordered  Outcome: Progressing     Problem: RESPIRATORY - ADULT  Goal: Achieves optimal ventilation and oxygenation  Description: INTERVENTIONS:  - Assess for changes in respiratory status  - Assess for changes in mentation and behavior  - Position to facilitate oxygenation and minimize respiratory effort  - Oxygen administered by appropriate delivery if ordered  - Initiate smoking cessation education as indicated  - Encourage broncho-pulmonary hygiene including cough, deep breathe, Incentive Spirometry  - Assess the need for suctioning and aspirate as needed  - Assess and instruct to report SOB or any respiratory difficulty  - Respiratory Therapy support as indicated  Outcome: Progressing     Problem: METABOLIC, FLUID AND ELECTROLYTES - ADULT  Goal: Electrolytes maintained within normal limits  Description: INTERVENTIONS:  - Monitor labs and assess patient for signs and symptoms of electrolyte imbalances  - Administer electrolyte replacement as ordered  - Monitor response to electrolyte replacements, including repeat lab results as appropriate  - Instruct patient on fluid and nutrition as appropriate  Outcome: Progressing  Goal: Fluid balance maintained  Description: INTERVENTIONS:  - Monitor labs   - Monitor I/O and WT  - Instruct patient on fluid and nutrition as appropriate  - Assess for signs & symptoms of volume excess or deficit  Outcome: Progressing

## 2023-04-17 NOTE — UTILIZATION REVIEW
Notification of Unplanned, Urgent, or   Emergency Inpatient Admission   [facility]  [address] [facility], [address]   Tax ID: [id number]  NPI: [id number]  Place of Service: [address] [facility]  [address]  Admission Level of Care: Inpatient  Place of Service Code: 24     Attending Physician Information  Attending Name and NPI#: Padmini Santiago, [address] Gonzalez [[id number]]  Phone: [phone]     Admission Information  Inpatient Admission Date/Time: 4/13/23  3:41 PM  Discharge Date/Time: No discharge date for patient encounter  Admitting Diagnosis Code/Description:  Respiratory distress [R06 03]  Hypoxia [R09 02]  Abnormal chest CT [R93 89]  Elevated troponin [R77 8]     Utilization Review Contact  Kyler Vogt Utilization   Phone: [phone]  Fax: [phone]  Email: [email or web address]  Contact for approvals/pending authorizations, clinical reviews, and discharge  Physician Advisory Services Contact  Medical Necessity Denial & [employer] Discussion  Phone: [phone]  Fax: [phone]  Email: [email or web address]

## 2023-04-17 NOTE — PROGRESS NOTES
[address] [facility]  Progress Note  Name: Loraine Astorga  MRN: [id number]  Unit/Bed#: [address] 68 2 [address] 208-01 I Date of Admission: 4/13/2023   Date of Service: 4/17/2023 I Hospital Day: 4    Assessment/Plan   * Acute respiratory failure with hypoxia (HCC)  Assessment & Plan  · Acute respiratory failure due to possible cardiogenic pulmonary edema versus noncardiogenic pulmonary edema versus atypical pneumonia/ pneumonitis  · CTA negative for PE  · Pulmonary follow-up appreciated  · Patient improving therefore bronchoscopy was canceled  · Continue IV Solu-Medrol every 8 hours, antibiotics  · IgE, ANCA, anti-Karla, scleroderma and AMA pending    Acute right ventricular heart failure (HCC)  Assessment & Plan  · The echo 4/14 revealed ejection fraction 25%, diastolic function normal, moderately dilated right ventricle  · Continue furosemide 40 mg twice daily  · Cardiology follow-up appreciated  ·  daily weights, I's and O's    Abnormal CT of the chest  Assessment & Plan  · Abnormal CT of the chest with severe bilateral groundglass opacities and adenopathy  · Opacities suggest pulmonary edema  The adenopathy suggest sarcoidosis  · CT last year which showed similar finding  When she was admitted, there was concern for vape related lung injury  Patient currently denies use of vape, admits to ongoing smoking, half a pack a day  · Continued on ceftriaxone and Zithromax for possible atypical pneumonia    Systemic steroids added to therapy  · Started Lasix for pulmonary edema, right heart failure  · Appreciate pulmonology and cardiology input    Elevated troponin  Assessment & Plan  · Elevated troponin with evaded BNP and abnormal CT chest showing pulmonary edema  · Nonischemic myocardial injury due to right heart strain  · 2D Echo with evidence of right heart failure  · Cardiology input appreciated, continue Lasix, holding parameters adjusted due to marginal blood pressures  · Monitor volume status with daily weights, I's and O's  · Low Na diet     Opioid use disorder, severe, on maintenance therapy (HCC)  Assessment & Plan  · PDMP and care everywhere reviewed  · She is maintained on  Suboxone TID 8mg-4mg-8mg     Anxiety disorder, unspecified  Assessment & Plan  · Anxiety disorder followed by the [facility] [place]  · PDMP and care everywhere reviewed  · Confirmed that she is on clonazepam 1 mg 4 times daily as needed  · With persistent anxiety added Atarax PRN    Tobacco abuse  Assessment & Plan  · Placed on nicotine patch  · Smoking cessation counseling    Bipolar disorder, unspecified ([facility] [address] )  Assessment & Plan  · Bipolar disorder [facility]  · Medications reviewed via care everywhere  · Continue Prozac 80 mg daily, trazodone 100 mg at bedtime, Neurontin 400 mg 3 times daily               VTE Pharmacologic Prophylaxis:   Pharmacologic: Lovenox    Patient Centered Rounds: I have performed bedside rounds with nursing staff today  Education and Discussions with Family / Patient: Patient    Time Spent for Care: More than 50% of total time spent on counseling and coordination of care as described above  Current Length of Stay: 4 day(s)    Current Patient Status: Inpatient   Certification Statement: The patient will continue to require additional inpatient hospital stay due to Acute hypoxic respiratory failure    Discharge Plan / Estimated Discharge Date: TBD    Code Status: Level 1 - Full Code      Subjective:   Patient seen and examined at bedside, sitting upright, states she feels anxious, denies any dyspnea or chest pain    Objective:     Vitals:   Temp (24hrs), Av 8 °F (36 °C), Min:95 9 °F (35 5 °C), Max:97 3 °F (36 3 °C)    Temp:  [95 9 °F (35 5 °C)-97 3 °F (36 3 °C)] 97 3 °F (36 3 °C)  HR:  [45-65] 45  Resp:  [16-21] 21  BP: (113-117)/(65-68) 117/68  SpO2:  [93 %-98 %] 97 %  Body mass index is 29 66 kg/m²       Input and Output Summary (last 24 hours):     No intake or output data in the 24 hours ending 04/17/23 1645    Physical Exam:    Constitutional: Patient is oriented to person, place and time, no acute distress  HEENT:  Normocephalic, atraumatic  Cardiovascular: Normal S1S2, RRR, No murmurs/rubs/gallops appreciated  Pulmonary: Decreased breath sounds bilaterally  Abdominal: Soft, Bowel sounds present, Non-tender, Non-distended  Extremities:  No cyanosis, clubbing or edema  Neurological: Cranial nerves II-XII grossly intact, sensation intact, otherwise no focal neurological symptoms  Skin:  Warm, dry    Additional Data:     Labs:    Results from last 7 days   Lab Units 04/17/23  0506   WBC Thousand/uL 14 55*   HEMOGLOBIN g/dL 10 7*   HEMATOCRIT % 33 2*   PLATELETS Thousands/uL 392*   NEUTROS PCT % 87*   LYMPHS PCT % 8*   MONOS PCT % 4   EOS PCT % 0     Results from last 7 days   Lab Units 04/17/23  0506   POTASSIUM mmol/L 4 2   CHLORIDE mmol/L 103   CO2 mmol/L 27   BUN mg/dL 26*   CREATININE mg/dL 0 81   CALCIUM mg/dL 9 0            I Have Reviewed All Lab Data Listed Above  Invasive Devices     Peripheral Intravenous Line  Duration           Peripheral IV 04/17/23 Distal;Left;Ventral (anterior) Forearm <1 day                     Recent Cultures (last 7 days):     Results from last 7 days   Lab Units 04/15/23  0643 04/13/23  1833 04/13/23  1200   BLOOD CULTURE   --   --  No Growth at 72 hrs  No Growth at 72 hrs  SPUTUM CULTURE  Test not performed  Suggest repeat specimen  --   --    GRAM STAIN RESULT  >10 squamous epithelial cells/lpf, indicating orophayngeal contamination  *  2+ Yeast*  2+ Gram positive cocci in pairs*  2+ Gram positive cocci in clusters*  2+ Gram negative rods*  --   --    LEGIONELLA URINARY ANTIGEN   --  Negative  --        Last 24 Hours Medication List:   Current Facility-Administered Medications   Medication Dose Route Frequency Provider Last Rate   • acetaminophen  650 mg Oral Q6H PRN Brett De La O MD     • albuterol  2 puff Inhalation Q4H PRN Yovana Quinn MD     • azithromycin  500 mg Oral Q24H Missy Wells MD     • benzonatate  100 mg Oral TID PRN Yovana Quinn MD     • budesonide-formoterol  2 puff Inhalation BID NETTE Palafox     • buprenorphine-naloxone  8 mg Sublingual BID Yovana Chu MD      And   • buprenorphine-naloxone  4 mg Sublingual Daily Yovana Chu MD     • cefTRIAXone  1,000 mg Intravenous Q24H Missy Wells MD 1,000 mg (04/17/23 0826)   • clonazePAM  1 mg Oral 4x Daily PRN Missy Wells MD     • docusate sodium  100 mg Oral Q12H Missy Wells MD     • enoxaparin  40 mg Subcutaneous Daily Missy Wells MD     • famotidine  20 mg Oral BID Missy Wells MD     • FLUoxetine  80 mg Oral Daily Missy Wells MD     • furosemide  40 mg Oral BID (diuretic) Carolina Craig DO     • gabapentin  400 mg Oral TID Missy Wells MD     • guaiFENesin  600 mg Oral Q12H [address] Yovana Quinn MD     • hydrOXYzine HCL  25 mg Oral Q6H PRN Yovana Chu MD     • levalbuterol  1 25 mg Nebulization TID Mariella August MD     • lidocaine  1 patch Topical Daily Mariella August MD     • methylPREDNISolone sodium succinate  40 mg Intravenous [facility] NETTE Paul     • nicotine  1 patch Transdermal Daily Missy Wells MD     • ondansetron  4 mg Oral Q6H PRN Missy Wells MD     • sodium chloride (PF)  3 mL Intravenous Q1H PRN Gonzales Navarro MD     • sodium chloride  3 mL Nebulization TID Mariella August MD     • topiramate  50 mg Oral BID Missy Wells MD     • traZODone  100 mg Oral HS Missy Wells MD          Today, Patient Was Seen By: Lyla Stout MD

## 2023-04-17 NOTE — PROGRESS NOTES
"  Cardiology         Progress Note - Cardiology   [facility] 41 y o  female MRN: [id number]  Unit/Bed#: Metsa 68 2 -01 Encounter: [id number]          Assessment/Recommendations/Discussion:   1  Acute hypoxic respiratory failure  2  Acute CHF  3  Nonischemic myocardial injury secondary to above  4  Bipolar disorder  5  Chronic opioid use disorder  6  Tobacco use disorder  7  Hepatitis C      · Good urine output  Slightly increased BUN noted  We will continue IV Lasix on the day and assess clinical progress, urine output, weights, renal function, electrolytes, blood pressure  Continue to suspect noncardiogenic component for pulmonary edema in light of isolated right heart disease with normal left heart            Subjective: Patient seen and examined, feels better today, less short of breath although states is feeling quite anxious, as she wants to smoke and misses her cat                Physical Exam:  GEN:  NAD  HEENT:  MMM, NCAT, pink conjunctiva, EOMI, nonicteric sclera  CV:  NO JVD/HJR, RR, NO M/R/G, +S1/S2, NO PARASTERNAL HEAVE/THRILL, NO LE EDEMA, NO HEPATIC SYSTOLIC PULSATION, WARM EXTREMITIES  RESP: Distant breath sounds  ABD:  SOFT, NT, NO GROSS ORGANOMEGALY        Vitals:   /66   Pulse 58   Temp (!) 97 3 °F (36 3 °C)   Resp 19   Ht 5' 1\" (1 549 m)   Wt 71 2 kg (156 lb 15 5 oz)   LMP 04/13/2023 (Exact Date)   SpO2 96%   BMI 29 66 kg/m²   Vitals:    04/16/23 0600 04/17/23 0547   Weight: 70 4 kg (155 lb 3 3 oz) 71 2 kg (156 lb 15 5 oz)     No intake or output data in the 24 hours ending 04/17/23 1057  Lab Results:  Results from last 7 days   Lab Units 04/17/23  0506   WBC Thousand/uL 14 55*   HEMOGLOBIN g/dL 10 7*   HEMATOCRIT % 33 2*   PLATELETS Thousands/uL 392*     Results from last 7 days   Lab Units 04/17/23  0506   POTASSIUM mmol/L 4 2   CHLORIDE mmol/L 103   CO2 mmol/L 27   BUN mg/dL 26*   CREATININE mg/dL 0 81   CALCIUM mg/dL 9 0     Results from last 7 days   Lab Units " 04/17/23  0506   POTASSIUM mmol/L 4 2   CHLORIDE mmol/L 103   CO2 mmol/L 27   BUN mg/dL 26*   CREATININE mg/dL 0 81   CALCIUM mg/dL 9 0           Medications:    Current Facility-Administered Medications:   •  acetaminophen (TYLENOL) tablet 650 mg, 650 mg, Oral, Q6H PRN, Nir White MD, 650 mg at 04/17/23 0537  •  albuterol (PROVENTIL HFA,VENTOLIN HFA) inhaler 2 puff, 2 puff, Inhalation, Q4H PRN, Yovana Jose MD, 2 puff at 04/15/23 1748  •  azithromycin (ZITHROMAX) tablet 500 mg, 500 mg, Oral, Q24H, Nir White MD, 500 mg at 04/16/23 1721  •  benzonatate (TESSALON PERLES) capsule 100 mg, 100 mg, Oral, TID PRN, Yovana Jose MD  •  budesonide-formoterol (SYMBICORT) 160-4 5 mcg/act inhaler 2 puff, 2 puff, Inhalation, BID, Manjula Amas, CRNP, 2 puff at 04/17/23 6993  •  buprenorphine-naloxone (Suboxone) film 8 mg, 8 mg, Sublingual, BID, 8 mg at 04/17/23 0816 **AND** buprenorphine-naloxone (Suboxone) film 4 mg, 4 mg, Sublingual, Daily, Yovana Chu MD, 4 mg at 04/16/23 1308  •  cefTRIAXone (ROCEPHIN) 1,000 mg in dextrose 5 % 50 mL IVPB, 1,000 mg, Intravenous, Q24H, Nir White MD, Last Rate: 100 mL/hr at 04/17/23 0826, 1,000 mg at 04/17/23 7309  •  clonazePAM (KlonoPIN) tablet 1 mg, 1 mg, Oral, 4x Daily PRN, Nir White MD, 1 mg at 04/17/23 1046  •  docusate sodium (COLACE) capsule 100 mg, 100 mg, Oral, Q12H, Nir White MD, 100 mg at 04/17/23 0537  •  enoxaparin (LOVENOX) subcutaneous injection 40 mg, 40 mg, Subcutaneous, Daily, Nir White MD, 40 mg at 04/17/23 1381  •  famotidine (PEPCID) tablet 20 mg, 20 mg, Oral, BID, Nir White MD, 20 mg at 04/17/23 5387  •  FLUoxetine (PROzac) capsule 80 mg, 80 mg, Oral, Daily, Nir White MD, 80 mg at 04/17/23 3820  •  furosemide (LASIX) tablet 40 mg, 40 mg, Oral, BID (diuretic), Carolina Craig DO, 40 mg at 04/17/23 2286  •  gabapentin (NEURONTIN) capsule 400 mg, 400 mg, Oral, TID, Niles Mccall MD, 400 mg at 04/17/23 0817  •  guaiFENesin (MUCINEX) 12 hr tablet 600 mg, 600 mg, Oral, Q12H [facility], Yovana Chu MD, 600 mg at 04/17/23 0817  •  hydrOXYzine HCL (ATARAX) tablet 25 mg, 25 mg, Oral, Q6H PRN, Yovana Velasco MD  •  levalbuterol (XOPENEX) inhalation solution 1 25 mg, 1 25 mg, Nebulization, TID, Yovana Chu MD, 1 25 mg at 04/17/23 0711  •  lidocaine (LIDODERM) 5 % patch 1 patch, 1 patch, Topical, Daily, Yovana Chu MD, 1 patch at 04/17/23 0061  •  methylPREDNISolone sodium succinate (Solu-MEDROL) injection 40 mg, 40 mg, Intravenous, Q8H [facility], NETTE Acosta  •  nicotine (NICODERM CQ) 14 mg/24hr TD 24 hr patch 1 patch, 1 patch, Transdermal, Daily, Niles Mccall MD, 1 patch at 04/17/23 9219  •  ondansetron (ZOFRAN-ODT) dispersible tablet 4 mg, 4 mg, Oral, Q6H PRN, Niles Mccall MD, 4 mg at 04/14/23 6018  •  Insert peripheral IV, , , Once **AND** sodium chloride (PF) 0 9 % injection 3 mL, 3 mL, Intravenous, Q1H PRN, Keith Ram MD  •  sodium chloride 0 9 % inhalation solution 3 mL, 3 mL, Nebulization, TID, Yovana Chu MD, 3 mL at 04/17/23 8406  •  topiramate (TOPAMAX) tablet 50 mg, 50 mg, Oral, BID, Niles Mccall MD, 50 mg at 04/17/23 7249  •  traZODone (DESYREL) tablet 100 mg, 100 mg, Oral, HS, Niles Mccall MD, 100 mg at 04/16/23 2111    This note was completed in part utilizing M-[employer] Fluency Direct Software  Grammatical errors, random word insertions, spelling mistakes, and incomplete sentences may be an occasional consequence of this system secondary to software limitations, ambient noise, and hardware issues  If you have any questions or concerns about the content, text, or information contained within the body of this dictation, please contact the provider for clarification

## 2023-04-17 NOTE — PROGRESS NOTES
"Progress Note - Pulmonary   Mead Amour 41 y o  female MRN: [id number]  Unit/Bed#: [place] [address] -01 Encounter: [id number]    Assessment/Plan:    1  Acute hypoxic respiratory failure likely multifaceted as listed below        -Decrease to 5 L-96%, does not wear home O2        -Maintain saturations greater than 88%        -Pulmonary toileting: Deep breathing cough, OOB as tolerated, IS q 1 hr        -We will need ambulatory pulse ox prior to discharge    2  Abnormal chest CT        -Severe diffuse GGO w/ septal wall thickening and lymphadenopathy        -Procalcitonin- 0 36-- 0 24        -Given responsiveness to steroid will be secondary to EVALI/pneumonitis        -Sputum pending-pulmonary looks unremarkable, negative RP2, [place]        -Pending: IgE, ANCA, Anti- Karla, scleroderma, AMA        -Day # 3-IV Solu-Medrol 40 mg every 6-reduce to every 8        -We will cancel bronchoscopy        -We will need repeat imaging 4 to 6 weeks        -Day # 4-ceftriaxone total 5/5 days antibiotic    3  Moderate to severe pulmonary hypertension        -Likely WHO group II & III        -4/14/2023-Echo EF 60%   RV moderately dilated-PA pressure 50 mmHg        -Cardiology following    4  Suspected COPD of unknown severity without acute exacerbation         -Inpatient: Continue Symbicort, Xopenex saline nebulizers 3 times a day         -PFTs have been ordered for outpatient         -We will discuss home regimen closer to discharge    5  Active tobacco abuse        -Encourage and educated on tobacco cessation        -NRT per primary team        -Patient appears in precontemplation stage           Chief Complaint:    \"I feel pretty good\"    Subjective:    Akhil Cain was comfortably sitting in her bed  She reports that she overall feels better since admission  No significant overnight events reported    Patient currently denying any fevers, chills, abscess, headaches, night sweats, pleuritic chest pain, or " "palpations  Objective:    Vitals: Blood pressure 116/66, pulse 58, temperature (!) 97 3 °F (36 3 °C), resp  rate 19, height 5' 1\" (1 549 m), weight 71 2 kg (156 lb 15 5 oz), last menstrual period 04/13/2023, SpO2 96 %  ra,Body mass index is 29 66 kg/m²  Intake/Output Summary (Last 24 hours) at 4/17/2023 0818  Last data filed at 4/16/2023 4343  Gross per 24 hour   Intake --   Output 900 ml   Net -900 ml       Invasive Devices     Peripheral Intravenous Line  Duration           Peripheral IV 04/13/23 Right Forearm 3 days                Physical Exam:   Physical Exam  Constitutional:       General: She is not in acute distress  Appearance: Normal appearance  She is normal weight  She is not ill-appearing  HENT:      Head: Normocephalic and atraumatic  Nose: Nose normal  No congestion or rhinorrhea  Mouth/Throat:      Mouth: Mucous membranes are moist       Pharynx: Oropharynx is clear  No oropharyngeal exudate or posterior oropharyngeal erythema  Cardiovascular:      Rate and Rhythm: Normal rate and regular rhythm  Pulses: Normal pulses  Heart sounds: Normal heart sounds  No murmur heard  No friction rub  No gallop  Pulmonary:      Effort: Pulmonary effort is normal  No tachypnea, bradypnea, accessory muscle usage or respiratory distress  Breath sounds: Decreased air movement present  No stridor  Decreased breath sounds present  No wheezing, rhonchi or rales  Comments: Overall clear breath sounds some scattered rales  Chest:      Chest wall: No tenderness  Abdominal:      General: Abdomen is flat  Bowel sounds are normal  There is no distension  Palpations: Abdomen is soft  There is no mass  Musculoskeletal:         General: No swelling or tenderness  Normal range of motion  Cervical back: Normal range of motion  No rigidity or tenderness  Skin:     General: Skin is warm and dry  Coloration: Skin is not jaundiced or pale     Neurological:      " General: No focal deficit present  Mental Status: She is alert and oriented to person, place, and time  Mental status is at baseline  Psychiatric:         Mood and Affect: Mood normal          Behavior: Behavior normal          Labs: I have personally reviewed pertinent lab results  , ABG: No results found for: PHART, DOL9CLI, PO2ART, DVJ5PUC, L6LMLZWE, BEART, SOURCE, BNP: No results found for: BNP, CBC:   Lab Results   Component Value Date    WBC 14 55 (H) 04/17/2023    HGB 10 7 (L) 04/17/2023    HCT 33 2 (L) 04/17/2023    MCV 91 04/17/2023     (H) 04/17/2023    MCH 29 3 04/17/2023    MCHC 32 2 04/17/2023    RDW 13 2 04/17/2023    MPV 8 6 (L) 04/17/2023    NRBC 0 04/17/2023   , CMP:   Lab Results   Component Value Date    SODIUM 139 04/17/2023    K 4 2 04/17/2023     04/17/2023    CO2 27 04/17/2023    BUN 26 (H) 04/17/2023    CREATININE 0 81 04/17/2023    CALCIUM 9 0 04/17/2023    EGFR 87 04/17/2023   , PT/INR: No results found for: PT, INR, Troponin: No results found for: TROPONINI        Imaging and other studies: I have personally reviewed pertinent films in PACS     CTA chest 4/14/2023-worsening groundglass opacification throughout both lungs

## 2023-04-17 NOTE — ASSESSMENT & PLAN NOTE
· Abnormal CT of the chest with severe bilateral groundglass opacities and adenopathy  · Opacities suggest pulmonary edema  The adenopathy suggest sarcoidosis  · CT last year which showed similar finding  When she was admitted, there was concern for vape related lung injury  Patient currently denies use of vape, admits to ongoing smoking, half a pack a day  · Continued on ceftriaxone and Zithromax for possible atypical pneumonia    Systemic steroids added to therapy  · Started Lasix for pulmonary edema, right heart failure  · Appreciate pulmonology and cardiology input

## 2023-04-17 NOTE — PROGRESS NOTES
Patient fell yesterday  On bed alarm  Patient would get up and turn the bed alarm off by herself  Reminded of importance of bed alarm and using call bell and waiting for assistance before getting up in bed  Patient would be agreeable but would still do the same  Patient currently back in bed  Bed alarm on  Will continue to monitor

## 2023-04-17 NOTE — ASSESSMENT & PLAN NOTE
· Anxiety disorder followed by the [address] [place]  · PDMP and care everywhere reviewed    · Confirmed that she is on clonazepam 1 mg 4 times daily as needed  · With persistent anxiety added Atarax PRN

## 2023-04-17 NOTE — ASSESSMENT & PLAN NOTE
· Acute respiratory failure due to possible cardiogenic pulmonary edema versus noncardiogenic pulmonary edema versus atypical pneumonia/ pneumonitis  · CTA negative for PE  · Pulmonary follow-up appreciated  · Patient improving therefore bronchoscopy was canceled  · Continue IV Solu-Medrol every 8 hours, antibiotics  · IgE, ANCA, anti-Karla, scleroderma and AMA pending

## 2023-04-17 NOTE — ASSESSMENT & PLAN NOTE
· The echo 4/14 revealed ejection fraction 14%, diastolic function normal, moderately dilated right ventricle  · Continue furosemide 40 mg twice daily  · Cardiology follow-up appreciated  ·  daily weights, I's and O's

## 2023-04-17 NOTE — ASSESSMENT & PLAN NOTE
· Bipolar disorder followed by [facility]  · Medications reviewed via care everywhere    · Continue Prozac 80 mg daily, trazodone 100 mg at bedtime, Neurontin 400 mg 3 times daily

## 2023-04-17 NOTE — PLAN OF CARE
Problem: Potential for Falls  Goal: Patient will remain free of falls  Description: INTERVENTIONS:  - Educate patient/family on patient safety including physical limitations  - Instruct patient to call for assistance with activity   - Consult OT/PT to assist with strengthening/mobility   - Keep Call bell within reach  - Keep bed low and locked with side rails adjusted as appropriate  - Keep care items and personal belongings within reach  - Initiate and maintain comfort rounds  - Make Fall Risk Sign visible to staff  - Offer Toileting every  Hours, in advance of need  - Initiate/Maintain alarm  - Obtain necessary fall risk management equipment:   - Apply yellow socks and bracelet for high fall risk patients  - Consider moving patient to room near nurses station  Outcome: Progressing     Problem: PAIN - ADULT  Goal: Verbalizes/displays adequate comfort level or baseline comfort level  Description: Interventions:  - Encourage patient to monitor pain and request assistance  - Assess pain using appropriate pain scale  - Administer analgesics based on type and severity of pain and evaluate response  - Implement non-pharmacological measures as appropriate and evaluate response  - Consider cultural and social influences on pain and pain management  - Notify physician/advanced practitioner if interventions unsuccessful or patient reports new pain  Outcome: Progressing     Problem: INFECTION - ADULT  Goal: Absence or prevention of progression during hospitalization  Description: INTERVENTIONS:  - Assess and monitor for signs and symptoms of infection  - Monitor lab/diagnostic results  - Monitor all insertion sites, i e  indwelling lines, tubes, and drains  - Monitor endotracheal if appropriate and nasal secretions for changes in amount and color  - [place] appropriate cooling/warming therapies per order  - Administer medications as ordered  - Instruct and encourage patient and family to use good hand hygiene technique  - Identify and instruct in appropriate isolation precautions for identified infection/condition  Outcome: Progressing  Goal: Absence of fever/infection during neutropenic period  Description: INTERVENTIONS:  - Monitor WBC    Outcome: Progressing     Problem: SAFETY ADULT  Goal: Patient will remain free of falls  Description: INTERVENTIONS:  - Educate patient/family on patient safety including physical limitations  - Instruct patient to call for assistance with activity   - Consult OT/PT to assist with strengthening/mobility   - Keep Call bell within reach  - Keep bed low and locked with side rails adjusted as appropriate  - Keep care items and personal belongings within reach  - Initiate and maintain comfort rounds  - Make Fall Risk Sign visible to staff  - Offer Toileting every  Hours, in advance of need  - Initiate/Maintain alarm  - Obtain necessary fall risk management equipment:   - Apply yellow socks and bracelet for high fall risk patients  - Consider moving patient to room near nurses station  Outcome: Progressing  Goal: Maintain or return to baseline ADL function  Description: INTERVENTIONS:  -  Assess patient's ability to carry out ADLs; assess patient's baseline for ADL function and identify physical deficits which impact ability to perform ADLs (bathing, care of mouth/teeth, toileting, grooming, dressing, etc )  - Assess/evaluate cause of self-care deficits   - Assess range of motion  - Assess patient's mobility; develop plan if impaired  - Assess patient's need for assistive devices and provide as appropriate  - Encourage maximum independence but intervene and supervise when necessary  - Involve family in performance of ADLs  - Assess for home care needs following discharge   - Consider OT consult to assist with ADL evaluation and planning for discharge  - Provide patient education as appropriate  Outcome: Progressing  Goal: Maintains/Returns to pre admission functional level  Description: INTERVENTIONS:  - Perform BMAT or MOVE assessment daily    - Set and communicate daily mobility goal to care team and patient/family/caregiver  - Collaborate with rehabilitation services on mobility goals if consulted  - Perform Range of Motion  times a day  - Reposition patient every  hours  - Dangle patient  times a day  - Stand patient  times a day  - Ambulate patient  times a day  - Out of bed to chair  times a day   - Out of bed for meals times a day  - Out of bed for toileting  - Record patient progress and toleration of activity level   Outcome: Progressing     Problem: DISCHARGE PLANNING  Goal: Discharge to home or other facility with appropriate resources  Description: INTERVENTIONS:  - Identify barriers to discharge w/patient and caregiver  - Arrange for needed discharge resources and transportation as appropriate  - Identify discharge learning needs (meds, wound care, etc )  - Arrange for interpretive services to assist at discharge as needed  - Refer to Case Management Department for coordinating discharge planning if the patient needs post-hospital services based on physician/advanced practitioner order or complex needs related to functional status, cognitive ability, or social support system  Outcome: Progressing     Problem: Knowledge Deficit  Goal: Patient/family/caregiver demonstrates understanding of disease process, treatment plan, medications, and discharge instructions  Description: Complete learning assessment and assess knowledge base    Interventions:  - Provide teaching at level of understanding  - Provide teaching via preferred learning methods  Outcome: Progressing     Problem: CARDIOVASCULAR - ADULT  Goal: Maintains optimal cardiac output and hemodynamic stability  Description: INTERVENTIONS:  - Monitor I/O, vital signs and rhythm  - Monitor for S/S and trends of decreased cardiac output  - Administer and titrate ordered vasoactive medications to optimize hemodynamic stability  - Assess quality of pulses, skin color and temperature  - Assess for signs of decreased coronary artery perfusion  - Instruct patient to report change in severity of symptoms  Outcome: Progressing  Goal: Absence of cardiac dysrhythmias or at baseline rhythm  Description: INTERVENTIONS:  - Continuous cardiac monitoring, vital signs, obtain 12 lead EKG if ordered  - Administer antiarrhythmic and heart rate control medications as ordered  - Monitor electrolytes and administer replacement therapy as ordered  Outcome: Progressing     Problem: RESPIRATORY - ADULT  Goal: Achieves optimal ventilation and oxygenation  Description: INTERVENTIONS:  - Assess for changes in respiratory status  - Assess for changes in mentation and behavior  - Position to facilitate oxygenation and minimize respiratory effort  - Oxygen administered by appropriate delivery if ordered  - Initiate smoking cessation education as indicated  - Encourage broncho-pulmonary hygiene including cough, deep breathe, Incentive Spirometry  - Assess the need for suctioning and aspirate as needed  - Assess and instruct to report SOB or any respiratory difficulty  - Respiratory Therapy support as indicated  Outcome: Progressing     Problem: METABOLIC, FLUID AND ELECTROLYTES - ADULT  Goal: Electrolytes maintained within normal limits  Description: INTERVENTIONS:  - Monitor labs and assess patient for signs and symptoms of electrolyte imbalances  - Administer electrolyte replacement as ordered  - Monitor response to electrolyte replacements, including repeat lab results as appropriate  - Instruct patient on fluid and nutrition as appropriate  Outcome: Progressing  Goal: Fluid balance maintained  Description: INTERVENTIONS:  - Monitor labs   - Monitor I/O and WT  - Instruct patient on fluid and nutrition as appropriate  - Assess for signs & symptoms of volume excess or deficit  Outcome: Progressing

## 2023-04-17 NOTE — CASE MANAGEMENT
Case Management Discharge Planning Note    Patient name Sheree Lopez  Location [facility] 2 /[address] [facility] Terri Caldwell* MRN [id number]  : 1975 Date 2023       Current Admission Date: 2023  Current Admission Diagnosis:Acute respiratory failure with hypoxia [facility])   Patient Active Problem List    Diagnosis Date Noted   • Acute right ventricular heart failure ([facility] [address] ) 2023   • Elevated troponin 2023   • Abnormal CT of the chest 2023   • Pneumonitis 10/28/2022   • Mild protein-calorie malnutrition ([facility] [address] ) 10/08/2022   • 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[facility] [address] ) 10/07/2022   • Anxiety disorder, unspecified 10/06/2022   • Opioid use disorder, severe, on maintenance therapy ([facility] [address] ) 10/06/2022   • Hepatitis C antibody test positive 2022   • Bradycardia 2022   • Acute respiratory failure with hypoxia ([address] ) 2022   • Substance Abuse Disorder  2022   • Anemia 2022   • Muscle spasm of shoulder region 2022   • Dyspepsia 2022   • Bipolar disorder, unspecified ([facility] [address] ) 2021   • Tobacco abuse 2021   • Encounter for monitoring opioid maintenance therapy 2021   • Rhabdomyolysis 2021      LOS (days): 4  Geometric Mean LOS (GMLOS) (days): 3 50  Days to GMLOS:-0 4     OBJECTIVE:  Risk of Unplanned Readmission Score: 36 17         Current admission status: Inpatient   Preferred Pharmacy:   [address] [place] [address]  Phone: [phone] Fax: [phone]    [facility] [address] [address]  Phone: [phone] Fax: [phone]    [address] [place] [address] [address] ,  [address] [facility] [address]  Phone: [phone] Fax: [phone]    Primary Care Provider: No primary care provider on file      Primary Insurance: Refugio ROSEN  Secondary Insurance:     DISCHARGE DETAILS:    Additional Comments: Patient reviewed during care coordination rounds with Dr Norma Springer who informed that pt's bronch was canceled due to improvement in status, reports that pt will likely require an additional 48 hours inpatient  Pt now on 5L 02 which is an improvement from 15L on 4/14  Cardiology and Pulmonology following  No discharge concerns or needs expressed or identified at this time, CM department to remain available

## 2023-04-18 PROBLEM — J81.1 PULMONARY EDEMA: Status: ACTIVE | Noted: 2023-04-13

## 2023-04-18 LAB
BACTERIA BLD CULT: NORMAL
BACTERIA BLD CULT: NORMAL
BASOPHILS # BLD MANUAL: 0 THOUSAND/UL (ref 0–0.1)
BASOPHILS NFR MAR MANUAL: 0 % (ref 0–1)
C-ANCA TITR SER IF: NORMAL TITER
EOSINOPHIL # BLD MANUAL: 0 THOUSAND/UL (ref 0–0.4)
EOSINOPHIL NFR BLD MANUAL: 0 % (ref 0–6)
ERYTHROCYTE [DISTWIDTH] IN BLOOD BY AUTOMATED COUNT: 13.2 % (ref 11.6–15.1)
HCT VFR BLD AUTO: 36.8 % (ref 34.8–46.1)
HGB BLD-MCNC: 11.6 G/DL (ref 11.5–15.4)
LYMPHOCYTES # BLD AUTO: 1.7 THOUSAND/UL (ref 0.6–4.47)
LYMPHOCYTES # BLD AUTO: 13 % (ref 14–44)
MCH RBC QN AUTO: 29.1 PG (ref 26.8–34.3)
MCHC RBC AUTO-ENTMCNC: 31.5 G/DL (ref 31.4–37.4)
MCV RBC AUTO: 93 FL (ref 82–98)
MONOCYTES # BLD AUTO: 0.65 THOUSAND/UL (ref 0–1.22)
MONOCYTES NFR BLD: 5 % (ref 4–12)
MYELOPEROXIDASE AB SER IA-ACNC: <0.2 UNITS (ref 0–0.9)
NEUTROPHILS # BLD MANUAL: 10.6 THOUSAND/UL (ref 1.85–7.62)
NEUTS SEG NFR BLD AUTO: 81 % (ref 43–75)
P-ANCA ATYPICAL TITR SER IF: NORMAL TITER
P-ANCA TITR SER IF: NORMAL TITER
PLATELET # BLD AUTO: 413 THOUSANDS/UL (ref 149–390)
PLATELET BLD QL SMEAR: ABNORMAL
PMV BLD AUTO: 8.4 FL (ref 8.9–12.7)
PROTEINASE3 AB SER IA-ACNC: <0.2 UNITS (ref 0–0.9)
RBC # BLD AUTO: 3.98 MILLION/UL (ref 3.81–5.12)
RBC MORPH BLD: NORMAL
VARIANT LYMPHS # BLD AUTO: 1 %
WBC # BLD AUTO: 13.09 THOUSAND/UL (ref 4.31–10.16)

## 2023-04-18 RX ORDER — METHYLPREDNISOLONE SODIUM SUCCINATE 40 MG/ML
40 INJECTION, POWDER, LYOPHILIZED, FOR SOLUTION INTRAMUSCULAR; INTRAVENOUS EVERY 12 HOURS SCHEDULED
Status: DISCONTINUED | OUTPATIENT
Start: 2023-04-18 | End: 2023-04-19

## 2023-04-18 RX ADMIN — METHYLPREDNISOLONE SODIUM SUCCINATE 40 MG: 40 INJECTION, POWDER, FOR SOLUTION INTRAMUSCULAR; INTRAVENOUS at 20:20

## 2023-04-18 RX ADMIN — METHYLPREDNISOLONE SODIUM SUCCINATE 40 MG: 40 INJECTION, POWDER, FOR SOLUTION INTRAMUSCULAR; INTRAVENOUS at 05:39

## 2023-04-18 RX ADMIN — Medication 3 ML: at 07:46

## 2023-04-18 RX ADMIN — GABAPENTIN 400 MG: 400 CAPSULE ORAL at 21:18

## 2023-04-18 RX ADMIN — GABAPENTIN 400 MG: 400 CAPSULE ORAL at 09:46

## 2023-04-18 RX ADMIN — FUROSEMIDE 40 MG: 40 TABLET ORAL at 09:47

## 2023-04-18 RX ADMIN — DOCUSATE SODIUM 100 MG: 100 CAPSULE, LIQUID FILLED ORAL at 17:19

## 2023-04-18 RX ADMIN — LEVALBUTEROL 1.25 MG: 1.25 SOLUTION, CONCENTRATE RESPIRATORY (INHALATION) at 07:46

## 2023-04-18 RX ADMIN — LEVALBUTEROL 1.25 MG: 1.25 SOLUTION, CONCENTRATE RESPIRATORY (INHALATION) at 13:02

## 2023-04-18 RX ADMIN — BUDESONIDE AND FORMOTEROL FUMARATE DIHYDRATE 2 PUFF: 160; 4.5 AEROSOL RESPIRATORY (INHALATION) at 09:48

## 2023-04-18 RX ADMIN — LIDOCAINE 5% 1 PATCH: 700 PATCH TOPICAL at 09:47

## 2023-04-18 RX ADMIN — CLONAZEPAM 1 MG: 1 TABLET ORAL at 14:14

## 2023-04-18 RX ADMIN — BUPRENORPHINE AND NALOXONE 8 MG: 8; 2 FILM BUCCAL; SUBLINGUAL at 09:46

## 2023-04-18 RX ADMIN — Medication 3 ML: at 13:02

## 2023-04-18 RX ADMIN — GUAIFENESIN 600 MG: 600 TABLET, EXTENDED RELEASE ORAL at 09:47

## 2023-04-18 RX ADMIN — FAMOTIDINE 20 MG: 20 TABLET ORAL at 09:47

## 2023-04-18 RX ADMIN — FAMOTIDINE 20 MG: 20 TABLET ORAL at 17:19

## 2023-04-18 RX ADMIN — METHYLPREDNISOLONE SODIUM SUCCINATE 40 MG: 40 INJECTION, POWDER, FOR SOLUTION INTRAMUSCULAR; INTRAVENOUS at 13:22

## 2023-04-18 RX ADMIN — TOPIRAMATE 50 MG: 25 TABLET, FILM COATED ORAL at 09:47

## 2023-04-18 RX ADMIN — LEVALBUTEROL 1.25 MG: 1.25 SOLUTION, CONCENTRATE RESPIRATORY (INHALATION) at 20:03

## 2023-04-18 RX ADMIN — CLONAZEPAM 1 MG: 1 TABLET ORAL at 09:46

## 2023-04-18 RX ADMIN — TOPIRAMATE 50 MG: 25 TABLET, FILM COATED ORAL at 17:19

## 2023-04-18 RX ADMIN — FUROSEMIDE 40 MG: 40 TABLET ORAL at 17:19

## 2023-04-18 RX ADMIN — ENOXAPARIN SODIUM 40 MG: 100 INJECTION SUBCUTANEOUS at 09:46

## 2023-04-18 RX ADMIN — BUPRENORPHINE AND NALOXONE 4 MG: 2; .5 FILM BUCCAL; SUBLINGUAL at 13:22

## 2023-04-18 RX ADMIN — Medication 3 ML: at 20:03

## 2023-04-18 RX ADMIN — GABAPENTIN 400 MG: 400 CAPSULE ORAL at 17:19

## 2023-04-18 RX ADMIN — TRAZODONE HYDROCHLORIDE 100 MG: 100 TABLET ORAL at 21:18

## 2023-04-18 RX ADMIN — FLUOXETINE 80 MG: 20 CAPSULE ORAL at 09:47

## 2023-04-18 RX ADMIN — CLONAZEPAM 1 MG: 1 TABLET ORAL at 20:20

## 2023-04-18 RX ADMIN — Medication 1 PATCH: at 09:46

## 2023-04-18 RX ADMIN — BUPRENORPHINE AND NALOXONE 8 MG: 8; 2 FILM BUCCAL; SUBLINGUAL at 20:20

## 2023-04-18 RX ADMIN — CEFTRIAXONE SODIUM 1000 MG: 10 INJECTION, POWDER, FOR SOLUTION INTRAVENOUS at 09:46

## 2023-04-18 NOTE — PROGRESS NOTES
"Progress Note - Pulmonary   Dina Uribe 41 y o  female MRN: [id number]  Unit/Bed#: [place] [address] -01 Encounter: [id number]    Assessment/Plan:  1  Acute hypoxic respiratory failure  · Improving, down to 2 L  · Will likely need ambulatory oximetry prior to discharge but potentially could avoid supplemental oxygen given excellent progress  2  Acute pneumonitis, likely multifactorial  · Suspect inflammatory process given response to steroids  · Okay to taper steroids to twice daily  · Would continue those for an additional 24 to 48 hours and transition to prednisone  · Would anticipate readiness for discharge in 48 to 72 hours  · Continue diuretics  · Avoid inhalants  · Completing final day of antibiotics, day 5/5 ceftriaxone  · Will need outpatient pulmonary follow-up but is fairly high risk for noncompliance and being lost to follow-up  3  Probable COPD, severity unknown  · Would benefit from outpatient pulmonary function testing  · Continue Symbicort for now  4  Tobacco abuse  · On nicotine replacement therapy  · Complete cessation is recommended    We will obtain chest x-ray in follow-up prior to discharge    ----------------------------------------------------------------------------------------------------------------------    HPI/Interval History:   Breathing better  Overall clinically improving  No new complaints  Vitals:   Blood pressure 119/75, pulse (!) 43, temperature 97 8 °F (36 6 °C), resp  rate 16, height 5' 1\" (1 549 m), weight 71 5 kg (157 lb 10 1 oz), last menstrual period 04/13/2023, SpO2 95 %  ,Body mass index is 29 78 kg/m²  SpO2: 95 %  SpO2 Activity: At Rest  O2 Device: Nasal cannula 3 L    Physical Exam:   Gen: Trouble on nasal cannula  HEENT: Conjugate gaze  Neck: No JVD or adenopathy  Chest: Improved breath sounds    Minimal crackles  Cardiac: Regular  Abdomen: Soft  Extremities: No edema      Labs:    CBC:  Results from last 7 days   Lab Units 04/18/23  0545   WBC Thousand/uL 13 09* " HEMOGLOBIN g/dL 11 6   HEMATOCRIT % 36 8   PLATELETS Thousands/uL 413*         BMP:   Lab Results   Component Value Date    SODIUM 139 04/17/2023    K 4 2 04/17/2023    CO2 27 04/17/2023     04/17/2023    BUN 26 (H) 04/17/2023    CREATININE 0 81 04/17/2023    CALCIUM 9 0 04/17/2023           Imaging studies:  No new imaging    Jose Valente MD

## 2023-04-18 NOTE — PROGRESS NOTES
[address] [place] [address]  Progress Note  Name: Lillie Verdin  MRN: [id number]  Unit/Bed#: [facility] 2 -01 I Date of Admission: 4/13/2023   Date of Service: 4/18/2023 I Hospital Day: 5    Assessment/Plan   * Acute respiratory failure with hypoxia (HCC)  Assessment & Plan  · Acute respiratory failure due to possible cardiogenic pulmonary edema versus noncardiogenic pulmonary edema versus atypical pneumonia/ pneumonitis  · Improving, currently on 2 L oxygen  · CTA negative for PE  · Pulmonary follow-up appreciated  · Patient improving therefore bronchoscopy was canceled  · IV Solu-Medrol taper to every 12 hours, can transition to prednisone in the next 24 to 48 hours, continue antibiotics, last day of ceftriaxone today  · IgE, ANCA, anti-Karla, scleroderma and AMA pending    Abnormal CT of the chest  Assessment & Plan  · Abnormal CT of the chest with severe bilateral groundglass opacities and adenopathy  · Opacities suggest pulmonary edema  The adenopathy suggest sarcoidosis  · CT last year which showed similar finding  When she was admitted, there was concern for vape related lung injury  Patient currently denies use of vape, admits to ongoing smoking, half a pack a day  · Continued on ceftriaxone and Zithromax for possible atypical pneumonia  Systemic steroids added to therapy  · Started Lasix for pulmonary edema, right heart failure  · Appreciate pulmonology and cardiology input    Pulmonary edema  Assessment & Plan  · Elevated troponin with evaded BNP and abnormal CT chest showing pulmonary edema  · Nonischemic myocardial injury due to right heart strain  · 2D Echo with evidence of right heart failure  · Cardiology input appreciated, continue Lasix  · Monitor volume status with daily weights, I's and O's  · Low Na diet     Opioid use disorder, severe, on maintenance therapy ([facility] [address] )  Assessment & Plan  · PDMP and care everywhere reviewed    · She is maintained on  Suboxone TID 8mg-4mg-8mg Anxiety disorder, unspecified  Assessment & Plan  · Anxiety disorder followed by the [facility] [place]  · PDMP and care everywhere reviewed  · Confirmed that she is on clonazepam 1 mg 4 times daily as needed  · With persistent anxiety added Atarax PRN    Tobacco abuse  Assessment & Plan  · Placed on nicotine patch  · Smoking cessation counseling    Bipolar disorder, unspecified ([address] )  Assessment & Plan  · Bipolar disorder followed by [facility]  · Medications reviewed via care everywhere  · Continue Prozac 80 mg daily, trazodone 100 mg at bedtime, Neurontin 400 mg 3 times daily             VTE Pharmacologic Prophylaxis:   Pharmacologic: Lovenox    Patient Centered Rounds: I have performed bedside rounds with nursing staff today  Education and Discussions with Family / Patient: Updated patient    Time Spent for Care: More than 50% of total time spent on counseling and coordination of care as described above  Current Length of Stay: 5 day(s)    Current Patient Status: Inpatient   Certification Statement: The patient will continue to require additional inpatient hospital stay due to Acute hypoxic respiratory failure    Discharge Plan / Estimated Discharge Date: 24-48h    Code Status: Level 1 - Full Code      Subjective:   Patient seen and examined at bedside, comfortable, currently denies any complaints    Objective:     Vitals:   Temp (24hrs), Av °F (36 7 °C), Min:97 3 °F (36 3 °C), Max:98 3 °F (36 8 °C)    Temp:  [97 3 °F (36 3 °C)-98 3 °F (36 8 °C)] 98 3 °F (36 8 °C)  HR:  [43-68] 68  Resp:  [16-21] 16  BP: (105-119)/(56-75) 108/72  SpO2:  [86 %-97 %] 90 %  Body mass index is 29 78 kg/m²  Input and Output Summary (last 24 hours):        Intake/Output Summary (Last 24 hours) at 2023 1539  Last data filed at 2023 0900  Gross per 24 hour   Intake 240 ml   Output --   Net 240 ml       Physical Exam:    Constitutional: Patient is oriented to person, place and time, no acute distress  HEENT:  Normocephalic, atraumatic  Cardiovascular: Normal S1S2, RRR, No murmurs/rubs/gallops appreciated  Pulmonary:  Bilateral air entry, No rhonchi/rales/wheezing appreciated  Abdominal: Soft, Bowel sounds present, Non-tender, Non-distended  Extremities:  No cyanosis, clubbing or edema  Neurological: Cranial nerves II-XII grossly intact, sensation intact, otherwise no focal neurological symptoms  Skin:  Warm, dry    Additional Data:     Labs:    Results from last 7 days   Lab Units 04/18/23  0545 04/17/23  0506   WBC Thousand/uL 13 09* 14 55*   HEMOGLOBIN g/dL 11 6 10 7*   HEMATOCRIT % 36 8 33 2*   PLATELETS Thousands/uL 413* 392*   NEUTROS PCT %  --  87*   LYMPHS PCT %  --  8*   LYMPHO PCT % 13*  --    MONOS PCT %  --  4   MONO PCT % 5  --    EOS PCT % 0 0     Results from last 7 days   Lab Units 04/17/23  0506   POTASSIUM mmol/L 4 2   CHLORIDE mmol/L 103   CO2 mmol/L 27   BUN mg/dL 26*   CREATININE mg/dL 0 81   CALCIUM mg/dL 9 0            I Have Reviewed All Lab Data Listed Above  Invasive Devices     Peripheral Intravenous Line  Duration           Peripheral IV 04/17/23 Distal;Left;Ventral (anterior) Forearm 1 day                    Recent Cultures (last 7 days):     Results from last 7 days   Lab Units 04/15/23  0643 04/13/23  1833 04/13/23  1200   BLOOD CULTURE   --   --  No Growth After 4 Days  No Growth After 4 Days  SPUTUM CULTURE  Test not performed  Suggest repeat specimen  --   --    GRAM STAIN RESULT  >10 squamous epithelial cells/lpf, indicating orophayngeal contamination  *  2+ Yeast*  2+ Gram positive cocci in pairs*  2+ Gram positive cocci in clusters*  2+ Gram negative rods*  --   --    LEGIONELLA URINARY ANTIGEN   --  Negative  --        Last 24 Hours Medication List:   Current Facility-Administered Medications   Medication Dose Route Frequency Provider Last Rate   • acetaminophen  650 mg Oral Q6H PRN Luis Fernando Prater MD     • albuterol  2 puff Inhalation Q4H PRN Yovana Chu MD     • benzonatate  100 mg Oral TID PRN Polina Griselda Prude, MD     • budesonide-formoterol  2 puff Inhalation BID NETTE Garcia     • buprenorphine-naloxone  8 mg Sublingual BID Yovana Chu MD      And   • buprenorphine-naloxone  4 mg Sublingual Daily Yovana Chu MD     • cefTRIAXone  1,000 mg Intravenous Q24H Leobardo Ramirez MD 1,000 mg (04/18/23 0946)   • clonazePAM  1 mg Oral 4x Daily PRN Leobardo Ramirez MD     • docusate sodium  100 mg Oral Q12H Leobardo Ramirez MD     • enoxaparin  40 mg Subcutaneous Daily Leobardo Ramirez MD     • famotidine  20 mg Oral BID Leobardo Ramirez MD     • FLUoxetine  80 mg Oral Daily Leobardo Ramirez MD     • furosemide  40 mg Oral BID (diuretic) Carolina Craig DO     • gabapentin  400 mg Oral TID Leobardo Ramirez MD     • hydrOXYzine HCL  25 mg Oral Q6H PRN Yovana Chu MD     • levalbuterol  1 25 mg Nebulization TID Liu Rm MD     • lidocaine  1 patch Topical Daily Liu Rm MD     • methylPREDNISolone sodium succinate  40 mg Intravenous Q12H 72 Dionne Leyva MD     • nicotine  1 patch Transdermal Daily Leobardo Ramirez MD     • ondansetron  4 mg Oral Q6H PRN Leobardo Ramirez MD     • sodium chloride (PF)  3 mL Intravenous Q1H PRN Cherrie Whiting MD     • sodium chloride  3 mL Nebulization TID Liu Rm MD     • topiramate  50 mg Oral BID Leobardo Ramirez MD     • traZODone  100 mg Oral HS Leobardo Ramirez MD          Today, Patient Was Seen By: Anu Hooker MD

## 2023-04-18 NOTE — CASE MANAGEMENT
Case Management Assessment & Discharge Planning Note    Patient name Susan Piedra  Location [facility] /[facility]* MRN [id number]  : 1975 Date 2023       Current Admission Date: 2023  Current Admission Diagnosis:Acute respiratory failure with hypoxia [facility])   Patient Active Problem List    Diagnosis Date Noted   • Acute right ventricular heart failure ([facility] [address] ) 2023   • Elevated troponin 2023   • Abnormal CT of the chest 2023   • Pneumonitis 10/28/2022   • Mild protein-calorie malnutrition ([facility] [address] ) 10/08/2022   • 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[facility] [address] ) 10/07/2022   • Anxiety disorder, unspecified 10/06/2022   • Opioid use disorder, severe, on maintenance therapy ([facility] [address] ) 10/06/2022   • Hepatitis C antibody test positive 2022   • Bradycardia 2022   • Acute respiratory failure with hypoxia ([address] ) 2022   • Substance Abuse Disorder  2022   • Anemia 2022   • Muscle spasm of shoulder region 2022   • Dyspepsia 2022   • Bipolar disorder, unspecified ([facility] [address] ) 2021   • Tobacco abuse 2021   • Encounter for monitoring opioid maintenance therapy 2021   • Rhabdomyolysis 2021      LOS (days): 5  Geometric Mean LOS (GMLOS) (days): 3 50  Days to GMLOS:-1 5     OBJECTIVE:    Risk of Unplanned Readmission Score: 34 09         Current admission status: Inpatient       Preferred Pharmacy:   [facility] [address]  Phone: [phone] Fax: [phone]    [address] [address]  Phone: [phone] Fax: [phone]    [address] [place] [address] [address] ,  [address] [facility] [address]  Phone: [phone] Fax: [phone]    Primary Care Provider: No primary care provider on file      Primary Insurance: Celeste ROSEN  Secondary Insurance: ASSESSMENT:  Active Health Care Proxies     Juan [address] Representative - Mother   Primary Phone: [phone] (Mobile)               Advance Directives  Primary Contact: Mother Diane Hill, [phone]    Readmission Root Cause  30 Day Readmission: No    Patient Information  Admitted from[de-identified] Home  Mental Status: Alert  During Assessment patient was accompanied by: Not accompanied during assessment  Assessment information provided by[de-identified] Patient  Primary Caregiver: Self  Support Systems: Friend, Parent  [place] of Residence: [address] [place] [address] do you live in?: [address] [facility] entry access options   Select all that apply : Stairs  Number of steps to enter home : 2  Type of Current Residence: 3 story home  Living Arrangements: Lives w/ Friend    Activities of Daily Living Prior to Admission  Functional Status: Independent  Completes ADLs independently?: Yes  Ambulates independently?: Yes  Does patient use assisted devices?: No  Does patient currently own DME?: No  Does patient have a history of Outpatient Therapy (PT/OT)?: No  Does the patient have a history of Short-Term Rehab?: No  Does patient have a history of HHC?: No  Does patient currently have [facility]?: No    Patient Information Continued  Does patient have prescription coverage?: Yes  Does patient have a history of substance abuse?: Yes  Does patient have a history of Mental Health Diagnosis?: Yes  Has patient received inpatient treatment related to mental health in the last 2 years?: Yes ([place])    Engana Pty  [employer] of Transport to [facility][de-identified][employer] - Bus (She walks and takes public transport)      DISCHARGE DETAILS:    Discharge planning discussed with[de-identified] patient     CM contacted family/caregiver?: No- see comments    Contacts  Patient Contacts: Diane Hill  Relationship to Patient[de-identified] Family  Contact Method: Phone  Phone Number: [phone]  Reason/Outcome: Continuity of Care, Discharge Planning    Additional Comments: Patient lives with a friend, does not use oxygen at home IPTA  Patient goes to [facility] and gets all of her medication refills from Dr Refugio Woodson for suboxone, etc  Patient requesting to discharge in time for her appointment Friday 11am to get her refills from Dr Refugio Woodson  Patient does not drive she walks or takes public trnapsortation  Patient states she is attempting to get social security for her disability

## 2023-04-18 NOTE — PROGRESS NOTES
"  Cardiology         Progress Note - Cardiology   [facility] 41 y o  female MRN: [id number]  Unit/Bed#: [facility] 68 2 -01 Encounter: [id number]          Assessment/Recommendations/Discussion:   1  Acute hypoxic respiratory failure  2  Acute CHF  3  Nonischemic myocardial injury secondary to above  4  Bipolar disorder  5  Chronic opioid use disorder  6  Tobacco use disorder  7  Hepatitis C      · Patient clinically improving on IV antibiotics, IV diuretic, and steroids  Continue to suspect noncardiogenic component for pulmonary edema in the setting of isolated right heart disease with normal left heart and significant pulmonary infiltrates  · We will continue challenging with IV Lasix  BMP not checked today, will check tomorrow morning  If renal function starts to decline, will start backing off and perhaps transition to furosemide 40 mg p o  daily          Subjective: Patient seen and examined, feels better                Physical Exam:  GEN:  NAD  HEENT:  MMM, NCAT, pink conjunctiva, EOMI, nonicteric sclera  CV:  NO JVD/HJR, RR, NO M/R/G, +S1/S2, NO PARASTERNAL HEAVE/THRILL, NO LE EDEMA, NO HEPATIC SYSTOLIC PULSATION, WARM EXTREMITIES  RESP:  CTAB/L but distant breath sounds  ABD:  SOFT, NT, NO GROSS ORGANOMEGALY        Vitals:   /75   Pulse (!) 43   Temp 97 8 °F (36 6 °C)   Resp 16   Ht 5' 1\" (1 549 m)   Wt 71 5 kg (157 lb 10 1 oz)   LMP 04/13/2023 (Exact Date)   SpO2 95%   BMI 29 78 kg/m²   Vitals:    04/17/23 0547 04/18/23 0600   Weight: 71 2 kg (156 lb 15 5 oz) 71 5 kg (157 lb 10 1 oz)       Intake/Output Summary (Last 24 hours) at 4/18/2023 1112  Last data filed at 4/18/2023 0900  Gross per 24 hour   Intake 240 ml   Output --   Net 240 ml         Lab Results:  Results from last 7 days   Lab Units 04/18/23  0545   WBC Thousand/uL 13 09*   HEMOGLOBIN g/dL 11 6   HEMATOCRIT % 36 8   PLATELETS Thousands/uL 413*     Results from last 7 days   Lab Units 04/17/23  0506   POTASSIUM mmol/L 4 2 " CHLORIDE mmol/L 103   CO2 mmol/L 27   BUN mg/dL 26*   CREATININE mg/dL 0 81   CALCIUM mg/dL 9 0     Results from last 7 days   Lab Units 04/17/23  0506   POTASSIUM mmol/L 4 2   CHLORIDE mmol/L 103   CO2 mmol/L 27   BUN mg/dL 26*   CREATININE mg/dL 0 81   CALCIUM mg/dL 9 0           Medications:    Current Facility-Administered Medications:   •  acetaminophen (TYLENOL) tablet 650 mg, 650 mg, Oral, Q6H PRN, Leobardo Ramirez MD, 650 mg at 04/17/23 0537  •  albuterol (PROVENTIL HFA,VENTOLIN HFA) inhaler 2 puff, 2 puff, Inhalation, Q4H PRN, Yovana House MD, 2 puff at 04/15/23 1748  •  azithromycin (ZITHROMAX) tablet 500 mg, 500 mg, Oral, Q24H, Leobardo Ramirez MD, 500 mg at 04/17/23 1654  •  benzonatate (TESSALON PERLES) capsule 100 mg, 100 mg, Oral, TID PRN, Yovana House MD  •  budesonide-formoterol (SYMBICORT) 160-4 5 mcg/act inhaler 2 puff, 2 puff, Inhalation, BID, Trinidad Alma Delia, CRNP, 2 puff at 04/18/23 0948  •  buprenorphine-naloxone (Suboxone) film 8 mg, 8 mg, Sublingual, BID, 8 mg at 04/18/23 0946 **AND** buprenorphine-naloxone (Suboxone) film 4 mg, 4 mg, Sublingual, Daily, Yovana Chu MD, 4 mg at 04/17/23 1413  •  cefTRIAXone (ROCEPHIN) 1,000 mg in dextrose 5 % 50 mL IVPB, 1,000 mg, Intravenous, Q24H, Leobardo Ramirez MD, Last Rate: 100 mL/hr at 04/18/23 0946, 1,000 mg at 04/18/23 0946  •  clonazePAM (KlonoPIN) tablet 1 mg, 1 mg, Oral, 4x Daily PRN, Leobardo Ramirez MD, 1 mg at 04/18/23 0946  •  docusate sodium (COLACE) capsule 100 mg, 100 mg, Oral, Q12H, Leobardo Ramirez MD, 100 mg at 04/17/23 1655  •  enoxaparin (LOVENOX) subcutaneous injection 40 mg, 40 mg, Subcutaneous, Daily, Leobardo Ramirez MD, 40 mg at 04/18/23 0946  •  famotidine (PEPCID) tablet 20 mg, 20 mg, Oral, BID, Leobardo Ramirez MD, 20 mg at 04/18/23 048  •  FLUoxetine (PROzac) capsule 80 mg, 80 mg, Oral, Daily, Minh Veliz Bebe Villegas MD, 80 mg at 04/18/23 0947  •  furosemide (LASIX) tablet 40 mg, 40 mg, Oral, BID (diuretic), Carolina Craig DO, 40 mg at 04/18/23 6699  •  gabapentin (NEURONTIN) capsule 400 mg, 400 mg, Oral, TID, Nir White MD, 400 mg at 04/18/23 0946  •  guaiFENesin (MUCINEX) 12 hr tablet 600 mg, 600 mg, Oral, Q12H [address], Yovana Chu MD, 600 mg at 04/18/23 0947  •  hydrOXYzine HCL (ATARAX) tablet 25 mg, 25 mg, Oral, Q6H PRN, Yovana Jose MD  •  levalbuterol (XOPENEX) inhalation solution 1 25 mg, 1 25 mg, Nebulization, TID, Yovana Chu MD, 1 25 mg at 04/18/23 0746  •  lidocaine (LIDODERM) 5 % patch 1 patch, 1 patch, Topical, Daily, Yovana Chu MD, 1 patch at 04/18/23 0947  •  methylPREDNISolone sodium succinate (Solu-MEDROL) injection 40 mg, 40 mg, Intravenous, Q8H [address], NETTE Abarca, 40 mg at 04/18/23 0539  •  nicotine (NICODERM CQ) 14 mg/24hr TD 24 hr patch 1 patch, 1 patch, Transdermal, Daily, Nir White MD, 1 patch at 04/18/23 0946  •  ondansetron (ZOFRAN-ODT) dispersible tablet 4 mg, 4 mg, Oral, Q6H PRN, Nir White MD, 4 mg at 04/14/23 3637  •  Insert peripheral IV, , , Once **AND** sodium chloride (PF) 0 9 % injection 3 mL, 3 mL, Intravenous, Q1H PRN, Carmita Riojas MD  •  sodium chloride 0 9 % inhalation solution 3 mL, 3 mL, Nebulization, TID, Yovana Chu MD, 3 mL at 04/18/23 0746  •  topiramate (TOPAMAX) tablet 50 mg, 50 mg, Oral, BID, Nir White MD, 50 mg at 04/18/23 4356  •  traZODone (DESYREL) tablet 100 mg, 100 mg, Oral, HS, Nir White MD, 100 mg at 04/17/23 9029    This note was completed in part utilizing M-[employer] Fluency Direct Software  Grammatical errors, random word insertions, spelling mistakes, and incomplete sentences may be an occasional consequence of this system secondary to software limitations, ambient noise, and hardware issues    If you have any questions or concerns about the content, text, or information contained within the body of this dictation, please contact the provider for clarification

## 2023-04-18 NOTE — ASSESSMENT & PLAN NOTE
· Acute respiratory failure due to possible cardiogenic pulmonary edema versus noncardiogenic pulmonary edema versus atypical pneumonia/ pneumonitis  · Improving, currently on 2 L oxygen  · CTA negative for PE  · Pulmonary follow-up appreciated  · Patient improving therefore bronchoscopy was canceled  · IV Solu-Medrol taper to every 12 hours, can transition to prednisone in the next 24 to 48 hours, continue antibiotics, last day of ceftriaxone today  · IgE, ANCA, anti-Karla, scleroderma and AMA pending

## 2023-04-18 NOTE — ASSESSMENT & PLAN NOTE
· Bipolar disorder followed by [facility]  · Medications reviewed via care everywhere    · Continue Prozac 80 mg daily, trazodone 100 mg at bedtime, Neurontin 400 mg 3 times daily

## 2023-04-18 NOTE — ASSESSMENT & PLAN NOTE
· Elevated troponin with evaded BNP and abnormal CT chest showing pulmonary edema  · Nonischemic myocardial injury due to right heart strain  · 2D Echo with evidence of right heart failure  · Cardiology input appreciated, continue Lasix  · Monitor volume status with daily weights, I's and O's  · Low Na diet

## 2023-04-18 NOTE — UTILIZATION REVIEW
Continued Stay Review    Date: 4/18                       Current Patient Class: IP Current Level of Care: MS    HPI:47 y o  female initially admitted on 4/13    Assessment/Plan:   Pt remains on oxygen at 1 5 L min NC and is having intermitten desats into the high to mid 80s  Has bradycardia  Remains on IV antibiotics, IV steroids, PO Lasix  He is clinically improving  Suspecting noncardiogenic component for Pulm edema  Continue Challenge with IV Lasix and will back off if creat starts to climb  WBC trending downward   Using PRN Tylenol and Clonazepam       Vital Signs:   04/18/23 1300 -- -- -- -- -- -- 1 5 L/min Nasal cannula --   04/18/23 0747 -- -- -- -- -- 95 % -- None (Room air) --   04/18/23 07:40:23 97 8 °F (36 6 °C) 43 Abnormal  16 119/75 90 88 % Abnormal  -- -- --   04/17/23 21:17:11 98 2 °F (36 8 °C) 64 18 114/70 79 86 % Abnormal  -- None (Room air) Sitting   04/17/23 21:17:10 -- -- -- 105/66 79 -- -- -- --   04/17/23 20:30:12 98 3 °F (36 8 °C) 67 -- 106/56 73 92 % -- -- --   04/17/23 1953 -- -- -- -- -- 90 % -- -- --   04/17/23 15:52:06 97 3 °F (36 3 °C) Abnormal  45 Abnormal  21 117/68 84 97 % -- -- --   04/17/23 1300 -- -- -- -- -- -- 5 L/min High flow nasal cannula --   04/17/23 0926 -- -- -- -- -- -- 5 L/min Mid flow nasal cannula --   04/17/23 07:43:10 97 3 °F (36 3 °C) Abnormal  58 19 116/66 83 96 % -- -- --   04/17/23 0715 -- -- -- -- -- 93 % 5 L/min Mid flow nasal cannula --   04/17/23 0000 -- 65 -- -- -- 98 % -- -- --   04/16/23 21:17:33 95 9 °F (35 5 °C) Abnormal  53 Abnormal  16 113/65 81 95 % -- -- Lying   04/16/23 21:15:10 -- -- -- -- -- 97 % -- -- --   04/16/23 2100 -- -- -- -- -- -- 5 L/min Mid flow nasal cannula --   04/16/23 15:08:06 97 8 °F (36 6 °C) 61 20 122/77 92 94 % -- -- --   04/16/23 1414 -- -- -- -- -- -- 5 L/min Mid flow nasal cannula --   04/16/23 08:00:55 98 2 °F (36 8 °C) 71 20 114/70 85 94 % 8 L/min Mid flow nasal cannula      Pertinent Labs/Diagnostic Results: Results from last 7 days   Lab Units 04/14/23  1239 04/13/23  1249   SARS-COV-2  Not Detected Negative     Results from last 7 days   Lab Units 04/18/23  0545 04/17/23  0506 04/16/23  0555 04/15/23  0559 04/13/23  1819 04/13/23  1226   WBC Thousand/uL 13 09* 14 55* 15 44* 9 04  --  15 60*   HEMOGLOBIN g/dL 11 6 10 7* 10 8* 10 3*  --  11 1*   HEMATOCRIT % 36 8 33 2* 33 8* 32 8*  --  33 5*   PLATELETS Thousands/uL 413* 392* 372 261   < > 295   NEUTROS ABS Thousands/µL  --  12 61* 14 02* 8 35*  --  12 69*    < > = values in this interval not displayed           Results from last 7 days   Lab Units 04/17/23  0506 04/16/23  0555 04/15/23  0559 04/13/23  1226   SODIUM mmol/L 139 137 138 138   POTASSIUM mmol/L 4 2 3 8 4 2 3 9   CHLORIDE mmol/L 103 103 105 108   CO2 mmol/L 27 24 23 19*   ANION GAP mmol/L 9 10 10 11   BUN mg/dL 26* 23 17 17   CREATININE mg/dL 0 81 0 90 0 77 0 95   EGFR ml/min/1 73sq m 87 77 93 72   CALCIUM mg/dL 9 0 9 2 8 7 8 8             Results from last 7 days   Lab Units 04/17/23  0506 04/16/23  0555 04/15/23  0559 04/13/23  1226   GLUCOSE RANDOM mg/dL 192* 192* 146* 99     Results from last 7 days   Lab Units 04/13/23  1625 04/13/23  1423 04/13/23  1226   HS TNI 0HR ng/L  --   --  675*   HS TNI 2HR ng/L  --  533*  --    HSTNI D2 ng/L  --  -142  --    HS TNI 4HR ng/L 439*  --   --    HSTNI D4 ng/L -236  --   --          Results from last 7 days   Lab Units 04/14/23  0500 04/13/23  1819   PROCALCITONIN ng/ml 0 24 0 36*     Results from last 7 days   Lab Units 04/13/23  1819 04/13/23  1226   BNP pg/mL 397* 407*     Results from last 7 days   Lab Units 04/14/23  1239 04/13/23  1833 04/13/23  1249   STREP PNEUMONIAE ANTIGEN, URINE   --  Negative  --    LEGIONELLA URINARY ANTIGEN   --  Negative  --    INFLUENZA A PCR   --   --  Negative   INFLUENZA B PCR   --   --  Negative   RSV PCR   --   --  Negative   RESPIRATORY SYNCYTIAL VIRUS  Not Detected  --   --      Results from last 7 days   Lab Units 04/14/23  1239   ADENOVIRUS  Not Detected   BORDETELLA PARAPERTUSSIS  Not Detected   BORDETELLA PERTUSSIS  Not Detected   CHLAMYDIA PNEUMONIAE  Not Detected   CORONAVIRUS 229E  Not Detected   CORONAVIRUS HKU1  Not Detected   CORONAVIRUS NL63  Not Detected   CORONAVIRUS OC43  Not Detected   METAPNEUMOVIRUS  Not Detected   RHINOVIRUS  Not Detected   MYCOPLASMA PNEUMONIAE  Not Detected   PARAINFLUENZA 1  Not Detected   PARAINFLUENZA 2  Not Detected   PARAINFLUENZA 3  Not Detected   PARAINFLUENZA 4  Not Detected     Results from last 7 days   Lab Units 04/15/23  0643 04/13/23  1200   BLOOD CULTURE   --  No Growth After 4 Days  No Growth After 4 Days  SPUTUM CULTURE  Test not performed  Suggest repeat specimen  --    GRAM STAIN RESULT  >10 squamous epithelial cells/lpf, indicating orophayngeal contamination  *  2+ Yeast*  2+ Gram positive cocci in pairs*  2+ Gram positive cocci in clusters*  2+ Gram negative rods*  --      Medications:   Scheduled Medications:  budesonide-formoterol, 2 puff, Inhalation, BID  buprenorphine-naloxone, 8 mg, Sublingual, BID   And  buprenorphine-naloxone, 4 mg, Sublingual, Daily  cefTRIAXone, 1,000 mg, Intravenous, Q24H  docusate sodium, 100 mg, Oral, Q12H  enoxaparin, 40 mg, Subcutaneous, Daily  famotidine, 20 mg, Oral, BID  FLUoxetine, 80 mg, Oral, Daily  furosemide, 40 mg, Oral, BID (diuretic)  gabapentin, 400 mg, Oral, TID  guaiFENesin, 600 mg, Oral, Q12H CHRISTIANE  levalbuterol, 1 25 mg, Nebulization, TID  lidocaine, 1 patch, Topical, Daily  methylPREDNISolone sodium succinate, 40 mg, Intravenous, Q8H CHRISTIANE  nicotine, 1 patch, Transdermal, Daily  sodium chloride, 3 mL, Nebulization, TID  topiramate, 50 mg, Oral, BID  traZODone, 100 mg, Oral, HS      Continuous IV Infusions:     PRN Meds:  acetaminophen, 650 mg, Oral, Q6H PRN - x 1 4/17  albuterol, 2 puff, Inhalation, Q4H PRN  benzonatate, 100 mg, Oral, TID PRN  clonazePAM, 1 mg, Oral, 4x Daily PRN - x 4 4/17, x 2 4/18  hydrOXYzine HCL, 25 mg, Oral, Q6H PRN  ondansetron, 4 mg, Oral, Q6H PRN    Discharge Plan: TBD    Network Utilization Review Department  ATTENTION: Please call with any questions or concerns to [phone] and carefully listen to the prompts so that you are directed to the right person  All voicemails are confidential   Justice Hoptarsha all requests for admission clinical reviews, approved or denied determinations and any other requests to dedicated fax number below belonging to the campus where the patient is receiving treatment   List of dedicated fax numbers for the Facilities:  [address] DENIALS (Administrative/Medical Necessity) [phone]   [address] (Maternity/NICU/Pediatrics) [phone]   912 Elizabeth Kamara [phone]   [facility] [place] [address] [phone]   [address] [place] [address] Sharri Subramanian [facility] 28 [phone]   [facility] [place] [facility] [place] 134 815 [facility] [phone]

## 2023-04-18 NOTE — PLAN OF CARE
Problem: Potential for Falls  Goal: Patient will remain free of falls  Description: INTERVENTIONS:  - Educate patient/family on patient safety including physical limitations  - Instruct patient to call for assistance with activity   - Consult OT/PT to assist with strengthening/mobility   - Keep Call bell within reach  - Keep bed low and locked with side rails adjusted as appropriate  - Keep care items and personal belongings within reach  - Initiate and maintain comfort rounds  - Apply yellow socks and bracelet for high fall risk patients  - Consider moving patient to room near nurses station  Outcome: Progressing     Problem: PAIN - ADULT  Goal: Verbalizes/displays adequate comfort level or baseline comfort level  Description: Interventions:  - Encourage patient to monitor pain and request assistance  - Assess pain using appropriate pain scale  - Administer analgesics based on type and severity of pain and evaluate response  - Implement non-pharmacological measures as appropriate and evaluate response  - Consider cultural and social influences on pain and pain management  - Notify physician/advanced practitioner if interventions unsuccessful or patient reports new pain  Outcome: Progressing     Problem: INFECTION - ADULT  Goal: Absence or prevention of progression during hospitalization  Description: INTERVENTIONS:  - Assess and monitor for signs and symptoms of infection  - Monitor lab/diagnostic results  - Monitor all insertion sites, i e  indwelling lines, tubes, and drains  - Monitor endotracheal if appropriate and nasal secretions for changes in amount and color  - Mulino appropriate cooling/warming therapies per order  - Administer medications as ordered  - Instruct and encourage patient and family to use good hand hygiene technique  - Identify and instruct in appropriate isolation precautions for identified infection/condition  Outcome: Progressing  Goal: Absence of fever/infection during neutropenic period  Description: INTERVENTIONS:  - Monitor WBC    Outcome: Progressing     Problem: SAFETY ADULT  Goal: Patient will remain free of falls  Description: INTERVENTIONS:  - Educate patient/family on patient safety including physical limitations  - Instruct patient to call for assistance with activity   - Consult OT/PT to assist with strengthening/mobility   - Keep Call bell within reach  - Keep bed low and locked with side rails adjusted as appropriate  - Keep care items and personal belongings within reach  - Initiate and maintain comfort rounds  - Apply yellow socks and bracelet for high fall risk patients  - Consider moving patient to room near nurses station  Outcome: Progressing  Goal: Maintain or return to baseline ADL function  Description: INTERVENTIONS:  - Educate patient/family on patient safety including physical limitations  - Instruct patient to call for assistance with activity   - Consult OT/PT to assist with strengthening/mobility   - Keep Call bell within reach  - Keep bed low and locked with side rails adjusted as appropriate  - Keep care items and personal belongings within reach  - Initiate and maintain comfort rounds  - Apply yellow socks and bracelet for high fall risk patients  - Consider moving patient to room near nurses station  Outcome: Progressing  Goal: Maintains/Returns to pre admission functional level  Description: INTERVENTIONS:  - Perform BMAT or MOVE assessment daily    - Set and communicate daily mobility goal to care team and patient/family/caregiver     - Collaborate with rehabilitation services on mobility goals if consulted  - Out of bed for toileting  - Record patient progress and toleration of activity level   Outcome: Progressing     Problem: DISCHARGE PLANNING  Goal: Discharge to home or other facility with appropriate resources  Description: INTERVENTIONS:  - Identify barriers to discharge w/patient and caregiver  - Arrange for needed discharge resources and transportation as appropriate  - Identify discharge learning needs (meds, wound care, etc )  - Arrange for interpretive services to assist at discharge as needed  - Refer to Case Management Department for coordinating discharge planning if the patient needs post-hospital services based on physician/advanced practitioner order or complex needs related to functional status, cognitive ability, or social support system  Outcome: Progressing     Problem: Knowledge Deficit  Goal: Patient/family/caregiver demonstrates understanding of disease process, treatment plan, medications, and discharge instructions  Description: Complete learning assessment and assess knowledge base    Interventions:  - Provide teaching at level of understanding  - Provide teaching via preferred learning methods  Outcome: Progressing     Problem: CARDIOVASCULAR - ADULT  Goal: Maintains optimal cardiac output and hemodynamic stability  Description: INTERVENTIONS:  - Monitor I/O, vital signs and rhythm  - Monitor for S/S and trends of decreased cardiac output  - Administer and titrate ordered vasoactive medications to optimize hemodynamic stability  - Assess quality of pulses, skin color and temperature  - Assess for signs of decreased coronary artery perfusion  - Instruct patient to report change in severity of symptoms  Outcome: Progressing  Goal: Absence of cardiac dysrhythmias or at baseline rhythm  Description: INTERVENTIONS:  - Continuous cardiac monitoring, vital signs, obtain 12 lead EKG if ordered  - Administer antiarrhythmic and heart rate control medications as ordered  - Monitor electrolytes and administer replacement therapy as ordered  Outcome: Progressing     Problem: RESPIRATORY - ADULT  Goal: Achieves optimal ventilation and oxygenation  Description: INTERVENTIONS:  - Assess for changes in respiratory status  - Assess for changes in mentation and behavior  - Position to facilitate oxygenation and minimize respiratory effort  - Oxygen administered by appropriate delivery if ordered  - Initiate smoking cessation education as indicated  - Encourage broncho-pulmonary hygiene including cough, deep breathe, Incentive Spirometry  - Assess the need for suctioning and aspirate as needed  - Assess and instruct to report SOB or any respiratory difficulty  - Respiratory Therapy support as indicated  Outcome: Progressing     Problem: METABOLIC, FLUID AND ELECTROLYTES - ADULT  Goal: Electrolytes maintained within normal limits  Description: INTERVENTIONS:  - Monitor labs and assess patient for signs and symptoms of electrolyte imbalances  - Administer electrolyte replacement as ordered  - Monitor response to electrolyte replacements, including repeat lab results as appropriate  - Instruct patient on fluid and nutrition as appropriate  Outcome: Progressing  Goal: Fluid balance maintained  Description: INTERVENTIONS:  - Monitor labs   - Monitor I/O and WT  - Instruct patient on fluid and nutrition as appropriate  - Assess for signs & symptoms of volume excess or deficit  Outcome: Progressing

## 2023-04-18 NOTE — ASSESSMENT & PLAN NOTE
· Anxiety disorder followed by the [address]  · PDMP and care everywhere reviewed    · Confirmed that she is on clonazepam 1 mg 4 times daily as needed  · With persistent anxiety added Atarax PRN

## 2023-04-19 LAB
ANION GAP SERPL CALCULATED.3IONS-SCNC: 11 MMOL/L (ref 4–13)
BASOPHILS # BLD AUTO: 0.07 THOUSANDS/ΜL (ref 0–0.1)
BASOPHILS NFR BLD AUTO: 1 % (ref 0–1)
BUN SERPL-MCNC: 21 MG/DL (ref 5–25)
CALCIUM SERPL-MCNC: 9.5 MG/DL (ref 8.4–10.2)
CHLORIDE SERPL-SCNC: 98 MMOL/L (ref 96–108)
CO2 SERPL-SCNC: 29 MMOL/L (ref 21–32)
CREAT SERPL-MCNC: 0.84 MG/DL (ref 0.6–1.3)
EOSINOPHIL # BLD AUTO: 0 THOUSAND/ΜL (ref 0–0.61)
EOSINOPHIL NFR BLD AUTO: 0 % (ref 0–6)
ERYTHROCYTE [DISTWIDTH] IN BLOOD BY AUTOMATED COUNT: 13 % (ref 11.6–15.1)
GFR SERPL CREATININE-BSD FRML MDRD: 83 ML/MIN/1.73SQ M
GLUCOSE SERPL-MCNC: 126 MG/DL (ref 65–140)
HCT VFR BLD AUTO: 41.3 % (ref 34.8–46.1)
HGB BLD-MCNC: 12.8 G/DL (ref 11.5–15.4)
IMM GRANULOCYTES # BLD AUTO: >0.5 THOUSAND/UL (ref 0–0.2)
IMM GRANULOCYTES NFR BLD AUTO: 6 % (ref 0–2)
LYMPHOCYTES # BLD AUTO: 2.46 THOUSANDS/ΜL (ref 0.6–4.47)
LYMPHOCYTES NFR BLD AUTO: 17 % (ref 14–44)
MCH RBC QN AUTO: 28.1 PG (ref 26.8–34.3)
MCHC RBC AUTO-ENTMCNC: 31 G/DL (ref 31.4–37.4)
MCV RBC AUTO: 91 FL (ref 82–98)
MONOCYTES # BLD AUTO: 0.71 THOUSAND/ΜL (ref 0.17–1.22)
MONOCYTES NFR BLD AUTO: 5 % (ref 4–12)
NEUTROPHILS # BLD AUTO: 10.51 THOUSANDS/ΜL (ref 1.85–7.62)
NEUTS SEG NFR BLD AUTO: 71 % (ref 43–75)
NRBC BLD AUTO-RTO: 0 /100 WBCS
PLATELET # BLD AUTO: 479 THOUSANDS/UL (ref 149–390)
PMV BLD AUTO: 8.2 FL (ref 8.9–12.7)
POTASSIUM SERPL-SCNC: 4.1 MMOL/L (ref 3.5–5.3)
RBC # BLD AUTO: 4.55 MILLION/UL (ref 3.81–5.12)
SODIUM SERPL-SCNC: 138 MMOL/L (ref 135–147)
WBC # BLD AUTO: 14.65 THOUSAND/UL (ref 4.31–10.16)

## 2023-04-19 RX ORDER — LEVALBUTEROL 1.25 MG/.5ML
1.25 SOLUTION, CONCENTRATE RESPIRATORY (INHALATION) EVERY 6 HOURS PRN
Status: DISCONTINUED | OUTPATIENT
Start: 2023-04-19 | End: 2023-04-20 | Stop reason: HOSPADM

## 2023-04-19 RX ORDER — FUROSEMIDE 10 MG/ML
40 INJECTION INTRAMUSCULAR; INTRAVENOUS ONCE
Status: COMPLETED | OUTPATIENT
Start: 2023-04-19 | End: 2023-04-19

## 2023-04-19 RX ORDER — PREDNISONE 20 MG/1
40 TABLET ORAL DAILY
Status: DISCONTINUED | OUTPATIENT
Start: 2023-04-20 | End: 2023-04-20 | Stop reason: HOSPADM

## 2023-04-19 RX ORDER — LEVALBUTEROL 1.25 MG/.5ML
1.25 SOLUTION, CONCENTRATE RESPIRATORY (INHALATION) EVERY 6 HOURS PRN
Status: DISCONTINUED | OUTPATIENT
Start: 2023-04-19 | End: 2023-04-19

## 2023-04-19 RX ORDER — SODIUM CHLORIDE FOR INHALATION 0.9 %
3 VIAL, NEBULIZER (ML) INHALATION EVERY 6 HOURS PRN
Status: DISCONTINUED | OUTPATIENT
Start: 2023-04-19 | End: 2023-04-20 | Stop reason: HOSPADM

## 2023-04-19 RX ADMIN — BUPRENORPHINE AND NALOXONE 4 MG: 2; .5 FILM BUCCAL; SUBLINGUAL at 15:31

## 2023-04-19 RX ADMIN — ENOXAPARIN SODIUM 40 MG: 100 INJECTION SUBCUTANEOUS at 09:04

## 2023-04-19 RX ADMIN — LIDOCAINE 5% 1 PATCH: 700 PATCH TOPICAL at 09:04

## 2023-04-19 RX ADMIN — BUPRENORPHINE AND NALOXONE 8 MG: 8; 2 FILM BUCCAL; SUBLINGUAL at 21:35

## 2023-04-19 RX ADMIN — GABAPENTIN 400 MG: 400 CAPSULE ORAL at 21:35

## 2023-04-19 RX ADMIN — BUDESONIDE AND FORMOTEROL FUMARATE DIHYDRATE 2 PUFF: 160; 4.5 AEROSOL RESPIRATORY (INHALATION) at 09:09

## 2023-04-19 RX ADMIN — LEVALBUTEROL 1.25 MG: 1.25 SOLUTION, CONCENTRATE RESPIRATORY (INHALATION) at 07:10

## 2023-04-19 RX ADMIN — LEVALBUTEROL 1.25 MG: 1.25 SOLUTION, CONCENTRATE RESPIRATORY (INHALATION) at 13:45

## 2023-04-19 RX ADMIN — TOPIRAMATE 50 MG: 25 TABLET, FILM COATED ORAL at 09:04

## 2023-04-19 RX ADMIN — CLONAZEPAM 1 MG: 1 TABLET ORAL at 09:04

## 2023-04-19 RX ADMIN — Medication 3 ML: at 07:10

## 2023-04-19 RX ADMIN — TOPIRAMATE 50 MG: 25 TABLET, FILM COATED ORAL at 17:02

## 2023-04-19 RX ADMIN — FUROSEMIDE 40 MG: 40 TABLET ORAL at 15:31

## 2023-04-19 RX ADMIN — DOCUSATE SODIUM 100 MG: 100 CAPSULE, LIQUID FILLED ORAL at 17:02

## 2023-04-19 RX ADMIN — METHYLPREDNISOLONE SODIUM SUCCINATE 40 MG: 40 INJECTION, POWDER, FOR SOLUTION INTRAMUSCULAR; INTRAVENOUS at 09:05

## 2023-04-19 RX ADMIN — CLONAZEPAM 1 MG: 1 TABLET ORAL at 13:00

## 2023-04-19 RX ADMIN — TRAZODONE HYDROCHLORIDE 100 MG: 100 TABLET ORAL at 21:35

## 2023-04-19 RX ADMIN — Medication 3 ML: at 13:45

## 2023-04-19 RX ADMIN — Medication 1 PATCH: at 09:04

## 2023-04-19 RX ADMIN — ACETAMINOPHEN 325MG 650 MG: 325 TABLET ORAL at 15:33

## 2023-04-19 RX ADMIN — FAMOTIDINE 20 MG: 20 TABLET ORAL at 09:04

## 2023-04-19 RX ADMIN — GABAPENTIN 400 MG: 400 CAPSULE ORAL at 15:31

## 2023-04-19 RX ADMIN — CLONAZEPAM 1 MG: 1 TABLET ORAL at 21:38

## 2023-04-19 RX ADMIN — FLUOXETINE 80 MG: 20 CAPSULE ORAL at 09:04

## 2023-04-19 RX ADMIN — FUROSEMIDE 40 MG: 10 INJECTION, SOLUTION INTRAVENOUS at 12:54

## 2023-04-19 RX ADMIN — GABAPENTIN 400 MG: 400 CAPSULE ORAL at 09:04

## 2023-04-19 RX ADMIN — FUROSEMIDE 40 MG: 40 TABLET ORAL at 09:04

## 2023-04-19 RX ADMIN — BUPRENORPHINE AND NALOXONE 8 MG: 8; 2 FILM BUCCAL; SUBLINGUAL at 09:02

## 2023-04-19 RX ADMIN — FAMOTIDINE 20 MG: 20 TABLET ORAL at 17:02

## 2023-04-19 RX ADMIN — BUDESONIDE AND FORMOTEROL FUMARATE DIHYDRATE 2 PUFF: 160; 4.5 AEROSOL RESPIRATORY (INHALATION) at 21:35

## 2023-04-19 RX ADMIN — CEFTRIAXONE SODIUM 1000 MG: 10 INJECTION, POWDER, FOR SOLUTION INTRAVENOUS at 09:00

## 2023-04-19 NOTE — ASSESSMENT & PLAN NOTE
· Elevated troponin with evaded BNP and abnormal CT chest showing pulmonary edema  · Nonischemic myocardial injury due to right heart strain  · 2D Echo with evidence of right heart failure  · Cardiology input appreciated, continue Lasix 40 mg twice daily given single dose of IV Lasix today  · Monitor volume status with daily weights, I's and O's  · Low Na diet

## 2023-04-19 NOTE — ASSESSMENT & PLAN NOTE
· Bipolar disorder followed by [facility] THE [facility]  · Medications reviewed via care everywhere    · Continue Prozac 80 mg daily, trazodone 100 mg at bedtime, Neurontin 400 mg 3 times daily

## 2023-04-19 NOTE — PROGRESS NOTES
[address] [place] [address]  Progress Note  Name: Justine Oppenheim  MRN: [id number]  Unit/Bed#: [facility] 68 2 -01 I Date of Admission: 4/13/2023   Date of Service: 4/19/2023 I Hospital Day: 6    Assessment/Plan   * Acute respiratory failure with hypoxia (HCC)  Assessment & Plan  · Acute respiratory failure due to possible cardiogenic pulmonary edema versus noncardiogenic pulmonary edema versus atypical pneumonia/ pneumonitis  · Improving, currently on 2 L oxygen  · CTA negative for PE  · Pulmonary follow-up appreciated  · Patient improving therefore bronchoscopy was canceled  · Transition to prednisone starting tomorrow  · Completed antibiotic therapy  · IgE, ANCA, anti-Karla, scleroderma and AMA pending    Abnormal CT of the chest  Assessment & Plan  · Abnormal CT of the chest with severe bilateral groundglass opacities and adenopathy  · Opacities suggest pulmonary edema  The adenopathy suggest sarcoidosis  · CT last year which showed similar finding  When she was admitted, there was concern for vape related lung injury  Patient currently denies use of vape, admits to ongoing smoking, half a pack a day  · Continued on ceftriaxone and Zithromax for possible atypical pneumonia  Systemic steroids added to therapy  · Started Lasix for pulmonary edema, right heart failure  · Appreciate pulmonology and cardiology input    Pulmonary edema  Assessment & Plan  · Elevated troponin with evaded BNP and abnormal CT chest showing pulmonary edema  · Nonischemic myocardial injury due to right heart strain  · 2D Echo with evidence of right heart failure  · Cardiology input appreciated, continue Lasix 40 mg twice daily given single dose of IV Lasix today  · Monitor volume status with daily weights, I's and O's  · Low Na diet     Opioid use disorder, severe, on maintenance therapy ([facility] [address] )  Assessment & Plan  · PDMP and care everywhere reviewed    · She is maintained on  Suboxone TID 8mg-4mg-8mg     Anxiety disorder, unspecified  Assessment & Plan  · Anxiety disorder followed by the [address] [place] [address] [address] [place]  · PDMP and care everywhere reviewed  · Confirmed that she is on clonazepam 1 mg 4 times daily as needed  · With persistent anxiety added Atarax PRN    Tobacco abuse  Assessment & Plan  · Placed on nicotine patch  · Smoking cessation counseling    Bipolar disorder, unspecified ([address] )  Assessment & Plan  · Bipolar disorder followed by [facility] THE [facility]  · Medications reviewed via care everywhere  · Continue Prozac 80 mg daily, trazodone 100 mg at bedtime, Neurontin 400 mg 3 times daily               VTE Pharmacologic Prophylaxis:   Pharmacologic: Lovenox    Patient Centered Rounds: I have performed bedside rounds with nursing staff today  Education and Discussions with Family / Patient: Patient    Time Spent for Care: More than 50% of total time spent on counseling and coordination of care as described above  Current Length of Stay: 6 day(s)    Current Patient Status: Inpatient   Certification Statement: The patient will continue to require additional inpatient hospital stay due to Acute hypoxic restpiratorry failure, IV diuretics    Discharge Plan / Estimated Discharge Date: 24-48h    Code Status: Level 1 - Full Code      Subjective:   Patient seen and examined at bedside, denies any complaints, sitting upright, comfortable, tolerating diet    Objective:     Vitals:   Temp (24hrs), Av 1 °F (36 7 °C), Min:97 8 °F (36 6 °C), Max:98 3 °F (36 8 °C)    Temp:  [97 8 °F (36 6 °C)-98 3 °F (36 8 °C)] 98 2 °F (36 8 °C)  HR:  [59-78] 59  Resp:  [18] 18  BP: ()/(61-72) 110/68  SpO2:  [90 %-98 %] 98 %  Body mass index is 28 08 kg/m²  Input and Output Summary (last 24 hours):        Intake/Output Summary (Last 24 hours) at 2023 1244  Last data filed at 2023 0900  Gross per 24 hour   Intake 1200 ml   Output --   Net 1200 ml       Physical Exam:    Constitutional: Patient is oriented to person, place and time, no acute distress  HEENT:  Normocephalic, atraumatic  Cardiovascular: Normal S1S2, RRR, No murmurs/rubs/gallops appreciated  Pulmonary:  Bilateral air entry, No rhonchi/rales/wheezing appreciated  Abdominal: Soft, Bowel sounds present, Non-tender, Non-distended  Extremities:  No cyanosis, clubbing or edema  Neurological: Cranial nerves II-XII grossly intact, sensation intact, otherwise no focal neurological symptoms  Skin:  Warm, dry    Additional Data:     Labs:    Results from last 7 days   Lab Units 04/19/23  0520   WBC Thousand/uL 14 65*   HEMOGLOBIN g/dL 12 8   HEMATOCRIT % 41 3   PLATELETS Thousands/uL 479*   NEUTROS PCT % 71   LYMPHS PCT % 17   MONOS PCT % 5   EOS PCT % 0     Results from last 7 days   Lab Units 04/19/23  0520   POTASSIUM mmol/L 4 1   CHLORIDE mmol/L 98   CO2 mmol/L 29   BUN mg/dL 21   CREATININE mg/dL 0 84   CALCIUM mg/dL 9 5            I Have Reviewed All Lab Data Listed Above  Invasive Devices     Peripheral Intravenous Line  Duration           Peripheral IV 04/19/23 Dorsal (posterior); Left Hand <1 day                     Recent Cultures (last 7 days):     Results from last 7 days   Lab Units 04/15/23  0643 04/13/23  1833 04/13/23  1200   BLOOD CULTURE   --   --  No Growth After 5 Days  No Growth After 5 Days  SPUTUM CULTURE  Test not performed  Suggest repeat specimen  --   --    GRAM STAIN RESULT  >10 squamous epithelial cells/lpf, indicating orophayngeal contamination  *  2+ Yeast*  2+ Gram positive cocci in pairs*  2+ Gram positive cocci in clusters*  2+ Gram negative rods*  --   --    LEGIONELLA URINARY ANTIGEN   --  Negative  --        Last 24 Hours Medication List:   Current Facility-Administered Medications   Medication Dose Route Frequency Provider Last Rate   • acetaminophen  650 mg Oral Q6H PRN Mariaelena Daley MD     • albuterol  2 puff Inhalation Q4H PRN Yovana Suárez MD     • benzonatate  100 mg Oral TID PRN Yovana Guadalupe Brown Fernández MD     • budesonide-formoterol  2 puff Inhalation BID NETTE Haynes     • buprenorphine-naloxone  8 mg Sublingual BID Yovana Chu MD      And   • buprenorphine-naloxone  4 mg Sublingual Daily April Barraza MD     • clonazePAM  1 mg Oral 4x Daily PRN Dannielle Dominguez MD     • docusate sodium  100 mg Oral Q12H Dannielle Dominguez MD     • enoxaparin  40 mg Subcutaneous Daily Dannielle Dominguez MD     • famotidine  20 mg Oral BID Dannielle Dominguez MD     • FLUoxetine  80 mg Oral Daily Dannielle Dominguez MD     • furosemide  40 mg Intravenous Once Holland Sumner DO     • furosemide  40 mg Oral BID (diuretic) Carolina Craig DO     • gabapentin  400 mg Oral TID Dannielle Dominguez MD     • hydrOXYzine HCL  25 mg Oral Q6H PRN Yovana Chu MD     • levalbuterol  1 25 mg Nebulization TID April Barraza MD     • lidocaine  1 patch Topical Daily April Barraza MD     • nicotine  1 patch Transdermal Daily Dannielle Dominguez MD     • ondansetron  4 mg Oral Q6H PRN Dannielle Dominguez MD     • [START ON 4/20/2023] predniSONE  40 mg Oral Daily Awilda Muñoz MD     • sodium chloride (PF)  3 mL Intravenous Q1H PRN Dawayne Severin, MD     • sodium chloride  3 mL Nebulization TID April Barraza MD     • topiramate  50 mg Oral BID Dannielle Dominguez MD     • traZODone  100 mg Oral HS Dannielle Dominguez MD          Today, Patient Was Seen By: Awilda Muñoz MD

## 2023-04-19 NOTE — ASSESSMENT & PLAN NOTE
· Anxiety disorder followed by the [address]  · PDMP and care everywhere reviewed    · Confirmed that she is on clonazepam 1 mg 4 times daily as needed  · With persistent anxiety added Atarax PRN

## 2023-04-19 NOTE — PLAN OF CARE
Problem: Potential for Falls  Goal: Patient will remain free of falls  Description: INTERVENTIONS:  - Educate patient/family on patient safety including physical limitations  - Instruct patient to call for assistance with activity   - Consult OT/PT to assist with strengthening/mobility   - Keep Call bell within reach  - Keep bed low and locked with side rails adjusted as appropriate  - Keep care items and personal belongings within reach  - Initiate and maintain comfort rounds  - Apply yellow socks and bracelet for high fall risk patients  - Consider moving patient to room near nurses station  4/19/2023 0937 by Zehra Perez RN  Outcome: Progressing  4/19/2023 0937 by Zehra Perez RN  Outcome: Progressing     Problem: PAIN - ADULT  Goal: Verbalizes/displays adequate comfort level or baseline comfort level  Description: Interventions:  - Encourage patient to monitor pain and request assistance  - Assess pain using appropriate pain scale  - Administer analgesics based on type and severity of pain and evaluate response  - Implement non-pharmacological measures as appropriate and evaluate response  - Consider cultural and social influences on pain and pain management  - Notify physician/advanced practitioner if interventions unsuccessful or patient reports new pain  4/19/2023 0937 by Zehra Perez RN  Outcome: Progressing  4/19/2023 0937 by Zehra Perez RN  Outcome: Progressing     Problem: INFECTION - ADULT  Goal: Absence or prevention of progression during hospitalization  Description: INTERVENTIONS:  - Assess and monitor for signs and symptoms of infection  - Monitor lab/diagnostic results  - Monitor all insertion sites, i e  indwelling lines, tubes, and drains  - Monitor endotracheal if appropriate and nasal secretions for changes in amount and color  - Okay appropriate cooling/warming therapies per order  - Administer medications as ordered  - Instruct and encourage patient and family to use good hand hygiene technique  - Identify and instruct in appropriate isolation precautions for identified infection/condition  4/19/2023 0937 by Vale White RN  Outcome: Progressing  4/19/2023 0937 by Vale White RN  Outcome: Progressing  Goal: Absence of fever/infection during neutropenic period  Description: INTERVENTIONS:  - Monitor WBC    4/19/2023 0937 by Vale White RN  Outcome: Progressing  4/19/2023 0937 by Vale White RN  Outcome: Progressing     Problem: SAFETY ADULT  Goal: Patient will remain free of falls  Description: INTERVENTIONS:  - Educate patient/family on patient safety including physical limitations  - Instruct patient to call for assistance with activity   - Consult OT/PT to assist with strengthening/mobility   - Keep Call bell within reach  - Keep bed low and locked with side rails adjusted as appropriate  - Keep care items and personal belongings within reach  - Initiate and maintain comfort rounds  - Apply yellow socks and bracelet for high fall risk patients  - Consider moving patient to room near nurses station  4/19/2023 0937 by Vale White RN  Outcome: Progressing  4/19/2023 0937 by Vale White RN  Outcome: Progressing  Goal: Maintain or return to baseline ADL function  Description: INTERVENTIONS:  - Educate patient/family on patient safety including physical limitations  - Instruct patient to call for assistance with activity   - Consult OT/PT to assist with strengthening/mobility   - Keep Call bell within reach  - Keep bed low and locked with side rails adjusted as appropriate  - Keep care items and personal belongings within reach  - Initiate and maintain comfort rounds  - Apply yellow socks and bracelet for high fall risk patients  - Consider moving patient to room near nurses station  4/19/2023 0937 by Vale White RN  Outcome: Progressing  4/19/2023 0937 by Vale White RN  Outcome: Progressing  Goal: Maintains/Returns to pre admission functional level  Description: INTERVENTIONS:  - Perform BMAT or MOVE assessment daily    - Set and communicate daily mobility goal to care team and patient/family/caregiver  - Collaborate with rehabilitation services on mobility goals if consulted  - Out of bed for toileting  - Record patient progress and toleration of activity level   4/19/2023 0937 by Leo Bowman RN  Outcome: Progressing  4/19/2023 0937 by Leo Bowman RN  Outcome: Progressing     Problem: DISCHARGE PLANNING  Goal: Discharge to home or other facility with appropriate resources  Description: INTERVENTIONS:  - Identify barriers to discharge w/patient and caregiver  - Arrange for needed discharge resources and transportation as appropriate  - Identify discharge learning needs (meds, wound care, etc )  - Arrange for interpretive services to assist at discharge as needed  - Refer to Case Management Department for coordinating discharge planning if the patient needs post-hospital services based on physician/advanced practitioner order or complex needs related to functional status, cognitive ability, or social support system  4/19/2023 0937 by Leo Bowman RN  Outcome: Progressing  4/19/2023 0937 by Leo Bowman RN  Outcome: Progressing     Problem: Knowledge Deficit  Goal: Patient/family/caregiver demonstrates understanding of disease process, treatment plan, medications, and discharge instructions  Description: Complete learning assessment and assess knowledge base    Interventions:  - Provide teaching at level of understanding  - Provide teaching via preferred learning methods  4/19/2023 0937 by Leo Bowman RN  Outcome: Progressing  4/19/2023 0937 by Leo Bowman RN  Outcome: Progressing     Problem: CARDIOVASCULAR - ADULT  Goal: Maintains optimal cardiac output and hemodynamic stability  Description: INTERVENTIONS:  - Monitor I/O, vital signs and rhythm  - Monitor for S/S and trends of decreased cardiac output  - Administer and titrate ordered vasoactive medications to optimize hemodynamic stability  - Assess quality of pulses, skin color and temperature  - Assess for signs of decreased coronary artery perfusion  - Instruct patient to report change in severity of symptoms  4/19/2023 0937 by Leo Bowman RN  Outcome: Progressing  4/19/2023 0937 by Leo Bowman RN  Outcome: Progressing  Goal: Absence of cardiac dysrhythmias or at baseline rhythm  Description: INTERVENTIONS:  - Continuous cardiac monitoring, vital signs, obtain 12 lead EKG if ordered  - Administer antiarrhythmic and heart rate control medications as ordered  - Monitor electrolytes and administer replacement therapy as ordered  4/19/2023 0937 by Leo Bowman RN  Outcome: Progressing  4/19/2023 0937 by Leo Bowman RN  Outcome: Progressing     Problem: RESPIRATORY - ADULT  Goal: Achieves optimal ventilation and oxygenation  Description: INTERVENTIONS:  - Assess for changes in respiratory status  - Assess for changes in mentation and behavior  - Position to facilitate oxygenation and minimize respiratory effort  - Oxygen administered by appropriate delivery if ordered  - Initiate smoking cessation education as indicated  - Encourage broncho-pulmonary hygiene including cough, deep breathe, Incentive Spirometry  - Assess the need for suctioning and aspirate as needed  - Assess and instruct to report SOB or any respiratory difficulty  - Respiratory Therapy support as indicated  4/19/2023 0937 by Leo Bowman RN  Outcome: Progressing  4/19/2023 0937 by Leo Bomwan RN  Outcome: Progressing     Problem: METABOLIC, FLUID AND ELECTROLYTES - ADULT  Goal: Electrolytes maintained within normal limits  Description: INTERVENTIONS:  - Monitor labs and assess patient for signs and symptoms of electrolyte imbalances  - Administer electrolyte replacement as ordered  - Monitor response to electrolyte replacements, including repeat lab results as appropriate  - Instruct patient on fluid and nutrition as appropriate  4/19/2023 2605 by Emma Rascon RN  Outcome: Progressing  4/19/2023 0937 by Emma Rascon RN  Outcome: Progressing  Goal: Fluid balance maintained  Description: INTERVENTIONS:  - Monitor labs   - Monitor I/O and WT  - Instruct patient on fluid and nutrition as appropriate  - Assess for signs & symptoms of volume excess or deficit  4/19/2023 0937 by Emma Rascon RN  Outcome: Progressing  4/19/2023 0937 by Emma Rascon RN  Outcome: Progressing

## 2023-04-19 NOTE — PLAN OF CARE
Problem: PAIN - ADULT  Goal: Verbalizes/displays adequate comfort level or baseline comfort level  Description: Interventions:  - Encourage patient to monitor pain and request assistance  - Assess pain using appropriate pain scale  - Administer analgesics based on type and severity of pain and evaluate response  - Implement non-pharmacological measures as appropriate and evaluate response  - Consider cultural and social influences on pain and pain management  - Notify physician/advanced practitioner if interventions unsuccessful or patient reports new pain  Outcome: Progressing     Problem: INFECTION - ADULT  Goal: Absence or prevention of progression during hospitalization  Description: INTERVENTIONS:  - Assess and monitor for signs and symptoms of infection  - Monitor lab/diagnostic results  - Monitor all insertion sites, i e  indwelling lines, tubes, and drains  - Monitor endotracheal if appropriate and nasal secretions for changes in amount and color  - [place] appropriate cooling/warming therapies per order  - Administer medications as ordered  - Instruct and encourage patient and family to use good hand hygiene technique  - Identify and instruct in appropriate isolation precautions for identified infection/condition  Outcome: Progressing  Goal: Absence of fever/infection during neutropenic period  Description: INTERVENTIONS:  - Monitor WBC    Outcome: Progressing

## 2023-04-19 NOTE — ASSESSMENT & PLAN NOTE
· Acute respiratory failure due to possible cardiogenic pulmonary edema versus noncardiogenic pulmonary edema versus atypical pneumonia/ pneumonitis  · Improving, currently on 2 L oxygen  · CTA negative for PE  · Pulmonary follow-up appreciated  · Patient improving therefore bronchoscopy was canceled  · Transition to prednisone starting tomorrow  · Completed antibiotic therapy  · IgE, ANCA, anti-Karla, scleroderma and AMA pending

## 2023-04-19 NOTE — PROGRESS NOTES
Cardiology Progress Note   MD Morris Nunez MD Angelena Chapel, DO, MD Frank Castellano DO, Ann Marie Scott DO, [facility] [place]  ----------------------------------------------------------------  [address] [address]    Lzumaria Engel 57 y o  female MRN: [id number]  Unit/Bed#: [place] 2 -01 Encounter: [id number]      ASSESSMENT:   • Acute hypoxic respiratory failure likely secondary to heart failure  • Acute diastolic heart failure  o LVEF 33%, normal diastolic function, moderate RV dilatation with mildly reduced function and findings of elevated PVR, mild RA dilatation, mild TR, April 2023  • Nonischemic troponin elevation secondary to heart failure  • Pulmonary edema  • Tobacco use  • Anxiety    PLAN:  Renal function remained stable with oral diuretic  Weights are trending downward, but urine output does not appear to be accurately obtained  Would give single dose of IV Lasix today to see how patient responds  Strict I's/O's and daily weights  Keep potassium greater than 4 magnesium greater than 2  If renal function declines, will hold off on further IV diuretic  Repeat chest x-ray in the morning  Supportive care    Signed: Andie Desai DO, RAHEEL, SERGEI, MIESHA      History of Present Illness:  Patient seen and examined  Denies chest pain, pressure, tightness or squeezing  Denies lightheadedness, dizziness or palpitations  Denies lower extremity swelling, orthopnea or paroxysmal nocturnal dyspnea          Review of Systems:  Review of Systems   Constitutional: Negative for decreased appetite, fever, weight gain and weight loss  HENT: Negative for congestion and sore throat  Eyes: Negative for visual disturbance  Cardiovascular: Negative for chest pain, dyspnea on exertion, leg swelling, near-syncope and palpitations  Respiratory: Negative for cough and shortness of breath      Hematologic/Lymphatic: Negative for bleeding problem  Skin: Negative for rash  Musculoskeletal: Negative for myalgias and neck pain  Gastrointestinal: Negative for abdominal pain and nausea  Neurological: Negative for light-headedness and weakness  Psychiatric/Behavioral: Negative for depression  Allergies   Allergen Reactions   • Haloperidol Other (See Comments)     oculogyr crisis       No current facility-administered medications on file prior to encounter       Current Outpatient Medications on File Prior to Encounter   Medication Sig   • ARIPiprazole (ABILIFY) 15 mg tablet Take 1 tablet (15 mg total) by mouth daily for 14 days   • clonazePAM (KlonoPIN) 1 mg tablet Take 1 tablet (1 mg total) by mouth 2 (two) times a day for 2 days   • docusate sodium (COLACE) 100 mg capsule Take 1 capsule (100 mg total) by mouth every 12 (twelve) hours for 15 days   • famotidine (PEPCID) 20 mg tablet Take 1 tablet (20 mg total) by mouth 2 (two) times a day   • ferrous sulfate 325 (65 Fe) mg tablet Take 1 tablet (325 mg total) by mouth daily with breakfast   • gabapentin (NEURONTIN) 300 mg capsule Take 2 capsules (600 mg total) by mouth 3 (three) times a day   • hydrOXYzine HCL (ATARAX) 25 mg tablet Take 1 tablet (25 mg total) by mouth every 6 (six) hours (Patient not taking: Reported on 11/22/2022)   • hydrOXYzine pamoate (VISTARIL) 25 mg capsule Take 1 capsule (25 mg total) by mouth every 6 (six) hours as needed for anxiety (Patient not taking: Reported on 11/22/2022)   • ibuprofen (MOTRIN) 400 mg tablet Take 1 tablet (400 mg total) by mouth every 6 (six) hours as needed for mild pain for up to 10 days   • ibuprofen (MOTRIN) 600 mg tablet Take 1 tablet (600 mg total) by mouth every 6 (six) hours as needed for moderate pain   • naloxone (NARCAN) 4 mg/0 1 mL nasal spray 4 mg into each nostril   • nicotine (NICODERM CQ) 14 mg/24hr TD 24 hr patch Place 1 patch on the skin every 24 hours   • nicotine polacrilex (NICORETTE) 4 mg gum Chew 1 each (4 mg total) as needed for smoking cessation   • ondansetron (Zofran ODT) 4 mg disintegrating tablet Take 1 tablet (4 mg total) by mouth every 6 (six) hours as needed for nausea or vomiting   • ondansetron (ZOFRAN-ODT) 4 mg disintegrating tablet Take 1 tablet (4 mg total) by mouth every 6 (six) hours as needed for nausea or vomiting   • pantoprazole (PROTONIX) 40 mg tablet Take 1 tablet (40 mg total) by mouth daily in the early morning   • topiramate (TOPAMAX) 100 mg tablet Take 1 tablet (100 mg total) by mouth 2 (two) times a day   Take in combination with 25mg for total daily dose of 125mg twice daily  • topiramate (Topamax) 25 mg tablet Take 1 tablet (25 mg total) by mouth 2 (two) times a day   Take in combination with 100mg for total daily dose of 125mg twice daily     • traZODone (DESYREL) 50 mg tablet Take 1 tablet (50 mg total) by mouth daily at bedtime   • venlafaxine (EFFEXOR-XR) 37 5 mg 24 hr capsule Take 1 capsule (37 5 mg total) by mouth daily        Current Facility-Administered Medications   Medication Dose Route Frequency Provider Last Rate   • acetaminophen  650 mg Oral Q6H PRN Ramon Ureña MD     • albuterol  2 puff Inhalation Q4H PRN Yovana Avendaño MD     • benzonatate  100 mg Oral TID PRN Yovana Avendaño MD     • budesonide-formoterol  2 puff Inhalation BID NETTE Cheung     • buprenorphine-naloxone  8 mg Sublingual BID Yovana Chu MD      And   • buprenorphine-naloxone  4 mg Sublingual Daily Yovana Chu MD     • cefTRIAXone  1,000 mg Intravenous Q24H Ramon Ureña MD 1,000 mg (04/19/23 0900)   • clonazePAM  1 mg Oral 4x Daily PRN Ramon Ureña MD     • docusate sodium  100 mg Oral Q12H Ramon Ureña MD     • enoxaparin  40 mg Subcutaneous Daily Ramon Ureña MD     • famotidine  20 mg Oral BID Ramon Ureña MD     • FLUoxetine  80 mg Oral Daily Ramon Ureña MD • furosemide  40 mg Oral BID (diuretic) Carolina Craig DO     • gabapentin  400 mg Oral TID Loulou Apple MD     • hydrOXYzine HCL  25 mg Oral Q6H PRN Yovana Chu MD     • levalbuterol  1 25 mg Nebulization TID Yovana Chu MD     • lidocaine  1 patch Topical Daily Jose Myles MD     • methylPREDNISolone sodium succinate  40 mg Intravenous Q12H [address] Gearldeatim Valente MD     • nicotine  1 patch Transdermal Daily Loulou Apple MD     • ondansetron  4 mg Oral Q6H PRN Loulou Apple MD     • sodium chloride (PF)  3 mL Intravenous Q1H PRN Nisa Ramos MD     • sodium chloride  3 mL Nebulization TID Jose Myles MD     • topiramate  50 mg Oral BID Loulou Apple MD     • traZODone  100 mg Oral HS Loulou Apple MD              Vitals:    04/19/23 0532 04/19/23 0710 04/19/23 0728 04/19/23 0730   BP:   110/68    BP Location:       Pulse:   59    Resp:   18    Temp:   98 2 °F (36 8 °C)    TempSrc:       SpO2:  96% 98% 98%   Weight: 67 4 kg (148 lb 9 4 oz)      Height:         Body mass index is 28 08 kg/m²  Intake/Output Summary (Last 24 hours) at 4/19/2023 1020  Last data filed at 4/19/2023 0900  Gross per 24 hour   Intake 1200 ml   Output --   Net 1200 ml       Weight change: -4 1 kg (-9 lb 0 6 oz)    PHYSICAL EXAMINATION:  Gen: Awake, Alert, NAD  Head/eyes: AT/NC, pupils equal and round, Anicteric  ENT: mmm  Neck: Supple, No elevated JVP, trachea midline  Resp: CTA bilaterally no w/r/r  CV: RRR +S1, S2, No m/r/g  Abd: Soft, NT/ND + BS  Ext: no LE edema bilaterally  Neuro:  Follows commands, moves all extermities  Psych: Appropriate affect, normal mood, pleasant attitude, non-combative  Skin: warm; no rash, erythema or venous stasis changes on exposed skin    Lab Results:  Results from last 7 days   Lab Units 04/19/23  0520   WBC Thousand/uL 14 65*   HEMOGLOBIN g/dL 12 8   HEMATOCRIT % 41 3   PLATELETS Thousands/uL 479*     Results from last 7 days   Lab Units 04/19/23  0520   POTASSIUM mmol/L 4 1   CHLORIDE mmol/L 98   CO2 mmol/L 29   BUN mg/dL 21   CREATININE mg/dL 0 84   CALCIUM mg/dL 9 5     No results found for: TROPONINT      Results from last 7 days   Lab Units 04/13/23  1625 04/13/23  1423 04/13/23  1226   HS TNI 0HR ng/L  --   --  675*   HS TNI 2HR ng/L  --  533*  --    HS TNI 4HR ng/L 439*  --   --            This note was completed in part utilizing [employer] Flu[employer] Direct Software  Grammatical errors, random word insertions, spelling mistakes, and incomplete sentences may be an occasional consequence of this system secondary to software limitations, ambient noise, and hardware issues  If you have any questions or concerns about the content, text, or information contained within the body of this dictation, please contact the provider for clarification

## 2023-04-19 NOTE — PROGRESS NOTES
"Progress Note - Pulmonary   Ruperto Valdovinos 57 y o  female MRN: [id number]  Unit/Bed#: [place] [address] -01 Encounter: [id number]    Assessment/Plan:    Acute hypoxic respiratory failure due to abnormal chest imaging with bilateral pulmonary opacities with mediastinal/hilar adenopathy - suspected acute pneumonitis   Now on room air  Ambulatory pulse ox prior to discharge  Activity as tolerated  Completed 5 days of ceftriaxone  Continue prednisone 40 mg daily  Repeat chest x-ray in the morning  Adenopathy is stable since September 2022  Will need repeat CT of the chest in 3-6 months      Nicotine dependence with possible underlying COPD              Recommend complete cessation  Counseled on limiting any inhalants  Outpatient pulmonary follow-up and PFTs      Outpatient follow-up pending course  Discussed with nursing  Chief Complaint:    \"I feel much better  \"    Subjective:    Akhil Cain is ambulating around her room on room air  She reports she feels much better  She is anxious to be discharged by Friday for an appointment that she needs to go to  Her shortness of breath is remarkably improved  No further pain  She did have 1 episode of hemoptysis which I was able to visualize at the bedside  It was dark, old blood  Objective:    Vitals: Blood pressure 110/78, pulse 59, temperature 98 2 °F (36 8 °C), resp  rate 18, height 5' 1\" (1 549 m), weight 67 4 kg (148 lb 9 4 oz), last menstrual period 04/13/2023, SpO2 98 %  Room air,Body mass index is 28 08 kg/m²  Intake/Output Summary (Last 24 hours) at 4/19/2023 1500  Last data filed at 4/19/2023 0900  Gross per 24 hour   Intake 840 ml   Output --   Net 840 ml       Invasive Devices     Peripheral Intravenous Line  Duration           Peripheral IV 04/19/23 Dorsal (posterior); Left Hand <1 day                Physical Exam:     Physical Exam  Vitals reviewed     Constitutional:       General: She is not in acute " distress  Appearance: She is well-developed  She is not toxic-appearing or diaphoretic  HENT:      Head: Normocephalic and atraumatic  Eyes:      General: No scleral icterus  Neck:      Trachea: No tracheal deviation  Cardiovascular:      Rate and Rhythm: Normal rate and regular rhythm  Heart sounds: S1 normal and S2 normal  No murmur heard  No friction rub  No gallop  Pulmonary:      Effort: Pulmonary effort is normal  No tachypnea, accessory muscle usage or respiratory distress  Breath sounds: Normal breath sounds  No stridor  No decreased breath sounds, wheezing, rhonchi or rales  Chest:      Chest wall: No tenderness  Abdominal:      General: Bowel sounds are normal  There is no distension  Palpations: Abdomen is soft  Tenderness: There is no abdominal tenderness  Musculoskeletal:      Cervical back: Neck supple  Right lower leg: No edema  Left lower leg: No edema  Skin:     General: Skin is warm and dry  Findings: No rash  Neurological:      Mental Status: She is alert and oriented to person, place, and time  GCS: GCS eye subscore is 4  GCS verbal subscore is 5  GCS motor subscore is 6  Psychiatric:         Speech: Speech normal          Behavior: Behavior normal  Behavior is cooperative           Labs:   CBC:   Lab Results   Component Value Date    WBC 14 65 (H) 04/19/2023    HGB 12 8 04/19/2023    HCT 41 3 04/19/2023    MCV 91 04/19/2023     (H) 04/19/2023    MCH 28 1 04/19/2023    MCHC 31 0 (L) 04/19/2023    RDW 13 0 04/19/2023    MPV 8 2 (L) 04/19/2023    NRBC 0 04/19/2023   , CMP:   Lab Results   Component Value Date    SODIUM 138 04/19/2023    K 4 1 04/19/2023    CL 98 04/19/2023    CO2 29 04/19/2023    BUN 21 04/19/2023    CREATININE 0 84 04/19/2023    CALCIUM 9 5 04/19/2023    EGFR 83 04/19/2023     Autoimmune serologies negative    Imaging and other studies: None new

## 2023-04-20 ENCOUNTER — APPOINTMENT (INPATIENT)
Dept: RADIOLOGY | Facility: HOSPITAL | Age: 48
End: 2023-04-20

## 2023-04-20 VITALS
HEIGHT: 61 IN | TEMPERATURE: 97.7 F | RESPIRATION RATE: 16 BRPM | WEIGHT: 149.47 LBS | HEART RATE: 67 BPM | SYSTOLIC BLOOD PRESSURE: 105 MMHG | OXYGEN SATURATION: 93 % | BODY MASS INDEX: 28.22 KG/M2 | DIASTOLIC BLOOD PRESSURE: 72 MMHG

## 2023-04-20 LAB
AMPHETAMINES UR QL SCN: NEGATIVE NG/ML
BARBITURATES UR QL SCN: NEGATIVE NG/ML
BENZODIAZ UR QL: NEGATIVE
BZE UR QL: NEGATIVE NG/ML
CANNABINOIDS UR QL SCN: NEGATIVE NG/ML
METHADONE UR QL SCN: NEGATIVE NG/ML
OPIATES UR QL: NEGATIVE NG/ML
PCP UR QL: NEGATIVE NG/ML
PROPOXYPH UR QL SCN: NEGATIVE NG/ML

## 2023-04-20 RX ORDER — FUROSEMIDE 40 MG/1
40 TABLET ORAL 2 TIMES DAILY
Qty: 60 TABLET | Refills: 0 | Status: SHIPPED | OUTPATIENT
Start: 2023-04-20 | End: 2023-05-20

## 2023-04-20 RX ORDER — FLUOXETINE HYDROCHLORIDE 40 MG/1
80 CAPSULE ORAL DAILY
Qty: 60 CAPSULE | Refills: 0 | Status: SHIPPED | OUTPATIENT
Start: 2023-04-21 | End: 2023-05-21

## 2023-04-20 RX ORDER — CLONAZEPAM 1 MG/1
1 TABLET ORAL 2 TIMES DAILY
Qty: 3 TABLET | Refills: 0 | Status: SHIPPED | OUTPATIENT
Start: 2023-04-20

## 2023-04-20 RX ORDER — PREDNISONE 10 MG/1
TABLET ORAL
Qty: 70 TABLET | Refills: 0 | Status: SHIPPED | OUTPATIENT
Start: 2023-04-21 | End: 2023-05-19

## 2023-04-20 RX ADMIN — FUROSEMIDE 40 MG: 40 TABLET ORAL at 17:17

## 2023-04-20 RX ADMIN — Medication 1 PATCH: at 09:23

## 2023-04-20 RX ADMIN — BUDESONIDE AND FORMOTEROL FUMARATE DIHYDRATE 2 PUFF: 160; 4.5 AEROSOL RESPIRATORY (INHALATION) at 09:27

## 2023-04-20 RX ADMIN — FUROSEMIDE 40 MG: 40 TABLET ORAL at 09:27

## 2023-04-20 RX ADMIN — CLONAZEPAM 1 MG: 1 TABLET ORAL at 13:24

## 2023-04-20 RX ADMIN — BUPRENORPHINE AND NALOXONE 4 MG: 2; .5 FILM BUCCAL; SUBLINGUAL at 13:24

## 2023-04-20 RX ADMIN — FAMOTIDINE 20 MG: 20 TABLET ORAL at 09:22

## 2023-04-20 RX ADMIN — BUPRENORPHINE AND NALOXONE 8 MG: 8; 2 FILM BUCCAL; SUBLINGUAL at 09:22

## 2023-04-20 RX ADMIN — TOPIRAMATE 50 MG: 25 TABLET, FILM COATED ORAL at 09:21

## 2023-04-20 RX ADMIN — GABAPENTIN 400 MG: 400 CAPSULE ORAL at 09:21

## 2023-04-20 RX ADMIN — GABAPENTIN 400 MG: 400 CAPSULE ORAL at 17:16

## 2023-04-20 RX ADMIN — DOCUSATE SODIUM 100 MG: 100 CAPSULE, LIQUID FILLED ORAL at 05:16

## 2023-04-20 RX ADMIN — PREDNISONE 40 MG: 20 TABLET ORAL at 09:21

## 2023-04-20 RX ADMIN — ENOXAPARIN SODIUM 40 MG: 100 INJECTION SUBCUTANEOUS at 09:21

## 2023-04-20 RX ADMIN — DOCUSATE SODIUM 100 MG: 100 CAPSULE, LIQUID FILLED ORAL at 17:16

## 2023-04-20 RX ADMIN — TOPIRAMATE 50 MG: 25 TABLET, FILM COATED ORAL at 17:16

## 2023-04-20 RX ADMIN — CLONAZEPAM 1 MG: 1 TABLET ORAL at 09:27

## 2023-04-20 RX ADMIN — FAMOTIDINE 20 MG: 20 TABLET ORAL at 17:16

## 2023-04-20 RX ADMIN — FLUOXETINE 80 MG: 20 CAPSULE ORAL at 09:21

## 2023-04-20 NOTE — ASSESSMENT & PLAN NOTE
· Anxiety disorder followed by the [address] [place]  · PDMP and care everywhere reviewed    · Confirmed that she is on clonazepam 1 mg 4 times daily as needed  · With persistent anxiety added Atarax PRN

## 2023-04-20 NOTE — ASSESSMENT & PLAN NOTE
· Bipolar disorder followed by [facility]  · Medications reviewed via care everywhere    · Continue Prozac 80 mg daily, trazodone 100 mg at bedtime, Neurontin 400 mg 3 times daily

## 2023-04-20 NOTE — PROGRESS NOTES
"Progress Note - Pulmonary   Rhesa Gitelman 57 y o  female MRN: [id number]  Unit/Bed#: [place] [address] -01 Encounter: [id number]    Assessment/Plan:    Acute hypoxic respiratory failure due to abnormal chest imaging with bilateral pulmonary opacities with mediastinal/hilar adenopathy - suspected acute pneumonitis              Now on room air  Ambulatory pulse ox today as discussed with nursing               Activity as tolerated               Completed 5 days of ceftriaxone  Continue prednisone 40 mg daily and taper by 10 mg weekly as tolerated                 Repeat chest x-ray in the morning               Adenopathy is stable since September 2022   Will need repeat CT of the chest in 3-6 months      Nicotine dependence with possible underlying COPD              Recommend complete cessation  Counseled on limiting any inhalants  Outpatient pulmonary follow-up and PFTs      Outpatient follow-up as per discharge instructions   Discussed with nursing primary team   We will sign off  Please call with further questions or concerns  Chief Complaint:    \"I feel good  \"    Subjective:    Liz Berman reports she feels good  She is off oxygen at rest   She is eager to go home tomorrow  She denies any shortness of breath and has had no further hemoptysis  Chest x-ray was done today and I am unable to see images at present  Objective:    Vitals: Blood pressure 95/64, pulse 57, temperature (!) 97 3 °F (36 3 °C), resp  rate 18, height 5' 1\" (1 549 m), weight 67 8 kg (149 lb 7 6 oz), last menstrual period 04/13/2023, SpO2 (!) 88 %  RA,Body mass index is 28 24 kg/m²  Intake/Output Summary (Last 24 hours) at 4/20/2023 0849  Last data filed at 4/19/2023 1730  Gross per 24 hour   Intake 1080 ml   Output 1700 ml   Net -620 ml       Invasive Devices     Peripheral Intravenous Line  Duration           Peripheral IV 04/19/23 Dorsal (posterior); Left Hand <1 day                Physical " Exam:     Physical Exam  Vitals reviewed  Constitutional:       General: She is not in acute distress  Appearance: She is well-developed  She is not toxic-appearing or diaphoretic  HENT:      Head: Normocephalic and atraumatic  Eyes:      General: No scleral icterus  Neck:      Trachea: No tracheal deviation  Cardiovascular:      Rate and Rhythm: Normal rate and regular rhythm  Heart sounds: S1 normal and S2 normal  No murmur heard  No friction rub  No gallop  Pulmonary:      Effort: Pulmonary effort is normal  No tachypnea, accessory muscle usage or respiratory distress  Breath sounds: Normal breath sounds  No stridor  No decreased breath sounds, wheezing, rhonchi or rales  Chest:      Chest wall: No tenderness  Abdominal:      General: Bowel sounds are normal  There is no distension  Palpations: Abdomen is soft  Tenderness: There is no abdominal tenderness  Musculoskeletal:      Cervical back: Neck supple  Right lower leg: No edema  Left lower leg: No edema  Skin:     General: Skin is warm and dry  Findings: No rash  Neurological:      Mental Status: She is alert and oriented to person, place, and time  GCS: GCS eye subscore is 4  GCS verbal subscore is 5  GCS motor subscore is 6  Psychiatric:         Speech: Speech normal          Behavior: Behavior normal  Behavior is cooperative         Labs: None today    Imaging and other studies: CXR pending

## 2023-04-20 NOTE — PROGRESS NOTES
Cardiology Progress Note   MD Eduardo Lambert MD Morton Gamble, DO, MD Sunshine Larson DO, Aida Bernal DO, [facility] - [place]  ----------------------------------------------------------------  [address], [address] [place] [id number]    Olivia Galloway 57 y o  female MRN: [id number]  Unit/Bed#: [address]01 Encounter: [id number]      ASSESSMENT:   • Acute hypoxic respiratory failure likely secondary to heart failure  • Acute diastolic heart failure  o LVEF 55%, normal diastolic function, moderate RV dilatation with mildly reduced function and findings of elevated PVR, mild RA dilatation, mild TR, April 2023  • Nonischemic troponin elevation secondary to heart failure  • Pulmonary edema  • Tobacco use  • Anxiety    PLAN:  Patient clinically euvolemic  Chest x-ray is clear without peripheral edema  Continue Lasix 40 mg p o  twice daily  Strict I/os and daily weights  Keep potassium greater than 4 magnesium greater than 2  No further inpatient CV testing  Nebulizers and steroids as per pulmonary  Outpatient follow-up with primary cardiologist within 2 weeks of discharge for additional CV testing as clinically indicated  We will see further inpatient  Signed: Jaswinder Lomas DO, RAHEEL, MIESHA MAI      History of Present Illness:  Patient seen and examined  Denies chest pain, pressure, tightness or squeezing  Denies lightheadedness, dizziness or palpitations  Denies lower extremity swelling, orthopnea or paroxysmal nocturnal dyspnea     Resting comfortably in bed  Review of Systems:  Review of Systems   Constitutional: Negative for decreased appetite, fever, weight gain and weight loss  HENT: Negative for congestion and sore throat  Eyes: Negative for visual disturbance  Cardiovascular: Negative for chest pain, dyspnea on exertion, leg swelling, near-syncope and palpitations  Respiratory: Negative for cough and shortness of breath  Hematologic/Lymphatic: Negative for bleeding problem  Skin: Negative for rash  Musculoskeletal: Negative for myalgias and neck pain  Gastrointestinal: Negative for abdominal pain and nausea  Neurological: Negative for light-headedness and weakness  Psychiatric/Behavioral: Negative for depression  Allergies   Allergen Reactions   • Haloperidol Other (See Comments)     oculogyr crisis       No current facility-administered medications on file prior to encounter       Current Outpatient Medications on File Prior to Encounter   Medication Sig   • ARIPiprazole (ABILIFY) 15 mg tablet Take 1 tablet (15 mg total) by mouth daily for 14 days   • clonazePAM (KlonoPIN) 1 mg tablet Take 1 tablet (1 mg total) by mouth 2 (two) times a day for 2 days   • docusate sodium (COLACE) 100 mg capsule Take 1 capsule (100 mg total) by mouth every 12 (twelve) hours for 15 days   • famotidine (PEPCID) 20 mg tablet Take 1 tablet (20 mg total) by mouth 2 (two) times a day   • ferrous sulfate 325 (65 Fe) mg tablet Take 1 tablet (325 mg total) by mouth daily with breakfast   • gabapentin (NEURONTIN) 300 mg capsule Take 2 capsules (600 mg total) by mouth 3 (three) times a day   • hydrOXYzine HCL (ATARAX) 25 mg tablet Take 1 tablet (25 mg total) by mouth every 6 (six) hours (Patient not taking: Reported on 11/22/2022)   • hydrOXYzine pamoate (VISTARIL) 25 mg capsule Take 1 capsule (25 mg total) by mouth every 6 (six) hours as needed for anxiety (Patient not taking: Reported on 11/22/2022)   • ibuprofen (MOTRIN) 400 mg tablet Take 1 tablet (400 mg total) by mouth every 6 (six) hours as needed for mild pain for up to 10 days   • ibuprofen (MOTRIN) 600 mg tablet Take 1 tablet (600 mg total) by mouth every 6 (six) hours as needed for moderate pain   • naloxone (NARCAN) 4 mg/0 1 mL nasal spray 4 mg into each nostril   • nicotine (NICODERM CQ) 14 mg/24hr TD 24 hr patch Place 1 patch on the skin every 24 hours   • nicotine polacrilex (NICORETTE) 4 mg gum Chew 1 each (4 mg total) as needed for smoking cessation   • ondansetron (Zofran ODT) 4 mg disintegrating tablet Take 1 tablet (4 mg total) by mouth every 6 (six) hours as needed for nausea or vomiting   • ondansetron (ZOFRAN-ODT) 4 mg disintegrating tablet Take 1 tablet (4 mg total) by mouth every 6 (six) hours as needed for nausea or vomiting   • pantoprazole (PROTONIX) 40 mg tablet Take 1 tablet (40 mg total) by mouth daily in the early morning   • topiramate (TOPAMAX) 100 mg tablet Take 1 tablet (100 mg total) by mouth 2 (two) times a day   Take in combination with 25mg for total daily dose of 125mg twice daily  • topiramate (Topamax) 25 mg tablet Take 1 tablet (25 mg total) by mouth 2 (two) times a day   Take in combination with 100mg for total daily dose of 125mg twice daily     • traZODone (DESYREL) 50 mg tablet Take 1 tablet (50 mg total) by mouth daily at bedtime   • venlafaxine (EFFEXOR-XR) 37 5 mg 24 hr capsule Take 1 capsule (37 5 mg total) by mouth daily        Current Facility-Administered Medications   Medication Dose Route Frequency Provider Last Rate   • acetaminophen  650 mg Oral Q6H PRN Rancho Fuchs MD     • albuterol  2 puff Inhalation Q4H PRN Yovana Mahmood MD     • budesonide-formoterol  2 puff Inhalation BID NETTE Cross     • buprenorphine-naloxone  8 mg Sublingual BID Yovana Chu MD      And   • buprenorphine-naloxone  4 mg Sublingual Daily Yovana Chu MD     • clonazePAM  1 mg Oral 4x Daily PRN Rancho Fuchs MD     • docusate sodium  100 mg Oral Q12H Rancho Fuchs MD     • enoxaparin  40 mg Subcutaneous Daily Rancho Fuchs MD     • famotidine  20 mg Oral BID Rancho Fuchs MD     • FLUoxetine  80 mg Oral Daily Rancho Fuchs MD     • furosemide  40 mg Oral BID (diuretic) Carolina Craig DO     • gabapentin  400 mg Oral TID Angel Medley Delbert Lagunas MD     • hydrOXYzine HCL  25 mg Oral Q6H PRN Yovana Chu MD     • levalbuterol  1 25 mg Nebulization Q6H PRN Anirudh Latham MD     • lidocaine  1 patch Topical Daily Amanda Rainey MD     • nicotine  1 patch Transdermal Daily Reilly Vargas MD     • ondansetron  4 mg Oral Q6H PRN Reilly Vargas MD     • predniSONE  40 mg Oral Daily Sid Costello MD     • sodium chloride (PF)  3 mL Intravenous Q1H PRN Gray Fernandes MD     • sodium chloride  3 mL Nebulization Q6H PRN Sid Costello MD     • topiramate  50 mg Oral BID Reilly Vargas MD     • traZODone  100 mg Oral HS Reilly Vargas MD              Vitals:    04/19/23 2143 04/20/23 0549 04/20/23 0728 04/20/23 0920   BP: 106/71  95/64 108/84   Pulse: 57   67   Resp:       Temp: (!) 97 3 °F (36 3 °C)      TempSrc:       SpO2: (!) 88%   93%   Weight:  67 8 kg (149 lb 7 6 oz)     Height:         Body mass index is 28 24 kg/m²  Intake/Output Summary (Last 24 hours) at 4/20/2023 1421  Last data filed at 4/19/2023 1730  Gross per 24 hour   Intake 360 ml   Output 900 ml   Net -540 ml       Weight change: 0 4 kg (14 1 oz)    PHYSICAL EXAMINATION:  Gen: Awake, Alert, NAD  Head/eyes: AT/NC, pupils equal and round, Anicteric  ENT: mmm  Neck: Supple, No elevated JVP, trachea midline  Resp: CTA bilaterally no w/r/r  CV: RRR +S1, S2, No m/r/g  Abd: Soft, NT/ND + BS  Ext: no LE edema bilaterally  Neuro:  Follows commands, moves all extermities  Psych: Appropriate affect, happy mood, pleasant attitude, non-combative  Skin: warm; no rash, erythema or venous stasis changes on exposed skin    Lab Results:  Results from last 7 days   Lab Units 04/19/23  0520   WBC Thousand/uL 14 65*   HEMOGLOBIN g/dL 12 8   HEMATOCRIT % 41 3   PLATELETS Thousands/uL 479*     Results from last 7 days   Lab Units 04/19/23  0520   POTASSIUM mmol/L 4 1   CHLORIDE mmol/L 98   CO2 mmol/L 29   BUN mg/dL 21   CREATININE mg/dL 0  84   CALCIUM mg/dL 9 5     No results found for: TROPONINT      Results from last 7 days   Lab Units 04/13/23  1625 04/13/23  1423   HS TNI 2HR ng/L  --  533*   HS TNI 4HR ng/L 439*  --            This note was completed in part utilizing [employer] Software  Grammatical errors, random word insertions, spelling mistakes, and incomplete sentences may be an occasional consequence of this system secondary to software limitations, ambient noise, and hardware issues  If you have any questions or concerns about the content, text, or information contained within the body of this dictation, please contact the provider for clarification

## 2023-04-20 NOTE — PLAN OF CARE
Problem: Potential for Falls  Goal: Patient will remain free of falls  Description: INTERVENTIONS:  - Educate patient/family on patient safety including physical limitations  - Instruct patient to call for assistance with activity   - Consult OT/PT to assist with strengthening/mobility   - Keep Call bell within reach  - Keep bed low and locked with side rails adjusted as appropriate  - Keep care items and personal belongings within reach  - Initiate and maintain comfort rounds  - Apply yellow socks and bracelet for high fall risk patients  - Consider moving patient to room near nurses station  Outcome: Progressing     Problem: PAIN - ADULT  Goal: Verbalizes/displays adequate comfort level or baseline comfort level  Description: Interventions:  - Encourage patient to monitor pain and request assistance  - Assess pain using appropriate pain scale  - Administer analgesics based on type and severity of pain and evaluate response  - Implement non-pharmacological measures as appropriate and evaluate response  - Consider cultural and social influences on pain and pain management  - Notify physician/advanced practitioner if interventions unsuccessful or patient reports new pain  Outcome: Progressing     Problem: INFECTION - ADULT  Goal: Absence or prevention of progression during hospitalization  Description: INTERVENTIONS:  - Assess and monitor for signs and symptoms of infection  - Monitor lab/diagnostic results  - Monitor all insertion sites, i e  indwelling lines, tubes, and drains  - Monitor endotracheal if appropriate and nasal secretions for changes in amount and color  - [place] appropriate cooling/warming therapies per order  - Administer medications as ordered  - Instruct and encourage patient and family to use good hand hygiene technique  - Identify and instruct in appropriate isolation precautions for identified infection/condition  Outcome: Progressing  Goal: Absence of fever/infection during neutropenic period  Description: INTERVENTIONS:  - Monitor WBC    Outcome: Progressing     Problem: SAFETY ADULT  Goal: Patient will remain free of falls  Description: INTERVENTIONS:  - Educate patient/family on patient safety including physical limitations  - Instruct patient to call for assistance with activity   - Consult OT/PT to assist with strengthening/mobility   - Keep Call bell within reach  - Keep bed low and locked with side rails adjusted as appropriate  - Keep care items and personal belongings within reach  - Initiate and maintain comfort rounds  - Apply yellow socks and bracelet for high fall risk patients  - Consider moving patient to room near nurses station  Outcome: Progressing  Goal: Maintain or return to baseline ADL function  Description: INTERVENTIONS:  - Educate patient/family on patient safety including physical limitations  - Instruct patient to call for assistance with activity   - Consult OT/PT to assist with strengthening/mobility   - Keep Call bell within reach  - Keep bed low and locked with side rails adjusted as appropriate  - Keep care items and personal belongings within reach  - Initiate and maintain comfort rounds  - Apply yellow socks and bracelet for high fall risk patients  - Consider moving patient to room near nurses station  Outcome: Progressing  Goal: Maintains/Returns to pre admission functional level  Description: INTERVENTIONS:  - Perform BMAT or MOVE assessment daily    - Set and communicate daily mobility goal to care team and patient/family/caregiver  - Collaborate with rehabilitation services on mobility goals if consulted  - Perform Range of Motion  times a day  - Reposition patient every  hours    - Dangle patient times a day  - Stand patient  times a day  - Ambulate patient  times a day  - Out of bed to chair  times a day   - Out of bed for meals  times a day  - Out of bed for toileting  - Record patient progress and toleration of activity level   Outcome: Progressing Problem: DISCHARGE PLANNING  Goal: Discharge to home or other facility with appropriate resources  Description: INTERVENTIONS:  - Identify barriers to discharge w/patient and caregiver  - Arrange for needed discharge resources and transportation as appropriate  - Identify discharge learning needs (meds, wound care, etc )  - Arrange for interpretive services to assist at discharge as needed  - Refer to Case Management Department for coordinating discharge planning if the patient needs post-hospital services based on physician/advanced practitioner order or complex needs related to functional status, cognitive ability, or social support system  Outcome: Progressing     Problem: Knowledge Deficit  Goal: Patient/family/caregiver demonstrates understanding of disease process, treatment plan, medications, and discharge instructions  Description: Complete learning assessment and assess knowledge base    Interventions:  - Provide teaching at level of understanding  - Provide teaching via preferred learning methods  Outcome: Progressing     Problem: CARDIOVASCULAR - ADULT  Goal: Maintains optimal cardiac output and hemodynamic stability  Description: INTERVENTIONS:  - Monitor I/O, vital signs and rhythm  - Monitor for S/S and trends of decreased cardiac output  - Administer and titrate ordered vasoactive medications to optimize hemodynamic stability  - Assess quality of pulses, skin color and temperature  - Assess for signs of decreased coronary artery perfusion  - Instruct patient to report change in severity of symptoms  Outcome: Progressing  Goal: Absence of cardiac dysrhythmias or at baseline rhythm  Description: INTERVENTIONS:  - Continuous cardiac monitoring, vital signs, obtain 12 lead EKG if ordered  - Administer antiarrhythmic and heart rate control medications as ordered  - Monitor electrolytes and administer replacement therapy as ordered  Outcome: Progressing     Problem: RESPIRATORY - ADULT  Goal: Achieves optimal ventilation and oxygenation  Description: INTERVENTIONS:  - Assess for changes in respiratory status  - Assess for changes in mentation and behavior  - Position to facilitate oxygenation and minimize respiratory effort  - Oxygen administered by appropriate delivery if ordered  - Initiate smoking cessation education as indicated  - Encourage broncho-pulmonary hygiene including cough, deep breathe, Incentive Spirometry  - Assess the need for suctioning and aspirate as needed  - Assess and instruct to report SOB or any respiratory difficulty  - Respiratory Therapy support as indicated  Outcome: Progressing     Problem: METABOLIC, FLUID AND ELECTROLYTES - ADULT  Goal: Electrolytes maintained within normal limits  Description: INTERVENTIONS:  - Monitor labs and assess patient for signs and symptoms of electrolyte imbalances  - Administer electrolyte replacement as ordered  - Monitor response to electrolyte replacements, including repeat lab results as appropriate  - Instruct patient on fluid and nutrition as appropriate  Outcome: Progressing  Goal: Fluid balance maintained  Description: INTERVENTIONS:  - Monitor labs   - Monitor I/O and WT  - Instruct patient on fluid and nutrition as appropriate  - Assess for signs & symptoms of volume excess or deficit  Outcome: Progressing

## 2023-04-20 NOTE — DISCHARGE SUMMARY
[address] [facility]  Discharge- Susan Piedra 1975, 57 y o  female MRN: [id number]  Unit/Bed#: Emi Pat[facility] -01 Encounter: [id number]  Primary Care Provider: No primary care provider on file  Date and time admitted to hospital: 4/13/2023 11:49 AM    * Acute respiratory failure with hypoxia (HCC)  Assessment & Plan  · Acute respiratory failure due to possible cardiogenic pulmonary edema versus noncardiogenic pulmonary edema versus atypical pneumonia/ pneumonitis  · Improving, saturating well on room air  · CTA negative for PE  · Pulmonary follow-up appreciated  · Patient improving therefore bronchoscopy was canceled  · Continue slow prednisone taper  · Completed antibiotic therapy  · Outpatient pulmonary follow-up    Abnormal CT of the chest  Assessment & Plan  · Abnormal CT of the chest with severe bilateral groundglass opacities and adenopathy  · Opacities suggest pulmonary edema  The adenopathy suggest sarcoidosis  · CT last year which showed similar finding  When she was admitted, there was concern for vape related lung injury  Patient currently denies use of vape, admits to ongoing smoking, half a pack a day  · Continued on ceftriaxone and Zithromax for possible atypical pneumonia  Systemic steroids added to therapy  · Started Lasix for pulmonary edema, right heart failure  · Appreciate pulmonology and cardiology input    Pulmonary edema  Assessment & Plan  · Elevated troponin with evaded BNP and abnormal CT chest showing pulmonary edema  · Nonischemic myocardial injury due to right heart strain  · 2D Echo with evidence of right heart failure  Cardiology follow-up appreciated, can discharge home on furosemide 40 mg twice daily with outpatient follow-up  Opioid use disorder, severe, on maintenance therapy ([facility] [address] )  Assessment & Plan  · PDMP and care everywhere reviewed    · She is maintained on  Suboxone TID 8mg-4mg-8mg   · Has an appointment tomorrow for her medication refills    Anxiety disorder, unspecified  Assessment & Plan  · Anxiety disorder followed by the [facility] [place]  · PDMP and care everywhere reviewed  · Confirmed that she is on clonazepam 1 mg 4 times daily as needed  · Is an appointment tomorrow to get more medications, will discharge on 3 tablets of clonazepam to hold her over    Tobacco abuse  Assessment & Plan  · Placed on nicotine patch  · Smoking cessation counseling    Bipolar disorder, unspecified ([facility] )  Assessment & Plan  · Bipolar disorder followed by [facility]  · Medications reviewed via care everywhere  · Continue Prozac 80 mg daily, trazodone 100 mg at bedtime, Neurontin 400 mg 3 times daily        Transition of Care Discharge Summary - [facility]    Patient Information: Madhuri Riojas 57 y o  female MRN: [id number]  Unit/Bed#: [address] 208-01 Encounter: [id number]    Discharging Physician / Practitioner: Sophia Casanova MD  PCP: No primary care provider on file  Admission Date: 4/13/2023  Discharge Date: 04/20/23    Disposition:      Other: home      Reason for Admission: Acute hypoxic respiratory failure    Discharge Diagnoses:     Principal Problem:    Acute respiratory failure with hypoxia (HCC)  Active Problems:    Bipolar disorder, unspecified ([facility] )    Tobacco abuse    Anxiety disorder, unspecified    Opioid use disorder, severe, on maintenance therapy ([facility] )    Pulmonary edema    Abnormal CT of the chest  Resolved Problems:    * No resolved hospital problems  *      Consultations During Hospital Stay:  · IP CONSULT TO CARDIOLOGY  · IP CONSULT TO PULMONOLOGY  · IP CONSULT TO CASE MANAGEMENT      Procedures Performed:     · none    Medication Adjustments and Discharge Medications:  · Medication Dosing Tapers - Please refer to Discharge Medication List for details on any medication dosing tapers (if applicable to patient)    · Discharge Medication List: See after visit summary for reconciled discharge medications  Wound Care Recommendations:  When applicable, please see wound care section of After Visit Summary  Diet Recommendations at Discharge:  Diet -        Diet Orders   (From admission, onward)             Start     Ordered    04/17/23 0818  Diet Regular; Regular House  Diet effective now        References:    Nutrtion Support Algorithm Enteral vs  Parenteral   Question Answer Comment   Diet Type Regular    Regular Regular House    RD to adjust diet per protocol? Yes        04/17/23 0817              Fluid Restriction - Continue Fluid Restriction as Listed Above at Discharge  Significant Findings / Test Results:     XR chest 1 view portable    Result Date: 4/13/2023  Impression: Moderate diffuse groundglass opacities, suggesting pulmonary edema  Workstation performed: [id number]     CT chest with contrast    Result Date: 4/13/2023  Impression: Severe diffuse groundglass opacity and septal thickening with sparing of the lung periphery, similar to September 2022 with trace effusions  The distribution is suggestive of pulmonary edema although atypical infection is not excluded  Stable mediastinal and hilar lymphadenopathy  The distribution is suggestive of sarcoidosis  Right ventricular dilation  Workstation performed: [id number]     CTA chest pe study    Result Date: 4/14/2023  · Impression: No evidence of pulmonary embolus Worsening groundglass opacification throughout both lungs with interlobar septal thickening suggesting worsening pulmonary edema  Stable mediastinal and hilar adenopathy suggestive of sarcoidosis Workstation performed: [id number]       Hospital Course:     Bayron Short is a 57 y o  female patient who originally presented to the hospital on 4/13/2023 due to dyspnea, patient has history of opioid dependence, bipolar disorder, anxiety presented with dyspnea found to have acute hypoxic respiratory failure  Cardiology and pulmonary were consulted    Imaging "negative for PE  She was treated with IV steroids and IV furosemide for pulmonary edema  She was also given IV antibiotics which she completed the course  Patient's symptoms have significantly improved  She will be discharged on a slow prednisone taper and continue diuretics will need to follow with pulmonary, and cardiology outpatient  Please see above problem list for further details  Condition at Discharge: good     Discharge Day Visit / Exam:     Subjective: Patient seen and examined at bedside sitting upright in chair, denies any dyspnea, states he feels much better    Vitals: Blood Pressure: 108/84 (04/20/23 0920)  Pulse: 67 (04/20/23 0920)  Temperature: (!) 97 3 °F (36 3 °C) (04/19/23 2143)  Temp Source: Oral (04/18/23 2050)  Respirations: 18 (04/19/23 1512)  Height: 5' 1\" (154 9 cm) (04/14/23 0955)  Weight - Scale: 67 8 kg (149 lb 7 6 oz) (04/20/23 0549)  SpO2: 93 % (04/20/23 0920)    Physical Exam:    Constitutional: Patient is oriented to person, place and time, no acute distress  HEENT:  Normocephalic, atraumatic  Cardiovascular: Normal S1S2, RRR, No murmurs/rubs/gallops appreciated  Pulmonary:  Bilateral air entry, No rhonchi/rales/wheezing appreciated  Abdominal: Soft, Bowel sounds present, Non-tender, Non-distended  Extremities:  No cyanosis, clubbing or edema  Neurological: Cranial nerves II-XII grossly intact, sensation intact, otherwise no focal neurological symptoms  Discharge instructions/Information to patient and family:   See after visit summary section titled Discharge Instructions for information provided to patient and family  Planned Readmission: no     Discharge Statement:  I spent 35 minutes discharging the patient  This time was spent on the day of discharge  I had direct contact with the patient on the day of discharge   Greater than 50% of the total time was spent examining patient, answering all patient questions, arranging and discussing plan of care with " patient as well as directly providing post-discharge instructions  Additional time then spent on discharge activities      ** Please Note: This note has been constructed using a voice recognition system **

## 2023-06-02 ENCOUNTER — HOSPITAL ENCOUNTER (EMERGENCY)
Facility: HOSPITAL | Age: 48
Discharge: HOME | End: 2023-06-02
Attending: EMERGENCY MEDICINE
Payer: COMMERCIAL

## 2023-06-02 VITALS
SYSTOLIC BLOOD PRESSURE: 109 MMHG | HEART RATE: 91 BPM | RESPIRATION RATE: 20 BRPM | DIASTOLIC BLOOD PRESSURE: 82 MMHG | TEMPERATURE: 99 F

## 2023-06-02 DIAGNOSIS — Z76.0 ENCOUNTER FOR MEDICATION REFILL: Primary | ICD-10-CM

## 2023-06-02 DIAGNOSIS — F41.9 ANXIETY: ICD-10-CM

## 2023-06-02 PROCEDURE — 99284 EMERGENCY DEPT VISIT MOD MDM: CPT

## 2023-06-02 PROCEDURE — 93005 ELECTROCARDIOGRAM TRACING: CPT

## 2023-06-03 LAB
ATRIAL RATE: 83 BPM
P AXIS: 53 DEGREES
PR INTERVAL: 174 MS
QRS AXIS: 66 DEGREES
QRSD INTERVAL: 86 MS
QT INTERVAL: 410 MS
QTC INTERVAL: 481 MS
T WAVE AXIS: 65 DEGREES
VENTRICULAR RATE: 83 BPM

## 2023-06-03 PROCEDURE — 93010 ELECTROCARDIOGRAM REPORT: CPT | Performed by: INTERNAL MEDICINE

## 2023-06-03 NOTE — ED PROVIDER NOTES
"History  Chief Complaint   Patient presents with   • Medical Problem     Pt states \"I need to get in contact with my doctor because I need a refill on my medication because I can't go the weekend without it\" Pt also states chest pain after walking here from the pharmacy  Pt scattered with thoughts  43-year-old female presents requesting refills of her Suboxone and Klonopin  She states that she can over the weekend without it and that her PCP is persistent and a refill  She denies chest pain, shortness of breath, suicidal or homicidal thoughts, headache, back/flank pain, abdominal pain, nausea, vomit, fevers, chills  History provided by:  Patient  Medical Problem  Associated symptoms: no abdominal pain, no chest pain, no congestion, no diarrhea, no ear pain, no fatigue, no fever, no myalgias, no nausea, no rash, no rhinorrhea, no shortness of breath, no sore throat, no vomiting and no wheezing        Prior to Admission Medications   Prescriptions Last Dose Informant Patient Reported? Taking? ARIPiprazole (ABILIFY) 15 mg tablet   No No   Sig: Take 1 tablet (15 mg total) by mouth daily for 14 days   FLUoxetine (PROzac) 40 MG capsule   No No   Sig: Take 2 capsules (80 mg total) by mouth daily Do not start before April 21, 2023     clonazePAM (KlonoPIN) 1 mg tablet   No No   Sig: Take 1 tablet (1 mg total) by mouth 2 (two) times a day   docusate sodium (COLACE) 100 mg capsule   No No   Sig: Take 1 capsule (100 mg total) by mouth every 12 (twelve) hours for 15 days   famotidine (PEPCID) 20 mg tablet   No No   Sig: Take 1 tablet (20 mg total) by mouth 2 (two) times a day   ferrous sulfate 325 (65 Fe) mg tablet   No No   Sig: Take 1 tablet (325 mg total) by mouth daily with breakfast   furosemide (LASIX) 40 mg tablet   No No   Sig: Take 1 tablet (40 mg total) by mouth 2 (two) times a day   gabapentin (NEURONTIN) 300 mg capsule   No No   Sig: Take 2 capsules (600 mg total) by mouth 3 (three) times a day " hydrOXYzine HCL (ATARAX) 25 mg tablet   No No   Sig: Take 1 tablet (25 mg total) by mouth every 6 (six) hours   hydrOXYzine pamoate (VISTARIL) 25 mg capsule   No No   Sig: Take 1 capsule (25 mg total) by mouth every 6 (six) hours as needed for anxiety   ibuprofen (MOTRIN) 600 mg tablet   No No   Sig: Take 1 tablet (600 mg total) by mouth every 6 (six) hours as needed for moderate pain   naloxone (NARCAN) 4 mg/0 1 mL nasal spray   Yes No   Si mg into each nostril   nicotine (NICODERM CQ) 14 mg/24hr TD 24 hr patch   No No   Sig: Place 1 patch on the skin every 24 hours   nicotine polacrilex (NICORETTE) 4 mg gum   No No   Sig: Chew 1 each (4 mg total) as needed for smoking cessation   ondansetron (ZOFRAN-ODT) 4 mg disintegrating tablet   No No   Sig: Take 1 tablet (4 mg total) by mouth every 6 (six) hours as needed for nausea or vomiting   pantoprazole (PROTONIX) 40 mg tablet   No No   Sig: Take 1 tablet (40 mg total) by mouth daily in the early morning   topiramate (TOPAMAX) 100 mg tablet   No No   Sig: Take 1 tablet (100 mg total) by mouth 2 (two) times a day   Take in combination with 25mg for total daily dose of 125mg twice daily  topiramate (Topamax) 25 mg tablet   No No   Sig: Take 1 tablet (25 mg total) by mouth 2 (two) times a day   Take in combination with 100mg for total daily dose of 125mg twice daily     traZODone (DESYREL) 50 mg tablet   No No   Sig: Take 1 tablet (50 mg total) by mouth daily at bedtime   venlafaxine (EFFEXOR-XR) 37 5 mg 24 hr capsule   No No   Sig: Take 1 capsule (37 5 mg total) by mouth daily      Facility-Administered Medications: None       Past Medical History:   Diagnosis Date   • Addiction to drug [facility])    • Alcohol abuse    • Alcoholism ([facility] [address] )    • Altered mental status 2022   • Elevated LFTs 2022   • Lower back pain    • Self-injurious behavior    • Substance abuse [facility])        Past Surgical History:   Procedure Laterality Date   • CHOLECYSTECTOMY         Family "History   Problem Relation Age of Onset   • Heart disease Father    • Heart disease Maternal Grandmother      I have reviewed and agree with the history as documented  E-Cigarette/Vaping   • E-Cigarette Use Current Every Day User    • Cartridges/Day 2      E-Cigarette/Vaping Substances   • Nicotine No    • THC No    • CBD No    • Flavoring No    • Other No    • Unknown No      Social History     Tobacco Use   • Smoking status: Every Day     Packs/day: 0 50     Years: 20 00     Total pack years: 10 00     Types: Cigarettes   • Smokeless tobacco: Current   Vaping Use   • Vaping Use: Every day   Substance Use Topics   • Alcohol use: Not Currently     Comment: MAGDALENO, pt historically inconsistent  Drinking  per [facility]   • Drug use: Not Currently     Types: Heroin, \"Crack\" cocaine, Cocaine, LSD, Benzodiazepines, Marijuana     Comment: Pt unreliable historian       Review of Systems   Constitutional: Negative for activity change, appetite change, fatigue and fever  HENT: Negative for congestion, dental problem, ear pain, rhinorrhea and sore throat  Eyes: Negative for pain and redness  Respiratory: Negative for chest tightness, shortness of breath and wheezing  Cardiovascular: Negative for chest pain and palpitations  Gastrointestinal: Negative for abdominal pain, blood in stool, constipation, diarrhea, nausea and vomiting  Endocrine: Negative for cold intolerance and heat intolerance  Genitourinary: Negative for dysuria, frequency and hematuria  Musculoskeletal: Negative for arthralgias and myalgias  Skin: Negative for color change, pallor and rash  Neurological: Negative for weakness and numbness  Hematological: Does not bruise/bleed easily  Psychiatric/Behavioral: Negative for agitation, hallucinations and suicidal ideas  Physical Exam  Physical Exam  Vitals and nursing note reviewed  Constitutional:       Appearance: She is well-developed     HENT:      Head: Normocephalic " and atraumatic  Eyes:      Extraocular Movements: Extraocular movements intact  Conjunctiva/sclera: Conjunctivae normal    Neck:      Vascular: No JVD  Trachea: No tracheal deviation  Cardiovascular:      Rate and Rhythm: Normal rate and regular rhythm  Pulmonary:      Effort: Pulmonary effort is normal  No tachypnea, accessory muscle usage or respiratory distress  Breath sounds: Normal breath sounds  Abdominal:      General: There is no distension  Palpations: There is no mass  Tenderness: There is no abdominal tenderness  There is no right CVA tenderness or left CVA tenderness  Musculoskeletal:      Cervical back: Normal range of motion  No rigidity  Right lower leg: Normal       Left lower leg: Normal    Skin:     General: Skin is warm  Capillary Refill: Capillary refill takes less than 2 seconds  Neurological:      General: No focal deficit present  Mental Status: She is alert and oriented to person, place, and time  Psychiatric:         Attention and Perception: Attention and perception normal          Mood and Affect: Mood is anxious  Affect is blunt  Speech: Speech normal          Behavior: Behavior is not agitated or slowed  Behavior is cooperative  Thought Content:  Thought content normal          Cognition and Memory: Cognition and memory normal          Judgment: Judgment normal          Vital Signs  ED Triage Vitals [06/02/23 1929]   Temperature Pulse Respirations Blood Pressure SpO2   99 °F (37 2 °C) 91 20 109/82 --      Temp Source Heart Rate Source Patient Position - Orthostatic VS BP Location FiO2 (%)   Oral Monitor Sitting Right arm --      Pain Score       --           Vitals:    06/02/23 1929   BP: 109/82   Pulse: 91   Patient Position - Orthostatic VS: Sitting         Visual Acuity      ED Medications  Medications - No data to display    Diagnostic Studies  Results Reviewed     None                 No orders to display Procedures  Procedures         ED Course                                             Medical Decision Making  63-year-old female who presents for evaluation of medication refill  Patient was informed we are unable to refer over this for her and she will have to follow-up with her PCP  Disposition  Final diagnoses:   Encounter for medication refill   Anxiety     Time reflects when diagnosis was documented in both MDM as applicable and the Disposition within this note     Time User Action Codes Description Comment    6/2/2023  8:27 PM Rosa Valdez Add [Z76 0] Encounter for medication refill     6/2/2023  8:27 PM Rosa Valdez Add [F41 9] Anxiety       ED Disposition     ED Disposition   Discharge    Condition   Stable    Date/Time   Fri Jun 2, 2023  8:27 PM    [address] discharge to home/self care  Follow-up Information     Follow up With Specialties Details Why Maddi Mercado MD Family Medicine Schedule an appointment as soon as possible for a visit in 2 days  [address] [place] [address]  [phone]            Current Discharge Medication List      CONTINUE these medications which have NOT CHANGED    Details   ARIPiprazole (ABILIFY) 15 mg tablet Take 1 tablet (15 mg total) by mouth daily for 14 days  Qty: 30 tablet, Refills: 1    Associated Diagnoses: Bipolar disorder, unspecified (HCC)      clonazePAM (KlonoPIN) 1 mg tablet Take 1 tablet (1 mg total) by mouth 2 (two) times a day  Qty: 3 tablet, Refills: 0    Associated Diagnoses: Anxiety disorder, unspecified      docusate sodium (COLACE) 100 mg capsule Take 1 capsule (100 mg total) by mouth every 12 (twelve) hours for 15 days  Qty: 30 capsule, Refills: 0    Associated Diagnoses: Constipation      famotidine (PEPCID) 20 mg tablet Take 1 tablet (20 mg total) by mouth 2 (two) times a day  Qty: 60 tablet, Refills: 0    Associated Diagnoses: Dyspepsia      ferrous sulfate 325 (65 Fe) mg tablet Take 1 tablet (325 mg total) by mouth daily with breakfast  Qty: 30 tablet, Refills: 1    Associated Diagnoses: Iron deficiency anemia secondary to inadequate dietary iron intake      FLUoxetine (PROzac) 40 MG capsule Take 2 capsules (80 mg total) by mouth daily Do not start before April 21, 2023  Qty: 60 capsule, Refills: 0    Associated Diagnoses: Hypoxia      furosemide (LASIX) 40 mg tablet Take 1 tablet (40 mg total) by mouth 2 (two) times a day  Qty: 60 tablet, Refills: 0    Associated Diagnoses: Hypoxia      gabapentin (NEURONTIN) 300 mg capsule Take 2 capsules (600 mg total) by mouth 3 (three) times a day  Qty: 180 capsule, Refills: 1    Associated Diagnoses: Anxiety disorder, unspecified      hydrOXYzine HCL (ATARAX) 25 mg tablet Take 1 tablet (25 mg total) by mouth every 6 (six) hours  Qty: 12 tablet, Refills: 0    Associated Diagnoses: Anxiety;  Anxiety disorder, unspecified      hydrOXYzine pamoate (VISTARIL) 25 mg capsule Take 1 capsule (25 mg total) by mouth every 6 (six) hours as needed for anxiety  Qty: 30 capsule, Refills: 0    Associated Diagnoses: Anxiety      ibuprofen (MOTRIN) 600 mg tablet Take 1 tablet (600 mg total) by mouth every 6 (six) hours as needed for moderate pain  Qty: 30 tablet, Refills: 1    Associated Diagnoses: Muscle spasm of shoulder region      naloxone (NARCAN) 4 mg/0 1 mL nasal spray 4 mg into each nostril      nicotine (NICODERM CQ) 14 mg/24hr TD 24 hr patch Place 1 patch on the skin every 24 hours  Qty: 28 patch, Refills: 0    Associated Diagnoses: Tobacco abuse      nicotine polacrilex (NICORETTE) 4 mg gum Chew 1 each (4 mg total) as needed for smoking cessation  Qty: 100 each, Refills: 0    Associated Diagnoses: Tobacco abuse      ondansetron (ZOFRAN-ODT) 4 mg disintegrating tablet Take 1 tablet (4 mg total) by mouth every 6 (six) hours as needed for nausea or vomiting  Qty: 8 tablet, Refills: 0    Associated Diagnoses: Anxiety      pantoprazole (PROTONIX) 40 mg tablet Take 1 tablet (40 mg total) by mouth daily in the early morning  Qty: 30 tablet, Refills: 1    Associated Diagnoses: Acute respiratory failure with hypoxia (HCC)      topiramate (TOPAMAX) 100 mg tablet Take 1 tablet (100 mg total) by mouth 2 (two) times a day   Take in combination with 25mg for total daily dose of 125mg twice daily  Qty: 60 tablet, Refills: 1    Associated Diagnoses: Bipolar disorder, unspecified (HCC)      topiramate (Topamax) 25 mg tablet Take 1 tablet (25 mg total) by mouth 2 (two) times a day   Take in combination with 100mg for total daily dose of 125mg twice daily  Qty: 60 tablet, Refills: 1    Associated Diagnoses: Bipolar disorder, unspecified ([address] )      traZODone (DESYREL) 50 mg tablet Take 1 tablet (50 mg total) by mouth daily at bedtime  Qty: 30 tablet, Refills: 1    Associated Diagnoses: Insomnia      venlafaxine (EFFEXOR-XR) 37 5 mg 24 hr capsule Take 1 capsule (37 5 mg total) by mouth daily  Qty: 30 capsule, Refills: 1    Associated Diagnoses: Anxiety disorder, unspecified             No discharge procedures on file      PDMP Review       Value Time User    PDMP Reviewed  Yes 6/2/2023  8:28 PM Cuate Reid MD          ED Provider  Electronically Signed by           Cuate Reid MD  06/02/23 6183

## 2023-06-19 ENCOUNTER — APPOINTMENT (EMERGENCY)
Dept: CT IMAGING | Facility: HOSPITAL | Age: 48
End: 2023-06-19
Payer: COMMERCIAL

## 2023-06-19 ENCOUNTER — HOSPITAL ENCOUNTER (OUTPATIENT)
Facility: HOSPITAL | Age: 48
Setting detail: OBSERVATION
Discharge: HOME | End: 2023-06-20
Attending: EMERGENCY MEDICINE | Admitting: INTERNAL MEDICINE
Payer: COMMERCIAL

## 2023-06-19 DIAGNOSIS — R93.89 ABNORMAL CT OF THE CHEST: ICD-10-CM

## 2023-06-19 DIAGNOSIS — R07.9 CHEST PAIN: Primary | ICD-10-CM

## 2023-06-19 DIAGNOSIS — F19.10 SUBSTANCE ABUSE (HCC): ICD-10-CM

## 2023-06-19 DIAGNOSIS — J81.1 PULMONARY EDEMA: ICD-10-CM

## 2023-06-19 DIAGNOSIS — F41.9 ANXIETY DISORDER, UNSPECIFIED TYPE: ICD-10-CM

## 2023-06-19 PROBLEM — R42 DIZZINESS: Status: ACTIVE | Noted: 2023-06-19

## 2023-06-19 LAB
2HR DELTA HS TROPONIN: 0 NG/L
ALBUMIN SERPL BCP-MCNC: 3.6 G/DL (ref 3.5–5)
ALP SERPL-CCNC: 50 U/L (ref 34–104)
ALT SERPL W P-5'-P-CCNC: 11 U/L (ref 7–52)
ANION GAP SERPL CALCULATED.3IONS-SCNC: 6 MMOL/L (ref 4–13)
AST SERPL W P-5'-P-CCNC: 19 U/L (ref 13–39)
ATRIAL RATE: 82 BPM
BASOPHILS # BLD AUTO: 0.04 THOUSANDS/ÂΜL (ref 0–0.1)
BASOPHILS NFR BLD AUTO: 0 % (ref 0–1)
BILIRUB SERPL-MCNC: 0.35 MG/DL (ref 0.2–1)
BNP SERPL-MCNC: 21 PG/ML (ref 0–100)
BUN SERPL-MCNC: 12 MG/DL (ref 5–25)
CALCIUM SERPL-MCNC: 7.9 MG/DL (ref 8.4–10.2)
CARDIAC TROPONIN I PNL SERPL HS: 4 NG/L
CARDIAC TROPONIN I PNL SERPL HS: 4 NG/L
CHLORIDE SERPL-SCNC: 111 MMOL/L (ref 96–108)
CO2 SERPL-SCNC: 20 MMOL/L (ref 21–32)
CREAT SERPL-MCNC: 1.09 MG/DL (ref 0.6–1.3)
D DIMER PPP FEU-MCNC: 0.71 UG/ML FEU
EOSINOPHIL # BLD AUTO: 0 THOUSAND/ÂΜL (ref 0–0.61)
EOSINOPHIL NFR BLD AUTO: 0 % (ref 0–6)
ERYTHROCYTE [DISTWIDTH] IN BLOOD BY AUTOMATED COUNT: 13 % (ref 11.6–15.1)
GFR SERPL CREATININE-BSD FRML MDRD: 61 ML/MIN/1.73SQ M
GLUCOSE SERPL-MCNC: 114 MG/DL (ref 65–140)
HCT VFR BLD AUTO: 34.9 % (ref 34.8–46.1)
HGB BLD-MCNC: 11.2 G/DL (ref 11.5–15.4)
IMM GRANULOCYTES # BLD AUTO: 0.07 THOUSAND/UL (ref 0–0.2)
IMM GRANULOCYTES NFR BLD AUTO: 1 % (ref 0–2)
LYMPHOCYTES # BLD AUTO: 1.44 THOUSANDS/ÂΜL (ref 0.6–4.47)
LYMPHOCYTES NFR BLD AUTO: 10 % (ref 14–44)
MCH RBC QN AUTO: 28.8 PG (ref 26.8–34.3)
MCHC RBC AUTO-ENTMCNC: 32.1 G/DL (ref 31.4–37.4)
MCV RBC AUTO: 90 FL (ref 82–98)
MONOCYTES # BLD AUTO: 0.75 THOUSAND/ÂΜL (ref 0.17–1.22)
MONOCYTES NFR BLD AUTO: 5 % (ref 4–12)
NEUTROPHILS # BLD AUTO: 12.61 THOUSANDS/ÂΜL (ref 1.85–7.62)
NEUTS SEG NFR BLD AUTO: 84 % (ref 43–75)
NRBC BLD AUTO-RTO: 0 /100 WBCS
P AXIS: 119 DEGREES
PLATELET # BLD AUTO: 211 THOUSANDS/UL (ref 149–390)
PMV BLD AUTO: 8.1 FL (ref 8.9–12.7)
POTASSIUM SERPL-SCNC: 3.4 MMOL/L (ref 3.5–5.3)
PR INTERVAL: 206 MS
PROT SERPL-MCNC: 5.7 G/DL (ref 6.4–8.4)
QRS AXIS: 110 DEGREES
QRSD INTERVAL: 92 MS
QT INTERVAL: 418 MS
QTC INTERVAL: 488 MS
RBC # BLD AUTO: 3.89 MILLION/UL (ref 3.81–5.12)
SODIUM SERPL-SCNC: 137 MMOL/L (ref 135–147)
T WAVE AXIS: 137 DEGREES
VENTRICULAR RATE: 82 BPM
WBC # BLD AUTO: 14.91 THOUSAND/UL (ref 4.31–10.16)

## 2023-06-19 PROCEDURE — 93005 ELECTROCARDIOGRAM TRACING: CPT

## 2023-06-19 PROCEDURE — 93010 ELECTROCARDIOGRAM REPORT: CPT | Performed by: INTERNAL MEDICINE

## 2023-06-19 PROCEDURE — 99223 1ST HOSP IP/OBS HIGH 75: CPT | Performed by: INTERNAL MEDICINE

## 2023-06-19 PROCEDURE — 85379 FIBRIN DEGRADATION QUANT: CPT | Performed by: EMERGENCY MEDICINE

## 2023-06-19 PROCEDURE — G1004 CDSM NDSC: HCPCS

## 2023-06-19 PROCEDURE — 96360 HYDRATION IV INFUSION INIT: CPT

## 2023-06-19 PROCEDURE — 80053 COMPREHEN METABOLIC PANEL: CPT | Performed by: EMERGENCY MEDICINE

## 2023-06-19 PROCEDURE — 84484 ASSAY OF TROPONIN QUANT: CPT | Performed by: EMERGENCY MEDICINE

## 2023-06-19 PROCEDURE — 83880 ASSAY OF NATRIURETIC PEPTIDE: CPT | Performed by: EMERGENCY MEDICINE

## 2023-06-19 PROCEDURE — 36415 COLL VENOUS BLD VENIPUNCTURE: CPT | Performed by: EMERGENCY MEDICINE

## 2023-06-19 PROCEDURE — 99285 EMERGENCY DEPT VISIT HI MDM: CPT

## 2023-06-19 PROCEDURE — 71275 CT ANGIOGRAPHY CHEST: CPT

## 2023-06-19 PROCEDURE — 85025 COMPLETE CBC W/AUTO DIFF WBC: CPT | Performed by: EMERGENCY MEDICINE

## 2023-06-19 RX ORDER — TOPIRAMATE 25 MG/1
25 TABLET ORAL EVERY 12 HOURS SCHEDULED
Status: DISCONTINUED | OUTPATIENT
Start: 2023-06-19 | End: 2023-06-19

## 2023-06-19 RX ORDER — TRAZODONE HYDROCHLORIDE 50 MG/1
50 TABLET ORAL
Status: DISCONTINUED | OUTPATIENT
Start: 2023-06-19 | End: 2023-06-20 | Stop reason: HOSPADM

## 2023-06-19 RX ORDER — FLUOXETINE HYDROCHLORIDE 20 MG/1
80 CAPSULE ORAL DAILY
Status: DISCONTINUED | OUTPATIENT
Start: 2023-06-20 | End: 2023-06-20 | Stop reason: HOSPADM

## 2023-06-19 RX ORDER — ENOXAPARIN SODIUM 100 MG/ML
40 INJECTION SUBCUTANEOUS
Status: DISCONTINUED | OUTPATIENT
Start: 2023-06-20 | End: 2023-06-20 | Stop reason: HOSPADM

## 2023-06-19 RX ORDER — ACETAMINOPHEN 325 MG/1
650 TABLET ORAL EVERY 4 HOURS PRN
Status: DISCONTINUED | OUTPATIENT
Start: 2023-06-19 | End: 2023-06-20 | Stop reason: HOSPADM

## 2023-06-19 RX ORDER — BUPRENORPHINE AND NALOXONE 8; 2 MG/1; MG/1
8 FILM, SOLUBLE BUCCAL; SUBLINGUAL DAILY
Status: DISCONTINUED | OUTPATIENT
Start: 2023-06-19 | End: 2023-06-19

## 2023-06-19 RX ORDER — FUROSEMIDE 40 MG/1
40 TABLET ORAL
Status: DISCONTINUED | OUTPATIENT
Start: 2023-06-19 | End: 2023-06-20 | Stop reason: HOSPADM

## 2023-06-19 RX ORDER — CLONAZEPAM 0.5 MG/1
1 TABLET ORAL 2 TIMES DAILY
Status: DISCONTINUED | OUTPATIENT
Start: 2023-06-19 | End: 2023-06-19

## 2023-06-19 RX ORDER — FAMOTIDINE 20 MG/1
20 TABLET, FILM COATED ORAL 2 TIMES DAILY
Status: DISCONTINUED | OUTPATIENT
Start: 2023-06-19 | End: 2023-06-20 | Stop reason: HOSPADM

## 2023-06-19 RX ORDER — FERROUS SULFATE 325(65) MG
325 TABLET ORAL
Status: DISCONTINUED | OUTPATIENT
Start: 2023-06-20 | End: 2023-06-20 | Stop reason: HOSPADM

## 2023-06-19 RX ORDER — BUPRENORPHINE AND NALOXONE 8; 2 MG/1; MG/1
8 FILM, SOLUBLE BUCCAL; SUBLINGUAL 3 TIMES DAILY
Status: DISCONTINUED | OUTPATIENT
Start: 2023-06-19 | End: 2023-06-20 | Stop reason: HOSPADM

## 2023-06-19 RX ORDER — GABAPENTIN 300 MG/1
600 CAPSULE ORAL 3 TIMES DAILY
Status: DISCONTINUED | OUTPATIENT
Start: 2023-06-19 | End: 2023-06-20 | Stop reason: HOSPADM

## 2023-06-19 RX ORDER — CLONAZEPAM 1 MG/1
1 TABLET ORAL 2 TIMES DAILY
Status: DISCONTINUED | OUTPATIENT
Start: 2023-06-20 | End: 2023-06-20

## 2023-06-19 RX ORDER — PANTOPRAZOLE SODIUM 40 MG/1
40 TABLET, DELAYED RELEASE ORAL
Status: DISCONTINUED | OUTPATIENT
Start: 2023-06-20 | End: 2023-06-20 | Stop reason: HOSPADM

## 2023-06-19 RX ORDER — CLONAZEPAM 0.5 MG/1
1 TABLET ORAL ONCE
Status: COMPLETED | OUTPATIENT
Start: 2023-06-19 | End: 2023-06-19

## 2023-06-19 RX ORDER — ONDANSETRON 2 MG/ML
4 INJECTION INTRAMUSCULAR; INTRAVENOUS EVERY 6 HOURS PRN
Status: DISCONTINUED | OUTPATIENT
Start: 2023-06-19 | End: 2023-06-20 | Stop reason: HOSPADM

## 2023-06-19 RX ORDER — VENLAFAXINE HYDROCHLORIDE 37.5 MG/1
37.5 CAPSULE, EXTENDED RELEASE ORAL DAILY
Status: DISCONTINUED | OUTPATIENT
Start: 2023-06-20 | End: 2023-06-20 | Stop reason: HOSPADM

## 2023-06-19 RX ORDER — TOPIRAMATE 100 MG/1
100 TABLET, FILM COATED ORAL EVERY 12 HOURS SCHEDULED
Status: DISCONTINUED | OUTPATIENT
Start: 2023-06-19 | End: 2023-06-20 | Stop reason: HOSPADM

## 2023-06-19 RX ORDER — HYDROXYZINE HYDROCHLORIDE 25 MG/1
25 TABLET, FILM COATED ORAL EVERY 6 HOURS SCHEDULED
Status: DISCONTINUED | OUTPATIENT
Start: 2023-06-19 | End: 2023-06-20 | Stop reason: HOSPADM

## 2023-06-19 RX ORDER — NICOTINE 21 MG/24HR
1 PATCH, TRANSDERMAL 24 HOURS TRANSDERMAL DAILY
Status: DISCONTINUED | OUTPATIENT
Start: 2023-06-20 | End: 2023-06-20 | Stop reason: HOSPADM

## 2023-06-19 RX ADMIN — HYDROXYZINE HYDROCHLORIDE 25 MG: 25 TABLET, FILM COATED ORAL at 19:13

## 2023-06-19 RX ADMIN — FUROSEMIDE 40 MG: 40 TABLET ORAL at 19:24

## 2023-06-19 RX ADMIN — IOHEXOL 85 ML: 350 INJECTION, SOLUTION INTRAVENOUS at 17:14

## 2023-06-19 RX ADMIN — GABAPENTIN 600 MG: 300 CAPSULE ORAL at 21:07

## 2023-06-19 RX ADMIN — FAMOTIDINE 20 MG: 20 TABLET ORAL at 19:13

## 2023-06-19 RX ADMIN — CLONAZEPAM 1 MG: 0.5 TABLET ORAL at 15:03

## 2023-06-19 RX ADMIN — SODIUM CHLORIDE 500 ML: 0.9 INJECTION, SOLUTION INTRAVENOUS at 15:07

## 2023-06-19 RX ADMIN — TRAZODONE HYDROCHLORIDE 50 MG: 50 TABLET ORAL at 21:08

## 2023-06-19 RX ADMIN — BUPRENORPHINE AND NALOXONE 8 MG: 8; 2 FILM BUCCAL; SUBLINGUAL at 21:10

## 2023-06-19 RX ADMIN — TOPIRAMATE 100 MG: 100 TABLET, FILM COATED ORAL at 21:07

## 2023-06-19 NOTE — ASSESSMENT & PLAN NOTE
· Patient with what appears to be chronic noncardiogenic pulmonary edema versus sarcoidosis  · Continue home Lasix  · Inpatient consult to Pulmonology as patient will likely not follow-up in the outpatient setting  · Saturating well on room air

## 2023-06-19 NOTE — ED PROVIDER NOTES
History  Chief Complaint   Patient presents with   • Dizziness     Pt came in via EMS, pt reports doing some yard work then walking to the police station, Pt reports SOB, dizziness, cough and L-sided lung pain, denies other symptoms  28-year-old female with a past medical history of bipolar, hepatitis C, anxiety, and recent diagnosis of possible sarcoidosis presents with chest pain  Patient was recently admitted with chest pain and had possible sarcoidosis seen on her CAT scan  She was also found to have pulmonary edema  She had an echo showing an EF of 60%  Patient states that she never followed up about the sarcoidosis  Patient states that she was doing yard work earlier today when she developed right-sided chest pain  She cannot describe the pain other than stating it feels like there is fluid in the right side of her chest   She states that this feels similar to when she was last in the hospital   She then decided to take a walk to the police station to file a report because her Klonopin was recently stolen from her on Thursday  She states that she was having exertional shortness of breath at that time  She was also feeling a little lightheaded while walking  Both symptoms have since gone away now that she is resting  The chest pain was not exertional, pleuritic, or positional   She notes a cough for the past 3 days  It is a dry cough  No fevers, congestion, or sore throat  She has bilateral trace edema, but no significant swelling  No recent immobilizations  Patient does smoke cigarettes  Patient notes that she has been feeling very jittery since she last took her Klonopin on Thursday  Prior to Admission Medications   Prescriptions Last Dose Informant Patient Reported? Taking?    ARIPiprazole (ABILIFY) 15 mg tablet   No No   Sig: Take 1 tablet (15 mg total) by mouth daily for 14 days   FLUoxetine (PROzac) 40 MG capsule   No No   Sig: Take 2 capsules (80 mg total) by mouth daily Do not start before 2023  clonazePAM (KlonoPIN) 1 mg tablet   No No   Sig: Take 1 tablet (1 mg total) by mouth 2 (two) times a day   docusate sodium (COLACE) 100 mg capsule   No No   Sig: Take 1 capsule (100 mg total) by mouth every 12 (twelve) hours for 15 days   famotidine (PEPCID) 20 mg tablet   No No   Sig: Take 1 tablet (20 mg total) by mouth 2 (two) times a day   ferrous sulfate 325 (65 Fe) mg tablet   No No   Sig: Take 1 tablet (325 mg total) by mouth daily with breakfast   furosemide (LASIX) 40 mg tablet   No No   Sig: Take 1 tablet (40 mg total) by mouth 2 (two) times a day   gabapentin (NEURONTIN) 300 mg capsule   No No   Sig: Take 2 capsules (600 mg total) by mouth 3 (three) times a day   hydrOXYzine HCL (ATARAX) 25 mg tablet   No No   Sig: Take 1 tablet (25 mg total) by mouth every 6 (six) hours   hydrOXYzine pamoate (VISTARIL) 25 mg capsule   No No   Sig: Take 1 capsule (25 mg total) by mouth every 6 (six) hours as needed for anxiety   ibuprofen (MOTRIN) 600 mg tablet   No No   Sig: Take 1 tablet (600 mg total) by mouth every 6 (six) hours as needed for moderate pain   naloxone (NARCAN) 4 mg/0 1 mL nasal spray   Yes No   Si mg into each nostril   nicotine (NICODERM CQ) 14 mg/24hr TD 24 hr patch   No No   Sig: Place 1 patch on the skin every 24 hours   nicotine polacrilex (NICORETTE) 4 mg gum   No No   Sig: Chew 1 each (4 mg total) as needed for smoking cessation   ondansetron (ZOFRAN-ODT) 4 mg disintegrating tablet   No No   Sig: Take 1 tablet (4 mg total) by mouth every 6 (six) hours as needed for nausea or vomiting   pantoprazole (PROTONIX) 40 mg tablet   No No   Sig: Take 1 tablet (40 mg total) by mouth daily in the early morning   topiramate (TOPAMAX) 100 mg tablet   No No   Sig: Take 1 tablet (100 mg total) by mouth 2 (two) times a day   Take in combination with 25mg for total daily dose of 125mg twice daily     topiramate (Topamax) 25 mg tablet   No No   Sig: Take 1 tablet (25 mg "total) by mouth 2 (two) times a day   Take in combination with 100mg for total daily dose of 125mg twice daily  traZODone (DESYREL) 50 mg tablet   No No   Sig: Take 1 tablet (50 mg total) by mouth daily at bedtime   venlafaxine (EFFEXOR-XR) 37 5 mg 24 hr capsule   No No   Sig: Take 1 capsule (37 5 mg total) by mouth daily      Facility-Administered Medications: None       Past Medical History:   Diagnosis Date   • Addiction to drug [facility])    • Alcohol abuse    • Alcoholism ([facility] [address] )    • Altered mental status 09/21/2022   • Elevated LFTs 09/21/2022   • Lower back pain    • Self-injurious behavior    • Substance abuse [facility])        Past Surgical History:   Procedure Laterality Date   • CHOLECYSTECTOMY         Family History   Problem Relation Age of Onset   • Heart disease Father    • Heart disease Maternal Grandmother      I have reviewed and agree with the history as documented  E-Cigarette/Vaping   • E-Cigarette Use Current Every Day User    • Cartridges/Day 2      E-Cigarette/Vaping Substances   • Nicotine No    • THC No    • CBD No    • Flavoring No    • Other No    • Unknown No      Social History     Tobacco Use   • Smoking status: Every Day     Packs/day: 0 50     Years: 20 00     Total pack years: 10 00     Types: Cigarettes   • Smokeless tobacco: Current   Vaping Use   • Vaping Use: Every day   Substance Use Topics   • Alcohol use: Not Currently     Comment: MAGDALENO, pt historically inconsistent  Drinking  per [facility]   • Drug use: Not Currently     Types: Heroin, \"Crack\" cocaine, Cocaine, LSD, Benzodiazepines, Marijuana     Comment: Pt unreliable historian        Review of Systems   Constitutional: Negative for chills, fatigue and fever  HENT: Negative for congestion, rhinorrhea and sore throat  Eyes: Negative for pain and redness  Respiratory: Positive for cough and shortness of breath  Negative for chest tightness and wheezing  Cardiovascular: Positive for chest pain   Negative for " palpitations  Gastrointestinal: Negative for abdominal pain, diarrhea, nausea and vomiting  Endocrine: Negative  Genitourinary: Negative for difficulty urinating and hematuria  Musculoskeletal: Negative for back pain and myalgias  Skin: Negative for pallor and rash  Allergic/Immunologic: Negative  Neurological: Negative for dizziness, weakness, light-headedness and headaches  Hematological: Negative  Physical Exam  ED Triage Vitals [06/19/23 1415]   Temperature Pulse Respirations Blood Pressure SpO2   98 6 °F (37 °C) 100 22 (!) 82/47 93 %      Temp Source Heart Rate Source Patient Position - Orthostatic VS BP Location FiO2 (%)   Oral Monitor Lying Right arm --      Pain Score       No Pain             Orthostatic Vital Signs  Vitals:    06/19/23 1730 06/19/23 1745 06/19/23 1815 06/19/23 1830   BP: 101/66 95/63 97/64 98/59   Pulse: 80 76 74 73   Patient Position - Orthostatic VS: Lying Lying Lying Lying       Physical Exam  Vitals and nursing note reviewed  Constitutional:       General: She is not in acute distress  Appearance: Normal appearance  She is not ill-appearing  HENT:      Head: Normocephalic and atraumatic  Eyes:      Conjunctiva/sclera: Conjunctivae normal    Cardiovascular:      Rate and Rhythm: Normal rate and regular rhythm  Heart sounds: No murmur heard  Pulmonary:      Effort: Pulmonary effort is normal  No respiratory distress  Breath sounds: Normal breath sounds  No wheezing, rhonchi or rales  Abdominal:      General: Abdomen is flat  There is no distension  Palpations: Abdomen is soft  Tenderness: There is no abdominal tenderness  Musculoskeletal:         General: Normal range of motion  Cervical back: Normal range of motion and neck supple  Right lower leg: Edema (Trace) present  Left lower leg: Edema (Trace) present  Skin:     General: Skin is warm and dry  Neurological:      General: No focal deficit present  Mental Status: She is alert and oriented to person, place, and time           ED Medications  Medications   ARIPiprazole (ABILIFY) tablet 15 mg (has no administration in time range)   famotidine (PEPCID) tablet 20 mg (has no administration in time range)   ferrous sulfate tablet 325 mg (has no administration in time range)   FLUoxetine (PROzac) capsule 80 mg (has no administration in time range)   furosemide (LASIX) tablet 40 mg (has no administration in time range)   gabapentin (NEURONTIN) capsule 600 mg (has no administration in time range)   hydrOXYzine HCL (ATARAX) tablet 25 mg (has no administration in time range)   pantoprazole (PROTONIX) EC tablet 40 mg (has no administration in time range)   topiramate (TOPAMAX) tablet 100 mg (has no administration in time range)   topiramate (TOPAMAX) tablet 25 mg (has no administration in time range)   traZODone (DESYREL) tablet 50 mg (has no administration in time range)   venlafaxine (EFFEXOR-XR) 24 hr capsule 37 5 mg (has no administration in time range)   acetaminophen (TYLENOL) tablet 650 mg (has no administration in time range)   ondansetron (ZOFRAN) injection 4 mg (has no administration in time range)   nicotine (NICODERM CQ) 21 mg/24 hr TD 24 hr patch 1 patch (has no administration in time range)   enoxaparin (LOVENOX) subcutaneous injection 40 mg (has no administration in time range)   clonazePAM (KlonoPIN) tablet 1 mg (has no administration in time range)   clonazePAM (KlonoPIN) tablet 1 mg (1 mg Oral Given 6/19/23 1503)   sodium chloride 0 9 % bolus 500 mL (0 mL Intravenous Stopped 6/19/23 1636)   iohexol (OMNIPAQUE) 350 MG/ML injection (SINGLE-DOSE) 85 mL (85 mL Intravenous Given 6/19/23 1714)       Diagnostic Studies  Results Reviewed     Procedure Component Value Units Date/Time    HS Troponin I 2hr [[id number]]  (Normal) Collected: 06/19/23 1732    Lab Status: Final result Specimen: Blood from Arm, Right Updated: 06/19/23 3889     hs TnI 2hr 4 ng/L      Delta 2hr hsTnI 0 ng/L     Comprehensive metabolic panel [[id number]]  (Abnormal) Collected: 06/19/23 1507    Lab Status: Final result Specimen: Blood from Hand, Left Updated: 06/19/23 1559     Sodium 137 mmol/L      Potassium 3 4 mmol/L      Chloride 111 mmol/L      CO2 20 mmol/L      ANION GAP 6 mmol/L      BUN 12 mg/dL      Creatinine 1 09 mg/dL      Glucose 114 mg/dL      Calcium 7 9 mg/dL      AST 19 U/L      ALT 11 U/L      Alkaline Phosphatase 50 U/L      Total Protein 5 7 g/dL      Albumin 3 6 g/dL      Total Bilirubin 0 35 mg/dL      eGFR 61 ml/min/1 73sq m     Narrative:      [place] guidelines for Chronic Kidney Disease (CKD):   •  Stage 1 with normal or high GFR (GFR > 90 mL/min/1 73 square meters)  •  Stage 2 Mild CKD (GFR = 60-89 mL/min/1 73 square meters)  •  Stage 3A Moderate CKD (GFR = 45-59 mL/min/1 73 square meters)  •  Stage 3B Moderate CKD (GFR = 30-44 mL/min/1 73 square meters)  •  Stage 4 Severe CKD (GFR = 15-29 mL/min/1 73 square meters)  •  Stage 5 End Stage CKD (GFR <15 mL/min/1 73 square meters)  Note: GFR calculation is accurate only with a steady state creatinine    HS Troponin 0hr (reflex protocol) [[id number]]  (Normal) Collected: 06/19/23 1507    Lab Status: Final result Specimen: Blood from Hand, Left Updated: 06/19/23 1546     hs TnI 0hr 4 ng/L     B-Type Natriuretic Peptide(BNP) [[id number]]  (Normal) Collected: 06/19/23 1507    Lab Status: Final result Specimen: Blood from Hand, Left Updated: 06/19/23 1542     BNP 21 pg/mL     D-Dimer [[id number]]  (Abnormal) Collected: 06/19/23 1507    Lab Status: Final result Specimen: Blood from Hand, Left Updated: 06/19/23 1541     D-Dimer, Quant 0 71 ug/ml FEU     CBC and differential [[id number]]  (Abnormal) Collected: 06/19/23 1507    Lab Status: Final result Specimen: Blood from Hand, Left Updated: 06/19/23 1515     WBC 14 91 Thousand/uL      RBC 3 89 Million/uL      Hemoglobin 11 2 g/dL      Hematocrit 34 9 %      MCV 90 fL      MCH 28 8 pg      MCHC 32 1 g/dL      RDW 13 0 %      MPV 8 1 fL      Platelets 362 Thousands/uL      nRBC 0 /100 WBCs      Neutrophils Relative 84 %      Immat GRANS % 1 %      Lymphocytes Relative 10 %      Monocytes Relative 5 %      Eosinophils Relative 0 %      Basophils Relative 0 %      Neutrophils Absolute 12 61 Thousands/µL      Immature Grans Absolute 0 07 Thousand/uL      Lymphocytes Absolute 1 44 Thousands/µL      Monocytes Absolute 0 75 Thousand/µL      Eosinophils Absolute 0 00 Thousand/µL      Basophils Absolute 0 04 Thousands/µL                  CTA chest pe study   Final Result by Roxanne Pettit MD (06/19 1804)      No pulmonary embolism  Diffuse asymmetric groundglass opacities throughout both lungs most prominent in the left lung could represent asymmetric pulmonary edema or pneumonia  Improved but persistent prominent adenopathy in the hilar regions and mediastinum which are likely reactive  Workstation performed: [id number]               Procedures  Procedures      ED Course  ED Course as of 06/19/23 1907 Mon Jun 19, 2023   1456 ECG 12 lead  This ECG was interpreted by me  The heart rate is 96, which is normal  The rhythm is regular  The axis is normal  The P waves are normal and the AK interval is normal  The QRS height is normal and width is normal  The ST segments are not elevated or depressed  The T waves are normal  The QT segment is normal  This ecg shows sinus rhythm  1544 D-Dimer, Quant(!): 0 71  PE study ordered   1835 Discussed CT finding with patient   She is agreeable with admission because she has failed to follow up with pulm   1836 Reached out to [facility] for admission             HEART Risk Score    Flowsheet Row Most Recent Value   Heart Score Risk Calculator    History 1 Filed at: 06/19/2023 1553   ECG 0 Filed at: 06/19/2023 1553   Age 1 Filed at: 06/19/2023 1553   Risk Factors 1 Filed at: 06/19/2023 1553   Troponin 0 Filed at: 06/19/2023 1553   HEART Score 3 Filed at: 06/19/2023 1553                                Medical Decision Making  79-year-old female presents with chest pain and shortness of breath  I explained to patient that we cannot refill her Klonopin  She needs to follow-up with her primary care physician who prescribes it  Chest pain and shortness of breath are concerning for ACS, arrhythmia, pulmonary edema, pleural effusion, pneumonia, pneumothorax, heart failure, PE, or other acute cardiopulmonary process  Will evaluate with CBC, CMP, troponin, BNP, D-dimer, and EKG  Will order imaging of the chest based on D-dimer  Will give 500 cc of fluids because patient is currently hypotensive  She does not appear fluid overloaded, so 500 cc should be okay for now  We will continue to reevaluate patient's fluid status  We will give a dose of Klonopin for here  Discussed case with medicine  Patient was admitted for further work-up and management  Chest pain: acute illness or injury  Pulmonary edema: chronic illness or injury with exacerbation, progression, or side effects of treatment  Amount and/or Complexity of Data Reviewed  External Data Reviewed: notes  Details: Reviewed past medical history and medications  Reviewed discharge summary from 4/13/2023  Labs: ordered  Decision-making details documented in ED Course  Radiology: ordered  ECG/medicine tests: ordered and independent interpretation performed  Decision-making details documented in ED Course  Risk  Prescription drug management  Decision regarding hospitalization  Diagnosis or treatment significantly limited by social determinants of health  Risk Details: Patient is at increased risk due to being homeless and having a difficult time following up            Disposition  Final diagnoses:   Chest pain   Pulmonary edema     Time reflects when diagnosis was documented in both MDM as applicable and the Disposition within this note     Time User Action Codes Description Comment    6/19/2023  6:43 PM Charles Rivas Add [R07 9] Chest pain     6/19/2023  6:43 PM Charles Rivas Add [J81 1] Pulmonary edema     6/19/2023  6:47 PM Shen Recinos Add [R93 89] Abnormal CT of the chest       ED Disposition     ED Disposition   Admit    Condition   Stable    Date/Time   Mon Jun 19, 2023  6:43 PM    Comment   Case was discussed with Dr Jennifer Ritchie and the patient's admission status was agreed to be Admission Status: observation status to the service of Dr Jennifer Ritchie   Follow-up Information    None         Patient's Medications   Discharge Prescriptions    No medications on file     No discharge procedures on file  PDMP Review       Value Time User    PDMP Reviewed  Yes 6/2/2023  8:28 PM Naga Guzman MD           ED Provider  Attending physically available and evaluated [facility]  I managed the patient along with the ED Attending      Electronically Signed by         Dinorah Cazares DO  06/19/23 1902

## 2023-06-19 NOTE — ED NOTES
Patient transported to [facility], [address] [place] [address]  06/19/23 8813

## 2023-06-19 NOTE — H&P
[address] [facility]  H&P  Name: Miguel Angel Weiss 57 y o  female I MRN: [id number]  Unit/Bed#: ED-10 I Date of Admission: 6/19/2023   Date of Service: 6/19/2023 I Hospital Day: 0      Assessment/Plan   * Dizziness  Assessment & Plan  · Presents with dizziness and chest pain  · Likely anxiety related  · Troponin I within normal limits  · PT/OT evaluation and treatment for dizziness  · Continue home antipsychotic medications for likely anxiety    Bipolar disorder, unspecified ([facility] [address] )  Assessment & Plan  · Continue home antipsychotic medication regimen  · Patient appears slightly anxious    Pulmonary edema  Assessment & Plan  · Patient with what appears to be chronic noncardiogenic pulmonary edema versus sarcoidosis  · Continue home Lasix  · Inpatient consult to Pulmonology as patient will likely not follow-up in the outpatient setting  · Saturating well on room air    Tobacco abuse  Assessment & Plan  · Nicotine patch while inpatient  · Encouraged tobacco abuse cessation    Dyspepsia  Assessment & Plan  · Continue home PPI and H2 blocker         VTE Prophylaxis: Lovenox  Code Status: Level 1 full code    Anticipated Length of Stay:  Patient will be admitted on an Observation basis with an anticipated length of stay of less than 2 midnights  Justification for Hospital Stay: Pulmonary edema    Total Time for Visit, including Counseling / Coordination of Care: 60 minutes  Greater than 50% of this total time spent on direct patient counseling and coordination of care  Chief Complaint:   Dizziness/chest pain    History of Present Illness:    Miguel Angel Weiss is a 57 y o  female with a past medical history significant for dyspepsia, tobacco abuse, noncardiogenic pulmonary edema versus sarcoidosis, bipolar disorder who presents with complaints of dizziness and chest pain    The patient was recently admitted to the hospital this past April and was found to have an abnormal CT of the chest which revealed pulmonary edema versus pneumonitis versus sarcoidosis  The patient did not follow-up with pulmonology in the outpatient setting  In the ED, all vital signs were found to be stable however blood pressures somewhat soft  The patient was assigned observation in the setting of abnormal CT scan with bed outpatient follow-up  Review of Systems:    Review of Systems   Constitutional: Negative for chills and fever  HENT: Negative for ear pain and sore throat  Eyes: Negative for pain and visual disturbance  Respiratory: Negative for cough and shortness of breath  Cardiovascular: Positive for chest pain  Negative for palpitations  Gastrointestinal: Negative for abdominal pain and vomiting  Genitourinary: Negative for dysuria and hematuria  Musculoskeletal: Negative for arthralgias and back pain  Skin: Negative for color change and rash  Neurological: Positive for dizziness  Negative for seizures and syncope  All other systems reviewed and are negative  Past Medical and Surgical History:     Past Medical History:   Diagnosis Date   • Addiction to drug [facility])    • Alcohol abuse    • Alcoholism ([facility] )    • Altered mental status 09/21/2022   • Elevated LFTs 09/21/2022   • Lower back pain    • Self-injurious behavior    • Substance abuse ([facility] )        Past Surgical History:   Procedure Laterality Date   • CHOLECYSTECTOMY         Meds/Allergies:    Prior to Admission medications    Medication Sig Start Date End Date Taking?  Authorizing Provider   ARIPiprazole (ABILIFY) 15 mg tablet Take 1 tablet (15 mg total) by mouth daily for 14 days 10/11/22 10/25/22  Dorys Conner MD   clonazePAM (KlonoPIN) 1 mg tablet Take 1 tablet (1 mg total) by mouth 2 (two) times a day 4/20/23   Lacey Gillis MD   docusate sodium (COLACE) 100 mg capsule Take 1 capsule (100 mg total) by mouth every 12 (twelve) hours for 15 days 3/27/23 4/11/23  Humaira Tracey PA-C   famotidine (PEPCID) 20 mg tablet Take 1 tablet (20 mg total) by mouth 2 (two) times a day 10/11/22 11/10/22  Howie Bumpers, MD   ferrous sulfate 325 (65 Fe) mg tablet Take 1 tablet (325 mg total) by mouth daily with breakfast 10/11/22 11/10/22  Howie Bumpers, MD   FLUoxetine (PROzac) 40 MG capsule Take 2 capsules (80 mg total) by mouth daily Do not start before April 21, 2023 4/21/23 5/21/23  Michoacano Freed MD   furosemide (LASIX) 40 mg tablet Take 1 tablet (40 mg total) by mouth 2 (two) times a day 4/20/23 5/20/23  Michoacano Freed MD   gabapentin (NEURONTIN) 300 mg capsule Take 2 capsules (600 mg total) by mouth 3 (three) times a day 10/11/22 11/10/22  Howie Bumpers, MD   hydrOXYzine HCL (ATARAX) 25 mg tablet Take 1 tablet (25 mg total) by mouth every 6 (six) hours 10/20/22   Jayden Mckee DO   hydrOXYzine pamoate (VISTARIL) 25 mg capsule Take 1 capsule (25 mg total) by mouth every 6 (six) hours as needed for anxiety 11/11/22   Reshma Moore MD   ibuprofen (MOTRIN) 600 mg tablet Take 1 tablet (600 mg total) by mouth every 6 (six) hours as needed for moderate pain 10/11/22 11/10/22  Howie Bumpers, MD   naloxone [facility]) 4 mg/0 1 mL nasal spray 4 mg into each nostril 9/2/22   Historical Provider, MD   nicotine (NICODERM CQ) 14 mg/24hr TD 24 hr patch Place 1 patch on the skin every 24 hours 10/28/22   NETTE San   nicotine polacrilex (NICORETTE) 4 mg gum Chew 1 each (4 mg total) as needed for smoking cessation 10/28/22   NETTE [place]   ondansetron (ZOFRAN-ODT) 4 mg disintegrating tablet Take 1 tablet (4 mg total) by mouth every 6 (six) hours as needed for nausea or vomiting 11/11/22   Reshma Moore MD   pantoprazole (PROTONIX) 40 mg tablet Take 1 tablet (40 mg total) by mouth daily in the early morning 10/11/22 11/10/22  Howie Bumpers, MD   topiramate (TOPAMAX) 100 mg tablet Take 1 tablet (100 mg total) by mouth 2 (two) times a day   Take in combination with 25mg for total daily dose of 125mg twice daily   10/11/22 11/10/22  Howie Bumpers, MD "  topiramate (Topamax) 25 mg tablet Take 1 tablet (25 mg total) by mouth 2 (two) times a day   Take in combination with 100mg for total daily dose of 125mg twice daily  10/11/22 11/22/22  Rajani Willard MD   traZODone (DESYREL) 50 mg tablet Take 1 tablet (50 mg total) by mouth daily at bedtime 10/11/22 11/22/22  Rajani Willard MD   venlafaxine [facility] ) 37 5 mg 24 hr capsule Take 1 capsule (37 5 mg total) by mouth daily 10/11/22 11/22/22  Rajani Willard MD       Allergies: Allergies   Allergen Reactions   • Haloperidol Other (See Comments)     oculogyr crisis       Social History:     Marital Status: Single   Substance Use History:   Social History     Substance and Sexual Activity   Alcohol Use Not Currently    Comment: MAGDALENO, pt historically inconsistent  Drinking  per [place] [id number]     Social History     Tobacco Use   Smoking Status Every Day   • Packs/day: 0 50   • Years: 20 00   • Total pack years: 10 00   • Types: Cigarettes   Smokeless Tobacco Current     Social History     Substance and Sexual Activity   Drug Use Not Currently   • Types: Heroin, \"Crack\" cocaine, Cocaine, LSD, Benzodiazepines, Marijuana    Comment: Pt unreliable historian       Family History:    Pertinent family history reviewed    Physical Exam:     Vitals:   Blood Pressure: 98/59 (06/19/23 1830)  Pulse: 73 (06/19/23 1830)  Temperature: 98 6 °F (37 °C) (06/19/23 1415)  Temp Source: Oral (06/19/23 1415)  Respirations: 16 (06/19/23 1815)  SpO2: 95 % (06/19/23 1830)    Physical Exam  Vitals and nursing note reviewed  Constitutional:       General: She is not in acute distress  Appearance: She is well-developed  HENT:      Head: Normocephalic and atraumatic  Eyes:      Conjunctiva/sclera: Conjunctivae normal    Cardiovascular:      Rate and Rhythm: Normal rate and regular rhythm  Heart sounds: No murmur heard  Pulmonary:      Effort: Pulmonary effort is normal  No respiratory distress  Breath sounds: Normal breath sounds   " Abdominal:      Palpations: Abdomen is soft  Tenderness: There is no abdominal tenderness  Musculoskeletal:         General: No swelling  Cervical back: Neck supple  Skin:     General: Skin is warm and dry  Capillary Refill: Capillary refill takes less than 2 seconds  Neurological:      Mental Status: She is alert  Psychiatric:         Mood and Affect: Mood normal           Additional Data:     Lab Results: I have reviewed pertinent results     Results from last 7 days   Lab Units 06/19/23  1507   WBC Thousand/uL 14 91*   HEMOGLOBIN g/dL 11 2*   HEMATOCRIT % 34 9   PLATELETS Thousands/uL 211   NEUTROS PCT % 84*   LYMPHS PCT % 10*   MONOS PCT % 5   EOS PCT % 0     Results from last 7 days   Lab Units 06/19/23  1507   SODIUM mmol/L 137   POTASSIUM mmol/L 3 4*   CHLORIDE mmol/L 111*   CO2 mmol/L 20*   BUN mg/dL 12   CREATININE mg/dL 1 09   ANION GAP mmol/L 6   CALCIUM mg/dL 7 9*   ALBUMIN g/dL 3 6   TOTAL BILIRUBIN mg/dL 0 35   ALK PHOS U/L 50   ALT U/L 11   AST U/L 19   GLUCOSE RANDOM mg/dL 114                       Imaging: I have reviewed pertinent imaging     CTA chest pe study   Final Result by Josep Reece MD (06/19 1804)      No pulmonary embolism  Diffuse asymmetric groundglass opacities throughout both lungs most prominent in the left lung could represent asymmetric pulmonary edema or pneumonia  Improved but persistent prominent adenopathy in the hilar regions and mediastinum which are likely reactive  Workstation performed: [id number]             EKG, Pathology, and Other Studies Reviewed on Admission:   · EKG: Reviewed     Allscripts / Epic Records Reviewed    ** Please Note: This note has been constructed using a voice recognition system   **

## 2023-06-19 NOTE — ASSESSMENT & PLAN NOTE
· Presents with dizziness and chest pain  · Likely anxiety related  · Troponin I within normal limits  · PT/OT evaluation and treatment for dizziness  · Continue home antipsychotic medications for likely anxiety

## 2023-06-20 VITALS
SYSTOLIC BLOOD PRESSURE: 112 MMHG | HEIGHT: 62 IN | WEIGHT: 167.33 LBS | BODY MASS INDEX: 30.79 KG/M2 | RESPIRATION RATE: 18 BRPM | HEART RATE: 82 BPM | TEMPERATURE: 97.7 F | DIASTOLIC BLOOD PRESSURE: 74 MMHG | OXYGEN SATURATION: 98 %

## 2023-06-20 LAB
ANION GAP SERPL CALCULATED.3IONS-SCNC: 5 MMOL/L (ref 4–13)
ATRIAL RATE: 96 BPM
BASOPHILS # BLD AUTO: 0.04 THOUSANDS/ÂΜL (ref 0–0.1)
BASOPHILS NFR BLD AUTO: 1 % (ref 0–1)
BUN SERPL-MCNC: 11 MG/DL (ref 5–25)
CALCIUM SERPL-MCNC: 8.1 MG/DL (ref 8.4–10.2)
CHLORIDE SERPL-SCNC: 110 MMOL/L (ref 96–108)
CO2 SERPL-SCNC: 24 MMOL/L (ref 21–32)
CREAT SERPL-MCNC: 0.82 MG/DL (ref 0.6–1.3)
EOSINOPHIL # BLD AUTO: 0 THOUSAND/ÂΜL (ref 0–0.61)
EOSINOPHIL NFR BLD AUTO: 0 % (ref 0–6)
ERYTHROCYTE [DISTWIDTH] IN BLOOD BY AUTOMATED COUNT: 13.2 % (ref 11.6–15.1)
GFR SERPL CREATININE-BSD FRML MDRD: 86 ML/MIN/1.73SQ M
GLUCOSE SERPL-MCNC: 95 MG/DL (ref 65–140)
HCT VFR BLD AUTO: 37.6 % (ref 34.8–46.1)
HGB BLD-MCNC: 11.6 G/DL (ref 11.5–15.4)
IMM GRANULOCYTES # BLD AUTO: 0.03 THOUSAND/UL (ref 0–0.2)
IMM GRANULOCYTES NFR BLD AUTO: 0 % (ref 0–2)
LYMPHOCYTES # BLD AUTO: 2.42 THOUSANDS/ÂΜL (ref 0.6–4.47)
LYMPHOCYTES NFR BLD AUTO: 36 % (ref 14–44)
MAGNESIUM SERPL-MCNC: 2.2 MG/DL (ref 1.9–2.7)
MCH RBC QN AUTO: 28.2 PG (ref 26.8–34.3)
MCHC RBC AUTO-ENTMCNC: 30.9 G/DL (ref 31.4–37.4)
MCV RBC AUTO: 91 FL (ref 82–98)
MONOCYTES # BLD AUTO: 0.63 THOUSAND/ÂΜL (ref 0.17–1.22)
MONOCYTES NFR BLD AUTO: 9 % (ref 4–12)
NEUTROPHILS # BLD AUTO: 3.69 THOUSANDS/ÂΜL (ref 1.85–7.62)
NEUTS SEG NFR BLD AUTO: 54 % (ref 43–75)
NRBC BLD AUTO-RTO: 0 /100 WBCS
P AXIS: 126 DEGREES
PHOSPHATE SERPL-MCNC: 3.5 MG/DL (ref 2.7–4.5)
PLATELET # BLD AUTO: 223 THOUSANDS/UL (ref 149–390)
PMV BLD AUTO: 8.5 FL (ref 8.9–12.7)
POTASSIUM SERPL-SCNC: 3.5 MMOL/L (ref 3.5–5.3)
PR INTERVAL: 182 MS
QRS AXIS: 117 DEGREES
QRSD INTERVAL: 92 MS
QT INTERVAL: 378 MS
QTC INTERVAL: 477 MS
RBC # BLD AUTO: 4.12 MILLION/UL (ref 3.81–5.12)
SODIUM SERPL-SCNC: 139 MMOL/L (ref 135–147)
T WAVE AXIS: 141 DEGREES
VENTRICULAR RATE: 96 BPM
WBC # BLD AUTO: 6.81 THOUSAND/UL (ref 4.31–10.16)

## 2023-06-20 PROCEDURE — 80048 BASIC METABOLIC PNL TOTAL CA: CPT | Performed by: INTERNAL MEDICINE

## 2023-06-20 PROCEDURE — 83735 ASSAY OF MAGNESIUM: CPT | Performed by: INTERNAL MEDICINE

## 2023-06-20 PROCEDURE — 84100 ASSAY OF PHOSPHORUS: CPT | Performed by: INTERNAL MEDICINE

## 2023-06-20 PROCEDURE — 99204 OFFICE O/P NEW MOD 45 MIN: CPT | Performed by: PHYSICIAN ASSISTANT

## 2023-06-20 PROCEDURE — 85025 COMPLETE CBC W/AUTO DIFF WBC: CPT | Performed by: INTERNAL MEDICINE

## 2023-06-20 PROCEDURE — 93010 ELECTROCARDIOGRAM REPORT: CPT | Performed by: INTERNAL MEDICINE

## 2023-06-20 PROCEDURE — 99239 HOSP IP/OBS DSCHRG MGMT >30: CPT | Performed by: INTERNAL MEDICINE

## 2023-06-20 RX ORDER — CLONAZEPAM 1 MG/1
1 TABLET ORAL 4 TIMES DAILY
Qty: 28 TABLET | Refills: 0 | Status: SHIPPED | OUTPATIENT
Start: 2023-06-20 | End: 2023-06-27

## 2023-06-20 RX ORDER — BUPRENORPHINE AND NALOXONE 8; 2 MG/1; MG/1
8 FILM, SOLUBLE BUCCAL; SUBLINGUAL 3 TIMES DAILY
Qty: 30 FILM | Refills: 0 | OUTPATIENT
Start: 2023-06-20 | End: 2023-06-30

## 2023-06-20 RX ORDER — BUPROPION HYDROCHLORIDE 300 MG/1
300 TABLET ORAL DAILY
COMMUNITY

## 2023-06-20 RX ORDER — CLONAZEPAM 1 MG/1
1 TABLET ORAL 4 TIMES DAILY
Qty: 28 TABLET | Refills: 0 | OUTPATIENT
Start: 2023-06-20 | End: 2023-06-27

## 2023-06-20 RX ORDER — BUPRENORPHINE AND NALOXONE 8; 2 MG/1; MG/1
8 FILM, SOLUBLE BUCCAL; SUBLINGUAL 3 TIMES DAILY
Qty: 30 FILM | Refills: 0 | Status: SHIPPED | COMMUNITY
Start: 2023-06-20 | End: 2023-06-30

## 2023-06-20 RX ORDER — CLONAZEPAM 1 MG/1
1 TABLET ORAL ONCE
Status: COMPLETED | OUTPATIENT
Start: 2023-06-20 | End: 2023-06-20

## 2023-06-20 RX ORDER — CLONAZEPAM 1 MG/1
1 TABLET ORAL 4 TIMES DAILY
Status: DISCONTINUED | OUTPATIENT
Start: 2023-06-20 | End: 2023-06-20 | Stop reason: HOSPADM

## 2023-06-20 RX ADMIN — GABAPENTIN 600 MG: 300 CAPSULE ORAL at 08:19

## 2023-06-20 RX ADMIN — HYDROXYZINE HYDROCHLORIDE 25 MG: 25 TABLET, FILM COATED ORAL at 16:44

## 2023-06-20 RX ADMIN — BUPRENORPHINE AND NALOXONE 8 MG: 8; 2 FILM BUCCAL; SUBLINGUAL at 08:23

## 2023-06-20 RX ADMIN — BUPRENORPHINE AND NALOXONE 8 MG: 8; 2 FILM BUCCAL; SUBLINGUAL at 16:45

## 2023-06-20 RX ADMIN — FUROSEMIDE 40 MG: 40 TABLET ORAL at 08:19

## 2023-06-20 RX ADMIN — FUROSEMIDE 40 MG: 40 TABLET ORAL at 16:44

## 2023-06-20 RX ADMIN — CLONAZEPAM 1 MG: 1 TABLET ORAL at 11:42

## 2023-06-20 RX ADMIN — GABAPENTIN 600 MG: 300 CAPSULE ORAL at 16:44

## 2023-06-20 RX ADMIN — CLONAZEPAM 1 MG: 1 TABLET ORAL at 08:19

## 2023-06-20 RX ADMIN — FERROUS SULFATE TAB 325 MG (65 MG ELEMENTAL FE) 325 MG: 325 (65 FE) TAB at 08:19

## 2023-06-20 RX ADMIN — Medication 1 PATCH: at 08:24

## 2023-06-20 RX ADMIN — CLONAZEPAM 1 MG: 1 TABLET ORAL at 16:44

## 2023-06-20 RX ADMIN — PANTOPRAZOLE SODIUM 40 MG: 40 TABLET, DELAYED RELEASE ORAL at 06:19

## 2023-06-20 RX ADMIN — FAMOTIDINE 20 MG: 20 TABLET ORAL at 08:19

## 2023-06-20 RX ADMIN — TOPIRAMATE 100 MG: 100 TABLET, FILM COATED ORAL at 08:19

## 2023-06-20 RX ADMIN — HYDROXYZINE HYDROCHLORIDE 25 MG: 25 TABLET, FILM COATED ORAL at 11:42

## 2023-06-20 RX ADMIN — FLUOXETINE 80 MG: 20 CAPSULE ORAL at 08:18

## 2023-06-20 RX ADMIN — ENOXAPARIN SODIUM 40 MG: 40 INJECTION SUBCUTANEOUS at 08:25

## 2023-06-20 RX ADMIN — VENLAFAXINE HYDROCHLORIDE 37.5 MG: 37.5 CAPSULE, EXTENDED RELEASE ORAL at 08:18

## 2023-06-20 RX ADMIN — FAMOTIDINE 20 MG: 20 TABLET ORAL at 16:44

## 2023-06-20 NOTE — CONSULTS
Consultation - Pulmonary Medicine   [facility] 57 y o  female MRN: [id number]  Unit/Bed#: E4 -01 Encounter: [id number]      Assessment:  1  Abnormal chest CT with diffuse asymmetric groundglass opacities  2  Hilar and mediastinal lymphadenopathy, improved  3    Dizziness and chest pain    Plan:   Patient presented with sudden onset of dizziness as well as chest pain/tightness which has since resolved, thought to be anxiety related, possibly also musculoskeletal  CTA was done during her work-up which showed no PE, shows diffuse asymmetric groundglass opacities as well as hilar and mediastinal lymphadenopathy  Findings look overall improved from prior CT scan in April - at that time she was treated for pneumonitis with a gradual steroid taper which she has completed  Currently denies any significant respiratory symptoms, has a mild cough since her admission  CT scan findings most likely related to ongoing smoking and vaping, she states she has cut down but has not completely quit, less likely asymmetric pulmonary edema, sarcoidosis is less likely  Autoimmune serology was negative during her prior admission  We will hold off on any additional steroids given she is currently stable on room air without any significant respiratory symptoms and overall improvement in her CT scan  In the absence of any worsening of CT findings and no acute symptoms or hypoxia in the setting of ongoing vape use, no need for urgent bronchoscopy at this time  Can consider as an outpatient if her CT findings or symptoms worsen or CT findings do not resolve with complete cessation from vaping  She can follow up with us in the office    History of Present Illness   Physician Requesting Consult: Gregg Milligan DO  Reason for Consult / Principal Problem: Abnormal chest CT  Hx and PE limited by: none  HPI: [facility] JASMINE is a 57y o  year old female current cigarette smoker and nicotine vape user with past medical history of "substance abuse, alcohol abuse who presents with complaint of chest pain/tightness as well as dizziness  She states she spent a lot of time mowing the lawn, also walked quite a bit the day of admission  Suddenly during walking she had a chest tightness and pain that traveled up into her neck and also began to feel dizzy  She has not had any of these episodes since  She denies any significant shortness of breath, feels she is breathing at her baseline  She does have a mild cough today with some clear sputum production but otherwise denies any previous cough  She was hospitalized here in April and was treated for pneumonitis at that time  She was hypoxic during that admission, she did complete a course of steroids  She was also hospitalized back in September 2022 with similar findings thought to be related to [facility]  Consults    Review of Systems   Constitutional: Negative  HENT: Negative  Respiratory: Negative  Cardiovascular: Negative  Gastrointestinal: Negative  Genitourinary: Negative  Musculoskeletal: Negative  Skin: Negative  Allergic/Immunologic: Negative  Neurological: Negative  Psychiatric/Behavioral: Negative  Historical Information   Past Medical History:   Diagnosis Date   • Addiction to drug [facility])    • Alcohol abuse    • Alcoholism ([facility] [address] )    • Altered mental status 09/21/2022   • Elevated LFTs 09/21/2022   • Lower back pain    • Self-injurious behavior    • Substance abuse [facility])      Past Surgical History:   Procedure Laterality Date   • CHOLECYSTECTOMY       Social History   Social History     Substance and Sexual Activity   Alcohol Use Not Currently    Comment: MAGDALENO, pt historically inconsistent   Drinking  per [facility]     Social History     Substance and Sexual Activity   Drug Use Not Currently   • Types: Heroin, \"Crack\" cocaine, Cocaine, LSD, Benzodiazepines, Marijuana    Comment: Pt unreliable historian     E-Cigarette/Vaping   • E-Cigarette " Use Current Every Day User    • Cartridges/Day 2      E-Cigarette/Vaping Substances   • Nicotine No    • THC No    • CBD No    • Flavoring No    • Other No    • Unknown No      Social History     Tobacco Use   Smoking Status Every Day   • Packs/day: 0 50   • Years: 20 00   • Total pack years: 10 00   • Types: Cigarettes   Smokeless Tobacco Current     Occupational History:     Family History:   Family History   Problem Relation Age of Onset   • Heart disease Father    • Heart disease Maternal Grandmother        Meds/Allergies   all current active meds have been reviewed, pertinent pulmonary meds have been reviewed and current meds:   Current Facility-Administered Medications   Medication Dose Route Frequency   • acetaminophen (TYLENOL) tablet 650 mg  650 mg Oral Q4H PRN   • ARIPiprazole (ABILIFY) tablet 15 mg  15 mg Oral Daily   • buprenorphine-naloxone (Suboxone) film 8 mg  8 mg Sublingual TID   • clonazePAM (KlonoPIN) tablet 1 mg  1 mg Oral 4x Daily   • enoxaparin (LOVENOX) subcutaneous injection 40 mg  40 mg Subcutaneous Q24H CHRISTIANE   • famotidine (PEPCID) tablet 20 mg  20 mg Oral BID   • ferrous sulfate tablet 325 mg  325 mg Oral Daily With Breakfast   • FLUoxetine (PROzac) capsule 80 mg  80 mg Oral Daily   • furosemide (LASIX) tablet 40 mg  40 mg Oral BID (diuretic)   • gabapentin (NEURONTIN) capsule 600 mg  600 mg Oral TID   • hydrOXYzine HCL (ATARAX) tablet 25 mg  25 mg Oral Q6H CHRISTIANE   • nicotine (NICODERM CQ) 21 mg/24 hr TD 24 hr patch 1 patch  1 patch Transdermal Daily   • ondansetron (ZOFRAN) injection 4 mg  4 mg Intravenous Q6H PRN   • pantoprazole (PROTONIX) EC tablet 40 mg  40 mg Oral Daily Before Breakfast   • topiramate (TOPAMAX) tablet 100 mg  100 mg Oral Q12H CHRISTIANE   • traZODone (DESYREL) tablet 50 mg  50 mg Oral HS   • venlafaxine (EFFEXOR-XR) 24 hr capsule 37 5 mg  37 5 mg Oral Daily       Allergies   Allergen Reactions   • Haloperidol Other (See Comments)     oculogyr crisis   • Abilify [Aripiprazole] "Other (See Comments)     Pt reports increased agitation ? (stating last time she took this med \"she lost it\"        Objective   Vitals: Blood pressure 117/71, pulse 79, temperature (!) 97 4 °F (36 3 °C), temperature source Temporal, resp  rate 18, height 5' 2\" (1 575 m), weight 75 9 kg (167 lb 5 3 oz), last menstrual period 06/19/2023, SpO2 98 %  ,Body mass index is 30 6 kg/m²  Intake/Output Summary (Last 24 hours) at 6/20/2023 1423  Last data filed at 6/19/2023 1636  Gross per 24 hour   Intake 500 ml   Output --   Net 500 ml     Invasive Devices     Peripheral Intravenous Line  Duration           Peripheral IV 06/19/23 Right Antecubital <1 day                Physical Exam  Vitals reviewed  Constitutional:       Appearance: Normal appearance  HENT:      Head: Normocephalic and atraumatic  Mouth/Throat:      Pharynx: Oropharynx is clear  Eyes:      Conjunctiva/sclera: Conjunctivae normal    Cardiovascular:      Rate and Rhythm: Normal rate and regular rhythm  Pulmonary:      Effort: Pulmonary effort is normal  No respiratory distress  Breath sounds: Normal breath sounds  No decreased breath sounds, wheezing, rhonchi or rales  Abdominal:      General: Abdomen is flat  There is no distension  Musculoskeletal:         General: Normal range of motion  Cervical back: Normal range of motion  Right lower leg: No edema  Left lower leg: No edema  Skin:     General: Skin is warm and dry  Neurological:      Mental Status: She is alert and oriented to person, place, and time  Psychiatric:         Mood and Affect: Mood normal          Behavior: Behavior normal          Lab Results:   I have personally reviewed pertinent lab results  , CBC:   Lab Results   Component Value Date    WBC 6 81 06/20/2023    HGB 11 6 06/20/2023    HCT 37 6 06/20/2023    MCV 91 06/20/2023     06/20/2023    RBC 4 12 06/20/2023    MCH 28 2 06/20/2023    MCHC 30 9 (L) 06/20/2023    RDW 13 2 06/20/2023    " MPV 8 5 (L) 06/20/2023    NRBC 0 06/20/2023   , CMP:   Lab Results   Component Value Date    SODIUM 139 06/20/2023    K 3 5 06/20/2023     (H) 06/20/2023    CO2 24 06/20/2023    BUN 11 06/20/2023    CREATININE 0 82 06/20/2023    CALCIUM 8 1 (L) 06/20/2023    AST 19 06/19/2023    ALT 11 06/19/2023    ALKPHOS 50 06/19/2023    EGFR 86 06/20/2023     Imaging Studies: I have personally reviewed pertinent reports  and I have personally reviewed pertinent films in PACS  EKG, Pathology, and Other Studies: I have personally reviewed pertinent reports      VTE Prophylaxis: Sequential compression device (Venodyne)  and Enoxaparin (Lovenox)    Code Status: Level 1 - Full Code  Advance Directive and Living Will:      Power of :    POLST:

## 2023-06-20 NOTE — DISCHARGE INSTR - AVS FIRST PAGE
Dear Rosaura Alvarado,     It was our pleasure to care for you here at [facility]  It is our hope that we were always able to exceed the expected standards for your care during your stay  You were hospitalized due to abnormal CT scan, as well as dizziness  You were cared for on the fourth floor by Jie Bernard DO with the [facility] who covers for your primary care physician (PCP), No primary care provider on file  , while you were hospitalized  If you have any questions or concerns related to this hospitalization, you may contact us at [phone]  For follow up as well as any medication refills, we recommend that you follow up with your primary care physician  A registered nurse will reach out to you by phone within a few days after your discharge to answer any additional questions that you may have after going home  However, at this time we provide for you here, the most important instructions / recommendations at discharge:     Notable Medication Adjustments -   No changes to chronic home medication, I have refilled your clonazepam    Testing Required after Discharge -   Outpatient chest x-ray or CT scan with the pulmonary service  Important follow up information -   PCP follow-up in 1 to 2 weeks  Pulmonary follow-up in 2 to 3 weeks  Other Instructions -   Recommend you discontinue vaping as this can lead to a shortened life, and it can affect your lungs  Please review this entire after visit summary as additional general instructions including medication list, appointments, activity, diet, any pertinent wound care, and other additional recommendations from your care team that may be provided for you        Sincerely,     Jie Bernard DO and Nurse Leslye Champion

## 2023-06-20 NOTE — PLAN OF CARE
Problem: PAIN - ADULT  Goal: Verbalizes/displays adequate comfort level or baseline comfort level  Description: Interventions:  - Encourage patient to monitor pain and request assistance  - Assess pain using appropriate pain scale  - Administer analgesics based on type and severity of pain and evaluate response  - Implement non-pharmacological measures as appropriate and evaluate response  - Consider cultural and social influences on pain and pain management  - Notify physician/advanced practitioner if interventions unsuccessful or patient reports new pain  Outcome: Progressing     Problem: SAFETY ADULT  Goal: Patient will remain free of falls  Description: INTERVENTIONS:  - Educate patient/family on patient safety including physical limitations  - Instruct patient to call for assistance with activity   - Consult OT/PT to assist with strengthening/mobility   - Keep Call bell within reach  - Keep bed low and locked with side rails adjusted as appropriate  - Keep care items and personal belongings within reach  - Initiate and maintain comfort rounds  - Make Fall Risk Sign visible to staff  - Offer Toileting every 3 Hours, in advance of need  - Initiate/Maintain bed alarm  - Obtain necessary fall risk management equipment: alarm   - Apply yellow socks and bracelet for high fall risk patients  - Consider moving patient to room near nurses station  Outcome: Progressing  Goal: Maintain or return to baseline ADL function  Description: INTERVENTIONS:  -  Assess patient's ability to carry out ADLs; assess patient's baseline for ADL function and identify physical deficits which impact ability to perform ADLs (bathing, care of mouth/teeth, toileting, grooming, dressing, etc )  - Assess/evaluate cause of self-care deficits   - Assess range of motion  - Assess patient's mobility; develop plan if impaired  - Assess patient's need for assistive devices and provide as appropriate  - Encourage maximum independence but intervene and supervise when necessary  - Involve family in performance of ADLs  - Assess for home care needs following discharge   - Consider OT consult to assist with ADL evaluation and planning for discharge  - Provide patient education as appropriate  Outcome: Progressing     Problem: DISCHARGE PLANNING  Goal: Discharge to home or other facility with appropriate resources  Description: INTERVENTIONS:  - Identify barriers to discharge w/patient and caregiver  - Arrange for needed discharge resources and transportation as appropriate  - Identify discharge learning needs (meds, wound care, etc )  - Arrange for interpretive services to assist at discharge as needed  - Refer to Case Management Department for coordinating discharge planning if the patient needs post-hospital services based on physician/advanced practitioner order or complex needs related to functional status, cognitive ability, or social support system  Outcome: Progressing     Problem: Knowledge Deficit  Goal: Patient/family/caregiver demonstrates understanding of disease process, treatment plan, medications, and discharge instructions  Description: Complete learning assessment and assess knowledge base    Interventions:  - Provide teaching at level of understanding  - Provide teaching via preferred learning methods  Outcome: Progressing     Problem: NEUROSENSORY - ADULT  Goal: Achieves stable or improved neurological status  Description: INTERVENTIONS  - Monitor and report changes in neurological status  - Monitor vital signs such as temperature, blood pressure, glucose, and any other labs ordered   - Initiate measures to prevent increased intracranial pressure  - Monitor for seizure activity and implement precautions if appropriate      Outcome: Progressing     Problem: CARDIOVASCULAR - ADULT  Goal: Maintains optimal cardiac output and hemodynamic stability  Description: INTERVENTIONS:  - Monitor I/O, vital signs and rhythm  - Monitor for S/S and trends of decreased cardiac output  - Administer and titrate ordered vasoactive medications to optimize hemodynamic stability  - Assess quality of pulses, skin color and temperature  - Assess for signs of decreased coronary artery perfusion  - Instruct patient to report change in severity of symptoms  Outcome: Progressing     Problem: RESPIRATORY - ADULT  Goal: Achieves optimal ventilation and oxygenation  Description: INTERVENTIONS:  - Assess for changes in respiratory status  - Assess for changes in mentation and behavior  - Position to facilitate oxygenation and minimize respiratory effort  - Oxygen administered by appropriate delivery if ordered  - Initiate smoking cessation education as indicated  - Encourage broncho-pulmonary hygiene including cough, deep breathe, Incentive Spirometry  - Assess the need for suctioning and aspirate as needed  - Assess and instruct to report SOB or any respiratory difficulty  - Respiratory Therapy support as indicated  Outcome: Progressing     Problem: MUSCULOSKELETAL - ADULT  Goal: Maintain or return mobility to safest level of function  Description: INTERVENTIONS:  - Assess patient's ability to carry out ADLs; assess patient's baseline for ADL function and identify physical deficits which impact ability to perform ADLs (bathing, care of mouth/teeth, toileting, grooming, dressing, etc )  - Assess/evaluate cause of self-care deficits   - Assess range of motion  - Assess patient's mobility  - Assess patient's need for assistive devices and provide as appropriate  - Encourage maximum independence but intervene and supervise when necessary  - Involve family in performance of ADLs  - Assess for home care needs following discharge   - Consider OT consult to assist with ADL evaluation and planning for discharge  - Provide patient education as appropriate  Outcome: Progressing

## 2023-06-20 NOTE — NURSING NOTE
Patient discharged, stable at this time  IV removed, AVS reviewed  Patient prescriptions available to pickup from [facility]  Patient walked out by PCA  No further needs at this time

## 2023-06-20 NOTE — ASSESSMENT & PLAN NOTE
· Patient with what appears to be chronic noncardiogenic pulmonary edema versus sarcoidosis  · Pulmonary consultation obtained, CT images personally reviewed by the pulmonary service  She has improving CT scan  This is felt to be in the setting of likely vaping associated lung injury    · She will need outpatient follow-up, she remains stable on room air

## 2023-06-20 NOTE — DISCHARGE SUMMARY
[facility]  Discharge- Bob Barrera 1975, 57 y o  female MRN: [id number]  Unit/Bed#: E4 -01 Encounter: [id number]  Primary Care Provider: No primary care provider on file  Date and time admitted to hospital: 6/19/2023  2:09 PM    Admitting Provider:  Triny Antony DO  Discharge Provider:  Darryle Bread, DO  Admission Date: 6/19/2023       Discharge Date: 06/20/23   LOS: 0  Primary Care Physician at Discharge: No primary care provider on file  None    HOSPITAL COURSE:  Bob Barrera is a 57 y o  female who presented dizziness, as well as shortness of breath  The patient reported she was cutting grass with an electronic lawnmower  She reported it was very hot outside, she was cutting a pill and she felt significantly short of breath  She subsequently noticed that her clonazepam was missing  After mowing the lawn she went to the police station where she filed a police report  She reports that this was a few blocks  She subsequently felt lightheaded and dizzy, she subsequently presented to the emergency room for acute evaluation  She had a serial set of cardiac enzymes which were negative for any myocardial ischemia  A CT scan of the chest was performed which showed chronic noncardiogenic pulmonary edema  She has previously known to the pulmonary service, and is a evaluated her repeat imaging  She remained stable on room air  It was felt that her CT findings were likely chronic and related some sequela of vaping associated lung injury  Patient did provide a police report, her medications were verified with PDMP  She did ask for a Refill of her clonazepam and this was provided as a courtesy until she can obtain a new prescription from her PCP  At the time of discharge the patient was saturating well on room air she was tolerating oral diet and was medically cleared for discharge home    All questions were answered the patient's satisfaction and she was in agreement with the discharge plan    DISCHARGE DIAGNOSES  * Dizziness  Assessment & Plan  · Presents with dizziness and chest pain  · Likely anxiety related  · Troponin I within normal limits  · Patient reports she ran out of her clonazepam as it was stolen from her  She provided a police report  Provide Refill until patient can obtain medication from PCP    Pulmonary edema  Assessment & Plan  · Patient with what appears to be chronic noncardiogenic pulmonary edema versus sarcoidosis  · Pulmonary consultation obtained, CT images personally reviewed by the pulmonary service  She has improving CT scan  This is felt to be in the setting of likely vaping associated lung injury  · She will need outpatient follow-up, she remains stable on room air    Dyspepsia  Assessment & Plan  · Continue home PPI and H2 blocker    Tobacco abuse  Assessment & Plan  · Nicotine patch while inpatient  · Encouraged tobacco abuse cessation    B[facility] disorder, unspecified ([facility] )  Assessment & Plan  · Continue home antipsychotic medication regimen  · Patient appears slightly anxious      CONSULTING PROVIDERS   IP CONSULT TO CASE MANAGEMENT  IP CONSULT TO PULMONOLOGY    PROCEDURES PERFORMED  * No surgery found *    RADIOLOGY RESULTS  CTA chest pe study    Result Date: 6/19/2023    Impression: No pulmonary embolism  Diffuse asymmetric groundglass opacities throughout both lungs most prominent in the left lung could represent asymmetric pulmonary edema or pneumonia  Improved but persistent prominent adenopathy in the hilar regions and mediastinum which are likely reactive         LABS  Results from last 7 days   Lab Units 06/20/23  0601 06/19/23  1507   WBC Thousand/uL 6 81 14 91*   HEMOGLOBIN g/dL 11 6 11 2*   HEMATOCRIT % 37 6 34 9   MCV fL 91 90   PLATELETS Thousands/uL 223 211     Results from last 7 days   Lab Units 06/20/23  0601 06/19/23  1507   SODIUM mmol/L 139 137   POTASSIUM mmol/L 3 5 3 4*   CHLORIDE mmol/L 110* 111*   CO2 "mmol/L 24 20*   BUN mg/dL 11 12   CREATININE mg/dL 0 82 1 09   CALCIUM mg/dL 8 1* 7 9*   ALBUMIN g/dL  --  3 6   TOTAL BILIRUBIN mg/dL  --  0 35   ALK PHOS U/L  --  50   ALT U/L  --  11   AST U/L  --  19   EGFR ml/min/1 73sq m 86 61   GLUCOSE RANDOM mg/dL 95 114     Results from last 7 days   Lab Units 06/19/23  1732 06/19/23  1507   HS TNI 0HR ng/L  --  4   HS TNI 2HR ng/L 4  --           Results from last 7 days   Lab Units 06/19/23  1507   D-DIMER QUANTITATIVE ug/ml FEU 0 71*                           Cultures:         Invalid input(s): \"URIBILINOGEN\"              PHYSICAL EXAM:  Vitals:   Blood Pressure: 112/74 (06/20/23 1459)  Pulse: 82 (06/20/23 1459)  Temperature: 97 7 °F (36 5 °C) (06/20/23 1459)  Temp Source: Temporal (06/20/23 1459)  Respirations: 18 (06/20/23 1459)  Height: 5' 2\" (157 5 cm) (06/19/23 1900)  Weight - Scale: 75 9 kg (167 lb 5 3 oz) (06/19/23 1900)  SpO2: 98 % (06/20/23 1459)      General: well appearing, no acute distress  HEENT: atraumatic, PERRLA, moist mucosa, normal pharynx, normal tonsils and adenoids, normal tongue, no fluid in sinuses  Neck: Trachea midline, no carotid bruit, no masses  Respiratory: normal chest wall expansion, CTA B, no r/r/w, no rubs  Cardiovascular: RRR, no m/r/g, Normal S1 and S2  Abdomen: Soft, non-tender, non-distended, normal bowel sounds in all quadrants, no hepatosplenomegaly, no tympany  Rectal: deferred  Musculoskeletal: normal ROM in upper and lower extremities  Integumentary: warm, dry, and pink, with no rash, purpura, or petechia  Heme/Lymph: no lymphadenopathy, no bruises  Neurological: Cranial Nerves II-XII grossly intact, no tics, normal sensation to pressure and light touch  Psychiatric: cooperative with normal mood, affect, and cognition      Discharge Disposition: Home/Self Care      Test Results Pending at Discharge:           Medications   · Summary of Medication Adjustments made as a result of this hospitalization: See discharge " list  · Medication Dosing Tapers - Please refer to Discharge Medication List for details on any medication dosing tapers (if applicable to patient)  · Discharge Medication List: See after visit summary for reconciled discharge medications  Diet restrictions:         Diet Orders   (From admission, onward)             Start     Ordered    06/19/23 1845  Diet Regular; Regular House  Diet effective now        References:    Adult Nutrition Support Algorithm    RD Therapeutic Diet Order Protocol   Question Answer Comment   Diet Type Regular    Regular Regular House    RD to adjust diet per protocol? Yes        06/19/23 1844              Activity restrictions: No strenuous activity  Discharge Condition: fair    Outpatient Follow-Up and Discharge Instructions  See after visit summary section titled Discharge Instructions for information provided to patient and family  Code Status: Level 1 - Full Code  Discharge Statement   I spent 35 minutes discharging the patient  This time was spent on the day of discharge  Greater than 50% of total time was spent with the patient and / or family counseling and / or coordination of care  ** Please Note: This note was completed in part utilizing M-Modal Fluency Direct Software  Grammatical errors, random word insertions, spelling mistakes, and incomplete sentences may be an occasional consequence of this system secondary to software limitations, ambient noise, and hardware issues  If you have any questions or concerns about the content, text, or information contained within the body of this dictation, please contact the provider for clarification  **

## 2023-06-20 NOTE — PLAN OF CARE
Problem: PAIN - ADULT  Goal: Verbalizes/displays adequate comfort level or baseline comfort level  Description: Interventions:  - Encourage patient to monitor pain and request assistance  - Assess pain using appropriate pain scale  - Administer analgesics based on type and severity of pain and evaluate response  - Implement non-pharmacological measures as appropriate and evaluate response  - Consider cultural and social influences on pain and pain management  - Notify physician/advanced practitioner if interventions unsuccessful or patient reports new pain  6/20/2023 1702 by Dalila Meraz RN  Outcome: Completed  6/20/2023 1014 by Dalila Meraz RN  Outcome: Progressing     Problem: SAFETY ADULT  Goal: Patient will remain free of falls  Description: INTERVENTIONS:  - Educate patient/family on patient safety including physical limitations  - Instruct patient to call for assistance with activity   - Consult OT/PT to assist with strengthening/mobility   - Keep Call bell within reach  - Keep bed low and locked with side rails adjusted as appropriate  - Keep care items and personal belongings within reach  - Initiate and maintain comfort rounds  - Make Fall Risk Sign visible to staff  - Offer Toileting every 1 Hours, in advance of need  - Initiate/Maintain alarm  - Obtain necessary fall risk management equipment: alarms  - Apply yellow socks and bracelet for high fall risk patients  - Consider moving patient to room near nurses station  6/20/2023 1702 by Dalila Meraz RN  Outcome: Completed  6/20/2023 1014 by Dalila Meraz RN  Outcome: Progressing  Goal: Maintain or return to baseline ADL function  Description: INTERVENTIONS:  -  Assess patient's ability to carry out ADLs; assess patient's baseline for ADL function and identify physical deficits which impact ability to perform ADLs (bathing, care of mouth/teeth, toileting, grooming, dressing, etc )  - Assess/evaluate cause of self-care deficits   - Assess range of motion  - Assess patient's mobility; develop plan if impaired  - Assess patient's need for assistive devices and provide as appropriate  - Encourage maximum independence but intervene and supervise when necessary  - Involve family in performance of ADLs  - Assess for home care needs following discharge   - Consider OT consult to assist with ADL evaluation and planning for discharge  - Provide patient education as appropriate  6/20/2023 1702 by Yadi Swanson RN  Outcome: Completed  6/20/2023 1014 by Yadi Swanson RN  Outcome: Progressing     Problem: DISCHARGE PLANNING  Goal: Discharge to home or other facility with appropriate resources  Description: INTERVENTIONS:  - Identify barriers to discharge w/patient and caregiver  - Arrange for needed discharge resources and transportation as appropriate  - Identify discharge learning needs (meds, wound care, etc )  - Arrange for interpretive services to assist at discharge as needed  - Refer to Case Management Department for coordinating discharge planning if the patient needs post-hospital services based on physician/advanced practitioner order or complex needs related to functional status, cognitive ability, or social support system  6/20/2023 1702 by Yadi Swanson RN  Outcome: Completed  6/20/2023 1014 by Yadi Swanson RN  Outcome: Progressing     Problem: Knowledge Deficit  Goal: Patient/family/caregiver demonstrates understanding of disease process, treatment plan, medications, and discharge instructions  Description: Complete learning assessment and assess knowledge base    Interventions:  - Provide teaching at level of understanding  - Provide teaching via preferred learning methods  6/20/2023 1702 by Yadi Swanson RN  Outcome: Completed  6/20/2023 1014 by Yadi Swanson RN  Outcome: Progressing     Problem: NEUROSENSORY - ADULT  Goal: Achieves stable or improved neurological status  Description: INTERVENTIONS  - Monitor and report changes in neurological status  - Monitor vital signs such as temperature, blood pressure, glucose, and any other labs ordered   - Initiate measures to prevent increased intracranial pressure  - Monitor for seizure activity and implement precautions if appropriate      6/20/2023 1702 by Johnathon Mccray RN  Outcome: Completed  6/20/2023 1014 by Johnathon Mccray RN  Outcome: Progressing     Problem: CARDIOVASCULAR - ADULT  Goal: Maintains optimal cardiac output and hemodynamic stability  Description: INTERVENTIONS:  - Monitor I/O, vital signs and rhythm  - Monitor for S/S and trends of decreased cardiac output  - Administer and titrate ordered vasoactive medications to optimize hemodynamic stability  - Assess quality of pulses, skin color and temperature  - Assess for signs of decreased coronary artery perfusion  - Instruct patient to report change in severity of symptoms  6/20/2023 1702 by Johnathon Mccray RN  Outcome: Completed  6/20/2023 1014 by Johnathon Mccray RN  Outcome: Progressing     Problem: RESPIRATORY - ADULT  Goal: Achieves optimal ventilation and oxygenation  Description: INTERVENTIONS:  - Assess for changes in respiratory status  - Assess for changes in mentation and behavior  - Position to facilitate oxygenation and minimize respiratory effort  - Oxygen administered by appropriate delivery if ordered  - Initiate smoking cessation education as indicated  - Encourage broncho-pulmonary hygiene including cough, deep breathe, Incentive Spirometry  - Assess the need for suctioning and aspirate as needed  - Assess and instruct to report SOB or any respiratory difficulty  - Respiratory Therapy support as indicated  6/20/2023 1702 by Johnathon Mccray RN  Outcome: Completed  6/20/2023 1014 by oJhnathon Mccray RN  Outcome: Progressing     Problem: MUSCULOSKELETAL - ADULT  Goal: Maintain or return mobility to safest level of function  Description: INTERVENTIONS:  - Assess patient's ability to carry out ADLs; assess patient's baseline for ADL function and identify physical deficits which impact ability to perform ADLs (bathing, care of mouth/teeth, toileting, grooming, dressing, etc )  - Assess/evaluate cause of self-care deficits   - Assess range of motion  - Assess patient's mobility  - Assess patient's need for assistive devices and provide as appropriate  - Encourage maximum independence but intervene and supervise when necessary  - Involve family in performance of ADLs  - Assess for home care needs following discharge   - Consider OT consult to assist with ADL evaluation and planning for discharge  - Provide patient education as appropriate  6/20/2023 1702 by Kennedi Mcdonnell RN  Outcome: Completed  6/20/2023 1014 by Kennedi Mcdonnell RN  Outcome: Progressing

## 2023-06-20 NOTE — PLAN OF CARE
Problem: PAIN - ADULT  Goal: Verbalizes/displays adequate comfort level or baseline comfort level  Description: Interventions:  - Encourage patient to monitor pain and request assistance  - Assess pain using appropriate pain scale  - Administer analgesics based on type and severity of pain and evaluate response  - Implement non-pharmacological measures as appropriate and evaluate response  - Consider cultural and social influences on pain and pain management  - Notify physician/advanced practitioner if interventions unsuccessful or patient reports new pain  Outcome: Progressing     Problem: SAFETY ADULT  Goal: Patient will remain free of falls  Description: INTERVENTIONS:  - Educate patient/family on patient safety including physical limitations  - Instruct patient to call for assistance with activity   - Consult OT/PT to assist with strengthening/mobility   - Keep Call bell within reach  - Keep bed low and locked with side rails adjusted as appropriate  - Keep care items and personal belongings within reach  - Initiate and maintain comfort rounds  - Make Fall Risk Sign visible to staff  - Offer Toileting every 1 Hours, in advance of need  - Initiate/Maintain alarm  - Obtain necessary fall risk management equipment: alarms  - Apply yellow socks and bracelet for high fall risk patients  - Consider moving patient to room near nurses station  Outcome: Progressing  Goal: Maintain or return to baseline ADL function  Description: INTERVENTIONS:  -  Assess patient's ability to carry out ADLs; assess patient's baseline for ADL function and identify physical deficits which impact ability to perform ADLs (bathing, care of mouth/teeth, toileting, grooming, dressing, etc )  - Assess/evaluate cause of self-care deficits   - Assess range of motion  - Assess patient's mobility; develop plan if impaired  - Assess patient's need for assistive devices and provide as appropriate  - Encourage maximum independence but intervene and supervise when necessary  - Involve family in performance of ADLs  - Assess for home care needs following discharge   - Consider OT consult to assist with ADL evaluation and planning for discharge  - Provide patient education as appropriate  Outcome: Progressing     Problem: DISCHARGE PLANNING  Goal: Discharge to home or other facility with appropriate resources  Description: INTERVENTIONS:  - Identify barriers to discharge w/patient and caregiver  - Arrange for needed discharge resources and transportation as appropriate  - Identify discharge learning needs (meds, wound care, etc )  - Arrange for interpretive services to assist at discharge as needed  - Refer to Case Management Department for coordinating discharge planning if the patient needs post-hospital services based on physician/advanced practitioner order or complex needs related to functional status, cognitive ability, or social support system  Outcome: Progressing     Problem: Knowledge Deficit  Goal: Patient/family/caregiver demonstrates understanding of disease process, treatment plan, medications, and discharge instructions  Description: Complete learning assessment and assess knowledge base    Interventions:  - Provide teaching at level of understanding  - Provide teaching via preferred learning methods  Outcome: Progressing     Problem: NEUROSENSORY - ADULT  Goal: Achieves stable or improved neurological status  Description: INTERVENTIONS  - Monitor and report changes in neurological status  - Monitor vital signs such as temperature, blood pressure, glucose, and any other labs ordered   - Initiate measures to prevent increased intracranial pressure  - Monitor for seizure activity and implement precautions if appropriate      Outcome: Progressing     Problem: CARDIOVASCULAR - ADULT  Goal: Maintains optimal cardiac output and hemodynamic stability  Description: INTERVENTIONS:  - Monitor I/O, vital signs and rhythm  - Monitor for S/S and trends of decreased cardiac output  - Administer and titrate ordered vasoactive medications to optimize hemodynamic stability  - Assess quality of pulses, skin color and temperature  - Assess for signs of decreased coronary artery perfusion  - Instruct patient to report change in severity of symptoms  Outcome: Progressing     Problem: RESPIRATORY - ADULT  Goal: Achieves optimal ventilation and oxygenation  Description: INTERVENTIONS:  - Assess for changes in respiratory status  - Assess for changes in mentation and behavior  - Position to facilitate oxygenation and minimize respiratory effort  - Oxygen administered by appropriate delivery if ordered  - Initiate smoking cessation education as indicated  - Encourage broncho-pulmonary hygiene including cough, deep breathe, Incentive Spirometry  - Assess the need for suctioning and aspirate as needed  - Assess and instruct to report SOB or any respiratory difficulty  - Respiratory Therapy support as indicated  Outcome: Progressing     Problem: MUSCULOSKELETAL - ADULT  Goal: Maintain or return mobility to safest level of function  Description: INTERVENTIONS:  - Assess patient's ability to carry out ADLs; assess patient's baseline for ADL function and identify physical deficits which impact ability to perform ADLs (bathing, care of mouth/teeth, toileting, grooming, dressing, etc )  - Assess/evaluate cause of self-care deficits   - Assess range of motion  - Assess patient's mobility  - Assess patient's need for assistive devices and provide as appropriate  - Encourage maximum independence but intervene and supervise when necessary  - Involve family in performance of ADLs  - Assess for home care needs following discharge   - Consider OT consult to assist with ADL evaluation and planning for discharge  - Provide patient education as appropriate  Outcome: Progressing

## 2023-06-20 NOTE — CASE MANAGEMENT
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[address] ) 10/07/2022   • Anxiety disorder, unspecified 10/06/2022   • Opioid use disorder, severe, on maintenance therapy ([address] ) 10/06/2022   • Hepatitis C antibody test positive 2022   • Bradycardia 2022   • Acute respiratory failure with hypoxia ([address] ) 2022   • Substance Abuse Disorder  2022   • Anemia 2022   • Muscle spasm of shoulder region 2022   • Dyspepsia 2022   • Bipolar disorder, unspecified ([address] ) 2021   • Tobacco abuse 2021   • Encounter for monitoring opioid maintenance therapy 2021   • Rhabdomyolysis 2021      LOS (days): 0  Geometric Mean LOS (GMLOS) (days):   Days to GMLOS:     OBJECTIVE:              Current admission status: Observation       Preferred Pharmacy:   [id number] - [address] [facility]  [address] [place] [address]  Phone: [phone] Fax: [phone]    [address] [facility] [address]  Phone: [phone] Fax: [phone]    [address] [place] [address] [address] ,  [address] [place] [address]  Phone: [phone] Fax: [phone]    Primary Care Provider: No primary care provider on file      Primary Insurance: Sebastian Apgar MA MCO  Secondary Insurance:     ASSESSMENT:  Active Health Care Proxies     Michelle Martinez Health Care Representative - Mother   Primary Phone: [phone] (Mobile)               Readmission Root Cause  30 Day Readmission: No    Patient Information  Admitted from[de-identified] Home  Mental Status: Alert  During Assessment patient was accompanied by: Not accompanied during assessment  Assessment information provided by[de-identified] Patient  Primary Caregiver: Self  Support Systems: /, Friend, Organized support group (Comment)  [place] of Residence: [address] do you live in?: [place] Gerald entry access options   Select all that apply : Stairs  Number of steps to enter home : 4  Do the steps have railings?: Yes  Type of Current Residence: [address] home  Floor Level: 2  Upon entering residence, is there a bedroom on the main floor (no further steps)?: No  A bedroom is located on the following floor levels of residence (select all that apply):: 2nd Floor  Upon entering residence, is there a bathroom on the main floor (no further steps)?: No  Indicate which floors of current residence have a bathroom (select all the apply):: Basement  Number of steps to basement from main floor: One Flight  Number of steps to 2nd floor from main floor: One Flight  In the last 12 months, was there a time when you were not able to pay the mortgage or rent on time?: No  In the last 12 months, how many places have you lived?: 1  In the last 12 months, was there a time when you did not have a steady place to sleep or slept in a shelter (including now)?: No  Homeless/housing insecurity resource given?: N/A  Living Arrangements: Lives w/ Friend  Is patient a ?: No    Activities of Daily Living Prior to Admission  Functional Status: Independent  Completes ADLs independently?: Yes  Ambulates independently?: Yes  Does patient use assisted devices?: No  Does patient currently own DME?: No  Does patient have a history of Outpatient Therapy (PT/OT)?: No  Does the patient have a history of Short-Term Rehab?: No  Does patient have a history of HHC?: No  Does patient currently have [address]?: No    Patient Information Continued  Income Source: Government aid  Does patient have prescription coverage?: Yes  Within the past 12 months, you worried that your food would run out before you got the money to buy more : Never true  Within the past 12 months, the food you bought just didn't last and you didn't have money to get more : Never true  Food insecurity resource given?: N/A  Does patient receive dialysis treatments?: No  Does patient have a history of substance abuse?: Yes  Historical substance use preference: Oxycodone  History of Withdrawal Symptoms: Denies past symptoms  Is patient currently in treatment for substance abuse?: Yes ([address])  Does patient have a history of Mental Health Diagnosis?: Yes (Bipolar Disorder)  Is patient receiving treatment for mental health?: Yes    Means of Transportation  Means of Transport to Appts[de-identified] Public Transportation - Bus  In the past 12 months, has lack of transportation kept you from medical appointments or from getting medications?: No  In the past 12 months, has lack of transportation kept you from meetings, work, or from getting things needed for daily living?: No  Was application for public transport provided?: N/A        DISCHARGE DETAILS:    Discharge planning discussed with[de-identified] Patient  Freedom of Choice: Yes  Comments - Freedom of Choice: Pt states she will discharge home, resume out-patient treatment & counseling services and follow up w/ her op provider      Were Treatment Team discharge recommendations reviewed with patient/caregiver?: Yes  Did patient/caregiver verbalize understanding of patient care needs?: Yes  Were patient/caregiver advised of the risks associated with not following Treatment Team discharge recommendations?: Yes    Contacts  Patient Contacts: Cecily Villegas (Mother) [phone] (M)  Relationship to Patient[de-identified] Family  Contact Method: Phone  Phone Number: Cecily Villegas "(Mother) [phone] (M)  Reason/Outcome: Emergency [address]         Is the patient interested in [place] [address] at discharge?: No  DME Referral Provided  Referral made for DME?: No    Discharge Destination Plan[de-identified] Home  Transport at Discharge :  (LYFT)    Additional Comments: CM consulted for \"post acute needs  Pt will discharge home wnd follow up with her op provider  Pt also scheduled to resume Tx w/ [address] and [address] counselor  CM to remain available for additional needs  CM consult closed          "

## 2023-06-20 NOTE — ASSESSMENT & PLAN NOTE
· Presents with dizziness and chest pain  · Likely anxiety related  · Troponin I within normal limits  · Patient reports she ran out of her clonazepam as it was stolen from her  She provided a police report    Provide Refill until patient can obtain medication from PCP

## 2023-06-21 ENCOUNTER — TELEPHONE (OUTPATIENT)
Dept: PULMONOLOGY | Facility: CLINIC | Age: 48
End: 2023-06-21

## 2023-06-21 NOTE — UTILIZATION REVIEW
NOTIFICATION OF OBSERVATION ADMISSION   AUTHORIZATION REQUEST   SERVICING FACILITY:   [address] [place] [address]  [address], [address]  Tax ID: [id number]  NPI: [id number]   ATTENDING PROVIDER:  Attending Name and NPI#: Maria Fernanda Holman [[id number]]  Address: [address] [place] [address], [address]   Phone: [phone]     ADMISSION INFORMATION:  Place of Service: On [address] Code: 22 CPT Code:   Admitting Diagnosis Code/Description:  Dizziness [R42]  Chest pain [R07 9]  Pulmonary edema [J81 1]  Abnormal CT of the chest [R93 89]  Observation Admission Date/Time: [email or web address]  Discharge Date/Time: 6/20/2023  5:29 PM     UTILIZATION REVIEW CONTACT:  Rey Quiroga Utilization   Network Utilization Review Department  Phone: [phone]  Fax: [phone]  Email: [email or web address]  Contact for approvals/pending authorizations, clinical reviews, and discharge  PHYSICIAN ADVISORY SERVICES:  Medical Necessity Denial & [facility] Review  Phone: [phone]  Fax: [phone]  Email: [email or web address]          Carter Crenshaw DO  Physician  Hospitalist  Discharge Summary      Signed  Date of Service:  6/20/2023  3:56 PM     Signed          [address] [place] [address]  Discharge- Ramon Thomson 1975, 57 y o  female MRN: [id number]  Unit/Bed#: E4 -01 Encounter: [id number]  Primary Care Provider: No primary care provider on file  Date and time admitted to hospital: 6/19/2023  2:09 PM     Admitting Provider:  Chetna Warren DO  Discharge Provider:  Sybil Garrido DO  Admission Date: 6/19/2023       Discharge Date: 06/20/23   LOS: 0  Primary Care Physician at Discharge: No primary care provider on file  None     HOSPITAL COURSE:  Ramon Thomson is a 57 y o  female who presented dizziness, as well as shortness of breath    The patient reported she was cutting grass with an electronic lawnmower  She reported it was very hot outside, she was cutting a pill and she felt significantly short of breath  She subsequently noticed that her clonazepam was missing  After mowing the lawn she went to the police station where she filed a police report  She reports that this was a few blocks  She subsequently felt lightheaded and dizzy, she subsequently presented to the emergency room for acute evaluation  She had a serial set of cardiac enzymes which were negative for any myocardial ischemia  A CT scan of the chest was performed which showed chronic noncardiogenic pulmonary edema  She has previously known to the pulmonary service, and is a evaluated her repeat imaging  She remained stable on room air  It was felt that her CT findings were likely chronic and related some sequela of vaping associated lung injury      Patient did provide a police report, her medications were verified with PDMP  She did ask for a Refill of her clonazepam and this was provided as a courtesy until she can obtain a new prescription from her PCP      At the time of discharge the patient was saturating well on room air she was tolerating oral diet and was medically cleared for discharge home  All questions were answered the patient's satisfaction and she was in agreement with the discharge plan     DISCHARGE DIAGNOSES  * Dizziness  Assessment & Plan  • Presents with dizziness and chest pain  • Likely anxiety related  • Troponin I within normal limits  • Patient reports she ran out of her clonazepam as it was stolen from her  She provided a police report  Provide Refill until patient can obtain medication from PCP     Pulmonary edema  Assessment & Plan  • Patient with what appears to be chronic noncardiogenic pulmonary edema versus sarcoidosis  • Pulmonary consultation obtained, CT images personally reviewed by the pulmonary service  She has improving CT scan    This is felt to be in the "setting of likely vaping associated lung injury  • She will need outpatient follow-up, she remains stable on room air     Dyspepsia  Assessment & Plan  • Continue home PPI and H2 blocker     Tobacco abuse  Assessment & Plan  • Nicotine patch while inpatient  • Encouraged tobacco abuse cessation     Bipolar disorder, unspecified ([facility] [facility] )  Assessment & Plan  • Continue home antipsychotic medication regimen  • Patient appears slightly anxious        CONSULTING PROVIDERS   IP CONSULT TO CASE MANAGEMENT  IP CONSULT TO PULMONOLOGY     PROCEDURES PERFORMED  * No surgery found *     RADIOLOGY RESULTS  CTA chest pe study     Result Date: 6/19/2023     Impression: No pulmonary embolism  Diffuse asymmetric groundglass opacities throughout both lungs most prominent in the left lung could represent asymmetric pulmonary edema or pneumonia   Improved but persistent prominent adenopathy in the hilar regions and mediastinum which are likely reactive          LABS        Results from last 7 days   Lab Units 06/20/23  0601 06/19/23  1507   WBC Thousand/uL 6 81 14 91*   HEMOGLOBIN g/dL 11 6 11 2*   HEMATOCRIT % 37 6 34 9   MCV fL 91 90   PLATELETS Thousands/uL 223 211            Results from last 7 days   Lab Units 06/20/23  0601 06/19/23  1507   SODIUM mmol/L 139 137   POTASSIUM mmol/L 3 5 3 4*   CHLORIDE mmol/L 110* 111*   CO2 mmol/L 24 20*   BUN mg/dL 11 12   CREATININE mg/dL 0 82 1 09   CALCIUM mg/dL 8 1* 7 9*   ALBUMIN g/dL  --  3 6   TOTAL BILIRUBIN mg/dL  --  0 35   ALK PHOS U/L  --  50   ALT U/L  --  11   AST U/L  --  19   EGFR ml/min/1 73sq m 86 61   GLUCOSE RANDOM mg/dL 95 114            Results from last 7 days   Lab Units 06/19/23  1732 06/19/23  1507   HS TNI 0HR ng/L  --  4   HS TNI 2HR ng/L 4  --                 Results from last 7 days   Lab Units 06/19/23  1507   D-DIMER QUANTITATIVE ug/ml FEU 0 71*                             Cultures:          Invalid input(s): \"URIBILINOGEN\"               PHYSICAL EXAM:  Vitals: " "  Blood Pressure: 112/74 (06/20/23 1459)  Pulse: 82 (06/20/23 1459)  Temperature: 97 7 °F (36 5 °C) (06/20/23 1459)  Temp Source: Temporal (06/20/23 1459)  Respirations: 18 (06/20/23 1459)  Height: 5' 2\" (157 5 cm) (06/19/23 1900)  Weight - Scale: 75 9 kg (167 lb 5 3 oz) (06/19/23 1900)  SpO2: 98 % (06/20/23 1459)        General: well appearing, no acute distress  HEENT: atraumatic, PERRLA, moist mucosa, normal pharynx, normal tonsils and adenoids, normal tongue, no fluid in sinuses  Neck: Trachea midline, no carotid bruit, no masses  Respiratory: normal chest wall expansion, CTA B, no r/r/w, no rubs  Cardiovascular: RRR, no m/r/g, Normal S1 and S2  Abdomen: Soft, non-tender, non-distended, normal bowel sounds in all quadrants, no hepatosplenomegaly, no tympany  Rectal: deferred  Musculoskeletal: normal ROM in upper and lower extremities  Integumentary: warm, dry, and pink, with no rash, purpura, or petechia  Heme/Lymph: no lymphadenopathy, no bruises  Neurological: Cranial Nerves II-XII grossly intact, no tics, normal sensation to pressure and light touch  Psychiatric: cooperative with normal mood, affect, and cognition        Discharge Disposition: Home/Self Care        Test Results Pending at Discharge:            Medications   • Summary of Medication Adjustments made as a result of this hospitalization: See discharge list  • Medication Dosing Tapers - Please refer to Discharge Medication List for details on any medication dosing tapers (if applicable to patient)    • Discharge Medication List: See after visit summary for reconciled discharge medications       Diet restrictions:               Diet Orders   (From admission, onward)                     Start     Ordered     06/19/23 1845   Diet Regular; Regular House  Diet effective now        References:    Adult Nutrition Support Algorithm    RD Therapeutic Diet Order Protocol   Question Answer Comment   Diet Type Regular     Regular Regular House     RD to adjust " diet per protocol? Yes         06/19/23 1844                 Activity restrictions: No strenuous activity  Discharge Condition: fair     Outpatient Follow-Up and Discharge Instructions  See after visit summary section titled Discharge Instructions for information provided to patient and family        Code Status: Level 1 - Full Code  Discharge Statement   I spent 35 minutes discharging the patient  This time was spent on the day of discharge  Greater than 50% of total time was spent with the patient and / or family counseling and / or coordination of care      ** Please Note: This note was completed in part utilizing M-Modal Fluency Direct Software   Grammatical errors, random word insertions, spelling mistakes, and incomplete sentences may be an occasional consequence of this system secondary to software limitations, ambient noise, and hardware issues   If you have any questions or concerns about the content, text, or information contained within the body of this dictation, please contact the provider for clarification  **

## 2023-07-18 ENCOUNTER — HOSPITAL ENCOUNTER (EMERGENCY)
Facility: HOSPITAL | Age: 48
Discharge: HOME | End: 2023-07-18
Attending: EMERGENCY MEDICINE
Payer: COMMERCIAL

## 2023-07-18 ENCOUNTER — APPOINTMENT (EMERGENCY)
Dept: RADIOLOGY | Facility: HOSPITAL | Age: 48
End: 2023-07-18
Payer: COMMERCIAL

## 2023-07-18 VITALS
TEMPERATURE: 98.2 F | RESPIRATION RATE: 18 BRPM | DIASTOLIC BLOOD PRESSURE: 68 MMHG | HEART RATE: 70 BPM | OXYGEN SATURATION: 97 % | SYSTOLIC BLOOD PRESSURE: 107 MMHG

## 2023-07-18 DIAGNOSIS — F41.0 ANXIETY ATTACK: Primary | ICD-10-CM

## 2023-07-18 DIAGNOSIS — Z76.0 MEDICATION REFILL: ICD-10-CM

## 2023-07-18 LAB
AMPHETAMINES SERPL QL SCN: NEGATIVE
ANION GAP SERPL CALCULATED.3IONS-SCNC: 5 MMOL/L
APAP SERPL-MCNC: <10 UG/ML (ref 10–20)
BACTERIA UR QL AUTO: ABNORMAL /HPF
BARBITURATES UR QL: NEGATIVE
BASOPHILS # BLD AUTO: 0.04 THOUSANDS/ÂΜL (ref 0–0.1)
BASOPHILS NFR BLD AUTO: 1 % (ref 0–1)
BENZODIAZ UR QL: NEGATIVE
BILIRUB UR QL STRIP: NEGATIVE
BUDDING YEAST: PRESENT
BUN SERPL-MCNC: 14 MG/DL (ref 5–25)
CALCIUM SERPL-MCNC: 8.1 MG/DL (ref 8.4–10.2)
CARDIAC TROPONIN I PNL SERPL HS: <2 NG/L
CHLORIDE SERPL-SCNC: 110 MMOL/L (ref 96–108)
CLARITY UR: CLEAR
CO2 SERPL-SCNC: 21 MMOL/L (ref 21–32)
COCAINE UR QL: NEGATIVE
COLOR UR: YELLOW
CREAT SERPL-MCNC: 0.88 MG/DL (ref 0.6–1.3)
EOSINOPHIL # BLD AUTO: 0 THOUSAND/ÂΜL (ref 0–0.61)
EOSINOPHIL NFR BLD AUTO: 0 % (ref 0–6)
ERYTHROCYTE [DISTWIDTH] IN BLOOD BY AUTOMATED COUNT: 12.8 % (ref 11.6–15.1)
ETHANOL SERPL-MCNC: <10 MG/DL
EXT PREGNANCY TEST URINE: NEGATIVE
EXT. CONTROL: NORMAL
GFR SERPL CREATININE-BSD FRML MDRD: 79 ML/MIN/1.73SQ M
GLUCOSE SERPL-MCNC: 77 MG/DL (ref 65–140)
GLUCOSE UR STRIP-MCNC: NEGATIVE MG/DL
HCT VFR BLD AUTO: 35.3 % (ref 34.8–46.1)
HGB BLD-MCNC: 11.1 G/DL (ref 11.5–15.4)
HGB UR QL STRIP.AUTO: ABNORMAL
IMM GRANULOCYTES # BLD AUTO: 0.04 THOUSAND/UL (ref 0–0.2)
IMM GRANULOCYTES NFR BLD AUTO: 1 % (ref 0–2)
KETONES UR STRIP-MCNC: NEGATIVE MG/DL
LEUKOCYTE ESTERASE UR QL STRIP: ABNORMAL
LYMPHOCYTES # BLD AUTO: 1.79 THOUSANDS/ÂΜL (ref 0.6–4.47)
LYMPHOCYTES NFR BLD AUTO: 21 % (ref 14–44)
MAGNESIUM SERPL-MCNC: 2.2 MG/DL (ref 1.9–2.7)
MCH RBC QN AUTO: 28.2 PG (ref 26.8–34.3)
MCHC RBC AUTO-ENTMCNC: 31.4 G/DL (ref 31.4–37.4)
MCV RBC AUTO: 90 FL (ref 82–98)
METHADONE UR QL: NEGATIVE
MONOCYTES # BLD AUTO: 0.68 THOUSAND/ÂΜL (ref 0.17–1.22)
MONOCYTES NFR BLD AUTO: 8 % (ref 4–12)
MUCOUS THREADS UR QL AUTO: ABNORMAL
NEUTROPHILS # BLD AUTO: 6.18 THOUSANDS/ÂΜL (ref 1.85–7.62)
NEUTS SEG NFR BLD AUTO: 69 % (ref 43–75)
NITRITE UR QL STRIP: NEGATIVE
NON-SQ EPI CELLS URNS QL MICRO: ABNORMAL /HPF
NRBC BLD AUTO-RTO: 0 /100 WBCS
OPIATES UR QL SCN: NEGATIVE
OXYCODONE+OXYMORPHONE UR QL SCN: NEGATIVE
PCP UR QL: NEGATIVE
PH UR STRIP.AUTO: 6.5 [PH] (ref 4.5–8)
PLATELET # BLD AUTO: 202 THOUSANDS/UL (ref 149–390)
PMV BLD AUTO: 8.1 FL (ref 8.9–12.7)
POTASSIUM SERPL-SCNC: 3.9 MMOL/L (ref 3.5–5.3)
PROT UR STRIP-MCNC: ABNORMAL MG/DL
RBC # BLD AUTO: 3.93 MILLION/UL (ref 3.81–5.12)
RBC #/AREA URNS AUTO: ABNORMAL /HPF
SALICYLATES SERPL-MCNC: <5 MG/DL (ref 3–20)
SODIUM SERPL-SCNC: 136 MMOL/L (ref 135–147)
SP GR UR STRIP.AUTO: 1.02 (ref 1–1.03)
THC UR QL: NEGATIVE
UROBILINOGEN UR QL STRIP.AUTO: 0.2 E.U./DL
WBC # BLD AUTO: 8.73 THOUSAND/UL (ref 4.31–10.16)
WBC #/AREA URNS AUTO: ABNORMAL /HPF

## 2023-07-18 PROCEDURE — 36415 COLL VENOUS BLD VENIPUNCTURE: CPT | Performed by: EMERGENCY MEDICINE

## 2023-07-18 PROCEDURE — 84484 ASSAY OF TROPONIN QUANT: CPT | Performed by: EMERGENCY MEDICINE

## 2023-07-18 PROCEDURE — 80179 DRUG ASSAY SALICYLATE: CPT | Performed by: EMERGENCY MEDICINE

## 2023-07-18 PROCEDURE — 80307 DRUG TEST PRSMV CHEM ANLYZR: CPT | Performed by: EMERGENCY MEDICINE

## 2023-07-18 PROCEDURE — 82077 ASSAY SPEC XCP UR&BREATH IA: CPT | Performed by: EMERGENCY MEDICINE

## 2023-07-18 PROCEDURE — 80048 BASIC METABOLIC PNL TOTAL CA: CPT | Performed by: EMERGENCY MEDICINE

## 2023-07-18 PROCEDURE — 81001 URINALYSIS AUTO W/SCOPE: CPT

## 2023-07-18 PROCEDURE — 71045 X-RAY EXAM CHEST 1 VIEW: CPT

## 2023-07-18 PROCEDURE — 93005 ELECTROCARDIOGRAM TRACING: CPT

## 2023-07-18 PROCEDURE — 81025 URINE PREGNANCY TEST: CPT | Performed by: EMERGENCY MEDICINE

## 2023-07-18 PROCEDURE — 85025 COMPLETE CBC W/AUTO DIFF WBC: CPT | Performed by: EMERGENCY MEDICINE

## 2023-07-18 PROCEDURE — 83735 ASSAY OF MAGNESIUM: CPT | Performed by: EMERGENCY MEDICINE

## 2023-07-18 PROCEDURE — 80143 DRUG ASSAY ACETAMINOPHEN: CPT | Performed by: EMERGENCY MEDICINE

## 2023-07-18 RX ORDER — CLONAZEPAM 1 MG/1
1 TABLET ORAL ONCE
Status: COMPLETED | OUTPATIENT
Start: 2023-07-18 | End: 2023-07-18

## 2023-07-18 RX ADMIN — SODIUM CHLORIDE, SODIUM LACTATE, POTASSIUM CHLORIDE, AND CALCIUM CHLORIDE 1000 ML: .6; .31; .03; .02 INJECTION, SOLUTION INTRAVENOUS at 17:28

## 2023-07-18 RX ADMIN — CLONAZEPAM 1 MG: 1 TABLET ORAL at 17:28

## 2023-07-18 NOTE — DISCHARGE INSTRUCTIONS
SINCE JUNE 26 YOU HAVE RECEIVED 81 TABLETS OF KLONOPIN. YOU DID NOT RECEIVE ANY FOR HOME. YOU SHOULD HAVE APPROXIMATELY 40-60 PILLS LEFT UNTIL YOU SEE YOUR PROVIDER ON 8/11

## 2023-07-19 LAB
ATRIAL RATE: 69 BPM
P AXIS: 28 DEGREES
PR INTERVAL: 172 MS
QRS AXIS: 70 DEGREES
QRSD INTERVAL: 84 MS
QT INTERVAL: 400 MS
QTC INTERVAL: 428 MS
T WAVE AXIS: 64 DEGREES
VENTRICULAR RATE: 69 BPM

## 2023-07-19 PROCEDURE — 93010 ELECTROCARDIOGRAM REPORT: CPT | Performed by: INTERNAL MEDICINE

## 2023-08-08 NOTE — ED PROVIDER NOTES
History  Chief Complaint   Patient presents with   • Anxiety     Pt reports feeling anxious and c/o various stress factors at home. Pt state she also ran out of her anxiety medications two days ago. Pt rambling in triage. C/o sob as well. Patient is a 53-year-old female with a history of bipolar disorder, opioid abuse, anxiety, hepatitis C, seizure coming in stating that she is very anxious. She tells me that she has no SI or HI. She has no visual hallucinations or auditory hallucinations. She reports that she has a lot of stressful events going on. She last saw her psychiatrist and therapist from [facility] well about 2 weeks ago. She states that she is not due to see them again until August 11 due to vacations from her doctors. She reports that she has been out of her of her Klonopin for the past two  Days "I'm not sure what I'm going to do about it. Feels were ago no one is helping me. I need the Klonopin or Ambien have a seizure"  she feels very anxious and short of breath. She has no chest pain, palpitations or lower extremity edema. She has no recent travel, sick contacts or recent antibiotic use. Reviewed chart and patient was seen at [facility] yesterday.  reveals that patient did fill her 3 tablets of Klonopin yesterday. She also received 28 tablets on July 11. She then received 50 tablets of clonazepam on June 26. History provided by:  Patient and medical records   used: No    Psychiatric Evaluation  Degree of incapacity (severity):   Moderate  Onset quality:  Gradual  Timing:  Constant  Progression:  Worsening  Chronicity:  Recurrent  Context: noncompliance and stressful life event    Treatment compliance:  Most of the time  Relieved by:  None tried  Worsened by:  Nothing  Ineffective treatments:  None tried  Associated symptoms: anxiety    Associated symptoms: no abdominal pain and no chest pain    Risk factors: hx of mental illness Prior to Admission Medications   Prescriptions Last Dose Informant Patient Reported? Taking? ARIPiprazole (ABILIFY) 15 mg tablet   No No   Sig: Take 1 tablet (15 mg total) by mouth daily for 14 days   FLUoxetine (PROzac) 40 MG capsule   No No   Sig: Take 2 capsules (80 mg total) by mouth daily Do not start before 2023. buPROPion (WELLBUTRIN XL) 300 mg 24 hr tablet   Yes No   Sig: Take 300 mg by mouth daily   clonazePAM (KlonoPIN) 1 mg tablet   No No   Sig: Take 1 tablet (1 mg total) by mouth 4 (four) times a day for 7 days   famotidine (PEPCID) 20 mg tablet   No No   Sig: Take 1 tablet (20 mg total) by mouth 2 (two) times a day   ferrous sulfate 325 (65 Fe) mg tablet   No No   Sig: Take 1 tablet (325 mg total) by mouth daily with breakfast   furosemide (LASIX) 40 mg tablet   No No   Sig: Take 1 tablet (40 mg total) by mouth 2 (two) times a day   gabapentin (NEURONTIN) 300 mg capsule   No No   Sig: Take 2 capsules (600 mg total) by mouth 3 (three) times a day   hydrOXYzine HCL (ATARAX) 25 mg tablet   No No   Sig: Take 1 tablet (25 mg total) by mouth every 6 (six) hours   naloxone (NARCAN) 4 mg/0.1 mL nasal spray   Yes No   Si mg into each nostril   nicotine (NICODERM CQ) 14 mg/24hr TD 24 hr patch   No No   Sig: Place 1 patch on the skin every 24 hours   pantoprazole (PROTONIX) 40 mg tablet   No No   Sig: Take 1 tablet (40 mg total) by mouth daily in the early morning   topiramate (TOPAMAX) 100 mg tablet   No No   Sig: Take 1 tablet (100 mg total) by mouth 2 (two) times a day . Take in combination with 25mg for total daily dose of 125mg twice daily.    traZODone (DESYREL) 50 mg tablet   No No   Sig: Take 1 tablet (50 mg total) by mouth daily at bedtime   venlafaxine (EFFEXOR-XR) 37.5 mg 24 hr capsule   No No   Sig: Take 1 capsule (37.5 mg total) by mouth daily      Facility-Administered Medications: None       Past Medical History:   Diagnosis Date   • Addiction to drug [facility])    • Alcohol abuse • Alcoholism [facility])    • Altered mental status 09/21/2022   • Elevated LFTs 09/21/2022   • Lower back pain    • Self-injurious behavior    • Substance abuse [facility])        Past Surgical History:   Procedure Laterality Date   • CHOLECYSTECTOMY         Family History   Problem Relation Age of Onset   • Heart disease Father    • Heart disease Maternal Grandmother      I have reviewed and agree with the history as documented. E-Cigarette/Vaping   • E-Cigarette Use Current Every Day User    • Cartridges/Day 2      E-Cigarette/Vaping Substances   • Nicotine No    • THC No    • CBD No    • Flavoring No    • Other No    • Unknown No      Social History     Tobacco Use   • Smoking status: Every Day     Packs/day: 0.50     Years: 20.00     Total pack years: 10.00     Types: Cigarettes   • Smokeless tobacco: Current   Vaping Use   • Vaping Use: Every day   Substance Use Topics   • Alcohol use: Not Currently     Comment: MAGDALENO, pt historically inconsistent. Drinking  per [facility] 2   • Drug use: Not Currently     Types: Heroin, "Crack" cocaine, Cocaine, LSD, Benzodiazepines, Marijuana     Comment: Pt unreliable historian       Review of Systems   Constitutional: Negative. Negative for chills and fever. HENT: Negative. Negative for ear pain and sore throat. Eyes: Negative for pain and visual disturbance. Respiratory: Negative. Negative for cough and shortness of breath. Cardiovascular: Negative. Negative for chest pain and palpitations. Gastrointestinal: Negative. Negative for abdominal pain and vomiting. Genitourinary: Negative. Negative for dysuria and hematuria. Musculoskeletal: Negative. Negative for arthralgias and back pain. Skin: Negative. Negative for color change and rash. Neurological: Negative. Negative for seizures and syncope. Psychiatric/Behavioral: The patient is nervous/anxious. All other systems reviewed and are negative.       Physical Exam  Physical Exam  Vitals and nursing note reviewed. Constitutional:       General: She is not in acute distress. Appearance: She is well-developed. Comments: Appears older then stated age   HENT:      Head: Normocephalic and atraumatic. Comments: Patient maintaining airway and secretions. No stridor . No brawniness under tongue. Nose: Nose normal.   Eyes:      Extraocular Movements: Extraocular movements intact. Conjunctiva/sclera: Conjunctivae normal.      Pupils: Pupils are equal, round, and reactive to light. Cardiovascular:      Rate and Rhythm: Normal rate and regular rhythm. Pulses:           Dorsalis pedis pulses are 2+ on the right side and 2+ on the left side. Heart sounds: Normal heart sounds, S1 normal and S2 normal. No murmur heard. Pulmonary:      Effort: Pulmonary effort is normal. No respiratory distress. Breath sounds: Normal breath sounds. Abdominal:      Palpations: Abdomen is soft. Tenderness: There is no abdominal tenderness. Musculoskeletal:         General: No swelling. Cervical back: Neck supple. Right lower leg: No edema. Left lower leg: No edema. Skin:     General: Skin is warm and dry. Capillary Refill: Capillary refill takes less than 2 seconds. Neurological:      General: No focal deficit present. Mental Status: She is alert and oriented to person, place, and time. GCS: GCS eye subscore is 4. GCS verbal subscore is 5. GCS motor subscore is 6. Cranial Nerves: Cranial nerves 2-12 are intact. Sensory: Sensation is intact. Motor: Motor function is intact. Coordination: Coordination is intact. Gait: Gait is intact. Comments: Cranial nerves 2-12 grossly intact. EOMI. PERRLA. No slurred speech. No facial asymmetry. No tongue deviation.       Patient can move bilateral upper extremities and lower extremities independently of each other pain-free with full active range of motion throughout the bilateral shoulders, elbows, wrists, hips, knees and ankles. No ataxia     Psychiatric:         Attention and Perception: Attention and perception normal.         Mood and Affect: Mood is anxious. Behavior: Behavior normal.         Thought Content:  Thought content normal.         Judgment: Judgment normal.         Vital Signs  ED Triage Vitals   Temperature Pulse Respirations Blood Pressure SpO2   07/18/23 1617 07/18/23 1617 07/18/23 1617 07/18/23 1617 07/18/23 1617   98.2 °F (36.8 °C) 76 20 130/75 98 %      Temp Source Heart Rate Source Patient Position - Orthostatic VS BP Location FiO2 (%)   07/18/23 1617 07/18/23 1617 07/18/23 1822 07/18/23 1617 --   Oral Monitor Sitting Right arm       Pain Score       --                  Vitals:    07/18/23 1617 07/18/23 1822   BP: 130/75 107/68   Pulse: 76 70   Patient Position - Orthostatic VS:  Sitting         Visual Acuity      ED Medications  Medications   lactated ringers bolus 1,000 mL (0 mL Intravenous Stopped 7/18/23 1850)   clonazePAM (KlonoPIN) tablet 1 mg (1 mg Oral Given 7/18/23 1728)       Diagnostic Studies  Results Reviewed     Procedure Component Value Units Date/Time    Ethanol [[id number]]  (Normal) Collected: 07/18/23 1719    Lab Status: Final result Specimen: Blood from Arm, Right Updated: 07/18/23 1802     Ethanol Lvl <10 mg/dL     Basic metabolic panel [[id number]]  (Abnormal) Collected: 07/18/23 1719    Lab Status: Final result Specimen: Blood from Hand, Right Updated: 07/18/23 1759     Sodium 136 mmol/L      Potassium 3.9 mmol/L      Chloride 110 mmol/L      CO2 21 mmol/L      ANION GAP 5 mmol/L      BUN 14 mg/dL      Creatinine 0.88 mg/dL      Glucose 77 mg/dL      Calcium 8.1 mg/dL      eGFR 79 ml/min/1.73sq m     Narrative:      [place] guidelines for Chronic Kidney Disease (CKD):   •  Stage 1 with normal or high GFR (GFR > 90 mL/min/1.73 square meters)  •  Stage 2 Mild CKD (GFR = 60-89 mL/min/1.73 square meters)  •  Stage 3A Moderate CKD (GFR = 45-59 mL/min/1.73 square meters)  •  Stage 3B Moderate CKD (GFR = 30-44 mL/min/1.73 square meters)  •  Stage 4 Severe CKD (GFR = 15-29 mL/min/1.73 square meters)  •  Stage 5 End Stage CKD (GFR <15 mL/min/1.73 square meters)  Note: GFR calculation is accurate only with a steady state creatinine    Magnesium [[id number]]  (Normal) Collected: 07/18/23 1719    Lab Status: Final result Specimen: Blood from Hand, Right Updated: 07/18/23 1759     Magnesium 2.2 mg/dL     Salicylate level [[id number]]  (Normal) Collected: 07/18/23 1719    Lab Status: Final result Specimen: Blood from Hand, Right Updated: 00/69/55 7369     Salicylate Lvl <5 mg/dL     Acetaminophen level-If concentration is detectable, please discuss with medical  on call.  [[id number]]  (Abnormal) Collected: 07/18/23 1719    Lab Status: Final result Specimen: Blood from Hand, Right Updated: 07/18/23 1759     Acetaminophen Level <10 ug/mL     HS Troponin 0hr (reflex protocol) [[id number]]  (Normal) Collected: 07/18/23 1719    Lab Status: Final result Specimen: Blood from Hand, Right Updated: 07/18/23 1757     hs TnI 0hr <2 ng/L     CBC and differential [[id number]]  (Abnormal) Collected: 07/18/23 1719    Lab Status: Final result Specimen: Blood from Hand, Right Updated: 07/18/23 1727     WBC 8.73 Thousand/uL      RBC 3.93 Million/uL      Hemoglobin 11.1 g/dL      Hematocrit 35.3 %      MCV 90 fL      MCH 28.2 pg      MCHC 31.4 g/dL      RDW 12.8 %      MPV 8.1 fL      Platelets 554 Thousands/uL      nRBC 0 /100 WBCs      Neutrophils Relative 69 %      Immat GRANS % 1 %      Lymphocytes Relative 21 %      Monocytes Relative 8 %      Eosinophils Relative 0 %      Basophils Relative 1 %      Neutrophils Absolute 6.18 Thousands/µL      Immature Grans Absolute 0.04 Thousand/uL      Lymphocytes Absolute 1.79 Thousands/µL      Monocytes Absolute 0.68 Thousand/µL      Eosinophils Absolute 0.00 Thousand/µL      Basophils Absolute 0.04 Thousands/µL     Rapid drug screen, urine [[id number]]  (Normal) Collected: 07/18/23 1649    Lab Status: Final result Specimen: Urine, Clean Catch Updated: 07/18/23 1715     Amph/Meth UR Negative     Barbiturate Ur Negative     Benzodiazepine Urine Negative     Cocaine Urine Negative     Methadone Urine Negative     Opiate Urine Negative     PCP Ur Negative     THC Urine Negative     Oxycodone Urine Negative    Narrative:      FOR MEDICAL PURPOSES ONLY. IF CONFIRMATION NEEDED PLEASE CONTACT THE LAB WITHIN 5 DAYS. Drug Screen Cutoff Levels:  AMPHETAMINE/METHAMPHETAMINES  1000 ng/mL  BARBITURATES     200 ng/mL  BENZODIAZEPINES     200 ng/mL  COCAINE      300 ng/mL  METHADONE      300 ng/mL  OPIATES      300 ng/mL  PHENCYCLIDINE     25 ng/mL  THC       50 ng/mL  OXYCODONE      100 ng/mL    Urine Microscopic [[id number]]  (Abnormal) Collected: 07/18/23 1646    Lab Status: Final result Specimen: Urine, Clean Catch Updated: 07/18/23 1713     RBC, UA 30-50 /hpf      WBC, UA 4-10 /hpf      Epithelial Cells Occasional /hpf      Bacteria, UA Occasional /hpf      MUCUS THREADS Occasional     Budding Yeast Present    POCT pregnancy, urine [[id number]]  (Normal) Resulted: 07/18/23 1649    Lab Status: Final result Updated: 07/18/23 1649     EXT Preg Test, Ur Negative     Control Valid    Urine Macroscopic, POC [[id number]]  (Abnormal) Collected: 07/18/23 1646    Lab Status: Final result Specimen: Urine Updated: 07/18/23 1647     Color, UA Yellow     Clarity, UA Clear     pH, UA 6.5     Leukocytes, UA Trace     Nitrite, UA Negative     Protein, UA 30 (1+) mg/dl      Glucose, UA Negative mg/dl      Ketones, UA Negative mg/dl      Urobilinogen, UA 0.2 E.U./dl      Bilirubin, UA Negative     Occult Blood, UA Small     Specific Gravity, UA 1.025    Narrative:      CLINITEK RESULT                 XR chest 1 view portable   ED Interpretation by Romain Stanley DO (07/18 1804)   No free air. No focal consolidation. Procedures  Procedures         ED Course  ED Course as of 07/18/23 1906 Tue Jul 18, 2023   1651 Patient is a 57year old female coming in with anxiety. On exam patient is very tangential and complains of shortness of breath. PERC negative low Wells therefore no D-dimer. We will start cardiac work-up well medical clearance for crisis eval    Disclosure: Voice to text software was used in the preparation of this document and could have resulted in translational errors.      Occasional wrong word or "sound a like" substitutions may have occurred due to the inherent limitations of voice recognition software.  Read the chart carefully and recognize, using context, where substitutions have occurred.       I have independently reviewed external records are available to me to the level of detail possible within the time constraints of my patient care responsibilities in the ED.       1716 No ischemia or arrhythmia. Pregnancy negative. Drug screen negative   1802 Labs reviewed and without actionable derangement    Patient medically cleared to meet with crisis     1820 Crisis met with patient. No grounds for 201 or 302. We will plan for DC home   1857 Upon discharge, patient was yelling "you are just treating me like I am a drug addict". I discussed with her and firmly expressed that I reviewed her  which is a system where we can see prescriptions that are filled. I discussed with her that we are aware that she had received over 70 tablets of Klonopin. She initially told me she has not received Klonopin in several days when in fact she received Klonopin yesterday as well as a prescription for Klonopin     Upon discharge, patient was pacing throughout the hallway on the telephone stating  "they are not helping me. "  Patient was ambulating without any distress or ataxia.         HEART Risk Score    Flowsheet Row Most Recent Value   Heart Score Risk Calculator    History 0 Filed at: 07/18/2023 1800   ECG 1 Filed at: 07/18/2023 1800   Age 1 Filed at: 07/18/2023 1800   Risk Factors 1 Filed at: 07/18/2023 1800   Troponin 0 Filed at: 07/18/2023 1800   HEART Score 3 Filed at: 07/18/2023 1800                        SBIRT 20yo+    Flowsheet Row Most Recent Value   Initial Alcohol Screen: US AUDIT-C     1. How often do you have a drink containing alcohol? 0 Filed at: 07/18/2023 1622   2. How many drinks containing alcohol do you have on a typical day you are drinking? 0 Filed at: 07/18/2023 1622   3b. FEMALE Any Age, or MALE 65+: How often do you have 4 or more drinks on one occassion? 0 Filed at: 07/18/2023 1622   Audit-C Score 0 Filed at: 07/18/2023 1622   LYNN: How many times in the past year have you. .. Used an illegal drug or used a prescription medication for non-medical reasons? Never Filed at: 07/18/2023 1622                    Medical Decision Making      EKG INTERPRETATION @ 1652  RHYTHM:NSR @ 69 bpm  AXIS: normal axis  INTERVALS: MN @ 172 ms  QRS COMPLEX: QRS @ 84 ms. Low voltage QRS  ST SEGMENT: non specific st segment changes. Diffuse artfiact  QT INTERVAL: QTC @ 428 ms  COMPARED WITH PRIOR no change compared to 6/19/23  Interpretation by Leonardo Larson DO    Differential diagnosis includes but not limited to:  COPD exacerbation, asthma exacerbation, pneumonia, PE, pulmonary edema, pulmonary effusions, lung mass/malignancy, CHF, ACS, NSTEMI, acute kidney injury, electrolyte dysfunction, inhalation injury, anemia, valvular disorder, viral etiology,      Anxiety attack: chronic illness or injury  Medication refill: chronic illness or injury  Amount and/or Complexity of Data Reviewed  External Data Reviewed: notes. Labs: ordered. Decision-making details documented in ED Course. Details: Pregnancy negative  NO UTI  No acute kidney injury, electrolyte dysfunction, coma panel negative. Troponin less than 2 with heart score less than 2  Radiology: ordered and independent interpretation performed. Decision-making details documented in ED Course. Details: Chest x-ray read by myself no acute pathology  ECG/medicine tests: ordered and independent interpretation performed. Decision-making details documented in ED Course. Details: No ischemia or arrhythmia      Risk  Prescription drug management. Disposition  Final diagnoses:   Anxiety attack   Medication refill     Time reflects when diagnosis was documented in both MDM as applicable and the Disposition within this note     Time User Action Codes Description Comment    7/18/2023  5:14 PM Lizzy Rosin Add [F41.0] Anxiety attack     7/18/2023  5:14 PM Lizzy Rosin Add [Z76.0] Medication refill       ED Disposition     ED Disposition   Discharge    Condition   Stable    Date/Time   Tue Jul 18, 2023  6:20 PM    Comment   Radha Grad discharge to home/self care.                Follow-up Information     Follow up With Specialties Details Why Contact Info Additional [address] [facility] Schedule an appointment as soon as possible for a visit in 1 week  [address] [place] [phone]  [address] [place] [address], [address] [place] [address], [address] [place] [address]          Discharge Medication List as of 7/18/2023  6:50 PM      CONTINUE these medications which have NOT CHANGED    Details   ARIPiprazole (ABILIFY) 15 mg tablet Take 1 tablet (15 mg total) by mouth daily for 14 days, Starting Tue 10/11/2022, Until Tue 10/25/2022, Normal      buPROPion (WELLBUTRIN XL) 300 mg 24 hr tablet Take 300 mg by mouth daily, Historical Med      clonazePAM (KlonoPIN) 1 mg tablet Take 1 tablet (1 mg total) by mouth 4 (four) times a day for 7 days, Starting Tue 6/20/2023, Until Tue 6/27/2023, Normal      famotidine (PEPCID) 20 mg tablet Take 1 tablet (20 mg total) by mouth 2 (two) times a day, Starting Tue 10/11/2022, Until Thu 11/10/2022, Normal      ferrous sulfate 325 (65 Fe) mg tablet Take 1 tablet (325 mg total) by mouth daily with breakfast, Starting Tue 10/11/2022, Until Thu 11/10/2022, Normal      FLUoxetine (PROzac) 40 MG capsule Take 2 capsules (80 mg total) by mouth daily Do not start before April 21, 2023., Starting Fri 4/21/2023, Until Sun 5/21/2023, Normal      furosemide (LASIX) 40 mg tablet Take 1 tablet (40 mg total) by mouth 2 (two) times a day, Starting Thu 4/20/2023, Until Sat 5/20/2023, Normal      gabapentin (NEURONTIN) 300 mg capsule Take 2 capsules (600 mg total) by mouth 3 (three) times a day, Starting Tue 10/11/2022, Until Thu 11/10/2022, Normal      hydrOXYzine HCL (ATARAX) 25 mg tablet Take 1 tablet (25 mg total) by mouth every 6 (six) hours, Starting Thu 10/20/2022, Normal      naloxone (NARCAN) 4 mg/0.1 mL nasal spray 4 mg into each nostril, Starting Fri 9/2/2022, Historical Med      nicotine (NICODERM CQ) 14 mg/24hr TD 24 hr patch Place 1 patch on the skin every 24 hours, Starting Fri 10/28/2022, Normal      pantoprazole (PROTONIX) 40 mg tablet Take 1 tablet (40 mg total) by mouth daily in the early morning, Starting Tue 10/11/2022, Until Thu 11/10/2022, Normal      topiramate (TOPAMAX) 100 mg tablet Take 1 tablet (100 mg total) by mouth 2 (two) times a day . Take in combination with 25mg for total daily dose of 125mg twice daily. , Starting Tue 10/11/2022, Until Thu 11/10/2022, Normal      traZODone (DESYREL) 50 mg tablet Take 1 tablet (50 mg total) by mouth daily at bedtime, Starting Tue 10/11/2022, Until Tue 11/22/2022, Normal      venlafaxine (EFFEXOR-XR) 37.5 mg 24 hr capsule Take 1 capsule (37.5 mg total) by mouth daily, Starting Tue 10/11/2022, Until Tue 11/22/2022, Normal             No discharge procedures on file.     PDMP Review       Value Time User    PDMP Reviewed  Yes 6/2/2023  8:28 PM Mari Henriquez MD          ED Provider  Electronically Signed by           Андрей Dee, DO  07/18/23 9120 repeat

## 2023-10-12 ENCOUNTER — PATIENT OUTREACH (OUTPATIENT)
Dept: CASE MANAGEMENT | Facility: OTHER | Age: 48
End: 2023-10-12

## 2023-10-12 NOTE — PROGRESS NOTES
Patient identified as [employer]. Outside records through [address] requested and refreshed. Chart review completed. Patient with significant past medical history of bipolar disorder, NICHOLAS, anxiety and  and severe opioid disorder on maintenance therapy. See problem list for complete list of medical diagnosis. Patient with  (2) admissions (dizziness and acute resp failure with hypoxia) and (9) ED visits (for anxiety, SI, med refills, chest pain and seizure). Per [facility] note 7/17/23, patient applying for ICM and paperwork was submitted 5/23/23. Call placed to request referral to . Per agent, patient has Behavioral Care Manager. Next outreach scheduled for approx 11/6. No further action to be taken at this time.

## 2023-10-30 ENCOUNTER — HOSPITAL ENCOUNTER (EMERGENCY)
Facility: HOSPITAL | Age: 48
Discharge: HOME | End: 2023-10-30
Attending: EMERGENCY MEDICINE | Admitting: EMERGENCY MEDICINE
Payer: COMMERCIAL

## 2023-10-30 VITALS
OXYGEN SATURATION: 97 % | RESPIRATION RATE: 16 BRPM | HEART RATE: 97 BPM | TEMPERATURE: 98.6 F | SYSTOLIC BLOOD PRESSURE: 125 MMHG | DIASTOLIC BLOOD PRESSURE: 79 MMHG

## 2023-10-30 DIAGNOSIS — F41.9 ANXIETY: ICD-10-CM

## 2023-10-30 DIAGNOSIS — Z76.0 MEDICATION REFILL: Primary | ICD-10-CM

## 2023-10-30 PROCEDURE — 99284 EMERGENCY DEPT VISIT MOD MDM: CPT | Performed by: PHYSICIAN ASSISTANT

## 2023-10-30 PROCEDURE — 99281 EMR DPT VST MAYX REQ PHY/QHP: CPT

## 2023-10-30 RX ORDER — BUPROPION HYDROCHLORIDE 300 MG/1
300 TABLET ORAL DAILY
Qty: 15 TABLET | Refills: 0 | Status: SHIPPED | OUTPATIENT
Start: 2023-10-30

## 2023-10-30 NOTE — ED PROVIDER NOTES
History  Chief Complaint   Patient presents with    Medication Refill     Pt missed apt. For Wellbutrin refill this AM and looking for 2week supply until next apt. Stating having anxiety but not wanting treatment for withdraw symptoms. Patient is a 44-year-old female with a history of bipolar disorder, opioid abuse, anxiety, hepatitis C, seizure coming in for medication refill. She denies SI or HI. She has no visual hallucinations or auditory hallucinations. She states she missed her doctor appointment last week and has an appointment in 2 weeks. She states she needs a refill on her Wellbutrin XL 300mg. She thought she could wait the two weeks for her next appointment but she is feeling anxious without her medications. She has been on her Wellbutrin for years. She follows up with a psychiatrist and therapist regularly. No other complaints at this time. Prior to Admission Medications   Prescriptions Last Dose Informant Patient Reported? Taking? ARIPiprazole (ABILIFY) 15 mg tablet   No No   Sig: Take 1 tablet (15 mg total) by mouth daily for 14 days   FLUoxetine (PROzac) 40 MG capsule   No No   Sig: Take 2 capsules (80 mg total) by mouth daily Do not start before April 21, 2023.    buPROPion (WELLBUTRIN XL) 300 mg 24 hr tablet   Yes No   Sig: Take 300 mg by mouth daily   clonazePAM (KlonoPIN) 1 mg tablet   No No   Sig: Take 1 tablet (1 mg total) by mouth 4 (four) times a day for 7 days   famotidine (PEPCID) 20 mg tablet   No No   Sig: Take 1 tablet (20 mg total) by mouth 2 (two) times a day   ferrous sulfate 325 (65 Fe) mg tablet   No No   Sig: Take 1 tablet (325 mg total) by mouth daily with breakfast   furosemide (LASIX) 40 mg tablet   No No   Sig: Take 1 tablet (40 mg total) by mouth 2 (two) times a day   gabapentin (NEURONTIN) 300 mg capsule   No No   Sig: Take 2 capsules (600 mg total) by mouth 3 (three) times a day   hydrOXYzine HCL (ATARAX) 25 mg tablet   No No   Sig: Take 1 tablet (25 mg total) by mouth every 6 (six) hours   naloxone (NARCAN) 4 mg/0.1 mL nasal spray   Yes No   Si mg into each nostril   nicotine (NICODERM CQ) 14 mg/24hr TD 24 hr patch   No No   Sig: Place 1 patch on the skin every 24 hours   pantoprazole (PROTONIX) 40 mg tablet   No No   Sig: Take 1 tablet (40 mg total) by mouth daily in the early morning   topiramate (TOPAMAX) 100 mg tablet   No No   Sig: Take 1 tablet (100 mg total) by mouth 2 (two) times a day . Take in combination with 25mg for total daily dose of 125mg twice daily. traZODone (DESYREL) 50 mg tablet   No No   Sig: Take 1 tablet (50 mg total) by mouth daily at bedtime   venlafaxine (EFFEXOR-XR) 37.5 mg 24 hr capsule   No No   Sig: Take 1 capsule (37.5 mg total) by mouth daily      Facility-Administered Medications: None       Past Medical History:   Diagnosis Date    Addiction to drug [facility])     Alcohol abuse     Alcoholism ([facility])     Altered mental status 2022    Elevated LFTs 2022    Lower back pain     Self-injurious behavior     Substance abuse ([facility])        Past Surgical History:   Procedure Laterality Date    CHOLECYSTECTOMY         Family History   Problem Relation Age of Onset    Heart disease Father     Heart disease Maternal Grandmother      I have reviewed and agree with the history as documented. E-Cigarette/Vaping    E-Cigarette Use Current Every Day User     Cartridges/Day 2      E-Cigarette/Vaping Substances    Nicotine No     THC No     CBD No     Flavoring No     Other No     Unknown No      Social History     Tobacco Use    Smoking status: Every Day     Packs/day: 0.50     Years: 20.00     Total pack years: 10.00     Types: Cigarettes    Smokeless tobacco: Current   Vaping Use    Vaping Use: Every day   Substance Use Topics    Alcohol use: Not Currently     Comment: MAGDALENO, pt historically inconsistent.  Drinking  per [facility] 2    Drug use: Not Currently     Types: Heroin, "Crack" cocaine, Cocaine, LSD, Benzodiazepines, Marijuana     Comment: Pt unreliable historian       Review of Systems   Constitutional:  Negative for chills and fever. Eyes:  Negative for visual disturbance. Respiratory:  Negative for cough and shortness of breath. Cardiovascular:  Negative for chest pain and palpitations. Gastrointestinal:  Negative for abdominal pain, diarrhea, nausea and vomiting. Genitourinary:  Negative for difficulty urinating. Musculoskeletal:  Negative for arthralgias and back pain. Skin:  Negative for color change and rash. Neurological:  Negative for syncope, weakness and numbness. Psychiatric/Behavioral:  The patient is nervous/anxious. All other systems reviewed and are negative. Physical Exam  Physical Exam  Vitals and nursing note reviewed. Constitutional:       General: She is not in acute distress. Appearance: Normal appearance. She is well-developed. She is not ill-appearing, toxic-appearing or diaphoretic. HENT:      Head: Normocephalic and atraumatic. Right Ear: External ear normal.      Left Ear: External ear normal.      Mouth/Throat:      Mouth: Mucous membranes are moist.   Eyes:      Conjunctiva/sclera: Conjunctivae normal.   Cardiovascular:      Rate and Rhythm: Normal rate and regular rhythm. Heart sounds: Normal heart sounds. No murmur heard. Pulmonary:      Effort: Pulmonary effort is normal. No respiratory distress. Breath sounds: Normal breath sounds. No stridor. No wheezing, rhonchi or rales. Abdominal:      General: Abdomen is flat. Bowel sounds are normal. There is no distension. Palpations: Abdomen is soft. Tenderness: There is no abdominal tenderness. There is no guarding. Musculoskeletal:         General: Normal range of motion. Cervical back: Normal range of motion. Skin:     General: Skin is warm and dry. Capillary Refill: Capillary refill takes less than 2 seconds. Neurological:      Mental Status: She is alert.    Psychiatric: Attention and Perception: Attention normal.         Mood and Affect: Mood is anxious. Speech: Speech normal.         Behavior: Behavior normal.         Vital Signs  ED Triage Vitals [10/30/23 1537]   Temperature Pulse Respirations Blood Pressure SpO2   98.6 °F (37 °C) 97 16 125/79 97 %      Temp Source Heart Rate Source Patient Position - Orthostatic VS BP Location FiO2 (%)   Oral Monitor Sitting Right arm --      Pain Score       --           Vitals:    10/30/23 1537   BP: 125/79   Pulse: 97   Patient Position - Orthostatic VS: Sitting         Visual Acuity      ED Medications  Medications - No data to display    Diagnostic Studies  Results Reviewed       None                   No orders to display              Procedures  Procedures         ED Course                                             Medical Decision Making  Called patient's pharmacy ([facility]) and verified dosage and instructions. Last filled in Aug 2023 for 90 day supply. Will prescribe 2 weeks supply of wellbutrin xl 300mg QD. The management plan was discussed in detail with the patient at bedside and all questions were answered. Prior to discharge, we provided both verbal and written instructions. We discussed with the patient the signs and symptoms for which to return to the emergency department. All questions were answered and patient was comfortable with the plan of care and discharged to home. Instructed the patient to follow up with the primary care provider and/or specialist provided and their written instructions. The patient verbalized understanding of our discussion and plan of care, and agrees to return to the Emergency Department for concerns and progression of illness.      At discharge, I instructed the patient to:  -follow up with pcp  -follow up with the recommended specialists- her psychiatrist and therapist.   -return to the ER if symptoms worsened or new symptoms arose  Patient agreed to this plan and was stable at time of discharge. Disposition  Final diagnoses:   Medication refill   Anxiety     Time reflects when diagnosis was documented in both MDM as applicable and the Disposition within this note       Time User Action Codes Description Comment    10/30/2023  4:25 PM Wendy Gonzales Add [Z76.0] Medication refill     10/30/2023  4:25 PM Wendy Gonzales Add [F41.9] Anxiety           ED Disposition       ED Disposition   Discharge    Condition   Stable    Date/Time   Mon Oct 30, 2023  4:25 PM    Comment   Sp Mcfaddent discharge to home/self care. Follow-up Information       Follow up With Specialties Details Why Contact Info Additional Information    Follow up with your family doctor and psychiatrist/therapist as scheduled         [address] [facility] Emergency Department Emergency Medicine  If symptoms worsen [address] [address]  [address] [facility] Emergency Department, [address] [place] [address], [employer], [place], [address]            Patient's Medications   Discharge Prescriptions    BUPROPION (WELLBUTRIN XL) 300 MG 24 HR TABLET    Take 1 tablet (300 mg total) by mouth daily       Start Date: 10/30/2023End Date: --       Order Dose: 300 mg       Quantity: 15 tablet    Refills: 0       No discharge procedures on file.     PDMP Review         Value Time User    PDMP Reviewed  Yes 6/2/2023  8:28 PM Femi Carreno MD            ED Provider  Electronically Signed by             Wendy Gonzales PA-C  10/30/23 6436

## 2023-11-15 ENCOUNTER — HOSPITAL ENCOUNTER (EMERGENCY)
Facility: HOSPITAL | Age: 48
Discharge: HOME | End: 2023-11-15
Attending: EMERGENCY MEDICINE
Payer: COMMERCIAL

## 2023-11-15 ENCOUNTER — APPOINTMENT (EMERGENCY)
Dept: RADIOLOGY | Facility: HOSPITAL | Age: 48
End: 2023-11-15
Payer: COMMERCIAL

## 2023-11-15 VITALS
DIASTOLIC BLOOD PRESSURE: 62 MMHG | HEIGHT: 62 IN | HEART RATE: 66 BPM | OXYGEN SATURATION: 98 % | WEIGHT: 180.78 LBS | SYSTOLIC BLOOD PRESSURE: 103 MMHG | RESPIRATION RATE: 16 BRPM | BODY MASS INDEX: 33.27 KG/M2 | TEMPERATURE: 97.7 F

## 2023-11-15 DIAGNOSIS — J20.9 ACUTE BRONCHITIS: Primary | ICD-10-CM

## 2023-11-15 LAB
ANION GAP SERPL CALCULATED.3IONS-SCNC: 4 MMOL/L
ATRIAL RATE: 65 BPM
BASOPHILS # BLD AUTO: 0.07 THOUSANDS/ÂΜL (ref 0–0.1)
BASOPHILS NFR BLD AUTO: 1 % (ref 0–1)
BNP SERPL-MCNC: 88 PG/ML (ref 0–100)
BUN SERPL-MCNC: 10 MG/DL (ref 5–25)
CALCIUM SERPL-MCNC: 8.5 MG/DL (ref 8.4–10.2)
CARDIAC TROPONIN I PNL SERPL HS: <2 NG/L
CHLORIDE SERPL-SCNC: 111 MMOL/L (ref 96–108)
CO2 SERPL-SCNC: 21 MMOL/L (ref 21–32)
CREAT SERPL-MCNC: 0.78 MG/DL (ref 0.6–1.3)
D DIMER PPP FEU-MCNC: 0.34 UG/ML FEU
EOSINOPHIL # BLD AUTO: 0.01 THOUSAND/ÂΜL (ref 0–0.61)
EOSINOPHIL NFR BLD AUTO: 0 % (ref 0–6)
ERYTHROCYTE [DISTWIDTH] IN BLOOD BY AUTOMATED COUNT: 13.9 % (ref 11.6–15.1)
EXT PREGNANCY TEST URINE: NEGATIVE
EXT. CONTROL: NORMAL
GFR SERPL CREATININE-BSD FRML MDRD: 91 ML/MIN/1.73SQ M
GLUCOSE SERPL-MCNC: 75 MG/DL (ref 65–140)
HCT VFR BLD AUTO: 35.2 % (ref 34.8–46.1)
HGB BLD-MCNC: 11.1 G/DL (ref 11.5–15.4)
IMM GRANULOCYTES # BLD AUTO: 0.04 THOUSAND/UL (ref 0–0.2)
IMM GRANULOCYTES NFR BLD AUTO: 0 % (ref 0–2)
LYMPHOCYTES # BLD AUTO: 2.73 THOUSANDS/ÂΜL (ref 0.6–4.47)
LYMPHOCYTES NFR BLD AUTO: 29 % (ref 14–44)
MCH RBC QN AUTO: 28 PG (ref 26.8–34.3)
MCHC RBC AUTO-ENTMCNC: 31.5 G/DL (ref 31.4–37.4)
MCV RBC AUTO: 89 FL (ref 82–98)
MONOCYTES # BLD AUTO: 0.66 THOUSAND/ÂΜL (ref 0.17–1.22)
MONOCYTES NFR BLD AUTO: 7 % (ref 4–12)
NEUTROPHILS # BLD AUTO: 5.98 THOUSANDS/ÂΜL (ref 1.85–7.62)
NEUTS SEG NFR BLD AUTO: 63 % (ref 43–75)
NRBC BLD AUTO-RTO: 0 /100 WBCS
P AXIS: 36 DEGREES
PLATELET # BLD AUTO: 206 THOUSANDS/UL (ref 149–390)
PMV BLD AUTO: 8.4 FL (ref 8.9–12.7)
POTASSIUM SERPL-SCNC: 3.9 MMOL/L (ref 3.5–5.3)
PR INTERVAL: 182 MS
QRS AXIS: 46 DEGREES
QRSD INTERVAL: 96 MS
QT INTERVAL: 408 MS
QTC INTERVAL: 424 MS
RBC # BLD AUTO: 3.96 MILLION/UL (ref 3.81–5.12)
SODIUM SERPL-SCNC: 136 MMOL/L (ref 135–147)
T WAVE AXIS: 28 DEGREES
VENTRICULAR RATE: 65 BPM
WBC # BLD AUTO: 9.49 THOUSAND/UL (ref 4.31–10.16)

## 2023-11-15 PROCEDURE — 71046 X-RAY EXAM CHEST 2 VIEWS: CPT

## 2023-11-15 PROCEDURE — 96374 THER/PROPH/DIAG INJ IV PUSH: CPT

## 2023-11-15 PROCEDURE — 99285 EMERGENCY DEPT VISIT HI MDM: CPT

## 2023-11-15 PROCEDURE — 93010 ELECTROCARDIOGRAM REPORT: CPT | Performed by: INTERNAL MEDICINE

## 2023-11-15 PROCEDURE — 93005 ELECTROCARDIOGRAM TRACING: CPT

## 2023-11-15 PROCEDURE — 81025 URINE PREGNANCY TEST: CPT | Performed by: EMERGENCY MEDICINE

## 2023-11-15 PROCEDURE — 80048 BASIC METABOLIC PNL TOTAL CA: CPT | Performed by: EMERGENCY MEDICINE

## 2023-11-15 PROCEDURE — 85025 COMPLETE CBC W/AUTO DIFF WBC: CPT | Performed by: EMERGENCY MEDICINE

## 2023-11-15 PROCEDURE — 36415 COLL VENOUS BLD VENIPUNCTURE: CPT | Performed by: EMERGENCY MEDICINE

## 2023-11-15 PROCEDURE — 83880 ASSAY OF NATRIURETIC PEPTIDE: CPT | Performed by: EMERGENCY MEDICINE

## 2023-11-15 PROCEDURE — 84484 ASSAY OF TROPONIN QUANT: CPT | Performed by: EMERGENCY MEDICINE

## 2023-11-15 PROCEDURE — 85379 FIBRIN DEGRADATION QUANT: CPT | Performed by: EMERGENCY MEDICINE

## 2023-11-15 PROCEDURE — 99285 EMERGENCY DEPT VISIT HI MDM: CPT | Performed by: EMERGENCY MEDICINE

## 2023-11-15 RX ORDER — HYDROXYZINE HYDROCHLORIDE 25 MG/1
25 TABLET, FILM COATED ORAL ONCE
Status: COMPLETED | OUTPATIENT
Start: 2023-11-15 | End: 2023-11-15

## 2023-11-15 RX ORDER — AZITHROMYCIN 250 MG/1
TABLET, FILM COATED ORAL
Qty: 6 TABLET | Refills: 0 | Status: SHIPPED | OUTPATIENT
Start: 2023-11-15 | End: 2023-11-19

## 2023-11-15 RX ORDER — ALBUTEROL SULFATE 90 UG/1
2 AEROSOL, METERED RESPIRATORY (INHALATION) EVERY 6 HOURS PRN
Qty: 6.7 G | Refills: 0 | Status: SHIPPED | OUTPATIENT
Start: 2023-11-15

## 2023-11-15 RX ORDER — KETOROLAC TROMETHAMINE 30 MG/ML
15 INJECTION, SOLUTION INTRAMUSCULAR; INTRAVENOUS ONCE
Status: COMPLETED | OUTPATIENT
Start: 2023-11-15 | End: 2023-11-15

## 2023-11-15 RX ADMIN — HYDROXYZINE HYDROCHLORIDE 25 MG: 25 TABLET, FILM COATED ORAL at 15:07

## 2023-11-15 RX ADMIN — KETOROLAC TROMETHAMINE 15 MG: 30 INJECTION, SOLUTION INTRAMUSCULAR; INTRAVENOUS at 15:10

## 2023-11-15 NOTE — ED PROVIDER NOTES
History  Chief Complaint   Patient presents with    Cough     Patient reports cough for several days, productive sputum, noted blood today, +fatigue, having chest wall pain from coughing     53-year-old female presents for evaluation with complaints for the past week. Patient complains of cough over the past week which is intermittently mixed with specks of blood and clear mucus. Is associated with chest pain with coughing, shortness of breath which is worse with exertion, and generalized malaise and fatigue. No abdominal pain, headache, neck pain or neck stiffness. History provided by:  Patient  Cough  Associated symptoms: chills, fever, rhinorrhea and shortness of breath    Associated symptoms: no chest pain, no ear pain, no myalgias, no rash, no sore throat and no wheezing        Prior to Admission Medications   Prescriptions Last Dose Informant Patient Reported? Taking? ARIPiprazole (ABILIFY) 15 mg tablet   No No   Sig: Take 1 tablet (15 mg total) by mouth daily for 14 days   FLUoxetine (PROzac) 40 MG capsule   No No   Sig: Take 2 capsules (80 mg total) by mouth daily Do not start before April 21, 2023.    buPROPion (WELLBUTRIN XL) 300 mg 24 hr tablet 11/15/2023  Yes Yes   Sig: Take 300 mg by mouth daily   buPROPion (Wellbutrin XL) 300 mg 24 hr tablet   No No   Sig: Take 1 tablet (300 mg total) by mouth daily   clonazePAM (KlonoPIN) 1 mg tablet   No Yes   Sig: Take 1 tablet (1 mg total) by mouth 4 (four) times a day for 7 days   famotidine (PEPCID) 20 mg tablet   No No   Sig: Take 1 tablet (20 mg total) by mouth 2 (two) times a day   ferrous sulfate 325 (65 Fe) mg tablet   No Yes   Sig: Take 1 tablet (325 mg total) by mouth daily with breakfast   furosemide (LASIX) 40 mg tablet   No No   Sig: Take 1 tablet (40 mg total) by mouth 2 (two) times a day   gabapentin (NEURONTIN) 300 mg capsule   No No   Sig: Take 2 capsules (600 mg total) by mouth 3 (three) times a day   hydrOXYzine HCL (ATARAX) 25 mg tablet More than a month  No No   Sig: Take 1 tablet (25 mg total) by mouth every 6 (six) hours   naloxone (NARCAN) 4 mg/0.1 mL nasal spray Not Taking  Yes No   Si mg into each nostril   Patient not taking: Reported on 11/15/2023   nicotine (NICODERM CQ) 14 mg/24hr TD 24 hr patch 11/15/2023  No Yes   Sig: Place 1 patch on the skin every 24 hours   pantoprazole (PROTONIX) 40 mg tablet   No No   Sig: Take 1 tablet (40 mg total) by mouth daily in the early morning   traZODone (DESYREL) 50 mg tablet   No No   Sig: Take 1 tablet (50 mg total) by mouth daily at bedtime   venlafaxine (EFFEXOR-XR) 37.5 mg 24 hr capsule   No No   Sig: Take 1 capsule (37.5 mg total) by mouth daily      Facility-Administered Medications: None       Past Medical History:   Diagnosis Date    Addiction to drug [facility])     Alcohol abuse     Alcoholism ([facility])     Altered mental status 2022    Elevated LFTs 2022    Lower back pain     Self-injurious behavior     Substance abuse ([facility])        Past Surgical History:   Procedure Laterality Date    CHOLECYSTECTOMY         Family History   Problem Relation Age of Onset    Heart disease Father     Heart disease Maternal Grandmother      I have reviewed and agree with the history as documented. E-Cigarette/Vaping    E-Cigarette Use Current Every Day User     Cartridges/Day 2      E-Cigarette/Vaping Substances    Nicotine No     THC No     CBD No     Flavoring No     Other No     Unknown No      Social History     Tobacco Use    Smoking status: Every Day     Packs/day: 0.50     Years: 20.00     Total pack years: 10.00     Types: Cigarettes    Smokeless tobacco: Current   Vaping Use    Vaping Use: Every day   Substance Use Topics    Alcohol use: Not Currently     Comment: MAGDALENO, pt historically inconsistent.  Drinking  per [facility] 2    Drug use: Not Currently     Types: Heroin, "Crack" cocaine, Cocaine, LSD, Benzodiazepines, Marijuana     Comment: Pt unreliable historian       Review of Systems   Constitutional:  Positive for chills, fatigue and fever. Negative for activity change and appetite change. HENT:  Positive for rhinorrhea. Negative for congestion, dental problem, ear pain and sore throat. Eyes:  Negative for pain and redness. Respiratory:  Positive for cough and shortness of breath. Negative for chest tightness and wheezing. Cardiovascular:  Negative for chest pain and palpitations. Gastrointestinal:  Negative for abdominal pain, blood in stool, constipation, diarrhea, nausea and vomiting. Endocrine: Negative for cold intolerance and heat intolerance. Genitourinary:  Negative for dysuria, frequency and hematuria. Musculoskeletal:  Negative for arthralgias and myalgias. Skin:  Negative for color change, pallor and rash. Neurological:  Negative for weakness and numbness. Hematological:  Does not bruise/bleed easily. Psychiatric/Behavioral:  Negative for agitation, hallucinations and suicidal ideas. Physical Exam  Physical Exam  Constitutional:       Appearance: She is well-developed. HENT:      Head: Normocephalic and atraumatic. Eyes:      Pupils: Pupils are equal, round, and reactive to light. Neck:      Vascular: No JVD. Trachea: No tracheal deviation. Cardiovascular:      Rate and Rhythm: Normal rate and regular rhythm. Pulmonary:      Effort: Pulmonary effort is normal. No tachypnea, accessory muscle usage or respiratory distress. Breath sounds: Normal breath sounds. No stridor. No wheezing, rhonchi or rales. Chest:      Chest wall: No tenderness. Abdominal:      General: There is no distension. Palpations: There is no mass. Tenderness: There is no abdominal tenderness. Hernia: No hernia is present. Musculoskeletal:      Right lower leg: Normal. No edema. Left lower leg: Normal. No edema. Skin:     General: Skin is warm. Capillary Refill: Capillary refill takes less than 2 seconds.    Neurological: Mental Status: She is alert and oriented to person, place, and time.    Psychiatric:         Behavior: Behavior normal.         Vital Signs  ED Triage Vitals   Temperature Pulse Respirations Blood Pressure SpO2   11/15/23 1404 11/15/23 1404 11/15/23 1404 11/15/23 1404 11/15/23 1404   97.7 °F (36.5 °C) 79 20 120/57 99 %      Temp Source Heart Rate Source Patient Position - Orthostatic VS BP Location FiO2 (%)   11/15/23 1404 11/15/23 1509 11/15/23 1509 11/15/23 1509 --   Oral Monitor Lying Left arm       Pain Score       11/15/23 1404       6           Vitals:    11/15/23 1404 11/15/23 1509 11/15/23 1530   BP: 120/57 108/60 103/62   Pulse: 79 68 66   Patient Position - Orthostatic VS:  Lying          Visual Acuity      ED Medications  Medications   ketorolac (TORADOL) injection 15 mg (15 mg Intravenous Given 11/15/23 1510)   hydrOXYzine HCL (ATARAX) tablet 25 mg (25 mg Oral Given 11/15/23 1507)       Diagnostic Studies  Results Reviewed       Procedure Component Value Units Date/Time    HS Troponin I 4hr [[id number]]     Lab Status: No result Specimen: Blood     HS Troponin 0hr (reflex protocol) [[id number]]  (Normal) Collected: 11/15/23 1511    Lab Status: Final result Specimen: Blood from Arm, Left Updated: 11/15/23 1540     hs TnI 0hr <2 ng/L     HS Troponin I 2hr [[id number]]     Lab Status: No result Specimen: Blood     B-Type Natriuretic Peptide(BNP) [[id number]]  (Normal) Collected: 11/15/23 1511    Lab Status: Final result Specimen: Blood from Arm, Left Updated: 11/15/23 1538     BNP 88 pg/mL     D-Dimer [[id number]]  (Normal) Collected: 11/15/23 1511    Lab Status: Final result Specimen: Blood from Arm, Left Updated: 11/15/23 1534     D-Dimer, Quant 0.34 ug/ml FEU     Basic metabolic panel [[id number]]  (Abnormal) Collected: 11/15/23 1511    Lab Status: Final result Specimen: Blood from Arm, Left Updated: 11/15/23 1532     Sodium 136 mmol/L      Potassium 3.9 mmol/L      Chloride 111 mmol/L      CO2 21 mmol/L ANION GAP 4 mmol/L      BUN 10 mg/dL      Creatinine 0.78 mg/dL      Glucose 75 mg/dL      Calcium 8.5 mg/dL      eGFR 91 ml/min/1.73sq m     Narrative:      National Kidney Disease Foundation guidelines for Chronic Kidney Disease (CKD):     Stage 1 with normal or high GFR (GFR > 90 mL/min/1.73 square meters)    Stage 2 Mild CKD (GFR = 60-89 mL/min/1.73 square meters)    Stage 3A Moderate CKD (GFR = 45-59 mL/min/1.73 square meters)    Stage 3B Moderate CKD (GFR = 30-44 mL/min/1.73 square meters)    Stage 4 Severe CKD (GFR = 15-29 mL/min/1.73 square meters)    Stage 5 End Stage CKD (GFR <15 mL/min/1.73 square meters)  Note: GFR calculation is accurate only with a steady state creatinine    CBC and differential [[id number]]  (Abnormal) Collected: 11/15/23 1511    Lab Status: Final result Specimen: Blood from Arm, Left Updated: 11/15/23 1517     WBC 9.49 Thousand/uL      RBC 3.96 Million/uL      Hemoglobin 11.1 g/dL      Hematocrit 35.2 %      MCV 89 fL      MCH 28.0 pg      MCHC 31.5 g/dL      RDW 13.9 %      MPV 8.4 fL      Platelets 714 Thousands/uL      nRBC 0 /100 WBCs      Neutrophils Relative 63 %      Immat GRANS % 0 %      Lymphocytes Relative 29 %      Monocytes Relative 7 %      Eosinophils Relative 0 %      Basophils Relative 1 %      Neutrophils Absolute 5.98 Thousands/µL      Immature Grans Absolute 0.04 Thousand/uL      Lymphocytes Absolute 2.73 Thousands/µL      Monocytes Absolute 0.66 Thousand/µL      Eosinophils Absolute 0.01 Thousand/µL      Basophils Absolute 0.07 Thousands/µL     POCT pregnancy, urine [[id number]]  (Normal) Resulted: 11/15/23 1508    Lab Status: Final result Updated: 11/15/23 1515     EXT Preg Test, Ur Negative     Control Valid                   XR chest 2 views   ED Interpretation by Nazia Palencia MD (11/15 1626)   Independently   reviewed; No acute abnormality      Final Result by Aide Quinones MD (87/05 [id number])      No acute cardiopulmonary disease. Workstation performed: [id number]                    Procedures  ECG 12 Lead Documentation Only    Date/Time: 11/15/2023 3:26 PM    Performed by: Carlotta Gonzalez MD  Authorized by: Carlotta Gonzalez MD    ECG reviewed by me, the ED Provider: yes    Patient location:  ED  Rate:     ECG rate:  65    ECG rate assessment: normal    Rhythm:     Rhythm: sinus rhythm    Ectopy:     Ectopy: none    QRS:     QRS axis:  Normal    QRS intervals:  Normal  Conduction:     Conduction: normal    ST segments:     ST segments:  Normal  T waves:     T waves: normal             ED Course  ED Course as of 11/15/23 1630   Wed Nov 15, 2023   1530 Hemoglobin(!): 11.1  stable   1626 XR chest 2 views   1626 Medical work-up reviewed and benign. Patient likely suffering from acute bronchitis. We will treat with albuterol inhaler, Z-Richard, PCP follow-up. Patient is appropriate for discharge to home. SBIRT 22yo+      Flowsheet Row Most Recent Value   Initial Alcohol Screen: US AUDIT-C     1. How often do you have a drink containing alcohol? 0 Filed at: 11/15/2023 1415   2. How many drinks containing alcohol do you have on a typical day you are drinking? 0 Filed at: 11/15/2023 1415   3b. FEMALE Any Age, or MALE 65+: How often do you have 4 or more drinks on one occassion? 0 Filed at: 11/15/2023 1415   Audit-C Score 0 Filed at: 11/15/2023 1415   LYNN: How many times in the past year have you. .. Used an illegal drug or used a prescription medication for non-medical reasons? Never Filed at: 11/15/2023 1415                      Medical Decision Making  Chest pain, shortness of breath, hemoptysis likely secondary to pneumonia versus bronchitis  Secondary to pulmonary embolism, dysrhythmia, acute coronary syndrome. We will do cardiac work-up with D-dimer, treat symptoms, reassess    Amount and/or Complexity of Data Reviewed  Labs: ordered. Decision-making details documented in ED Course. Radiology: ordered. Decision-making details documented in ED Course. Risk  Prescription drug management. Disposition  Final diagnoses:   Acute bronchitis     Time reflects when diagnosis was documented in both MDM as applicable and the Disposition within this note       Time User Action Codes Description Comment    11/15/2023  4:28 PM Nghia Becker Add [J20.9] Acute bronchitis           ED Disposition       ED Disposition   Discharge    Condition   Stable    Date/Time   Wed Nov 15, 2023 1628    [facility] discharge to home/self care. Follow-up Information       Follow up With Specialties Details Why John Elam MD Family Medicine Schedule an appointment as soon as possible for a visit in 2 days  [address] [facility]  [address]  [address] [facility]              Patient's Medications   Discharge Prescriptions    ALBUTEROL (PROVENTIL HFA) 90 MCG/ACT INHALER    Inhale 2 puffs every 6 (six) hours as needed for wheezing       Start Date: 11/15/2023End Date: --       Order Dose: 2 puffs       Quantity: 6.7 g    Refills: 0    AZITHROMYCIN (ZITHROMAX Z-ABBEY) 250 MG TABLET    Take 2 tabs x 1 day, then 1 tab x 4 days       Start Date: 11/15/2023End Date: 11/19/2023       Order Dose: --       Quantity: 6 tablet    Refills: 0       No discharge procedures on file.     PDMP Review         Value Time User    PDMP Reviewed  Yes 6/2/2023  8:28 PM Kermit Escalante MD            ED Provider  Electronically Signed by             Kermit Escalante MD  11/15/23 5384

## 2023-12-03 ENCOUNTER — APPOINTMENT (EMERGENCY)
Dept: CT IMAGING | Facility: HOSPITAL | Age: 48
End: 2023-12-03
Payer: COMMERCIAL

## 2023-12-03 ENCOUNTER — HOSPITAL ENCOUNTER (EMERGENCY)
Facility: HOSPITAL | Age: 48
Discharge: HOME | End: 2023-12-03
Attending: EMERGENCY MEDICINE
Payer: COMMERCIAL

## 2023-12-03 VITALS
HEIGHT: 62 IN | DIASTOLIC BLOOD PRESSURE: 67 MMHG | BODY MASS INDEX: 33.27 KG/M2 | TEMPERATURE: 98 F | RESPIRATION RATE: 16 BRPM | HEART RATE: 60 BPM | SYSTOLIC BLOOD PRESSURE: 98 MMHG | WEIGHT: 180.78 LBS | OXYGEN SATURATION: 98 %

## 2023-12-03 DIAGNOSIS — J32.9 SINUSITIS: Primary | ICD-10-CM

## 2023-12-03 DIAGNOSIS — R93.89 ABNORMAL CT OF THE CHEST: ICD-10-CM

## 2023-12-03 DIAGNOSIS — F41.9 ANXIETY: ICD-10-CM

## 2023-12-03 DIAGNOSIS — R06.00 DYSPNEA: ICD-10-CM

## 2023-12-03 DIAGNOSIS — R05.9 COUGH: ICD-10-CM

## 2023-12-03 DIAGNOSIS — R31.9 HEMATURIA: ICD-10-CM

## 2023-12-03 LAB
ANION GAP SERPL CALCULATED.3IONS-SCNC: 6 MMOL/L
ATRIAL RATE: 65 BPM
BACTERIA UR QL AUTO: ABNORMAL /HPF
BASOPHILS # BLD AUTO: 0.07 THOUSANDS/ÂΜL (ref 0–0.1)
BASOPHILS NFR BLD AUTO: 1 % (ref 0–1)
BILIRUB UR QL STRIP: NEGATIVE
BNP SERPL-MCNC: 82 PG/ML (ref 0–100)
BUN SERPL-MCNC: 12 MG/DL (ref 5–25)
CALCIUM SERPL-MCNC: 8.3 MG/DL (ref 8.4–10.2)
CARDIAC TROPONIN I PNL SERPL HS: <2 NG/L
CHLORIDE SERPL-SCNC: 108 MMOL/L (ref 96–108)
CLARITY UR: ABNORMAL
CO2 SERPL-SCNC: 21 MMOL/L (ref 21–32)
COLOR UR: ABNORMAL
CREAT SERPL-MCNC: 0.86 MG/DL (ref 0.6–1.3)
D DIMER PPP FEU-MCNC: 0.77 UG/ML FEU
EOSINOPHIL # BLD AUTO: 0 THOUSAND/ÂΜL (ref 0–0.61)
EOSINOPHIL NFR BLD AUTO: 0 % (ref 0–6)
ERYTHROCYTE [DISTWIDTH] IN BLOOD BY AUTOMATED COUNT: 14 % (ref 11.6–15.1)
FLUAV RNA RESP QL NAA+PROBE: NEGATIVE
FLUBV RNA RESP QL NAA+PROBE: NEGATIVE
GFR SERPL CREATININE-BSD FRML MDRD: 81 ML/MIN/1.73SQ M
GLUCOSE SERPL-MCNC: 76 MG/DL (ref 65–140)
GLUCOSE UR STRIP-MCNC: NEGATIVE MG/DL
HCT VFR BLD AUTO: 34.3 % (ref 34.8–46.1)
HGB BLD-MCNC: 11.1 G/DL (ref 11.5–15.4)
HGB UR QL STRIP.AUTO: ABNORMAL
IMM GRANULOCYTES # BLD AUTO: 0.03 THOUSAND/UL (ref 0–0.2)
IMM GRANULOCYTES NFR BLD AUTO: 0 % (ref 0–2)
KETONES UR STRIP-MCNC: NEGATIVE MG/DL
LEUKOCYTE ESTERASE UR QL STRIP: ABNORMAL
LYMPHOCYTES # BLD AUTO: 2.38 THOUSANDS/ÂΜL (ref 0.6–4.47)
LYMPHOCYTES NFR BLD AUTO: 32 % (ref 14–44)
MCH RBC QN AUTO: 28.4 PG (ref 26.8–34.3)
MCHC RBC AUTO-ENTMCNC: 32.4 G/DL (ref 31.4–37.4)
MCV RBC AUTO: 88 FL (ref 82–98)
MONOCYTES # BLD AUTO: 0.8 THOUSAND/ÂΜL (ref 0.17–1.22)
MONOCYTES NFR BLD AUTO: 11 % (ref 4–12)
NEUTROPHILS # BLD AUTO: 4.11 THOUSANDS/ÂΜL (ref 1.85–7.62)
NEUTS SEG NFR BLD AUTO: 56 % (ref 43–75)
NITRITE UR QL STRIP: NEGATIVE
NON-SQ EPI CELLS URNS QL MICRO: ABNORMAL /HPF
NRBC BLD AUTO-RTO: 0 /100 WBCS
P AXIS: -54 DEGREES
PH UR STRIP.AUTO: 7.5 [PH] (ref 4.5–8)
PLATELET # BLD AUTO: 220 THOUSANDS/UL (ref 149–390)
PMV BLD AUTO: 8.2 FL (ref 8.9–12.7)
POTASSIUM SERPL-SCNC: 4.3 MMOL/L (ref 3.5–5.3)
PR INTERVAL: 168 MS
PROT UR STRIP-MCNC: ABNORMAL MG/DL
QRS AXIS: 66 DEGREES
QRSD INTERVAL: 84 MS
QT INTERVAL: 396 MS
QTC INTERVAL: 411 MS
RBC # BLD AUTO: 3.91 MILLION/UL (ref 3.81–5.12)
RBC #/AREA URNS AUTO: ABNORMAL /HPF
RSV RNA RESP QL NAA+PROBE: NEGATIVE
SARS-COV-2 RNA RESP QL NAA+PROBE: NEGATIVE
SODIUM SERPL-SCNC: 135 MMOL/L (ref 135–147)
SP GR UR STRIP.AUTO: 1.01 (ref 1–1.03)
T WAVE AXIS: 50 DEGREES
UROBILINOGEN UR QL STRIP.AUTO: 0.2 E.U./DL
VENTRICULAR RATE: 65 BPM
WBC # BLD AUTO: 7.39 THOUSAND/UL (ref 4.31–10.16)
WBC #/AREA URNS AUTO: ABNORMAL /HPF

## 2023-12-03 PROCEDURE — 36415 COLL VENOUS BLD VENIPUNCTURE: CPT | Performed by: PHYSICIAN ASSISTANT

## 2023-12-03 PROCEDURE — 71275 CT ANGIOGRAPHY CHEST: CPT

## 2023-12-03 PROCEDURE — 85025 COMPLETE CBC W/AUTO DIFF WBC: CPT | Performed by: PHYSICIAN ASSISTANT

## 2023-12-03 PROCEDURE — 84484 ASSAY OF TROPONIN QUANT: CPT | Performed by: PHYSICIAN ASSISTANT

## 2023-12-03 PROCEDURE — G1004 CDSM NDSC: HCPCS

## 2023-12-03 PROCEDURE — 93005 ELECTROCARDIOGRAM TRACING: CPT

## 2023-12-03 PROCEDURE — 83880 ASSAY OF NATRIURETIC PEPTIDE: CPT | Performed by: PHYSICIAN ASSISTANT

## 2023-12-03 PROCEDURE — 99285 EMERGENCY DEPT VISIT HI MDM: CPT

## 2023-12-03 PROCEDURE — 81001 URINALYSIS AUTO W/SCOPE: CPT

## 2023-12-03 PROCEDURE — 85379 FIBRIN DEGRADATION QUANT: CPT | Performed by: PHYSICIAN ASSISTANT

## 2023-12-03 PROCEDURE — 99285 EMERGENCY DEPT VISIT HI MDM: CPT | Performed by: PHYSICIAN ASSISTANT

## 2023-12-03 PROCEDURE — 80048 BASIC METABOLIC PNL TOTAL CA: CPT | Performed by: PHYSICIAN ASSISTANT

## 2023-12-03 PROCEDURE — 0241U HB NFCT DS VIR RESP RNA 4 TRGT: CPT | Performed by: PHYSICIAN ASSISTANT

## 2023-12-03 RX ORDER — FLUTICASONE PROPIONATE 50 MCG
1 SPRAY, SUSPENSION (ML) NASAL DAILY
Qty: 16 G | Refills: 0 | Status: SHIPPED | OUTPATIENT
Start: 2023-12-03

## 2023-12-03 RX ORDER — BENZONATATE 100 MG/1
100 CAPSULE ORAL 3 TIMES DAILY PRN
Qty: 21 CAPSULE | Refills: 0 | Status: SHIPPED | OUTPATIENT
Start: 2023-12-03

## 2023-12-03 RX ORDER — ALPRAZOLAM 0.5 MG/1
1 TABLET ORAL ONCE
Status: COMPLETED | OUTPATIENT
Start: 2023-12-03 | End: 2023-12-03

## 2023-12-03 RX ORDER — ALPRAZOLAM 0.5 MG/1
2 TABLET ORAL ONCE
Status: DISCONTINUED | OUTPATIENT
Start: 2023-12-03 | End: 2023-12-03

## 2023-12-03 RX ORDER — AMOXICILLIN AND CLAVULANATE POTASSIUM 875; 125 MG/1; MG/1
1 TABLET, FILM COATED ORAL EVERY 12 HOURS
Qty: 14 TABLET | Refills: 0 | Status: SHIPPED | OUTPATIENT
Start: 2023-12-03 | End: 2023-12-10

## 2023-12-03 RX ORDER — HYDROXYZINE HYDROCHLORIDE 25 MG/1
25 TABLET, FILM COATED ORAL 2 TIMES DAILY PRN
Qty: 12 TABLET | Refills: 0 | Status: SHIPPED | OUTPATIENT
Start: 2023-12-03

## 2023-12-03 RX ADMIN — IOHEXOL 85 ML: 350 INJECTION, SOLUTION INTRAVENOUS at 18:12

## 2023-12-03 RX ADMIN — ALPRAZOLAM 1 MG: 0.5 TABLET ORAL at 16:59

## 2023-12-03 NOTE — ED PROVIDER NOTES
History  Chief Complaint   Patient presents with    Shortness of Breath     Patient arrives via EMS with reports of SOB. Pt states she was recently seen for a URI. [facility] is a 56 yo F presenting with shortness of breath which has been ongoing for several weeks. Seen in ED on 11/15, underwent workup including labs/EKG, CXR, diagnosed with bronchitis and noted improvement with prescribed azithromycin. Notes shortly after finishing course however cough again seemed to worsen and also reports sinus congestion/pressure for about 10 days. No further blood in sputum as she had at previous evaluation. Notes subjective fevers/chills at this time. Reports dyspnea with activity and chest tightness which she correlates only with anxiety and not with activity. No vomiting or diarrhea. Reports chronic b/l LE edema but improved with compression socks. No pleuritic chest pain. She requests xanax for anxiety as it is severe currently, takes klonopin daily but reportedly has not had today. She also notes hoarseness for days-weeks which she attributes to cough. History provided by:  Patient   used: No            Prior to Admission Medications   Prescriptions Last Dose Informant Patient Reported? Taking? ARIPiprazole (ABILIFY) 15 mg tablet   No No   Sig: Take 1 tablet (15 mg total) by mouth daily for 14 days   FLUoxetine (PROzac) 40 MG capsule   No No   Sig: Take 2 capsules (80 mg total) by mouth daily Do not start before April 21, 2023.    albuterol (Proventil HFA) 90 mcg/act inhaler   No No   Sig: Inhale 2 puffs every 6 (six) hours as needed for wheezing   buPROPion (WELLBUTRIN XL) 300 mg 24 hr tablet   Yes No   Sig: Take 300 mg by mouth daily   buPROPion (Wellbutrin XL) 300 mg 24 hr tablet   No No   Sig: Take 1 tablet (300 mg total) by mouth daily   clonazePAM (KlonoPIN) 1 mg tablet   No No   Sig: Take 1 tablet (1 mg total) by mouth 4 (four) times a day for 7 days   famotidine (PEPCID) 20 mg tablet No No   Sig: Take 1 tablet (20 mg total) by mouth 2 (two) times a day   ferrous sulfate 325 (65 Fe) mg tablet   No No   Sig: Take 1 tablet (325 mg total) by mouth daily with breakfast   furosemide (LASIX) 40 mg tablet   No No   Sig: Take 1 tablet (40 mg total) by mouth 2 (two) times a day   gabapentin (NEURONTIN) 300 mg capsule   No No   Sig: Take 2 capsules (600 mg total) by mouth 3 (three) times a day   hydrOXYzine HCL (ATARAX) 25 mg tablet   No No   Sig: Take 1 tablet (25 mg total) by mouth every 6 (six) hours   naloxone (NARCAN) 4 mg/0.1 mL nasal spray   Yes No   Si mg into each nostril   Patient not taking: Reported on 11/15/2023   nicotine (NICODERM CQ) 14 mg/24hr TD 24 hr patch   No No   Sig: Place 1 patch on the skin every 24 hours   pantoprazole (PROTONIX) 40 mg tablet   No No   Sig: Take 1 tablet (40 mg total) by mouth daily in the early morning   traZODone (DESYREL) 50 mg tablet   No No   Sig: Take 1 tablet (50 mg total) by mouth daily at bedtime   venlafaxine (EFFEXOR-XR) 37.5 mg 24 hr capsule   No No   Sig: Take 1 capsule (37.5 mg total) by mouth daily      Facility-Administered Medications: None       Past Medical History:   Diagnosis Date    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     E-Cigarette/Vaping    E-Cigarette Use Current Every Day User     Cartridges/Day 2      E-Cigarette/Vaping Substances    Nicotine No     THC No     CBD No     Flavoring No     Other No     Unknown No      Social History     Tobacco Use    Smoking status: Every Day     Packs/day: 0.50     Years: 20.00 Total pack years: 10.00     Types: Cigarettes    Smokeless tobacco: Current   Vaping Use    Vaping Use: Every day   Substance Use Topics    Alcohol use: Not Currently     Comment: MAGDALENO, pt historically inconsistent. Drinking  per [facility] 2    Drug use: Not Currently     Types: Heroin, "Crack" cocaine, Cocaine, LSD, Benzodiazepines, Marijuana     Comment: Pt unreliable historian       Review of Systems   Constitutional:  Positive for chills and fever. HENT:  Positive for congestion, rhinorrhea, sore throat and voice change. Negative for ear pain. Eyes:  Negative for pain and visual disturbance. Respiratory:  Positive for cough and shortness of breath. Negative for wheezing. Cardiovascular:  Positive for chest pain. Negative for palpitations. Gastrointestinal:  Negative for abdominal pain, diarrhea, nausea and vomiting. Genitourinary:  Negative for dysuria, frequency and urgency. Musculoskeletal:  Negative for back pain, neck pain and neck stiffness. Skin:  Negative for rash and wound. Neurological:  Negative for dizziness, weakness, light-headedness and numbness. Psychiatric/Behavioral:  The patient is nervous/anxious. Physical Exam  Physical Exam  Constitutional:       General: She is not in acute distress. Appearance: She is well-developed. She is not toxic-appearing or diaphoretic. HENT:      Head: Normocephalic and atraumatic. Right Ear: External ear normal.      Left Ear: External ear normal.   Eyes:      Extraocular Movements:      Right eye: Normal extraocular motion. Left eye: Normal extraocular motion. Conjunctiva/sclera: Conjunctivae normal.      Pupils: Pupils are equal, round, and reactive to light. Cardiovascular:      Rate and Rhythm: Normal rate and regular rhythm. Comments: 1+ b/l symmetric LE edema  Pulmonary:      Effort: Pulmonary effort is normal. No accessory muscle usage or respiratory distress. Breath sounds:  No wheezing, rhonchi or rales. Abdominal:      General: Abdomen is flat. There is no distension. Tenderness: There is no abdominal tenderness. There is no guarding. Musculoskeletal:      Cervical back: Normal range of motion. No rigidity. Skin:     General: Skin is warm and dry. Capillary Refill: Capillary refill takes less than 2 seconds. Findings: No erythema or rash. Neurological:      Mental Status: She is alert and oriented to person, place, and time. Motor: No abnormal muscle tone. Coordination: Coordination normal.   Psychiatric:         Mood and Affect: Mood is anxious. Behavior: Behavior normal.         Thought Content:  Thought content normal.         Judgment: Judgment normal.         Vital Signs  ED Triage Vitals   Temperature Pulse Respirations Blood Pressure SpO2   12/03/23 1523 12/03/23 1523 12/03/23 1523 12/03/23 1523 12/03/23 1523   98 °F (36.7 °C) 71 18 127/87 98 %      Temp Source Heart Rate Source Patient Position - Orthostatic VS BP Location FiO2 (%)   12/03/23 1523 12/03/23 1523 12/03/23 1523 12/03/23 1523 --   Oral Monitor Sitting Left arm       Pain Score       12/03/23 2017       No Pain           Vitals:    12/03/23 1523 12/03/23 1805 12/03/23 2017   BP: 127/87 103/57 98/67   Pulse: 71 65 60   Patient Position - Orthostatic VS: Sitting Lying Lying         Visual Acuity      ED Medications  Medications   ALPRAZolam (XANAX) tablet 1 mg (1 mg Oral Given 12/3/23 1659)   iohexol (OMNIPAQUE) 350 MG/ML injection (MULTI-DOSE) 85 mL (85 mL Intravenous Given 12/3/23 1812)       Diagnostic Studies  Results Reviewed       Procedure Component Value Units Date/Time    FLU/RSV/COVID - if FLU/RSV clinically relevant [[id number]]  (Normal) Collected: 12/03/23 1639    Lab Status: Final result Specimen: Nares from Nose Updated: 12/03/23 1739     SARS-CoV-2 Negative     INFLUENZA A PCR Negative     INFLUENZA B PCR Negative     RSV PCR Negative    Narrative:      FOR PEDIATRIC PATIENTS - copy/paste COVID Guidelines URL to browser: [email or web address]    SARS-CoV-2 assay is a Nucleic Acid Amplification assay intended for the  qualitative detection of nucleic acid from SARS-CoV-2 in nasopharyngeal  swabs. Results are for the presumptive identification of SARS-CoV-2 RNA. Positive results are indicative of infection with SARS-CoV-2, the virus  causing COVID-19, but do not rule out bacterial infection or co-infection  with other viruses. Laboratories within the [facility] and its  territories are required to report all positive results to the appropriate  public health authorities. Negative results do not preclude SARS-CoV-2  infection and should not be used as the sole basis for treatment or other  patient management decisions. Negative results must be combined with  clinical observations, patient history, and epidemiological information. This test has not been FDA cleared or approved. This test has been authorized by FDA under an Emergency Use Authorization  (EUA). This test is only authorized for the duration of time the  declaration that circumstances exist justifying the authorization of the  emergency use of an in vitro diagnostic tests for detection of SARS-CoV-2  virus and/or diagnosis of COVID-19 infection under section 564(b)(1) of  the Act, 21 U. S.C. 621RTU-5(L)(7), unless the authorization is terminated  or revoked sooner. The test has been validated but independent review by FDA  and CLIA is pending. Test performed using [employer] GeneXpert: This RT-PCR assay targets N2,  a region unique to SARS-CoV-2. A conserved region in the E-gene was chosen  for pan-Sarbecovirus detection which includes SARS-CoV-2. According to CMS-2020-01-R, this platform meets the definition of high-throughput technology.     HS Troponin 0hr (reflex protocol) [[id number]]  (Normal) Collected: 12/03/23 1469    Lab Status: Final result Specimen: Blood from Arm, Right Updated: 12/03/23 1713     hs TnI 0hr <2 ng/L     B-Type Natriuretic Peptide(BNP) [[id number]]  (Normal) Collected: 12/03/23 1639    Lab Status: Final result Specimen: Blood from Arm, Right Updated: 12/03/23 1712     BNP 82 pg/mL     Urine Microscopic [[id number]]  (Abnormal) Collected: 12/03/23 1642    Lab Status: Final result Specimen: Urine, Clean Catch Updated: 12/03/23 1711     RBC, UA Innumerable /hpf      WBC, UA 4-10 /hpf      Epithelial Cells Occasional /hpf      Bacteria, UA None Seen /hpf     D-dimer, quantitative [[id number]]  (Abnormal) Collected: 12/03/23 1639    Lab Status: Final result Specimen: Blood from Arm, Right Updated: 12/03/23 1709     D-Dimer, Quant 0.77 ug/ml FEU     Basic metabolic panel [[id number]]  (Abnormal) Collected: 12/03/23 1639    Lab Status: Final result Specimen: Blood from Arm, Right Updated: 12/03/23 1708     Sodium 135 mmol/L      Potassium 4.3 mmol/L      Chloride 108 mmol/L      CO2 21 mmol/L      ANION GAP 6 mmol/L      BUN 12 mg/dL      Creatinine 0.86 mg/dL      Glucose 76 mg/dL      Calcium 8.3 mg/dL      eGFR 81 ml/min/1.73sq m     Narrative:      [facility] guidelines for [facility] Kid[facility] Disease (CKD):     Stage 1 with normal or high GFR (GFR > 90 mL/min/1.73 square meters)    Stage 2 Mild CKD (GFR = 60-89 mL/min/1.73 square meters)    Stage 3A Moderate CKD (GFR = 45-59 mL/min/1.73 square meters)    Stage 3B Moderate CKD (GFR = 30-44 mL/min/1.73 square meters)    Stage 4 Severe CKD (GFR = 15-29 mL/min/1.73 square meters)    Stage 5 End Stage CKD (GFR <15 mL/min/1.73 square meters)  Note: GFR calculation is accurate only with a steady state creatinine    CBC and differential [[id number]]  (Abnormal) Collected: 12/03/23 1639    Lab Status: Final result Specimen: Blood from Arm, Right Updated: 12/03/23 1651     WBC 7.39 Thousand/uL      RBC 3.91 Million/uL      Hemoglobin 11.1 g/dL      Hematocrit 34.3 %      MCV 88 fL      MCH 28.4 pg MCHC 32.4 g/dL      RDW 14.0 %      MPV 8.2 fL      Platelets 447 Thousands/uL      nRBC 0 /100 WBCs      Neutrophils Relative 56 %      Immat GRANS % 0 %      Lymphocytes Relative 32 %      Monocytes Relative 11 %      Eosinophils Relative 0 %      Basophils Relative 1 %      Neutrophils Absolute 4.11 Thousands/µL      Immature Grans Absolute 0.03 Thousand/uL      Lymphocytes Absolute 2.38 Thousands/µL      Monocytes Absolute 0.80 Thousand/µL      Eosinophils Absolute 0.00 Thousand/µL      Basophils Absolute 0.07 Thousands/µL     Urine Macroscopic, POC [[id number]]  (Abnormal) Collected: 12/03/23 1642    Lab Status: Final result Specimen: Urine Updated: 12/03/23 1644     Color, UA Orange     Clarity, UA Turbid     pH, UA 7.5     Leukocytes, UA Trace     Nitrite, UA Negative     Protein, UA 30 (1+) mg/dl      Glucose, UA Negative mg/dl      Ketones, UA Negative mg/dl      Urobilinogen, UA 0.2 E.U./dl      Bilirubin, UA Negative     Occult Blood, UA Large     Specific Gravity, UA 1.015    Narrative:      CLINITEK RESULT                   CTA ED chest PE Study   Final Result by Hakeem Bustillos MD (12/03 1948)      No evidence for pulmonary embolus      No consolidation or effusion      Stable mild mediastinal and hilar adenopathy again possibly in the setting of underlying granulomatous disease i.e. sarcoidosis. Reflux of contrast into the IVC/hepatic veins suggesting component of right heart failure. Correlate clinically.       Workstation performed: [id number]                    Procedures  ECG 12 Lead Documentation Only    Date/Time: 12/3/2023 4:45 PM    Performed by: Jewels Ford PA-C  Authorized by: Jewels Ford PA-C    Indications / Diagnosis:  Chest discomfort  ECG reviewed by me, the ED Provider: yes    Patient location:  ED  Previous ECG:     Previous ECG:  Compared to current    Similarity:  No change    Comparison to cardiac monitor: Yes    Interpretation:     Interpretation: normal Rate:     ECG rate:  65    ECG rate assessment: normal    Rhythm:     Rhythm: sinus rhythm    Ectopy:     Ectopy: none    QRS:     QRS axis:  Normal    QRS intervals:  Normal  Conduction:     Conduction: normal    ST segments:     ST segments:  Normal  T waves:     T waves: normal             ED Course                               SBIRT 22yo+      Flowsheet Row Most Recent Value   Initial Alcohol Screen: US AUDIT-C     1. How often do you have a drink containing alcohol? 0 Filed at: 12/03/2023 1526   2. How many drinks containing alcohol do you have on a typical day you are drinking? 0 Filed at: 12/03/2023 1526   3b. FEMALE Any Age, or MALE 65+: How often do you have 4 or more drinks on one occassion? 0 Filed at: 12/03/2023 1526   Audit-C Score 0 Filed at: 12/03/2023 1526   LYNN: How many times in the past year have you. .. Used an illegal drug or used a prescription medication for non-medical reasons? Never Filed at: 12/03/2023 Gerry Heard Criteria for PE      Flowsheet Row Most Recent Value   Junior' Criteria for PE    Clinical signs and symptoms of DVT 0 Filed at: 12/03/2023 1714   PE is primary diagnosis or equally likely 0 Filed at: 12/03/2023 1714   HR >100 0 Filed at: 12/03/2023 1714   Immobilization at least 3 days or Surgery in the previous 4 weeks 0 Filed at: 12/03/2023 1714   Previous, objectively diagnosed PE or DVT 0 Filed at: 12/03/2023 1714   Hemoptysis 1 Filed at: 12/03/2023 1714   Malignancy with treatment within 6 months or palliative 0 Filed at: 12/03/2023 1714   Junior' Criteria Total 1 Filed at: 12/03/2023 [address]  Pt presenting with ongoing dyspnea, cough as well as sinus pressure/congestion for about the past 10 days. She also notes being anxious and requests medication for same. Notes associated chest tightness which she correlates with anxiety. EKG shows NSR without ischemic changes/ectopy. Troponin for cardiac ischemia WNL.  D-dimer slightly elevated - subsequent CTA chest PE study reveals no PE, stable mediastinal/hilar LAD suspicious for granulomatous disease - pt advised to f/u with PCP and pulmonology as directed at previous admission for this finding. Questionable evidence of heart failure but normal BNP, felt unlikely clinically. COVID19/flu/RSV testing negative. Plan for d/c with augmentin, flonase for sinusitis. Follow up with PCP as well as pulmonology recommended for re-evaluation of CT chest findings. Pt verbalizes understanding. Requests atarax for anxiety, will give short course but caution advised in combining with other anxiety medications or any alcohol/substances. Hematuria noted on labs - pt reports current vaginal bleeding so likely from that source, although advised f/u with PCP to ensure resolution of hematuria. Return to ED indications reviewed. Amount and/or Complexity of Data Reviewed  Labs: ordered. Radiology: ordered. Risk  Prescription drug management. Disposition  Final diagnoses:   Sinusitis   Cough   Dyspnea   Hematuria   Abnormal CT of the chest   Anxiety     Time reflects when diagnosis was documented in both MDM as applicable and the Disposition within this note       Time User Action Codes Description Comment    12/3/2023  8:41 PM Tyler Itzel [J32.9] Sinusitis     12/3/2023  8:41 PM Marta Bharati Add [R05.9] Cough     12/3/2023  8:41 PM Marta Bharati Add [R06.00] Dyspnea     12/3/2023  8:41 PM Marta Bharati Add [R31.9] Hematuria     12/3/2023  8:41 PM Marta Bharati Add [R93.89] Abnormal CT of the chest     12/3/2023  8:53 PM Marta Bharati Add [F41.9] Anxiety           ED Disposition       ED Disposition   Discharge    Condition   Stable    Date/Time   Sun Dec 3, 2023 2041    [address] discharge to home/self care.                    Follow-up Information       Follow up With Specialties Details Why Contact Info Additional Information [address] [facility] Emergency Medicine  If symptoms worsen [address] [address]  [address] [facility] Emergency Department, [address] Ifeanyi Anglin MD Family Medicine Schedule an appointment as soon as possible for a visit   [address] [facility]  [address]  [address] [facility]               Patient's Medications   Discharge Prescriptions    AMOXICILLIN-CLAVULANATE (AUGMENTIN) 875-125 MG PER TABLET    Take 1 tablet by mouth every 12 (twelve) hours for 7 days       Start Date: 12/3/2023 End Date: 12/10/2023       Order Dose: 1 tablet       Quantity: 14 tablet    Refills: 0    BENZONATATE (TESSALON PERLES) 100 MG CAPSULE    Take 1 capsule (100 mg total) by mouth 3 (three) times a day as needed for cough       Start Date: 12/3/2023 End Date: --       Order Dose: 100 mg       Quantity: 21 capsule    Refills: 0    FLUTICASONE (FLONASE) 50 MCG/ACT NASAL SPRAY    1 spray into each nostril daily       Start Date: 12/3/2023 End Date: --       Order Dose: 1 spray       Quantity: 16 g    Refills: 0    HYDROXYZINE HCL (ATARAX) 25 MG TABLET    Take 1 tablet (25 mg total) by mouth 2 (two) times a day as needed for anxiety       Start Date: 12/3/2023 End Date: --       Order Dose: 25 mg       Quantity: 12 tablet    Refills: 0           PDMP Review         Value Time User    PDMP Reviewed  Yes 12/3/2023  4:23 PM Titi Beltrán PA-C            ED Provider  Electronically Signed by             Titi Beltrán PA-C  12/03/23 5140

## 2023-12-04 NOTE — DISCHARGE INSTRUCTIONS
Please refer to the attached information for strict return instructions. If symptoms worsen or new symptoms develop please return to the ER. Please follow up with your primary care physician concerning abnormal CT findings of your chest as well as for blood in your urine. Use prescribed antibiotic for full course for sinusitis.

## 2023-12-15 ENCOUNTER — HOSPITAL ENCOUNTER (EMERGENCY)
Facility: HOSPITAL | Age: 48
Discharge: HOME | End: 2023-12-15
Attending: EMERGENCY MEDICINE
Payer: COMMERCIAL

## 2023-12-15 VITALS
HEART RATE: 93 BPM | TEMPERATURE: 98.4 F | OXYGEN SATURATION: 99 % | WEIGHT: 175 LBS | DIASTOLIC BLOOD PRESSURE: 70 MMHG | SYSTOLIC BLOOD PRESSURE: 136 MMHG | BODY MASS INDEX: 32.01 KG/M2 | RESPIRATION RATE: 18 BRPM

## 2023-12-15 DIAGNOSIS — F41.9 ANXIETY: ICD-10-CM

## 2023-12-15 DIAGNOSIS — Z76.0 ENCOUNTER FOR MEDICATION REFILL: Primary | ICD-10-CM

## 2023-12-15 DIAGNOSIS — R05.3 CHRONIC COUGH: ICD-10-CM

## 2023-12-15 DIAGNOSIS — K59.09 CHRONIC CONSTIPATION: ICD-10-CM

## 2023-12-15 PROCEDURE — 99281 EMR DPT VST MAYX REQ PHY/QHP: CPT

## 2023-12-15 PROCEDURE — 99284 EMERGENCY DEPT VISIT MOD MDM: CPT | Performed by: EMERGENCY MEDICINE

## 2023-12-15 RX ORDER — POLYETHYLENE GLYCOL 3350 17 G/17G
17 POWDER, FOR SOLUTION ORAL DAILY
Qty: 225 G | Refills: 0 | Status: SHIPPED | OUTPATIENT
Start: 2023-12-15

## 2023-12-15 RX ORDER — GUAIFENESIN 600 MG/1
1200 TABLET, EXTENDED RELEASE ORAL EVERY 12 HOURS SCHEDULED
Qty: 120 TABLET | Refills: 0 | Status: SHIPPED | OUTPATIENT
Start: 2023-12-15 | End: 2024-01-14

## 2023-12-15 RX ORDER — CLONAZEPAM 1 MG/1
1 TABLET ORAL ONCE
Status: COMPLETED | OUTPATIENT
Start: 2023-12-15 | End: 2023-12-15

## 2023-12-15 RX ORDER — CLONAZEPAM 1 MG/1
1 TABLET ORAL 2 TIMES DAILY
Qty: 12 TABLET | Refills: 0 | Status: SHIPPED | OUTPATIENT
Start: 2023-12-16 | End: 2023-12-22

## 2023-12-15 RX ADMIN — CLONAZEPAM 1 MG: 1 TABLET ORAL at 18:22
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
## 2023-12-15 NOTE — ED NOTES
Pt requested that rx be changed to [facility]. Advised per Dr Blayne Shirley unable to change pharmacy once controlled Rx sent.   Pt left     Bernda Kenney RN  12/15/23 2858

## 2023-12-29 ENCOUNTER — HOSPITAL ENCOUNTER (EMERGENCY)
Facility: HOSPITAL | Age: 48
Discharge: HOME | End: 2023-12-29
Attending: EMERGENCY MEDICINE
Payer: COMMERCIAL

## 2023-12-29 VITALS
OXYGEN SATURATION: 99 % | SYSTOLIC BLOOD PRESSURE: 132 MMHG | HEART RATE: 92 BPM | RESPIRATION RATE: 14 BRPM | TEMPERATURE: 98.4 F | DIASTOLIC BLOOD PRESSURE: 99 MMHG

## 2023-12-29 DIAGNOSIS — F41.9 ANXIETY: Primary | ICD-10-CM

## 2023-12-29 PROCEDURE — 99284 EMERGENCY DEPT VISIT MOD MDM: CPT | Performed by: PHYSICIAN ASSISTANT

## 2023-12-29 PROCEDURE — 99283 EMERGENCY DEPT VISIT LOW MDM: CPT

## 2023-12-29 RX ORDER — HYDROXYZINE HYDROCHLORIDE 25 MG/1
25 TABLET, FILM COATED ORAL EVERY 6 HOURS PRN
Qty: 30 TABLET | Refills: 0 | Status: SHIPPED | OUTPATIENT
Start: 2023-12-29

## 2023-12-29 RX ORDER — HYDROXYZINE HYDROCHLORIDE 25 MG/1
25 TABLET, FILM COATED ORAL ONCE
Status: COMPLETED | OUTPATIENT
Start: 2023-12-29 | End: 2023-12-29

## 2023-12-29 RX ADMIN — HYDROXYZINE HYDROCHLORIDE 25 MG: 25 TABLET, FILM COATED ORAL at 20:35

## 2023-12-30 NOTE — ED PROVIDER NOTES
History  Chief Complaint   Patient presents with    Anxiety     Pt reports weaning herself off using her anxiety medications all the time and reports that she would like a dose of atarax to help mange her anxiety that she is having tonight      This is a 48 year old female presenting to the ED for anxiety. She states she is in the process of weaning off her Klonipin, reportedly with the help of her provider. She states that since she is weaning off she is having episodes of break through anxiety. She is requesting atarax now and script to the pharmacy. No other complaints at this time.      History provided by:  Patient   used: No        Prior to Admission Medications   Prescriptions Last Dose Informant Patient Reported? Taking?   ARIPiprazole (ABILIFY) 15 mg tablet   No No   Sig: Take 1 tablet (15 mg total) by mouth daily for 14 days   FLUoxetine (PROzac) 40 MG capsule   No No   Sig: Take 2 capsules (80 mg total) by mouth daily Do not start before April 21, 2023.   albuterol (Proventil HFA) 90 mcg/act inhaler   No No   Sig: Inhale 2 puffs every 6 (six) hours as needed for wheezing   benzonatate (TESSALON PERLES) 100 mg capsule   No No   Sig: Take 1 capsule (100 mg total) by mouth 3 (three) times a day as needed for cough   buPROPion (WELLBUTRIN XL) 300 mg 24 hr tablet   Yes No   Sig: Take 300 mg by mouth daily   buPROPion (Wellbutrin XL) 300 mg 24 hr tablet   No No   Sig: Take 1 tablet (300 mg total) by mouth daily   clonazePAM (KlonoPIN) 1 mg tablet   No No   Sig: Take 1 tablet (1 mg total) by mouth 4 (four) times a day for 7 days   clonazePAM (KlonoPIN) 1 mg tablet   No No   Sig: Take 1 tablet (1 mg total) by mouth 2 (two) times a day for 6 days Do not start before December 16, 2023.   famotidine (PEPCID) 20 mg tablet   No No   Sig: Take 1 tablet (20 mg total) by mouth 2 (two) times a day   ferrous sulfate 325 (65 Fe) mg tablet   No No   Sig: Take 1 tablet (325 mg total) by mouth daily with  breakfast   fluticasone (FLONASE) 50 mcg/act nasal spray   No No   Si spray into each nostril daily   furosemide (LASIX) 40 mg tablet   No No   Sig: Take 1 tablet (40 mg total) by mouth 2 (two) times a day   gabapentin (NEURONTIN) 300 mg capsule   No No   Sig: Take 2 capsules (600 mg total) by mouth 3 (three) times a day   guaiFENesin (MUCINEX) 600 mg 12 hr tablet   No No   Sig: Take 2 tablets (1,200 mg total) by mouth every 12 (twelve) hours   hydrOXYzine HCL (ATARAX) 25 mg tablet   No No   Sig: Take 1 tablet (25 mg total) by mouth every 6 (six) hours   hydrOXYzine HCL (ATARAX) 25 mg tablet   No No   Sig: Take 1 tablet (25 mg total) by mouth 2 (two) times a day as needed for anxiety   naloxone (NARCAN) 4 mg/0.1 mL nasal spray   Yes No   Si mg into each nostril   Patient not taking: Reported on 11/15/2023   nicotine (NICODERM CQ) 14 mg/24hr TD 24 hr patch   No No   Sig: Place 1 patch on the skin every 24 hours   pantoprazole (PROTONIX) 40 mg tablet   No No   Sig: Take 1 tablet (40 mg total) by mouth daily in the early morning   polyethylene glycol (GLYCOLAX) 17 GM/SCOOP powder   No No   Sig: Take 17 g by mouth daily   traZODone (DESYREL) 50 mg tablet   No No   Sig: Take 1 tablet (50 mg total) by mouth daily at bedtime   venlafaxine (EFFEXOR-XR) 37.5 mg 24 hr capsule   No No   Sig: Take 1 capsule (37.5 mg total) by mouth daily      Facility-Administered Medications: None       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    E-Cigarette/Vaping    E-Cigarette Use  "Current Every Day User     Cartridges/Day 2      E-Cigarette/Vaping Substances    Nicotine No     THC No     CBD No     Flavoring No     Other No     Unknown No      Social History     Tobacco Use    Smoking status: Some Days     Current packs/day: 0.50     Average packs/day: 0.5 packs/day for 20.0 years (10.0 ttl pk-yrs)     Types: Cigarettes    Smokeless tobacco: Current   Vaping Use    Vaping status: Every Day   Substance Use Topics    Alcohol use: Not Currently     Comment: MAGDALENO, pt historically inconsistent. Drinking  per [facility] 2    Drug use: Not Currently     Types: Heroin, \"Crack\" cocaine, Cocaine, LSD, Benzodiazepines, Marijuana     Comment: Pt unreliable historian       Review of Systems   Psychiatric/Behavioral:  The patient is nervous/anxious.    All other systems reviewed and are negative.      Physical Exam  Physical Exam  Vitals reviewed.   Constitutional:       General: She is not in acute distress.     Appearance: Normal appearance. She is well-developed and well-groomed. She is not ill-appearing.   HENT:      Head: Normocephalic and atraumatic.      Right Ear: External ear normal.      Left Ear: External ear normal.      Nose: Nose normal.   Eyes:      General: No scleral icterus.     Conjunctiva/sclera: Conjunctivae normal.   Cardiovascular:      Rate and Rhythm: Normal rate and regular rhythm.   Pulmonary:      Effort: Pulmonary effort is normal.      Breath sounds: No stridor.   Abdominal:      General: There is no distension.   Musculoskeletal:         General: No deformity. Normal range of motion.      Cervical back: Normal range of motion.   Skin:     Coloration: Skin is not jaundiced or pale.      Findings: No lesion or rash.   Neurological:      Mental Status: She is alert and oriented to person, place, and time.   Psychiatric:         Attention and Perception: Attention normal.         Mood and Affect: Mood is anxious.         Speech: Speech is tangential.         Behavior: " Behavior is hyperactive. Behavior is cooperative.         Thought Content: Thought content normal.         Cognition and Memory: Cognition normal.         Judgment: Judgment normal.         Vital Signs  ED Triage Vitals [12/29/23 2013]   Temperature Pulse Respirations Blood Pressure SpO2   98.4 °F (36.9 °C) 92 14 132/99 99 %      Temp src Heart Rate Source Patient Position - Orthostatic VS BP Location FiO2 (%)   -- Monitor Sitting Right arm --      Pain Score       --           Vitals:    12/29/23 2013   BP: 132/99   Pulse: 92   Patient Position - Orthostatic VS: Sitting         Visual Acuity      ED Medications  Medications   hydrOXYzine HCL (ATARAX) tablet 25 mg (25 mg Oral Given 12/29/23 2035)       Diagnostic Studies  Results Reviewed       None                   No orders to display              Procedures  Procedures         ED Course                                             Medical Decision Making      DDx including but not limited to: anxiety, panic attack, substance abuse, depression; doubt suicidal ideation or metabolic abnormality or cardiac etiology or PE.     Pt presenting to the ED for anxiety. She has script for Klonopin that she is weaning off of. She is requesting script for Atarax. She offers no complaints at this time. Will provide with script for atarax and give dose here. Recommend pt follow up with her psychiatrist.    Prior to discharge, discharge instructions were discussed with patient at bedside. Patient was provided both verbal and written instructions. Patient is understanding of the discharge instructions and is agreeable to plan of care. Return precautions were discussed with patient bedside, patient verbalized understanding of signs and symptoms that would necessitate return to the ED. All questions were answered. Patient was comfortable with the plan of care and discharged to home.     Dispo: discharge home with follow up to Psychiatry. Patient stable, in no acute distress and  non-toxic at discharge.    Problems Addressed:  Anxiety: acute illness or injury    Risk  Prescription drug management.             Disposition  Final diagnoses:   Anxiety     Time reflects when diagnosis was documented in both MDM as applicable and the Disposition within this note       Time User Action Codes Description Comment    12/29/2023  8:25 PM CulpLynn sloan Add [F41.9] Anxiety           ED Disposition       ED Disposition   Discharge    Condition   Stable    Date/Time   Fri Dec 29, 2023  8:26 PM    Comment   Carey Kieth discharge to home/self care.             Follow-up Information       Follow up With Specialties Details Why Contact Info    Grace Dawn MD Family Medicine Schedule an appointment as soon as possible for a visit  As needed [address] [place] [address]  [phone]              Discharge Medication List as of 12/29/2023  8:28 PM        START taking these medications    Details   !! hydrOXYzine HCL (ATARAX) 25 mg tablet Take 1 tablet (25 mg total) by mouth every 6 (six) hours as needed for anxiety, Starting Fri 12/29/2023, Normal       !! - Potential duplicate medications found. Please discuss with provider.        CONTINUE these medications which have NOT CHANGED    Details   albuterol (Proventil HFA) 90 mcg/act inhaler Inhale 2 puffs every 6 (six) hours as needed for wheezing, Starting Wed 11/15/2023, Normal      ARIPiprazole (ABILIFY) 15 mg tablet Take 1 tablet (15 mg total) by mouth daily for 14 days, Starting Tue 10/11/2022, Until Tue 10/25/2022, Normal      benzonatate (TESSALON PERLES) 100 mg capsule Take 1 capsule (100 mg total) by mouth 3 (three) times a day as needed for cough, Starting Sun 12/3/2023, Normal      !! buPROPion (WELLBUTRIN XL) 300 mg 24 hr tablet Take 300 mg by mouth daily, Historical Med      !! buPROPion (Wellbutrin XL) 300 mg 24 hr tablet Take 1 tablet (300 mg total) by mouth daily, Starting Mon 10/30/2023, Normal      clonazePAM  (KlonoPIN) 1 mg tablet Take 1 tablet (1 mg total) by mouth 4 (four) times a day for 7 days, Starting Tue 6/20/2023, Until Wed 11/15/2023, Normal      clonazePAM (KlonoPIN) 1 mg tablet Take 1 tablet (1 mg total) by mouth 2 (two) times a day for 6 days Do not start before December 16, 2023., Starting Sat 12/16/2023, Until Fri 12/22/2023, Normal      famotidine (PEPCID) 20 mg tablet Take 1 tablet (20 mg total) by mouth 2 (two) times a day, Starting Tue 10/11/2022, Until Thu 11/10/2022, Normal      ferrous sulfate 325 (65 Fe) mg tablet Take 1 tablet (325 mg total) by mouth daily with breakfast, Starting Tue 10/11/2022, Until Wed 11/15/2023, Normal      FLUoxetine (PROzac) 40 MG capsule Take 2 capsules (80 mg total) by mouth daily Do not start before April 21, 2023., Starting Fri 4/21/2023, Until Sun 5/21/2023, Normal      fluticasone (FLONASE) 50 mcg/act nasal spray 1 spray into each nostril daily, Starting Sun 12/3/2023, Normal      furosemide (LASIX) 40 mg tablet Take 1 tablet (40 mg total) by mouth 2 (two) times a day, Starting Thu 4/20/2023, Until Sat 5/20/2023, Normal      gabapentin (NEURONTIN) 300 mg capsule Take 2 capsules (600 mg total) by mouth 3 (three) times a day, Starting Tue 10/11/2022, Until Thu 11/10/2022, Normal      guaiFENesin (MUCINEX) 600 mg 12 hr tablet Take 2 tablets (1,200 mg total) by mouth every 12 (twelve) hours, Starting Fri 12/15/2023, Until Sun 1/14/2024, Normal      !! hydrOXYzine HCL (ATARAX) 25 mg tablet Take 1 tablet (25 mg total) by mouth every 6 (six) hours, Starting Thu 10/20/2022, Normal      !! hydrOXYzine HCL (ATARAX) 25 mg tablet Take 1 tablet (25 mg total) by mouth 2 (two) times a day as needed for anxiety, Starting Sun 12/3/2023, Normal      naloxone (NARCAN) 4 mg/0.1 mL nasal spray 4 mg into each nostril, Starting Fri 9/2/2022, Historical Med      nicotine (NICODERM CQ) 14 mg/24hr TD 24 hr patch Place 1 patch on the skin every 24 hours, Starting Fri 10/28/2022, Normal       pantoprazole (PROTONIX) 40 mg tablet Take 1 tablet (40 mg total) by mouth daily in the early morning, Starting Tue 10/11/2022, Until Thu 11/10/2022, Normal      polyethylene glycol (GLYCOLAX) 17 GM/SCOOP powder Take 17 g by mouth daily, Starting Fri 12/15/2023, Normal      traZODone (DESYREL) 50 mg tablet Take 1 tablet (50 mg total) by mouth daily at bedtime, Starting Tue 10/11/2022, Until Tue 11/22/2022, Normal      venlafaxine (EFFEXOR-XR) 37.5 mg 24 hr capsule Take 1 capsule (37.5 mg total) by mouth daily, Starting Tue 10/11/2022, Until Tue 11/22/2022, Normal       !! - Potential duplicate medications found. Please discuss with provider.          No discharge procedures on file.    PDMP Review         Value Time User    PDMP Reviewed  Yes 12/15/2023  6:05 PM Gaye Alberto DO            ED Provider  Electronically Signed by Lynn Culp PA-C  12/29/23 2048

## 2024-01-01 NOTE — DISCHARGE SUMMARY
Discharge Summary - 6 [facility] 42 y o  female MRN: [id number]  Unit/Bed#: [facility] [place] 569-02 Encounter: [id number]     Admission Date:   Admission Orders (From admission, onward)     Ordered        12/23/21 1928  ED TO DIFFERENT CAMPUS IP [facility] UNIT or INPATIENT MEDICAL UNIT to [place] [id number] (using Discharge Readmit Navigator) - Admit Patient to [address] [place] [address]  Once                          Discharge Date: 1/5/22    Attending Psychiatrist: Shell Delgado MD    Reason for Admission:   Cecile Cardozo is a 42 y o  female, admitted to the inpatient behavioral health unit at [address] [place] [address] , as a involuntarily 302 commitment, subsequent to increased irritability, depressed mood, decreased need for sleep, erratic behavior, difficiulty controlling emotions, and disorganized thoughts, in the setting of the anniversary of her father's death  On initial presentation, she was difficult to follow due to flight of ideas and short lived episodes of crying, and pressured speech  She became disruptive and began yelling and running around the unit, requiring PRN medications at intake, as well as restraints  Please refer to the initial H&P below for full details  See below HPI from Stefani Torres MD on 12/24/21 :    "Cecile Cardozo is a 42 y o  female with a history of Bipolar Disorder and substance use who was admitted to the inpatient psychiatric unit on a involuntary 302 commitment basis due to mixed symptoms of bipolar disorder, unstable mood, increased agitation and inability to care for self because of mental illness      Symptoms prior to admission included feeling depressed, mood swings, increased irritability, decreased need for sleep, erratic behavior, difficulty controlling anger, agitation, disorganized thinking process and obsessive thoughts  Onset of symptoms was gradual starting several days ago with progressively worsening course since that time  Occupational Therapy   Progress Note    A Girl Deepa Blackburn   MRN: [id number]     Objective:  Respiratory Status:BCPAP  Infant Bed:Isolette  HR: WDL  RR:  intermittent tachypnea   O2 Sats:  76-82%    Pain:  NIPS ( Infant Pain Scale) birth to one year: observe for 1 minute   Select 0 or 1; for cry select 0, 1, or 2   Facial Expression  1: Grimace   Cry 1: Whimper   Breathing Patterns 1: Change in breathing   Arms  0: Restrained/Relaxed   Legs  0: Restrained/Relaxed   State of Arousal  1: fussy   NIPS Score 4   Max score of 7 points, considering pain greater than or equal to 4.    State of Arousal: light sleep, drowsy, and fussy  State Transition:immature and poor  Stress Cues:tachypnea, O2 desaturations , startle , arm extension, finger splay , hypertonicity , sitting on air , diffuse squirming , tremors , arching , and grimace   Interventions for State Regulation:Bracing , Grasping, Clasping , Covering eyes , Hands to face/mouth , Facilitate physiological flexion , and Hand hug   Infant's attempts at self-regulation: [] yes [x] No  Response to Intervention:transition to light sleep  and physiological compromise   Comments:      RESPONSE TO SENSORY INPUT:  Tactile firm touch: []WNL for GA [x]hypersensitive []hyposensitive   Vestibular tolerance: []WNL for GA [x] hypersensitive []hyposensitive   Visual: []WNL for GA [x]hypersensitive []hyposensitive  Auditory:[] WNL for GA [x]hypersensitive []hyposensitive    NEUROLOGICAL DEVELOPMENT:    APPEARANCE/MUSCLE TONE:  Quality of movement: []typical for GA [x] atypical for GA  Tremors: [x] present []absent   Tone: [x]typical for GA []atypical for GA []symmetrical [] Asymmetrical   [] Normal [] Hypertonic  [] hypotonic  [] fluctuating   Comments:     ACTIVE MOVEMENT PATTERNS   extension     PRE-FEEDING/FEEDING/NON-NUTRITIVE SUCKING:  Current method of nutrition:  []NPO []TPN [x]OG [] NG []PO  Comments:       Treatment:   Preparatory touch via deep, static touch and  Stressors preceding admission included anniversary of father's death       On initial evaluation after admission to the inpatient psychiatric unit the patient  was  Initially cooperating well was interview, she admitted having self harmful but not lethal acts of cutting 2 weeks ago because of feeling angry, she stated that her anger was related to the fact that the  in the program she had been staying, did not pay respect to other clients  Patient also admitted that anniversary of her father death year ago contributed to her depressive as she self describes state, the patient stated that she agreed with diagnosis of mood disorder, however she denied taking any mood stabilizers or FDA approved 2nd generation antipsychotics medications to treat her mood disorder  When we discussed the previous treatment, the patient stated that the Zyprexa and Seroquel made her gain weight, and the same was true for Depakote, Tegretol also led to some side effects and the patient did not want to "experiment" with new FDA approved medications to treat bipolar mood disorder such as Omid Beam which is not associated with weight gain  The patient was not the best historian, because of her flight of idea and dysphoric mood with frequent but short lived  S of crying when she talked about her father and angry feelings when she describes the recent events in her life  The the patient stated she was on several antidepressants such as Prozac Wellbutrin, [place] also stated she was on Neurontin 600 mg twice a day, and she admitted having history of opioid dependence but telling Suboxone 8 mg twice a day help her to stay clean    The as the interview progressed, the patient more more started to concentrate on her Deliah Aki to receive benzodiazepines including clonazepam   The writer reviews the [facility] our record and indeed the patient prescribed clonazepam for 1 months by an outpatient provider, there was also history she containment to BUEs/BLEs to provide positive sensory input and facilitation of physiological flexion. Infant unswaddled from dandle and gentle turned in order to stimulate infant to transition from drowsy to quiet alert state in calming way. Two person care during routine nsg assessment to minimize infant stress and promote neuroprotection. Infant tolerated poorly and required significant intervention to regulate state. Infant positioned supine in physiological flexion in dandle amaya. OT provided deep static touch at conclusion of handling to assist with neurobehavioral organization and provide positive touch.         No family present for education. and Education provided to nurse     Yessi Hirsch OT 2024     OT Date of Treatment: 04/19/24   OT Start Time: 1348  OT Stop Time: 1412  OT Total Time (min): 24 min    Billable Minutes:  Therapeutic Activity 24 minutes    was prescribed Adderall and Suboxone  The writer had concerns about several antidepressants prescribed to the patient's was symptoms of mixed affect most likely associated with bipolar disorder, and also ex had concerns about clonazepam that was prescribed to gather with Suboxone  The writer suggested to reduce number of antidepressants to Prozac just 1 antidepressant and provide lower dose, at the same time starting mood stabilizers  As a response the patient became more and more hyperverbal, at times agitated, intrusive, demanding clonazepam   And necessity for p r n  medications and seclusion was evident because of the patient starts screaming, yelling, running in the whole disruptive environment of our treatment unit, and after providing with p r n  Zyprexa and after that Thorazine the patient condition significantly improved       the patient stated that she had her mother living in [place] that she herself used to live in [place] and recently moved to [facility]  There are not some any available records in our electronic medical system in relation to patient's history of bipolar mood disorder  However the patient knowledge of multiple mood stabilizers and antipsychotics showed that the patient most likely had multiple previous at episodes of her mood instability and treatment with a variety of medications helping patients was bipolar disorder "    Hospital Course: The time of admission, patient was started on risperidone for mood stabilization, and cogentin 1mg BID was started to prevent potential EPS as patient reported she had this in the past with previous medications  Patient was continued on neurontin 500mg TID for anxiety, topamax 100mg QHS for mood, and suboxone 8mg BID for opioid withdrawal  She was also started on Trazodone 100mg QHS for insomnia  Risperidone was switched to Zyprexa 10mg BID for mood stabilization, as patient reported anxiety with risperidone  Wellbutrin was titrated to Wellbutrin 300mg daily for smoking cravings and mood  The patient adhered to her medication regimen and denied any acute adverse effects not previously mentioned  Initially she required restraints several times due to agitation and aggression  She was noted to have been screaming, attempting to bite staff at times  Due to these behaviors and need for medication, she became medications over objection as needed  After initial episodes of agitation, patient progressively became more calm and in behavioral control  She initially presented with disorganized thought patterns, rapid speech but became more redirectable and interruptible  The patient's affect brightened throughout the course of psychiatric management and she was seen in [facility] interacting appropriately with peers and participating in group therapy  At the time of discharge, Adrian Stephenson reports feeling "okay but nervous" and was apprehensive about her ability to obtain suboxone  Patient's anxiety improved when she was informed about plans for suboxone bridge until she could see her outpatient provider  She was talkative but interruptible, and largely organized, linear, and logical in thought process  She reported sleeping 5 hours last night, but states this is due to her cigarette cravings, and she feels energetic and well-rested  She denied suicidal and homicidal ideations at the time of discharge, as well as auditory and visual hallucinations  She is future oriented  Her goals for after discharge include obtaining health insurance in the [facility] and working on a permanent housing situation       Risk of Harm to Self:   The following ratings are based on assessment at the time of discharge  Demographic risk factors include: , never   Historical Risk Factors include: chronic anxiety symptoms, history of mood disorder, history of psychosis, history of substance use  Current Specific Risk Factors include: recent inpatient psychiatric admission - being discharged today, diagnosis of mood disorder, chronic anxiety symptoms, recent losses (father 1 year ago)  Protective Factors: no current suicidal ideation, improved mood, improved anxiety symptoms, ability to make plans for the future, no current suicidal plan or intent, outpatient psychiatric follow up established, having a desire to be alive, having a desire to live, having a sense of purpose or meaning in life, responsibilities and duties to others, sobriety, supportive family  Weapons/Firearms: none  The following steps have been taken to ensure weapons are properly secured: not applicable  Based on today's assessment, [place] presents the following risk of harm to self: minimal    Risk of Harm to Others: The following ratings are based on assessment at the time of discharge  Demographic Risk Factors include: unemployed  Historical Risk Factors include: history of aggressive behavior, drug abuse  Current Specific Risk Factors include: recent episode of mood instability, recent episodes of agitation, multiple stressors  Protective Factors: no current homicidal ideation, improved impulse control, improved mood, no current psychotic symptoms, willing to continue psychiatric treatment, willing to remain free from substance use, outpatient follow up established, no prior history of violence, supportive family, sobriety, responsibilities and duties to others, resilience, access to mental health treatment  Weapons/Firearms: none   The following steps have been taken to ensure weapons are properly secured: not applicable   Based on today's assessment, [place] presents the following risk of harm to others: minimal     Discharge Medications:    Psychiatric medications include:    · Zyprexa 10mg BID for mood stabilization  · Cogentin 1mg BID - prevent EPS  · Neurontin 500mg TID for anxiety  · Topamax 100mg QHS for mood  · Suboxone 8mg BID for opioid withdrawal  · Wellbutrin 300mg daily for smoking cravings and mood    Other home medications for medical conditions were continued throughout hospitalization, if applicable  See AVS for more details  Follow ups:     The patient is scheduled for follow up at:     [facility] 1/10/22 at 2:00PM phone intake    Behavioral Health Medications:   all current active meds have been reviewed, continue current psychiatric medications and current meds:   Current Facility-Administered Medications   Medication Dose Route Frequency    acetaminophen (TYLENOL) tablet 650 mg  650 mg Oral Q6H PRN    acetaminophen (TYLENOL) tablet 650 mg  650 mg Oral Q4H PRN    acetaminophen (TYLENOL) tablet 975 mg  975 mg Oral Q6H PRN    aluminum-magnesium hydroxide-simethicone (MYLANTA) oral suspension 30 mL  30 mL Oral Q4H PRN    artificial tear (LUBRIFRESH P M ) ophthalmic ointment 1 application  1 application Both Eyes O7G PRN    benztropine (COGENTIN) injection 1 mg  1 mg Intramuscular Q4H PRN Max 6/day    benztropine (COGENTIN) tablet 1 mg  1 mg Oral Q4H PRN Max 6/day    benztropine (COGENTIN) tablet 1 mg  1 mg Oral BID    buprenorphine-naloxone (Suboxone) film 8 mg  8 mg Sublingual BID    buPROPion (WELLBUTRIN XL) 24 hr tablet 300 mg  300 mg Oral Daily    chlorproMAZINE (THORAZINE) injection SOLN 50 mg  50 mg Intramuscular Q4H PRN    chlorproMAZINE (THORAZINE) tablet 100 mg  100 mg Oral Q4H PRN    cyclobenzaprine (FLEXERIL) tablet 5 mg  5 mg Oral BID PRN    Diclofenac Sodium (VOLTAREN) 1 % topical gel 2 g  2 g Topical 4x Daily    hydrOXYzine HCL (ATARAX) tablet 50 mg  50 mg Oral Q6H PRN Max 4/day    Or    diphenhydrAMINE (BENADRYL) injection 50 mg  50 mg Intramuscular Q6H PRN    docusate sodium (COLACE) capsule 100 mg  100 mg Oral BID PRN    famotidine (PEPCID) tablet 20 mg  20 mg Oral BID    gabapentin (NEURONTIN) capsule 500 mg  500 mg Oral TID    hydrOXYzine HCL (ATARAX) tablet 100 mg  100 mg Oral Q6H PRN Max 4/day    hydrOXYzine HCL (ATARAX) tablet 25 mg  25 mg Oral Q6H PRN Max 4/day    ibuprofen (MOTRIN) tablet 400 mg  400 mg Oral Q6H PRN    ibuprofen (MOTRIN) tablet 600 mg  600 mg Oral Q8H PRN    ibuprofen (MOTRIN) tablet 600 mg  600 mg Oral Q6H [facility]    LORazepam (ATIVAN) tablet 1 mg  1 mg Oral Daily PRN    melatonin tablet 3 mg  3 mg Oral HS PRN    nicotine (NICODERM CQ) 14 mg/24hr TD 24 hr patch 1 patch  1 patch Transdermal Daily    nicotine polacrilex (NICORETTE) gum 4 mg  4 mg Oral Q2H PRN    OLANZapine (ZyPREXA) tablet 10 mg  10 mg Oral Q3H PRN Max 3/day    Or    OLANZapine (ZyPREXA) IM injection 10 mg  10 mg Intramuscular Q3H PRN Max 3/day    OLANZapine (ZyPREXA) tablet 10 mg  10 mg Oral HS    Or    OLANZapine (ZyPREXA) IM injection 10 mg  10 mg Intramuscular HS    OLANZapine (ZyPREXA) tablet 10 mg  10 mg Oral Daily    Or    OLANZapine (ZyPREXA) IM injection 10 mg  10 mg Intramuscular Daily    OLANZapine (ZyPREXA) tablet 5 mg  5 mg Oral Q3H PRN Max 6/day    Or    OLANZapine (ZyPREXA) IM injection 5 mg  5 mg Intramuscular Q3H PRN Max 6/day    OLANZapine (ZyPREXA) tablet 2 5 mg  2 5 mg Oral Q3H PRN Max 8/day    ondansetron (ZOFRAN-ODT) dispersible tablet 4 mg  4 mg Oral Q6H PRN    polyethylene glycol (MIRALAX) packet 17 g  17 g Oral Daily PRN    sterile water injection **ADS Override Pull**        sterile water injection **ADS Override Pull**        sterile water injection **ADS Override Pull**        sterile water injection **ADS Override Pull**        sterile water injection **ADS Override Pull**        topiramate (TOPAMAX) tablet 100 mg  100 mg Oral HS    traZODone (DESYREL) tablet 100 mg  100 mg Oral HS     Labs/Imaging:   I have personally reviewed all pertinent laboratory/tests results    Most Recent Labs:   Lab Results   Component Value Date    WBC 7 80 12/25/2021    RBC 4 80 12/25/2021    HGB 14 0 12/25/2021    HCT 42 3 12/25/2021     12/25/2021    RDW 13 3 12/25/2021    NEUTROABS 5 10 12/25/2021    SODIUM 137 12/25/2021    K 3 6 12/25/2021     12/25/2021    CO2 25 12/25/2021    BUN 10 12/25/2021    CREATININE 0 77 12/25/2021    GLUC 123 (H) 12/25/2021    GLUF 123 (H) 12/25/2021    CALCIUM 8 6 12/25/2021    AST 52 (H) 12/25/2021    ALT 37 (H) 12/25/2021    ALKPHOS 68 12/25/2021    TP 7 4 12/25/2021    ALB 4 0 12/25/2021    TBILI 0 84 12/25/2021    CHOLESTEROL 196 12/25/2021    HDL 43 (L) 12/25/2021    TRIG 92 12/25/2021    LDLCALC 135 (H) 12/25/2021    NONHDLC 153 12/25/2021    PQA6AAPMQKCZ 2 630 12/25/2021    PREGUR Negative (-) 12/22/2021    RPR Non-Reactive 12/25/2021    HGBA1C 5 2 12/25/2021     12/25/2021       Mental Status at time of Discharge:   Appearance:   female with red hair, appropriate grooming and hygiene, appearing stated age, dressed casually, with horizontal scars on left wrist and bandaids on several fingers, seated comfortably in no acute distress   Behavior:  cooperative and restless and fidgety   Speech:  normal volume, normal pitch, fluent, clear, coherent, increased rate and talkative but interruptible   Mood:  "okay, nervous"   Affect:  Constricted but reactive   Language Within normal limits   Thought Process:  Mostly linear, logical, goal directed, but perseverative at times, increased rate of thoughts at times   Thought Content:  No verbalized delusions and no apparent paranoia   Perceptual Disturbances: Denies auditory or visual hallucinations and Does not appear to be responding to internal stimuli   Risk Potential: Denies suicidal or homicidal ideation, intent, or plan   Sensorium:  person, place, time and current situation   Cognition:  Grossly intact   Consciousness:  alert and awake   Attention: attention span and concentration were age appropriate   Insight:  fair   Judgment: fair   Intellect not formally assessed   Gait/Station: normal gait/station   Motor Activity: no abnormal movements     Discharge Diagnosis:   Patient Active Problem List   Diagnosis    Medical clearance for psychiatric admission    Rhabdomyolysis    Bipolar affective disorder, current episode manic ([facility] [address] )    Tobacco abuse    Encounter for monitoring opioid maintenance therapy    Muscle spasm of shoulder region    Dyspepsia       Discharge Medications:  See list above, as well as the after visit summary containing reconciled discharge medications provided to patient and family  Discharge instructions/Information to patient and family:   See after visit summary for information provided to patient and family  Provisions for Follow-Up Care:  See after visit summary for information related to follow-up care and any pertinent home health orders  This note has been constructed using a voice recognition system  There may be translation, syntax,  or grammatical errors  If you have any questions, please contact the dictating provider      David Martinez MD  Psychiatry Resident, PGY-1

## 2024-02-03 ENCOUNTER — HOSPITAL ENCOUNTER (EMERGENCY)
Facility: HOSPITAL | Age: 49
End: 2024-02-06
Attending: EMERGENCY MEDICINE
Payer: COMMERCIAL

## 2024-02-03 DIAGNOSIS — F41.9 ANXIETY: Primary | ICD-10-CM

## 2024-02-03 DIAGNOSIS — Z00.8 MEDICAL CLEARANCE FOR PSYCHIATRIC ADMISSION: ICD-10-CM

## 2024-02-03 LAB
AMPHETAMINES SERPL QL SCN: NEGATIVE
BARBITURATES UR QL: NEGATIVE
BENZODIAZ UR QL: NEGATIVE
COCAINE UR QL: NEGATIVE
ETHANOL EXG-MCNC: 0 MG/DL
EXT PREGNANCY TEST URINE: NEGATIVE
EXT. CONTROL: NORMAL
METHADONE UR QL: NEGATIVE
OPIATES UR QL SCN: NEGATIVE
OXYCODONE+OXYMORPHONE UR QL SCN: NEGATIVE
PCP UR QL: NEGATIVE
THC UR QL: NEGATIVE

## 2024-02-03 PROCEDURE — 81025 URINE PREGNANCY TEST: CPT | Performed by: EMERGENCY MEDICINE

## 2024-02-03 PROCEDURE — 99285 EMERGENCY DEPT VISIT HI MDM: CPT | Performed by: EMERGENCY MEDICINE

## 2024-02-03 PROCEDURE — NC001 PR NO CHARGE: Performed by: EMERGENCY MEDICINE

## 2024-02-03 PROCEDURE — 99284 EMERGENCY DEPT VISIT MOD MDM: CPT

## 2024-02-03 PROCEDURE — 82075 ASSAY OF BREATH ETHANOL: CPT | Performed by: EMERGENCY MEDICINE

## 2024-02-03 PROCEDURE — 80307 DRUG TEST PRSMV CHEM ANLYZR: CPT | Performed by: EMERGENCY MEDICINE

## 2024-02-03 RX ORDER — LORAZEPAM 1 MG/1
2 TABLET ORAL ONCE
Status: COMPLETED | OUTPATIENT
Start: 2024-02-03 | End: 2024-02-03

## 2024-02-03 RX ADMIN — LORAZEPAM 2 MG: 1 TABLET ORAL at 22:11

## 2024-02-04 RX ORDER — ONDANSETRON 4 MG/1
4 TABLET, ORALLY DISINTEGRATING ORAL ONCE
Status: COMPLETED | OUTPATIENT
Start: 2024-02-04 | End: 2024-02-04

## 2024-02-04 RX ORDER — LORAZEPAM 1 MG/1
1 TABLET ORAL ONCE
Status: COMPLETED | OUTPATIENT
Start: 2024-02-04 | End: 2024-02-04

## 2024-02-04 RX ORDER — HYDROXYZINE HYDROCHLORIDE 25 MG/1
50 TABLET, FILM COATED ORAL ONCE
Status: COMPLETED | OUTPATIENT
Start: 2024-02-04 | End: 2024-02-04

## 2024-02-04 RX ADMIN — LORAZEPAM 1 MG: 1 TABLET ORAL at 13:04

## 2024-02-04 RX ADMIN — LORAZEPAM 1 MG: 1 TABLET ORAL at 20:15

## 2024-02-04 RX ADMIN — ONDANSETRON 4 MG: 4 TABLET, ORALLY DISINTEGRATING ORAL at 13:36

## 2024-02-04 RX ADMIN — HYDROXYZINE HYDROCHLORIDE 50 MG: 25 TABLET, FILM COATED ORAL at 22:29

## 2024-02-04 NOTE — ED NOTES
Recipient:    Name: GUILLERMO MADRIGAL   Recipient ID: [id number]   YOB: 1975   Gender: Female           Eligibility Summary:    Type Name   Medicaid Category: MHX  Program Status: 00  Service Program: [facility] Based Funding Only - Non-Medic   [id number]   Managed Care [facility]

## 2024-02-04 NOTE — ED NOTES
Patient asked RN for medication help her relax. Patient started to speak to this RN with a loud and frustrated tone. Patient instructed that we are waiting for crisis to speak with her and I will discuss this with the provider.     Lyn Mckeon RN  02/03/24 2046

## 2024-02-04 NOTE — ED NOTES
Bedsearch:  No beds in the [facility]  Patient asked for a bed at [facility], but there is no ans in admissions

## 2024-02-04 NOTE — ED NOTES
Inpatient process, patient's rights, and bed search process explained to patient by this writer, patient verbalizes understanding.     Patient has a history of [facility] admissions to [facility] and [facility]. She states that [facility] has been great to her in the past but is requesting to be admitted to [facility] this time as it's closer and they've also treated her well there. This writer expressed that [facility] currently has no availability at this time. Patient requests for crisis to check again in the morning. This writer confirmed with the patient that if a bed is still not open in the morning, that the patient would permit crisis to extend the bed search and potentially refer her to [facility] again. Patient expresses mild frustration with [facility] availability but is otherwise agreeable to allowing crisis to refer her to [facility].     Voluntary commitment forms signed by both the patient and ED physician at this time and emailed to [facility] intake via fax. Original placed on patient's chart, copy placed in crisis office.

## 2024-02-04 NOTE — ED NOTES
Vital signs deferred at this time d/t patient being asleep. Patient chest is rising equally. Q7 checks being performed on paper.     Lyn Mckeon RN  02/04/24 0314

## 2024-02-04 NOTE — ED PROVIDER NOTES
10:22 PM  Patient evaluated by crisis.  201 signed.  Ativan given for her anxiety.  Multiple medication discrepancies, so we will hold off on any of her home meds at this time until a could be further clarified.     Jerry Epstein Jr.,   02/03/24 [id number]

## 2024-02-04 NOTE — ED NOTES
*Patient stated her secondary choice for inpatient admission as [facility].    [facility] per Ren[facility]: no beds at this time      0710: patient made aware of [facility] availability status. She gives verbal consent to this writer to further extend bed search. She denies having any facilities that she would not want to be referred to at this time.

## 2024-02-04 NOTE — ED PROVIDER NOTES
History  Chief Complaint   Patient presents with    Anxiety     Patient brought in by EMS d/t increased anxiety. Patient states she takes gabapentin and he has been taking 2 pills instead of one a day by accident. Patient states she needs her medications refilled and changed d/t increased anxiousness.      48-year-old female, history of anxiety, presents with increasing anxiety.  He is on multiple medications which she states she has been taking regularly, does not think they are helping but her anxiety.  Patient reports history of self-harm, initially denies any thoughts of harming herself.  Patient believes she needs inpatient psychiatric treatment to help her with her anxiety and medications.      History provided by:  Patient   used: No        Prior to Admission Medications   Prescriptions Last Dose Informant Patient Reported? Taking?   FLUoxetine (PROzac) 40 MG capsule   No No   Sig: Take 2 capsules (80 mg total) by mouth daily Do not start before April 21, 2023.   albuterol (Proventil HFA) 90 mcg/act inhaler   No Yes   Sig: Inhale 2 puffs every 6 (six) hours as needed for wheezing   buPROPion (WELLBUTRIN XL) 300 mg 24 hr tablet 2/3/2024  Yes Yes   Sig: Take 300 mg by mouth daily   buPROPion (Wellbutrin XL) 300 mg 24 hr tablet 2/3/2024  No Yes   Sig: Take 1 tablet (300 mg total) by mouth daily   buprenorphine-naloxone (SUBOXONE) 8-2 mg per SL tablet  Self Yes Yes   Sig: Place 1 tablet under the tongue 3 (three) times a day   clonazePAM (KlonoPIN) 1 mg tablet   No Yes   Sig: Take 1 tablet (1 mg total) by mouth 4 (four) times a day for 7 days   famotidine (PEPCID) 20 mg tablet   No Yes   Sig: Take 1 tablet (20 mg total) by mouth 2 (two) times a day   gabapentin (NEURONTIN) 300 mg capsule   No Yes   Sig: Take 2 capsules (600 mg total) by mouth 3 (three) times a day   hydrOXYzine HCL (ATARAX) 25 mg tablet   No Yes   Sig: Take 1 tablet (25 mg total) by mouth every 6 (six) hours   naloxone  "(NARCAN) 4 mg/0.1 mL nasal spray   Yes No   Si mg into each nostril   Patient not taking: Reported on 11/15/2023   nicotine (NICODERM CQ) 14 mg/24hr TD 24 hr patch   No No   Sig: Place 1 patch on the skin every 24 hours   polyethylene glycol (GLYCOLAX) 17 GM/SCOOP powder   No Yes   Sig: Take 17 g by mouth daily   traZODone (DESYREL) 50 mg tablet   No Yes   Sig: Take 1 tablet (50 mg total) by mouth daily at bedtime      Facility-Administered Medications: None       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    E-Cigarette/Vaping    E-Cigarette Use Current Every Day User     Cartridges/Day 2      E-Cigarette/Vaping Substances    Nicotine No     THC No     CBD No     Flavoring No     Other No     Unknown No      Social History     Tobacco Use    Smoking status: Some Days     Current packs/day: 0.50     Average packs/day: 0.5 packs/day for 20.0 years (10.0 ttl pk-yrs)     Types: Cigarettes    Smokeless tobacco: Current   Vaping Use    Vaping status: Every Day   Substance Use Topics    Alcohol use: Not Currently     Comment: MAGDALENO, pt historically inconsistent. Drinking  per [facility] 2    Drug use: Not Currently     Types: Heroin, \"Crack\" cocaine, Cocaine, LSD, Benzodiazepines, Marijuana     Comment: Pt unreliable historian       Review of Systems   Constitutional: Negative.    Respiratory: Negative.     Cardiovascular: Negative.    Gastrointestinal: Negative.        Physical Exam  Physical Exam  Vitals and nursing note reviewed.   Constitutional:       " General: She is not in acute distress.  HENT:      Head: Normocephalic and atraumatic.   Eyes:      Extraocular Movements: Extraocular movements intact.      Pupils: Pupils are equal, round, and reactive to light.   Cardiovascular:      Rate and Rhythm: Normal rate and regular rhythm.   Pulmonary:      Effort: Pulmonary effort is normal.      Breath sounds: Normal breath sounds.   Skin:     General: Skin is warm and dry.   Neurological:      General: No focal deficit present.      Mental Status: She is alert and oriented to person, place, and time.   Psychiatric:      Comments: Calm and cooperative         Vital Signs  ED Triage Vitals   Temperature Pulse Respirations Blood Pressure SpO2   02/03/24 1903 02/03/24 1903 02/03/24 1903 02/03/24 1909 02/04/24 0800   97.7 °F (36.5 °C) 74 20 128/68 97 %      Temp src Heart Rate Source Patient Position - Orthostatic VS BP Location FiO2 (%)   -- 02/03/24 1903 02/03/24 1903 02/03/24 1903 --    Monitor Lying Right arm       Pain Score       02/03/24 1903       No Pain           Vitals:    02/05/24 0825 02/05/24 1215 02/05/24 2000 02/06/24 0600   BP: 110/72 117/72 115/72 100/78   Pulse: 67 70 74 71   Patient Position - Orthostatic VS: Lying Sitting Lying Lying         Visual Acuity      ED Medications  Medications   buPROPion (WELLBUTRIN XL) 24 hr tablet 300 mg (300 mg Oral Given 2/5/24 0818)   gabapentin (NEURONTIN) capsule 600 mg (600 mg Oral Given 2/5/24 2130)   hydrOXYzine HCL (ATARAX) tablet 25 mg (has no administration in time range)   buprenorphine-naloxone (Suboxone) film 8 mg (8 mg Sublingual Given 2/5/24 2130)   nicotine polacrilex (NICORETTE) gum 2 mg (2 mg Oral Given 2/5/24 2232)   ibuprofen (MOTRIN) tablet 400 mg (400 mg Oral Given 2/6/24 0628)   clonazePAM (KlonoPIN) tablet 0.5 mg (0.5 mg Oral Given 2/6/24 0318)   LORazepam (ATIVAN) tablet 2 mg (2 mg Oral Given 2/3/24 2211)   LORazepam (ATIVAN) tablet 1 mg (1 mg Oral Given 2/4/24 0400)   ondansetron (ZOFRAN-ODT)  dispersible tablet 4 mg (4 mg Oral Given 2/4/24 1336)   LORazepam (ATIVAN) tablet 1 mg (1 mg Oral Given 2/4/24 2015)   hydrOXYzine HCL (ATARAX) tablet 50 mg (50 mg Oral Given 2/4/24 2229)   nicotine polacrilex (NICORETTE) gum 2 mg (2 mg Oral Given 2/5/24 0933)   LORazepam (ATIVAN) tablet 1 mg (1 mg Oral Given 2/6/24 0125)       Diagnostic Studies  Results Reviewed       Procedure Component Value Units Date/Time    Comprehensive metabolic panel [[id number]]  (Abnormal) Collected: 02/05/24 0314    Lab Status: Final result Specimen: Blood from Arm, Right Updated: 02/05/24 0348     Sodium 137 mmol/L      Potassium 4.5 mmol/L      Chloride 111 mmol/L      CO2 22 mmol/L      ANION GAP 4 mmol/L      BUN 15 mg/dL      Creatinine 0.77 mg/dL      Glucose 84 mg/dL      Calcium 8.6 mg/dL      AST 13 U/L      ALT 17 U/L      Alkaline Phosphatase 71 U/L      Total Protein 6.2 g/dL      Albumin 3.8 g/dL      Total Bilirubin 0.39 mg/dL      eGFR 92 ml/min/1.73sq m     Narrative:      National Kid[employer] Disease Foundation guidelines for Chronic Kidney Disease (CKD):     Stage 1 with normal or high GFR (GFR > 90 mL/min/1.73 square meters)    Stage 2 Mild CKD (GFR = 60-89 mL/min/1.73 square meters)    Stage 3A Moderate CKD (GFR = 45-59 mL/min/1.73 square meters)    Stage 3B Moderate CKD (GFR = 30-44 mL/min/1.73 square meters)    Stage 4 Severe CKD (GFR = 15-29 mL/min/1.73 square meters)    Stage 5 End Stage CKD (GFR <15 mL/min/1.73 square meters)  Note: GFR calculation is accurate only with a steady state creatinine    CBC and differential [[id number]]  (Abnormal) Collected: 02/05/24 0314    Lab Status: Final result Specimen: Blood from Arm, Right Updated: 02/05/24 0323     WBC 6.20 Thousand/uL      RBC 4.67 Million/uL      Hemoglobin 13.4 g/dL      Hematocrit 41.4 %      MCV 89 fL      MCH 28.7 pg      MCHC 32.4 g/dL      RDW 12.8 %      MPV 8.0 fL      Platelets 190 Thousands/uL      nRBC 0 /100 WBCs      Neutrophils Relative 48 %       Immat GRANS % 0 %      Lymphocytes Relative 42 %      Monocytes Relative 9 %      Eosinophils Relative 0 %      Basophils Relative 1 %      Neutrophils Absolute 2.92 Thousands/µL      Immature Grans Absolute 0.01 Thousand/uL      Lymphocytes Absolute 2.61 Thousands/µL      Monocytes Absolute 0.58 Thousand/µL      Eosinophils Absolute 0.00 Thousand/µL      Basophils Absolute 0.08 Thousands/µL     POCT pregnancy, urine [[id number]]  (Normal) Resulted: 02/03/24 2221    Lab Status: Final result Specimen: Urine Updated: 02/03/24 2222     EXT Preg Test, Ur Negative     Control Valid    Rapid drug screen, urine [[id number]]  (Normal) Collected: 02/03/24 1939    Lab Status: Final result Specimen: Urine, Clean Catch Updated: 02/03/24 1959     Amph/Meth UR Negative     Barbiturate Ur Negative     Benzodiazepine Urine Negative     Cocaine Urine Negative     Methadone Urine Negative     Opiate Urine Negative     PCP Ur Negative     THC Urine Negative     Oxycodone Urine Negative    Narrative:      FOR MEDICAL PURPOSES ONLY.   IF CONFIRMATION NEEDED PLEASE CONTACT THE LAB WITHIN 5 DAYS.    Drug Screen Cutoff Levels:  AMPHETAMINE/METHAMPHETAMINES  1000 ng/mL  BARBITURATES     200 ng/mL  BENZODIAZEPINES     200 ng/mL  COCAINE      300 ng/mL  METHADONE      300 ng/mL  OPIATES      300 ng/mL  PHENCYCLIDINE     25 ng/mL  THC       50 ng/mL  OXYCODONE      100 ng/mL    POCT alcohol breath test [[id number]]  (Normal) Resulted: 02/03/24 1935    Lab Status: Final result Updated: 02/03/24 1935     EXTBreath Alcohol 0.000                   No orders to display              Procedures  Procedures         ED Course                               SBIRT 22yo+      Flowsheet Row Most Recent Value   Initial Alcohol Screen: US AUDIT-C     1. How often do you have a drink containing alcohol? 0 Filed at: 02/03/2024 1907   2. How many drinks containing alcohol do you have on a typical day you are drinking?  0 Filed at: 02/03/2024 1907   3a. Male UNDER  65: How often do you have five or more drinks on one occasion? 0 Filed at: 02/03/2024 1907   3b. FEMALE Any Age, or MALE 65+: How often do you have 4 or more drinks on one occassion? 0 Filed at: 02/03/2024 1907   Audit-C Score 0 Filed at: 02/03/2024 1907   LYNN: How many times in the past year have you...    Used an illegal drug or used a prescription medication for non-medical reasons? Never Filed at: 02/03/2024 1907                      Medical Decision Making  48-year-old female, presenting with increased anxiety.  Differential diagnosis includes anxiety, psychosis, intoxication among other diagnoses.  Patient appears calm and in no distress, cooperative in ED.  Patient requesting inpatient treatment for anxiety, screening test ordered, will consult ED crisis worker, continue to monitor in ED and reevaluate.    Signed out pending crisis eval    Amount and/or Complexity of Data Reviewed  Labs: ordered. Decision-making details documented in ED Course.    Risk  OTC drugs.  Prescription drug management.  Decision regarding hospitalization.             Disposition  Final diagnoses:   Anxiety     Time reflects when diagnosis was documented in both MDM as applicable and the Disposition within this note       Time User Action Codes Description Comment    2/3/2024  8:16 PM Ramon Eason Add [F41.9] Anxiety           ED Disposition       ED Disposition   Transfer to Behavioral Health    [facility] [place]   --    Date/Time   Sat Feb 3, 2024 2016    Comment   Carey Keith has been medically cleared.               MD Documentation      Flowsheet Row Most Recent Value   Sending MD Dr. Epstein          Follow-up Information    None         Patient's Medications   Discharge Prescriptions    No medications on file       No discharge procedures on file.    PDMP Review         Value Time User    PDMP Reviewed  Yes 2/3/2024 10:04 PM Jerry Epstein Jr.,             ED Provider  Electronically Signed by             Ramon PICHARDO  MD Jenaro  02/06/24 0804

## 2024-02-04 NOTE — ED NOTES
Insurance Authorization for admission:   Phone call placed to Leonie Fabian  Phone number: [phone]     Spoke to Sandra MENDOZA  3 days approved.  Level of care: voluntary AIP  Review on TBD   Authorization # will be given to accepting facility

## 2024-02-04 NOTE — ED NOTES
"The patient is a 48 year old female presenting to the ED due to increased anxiety with an inability to self-regulate and a desire for medication adjustments. Patient has a PPH of anxiety, depression, and bipolar disorder. She is a cooperative, well-appearing, and alert and oriented to all spheres. Introduced self and role, the patient agrees to speak with this writer at this time.     Per the patient, she presented to the hospital tonight due trouble managing her anxiety for the past two weeks that the significantly worsened tonight. She states that this is around the same time that her father passed away in 2019 and her anxiety becomes increasingly hard to manage. Her and her father had a tumultuous relationship before he passed and she claims that the last words he said to her were \"fuck you.\" She has a better relationship with her mother, who is living in [place], but states that she's unable to see her because she hasn't been felt emotionally/mentally equipped enough to return to the state since her dad . She has a history of self-injurious behavior via cutting herself and proceeds to show this writer where she has cut herself in the past. Scars are visible on the right side of her neck and bilateral arms, most of which look significantly healed. Patient states that the last time she cut herself was two weeks ago and that's how she knew her anxiety would continue to spiral. She has had difficulty sleeping through the night (reports getting only four hours at best) and has had a fluctuating appetite that is difficult to anticipate resulting in poor eating habits. She also reports increased inability to focus. She does not have an explicit wish to be dead at this time but does endorse feelings of hopelessness and helplessness, stating \"I just can't take it anymore. How do I keep living like this?\" She states that she feels as though she's \"cornered\" and \"without options.\" She does have a history of SI and " "states that she knows that if her anxiety continues the way that it is, she'll have those thoughts soon. She is looking to sign herself in for inpatient treatment before her thoughts reach that point.     She is prescribed multiple medications for anxiety and depression but has a history of either taking the wrong medications at the wrong times, mistakenly doubling her dosages, as well as attempting to take herself off of or, per EMR, increase medications without receiving approval from her doctor first. She is otherwise medication compliant and acknowledges that when she does have her medications sorted appropriately, they are beneficial to her state of mind. Current psychotropic medications are managed by family medicine, Dr. Dawn. Carey states that she's been mostly fine being managed by Dr. Dawn but states that she would also like to begin speaking with a psychiatrist for additional management. She reports that her family doctor is currently out of the office and so she has had difficulty reconciling her medication mishaps on her own. She denies symptoms of psychosis but adds that she does tend to start seeing things out of the corner of her eye and hearing voices/whispers when her anxiety is \"through the roof.\" No current issues with substance/alcohol abuse (UDS is negative for all as well as initial BAT level) but does have a history of substance abuse and states she is still currently taking suboxone. No legal issues.    Patient is seeking to sign herself in for mood and medication stabilization.   "

## 2024-02-04 NOTE — ED NOTES
VS deferred at this time d/t patient being asleep. Patient is asleep with equal chest rising. Will continue to monitor patient Q7 on paper.     Lyn Mckeon RN  02/03/24 0503

## 2024-02-04 NOTE — ED NOTES
Patient requested a ham sandwich instead of crackers. Pinole given at this time.     Lyn Mckeon RN  02/03/24 2047

## 2024-02-04 NOTE — ED NOTES
Patient wearing a long sleeve shirt under her paper scrubs and earring still in ears. Nurse asked patient to remove shirt and earrings per protocol. Patient removed shirt but then nurse noticed patient wearing more clothing and undergarments under paper scrubs as well as sock booties under her hospital socks. Nurse then asked patient to remove clothing and undergarments and patient refused. Safety of patient and staff explained but patient continued to refuse. Security and charge nurse notified of incident. Security arrived at bedside and patient was asked to remove clothing again. Initially patient refused but then was willing and removed them. Nurse offered patient hospital undergarments and patient was happy about that. Nurse also removed inhaler from bedside.     Anna Mcdonald RN  02/04/24 1401       Anna Mcdonald RN  02/04/24 1401

## 2024-02-05 ENCOUNTER — TELEPHONE (OUTPATIENT)
Dept: PSYCHIATRY | Facility: CLINIC | Age: 49
End: 2024-02-05

## 2024-02-05 LAB
ALBUMIN SERPL BCP-MCNC: 3.8 G/DL (ref 3.5–5)
ALP SERPL-CCNC: 71 U/L (ref 34–104)
ALT SERPL W P-5'-P-CCNC: 17 U/L (ref 7–52)
ANION GAP SERPL CALCULATED.3IONS-SCNC: 4 MMOL/L
AST SERPL W P-5'-P-CCNC: 13 U/L (ref 13–39)
BASOPHILS # BLD AUTO: 0.08 THOUSANDS/ÂΜL (ref 0–0.1)
BASOPHILS NFR BLD AUTO: 1 % (ref 0–1)
BILIRUB SERPL-MCNC: 0.39 MG/DL (ref 0.2–1)
BUN SERPL-MCNC: 15 MG/DL (ref 5–25)
CALCIUM SERPL-MCNC: 8.6 MG/DL (ref 8.4–10.2)
CHLORIDE SERPL-SCNC: 111 MMOL/L (ref 96–108)
CO2 SERPL-SCNC: 22 MMOL/L (ref 21–32)
CREAT SERPL-MCNC: 0.77 MG/DL (ref 0.6–1.3)
EOSINOPHIL # BLD AUTO: 0 THOUSAND/ÂΜL (ref 0–0.61)
EOSINOPHIL NFR BLD AUTO: 0 % (ref 0–6)
ERYTHROCYTE [DISTWIDTH] IN BLOOD BY AUTOMATED COUNT: 12.8 % (ref 11.6–15.1)
GFR SERPL CREATININE-BSD FRML MDRD: 92 ML/MIN/1.73SQ M
GLUCOSE SERPL-MCNC: 84 MG/DL (ref 65–140)
HCT VFR BLD AUTO: 41.4 % (ref 34.8–46.1)
HGB BLD-MCNC: 13.4 G/DL (ref 11.5–15.4)
IMM GRANULOCYTES # BLD AUTO: 0.01 THOUSAND/UL (ref 0–0.2)
IMM GRANULOCYTES NFR BLD AUTO: 0 % (ref 0–2)
LYMPHOCYTES # BLD AUTO: 2.61 THOUSANDS/ÂΜL (ref 0.6–4.47)
LYMPHOCYTES NFR BLD AUTO: 42 % (ref 14–44)
MCH RBC QN AUTO: 28.7 PG (ref 26.8–34.3)
MCHC RBC AUTO-ENTMCNC: 32.4 G/DL (ref 31.4–37.4)
MCV RBC AUTO: 89 FL (ref 82–98)
MONOCYTES # BLD AUTO: 0.58 THOUSAND/ÂΜL (ref 0.17–1.22)
MONOCYTES NFR BLD AUTO: 9 % (ref 4–12)
NEUTROPHILS # BLD AUTO: 2.92 THOUSANDS/ÂΜL (ref 1.85–7.62)
NEUTS SEG NFR BLD AUTO: 48 % (ref 43–75)
NRBC BLD AUTO-RTO: 0 /100 WBCS
PLATELET # BLD AUTO: 190 THOUSANDS/UL (ref 149–390)
PMV BLD AUTO: 8 FL (ref 8.9–12.7)
POTASSIUM SERPL-SCNC: 4.5 MMOL/L (ref 3.5–5.3)
PROT SERPL-MCNC: 6.2 G/DL (ref 6.4–8.4)
RBC # BLD AUTO: 4.67 MILLION/UL (ref 3.81–5.12)
SODIUM SERPL-SCNC: 137 MMOL/L (ref 135–147)
WBC # BLD AUTO: 6.2 THOUSAND/UL (ref 4.31–10.16)

## 2024-02-05 PROCEDURE — 80053 COMPREHEN METABOLIC PANEL: CPT | Performed by: EMERGENCY MEDICINE

## 2024-02-05 PROCEDURE — 85025 COMPLETE CBC W/AUTO DIFF WBC: CPT | Performed by: EMERGENCY MEDICINE

## 2024-02-05 PROCEDURE — 36415 COLL VENOUS BLD VENIPUNCTURE: CPT | Performed by: EMERGENCY MEDICINE

## 2024-02-05 RX ORDER — BUPROPION HYDROCHLORIDE 150 MG/1
300 TABLET ORAL DAILY
Status: DISCONTINUED | OUTPATIENT
Start: 2024-02-05 | End: 2024-02-06 | Stop reason: HOSPADM

## 2024-02-05 RX ORDER — BUPRENORPHINE AND NALOXONE 8; 2 MG/1; MG/1
8 FILM, SOLUBLE BUCCAL; SUBLINGUAL 3 TIMES DAILY
Status: DISCONTINUED | OUTPATIENT
Start: 2024-02-05 | End: 2024-02-06 | Stop reason: HOSPADM

## 2024-02-05 RX ORDER — IBUPROFEN 400 MG/1
400 TABLET ORAL EVERY 8 HOURS PRN
Status: DISCONTINUED | OUTPATIENT
Start: 2024-02-05 | End: 2024-02-06 | Stop reason: HOSPADM

## 2024-02-05 RX ORDER — BUPRENORPHINE HYDROCHLORIDE AND NALOXONE HYDROCHLORIDE DIHYDRATE 8; 2 MG/1; MG/1
1 TABLET SUBLINGUAL 3 TIMES DAILY
COMMUNITY

## 2024-02-05 RX ORDER — GABAPENTIN 300 MG/1
600 CAPSULE ORAL 3 TIMES DAILY
Status: DISCONTINUED | OUTPATIENT
Start: 2024-02-05 | End: 2024-02-06 | Stop reason: HOSPADM

## 2024-02-05 RX ORDER — NICOTINE 21 MG/24HR
21 PATCH, TRANSDERMAL 24 HOURS TRANSDERMAL DAILY
Status: DISCONTINUED | OUTPATIENT
Start: 2024-02-05 | End: 2024-02-05

## 2024-02-05 RX ORDER — HYDROXYZINE HYDROCHLORIDE 25 MG/1
25 TABLET, FILM COATED ORAL EVERY 6 HOURS PRN
Status: DISCONTINUED | OUTPATIENT
Start: 2024-02-05 | End: 2024-02-06 | Stop reason: HOSPADM

## 2024-02-05 RX ORDER — BUPRENORPHINE AND NALOXONE 8; 2 MG/1; MG/1
8 FILM, SOLUBLE BUCCAL; SUBLINGUAL 2 TIMES DAILY
Status: DISCONTINUED | OUTPATIENT
Start: 2024-02-05 | End: 2024-02-05

## 2024-02-05 RX ADMIN — BUPROPION HYDROCHLORIDE 300 MG: 150 TABLET, FILM COATED, EXTENDED RELEASE ORAL at 08:18

## 2024-02-05 RX ADMIN — IBUPROFEN 400 MG: 400 TABLET, FILM COATED ORAL at 21:34

## 2024-02-05 RX ADMIN — GABAPENTIN 600 MG: 300 CAPSULE ORAL at 08:18

## 2024-02-05 RX ADMIN — IBUPROFEN 400 MG: 400 TABLET, FILM COATED ORAL at 10:47

## 2024-02-05 RX ADMIN — BUPRENORPHINE AND NALOXONE 8 MG: 8; 2 FILM BUCCAL; SUBLINGUAL at 21:30

## 2024-02-05 RX ADMIN — GABAPENTIN 600 MG: 300 CAPSULE ORAL at 16:02

## 2024-02-05 RX ADMIN — NICOTINE POLACRILEX 2 MG: 2 GUM, CHEWING BUCCAL at 09:33

## 2024-02-05 RX ADMIN — BUPRENORPHINE AND NALOXONE 8 MG: 8; 2 FILM BUCCAL; SUBLINGUAL at 16:02

## 2024-02-05 RX ADMIN — GABAPENTIN 600 MG: 300 CAPSULE ORAL at 21:30

## 2024-02-05 RX ADMIN — BUPRENORPHINE AND NALOXONE 8 MG: 8; 2 FILM BUCCAL; SUBLINGUAL at 08:18

## 2024-02-05 RX ADMIN — NICOTINE POLACRILEX 2 MG: 2 GUM, CHEWING ORAL at 22:32

## 2024-02-05 NOTE — ED NOTES
Pt provided with new scrubs, warm blankets, and new sheets     Heather Todd, PORFIRIO  02/05/24 0854

## 2024-02-05 NOTE — ED NOTES
Spoke with [facility] admissions following their medical director review, pt cannot be accepted due to medical hx concerns. Informed ED RN of same.    Suboxone is prescribed from Dr. Grace Dawn, prescribed TID.

## 2024-02-05 NOTE — ED NOTES
Pt requesting sugar cookies for breakfast, order placed at this time.      Heather Todd RN  02/05/24 0902

## 2024-02-05 NOTE — ED NOTES
Assumed care pt at this time, pt sitting up in bed watching t.v. Appears comfortable, none labored respirations. See paper charting for Q 7 min checks.      Kelsey Siegel RN  02/04/24 1914

## 2024-02-05 NOTE — ED NOTES
Meal tray ordered for patient. Patient is complaining of R flank pain, radiation of pain to RLQ. Denies other symptom. Provider aware.      Tammy Murphy RN  02/05/24 7620

## 2024-02-05 NOTE — ED NOTES
Potential discharge beds available at [facility]. Clinical faxed for review. Bed search to continue if needed.

## 2024-02-05 NOTE — TELEPHONE ENCOUNTER
Consult placed on [facility] queue at 0840 to be seen by an [facility] psychiatrist.

## 2024-02-05 NOTE — ED NOTES
"Patient aware there are no network beds. She says \"the doctor said I was going\".  She met with [facility] psychiatry and was under the impression [facility] had beds.  She was asked if she would consider other facilities.  Patient says \"dont be mad at me but I dont want to go anywhere else but [facility]\". Patient adamant about placement at [facility]. Patient is aware she will need to wait until a bed opens up.  "

## 2024-02-05 NOTE — ED NOTES
Pt requesting PRN Susy, RN explained she has PRN atarax prescribed but will let provider know of her request.      Heather Todd RN  02/05/24 6557

## 2024-02-05 NOTE — ED NOTES
Pt med rec performed at this time; prescribing doctor for suboxone is Dr. Grace Dawn.       Heather Todd, RN  02/05/24 5724

## 2024-02-05 NOTE — ED NOTES
Crisis worker spoke with [facility] admissions. Pt will be reviewed with medical provider in admissions due to medical hx; CBC/CMP requested. TT sent to ED provider requesting same. Crisis to follow up. In the event of Pt being accepted to [facility], a 10 day supply of her Suboxone will need to be brought to facility.

## 2024-02-05 NOTE — TELEMEDICINE
TeleConsultation - Behavioral Health   Carey Keith 48 y.o. female MRN: [id number]  Unit/Bed#: ED-04 Encounter: [id number]        REQUIRED DOCUMENTATION:     1. This service was provided via Telemedicine.  2. Provider located at [place]  3. TeleMed provider: Kris Valerio MD.  4. Identify all parties in room with patient during tele consult:  pt  5.Patient was then informed that this was a Telemedicine visit and that the exam was being conducted confidentially over secure lines. My office door was closed. No one else was in the room.  Patient acknowledged consent and understanding of privacy and security of the Telemedicine visit, and gave us permission to have the assistant stay in the room in order to assist with the history and to conduct the exam.  I informed the patient that I have reviewed their record in Epic and presented the opportunity for them to ask any questions regarding the visit today.  The patient agreed to participate.       Assessment/Plan     Present on Admission:  **None**    Assessment:    Unspecified anxiety disorder; rule out generalized anxiety disorder; unspecified mood disorder; rule out bipolar disorder; opioid use disorder in remission on Suboxone    Treatment Plan:    Voluntary inpatient psychiatric treatment is indicated for disabling anxiety placing patient at risk for escalation to self-injurious behaviors to include suicide.  The patient is in agreement this plan.  In the meantime recommend continuing psychiatric medications as currently ordered here.  Will defer any psychiatric medication changes to the inpatient psychiatrist following further observation and evaluation on the inpatient unit.  No additional psychiatric precautions are indicated at this time.  Reconsult psychiatry as needed.    Current Medications:     Current Facility-Administered Medications   Medication Dose Route Frequency Provider Last Rate    buprenorphine-naloxone  8 mg Sublingual TID Ramon Eason MD    "   buPROPion  300 mg Oral Daily Ramon Eason MD      gabapentin  600 mg Oral TID Ramon Eason MD      hydrOXYzine HCL  25 mg Oral Q6H PRN Ramon Eason MD      nicotine polacrilex  2 mg Oral Q2H PRN Ramon Eason MD         Risks / Benefits of Treatment:    Risks, benefits, and possible side effects of medications explained to patient and patient verbalizes understanding.      Other treatment modalities recommended as indicated:    Inpatient psychiatric treatment      Consult to Psychiatry  Consult performed by: Kris Valerio MD  Consult ordered by: Ramon Eason MD      Physician Requesting Consult: Ramon Eason MD  Principal Problem:<principal problem not specified>    Reason for Consult: anxiety      History of Present Illness      Patient is a 48 y.o. female who has presented to the emergency department where crisis is obtained and documented the following information:    The patient is a 48 year old female presenting to the ED due to increased anxiety with an inability to self-regulate and a desire for medication adjustments. Patient has a PPH of anxiety, depression, and bipolar disorder. She is a cooperative, well-appearing, and alert and oriented to all spheres. Introduced self and role, the patient agrees to speak with this writer at this time.   Per the patient, she presented to the hospital tonight due trouble managing her anxiety for the past two weeks that the significantly worsened tonight. She states that this is around the same time that her father passed away in 2019 and her anxiety becomes increasingly hard to manage. Her and her father had a tumultuous relationship before he passed and she claims that the last words he said to her were \"fuck you.\" She has a better relationship with her mother, who is living in [place], but states that she's unable to see her because she hasn't been felt emotionally/mentally equipped enough to return to the state since her dad . She has " "a history of self-injurious behavior via cutting herself and proceeds to show this writer where she has cut herself in the past. Scars are visible on the right side of her neck and bilateral arms, most of which look significantly healed. Patient states that the last time she cut herself was two weeks ago and that's how she knew her anxiety would continue to spiral. She has had difficulty sleeping through the night (reports getting only four hours at best) and has had a fluctuating appetite that is difficult to anticipate resulting in poor eating habits. She also reports increased inability to focus. She does not have an explicit wish to be dead at this time but does endorse feelings of hopelessness and helplessness, stating \"I just can't take it anymore. How do I keep living like this?\" She states that she feels as though she's \"cornered\" and \"without options.\" She does have a history of SI and states that she knows that if her anxiety continues the way that it is, she'll have those thoughts soon. She is looking to sign herself in for inpatient treatment before her thoughts reach that point.   She is prescribed multiple medications for anxiety and depression but has a history of either taking the wrong medications at the wrong times, mistakenly doubling her dosages, as well as attempting to take herself off of or, per EMR, increase medications without receiving approval from her doctor first. She is otherwise medication compliant and acknowledges that when she does have her medications sorted appropriately, they are beneficial to her state of mind. Current psychotropic medications are managed by family medicine, Dr. Dawn. Carey states that she's been mostly fine being managed by Dr. Dawn but states that she would also like to begin speaking with a psychiatrist for additional management. She reports that her family doctor is currently out of the office and so she has had difficulty reconciling her " "medication mishaps on her own. She denies symptoms of psychosis but adds that she does tend to start seeing things out of the corner of her eye and hearing voices/whispers when her anxiety is \"through the roof.\" No current issues with substance/alcohol abuse (UDS is negative for all as well as initial BAT level) but does have a history of substance abuse and states she is still currently taking suboxone. No legal issues.  Patient is seeking to sign herself in for mood and medication stabilization.         In meeting with the patient she shares that this disabling anxiety as being progressing over the last few weeks.  She believes she has been mistakenly missed taking her psychiatric medication.  She states that when she is is anxious she frequently becomes mad at herself and feels that the compulsion to cut herself.  She states she attempted suicide by slashing her throat within the last couple of years.    Past psychiatric history: The patient reports history of anxiety and depression with 1 prior suicide attempts and with 2 prior psychiatric hospitalizations as above.    Social history: The patient is single.  She is not in a relationship at this time.  She has no children and is not employed.  She reports no abuse.    Family history: Cousin and uncle completed suicide.  Patient reports that depression, anxiety and anger runs in the family.    Substance use history: Patient reports that years ago she used \"all little alcohol\" as well as pain pills.  She states \"I got on Suboxone pretty quickly\".    [place] suicide severity risk scale: The patient denies active death wishes or suicidal ideation over the past month but states she recognizes the warning signs that she is currently experiencing and that in the past they have escalated into suicidal ideation and an attempt.  She scores as low risk for suicide currently but when potential for this to be further escalated into treatment interventions are not employed at " "this time.    Mental status examination: The patient is alert and well oriented in all spheres.  She is pleasant and cooperative with anxious presentation.  Speech is pressured.  Speech is somewhat circumstantial but otherwise unremarkable.  Thought process is circumstantial.  Thought content is reality based.  In general the patient appears to be at very vague historian but memory appears to be fair in all spheres.  She appears to be of average intelligence by use of vocabulary, general fund of knowledge, sentence structure and syntax.  She denies suicidal homicidal ideation.  She denies hallucinations other psychotic features.  Insight and judgment are currently intact to the extent that she recognizes the need for inpatient psychiatric treatment for her safety.  She is experiencing significant feelings of hopelessness and helplessness.      Past Medical History:   Diagnosis Date    Addiction to drug ([facility])     Alcohol abuse     Alcoholism ([facility])     Altered mental status 09/21/2022    Elevated LFTs 09/21/2022    Lower back pain     Self-injurious behavior     Substance abuse ([facility])        Medical Review Of Systems:    Review of Systems    Meds/Allergies     all current active meds have been reviewed  Allergies   Allergen Reactions    Haloperidol Other (See Comments)     oculogyr crisis    Abilify [Aripiprazole] Other (See Comments)     Pt reports increased agitation ? (stating last time she took this med \"she lost it\"        Objective     Vital signs in last 24 hours:  HR:  [56-67] 67  Resp:  [18] 18  BP: ()/(56-83) 110/72    No intake or output data in the 24 hours ending 02/05/24 1024        Lab Results: I have personally reviewed all pertinent laboratory/tests results.         Imaging Studies: No results found.  EKG/Pathology/Other Studies:   Lab Results   Component Value Date    VENTRATE 65 12/03/2023    ATRIALRATE 65 12/03/2023    PRINT 168 12/03/2023    QRSDINT 84 12/03/2023    QTINT 396 12/03/2023    " QTCINT 411 12/03/2023    PAXIS -54 12/03/2023    QRSAXIS 66 12/03/2023    TWAVEAXIS 50 12/03/2023        Code Status: Prior  Advance Directive and Living Will:      Power of :    POLST:      Screenings:    1. Nutrition Screening  Not available on chart    2. Pain Screening  Not available on chart    3. Suicide Screening  Not available on chart    Counseling / Coordination of Care:    Total floor / unit time spent today 30 minutes. Greater than 50% of total time was spent with the patient and / or family counseling and / or coordination of care. A description of the counseling / coordination of care: Chart review, patient evaluation, coordination communication with staff, nursing and provider.

## 2024-02-05 NOTE — ED NOTES
Patient is presently engaged in phone consult with ANTONI Valerio, Psychiatry.      Tammy Murphy, PORFIRIO  02/05/24 6939

## 2024-02-06 ENCOUNTER — HOSPITAL ENCOUNTER (INPATIENT)
Facility: HOSPITAL | Age: 49
LOS: 1 days | Discharge: HOME | DRG: 753 | End: 2024-02-07
Attending: STUDENT IN AN ORGANIZED HEALTH CARE EDUCATION/TRAINING PROGRAM | Admitting: PSYCHIATRY & NEUROLOGY
Payer: COMMERCIAL

## 2024-02-06 ENCOUNTER — E-CONSULT REQUEST (OUTPATIENT)
Dept: PSYCHIATRY | Facility: CLINIC | Age: 49
End: 2024-02-06

## 2024-02-06 ENCOUNTER — TELEPHONE (OUTPATIENT)
Dept: PSYCHIATRY | Facility: CLINIC | Age: 49
End: 2024-02-06

## 2024-02-06 VITALS
DIASTOLIC BLOOD PRESSURE: 68 MMHG | TEMPERATURE: 97.7 F | BODY MASS INDEX: 30.32 KG/M2 | OXYGEN SATURATION: 100 % | WEIGHT: 165.79 LBS | HEART RATE: 72 BPM | SYSTOLIC BLOOD PRESSURE: 106 MMHG | RESPIRATION RATE: 16 BRPM

## 2024-02-06 DIAGNOSIS — F41.9 ANXIETY DISORDER, UNSPECIFIED: ICD-10-CM

## 2024-02-06 DIAGNOSIS — E78.5 HYPERLIPIDEMIA: ICD-10-CM

## 2024-02-06 DIAGNOSIS — R09.02 HYPOXIA: ICD-10-CM

## 2024-02-06 DIAGNOSIS — Z72.0 TOBACCO ABUSE: ICD-10-CM

## 2024-02-06 DIAGNOSIS — K59.09 CHRONIC CONSTIPATION: ICD-10-CM

## 2024-02-06 DIAGNOSIS — Z00.8 MEDICAL CLEARANCE FOR PSYCHIATRIC ADMISSION: ICD-10-CM

## 2024-02-06 DIAGNOSIS — J20.9 ACUTE BRONCHITIS: ICD-10-CM

## 2024-02-06 DIAGNOSIS — G47.00 INSOMNIA: ICD-10-CM

## 2024-02-06 DIAGNOSIS — F39 UNSPECIFIED MOOD (AFFECTIVE) DISORDER (HCC): Primary | ICD-10-CM

## 2024-02-06 LAB
BACTERIA UR QL AUTO: ABNORMAL /HPF
BILIRUB UR QL STRIP: NEGATIVE
CLARITY UR: CLEAR
COLOR UR: ABNORMAL
GLUCOSE UR STRIP-MCNC: NEGATIVE MG/DL
HGB UR QL STRIP.AUTO: NEGATIVE
KETONES UR STRIP-MCNC: NEGATIVE MG/DL
LEUKOCYTE ESTERASE UR QL STRIP: NEGATIVE
MUCOUS THREADS UR QL AUTO: ABNORMAL
NITRITE UR QL STRIP: NEGATIVE
NON-SQ EPI CELLS URNS QL MICRO: ABNORMAL /HPF
PH UR STRIP.AUTO: 7 [PH] (ref 4.5–8)
PROT UR STRIP-MCNC: ABNORMAL MG/DL
RBC #/AREA URNS AUTO: ABNORMAL /HPF
SP GR UR STRIP.AUTO: >=1.03 (ref 1–1.03)
UROBILINOGEN UR QL STRIP.AUTO: 2 E.U./DL
WBC #/AREA URNS AUTO: ABNORMAL /HPF

## 2024-02-06 PROCEDURE — 81001 URINALYSIS AUTO W/SCOPE: CPT

## 2024-02-06 RX ORDER — BUPRENORPHINE AND NALOXONE 8; 2 MG/1; MG/1
8 FILM, SOLUBLE BUCCAL; SUBLINGUAL 3 TIMES DAILY
Status: CANCELLED | OUTPATIENT
Start: 2024-02-06

## 2024-02-06 RX ORDER — METHOCARBAMOL 500 MG/1
500 TABLET, FILM COATED ORAL ONCE
Status: COMPLETED | OUTPATIENT
Start: 2024-02-06 | End: 2024-02-06

## 2024-02-06 RX ORDER — PROPRANOLOL HYDROCHLORIDE 20 MG/1
10 TABLET ORAL EVERY 8 HOURS PRN
Status: CANCELLED | OUTPATIENT
Start: 2024-02-06

## 2024-02-06 RX ORDER — ACETAMINOPHEN 325 MG/1
975 TABLET ORAL EVERY 6 HOURS PRN
Status: DISCONTINUED | OUTPATIENT
Start: 2024-02-06 | End: 2024-02-07

## 2024-02-06 RX ORDER — TRAZODONE HYDROCHLORIDE 50 MG/1
50 TABLET ORAL
Status: DISCONTINUED | OUTPATIENT
Start: 2024-02-06 | End: 2024-02-07 | Stop reason: HOSPADM

## 2024-02-06 RX ORDER — PROPRANOLOL HYDROCHLORIDE 10 MG/1
10 TABLET ORAL EVERY 8 HOURS PRN
Status: DISCONTINUED | OUTPATIENT
Start: 2024-02-06 | End: 2024-02-07 | Stop reason: HOSPADM

## 2024-02-06 RX ORDER — OLANZAPINE 10 MG/2ML
5 INJECTION, POWDER, FOR SOLUTION INTRAMUSCULAR
Status: CANCELLED | OUTPATIENT
Start: 2024-02-06

## 2024-02-06 RX ORDER — LORAZEPAM 1 MG/1
1 TABLET ORAL ONCE
Status: COMPLETED | OUTPATIENT
Start: 2024-02-06 | End: 2024-02-06

## 2024-02-06 RX ORDER — CLONAZEPAM 0.5 MG/1
0.5 TABLET ORAL EVERY 8 HOURS
Status: DISCONTINUED | OUTPATIENT
Start: 2024-02-06 | End: 2024-02-06 | Stop reason: HOSPADM

## 2024-02-06 RX ORDER — OLANZAPINE 10 MG/2ML
5 INJECTION, POWDER, FOR SOLUTION INTRAMUSCULAR
Status: DISCONTINUED | OUTPATIENT
Start: 2024-02-06 | End: 2024-02-07 | Stop reason: HOSPADM

## 2024-02-06 RX ORDER — CLONAZEPAM 0.5 MG/1
0.5 TABLET ORAL EVERY 8 HOURS
Status: CANCELLED | OUTPATIENT
Start: 2024-02-06

## 2024-02-06 RX ORDER — GABAPENTIN 300 MG/1
600 CAPSULE ORAL 3 TIMES DAILY
Status: DISCONTINUED | OUTPATIENT
Start: 2024-02-06 | End: 2024-02-07 | Stop reason: HOSPADM

## 2024-02-06 RX ORDER — BENZTROPINE MESYLATE 1 MG/1
1 TABLET ORAL
Status: CANCELLED | OUTPATIENT
Start: 2024-02-06

## 2024-02-06 RX ORDER — HYDROXYZINE HYDROCHLORIDE 25 MG/1
25 TABLET, FILM COATED ORAL
Status: DISCONTINUED | OUTPATIENT
Start: 2024-02-06 | End: 2024-02-07 | Stop reason: HOSPADM

## 2024-02-06 RX ORDER — ACETAMINOPHEN 325 MG/1
975 TABLET ORAL EVERY 6 HOURS PRN
Status: CANCELLED | OUTPATIENT
Start: 2024-02-06

## 2024-02-06 RX ORDER — OLANZAPINE 5 MG/1
10 TABLET ORAL
Status: CANCELLED | OUTPATIENT
Start: 2024-02-06

## 2024-02-06 RX ORDER — POLYETHYLENE GLYCOL 3350 17 G/17G
17 POWDER, FOR SOLUTION ORAL DAILY PRN
Status: DISCONTINUED | OUTPATIENT
Start: 2024-02-06 | End: 2024-02-07 | Stop reason: HOSPADM

## 2024-02-06 RX ORDER — OLANZAPINE 5 MG/1
5 TABLET ORAL
Status: CANCELLED | OUTPATIENT
Start: 2024-02-06

## 2024-02-06 RX ORDER — BENZTROPINE MESYLATE 1 MG/ML
1 INJECTION INTRAMUSCULAR; INTRAVENOUS
Status: CANCELLED | OUTPATIENT
Start: 2024-02-06

## 2024-02-06 RX ORDER — OLANZAPINE 5 MG/1
5 TABLET ORAL
Status: DISCONTINUED | OUTPATIENT
Start: 2024-02-06 | End: 2024-02-07 | Stop reason: HOSPADM

## 2024-02-06 RX ORDER — ACETAMINOPHEN 325 MG/1
650 TABLET ORAL EVERY 6 HOURS PRN
Status: DISCONTINUED | OUTPATIENT
Start: 2024-02-06 | End: 2024-02-07 | Stop reason: HOSPADM

## 2024-02-06 RX ORDER — OLANZAPINE 10 MG/1
10 TABLET ORAL
Status: DISCONTINUED | OUTPATIENT
Start: 2024-02-06 | End: 2024-02-07 | Stop reason: HOSPADM

## 2024-02-06 RX ORDER — LORAZEPAM 2 MG/ML
2 INJECTION INTRAMUSCULAR EVERY 6 HOURS PRN
Status: DISCONTINUED | OUTPATIENT
Start: 2024-02-06 | End: 2024-02-07 | Stop reason: HOSPADM

## 2024-02-06 RX ORDER — MAGNESIUM HYDROXIDE/ALUMINUM HYDROXICE/SIMETHICONE 120; 1200; 1200 MG/30ML; MG/30ML; MG/30ML
30 SUSPENSION ORAL EVERY 4 HOURS PRN
Status: CANCELLED | OUTPATIENT
Start: 2024-02-06

## 2024-02-06 RX ORDER — OLANZAPINE 10 MG/2ML
10 INJECTION, POWDER, FOR SOLUTION INTRAMUSCULAR
Status: CANCELLED | OUTPATIENT
Start: 2024-02-06

## 2024-02-06 RX ORDER — TRAZODONE HYDROCHLORIDE 50 MG/1
50 TABLET ORAL
Status: CANCELLED | OUTPATIENT
Start: 2024-02-06

## 2024-02-06 RX ORDER — ACETAMINOPHEN 325 MG/1
650 TABLET ORAL EVERY 6 HOURS PRN
Status: CANCELLED | OUTPATIENT
Start: 2024-02-06

## 2024-02-06 RX ORDER — OLANZAPINE 5 MG/1
2.5 TABLET ORAL
Status: CANCELLED | OUTPATIENT
Start: 2024-02-06

## 2024-02-06 RX ORDER — DIPHENHYDRAMINE HYDROCHLORIDE 50 MG/ML
50 INJECTION INTRAMUSCULAR; INTRAVENOUS EVERY 6 HOURS PRN
Status: CANCELLED | OUTPATIENT
Start: 2024-02-06

## 2024-02-06 RX ORDER — BENZTROPINE MESYLATE 1 MG/ML
1 INJECTION INTRAMUSCULAR; INTRAVENOUS
Status: DISCONTINUED | OUTPATIENT
Start: 2024-02-06 | End: 2024-02-07 | Stop reason: HOSPADM

## 2024-02-06 RX ORDER — HYDROXYZINE HYDROCHLORIDE 25 MG/1
25 TABLET, FILM COATED ORAL
Status: CANCELLED | OUTPATIENT
Start: 2024-02-06

## 2024-02-06 RX ORDER — MAGNESIUM HYDROXIDE/ALUMINUM HYDROXICE/SIMETHICONE 120; 1200; 1200 MG/30ML; MG/30ML; MG/30ML
30 SUSPENSION ORAL EVERY 4 HOURS PRN
Status: DISCONTINUED | OUTPATIENT
Start: 2024-02-06 | End: 2024-02-07 | Stop reason: HOSPADM

## 2024-02-06 RX ORDER — POLYETHYLENE GLYCOL 3350 17 G/17G
17 POWDER, FOR SOLUTION ORAL DAILY PRN
Status: CANCELLED | OUTPATIENT
Start: 2024-02-06

## 2024-02-06 RX ORDER — HYDROXYZINE 50 MG/1
50 TABLET, FILM COATED ORAL
Status: DISCONTINUED | OUTPATIENT
Start: 2024-02-06 | End: 2024-02-07 | Stop reason: HOSPADM

## 2024-02-06 RX ORDER — ACETAMINOPHEN 325 MG/1
650 TABLET ORAL EVERY 4 HOURS PRN
Status: CANCELLED | OUTPATIENT
Start: 2024-02-06

## 2024-02-06 RX ORDER — LORAZEPAM 2 MG/ML
2 INJECTION INTRAMUSCULAR EVERY 6 HOURS PRN
Status: CANCELLED | OUTPATIENT
Start: 2024-02-06

## 2024-02-06 RX ORDER — HYDROXYZINE HYDROCHLORIDE 25 MG/1
100 TABLET, FILM COATED ORAL
Status: CANCELLED | OUTPATIENT
Start: 2024-02-06

## 2024-02-06 RX ORDER — CLONAZEPAM 0.5 MG/1
0.5 TABLET ORAL EVERY 8 HOURS SCHEDULED
Status: DISCONTINUED | OUTPATIENT
Start: 2024-02-06 | End: 2024-02-07 | Stop reason: HOSPADM

## 2024-02-06 RX ORDER — BENZTROPINE MESYLATE 1 MG/1
1 TABLET ORAL
Status: DISCONTINUED | OUTPATIENT
Start: 2024-02-06 | End: 2024-02-07 | Stop reason: HOSPADM

## 2024-02-06 RX ORDER — LANOLIN ALCOHOL/MO/W.PET/CERES
3 CREAM (GRAM) TOPICAL
Status: CANCELLED | OUTPATIENT
Start: 2024-02-06

## 2024-02-06 RX ORDER — HYDROXYZINE HYDROCHLORIDE 25 MG/1
50 TABLET, FILM COATED ORAL
Status: CANCELLED | OUTPATIENT
Start: 2024-02-06

## 2024-02-06 RX ORDER — BUPROPION HYDROCHLORIDE 150 MG/1
300 TABLET ORAL DAILY
Status: CANCELLED | OUTPATIENT
Start: 2024-02-07

## 2024-02-06 RX ORDER — GABAPENTIN 300 MG/1
600 CAPSULE ORAL 3 TIMES DAILY
Status: CANCELLED | OUTPATIENT
Start: 2024-02-06

## 2024-02-06 RX ORDER — BUPROPION HYDROCHLORIDE 300 MG/1
300 TABLET ORAL DAILY
Status: DISCONTINUED | OUTPATIENT
Start: 2024-02-07 | End: 2024-02-07

## 2024-02-06 RX ORDER — LIDOCAINE 50 MG/G
1 PATCH TOPICAL ONCE
Status: DISCONTINUED | OUTPATIENT
Start: 2024-02-06 | End: 2024-02-06 | Stop reason: HOSPADM

## 2024-02-06 RX ORDER — ACETAMINOPHEN 325 MG/1
650 TABLET ORAL EVERY 4 HOURS PRN
Status: DISCONTINUED | OUTPATIENT
Start: 2024-02-06 | End: 2024-02-07 | Stop reason: HOSPADM

## 2024-02-06 RX ORDER — HYDROXYZINE 50 MG/1
100 TABLET, FILM COATED ORAL
Status: DISCONTINUED | OUTPATIENT
Start: 2024-02-06 | End: 2024-02-07 | Stop reason: HOSPADM

## 2024-02-06 RX ORDER — LANOLIN ALCOHOL/MO/W.PET/CERES
3 CREAM (GRAM) TOPICAL
Status: DISCONTINUED | OUTPATIENT
Start: 2024-02-06 | End: 2024-02-07 | Stop reason: HOSPADM

## 2024-02-06 RX ORDER — OLANZAPINE 10 MG/2ML
10 INJECTION, POWDER, FOR SOLUTION INTRAMUSCULAR
Status: DISCONTINUED | OUTPATIENT
Start: 2024-02-06 | End: 2024-02-07 | Stop reason: HOSPADM

## 2024-02-06 RX ORDER — DIPHENHYDRAMINE HYDROCHLORIDE 50 MG/ML
50 INJECTION INTRAMUSCULAR; INTRAVENOUS EVERY 6 HOURS PRN
Status: DISCONTINUED | OUTPATIENT
Start: 2024-02-06 | End: 2024-02-07 | Stop reason: HOSPADM

## 2024-02-06 RX ORDER — OLANZAPINE 2.5 MG/1
2.5 TABLET, FILM COATED ORAL
Status: DISCONTINUED | OUTPATIENT
Start: 2024-02-06 | End: 2024-02-07 | Stop reason: HOSPADM

## 2024-02-06 RX ORDER — BUPRENORPHINE AND NALOXONE 8; 2 MG/1; MG/1
8 FILM, SOLUBLE BUCCAL; SUBLINGUAL 3 TIMES DAILY
Status: DISCONTINUED | OUTPATIENT
Start: 2024-02-06 | End: 2024-02-07 | Stop reason: HOSPADM

## 2024-02-06 RX ADMIN — BUPRENORPHINE AND NALOXONE 8 MG: 8; 2 FILM BUCCAL; SUBLINGUAL at 21:02

## 2024-02-06 RX ADMIN — IBUPROFEN 400 MG: 400 TABLET, FILM COATED ORAL at 06:28

## 2024-02-06 RX ADMIN — METHOCARBAMOL 500 MG: 500 TABLET ORAL at 09:27

## 2024-02-06 RX ADMIN — CLONAZEPAM 0.5 MG: 0.5 TABLET ORAL at 10:21

## 2024-02-06 RX ADMIN — GABAPENTIN 600 MG: 300 CAPSULE ORAL at 08:19

## 2024-02-06 RX ADMIN — BUPRENORPHINE AND NALOXONE 8 MG: 8; 2 FILM BUCCAL; SUBLINGUAL at 15:37

## 2024-02-06 RX ADMIN — CLONAZEPAM 0.5 MG: 0.5 TABLET ORAL at 03:18

## 2024-02-06 RX ADMIN — CLONAZEPAM 0.5 MG: 0.5 TABLET ORAL at 19:35

## 2024-02-06 RX ADMIN — BUPROPION HYDROCHLORIDE 300 MG: 150 TABLET, FILM COATED, EXTENDED RELEASE ORAL at 08:18

## 2024-02-06 RX ADMIN — IBUPROFEN 400 MG: 400 TABLET, FILM COATED ORAL at 15:38

## 2024-02-06 RX ADMIN — LIDOCAINE 1 PATCH: 50 PATCH CUTANEOUS at 09:27

## 2024-02-06 RX ADMIN — LORAZEPAM 1 MG: 1 TABLET ORAL at 01:25

## 2024-02-06 RX ADMIN — MELATONIN TAB 3 MG 3 MG: 3 TAB at 21:02

## 2024-02-06 RX ADMIN — GABAPENTIN 600 MG: 300 CAPSULE ORAL at 15:38

## 2024-02-06 RX ADMIN — NICOTINE POLACRILEX 2 MG: 2 GUM, CHEWING ORAL at 14:14

## 2024-02-06 RX ADMIN — GABAPENTIN 600 MG: 300 CAPSULE ORAL at 21:01

## 2024-02-06 RX ADMIN — NICOTINE POLACRILEX 2 MG: 2 GUM, CHEWING ORAL at 08:44

## 2024-02-06 RX ADMIN — BUPRENORPHINE AND NALOXONE 8 MG: 8; 2 FILM BUCCAL; SUBLINGUAL at 08:20

## 2024-02-06 RX ADMIN — HYDROXYZINE HYDROCHLORIDE 25 MG: 25 TABLET, FILM COATED ORAL at 12:46

## 2024-02-06 NOTE — ED NOTES
Pt ringing calling bell asking for Klonopin or Ativan for anxiety. This RN spoke to the attending who said the patient met with psychiatry and then plan is to give Atarax PRN for anxiety instead. Pt made aware and denied atarax. Pt increasingly agitated and asking to speak to a manager. Charge nurse made aware.     Jayleen Moy RN  02/05/24 1954

## 2024-02-06 NOTE — ED CARE HANDOFF
Emergency Department Sign Out Note        Sign out and transfer of care from Dr. Epstein. See Separate Emergency Department note.     The patient, Carey Keith, was evaluated by the previous provider for anxiety.    Workup Completed:  Medically cleared    ED Course / Workup Pending (followup):  Psychiatry consult, with medication recommendation for clonidine.                                    ED Course as of 02/06/24 1136   Tue Feb 06, 2024   0952 C/o right flank and back pain, no new urinary symptoms.  UA is normal.     1132 Examine patient at bedside with regard to her back pain and updated on the plan.  She localizes to right low back, no radiation, normal ROM.  I suspect her prolonged ED stay is the main contirbutor.  Treat with NSAIDs, topical, muscle relaxer.  In addition, she is asking for ativan again for anxiety.  While she awaits placement, psychiatry has consulted and given a regimen of clonzepam 0.5 q8h.  She wants, more, saying she was on 1mg q8h.  In any case, I explained that I am not escalating her dosing of benzodiazepines over what psychiatry has recommended.  Any changes would be according to psychiatrist recommendations.       Procedures  Medical Decision Making  Amount and/or Complexity of Data Reviewed  Labs: ordered.    Risk  OTC drugs.  Prescription drug management.  Decision regarding hospitalization.            Disposition  Final diagnoses:   Anxiety     Time reflects when diagnosis was documented in both MDM as applicable and the Disposition within this note       Time User Action Codes Description Comment    2/3/2024  8:16 PM Ramon Eason Add [F41.9] Anxiety           ED Disposition       ED Disposition   Transfer to Behavioral Health Condition   --    Date/Time   Sat Feb 3, 2024  8:16 PM    Comment   Carey Keith has been medically cleared.               MD Documentation      Flowsheet Row Most Recent Value   Sending MD Dr. Epstein          Follow-up Information     None       Patient's Medications   Discharge Prescriptions    No medications on file     No discharge procedures on file.       ED Provider  Electronically Signed by     Nilton Bravo MD  02/07/24 0673

## 2024-02-06 NOTE — EMTALA/ACUTE CARE TRANSFER
Formerly [facility], [facility] EMERGENCY DEPARTMENT  [address] [address]  Dept: [phone]      EMTALA TRANSFER CONSENT    NAME Carey SANTOS 1975                              MRN [id number]    I have been informed of my rights regarding examination, treatment, and transfer   by Dr. Nilton Bravo MD    Benefits: Specialized equipment and/or services available at the receiving facility (Include comment)________________________    Risks: Potential for delay in receiving treatment, Increased discomfort during transfer      Consent for Transfer:  I acknowledge that my medical condition has been evaluated and explained to me by the emergency department physician or other qualified medical person and/or my attending physician, who has recommended that I be transferred to the service of  Accepting Physician: Dr Wiggins at Accepting Facility Name, City & State : [address] The above potential benefits of such transfer, the potential risks associated with such transfer, and the probable risks of not being transferred have been explained to me, and I fully understand them.  The doctor has explained that, in my case, the benefits of transfer outweigh the risks.  I agree to be transferred.    I authorize the performance of emergency medical procedures and treatments upon me in both transit and upon arrival at the receiving facility.  Additionally, I authorize the release of any and all medical records to the receiving facility and request they be transported with me, if possible.  I understand that the safest mode of transportation during a medical emergency is an ambulance and that the Hospital advocates the use of this mode of transport. Risks of traveling to the receiving facility by car, including absence of medical control, life sustaining equipment, such as oxygen, and medical personnel has been explained to me and I fully understand  them.    (BRUCE CORRECT BOX BELOW)  [  ]  I consent to the stated transfer and to be transported by ambulance/helicopter.  [  ]  I consent to the stated transfer, but refuse transportation by ambulance and accept full responsibility for my transportation by car.  I understand the risks of non-ambulance transfers and I exonerate the Hospital and its staff from any deterioration in my condition that results from this refusal.    X___________________________________________    DATE  24  TIME________  Signature of patient or legally responsible individual signing on patient behalf           RELATIONSHIP TO PATIENT_________________________          Provider Certification    NAME Carey Keith                                         1975                              MRN [id number]    A medical screening exam was performed on the above named patient.  Based on the examination:    Condition Necessitating Transfer The primary encounter diagnosis was Anxiety. A diagnosis of Medical clearance for psychiatric admission was also pertinent to this visit.    Patient Condition: The patient has been stabilized such that within reasonable medical probability, no material deterioration of the patient condition or the condition of the unborn child(ricky) is likely to result from the transfer, No noted underlying medical condition requiring transfer to another facility. Transfer is per preference and request of patient and/or family    Reason for Transfer: Level of Care needed not available at this facility    Transfer Requirements: Facility [address]   Space available and qualified personnel available for treatment as acknowledged by asher  Agreed to accept transfer and to provide appropriate medical treatment as acknowledged by       Dr Wiggins  Appropriate medical records of the examination and treatment of the patient are provided at the time of transfer   STAFF INITIAL WHEN COMPLETED _______  Transfer will  be performed by qualified personnel from [facility]  and appropriate transfer equipment as required, including the use of necessary and appropriate life support measures.    Provider Certification: I have examined the patient and explained the following risks and benefits of being transferred/refusing transfer to the patient/family:  General risk, such as traffic hazards, adverse weather conditions, rough terrain or turbulence, possible failure of equipment (including vehicle or aircraft), or consequences of actions of persons outside the control of the transport personnel, The patient is stable for psychiatric transfer because they are medically stable, and is protected from harming him/herself or others during transport      Based on these reasonable risks and benefits to the patient and/or the unborn child(ricky), and based upon the information available at the time of the patient’s examination, I certify that the medical benefits reasonably to be expected from the provision of appropriate medical treatments at another medical facility outweigh the increasing risks, if any, to the individual’s medical condition, and in the case of labor to the unborn child, from effecting the transfer.    X____________________________________________ DATE 02/06/24        TIME_______      ORIGINAL - SEND TO MEDICAL RECORDS   COPY - SEND WITH PATIENT DURING TRANSFER

## 2024-02-06 NOTE — ED NOTES
Pt c/o right flank pain. States she is concerned she has a UTI. Provider made aware. Pt medicated at this time and UA will be collected.     Jolly Otero RN  02/06/24 0522

## 2024-02-06 NOTE — ED NOTES
Assumed care for pt at this time. Pt in bed resting with no outward signs of distress. Q7 checks continued on paper in chart.      Jolly Otero RN  02/06/24 0629

## 2024-02-06 NOTE — ED NOTES
Patient sleeping. Vitals deferred at this time. Respirations equal and non-labored.      Daily Hoffman RN  02/06/24 0058

## 2024-02-06 NOTE — ED NOTES
Pt stated she was feeling anxious. Pt medicated with PRN atarax at this time     Jolly Otero RN  02/06/24 4357

## 2024-02-06 NOTE — TELEPHONE ENCOUNTER
Consult placed on [facility] queue at 0810 to be seen by an [facility] psychiatrist.

## 2024-02-06 NOTE — ED NOTES
Patient is accepted at [facility]  Patient is accepted by Dr.Siddiqui tarsha Chawla     Transportation is arranged with [facility]  Transportation is scheduled for TBD    Nurse report is to be called to  [phone]  prior to patient transfer.

## 2024-02-06 NOTE — ED NOTES
"Spoke to patient regarding [facility].  She adamantly refuses and says \"why do I have to wait, its not fair!\" Explained there are many patients holding awaiting a bed.  Patient very angry and says I only want [place] and Ill wait till a bed opens.  Encouraged her to think about [facility].    "

## 2024-02-06 NOTE — TELEMEDICINE
TeleConsultation - Behavioral Health   Carey Keith 48 y.o. female MRN: [id number]  Unit/Bed#: ED-04 Encounter: [id number]        REQUIRED DOCUMENTATION:     1. This service was provided via Telemedicine.  [facility]. [facility]  3. TeleMed provider: Neda Harden MD.  4. Identify all parties in room with patient during tele consult:  pt  5.Patient was then informed that this was a Telemedicine visit and that the exam was being conducted confidentially over secure lines. My office door was closed. No one else was in the room.  Patient acknowledged consent and understanding of privacy and security of the Telemedicine visit, and gave us permission to have the assistant stay in the room in order to assist with the history and to conduct the exam.  I informed the patient that I have reviewed their record in Epic and presented the opportunity for them to ask any questions regarding the visit today.  The patient agreed to participate.       Assessment/Plan     Present on Admission:  **None**    Assessment:    Unspecified anxiety disorder; rule out generalized anxiety disorder; unspecified mood disorder; rule out bipolar disorder; opioid use disorder in remission on Suboxone    Treatment Plan:  Continue current plan, with the exception of the following changes:   -Restart Klonopin at 0.5mg q8hrs PO    Continue bed search on 201    Current Medications:     Current Facility-Administered Medications   Medication Dose Route Frequency Provider Last Rate    buprenorphine-naloxone  8 mg Sublingual TID Ramon Eason MD      buPROPion  300 mg Oral Daily Ramon Eason MD      clonazePAM  0.5 mg Oral Q8H Jerry Epstein Jr., DO      gabapentin  600 mg Oral TID Ramon Eason MD      hydrOXYzine HCL  25 mg Oral Q6H PRN Ramon Eason MD      ibuprofen  400 mg Oral Q8H PRN Ramon Eason MD      nicotine polacrilex  2 mg Oral Q2H PRN Ramon Eason MD         Risks / Benefits of Treatment:    Risks, benefits,  "and possible side effects of medications explained to patient and patient verbalizes understanding.      Other treatment modalities recommended as indicated:    Inpatient psychiatric treatment      Consults  Physician Requesting Consult: Jerry Epstein Jr., DO  Principal Problem:<principal problem not specified>    Reason for Consult: Follow up / anxiety medication      History of Present Illness      Patient is a 48 y.o. female who has presented to the emergency department where crisis is obtained and documented the following information:    The patient is a 48 year old female presenting to the ED due to increased anxiety with an inability to self-regulate and a desire for medication adjustments. Patient has a PPH of anxiety, depression, and bipolar disorder. She is a cooperative, well-appearing, and alert and oriented to all spheres. Introduced self and role, the patient agrees to speak with this writer at this time.   Per the patient, she presented to the [facility] due trouble managing her anxiety for the past two weeks that the significantly worsened tonight. She states that this is around the same time that her father passed away in 2019 and her anxiety becomes increasingly hard to manage. Her and her father had a tumultuous relationship before he passed and she claims that the last words he said to her were \"fuck you.\" She has a better relationship with her mother, who is living in [place], but states that she's unable to see her because she hasn't been felt emotionally/mentally equipped enough to return to the state since her dad . She has a history of self-injurious behavior via cutting herself and proceeds to show this writer where she has cut herself in the past. Scars are visible on the right side of her neck and bilateral arms, most of which look significantly healed. Patient states that the last time she cut herself was two weeks ago and that's how she knew her anxiety would continue to " "spiral. She has had difficulty sleeping through the night (reports getting only four hours at best) and has had a fluctuating appetite that is difficult to anticipate resulting in poor eating habits. She also reports increased inability to focus. She does not have an explicit wish to be dead at this time but does endorse feelings of hopelessness and helplessness, stating \"I just can't take it anymore. How do I keep living like this?\" She states that she feels as though she's \"cornered\" and \"without options.\" She does have a history of SI and states that she knows that if her anxiety continues the way that it is, she'll have those thoughts soon. She is looking to sign herself in for inpatient treatment before her thoughts reach that point.   She is prescribed multiple medications for anxiety and depression but has a history of either taking the wrong medications at the wrong times, mistakenly doubling her dosages, as well as attempting to take herself off of or, per EMR, increase medications without receiving approval from her doctor first. She is otherwise medication compliant and acknowledges that when she does have her medications sorted appropriately, they are beneficial to her state of mind. Current psychotropic medications are managed by family medicine, Dr. aDwn. Carey states that she's been mostly fine being managed by Dr. Dawn but states that she would also like to begin speaking with a psychiatrist for additional management. She reports that her family doctor is currently out of the office and so she has had difficulty reconciling her medication mishaps on her own. She denies symptoms of psychosis but adds that she does tend to start seeing things out of the corner of her eye and hearing voices/whispers when her anxiety is \"through the roof.\" No current issues with substance/alcohol abuse (UDS is negative for all as well as initial BAT level) but does have a history of substance abuse and states " "she is still currently taking suboxone. No legal issues.  Patient is seeking to sign herself in for mood and medication stabilization.            Interval History:  Patient was seen for followup via telepsychiatry. Please refer to initial psychiatric consultation on 2/5/23 for further details. Continues to reports severe anxiety which is leading to a compulsion to cut herself.  She reports taking klonopin at home and would like to restart it here. PDMP checked. Denies any adverse reactions in past from simultaneous use of klonopin up to 1mg dose QID and Suboxone 8mg TID. Reports understanding risks/benefits of treatment.       Historical Information:  Past psychiatric history: The patient reports history of anxiety and depression with 1 prior suicide attempts and with 2 prior psychiatric hospitalizations as above.    Social history: The patient is single.  She is not in a relationship at this time.  She has no children and is not employed.  She reports no abuse.    Family history: Cousin and uncle completed suicide.  Patient reports that depression, anxiety and anger runs in the family.    Substance use history: Patient reports that years ago she used \"all little alcohol\" as well as pain pills.  She states \"I got on Suboxone pretty quickly\".    [place] suicide severity risk scale: The patient denies active death wishes or suicidal ideation over the past month but states she recognizes the warning signs that she is currently experiencing and that in the past they have escalated into suicidal ideation and an attempt.  She scores as low risk for suicide currently but when potential for this to be further escalated into treatment interventions are not employed at this time.    Mental status examination: The patient is alert and well oriented in all spheres.  She is pleasant and cooperative with anxious presentation.  Speech is pressured.  Speech is unremarkable.  Thought process is linear.  Thought content is reality " "based.  Memory appears to be fair in all spheres.  She appears to be of average intelligence by use of vocabulary, general fund of knowledge, sentence structure and syntax.  She denies suicidal homicidal ideation.  She denies hallucinations other psychotic features.  Insight and judgment are currently intact.      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)     Alcohol abuse     Alcoholism ([facility])     Altered mental status 09/21/2022    Elevated LFTs 09/21/2022    Lower back pain     Self-injurious behavior     Substance abuse ([facility])        Medical Review Of Systems:    Review of Systems    Meds/Allergies     all current active meds have been reviewed  Allergies   Allergen Reactions    Haloperidol Other (See Comments)     oculogyr crisis    Abilify [Aripiprazole] Other (See Comments)     Pt reports increased agitation ? (stating last time she took this med \"she lost it\"        Objective     Vital signs in last 24 hours:  HR:  [67-74] 74  Resp:  [16-18] 18  BP: (110-117)/(72) 115/72    No intake or output data in the 24 hours ending 02/06/24 0358        Lab Results: I have personally reviewed all pertinent laboratory/tests results.         Imaging Studies: No results found.  EKG/Pathology/Other Studies:   Lab Results   Component Value Date    VENTRATE 65 12/03/2023    ATRIALRATE 65 12/03/2023    PRINT 168 12/03/2023    QRSDINT 84 12/03/2023    QTINT 396 12/03/2023    QTCINT 411 12/03/2023    PAXIS -54 12/03/2023    QRSAXIS 66 12/03/2023    TWAVEAXIS 50 12/03/2023        Code Status: Prior  Advance Directive and Living Will:      Power of :    POLST:      Screenings:    1. Nutrition Screening  Not available on chart    2. Pain Screening  Not available on chart    3. Suicide Screening  Not available on chart    Counseling / Coordination of Care:    Total floor / unit time spent today 30 minutes. Greater than 50% of total time was spent with the patient and / or family counseling and / or " coordination of care. A description of the counseling / coordination of care: Chart review, patient evaluation, coordination communication with staff, nursing and provider.

## 2024-02-06 NOTE — ED NOTES
4AM vital signs deferred at this time to promote uninterrupted rest. Patient resting in stretcher, no outward signs of distress notes. Respirations equal and non labored      Jayleen Moy RN  02/06/24 [id number]

## 2024-02-06 NOTE — ED NOTES
Special Delivery at bedside to transport pt. Security also at bedside to escort pt and transport out     Jolly Otero RN  02/06/24 9866

## 2024-02-07 VITALS
RESPIRATION RATE: 16 BRPM | BODY MASS INDEX: 30.25 KG/M2 | OXYGEN SATURATION: 95 % | SYSTOLIC BLOOD PRESSURE: 113 MMHG | DIASTOLIC BLOOD PRESSURE: 75 MMHG | HEIGHT: 61 IN | TEMPERATURE: 97.7 F | WEIGHT: 160.2 LBS | HEART RATE: 67 BPM

## 2024-02-07 PROBLEM — J81.1 PULMONARY EDEMA: Status: RESOLVED | Noted: 2023-04-13 | Resolved: 2024-02-07

## 2024-02-07 PROBLEM — E78.5 HYPERLIPIDEMIA: Status: ACTIVE | Noted: 2024-02-07

## 2024-02-07 LAB
25(OH)D3 SERPL-MCNC: 9.3 NG/ML (ref 30–100)
ALBUMIN SERPL BCP-MCNC: 3.8 G/DL (ref 3.5–5)
ALP SERPL-CCNC: 94 U/L (ref 34–104)
ALT SERPL W P-5'-P-CCNC: 101 U/L (ref 7–52)
ANION GAP SERPL CALCULATED.3IONS-SCNC: 7 MMOL/L
AST SERPL W P-5'-P-CCNC: 73 U/L (ref 13–39)
BASOPHILS # BLD AUTO: 0.07 THOUSANDS/ÂΜL (ref 0–0.1)
BASOPHILS NFR BLD AUTO: 1 % (ref 0–1)
BILIRUB SERPL-MCNC: 0.74 MG/DL (ref 0.2–1)
BUN SERPL-MCNC: 17 MG/DL (ref 5–25)
CALCIUM SERPL-MCNC: 8.8 MG/DL (ref 8.4–10.2)
CHLORIDE SERPL-SCNC: 105 MMOL/L (ref 96–108)
CHOLEST SERPL-MCNC: 216 MG/DL
CO2 SERPL-SCNC: 25 MMOL/L (ref 21–32)
CREAT SERPL-MCNC: 0.74 MG/DL (ref 0.6–1.3)
EOSINOPHIL # BLD AUTO: 0.01 THOUSAND/ÂΜL (ref 0–0.61)
EOSINOPHIL NFR BLD AUTO: 0 % (ref 0–6)
ERYTHROCYTE [DISTWIDTH] IN BLOOD BY AUTOMATED COUNT: 12.8 % (ref 11.6–15.1)
FOLATE SERPL-MCNC: 6.3 NG/ML
GFR SERPL CREATININE-BSD FRML MDRD: 97 ML/MIN/1.73SQ M
GLUCOSE P FAST SERPL-MCNC: 82 MG/DL (ref 65–99)
GLUCOSE SERPL-MCNC: 82 MG/DL (ref 65–140)
HCG SERPL QL: NEGATIVE
HCT VFR BLD AUTO: 43.4 % (ref 34.8–46.1)
HDLC SERPL-MCNC: 51 MG/DL
HGB BLD-MCNC: 13.9 G/DL (ref 11.5–15.4)
IMM GRANULOCYTES # BLD AUTO: 0.02 THOUSAND/UL (ref 0–0.2)
IMM GRANULOCYTES NFR BLD AUTO: 0 % (ref 0–2)
LDLC SERPL CALC-MCNC: 151 MG/DL (ref 0–100)
LYMPHOCYTES # BLD AUTO: 2.52 THOUSANDS/ÂΜL (ref 0.6–4.47)
LYMPHOCYTES NFR BLD AUTO: 41 % (ref 14–44)
MCH RBC QN AUTO: 28.7 PG (ref 26.8–34.3)
MCHC RBC AUTO-ENTMCNC: 32 G/DL (ref 31.4–37.4)
MCV RBC AUTO: 90 FL (ref 82–98)
MONOCYTES # BLD AUTO: 0.5 THOUSAND/ÂΜL (ref 0.17–1.22)
MONOCYTES NFR BLD AUTO: 8 % (ref 4–12)
NEUTROPHILS # BLD AUTO: 2.98 THOUSANDS/ÂΜL (ref 1.85–7.62)
NEUTS SEG NFR BLD AUTO: 50 % (ref 43–75)
NONHDLC SERPL-MCNC: 165 MG/DL
NRBC BLD AUTO-RTO: 0 /100 WBCS
PLATELET # BLD AUTO: 233 THOUSANDS/UL (ref 149–390)
PMV BLD AUTO: 8.6 FL (ref 8.9–12.7)
POTASSIUM SERPL-SCNC: 4 MMOL/L (ref 3.5–5.3)
PROT SERPL-MCNC: 6.6 G/DL (ref 6.4–8.4)
RBC # BLD AUTO: 4.84 MILLION/UL (ref 3.81–5.12)
SODIUM SERPL-SCNC: 137 MMOL/L (ref 135–147)
TREPONEMA PALLIDUM IGG+IGM AB [PRESENCE] IN SERUM OR PLASMA BY IMMUNOASSAY: NORMAL
TRIGL SERPL-MCNC: 72 MG/DL
TSH SERPL DL<=0.05 MIU/L-ACNC: 3.55 UIU/ML (ref 0.45–4.5)
VIT B12 SERPL-MCNC: 285 PG/ML (ref 180–914)
WBC # BLD AUTO: 6.1 THOUSAND/UL (ref 4.31–10.16)

## 2024-02-07 PROCEDURE — 82607 VITAMIN B-12: CPT

## 2024-02-07 PROCEDURE — 80061 LIPID PANEL: CPT

## 2024-02-07 PROCEDURE — 85025 COMPLETE CBC W/AUTO DIFF WBC: CPT

## 2024-02-07 PROCEDURE — 86780 TREPONEMA PALLIDUM: CPT

## 2024-02-07 PROCEDURE — 82306 VITAMIN D 25 HYDROXY: CPT

## 2024-02-07 PROCEDURE — 99236 HOSP IP/OBS SAME DATE HI 85: CPT | Performed by: PSYCHIATRY & NEUROLOGY

## 2024-02-07 PROCEDURE — 84703 CHORIONIC GONADOTROPIN ASSAY: CPT

## 2024-02-07 PROCEDURE — 84443 ASSAY THYROID STIM HORMONE: CPT

## 2024-02-07 PROCEDURE — 99222 1ST HOSP IP/OBS MODERATE 55: CPT | Performed by: NURSE PRACTITIONER

## 2024-02-07 PROCEDURE — 80053 COMPREHEN METABOLIC PANEL: CPT

## 2024-02-07 PROCEDURE — 82746 ASSAY OF FOLIC ACID SERUM: CPT

## 2024-02-07 RX ORDER — CLONAZEPAM 0.5 MG/1
0.5 TABLET ORAL EVERY 8 HOURS SCHEDULED
Qty: 15 TABLET | Refills: 0 | Status: CANCELLED | OUTPATIENT
Start: 2024-02-07 | End: 2024-02-12

## 2024-02-07 RX ORDER — GABAPENTIN 300 MG/1
600 CAPSULE ORAL 3 TIMES DAILY
Qty: 180 CAPSULE | Refills: 0 | Status: SHIPPED | OUTPATIENT
Start: 2024-02-07 | End: 2024-02-08

## 2024-02-07 RX ORDER — ATORVASTATIN CALCIUM 10 MG/1
10 TABLET, FILM COATED ORAL
Status: DISCONTINUED | OUTPATIENT
Start: 2024-02-07 | End: 2024-02-07 | Stop reason: HOSPADM

## 2024-02-07 RX ORDER — HYDROXYZINE HYDROCHLORIDE 25 MG/1
25 TABLET, FILM COATED ORAL EVERY 6 HOURS PRN
Qty: 30 TABLET | Refills: 0 | Status: SHIPPED | OUTPATIENT
Start: 2024-02-07 | End: 2024-02-08

## 2024-02-07 RX ORDER — ONDANSETRON 4 MG/1
4 TABLET, ORALLY DISINTEGRATING ORAL EVERY 8 HOURS PRN
Status: DISCONTINUED | OUTPATIENT
Start: 2024-02-07 | End: 2024-02-07 | Stop reason: HOSPADM

## 2024-02-07 RX ORDER — ATORVASTATIN CALCIUM 10 MG/1
10 TABLET, FILM COATED ORAL
Qty: 30 TABLET | Refills: 0 | Status: SHIPPED | OUTPATIENT
Start: 2024-02-07 | End: 2024-02-08

## 2024-02-07 RX ORDER — FAMOTIDINE 20 MG/1
20 TABLET, FILM COATED ORAL 2 TIMES DAILY
Status: DISCONTINUED | OUTPATIENT
Start: 2024-02-07 | End: 2024-02-07 | Stop reason: HOSPADM

## 2024-02-07 RX ORDER — TOPIRAMATE 25 MG/1
25 TABLET ORAL 2 TIMES DAILY
Status: DISCONTINUED | OUTPATIENT
Start: 2024-02-07 | End: 2024-02-07 | Stop reason: HOSPADM

## 2024-02-07 RX ORDER — NICOTINE 21 MG/24HR
21 PATCH, TRANSDERMAL 24 HOURS TRANSDERMAL DAILY
Status: DISCONTINUED | OUTPATIENT
Start: 2024-02-07 | End: 2024-02-07 | Stop reason: HOSPADM

## 2024-02-07 RX ORDER — CLONAZEPAM 0.5 MG/1
0.5 TABLET ORAL 3 TIMES DAILY
Qty: 21 TABLET | Refills: 0 | Status: SHIPPED | OUTPATIENT
Start: 2024-02-07 | End: 2024-02-14

## 2024-02-07 RX ORDER — FLUOXETINE HYDROCHLORIDE 40 MG/1
40 CAPSULE ORAL DAILY
Qty: 30 CAPSULE | Refills: 0 | Status: SHIPPED | OUTPATIENT
Start: 2024-02-07 | End: 2024-02-08

## 2024-02-07 RX ORDER — FLUOXETINE HYDROCHLORIDE 20 MG/1
60 CAPSULE ORAL DAILY
Status: DISCONTINUED | OUTPATIENT
Start: 2024-02-07 | End: 2024-02-07 | Stop reason: HOSPADM

## 2024-02-07 RX ORDER — IBUPROFEN 600 MG/1
600 TABLET ORAL EVERY 8 HOURS PRN
Status: DISCONTINUED | OUTPATIENT
Start: 2024-02-07 | End: 2024-02-07 | Stop reason: HOSPADM

## 2024-02-07 RX ADMIN — ACETAMINOPHEN 650 MG: 325 TABLET ORAL at 05:29

## 2024-02-07 RX ADMIN — GABAPENTIN 600 MG: 300 CAPSULE ORAL at 15:21

## 2024-02-07 RX ADMIN — ACETAMINOPHEN 975 MG: 325 TABLET ORAL at 00:42

## 2024-02-07 RX ADMIN — NICOTINE POLACRILEX 2 MG: 2 GUM, CHEWING BUCCAL at 16:14

## 2024-02-07 RX ADMIN — NICOTINE POLACRILEX 2 MG: 2 GUM, CHEWING BUCCAL at 13:05

## 2024-02-07 RX ADMIN — FLUOXETINE 60 MG: 20 CAPSULE ORAL at 12:29

## 2024-02-07 RX ADMIN — BUPRENORPHINE AND NALOXONE 8 MG: 8; 2 FILM BUCCAL; SUBLINGUAL at 08:05

## 2024-02-07 RX ADMIN — CLONAZEPAM 0.5 MG: 0.5 TABLET ORAL at 13:03

## 2024-02-07 RX ADMIN — CLONAZEPAM 0.5 MG: 0.5 TABLET ORAL at 05:30

## 2024-02-07 RX ADMIN — NICOTINE POLACRILEX 2 MG: 2 GUM, CHEWING BUCCAL at 06:21

## 2024-02-07 RX ADMIN — BUPROPION HYDROCHLORIDE 300 MG: 300 TABLET, EXTENDED RELEASE ORAL at 08:05

## 2024-02-07 RX ADMIN — NICOTINE POLACRILEX 2 MG: 2 GUM, CHEWING BUCCAL at 11:38

## 2024-02-07 RX ADMIN — BUPRENORPHINE AND NALOXONE 8 MG: 8; 2 FILM BUCCAL; SUBLINGUAL at 15:21

## 2024-02-07 RX ADMIN — TOPIRAMATE 25 MG: 25 TABLET, FILM COATED ORAL at 12:29

## 2024-02-07 RX ADMIN — HYDROXYZINE HYDROCHLORIDE 100 MG: 50 TABLET, FILM COATED ORAL at 13:58

## 2024-02-07 RX ADMIN — GABAPENTIN 600 MG: 300 CAPSULE ORAL at 08:05

## 2024-02-07 RX ADMIN — TRAZODONE HYDROCHLORIDE 50 MG: 50 TABLET ORAL at 00:46

## 2024-02-07 RX ADMIN — NICOTINE 21 MG: 21 PATCH, EXTENDED RELEASE TRANSDERMAL at 12:29

## 2024-02-07 RX ADMIN — HYDROXYZINE HYDROCHLORIDE 100 MG: 50 TABLET, FILM COATED ORAL at 00:50

## 2024-02-07 RX ADMIN — IBUPROFEN 600 MG: 600 TABLET ORAL at 13:03

## 2024-02-07 NOTE — NURSING NOTE
" 48 Years old female admitted on 201 from [facility] ED. Patient presents with increasing anxiety. She is on multiple medications which she states she has been taking regularly, does not think they are helping but her anxiety.  Patient reports history of self-harm.  Patient believes she needs inpatient psychiatric treatment to help her with her anxiety and medications.     On admission   PT is AAOx4,  PT.  is cooperative with BH and body assessment.  Patient is denying any SI,and  HI, .Patient presents with low self-confidence. Patient denies auditory hallucinations but appears to mumble to herself when questioned she states,\" don't worry about it.\" Patient seems preoccupied with her dosage of klonopin, insisting on 1 mg instead of 0.5. Additionally, she requested scheduled administration of her suboxone at 0900,1200, and 1700.   Patient reports a history of self -harm but denies any current incidents. Oriented to unit and regulations; unit expectations reviewed. Q 7 minute checks initiated.       "

## 2024-02-07 NOTE — NURSING NOTE
Patient c/o anxiety related to peer on the unit. Requested PRN. Given PRN of Atarax 100 mg po for severe anxiety. Will monitor effectiveness.

## 2024-02-07 NOTE — PROGRESS NOTES
02/07/24 1226    Admission Notification   Notification of Admission Provided to: PCP  ([facility]: [phone])     CM Intern outreached to Pt's PCP to notify them of admission.

## 2024-02-07 NOTE — NURSING NOTE
Atarax 100mgs effective in decreasing patient anxiety and Trazadone 50mgs po prn effective - patient is calmer and resting.

## 2024-02-07 NOTE — BH TRANSITION RECORD
Contact Information: If you have any questions, concerns, pended studies, tests and/or procedures, or emergencies regarding your inpatient behavioral health visit. Please contact [facility] unit [facility] [phone] and ask to speak to a , nurse or physician. A contact is available 24 hours/ 7 days a week at this number.     Summary of Procedures Performed During your Stay:  Below is a list of major procedures performed during your hospital stay and a summary of results:  - No major procedures performed.    If studies are pending at discharge, follow up with your PCP and/or referring provider.
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
## 2024-02-07 NOTE — CONSULTS
[facility]  Consult  Name: Carey Keith 48 y.o. female I MRN: [id number]  Unit/Bed#: [facility] 352-01 I Date of Admission: 2/6/2024   Date of Service: 2/7/2024 I Hospital Day: 1    Inpatient consult for Medical Clearance for  patient  Consult performed by: NETTE Saldivar  Consult ordered by: Peter Combs,           Assessment/Plan   Medical clearance for psychiatric admission  Assessment & Plan  Patient is medically cleared for admission to [facility] and treatment of underlying psychiatric illness based on available results  No acute medical needs. Medicine will sign off. Please call with additional questions or concerns    Hyperlipidemia  Assessment & Plan    Add statin and repeat CMP/Lipid panel in 6 weeks     Pulmonary edema  Assessment & Plan  Hst of chronic non cardiogenic pulmonary edema vs sarcoidosis   No longer taking lasix   Saturating well on room air     Opioid use disorder, severe, on maintenance therapy (HCC)  Assessment & Plan  Continue Suboxone     Anxiety disorder, unspecified  Assessment & Plan  Presents to ED with increased anxiety. States she's been taking 2 tablets of Gabapentin instead of 1 tablet. She is requesting her medications be adjusted.   [facility]  Management per primary service     Dyspepsia  Assessment & Plan  Continue home PPI and H2 blocker    Tobacco abuse  Assessment & Plan  Smokes 2 ppd  Smoking cessation education and counseling   Nicotine patch while hospitalized            Recommendations for Discharge:  SLIM will sign off - please call with questions or concerns.  Follow up with PCP upon discharge.     Counseling / Coordination of Care Time: 30 minutes.  Greater than 50% of total time spent on patient counseling and coordination of care.    Collaboration of Care: Were Recommendations Directly Discussed with Primary Treatment Team? - Yes     History of Present Illness:    Carey Keith is a 48 y.o. female with a  "PMH including substance abuse on Suboxone, tobacco dependence, dyspepsia, and noncardiogenic pulmonary edema who is originally admitted to the psychiatric service due to increased anxiety. We are consulted for medical clearance for psychiatric hospitalization and medical management.  Currently, patient is resting out of bed in chair.  She is cooperative.  She is requesting a nicotine patch.  She does report some right-sided abdominal pain for which she is requesting Motrin.  Abdominal exam benign.  Abdomen is soft, nontender with positive bowel.  UA negative.  Will trial Motrin and determine if further workup is needed.    Review of Systems:    Review of Systems   Constitutional:  Negative for appetite change and chills.   HENT:  Negative for congestion and sore throat.    Eyes:  Negative for visual disturbance.   Respiratory:  Negative for cough and shortness of breath.    Cardiovascular:  Negative for chest pain.   Gastrointestinal:  Positive for abdominal pain. Negative for constipation, diarrhea, nausea and vomiting.   Genitourinary:  Negative for difficulty urinating.   Musculoskeletal:  Negative for gait problem.   Skin:  Negative for wound.   Neurological:  Negative for dizziness, light-headedness and headaches.       Past Medical and Surgical History:     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)     Alcohol abuse     Alcoholism (HCC)     Altered mental status 09/21/2022    Elevated LFTs 09/21/2022    Lower back pain     Self-injurious behavior     Substance abuse (HCC)        Past Surgical History:   Procedure Laterality Date    CHOLECYSTECTOMY         Meds/Allergies:    all medications and allergies reviewed    Allergies:   Allergies   Allergen Reactions    Haloperidol Other (See Comments)     oculogyr crisis    Abilify [Aripiprazole] Other (See Comments)     Pt reports increased agitation ? (stating last time she took this med \"she lost it\"        Social History:     Marital " "Status: Single    Substance Use History:   Social History     Substance and Sexual Activity   Alcohol Use Not Currently    Comment: MAGDALENO, pt historically inconsistent. Drinking  per [facility] 2     Social History     Tobacco Use   Smoking Status Some Days    Current packs/day: 0.50    Average packs/day: 0.5 packs/day for 20.0 years (10.0 ttl pk-yrs)    Types: Cigarettes   Smokeless Tobacco Current     Social History     Substance and Sexual Activity   Drug Use Not Currently    Types: Heroin, \"Crack\" cocaine, Cocaine, LSD, Benzodiazepines, Marijuana    Comment: Pt unreliable historian       Family History:    Family History   Problem Relation Age of Onset    Heart disease Father     Heart disease Maternal Grandmother        Physical Exam:     Vitals:   Blood Pressure: 99/70 (02/07/24 0731)  Pulse: (!) 52 (02/07/24 0731)  Temperature: 97.9 °F (36.6 °C) (02/07/24 0731)  Temp Source: Temporal (02/07/24 0731)  Respirations: 16 (02/07/24 0731)  Height: 5' 1\" (154.9 cm) (02/06/24 1828)  Weight - Scale: 72.7 kg (160 lb 3.2 oz) (02/06/24 1828)  SpO2: 95 % (02/07/24 0731)    Physical Exam  Vitals and nursing note reviewed.   Constitutional:       General: She is not in acute distress.     Appearance: She is not toxic-appearing or diaphoretic.   HENT:      Head: Normocephalic.      Mouth/Throat:      Mouth: Mucous membranes are moist.   Eyes:      Conjunctiva/sclera: Conjunctivae normal.   Cardiovascular:      Rate and Rhythm: Normal rate.   Pulmonary:      Effort: Pulmonary effort is normal.      Breath sounds: Normal breath sounds.   Abdominal:      General: Bowel sounds are normal.      Palpations: Abdomen is soft.   Musculoskeletal:         General: Normal range of motion.      Cervical back: Normal range of motion.      Right lower leg: No edema.      Left lower leg: No edema.   Skin:     General: Skin is warm and dry.      Capillary Refill: Capillary refill takes less than 2 seconds.   Neurological:      Mental " Status: She is alert and oriented to person, place, and time. Mental status is at baseline.   Psychiatric:         Speech: Speech normal.         Behavior: Behavior is cooperative.         Additional Data:     Lab Results:     Results from last 7 days   Lab Units 02/07/24  0438   WBC Thousand/uL 6.10   HEMOGLOBIN g/dL 13.9   HEMATOCRIT % 43.4   PLATELETS Thousands/uL 233   NEUTROS PCT % 50   LYMPHS PCT % 41   MONOS PCT % 8   EOS PCT % 0     Results from last 7 days   Lab Units 02/07/24  0438   SODIUM mmol/L 137   POTASSIUM mmol/L 4.0   CHLORIDE mmol/L 105   CO2 mmol/L 25   BUN mg/dL 17   CREATININE mg/dL 0.74   ANION GAP mmol/L 7   CALCIUM mg/dL 8.8   ALBUMIN g/dL 3.8   TOTAL BILIRUBIN mg/dL 0.74   ALK PHOS U/L 94   ALT U/L 101*   AST U/L 73*   GLUCOSE RANDOM mg/dL 82             Lab Results   Component Value Date/Time    HGBA1C 5.2 12/25/2021 08:09 AM               Imaging: I have personally reviewed pertinent reports.      No orders to display       EKG, Pathology, and Other Studies Reviewed on Admission:   EKG: see above documentation    ** Please Note: This note has been constructed using a voice recognition system. **

## 2024-02-07 NOTE — PROGRESS NOTES
02/07/24 1612   Team Meeting   Meeting Type Tx Team Meeting   Initial Conference Date 02/07/24   Team Members Present   Team Members Present Physician;Nurse;   Physician Team Member Rosalie   Nursing Team Member Miya   Care Management Team Member Harsha   Patient/Family Present   Patient Present No   Patient's Family Present No     Pt refused to meet with tx team to review tx plan. Pt states she does not belong here in the hospital and would like to leave.

## 2024-02-07 NOTE — PLAN OF CARE
Problem: Ineffective Coping  Goal: Cooperates with admission process  Description: Interventions:   - Complete admission process  Outcome: Not Progressing  Goal: Participates in unit activities  Description: Interventions:  - Provide therapeutic environment   - Provide required programming   - Redirect inappropriate behaviors   Outcome: Not Progressing     Problem: Depression  Goal: Refrain from harming self  Description: Interventions:  - Monitor patient closely, per order   - Supervise medication ingestion, monitor effects and side effects   Outcome: Progressing  Goal: Refrain from isolation  Description: Interventions:  - Develop a trusting relationship   - Encourage socialization   Outcome: Not Progressing  Goal: Refrain from self-neglect  Outcome: Not Progressing     Problem: Anxiety  Goal: Anxiety is at manageable level  Description: Interventions:  - Assess and monitor patient's anxiety level.   - Monitor for signs and symptoms (heart palpitations, chest pain, shortness of breath, headaches, nausea, feeling jumpy, restlessness, irritable, apprehensive).   - Collaborate with interdisciplinary team and initiate plan and interventions as ordered.  - Worth patient to unit/surroundings  - Explain treatment plan  - Encourage participation in care  - Encourage verbalization of concerns/fears  - Identify coping mechanisms  - Assist in developing anxiety-reducing skills  - Administer/offer alternative therapies  - Limit or eliminate stimulants  Outcome: Not Progressing     Problem: Alteration in Orientation  Goal: Express concerns related to confused thinking related to:  Description: Interventions:  - Encourage patient to express feelings, fears, frustrations, hopes  - Assign consistent caregivers   - Worth/re-orient patient as needed  - Allow comfort items, as appropriate  - Provide visual cues, signs, etc.   Outcome: Not Progressing  Goal: Cooperate with recommended testing/procedures  Description:  Interventions:  - Determine need for ancillary testing  - Observe for mental status changes  - Implement falls/precaution protocol   Outcome: Not Progressing
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
## 2024-02-07 NOTE — NURSING NOTE
"Patient reports that PRN of Atarax 100 mg po has not been effective. \" That doesn't do shit for me\".   "

## 2024-02-07 NOTE — PROGRESS NOTES
02/07/24 0859   Team Meeting   Meeting Type Daily Rounds   Team Members Present   Team Members Present Physician;Nurse;   Physician Team Member Kassi   [facility]   Care Management Team Member Harsha   Patient/Family Present   Patient Present No   Patient's Family Present No     Readmit score 28. Pt is a new admit here on a 201 for increasing anxiety and depression. Denying SI upon admission. Preoccupied with Klonopin dosing. Pt requesting Suboxone which she reports taking PTA. Discharge to be determined.

## 2024-02-07 NOTE — DISCHARGE INSTR - APPOINTMENTS
Sakina, or Rossy, our Behavioral Health Nurse Navigators, will be calling you after your discharge, on the phone number that you provided.  They will be available as an additional support, if needed.   If you wish to speak with one of them, you may contact Sakina at [phone] or Rossy at [phone].

## 2024-02-07 NOTE — ASSESSMENT & PLAN NOTE
Hst of chronic non cardiogenic pulmonary edema vs sarcoidosis   No longer taking lasix   Saturating well on room air

## 2024-02-07 NOTE — ASSESSMENT & PLAN NOTE
Presents to ED with increased anxiety. States she's been taking 2 tablets of Gabapentin instead of 1 tablet. She is requesting her medications be adjusted.   Admitted to [facility]  Management per primary service

## 2024-02-07 NOTE — SOCIAL WORK
SW met with pt for discharge planning purposes. Pt agreeable to referral to [facility] for OP. Pt states she needs to be able to walk to her OP provider. Phone call placed to [facility]. Pt completed intake questions and full intake appointment scheduled for Tuesday on 1pm.    Pt requesting her medications be sent to [facility] pharmacy. Pt states she does have suboxone at home and does not need a refill of this.     SW called Pt's PCP office and was advised that pt cannot be scheduled for a COLIN appointment until she is fully discharged from the hospital. Pt must call herself upon discharge and PCP office will schedule her an appointment within 72 hours.     Pt to D/C today. Pt denies SI/HI/AVH. Pt oriented x3. Pt to d/c to her home via lyft. Pt to follow up with [facility] on 2/13. Scripts sent to preferred pharmacy.     Discharge Address: [facility] [place] [address]  Phone: [phone]
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
## 2024-02-07 NOTE — DISCHARGE INSTR - OTHER ORDERS
CRISIS INFORMATION  If you are experiencing a mental health emergency, you may call the [facility] Crisis Intervention Office 24 hours a day, 7 days per week at [phone].    In [facility], call [phone].    [facility] is a confidential 24/7 telephone support service manned by trained mental health consumers.  [facility] provides support, a listening ear and can provide information about available services.[employer] specializes in the concerns of mental health consumers, their families and friends.  However, we are also here for anyone who has a mental health concern, is confused about or just doesn't know anything about mental health or where to get information.  To reach [facility], call [phone].    HOW TO GET SUBSTANCE ABUSE HELP:  If you or someone you know has a drug or alcohol problem, there is help:  [facility] Drug & Alcohol Abuse Services: [phone]  [facility] Drug & Alcohol Abuse Services: [phone]  An assessment is the first step.   In addition to those listed there are other programs available in the area but assessment is best to determine an appropriate level of care.  If you DO NOT have Medical Assistance (MA) or Private Insurance, an assessment can be scheduled at one of these providers:  [facility]  [address] [place] [address]  [phone]   [facility]  [address] [place] [address]  [phone]   [address] [place] [address]  [phone]   [facility]  [address] [place] [address]  [phone]   [facility]  [address] [place] [address]  [phone]   [facility]  [address] [place] [address]  [phone]   [facility]  [address], [place] [address]  [phone]     If you HAVE Medical Assistance, an assessment can be scheduled at one of these providers:  [facility]  [address] [place] [address]  [phone]   [facility]  [address] [place] [address]  [phone]   [facility]  [address], [place] [address]  [phone]  [address] [place] [address], [address], [place] [address] [phone]   [address] [address]  [phone]   [facility]  [address] [place] [address]  [phone]   [facility] ([facility])  [address] [place] [address]  [phone]   [facility]  [address] [place] [address]  [phone]   [facility]  [address] [place] [address]  [phone]   [facility]  [address] [place] [address]  [phone]   [facility]  [address], [place] [address]  [phone]     If you HAVE Private Insurance, an assessment can be scheduled at one of these providers.  Please contact these Providers to determine if they are in your network plan:  [facility]  [address], [place] [address]  [phone]   [address] [address]  [phone]   [facility]  [address] [address] [facility] [place] [address]  [phone]   [facility] ([facility])  [address] [place] [address]  [phone]   [facility]  [address] [place] [address]  [phone]   [facility]  [address], [place] [address]  [phone]     From the [facility] website [place]guides[place]opioid-epidemic/#GetNaloxone    How do I get naloxone?  Family members and friends can access naloxone by:    Obtaining a prescription from their family doctor  Using the standing order issued by Acting Physician General Marisa Pierce. A standing order is a prescription written  for the general public, rather than specifically for an individual.  To use the standing order, print it and take it with you to the pharmacy or have the digital version on your phone. Download the standing order from the [facility] (PDF).    If you are unable to print it or use the digital version, the standing order is kept on file at many pharmacies. If a pharmacy does not have it on file, they may have the ability to look it up.    Naloxone prescriptions can be filled at most pharmacies. Although the medication might not be available for same-day pickup, it often can be ordered and available within a day or two.

## 2024-02-07 NOTE — PROGRESS NOTES
Met with Carey she has been taking double the prescribed dose of her medications Prozac and Neurontin. She completed her admission self assessment. She identified the death of her father 12/24/2020 as an issue. She does not have a relationship with her family. And she lacks financial security. She appears depressed. She does have things she enjoys In general she wants to learn more about her mental health and how to cope with it..

## 2024-02-07 NOTE — PLAN OF CARE
Problem: Ineffective Coping  Goal: Cooperates with admission process  Description: Interventions:   - Complete admission process  Outcome: Progressing  Goal: Participates in unit activities  Description: Interventions:  - Provide therapeutic environment   - Provide required programming   - Redirect inappropriate behaviors   Outcome: Progressing     Problem: Depression  Goal: Refrain from isolation  Description: Interventions:  - Develop a trusting relationship   - Encourage socialization   Outcome: Progressing   Patient completed her admission self assessment and has begun to attend groups participating.

## 2024-02-07 NOTE — NURSING NOTE
Pt awoke and completed all morning routines.  AVS printed and reviewed with Pt.  Pt verbalized understanding of discharge instructions and agreed to be compliant with their medication regimen.  When discussing smoking cessation Pt stated that they are not ready to quit smoking but they are aware of the resources available to them if they choose to do so.  Pt discharged to home via [employer].  Pt was discharged with all of their belongings at time of discharge.

## 2024-02-07 NOTE — NURSING NOTE
Patient arrived at the nurses station c/o 10/10 pain in right hip/lower back Tylenol 975mgs po prn given. Also c/o severe anxiety and insomnia and therefore given Atarax 100mgs po prn and Trazadone 100mgs po prn.

## 2024-02-07 NOTE — NURSING NOTE
"Patient has been about the unit. At the nurses station numerous times asking about medications and feels the doses on her medications need to be increased. \" I'm here for my medications to be changed\". Denying SI/HI. Denies hallucinations. Rambling, mumbled  speech.   "

## 2024-02-07 NOTE — TREATMENT PLAN
TREATMENT PLAN REVIEW - Behavioral Health Carey Keith 48 y.o. 1975 female MRN: [id number]    [facility] 3B BEHAVIORAL [facility] Room / Bed: [facility] 352-01 Encounter: [id number]        Admit Date/Time:  2/6/2024  6:23 PM    Treatment Team:   MD Sharona Quinteros LPC Kiley Vanhorn Stephanie L Ditmer, RN Sean Washburn Karissa Marie Kormandy, CRNP Brooke Rickert Tara Nickens Rebecca Sussman    Diagnosis: Principal Problem:    Unspecified mood (affective) disorder (HCC)  Active Problems:    Medical clearance for psychiatric admission    Tobacco abuse    Dyspepsia    Anxiety disorder, unspecified    Opioid use disorder, severe, on maintenance therapy (HCC)    Pulmonary edema    Hyperlipidemia      Patient Strengths/Assets: capable of independent living, cooperative, communication skills, motivation for treatment/growth, patient is on a voluntary commitment      Patient Barriers/Limitations: chronic mental illness, family conflict, lack of financial means, lack of social/family support, poor self-care    Short Term Goals: decrease in depressive symptoms, decrease in anxiety symptoms, ability to stay safe on the unit, ability to stay free from restraints, improvement in insight, improvement in self care, tolerate medications    Long Term Goals: improvement in anxiety, resolution of depressive symptoms, stabilization of mood, improved insight, acceptance of need for psychiatric medications, acceptance of need for psychiatric follow up after discharge, adequate self care    Progress Towards Goals: restarting psychiatric medications as prescribed    Recommended Treatment: medication management, patient medication education, group therapy, milieu therapy, continued Behavioral Health psychiatric evaluation/assessment process     Treatment Frequency: daily medication monitoring, group and milieu therapy daily, monitoring through interdisciplinary rounds,  monitoring through weekly patient care conferences    Expected Discharge Date: 10 days - 2/17/2024    Discharge Plan: referrals as indicated    Treatment Plan Created/Updated By: Peter Combs DO

## 2024-02-07 NOTE — PLAN OF CARE
Problem: Ineffective Coping  Goal: Cooperates with admission process  Description: Interventions:   - Complete admission process  Outcome: Progressing     Problem: Depression  Goal: Refrain from harming self  Description: Interventions:  - Monitor patient closely, per order   - Supervise medication ingestion, monitor effects and side effects   Outcome: Progressing  Goal: Refrain from isolation  Description: Interventions:  - Develop a trusting relationship   - Encourage socialization   Outcome: Progressing  Goal: Refrain from self-neglect  Outcome: Progressing     Problem: Alteration in Orientation  Goal: Express concerns related to confused thinking related to:  Description: Interventions:  - Encourage patient to express feelings, fears, frustrations, hopes  - Assign consistent caregivers   - Yancey/re-orient patient as needed  - Allow comfort items, as appropriate  - Provide visual cues, signs, etc.   Outcome: Progressing  Goal: Cooperate with recommended testing/procedures  Description: Interventions:  - Determine need for ancillary testing  - Observe for mental status changes  - Implement falls/precaution protocol   Outcome: Progressing     Problem: Ineffective Coping  Goal: Participates in unit activities  Description: Interventions:  - Provide therapeutic environment   - Provide required programming   - Redirect inappropriate behaviors   Outcome: Not Progressing     Problem: Anxiety  Goal: Anxiety is at manageable level  Description: Interventions:  - Assess and monitor patient's anxiety level.   - Monitor for signs and symptoms (heart palpitations, chest pain, shortness of breath, headaches, nausea, feeling jumpy, restlessness, irritable, apprehensive).   - Collaborate with interdisciplinary team and initiate plan and interventions as ordered.  - Yancey patient to unit/surroundings  - Explain treatment plan  - Encourage participation in care  - Encourage verbalization of concerns/fears  - Identify coping  mechanisms  - Assist in developing anxiety-reducing skills  - Administer/offer alternative therapies  - Limit or eliminate stimulants  Outcome: Not Progressing     Problem: Individualized Interventions  Goal: Patient will recognize inappropriate behaviors and develop alternative behaviors within 5 days  Description: Interventions:  - Patient in collaboration with Treatment Team will develop a behavior management plan to help identify effective coping skills to deal with stressors  Outcome: Not Progressing

## 2024-02-08 RX ORDER — ATORVASTATIN CALCIUM 10 MG/1
10 TABLET, FILM COATED ORAL
Qty: 30 TABLET | Refills: 0 | Status: SHIPPED | OUTPATIENT
Start: 2024-02-08 | End: 2024-03-09

## 2024-02-08 RX ORDER — HYDROXYZINE HYDROCHLORIDE 25 MG/1
25 TABLET, FILM COATED ORAL EVERY 6 HOURS PRN
Qty: 15 TABLET | Refills: 0 | Status: SHIPPED | OUTPATIENT
Start: 2024-02-08

## 2024-02-08 RX ORDER — FLUOXETINE HYDROCHLORIDE 40 MG/1
40 CAPSULE ORAL DAILY
Qty: 30 CAPSULE | Refills: 0 | Status: SHIPPED | OUTPATIENT
Start: 2024-02-08 | End: 2024-03-09

## 2024-02-08 RX ORDER — ALBUTEROL SULFATE 90 UG/1
2 AEROSOL, METERED RESPIRATORY (INHALATION) EVERY 6 HOURS PRN
Qty: 6.7 G | Refills: 0 | Status: SHIPPED | OUTPATIENT
Start: 2024-02-08

## 2024-02-08 RX ORDER — POLYETHYLENE GLYCOL 3350 17 G/17G
17 POWDER, FOR SOLUTION ORAL DAILY
Qty: 225 G | Refills: 0 | Status: SHIPPED | OUTPATIENT
Start: 2024-02-08

## 2024-02-08 RX ORDER — TRAZODONE HYDROCHLORIDE 50 MG/1
50 TABLET ORAL
Qty: 30 TABLET | Refills: 0 | Status: SHIPPED | OUTPATIENT
Start: 2024-02-08 | End: 2024-03-09

## 2024-02-08 RX ORDER — GABAPENTIN 300 MG/1
600 CAPSULE ORAL 3 TIMES DAILY
Qty: 180 CAPSULE | Refills: 0 | Status: SHIPPED | OUTPATIENT
Start: 2024-02-08 | End: 2024-03-09

## 2024-02-12 ENCOUNTER — HOSPITAL ENCOUNTER (EMERGENCY)
Facility: HOSPITAL | Age: 49
Discharge: HOME | End: 2024-02-12
Attending: EMERGENCY MEDICINE
Payer: COMMERCIAL

## 2024-02-12 VITALS
OXYGEN SATURATION: 97 % | HEART RATE: 83 BPM | TEMPERATURE: 97.5 F | DIASTOLIC BLOOD PRESSURE: 66 MMHG | SYSTOLIC BLOOD PRESSURE: 130 MMHG | RESPIRATION RATE: 16 BRPM

## 2024-02-12 DIAGNOSIS — Z76.0 MEDICATION REFILL: Primary | ICD-10-CM

## 2024-02-12 PROCEDURE — 99284 EMERGENCY DEPT VISIT MOD MDM: CPT

## 2024-02-12 PROCEDURE — 99282 EMERGENCY DEPT VISIT SF MDM: CPT

## 2024-02-12 NOTE — ED NOTES
Patient assessed and discharged by ED Provider prior to RN's assessment and med req.      Alice Turk RN  02/12/24 4749

## 2024-02-12 NOTE — ED PROVIDER NOTES
History  Chief Complaint   Patient presents with    Medication Refill     Pt reports needs refill Wellbutrin 300mg, reports stopped taking for a few days. Reports has needs to make an appt with psychiatrist. Denies SI/HI     Carey is a 48-year-old with history of anxiety and substance abuse presenting to the emergency room for medication refill.  She states that she needs a refill of her Wellbutrin 300 mg.  She states that she stopped taking the medication a few days ago and does not have an appointment with her psychiatrist in the near future.  She denies SI/HI.  States that she has feeling unable to concentrate and generally more depressed than usual without her medications.      Medication Refill      Prior to Admission Medications   Prescriptions Last Dose Informant Patient Reported? Taking?   FLUoxetine (PROzac) 40 MG capsule   No No   Sig: Take 1 capsule (40 mg total) by mouth daily   albuterol (Proventil HFA) 90 mcg/act inhaler   No No   Sig: Inhale 2 puffs every 6 (six) hours as needed for wheezing   atorvastatin (LIPITOR) 10 mg tablet   No No   Sig: Take 1 tablet (10 mg total) by mouth daily with dinner   buprenorphine-naloxone (SUBOXONE) 8-2 mg per SL tablet  Self Yes No   Sig: Place 1 tablet under the tongue 3 (three) times a day   clonazePAM (KlonoPIN) 0.5 mg tablet   No No   Sig: Take 1 tablet (0.5 mg total) by mouth 3 (three) times a day for 7 days   gabapentin (NEURONTIN) 300 mg capsule   No No   Sig: Take 2 capsules (600 mg total) by mouth 3 (three) times a day   hydrOXYzine HCL (ATARAX) 25 mg tablet   No No   Sig: Take 1 tablet (25 mg total) by mouth every 6 (six) hours as needed for anxiety (anxiety) for up to 15 doses   polyethylene glycol (GLYCOLAX) 17 GM/SCOOP powder   No No   Sig: Take 17 g by mouth daily   traZODone (DESYREL) 50 mg tablet   No No   Sig: Take 1 tablet (50 mg total) by mouth daily at bedtime      Facility-Administered Medications: None       Past Medical History:   Diagnosis  "Date    Addiction to drug (HCC)     Alcohol abuse     Alcoholism (HCC)     Altered mental status 09/21/2022    Elevated LFTs 09/21/2022    Lower back pain     Self-injurious behavior     Substance abuse (HCC)        Past Surgical History:   Procedure Laterality Date    CHOLECYSTECTOMY         Family History   Problem Relation Age of Onset    Heart disease Father     Heart disease Maternal Grandmother      I have reviewed and agree with the history as documented.    E-Cigarette/Vaping    E-Cigarette Use Current Every Day User     Cartridges/Day 2      E-Cigarette/Vaping Substances    Nicotine No     THC No     CBD No     Flavoring No     Other No     Unknown No      Social History     Tobacco Use    Smoking status: Some Days     Current packs/day: 0.50     Average packs/day: 0.5 packs/day for 20.0 years (10.0 ttl pk-yrs)     Types: Cigarettes    Smokeless tobacco: Current   Vaping Use    Vaping status: Every Day   Substance Use Topics    Alcohol use: Not Currently     Comment: MAGDALENO, pt historically inconsistent. Drinking  per [facility] 2    Drug use: Not Currently     Types: Heroin, \"Crack\" cocaine, Cocaine, LSD, Benzodiazepines, Marijuana     Comment: Pt unreliable historian       Review of Systems   Constitutional:  Negative for chills and fever.   HENT:  Negative for ear pain and sore throat.    Eyes:  Negative for pain and visual disturbance.   Respiratory:  Negative for cough and shortness of breath.    Cardiovascular:  Negative for chest pain and palpitations.   Gastrointestinal:  Negative for abdominal pain and vomiting.   Genitourinary:  Negative for dysuria and hematuria.   Musculoskeletal:  Negative for arthralgias and back pain.   Skin:  Negative for color change and rash.   Neurological:  Negative for seizures and syncope.   Psychiatric/Behavioral:  Negative for agitation, behavioral problems, confusion, hallucinations, self-injury, sleep disturbance and suicidal ideas. The patient is not " nervous/anxious and is not hyperactive.    All other systems reviewed and are negative.      Physical Exam  Physical Exam  Vitals and nursing note reviewed.   Constitutional:       General: She is not in acute distress.     Appearance: She is well-developed.   HENT:      Head: Normocephalic and atraumatic.   Eyes:      Conjunctiva/sclera: Conjunctivae normal.   Cardiovascular:      Rate and Rhythm: Normal rate and regular rhythm.      Heart sounds: No murmur heard.  Pulmonary:      Effort: Pulmonary effort is normal. No respiratory distress.      Breath sounds: Normal breath sounds. No wheezing.   Abdominal:      Palpations: Abdomen is soft.   Musculoskeletal:         General: No swelling.      Cervical back: Neck supple.   Skin:     General: Skin is warm and dry.      Capillary Refill: Capillary refill takes less than 2 seconds.   Neurological:      General: No focal deficit present.      Mental Status: She is alert and oriented to person, place, and time.   Psychiatric:         Mood and Affect: Mood normal.         Behavior: Behavior normal.         Thought Content: Thought content normal.         Judgment: Judgment normal.         Vital Signs  ED Triage Vitals   Temperature Pulse Respirations Blood Pressure SpO2   02/12/24 1255 02/12/24 1257 02/12/24 1257 02/12/24 1257 02/12/24 1257   97.5 °F (36.4 °C) 83 16 130/66 97 %      Temp src Heart Rate Source Patient Position - Orthostatic VS BP Location FiO2 (%)   -- 02/12/24 1257 02/12/24 1257 02/12/24 1257 --    Monitor Sitting Right arm       Pain Score       --                  Vitals:    02/12/24 1257   BP: 130/66   Pulse: 83   Patient Position - Orthostatic VS: Sitting         Visual Acuity      ED Medications  Medications - No data to display    Diagnostic Studies  Results Reviewed       None                   No orders to display              Procedures  Procedures         ED Course                                             Medical Decision Making  Patient  presents to the emergency room for refill of her Wellbutrin.  She states that she has been without medication for 3 days now.  Denies SI/HI.  Patient states that she stopped taking the medication of her own volition.  Per her chart she had a recent inpatient behavioral health admission where a psychiatrist recommended discontinuation of Wellbutrin due to previous seizure activity and belief that patient was worsening her anxiety.  I discussed these findings with the patient and she states that she had seizure activity surrounding taking multiple OTC supplements and states that this has not happened recently.  I discussed with her that at the psychiatrist recommendation I would not recommend a medication that lowers seizure threshold with history of seizures in the past.  I recommended reaching out to her psychiatrist for their recommendations and medication reconciliation.  Provided contact for [facility] but recommended that she reaches out to her's first.              Disposition  Final diagnoses:   Medication refill     Time reflects when diagnosis was documented in both MDM as applicable and the Disposition within this note       Time User Action Codes Description Comment    2/12/2024  1:23 PM Carrie Galvin [Z76.0] Medication refill           ED Disposition       ED Disposition   Discharge    Condition   Stable    Date/Time   Mon Feb 12, 2024  1:23 PM    Comment   Carey Keith discharge to home/self care.                   Follow-up Information       Follow up With Specialties Details Why Contact Info Additional Information    [facility] [place] [facility] [address] [address]  [phone] [facility], [address] [place] [address], [address]   [phone]            Discharge Medication List as of 2/12/2024  1:24 PM        CONTINUE  these medications which have NOT CHANGED    Details   albuterol (Proventil HFA) 90 mcg/act inhaler Inhale 2 puffs every 6 (six) hours as needed for wheezing, Starting Thu 2/8/2024, Normal      atorvastatin (LIPITOR) 10 mg tablet Take 1 tablet (10 mg total) by mouth daily with dinner, Starting Thu 2/8/2024, Until Sat 3/9/2024, Normal      buprenorphine-naloxone (SUBOXONE) 8-2 mg per SL tablet Place 1 tablet under the tongue 3 (three) times a day, Historical Med      clonazePAM (KlonoPIN) 0.5 mg tablet Take 1 tablet (0.5 mg total) by mouth 3 (three) times a day for 7 days, Starting Wed 2/7/2024, Until Wed 2/14/2024, Normal      FLUoxetine (PROzac) 40 MG capsule Take 1 capsule (40 mg total) by mouth daily, Starting Thu 2/8/2024, Until Sat 3/9/2024, Normal      gabapentin (NEURONTIN) 300 mg capsule Take 2 capsules (600 mg total) by mouth 3 (three) times a day, Starting Thu 2/8/2024, Until Sat 3/9/2024, Normal      hydrOXYzine HCL (ATARAX) 25 mg tablet Take 1 tablet (25 mg total) by mouth every 6 (six) hours as needed for anxiety (anxiety) for up to 15 doses, Starting Thu 2/8/2024, Normal      polyethylene glycol (GLYCOLAX) 17 GM/SCOOP powder Take 17 g by mouth daily, Starting Thu 2/8/2024, Normal      traZODone (DESYREL) 50 mg tablet Take 1 tablet (50 mg total) by mouth daily at bedtime, Starting Thu 2/8/2024, Until Sat 3/9/2024, Normal             No discharge procedures on file.    PDMP Review         Value Time User    PDMP Reviewed  Yes 2/7/2024  4:20 PM Peter Combs DO            ED Provider  Electronically Signed by             Carrie Galvin PA-C  02/12/24 1896

## 2024-02-20 ENCOUNTER — TELEPHONE (OUTPATIENT)
Dept: PSYCHIATRY | Facility: CLINIC | Age: 49
End: 2024-02-20

## 2024-02-20 NOTE — TELEPHONE ENCOUNTER
Called [facility] to follow up on request for PA for Hydroxyzine.  Informed pharmacist that she is not our patient and will require this to be followed up with other provider.  Per pharmacist, medication was already processed.  Nothing further needed at this time.

## 2024-04-10 ENCOUNTER — APPOINTMENT (EMERGENCY)
Dept: CT IMAGING | Facility: HOSPITAL | Age: 49
End: 2024-04-10
Payer: COMMERCIAL

## 2024-04-10 ENCOUNTER — HOSPITAL ENCOUNTER (EMERGENCY)
Facility: HOSPITAL | Age: 49
Discharge: HOME | End: 2024-04-10
Attending: EMERGENCY MEDICINE
Payer: COMMERCIAL

## 2024-04-10 VITALS
RESPIRATION RATE: 16 BRPM | OXYGEN SATURATION: 95 % | TEMPERATURE: 98.4 F | SYSTOLIC BLOOD PRESSURE: 98 MMHG | HEART RATE: 70 BPM | DIASTOLIC BLOOD PRESSURE: 61 MMHG

## 2024-04-10 DIAGNOSIS — R14.0 ABDOMINAL BLOATING: Primary | ICD-10-CM

## 2024-04-10 DIAGNOSIS — T50.901A MEDICATION OVERDOSE: ICD-10-CM

## 2024-04-10 LAB
ALBUMIN SERPL BCP-MCNC: 3.7 G/DL (ref 3.5–5)
ALP SERPL-CCNC: 80 U/L (ref 34–104)
ALT SERPL W P-5'-P-CCNC: 16 U/L (ref 7–52)
AMPHETAMINES SERPL QL SCN: NEGATIVE
ANION GAP SERPL CALCULATED.3IONS-SCNC: 7 MMOL/L (ref 4–13)
AST SERPL W P-5'-P-CCNC: 26 U/L (ref 13–39)
BACTERIA UR QL AUTO: ABNORMAL /HPF
BARBITURATES UR QL: NEGATIVE
BASOPHILS # BLD AUTO: 0.07 THOUSANDS/ÂΜL (ref 0–0.1)
BASOPHILS NFR BLD AUTO: 1 % (ref 0–1)
BENZODIAZ UR QL: NEGATIVE
BILIRUB SERPL-MCNC: 0.28 MG/DL (ref 0.2–1)
BILIRUB UR QL STRIP: NEGATIVE
BUN SERPL-MCNC: 17 MG/DL (ref 5–25)
CALCIUM SERPL-MCNC: 8.5 MG/DL (ref 8.4–10.2)
CHLORIDE SERPL-SCNC: 108 MMOL/L (ref 96–108)
CLARITY UR: CLEAR
CO2 SERPL-SCNC: 20 MMOL/L (ref 21–32)
COCAINE UR QL: NEGATIVE
COLOR UR: YELLOW
CREAT SERPL-MCNC: 0.72 MG/DL (ref 0.6–1.3)
EOSINOPHIL # BLD AUTO: 0.14 THOUSAND/ÂΜL (ref 0–0.61)
EOSINOPHIL NFR BLD AUTO: 2 % (ref 0–6)
ERYTHROCYTE [DISTWIDTH] IN BLOOD BY AUTOMATED COUNT: 13.2 % (ref 11.6–15.1)
ETHANOL EXG-MCNC: 0 MG/DL
EXT PREGNANCY TEST URINE: NEGATIVE
EXT. CONTROL: NORMAL
FENTANYL UR QL SCN: NEGATIVE
GFR SERPL CREATININE-BSD FRML MDRD: 100 ML/MIN/1.73SQ M
GLUCOSE SERPL-MCNC: 100 MG/DL (ref 65–140)
GLUCOSE UR STRIP-MCNC: NEGATIVE MG/DL
HCT VFR BLD AUTO: 37.2 % (ref 34.8–46.1)
HGB BLD-MCNC: 11.6 G/DL (ref 11.5–15.4)
HGB UR QL STRIP.AUTO: ABNORMAL
HYDROCODONE UR QL SCN: NEGATIVE
IMM GRANULOCYTES # BLD AUTO: 0.03 THOUSAND/UL (ref 0–0.2)
IMM GRANULOCYTES NFR BLD AUTO: 0 % (ref 0–2)
KETONES UR STRIP-MCNC: NEGATIVE MG/DL
LEUKOCYTE ESTERASE UR QL STRIP: ABNORMAL
LIPASE SERPL-CCNC: 11 U/L (ref 11–82)
LYMPHOCYTES # BLD AUTO: 2.32 THOUSANDS/ÂΜL (ref 0.6–4.47)
LYMPHOCYTES NFR BLD AUTO: 30 % (ref 14–44)
MAGNESIUM SERPL-MCNC: 1.9 MG/DL (ref 1.9–2.7)
MCH RBC QN AUTO: 29.1 PG (ref 26.8–34.3)
MCHC RBC AUTO-ENTMCNC: 31.2 G/DL (ref 31.4–37.4)
MCV RBC AUTO: 93 FL (ref 82–98)
METHADONE UR QL: NEGATIVE
MONOCYTES # BLD AUTO: 0.69 THOUSAND/ÂΜL (ref 0.17–1.22)
MONOCYTES NFR BLD AUTO: 9 % (ref 4–12)
NEUTROPHILS # BLD AUTO: 4.39 THOUSANDS/ÂΜL (ref 1.85–7.62)
NEUTS SEG NFR BLD AUTO: 58 % (ref 43–75)
NITRITE UR QL STRIP: NEGATIVE
NON-SQ EPI CELLS URNS QL MICRO: ABNORMAL /HPF
NRBC BLD AUTO-RTO: 0 /100 WBCS
OPIATES UR QL SCN: NEGATIVE
OXYCODONE+OXYMORPHONE UR QL SCN: NEGATIVE
PCP UR QL: NEGATIVE
PH UR STRIP.AUTO: 6 [PH] (ref 4.5–8)
PLATELET # BLD AUTO: 212 THOUSANDS/UL (ref 149–390)
PMV BLD AUTO: 9 FL (ref 8.9–12.7)
POTASSIUM SERPL-SCNC: 4.3 MMOL/L (ref 3.5–5.3)
PROT SERPL-MCNC: 6 G/DL (ref 6.4–8.4)
PROT UR STRIP-MCNC: NEGATIVE MG/DL
RBC # BLD AUTO: 3.99 MILLION/UL (ref 3.81–5.12)
RBC #/AREA URNS AUTO: ABNORMAL /HPF
SODIUM SERPL-SCNC: 135 MMOL/L (ref 135–147)
SP GR UR STRIP.AUTO: 1.01 (ref 1–1.03)
THC UR QL: NEGATIVE
UROBILINOGEN UR QL STRIP.AUTO: 0.2 E.U./DL
WBC # BLD AUTO: 7.64 THOUSAND/UL (ref 4.31–10.16)
WBC #/AREA URNS AUTO: ABNORMAL /HPF

## 2024-04-10 PROCEDURE — 81025 URINE PREGNANCY TEST: CPT

## 2024-04-10 PROCEDURE — 83735 ASSAY OF MAGNESIUM: CPT

## 2024-04-10 PROCEDURE — 80053 COMPREHEN METABOLIC PANEL: CPT

## 2024-04-10 PROCEDURE — 81001 URINALYSIS AUTO W/SCOPE: CPT

## 2024-04-10 PROCEDURE — 36415 COLL VENOUS BLD VENIPUNCTURE: CPT

## 2024-04-10 PROCEDURE — 74177 CT ABD & PELVIS W/CONTRAST: CPT

## 2024-04-10 PROCEDURE — 80307 DRUG TEST PRSMV CHEM ANLYZR: CPT

## 2024-04-10 PROCEDURE — 82075 ASSAY OF BREATH ETHANOL: CPT

## 2024-04-10 PROCEDURE — 83690 ASSAY OF LIPASE: CPT

## 2024-04-10 PROCEDURE — 85025 COMPLETE CBC W/AUTO DIFF WBC: CPT

## 2024-04-10 RX ADMIN — IOHEXOL 100 ML: 350 INJECTION, SOLUTION INTRAVENOUS at 15:26

## 2024-04-10 RX ADMIN — SODIUM CHLORIDE 1000 ML: 0.9 INJECTION, SOLUTION INTRAVENOUS at 13:27

## 2024-04-10 NOTE — ED PROVIDER NOTES
"History  Chief Complaint   Patient presents with    Bloated     Patient reports over the past 3 weeks she has been \"abusing laxatives.\" Reports she is on suboxide and got constipated so she started to take about 9 laxatives everyday or every other day. Reports is having bowel movements, the last BM being today. Reports bloating all over her body.      Patient is a 48-year-old female presented to the ED with a chief complaint of medication misuse and bloating.  Patient has a significant past medical history of addiction to drug, alcohol abuse, substance abuse.  Patient states that for the past week 3 weeks she has been misusing her stool softeners.  She states she has been using Dulcolax along with over-the-counter stool softeners.  She states that over the past 2 weeks she has been taking multiple pills a day.  She states that this past week she has gotten up to 7 pills a day.  Patient states that at this point she feels bloated.  Patient states that had looser stools along with this.  At this point she states that she feels bloated.  She states that she lost around 20 pounds while using the stool softeners but now feels as though she gained it all back.  Patient significantly concerned about weight and stated that she looks obese infections now.  Patient denies any other symptoms along with this.  She states that there was 1 or 2 times where she felt a little unbalanced while walking.Portions of the record may have been created with voice recognition software. Occasional wrong word or \"sound a like\" substitutions may have occurred due to the inherent limitations of voice recognition software. Read the chart carefully and recognize, using context, where substitutions have occurred.          Prior to Admission Medications   Prescriptions Last Dose Informant Patient Reported? Taking?   FLUoxetine (PROzac) 40 MG capsule   No No   Sig: Take 1 capsule (40 mg total) by mouth daily   albuterol (Proventil HFA) 90 mcg/act " inhaler   No No   Sig: Inhale 2 puffs every 6 (six) hours as needed for wheezing   atorvastatin (LIPITOR) 10 mg tablet   No No   Sig: Take 1 tablet (10 mg total) by mouth daily with dinner   buprenorphine-naloxone (SUBOXONE) 8-2 mg per SL tablet  Self Yes No   Sig: Place 1 tablet under the tongue 3 (three) times a day   clonazePAM (KlonoPIN) 0.5 mg tablet   No No   Sig: Take 1 tablet (0.5 mg total) by mouth 3 (three) times a day for 7 days   gabapentin (NEURONTIN) 300 mg capsule   No No   Sig: Take 2 capsules (600 mg total) by mouth 3 (three) times a day   hydrOXYzine HCL (ATARAX) 25 mg tablet   No No   Sig: Take 1 tablet (25 mg total) by mouth every 6 (six) hours as needed for anxiety (anxiety) for up to 15 doses   polyethylene glycol (GLYCOLAX) 17 GM/SCOOP powder   No No   Sig: Take 17 g by mouth daily   traZODone (DESYREL) 50 mg tablet   No No   Sig: Take 1 tablet (50 mg total) by mouth daily at bedtime      Facility-Administered Medications: None       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)     Alcohol abuse     Alcoholism (HCC)     Altered mental status 09/21/2022    Elevated LFTs 09/21/2022    Lower back pain     Self-injurious behavior     Substance abuse (HCC)        Past Surgical History:   Procedure Laterality Date    CHOLECYSTECTOMY         Family History   Problem Relation Age of Onset    Heart disease Father     Heart disease Maternal Grandmother      I have reviewed and agree with the history as documented.    E-Cigarette/Vaping    E-Cigarette Use Current Every Day User     Cartridges/Day 2      E-Cigarette/Vaping Substances    Nicotine No     THC No     CBD No     Flavoring No     Other No     Unknown No      Social History     Tobacco Use    Smoking status: Some Days     Current packs/day: 0.50     Average packs/day: 0.5 packs/day for 20.0 years (10.0 ttl pk-yrs)     Types: Cigarettes    Smokeless tobacco: Current   Vaping Use    Vaping status: Every Day   Substance Use  "Topics    Alcohol use: Not Currently     Comment: MAGDALENO, pt historically inconsistent. Drinking  per [facility] 2    Drug use: Not Currently     Types: Heroin, \"Crack\" cocaine, Cocaine, LSD, Benzodiazepines, Marijuana     Comment: Pt unreliable historian       Review of Systems   Constitutional:  Negative for chills, diaphoresis, fatigue and fever.   HENT:  Negative for congestion, ear discharge, ear pain, postnasal drip, rhinorrhea, sinus pressure, sinus pain and sore throat.    Eyes:  Negative for photophobia, discharge, itching and visual disturbance.   Respiratory:  Negative for cough, chest tightness and shortness of breath.    Cardiovascular:  Negative for chest pain and palpitations.   Gastrointestinal:  Positive for abdominal distention and abdominal pain. Negative for constipation, diarrhea, nausea and vomiting.   Genitourinary:  Negative for difficulty urinating, dysuria, flank pain, frequency and hematuria.   Musculoskeletal:  Negative for arthralgias, back pain, joint swelling, myalgias, neck pain and neck stiffness.   Skin:  Negative for rash and wound.   Neurological:  Negative for dizziness, tremors, syncope, facial asymmetry, weakness, light-headedness, numbness and headaches.   Psychiatric/Behavioral:  Positive for behavioral problems.        Physical Exam  Physical Exam  Vitals and nursing note reviewed.   Constitutional:       General: She is not in acute distress.     Appearance: Normal appearance. She is well-developed. She is not ill-appearing.   HENT:      Head: Normocephalic and atraumatic.      Right Ear: External ear normal.      Left Ear: External ear normal.      Nose: Nose normal.      Mouth/Throat:      Mouth: Mucous membranes are moist.      Pharynx: Oropharynx is clear. No oropharyngeal exudate or posterior oropharyngeal erythema.   Eyes:      General:         Right eye: No discharge.         Left eye: No discharge.      Conjunctiva/sclera: Conjunctivae normal.   Cardiovascular: "      Rate and Rhythm: Normal rate and regular rhythm.      Pulses: Normal pulses.   Pulmonary:      Effort: Pulmonary effort is normal. No respiratory distress.      Breath sounds: No stridor. No wheezing, rhonchi or rales.   Chest:      Chest wall: No tenderness.   Abdominal:      General: Bowel sounds are normal. There is no distension.      Palpations: Abdomen is soft. There is no mass.      Tenderness: There is abdominal tenderness. There is no right CVA tenderness, left CVA tenderness, guarding or rebound.      Hernia: A hernia is present.      Comments: Bowel sounds normal.  No distention noted on exam.  Patient has tenderness throughout the lower abdomen.  No rebound tenderness, negative Rovsing, negative McBurney point tenderness Ramirez sign also negative.  No erythema ecchymosis or edema noted on exam.   Musculoskeletal:         General: No swelling.      Cervical back: Neck supple.   Skin:     General: Skin is warm and dry.      Capillary Refill: Capillary refill takes less than 2 seconds.      Findings: No erythema, lesion or rash.   Neurological:      Mental Status: She is alert and oriented to person, place, and time.   Psychiatric:         Mood and Affect: Mood normal.         Vital Signs  ED Triage Vitals   Temperature Pulse Respirations Blood Pressure SpO2   04/10/24 1231 04/10/24 1231 04/10/24 1231 04/10/24 1231 04/10/24 1231   98.4 °F (36.9 °C) 81 16 117/76 97 %      Temp Source Heart Rate Source Patient Position - Orthostatic VS BP Location FiO2 (%)   04/10/24 1231 04/10/24 1231 04/10/24 1231 04/10/24 1231 --   Oral Monitor Lying Right arm       Pain Score       04/10/24 1500       3           Vitals:    04/10/24 1231 04/10/24 1500   BP: 117/76 98/61   Pulse: 81 70   Patient Position - Orthostatic VS: Lying Lying         Visual Acuity      ED Medications  Medications   sodium chloride 0.9 % bolus 1,000 mL (0 mL Intravenous Stopped 4/10/24 1440)   iohexol (OMNIPAQUE) 350 MG/ML injection  (SINGLE-DOSE) 100 mL (100 mL Intravenous Given 4/10/24 1526)       Diagnostic Studies  Results Reviewed       Procedure Component Value Units Date/Time    POCT pregnancy, urine [[id number]]  (Normal) Resulted: 04/10/24 1510    Lab Status: Final result Updated: 04/10/24 1510     EXT Preg Test, Ur Negative     Control Valid    Comprehensive metabolic panel [[id number]]  (Abnormal) Collected: 04/10/24 1232    Lab Status: Final result Specimen: Blood from Arm, Left Updated: 04/10/24 1359     Sodium 135 mmol/L      Potassium 4.3 mmol/L      Chloride 108 mmol/L      CO2 20 mmol/L      ANION GAP 7 mmol/L      BUN 17 mg/dL      Creatinine 0.72 mg/dL      Glucose 100 mg/dL      Calcium 8.5 mg/dL      AST 26 U/L      ALT 16 U/L      Alkaline Phosphatase 80 U/L      Total Protein 6.0 g/dL      Albumin 3.7 g/dL      Total Bilirubin 0.28 mg/dL      eGFR 100 ml/min/1.73sq m     Narrative:      National Kid[employer] Foundation guidelines for Chronic Kidney Disease (CKD):     Stage 1 with normal or high GFR (GFR > 90 mL/min/1.73 square meters)    Stage 2 Mild CKD (GFR = 60-89 mL/min/1.73 square meters)    Stage 3A Moderate CKD (GFR = 45-59 mL/min/1.73 square meters)    Stage 3B Moderate CKD (GFR = 30-44 mL/min/1.73 square meters)    Stage 4 Severe CKD (GFR = 15-29 mL/min/1.73 square meters)    Stage 5 End Stage CKD (GFR <15 mL/min/1.73 square meters)  Note: GFR calculation is accurate only with a steady state creatinine    Rapid drug screen, urine [[id number]]  (Normal) Collected: 04/10/24 1328    Lab Status: Final result Specimen: Urine, Clean Catch Updated: 04/10/24 1359     Amph/Meth UR Negative     Barbiturate Ur Negative     Benzodiazepine Urine Negative     Cocaine Urine Negative     Methadone Urine Negative     Opiate Urine Negative     PCP Ur Negative     THC Urine Negative     Oxycodone Urine Negative     Fentanyl Urine Negative     HYDROCODONE URINE Negative    Narrative:      FOR MEDICAL PURPOSES ONLY.   IF CONFIRMATION  NEEDED PLEASE CONTACT THE LAB WITHIN 5 DAYS.    Drug Screen Cutoff Levels:  AMPHETAMINE/METHAMPHETAMINES  1000 ng/mL  BARBITURATES     200 ng/mL  BENZODIAZEPINES     200 ng/mL  COCAINE      300 ng/mL  METHADONE      300 ng/mL  OPIATES      300 ng/mL  PHENCYCLIDINE     25 ng/mL  THC       50 ng/mL  OXYCODONE      100 ng/mL  FENTANYL      5 ng/mL  HYDROCODONE     300 ng/mL    Lipase [[id number]]  (Normal) Collected: 04/10/24 1232    Lab Status: Final result Specimen: Blood from Arm, Left Updated: 04/10/24 1356     Lipase 11 u/L     Magnesium [[id number]]  (Normal) Collected: 04/10/24 1232    Lab Status: Final result Specimen: Blood from Arm, Left Updated: 04/10/24 1356     Magnesium 1.9 mg/dL     Urine Microscopic [[id number]]  (Abnormal) Collected: 04/10/24 1330    Lab Status: Final result Specimen: Urine, Other Updated: 04/10/24 1348     RBC, UA 2-4 /hpf      WBC, UA 1-2 /hpf      Epithelial Cells Occasional /hpf      Bacteria, UA None Seen /hpf     POCT alcohol breath test [[id number]]  (Normal) Resulted: 04/10/24 1345    Lab Status: Final result Updated: 04/10/24 1346     EXTBreath Alcohol 0.000    CBC and differential [[id number]]  (Abnormal) Collected: 04/10/24 1232    Lab Status: Final result Specimen: Blood from Arm, Left Updated: 04/10/24 1339     WBC 7.64 Thousand/uL      RBC 3.99 Million/uL      Hemoglobin 11.6 g/dL      Hematocrit 37.2 %      MCV 93 fL      MCH 29.1 pg      MCHC 31.2 g/dL      RDW 13.2 %      MPV 9.0 fL      Platelets 212 Thousands/uL      nRBC 0 /100 WBCs      Segmented % 58 %      Immature Grans % 0 %      Lymphocytes % 30 %      Monocytes % 9 %      Eosinophils Relative 2 %      Basophils Relative 1 %      Absolute Neutrophils 4.39 Thousands/µL      Absolute Immature Grans 0.03 Thousand/uL      Absolute Lymphocytes 2.32 Thousands/µL      Absolute Monocytes 0.69 Thousand/µL      Eosinophils Absolute 0.14 Thousand/µL      Basophils Absolute 0.07 Thousands/µL     Urine Macroscopic, POC  [[id number]]  (Abnormal) Collected: 04/10/24 1330    Lab Status: Final result Specimen: Urine Updated: 04/10/24 1331     Color, UA Yellow     Clarity, UA Clear     pH, UA 6.0     Leukocytes, UA Trace     Nitrite, UA Negative     Protein, UA Negative mg/dl      Glucose, UA Negative mg/dl      Ketones, UA Negative mg/dl      Urobilinogen, UA 0.2 E.U./dl      Bilirubin, UA Negative     Occult Blood, UA Moderate     Specific Gravity, UA 1.015    Narrative:      CLINITEK RESULT                   CT abdomen pelvis with contrast   Final Result by Anam Rebolledo MD (04/10 1604)      No acute findings.         Workstation performed: [id number]                    Procedures  Procedures         ED Course  ED Course as of 04/11/24 2127   Wed Apr 10, 2024   1322 Rule out acute abdomen/electrolyte abnormality                                   SBIRT 20yo+      Flowsheet Row Most Recent Value   Initial Alcohol Screen: US AUDIT-C     1. How often do you have a drink containing alcohol? 0 Filed at: 04/10/2024 1406   2. How many drinks containing alcohol do you have on a typical day you are drinking?  0 Filed at: 04/10/2024 1406   3b. FEMALE Any Age, or MALE 65+: How often do you have 4 or more drinks on one occassion? 0 Filed at: 04/10/2024 1406   Audit-C Score 0 Filed at: 04/10/2024 1406   LYNN: How many times in the past year have you...    Used an illegal drug or used a prescription medication for non-medical reasons? Never Filed at: 04/10/2024 1406                      Medical Decision Making  Patient 48-year-old female presented ED with a chief complaint of medication misuse and abdominal distention.  Cardiopulmonary exam was benign.  Patient had generalized abdominal tenderness.  No rebound tenderness Rovsing was negative, no McBurney point tenderness Ramirez's was negative.  Oropharynx clear.  Patient significant concerned about weight and also here because misuse of laxative medications.  Patient did have a   with her who is concerned about eating disorder.  Baseline labs showed no acute abnormalities patient's rapid drug and alcohol breath test were both negative with prior history.  Patient did have some blood leukocytes in which I stated that the urine be sent for culture as she was not experiencing any urinary symptoms.  CT abdomen pelvis was ordered and showed no acute abnormalities I did advise her that she did have fibroids on uterus.  She was reassured understood and had no further questions.  I spent great detail explained to the patient that she needs to follow-up with psychiatry services as the medication misuse was due 2 failure.  Patient stated that she lost 20 pounds but as soon as she stopped doing it she admitted to gaining that right back.  She also stated feeling very distended.  Patient was using Dulcolax and other stool softeners on a consistent basis for the last 3 weeks and this past week got up to around 7 pills a day.  No acute abnormalities on CT or lab results patient was discharged.  Did give information for patient to follow-up with.  Patient denies any suicidal ideation and stated she does not want to harm self or discharge thoughts of harming herself.  She stated that all of her thoughts are on what she thinks. Patient understood diagnosis and treatment plan and had no further questions.  Patient was discharged in stable condition.  Patient was advised to follow-up with her PCP in 1 to 2 days.  Patient was advised to return to the ED with any worsening symptoms that were explained on discharge including but not limited to chest pain, shortness of breath, irretractable vomiting or diarrhea, vision loss, loss of function, loss of sensation, syncope, hemoptysis, hematochezia, hematemesis, melena, decreased oral intake, feeling ill.     Ddx-abdominal bloating, acute abdomen, bowel obstruction, colitis, pancreatitis, eating disorder, medication misuse    Portions of the record may have  "been created with voice recognition software. Occasional wrong word or \"sound a like\" substitutions may have occurred due to the inherent limitations of voice recognition software. Read the chart carefully and recognize, using context, where substitutions have occurred.          Amount and/or Complexity of Data Reviewed  Independent Historian:      Details:  was present.  Labs: ordered. Decision-making details documented in ED Course.  Radiology: ordered and independent interpretation performed. Decision-making details documented in ED Course.    Risk  OTC drugs.  Prescription drug management.             Disposition  Final diagnoses:   Abdominal bloating   Medication overdose     Time reflects when diagnosis was documented in both MDM as applicable and the Disposition within this note       Time User Action Codes Description Comment    4/10/2024  4:07 PM Aime Hilton [R14.0] Abdominal bloating     4/10/2024  4:46 PM Aime Hilton [T50.901A] Medication overdose           ED Disposition       ED Disposition   Discharge    Condition   Stable    Date/Time   Wed Apr 10, 2024 1607    Comment   Carey Keith discharge to home/self care.                   Follow-up Information       Follow up With Specialties Details Why Contact Info Additional Information    [facility] Emergency Department Emergency Medicine   [address] [facility] [id number] [phone] [facility] Emergency Department, [address] [place], [place], [address]    [facility] Medicine   [address] [place] [address] [phone] [facility], [address] [place] [address], [place], [place], [address] [phone]    [facility] Psychiatry   [facility] [facility] [id number] [phone] [facility]" [facility] [place] [facility], [place], [place], [address] [phone]            Discharge Medication List as of 4/10/2024  4:46 PM        CONTINUE these medications which have NOT CHANGED    Details   albuterol (Proventil HFA) 90 mcg/act inhaler Inhale 2 puffs every 6 (six) hours as needed for wheezing, Starting Thu 2/8/2024, Normal      atorvastatin (LIPITOR) 10 mg tablet Take 1 tablet (10 mg total) by mouth daily with dinner, Starting Thu 2/8/2024, Until Sat 3/9/2024, Normal      buprenorphine-naloxone (SUBOXONE) 8-2 mg per SL tablet Place 1 tablet under the tongue 3 (three) times a day, Historical Med      clonazePAM (KlonoPIN) 0.5 mg tablet Take 1 tablet (0.5 mg total) by mouth 3 (three) times a day for 7 days, Starting Wed 2/7/2024, Until Wed 2/14/2024, Normal      FLUoxetine (PROzac) 40 MG capsule Take 1 capsule (40 mg total) by mouth daily, Starting Thu 2/8/2024, Until Sat 3/9/2024, Normal      gabapentin (NEURONTIN) 300 mg capsule Take 2 capsules (600 mg total) by mouth 3 (three) times a day, Starting Thu 2/8/2024, Until Sat 3/9/2024, Normal      hydrOXYzine HCL (ATARAX) 25 mg tablet Take 1 tablet (25 mg total) by mouth every 6 (six) hours as needed for anxiety (anxiety) for up to 15 doses, Starting Thu 2/8/2024, Normal      polyethylene glycol (GLYCOLAX) 17 GM/SCOOP powder Take 17 g by mouth daily, Starting Thu 2/8/2024, Normal      traZODone (DESYREL) 50 mg tablet Take 1 tablet (50 mg total) by mouth daily at bedtime, Starting Thu 2/8/2024, Until Sat 3/9/2024, Normal                 PDMP Review         Value Time User    PDMP Reviewed  Yes 2/7/2024  4:20 PM Peter Combs DO            ED Provider  Electronically Signed by             Aime Hilton PA-C  04/11/24 5969

## 2024-04-10 NOTE — Clinical Note
Carey Keith was seen and treated in our emergency department on 4/10/2024.                Diagnosis: Generalized abdominal bloating    Carey  .    She may return on this date: 04/11/2024         If you have any questions or concerns, please don't hesitate to call.      Aime Hilton PA-C    ______________________________           _______________          _______________  Hospital Representative                              Date                                Time

## 2024-04-10 NOTE — DISCHARGE INSTRUCTIONS
Patient advised to follow-up PCP for today's ED visit.  Patient advised to return to ED with any worsening conditions patient advised to follow-up with psych for today's ED visit.

## 2024-04-14 ENCOUNTER — HOSPITAL ENCOUNTER (EMERGENCY)
Facility: HOSPITAL | Age: 49
Discharge: HOME | End: 2024-04-14
Attending: EMERGENCY MEDICINE | Admitting: EMERGENCY MEDICINE
Payer: COMMERCIAL

## 2024-04-14 VITALS
WEIGHT: 176.37 LBS | HEART RATE: 90 BPM | TEMPERATURE: 98 F | OXYGEN SATURATION: 98 % | RESPIRATION RATE: 20 BRPM | DIASTOLIC BLOOD PRESSURE: 76 MMHG | BODY MASS INDEX: 33.32 KG/M2 | SYSTOLIC BLOOD PRESSURE: 117 MMHG

## 2024-04-14 DIAGNOSIS — Z76.0 ENCOUNTER FOR MEDICATION REFILL: Primary | ICD-10-CM

## 2024-04-14 PROCEDURE — 99282 EMERGENCY DEPT VISIT SF MDM: CPT | Performed by: EMERGENCY MEDICINE

## 2024-04-14 PROCEDURE — 99284 EMERGENCY DEPT VISIT MOD MDM: CPT

## 2024-04-15 NOTE — ED NOTES
Patient evaluated and discharged prior to nursing assessment     Fernandez Uribe, RN  04/14/24 3075

## 2024-04-15 NOTE — ED ATTENDING ATTESTATION
"4/14/2024  I, Jerry Epstein Jr, DO, saw and evaluated the patient. I have discussed the patient with the resident/non-physician practitioner and agree with the resident's/non-physician practitioner's findings, Plan of Care, and MDM as documented in the resident's/non-physician practitioner's note, except where noted. All available labs and Radiology studies were reviewed.  I was present for key portions of any procedure(s) performed by the resident/non-physician practitioner and I was immediately available to provide assistance.       At this point I agree with the current assessment done in the Emergency Department.  I have conducted an independent evaluation of this patient a history and physical is as follows:    Final Diagnosis:  1. Encounter for medication refill            MDM     Patient presents today asking for a refill on Wellbutrin.  Has not had this medication in several months.  Came here about 2 months ago with the same complaint and at that time it was not refilled.  Reasons given at that time were: \"in her chart she had a recent inpatient behavioral health admission where a psychiatrist recommended discontinuation of Wellbutrin due to previous seizure activity and belief that patient was worsening her anxiety.\"    Was referred to [facility] psychiatry.  Patient has not followed up with them.  Patient has not followed up with her PCP since this visit.  Given the recent stated above and her past medical history, will not refill this medication as they feel it could be harmful.  Patient has no acute psychiatric needs at this time.  She specifically has no suicidal or homicidal ideation, intent or plan.  Will discharge with an ambulatory referral to family practice.         Lab Results:   Abnormal Labs Reviewed - No data to display  Lab Results: I have personally reviewed pertinent lab results.    Imaging:   No orders to display     I have personally reviewed pertinent reports.    EKG, Pathology, and " Other Studies: I have personally reviewed pertinent films in PACS    Clinical Impression:    Final diagnoses:   Encounter for medication refill         Disposition    discharged           New Prescriptions:    New Prescriptions    No medications on file            Follow-up Instructions:    [facility] Emergency Department  [address] [facility] [address] [phone]  Go to   If symptoms worsen    [facility] Emergency Department  [address] [facility] [address] [phone]  Go to   If symptoms worsen    [facility]  [address]   [address]  [facility] [id number] [phone]        [facility]  [address]  [address]  [facility] [address] [phone]              History of Present Illness   Carey Keith is a 48 y.o. female who presents with Medical Problem (Patient states medications dose was changed and how experiencing increased depression, denies any SI)    has a past medical history of Addiction to drug ([facility]), Alcohol abuse, Alcoholism ([facility]), Altered mental status (09/21/2022), Elevated LFTs (09/21/2022), Lower back pain, Self-injurious behavior, and Substance abuse ([facility])..         Objective     Vitals:    04/14/24 2052   BP: 117/76   BP Location: Right arm   Pulse: 90   Resp: 20   Temp: 98 °F (36.7 °C)   SpO2: 98%   Weight: 80 kg (176 lb 5.9 oz)     Body mass index is 33.32 kg/m².  No intake or output data in the 24 hours ending 04/14/24 2133  Invasive Devices       None                   ED Course         Critical Care Time  Procedures

## 2024-04-15 NOTE — DISCHARGE INSTRUCTIONS
Please follow up with your primary care physician for further evaluation and management of your depression.

## 2024-04-15 NOTE — ED PROVIDER NOTES
History  Chief Complaint   Patient presents with    Medical Problem     Patient states medications dose was changed and how experiencing increased depression, denies any SI     HPI  Pt is a 47 yo female presenting for medication refill stating she would like a script for welbutrin. PMH - substance abuse, alcohol abuse, anxiety disorder. Pt states she does not have any SI, HI, AH/VH and just wants a wellbutrin but per chart review and per patient she was taken off of it due to poor compliance and seizure concerns. Pt states she is currently in between changing PCPs. Pt has not other complaints  Prior to Admission Medications   Prescriptions Last Dose Informant Patient Reported? Taking?   FLUoxetine (PROzac) 40 MG capsule   No No   Sig: Take 1 capsule (40 mg total) by mouth daily   albuterol (Proventil HFA) 90 mcg/act inhaler   No No   Sig: Inhale 2 puffs every 6 (six) hours as needed for wheezing   atorvastatin (LIPITOR) 10 mg tablet   No No   Sig: Take 1 tablet (10 mg total) by mouth daily with dinner   buprenorphine-naloxone (SUBOXONE) 8-2 mg per SL tablet  Self Yes No   Sig: Place 1 tablet under the tongue 3 (three) times a day   clonazePAM (KlonoPIN) 0.5 mg tablet   No No   Sig: Take 1 tablet (0.5 mg total) by mouth 3 (three) times a day for 7 days   gabapentin (NEURONTIN) 300 mg capsule   No No   Sig: Take 2 capsules (600 mg total) by mouth 3 (three) times a day   hydrOXYzine HCL (ATARAX) 25 mg tablet   No No   Sig: Take 1 tablet (25 mg total) by mouth every 6 (six) hours as needed for anxiety (anxiety) for up to 15 doses   polyethylene glycol (GLYCOLAX) 17 GM/SCOOP powder   No No   Sig: Take 17 g by mouth daily   traZODone (DESYREL) 50 mg tablet   No No   Sig: Take 1 tablet (50 mg total) by mouth daily at bedtime      Facility-Administered Medications: None       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)     Alcohol abuse     Alcoholism (HCC)     Altered mental status 09/21/2022     "Elevated LFTs 09/21/2022    Lower back pain     Self-injurious behavior     Substance abuse (HCC)        Past Surgical History:   Procedure Laterality Date    CHOLECYSTECTOMY         Family History   Problem Relation Age of Onset    Heart disease Father     Heart disease Maternal Grandmother      I have reviewed and agree with the history as documented.    E-Cigarette/Vaping    E-Cigarette Use Current Every Day User     Cartridges/Day 2      E-Cigarette/Vaping Substances    Nicotine No     THC No     CBD No     Flavoring No     Other No     Unknown No      Social History     Tobacco Use    Smoking status: Some Days     Current packs/day: 0.50     Average packs/day: 0.5 packs/day for 20.0 years (10.0 ttl pk-yrs)     Types: Cigarettes    Smokeless tobacco: Current   Vaping Use    Vaping status: Every Day   Substance Use Topics    Alcohol use: Not Currently     Comment: MAGDALENO, pt historically inconsistent. Drinking  per [facility] 2    Drug use: Not Currently     Types: Heroin, \"Crack\" cocaine, Cocaine, LSD, Benzodiazepines, Marijuana     Comment: Pt unreliable historian        Review of Systems   Constitutional: Negative.    HENT: Negative.     Eyes: Negative.    Respiratory: Negative.     Cardiovascular: Negative.    Gastrointestinal: Negative.    Endocrine: Negative.    Genitourinary: Negative.    Musculoskeletal: Negative.    Skin: Negative.    Allergic/Immunologic: Negative.    Neurological: Negative.    Hematological: Negative.    Psychiatric/Behavioral: Negative.          No SI, HI, AH/VH.  Medication refill   All other systems reviewed and are negative.      Physical Exam  ED Triage Vitals [04/14/24 2052]   Temperature Pulse Respirations Blood Pressure SpO2   98 °F (36.7 °C) 90 20 117/76 98 %      Temp src Heart Rate Source Patient Position - Orthostatic VS BP Location FiO2 (%)   -- Monitor Sitting Right arm --      Pain Score       --             Orthostatic Vital Signs  Vitals:    04/14/24 2052   BP: " 117/76   Pulse: 90   Patient Position - Orthostatic VS: Sitting       Physical Exam  Vitals and nursing note reviewed.   Constitutional:       Appearance: Normal appearance. She is normal weight.   HENT:      Head: Normocephalic and atraumatic.      Right Ear: Tympanic membrane, ear canal and external ear normal.      Left Ear: Tympanic membrane, ear canal and external ear normal.      Nose: Nose normal.      Mouth/Throat:      Mouth: Mucous membranes are moist.      Pharynx: Oropharynx is clear.   Eyes:      Extraocular Movements: Extraocular movements intact.      Conjunctiva/sclera: Conjunctivae normal.      Pupils: Pupils are equal, round, and reactive to light.   Cardiovascular:      Rate and Rhythm: Normal rate and regular rhythm.      Pulses: Normal pulses.      Heart sounds: Normal heart sounds.   Pulmonary:      Effort: Pulmonary effort is normal.      Breath sounds: Normal breath sounds.   Abdominal:      General: Abdomen is flat. Bowel sounds are normal.      Palpations: Abdomen is soft.   Musculoskeletal:         General: Normal range of motion.      Cervical back: Normal range of motion and neck supple.   Skin:     General: Skin is warm and dry.      Capillary Refill: Capillary refill takes less than 2 seconds.   Neurological:      General: No focal deficit present.      Mental Status: She is alert and oriented to person, place, and time.   Psychiatric:         Mood and Affect: Mood normal.         Behavior: Behavior normal.      Comments: Pt denies SI, HI, AH/VH.         ED Medications  Medications - No data to display    Diagnostic Studies  Results Reviewed       None                   No orders to display         Procedures  Procedures      ED Course                                       Medical Decision Making  Pt is a 49 yo female presenting for medication refill.  Ddx: medication refill  Based on pt history and chart review, will not plan to refill medication wellbutrin at this time due to poor  compliance and seizure concerns. Discussed with pt that she needs to establish with a family care doctor for further management of her medications. Provided pt with phones numbers for primary care facilities and put in ambulatory referral for primary care. Pt understands and agrees with plan. Return precautions given, read for discharge at this time.     Problems Addressed:  Encounter for medication refill: self-limited or minor problem          Disposition  Final diagnoses:   Encounter for medication refill     Time reflects when diagnosis was documented in both MDM as applicable and the Disposition within this note       Time User Action Codes Description Comment    4/14/2024  9:17 PM Frank Fischer [Z76.0] Encounter for medication refill           ED Disposition       ED Disposition   Discharge    Condition   Stable    Date/Time   Sun Apr 14, 2024  9:17 PM    Comment   Carey Keith discharge to home/self care.                   Follow-up Information       Follow up With Specialties Details Why Contact Info Additional Information    Critical access hospital Emergency Department Emergency Medicine Go to  If symptoms worsen [address] [place] [id number]  [phone] [facility] Emergency Department, [address] [place] [address], [id number]    Critical access hospital Emergency Department Emergency Medicine Go to  If symptoms worsen [address] [facility] [address]  [phone] [facility] Emergency Department, [address], [place], [address]    [facility] Medicine   [address] [place] [phone] [facility], [address] [place] [address], [phone]    [place] [id number] [facility] [address]  [facility] [id number] [phone] [address]  [facility] [address], [place], [place], [address] [phone]            Patient's Medications   Discharge Prescriptions    No medications on file         PDMP Review         Value Time User    PDMP Reviewed  Yes 2/7/2024  4:20 PM Peter Combs,              ED Provider  Attending physically available and evaluated Carey Keith. I managed the patient along with the ED Attending.    Electronically Signed by           Frank Fischer MD  04/14/24 7011

## 2024-05-01 ENCOUNTER — HOSPITAL ENCOUNTER (EMERGENCY)
Facility: HOSPITAL | Age: 49
End: 2024-05-01
Attending: EMERGENCY MEDICINE
Payer: COMMERCIAL

## 2024-05-01 ENCOUNTER — HOSPITAL ENCOUNTER (INPATIENT)
Facility: HOSPITAL | Age: 49
LOS: 6 days | Discharge: HOME | DRG: 753 | End: 2024-05-07
Attending: STUDENT IN AN ORGANIZED HEALTH CARE EDUCATION/TRAINING PROGRAM | Admitting: PSYCHIATRY & NEUROLOGY
Payer: COMMERCIAL

## 2024-05-01 VITALS
HEART RATE: 74 BPM | RESPIRATION RATE: 18 BRPM | DIASTOLIC BLOOD PRESSURE: 68 MMHG | TEMPERATURE: 98.2 F | OXYGEN SATURATION: 96 % | WEIGHT: 171.3 LBS | HEIGHT: 61 IN | SYSTOLIC BLOOD PRESSURE: 98 MMHG | BODY MASS INDEX: 32.34 KG/M2

## 2024-05-01 DIAGNOSIS — E55.9 VITAMIN D DEFICIENCY: ICD-10-CM

## 2024-05-01 DIAGNOSIS — F32.A DEPRESSION: Primary | ICD-10-CM

## 2024-05-01 DIAGNOSIS — Z91.89 AT HIGH RISK FOR SELF HARM: ICD-10-CM

## 2024-05-01 DIAGNOSIS — F41.9 ANXIETY DISORDER, UNSPECIFIED: ICD-10-CM

## 2024-05-01 DIAGNOSIS — F32.A DEPRESSION: ICD-10-CM

## 2024-05-01 DIAGNOSIS — F11.20 OPIOID USE DISORDER, SEVERE, ON MAINTENANCE THERAPY (HCC): Primary | ICD-10-CM

## 2024-05-01 LAB
AMPHETAMINES SERPL QL SCN: NEGATIVE
BARBITURATES UR QL: NEGATIVE
BENZODIAZ UR QL: NEGATIVE
COCAINE UR QL: NEGATIVE
ETHANOL EXG-MCNC: 0 MG/DL
EXT PREGNANCY TEST URINE: NEGATIVE
EXT. CONTROL: NORMAL
FENTANYL UR QL SCN: NEGATIVE
HYDROCODONE UR QL SCN: NEGATIVE
METHADONE UR QL: NEGATIVE
OPIATES UR QL SCN: NEGATIVE
OXYCODONE+OXYMORPHONE UR QL SCN: NEGATIVE
PCP UR QL: NEGATIVE
THC UR QL: NEGATIVE

## 2024-05-01 PROCEDURE — GZHZZZZ GROUP PSYCHOTHERAPY: ICD-10-PCS | Performed by: PSYCHIATRY & NEUROLOGY

## 2024-05-01 PROCEDURE — GZ59ZZZ INDIVIDUAL PSYCHOTHERAPY, PSYCHOPHYSIOLOGICAL: ICD-10-PCS | Performed by: PSYCHIATRY & NEUROLOGY

## 2024-05-01 PROCEDURE — 81025 URINE PREGNANCY TEST: CPT | Performed by: EMERGENCY MEDICINE

## 2024-05-01 PROCEDURE — 82075 ASSAY OF BREATH ETHANOL: CPT | Performed by: EMERGENCY MEDICINE

## 2024-05-01 PROCEDURE — 80307 DRUG TEST PRSMV CHEM ANLYZR: CPT | Performed by: EMERGENCY MEDICINE

## 2024-05-01 PROCEDURE — 99284 EMERGENCY DEPT VISIT MOD MDM: CPT | Performed by: EMERGENCY MEDICINE

## 2024-05-01 PROCEDURE — 99283 EMERGENCY DEPT VISIT LOW MDM: CPT

## 2024-05-01 RX ORDER — ACETAMINOPHEN 325 MG/1
975 TABLET ORAL EVERY 6 HOURS PRN
Status: CANCELLED | OUTPATIENT
Start: 2024-05-01

## 2024-05-01 RX ORDER — LORAZEPAM 2 MG/ML
2 INJECTION INTRAMUSCULAR EVERY 6 HOURS PRN
Status: DISCONTINUED | OUTPATIENT
Start: 2024-05-01 | End: 2024-05-07 | Stop reason: HOSPADM

## 2024-05-01 RX ORDER — CLONAZEPAM 1 MG/1
1 TABLET ORAL 3 TIMES DAILY PRN
COMMUNITY
Start: 2024-04-08 | End: 2024-05-07

## 2024-05-01 RX ORDER — BISACODYL 10 MG
10 SUPPOSITORY, RECTAL RECTAL DAILY PRN
Status: DISCONTINUED | OUTPATIENT
Start: 2024-05-01 | End: 2024-05-07 | Stop reason: HOSPADM

## 2024-05-01 RX ORDER — MAGNESIUM HYDROXIDE/ALUMINUM HYDROXICE/SIMETHICONE 120; 1200; 1200 MG/30ML; MG/30ML; MG/30ML
30 SUSPENSION ORAL EVERY 4 HOURS PRN
Status: CANCELLED | OUTPATIENT
Start: 2024-05-01

## 2024-05-01 RX ORDER — TRAZODONE HYDROCHLORIDE 50 MG/1
50 TABLET ORAL
Status: CANCELLED | OUTPATIENT
Start: 2024-05-01

## 2024-05-01 RX ORDER — HYDROXYZINE HYDROCHLORIDE 25 MG/1
25 TABLET, FILM COATED ORAL
Status: CANCELLED | OUTPATIENT
Start: 2024-05-01

## 2024-05-01 RX ORDER — CLONAZEPAM 0.5 MG/1
0.5 TABLET ORAL ONCE
Status: COMPLETED | OUTPATIENT
Start: 2024-05-01 | End: 2024-05-01

## 2024-05-01 RX ORDER — ACETAMINOPHEN 325 MG/1
650 TABLET ORAL EVERY 4 HOURS PRN
Status: CANCELLED | OUTPATIENT
Start: 2024-05-01

## 2024-05-01 RX ORDER — HYDROXYZINE 50 MG/1
100 TABLET, FILM COATED ORAL
Status: DISCONTINUED | OUTPATIENT
Start: 2024-05-01 | End: 2024-05-07 | Stop reason: HOSPADM

## 2024-05-01 RX ORDER — DIPHENHYDRAMINE HYDROCHLORIDE 50 MG/ML
50 INJECTION INTRAMUSCULAR; INTRAVENOUS EVERY 6 HOURS PRN
Status: DISCONTINUED | OUTPATIENT
Start: 2024-05-01 | End: 2024-05-02

## 2024-05-01 RX ORDER — AMOXICILLIN 250 MG
1 CAPSULE ORAL DAILY PRN
Status: DISCONTINUED | OUTPATIENT
Start: 2024-05-01 | End: 2024-05-07 | Stop reason: HOSPADM

## 2024-05-01 RX ORDER — TRAZODONE HYDROCHLORIDE 50 MG/1
50 TABLET ORAL
Status: DISCONTINUED | OUTPATIENT
Start: 2024-05-01 | End: 2024-05-01

## 2024-05-01 RX ORDER — AMOXICILLIN 250 MG
1 CAPSULE ORAL DAILY PRN
Status: CANCELLED | OUTPATIENT
Start: 2024-05-01

## 2024-05-01 RX ORDER — OLANZAPINE 5 MG/1
5 TABLET ORAL
Status: CANCELLED | OUTPATIENT
Start: 2024-05-01

## 2024-05-01 RX ORDER — HYDROXYZINE 50 MG/1
50 TABLET, FILM COATED ORAL
Status: DISCONTINUED | OUTPATIENT
Start: 2024-05-01 | End: 2024-05-02

## 2024-05-01 RX ORDER — POLYETHYLENE GLYCOL 3350 17 G/17G
17 POWDER, FOR SOLUTION ORAL DAILY PRN
Status: CANCELLED | OUTPATIENT
Start: 2024-05-01

## 2024-05-01 RX ORDER — FLUTICASONE PROPIONATE 50 MCG
1 SPRAY, SUSPENSION (ML) NASAL DAILY
COMMUNITY
Start: 2023-12-03 | End: 2024-05-07

## 2024-05-01 RX ORDER — OLANZAPINE 5 MG/1
5 TABLET ORAL
Status: DISCONTINUED | OUTPATIENT
Start: 2024-05-01 | End: 2024-05-07 | Stop reason: HOSPADM

## 2024-05-01 RX ORDER — ACETAMINOPHEN 325 MG/1
650 TABLET ORAL EVERY 6 HOURS PRN
Status: DISCONTINUED | OUTPATIENT
Start: 2024-05-01 | End: 2024-05-07 | Stop reason: HOSPADM

## 2024-05-01 RX ORDER — LANOLIN ALCOHOL/MO/W.PET/CERES
3 CREAM (GRAM) TOPICAL
Status: DISCONTINUED | OUTPATIENT
Start: 2024-05-01 | End: 2024-05-02

## 2024-05-01 RX ORDER — TRAZODONE HYDROCHLORIDE 100 MG/1
100 TABLET ORAL
COMMUNITY
Start: 2024-03-25 | End: 2024-05-07

## 2024-05-01 RX ORDER — BUPRENORPHINE AND NALOXONE 8; 2 MG/1; MG/1
8 FILM, SOLUBLE BUCCAL; SUBLINGUAL DAILY
COMMUNITY
Start: 2024-04-06 | End: 2024-05-01

## 2024-05-01 RX ORDER — TRAZODONE HYDROCHLORIDE 100 MG/1
100 TABLET ORAL
Status: DISCONTINUED | OUTPATIENT
Start: 2024-05-01 | End: 2024-05-07 | Stop reason: HOSPADM

## 2024-05-01 RX ORDER — BUPROPION HYDROCHLORIDE 150 MG/1
300 TABLET ORAL EVERY MORNING
COMMUNITY
Start: 2024-02-27 | End: 2024-05-07

## 2024-05-01 RX ORDER — POLYETHYLENE GLYCOL 3350 17 G/17G
17 POWDER, FOR SOLUTION ORAL DAILY PRN
Status: DISCONTINUED | OUTPATIENT
Start: 2024-05-01 | End: 2024-05-07 | Stop reason: HOSPADM

## 2024-05-01 RX ORDER — HYDROXYZINE HYDROCHLORIDE 25 MG/1
50 TABLET, FILM COATED ORAL
Status: CANCELLED | OUTPATIENT
Start: 2024-05-01

## 2024-05-01 RX ORDER — DIPHENHYDRAMINE HCL 25 MG
25 TABLET ORAL ONCE
Status: COMPLETED | OUTPATIENT
Start: 2024-05-01 | End: 2024-05-01

## 2024-05-01 RX ORDER — OLANZAPINE 10 MG/2ML
2.5 INJECTION, POWDER, FOR SOLUTION INTRAMUSCULAR
Status: DISCONTINUED | OUTPATIENT
Start: 2024-05-01 | End: 2024-05-07 | Stop reason: HOSPADM

## 2024-05-01 RX ORDER — BENZTROPINE MESYLATE 1 MG/1
1 TABLET ORAL
Status: DISCONTINUED | OUTPATIENT
Start: 2024-05-01 | End: 2024-05-07 | Stop reason: HOSPADM

## 2024-05-01 RX ORDER — HYDROXYZINE HYDROCHLORIDE 25 MG/1
100 TABLET, FILM COATED ORAL
Status: CANCELLED | OUTPATIENT
Start: 2024-05-01

## 2024-05-01 RX ORDER — TRAZODONE HYDROCHLORIDE 50 MG/1
50 TABLET ORAL ONCE
Status: DISCONTINUED | OUTPATIENT
Start: 2024-05-01 | End: 2024-05-02

## 2024-05-01 RX ORDER — LORAZEPAM 2 MG/ML
2 INJECTION INTRAMUSCULAR EVERY 6 HOURS PRN
Status: CANCELLED | OUTPATIENT
Start: 2024-05-01

## 2024-05-01 RX ORDER — ACETAMINOPHEN 325 MG/1
975 TABLET ORAL EVERY 6 HOURS PRN
Status: DISCONTINUED | OUTPATIENT
Start: 2024-05-01 | End: 2024-05-07 | Stop reason: HOSPADM

## 2024-05-01 RX ORDER — NICOTINE 21 MG/24HR
1 PATCH, TRANSDERMAL 24 HOURS TRANSDERMAL DAILY
COMMUNITY
Start: 2024-03-06 | End: 2024-05-07

## 2024-05-01 RX ORDER — OLANZAPINE 2.5 MG/1
2.5 TABLET, FILM COATED ORAL
Status: DISCONTINUED | OUTPATIENT
Start: 2024-05-01 | End: 2024-05-07 | Stop reason: HOSPADM

## 2024-05-01 RX ORDER — OLANZAPINE 10 MG/2ML
2.5 INJECTION, POWDER, FOR SOLUTION INTRAMUSCULAR
Status: CANCELLED | OUTPATIENT
Start: 2024-05-01

## 2024-05-01 RX ORDER — LANOLIN ALCOHOL/MO/W.PET/CERES
3 CREAM (GRAM) TOPICAL
Status: CANCELLED | OUTPATIENT
Start: 2024-05-01

## 2024-05-01 RX ORDER — ACETAMINOPHEN 325 MG/1
650 TABLET ORAL EVERY 6 HOURS PRN
Status: CANCELLED | OUTPATIENT
Start: 2024-05-01

## 2024-05-01 RX ORDER — MAGNESIUM HYDROXIDE/ALUMINUM HYDROXICE/SIMETHICONE 120; 1200; 1200 MG/30ML; MG/30ML; MG/30ML
30 SUSPENSION ORAL EVERY 4 HOURS PRN
Status: DISCONTINUED | OUTPATIENT
Start: 2024-05-01 | End: 2024-05-07 | Stop reason: HOSPADM

## 2024-05-01 RX ORDER — OLANZAPINE 5 MG/1
2.5 TABLET ORAL
Status: CANCELLED | OUTPATIENT
Start: 2024-05-01

## 2024-05-01 RX ORDER — ACETAMINOPHEN 325 MG/1
650 TABLET ORAL EVERY 4 HOURS PRN
Status: DISCONTINUED | OUTPATIENT
Start: 2024-05-01 | End: 2024-05-07 | Stop reason: HOSPADM

## 2024-05-01 RX ORDER — CLONAZEPAM 1 MG/1
1 TABLET ORAL
Status: DISCONTINUED | OUTPATIENT
Start: 2024-05-01 | End: 2024-05-02

## 2024-05-01 RX ORDER — BISACODYL 10 MG
10 SUPPOSITORY, RECTAL RECTAL DAILY PRN
Status: CANCELLED | OUTPATIENT
Start: 2024-05-01

## 2024-05-01 RX ORDER — HYDROXYZINE HYDROCHLORIDE 25 MG/1
25 TABLET, FILM COATED ORAL
Status: DISCONTINUED | OUTPATIENT
Start: 2024-05-01 | End: 2024-05-07 | Stop reason: HOSPADM

## 2024-05-01 RX ORDER — OLANZAPINE 10 MG/2ML
5 INJECTION, POWDER, FOR SOLUTION INTRAMUSCULAR
Status: CANCELLED | OUTPATIENT
Start: 2024-05-01

## 2024-05-01 RX ORDER — DIPHENHYDRAMINE HYDROCHLORIDE 50 MG/ML
50 INJECTION INTRAMUSCULAR; INTRAVENOUS EVERY 6 HOURS PRN
Status: CANCELLED | OUTPATIENT
Start: 2024-05-01

## 2024-05-01 RX ORDER — BENZTROPINE MESYLATE 1 MG/1
1 TABLET ORAL
Status: CANCELLED | OUTPATIENT
Start: 2024-05-01

## 2024-05-01 RX ORDER — OLANZAPINE 10 MG/2ML
5 INJECTION, POWDER, FOR SOLUTION INTRAMUSCULAR
Status: DISCONTINUED | OUTPATIENT
Start: 2024-05-01 | End: 2024-05-07 | Stop reason: HOSPADM

## 2024-05-01 RX ADMIN — CLONAZEPAM 0.5 MG: 0.5 TABLET ORAL at 17:55

## 2024-05-01 RX ADMIN — DIPHENHYDRAMINE HYDROCHLORIDE 25 MG: 25 TABLET ORAL at 19:04

## 2024-05-01 NOTE — ED NOTES
Q15 minutes safety checks started on paper chart at this time.      Radha Saldaña RN  05/01/24 0853

## 2024-05-01 NOTE — ED NOTES
Per Da at [facility]- Denied due to medical acuity.    Jeremy Pierre  Crisis Intervention Specialist II  05/01/24

## 2024-05-01 NOTE — ED NOTES
Insurance Authorization for Admission:  Phone Call Placed to [facility].  Phone Number [phone].  Spoke to [facility].  5 Days Approved.  Level of Care AIP- 201.  Review on TBD.  Authorization #Accepting facility to call upon admission.    EVS (Eligibility Verification System) Called- [phone]  Automated System Indicates Active with [facility]    Jeremy Pierre  Crisis Intervention Specialist II  05/01/24

## 2024-05-01 NOTE — ED NOTES
"Patient presents to the Emergency Department via [facility] Department after placing a call to [facility]. Patient is calm and cooperative upon approach and agrees to speak with this writer. Patient's ICM from [facility] is at bedside, and patient reports she can stay in the room throughout assessment. Patient is oriented across all spheres, has a labile affect, tangential and pressured speech, depressed and anxious mood. Patient reports she has been experiencing increased depression and anxiety over the past few weeks. Patient reports fleeting suicidal thoughts, and recently cut her left forearm several times with a piece of glass. Patient reports she has not been compliant with her medications, taking them when she feels like it or not at all. Patient reports she has been \"abusing laxatives\", and last took some on 4/23/24. Patient exhibits symptoms of a manic episode. Patient reports her primary care physician prescribes her psychotropic medications and her suboxone. Patient denies having a psychiatrist or a therapist at present. Patient denies homicidal ideation and auditory/visual/tactile hallucinations. Patient reports smoking about a pack of cigarettes per day, denies drug and alcohol consumption. Patient reports she has not been sleeping well, in part because she has not been taking her medications. Patient reports a decreased appetite of late. Patient is requesting inpatient psychiatric treatment to ensure her safety and help her stabilize.     Jeremy Pierre  Crisis Intervention Specialist II  05/01/24   "

## 2024-05-01 NOTE — ED NOTES
Meal tray ordered for pt at this time.  Informed she needs to go through belongings and pack them up to be locked away while she is admitted here.  Pt in agreement.       Heather Todd RN  05/01/24 5122

## 2024-05-01 NOTE — ED PROVIDER NOTES
History  Chief Complaint   Patient presents with    Psychiatric Evaluation     Pt came in with [employer], per [employer] they received a call from crisis. Pt want to sign herself for treatment, some scratches noted to [employer]. From 10 days ago.  at bedside.        History provided by:  Patient  Psychiatric Evaluation  Presenting symptoms: depression    Presenting symptoms: no agitation    Patient accompanied by:  Caregiver  Degree of incapacity (severity):  Moderate  Onset quality:  Gradual  Duration:  1 week  Progression:  Waxing and waning  Chronicity:  New  Context: noncompliance    Treatment compliance:  Some of the time  Time since last psychoactive medication taken:  1 day  Relieved by:  Nothing  Worsened by:  Nothing  Associated symptoms: anxiety    Associated symptoms: no abdominal pain, no chest pain and no headaches    Risk factors: hx of mental illness        Prior to Admission Medications   Prescriptions Last Dose Informant Patient Reported? Taking?   Diclofenac Sodium (VOLTAREN) 1 %   Yes Yes   Sig: Apply 2 g topically 4 (four) times a day   albuterol (Proventil HFA) 90 mcg/act inhaler   No Yes   Sig: Inhale 2 puffs every 6 (six) hours as needed for wheezing   atorvastatin (LIPITOR) 10 mg tablet   No No   Sig: Take 1 tablet (10 mg total) by mouth daily with dinner   buPROPion (WELLBUTRIN XL) 150 mg 24 hr tablet   Yes Yes   Sig: Take 300 mg by mouth every morning   buprenorphine-naloxone (SUBOXONE) 8-2 mg per SL tablet  Self Yes Yes   Sig: Place 1 tablet under the tongue 3 (three) times a day   clonazePAM (KlonoPIN) 1 mg tablet   Yes Yes   Sig: Take 1 mg by mouth 3 (three) times a day as needed for anxiety   fluticasone (FLONASE) 50 mcg/act nasal spray   Yes Yes   Si spray into each nostril daily   gabapentin (NEURONTIN) 300 mg capsule   No Yes   Sig: Take 2 capsules (600 mg total) by mouth 3 (three) times a day   hydrOXYzine HCL (ATARAX) 25 mg tablet Not Taking  No No   Sig: Take 1 tablet (25 mg total)  "by mouth every 6 (six) hours as needed for anxiety (anxiety) for up to 15 doses   Patient not taking: Reported on 5/1/2024   nicotine (NICODERM CQ) 21 mg/24 hr TD 24 hr patch   Yes Yes   Sig: Place 1 patch on the skin daily   polyethylene glycol (GLYCOLAX) 17 GM/SCOOP powder Unknown  No No   Sig: Take 17 g by mouth daily   traZODone (DESYREL) 100 mg tablet   Yes Yes   Sig: Take 100 mg by mouth daily at bedtime      Facility-Administered Medications: None       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)     Alcohol abuse     Alcoholism (HCC)     Altered mental status 09/21/2022    Elevated LFTs 09/21/2022    Lower back pain     Self-injurious behavior     Substance abuse (HCC)        Past Surgical History:   Procedure Laterality Date    CHOLECYSTECTOMY         Family History   Problem Relation Age of Onset    Heart disease Father     Heart disease Maternal Grandmother      I have reviewed and agree with the history as documented.    E-Cigarette/Vaping    E-Cigarette Use Current Every Day User     Cartridges/Day 2      E-Cigarette/Vaping Substances    Nicotine Yes     THC Yes     CBD No     Flavoring Yes     Other No     Unknown No      Social History     Tobacco Use    Smoking status: Some Days     Current packs/day: 0.50     Average packs/day: 0.5 packs/day for 20.0 years (10.0 ttl pk-yrs)     Types: Cigarettes    Smokeless tobacco: Current    Tobacco comments:     Pt not ready to quit   Vaping Use    Vaping status: Every Day    Substances: Nicotine, THC, Flavoring   Substance Use Topics    Alcohol use: Not Currently     Comment: MAGDALENO, pt historically inconsistent. Drinking  per [facility] 2    Drug use: Not Currently     Types: Heroin, \"Crack\" cocaine, Cocaine, LSD, Benzodiazepines, Marijuana     Comment: Pt unreliable historian       Review of Systems   Constitutional:  Negative for chills and fever.   HENT:  Negative for facial swelling, sore throat and trouble swallowing.    Eyes:  " Negative for pain and visual disturbance.   Respiratory:  Negative for cough, chest tightness and shortness of breath.    Cardiovascular:  Negative for chest pain and leg swelling.   Gastrointestinal:  Negative for abdominal pain, blood in stool, diarrhea, nausea and vomiting.   Genitourinary:  Negative for dysuria and flank pain.   Musculoskeletal:  Negative for back pain, neck pain and neck stiffness.   Skin:  Negative for pallor and rash.   Allergic/Immunologic: Negative for environmental allergies and immunocompromised state.   Neurological:  Negative for dizziness and headaches.   Hematological:  Negative for adenopathy. Does not bruise/bleed easily.   Psychiatric/Behavioral:  Negative for agitation and behavioral problems. The patient is nervous/anxious and is hyperactive.    All other systems reviewed and are negative.      Physical Exam  Physical Exam  Vitals and nursing note reviewed.   Constitutional:       General: She is not in acute distress.     Appearance: She is well-developed.   HENT:      Head: Normocephalic and atraumatic.   Eyes:      Extraocular Movements: Extraocular movements intact.   Cardiovascular:      Rate and Rhythm: Normal rate and regular rhythm.      Heart sounds: Normal heart sounds.   Pulmonary:      Effort: Pulmonary effort is normal.      Breath sounds: Normal breath sounds.   Abdominal:      Palpations: Abdomen is soft.      Tenderness: There is no abdominal tenderness. There is no guarding or rebound.   Musculoskeletal:         General: Normal range of motion.      Cervical back: Normal range of motion and neck supple.   Skin:     General: Skin is warm and dry.   Neurological:      General: No focal deficit present.      Mental Status: She is alert and oriented to person, place, and time.   Psychiatric:         Mood and Affect: Mood is anxious.         Speech: Speech is rapid and pressured.         Behavior: Behavior is hyperactive.         Vital Signs  ED Triage Vitals  [05/01/24 1642]   Temperature Pulse Respirations Blood Pressure SpO2   98.2 °F (36.8 °C) 83 20 104/71 98 %      Temp Source Heart Rate Source Patient Position - Orthostatic VS BP Location FiO2 (%)   Oral Monitor Sitting Right arm --      Pain Score       No Pain           Vitals:    05/01/24 1642 05/01/24 2025   BP: 104/71 98/68   Pulse: 83 74   Patient Position - Orthostatic VS: Sitting Sitting         Visual Acuity      ED Medications  Medications   clonazePAM (KlonoPIN) tablet 0.5 mg (0.5 mg Oral Given 5/1/24 1755)   diphenhydrAMINE (BENADRYL) tablet 25 mg (25 mg Oral Given 5/1/24 1904)       Diagnostic Studies  Results Reviewed       Procedure Component Value Units Date/Time    Rapid drug screen, urine [[id number]]  (Normal) Collected: 05/01/24 1710    Lab Status: Final result Specimen: Urine, Clean Catch Updated: 05/01/24 1742     Amph/Meth UR Negative     Barbiturate Ur Negative     Benzodiazepine Urine Negative     Cocaine Urine Negative     Methadone Urine Negative     Opiate Urine Negative     PCP Ur Negative     THC Urine Negative     Oxycodone Urine Negative     Fentanyl Urine Negative     HYDROCODONE URINE Negative    Narrative:      FOR MEDICAL PURPOSES ONLY.   IF CONFIRMATION NEEDED PLEASE CONTACT THE LAB WITHIN 5 DAYS.    Drug Screen Cutoff Levels:  AMPHETAMINE/METHAMPHETAMINES  1000 ng/mL  BARBITURATES     200 ng/mL  BENZODIAZEPINES     200 ng/mL  COCAINE      300 ng/mL  METHADONE      300 ng/mL  OPIATES      300 ng/mL  PHENCYCLIDINE     25 ng/mL  THC       50 ng/mL  OXYCODONE      100 ng/mL  FENTANYL      5 ng/mL  HYDROCODONE     300 ng/mL    POCT alcohol breath test [[id number]]  (Normal) Resulted: 05/01/24 1714    Lab Status: Final result Updated: 05/01/24 1715     EXTBreath Alcohol 0    POCT pregnancy, urine [[id number]]  (Normal) Resulted: 05/01/24 1710    Lab Status: Final result Specimen: Urine Updated: 05/01/24 1710     EXT Preg Test, Ur Negative     Control Valid                   No orders to  display              Procedures  Procedures         ED Course  ED Course as of 05/02/24 0139   Wed May 01, 2024   1849 Patient evaluated by ED crisis, signed 201.   2024 Patient is medically stable for psych placement.                               SBIRT 20yo+      Flowsheet Row Most Recent Value   Initial Alcohol Screen: US AUDIT-C     1. How often do you have a drink containing alcohol? 0 Filed at: 05/01/2024 1654   2. How many drinks containing alcohol do you have on a typical day you are drinking?  0 Filed at: 05/01/2024 1654   3b. FEMALE Any Age, or MALE 65+: How often do you have 4 or more drinks on one occassion? 0 Filed at: 05/01/2024 1654   Audit-C Score 0 Filed at: 05/01/2024 1654   LYNN: How many times in the past year have you...    Used an illegal drug or used a prescription medication for non-medical reasons? Never Filed at: 05/01/2024 1654                      Medical Decision Making  Patient is a 49-year-old female, history of alcohol abuse, substance abuse, comes in with complaints of depression, self-harm, cut herself with a glass last week, not taking her meds regularly, denies active SI and HI however feels he needs help with her psychologic state.  Exam, patient is conscious, alert, vital signs stable, no acute distress, superficial linear healing scars over left forearm.  Impression: Depression, self-harm, will get crisis evaluation, patient would like to sign 201.    Problems Addressed:  At high risk for self harm: acute illness or injury  Depression: acute illness or injury    Amount and/or Complexity of Data Reviewed  Labs: ordered.    Risk  OTC drugs.  Prescription drug management.  Decision regarding hospitalization.             Disposition  Final diagnoses:   Depression   At high risk for self harm     Time reflects when diagnosis was documented in both MDM as applicable and the Disposition within this note       Time User Action Codes Description Comment    5/1/2024  5:23 PM Denisse  Napoleon Add [F32.A] Depression     5/1/2024  5:23 PM DenisseNapoleon Add [Z91.89] At high risk for self harm           ED Disposition       ED Disposition   Transfer to Behavioral Health Condition   --    Date/Time   Wed May 1, 2024 2047    Comment   Carey Keith should be transferred out to Psych Facility and has been medically cleared.               MD Documentation      Flowsheet Row Most Recent Value   Patient Condition The patient has been stabilized such that within reasonable medical probability, no material deterioration of the patient condition or the condition of the unborn child(ricky) is likely to result from the transfer   Reason for Transfer Level of Care needed not available at this facility   Benefits of Transfer Specialized equipment and/or services available at the receiving facility (Include comment)________________________, Continuity of care   Risks of Transfer Potential for delay in receiving treatment   Accepting Physician Dr. Jones   Accepting Facility Name, [address]    (Name & Tel number) [facility] [phone]   Transported by (Company and Unit #) [employer]   Sending MD Dr. Napoleon Salcedo   Provider Certification General risk, such as traffic hazards, adverse weather conditions, rough terrain or turbulence, possible failure of equipment (including vehicle or aircraft), or consequences of actions of persons outside the control of the transport personnel          RN Documentation      Flowsheet Row Most Recent Value   Accepting Facility Name, [address]    (Name & Tel number) [facility] [phone]   Transported by (Company and Unit #) [employer]          Follow-up Information    None         Discharge Medication List as of 5/1/2024  9:33 PM        CONTINUE these medications which have NOT CHANGED    Details   albuterol (Proventil HFA) 90 mcg/act inhaler Inhale 2 puffs every 6 (six) hours as needed for wheezing,  Starting Thu 2/8/2024, Normal      buprenorphine-naloxone (SUBOXONE) 8-2 mg per SL tablet Place 1 tablet under the tongue 3 (three) times a day, Historical Med      buPROPion (WELLBUTRIN XL) 150 mg 24 hr tablet Take 300 mg by mouth every morning, Starting Tue 2/27/2024, Historical Med      clonazePAM (KlonoPIN) 1 mg tablet Take 1 mg by mouth 3 (three) times a day as needed for anxiety, Starting Mon 4/8/2024, Historical Med      Diclofenac Sodium (VOLTAREN) 1 % Apply 2 g topically 4 (four) times a day, Starting Wed 2/28/2024, Historical Med      fluticasone (FLONASE) 50 mcg/act nasal spray 1 spray into each nostril daily, Starting Sun 12/3/2023, Historical Med      gabapentin (NEURONTIN) 300 mg capsule Take 2 capsules (600 mg total) by mouth 3 (three) times a day, Starting Thu 2/8/2024, Until Wed 5/1/2024, Normal      nicotine (NICODERM CQ) 21 mg/24 hr TD 24 hr patch Place 1 patch on the skin daily, Starting Wed 3/6/2024, Historical Med      traZODone (DESYREL) 100 mg tablet Take 100 mg by mouth daily at bedtime, Starting Mon 3/25/2024, Historical Med      atorvastatin (LIPITOR) 10 mg tablet Take 1 tablet (10 mg total) by mouth daily with dinner, Starting Thu 2/8/2024, Until Sat 3/9/2024, Normal      hydrOXYzine HCL (ATARAX) 25 mg tablet Take 1 tablet (25 mg total) by mouth every 6 (six) hours as needed for anxiety (anxiety) for up to 15 doses, Starting Thu 2/8/2024, Normal      polyethylene glycol (GLYCOLAX) 17 GM/SCOOP powder Take 17 g by mouth daily, Starting Thu 2/8/2024, Normal             No discharge procedures on file.    PDMP Review         Value Time User    PDMP Reviewed  Yes 2/7/2024  4:20 PM Peter Combs DO            ED Provider  Electronically Signed by             Napoleon Salcedo MD  05/02/24 0141

## 2024-05-01 NOTE — ED NOTES
Clinical faxed to [facility] for review.    Jeremy Pierre  Crisis Intervention Specialist II  05/01/24

## 2024-05-01 NOTE — ED NOTES
Pt requesting to have liquids with no ice due to dental pain     Heather Todd, RN  05/01/24 1347

## 2024-05-01 NOTE — ED NOTES
201 Signed and Dated- Rights explained, bed search explained- Faxed to [facility]- Original on patient chart.     Patient is requesting to be referred to [facility] before other options are explored.    Jeremy Pierre  Crisis Intervention Specialist II  05/01/24

## 2024-05-01 NOTE — ED NOTES
Call placed to [facility], spoke with Da who reports bed available; chart to be faxed for review.    Caroline Adame, TOD  05/01/24    [id number]

## 2024-05-01 NOTE — ED NOTES
Assumed care of patient at this time. Pt alert sitting in bed requesting something more to eat. Pt given sandwich and room broken down for [facility] pt. Patient belongings removed from room and placed in reserve locker. Q15 minute checks continued on paper charting     Lilo Saunders RN  05/01/24 [id number]

## 2024-05-02 PROBLEM — F41.9 ANXIETY DISORDER, UNSPECIFIED: Status: RESOLVED | Noted: 2022-10-06 | Resolved: 2024-05-02

## 2024-05-02 PROBLEM — D64.9 ANEMIA: Status: RESOLVED | Noted: 2022-09-21 | Resolved: 2024-05-02

## 2024-05-02 PROBLEM — F31.9 BIPOLAR 1 DISORDER (HCC): Status: ACTIVE | Noted: 2024-05-02

## 2024-05-02 PROBLEM — F13.10 SEDATIVE OR HYPNOTIC ABUSE (HCC): Status: RESOLVED | Noted: 2022-10-07 | Resolved: 2024-05-02

## 2024-05-02 PROBLEM — J96.01 ACUTE RESPIRATORY FAILURE WITH HYPOXIA (HCC): Status: RESOLVED | Noted: 2022-09-21 | Resolved: 2024-05-02

## 2024-05-02 PROBLEM — I50.811 ACUTE RIGHT VENTRICULAR HEART FAILURE (HCC): Status: RESOLVED | Noted: 2023-04-14 | Resolved: 2024-05-02

## 2024-05-02 PROBLEM — M62.82 RHABDOMYOLYSIS: Status: RESOLVED | Noted: 2021-12-23 | Resolved: 2024-05-02

## 2024-05-02 PROBLEM — Z51.81 ENCOUNTER FOR MONITORING OPIOID MAINTENANCE THERAPY: Status: RESOLVED | Noted: 2021-12-24 | Resolved: 2024-05-02

## 2024-05-02 PROBLEM — Z79.891 ENCOUNTER FOR MONITORING OPIOID MAINTENANCE THERAPY: Status: RESOLVED | Noted: 2021-12-24 | Resolved: 2024-05-02

## 2024-05-02 PROBLEM — R93.89 ABNORMAL CT OF THE CHEST: Status: RESOLVED | Noted: 2023-04-13 | Resolved: 2024-05-02

## 2024-05-02 PROBLEM — M62.838 MUSCLE SPASM OF SHOULDER REGION: Status: RESOLVED | Noted: 2022-01-02 | Resolved: 2024-05-02

## 2024-05-02 PROBLEM — F19.10 SUBSTANCE ABUSE (HCC): Status: RESOLVED | Noted: 2022-09-21 | Resolved: 2024-05-02

## 2024-05-02 PROBLEM — J98.4 PNEUMONITIS: Status: RESOLVED | Noted: 2022-10-28 | Resolved: 2024-05-02

## 2024-05-02 PROBLEM — R42 DIZZINESS: Status: RESOLVED | Noted: 2023-06-19 | Resolved: 2024-05-02

## 2024-05-02 PROBLEM — F39 UNSPECIFIED MOOD (AFFECTIVE) DISORDER (HCC): Status: RESOLVED | Noted: 2021-12-24 | Resolved: 2024-05-02

## 2024-05-02 PROBLEM — E44.1 MILD PROTEIN-CALORIE MALNUTRITION (HCC): Status: RESOLVED | Noted: 2022-10-08 | Resolved: 2024-05-02

## 2024-05-02 PROBLEM — R00.1 BRADYCARDIA: Status: RESOLVED | Noted: 2022-09-25 | Resolved: 2024-05-02

## 2024-05-02 LAB
25(OH)D3 SERPL-MCNC: 17.1 NG/ML (ref 30–100)
BACTERIA UR QL AUTO: ABNORMAL /HPF
BASOPHILS # BLD AUTO: 0.04 THOUSANDS/ÂΜL (ref 0–0.1)
BASOPHILS NFR BLD AUTO: 1 % (ref 0–1)
BILIRUB UR QL STRIP: NEGATIVE
CHOLEST SERPL-MCNC: 184 MG/DL
CLARITY UR: CLEAR
COLOR UR: YELLOW
EOSINOPHIL # BLD AUTO: 0.01 THOUSAND/ÂΜL (ref 0–0.61)
EOSINOPHIL NFR BLD AUTO: 0 % (ref 0–6)
ERYTHROCYTE [DISTWIDTH] IN BLOOD BY AUTOMATED COUNT: 13 % (ref 11.6–15.1)
FOLATE SERPL-MCNC: 7.4 NG/ML
GLUCOSE UR STRIP-MCNC: NEGATIVE MG/DL
HCT VFR BLD AUTO: 40.3 % (ref 34.8–46.1)
HDLC SERPL-MCNC: 42 MG/DL
HGB BLD-MCNC: 12.5 G/DL (ref 11.5–15.4)
HGB UR QL STRIP.AUTO: NEGATIVE
IMM GRANULOCYTES # BLD AUTO: 0.04 THOUSAND/UL (ref 0–0.2)
IMM GRANULOCYTES NFR BLD AUTO: 1 % (ref 0–2)
KETONES UR STRIP-MCNC: NEGATIVE MG/DL
LDLC SERPL CALC-MCNC: 124 MG/DL (ref 0–100)
LEUKOCYTE ESTERASE UR QL STRIP: ABNORMAL
LYMPHOCYTES # BLD AUTO: 2.32 THOUSANDS/ÂΜL (ref 0.6–4.47)
LYMPHOCYTES NFR BLD AUTO: 39 % (ref 14–44)
MCH RBC QN AUTO: 28.1 PG (ref 26.8–34.3)
MCHC RBC AUTO-ENTMCNC: 31 G/DL (ref 31.4–37.4)
MCV RBC AUTO: 91 FL (ref 82–98)
MONOCYTES # BLD AUTO: 0.47 THOUSAND/ÂΜL (ref 0.17–1.22)
MONOCYTES NFR BLD AUTO: 8 % (ref 4–12)
NEUTROPHILS # BLD AUTO: 3.15 THOUSANDS/ÂΜL (ref 1.85–7.62)
NEUTS SEG NFR BLD AUTO: 51 % (ref 43–75)
NITRITE UR QL STRIP: NEGATIVE
NON-SQ EPI CELLS URNS QL MICRO: ABNORMAL /HPF
NONHDLC SERPL-MCNC: 142 MG/DL
NRBC BLD AUTO-RTO: 0 /100 WBCS
PH UR STRIP.AUTO: 7.5 [PH]
PLATELET # BLD AUTO: 229 THOUSANDS/UL (ref 149–390)
PMV BLD AUTO: 8.7 FL (ref 8.9–12.7)
PROT UR STRIP-MCNC: NEGATIVE MG/DL
RBC # BLD AUTO: 4.45 MILLION/UL (ref 3.81–5.12)
RBC #/AREA URNS AUTO: ABNORMAL /HPF
SP GR UR STRIP.AUTO: 1.01 (ref 1–1.03)
TRIGL SERPL-MCNC: 88 MG/DL
TSH SERPL DL<=0.05 MIU/L-ACNC: 2.48 UIU/ML (ref 0.45–4.5)
UROBILINOGEN UR STRIP-ACNC: <2 MG/DL
VIT B12 SERPL-MCNC: 254 PG/ML (ref 180–914)
WBC # BLD AUTO: 6.03 THOUSAND/UL (ref 4.31–10.16)
WBC #/AREA URNS AUTO: ABNORMAL /HPF

## 2024-05-02 PROCEDURE — 99221 1ST HOSP IP/OBS SF/LOW 40: CPT | Performed by: PHYSICIAN ASSISTANT

## 2024-05-02 PROCEDURE — 80053 COMPREHEN METABOLIC PANEL: CPT | Performed by: PSYCHIATRY & NEUROLOGY

## 2024-05-02 PROCEDURE — 81001 URINALYSIS AUTO W/SCOPE: CPT | Performed by: PSYCHIATRY & NEUROLOGY

## 2024-05-02 PROCEDURE — 82607 VITAMIN B-12: CPT | Performed by: PSYCHIATRY & NEUROLOGY

## 2024-05-02 PROCEDURE — 82306 VITAMIN D 25 HYDROXY: CPT | Performed by: PSYCHIATRY & NEUROLOGY

## 2024-05-02 PROCEDURE — 80061 LIPID PANEL: CPT | Performed by: PSYCHIATRY & NEUROLOGY

## 2024-05-02 PROCEDURE — 99223 1ST HOSP IP/OBS HIGH 75: CPT | Performed by: PSYCHIATRY & NEUROLOGY

## 2024-05-02 PROCEDURE — 82746 ASSAY OF FOLIC ACID SERUM: CPT | Performed by: PSYCHIATRY & NEUROLOGY

## 2024-05-02 PROCEDURE — 84443 ASSAY THYROID STIM HORMONE: CPT | Performed by: PSYCHIATRY & NEUROLOGY

## 2024-05-02 PROCEDURE — 85025 COMPLETE CBC W/AUTO DIFF WBC: CPT | Performed by: PSYCHIATRY & NEUROLOGY

## 2024-05-02 RX ORDER — GABAPENTIN 300 MG/1
600 CAPSULE ORAL 3 TIMES DAILY
Status: DISCONTINUED | OUTPATIENT
Start: 2024-05-02 | End: 2024-05-02

## 2024-05-02 RX ORDER — IBUPROFEN 400 MG/1
400 TABLET ORAL EVERY 6 HOURS PRN
Status: DISCONTINUED | OUTPATIENT
Start: 2024-05-02 | End: 2024-05-07 | Stop reason: HOSPADM

## 2024-05-02 RX ORDER — ONDANSETRON 4 MG/1
4 TABLET, ORALLY DISINTEGRATING ORAL EVERY 6 HOURS PRN
Status: DISCONTINUED | OUTPATIENT
Start: 2024-05-02 | End: 2024-05-07 | Stop reason: HOSPADM

## 2024-05-02 RX ORDER — BUPROPION HYDROCHLORIDE 300 MG/1
300 TABLET ORAL DAILY
Status: DISCONTINUED | OUTPATIENT
Start: 2024-05-03 | End: 2024-05-02

## 2024-05-02 RX ORDER — CLONAZEPAM 1 MG/1
1 TABLET ORAL 3 TIMES DAILY PRN
Status: DISCONTINUED | OUTPATIENT
Start: 2024-05-02 | End: 2024-05-02

## 2024-05-02 RX ORDER — TRAZODONE HYDROCHLORIDE 100 MG/1
100 TABLET ORAL
Status: DISCONTINUED | OUTPATIENT
Start: 2024-05-02 | End: 2024-05-02

## 2024-05-02 RX ORDER — GABAPENTIN 300 MG/1
600 CAPSULE ORAL 3 TIMES DAILY
Status: DISCONTINUED | OUTPATIENT
Start: 2024-05-02 | End: 2024-05-07 | Stop reason: HOSPADM

## 2024-05-02 RX ORDER — BUPRENORPHINE AND NALOXONE 8; 2 MG/1; MG/1
8 FILM, SOLUBLE BUCCAL; SUBLINGUAL 3 TIMES DAILY
Status: DISCONTINUED | OUTPATIENT
Start: 2024-05-02 | End: 2024-05-07 | Stop reason: HOSPADM

## 2024-05-02 RX ORDER — BUPROPION HYDROCHLORIDE 300 MG/1
300 TABLET ORAL DAILY
Status: DISCONTINUED | OUTPATIENT
Start: 2024-05-02 | End: 2024-05-07 | Stop reason: HOSPADM

## 2024-05-02 RX ORDER — ERGOCALCIFEROL 1.25 MG/1
50000 CAPSULE ORAL WEEKLY
Status: DISCONTINUED | OUTPATIENT
Start: 2024-05-02 | End: 2024-05-07 | Stop reason: HOSPADM

## 2024-05-02 RX ORDER — TRAZODONE HYDROCHLORIDE 100 MG/1
100 TABLET ORAL
Status: DISCONTINUED | OUTPATIENT
Start: 2024-05-02 | End: 2024-05-07 | Stop reason: HOSPADM

## 2024-05-02 RX ORDER — CLONAZEPAM 1 MG/1
1 TABLET ORAL 3 TIMES DAILY
Status: DISCONTINUED | OUTPATIENT
Start: 2024-05-02 | End: 2024-05-02

## 2024-05-02 RX ORDER — BUPROPION HYDROCHLORIDE 150 MG/1
150 TABLET ORAL DAILY
Status: DISCONTINUED | OUTPATIENT
Start: 2024-05-02 | End: 2024-05-02

## 2024-05-02 RX ORDER — CLONAZEPAM 1 MG/1
1 TABLET ORAL 3 TIMES DAILY
Status: DISCONTINUED | OUTPATIENT
Start: 2024-05-02 | End: 2024-05-07 | Stop reason: HOSPADM

## 2024-05-02 RX ADMIN — BUPRENORPHINE AND NALOXONE 8 MG: 8; 2 FILM BUCCAL; SUBLINGUAL at 20:54

## 2024-05-02 RX ADMIN — GABAPENTIN 600 MG: 300 CAPSULE ORAL at 10:03

## 2024-05-02 RX ADMIN — BUPROPION HYDROCHLORIDE 300 MG: 300 TABLET, FILM COATED, EXTENDED RELEASE ORAL at 10:03

## 2024-05-02 RX ADMIN — ERGOCALCIFEROL 50000 UNITS: 1.25 CAPSULE ORAL at 17:20

## 2024-05-02 RX ADMIN — CLONAZEPAM 1 MG: 1 TABLET ORAL at 15:47

## 2024-05-02 RX ADMIN — NICOTINE POLACRILEX 4 MG: 4 GUM, CHEWING BUCCAL at 15:30

## 2024-05-02 RX ADMIN — TRAZODONE HYDROCHLORIDE 100 MG: 100 TABLET ORAL at 21:11

## 2024-05-02 RX ADMIN — GABAPENTIN 600 MG: 300 CAPSULE ORAL at 20:54

## 2024-05-02 RX ADMIN — BUPRENORPHINE AND NALOXONE 8 MG: 8; 2 FILM BUCCAL; SUBLINGUAL at 10:03

## 2024-05-02 RX ADMIN — TRAZODONE HYDROCHLORIDE 100 MG: 100 TABLET ORAL at 02:27

## 2024-05-02 RX ADMIN — CLONAZEPAM 1 MG: 1 TABLET ORAL at 02:27

## 2024-05-02 RX ADMIN — CLONAZEPAM 1 MG: 1 TABLET ORAL at 11:00

## 2024-05-02 RX ADMIN — GABAPENTIN 600 MG: 300 CAPSULE ORAL at 15:47

## 2024-05-02 RX ADMIN — SENNOSIDES AND DOCUSATE SODIUM 1 TABLET: 8.6; 5 TABLET ORAL at 14:27

## 2024-05-02 RX ADMIN — CLONAZEPAM 1 MG: 1 TABLET ORAL at 20:54

## 2024-05-02 RX ADMIN — BUPRENORPHINE AND NALOXONE 8 MG: 8; 2 FILM BUCCAL; SUBLINGUAL at 15:47

## 2024-05-02 NOTE — PLAN OF CARE
Problem: PAIN - ADULT  Goal: Verbalizes/displays adequate comfort level or baseline comfort level  Description: Interventions:  - Encourage patient to monitor pain and request assistance  - Assess pain using appropriate pain scale  - Administer analgesics based on type and severity of pain and evaluate response  - Implement non-pharmacological measures as appropriate and evaluate response  - Consider cultural and social influences on pain and pain management  - Notify physician/advanced practitioner if interventions unsuccessful or patient reports new pain  Outcome: Progressing     Problem: DISCHARGE PLANNING  Goal: Discharge to home or other facility with appropriate resources  Description: INTERVENTIONS:  - Identify barriers to discharge w/patient and caregiver  - Arrange for needed discharge resources and transportation as appropriate  - Identify discharge learning needs (meds, wound care, etc.)  - Arrange for interpretive services to assist at discharge as needed  - Refer to Case Management Department for coordinating discharge planning if the patient needs post-hospital services based on physician/advanced practitioner order or complex needs related to functional status, cognitive ability, or social support system  Outcome: Progressing     Problem: Alteration in Thoughts and Perception  Goal: Treatment Goal: Gain control of psychotic behaviors/thinking, reduce/eliminate presenting symptoms and demonstrate improved reality functioning upon discharge  Outcome: Progressing  Goal: Verbalize thoughts and feelings  Description: Interventions:  - Promote a nonjudgmental and trusting relationship with the patient through active listening and therapeutic communication  - Assess patient's level of functioning, behavior and potential for risk  - Engage patient in 1 on 1 interactions  - Encourage patient to express fears, feelings, frustrations, and discuss symptoms    - [place] patient to reality, help patient recognize  reality-based thinking   - Administer medications as ordered and assess for potential side effects  - Provide the patient education related to the signs and symptoms of the illness and desired effects of prescribed medications  Outcome: Progressing  Goal: Refrain from acting on delusional thinking/internal stimuli  Description: Interventions:  - Monitor patient closely, per order   - Utilize least restrictive measures   - Set reasonable limits, give positive feedback for acceptable   - Administer medications as ordered and monitor of potential side effects  Outcome: Progressing  Goal: Agree to be compliant with medication regime, as prescribed and report medication side effects  Description: Interventions:  - Offer appropriate PRN medication and supervise ingestion; conduct AIMS, as needed   Outcome: Progressing  Goal: Recognize dysfunctional thoughts, communicate reality-based thoughts at the time of discharge  Description: Interventions:  - Provide medication and psycho-education to assist patient in compliance and developing insight into his/her illness   Outcome: Progressing

## 2024-05-02 NOTE — PLAN OF CARE
Problem: PAIN - ADULT  Goal: Verbalizes/displays adequate comfort level or baseline comfort level  Description: Interventions:  - Encourage patient to monitor pain and request assistance  - Assess pain using appropriate pain scale  - Administer analgesics based on type and severity of pain and evaluate response  - Implement non-pharmacological measures as appropriate and evaluate response  - Consider cultural and social influences on pain and pain management  - Notify physician/advanced practitioner if interventions unsuccessful or patient reports new pain  Outcome: Progressing     Problem: Alteration in Thoughts and Perception  Goal: Treatment Goal: Gain control of psychotic behaviors/thinking, reduce/eliminate presenting symptoms and demonstrate improved reality functioning upon discharge  Outcome: Progressing  Goal: Verbalize thoughts and feelings  Description: Interventions:  - Promote a nonjudgmental and trusting relationship with the patient through active listening and therapeutic communication  - Assess patient's level of functioning, behavior and potential for risk  - Engage patient in 1 on 1 interactions  - Encourage patient to express fears, feelings, frustrations, and discuss symptoms    - [place] patient to reality, help patient recognize reality-based thinking   - Administer medications as ordered and assess for potential side effects  - Provide the patient education related to the signs and symptoms of the illness and desired effects of prescribed medications  Outcome: Progressing  Goal: Refrain from acting on delusional thinking/internal stimuli  Description: Interventions:  - Monitor patient closely, per order   - Utilize least restrictive measures   - Set reasonable limits, give positive feedback for acceptable   - Administer medications as ordered and monitor of potential side effects  Outcome: Progressing  Goal: Agree to be compliant with medication regime, as prescribed and report medication  side effects  Description: Interventions:  - Offer appropriate PRN medication and supervise ingestion; conduct AIMS, as needed   Outcome: Progressing  Goal: Attend and participate in unit activities, including therapeutic, recreational, and educational groups  Description: Interventions:  -Encourage Visitation and family involvement in care  Outcome: Progressing  Goal: Recognize dysfunctional thoughts, communicate reality-based thoughts at the time of discharge  Description: Interventions:  - Provide medication and psycho-education to assist patient in compliance and developing insight into his/her illness   Outcome: Progressing  Goal: Complete daily ADLs, including personal hygiene independently, as able  Description: Interventions:  - Observe, teach, and assist patient with ADLS  - Monitor and promote a balance of rest/activity, with adequate nutrition and elimination   Outcome: Progressing     Problem: Risk for Self Injury/Neglect  Goal: Treatment Goal: Remain safe during length of stay, learn and adopt new coping skills, and be free of self-injurious ideation, impulses and acts at the time of discharge  Outcome: Progressing  Goal: Verbalize thoughts and feelings  Description: Interventions:  - Assess and re-assess patient's lethality and potential for self-injury  - Engage patient in 1:1 interactions, daily, for a minimum of 15 minutes  - Encourage patient to express feelings, fears, frustrations, hopes  - Establish rapport/trust with patient   Outcome: Progressing  Goal: Refrain from harming self  Description: Interventions:  - Monitor patient closely, per order  - Develop a trusting relationship  - Supervise medication ingestion, monitor effects and side effects   Outcome: Progressing  Goal: Attend and participate in unit activities, including therapeutic, recreational, and educational groups  Description: Interventions:  - Provide therapeutic and educational activities daily, encourage attendance and  participation, and document same in the medical record  - Obtain collateral information, encourage visitation and family involvement in care   Outcome: Progressing  Goal: Recognize maladaptive responses and adopt new coping mechanisms  Outcome: Progressing  Goal: Complete daily ADLs, including personal hygiene independently, as able  Description: Interventions:  - Observe, teach, and assist patient with ADLS  - Monitor and promote a balance of rest/activity, with adequate nutrition and elimination  Outcome: Progressing

## 2024-05-02 NOTE — H&P
Psychiatric Evaluation - Behavioral Health   Carey Keith 49 y.o. female MRN: [id number]  Unit/Bed#: [facility] 209-02 Encounter: [id number]    Assessment/Plan   Principal Problem:    Bipolar 1 disorder (HCC)  Active Problems:    Tobacco abuse    Dyspepsia    Opioid use disorder, severe, on maintenance therapy (HCC)    Hyperlipidemia      Plan:   Continue home psychotropic medication regimen:  --Suboxone 8-2 mg p.o. 3 times daily as confirmed by PDMP  --Klonopin 1 mg p.o. 3 times daily as needed for anxiety  --Wellbutrin  mg p.o. daily for depression  --Gabapentin 600 mg p.o. 3 times daily    Patient is now currently denying all symptoms and states that she wants to restart her medications and be connected with an outpatient provider.  Her presentation appears to be consistent with a long history of depression secondary to bipolar disorder.  The patient states that the above medication regimen works for her very well when she takes everything as prescribed.    Admit to [facility] inpatient psychiatry.  Medical management per SLIM.  Check admission labs: CMP, CBC, TSH, RPR, UDS, Lipid Panel, A1c.  Collaborate with case management for baseline assessment and disposition planning.  Chart reviewed and case discussed with treatment team.  Start/continue the following medications:  Current Facility-Administered Medications   Medication Dose Route Frequency Provider Last Rate    acetaminophen  650 mg Oral Q6H PRN Angie Jones MD      acetaminophen  650 mg Oral Q4H PRN Angie Jones MD      acetaminophen  975 mg Oral Q6H PRN Angie Jones MD      aluminum-magnesium hydroxide-simethicone  30 mL Oral Q4H PRN Angie Jones MD      benztropine  1 mg Oral Q4H PRN Max 6/day Angie Jones MD      bisacodyl  10 mg Rectal Daily PRN Angie Jones MD      buprenorphine-naloxone  8 mg Sublingual TID Miguel Velasquez DO      buPROPion  150 mg Oral Daily Miguel Velasquez DO       clonazePAM  1 mg Oral TID PRN Miguel Velasquez DO      gabapentin  600 mg Oral TID Miguel Velasquez DO      hydrOXYzine HCL  100 mg Oral Q6H PRN Max 4/day Angie Jones MD      Or    LORazepam  2 mg Intramuscular Q6H PRN Angie Jones MD      hydrOXYzine HCL  25 mg Oral Q6H PRN Max 4/day Angie Jones MD      nicotine polacrilex  4 mg Oral Q2H PRN Angie Jones MD      OLANZapine  5 mg Oral Q4H PRN Max 3/day Angie Jones MD      Or    OLANZapine  2.5 mg Intramuscular Q4H PRN Max 3/day Angie Jones MD      OLANZapine  5 mg Oral Q3H PRN Max 3/day Angie Jones MD      Or    OLANZapine  5 mg Intramuscular Q3H PRN Max 3/day Angie Jones MD      OLANZapine  2.5 mg Oral Q4H PRN Max 6/day Angie Jones MD      ondansetron  4 mg Oral Q6H PRN Miguel Velasquez DO      polyethylene glycol  17 g Oral Daily PRN Angie Jones MD      senna-docusate sodium  1 tablet Oral Daily PRN Angie Jones MD      traZODone  100 mg Oral HS PRN Angie Jones MD      traZODone  100 mg Oral HS Miguel Velasquez DO         Treatment options and alternatives were reviewed with the patient, who concurs with the above plan. Risks, benefits, and possible side effects of medications were explained to the patient, and she verbalizes understanding.      -----------------------------------    Chief Complaint: SI    History of Present Illness     Per Crisis worker note:  to [facility]. Patient is calm and cooperative upon approach and agrees to speak with this writer. Patient's ICM from [facility] is at bedside, and patient reports she can stay in the room throughout assessment. Patient is oriented across all spheres, has a labile affect, tangential and pressured speech, depressed and anxious mood. Patient reports she has been experiencing increased depression and anxiety over the past few weeks. Patient reports fleeting suicidal thoughts, and recently cut her left forearm several times  "with a piece of glass. Patient reports she has not been compliant with her medications, taking them when she feels like it or not at all. Patient reports she has been \"abusing laxatives\", and last took some on 4/23/24. Patient exhibits symptoms of a manic episode. Patient reports her primary care physician prescribes her psychotropic medications and her suboxone. Patient denies having a psychiatrist or a therapist at present. Patient denies homicidal ideation and auditory/visual/tactile hallucinations. Patient reports smoking about a pack of cigarettes per day, denies drug and alcohol consumption. Patient reports she has not been sleeping well, in part because she has not been taking her medications. Patient reports a decreased appetite of late. Patient is requesting inpatient psychiatric treatment to ensure her safety and help her stabilize.      On admission to Inpatient Psychiatric Unit:  Patient is a 49-year-old white female with a past psychiatric history of bipolar 1 disorder and opioid use disorder, severe, in sustained remission, on maintenance therapy, who presents voluntarily to inpatient U after overdosing on laxatives and then cutting herself.    Symptoms prior to hospitalization include: Progressively worsening depressed mood, suicidal ideation with the aforementioned laxative abuse and cutting self, medication noncompliance, generalized anxiety, and hopelessness.  Symptom severity is rated as severe.  Timeline is acute on chronic.  Mitigating factors are having an ICM from [facility].  Exacerbating stressors or medication nonadherence.    On initial psychiatric evaluation today, the patient states that she was feeling depressed and suicidal because she was off of her medications for a few days and states that she takes them inconsistently and is fairly nonadherent.  However, she states that when she takes her medications consistently, she does not feel depressed nor suicidal.  She has spent much of her " "life being admitted to various inpatient psychiatric units and rehabs for bipolar disorder and associated depression.  She had a history of an eating disorder in the past but now eats adequately.  Under normal circumstances, she takes Klonopin, Suboxone, Wellbutrin, gabapentin, and trazodone with positive effect.  She wishes to continue taking this regimen.  She denies psychotic symptoms.  She denies current drug use but has a remote history of opioid abuse for which she now takes Suboxone.  She denies current or recent manic symptoms. She is requesting a new OP provider. She has [facility].    Psychiatric Review Of Systems:  Medication side effects: none  Sleep: no change  Appetite: no change  Hygiene: able to tend to instrumental and basic ADLs  Anxiety Symptoms: +  Psychotic Symptoms: denies  Depression Symptoms: +  Manic Symptoms: denies  PTSD Symptoms: denies  Suicidal Thoughts: +  Homicidal Thoughts: denies    Medical Review Of Systems:   Patient denies headache or dizziness.   Patient denies chest pain or palpitations.  Patient denies difficulty breathing or wheezing.  Patient denies nausea, vomiting, or diarrhea.  Patient denies polyuria or polydipsia.  Patient denies weakness or numbness.  Pertinent positives as per HPI.    Historical Information     Psychiatric History:   Diagnoses: BPAD1, OUD on MAT  Inpatient Hx: Multiple  Outpatient Hx: None  Medications/Trials: Wellbutrin, Suboxone, Klonopin, Trazodone, Gabapentin    Substance Abuse History:  Social History     Substance and Sexual Activity   Alcohol Use Not Currently    Comment: MAGDALENO, pt historically inconsistent. Drinking  per [place] [id number]     Social History     Substance and Sexual Activity   Drug Use Not Currently    Types: Heroin, \"Crack\" cocaine, Cocaine, LSD, Benzodiazepines, Marijuana    Comment: Pt unreliable historian     Previously used Opioids, clean for \"several years.\" On Suboxone MAT as per above.    I spent time with Carey" "in counseling and education on risk of substance abuse. I assessed motivation and encouraged her for treatment as appropriate.     Family History:   Family History   Problem Relation Age of Onset    Heart disease Father     Heart disease Maternal Grandmother        Social History:  Highest education: HS  Currently living: With a friend  Relationships: Single  Children: None  Occupation: Applying for social security  In retirement once  No  hx    Rest of social history as per below:    Social History     Socioeconomic History    Marital status: Single     Spouse name: Not on file    Number of children: Not on file    Years of education: Not on file    Highest education level: Not on file   Occupational History    Not on file   Tobacco Use    Smoking status: Some Days     Current packs/day: 0.50     Average packs/day: 0.5 packs/day for 20.0 years (10.0 ttl pk-yrs)     Types: Cigarettes    Smokeless tobacco: Current    Tobacco comments:     Pt not ready to quit   Vaping Use    Vaping status: Every Day    Substances: Nicotine, THC, Flavoring   Substance and Sexual Activity    Alcohol use: Not Currently     Comment: MAGDALENO, pt historically inconsistent. Drinking  per [place] [id number]    Drug use: Not Currently     Types: Heroin, \"Crack\" cocaine, Cocaine, LSD, Benzodiazepines, Marijuana     Comment: Pt unreliable historian    Sexual activity: Not Currently   Other Topics Concern    Not on file   Social History Narrative    Not on file     Social Determinants of Health     Financial Resource Strain: High Risk (2/7/2024)    Overall Financial Resource Strain (CARDIA)     Difficulty of Paying Living Expenses: Very hard   Food Insecurity: Food Insecurity Present (2/7/2024)    Hunger Vital Sign     Worried About Running Out of Food in the Last Year: Sometimes true     Ran Out of Food in the Last Year: Sometimes true   Transportation Needs: No Transportation Needs (2/7/2024)    PRAPARE - Transportation     Lack of " "Transportation (Medical): No     Lack of Transportation (Non-Medical): No   Physical Activity: Not on file   Stress: Not on file   Social Connections: Not on file   Intimate Partner Violence: Not At Risk (2/7/2024)    Humiliation, Afraid, Rape, and Kick questionnaire     Fear of Current or Ex-Partner: No     Emotionally Abused: No     Physically Abused: No     Sexually Abused: No   Housing Stability: Low Risk  (2/7/2024)    Housing Stability Vital Sign     Unable to Pay for Housing in the Last Year: No     Number of Places Lived in the Last Year: 1     Unstable Housing in the Last Year: No       Past Medical History:   Past Medical History:   Diagnosis Date    Addiction to drug ([facility])     Alcohol abuse     Alcoholism ([facility])     Altered mental status 09/21/2022    Elevated LFTs 09/21/2022    Lower back pain     Self-injurious behavior     Substance abuse (HCC)         -----------------------------------  Objective    Temp:  [96.8 °F (36 °C)-98.2 °F (36.8 °C)] 97.5 °F (36.4 °C)  HR:  [56-83] 56  Resp:  [18-20] 18  BP: ()/(68-77) 101/73    Mental Status Evaluation:  Appearance: disheveled, appears consistent with stated age  Motor: +mild psychomotor agitation, no gait abnormalities  Behavior: cooperative  Speech: pressured, volume is intermittently loud  Mood: \"good\"  Affect: euthymic appearing  Thought Process: relatively goal oriented and linear with some circumstantiality.  Thought Content: denies auditory hallucinations, denies visual hallucinations, denies delusions  Risk Potential: denies suicidal ideation, plan, or intent. Denies homicidal ideation  Sensorium: Oriented to person, place, time, and situation  Cognition: cognitive ability appears intact but was not quantitatively tested  Consciousness: alert and awake  Attention: currently impaired  Insight: fair  Judgement: poor      Meds/Allergies   Allergies   Allergen Reactions    Haloperidol Other (See Comments)     oculogyr crisis    Abilify " "[Aripiprazole] Other (See Comments)     Pt reports increased agitation ? (stating last time she took this med \"she lost it\"          Behavioral Health Medications: all current active meds have been reviewed as per above. Changes as per above. Risks, benefits, indications, and alternatives to treatment were discussed with patient.     Laboratory results:  I have personally reviewed all pertinent laboratory/tests results.  Recent Results (from the past 48 hour(s))   Rapid drug screen, urine    Collection Time: 05/01/24  5:10 PM   Result Value Ref Range    Amph/Meth UR Negative Negative    Barbiturate Ur Negative Negative    Benzodiazepine Urine Negative Negative    Cocaine Urine Negative Negative    Methadone Urine Negative Negative    Opiate Urine Negative Negative    PCP Ur Negative Negative    THC Urine Negative Negative    Oxycodone Urine Negative Negative    Fentanyl Urine Negative Negative    HYDROCODONE URINE Negative Negative   POCT pregnancy, urine    Collection Time: 05/01/24  5:10 PM   Result Value Ref Range    EXT Preg Test, Ur Negative     Control Valid    POCT alcohol breath test    Collection Time: 05/01/24  5:14 PM   Result Value Ref Range    EXTBreath Alcohol 0    CBC and differential    Collection Time: 05/02/24  6:16 AM   Result Value Ref Range    WBC 6.03 4.31 - 10.16 Thousand/uL    RBC 4.45 3.81 - 5.12 Million/uL    Hemoglobin 12.5 11.5 - 15.4 g/dL    Hematocrit 40.3 34.8 - 46.1 %    MCV 91 82 - 98 fL    MCH 28.1 26.8 - 34.3 pg    MCHC 31.0 (L) 31.4 - 37.4 g/dL    RDW 13.0 11.6 - 15.1 %    MPV 8.7 (L) 8.9 - 12.7 fL    Platelets 229 149 - 390 Thousands/uL    nRBC 0 /100 WBCs    Segmented % 51 43 - 75 %    Immature Grans % 1 0 - 2 %    Lymphocytes % 39 14 - 44 %    Monocytes % 8 4 - 12 %    Eosinophils Relative 0 0 - 6 %    Basophils Relative 1 0 - 1 %    Absolute Neutrophils 3.15 1.85 - 7.62 Thousands/µL    Absolute Immature Grans 0.04 0.00 - 0.20 Thousand/uL    Absolute Lymphocytes 2.32 0.60 - " 4.47 Thousands/µL    Absolute Monocytes 0.47 0.17 - 1.22 Thousand/µL    Eosinophils Absolute 0.01 0.00 - 0.61 Thousand/µL    Basophils Absolute 0.04 0.00 - 0.10 Thousands/µL        Progress Toward Goals & Illness Status: Patient is not at goal. They are psychiatrically unstable and are not yet ready for discharge. The patient's condition currently requires active psychopharmacological medication management, interdisciplinary coordination with case management, and the utilization of adjunctive milieu and group therapy to augment psychopharmacological efficacy. The patient's risk of morbidity, and progression or decompensation of psychiatric disease, is high without this current treatment.           -----------------------------------    Risks / Benefits of Treatment:     Risks, benefits, and possible side effects of medications explained to patient. The patient verbalizes understanding and agreement for treatment.     Counseling / Coordination of Care:     Patient's presentation on admission and proposed treatment plan were discussed with the treatment team.  Diagnosis, medication changes and treatment plan were reviewed with the patient.  Recent stressors were discussed with the patient.  Events leading to admission were reviewed with the patient.  Importance of medication and treatment compliance was reviewed with the patient.          Inpatient Psychiatric Certification:     Certification: Based upon physical, mental and social evaluations, I certify that inpatient psychiatric services are medically necessary for this patient for a duration of 5-7 midnights (exact duration TBD pending patient's response to psychiatric treatment) for the diagnosis listed above.     Available alternative community resources do not meet the patient's mental health care needs.  I further attest that an established written individualized plan of care has been implemented and is outlined in the patient's medical records.        This  note has been constructed using a voice recognition system. There may be translation, syntax, or grammatical errors. If you have any questions, please contact the dictating provider.

## 2024-05-02 NOTE — NURSING NOTE
Pt is a 89 here from [place] [facility]. Pt was admitted for attempted OD on laxatives and cutting her left forearm. Pt also reports having scars on right forearm. Endorsed intermittent SI over the past few weeks with no HI or AVH. Denies etoh and drug use. Reports smoking 1/2 to 1 pack of cigarettes per day. Pt cooperative on admission, very tangential.  Denies SI/HI/AVH at this time. UDS negative on admission. C-SSRS lifetime scale moderate. PTA med req confirmed; Dr. Jones notified.

## 2024-05-02 NOTE — CONSULTS
[facility]  Consult  Name: Carey Keith 49 y.o. female I MRN: [id number]  Unit/Bed#: -02 I Date of Admission: 5/1/2024   Date of Service: 5/2/2024 I Hospital Day: 1    Inpatient consult for Medical Clearance for  patient  Consult performed by: Jeremy Serrano PA-C  Consult ordered by: Angie Jones MD          Assessment/Plan   Medical clearance for psychiatric admission  Assessment & Plan  Admission labs reviewed, CBC, CMP, Vitamin B12, Folate, lipid panel, TSH, UA acceptable  Vitamin D 25 hydroxy: 17.1ng/mL - initiate supplement this admission  Lipid panel reveals mildly elevated LDL - recommend dietary changes  No additional labs pending  Vitals stable   UDS negative  COVID-19 negative  EKG reveals NSR, 65bpm,  (12/3/23)  Medically stable for continued inpatient psychiatric treatment based on available results  Please contact [facility] with any questions or concerns      Hyperlipidemia  Assessment & Plan  Maintained on Lipitor outpatient     Tobacco abuse  Assessment & Plan   on cessation   NRT while admitted    * Bipolar 1 disorder (HCC)  Assessment & Plan  Management per psychiatry              VTE Prophylaxis: VTE Score: 2 Low Risk (Score 0-2) - Encourage Ambulation.    Mobility:      -[facility] Goal achieved. Continue to encourage appropriate mobility.    Recommendations for Discharge:  Discharge timeline per primary team.  Routine follow-up with primary care provider.   on cessation from tobacco use.     Total Time Spent on Date of Encounter in care of patient: 35 mins. This time was spent on one or more of the following: performing physical exam; counseling and coordination of care; obtaining or reviewing history; documenting in the medical record; reviewing/ordering tests, medications or procedures; communicating with other healthcare professionals and discussing with patient's family/caregivers.    Collaboration of Care: Were Recommendations  Directly Discussed with Primary Treatment Team? No    History of Present Illness:  Carey Keith is a 49 y.o. female who is originally admitted to the behavioral health service due to anxiety. We are consulted for management of chronic medical conditions.   Patient denies any chronic medical conditions for which she does not takes daily medications.  Patient should continue all prior to admission medications as prescribed by primary care provider/outpatient specialists.  Patient denies recent travel, illness or sick contacts.  Available admission lab work and vitals are acceptable.  Patient feels a baseline physical health.  Patient appears medically stable for inpatient psychiatric treatment at this time. Please contact [facility] with any medical questions or concerns if issues should arise.      Review of Systems:  Review of Systems   Psychiatric/Behavioral:  The patient is nervous/anxious.    All other systems reviewed and are negative.      Past Medical and Surgical History:   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)     Alcohol abuse     Alcoholism (HCC)     Altered mental status 09/21/2022    Elevated LFTs 09/21/2022    Lower back pain     Self-injurious behavior     Substance abuse (HCC)        Past Surgical History:   Procedure Laterality Date    CHOLECYSTECTOMY         Meds/Allergies:  PTA meds:   Prior to Admission Medications   Prescriptions Last Dose Informant Patient Reported? Taking?   Diclofenac Sodium (VOLTAREN) 1 % Unknown  Yes No   Sig: Apply 2 g topically 4 (four) times a day   albuterol (Proventil HFA) 90 mcg/act inhaler Unknown  No No   Sig: Inhale 2 puffs every 6 (six) hours as needed for wheezing   atorvastatin (LIPITOR) 10 mg tablet   No No   Sig: Take 1 tablet (10 mg total) by mouth daily with dinner   buPROPion (WELLBUTRIN XL) 150 mg 24 hr tablet 4/30/2024  Yes Yes   Sig: Take 300 mg by mouth every morning   buprenorphine-naloxone (SUBOXONE) 8-2 mg per SL tablet  "2024 Self Yes Yes   Sig: Place 1 tablet under the tongue 3 (three) times a day   clonazePAM (KlonoPIN) 1 mg tablet 2024  Yes Yes   Sig: Take 1 mg by mouth 3 (three) times a day as needed for anxiety   fluticasone (FLONASE) 50 mcg/act nasal spray Unknown  Yes No   Si spray into each nostril daily   gabapentin (NEURONTIN) 300 mg capsule 2024  No Yes   Sig: Take 2 capsules (600 mg total) by mouth 3 (three) times a day   hydrOXYzine HCL (ATARAX) 25 mg tablet Not Taking  No No   Sig: Take 1 tablet (25 mg total) by mouth every 6 (six) hours as needed for anxiety (anxiety) for up to 15 doses   Patient not taking: Reported on 2024   nicotine (NICODERM CQ) 21 mg/24 hr TD 24 hr patch Unknown  Yes No   Sig: Place 1 patch on the skin daily   polyethylene glycol (GLYCOLAX) 17 GM/SCOOP powder Unknown  No No   Sig: Take 17 g by mouth daily   traZODone (DESYREL) 100 mg tablet 2024  Yes Yes   Sig: Take 100 mg by mouth daily at bedtime      Facility-Administered Medications: None       Allergies:   Allergies   Allergen Reactions    Haloperidol Other (See Comments)     oculogyr crisis    Abilify [Aripiprazole] Other (See Comments)     Pt reports increased agitation ? (stating last time she took this med \"she lost it\"        Social History:  Marital Status: Single  Substance Use History:   Social History     Substance and Sexual Activity   Alcohol Use Not Currently    Comment: MAGDALENO, pt historically inconsistent. Drinking  per [place] [id number]     Social History     Tobacco Use   Smoking Status Some Days    Current packs/day: 0.50    Average packs/day: 0.5 packs/day for 20.0 years (10.0 ttl pk-yrs)    Types: Cigarettes   Smokeless Tobacco Current   Tobacco Comments    Pt not ready to quit     Social History     Substance and Sexual Activity   Drug Use Not Currently    Types: Heroin, \"Crack\" cocaine, Cocaine, LSD, Benzodiazepines, Marijuana    Comment: Pt unreliable historian       Family History:  Family " "History   Problem Relation Age of Onset    Heart disease Father     Heart disease Maternal Grandmother        Physical Exam:   Vitals:   Blood Pressure: 128/86 (05/02/24 1126)  Pulse: 75 (05/02/24 1126)  Temperature: 98.1 °F (36.7 °C) (05/02/24 1126)  Temp Source: Tympanic (05/02/24 1126)  Respirations: 18 (05/02/24 1126)  Height: 5' 1\" (154.9 cm) (05/01/24 2229)  Weight - Scale: 77.5 kg (170 lb 12.8 oz) (05/01/24 2229)  SpO2: 98 % (05/02/24 1126)    Physical Exam  Vitals and nursing note reviewed.   Constitutional:       General: She is awake. She is not in acute distress.  HENT:      Head: Normocephalic and atraumatic.   Cardiovascular:      Rate and Rhythm: Normal rate and regular rhythm.      Heart sounds: No murmur heard.  Pulmonary:      Effort: Pulmonary effort is normal.      Breath sounds: Normal breath sounds.   Abdominal:      General: There is no distension.      Palpations: Abdomen is soft.   Musculoskeletal:      Right lower leg: No edema.      Left lower leg: No edema.   Skin:     General: Skin is warm and dry.   Neurological:      Mental Status: She is alert.      Comments: CN II-XII grossly intact        Additional Data:   Lab Results:    Results from last 7 days   Lab Units 05/02/24  0616   WBC Thousand/uL 6.03   HEMOGLOBIN g/dL 12.5   HEMATOCRIT % 40.3   PLATELETS Thousands/uL 229   SEGS PCT % 51   LYMPHO PCT % 39   MONO PCT % 8   EOS PCT % 0     Results from last 7 days   Lab Units 05/02/24  0615   SODIUM mmol/L 139   POTASSIUM mmol/L 4.6   CHLORIDE mmol/L 110*   CO2 mmol/L 25   BUN mg/dL 15   CREATININE mg/dL 0.89   ANION GAP mmol/L 4   CALCIUM mg/dL 8.4   ALBUMIN g/dL 3.7   TOTAL BILIRUBIN mg/dL 0.38   ALK PHOS U/L 59   ALT U/L 12   AST U/L 18   GLUCOSE RANDOM mg/dL 88             Lab Results   Component Value Date/Time    HGBA1C 5.2 12/25/2021 08:09 AM               Imaging: No pertinent imaging reviewed.  No orders to display       EKG, Pathology, and Other Studies Reviewed on Admission: "   EKG: NSR. HR 65bpm, .    ** Please Note: This note may have been constructed using a voice recognition system. **

## 2024-05-02 NOTE — ED NOTES
Patient in agreement with treatment at [facility].    Roundtrip initiated at this time.    CTS P/U 4503    Jeremy Pierre  Crisis Intervention Specialist II  05/01/24

## 2024-05-02 NOTE — NURSING NOTE
Patient endorses depression and sadness, patient voice is hoarse, raspy and soft. Patient denies HI AVH at present. Patient is in day room, wrapped in blanket, expresses wanting talk to the dr about her medication. Patient reports trouble swallowing larger pills related to a hiatal hernia

## 2024-05-02 NOTE — TREATMENT PLAN
TREATMENT PLAN REVIEW - Behavioral Health Carey Keith 49 y.o. 1975 female MRN: [id number]    [facility] IP BEHAVIORAL HLTH Room / Bed: [facility] 209-02 Encounter: [id number]          Admit Date/Time:  5/1/2024 10:24 PM    Treatment Team:   DO Toña Hsu, PORFIRIO Casanova, PORFIRIO Bro, PORFIRIO Rhodes, PORFIRIO Wesley, PORFIRIO Haas    Diagnosis: Principal Problem:    Bipolar 1 disorder (HCC)  Active Problems:    Tobacco abuse    Dyspepsia    Opioid use disorder, severe, on maintenance therapy (HCC)    Hyperlipidemia      Patient Strengths/Assets: good past treatment response    Patient Barriers/Limitations: noncompliant with medication    Short Term Goals: decrease in suicidal thoughts    Long Term Goals: free of suicidal thoughts    Progress Towards Goals: starting psychiatric medications as prescribed, continue psychiatric medications as prescribed, improving gradually    Recommended Treatment: medication management, patient medication education, group therapy, milieu therapy, continued Behavioral Health psychiatric evaluation/assessment process    Treatment Frequency: daily medication monitoring, group and milieu therapy daily, monitoring through interdisciplinary rounds, monitoring through weekly patient care conferences    Expected Discharge Date:  5-10 days    Discharge Plan: discharge to home    Treatment Plan Created/Updated By: Miguel Velasquez DO

## 2024-05-02 NOTE — ASSESSMENT & PLAN NOTE
Admission labs reviewed, CBC, CMP, Vitamin B12, Folate, lipid panel, TSH, UA acceptable  Vitamin D 25 hydroxy: 17.1ng/mL - initiate supplement this admission  Lipid panel reveals mildly elevated LDL - recommend dietary changes. Continue lipitor 10mg.  No additional labs pending  Vitals stable   UDS negative  COVID-19 negative  EKG reveals NSR, 65bpm,  (12/3/23)  Medically stable for continued inpatient psychiatric treatment based on available results  Please contact [facility] with any questions or concerns

## 2024-05-02 NOTE — CMS CERTIFICATION NOTE
Recertification: Based upon physical, mental and social evaluations, I certify that inpatient psychiatric services continue to be medically necessary for this patient for a duration of 10 midnights for the treatment of  Bipolar 1 disorder (HCC) Available alternative community resources still do not meet the patient's mental health care needs. I further attest that an established written individualized plan of care has been updated and is outlined in the patient's medical records.

## 2024-05-02 NOTE — NURSING NOTE
Cellphone  Cigaretts  Nicotine gum  Lighter  Nicoten lozenge  6 shirts  2 jackets  4 pants  5 socks  Shoes  Shampoo  Conditioner  Inhaler  Presealed earrings  5 books  Bookbag  Toothbush  Toothpaste  Makeup  Bodyspray  Vape  Tanktop  Pants  5 underpants

## 2024-05-02 NOTE — ED NOTES
Patient informed of denial at [facility]- Given opportunity to identify where else she would like to be referred.    Patient does not wish to be referred to [facility] at this time.    Jeremy Pierre  Crisis Intervention Specialist II  05/01/24

## 2024-05-02 NOTE — PLAN OF CARE
Problem: Alteration in Thoughts and Perception  Goal: Verbalize thoughts and feelings  Description: Interventions:  - Promote a nonjudgmental and trusting relationship with the patient through active listening and therapeutic communication  - Assess patient's level of functioning, behavior and potential for risk  - Engage patient in 1 on 1 interactions  - Encourage patient to express fears, feelings, frustrations, and discuss symptoms    - [place] patient to reality, help patient recognize reality-based thinking   - Administer medications as ordered and assess for potential side effects  - Provide the patient education related to the signs and symptoms of the illness and desired effects of prescribed medications  Outcome: Progressing     Problem: Alteration in Thoughts and Perception  Goal: Agree to be compliant with medication regime, as prescribed and report medication side effects  Description: Interventions:  - Offer appropriate PRN medication and supervise ingestion; conduct AIMS, as needed   Outcome: Progressing

## 2024-05-02 NOTE — ED NOTES
Security at bedside escorting pt out with cts for transfer to [facility]     Lilo Saunders RN  05/01/24 6489

## 2024-05-02 NOTE — PROGRESS NOTES
New admission - 201 from [place] ED. OD on laxatives. Pt cut arm. Reported intermittent SI. On Suboxone and Klonopin. Tangential in conversation. UDS negative      05/02/24 3524   Team Meeting   Meeting Type Daily Rounds   Team Members Present   Team Members Present Physician;Nurse;;Other (Discipline and Name)   Physician Team Member Dr. Aguilar / Dr. Velasquez / KALEIGH Root / PA Student   Nursing Team Member Dell / Meagan   Care Management Team Member Theo / Phong / Mayela   Other (Discipline and Name) Group Facilitator - Jewel   Patient/Family Present   Patient Present No   Patient's Family Present No

## 2024-05-02 NOTE — EMTALA/ACUTE CARE TRANSFER
[facility] EMERGENCY DEPARTMENT  [address] [address]  Dept: [phone]      EMTALA TRANSFER CONSENT    NAME Carey Keith                                         1975                              MRN [id number]    I have been informed of my rights regarding examination, treatment, and transfer   by Dr. Napoleon Salcedo MD    Benefits: Specialized equipment and/or services available at the receiving facility (Include comment)________________________, Continuity of care    Risks: Potential for delay in receiving treatment      Consent for Transfer:  I acknowledge that my medical condition has been evaluated and explained to me by the emergency department physician or other qualified medical person and/or my attending physician, who has recommended that I be transferred to the service of  Accepting Physician: Dr. Jones at Accepting Facility Name, City & State : [facility]; [facility]. The above potential benefits of such transfer, the potential risks associated with such transfer, and the probable risks of not being transferred have been explained to me, and I fully understand them.  The doctor has explained that, in my case, the benefits of transfer outweigh the risks.  I agree to be transferred.    I authorize the performance of emergency medical procedures and treatments upon me in both transit and upon arrival at the receiving facility.  Additionally, I authorize the release of any and all medical records to the receiving facility and request they be transported with me, if possible.  I understand that the safest mode of transportation during a medical emergency is an ambulance and that the Hospital advocates the use of this mode of transport. Risks of traveling to the receiving facility by car, including absence of medical control, life sustaining equipment, such as oxygen, and medical personnel has been explained to me and I fully understand them.    (BRUCE  CORRECT BOX BELOW)  [  ]  I consent to the stated transfer and to be transported by ambulance/helicopter.  [  ]  I consent to the stated transfer, but refuse transportation by ambulance and accept full responsibility for my transportation by car.  I understand the risks of non-ambulance transfers and I exonerate the Hospital and its staff from any deterioration in my condition that results from this refusal.    X___________________________________________    DATE  24  TIME________  Signature of patient or legally responsible individual signing on patient behalf           RELATIONSHIP TO PATIENT_________________________          Provider Certification    NAME Carey Keith                                         1975                              MRN [id number]    A medical screening exam was performed on the above named patient.  Based on the examination:    Condition Necessitating Transfer The primary encounter diagnosis was Depression. A diagnosis of At high risk for self harm was also pertinent to this visit.    Patient Condition: The patient has been stabilized such that within reasonable medical probability, no material deterioration of the patient condition or the condition of the unborn child(ricky) is likely to result from the transfer    Reason for Transfer: Level of Care needed not available at this facility    Transfer Requirements: Facility [address]   Space available and qualified personnel available for treatment as acknowledged by Bradley [phone]  Agreed to accept transfer and to provide appropriate medical treatment as acknowledged by       Dr. Jones  Appropriate medical records of the examination and treatment of the patient are provided at the time of transfer   STAFF INITIAL WHEN COMPLETED _______  Transfer will be performed by qualified personnel from [facility]  and appropriate transfer equipment as required, including the use of necessary and appropriate life support  measures.    Provider Certification: I have examined the patient and explained the following risks and benefits of being transferred/refusing transfer to the patient/family:  General risk, such as traffic hazards, adverse weather conditions, rough terrain or turbulence, possible failure of equipment (including vehicle or aircraft), or consequences of actions of persons outside the control of the transport personnel      Based on these reasonable risks and benefits to the patient and/or the unborn child(ricky), and based upon the information available at the time of the patient’s examination, I certify that the medical benefits reasonably to be expected from the provision of appropriate medical treatments at another medical facility outweigh the increasing risks, if any, to the individual’s medical condition, and in the case of labor to the unborn child, from effecting the transfer.    X____________________________________________ DATE 05/01/24        TIME_______      ORIGINAL - SEND TO MEDICAL RECORDS   COPY - SEND WITH PATIENT DURING TRANSFER

## 2024-05-02 NOTE — NURSING NOTE
Patient initially fell asleep before HS Klonopin or Trazodone given. Did awaken about 0220 & was given Klonopin 1 mg (HS dose) along with Trazodone 100 mg. Po prn/sleep. Good effect obtained, as observed asleep in bed within the hour & remained asleep throughout the night.

## 2024-05-02 NOTE — NURSING NOTE
Pt visible on unit with limited social interactions. Pt endorses depression. Pt cooperative with care and medication compliant. Pt denies SI,HI and AV/H.

## 2024-05-02 NOTE — ED NOTES
Patient is accepted at [facility].  Patient is accepted by Dr. Robert Da Silva in Intake.    Transportation is arranged with CTS.  Transportation is scheduled for 2115.  Patient may go to the floor at No time constraints.      Nurse report is to be called to [phone] prior to patient transfer.     Jeremy Pierre  Crisis Intervention Specialist II  05/01/24

## 2024-05-03 LAB
ATRIAL RATE: 54 BPM
PR INTERVAL: 182 MS
QRS AXIS: 31 DEGREES
QRSD INTERVAL: 88 MS
QT INTERVAL: 460 MS
QTC INTERVAL: 436 MS
T WAVE AXIS: 30 DEGREES
VENTRICULAR RATE: 54 BPM

## 2024-05-03 PROCEDURE — 99232 SBSQ HOSP IP/OBS MODERATE 35: CPT | Performed by: PSYCHIATRY & NEUROLOGY

## 2024-05-03 PROCEDURE — 93010 ELECTROCARDIOGRAM REPORT: CPT | Performed by: INTERNAL MEDICINE

## 2024-05-03 PROCEDURE — 93005 ELECTROCARDIOGRAM TRACING: CPT

## 2024-05-03 RX ORDER — SENNOSIDES 8.6 MG
1 TABLET ORAL 2 TIMES DAILY
Status: DISCONTINUED | OUTPATIENT
Start: 2024-05-03 | End: 2024-05-07 | Stop reason: HOSPADM

## 2024-05-03 RX ORDER — TOPIRAMATE 100 MG/1
200 TABLET, FILM COATED ORAL DAILY
Status: DISCONTINUED | OUTPATIENT
Start: 2024-05-03 | End: 2024-05-07 | Stop reason: HOSPADM

## 2024-05-03 RX ADMIN — GABAPENTIN 600 MG: 300 CAPSULE ORAL at 15:22

## 2024-05-03 RX ADMIN — TRAZODONE HYDROCHLORIDE 100 MG: 100 TABLET ORAL at 21:15

## 2024-05-03 RX ADMIN — TOPIRAMATE 200 MG: 100 TABLET, FILM COATED ORAL at 08:27

## 2024-05-03 RX ADMIN — HYDROXYZINE HYDROCHLORIDE 25 MG: 25 TABLET ORAL at 11:25

## 2024-05-03 RX ADMIN — SENNOSIDES 8.6 MG: 8.6 TABLET, FILM COATED ORAL at 08:27

## 2024-05-03 RX ADMIN — BUPRENORPHINE AND NALOXONE 8 MG: 8; 2 FILM BUCCAL; SUBLINGUAL at 08:29

## 2024-05-03 RX ADMIN — SENNOSIDES 8.6 MG: 8.6 TABLET, FILM COATED ORAL at 18:57

## 2024-05-03 RX ADMIN — CLONAZEPAM 1 MG: 1 TABLET ORAL at 08:27

## 2024-05-03 RX ADMIN — CLONAZEPAM 1 MG: 1 TABLET ORAL at 15:22

## 2024-05-03 RX ADMIN — GABAPENTIN 600 MG: 300 CAPSULE ORAL at 08:27

## 2024-05-03 RX ADMIN — BUPRENORPHINE AND NALOXONE 8 MG: 8; 2 FILM BUCCAL; SUBLINGUAL at 15:22

## 2024-05-03 RX ADMIN — BUPRENORPHINE AND NALOXONE 8 MG: 8; 2 FILM BUCCAL; SUBLINGUAL at 20:48

## 2024-05-03 RX ADMIN — IBUPROFEN 400 MG: 400 TABLET, FILM COATED ORAL at 19:20

## 2024-05-03 RX ADMIN — BUPROPION HYDROCHLORIDE 300 MG: 300 TABLET, FILM COATED, EXTENDED RELEASE ORAL at 08:27

## 2024-05-03 RX ADMIN — NICOTINE POLACRILEX 4 MG: 4 GUM, CHEWING BUCCAL at 10:45

## 2024-05-03 RX ADMIN — CLONAZEPAM 1 MG: 1 TABLET ORAL at 20:48

## 2024-05-03 RX ADMIN — GABAPENTIN 600 MG: 300 CAPSULE ORAL at 20:48

## 2024-05-03 RX ADMIN — NICOTINE POLACRILEX 4 MG: 4 GUM, CHEWING BUCCAL at 19:19

## 2024-05-03 NOTE — PROGRESS NOTES
.Progress Note - Behavioral Health   Carey Keith 49 y.o. female MRN: [id number]  Unit/Bed#: [facility] 209-02 Encounter: [id number]    Assessment:  Principal Problem:    Bipolar 1 disorder (HCC)  Active Problems:    Medical clearance for psychiatric admission    Tobacco abuse    Dyspepsia    Opioid use disorder, severe, on maintenance therapy (HCC)    Hyperlipidemia      Plan:  --Continue with psychiatric hospitalization  --Continue with individual, group, and milieu therapy  --Continue the following medications:  Current Facility-Administered Medications   Medication Dose Route Frequency    acetaminophen (TYLENOL) tablet 650 mg  650 mg Oral Q6H PRN    acetaminophen (TYLENOL) tablet 650 mg  650 mg Oral Q4H PRN    acetaminophen (TYLENOL) tablet 975 mg  975 mg Oral Q6H PRN    aluminum-magnesium hydroxide-simethicone (MAALOX) oral suspension 30 mL  30 mL Oral Q4H PRN    benztropine (COGENTIN) tablet 1 mg  1 mg Oral Q4H PRN Max 6/day    bisacodyl (DULCOLAX) rectal suppository 10 mg  10 mg Rectal Daily PRN    buprenorphine-naloxone (Suboxone) film 8 mg  8 mg Sublingual TID    buPROPion (WELLBUTRIN XL) 24 hr tablet 300 mg  300 mg Oral Daily    clonazePAM (KlonoPIN) tablet 1 mg  1 mg Oral TID    ergocalciferol (VITAMIN D2) capsule 50,000 Units  50,000 Units Oral Weekly    gabapentin (NEURONTIN) capsule 600 mg  600 mg Oral TID    hydrOXYzine HCL (ATARAX) tablet 100 mg  100 mg Oral Q6H PRN Max 4/day    Or    LORazepam (ATIVAN) injection 2 mg  2 mg Intramuscular Q6H PRN    hydrOXYzine HCL (ATARAX) tablet 25 mg  25 mg Oral Q6H PRN Max 4/day    ibuprofen (MOTRIN) tablet 400 mg  400 mg Oral Q6H PRN    nicotine polacrilex (NICORETTE) gum 4 mg  4 mg Oral Q2H PRN    OLANZapine (ZyPREXA) tablet 5 mg  5 mg Oral Q4H PRN Max 3/day    Or    OLANZapine (ZyPREXA) IM injection 2.5 mg  2.5 mg Intramuscular Q4H PRN Max 3/day    OLANZapine (ZyPREXA) tablet 5 mg  5 mg Oral Q3H PRN Max 3/day    Or    OLANZapine (ZyPREXA) IM injection 5 mg  5 mg  Intramuscular Q3H PRN Max 3/day    OLANZapine (ZyPREXA) tablet 2.5 mg  2.5 mg Oral Q4H PRN Max 6/day    ondansetron (ZOFRAN-ODT) dispersible tablet 4 mg  4 mg Oral Q6H PRN    polyethylene glycol (MIRALAX) packet 17 g  17 g Oral Daily PRN    senna (SENOKOT) tablet 8.6 mg  1 tablet Oral BID    senna-docusate sodium (SENOKOT S) 8.6-50 mg per tablet 1 tablet  1 tablet Oral Daily PRN    topiramate (TOPAMAX) tablet 200 mg  200 mg Oral Daily    traZODone (DESYREL) tablet 100 mg  100 mg Oral HS PRN    traZODone (DESYREL) tablet 100 mg  100 mg Oral HS       Subjective: Patient was seen for continuation of care. Chart was reviewed and discussed with treatment team.     Over the past 24 hours, the patient has been visible in the milieu with somewhat limited social interaction.  She was observed hoarding coffee creamer and placing it into her bra yesterday and had to be redirected by T.    Today, she is without psychiatric complaint. She states she usually takes Topamax 200mg QD for mood and weight loss, and requests it be restarted, stating that she was taking it daily prior to hospitalization. She is c/o constipation and we discussed starting Senna. She denies SI, HI, or AVH. Is tolerating all of her medications. She is c/o mild right hip-area pain which she attributes to a known history of GYN-related cyst.    Patient denied adverse effects to their psychiatric medication regimen. Patient denied other new or worsening psychiatric symptoms/complaints at this time. Discussed the importance of continuing to take medications as prescribed, as well as the importance of continuing to attend groups on the unit.     Psychiatric Review of Systems:  Medication adverse effects: none  Sleep: unchanged  Appetite: unchanged  Behaviors over the past 24 hours: unchanged    Vitals:  Vitals:    05/03/24 0717   BP: 118/83   Pulse: (!) 52   Resp: 16   Temp: 98.3 °F (36.8 °C)   SpO2: 96%       Laboratory results:    I have personally reviewed  all pertinent laboratory/tests results  Recent Results (from the past 48 hour(s))   Rapid drug screen, urine    Collection Time: 05/01/24  5:10 PM   Result Value Ref Range    Amph/Meth UR Negative Negative    Barbiturate Ur Negative Negative    Benzodiazepine Urine Negative Negative    Cocaine Urine Negative Negative    Methadone Urine Negative Negative    Opiate Urine Negative Negative    PCP Ur Negative Negative    THC Urine Negative Negative    Oxycodone Urine Negative Negative    Fentanyl Urine Negative Negative    HYDROCODONE URINE Negative Negative   POCT pregnancy, urine    Collection Time: 05/01/24  5:10 PM   Result Value Ref Range    EXT Preg Test, Ur Negative     Control Valid    POCT alcohol breath test    Collection Time: 05/01/24  5:14 PM   Result Value Ref Range    EXTBreath Alcohol 0    Vitamin D 25 hydroxy    Collection Time: 05/02/24  6:14 AM   Result Value Ref Range    Vit D, 25-Hydroxy 17.1 (L) 30.0 - 100.0 ng/mL   Comprehensive metabolic panel    Collection Time: 05/02/24  6:15 AM   Result Value Ref Range    Sodium 139 135 - 147 mmol/L    Potassium 4.6 3.5 - 5.3 mmol/L    Chloride 110 (H) 96 - 108 mmol/L    CO2 25 21 - 32 mmol/L    ANION GAP 4 4 - 13 mmol/L    BUN 15 5 - 25 mg/dL    Creatinine 0.89 0.60 - 1.30 mg/dL    Glucose 88 65 - 140 mg/dL    Glucose, Fasting 88 65 - 99 mg/dL    Calcium 8.4 8.4 - 10.2 mg/dL    AST 18 13 - 39 U/L    ALT 12 7 - 52 U/L    Alkaline Phosphatase 59 34 - 104 U/L    Total Protein 6.2 (L) 6.4 - 8.4 g/dL    Albumin 3.7 3.5 - 5.0 g/dL    Total Bilirubin 0.38 mg/dL    eGFR 77 ml/min/1.73sq m   TSH, 3rd generation with Free T4 reflex    Collection Time: 05/02/24  6:15 AM   Result Value Ref Range    TSH 3RD GENERATON 2.475 0.450 - 4.500 uIU/mL   Vitamin B12    Collection Time: 05/02/24  6:15 AM   Result Value Ref Range    Vitamin B-12 254 180 - 914 pg/mL   Folate    Collection Time: 05/02/24  6:15 AM   Result Value Ref Range    Folate 7.4 >5.9 ng/mL   Lipid panel     Collection Time: 05/02/24  6:15 AM   Result Value Ref Range    Cholesterol 184 See Comment mg/dL    Triglycerides 88 See Comment mg/dL    HDL, Direct 42 (L) >=50 mg/dL    LDL Calculated 124 (H) 0 - 100 mg/dL    Non-HDL-Chol (CHOL-HDL) 142 mg/dl   CBC and differential    Collection Time: 05/02/24  6:16 AM   Result Value Ref Range    WBC 6.03 4.31 - 10.16 Thousand/uL    RBC 4.45 3.81 - 5.12 Million/uL    Hemoglobin 12.5 11.5 - 15.4 g/dL    Hematocrit 40.3 34.8 - 46.1 %    MCV 91 82 - 98 fL    MCH 28.1 26.8 - 34.3 pg    MCHC 31.0 (L) 31.4 - 37.4 g/dL    RDW 13.0 11.6 - 15.1 %    MPV 8.7 (L) 8.9 - 12.7 fL    Platelets 229 149 - 390 Thousands/uL    nRBC 0 /100 WBCs    Segmented % 51 43 - 75 %    Immature Grans % 1 0 - 2 %    Lymphocytes % 39 14 - 44 %    Monocytes % 8 4 - 12 %    Eosinophils Relative 0 0 - 6 %    Basophils Relative 1 0 - 1 %    Absolute Neutrophils 3.15 1.85 - 7.62 Thousands/µL    Absolute Immature Grans 0.04 0.00 - 0.20 Thousand/uL    Absolute Lymphocytes 2.32 0.60 - 4.47 Thousands/µL    Absolute Monocytes 0.47 0.17 - 1.22 Thousand/µL    Eosinophils Absolute 0.01 0.00 - 0.61 Thousand/µL    Basophils Absolute 0.04 0.00 - 0.10 Thousands/µL   Urinalysis    Collection Time: 05/02/24  6:36 AM   Result Value Ref Range    Color, UA Yellow     Clarity, UA Clear     Specific Gravity, UA 1.015 1.005 - 1.030    pH, UA 7.5 4.5, 5.0, 5.5, 6.0, 6.5, 7.0, 7.5, 8.0    Leukocytes, UA Trace (A) Negative    Nitrite, UA Negative Negative    Protein, UA Negative Negative mg/dl    Glucose, UA Negative Negative mg/dl    Ketones, UA Negative Negative mg/dl    Urobilinogen, UA <2.0 <2.0 mg/dl mg/dl    Bilirubin, UA Negative Negative    Occult Blood, UA Negative Negative    RBC, UA None Seen None Seen, 2-4 /hpf    WBC, UA 2-4 None Seen, 2-4, 5-60 /hpf    Epithelial Cells Occasional None Seen, Occasional /hpf    Bacteria, UA Occasional None Seen, Occasional /hpf        Current Medications:  Current medications as per above. All  "medications have been reviewed.   Risks, benefits, alternatives, and possible side effects of patient's psychiatric medications were discussed with patient.     Mental Status Evaluation:  Appearance: moderately kempt  Motor: +mild psychomotor agitation  Behavior: cooperative, pleasant  Speech: normal rate, rhythm, and volume  Mood: \"okay\"  Affect: mood-congruent, anxious appearing  Thought Process: organized and linear  Thought Content: denies auditory hallucinations, denies visual hallucinations, denies delusions  Risk Potential: denies suicidal ideation, plan, or intent. Denies homicidal ideation  Sensorium: Oriented to person, place, time, and situation  Cognition: cognitive ability appears intact but was not quantitatively tested  Consciousness: alert and awake  Attention: currently intact  Insight: fair  Judgement: fair      Progress Toward Goals & Illness Status: Patient is not at goal. They are not yet ready for discharge. The patient's condition currently requires active psychopharmacological medication management, interdisciplinary coordination with case management, and the utilization of adjunctive milieu and group therapy to augment psychopharmacological efficacy. The patient's risk of morbidity, and progression or decompensation of psychiatric disease, is higher without this current treatment.       This note has been constructed using a voice recognition system. There may be translation, syntax, or grammatical errors. If you have any questions, please contact the dictating provider.    "

## 2024-05-03 NOTE — NURSING NOTE
"Pt adamantly denies SI/HI/AVH and report some anxiety and depression which she states is improved from yesterday. While pt denies AVH she is observed frequently and loudly RIS. She remains hyper-verbal and rambling with loud speech. She is labile throughout the shift and needs frequent re-direction due to her being somewhat DO. She reports hx of an eating disorder which \"caused a hiatal hernia\" and she has issues swallowing pills but is otherwise compliant with medications. She continues to report poor appetite d/t hx of ED.     "

## 2024-05-03 NOTE — CASE MANAGEMENT
"INTAKE    Readmit score:  Red 25   Confirmed Address   [address] [place] [address]    County: [place]     Resides in the home with/can return?:    Pt lives with a friend and can return    Confirmed Phone Number: [phone]    Commitment Status/Admitted from: [address] [place] [address] ED   Presenting C/O:             ED Note   \"  Psychiatric Evaluation        Pt came in with [facility], per [facility] they received a call from crisis. Pt want to sign herself for treatment, some scratches noted to L-arm. From 10 days ago.  at bedside.\"      Outpatient:     Pt reported \"I am trying to apply for SSI and I need a Psychiatrist.\"     Pt reported she had been to [facility] in the past but reported she missed appts so they closed her. Pt asked if CM could refer her there again or to another provider in [place].     Pt reported she walks everywhere so would like it to be close to her address.      Suboxone Provider:    Pt reported she goes to Dr. Edwards for Suboxone and Konopin but reported they will not give her Wellbutrin.      ACT/ICM/CPS/WRT/SC: Unknown    PCP:    Pt has new patient appt with [facility] in [place] on Wednesday May 8, 2024 at 1:30pm on [facility].    Work/Income:      Pt is unemployed.   Pt reported she is trying to apply for SSI but reported she needs to have a psychiatrist.     [facility] ([facility] ) - [phone]     Pt asked CM to call [facility] . CM explained that [facility] will request pt records from [facility] department and CM is unable to provide her records directly to her.      Legal/  Probation/[place] Ofc:    Denies   Access to Firearms:    Denies   Referrals Needed:  OP   Transport at Discharge:    TBD    IMM:   [employer] Text [employer]:    Emergency Contact:     Susanna Yousif (Mother) [phone]     PASTORA Cantu (Friend) [phone]    ROIs obtained:        Outpatient   [facility] ([facility] ) - [phone]   Dr. Edwards (Suboxone Provider)    (Mercedes)    Insurance: "     [place] [employer]    Audit:        PAWSS:  BAT:  UDS: Negative

## 2024-05-03 NOTE — PROGRESS NOTES
Diagnosis of Bipolar 1 disorder reviewed.   Short term goals for decrease in suicidal thoughts discussed.   All present parties in agreement and treatment plan signed.    05/03/24 1415   Team Meeting   Meeting Type Tx Team Meeting   Team Members Present   Team Members Present Physician;Nurse;   Physician Team Member Dr. Velasquez   Nursing Team Member Nadeem   Care Management Team Member Theo   Patient/Family Present   Patient Present No (pt declined to meet with treatment team)   Patient's Family Present No

## 2024-05-03 NOTE — NURSING NOTE
Pt approached nursing station and requested PRN for anxiety H: 17  Pt states this was triggered by accidentally closing herself in her bathroom and not being able to get her door back open. Pt received Atarax 25 mg po and upon follow up pt appears mildly calmer. PRN somewhat effective.

## 2024-05-03 NOTE — NURSING NOTE
"Pt presented with a irritable edge stating \" I feel on edge and I just don't know why.\" Pr denied current SI/HI/AVH and thoughts of self harm. Medication compliant. Requested Ibuprofen 400mg for chronic pain 4/10 in her lower back. Pt attends select groups and interacts minimally with peers. Continues to report a poor appetite. Upon reassessment of PRN Ibuprofen pt reports full pain relief with 0/10 pain. Denied unmet needs at this time.  "

## 2024-05-03 NOTE — PLAN OF CARE
Problem: PAIN - ADULT  Goal: Verbalizes/displays adequate comfort level or baseline comfort level  Description: Interventions:  - Encourage patient to monitor pain and request assistance  - Assess pain using appropriate pain scale  - Administer analgesics based on type and severity of pain and evaluate response  - Implement non-pharmacological measures as appropriate and evaluate response  - Consider cultural and social influences on pain and pain management  - Notify physician/advanced practitioner if interventions unsuccessful or patient reports new pain  Outcome: Progressing     Problem: Alteration in Thoughts and Perception  Goal: Treatment Goal: Gain control of psychotic behaviors/thinking, reduce/eliminate presenting symptoms and demonstrate improved reality functioning upon discharge  Outcome: Progressing  Goal: Verbalize thoughts and feelings  Description: Interventions:  - Promote a nonjudgmental and trusting relationship with the patient through active listening and therapeutic communication  - Assess patient's level of functioning, behavior and potential for risk  - Engage patient in 1 on 1 interactions  - Encourage patient to express fears, feelings, frustrations, and discuss symptoms    - [place] patient to reality, help patient recognize reality-based thinking   - Administer medications as ordered and assess for potential side effects  - Provide the patient education related to the signs and symptoms of the illness and desired effects of prescribed medications  Outcome: Progressing  Goal: Refrain from acting on delusional thinking/internal stimuli  Description: Interventions:  - Monitor patient closely, per order   - Utilize least restrictive measures   - Set reasonable limits, give positive feedback for acceptable   - Administer medications as ordered and monitor of potential side effects  Outcome: Progressing  Goal: Agree to be compliant with medication regime, as prescribed and report medication  side effects  Description: Interventions:  - Offer appropriate PRN medication and supervise ingestion; conduct AIMS, as needed   Outcome: Progressing  Goal: Attend and participate in unit activities, including therapeutic, recreational, and educational groups  Description: Interventions:  -Encourage Visitation and family involvement in care  Outcome: Progressing  Goal: Recognize dysfunctional thoughts, communicate reality-based thoughts at the time of discharge  Description: Interventions:  - Provide medication and psycho-education to assist patient in compliance and developing insight into his/her illness   Outcome: Progressing  Goal: Complete daily ADLs, including personal hygiene independently, as able  Description: Interventions:  - Observe, teach, and assist patient with ADLS  - Monitor and promote a balance of rest/activity, with adequate nutrition and elimination   Outcome: Progressing     Problem: Risk for Self Injury/Neglect  Goal: Treatment Goal: Remain safe during length of stay, learn and adopt new coping skills, and be free of self-injurious ideation, impulses and acts at the time of discharge  Outcome: Progressing  Goal: Verbalize thoughts and feelings  Description: Interventions:  - Assess and re-assess patient's lethality and potential for self-injury  - Engage patient in 1:1 interactions, daily, for a minimum of 15 minutes  - Encourage patient to express feelings, fears, frustrations, hopes  - Establish rapport/trust with patient   Outcome: Progressing  Goal: Refrain from harming self  Description: Interventions:  - Monitor patient closely, per order  - Develop a trusting relationship  - Supervise medication ingestion, monitor effects and side effects   Outcome: Progressing  Goal: Attend and participate in unit activities, including therapeutic, recreational, and educational groups  Description: Interventions:  - Provide therapeutic and educational activities daily, encourage attendance and  participation, and document same in the medical record  - Obtain collateral information, encourage visitation and family involvement in care   Outcome: Progressing  Goal: Recognize maladaptive responses and adopt new coping mechanisms  Outcome: Progressing  Goal: Complete daily ADLs, including personal hygiene independently, as able  Description: Interventions:  - Observe, teach, and assist patient with ADLS  - Monitor and promote a balance of rest/activity, with adequate nutrition and elimination  Outcome: Progressing

## 2024-05-04 PROCEDURE — 99232 SBSQ HOSP IP/OBS MODERATE 35: CPT | Performed by: PSYCHIATRY & NEUROLOGY

## 2024-05-04 RX ORDER — NICOTINE 21 MG/24HR
1 PATCH, TRANSDERMAL 24 HOURS TRANSDERMAL DAILY
Status: DISCONTINUED | OUTPATIENT
Start: 2024-05-04 | End: 2024-05-07 | Stop reason: HOSPADM

## 2024-05-04 RX ADMIN — CLONAZEPAM 1 MG: 1 TABLET ORAL at 21:04

## 2024-05-04 RX ADMIN — NICOTINE POLACRILEX 4 MG: 4 GUM, CHEWING BUCCAL at 17:05

## 2024-05-04 RX ADMIN — GABAPENTIN 600 MG: 300 CAPSULE ORAL at 08:08

## 2024-05-04 RX ADMIN — BUPRENORPHINE AND NALOXONE 8 MG: 8; 2 FILM BUCCAL; SUBLINGUAL at 15:58

## 2024-05-04 RX ADMIN — SENNOSIDES 8.6 MG: 8.6 TABLET, FILM COATED ORAL at 17:07

## 2024-05-04 RX ADMIN — CLONAZEPAM 1 MG: 1 TABLET ORAL at 15:57

## 2024-05-04 RX ADMIN — TOPIRAMATE 200 MG: 100 TABLET, FILM COATED ORAL at 08:07

## 2024-05-04 RX ADMIN — IBUPROFEN 400 MG: 400 TABLET, FILM COATED ORAL at 21:03

## 2024-05-04 RX ADMIN — TRAZODONE HYDROCHLORIDE 100 MG: 100 TABLET ORAL at 21:04

## 2024-05-04 RX ADMIN — GABAPENTIN 600 MG: 300 CAPSULE ORAL at 15:57

## 2024-05-04 RX ADMIN — BUPRENORPHINE AND NALOXONE 8 MG: 8; 2 FILM BUCCAL; SUBLINGUAL at 21:04

## 2024-05-04 RX ADMIN — BUPROPION HYDROCHLORIDE 300 MG: 300 TABLET, FILM COATED, EXTENDED RELEASE ORAL at 08:07

## 2024-05-04 RX ADMIN — SENNOSIDES 8.6 MG: 8.6 TABLET, FILM COATED ORAL at 08:07

## 2024-05-04 RX ADMIN — NICOTINE POLACRILEX 4 MG: 4 GUM, CHEWING BUCCAL at 09:13

## 2024-05-04 RX ADMIN — BUPRENORPHINE AND NALOXONE 8 MG: 8; 2 FILM BUCCAL; SUBLINGUAL at 08:08

## 2024-05-04 RX ADMIN — CLONAZEPAM 1 MG: 1 TABLET ORAL at 08:08

## 2024-05-04 RX ADMIN — GABAPENTIN 600 MG: 300 CAPSULE ORAL at 21:04

## 2024-05-04 RX ADMIN — NICOTINE POLACRILEX 4 MG: 4 GUM, CHEWING BUCCAL at 12:18

## 2024-05-04 RX ADMIN — NICOTINE 1 PATCH: 21 PATCH, EXTENDED RELEASE TRANSDERMAL at 12:32

## 2024-05-04 RX ADMIN — NICOTINE POLACRILEX 4 MG: 4 GUM, CHEWING BUCCAL at 19:36

## 2024-05-04 NOTE — PROGRESS NOTES
"Progress Note - Behavioral Health   Carey Keith 49 y.o. female MRN: [id number]  Unit/Bed#: [facility] 209-02 Encounter: [id number]    Assessment/Plan   Principal Problem:    Bipolar 1 disorder (HCC)  Active Problems:    Medical clearance for psychiatric admission    Tobacco abuse    Dyspepsia    Opioid use disorder, severe, on maintenance therapy (HCC)    Hyperlipidemia    Continue medications as noted below.  Continue to promote patient participation in group therapy, milieu therapy, occupational therapy  Continue medical management by primary team.  Discharge disposition:  pending    Subjective:  The patient was evaluated this morning for continuity of care and no acute distress noted throughout the evaluation. Over the past 24 hours staff noted that Carey has been hyperverbal, loud, and rambling at times. Patient has been medication adherent.    Today on evaluation, Carey reports that she feels \"great \"in terms of her mood.  She refers that she has been having difficulty with her bowel movements and constipation prior to her admission, and states that is why she has been using laxatives.  At this time, she denies any acute issues.  She denies SI/HI.  Denies perceptual disturbances.  Denies delusions, paranoia.  Denies medication side effects.    Psychiatric Review of Systems:  Behavior over the last 24 hours:  unchanged  Sleep: improved  Appetite: normal  Medication side effects: No   ROS: reports no complaints, all other systems are negative    Current Medications:  Current Facility-Administered Medications   Medication Dose Route Frequency Provider Last Rate    acetaminophen  650 mg Oral Q6H PRN Angie Jones MD      acetaminophen  650 mg Oral Q4H PRN Angie Jones MD      acetaminophen  975 mg Oral Q6H PRN Angie Jones MD      aluminum-magnesium hydroxide-simethicone  30 mL Oral Q4H PRN Angie Jones MD      benztropine  1 mg Oral Q4H PRN Max 6/day Angie Jones MD      bisacodyl  " 10 mg Rectal Daily PRN Angie Jones MD      buprenorphine-naloxone  8 mg Sublingual TID Miguel Velasquez, DO      buPROPion  300 mg Oral Daily Miguel Velasquez, DO      clonazePAM  1 mg Oral TID Miguel Velasquez, DO      ergocalciferol  50,000 Units Oral Weekly Miguel Velasquez, DO      gabapentin  600 mg Oral TID Miguel Velasquez,       hydrOXYzine HCL  100 mg Oral Q6H PRN Max 4/day Angie Jones MD      Or    LORazepam  2 mg Intramuscular Q6H PRN Angie Jones MD      hydrOXYzine HCL  25 mg Oral Q6H PRN Max 4/day Angie Jones MD      ibuprofen  400 mg Oral Q6H PRN Jeremy Serrano PA-C      nicotine  1 patch Transdermal Daily Cl Azevedo MD      nicotine polacrilex  4 mg Oral Q2H PRN Angie Jones MD      OLANZapine  5 mg Oral Q4H PRN Max 3/day Angie Jones MD      Or    OLANZapine  2.5 mg Intramuscular Q4H PRN Max 3/day Angie Jones MD      OLANZapine  5 mg Oral Q3H PRN Max 3/day Angie Jones MD      Or    OLANZapine  5 mg Intramuscular Q3H PRN Max 3/day Angie Jones MD      OLANZapine  2.5 mg Oral Q4H PRN Max 6/day Angie Jones MD      ondansetron  4 mg Oral Q6H PRN Miguel Velasquez,       polyethylene glycol  17 g Oral Daily PRN Angie Jones MD      senna  1 tablet Oral BID Miguel Velasquez,       senna-docusate sodium  1 tablet Oral Daily PRN Angie Jones MD      topiramate  200 mg Oral Daily Miguel Velasquez,       traZODone  100 mg Oral HS PRN Angie Jones MD      traZODone  100 mg Oral HS Miguel Velasquez DO         Behavioral Health Medications: all current active meds have been reviewed and continue current psychiatric medications.    Vital signs in last 24 hours:  Temp:  [97.1 °F (36.2 °C)-98 °F (36.7 °C)] 98 °F (36.7 °C)  HR:  [62] 62  Resp:  [16-18] 18  BP: (107-114)/(72-78) 107/78    Laboratory results:  I have personally reviewed all pertinent laboratory/tests results.  Most Recent Labs:   Lab Results  "  Component Value Date    WBC 6.03 05/02/2024    RBC 4.45 05/02/2024    HGB 12.5 05/02/2024    HCT 40.3 05/02/2024     05/02/2024    RDW 13.0 05/02/2024    TOTANEUTABS 10.60 (H) 04/18/2023    NEUTROABS 3.15 05/02/2024    SODIUM 139 05/02/2024    K 4.6 05/02/2024     (H) 05/02/2024    CO2 25 05/02/2024    BUN 15 05/02/2024    CREATININE 0.89 05/02/2024    GLUC 88 05/02/2024    GLUF 88 05/02/2024    CALCIUM 8.4 05/02/2024    AST 18 05/02/2024    ALT 12 05/02/2024    ALKPHOS 59 05/02/2024    TP 6.2 (L) 05/02/2024    ALB 3.7 05/02/2024    TBILI 0.38 05/02/2024    CHOLESTEROL 184 05/02/2024    HDL 42 (L) 05/02/2024    TRIG 88 05/02/2024    LDLCALC 124 (H) 05/02/2024    NONHDLC 142 05/02/2024    OCC2ONEAFSVE 2.475 05/02/2024    FREET4 0.82 09/16/2022    PREGUR Negative 10/20/2022    PREGSERUM Negative 02/07/2024    RPR Non-Reactive 12/25/2021    HGBA1C 5.2 12/25/2021     12/25/2021         Mental Status Evaluation:    Appearance:  age appropriate, casually dressed   Behavior:  pleasant, cooperative, animated   Speech:  normal rate and volume   Mood:  \"great\"   Affect:  constricted   Thought Process:  goal directed   Associations: intact associations   Thought Content:  no overt delusions   Perceptual Disturbances: no auditory hallucinations, no visual hallucinations, does not appear responding to internal stimuli   Risk Potential: Suicidal ideation - None at present  Homicidal ideation - None at present  Potential for aggression - Not at present   Sensorium:  oriented to person, place, and time/date   Memory:  recent and remote memory grossly intact   Consciousness:  alert and awake   Attention/Concentration: attention span and concentration are age appropriate   Insight:  fair   Judgment: fair   Gait/Station: normal gait/station   Motor Activity: no abnormal movements     Progress Toward Goals:  Patient is not at goal. They are not yet ready for discharge. The patient's condition currently requires " active psychopharmacological medication management, interdisciplinary coordination with case management, and the utilization of adjunctive milieu and group therapy to augment psychopharmacological efficacy. The patient's risk of morbidity, and progression or decompensation of psychiatric disease, is higher without this current treatment.     Recommended Treatment:   See above for assessment and plan.     Risks, benefits and possible side effects of Medications:   Risks, benefits, and possible side effects of medications explained to patient and patient verbalizes understanding.      Treatment discussed with nursing staff.    This note has been constructed using a voice recognition system.  There may be translation, syntax, or grammatical errors. If you have any questions, please contact the dictating provider.    Cl Azevedo MD

## 2024-05-04 NOTE — NURSING NOTE
"This writer was informed by [facility] performing contraband checks that this pt had a nicotine vape in her room, found on her dresser. This writer prompted pt to speak with security present and explained that her vape was found and put with her belongings and explained to pt the hazards, as well as this being prohibited on the unit. Pt expressed understanding and apologized. She informed writer she concealed it in her hair on arrival to unit. Pt was asked if she had any other contraband concealed on unit, pt made eye contact and informed writer \"no.\" Pt returned to milieu following conversation with writer. Loud on phone, redirected twice by this writer and peer RN. Encouraged pt if she cannot be appropriate on the phone, to take a break from the phone. Pt denied any symptoms at this time, reports she is doing well overall at this time.   "

## 2024-05-04 NOTE — NURSING NOTE
"Patient speech is rapid, loud and tangential this morning. Pt is self-aware, informing writer, \"I know I talk a lot, I'm probably talking your ear off right now.\" Denies SI, HI or hallucinations. Reports that she is aware she is overusing laxatives and is trying to cut down on how many she takes at a time. Pt remains hyper-focused on her bowels and her body image throughout conversation.   "

## 2024-05-04 NOTE — TREATMENT TEAM
05/04/24 1000   Team Meeting   Meeting Type Daily Rounds   Team Members Present   Team Members Present Physician;Nurse   Physician Team Member Griselda/Jolly   Nursing Team Member Kody   Patient/Family Present   Patient Present No   Patient's Family Present No       AM rounds- hyperverbal, loud and rambling at times. Denies SI/HI. Slept well.

## 2024-05-04 NOTE — PLAN OF CARE
Problem: Alteration in Thoughts and Perception  Goal: Verbalize thoughts and feelings  Description: Interventions:  - Promote a nonjudgmental and trusting relationship with the patient through active listening and therapeutic communication  - Assess patient's level of functioning, behavior and potential for risk  - Engage patient in 1 on 1 interactions  - Encourage patient to express fears, feelings, frustrations, and discuss symptoms    - [place] patient to reality, help patient recognize reality-based thinking   - Administer medications as ordered and assess for potential side effects  - Provide the patient education related to the signs and symptoms of the illness and desired effects of prescribed medications  Outcome: Progressing  Goal: Attend and participate in unit activities, including therapeutic, recreational, and educational groups  Description: Interventions:  -Encourage Visitation and family involvement in care  Outcome: Progressing     Problem: Ineffective Coping  Goal: Participates in unit activities  Description: Interventions:  - Provide therapeutic environment   - Provide required programming   - Redirect inappropriate behaviors   Outcome: Progressing     Problem: Risk for Self Injury/Neglect  Goal: Verbalize thoughts and feelings  Description: Interventions:  - Assess and re-assess patient's lethality and potential for self-injury  - Engage patient in 1:1 interactions, daily, for a minimum of 15 minutes  - Encourage patient to express feelings, fears, frustrations, hopes  - Establish rapport/trust with patient   Outcome: Progressing  Goal: Attend and participate in unit activities, including therapeutic, recreational, and educational groups  Description: Interventions:  - Provide therapeutic and educational activities daily, encourage attendance and participation, and document same in the medical record  - Obtain collateral information, encourage visitation and family involvement in care    Outcome: Progressing

## 2024-05-05 PROCEDURE — 99232 SBSQ HOSP IP/OBS MODERATE 35: CPT | Performed by: PSYCHIATRY & NEUROLOGY

## 2024-05-05 RX ORDER — LORAZEPAM 0.5 MG/1
0.5 TABLET ORAL ONCE
Status: COMPLETED | OUTPATIENT
Start: 2024-05-05 | End: 2024-05-05

## 2024-05-05 RX ADMIN — GABAPENTIN 600 MG: 300 CAPSULE ORAL at 20:44

## 2024-05-05 RX ADMIN — NICOTINE POLACRILEX 4 MG: 4 GUM, CHEWING BUCCAL at 09:59

## 2024-05-05 RX ADMIN — SENNOSIDES 8.6 MG: 8.6 TABLET, FILM COATED ORAL at 17:07

## 2024-05-05 RX ADMIN — CLONAZEPAM 1 MG: 1 TABLET ORAL at 08:12

## 2024-05-05 RX ADMIN — GABAPENTIN 600 MG: 300 CAPSULE ORAL at 16:08

## 2024-05-05 RX ADMIN — NICOTINE 1 PATCH: 21 PATCH, EXTENDED RELEASE TRANSDERMAL at 08:13

## 2024-05-05 RX ADMIN — BUPRENORPHINE AND NALOXONE 8 MG: 8; 2 FILM BUCCAL; SUBLINGUAL at 16:08

## 2024-05-05 RX ADMIN — BUPRENORPHINE AND NALOXONE 8 MG: 8; 2 FILM BUCCAL; SUBLINGUAL at 08:13

## 2024-05-05 RX ADMIN — ACETAMINOPHEN 650 MG: 325 TABLET, FILM COATED ORAL at 18:11

## 2024-05-05 RX ADMIN — GABAPENTIN 600 MG: 300 CAPSULE ORAL at 08:12

## 2024-05-05 RX ADMIN — LORAZEPAM 0.5 MG: 0.5 TABLET ORAL at 11:41

## 2024-05-05 RX ADMIN — BUPROPION HYDROCHLORIDE 300 MG: 300 TABLET, FILM COATED, EXTENDED RELEASE ORAL at 08:12

## 2024-05-05 RX ADMIN — TOPIRAMATE 200 MG: 100 TABLET, FILM COATED ORAL at 08:12

## 2024-05-05 RX ADMIN — TRAZODONE HYDROCHLORIDE 100 MG: 100 TABLET ORAL at 23:10

## 2024-05-05 RX ADMIN — BUPRENORPHINE AND NALOXONE 8 MG: 8; 2 FILM BUCCAL; SUBLINGUAL at 20:44

## 2024-05-05 RX ADMIN — SENNOSIDES 8.6 MG: 8.6 TABLET, FILM COATED ORAL at 08:12

## 2024-05-05 RX ADMIN — CLONAZEPAM 1 MG: 1 TABLET ORAL at 16:08

## 2024-05-05 RX ADMIN — CLONAZEPAM 1 MG: 1 TABLET ORAL at 20:44

## 2024-05-05 NOTE — NURSING NOTE
Pt needing multiple reminders and redirection for being intrusive in roommate's care. Pt initially apologizes and complies, but then within 30 seconds, pt is engaged in peer's conversation. Needing additional redirect.

## 2024-05-05 NOTE — TREATMENT TEAM
05/05/24 1300   Team Meeting   Meeting Type Daily Rounds   Team Members Present   Team Members Present Physician;Nurse   Physician Team Member Griselda/Jolly   Nursing Team Member Kody   Patient/Family Present   Patient Present No   Patient's Family Present No       AM rounds- denies SI/HI, hyperverbal and rambling. Loud on phone and needing redirection. Slept well.

## 2024-05-05 NOTE — NURSING NOTE
"Pt is visible, loud, social with roommate, intrusive in the roommate's care. Pt trying to insert self into roommate's discharge. Pt is showered, makeup applied. Focused on anxiety. Spoke with Psychiatrist and got a one-time dose of Ativan 0.5mg po. Pt then yelling down the sarmiento, \"where is the doctor?\" Writer inquired about pt's needs, pt states, \"the doctor only gave me 0.5mg of Ativan.\" Writer explained that pt is on scheduled Klonopin and after conversation with the doctor, 0.5mg is what the doctor placed. Pt acknowledged and then as walking away asked if it was going to help pt's symptoms. Writer encourage pt to sit and read or rest in room after lunch and let medication take effect. Pt denies SI, HI,AVH. Speech is rapid, pressured, rambling.   "

## 2024-05-05 NOTE — PLAN OF CARE
Problem: PAIN - ADULT  Goal: Verbalizes/displays adequate comfort level or baseline comfort level  Description: Interventions:  - Encourage patient to monitor pain and request assistance  - Assess pain using appropriate pain scale  - Administer analgesics based on type and severity of pain and evaluate response  - Implement non-pharmacological measures as appropriate and evaluate response  - Consider cultural and social influences on pain and pain management  - Notify physician/advanced practitioner if interventions unsuccessful or patient reports new pain  Outcome: Progressing     Problem: DISCHARGE PLANNING  Goal: Discharge to home or other facility with appropriate resources  Description: INTERVENTIONS:  - Identify barriers to discharge w/patient and caregiver  - Arrange for needed discharge resources and transportation as appropriate  - Identify discharge learning needs (meds, wound care, etc.)  - Arrange for interpretive services to assist at discharge as needed  - Refer to Case Management Department for coordinating discharge planning if the patient needs post-hospital services based on physician/advanced practitioner order or complex needs related to functional status, cognitive ability, or social support system  Outcome: Progressing     Problem: Alteration in Thoughts and Perception  Goal: Treatment Goal: Gain control of psychotic behaviors/thinking, reduce/eliminate presenting symptoms and demonstrate improved reality functioning upon discharge  Outcome: Progressing     Problem: Alteration in Thoughts and Perception  Goal: Verbalize thoughts and feelings  Description: Interventions:  - Promote a nonjudgmental and trusting relationship with the patient through active listening and therapeutic communication  - Assess patient's level of functioning, behavior and potential for risk  - Engage patient in 1 on 1 interactions  - Encourage patient to express fears, feelings, frustrations, and discuss symptoms    -  [place] patient to reality, help patient recognize reality-based thinking   - Administer medications as ordered and assess for potential side effects  - Provide the patient education related to the signs and symptoms of the illness and desired effects of prescribed medications  Outcome: Progressing     Problem: Alteration in Thoughts and Perception  Goal: Refrain from acting on delusional thinking/internal stimuli  Description: Interventions:  - Monitor patient closely, per order   - Utilize least restrictive measures   - Set reasonable limits, give positive feedback for acceptable   - Administer medications as ordered and monitor of potential side effects  Outcome: Progressing     Problem: Alteration in Thoughts and Perception  Goal: Agree to be compliant with medication regime, as prescribed and report medication side effects  Description: Interventions:  - Offer appropriate PRN medication and supervise ingestion; conduct AIMS, as needed   Outcome: Progressing     Problem: Alteration in Thoughts and Perception  Goal: Agree to be compliant with medication regime, as prescribed and report medication side effects  Description: Interventions:  - Offer appropriate PRN medication and supervise ingestion; conduct AIMS, as needed   Outcome: Progressing     Problem: Alteration in Thoughts and Perception  Goal: Attend and participate in unit activities, including therapeutic, recreational, and educational groups  Description: Interventions:  -Encourage Visitation and family involvement in care  Outcome: Progressing     Problem: Alteration in Thoughts and Perception  Goal: Recognize dysfunctional thoughts, communicate reality-based thoughts at the time of discharge  Description: Interventions:  - Provide medication and psycho-education to assist patient in compliance and developing insight into his/her illness   Outcome: Progressing     Problem: Alteration in Thoughts and Perception  Goal: Complete daily ADLs, including  personal hygiene independently, as able  Description: Interventions:  - Observe, teach, and assist patient with ADLS  - Monitor and promote a balance of rest/activity, with adequate nutrition and elimination   Outcome: Progressing     Problem: Ineffective Coping  Goal: Participates in unit activities  Description: Interventions:  - Provide therapeutic environment   - Provide required programming   - Redirect inappropriate behaviors   Outcome: Progressing     Problem: Risk for Self Injury/Neglect  Goal: Verbalize thoughts and feelings  Description: Interventions:  - Assess and re-assess patient's lethality and potential for self-injury  - Engage patient in 1:1 interactions, daily, for a minimum of 15 minutes  - Encourage patient to express feelings, fears, frustrations, hopes  - Establish rapport/trust with patient   Outcome: Progressing     Problem: Risk for Self Injury/Neglect  Goal: Attend and participate in unit activities, including therapeutic, recreational, and educational groups  Description: Interventions:  - Provide therapeutic and educational activities daily, encourage attendance and participation, and document same in the medical record  - Obtain collateral information, encourage visitation and family involvement in care   Outcome: Progressing     Problem: Risk for Self Injury/Neglect  Goal: Recognize maladaptive responses and adopt new coping mechanisms  Outcome: Progressing

## 2024-05-05 NOTE — PLAN OF CARE
Reviewed with patient.        Problem: SAFETY ADULT  Goal: Patient will remain free of falls  Description: INTERVENTIONS:  - Educate patient/family on patient safety including physical limitations  - Keep care items and personal belongings within reach  - Initiate and maintain comfort rounds  - Make Fall Risk Sign visible to staff  - Apply yellow socks and bracelet for high fall risk patients  - Consider moving patient to room near nurses station  Outcome: Progressing
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
## 2024-05-05 NOTE — NURSING NOTE
Pt approached nurse's station while writer was going through a peers belongings and told writer to put the new admission in another female peer's room. Writer redirected pt and made pt aware that staff will place new pt's in open beds. Pt's room had an open bed. Pt rolled eyes and walked away.
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
## 2024-05-05 NOTE — NURSING NOTE
@ 2103 Pt requested PRN Ibuprofen for lower back pain 6/10. Pt received Ibuprofen 400mg. One hour later pt reported the PRN was effective and her pain decreased to a 2/10.

## 2024-05-06 PROCEDURE — 99232 SBSQ HOSP IP/OBS MODERATE 35: CPT | Performed by: PSYCHIATRY & NEUROLOGY

## 2024-05-06 RX ADMIN — GABAPENTIN 600 MG: 300 CAPSULE ORAL at 15:48

## 2024-05-06 RX ADMIN — BUPRENORPHINE AND NALOXONE 8 MG: 8; 2 FILM BUCCAL; SUBLINGUAL at 15:49

## 2024-05-06 RX ADMIN — CLONAZEPAM 1 MG: 1 TABLET ORAL at 21:22

## 2024-05-06 RX ADMIN — SENNOSIDES 8.6 MG: 8.6 TABLET, FILM COATED ORAL at 08:08

## 2024-05-06 RX ADMIN — GABAPENTIN 600 MG: 300 CAPSULE ORAL at 08:09

## 2024-05-06 RX ADMIN — BUPRENORPHINE AND NALOXONE 8 MG: 8; 2 FILM BUCCAL; SUBLINGUAL at 08:09

## 2024-05-06 RX ADMIN — CLONAZEPAM 1 MG: 1 TABLET ORAL at 08:08

## 2024-05-06 RX ADMIN — NICOTINE 1 PATCH: 21 PATCH, EXTENDED RELEASE TRANSDERMAL at 08:09

## 2024-05-06 RX ADMIN — NICOTINE POLACRILEX 4 MG: 4 GUM, CHEWING BUCCAL at 21:55

## 2024-05-06 RX ADMIN — BUPROPION HYDROCHLORIDE 300 MG: 300 TABLET, FILM COATED, EXTENDED RELEASE ORAL at 08:08

## 2024-05-06 RX ADMIN — CLONAZEPAM 1 MG: 1 TABLET ORAL at 15:48

## 2024-05-06 RX ADMIN — HYDROXYZINE HYDROCHLORIDE 100 MG: 50 TABLET, FILM COATED ORAL at 12:44

## 2024-05-06 RX ADMIN — GABAPENTIN 600 MG: 300 CAPSULE ORAL at 21:23

## 2024-05-06 RX ADMIN — BUPRENORPHINE AND NALOXONE 8 MG: 8; 2 FILM BUCCAL; SUBLINGUAL at 21:24

## 2024-05-06 RX ADMIN — NICOTINE POLACRILEX 4 MG: 4 GUM, CHEWING BUCCAL at 09:37

## 2024-05-06 RX ADMIN — TOPIRAMATE 200 MG: 100 TABLET, FILM COATED ORAL at 08:09

## 2024-05-06 RX ADMIN — TRAZODONE HYDROCHLORIDE 100 MG: 100 TABLET ORAL at 22:49

## 2024-05-06 RX ADMIN — SENNOSIDES 8.6 MG: 8.6 TABLET, FILM COATED ORAL at 17:25

## 2024-05-06 NOTE — NURSING NOTE
Pt OOB watching TV with peers. Pt loud, talkative, rambles in conversation. Denies current SI, HI and hallucinations. Reports hard stools and trouble having a bowel movement. Last documented bowel movement was yesterday. Compliant with scheduled Senokot, instructed pt to let staff know if experiencing constipation again tomorrow.

## 2024-05-06 NOTE — PROGRESS NOTES
.Progress Note - Behavioral Health   Carey Keith 49 y.o. female MRN: [id number]  Unit/Bed#: [facility] 209-02 Encounter: [id number]    Assessment:  Principal Problem:    Bipolar 1 disorder (HCC)  Active Problems:    Medical clearance for psychiatric admission    Tobacco abuse    Dyspepsia    Opioid use disorder, severe, on maintenance therapy (HCC)    Hyperlipidemia      Plan:  --Continue with psychiatric hospitalization  --Continue with individual, group, and milieu therapy  --Continue the following medications:  Current Facility-Administered Medications   Medication Dose Route Frequency    acetaminophen (TYLENOL) tablet 650 mg  650 mg Oral Q6H PRN    acetaminophen (TYLENOL) tablet 650 mg  650 mg Oral Q4H PRN    acetaminophen (TYLENOL) tablet 975 mg  975 mg Oral Q6H PRN    aluminum-magnesium hydroxide-simethicone (MAALOX) oral suspension 30 mL  30 mL Oral Q4H PRN    benztropine (COGENTIN) tablet 1 mg  1 mg Oral Q4H PRN Max 6/day    bisacodyl (DULCOLAX) rectal suppository 10 mg  10 mg Rectal Daily PRN    buprenorphine-naloxone (Suboxone) film 8 mg  8 mg Sublingual TID    buPROPion (WELLBUTRIN XL) 24 hr tablet 300 mg  300 mg Oral Daily    clonazePAM (KlonoPIN) tablet 1 mg  1 mg Oral TID    ergocalciferol (VITAMIN D2) capsule 50,000 Units  50,000 Units Oral Weekly    gabapentin (NEURONTIN) capsule 600 mg  600 mg Oral TID    hydrOXYzine HCL (ATARAX) tablet 100 mg  100 mg Oral Q6H PRN Max 4/day    Or    LORazepam (ATIVAN) injection 2 mg  2 mg Intramuscular Q6H PRN    hydrOXYzine HCL (ATARAX) tablet 25 mg  25 mg Oral Q6H PRN Max 4/day    ibuprofen (MOTRIN) tablet 400 mg  400 mg Oral Q6H PRN    nicotine (NICODERM CQ) 21 mg/24 hr TD 24 hr patch 1 patch  1 patch Transdermal Daily    nicotine polacrilex (NICORETTE) gum 4 mg  4 mg Oral Q2H PRN    OLANZapine (ZyPREXA) tablet 5 mg  5 mg Oral Q4H PRN Max 3/day    Or    OLANZapine (ZyPREXA) IM injection 2.5 mg  2.5 mg Intramuscular Q4H PRN Max 3/day    OLANZapine (ZyPREXA) tablet  "5 mg  5 mg Oral Q3H PRN Max 3/day    Or    OLANZapine (ZyPREXA) IM injection 5 mg  5 mg Intramuscular Q3H PRN Max 3/day    OLANZapine (ZyPREXA) tablet 2.5 mg  2.5 mg Oral Q4H PRN Max 6/day    ondansetron (ZOFRAN-ODT) dispersible tablet 4 mg  4 mg Oral Q6H PRN    polyethylene glycol (MIRALAX) packet 17 g  17 g Oral Daily PRN    senna (SENOKOT) tablet 8.6 mg  1 tablet Oral BID    senna-docusate sodium (SENOKOT S) 8.6-50 mg per tablet 1 tablet  1 tablet Oral Daily PRN    topiramate (TOPAMAX) tablet 200 mg  200 mg Oral Daily    traZODone (DESYREL) tablet 100 mg  100 mg Oral HS PRN    traZODone (DESYREL) tablet 100 mg  100 mg Oral HS       Subjective: Patient was seen for continuation of care. Chart was reviewed and discussed with treatment team.     No acute behavioral events over the past 24 hours.  Today, the patient is without complaint.  She denies all symptoms.  She states that restarting her medications have \"done wonders for me.\"  Patient articulates a readiness for discharge sometime this week.  She is future oriented and able to articulate a safety plan for her safe return to the community.  She is visible in the milieu interacting appropriately with peers.    Patient denied adverse effects to their psychiatric medication regimen. Patient denied other new or worsening psychiatric symptoms/complaints at this time. Discussed the importance of continuing to take medications as prescribed, as well as the importance of continuing to attend groups on the unit.     Psychiatric Review of Systems:  Medication adverse effects: none  Sleep: unchanged  Appetite: unchanged  Behaviors over the past 24 hours: unchanged    Vitals:  Vitals:    05/05/24 1551   BP: 92/67   Pulse: 66   Resp: 16   Temp: 97.9 °F (36.6 °C)   SpO2: 96%       Laboratory results:    I have personally reviewed all pertinent laboratory/tests results  No results found for this or any previous visit (from the past 48 hour(s)).       Current " "Medications:  Current medications as per above. All medications have been reviewed.   Risks, benefits, alternatives, and possible side effects of patient's psychiatric medications were discussed with patient.     Mental Status Evaluation:  Appearance: moderately kempt  Motor: +mild psychomotor agitation  Behavior: cooperative, pleasant  Speech: normal rate, rhythm, and volume  Mood: \"good\"  Affect: mood-congruent, anxious appearing  Thought Process: organized and linear  Thought Content: denies auditory hallucinations, denies visual hallucinations, denies delusions  Risk Potential: denies suicidal ideation, plan, or intent. Denies homicidal ideation  Sensorium: Oriented to person, place, time, and situation  Cognition: cognitive ability appears intact but was not quantitatively tested  Consciousness: alert and awake  Attention: currently intact  Insight: fair  Judgement: fair      Progress Toward Goals & Illness Status: Patient is not at goal. They are not yet ready for discharge. The patient's condition currently requires active psychopharmacological medication management, interdisciplinary coordination with case management, and the utilization of adjunctive milieu and group therapy to augment psychopharmacological efficacy. The patient's risk of morbidity, and progression or decompensation of psychiatric disease, is higher without this current treatment.       This note has been constructed using a voice recognition system. There may be translation, syntax, or grammatical errors. If you have any questions, please contact the dictating provider.    "
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
## 2024-05-06 NOTE — DISCHARGE INSTR - APPOINTMENTS
[facility] Medical Records   Phone: [phone] (Monday through Friday: 8 am - 4:30 pm)  Fax: [phone]  Email: [email or web address]        You will be discharged to [address] [place] [address]  You confirmed that your cell phone number is [phone]          Sakina, or Rossy, our Behavioral Health Nurse Navigators, will be calling you after your discharge, on the phone number that you provided.  They will be available as an additional support, if needed.   If you wish to speak with one of them, you may contact Sakina at [phone] or Rossy at [phone].

## 2024-05-06 NOTE — CASE MANAGEMENT
CM met with pt to check in about dc plans for tomorrow. Pt was in bed resting. CM informed pt that [facility] can take her back for TELEHEALTH OP and pt jumped up out of bed and was excited. CM had pt sign all of the MINNA for CM to send back to [facility] for her to be scheduled for an intake.     CM informed pt that CM attempted to call Dr. Dawn's office multiple times to see if an appt for Suboxone and Klonopin can be scheduled and they did not answer. Pt reported that they often do not answer but she will be able to contact them on her own after dc.     CM informed her that CM spoke to Maura from her 's office and they will request medical records if they need to.     Pt asked if CM can call and send pt's dc ppw to her '' Lila. Pt signed MINNA last week so CM can send that. Pt Thankful.     Pt reported she will need a ride home tomorrow. CM to coordinate.

## 2024-05-06 NOTE — NURSING NOTE
Pt denies SI or AVH is worried about missing tooth in front of mouth, afraid she wont get a man if she do not have good teeth and can not smile, given reassurance and support, will come to staff if feeling unsafe

## 2024-05-06 NOTE — NURSING NOTE
Pt reported severe anxiety. Pt restless and shaking. Pt received Atarax 100mg PO at 1244. Pt continues to be labile and tearful, became agitated with roommate, Pt was moved to different room.

## 2024-05-06 NOTE — PLAN OF CARE
Pt attends groups and other unit activities    Problem: Ineffective Coping  Goal: Participates in unit activities  Description: Interventions:  - Provide therapeutic environment   - Provide required programming   - Redirect inappropriate behaviors   Outcome: Progressing

## 2024-05-06 NOTE — DISCHARGE INSTR - OTHER ORDERS
Psychiatric CRISIS INFORMATION   [facility] is a confidential 7 days/week telephone support service manned by trained mental health consumers.  [facility] operates daily but is not able to accept calls between 2AM-6AM.   [facility] provides support, a listening ear and can provide information about available services. [facility] specializes in the concerns of mental health consumers, their families and friends.  However, we are also here for anyone who has a mental health concern, is confused about or just doesn't know anything about mental health or where to get information. To reach [facility], call 24 hours a day [phone]   Text CONNECT to 771225 from anywhere in the [place], anytime, about any type of crisis.  A live, trained Crisis Counselor receives the text and lets you know that they are here to listen.  The volunteer Crisis Counselor will help you move from a hot moment to a cool moment.  [facility] Crisis Intervention - licensed telephone and mobile crisis services that provide mental health assessments to all age groups regardless of income or insurance.  Crisis Intervention operates 24-hour/7 days a week. [facility] Crisis [facility] assists consumer who are experiencing a mental health emergency and lack the resources to assist themselves.  Immediate intervention for suicidal and depressed individuals with home visits/outreach being top priority. Crisis can be contacted at [phone].   The [employer] [place] [employer] Mental Illness (AJ) offers various education & support groups for you & your family.  For more information visit their website at   http://www.aj-lv.org/.  Dial 2-1-1 to get connected/get help.  Free, confidential information & referral available 24/7: Aging Services, Child & Youth Services, Counseling, Education/Training, Food/Shelter/Clothing, Health Services, Parenting, Substance Abuse, Support Groups, Volunteer Opportunities, & much more.  Phone: 2-1-1 or 266-362-3779, Web:  www.tj003pwor.org, Email: [email or web address]

## 2024-05-06 NOTE — CASE MANAGEMENT
"JAIDEN called [facility] ([phone]) to see if pt can return there for MH OP as she was there in the past but closed due to missing appts. CM left VM asking for a call back.    1PM  CM spoke to Sandra at [facility] and she reported pt can be scheduled again with them for MH OP. Sandra requested that CM have pt sign consent forms and then send back and then she can give an Intake date and time.     JAIDEN called pt's  office ([phone]) CM spoke to Maura to provide an update on pt and She updated pt's file with them. They will request medical records id needed.     1:55pm   CM called Pt's PCP Dr. Grace Dawn ([phone]), Pt's PCP/Suboxone and Klonopin provider several times and did not get an answer, call kept ringing and ringing. CM will encourage pt to contact them to schedule next appt.       4:20pm   JAIDEN sent signed consents to Sandra at [facility] and she was able to provide CM with intake appt:    \"Intake Appointment is on 05/15/2024 @ 12:00 with Judith via [facility].  She will receive an email with a link to click on 15 minutes before her appointment.\"         "

## 2024-05-07 VITALS
HEIGHT: 61 IN | SYSTOLIC BLOOD PRESSURE: 116 MMHG | TEMPERATURE: 98.1 F | OXYGEN SATURATION: 96 % | BODY MASS INDEX: 32.25 KG/M2 | RESPIRATION RATE: 16 BRPM | WEIGHT: 170.8 LBS | HEART RATE: 72 BPM | DIASTOLIC BLOOD PRESSURE: 72 MMHG

## 2024-05-07 PROCEDURE — 99239 HOSP IP/OBS DSCHRG MGMT >30: CPT | Performed by: PSYCHIATRY & NEUROLOGY

## 2024-05-07 RX ORDER — BUPRENORPHINE AND NALOXONE 8; 2 MG/1; MG/1
8 FILM, SOLUBLE BUCCAL; SUBLINGUAL 3 TIMES DAILY
Qty: 90 FILM | Refills: 0 | Status: SHIPPED | OUTPATIENT
Start: 2024-05-07 | End: 2024-06-06

## 2024-05-07 RX ORDER — ERGOCALCIFEROL 1.25 MG/1
50000 CAPSULE ORAL WEEKLY
Qty: 5 CAPSULE | Refills: 0 | Status: SHIPPED | OUTPATIENT
Start: 2024-05-09 | End: 2024-06-07

## 2024-05-07 RX ORDER — CLONAZEPAM 1 MG/1
1 TABLET ORAL 3 TIMES DAILY
Qty: 90 TABLET | Refills: 0 | Status: SHIPPED | OUTPATIENT
Start: 2024-05-07 | End: 2024-06-06

## 2024-05-07 RX ORDER — BUPROPION HYDROCHLORIDE 300 MG/1
300 TABLET ORAL DAILY
Qty: 30 TABLET | Refills: 0 | Status: SHIPPED | OUTPATIENT
Start: 2024-05-07 | End: 2024-06-06

## 2024-05-07 RX ORDER — TRAZODONE HYDROCHLORIDE 100 MG/1
100 TABLET ORAL
Qty: 30 TABLET | Refills: 0 | Status: SHIPPED | OUTPATIENT
Start: 2024-05-07 | End: 2024-06-06

## 2024-05-07 RX ORDER — GABAPENTIN 300 MG/1
600 CAPSULE ORAL 3 TIMES DAILY
Qty: 180 CAPSULE | Refills: 0 | Status: SHIPPED | OUTPATIENT
Start: 2024-05-07 | End: 2024-06-06

## 2024-05-07 RX ADMIN — NICOTINE 1 PATCH: 21 PATCH, EXTENDED RELEASE TRANSDERMAL at 08:38

## 2024-05-07 RX ADMIN — BUPRENORPHINE AND NALOXONE 8 MG: 8; 2 FILM BUCCAL; SUBLINGUAL at 08:50

## 2024-05-07 RX ADMIN — SENNOSIDES 8.6 MG: 8.6 TABLET, FILM COATED ORAL at 08:34

## 2024-05-07 RX ADMIN — GABAPENTIN 600 MG: 300 CAPSULE ORAL at 08:34

## 2024-05-07 RX ADMIN — BUPROPION HYDROCHLORIDE 300 MG: 300 TABLET, FILM COATED, EXTENDED RELEASE ORAL at 08:34

## 2024-05-07 RX ADMIN — IBUPROFEN 400 MG: 400 TABLET, FILM COATED ORAL at 09:10

## 2024-05-07 RX ADMIN — TOPIRAMATE 200 MG: 100 TABLET, FILM COATED ORAL at 08:34

## 2024-05-07 RX ADMIN — CLONAZEPAM 1 MG: 1 TABLET ORAL at 08:34

## 2024-05-07 NOTE — NURSING NOTE
"Pt expressed readiness for discharge, reports feeling \"a little hesitant, but in a good way.\" Denies SI. AVS reviewed and the patient denied any questions or concerns. Reports feeling safe and able to use coping skills upon departure from this unit.  Patient walked out of facility safely by staff with belongings (including medications brought in on admission).    "

## 2024-05-07 NOTE — PLAN OF CARE
Problem: Alteration in Thoughts and Perception  Goal: Recognize dysfunctional thoughts, communicate reality-based thoughts at the time of discharge  Description: Interventions:  - Provide medication and psycho-education to assist patient in compliance and developing insight into his/her illness   Outcome: Not Progressing

## 2024-05-07 NOTE — PLAN OF CARE
Problem: PAIN - ADULT  Goal: Verbalizes/displays adequate comfort level or baseline comfort level  Description: Interventions:  - Encourage patient to monitor pain and request assistance  - Assess pain using appropriate pain scale  - Administer analgesics based on type and severity of pain and evaluate response  - Implement non-pharmacological measures as appropriate and evaluate response  - Consider cultural and social influences on pain and pain management  - Notify physician/advanced practitioner if interventions unsuccessful or patient reports new pain  Outcome: Adequate for Discharge     Problem: DISCHARGE PLANNING  Goal: Discharge to home or other facility with appropriate resources  Description: INTERVENTIONS:  - Identify barriers to discharge w/patient and caregiver  - Arrange for needed discharge resources and transportation as appropriate  - Identify discharge learning needs (meds, wound care, etc.)  - Arrange for interpretive services to assist at discharge as needed  - Refer to Case Management Department for coordinating discharge planning if the patient needs post-hospital services based on physician/advanced practitioner order or complex needs related to functional status, cognitive ability, or social support system  Outcome: Adequate for Discharge     Problem: Alteration in Thoughts and Perception  Goal: Treatment Goal: Gain control of psychotic behaviors/thinking, reduce/eliminate presenting symptoms and demonstrate improved reality functioning upon discharge  Outcome: Adequate for Discharge  Goal: Verbalize thoughts and feelings  Description: Interventions:  - Promote a nonjudgmental and trusting relationship with the patient through active listening and therapeutic communication  - Assess patient's level of functioning, behavior and potential for risk  - Engage patient in 1 on 1 interactions  - Encourage patient to express fears, feelings, frustrations, and discuss symptoms    - Wisner patient to  reality, help patient recognize reality-based thinking   - Administer medications as ordered and assess for potential side effects  - Provide the patient education related to the signs and symptoms of the illness and desired effects of prescribed medications  Outcome: Adequate for Discharge  Goal: Refrain from acting on delusional thinking/internal stimuli  Description: Interventions:  - Monitor patient closely, per order   - Utilize least restrictive measures   - Set reasonable limits, give positive feedback for acceptable   - Administer medications as ordered and monitor of potential side effects  Outcome: Adequate for Discharge  Goal: Agree to be compliant with medication regime, as prescribed and report medication side effects  Description: Interventions:  - Offer appropriate PRN medication and supervise ingestion; conduct AIMS, as needed   Outcome: Adequate for Discharge  Goal: Attend and participate in unit activities, including therapeutic, recreational, and educational groups  Description: Interventions:  -Encourage Visitation and family involvement in care  Outcome: Adequate for Discharge  Goal: Recognize dysfunctional thoughts, communicate reality-based thoughts at the time of discharge  Description: Interventions:  - Provide medication and psycho-education to assist patient in compliance and developing insight into his/her illness   Outcome: Adequate for Discharge  Goal: Complete daily ADLs, including personal hygiene independently, as able  Description: Interventions:  - Observe, teach, and assist patient with ADLS  - Monitor and promote a balance of rest/activity, with adequate nutrition and elimination   Outcome: Adequate for Discharge     Problem: Ineffective Coping  Goal: Identifies ineffective coping skills  Outcome: Adequate for Discharge  Goal: Identifies healthy coping skills  Outcome: Adequate for Discharge  Goal: Demonstrates healthy coping skills  Outcome: Adequate for Discharge  Goal:  Participates in unit activities  Description: Interventions:  - Provide therapeutic environment   - Provide required programming   - Redirect inappropriate behaviors   Outcome: Adequate for Discharge  Goal: Patient/Family participate in treatment and DC plans  Description: Interventions:  - Provide therapeutic environment  Outcome: Adequate for Discharge  Goal: Patient/Family verbalizes awareness of resources  Outcome: Adequate for Discharge  Goal: Understands least restrictive measures  Description: Interventions:  - Utilize least restrictive behavior  Outcome: Adequate for Discharge  Goal: Free from restraint events  Description: - Utilize least restrictive measures   - Provide behavioral interventions   - Redirect inappropriate behaviors   Outcome: Adequate for Discharge     Problem: Risk for Self Injury/Neglect  Goal: Treatment Goal: Remain safe during length of stay, learn and adopt new coping skills, and be free of self-injurious ideation, impulses and acts at the time of discharge  Outcome: Adequate for Discharge  Goal: Verbalize thoughts and feelings  Description: Interventions:  - Assess and re-assess patient's lethality and potential for self-injury  - Engage patient in 1:1 interactions, daily, for a minimum of 15 minutes  - Encourage patient to express feelings, fears, frustrations, hopes  - Establish rapport/trust with patient   Outcome: Adequate for Discharge  Goal: Refrain from harming self  Description: Interventions:  - Monitor patient closely, per order  - Develop a trusting relationship  - Supervise medication ingestion, monitor effects and side effects   Outcome: Adequate for Discharge  Goal: Attend and participate in unit activities, including therapeutic, recreational, and educational groups  Description: Interventions:  - Provide therapeutic and educational activities daily, encourage attendance and participation, and document same in the medical record  - Obtain collateral information,  encourage visitation and family involvement in care   Outcome: Adequate for Discharge  Goal: Recognize maladaptive responses and adopt new coping mechanisms  Outcome: Adequate for Discharge  Goal: Complete daily ADLs, including personal hygiene independently, as able  Description: Interventions:  - Observe, teach, and assist patient with ADLS  - Monitor and promote a balance of rest/activity, with adequate nutrition and elimination  Outcome: Adequate for Discharge     Problem: SAFETY ADULT  Goal: Patient will remain free of falls  Description: INTERVENTIONS:  - Educate patient/family on patient safety including physical limitations  - Keep care items and personal belongings within reach  - Initiate and maintain comfort rounds  - Make Fall Risk Sign visible to staff  - Apply yellow socks and bracelet for high fall risk patients  - Consider moving patient to room near nurses station  Outcome: Adequate for Discharge

## 2024-05-07 NOTE — BH TRANSITION RECORD
Contact Information: If you have any questions, concerns, pended studies, tests and/or procedures, or emergencies regarding your inpatient behavioral health visit. Please contact [place] behavioral [facility] [phone] and ask to speak to a , nurse or physician. A contact is available 24 hours/ 7 days a week at this number.     Summary of Procedures Performed During your Stay:  Below is a list of major procedures performed during your hospital stay and a summary of results:  - No major procedures performed.    VitD - 17.1  LDL - 124  HDL - 42    Pending Studies (From admission, onward)      None          Please follow up on the above pending studies with your PCP and/or referring provider.

## 2024-05-07 NOTE — CASE MANAGEMENT
"5/7/24 - 1:10pm     JAIDEN called [facility] ([phone]) to get an email or fax number to be able to send her pt's dc paperwork. Lila answered and said \"I am in a training.\" JAIDEN will try again later.     JAIDEN faxed dc summary to [facility].   "

## 2024-05-07 NOTE — PROGRESS NOTES
Pt declined to complete relapse prevention plan; pt received information about resources for relapse prevention and management.

## 2024-05-07 NOTE — DISCHARGE SUMMARY
"  Discharge Summary - Behavioral Health   Carey Keith 49 y.o. female MRN: [id number]  Unit/Bed#: -02 Encounter: [id number]     Admission Date: 5/1/2024         Discharge Date: 05/07/24    Attending Psychiatrist: Miguel Velasquez DO    Reason for Admission/HPI (as per admission documentation):     Per Crisis worker note:  to [facility]. Patient is calm and cooperative upon approach and agrees to speak with this writer. Patient's ICM from [facility] is at bedside, and patient reports she can stay in the room throughout assessment. Patient is oriented across all spheres, has a labile affect, tangential and pressured speech, depressed and anxious mood. Patient reports she has been experiencing increased depression and anxiety over the past few weeks. Patient reports fleeting suicidal thoughts, and recently cut her left forearm several times with a piece of glass. Patient reports she has not been compliant with her medications, taking them when she feels like it or not at all. Patient reports she has been \"abusing laxatives\", and last took some on 4/23/24. Patient exhibits symptoms of a manic episode. Patient reports her primary care physician prescribes her psychotropic medications and her suboxone. Patient denies having a psychiatrist or a therapist at present. Patient denies homicidal ideation and auditory/visual/tactile hallucinations. Patient reports smoking about a pack of cigarettes per day, denies drug and alcohol consumption. Patient reports she has not been sleeping well, in part because she has not been taking her medications. Patient reports a decreased appetite of late. Patient is requesting inpatient psychiatric treatment to ensure her safety and help her stabilize.       On admission to Inpatient Psychiatric Unit:  Patient is a 49-year-old white female with a past psychiatric history of bipolar 1 disorder and opioid use disorder, severe, in sustained remission, on maintenance " therapy, who presents voluntarily to inpatient U after overdosing on laxatives and then cutting herself.     Symptoms prior to hospitalization include: Progressively worsening depressed mood, suicidal ideation with the aforementioned laxative abuse and cutting self, medication noncompliance, generalized anxiety, and hopelessness.  Symptom severity is rated as severe.  Timeline is acute on chronic.  Mitigating factors are having an ICM from [facility].  Exacerbating stressors or medication nonadherence.     On initial psychiatric evaluation today, the patient states that she was feeling depressed and suicidal because she was off of her medications for a few days and states that she takes them inconsistently and is fairly nonadherent.  However, she states that when she takes her medications consistently, she does not feel depressed nor suicidal.  She has spent much of her life being admitted to various inpatient psychiatric units and rehabs for bipolar disorder and associated depression.  She had a history of an eating disorder in the past but now eats adequately.  Under normal circumstances, she takes Klonopin, Suboxone, Wellbutrin, gabapentin, and trazodone with positive effect.  She wishes to continue taking this regimen.  She denies psychotic symptoms.  She denies current drug use but has a remote history of opioid abuse for which she now takes Suboxone.  She denies current or recent manic symptoms. She is requesting a new OP provider. She has [facility] ICM.     Psychiatric Review Of Systems:  Medication side effects: none  Sleep: no change  Appetite: no change  Hygiene: able to tend to instrumental and basic ADLs  Anxiety Symptoms: +  Psychotic Symptoms: denies  Depression Symptoms: +  Manic Symptoms: denies  PTSD Symptoms: denies  Suicidal Thoughts: +  Homicidal Thoughts: denies     Medical Review Of Systems:   Patient denies headache or dizziness.   Patient denies chest pain or palpitations.  Patient denies  difficulty breathing or wheezing.  Patient denies nausea, vomiting, or diarrhea.  Patient denies polyuria or polydipsia.  Patient denies weakness or numbness.  Pertinent positives as per HPI.      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)     Alcohol abuse     Alcoholism (HCC)     Altered mental status 09/21/2022    Elevated LFTs 09/21/2022    Lower back pain     Self-injurious behavior     Substance abuse (HCC)      Past Surgical History:   Procedure Laterality Date    CHOLECYSTECTOMY         Medications:    Current Facility-Administered Medications   Medication Dose Route Frequency Provider Last Rate    acetaminophen  650 mg Oral Q6H PRN Angie Jones MD      acetaminophen  650 mg Oral Q4H PRN Angie Jones MD      acetaminophen  975 mg Oral Q6H PRN Angie Jones MD      aluminum-magnesium hydroxide-simethicone  30 mL Oral Q4H PRN Angie Jones MD      benztropine  1 mg Oral Q4H PRN Max 6/day Angie Jones MD      bisacodyl  10 mg Rectal Daily PRN Angie Jones MD      buprenorphine-naloxone  8 mg Sublingual TID Miguel Velasquez, DO      buPROPion  300 mg Oral Daily Miguel Velasquez, DO      clonazePAM  1 mg Oral TID Miguel Velasquez, DO      ergocalciferol  50,000 Units Oral Weekly Miguel Velasquez, DO      gabapentin  600 mg Oral TID Miguel Velasquez,       hydrOXYzine HCL  100 mg Oral Q6H PRN Max 4/day Angie Jones MD      Or    LORazepam  2 mg Intramuscular Q6H PRN Angie Jones MD      hydrOXYzine HCL  25 mg Oral Q6H PRN Max 4/day Angie Jones MD      ibuprofen  400 mg Oral Q6H PRN Jeremy Serrano PA-C      nicotine  1 patch Transdermal Daily Cl Azevedo MD      nicotine polacrilex  4 mg Oral Q2H PRN Angie Jones MD      OLANZapine  5 mg Oral Q4H PRN Max 3/day Angie Jones MD      Or    OLANZapine  2.5 mg Intramuscular Q4H PRN Max 3/day Angie Jones MD      OLANZapine  5 mg Oral Q3H PRN Max 3/day Angie Jones  "MD      Or    OLANZapine  5 mg Intramuscular Q3H PRN Max 3/day Angie Jones MD      OLANZapine  2.5 mg Oral Q4H PRN Max 6/day Angie Jones MD      ondansetron  4 mg Oral Q6H PRN Miguel Velasquez,       polyethylene glycol  17 g Oral Daily PRN Angie Jones MD      senna  1 tablet Oral BID Miguel Velasquez DO      senna-docusate sodium  1 tablet Oral Daily PRN Angie Jones MD      topiramate  200 mg Oral Daily Miguel Velasquez DO      traZODone  100 mg Oral HS PRN Angie Jones MD      traZODone  100 mg Oral HS Miguel Velasquez,          Allergies:     Allergies   Allergen Reactions    Haloperidol Other (See Comments)     oculogyr crisis    Abilify [Aripiprazole] Other (See Comments)     Pt reports increased agitation ? (stating last time she took this med \"she lost it\"        Objective     Vital signs in last 24 hours:    Temp:  [97 °F (36.1 °C)-97.3 °F (36.3 °C)] 97 °F (36.1 °C)  HR:  [69-75] 69  Resp:  [18-19] 18  BP: ()/(68-83) 96/68    No intake or output data in the 24 hours ending 05/07/24 0723    Hospital Course:     Carey was admitted to the inpatient psychiatric unit on 5/1/2024. During their hospitalization, Carey was encouraged to attend groups and interact appropriately and positively with others in the milieu.     Carey was continued on the following psychiatric medications: Suboxone 8mg PO TID, Wellbutrin XL 300mg PO QD, Klonopin 1mg PO TID, Topamax 200mg PO QD, and Trazodone 100mg PO HS. These medications were titrated up to a therapeutic range as evidenced by a reduction in Carey's reported psychiatric symptomatology. There were no side effects noted or reported throughout Carey's psychiatric hospitalization.     At the time of discharge, there were no criteria present through which Carey could be kept involuntarily for further psychiatric hospitalization. Patient was able to articulate a safety plan upon discharge home, including " "going to any ED if their symptoms return or worsen, or calling 911. We discussed mitigating factors against suicidal behavior, and the patient was able to articulate these mitigating factors which outweighed any potential exacerbating stressors. Patient explicitly denied suicidal ideation, plan, or intent. They explicitly stated they felt safe to return to the community.     An outpatient discharge/follow up plan was discussed and coordinated between anjelica Patino writer, and case management team.    Specific discharge disposition: Home.    Mental Status at Time of Discharge:     Appearance: casually dressed, appears consistent with stated age  Motor: no psychomotor disturbances, no gait abnormalities  Behavior: cooperative, interacts with this writer appropriately  Speech: normal rate, rhythm, and volume  Mood: \"better\"  Affect: euthymic, normal range and intensity  Thought Process: organized, linear, and goal-oriented  Thought Content: denies auditory or visual hallucinations  Perception: denies delusions or other perceptual disturbances  Risk Potential: denies suicidal ideation, plan, or intent. Denies homicidal ideation  Sensorium: Oriented to person, place, time, and situation  Cognition: cognitive ability appears intact but was not quantitatively tested  Consciousness: alert and awake  Attention: able to focus without difficulty  Insight: improved  Judgement: improved       Suicide/Homicide Risk Assessment:    Risk of Harm to Self:   Patient denied suicidal thoughts, intent, plan, or acts of furtherance at the time of discharge.    Risk of Harm to Others:  Patient denied homicidal thoughts or intent at the time of discharge      Admission Diagnosis:    Principal Problem:    Bipolar 1 disorder (HCC)  Active Problems:    Medical clearance for psychiatric admission    Tobacco abuse    Dyspepsia    Opioid use disorder, severe, on maintenance therapy (HCC)    Hyperlipidemia      Discharge Diagnosis:     Principal " Problem:    Bipolar 1 disorder ([facility])  Active Problems:    Medical clearance for psychiatric admission    Tobacco abuse    Dyspepsia    Opioid use disorder, severe, on maintenance therapy ([facility])    Hyperlipidemia  Resolved Problems:    * No resolved hospital problems. *      Laboratory Results: I have personally reviewed all pertinent lab results.    No results found for this or any previous visit (from the past 48 hour(s)).     Discharge Medications:    See after visit summary for all reconciled discharge medications provided to patient and family.      Current Discharge Medication List        START taking these medications    Details   buprenorphine-naloxone (Suboxone) 8-2 mg Place 1 Film (8 mg total) under the tongue 3 (three) times a day  Qty: 90 Film, Refills: 0    Associated Diagnoses: Opioid use disorder, severe, on maintenance therapy ([facility])      ergocalciferol (VITAMIN D2) 50,000 units Take 1 capsule (50,000 Units total) by mouth once a week for 5 doses  Qty: 5 capsule, Refills: 0    Associated Diagnoses: Vitamin D deficiency      topiramate (TOPAMAX) 200 MG tablet Take 1 tablet (200 mg total) by mouth daily  Qty: 30 tablet, Refills: 0    Associated Diagnoses: Depression              Current Discharge Medication List        STOP taking these medications       buprenorphine-naloxone (SUBOXONE) 8-2 mg per SL tablet Comments:   Reason for Stopping:         atorvastatin (LIPITOR) 10 mg tablet Comments:   Reason for Stopping:         Diclofenac Sodium (VOLTAREN) 1 % Comments:   Reason for Stopping:         fluticasone (FLONASE) 50 mcg/act nasal spray Comments:   Reason for Stopping:         hydrOXYzine HCL (ATARAX) 25 mg tablet Comments:   Reason for Stopping:         nicotine (NICODERM CQ) 21 mg/24 hr TD 24 hr patch Comments:   Reason for Stopping:         polyethylene glycol (GLYCOLAX) 17 GM/SCOOP powder Comments:   Reason for Stopping:                Current Discharge Medication List        CONTINUE these  medications which have CHANGED    Details   buPROPion (WELLBUTRIN XL) 300 mg 24 hr tablet Take 1 tablet (300 mg total) by mouth daily  Qty: 30 tablet, Refills: 0    Associated Diagnoses: Depression      clonazePAM (KlonoPIN) 1 mg tablet Take 1 tablet (1 mg total) by mouth 3 (three) times a day  Qty: 90 tablet, Refills: 0    Associated Diagnoses: Depression      gabapentin (NEURONTIN) 300 mg capsule Take 2 capsules (600 mg total) by mouth 3 (three) times a day  Qty: 180 capsule, Refills: 0    Associated Diagnoses: Anxiety disorder, unspecified      traZODone (DESYREL) 100 mg tablet Take 1 tablet (100 mg total) by mouth daily at bedtime  Qty: 30 tablet, Refills: 0    Associated Diagnoses: Depression              Current Discharge Medication List        CONTINUE these medications which have NOT CHANGED    Details   albuterol (Proventil HFA) 90 mcg/act inhaler Inhale 2 puffs every 6 (six) hours as needed for wheezing  Qty: 6.7 g, Refills: 0    Comments: Substitution to a formulary equivalent within the same pharmaceutical class is authorized.  Associated Diagnoses: Acute bronchitis              Discharge instructions/Information to patient and family:     See after visit summary for information provided to patient and family.      Provisions for Follow-Up Care:    See after visit summary for information related to follow-up care and any pertinent home health orders.      Discharge Statement:    I spent 45 minutes discharging the patient. This time was spent on the day of discharge. I had direct contact with the patient on the day of discharge.     Additional documentation is required if more than 30 minutes were spent on discharge:    I had face-to-face contact with the patient and discussed discharge treatment plan  I reviewed the importance of compliance with medications and outpatient treatment after discharge with the patient.  I discussed discharge and treatment plan with the patient, nursing, and case  management/social work.  I discussed the medication regimen and possible side effects of the medications with the patient prior to discharge. At the time of discharge they were tolerating psychiatric medications.  I discussed outpatient follow up with the patient as per treatment plan / aftercare summary.  I reviewed elements of the aftercare plan with the patient. I discussed that if symptoms worsen or reoccur, that the patient can return to the emergency room or dial 911 in an emergency.  I reviewed pertinent mitigating vs exacerbating stressors, as well as other risk factors, as they relate to potential suicidal behavior.  I reviewed the patient's hospital course and current psychiatric disease status. As of today, they are now at goal. They are psychiatrically stable for discharge home. The combination of active psychopharmacological medication management, interdisciplinary coordination with case management, and adjunctive milieu and group therapy to augment psychopharmacological efficacy appears to have been successful. The patient's risk of morbidity, and progression or decompensation of psychiatric disease, is lower today as compared to the day of admission.      Miguel Velasquez DO 05/07/24

## 2024-05-08 ENCOUNTER — TELEPHONE (OUTPATIENT)
Dept: INTERNAL MEDICINE CLINIC | Facility: CLINIC | Age: 49
End: 2024-05-08

## 2024-05-08 ENCOUNTER — TELEPHONE (OUTPATIENT)
Age: 49
End: 2024-05-08

## 2024-05-08 LAB
ALBUMIN SERPL BCP-MCNC: 3.7 G/DL (ref 3.5–5)
ALP SERPL-CCNC: 59 U/L (ref 34–104)
ALT SERPL W P-5'-P-CCNC: 12 U/L (ref 7–52)
ANION GAP SERPL CALCULATED.3IONS-SCNC: 4 MMOL/L (ref 4–13)
AST SERPL W P-5'-P-CCNC: 18 U/L (ref 13–39)
BILIRUB SERPL-MCNC: 0.38 MG/DL (ref 0.2–1)
BUN SERPL-MCNC: 15 MG/DL (ref 5–25)
CALCIUM SERPL-MCNC: 8.4 MG/DL (ref 8.4–10.2)
CHLORIDE SERPL-SCNC: 110 MMOL/L (ref 96–108)
CO2 SERPL-SCNC: 25 MMOL/L (ref 21–32)
CREAT SERPL-MCNC: 0.89 MG/DL (ref 0.6–1.3)
GFR SERPL CREATININE-BSD FRML MDRD: 77 ML/MIN/1.73SQ M
GLUCOSE P FAST SERPL-MCNC: 88 MG/DL (ref 65–99)
GLUCOSE SERPL-MCNC: 88 MG/DL (ref 65–140)
POTASSIUM SERPL-SCNC: 4.6 MMOL/L (ref 3.5–5.3)
PROT SERPL-MCNC: 6.2 G/DL (ref 6.4–8.4)
SODIUM SERPL-SCNC: 139 MMOL/L (ref 135–147)

## 2024-05-08 NOTE — TELEPHONE ENCOUNTER
Patient called to say that she was discharged from [facility] in [place] yesterday; I was unable to see discharge summary from yesterday on her chart but had a new a New patient appointment scheduled; dov transfer to [place]

## 2024-05-08 NOTE — TELEPHONE ENCOUNTER
Patient called about new patient appt scheduled for today (5.8.24). she was told to come for the appt., bring insurance cards and list of medications.  She in not a TCM because of D/C from behavioral health.

## 2024-05-10 ENCOUNTER — OFFICE VISIT (OUTPATIENT)
Dept: INTERNAL MEDICINE CLINIC | Facility: CLINIC | Age: 49
End: 2024-05-10
Payer: COMMERCIAL

## 2024-05-10 VITALS
DIASTOLIC BLOOD PRESSURE: 82 MMHG | HEIGHT: 61 IN | BODY MASS INDEX: 32.28 KG/M2 | SYSTOLIC BLOOD PRESSURE: 120 MMHG | RESPIRATION RATE: 13 BRPM | TEMPERATURE: 97.9 F | WEIGHT: 171 LBS

## 2024-05-10 DIAGNOSIS — B00.2 ORAL HERPES: ICD-10-CM

## 2024-05-10 DIAGNOSIS — R59.0 HILAR ADENOPATHY: ICD-10-CM

## 2024-05-10 DIAGNOSIS — R14.0 ABDOMINAL BLOATING: ICD-10-CM

## 2024-05-10 DIAGNOSIS — F11.20 OPIOID USE DISORDER, SEVERE, ON MAINTENANCE THERAPY (HCC): ICD-10-CM

## 2024-05-10 DIAGNOSIS — F31.9 BIPOLAR 1 DISORDER (HCC): Primary | ICD-10-CM

## 2024-05-10 PROCEDURE — 99204 OFFICE O/P NEW MOD 45 MIN: CPT | Performed by: INTERNAL MEDICINE

## 2024-05-10 RX ORDER — VALACYCLOVIR HYDROCHLORIDE 500 MG/1
500 TABLET, FILM COATED ORAL 2 TIMES DAILY
Qty: 6 TABLET | Refills: 0 | Status: SHIPPED | OUTPATIENT
Start: 2024-05-10 | End: 2024-05-13

## 2024-05-10 NOTE — PROGRESS NOTES
"INTERNAL MEDICINE OFFICE VISIT  [facility]    NAME: Carey Keith  AGE: 49 y.o. SEX: female    DATE OF ENCOUNTER: 5/12/2024    Assessment and Plan/History of Present Illness     Carey is here today to establish care    Medical history of bipolar 1 disorder, anxiety/depression, opioid use disorder on Suboxone    Patient recently admitted at [facility].  Presented voluntarily to inpatient U after overdosing on laxatives and cutting herself per chart review    Per hospital discharge summary on 5/7/2024 \"she has spent much of her life being admitted to various inpatient psychiatric units and rehabs for bipolar disorder and associated depression\"    Patient is a tangential historian today.  She spent a good portion of today's visit describing her personal and family history of mental health issues/family issues.  Patient does report history of self-injurious behaviors and prior suicide attempts.  She does not endorse any active suicidal ideation today.  She is alert and oriented    [facility]        Allergies:   Allergies   Allergen Reactions   • Haloperidol Other (See Comments)     oculogyr crisis   • Abilify [Aripiprazole] Other (See Comments)     Pt reports increased agitation ? (stating last time she took this med \"she lost it\"       Past medical history:   Past Medical History:   Diagnosis Date   • Addiction to drug (HCC)    • Alcohol abuse    • Alcoholism (HCC)    • Altered mental status 09/21/2022   • Elevated LFTs 09/21/2022   • Lower back pain    • Self-injurious behavior    • Substance abuse (HCC)       Past surgical history:   Past Surgical History:   Procedure Laterality Date   • CHOLECYSTECTOMY        Family history:   Family History   Problem Relation Age of Onset   • Autism Mother    • Heart disease Father    • Stroke Father    • Anxiety disorder Father    • Heart disease " Maternal Grandmother       Social history:   Tobacco Use: High Risk (5/10/2024)    Patient History    • Smoking Tobacco Use: Some Days    • Smokeless Tobacco Use: Current    • Passive Exposure: Not on file      Alcohol Use: Not on file         1. Bipolar 1 disorder (HCC)  Assessment & Plan:  Current regimen of gabapentin, bupropion, Klonopin, topiramate, trazodone  -Continue follow-up with psychiatry      2. Abdominal bloating  -     Ambulatory referral to Family Practice    3. Opioid use disorder, severe, on maintenance therapy (HCC)  Assessment & Plan:  Maintained on Suboxone  PDMP reviewed      4. Oral herpes  Assessment & Plan:  Patient requests prescription for cold sores.  States she only gets breakouts intermittently  -Treat with 3-day course of Valtrex    Orders:  -     valACYclovir (VALTREX) 500 mg tablet; Take 1 tablet (500 mg total) by mouth 2 (two) times a day for 3 days    5. Hilar adenopathy  Assessment & Plan:  CTA of the chest completed December 2023 -stable mild mediastinal and hilar adenopathy possibly in the setting of underlying granulomatous disease such as sarcoidosis  -Patient has follow-up appointment scheduled with pulmonology in July.  I printed out the appointment information for this and gave to the patient and encouraged her to follow-up as scheduled                   No orders of the defined types were placed in this encounter.      Chief Complaint     Chief Complaint   Patient presents with   • Establish Care     Needs mammo, pap and colonoscopy.           Review of Systems     10 point ROS negative except per HPI    The following portions of the patient's history were reviewed and updated as appropriate: allergies, current medications, past family history, past medical history, past social history, past surgical history and problem list.    Active Problem List     Patient Active Problem List   Diagnosis   • Medical clearance for psychiatric admission   • Tobacco abuse   • Dyspepsia  "  • Hepatitis C antibody test positive   • Opioid use disorder, severe, on maintenance therapy (HCC)   • Hyperlipidemia   • Bipolar 1 disorder (HCC)   • Hilar adenopathy   • Oral herpes       Objective     /82 (BP Location: Left arm, Patient Position: Sitting, Cuff Size: Standard)   Temp 97.9 °F (36.6 °C)   Resp 13   Ht 5' 1\" (1.549 m)   Wt 77.6 kg (171 lb)   LMP 03/13/2024 (Approximate)   BMI 32.31 kg/m²     Physical Exam    Pertinent Laboratory/Diagnostic Studies:  No results found.    Images and diagnostics reviewed     Current Medications     Current Outpatient Medications:   •  albuterol (Proventil HFA) 90 mcg/act inhaler, Inhale 2 puffs every 6 (six) hours as needed for wheezing, Disp: 6.7 g, Rfl: 0  •  buprenorphine-naloxone (Suboxone) 8-2 mg, Place 1 Film (8 mg total) under the tongue 3 (three) times a day, Disp: 90 Film, Rfl: 0  •  buPROPion (WELLBUTRIN XL) 300 mg 24 hr tablet, Take 1 tablet (300 mg total) by mouth daily, Disp: 30 tablet, Rfl: 0  •  clonazePAM (KlonoPIN) 1 mg tablet, Take 1 tablet (1 mg total) by mouth 3 (three) times a day, Disp: 90 tablet, Rfl: 0  •  ergocalciferol (VITAMIN D2) 50,000 units, Take 1 capsule (50,000 Units total) by mouth once a week for 5 doses, Disp: 5 capsule, Rfl: 0  •  gabapentin (NEURONTIN) 300 mg capsule, Take 2 capsules (600 mg total) by mouth 3 (three) times a day, Disp: 180 capsule, Rfl: 0  •  topiramate (TOPAMAX) 200 MG tablet, Take 1 tablet (200 mg total) by mouth daily, Disp: 30 tablet, Rfl: 0  •  traZODone (DESYREL) 100 mg tablet, Take 1 tablet (100 mg total) by mouth daily at bedtime, Disp: 30 tablet, Rfl: 0  •  valACYclovir (VALTREX) 500 mg tablet, Take 1 tablet (500 mg total) by mouth 2 (two) times a day for 3 days, Disp: 6 tablet, Rfl: 0    Health Maintenance     Health Maintenance   Topic Date Due   • Pneumococcal Vaccine: Pediatrics (0 to 5 Years) and At-Risk Patients (6 to 64 Years) (1 of 2 - PCV) Never done   • Annual Physical  Never done "   • Hepatitis A Vaccine (1 of 2 - Risk 2-dose series) Never done   • DTaP,Tdap,and Td Vaccines (1 - Tdap) Never done   • Cervical Cancer Screening  Never done   • Breast Cancer Screening: Mammogram  Never done   • Colorectal Cancer Screening  Never done   • COVID-19 Vaccine (1 - 2023-24 season) Never done   • Influenza Vaccine (Season Ended) 09/01/2024   • Zoster Vaccine (1 of 2) 04/19/2027   • HIV Screening  Completed   • Hepatitis C Screening  Completed   • HIB Vaccine  Aged Out   • IPV Vaccine  Aged Out   • Meningococcal ACWY Vaccine  Aged Out   • HPV Vaccine  Aged Out       There is no immunization history on file for this patient.    Christian Julio D.O.  [facility] Internal Medicine - [address] [place] [address]  Office: [phone]  Fax: [phone]

## 2024-05-12 PROBLEM — R59.0 HILAR ADENOPATHY: Status: ACTIVE | Noted: 2024-05-12

## 2024-05-12 PROBLEM — B00.2 ORAL HERPES: Status: ACTIVE | Noted: 2024-05-12

## 2024-05-12 NOTE — ASSESSMENT & PLAN NOTE
CTA of the chest completed December 2023 -stable mild mediastinal and hilar adenopathy possibly in the setting of underlying granulomatous disease such as sarcoidosis  -Patient has follow-up appointment scheduled with pulmonology in July.  I printed out the appointment information for this and gave to the patient and encouraged her to follow-up as scheduled

## 2024-05-12 NOTE — ASSESSMENT & PLAN NOTE
Patient requests prescription for cold sores.  States she only gets breakouts intermittently  -Treat with 3-day course of Valtrex

## 2024-05-12 NOTE — ASSESSMENT & PLAN NOTE
Current regimen of gabapentin, bupropion, Klonopin, topiramate, trazodone  -Continue follow-up with psychiatry

## 2024-05-28 ENCOUNTER — HOSPITAL ENCOUNTER (EMERGENCY)
Facility: HOSPITAL | Age: 49
Discharge: HOME | End: 2024-05-28
Attending: EMERGENCY MEDICINE
Payer: COMMERCIAL

## 2024-05-28 VITALS
OXYGEN SATURATION: 97 % | RESPIRATION RATE: 18 BRPM | DIASTOLIC BLOOD PRESSURE: 65 MMHG | TEMPERATURE: 97.7 F | SYSTOLIC BLOOD PRESSURE: 127 MMHG | WEIGHT: 175.04 LBS | BODY MASS INDEX: 33.07 KG/M2 | HEART RATE: 88 BPM

## 2024-05-28 DIAGNOSIS — Z76.0 MEDICATION REFILL: Primary | ICD-10-CM

## 2024-05-28 DIAGNOSIS — F32.A DEPRESSION: ICD-10-CM

## 2024-05-28 PROCEDURE — 99284 EMERGENCY DEPT VISIT MOD MDM: CPT | Performed by: EMERGENCY MEDICINE

## 2024-05-28 PROCEDURE — 99281 EMR DPT VST MAYX REQ PHY/QHP: CPT

## 2024-05-28 RX ORDER — BUPROPION HYDROCHLORIDE 150 MG/1
300 TABLET ORAL DAILY
Qty: 14 TABLET | Refills: 0 | Status: SHIPPED | OUTPATIENT
Start: 2024-05-28 | End: 2024-06-03 | Stop reason: SDUPTHER

## 2024-05-28 RX ORDER — BUPROPION HYDROCHLORIDE 300 MG/1
300 TABLET ORAL DAILY
Qty: 30 TABLET | Refills: 0 | Status: SHIPPED | OUTPATIENT
Start: 2024-05-28 | End: 2024-05-28

## 2024-05-28 NOTE — ED PROVIDER NOTES
History  Chief Complaint   Patient presents with    Medication Refill     Pt reports dropping bottle of wellbutrin a few days ago and now is missing a few pills and looking for a refill.      Carey is a pleasant 50 yo female here for medication refill.  She says that she was opening her bottle of Wellbutrin and accidentally spilled a number of pills.  She was able to collect some but says that she is now 7 pills short.  Apparently they fell into some bushes and she was unable to retrieve them.  She has no other complaints.      Medication Refill      Prior to Admission Medications   Prescriptions Last Dose Informant Patient Reported? Taking?   albuterol (Proventil HFA) 90 mcg/act inhaler   No No   Sig: Inhale 2 puffs every 6 (six) hours as needed for wheezing   buPROPion (WELLBUTRIN XL) 150 mg 24 hr tablet   No Yes   Sig: Take 2 tablets (300 mg total) by mouth daily for 7 days   buPROPion (WELLBUTRIN XL) 300 mg 24 hr tablet   No No   Sig: Take 1 tablet (300 mg total) by mouth daily   buprenorphine-naloxone (Suboxone) 8-2 mg   No No   Sig: Place 1 Film (8 mg total) under the tongue 3 (three) times a day   clonazePAM (KlonoPIN) 1 mg tablet   No No   Sig: Take 1 tablet (1 mg total) by mouth 3 (three) times a day   ergocalciferol (VITAMIN D2) 50,000 units   No No   Sig: Take 1 capsule (50,000 Units total) by mouth once a week for 5 doses   gabapentin (NEURONTIN) 300 mg capsule   No No   Sig: Take 2 capsules (600 mg total) by mouth 3 (three) times a day   topiramate (TOPAMAX) 200 MG tablet   No No   Sig: Take 1 tablet (200 mg total) by mouth daily   traZODone (DESYREL) 100 mg tablet   No No   Sig: Take 1 tablet (100 mg total) by mouth daily at bedtime   valACYclovir (VALTREX) 500 mg tablet   No No   Sig: Take 1 tablet (500 mg total) by mouth 2 (two) times a day for 3 days      Facility-Administered Medications: None       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)     Alcohol abuse      "Alcoholism (HCC)     Altered mental status 09/21/2022    Elevated LFTs 09/21/2022    Lower back pain     Self-injurious behavior     Substance abuse (HCC)        Past Surgical History:   Procedure Laterality Date    CHOLECYSTECTOMY         Family History   Problem Relation Age of Onset    Autism Mother     Heart disease Father     Stroke Father     Anxiety disorder Father     Heart disease Maternal Grandmother      I have reviewed and agree with the history as documented.    E-Cigarette/Vaping    E-Cigarette Use Current Every Day User     Cartridges/Day 2      E-Cigarette/Vaping Substances    Nicotine Yes     THC Yes     CBD No     Flavoring Yes     Other No     Unknown No      Social History     Tobacco Use    Smoking status: Some Days     Current packs/day: 0.50     Average packs/day: 0.5 packs/day for 20.0 years (10.0 ttl pk-yrs)     Types: Cigarettes    Smokeless tobacco: Current    Tobacco comments:     Pt not ready to quit   Vaping Use    Vaping status: Every Day    Substances: Nicotine, THC, Flavoring   Substance Use Topics    Alcohol use: Not Currently     Comment: MAGDAL[place], pt historically inconsistent. Drinking  per [place] [id number]    Drug use: Not Currently     Types: Heroin, \"Crack\" cocaine, Cocaine, LSD, Benzodiazepines, Marijuana     Comment: Pt unreliable historian       Review of Systems   Constitutional:  Negative for chills and fever.   HENT:  Negative for sore throat.    Eyes:  Negative for visual disturbance.   Respiratory:  Negative for cough and shortness of breath.    Cardiovascular:  Negative for chest pain and palpitations.   Gastrointestinal:  Negative for abdominal pain, nausea and vomiting.   Genitourinary:  Negative for dysuria and hematuria.   Skin:  Negative for color change and rash.   Neurological:  Negative for syncope.   All other systems reviewed and are negative.      Physical Exam  Physical Exam  Vitals and nursing note reviewed.   Constitutional:       General: She is not in " acute distress.     Appearance: She is well-developed.   HENT:      Head: Normocephalic and atraumatic.   Eyes:      Conjunctiva/sclera: Conjunctivae normal.   Cardiovascular:      Rate and Rhythm: Normal rate and regular rhythm.      Heart sounds: No murmur heard.  Pulmonary:      Effort: Pulmonary effort is normal. No respiratory distress.      Breath sounds: Normal breath sounds.   Abdominal:      Palpations: Abdomen is soft.      Tenderness: There is no abdominal tenderness.   Musculoskeletal:         General: No swelling.      Cervical back: Neck supple.   Skin:     General: Skin is warm and dry.      Capillary Refill: Capillary refill takes less than 2 seconds.   Neurological:      General: No focal deficit present.      Mental Status: She is alert and oriented to person, place, and time.   Psychiatric:         Mood and Affect: Mood normal.         Vital Signs  ED Triage Vitals [05/28/24 1618]   Temperature Pulse Respirations Blood Pressure SpO2   97.7 °F (36.5 °C) 88 18 127/65 97 %      Temp Source Heart Rate Source Patient Position - Orthostatic VS BP Location FiO2 (%)   Oral Monitor Sitting Right arm --      Pain Score       --           Vitals:    05/28/24 1618   BP: 127/65   Pulse: 88   Patient Position - Orthostatic VS: Sitting         Visual Acuity      ED Medications  Medications - No data to display    Diagnostic Studies  Results Reviewed       None                   No orders to display              Procedures  Procedures         ED Course                               SBIRT 22yo+      Flowsheet Row Most Recent Value   Initial Alcohol Screen: US AUDIT-C     1. How often do you have a drink containing alcohol? 0 Filed at: 05/28/2024 1619   2. How many drinks containing alcohol do you have on a typical day you are drinking?  0 Filed at: 05/28/2024 1619   3b. FEMALE Any Age, or MALE 65+: How often do you have 4 or more drinks on one occassion? 0 Filed at: 05/28/2024 1619   Audit-C Score 0 Filed at:  05/28/2024 1619   LYNN: How many times in the past year have you...    Used an illegal drug or used a prescription medication for non-medical reasons? Never Filed at: 05/28/2024 1619                      Medical Decision Making  49-year-old female requesting medication refill of Wellbutrin.  There has been no interruption in her treatment.  No other complaints.  Will will provide refill.  Discussed outpatient follow-up and return precautions.    Risk  Prescription drug management.             Disposition  Final diagnoses:   Medication refill     Time reflects when diagnosis was documented in both MDM as applicable and the Disposition within this note       Time User Action Codes Description Comment    5/28/2024  4:33 PM Norm Brown [Z76.0] Medication refill     5/28/2024  4:38 PM Norm Brown [F32.A] Depression           ED Disposition       ED Disposition   Discharge    Condition   Stable    Date/Time   Tue May 28, 2024 1633    Comment   Carey Keith discharge to home/self care.                   Follow-up Information       Follow up With Specialties Details Why Contact Info    Christian Julio,  Internal Medicine   [facility] [address] [address]  [phone]              Discharge Medication List as of 5/28/2024  4:39 PM        CONTINUE these medications which have CHANGED    Details   buPROPion (WELLBUTRIN XL) 150 mg 24 hr tablet Take 2 tablets (300 mg total) by mouth daily for 7 days, Starting Tue 5/28/2024, Until Tue 6/4/2024, Normal           CONTINUE these medications which have NOT CHANGED    Details   albuterol (Proventil HFA) 90 mcg/act inhaler Inhale 2 puffs every 6 (six) hours as needed for wheezing, Starting Thu 2/8/2024, Normal      buprenorphine-naloxone (Suboxone) 8-2 mg Place 1 Film (8 mg total) under the tongue 3 (three) times a day, Starting Tue 5/7/2024, Until Thu 6/6/2024, Normal      clonazePAM (KlonoPIN) 1 mg tablet Take 1 tablet (1 mg total)  by mouth 3 (three) times a day, Starting Tue 5/7/2024, Until Thu 6/6/2024, Normal      ergocalciferol (VITAMIN D2) 50,000 units Take 1 capsule (50,000 Units total) by mouth once a week for 5 doses, Starting Thu 5/9/2024, Until Fri 6/7/2024, Normal      gabapentin (NEURONTIN) 300 mg capsule Take 2 capsules (600 mg total) by mouth 3 (three) times a day, Starting Tue 5/7/2024, Until Thu 6/6/2024, Normal      topiramate (TOPAMAX) 200 MG tablet Take 1 tablet (200 mg total) by mouth daily, Starting Tue 5/7/2024, Until Thu 6/6/2024, Normal      traZODone (DESYREL) 100 mg tablet Take 1 tablet (100 mg total) by mouth daily at bedtime, Starting Tue 5/7/2024, Until Thu 6/6/2024, Normal      valACYclovir (VALTREX) 500 mg tablet Take 1 tablet (500 mg total) by mouth 2 (two) times a day for 3 days, Starting Fri 5/10/2024, Until Mon 5/13/2024, Normal             No discharge procedures on file.    PDMP Review         Value Time User    PDMP Reviewed  Yes 5/28/2024  4:33 PM Norm Brown MD            ED Provider  Electronically Signed by             Norm Borwn MD  05/28/24 3578

## 2024-05-28 NOTE — Clinical Note
Carey Keith was seen and treated in our emergency department on 5/28/2024.                Diagnosis:     Carey  may return to work on return date.    She may return on this date: 05/29/2024         If you have any questions or concerns, please don't hesitate to call.      Norm Brown MD    ______________________________           _______________          _______________  Hospital Representative                              Date                                Time

## 2024-06-03 DIAGNOSIS — F32.A DEPRESSION: ICD-10-CM

## 2024-06-03 DIAGNOSIS — F41.9 ANXIETY DISORDER, UNSPECIFIED: ICD-10-CM

## 2024-06-03 RX ORDER — CLONAZEPAM 1 MG/1
1 TABLET ORAL 3 TIMES DAILY
Qty: 90 TABLET | Refills: 0 | Status: SHIPPED | OUTPATIENT
Start: 2024-06-03 | End: 2024-07-03

## 2024-06-03 RX ORDER — BUPROPION HYDROCHLORIDE 150 MG/1
300 TABLET ORAL DAILY
Qty: 60 TABLET | Refills: 0 | Status: SHIPPED | OUTPATIENT
Start: 2024-06-03

## 2024-06-03 RX ORDER — GABAPENTIN 300 MG/1
600 CAPSULE ORAL 3 TIMES DAILY
Qty: 180 CAPSULE | Refills: 0 | Status: SHIPPED | OUTPATIENT
Start: 2024-06-03

## 2024-06-03 RX ORDER — TRAZODONE HYDROCHLORIDE 100 MG/1
100 TABLET ORAL
Qty: 30 TABLET | Refills: 0 | Status: SHIPPED | OUTPATIENT
Start: 2024-06-03 | End: 2024-07-03

## 2024-06-03 NOTE — TELEPHONE ENCOUNTER
Pt called back and stated she also needs   Gabapentin 300mg TID    Pharmacy [facility].    See messages below - she needs to get medication and won't see Psych doctor for 3 weeks and needs something to bridge the gap.    Please advise [phone]    Only has about a day or two of medication left

## 2024-06-03 NOTE — TELEPHONE ENCOUNTER
Pt called in for an update. Pt is very panicked in regards to her medication. Scheduled pt for next day appointment with doctor to go over medications in person

## 2024-06-03 NOTE — TELEPHONE ENCOUNTER
Patient called in and stated that she is 3 weeks to a month out from seeing her therapist. She will not refill the patients medication till she is seen. Patient stated that Dr. Julio has prescribed he the medications before.Patient only has 4 days of medication left.     WELLBUTRIN XL   KlonoPIN  NEURONTIN   TOPAMAX   DESYREL     Please send refill to [facility] pharmacy on file per patients request.       If refills can't be done please call patient back at  and advise.    Thank you.

## 2024-06-08 ENCOUNTER — TELEPHONE (OUTPATIENT)
Dept: OTHER | Facility: OTHER | Age: 49
End: 2024-06-08

## 2024-06-08 NOTE — TELEPHONE ENCOUNTER
Pt reached the after hours service, she inquired of her wellbutrin rx. Advised pt rx sent to [facility] pharmacy [place] on 6/3. Pt will call pharmacy.

## 2024-06-13 NOTE — TELEPHONE ENCOUNTER
Patient called stating that her Wellbutrin was filled incorrectly.  Patient should be getting 300 mg not 150 mg.  Insurance will not cover 150 mg.      Prescription:  buPROPion (Wellbutrin XL) 300 mg 24 hr tablet   Take 1 tablet (300 mg total) by mouth daily     Please call in prescription to [facility]  [facility] [place] - [address] [place] [address].    Please return patients call.

## 2024-06-14 ENCOUNTER — TELEPHONE (OUTPATIENT)
Dept: INTERNAL MEDICINE CLINIC | Facility: CLINIC | Age: 49
End: 2024-06-14

## 2024-06-14 DIAGNOSIS — F32.A DEPRESSION: ICD-10-CM

## 2024-06-14 RX ORDER — BUPROPION HYDROCHLORIDE 300 MG/1
300 TABLET ORAL DAILY
Qty: 90 TABLET | Refills: 0 | Status: SHIPPED | OUTPATIENT
Start: 2024-06-14

## 2024-06-14 NOTE — TELEPHONE ENCOUNTER
Pt called stated that her insurance doesn't cover the Wellbutrin  mg it only covers the Wellbutrin  mg daily can toy please resend again as Wellbutrin  mg of the  pharmacy thank you

## 2024-06-17 NOTE — PLAN OF CARE
Problem: PAIN - ADULT  Goal: Verbalizes/displays adequate comfort level or baseline comfort level  Description: Interventions:  - Encourage patient to monitor pain and request assistance  - Assess pain using appropriate pain scale  - Administer analgesics based on type and severity of pain and evaluate response  - Implement non-pharmacological measures as appropriate and evaluate response  - Consider cultural and social influences on pain and pain management  - Notify physician/advanced practitioner if interventions unsuccessful or patient reports new pain  Outcome: Progressing     Problem: DISCHARGE PLANNING  Goal: Discharge to home or other facility with appropriate resources  Description: INTERVENTIONS:  - Identify barriers to discharge w/patient and caregiver  - Arrange for needed discharge resources and transportation as appropriate  - Identify discharge learning needs (meds, wound care, etc.)  - Arrange for interpretive services to assist at discharge as needed  - Refer to Case Management Department for coordinating discharge planning if the patient needs post-hospital services based on physician/advanced practitioner order or complex needs related to functional status, cognitive ability, or social support system  Outcome: Progressing     Problem: Alteration in Thoughts and Perception  Goal: Treatment Goal: Gain control of psychotic behaviors/thinking, reduce/eliminate presenting symptoms and demonstrate improved reality functioning upon discharge  Outcome: Progressing     Problem: Alteration in Thoughts and Perception  Goal: Verbalize thoughts and feelings  Description: Interventions:  - Promote a nonjudgmental and trusting relationship with the patient through active listening and therapeutic communication  - Assess patient's level of functioning, behavior and potential for risk  - Engage patient in 1 on 1 interactions  - Encourage patient to express fears, feelings, frustrations, and discuss symptoms    -  [place] patient to reality, help patient recognize reality-based thinking   - Administer medications as ordered and assess for potential side effects  - Provide the patient education related to the signs and symptoms of the illness and desired effects of prescribed medications  Outcome: Progressing     Problem: Alteration in Thoughts and Perception  Goal: Refrain from acting on delusional thinking/internal stimuli  Description: Interventions:  - Monitor patient closely, per order   - Utilize least restrictive measures   - Set reasonable limits, give positive feedback for acceptable   - Administer medications as ordered and monitor of potential side effects  Outcome: Progressing     Problem: Alteration in Thoughts and Perception  Goal: Agree to be compliant with medication regime, as prescribed and report medication side effects  Description: Interventions:  - Offer appropriate PRN medication and supervise ingestion; conduct AIMS, as needed   Outcome: Progressing     Problem: Alteration in Thoughts and Perception  Goal: Verbalize thoughts and feelings  Description: Interventions:  - Promote a nonjudgmental and trusting relationship with the patient through active listening and therapeutic communication  - Assess patient's level of functioning, behavior and potential for risk  - Engage patient in 1 on 1 interactions  - Encourage patient to express fears, feelings, frustrations, and discuss symptoms    - [place] patient to reality, help patient recognize reality-based thinking   - Administer medications as ordered and assess for potential side effects  - Provide the patient education related to the signs and symptoms of the illness and desired effects of prescribed medications  Outcome: Progressing     Problem: Alteration in Thoughts and Perception  Goal: Refrain from acting on delusional thinking/internal stimuli  Description: Interventions:  - Monitor patient closely, per order   - Utilize least restrictive measures    - Set reasonable limits, give positive feedback for acceptable   - Administer medications as ordered and monitor of potential side effects  Outcome: Progressing     Problem: Alteration in Thoughts and Perception  Goal: Agree to be compliant with medication regime, as prescribed and report medication side effects  Description: Interventions:  - Offer appropriate PRN medication and supervise ingestion; conduct AIMS, as needed   Outcome: Progressing     Problem: Alteration in Thoughts and Perception  Goal: Attend and participate in unit activities, including therapeutic, recreational, and educational groups  Description: Interventions:  -Encourage Visitation and family involvement in care  Outcome: Progressing     Problem: Alteration in Thoughts and Perception  Goal: Recognize dysfunctional thoughts, communicate reality-based thoughts at the time of discharge  Description: Interventions:  - Provide medication and psycho-education to assist patient in compliance and developing insight into his/her illness   Outcome: Progressing     Problem: Alteration in Thoughts and Perception  Goal: Complete daily ADLs, including personal hygiene independently, as able  Description: Interventions:  - Observe, teach, and assist patient with ADLS  - Monitor and promote a balance of rest/activity, with adequate nutrition and elimination   Outcome: Progressing     Problem: Ineffective Coping  Goal: Participates in unit activities  Description: Interventions:  - Provide therapeutic environment   - Provide required programming   - Redirect inappropriate behaviors   Outcome: Progressing     Problem: Risk for Self Injury/Neglect  Goal: Treatment Goal: Remain safe during length of stay, learn and adopt new coping skills, and be free of self-injurious ideation, impulses and acts at the time of discharge  Outcome: Progressing     Problem: Risk for Self Injury/Neglect  Goal: Verbalize thoughts and feelings  Description: Interventions:  - Assess  and re-assess patient's lethality and potential for self-injury  - Engage patient in 1:1 interactions, daily, for a minimum of 15 minutes  - Encourage patient to express feelings, fears, frustrations, hopes  - Establish rapport/trust with patient   Outcome: Progressing     Problem: Risk for Self Injury/Neglect  Goal: Verbalize thoughts and feelings  Description: Interventions:  - Assess and re-assess patient's lethality and potential for self-injury  - Engage patient in 1:1 interactions, daily, for a minimum of 15 minutes  - Encourage patient to express feelings, fears, frustrations, hopes  - Establish rapport/trust with patient   Outcome: Progressing     Problem: Risk for Self Injury/Neglect  Goal: Refrain from harming self  Description: Interventions:  - Monitor patient closely, per order  - Develop a trusting relationship  - Supervise medication ingestion, monitor effects and side effects   Outcome: Progressing     Problem: Risk for Self Injury/Neglect  Goal: Attend and participate in unit activities, including therapeutic, recreational, and educational groups  Description: Interventions:  - Provide therapeutic and educational activities daily, encourage attendance and participation, and document same in the medical record  - Obtain collateral information, encourage visitation and family involvement in care   Outcome: Progressing     Problem: Risk for Self Injury/Neglect  Goal: Complete daily ADLs, including personal hygiene independently, as able  Description: Interventions:  - Observe, teach, and assist patient with ADLS  - Monitor and promote a balance of rest/activity, with adequate nutrition and elimination  Outcome: Progressing      Detail Level: Generalized Instructions: This plan will send the code FBSE to the PM system.  DO NOT or CHANGE the price. Price (Do Not Change): 0.00

## 2024-07-11 ENCOUNTER — TELEPHONE (OUTPATIENT)
Age: 49
End: 2024-07-11

## 2024-07-11 NOTE — TELEPHONE ENCOUNTER
Patient called in stating she been failed by her  and unable get her social security for disability. So she would need Dr. Gonzales to help to fill in MINNA she will come down to the practice to sign and once done can be faxed to her  office @ [phone]. She needs to be helped by tomorrow please do keep her informed. Thanks

## 2024-07-12 NOTE — TELEPHONE ENCOUNTER
Pt called requesting medical records sent to the  and I told er that she has to come in and fill out a medical record release form to be sent to [facility] she stated that she was coming in to fill it out on Monday

## 2024-07-12 NOTE — TELEPHONE ENCOUNTER
Pt returning call re: paperwork, warm TSF to [facility] to assist further.

## 2024-07-15 ENCOUNTER — TELEPHONE (OUTPATIENT)
Age: 49
End: 2024-07-15

## 2024-07-15 DIAGNOSIS — D25.9 UTERINE LEIOMYOMA, UNSPECIFIED LOCATION: Primary | ICD-10-CM

## 2024-07-15 NOTE — TELEPHONE ENCOUNTER
There were no acute findings on CT scan done in April. She was noted to have fibroid uterus. I placed an order for a TVU which I would like her to have BEFORE her visit.

## 2024-07-15 NOTE — TELEPHONE ENCOUNTER
NP on going abdominal cramping for 6 months. Should patient be seen sooner?     I did offer patient a sooner appt then 08/02/24, but she had other appts.     Patient will need a lyft as stated in the appt desk.

## 2024-07-16 NOTE — TELEPHONE ENCOUNTER
Patient did not answer return call.  Per HIPAA, Left detailed message to inform patient.  Advised call to schedule TVU.  Central Scheduling number given.  Patient does not have an active [employer] account.

## 2024-07-18 ENCOUNTER — APPOINTMENT (OUTPATIENT)
Dept: LAB | Facility: HOSPITAL | Age: 49
End: 2024-07-18
Payer: COMMERCIAL

## 2024-07-18 DIAGNOSIS — Z79.899 ENCOUNTER FOR LONG-TERM (CURRENT) USE OF OTHER MEDICATIONS: ICD-10-CM

## 2024-07-18 DIAGNOSIS — F41.1 GENERALIZED ANXIETY DISORDER: ICD-10-CM

## 2024-07-18 DIAGNOSIS — F43.12 PROLONGED POSTTRAUMATIC STRESS DISORDER: ICD-10-CM

## 2024-07-18 DIAGNOSIS — F31.62 MODERATE MIXED BIPOLAR I DISORDER (HCC): ICD-10-CM

## 2024-07-18 LAB
ALBUMIN SERPL BCG-MCNC: 3.8 G/DL (ref 3.5–5)
ALP SERPL-CCNC: 57 U/L (ref 34–104)
ALT SERPL W P-5'-P-CCNC: 11 U/L (ref 7–52)
ANION GAP SERPL CALCULATED.3IONS-SCNC: 4 MMOL/L (ref 4–13)
AST SERPL W P-5'-P-CCNC: 17 U/L (ref 13–39)
BASOPHILS # BLD AUTO: 0.04 THOUSANDS/ÂΜL (ref 0–0.1)
BASOPHILS NFR BLD AUTO: 1 % (ref 0–1)
BILIRUB SERPL-MCNC: 0.34 MG/DL (ref 0.2–1)
BUN SERPL-MCNC: 15 MG/DL (ref 5–25)
CALCIUM SERPL-MCNC: 8.9 MG/DL (ref 8.4–10.2)
CHLORIDE SERPL-SCNC: 109 MMOL/L (ref 96–108)
CHOLEST SERPL-MCNC: 190 MG/DL
CO2 SERPL-SCNC: 22 MMOL/L (ref 21–32)
CREAT SERPL-MCNC: 0.83 MG/DL (ref 0.6–1.3)
EOSINOPHIL # BLD AUTO: 0 THOUSAND/ÂΜL (ref 0–0.61)
EOSINOPHIL NFR BLD AUTO: 0 % (ref 0–6)
ERYTHROCYTE [DISTWIDTH] IN BLOOD BY AUTOMATED COUNT: 12.8 % (ref 11.6–15.1)
GFR SERPL CREATININE-BSD FRML MDRD: 84 ML/MIN/1.73SQ M
GLUCOSE P FAST SERPL-MCNC: 70 MG/DL (ref 65–99)
HCT VFR BLD AUTO: 38.5 % (ref 34.8–46.1)
HDLC SERPL-MCNC: 50 MG/DL
HGB BLD-MCNC: 12.3 G/DL (ref 11.5–15.4)
IMM GRANULOCYTES # BLD AUTO: 0.03 THOUSAND/UL (ref 0–0.2)
IMM GRANULOCYTES NFR BLD AUTO: 0 % (ref 0–2)
LDLC SERPL CALC-MCNC: 119 MG/DL (ref 0–100)
LYMPHOCYTES # BLD AUTO: 2.58 THOUSANDS/ÂΜL (ref 0.6–4.47)
LYMPHOCYTES NFR BLD AUTO: 32 % (ref 14–44)
MCH RBC QN AUTO: 28.4 PG (ref 26.8–34.3)
MCHC RBC AUTO-ENTMCNC: 31.9 G/DL (ref 31.4–37.4)
MCV RBC AUTO: 89 FL (ref 82–98)
MONOCYTES # BLD AUTO: 0.48 THOUSAND/ÂΜL (ref 0.17–1.22)
MONOCYTES NFR BLD AUTO: 6 % (ref 4–12)
NEUTROPHILS # BLD AUTO: 4.97 THOUSANDS/ÂΜL (ref 1.85–7.62)
NEUTS SEG NFR BLD AUTO: 61 % (ref 43–75)
NONHDLC SERPL-MCNC: 140 MG/DL
NRBC BLD AUTO-RTO: 0 /100 WBCS
PLATELET # BLD AUTO: 236 THOUSANDS/UL (ref 149–390)
PMV BLD AUTO: 8.9 FL (ref 8.9–12.7)
POTASSIUM SERPL-SCNC: 4.5 MMOL/L (ref 3.5–5.3)
PROT SERPL-MCNC: 6.4 G/DL (ref 6.4–8.4)
RBC # BLD AUTO: 4.33 MILLION/UL (ref 3.81–5.12)
SODIUM SERPL-SCNC: 135 MMOL/L (ref 135–147)
TRIGL SERPL-MCNC: 107 MG/DL
WBC # BLD AUTO: 8.1 THOUSAND/UL (ref 4.31–10.16)

## 2024-07-18 PROCEDURE — 36415 COLL VENOUS BLD VENIPUNCTURE: CPT

## 2024-07-18 PROCEDURE — 80053 COMPREHEN METABOLIC PANEL: CPT

## 2024-07-18 PROCEDURE — 80366 DRUG SCREENING PREGABALIN: CPT

## 2024-07-18 PROCEDURE — 80359 METHYLENEDIOXYAMPHETAMINES: CPT

## 2024-07-18 PROCEDURE — 80355 GABAPENTIN NON-BLOOD: CPT

## 2024-07-18 PROCEDURE — 80307 DRUG TEST PRSMV CHEM ANLYZR: CPT

## 2024-07-18 PROCEDURE — 80324 DRUG SCREEN AMPHETAMINES 1/2: CPT

## 2024-07-18 PROCEDURE — 85025 COMPLETE CBC W/AUTO DIFF WBC: CPT

## 2024-07-18 PROCEDURE — 80346 BENZODIAZEPINES1-12: CPT

## 2024-07-18 PROCEDURE — 80061 LIPID PANEL: CPT

## 2024-07-18 PROCEDURE — 80362 OPIOIDS & OPIATE ANALOGS 1/2: CPT

## 2024-07-18 PROCEDURE — 80348 DRUG SCREENING BUPRENORPHINE: CPT

## 2024-07-18 PROCEDURE — 80353 DRUG SCREENING COCAINE: CPT

## 2024-07-22 LAB
1OH-MIDAZOLAM UR CFM-MCNC: NOT DETECTED NG/MG CREAT
6MAM UR QL SCN: NEGATIVE NG/ML
7AMINOCLONAZEPAM UR CFM-MCNC: 28 NG/MG CREAT
A-OH ALPRAZ UR QL CFM: NOT DETECTED NG/MG CREAT
ACCEPTABLE CREAT UR QL: 127 MG/DL
ALPRAZ/CREAT UR CFM: NOT DETECTED NG/MG CREAT
AMPHET UR QL CFM: 39 NG/MG CREAT
AMPHET UR QL SCN: ABNORMAL NG/ML
BARBITURATES UR QL SCN: NEGATIVE NG/ML
BENZODIAZ UR QL SCN: ABNORMAL NG/ML
BUPRENORPHINE UR QL CFM: ABNORMAL NG/ML
BZE UR QL CFM: NOT DETECTED NG/MG CREAT
CANNABINOIDS UR QL SCN: NEGATIVE NG/ML
CARISOPRODOL UR QL: NEGATIVE NG/ML
CLONAZEPAM/CREAT UR CFM: NOT DETECTED NG/MG CREAT
COCAINE+BZE UR QL SCN: NEGATIVE NG/ML
COCAINE/CREAT UR CFM: NOT DETECTED NG/MG CREAT
DIAZEPAM/CREAT UR: NOT DETECTED NG/MG CREAT
ETHYL GLUCURONIDE UR QL SCN: NEGATIVE NG/ML
FENTANYL UR QL SCN: NEGATIVE NG/ML
FLUNITRAZEPAM UR-MCNC: NOT DETECTED NG/MG CREAT
GABAPENTIN SERPLBLD QL SCN: ABNORMAL UG/ML
GABAPENTIN UR QL CFM: PRESENT
LORAZEPAM UR CFM-MCNC: NOT DETECTED NG/MG CREAT
MDMA UR QL CFM: NOT DETECTED NG/MG CREAT
MDMA UR QL CFM: NOT DETECTED NG/MG CREAT
METHADONE UR QL SCN: NEGATIVE NG/ML
METHAMPHET UR QL CFM: 228 NG/MG CREAT
MIDAZOLAM/CREAT UR CFM: NOT DETECTED NG/MG CREAT
NALOXONE UR CFM-MCNC: 531 NG/MG CREAT
NITRITE UR QL STRIP: NEGATIVE UG/ML
NORBUP/BUP RATIO: 1.02
NORBUPRENORPHINE UR CFM-MCNC: 516 NG/MG CREAT
NORBUPRENORPHINE/CREAT UR: 505 NG/MG CREAT
NORCODEINE/CREAT UR CFM: NOT DETECTED NG/MG CREAT
NORDIAZEPAM UR CFM-MCNC: NOT DETECTED NG/MG CREAT
NORFLUNITRAZEPAM UR-MCNC: NOT DETECTED NG/MG CREAT
OH-ETHYLFLURAZ UR CFM-MCNC: NOT DETECTED NG/MG CREAT
OH-TRIAZOLAM UR CFM-MCNC: NOT DETECTED NG/MG CREAT
OPIATES UR QL SCN: NEGATIVE NG/ML
OXAZEPAM CTO UR CFM-MCNC: NOT DETECTED NG/MG CREAT
OXYCODONE+OXYMORPHONE UR QL SCN: NEGATIVE NG/ML
PCP UR QL SCN: NEGATIVE NG/ML
PREGABALIN UR QL CFM: NOT DETECTED
PROPOXYPH UR QL SCN: NEGATIVE NG/ML
SPECIMEN PH ACCEPTABLE UR: 6.7 (ref 4.5–8.9)
TAPENTADOL UR QL SCN: NEGATIVE NG/ML
TEMAZEPAM UR CFM-MCNC: NOT DETECTED NG/MG CREAT
TRAMADOL UR QL SCN: NEGATIVE NG/ML

## 2024-07-30 ENCOUNTER — TELEPHONE (OUTPATIENT)
Dept: GYNECOLOGY | Facility: CLINIC | Age: 49
End: 2024-07-30

## 2024-07-30 NOTE — TELEPHONE ENCOUNTER
Spoke to [employer] service ride was set up for 8/2/2024 Pt. To be picked up at 2:30 pm outside her home  by a lyft or uber.  Could not leqve message on Pt's phone called her mother and mother states Pt. Is in a hosp in [place]. Mother to speak with her daughter and call back when we know if Pt. Will make appt .

## 2024-08-02 ENCOUNTER — TELEPHONE (OUTPATIENT)
Dept: GYNECOLOGY | Facility: CLINIC | Age: 49
End: 2024-08-02

## 2024-08-02 NOTE — TELEPHONE ENCOUNTER
LV with pt mom to let her know that her lyft  has been rescheduled for 8:50 AM for 08/23/24. Her appointment is for 9:30 am that day

## 2024-08-12 ENCOUNTER — TELEPHONE (OUTPATIENT)
Dept: INTERNAL MEDICINE CLINIC | Facility: CLINIC | Age: 49
End: 2024-08-12

## 2024-08-16 NOTE — TELEPHONE ENCOUNTER
Patient called the office requesting for a appointment with Dr. Julio to discuss medication adjustment. Next available appointment offered to pt on 8/27/2024. Pt refused appointment as her psych doctor will be back in town. No further questions.

## 2024-08-19 DIAGNOSIS — F41.9 ANXIETY DISORDER, UNSPECIFIED: ICD-10-CM

## 2024-08-19 DIAGNOSIS — F32.A DEPRESSION: ICD-10-CM

## 2024-08-19 NOTE — TELEPHONE ENCOUNTER
TWO REFILLS  Medication: clonazePAM (KlonoPIN) 1 mg tablet     Dose/Frequency: Take 1 tablet (1 mg total) by mouth 3 (three) times a day     Quantity: 90 tablet     Pharmacy: [address]     Office:   [x] PCP/Provider - Christian Julio, DO   [] Speciality/Provider -     Does the patient have enough for 3 days?   [] Yes   [x] No - Send as HP to POD         Medication: gabapentin (NEURONTIN) 300 mg capsule     Dose/Frequency: Take 2 capsules (600 mg total) by mouth 3 (three) times a day     Quantity: 180 capsule     Pharmacy: [address]     Office:   [x] PCP/Provider - Christian Julio, DO   [] Speciality/Provider -     Does the patient have enough for 3 days?   [] Yes   [x] No - Send as HP to POD

## 2024-08-20 RX ORDER — GABAPENTIN 300 MG/1
600 CAPSULE ORAL 3 TIMES DAILY
Qty: 180 CAPSULE | Refills: 5 | Status: SHIPPED | OUTPATIENT
Start: 2024-08-20

## 2024-08-20 RX ORDER — CLONAZEPAM 1 MG/1
1 TABLET ORAL 3 TIMES DAILY
Qty: 90 TABLET | Refills: 0 | OUTPATIENT
Start: 2024-08-20 | End: 2024-09-19

## 2024-08-27 ENCOUNTER — OFFICE VISIT (OUTPATIENT)
Dept: INTERNAL MEDICINE CLINIC | Facility: CLINIC | Age: 49
End: 2024-08-27
Payer: COMMERCIAL

## 2024-08-27 VITALS
HEART RATE: 101 BPM | RESPIRATION RATE: 18 BRPM | BODY MASS INDEX: 30.58 KG/M2 | TEMPERATURE: 96.1 F | SYSTOLIC BLOOD PRESSURE: 124 MMHG | DIASTOLIC BLOOD PRESSURE: 60 MMHG | HEIGHT: 61 IN | WEIGHT: 162 LBS | OXYGEN SATURATION: 96 %

## 2024-08-27 DIAGNOSIS — Z12.11 SCREENING FOR COLORECTAL CANCER: Primary | ICD-10-CM

## 2024-08-27 DIAGNOSIS — Z12.12 SCREENING FOR COLORECTAL CANCER: Primary | ICD-10-CM

## 2024-08-27 DIAGNOSIS — Z12.31 ENCOUNTER FOR SCREENING MAMMOGRAM FOR BREAST CANCER: ICD-10-CM

## 2024-08-27 DIAGNOSIS — E78.5 HYPERLIPIDEMIA, UNSPECIFIED HYPERLIPIDEMIA TYPE: ICD-10-CM

## 2024-08-27 DIAGNOSIS — Z12.4 SCREENING FOR CERVICAL CANCER: ICD-10-CM

## 2024-08-27 DIAGNOSIS — R11.0 NAUSEA: ICD-10-CM

## 2024-08-27 DIAGNOSIS — R59.0 HILAR ADENOPATHY: ICD-10-CM

## 2024-08-27 DIAGNOSIS — F31.9 BIPOLAR 1 DISORDER (HCC): ICD-10-CM

## 2024-08-27 DIAGNOSIS — F11.20 OPIOID USE DISORDER, SEVERE, ON MAINTENANCE THERAPY (HCC): ICD-10-CM

## 2024-08-27 PROCEDURE — 99214 OFFICE O/P EST MOD 30 MIN: CPT | Performed by: INTERNAL MEDICINE

## 2024-08-27 RX ORDER — BUPROPION HYDROCHLORIDE 150 MG/1
TABLET ORAL
Qty: 90 TABLET | Refills: 0 | Status: SHIPPED | OUTPATIENT
Start: 2024-08-27

## 2024-08-27 RX ORDER — NAPROXEN 500 MG/1
500 TABLET ORAL
COMMUNITY
Start: 2024-07-03 | End: 2025-07-03

## 2024-08-27 RX ORDER — POLYETHYLENE GLYCOL 3350 17 G/17G
POWDER, FOR SOLUTION ORAL
COMMUNITY
Start: 2024-07-31

## 2024-08-27 RX ORDER — BUPRENORPHINE AND NALOXONE 8; 2 MG/1; MG/1
FILM, SOLUBLE BUCCAL; SUBLINGUAL
COMMUNITY
Start: 2024-08-14

## 2024-08-27 RX ORDER — ONDANSETRON 4 MG/1
4 TABLET, ORALLY DISINTEGRATING ORAL EVERY 6 HOURS PRN
Qty: 30 TABLET | Refills: 0 | Status: SHIPPED | OUTPATIENT
Start: 2024-08-27

## 2024-08-27 RX ORDER — NALOXONE HYDROCHLORIDE 4 MG/.1ML
4 SPRAY NASAL
COMMUNITY
Start: 2024-07-03

## 2024-08-27 RX ORDER — BUDESONIDE AND FORMOTEROL FUMARATE DIHYDRATE 160; 4.5 UG/1; UG/1
2 AEROSOL RESPIRATORY (INHALATION) 2 TIMES DAILY
COMMUNITY
Start: 2024-06-03

## 2024-08-27 NOTE — PROGRESS NOTES
INTERNAL MEDICINE OFFICE VISIT  [facility]- [place]    NAME: Carey Keith  AGE: 49 y.o. SEX: female    DATE OF ENCOUNTER: 8/27/2024    Assessment and Plan/History of Present Illness     Here today for follow up  Medical history of bipolar 1 disorder, anxiety/depression, opioid use disorder on Suboxone     Patient reports intermittent numbness, tingling, pain in the second and third digits bilaterally.  Occasionally fingers will get locked.  Negative Tinel sign, negative Durkan's test on exam.  No significant muscle weakness or thenar atrophy on exam  -Symptoms may be multifactorial related to trigger finger, carpal tunnel, OA  -Appears to be tolerating okay at this time, will continue to monitor clinically    1. Screening for colorectal cancer  2. Screening for cervical cancer  3. Encounter for screening mammogram for breast cancer  4. Hyperlipidemia, unspecified hyperlipidemia type  -     CBC and differential; Future  -     Comprehensive metabolic panel; Future  -     Lipid Panel with Direct LDL reflex; Future  -     TSH, 3rd generation with Free T4 reflex; Future  5. Bipolar 1 disorder (HCC)  Assessment & Plan:  Symptoms appear to be stable at this time.  Patient is following with psychiatry    Current regimen:  Gabapentin 600 mg 3 times daily  Bupropion 300 mg in the a.m.   Patient also states she is taking bupropion 150 mg in the afternoon and requests refill today which was sent  Clonazepam 1 mg 3 times daily  Topiramate 200 mg daily  Trazodone 100 mg daily at bedtime  Orders:  -     buPROPion (WELLBUTRIN XL) 150 mg 24 hr tablet; Take 1 tablet (150 mg total) by mouth daily in the afternoon  6. Nausea  -     ondansetron (ZOFRAN-ODT) 4 mg disintegrating tablet; Take 1 tablet (4 mg total) by mouth every 6 (six) hours as needed for nausea or vomiting  7. Hilar adenopathy  Assessment & Plan:  CTA of the chest completed December 2023 -stable mild mediastinal and hilar adenopathy possibly in the  "setting of underlying granulomatous disease such as sarcoidosis  -Evaluated by pulmonology at [facility] -probable sarcoidosis  -Pulmonology recommending repeat CT of the chest, PFTs    -Maintained on Symbicort  -Albuterol as needed  8. Opioid use disorder, severe, on maintenance therapy (HCC)  Assessment & Plan:  Maintained on Suboxone             Orders Placed This Encounter   Procedures   • CBC and differential   • Comprehensive metabolic panel   • Lipid Panel with Direct LDL reflex   • TSH, 3rd generation with Free T4 reflex         Chief Complaint     Chief Complaint   Patient presents with   • Follow-up     Review medications / per pt pneumo tdap hep A        Review of Systems     10 point ROS negative except per HPI    The following portions of the patient's history were reviewed and updated as appropriate: allergies, current medications, past family history, past medical history, past social history, past surgical history and problem list.    Objective     /60 (BP Location: Left arm, Patient Position: Sitting, Cuff Size: Standard)   Pulse 101   Temp (!) 96.1 °F (35.6 °C)   Resp 18   Ht 5' 1\" (1.549 m)   Wt 73.5 kg (162 lb)   SpO2 96%   BMI 30.61 kg/m²     Physical Exam  Cardiovascular:      Rate and Rhythm: Normal rate and regular rhythm.      Heart sounds: Normal heart sounds. No murmur heard.  Pulmonary:      Effort: Pulmonary effort is normal.      Breath sounds: Normal breath sounds. No wheezing or rales.           Current Medications     Current Outpatient Medications:   •  albuterol (Proventil HFA) 90 mcg/act inhaler, Inhale 2 puffs every 6 (six) hours as needed for wheezing, Disp: 6.7 g, Rfl: 0  •  buprenorphine-naloxone (Suboxone) 8-2 mg, PLACE 1 FILM UNDER THE TONGUE 3 TIMES A DAY., Disp: , Rfl:   •  buPROPion (WELLBUTRIN XL) 150 mg 24 hr tablet, Take 1 tablet (150 mg total) by mouth daily in the afternoon, Disp: 90 tablet, Rfl: 0  •  buPROPion (WELLBUTRIN XL) 300 mg 24 hr tablet, Take 1 " tablet (300 mg total) by mouth daily, Disp: 90 tablet, Rfl: 0  •  clonazePAM (KlonoPIN) 1 mg tablet, Take 1 tablet (1 mg total) by mouth 3 (three) times a day, Disp: 90 tablet, Rfl: 0  •  ergocalciferol (VITAMIN D2) 50,000 units, Take 1 capsule (50,000 Units total) by mouth once a week for 5 doses, Disp: 5 capsule, Rfl: 0  •  gabapentin (NEURONTIN) 300 mg capsule, Take 2 capsules (600 mg total) by mouth 3 (three) times a day, Disp: 180 capsule, Rfl: 5  •  naproxen (NAPROSYN) 500 mg tablet, Take 500 mg by mouth, Disp: , Rfl:   •  Narcan 4 MG/0.1ML nasal spray, 4 mg into each nostril, Disp: , Rfl:   •  ondansetron (ZOFRAN-ODT) 4 mg disintegrating tablet, Take 1 tablet (4 mg total) by mouth every 6 (six) hours as needed for nausea or vomiting, Disp: 30 tablet, Rfl: 0  •  polyethylene glycol (GLYCOLAX) 17 GM/SCOOP powder, MIX 17 GRAMS WITH LIQUID AND DRINK DAILY, Disp: , Rfl:   •  Symbicort 160-4.5 MCG/ACT inhaler, Inhale 2 puffs 2 (two) times a day, Disp: , Rfl:   •  topiramate (TOPAMAX) 200 MG tablet, Take 1 tablet (200 mg total) by mouth daily, Disp: 30 tablet, Rfl: 0  •  traZODone (DESYREL) 100 mg tablet, Take 1 tablet (100 mg total) by mouth daily at bedtime, Disp: 30 tablet, Rfl: 0  •  valACYclovir (VALTREX) 500 mg tablet, Take 1 tablet (500 mg total) by mouth 2 (two) times a day for 3 days, Disp: 6 tablet, Rfl: 0      Christian Julio D.O.  [facility] [address]  [place] [address]  Office: [phone]  Fax: [phone]

## 2024-08-27 NOTE — ASSESSMENT & PLAN NOTE
CTA of the chest completed December 2023 -stable mild mediastinal and hilar adenopathy possibly in the setting of underlying granulomatous disease such as sarcoidosis  -Evaluated by pulmonology at [facility] -probable sarcoidosis  -Pulmonology recommending repeat CT of the chest, PFTs    -Maintained on Symbicort  -Albuterol as needed

## 2024-08-27 NOTE — ASSESSMENT & PLAN NOTE
Symptoms appear to be stable at this time.  Patient is following with psychiatry    Current regimen:  Gabapentin 600 mg 3 times daily  Bupropion 300 mg in the a.m.   Patient also states she is taking bupropion 150 mg in the afternoon and requests refill today which was sent  Clonazepam 1 mg 3 times daily  Topiramate 200 mg daily  Trazodone 100 mg daily at bedtime

## 2024-10-06 PROCEDURE — 99283 EMERGENCY DEPT VISIT LOW MDM: CPT

## 2024-10-07 ENCOUNTER — HOSPITAL ENCOUNTER (EMERGENCY)
Facility: HOSPITAL | Age: 49
Discharge: HOME | End: 2024-10-07
Attending: EMERGENCY MEDICINE
Payer: COMMERCIAL

## 2024-10-07 ENCOUNTER — APPOINTMENT (EMERGENCY)
Dept: RADIOLOGY | Facility: HOSPITAL | Age: 49
End: 2024-10-07
Payer: COMMERCIAL

## 2024-10-07 VITALS
HEART RATE: 110 BPM | DIASTOLIC BLOOD PRESSURE: 85 MMHG | BODY MASS INDEX: 30.74 KG/M2 | OXYGEN SATURATION: 98 % | RESPIRATION RATE: 18 BRPM | TEMPERATURE: 98.6 F | SYSTOLIC BLOOD PRESSURE: 128 MMHG | WEIGHT: 162.7 LBS

## 2024-10-07 DIAGNOSIS — S00.81XA ABRASION OF FACE, INITIAL ENCOUNTER: ICD-10-CM

## 2024-10-07 DIAGNOSIS — J06.9 VIRAL URI WITH COUGH: Primary | ICD-10-CM

## 2024-10-07 DIAGNOSIS — B00.2 ORAL HERPES: ICD-10-CM

## 2024-10-07 DIAGNOSIS — R19.7 DIARRHEA: ICD-10-CM

## 2024-10-07 LAB
FLUAV AG UPPER RESP QL IA.RAPID: NEGATIVE
FLUBV AG UPPER RESP QL IA.RAPID: NEGATIVE
SARS-COV+SARS-COV-2 AG RESP QL IA.RAPID: NEGATIVE

## 2024-10-07 PROCEDURE — 87804 INFLUENZA ASSAY W/OPTIC: CPT | Performed by: PHYSICIAN ASSISTANT

## 2024-10-07 PROCEDURE — 87811 SARS-COV-2 COVID19 W/OPTIC: CPT | Performed by: PHYSICIAN ASSISTANT

## 2024-10-07 PROCEDURE — 99284 EMERGENCY DEPT VISIT MOD MDM: CPT | Performed by: PHYSICIAN ASSISTANT

## 2024-10-07 PROCEDURE — 71046 X-RAY EXAM CHEST 2 VIEWS: CPT

## 2024-10-07 RX ORDER — BACITRACIN, NEOMYCIN, POLYMYXIN B 400; 3.5; 5 [USP'U]/G; MG/G; [USP'U]/G
1 OINTMENT TOPICAL ONCE
Status: COMPLETED | OUTPATIENT
Start: 2024-10-07 | End: 2024-10-07

## 2024-10-07 RX ORDER — VALACYCLOVIR HYDROCHLORIDE 500 MG/1
500 TABLET, FILM COATED ORAL 2 TIMES DAILY
Qty: 6 TABLET | Refills: 0 | Status: SHIPPED | OUTPATIENT
Start: 2024-10-07 | End: 2024-10-10

## 2024-10-07 RX ADMIN — BACITRACIN ZINC, NEOMYCIN, POLYMYXIN B 1 LARGE APPLICATION: 400; 3.5; 5 OINTMENT TOPICAL at 01:28

## 2024-10-07 NOTE — ED PROVIDER NOTES
Final diagnoses:   Viral URI with cough   Diarrhea   Abrasion of face, initial encounter   Oral herpes     ED Disposition       ED Disposition   Discharge    Condition   Stable    Date/Time   Mon Oct 7, 2024 12:50 AM    Comment   Carey Keith discharge to home/self care.             Assessment & Plan       Medical Decision Making      The patient is stable and has a history and physical exam consistent with a viral illness. COVID/Flu testing has been performed.  CXR for evaluation of acute cardiopulmonary abnormalities in the setting of cough and subjective shortness of breath.  I considered the patient's other medical conditions as applicable/noted above in my medical decision making.  Offered Bentyl for diarrhea, patient declines as she states due to her taking Suboxone she does not have bowel movements often.  The patient is stable upon discharge. The plan is for supportive care at home.  Symptomatic therapy suggested: rest, increase fluids, gargle prn for sore throat, OTC acetaminophen, ibuprofen, cough suppressant of choice, bland diet, and call prn if symptoms persist or worsen. Call or go to PCP if these symptoms persist or fail to improve as anticipated. Return to ER precautions discussed as well.  Patient provided with Neosporin for her abrasion on her face.  Also represcribed Valtrex per patient request due to concern for developing a herpes outbreak.    Prior to discharge, discharge instructions were discussed with patient at bedside. Patient was provided both verbal and written instructions. Patient is understanding of the discharge instructions and is agreeable to plan of care. Return precautions were discussed with patient bedside, patient verbalized understanding of signs and symptoms that would necessitate return to the ED. All questions were answered. Patient was comfortable with the plan of care and discharged to home.     Dispo: discharge home with follow up to PCP. Patient stable, in no acute  "distress and non-toxic at discharge.    Problems Addressed:  Abrasion of face, initial encounter: acute illness or injury  Diarrhea: acute illness or injury  Oral herpes: chronic illness or injury with exacerbation, progression, or side effects of treatment  Viral URI with cough: acute illness or injury    Amount and/or Complexity of Data Reviewed  Labs: ordered.  Radiology: ordered and independent interpretation performed.    Risk  OTC drugs.  Prescription drug management.             Medications   neomycin-bacitracin-polymyxin b (NEOSPORIN) ointment 1 large application (1 large application Topical Given 10/7/24 0128)       ED Risk Strat Scores                                               History of Present Illness       Chief Complaint   Patient presents with    Cold Like Symptoms     Patient is here for cough with flem, and diarrhea for the last 3 days.       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)     Alcohol abuse     Alcoholism (HCC)     Altered mental status 09/21/2022    Elevated LFTs 09/21/2022    Lower back pain     Self-injurious behavior     Substance abuse (HCC)       Past Surgical History:   Procedure Laterality Date    CHOLECYSTECTOMY        Family History   Problem Relation Age of Onset    Autism Mother     Heart disease Father     Stroke Father     Anxiety disorder Father     Heart disease Maternal Grandmother       Social History     Tobacco Use    Smoking status: Some Days     Current packs/day: 0.50     Average packs/day: 0.5 packs/day for 20.0 years (10.0 ttl pk-yrs)     Types: Cigarettes    Smokeless tobacco: Current    Tobacco comments:     Pt not ready to quit   Vaping Use    Vaping status: Every Day    Substances: Nicotine, THC, Flavoring   Substance Use Topics    Alcohol use: Not Currently     Comment: MAGDALENO, pt historically inconsistent. Drinking  per [facility] 2    Drug use: Not Currently     Types: Heroin, \"Crack\" cocaine, Cocaine, LSD, Benzodiazepines, " "Marijuana     Comment: Pt unreliable historian      E-Cigarette/Vaping    E-Cigarette Use Current Every Day User     Cartridges/Day 2       E-Cigarette/Vaping Substances    Nicotine Yes     THC Yes     CBD No     Flavoring Yes     Other No     Unknown No       I have reviewed and agree with the history as documented.     This is a 49-year-old female with history of drug and alcohol abuse, and psychiatric disorder presenting to ED for evaluation multiple complaints. She states that over the last 3 days she has been coughing up phlegm and has diarrhea.  She states that a friend who she has been in contact with has been sick with similar symptoms.  She states that she occasionally has shortness of breath associated with symptoms.  She endorses abdominal cramping.  No true abdominal pain.  She denies any dysuria, hematuria, frequency or urgency.  She denies any fevers or chills.  She states that she also believes she may be developing a herpes outbreak, states that she has had this in the past and has had cold sores recently.  She feels that she may be developing herpes ulcer to the genitals, does not currently have 1.  She is able to tolerate p.o. intake.  Patient also has an abrasion to the proximal nasal bridge, when asked how she received the abrasion she states that \"I do not know\".  But states that it has been dry and continues to crack open.      History provided by:  Patient   used: No        Review of Systems   Constitutional:  Negative for chills and fever.   Respiratory:  Positive for cough and shortness of breath.    Cardiovascular:  Negative for chest pain and palpitations.   Gastrointestinal:  Positive for abdominal pain and diarrhea. Negative for nausea and vomiting.   Genitourinary:  Negative for difficulty urinating, dysuria, flank pain and hematuria.   Musculoskeletal:  Negative for back pain.   Neurological:  Negative for dizziness, light-headedness and headaches.   All other " systems reviewed and are negative.          Objective       ED Triage Vitals [10/07/24 0003]   Temperature Pulse Blood Pressure Respirations SpO2 Patient Position - Orthostatic VS   98.6 °F (37 °C) (!) 110 128/85 18 98 % Sitting      Temp Source Heart Rate Source BP Location FiO2 (%) Pain Score    Oral Monitor Right arm -- --      Vitals      Date and Time Temp Pulse SpO2 Resp BP Pain Score FACES Pain Rating User   10/07/24 0003 98.6 °F (37 °C) 110 98 % 18 128/85 -- -- [facility]            Physical Exam  Vitals reviewed.   Constitutional:       General: She is not in acute distress.     Appearance: Normal appearance. She is well-developed and well-groomed. She is not ill-appearing, toxic-appearing or diaphoretic.   HENT:      Head: Normocephalic and atraumatic.        Comments: Abrasion proximal to the nasal bridge     Right Ear: External ear normal.      Left Ear: External ear normal.      Nose: Nose normal. No congestion or rhinorrhea.      Mouth/Throat:      Mouth: Mucous membranes are moist.      Pharynx: Oropharynx is clear. No oropharyngeal exudate or posterior oropharyngeal erythema.   Eyes:      General: No scleral icterus.        Right eye: No discharge.         Left eye: No discharge.      Extraocular Movements: Extraocular movements intact.      Conjunctiva/sclera: Conjunctivae normal.   Cardiovascular:      Rate and Rhythm: Normal rate and regular rhythm.      Pulses: Normal pulses.      Heart sounds: No murmur heard.     No friction rub. No gallop.   Pulmonary:      Effort: Pulmonary effort is normal. No respiratory distress.      Breath sounds: Normal breath sounds. No wheezing, rhonchi or rales.   Abdominal:      General: Abdomen is flat. There is no distension.      Palpations: Abdomen is soft.      Tenderness: There is no abdominal tenderness. There is no right CVA tenderness, left CVA tenderness, guarding or rebound.   Musculoskeletal:         General: No deformity. Normal range of motion.      Cervical  back: Normal range of motion and neck supple.   Skin:     General: Skin is warm and dry.      Coloration: Skin is not jaundiced or pale.      Findings: No rash.   Neurological:      General: No focal deficit present.      Mental Status: She is alert.   Psychiatric:         Mood and Affect: Mood normal.         Speech: Speech is rapid and pressured and tangential.         Behavior: Behavior is hyperactive. Behavior is cooperative.         Thought Content: Thought content is not paranoid.         Results Reviewed       Procedure Component Value Units Date/Time    FLU/COVID Rapid Antigen (30 min. TAT) - Preferred screening test in ED [[id number]]  (Normal) Collected: 10/07/24 0028    Lab Status: Final result Specimen: Nares from Nose Updated: 10/07/24 0045     SARS COV Rapid Antigen Negative     Influenza A Rapid Antigen Negative     Influenza B Rapid Antigen Negative    Narrative:      This test has been performed using the Quidel Karol 2 FLU+SARS Antigen test under the Emergency Use Authorization (EUA). This test has been validated by the  and verified by the performing laboratory. The Karol uses lateral flow immunofluorescent sandwich assay to detect SARS-COV, Influenza A and Influenza B Antigen.     The Quidel Karol 2 SARS Antigen test does not differentiate between SARS-CoV and SARS-CoV-2.     Negative results are presumptive and may be confirmed with a molecular assay, if necessary, for patient management. Negative results do not rule out SARS-CoV-2 or influenza infection and should not be used as the sole basis for treatment or patient management decisions. A negative test result may occur if the level of antigen in a sample is below the limit of detection of this test.     Positive results are indicative of the presence of viral antigens, but do not rule out bacterial infection or co-infection with other viruses.     All test results should be used as an adjunct to clinical observations and other  information available to the provider.    FOR PEDIATRIC PATIENTS - copy/paste COVID Guidelines URL to browser: [email or web address]            XR chest 2 views   ED Interpretation by Lynn Culp PA-C (10/07 0124)   No acute cardiopulmonary disease as interpreted by me at this time.          Procedures    ED Medication and Procedure Management   Prior to Admission Medications   Prescriptions Last Dose Informant Patient Reported? Taking?   Narcan 4 MG/0.1ML nasal spray   Yes No   Si mg into each nostril   Symbicort 160-4.5 MCG/ACT inhaler   Yes No   Sig: Inhale 2 puffs 2 (two) times a day   albuterol (Proventil HFA) 90 mcg/act inhaler   No No   Sig: Inhale 2 puffs every 6 (six) hours as needed for wheezing   buPROPion (WELLBUTRIN XL) 150 mg 24 hr tablet   No No   Sig: Take 1 tablet (150 mg total) by mouth daily in the afternoon   buPROPion (WELLBUTRIN XL) 300 mg 24 hr tablet   No No   Sig: Take 1 tablet (300 mg total) by mouth daily   buprenorphine-naloxone (Suboxone) 8-2 mg   Yes No   Sig: PLACE 1 FILM UNDER THE TONGUE 3 TIMES A DAY.   clonazePAM (KlonoPIN) 1 mg tablet   No No   Sig: Take 1 tablet (1 mg total) by mouth 3 (three) times a day   ergocalciferol (VITAMIN D2) 50,000 units   No No   Sig: Take 1 capsule (50,000 Units total) by mouth once a week for 5 doses   gabapentin (NEURONTIN) 300 mg capsule   No No   Sig: Take 2 capsules (600 mg total) by mouth 3 (three) times a day   naproxen (NAPROSYN) 500 mg tablet   Yes No   Sig: Take 500 mg by mouth   ondansetron (ZOFRAN-ODT) 4 mg disintegrating tablet   No No   Sig: Take 1 tablet (4 mg total) by mouth every 6 (six) hours as needed for nausea or vomiting   polyethylene glycol (GLYCOLAX) 17 GM/SCOOP powder   Yes No   Sig: MIX 17 GRAMS WITH LIQUID AND DRINK DAILY   topiramate (TOPAMAX) 200 MG tablet   No No   Sig: Take 1 tablet (200 mg total) by mouth daily   traZODone (DESYREL) 100 mg tablet   No No    Sig: Take 1 tablet (100 mg total) by mouth daily at bedtime   valACYclovir (VALTREX) 500 mg tablet   No No   Sig: Take 1 tablet (500 mg total) by mouth 2 (two) times a day for 3 days   valACYclovir (VALTREX) 500 mg tablet   No Yes   Sig: Take 1 tablet (500 mg total) by mouth 2 (two) times a day for 3 days      Facility-Administered Medications: None     Discharge Medication List as of 10/7/2024  1:25 AM        CONTINUE these medications which have CHANGED    Details   valACYclovir (VALTREX) 500 mg tablet Take 1 tablet (500 mg total) by mouth 2 (two) times a day for 3 days, Starting Mon 10/7/2024, Until Thu 10/10/2024, Normal           CONTINUE these medications which have NOT CHANGED    Details   albuterol (Proventil HFA) 90 mcg/act inhaler Inhale 2 puffs every 6 (six) hours as needed for wheezing, Starting Thu 2/8/2024, Normal      buprenorphine-naloxone (Suboxone) 8-2 mg PLACE 1 FILM UNDER THE TONGUE 3 TIMES A DAY., Historical Med      !! buPROPion (WELLBUTRIN XL) 150 mg 24 hr tablet Take 1 tablet (150 mg total) by mouth daily in the afternoon, Normal      !! buPROPion (WELLBUTRIN XL) 300 mg 24 hr tablet Take 1 tablet (300 mg total) by mouth daily, Starting Fri 6/14/2024, Normal      clonazePAM (KlonoPIN) 1 mg tablet Take 1 tablet (1 mg total) by mouth 3 (three) times a day, Starting Mon 6/3/2024, Until Tue 8/27/2024, Normal      ergocalciferol (VITAMIN D2) 50,000 units Take 1 capsule (50,000 Units total) by mouth once a week for 5 doses, Starting Thu 5/9/2024, Until Tue 8/27/2024, Normal      gabapentin (NEURONTIN) 300 mg capsule Take 2 capsules (600 mg total) by mouth 3 (three) times a day, Starting Tue 8/20/2024, Normal      naproxen (NAPROSYN) 500 mg tablet Take 500 mg by mouth, Starting Wed 7/3/2024, Until Thu 7/3/2025 at 2359, Historical Med      Narcan 4 MG/0.1ML nasal spray 4 mg into each nostril, Starting Wed 7/3/2024, Historical Med      ondansetron (ZOFRAN-ODT) 4 mg disintegrating tablet Take 1 tablet  (4 mg total) by mouth every 6 (six) hours as needed for nausea or vomiting, Starting Tue 8/27/2024, Normal      polyethylene glycol (GLYCOLAX) 17 GM/SCOOP powder MIX 17 GRAMS WITH LIQUID AND DRINK DAILY, Historical Med      Symbicort 160-4.5 MCG/ACT inhaler Inhale 2 puffs 2 (two) times a day, Starting Mon 6/3/2024, Historical Med      topiramate (TOPAMAX) 200 MG tablet Take 1 tablet (200 mg total) by mouth daily, Starting Mon 6/3/2024, Until Tue 8/27/2024, Normal      traZODone (DESYREL) 100 mg tablet Take 1 tablet (100 mg total) by mouth daily at bedtime, Starting Mon 6/3/2024, Until Tue 8/27/2024, Normal       !! - Potential duplicate medications found. Please discuss with provider.        No discharge procedures on file.  ED SEPSIS DOCUMENTATION   Time reflects when diagnosis was documented in both MDM as applicable and the Disposition within this note       Time User Action Codes Description Comment    10/7/2024 12:50 AM Lynn Culp [J06.9] Viral URI with cough     10/7/2024 12:50 AM Lynn Culp [R19.7] Diarrhea     10/7/2024 12:50 AM Lynn Culp [S00.81XA] Abrasion of face, initial encounter     10/7/2024 12:50 AM Lynn Culp [B00.2] Oral herpes                  Lynn Culp PA-C  10/07/24 0543

## 2024-10-07 NOTE — DISCHARGE INSTRUCTIONS
Take Tylenol or motrin if you develop a fever. Use as directed on the box.  Use over the counter cold and flu medicine for cough and congestion. Recommend Zarbees.   Use Flonase or nasal saline spray for nasal congestion.  Try using honey and warm water or tea for sore throat and cough.    Follow up with your family doctor if symptoms do not improve over the next couple of days.

## 2024-10-24 ENCOUNTER — APPOINTMENT (EMERGENCY)
Dept: CT IMAGING | Facility: HOSPITAL | Age: 49
End: 2024-10-24
Payer: COMMERCIAL

## 2024-10-24 ENCOUNTER — HOSPITAL ENCOUNTER (EMERGENCY)
Facility: HOSPITAL | Age: 49
End: 2024-10-24
Attending: EMERGENCY MEDICINE | Admitting: EMERGENCY MEDICINE
Payer: COMMERCIAL

## 2024-10-24 VITALS
DIASTOLIC BLOOD PRESSURE: 59 MMHG | SYSTOLIC BLOOD PRESSURE: 115 MMHG | OXYGEN SATURATION: 97 % | TEMPERATURE: 97.9 F | RESPIRATION RATE: 16 BRPM | HEART RATE: 67 BPM

## 2024-10-24 DIAGNOSIS — R45.851 SUICIDAL IDEATIONS: ICD-10-CM

## 2024-10-24 DIAGNOSIS — F19.10 SUBSTANCE ABUSE (HCC): Primary | ICD-10-CM
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
## 2024-10-24 DIAGNOSIS — Z86.59 HISTORY OF BIPOLAR DISORDER: ICD-10-CM

## 2024-10-24 LAB
AMPHETAMINES SERPL QL SCN: POSITIVE
ANION GAP SERPL CALCULATED.3IONS-SCNC: 9 MMOL/L (ref 4–13)
APAP SERPL-MCNC: <2 UG/ML (ref 10–20)
ATRIAL RATE: 375 BPM
ATRIAL RATE: 58 BPM
BACTERIA UR QL AUTO: ABNORMAL /HPF
BARBITURATES UR QL: NEGATIVE
BASOPHILS # BLD AUTO: 0.1 THOUSANDS/ΜL (ref 0–0.1)
BASOPHILS NFR BLD AUTO: 1 % (ref 0–1)
BENZODIAZ UR QL: POSITIVE
BILIRUB UR QL STRIP: ABNORMAL
BUN SERPL-MCNC: 16 MG/DL (ref 5–25)
CALCIUM SERPL-MCNC: 8.4 MG/DL (ref 8.4–10.2)
CHLORIDE SERPL-SCNC: 107 MMOL/L (ref 96–108)
CLARITY UR: CLEAR
CO2 SERPL-SCNC: 23 MMOL/L (ref 21–32)
COCAINE UR QL: NEGATIVE
COLOR UR: YELLOW
CREAT SERPL-MCNC: 0.75 MG/DL (ref 0.6–1.3)
EOSINOPHIL # BLD AUTO: 0.01 THOUSAND/ΜL (ref 0–0.61)
EOSINOPHIL NFR BLD AUTO: 0 % (ref 0–6)
ERYTHROCYTE [DISTWIDTH] IN BLOOD BY AUTOMATED COUNT: 13.2 % (ref 11.6–15.1)
ETHANOL SERPL-MCNC: <10 MG/DL
EXT PREGNANCY TEST URINE: NEGATIVE
EXT. CONTROL: NORMAL
FENTANYL UR QL SCN: NEGATIVE
GFR SERPL CREATININE-BSD FRML MDRD: 95 ML/MIN/1.73SQ M
GLUCOSE SERPL-MCNC: 84 MG/DL (ref 65–140)
GLUCOSE UR STRIP-MCNC: NEGATIVE MG/DL
HCT VFR BLD AUTO: 32.1 % (ref 34.8–46.1)
HGB BLD-MCNC: 10.5 G/DL (ref 11.5–15.4)
HGB UR QL STRIP.AUTO: ABNORMAL
HYALINE CASTS #/AREA URNS LPF: ABNORMAL /LPF
HYDROCODONE UR QL SCN: NEGATIVE
IMM GRANULOCYTES # BLD AUTO: 0.03 THOUSAND/UL (ref 0–0.2)
IMM GRANULOCYTES NFR BLD AUTO: 0 % (ref 0–2)
KETONES UR STRIP-MCNC: ABNORMAL MG/DL
LEUKOCYTE ESTERASE UR QL STRIP: NEGATIVE
LYMPHOCYTES # BLD AUTO: 2.4 THOUSANDS/ΜL (ref 0.6–4.47)
LYMPHOCYTES NFR BLD AUTO: 25 % (ref 14–44)
MAGNESIUM SERPL-MCNC: 2 MG/DL (ref 1.9–2.7)
MCH RBC QN AUTO: 28.2 PG (ref 26.8–34.3)
MCHC RBC AUTO-ENTMCNC: 32.7 G/DL (ref 31.4–37.4)
MCV RBC AUTO: 86 FL (ref 82–98)
METHADONE UR QL: NEGATIVE
MONOCYTES # BLD AUTO: 0.68 THOUSAND/ΜL (ref 0.17–1.22)
MONOCYTES NFR BLD AUTO: 7 % (ref 4–12)
MUCOUS THREADS UR QL AUTO: ABNORMAL
NEUTROPHILS # BLD AUTO: 6.25 THOUSANDS/ΜL (ref 1.85–7.62)
NEUTS SEG NFR BLD AUTO: 67 % (ref 43–75)
NITRITE UR QL STRIP: POSITIVE
NON-SQ EPI CELLS URNS QL MICRO: ABNORMAL /HPF
NRBC BLD AUTO-RTO: 0 /100 WBCS
OPIATES UR QL SCN: NEGATIVE
OXYCODONE+OXYMORPHONE UR QL SCN: NEGATIVE
PCP UR QL: NEGATIVE
PH UR STRIP.AUTO: 5.5 [PH] (ref 4.5–8)
PLATELET # BLD AUTO: 320 THOUSANDS/UL (ref 149–390)
PMV BLD AUTO: 8.3 FL (ref 8.9–12.7)
POTASSIUM SERPL-SCNC: 3 MMOL/L (ref 3.5–5.3)
PROT UR STRIP-MCNC: ABNORMAL MG/DL
QRS AXIS: 53 DEGREES
QRS AXIS: 57 DEGREES
QRSD INTERVAL: 72 MS
QRSD INTERVAL: 90 MS
QT INTERVAL: 412 MS
QT INTERVAL: 414 MS
QTC INTERVAL: 406 MS
QTC INTERVAL: 412 MS
RBC # BLD AUTO: 3.72 MILLION/UL (ref 3.81–5.12)
RBC #/AREA URNS AUTO: ABNORMAL /HPF
SALICYLATES SERPL-MCNC: <5 MG/DL (ref 3–20)
SODIUM SERPL-SCNC: 139 MMOL/L (ref 135–147)
SP GR UR STRIP.AUTO: >=1.03 (ref 1–1.03)
T WAVE AXIS: 34 DEGREES
T WAVE AXIS: 42 DEGREES
THC UR QL: NEGATIVE
UROBILINOGEN UR QL STRIP.AUTO: 1 E.U./DL
VENTRICULAR RATE: 58 BPM
VENTRICULAR RATE: 60 BPM
WBC # BLD AUTO: 9.47 THOUSAND/UL (ref 4.31–10.16)
WBC #/AREA URNS AUTO: ABNORMAL /HPF

## 2024-10-24 PROCEDURE — 93005 ELECTROCARDIOGRAM TRACING: CPT

## 2024-10-24 PROCEDURE — 81001 URINALYSIS AUTO W/SCOPE: CPT

## 2024-10-24 PROCEDURE — 93010 ELECTROCARDIOGRAM REPORT: CPT | Performed by: INTERNAL MEDICINE

## 2024-10-24 PROCEDURE — 99285 EMERGENCY DEPT VISIT HI MDM: CPT | Performed by: EMERGENCY MEDICINE

## 2024-10-24 PROCEDURE — 82077 ASSAY SPEC XCP UR&BREATH IA: CPT | Performed by: EMERGENCY MEDICINE

## 2024-10-24 PROCEDURE — 36415 COLL VENOUS BLD VENIPUNCTURE: CPT | Performed by: EMERGENCY MEDICINE

## 2024-10-24 PROCEDURE — 80307 DRUG TEST PRSMV CHEM ANLYZR: CPT | Performed by: EMERGENCY MEDICINE

## 2024-10-24 PROCEDURE — 96376 TX/PRO/DX INJ SAME DRUG ADON: CPT

## 2024-10-24 PROCEDURE — 70450 CT HEAD/BRAIN W/O DYE: CPT

## 2024-10-24 PROCEDURE — 80143 DRUG ASSAY ACETAMINOPHEN: CPT | Performed by: EMERGENCY MEDICINE

## 2024-10-24 PROCEDURE — 83735 ASSAY OF MAGNESIUM: CPT | Performed by: EMERGENCY MEDICINE

## 2024-10-24 PROCEDURE — 85025 COMPLETE CBC W/AUTO DIFF WBC: CPT | Performed by: EMERGENCY MEDICINE

## 2024-10-24 PROCEDURE — 80179 DRUG ASSAY SALICYLATE: CPT | Performed by: EMERGENCY MEDICINE

## 2024-10-24 PROCEDURE — 99284 EMERGENCY DEPT VISIT MOD MDM: CPT

## 2024-10-24 PROCEDURE — 96374 THER/PROPH/DIAG INJ IV PUSH: CPT

## 2024-10-24 PROCEDURE — 80048 BASIC METABOLIC PNL TOTAL CA: CPT | Performed by: EMERGENCY MEDICINE

## 2024-10-24 PROCEDURE — 81025 URINE PREGNANCY TEST: CPT | Performed by: EMERGENCY MEDICINE

## 2024-10-24 RX ORDER — BUPROPION HYDROCHLORIDE 150 MG/1
300 TABLET ORAL EVERY MORNING
Status: DISCONTINUED | OUTPATIENT
Start: 2024-10-25 | End: 2024-10-25 | Stop reason: HOSPADM

## 2024-10-24 RX ORDER — MAGNESIUM HYDROXIDE/ALUMINUM HYDROXICE/SIMETHICONE 120; 1200; 1200 MG/30ML; MG/30ML; MG/30ML
30 SUSPENSION ORAL EVERY 4 HOURS PRN
Status: CANCELLED | OUTPATIENT
Start: 2024-10-24

## 2024-10-24 RX ORDER — BENZTROPINE MESYLATE 1 MG/1
1 TABLET ORAL
Status: CANCELLED | OUTPATIENT
Start: 2024-10-24

## 2024-10-24 RX ORDER — POLYETHYLENE GLYCOL 3350 17 G/17G
17 POWDER, FOR SOLUTION ORAL DAILY PRN
Status: CANCELLED | OUTPATIENT
Start: 2024-10-24

## 2024-10-24 RX ORDER — IBUPROFEN 400 MG/1
800 TABLET, FILM COATED ORAL EVERY 8 HOURS PRN
Status: CANCELLED | OUTPATIENT
Start: 2024-10-24

## 2024-10-24 RX ORDER — HYDROXYZINE HYDROCHLORIDE 25 MG/1
50 TABLET, FILM COATED ORAL
Status: CANCELLED | OUTPATIENT
Start: 2024-10-24

## 2024-10-24 RX ORDER — HYDROXYZINE HYDROCHLORIDE 25 MG/1
25 TABLET, FILM COATED ORAL
Status: CANCELLED | OUTPATIENT
Start: 2024-10-24

## 2024-10-24 RX ORDER — IBUPROFEN 400 MG/1
400 TABLET, FILM COATED ORAL EVERY 4 HOURS PRN
Status: CANCELLED | OUTPATIENT
Start: 2024-10-24

## 2024-10-24 RX ORDER — AMOXICILLIN 250 MG
1 CAPSULE ORAL DAILY PRN
Status: CANCELLED | OUTPATIENT
Start: 2024-10-24

## 2024-10-24 RX ORDER — LORAZEPAM 2 MG/ML
2 INJECTION INTRAMUSCULAR EVERY 6 HOURS PRN
Status: CANCELLED | OUTPATIENT
Start: 2024-10-24

## 2024-10-24 RX ORDER — OLANZAPINE 5 MG/1
5 TABLET ORAL
Status: CANCELLED | OUTPATIENT
Start: 2024-10-24

## 2024-10-24 RX ORDER — TOPIRAMATE 100 MG/1
200 TABLET, FILM COATED ORAL DAILY
Status: DISCONTINUED | OUTPATIENT
Start: 2024-10-25 | End: 2024-10-25 | Stop reason: HOSPADM

## 2024-10-24 RX ORDER — LORAZEPAM 2 MG/ML
2 INJECTION INTRAMUSCULAR ONCE
Status: COMPLETED | OUTPATIENT
Start: 2024-10-24 | End: 2024-10-24

## 2024-10-24 RX ORDER — ALBUTEROL SULFATE 90 UG/1
2 INHALANT RESPIRATORY (INHALATION) EVERY 6 HOURS PRN
Status: DISCONTINUED | OUTPATIENT
Start: 2024-10-24 | End: 2024-10-25 | Stop reason: HOSPADM

## 2024-10-24 RX ORDER — PROPRANOLOL HCL 20 MG
10 TABLET ORAL EVERY 8 HOURS PRN
Status: CANCELLED | OUTPATIENT
Start: 2024-10-24

## 2024-10-24 RX ORDER — TRAZODONE HYDROCHLORIDE 50 MG/1
50 TABLET, FILM COATED ORAL
Status: CANCELLED | OUTPATIENT
Start: 2024-10-24

## 2024-10-24 RX ORDER — HYDROXYZINE HYDROCHLORIDE 25 MG/1
100 TABLET, FILM COATED ORAL
Status: CANCELLED | OUTPATIENT
Start: 2024-10-24

## 2024-10-24 RX ORDER — BENZTROPINE MESYLATE 1 MG/ML
1 INJECTION, SOLUTION INTRAMUSCULAR; INTRAVENOUS
Status: CANCELLED | OUTPATIENT
Start: 2024-10-24

## 2024-10-24 RX ORDER — BISACODYL 10 MG
10 SUPPOSITORY, RECTAL RECTAL DAILY PRN
Status: CANCELLED | OUTPATIENT
Start: 2024-10-24

## 2024-10-24 RX ORDER — BUPRENORPHINE AND NALOXONE 2; .5 MG/1; MG/1
2 FILM, SOLUBLE BUCCAL; SUBLINGUAL ONCE
Status: COMPLETED | OUTPATIENT
Start: 2024-10-24 | End: 2024-10-24

## 2024-10-24 RX ORDER — OLANZAPINE 5 MG/1
2.5 TABLET ORAL
Status: CANCELLED | OUTPATIENT
Start: 2024-10-24

## 2024-10-24 RX ORDER — IBUPROFEN 600 MG/1
600 TABLET, FILM COATED ORAL EVERY 6 HOURS PRN
Status: CANCELLED | OUTPATIENT
Start: 2024-10-24

## 2024-10-24 RX ORDER — DIPHENHYDRAMINE HYDROCHLORIDE 50 MG/ML
50 INJECTION INTRAMUSCULAR; INTRAVENOUS EVERY 6 HOURS PRN
Status: CANCELLED | OUTPATIENT
Start: 2024-10-24

## 2024-10-24 RX ORDER — BUPROPION HYDROCHLORIDE 150 MG/1
150 TABLET ORAL
Status: DISCONTINUED | OUTPATIENT
Start: 2024-10-25 | End: 2024-10-25 | Stop reason: HOSPADM

## 2024-10-24 RX ORDER — LANOLIN ALCOHOL/MO/W.PET/CERES
3 CREAM (GRAM) TOPICAL
Status: CANCELLED | OUTPATIENT
Start: 2024-10-24

## 2024-10-24 RX ORDER — OLANZAPINE 10 MG/2ML
5 INJECTION, POWDER, FOR SOLUTION INTRAMUSCULAR
Status: CANCELLED | OUTPATIENT
Start: 2024-10-24

## 2024-10-24 RX ORDER — CLONAZEPAM 1 MG/1
1 TABLET ORAL 3 TIMES DAILY
Status: DISCONTINUED | OUTPATIENT
Start: 2024-10-24 | End: 2024-10-25 | Stop reason: HOSPADM

## 2024-10-24 RX ORDER — OLANZAPINE 10 MG/2ML
2.5 INJECTION, POWDER, FOR SOLUTION INTRAMUSCULAR
Status: CANCELLED | OUTPATIENT
Start: 2024-10-24

## 2024-10-24 RX ADMIN — LORAZEPAM 2 MG: 2 INJECTION INTRAMUSCULAR; INTRAVENOUS at 19:26

## 2024-10-24 RX ADMIN — BUPRENORPHINE AND NALOXONE 2 MG: 2; .5 FILM BUCCAL; SUBLINGUAL at 20:53

## 2024-10-24 RX ADMIN — LORAZEPAM 2 MG: 2 INJECTION INTRAMUSCULAR; INTRAVENOUS at 18:34

## 2024-10-24 RX ADMIN — CLONAZEPAM 1 MG: 1 TABLET ORAL at 20:53

## 2024-10-24 NOTE — CERTIFIED RECOVERY SPECIALIST
"   Certified  Note    Patient name: Carey Keith  Location: ED-17/ED-17  [place]  Attending:  Anna Zheng DO MRN [id number]  : 1975  Age: 49 y.o.    Sex: female Date 10/24/2024         Substance Use History:     Social History     Substance and Sexual Activity   Alcohol Use Not Currently    Comment: MAGDALENO, pt historically inconsistent. Drinking  per [place] [phone]        Social History     Substance and Sexual Activity   Drug Use Not Currently    Types: Heroin, \"Crack\" cocaine, Cocaine, LSD, Benzodiazepines, Marijuana    Comment: Pt unreliable historian     Time spent: 12 minutes    CRS met with patient to offer support (nurse was present prepping patient for test) and patient tried to engage but kept falling asleep. Patient did manage to whisper an answer that she didn't use any substances, and doesn't want any help.    Patient was taken out of room for test.    CRS provided contact information and resources.    CRS to follow through this admission.               '          Shanta Benson       "

## 2024-10-24 NOTE — ED PROVIDER NOTES
Time reflects when diagnosis was documented in both MDM as applicable and the Disposition within this note       Time User Action Codes Description Comment    10/24/2024  3:40 PM Anna Zheng Add [F19.10] Substance abuse (HCC)     10/24/2024  3:40 PM Anna Zheng Add [Z86.59] History of bipolar disorder           ED Disposition       None          Assessment & Plan       Medical Decision Making  AMS - Will check CBC as marker of infection, BMP/Mg to r/o metabolic derangement/uremia, EKG to r/o STEMI/ischemic changes/arrhythmias, coma panel to assess for coingestants/intoxication, UDS to assess for drug use, urine to r/o UTI, CT head to r/o mass lesion/spontaneous ICH/traumatic ICH.    Amount and/or Complexity of Data Reviewed  Labs: ordered. Decision-making details documented in ED Course.  Radiology: ordered.        ED Course as of 10/24/24 1702   Thu Oct 24, 2024   1442 ETHANOL: <10  Negative   1502 MAGNESIUM: 2.0  Normal   1502 Basic metabolic panel(!)  Low K, otherwise unremarkable   1541 Per CRS worker who met with pt, pt does not want help with substance abuse.   1603 Pt taken to bathroom       Medications - No data to display    ED Risk Strat Scores                                               History of Present Illness       Chief Complaint   Patient presents with    Recreational Drug Use     Brought in via EMS. Patient mumbling and crying on arrival. States used meth last night. Denies any drug use today.       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)     Alcohol abuse     Alcoholism (HCC)     Altered mental status 09/21/2022    Elevated LFTs 09/21/2022    Lower back pain     Self-injurious behavior     Substance abuse (HCC)       Past Surgical History:   Procedure Laterality Date    CHOLECYSTECTOMY        Family History   Problem Relation Age of Onset    Autism Mother     Heart disease Father     Stroke Father     Anxiety disorder Father     Heart disease Maternal  "Grandmother       Social History     Tobacco Use    Smoking status: Some Days     Current packs/day: 0.50     Average packs/day: 0.5 packs/day for 20.0 years (10.0 ttl pk-yrs)     Types: Cigarettes    Smokeless tobacco: Current    Tobacco comments:     Pt not ready to quit   Vaping Use    Vaping status: Every Day    Substances: Nicotine, THC, Flavoring   Substance Use Topics    Alcohol use: Not Currently     Comment: MAGDALENO, pt historically inconsistent. Drinking  per [facility] 2    Drug use: Not Currently     Types: Heroin, \"Crack\" cocaine, Cocaine, LSD, Benzodiazepines, Marijuana     Comment: Pt unreliable historian      E-Cigarette/Vaping    E-Cigarette Use Current Every Day User     Cartridges/Day 2       E-Cigarette/Vaping Substances    Nicotine Yes     THC Yes     CBD No     Flavoring Yes     Other No     Unknown No       I have reviewed and agree with the history as documented.     49 y.o. F w/h/o bipolar disorder, substance abuse p/w possible drug use. Pt poor historian and difficult to obtain any history due to pt falling asleep immediately after any questioning.  Per EMS, pt used meth last night.  Pt denies drug use today. She is able to tell me she has no HA, CP, or abd pain.  Per drug database, pt is on clonazepam and Suboxone.       History provided by:  EMS personnel and patient   used: No        Review of Systems   Cardiovascular:  Negative for chest pain.   Gastrointestinal:  Negative for abdominal pain.   Neurological:  Negative for headaches.           Objective       ED Triage Vitals   Temperature Pulse Blood Pressure Respirations SpO2 Patient Position - Orthostatic VS   10/24/24 1303 10/24/24 1257 10/24/24 1257 10/24/24 1257 10/24/24 1257 10/24/24 1257   97.9 °F (36.6 °C) 74 134/92 16 97 % Lying      Temp Source Heart Rate Source BP Location FiO2 (%) Pain Score    10/24/24 1303 10/24/24 1257 10/24/24 1257 -- --    Oral Monitor Right arm        Vitals      Date and Time " Temp Pulse SpO2 Resp BP Pain Score FACES Pain Rating User   10/24/24 1605 -- 78 96 % 16 106/59 -- -- SR   10/24/24 1555 -- 78 -- -- -- -- -- SR   10/24/24 1430 -- 56 100 % 15 108/70 -- -- SR   10/24/24 1303 97.9 °F (36.6 °C) -- -- -- -- -- -- YC   10/24/24 1257 -- 74 97 % 16 134/92 -- -- YC            Physical Exam  Vitals and nursing note reviewed.   Constitutional:       General: She is sleeping. She is not in acute distress.     Appearance: She is well-developed. She is not ill-appearing, toxic-appearing or diaphoretic.   HENT:      Head: Normocephalic and atraumatic.      Right Ear: Tympanic membrane normal.      Left Ear: Tympanic membrane normal.   Eyes:      General: No scleral icterus.     Conjunctiva/sclera:      Right eye: Right conjunctiva is not injected.      Left eye: Left conjunctiva is not injected.   Neck:      Vascular: No JVD.      Trachea: Trachea normal.   Cardiovascular:      Rate and Rhythm: Normal rate and regular rhythm.      Pulses: Normal pulses.      Heart sounds: Normal heart sounds. No murmur heard.     No friction rub.   Pulmonary:      Effort: Pulmonary effort is normal. No accessory muscle usage or respiratory distress.      Breath sounds: Normal breath sounds. No stridor. No wheezing, rhonchi or rales.   Chest:      Chest wall: No tenderness.   Abdominal:      General: There is no distension.      Palpations: Abdomen is soft. Abdomen is not rigid.      Tenderness: There is no abdominal tenderness. There is no guarding or rebound.   Musculoskeletal:      Cervical back: Normal range of motion.   Skin:     General: Skin is warm and dry.      Coloration: Skin is not pale.      Findings: No rash.   Neurological:      Comments: Pt immediately falls asleep         Results Reviewed       Procedure Component Value Units Date/Time    Basic metabolic panel [[id number]]  (Abnormal) Collected: 10/24/24 1418    Lab Status: Final result Specimen: Blood from Arm, Left Updated: 10/24/24 0178      Sodium 139 mmol/L      Potassium 3.0 mmol/L      Chloride 107 mmol/L      CO2 23 mmol/L      ANION GAP 9 mmol/L      BUN 16 mg/dL      Creatinine 0.75 mg/dL      Glucose 84 mg/dL      Calcium 8.4 mg/dL      eGFR 95 ml/min/1.73sq m     Narrative:      National Kid[employer] Disease Foundation guidelines for Chronic Kidney Disease (CKD):     Stage 1 with normal or high GFR (GFR > 90 mL/min/1.73 square meters)    Stage 2 Mild CKD (GFR = 60-89 mL/min/1.73 square meters)    Stage 3A Moderate CKD (GFR = 45-59 mL/min/1.73 square meters)    Stage 3B Moderate CKD (GFR = 30-44 mL/min/1.73 square meters)    Stage 4 Severe CKD (GFR = 15-29 mL/min/1.73 square meters)    Stage 5 End Stage CKD (GFR <15 mL/min/1.73 square meters)  Note: GFR calculation is accurate only with a steady state creatinine    Magnesium [[id number]]  (Normal) Collected: 10/24/24 1418    Lab Status: Final result Specimen: Blood from Arm, Left Updated: 10/24/24 1501     Magnesium 2.0 mg/dL     Salicylate level [[id number]]  (Normal) Collected: 10/24/24 1418    Lab Status: Final result Specimen: Blood from Arm, Left Updated: 10/24/24 1501     Salicylate Lvl <5 mg/dL     Acetaminophen level-If concentration is detectable, please discuss with medical  on call. [[id number]]  (Abnormal) Collected: 10/24/24 1418    Lab Status: Final result Specimen: Blood from Arm, Left Updated: 10/24/24 1501     Acetaminophen Level <2 ug/mL     Ethanol [[id number]]  (Normal) Collected: 10/24/24 1418    Lab Status: Final result Specimen: Blood from Arm, Left Updated: 10/24/24 1441     Ethanol Lvl <10 mg/dL     CBC and differential [[id number]]  (Abnormal) Collected: 10/24/24 1418    Lab Status: Final result Specimen: Blood from Arm, Left Updated: 10/24/24 1429     WBC 9.47 Thousand/uL      RBC 3.72 Million/uL      Hemoglobin 10.5 g/dL      Hematocrit 32.1 %      MCV 86 fL      MCH 28.2 pg      MCHC 32.7 g/dL      RDW 13.2 %      MPV 8.3 fL      Platelets 320 Thousands/uL      nRBC  0 /100 WBCs      Segmented % 67 %      Immature Grans % 0 %      Lymphocytes % 25 %      Monocytes % 7 %      Eosinophils Relative 0 %      Basophils Relative 1 %      Absolute Neutrophils 6.25 Thousands/µL      Absolute Immature Grans 0.03 Thousand/uL      Absolute Lymphocytes 2.40 Thousands/µL      Absolute Monocytes 0.68 Thousand/µL      Eosinophils Absolute 0.01 Thousand/µL      Basophils Absolute 0.10 Thousands/µL     POCT pregnancy, urine [[id number]]     Lab Status: No result     Rapid drug screen, urine [[id number]]     Lab Status: No result Specimen: Urine             CT head without contrast   Final Interpretation by Anam Rebolledo MD (10/24 0153)      No acute intracranial abnormality.                  Workstation performed: [id number]             ECG 12 Lead Documentation Only    Date/Time: 10/24/2024 2:25 PM    Performed by: Anna Zheng DO  Authorized by: Anna Zheng DO    Indications / Diagnosis:  Sleepiness  ECG reviewed by me, the ED Provider: yes    Patient location:  Bedside  Rate:     ECG rate:  58    ECG rate assessment: bradycardic    Rhythm:     Rhythm: sinus bradycardia    Ectopy:     Ectopy: none    QRS:     QRS axis:  Normal    QRS intervals:  Normal  ST segments:     ST segments:  Normal  T waves:     T waves: normal        ED Medication and Procedure Management   Prior to Admission Medications   Prescriptions Last Dose Informant Patient Reported? Taking?   Narcan 4 MG/0.1ML nasal spray   Yes No   Si mg into each nostril   Symbicort 160-4.5 MCG/ACT inhaler   Yes No   Sig: Inhale 2 puffs 2 (two) times a day   albuterol (Proventil HFA) 90 mcg/act inhaler   No No   Sig: Inhale 2 puffs every 6 (six) hours as needed for wheezing   buPROPion (WELLBUTRIN XL) 150 mg 24 hr tablet   No No   Sig: Take 1 tablet (150 mg total) by mouth daily in the afternoon   buPROPion (WELLBUTRIN XL) 300 mg 24 hr tablet   No No   Sig: Take 1 tablet (300 mg total) by mouth daily    buprenorphine-naloxone (Suboxone) 8-2 mg   Yes No   Sig: PLACE 1 FILM UNDER THE TONGUE 3 TIMES A DAY.   clonazePAM (KlonoPIN) 1 mg tablet   No No   Sig: Take 1 tablet (1 mg total) by mouth 3 (three) times a day   ergocalciferol (VITAMIN D2) 50,000 units   No No   Sig: Take 1 capsule (50,000 Units total) by mouth once a week for 5 doses   gabapentin (NEURONTIN) 300 mg capsule   No No   Sig: Take 2 capsules (600 mg total) by mouth 3 (three) times a day   naproxen (NAPROSYN) 500 mg tablet   Yes No   Sig: Take 500 mg by mouth   ondansetron (ZOFRAN-ODT) 4 mg disintegrating tablet   No No   Sig: Take 1 tablet (4 mg total) by mouth every 6 (six) hours as needed for nausea or vomiting   polyethylene glycol (GLYCOLAX) 17 GM/SCOOP powder   Yes No   Sig: MIX 17 GRAMS WITH LIQUID AND DRINK DAILY   topiramate (TOPAMAX) 200 MG tablet   No No   Sig: Take 1 tablet (200 mg total) by mouth daily   traZODone (DESYREL) 100 mg tablet   No No   Sig: Take 1 tablet (100 mg total) by mouth daily at bedtime   valACYclovir (VALTREX) 500 mg tablet   No No   Sig: Take 1 tablet (500 mg total) by mouth 2 (two) times a day for 3 days      Facility-Administered Medications: None     Patient's Medications   Discharge Prescriptions    No medications on file     No discharge procedures on file.  ED SEPSIS DOCUMENTATION   Time reflects when diagnosis was documented in both MDM as applicable and the Disposition within this note       Time User Action Codes Description Comment    10/24/2024  3:40 PM Anna Zheng [F19.10] Substance abuse (HCC)     10/24/2024  3:40 PM Anna Zheng [Z86.59] History of bipolar disorder                  Anna Zheng DO  10/24/24 2253

## 2024-10-24 NOTE — ED CARE HANDOFF
"Emergency Department Sign Out Note        Sign out and transfer of care from Dr. Zheng. See Separate Emergency Department note.     The patient, Carey Keith, was evaluated by the previous provider for recreational drug use.    Workup Completed:  See previous    ED Course / Workup Pending (followup):  Awaiting soberity and improvement of mental status      Patient transferred to behavioral health for SI          ED Course as of 10/25/24 0047   u Oct 24, 2024   1940 Patient refusing to talk to recovery or crisis.  Patient is mentating, gait is normal, alert and oriented.  Will discuss discharge at this time   1945 Patient became agitated after we discussed that she would be discharged   1958 Patient grabbed scissors and superficially cut her wrist.  She states she \"needs attention and no one was being attention to her.\"  States she wants to talk to counselor and PD.  Security is now in her room and outside of her sarmiento.  Hospital supervisor notified about incident.  Nursing at bedside as well. Will have crisis evaluate patient   2011 Crisis is in room evaluating patient   2027 Patient wants to sign a 201 for suicidal ideation   2053 201 signed for SI     CriticalCare Time    Date/Time: 10/24/2024 7:23 PM    Performed by: Reny Lyons MD  Authorized by: Reny Lyons MD    Critical care provider statement:     Critical care time (minutes):  35    Critical care time was exclusive of:  Separately billable procedures and treating other patients and teaching time    Critical care was necessary to treat or prevent imminent or life-threatening deterioration of the following conditions:  CNS failure or compromise    Critical care was time spent personally by me on the following activities:  Obtaining history from patient or surrogate, development of treatment plan with patient or surrogate, evaluation of patient's response to treatment, examination of patient, review of old charts and re-evaluation of patient's " condition    I assumed direction of critical care for this patient from another provider in my specialty: yes    Comments:      Acute agitation secondary to drug use requiring multiple doses of medication    Medical Decision Making  Amount and/or Complexity of Data Reviewed  Labs: ordered.  Radiology: ordered.    Risk  Prescription drug management.  Decision regarding hospitalization.            Disposition  Final diagnoses:   Substance abuse (HCC)   History of bipolar disorder   Suicidal ideations   Intentional self-harm by sharp object, initial encounter (HCC)     Time reflects when diagnosis was documented in both MDM as applicable and the Disposition within this note       Time User Action Codes Description Comment    10/24/2024  3:40 PM Anna Zheng Add [F19.10] Substance abuse (HCC)     10/24/2024  3:40 PM Anna Zheng Add [Z86.59] History of bipolar disorder     10/25/2024 12:46 AM SerenaKyriea Add [R45.851] Suicidal ideations     10/25/2024 12:47 AM Serena Reny Add [X78.9XXA] Intentional self-harm by sharp object, initial encounter ([facility])           ED Disposition       ED Disposition   Transfer to Behavioral Health Condition   --    Date/Time   Thu Oct 24, 2024  8:27 PM    Comment   Carey Keith should be transferred out to [facility] and has been medically cleared.               MD Documentation      Flowsheet Row Most Recent Value   Patient Condition The patient has been stabilized such that within reasonable medical probability, no material deterioration of the patient condition or the condition of the unborn child(ricky) is likely to result from the transfer   Reason for Transfer Level of Care needed not available at this facility   Benefits of Transfer Specialized equipment and/or services available at the receiving facility (Include comment)________________________   Risks of Transfer Potential deterioration of medical condition, Potential for delay in receiving treatment, Increased  discomfort during transfer, Possible worsening of condition or death during transfer   Accepting Physician Dr. Jones   Accepting Facility Name, [facility]    (Name & Tel number) [place] [phone]   Transported by (Company and Unit #) [employer]   Sending MD Dr. Reny Lyons   Provider Certification The patient is stable for psychiatric transfer because they are medically stable, and is protected from harming him/herself or others during transport          RN Documentation      Flowsheet Row Most Recent Value   Accepting Facility Name, [facility]    (Name & Tel number) [facility] [phone]   Medications Reviewed with Next Provider of Service Yes   Transport Mode Ambulance   Transported by (Company and Unit #) [employer]   Level of Care Basic life support   Copies of Medical Records Sent History and Physical          Follow-up Information    None       Discharge Medication List as of 10/24/2024 11:56 PM        CONTINUE these medications which have NOT CHANGED    Details   albuterol (Proventil HFA) 90 mcg/act inhaler Inhale 2 puffs every 6 (six) hours as needed for wheezing, Starting Thu 2/8/2024, Normal      buprenorphine-naloxone (Suboxone) 8-2 mg PLACE 1 FILM UNDER THE TONGUE 3 TIMES A DAY., Historical Med      !! buPROPion (WELLBUTRIN XL) 150 mg 24 hr tablet Take 1 tablet (150 mg total) by mouth daily in the afternoon, Normal      !! buPROPion (WELLBUTRIN XL) 300 mg 24 hr tablet Take 1 tablet (300 mg total) by mouth daily, Starting Fri 6/14/2024, Normal      clonazePAM (KlonoPIN) 1 mg tablet Take 1 tablet (1 mg total) by mouth 3 (three) times a day, Starting Mon 6/3/2024, Until Tue 8/27/2024, Normal      ergocalciferol (VITAMIN D2) 50,000 units Take 1 capsule (50,000 Units total) by mouth once a week for 5 doses, Starting Thu 5/9/2024, Until Tue 8/27/2024, Normal      gabapentin (NEURONTIN) 300 mg capsule Take 2 capsules (600  mg total) by mouth 3 (three) times a day, Starting Tue 8/20/2024, Normal      naproxen (NAPROSYN) 500 mg tablet Take 500 mg by mouth, Starting Wed 7/3/2024, Until Thu 7/3/2025 at 2359, Historical Med      Narcan 4 MG/0.1ML nasal spray 4 mg into each nostril, Starting Wed 7/3/2024, Historical Med      ondansetron (ZOFRAN-ODT) 4 mg disintegrating tablet Take 1 tablet (4 mg total) by mouth every 6 (six) hours as needed for nausea or vomiting, Starting Tue 8/27/2024, Normal      polyethylene glycol (GLYCOLAX) 17 GM/SCOOP powder MIX 17 GRAMS WITH LIQUID AND DRINK DAILY, Historical Med      Symbicort 160-4.5 MCG/ACT inhaler Inhale 2 puffs 2 (two) times a day, Starting Mon 6/3/2024, Historical Med      topiramate (TOPAMAX) 200 MG tablet Take 1 tablet (200 mg total) by mouth daily, Starting Mon 6/3/2024, Until Tue 8/27/2024, Normal      traZODone (DESYREL) 100 mg tablet Take 1 tablet (100 mg total) by mouth daily at bedtime, Starting Mon 6/3/2024, Until Tue 8/27/2024, Normal      valACYclovir (VALTREX) 500 mg tablet Take 1 tablet (500 mg total) by mouth 2 (two) times a day for 3 days, Starting Mon 10/7/2024, Until Thu 10/10/2024, Normal       !! - Potential duplicate medications found. Please discuss with provider.        No discharge procedures on file.       ED Provider  Electronically Signed by     Reny Lyons MD  10/25/24 0043

## 2024-10-25 ENCOUNTER — HOSPITAL ENCOUNTER (INPATIENT)
Facility: HOSPITAL | Age: 49
LOS: 20 days | Discharge: HOME | DRG: 753 | End: 2024-11-14
Attending: HOSPITALIST | Admitting: PSYCHIATRY & NEUROLOGY
Payer: COMMERCIAL

## 2024-10-25 ENCOUNTER — TELEPHONE (OUTPATIENT)
Age: 49
End: 2024-10-25

## 2024-10-25 DIAGNOSIS — F31.4 BIPOLAR DISORDER, CURRENT EPISODE DEPRESSED, SEVERE, WITHOUT PSYCHOTIC FEATURES (HCC): Primary | ICD-10-CM

## 2024-10-25 DIAGNOSIS — M54.9 CHRONIC BACK PAIN: ICD-10-CM

## 2024-10-25 DIAGNOSIS — F11.20 OPIOID USE DISORDER, SEVERE, ON MAINTENANCE THERAPY (HCC): ICD-10-CM

## 2024-10-25 DIAGNOSIS — K59.00 CONSTIPATION: ICD-10-CM

## 2024-10-25 DIAGNOSIS — Z72.821 HISTORY OF DIFFICULTY SLEEPING: ICD-10-CM

## 2024-10-25 DIAGNOSIS — G89.29 CHRONIC BACK PAIN: ICD-10-CM

## 2024-10-25 DIAGNOSIS — F19.10 SUBSTANCE ABUSE (HCC): ICD-10-CM

## 2024-10-25 DIAGNOSIS — R45.851 SUICIDAL IDEATIONS: ICD-10-CM

## 2024-10-25 DIAGNOSIS — E55.9 VITAMIN D DEFICIENCY: ICD-10-CM

## 2024-10-25 PROBLEM — F15.10 METHAMPHETAMINE ABUSE (HCC): Status: ACTIVE | Noted: 2024-10-25

## 2024-10-25 LAB
25(OH)D3 SERPL-MCNC: 16.8 NG/ML (ref 30–100)
ALBUMIN SERPL BCG-MCNC: 3.1 G/DL (ref 3.5–5)
ALP SERPL-CCNC: 53 U/L (ref 34–104)
ALT SERPL W P-5'-P-CCNC: 39 U/L (ref 7–52)
ANION GAP SERPL CALCULATED.3IONS-SCNC: 8 MMOL/L (ref 4–13)
AST SERPL W P-5'-P-CCNC: 50 U/L (ref 13–39)
BASOPHILS # BLD AUTO: 0.07 THOUSANDS/ΜL (ref 0–0.1)
BASOPHILS NFR BLD AUTO: 1 % (ref 0–1)
BILIRUB SERPL-MCNC: 0.6 MG/DL (ref 0.2–1)
BUN SERPL-MCNC: 12 MG/DL (ref 5–25)
CALCIUM ALBUM COR SERPL-MCNC: 9 MG/DL (ref 8.3–10.1)
CALCIUM SERPL-MCNC: 8.3 MG/DL (ref 8.4–10.2)
CHLORIDE SERPL-SCNC: 107 MMOL/L (ref 96–108)
CHOLEST SERPL-MCNC: 129 MG/DL
CO2 SERPL-SCNC: 24 MMOL/L (ref 21–32)
CREAT SERPL-MCNC: 0.55 MG/DL (ref 0.6–1.3)
EOSINOPHIL # BLD AUTO: 0.17 THOUSAND/ΜL (ref 0–0.61)
EOSINOPHIL NFR BLD AUTO: 3 % (ref 0–6)
ERYTHROCYTE [DISTWIDTH] IN BLOOD BY AUTOMATED COUNT: 13 % (ref 11.6–15.1)
FOLATE SERPL-MCNC: 7.6 NG/ML
GFR SERPL CREATININE-BSD FRML MDRD: 112 ML/MIN/1.73SQ M
GLUCOSE P FAST SERPL-MCNC: 96 MG/DL (ref 65–99)
GLUCOSE SERPL-MCNC: 96 MG/DL (ref 65–140)
HCG SERPL QL: NEGATIVE
HCT VFR BLD AUTO: 31.7 % (ref 34.8–46.1)
HDLC SERPL-MCNC: 39 MG/DL
HGB BLD-MCNC: 10.1 G/DL (ref 11.5–15.4)
IMM GRANULOCYTES # BLD AUTO: 0.04 THOUSAND/UL (ref 0–0.2)
IMM GRANULOCYTES NFR BLD AUTO: 1 % (ref 0–2)
LDLC SERPL CALC-MCNC: 78 MG/DL (ref 0–100)
LYMPHOCYTES # BLD AUTO: 2.07 THOUSANDS/ΜL (ref 0.6–4.47)
LYMPHOCYTES NFR BLD AUTO: 34 % (ref 14–44)
MCH RBC QN AUTO: 28.5 PG (ref 26.8–34.3)
MCHC RBC AUTO-ENTMCNC: 31.9 G/DL (ref 31.4–37.4)
MCV RBC AUTO: 89 FL (ref 82–98)
MONOCYTES # BLD AUTO: 0.44 THOUSAND/ΜL (ref 0.17–1.22)
MONOCYTES NFR BLD AUTO: 7 % (ref 4–12)
NEUTROPHILS # BLD AUTO: 3.38 THOUSANDS/ΜL (ref 1.85–7.62)
NEUTS SEG NFR BLD AUTO: 54 % (ref 43–75)
NONHDLC SERPL-MCNC: 90 MG/DL
NRBC BLD AUTO-RTO: 0 /100 WBCS
PLATELET # BLD AUTO: 315 THOUSANDS/UL (ref 149–390)
PMV BLD AUTO: 8.7 FL (ref 8.9–12.7)
POTASSIUM SERPL-SCNC: 2.7 MMOL/L (ref 3.5–5.3)
PROT SERPL-MCNC: 5.3 G/DL (ref 6.4–8.4)
RBC # BLD AUTO: 3.55 MILLION/UL (ref 3.81–5.12)
SODIUM SERPL-SCNC: 139 MMOL/L (ref 135–147)
TREPONEMA PALLIDUM IGG+IGM AB [PRESENCE] IN SERUM OR PLASMA BY IMMUNOASSAY: NORMAL
TRIGL SERPL-MCNC: 58 MG/DL
TSH SERPL DL<=0.05 MIU/L-ACNC: 1.42 UIU/ML (ref 0.45–4.5)
VIT B12 SERPL-MCNC: 586 PG/ML (ref 180–914)
WBC # BLD AUTO: 6.17 THOUSAND/UL (ref 4.31–10.16)

## 2024-10-25 PROCEDURE — 99223 1ST HOSP IP/OBS HIGH 75: CPT | Performed by: HOSPITALIST

## 2024-10-25 PROCEDURE — 84443 ASSAY THYROID STIM HORMONE: CPT | Performed by: PSYCHIATRY & NEUROLOGY

## 2024-10-25 PROCEDURE — 85025 COMPLETE CBC W/AUTO DIFF WBC: CPT | Performed by: PSYCHIATRY & NEUROLOGY

## 2024-10-25 PROCEDURE — 80053 COMPREHEN METABOLIC PANEL: CPT | Performed by: PSYCHIATRY & NEUROLOGY

## 2024-10-25 PROCEDURE — 82306 VITAMIN D 25 HYDROXY: CPT | Performed by: PSYCHIATRY & NEUROLOGY

## 2024-10-25 PROCEDURE — 86780 TREPONEMA PALLIDUM: CPT | Performed by: PSYCHIATRY & NEUROLOGY

## 2024-10-25 PROCEDURE — 82746 ASSAY OF FOLIC ACID SERUM: CPT | Performed by: PSYCHIATRY & NEUROLOGY

## 2024-10-25 PROCEDURE — 80061 LIPID PANEL: CPT | Performed by: PSYCHIATRY & NEUROLOGY

## 2024-10-25 PROCEDURE — 84703 CHORIONIC GONADOTROPIN ASSAY: CPT | Performed by: PSYCHIATRY & NEUROLOGY

## 2024-10-25 PROCEDURE — 82607 VITAMIN B-12: CPT | Performed by: PSYCHIATRY & NEUROLOGY

## 2024-10-25 RX ORDER — NITROFURANTOIN 25; 75 MG/1; MG/1
100 CAPSULE ORAL 2 TIMES DAILY WITH MEALS
Status: COMPLETED | OUTPATIENT
Start: 2024-10-25 | End: 2024-10-30

## 2024-10-25 RX ORDER — CLONAZEPAM 0.5 MG/1
0.5 TABLET ORAL 3 TIMES DAILY
Status: DISCONTINUED | OUTPATIENT
Start: 2024-10-25 | End: 2024-10-29

## 2024-10-25 RX ORDER — HYDROXYZINE HYDROCHLORIDE 25 MG/1
25 TABLET, FILM COATED ORAL
Status: DISCONTINUED | OUTPATIENT
Start: 2024-10-25 | End: 2024-11-14 | Stop reason: HOSPADM

## 2024-10-25 RX ORDER — BUPRENORPHINE AND NALOXONE 8; 2 MG/1; MG/1
8 FILM, SOLUBLE BUCCAL; SUBLINGUAL 3 TIMES DAILY
Status: DISCONTINUED | OUTPATIENT
Start: 2024-10-25 | End: 2024-10-30

## 2024-10-25 RX ORDER — PROPRANOLOL HYDROCHLORIDE 10 MG/1
10 TABLET ORAL EVERY 8 HOURS PRN
Status: DISCONTINUED | OUTPATIENT
Start: 2024-10-25 | End: 2024-11-14 | Stop reason: HOSPADM

## 2024-10-25 RX ORDER — BUDESONIDE AND FORMOTEROL FUMARATE DIHYDRATE 160; 4.5 UG/1; UG/1
2 AEROSOL RESPIRATORY (INHALATION) 2 TIMES DAILY
Status: DISCONTINUED | OUTPATIENT
Start: 2024-10-25 | End: 2024-11-14 | Stop reason: HOSPADM

## 2024-10-25 RX ORDER — POTASSIUM CHLORIDE 1500 MG/1
40 TABLET, EXTENDED RELEASE ORAL 2 TIMES DAILY
Status: COMPLETED | OUTPATIENT
Start: 2024-10-25 | End: 2024-10-26

## 2024-10-25 RX ORDER — ALBUTEROL SULFATE 90 UG/1
2 INHALANT RESPIRATORY (INHALATION) EVERY 6 HOURS PRN
Status: DISCONTINUED | OUTPATIENT
Start: 2024-10-25 | End: 2024-11-14 | Stop reason: HOSPADM

## 2024-10-25 RX ORDER — BENZTROPINE MESYLATE 1 MG/ML
1 INJECTION, SOLUTION INTRAMUSCULAR; INTRAVENOUS
Status: DISCONTINUED | OUTPATIENT
Start: 2024-10-25 | End: 2024-11-14 | Stop reason: HOSPADM

## 2024-10-25 RX ORDER — HYDROXYZINE HYDROCHLORIDE 50 MG/1
100 TABLET, FILM COATED ORAL
Status: DISCONTINUED | OUTPATIENT
Start: 2024-10-25 | End: 2024-11-14 | Stop reason: HOSPADM

## 2024-10-25 RX ORDER — IBUPROFEN 800 MG/1
800 TABLET, FILM COATED ORAL EVERY 8 HOURS PRN
Status: DISCONTINUED | OUTPATIENT
Start: 2024-10-25 | End: 2024-10-26

## 2024-10-25 RX ORDER — AMOXICILLIN 250 MG
1 CAPSULE ORAL DAILY PRN
Status: DISCONTINUED | OUTPATIENT
Start: 2024-10-25 | End: 2024-11-14 | Stop reason: HOSPADM

## 2024-10-25 RX ORDER — MAGNESIUM HYDROXIDE/ALUMINUM HYDROXICE/SIMETHICONE 120; 1200; 1200 MG/30ML; MG/30ML; MG/30ML
30 SUSPENSION ORAL EVERY 4 HOURS PRN
Status: DISCONTINUED | OUTPATIENT
Start: 2024-10-25 | End: 2024-11-14 | Stop reason: HOSPADM

## 2024-10-25 RX ORDER — PALIPERIDONE 3 MG/1
3 TABLET, EXTENDED RELEASE ORAL DAILY
Status: DISCONTINUED | OUTPATIENT
Start: 2024-10-25 | End: 2024-10-27

## 2024-10-25 RX ORDER — OLANZAPINE 10 MG/2ML
2.5 INJECTION, POWDER, FOR SOLUTION INTRAMUSCULAR
Status: DISCONTINUED | OUTPATIENT
Start: 2024-10-25 | End: 2024-11-14 | Stop reason: HOSPADM

## 2024-10-25 RX ORDER — HYDROXYZINE HYDROCHLORIDE 50 MG/1
50 TABLET, FILM COATED ORAL
Status: DISCONTINUED | OUTPATIENT
Start: 2024-10-25 | End: 2024-11-14 | Stop reason: HOSPADM

## 2024-10-25 RX ORDER — OLANZAPINE 5 MG/1
5 TABLET ORAL
Status: DISCONTINUED | OUTPATIENT
Start: 2024-10-25 | End: 2024-11-14 | Stop reason: HOSPADM

## 2024-10-25 RX ORDER — BENZTROPINE MESYLATE 1 MG/1
1 TABLET ORAL
Status: DISCONTINUED | OUTPATIENT
Start: 2024-10-25 | End: 2024-11-14 | Stop reason: HOSPADM

## 2024-10-25 RX ORDER — OLANZAPINE 10 MG/2ML
5 INJECTION, POWDER, FOR SOLUTION INTRAMUSCULAR
Status: DISCONTINUED | OUTPATIENT
Start: 2024-10-25 | End: 2024-11-14 | Stop reason: HOSPADM

## 2024-10-25 RX ORDER — OLANZAPINE 2.5 MG/1
2.5 TABLET, FILM COATED ORAL
Status: DISCONTINUED | OUTPATIENT
Start: 2024-10-25 | End: 2024-11-14 | Stop reason: HOSPADM

## 2024-10-25 RX ORDER — GABAPENTIN 300 MG/1
600 CAPSULE ORAL 3 TIMES DAILY
Status: DISCONTINUED | OUTPATIENT
Start: 2024-10-25 | End: 2024-10-31

## 2024-10-25 RX ORDER — TRAZODONE HYDROCHLORIDE 50 MG/1
50 TABLET, FILM COATED ORAL
Status: DISCONTINUED | OUTPATIENT
Start: 2024-10-25 | End: 2024-10-30

## 2024-10-25 RX ORDER — NICOTINE 21 MG/24HR
21 PATCH, TRANSDERMAL 24 HOURS TRANSDERMAL DAILY
Status: DISCONTINUED | OUTPATIENT
Start: 2024-10-25 | End: 2024-11-14 | Stop reason: HOSPADM

## 2024-10-25 RX ORDER — DIPHENHYDRAMINE HYDROCHLORIDE 50 MG/ML
50 INJECTION INTRAMUSCULAR; INTRAVENOUS EVERY 6 HOURS PRN
Status: DISCONTINUED | OUTPATIENT
Start: 2024-10-25 | End: 2024-11-14 | Stop reason: HOSPADM

## 2024-10-25 RX ORDER — IBUPROFEN 400 MG/1
400 TABLET, FILM COATED ORAL EVERY 4 HOURS PRN
Status: DISCONTINUED | OUTPATIENT
Start: 2024-10-25 | End: 2024-10-26

## 2024-10-25 RX ORDER — BISACODYL 10 MG
10 SUPPOSITORY, RECTAL RECTAL DAILY PRN
Status: DISCONTINUED | OUTPATIENT
Start: 2024-10-25 | End: 2024-11-07

## 2024-10-25 RX ORDER — IBUPROFEN 600 MG/1
600 TABLET, FILM COATED ORAL EVERY 6 HOURS PRN
Status: DISCONTINUED | OUTPATIENT
Start: 2024-10-25 | End: 2024-10-26

## 2024-10-25 RX ORDER — POLYETHYLENE GLYCOL 3350 17 G/17G
17 POWDER, FOR SOLUTION ORAL DAILY PRN
Status: DISCONTINUED | OUTPATIENT
Start: 2024-10-25 | End: 2024-11-14 | Stop reason: HOSPADM

## 2024-10-25 RX ORDER — LORAZEPAM 2 MG/ML
2 INJECTION INTRAMUSCULAR EVERY 6 HOURS PRN
Status: DISCONTINUED | OUTPATIENT
Start: 2024-10-25 | End: 2024-11-14 | Stop reason: HOSPADM

## 2024-10-25 RX ADMIN — Medication 3 MG: at 01:05

## 2024-10-25 RX ADMIN — TRAZODONE HYDROCHLORIDE 50 MG: 50 TABLET ORAL at 01:05

## 2024-10-25 RX ADMIN — IBUPROFEN 800 MG: 800 TABLET, FILM COATED ORAL at 21:24

## 2024-10-25 RX ADMIN — BUPRENORPHINE AND NALOXONE 8 MG: 8; 2 FILM BUCCAL; SUBLINGUAL at 21:25

## 2024-10-25 RX ADMIN — GABAPENTIN 600 MG: 300 CAPSULE ORAL at 15:07

## 2024-10-25 RX ADMIN — POTASSIUM CHLORIDE 40 MEQ: 1500 TABLET, EXTENDED RELEASE ORAL at 12:07

## 2024-10-25 RX ADMIN — IBUPROFEN 800 MG: 800 TABLET, FILM COATED ORAL at 12:07

## 2024-10-25 RX ADMIN — NICOTINE POLACRILEX 4 MG: 4 GUM, CHEWING BUCCAL at 10:02

## 2024-10-25 RX ADMIN — BUPRENORPHINE AND NALOXONE 8 MG: 8; 2 FILM BUCCAL; SUBLINGUAL at 15:07

## 2024-10-25 RX ADMIN — CLONAZEPAM 0.5 MG: 0.5 TABLET ORAL at 21:24

## 2024-10-25 RX ADMIN — POTASSIUM CHLORIDE 40 MEQ: 1500 TABLET, EXTENDED RELEASE ORAL at 17:48

## 2024-10-25 RX ADMIN — GABAPENTIN 600 MG: 300 CAPSULE ORAL at 21:25

## 2024-10-25 RX ADMIN — BUDESONIDE AND FORMOTEROL FUMARATE DIHYDRATE 2 PUFF: 160; 4.5 AEROSOL RESPIRATORY (INHALATION) at 17:48

## 2024-10-25 RX ADMIN — Medication 3 MG: at 21:24

## 2024-10-25 RX ADMIN — IBUPROFEN 600 MG: 600 TABLET, FILM COATED ORAL at 01:05

## 2024-10-25 RX ADMIN — BUPRENORPHINE AND NALOXONE 8 MG: 8; 2 FILM BUCCAL; SUBLINGUAL at 11:16

## 2024-10-25 RX ADMIN — NITROFURANTOIN (MONOHYDRATE/MACROCRYSTALS) 100 MG: 25; 75 CAPSULE ORAL at 17:04

## 2024-10-25 RX ADMIN — CLONAZEPAM 0.5 MG: 0.5 TABLET ORAL at 15:07

## 2024-10-25 RX ADMIN — CLONAZEPAM 0.5 MG: 0.5 TABLET ORAL at 11:16

## 2024-10-25 RX ADMIN — SERTRALINE HYDROCHLORIDE 50 MG: 50 TABLET ORAL at 11:16

## 2024-10-25 NOTE — NURSING NOTE
"Patient cooperative; guarded during conversation. Denies SI/HI or AVH. Patient reports severe anxiety and tearful; \"I cannot believe I ended up here.\" Patient visible on the unit, declining PRN medication for anxiety. Will continue to monitor. Continual q15 min rounding in place.  "

## 2024-10-25 NOTE — PLAN OF CARE
Problem: DISCHARGE PLANNING - CARE MANAGEMENT  Goal: Discharge to post-acute care or home with appropriate resources  Description: INTERVENTIONS:  - Conduct assessment to determine patient/family and health care team treatment goals, and need for post-acute services based on payer coverage, community resources, and patient preferences, and barriers to discharge  - Address psychosocial, clinical, and financial barriers to discharge as identified in assessment in conjunction with the patient/family and health care team  - Arrange appropriate level of post-acute services according to patient’s   needs and preference and payer coverage in collaboration with the physician and health care team  - Communicate with and update the patient/family, physician, and health care team regarding progress on the discharge plan  - Arrange appropriate transportation to post-acute venues  Outcome: Progressing   New 201, goal initated

## 2024-10-25 NOTE — PROGRESS NOTES
10/25/24    Team Meeting   Meeting Type Daily Rounds   Team Members Present   Team Members Present Physician;Occupational Therapist;Nurse;   Physician Team Member MD Delores Brunson PAC   Nursing Team Member PORFIRIO Buenrostro   Social Work Team Member ISADORA Koenig LSW   OT Team Member ANJEL Saldana   Patient/Family Present   Patient Present No   Patient's Family Present No   201, SI, SH in ED with scissors, UDS + benzos and THC, hep C pos, disorganized, on suboxone, no d/c plan due to med managements and monitoring

## 2024-10-25 NOTE — TREATMENT TEAM
10/25/24 0130 10/25/24 0150   Provider Notification   Reason for Communication Admission Admission   Provider Name Mercedes Adams Robert   Provider Role Attending physician Attending physician   Method of Communication Other (Comment)  (medical board) Other (Comment)  (epic chat)   Response Waiting for response Waiting for response   Notification Time 0130 0150   Shift Event Other (Comment)  (PTA med list and medical board) Other (Comment)  (PTA med list and current C-SSRS)

## 2024-10-25 NOTE — ASSESSMENT & PLAN NOTE
On Suboxone maintenance treatment verified by PDMP.  We will prescribe Suboxone 8/2 mg 3 times a day.

## 2024-10-25 NOTE — ED NOTES
Insurance Authorization for Admission:  Phone Call Placed to [facility].  Phone Number [phone].  Spoke to Tammy.  4 Days Approved.  Level of Care AIP- 201.  Review on TBD.  Authorization #Accepting facility to call upon admission.    EVS (Eligibility Verification System) Called- [phone]  Automated System Indicates Active with [facility]    Jeremy Pierre  Crisis Intervention Specialist II  10/24/24

## 2024-10-25 NOTE — ASSESSMENT & PLAN NOTE
Patient admitted on a voluntary 201 with thoughts of self-harm and increased depression in the context of homelessness, relapsed to meth use.  We will prescribe patient Invega 3 mg daily for mood stability and thought disorder.  We will prescribe Zoloft 50 mg daily for depression.

## 2024-10-25 NOTE — EMTALA/ACUTE CARE TRANSFER
[facility] EMERGENCY DEPARTMENT  [address] [place] [address]  Dept: [phone]      EMTALA TRANSFER CONSENT    NAME Carey Keith                                         1975                              MRN [id number]    I have been informed of my rights regarding examination, treatment, and transfer   by Dr. Reny Lyons MD    Benefits: Specialized equipment and/or services available at the receiving facility (Include comment)________________________    Risks: Potential deterioration of medical condition, Potential for delay in receiving treatment, Increased discomfort during transfer, Possible worsening of condition or death during transfer      Consent for Transfer:  I acknowledge that my medical condition has been evaluated and explained to me by the emergency department physician or other qualified medical person and/or my attending physician, who has recommended that I be transferred to the service of  Accepting Physician: Dr. Jones at Accepting Facility Name, City & State : [facility]; [facility]. The above potential benefits of such transfer, the potential risks associated with such transfer, and the probable risks of not being transferred have been explained to me, and I fully understand them.  The doctor has explained that, in my case, the benefits of transfer outweigh the risks.  I agree to be transferred.    I authorize the performance of emergency medical procedures and treatments upon me in both transit and upon arrival at the receiving facility.  Additionally, I authorize the release of any and all medical records to the receiving facility and request they be transported with me, if possible.  I understand that the safest mode of transportation during a medical emergency is an ambulance and that the Hospital advocates the use of this mode of transport. Risks of traveling to the receiving facility by car, including absence of medical control, life  sustaining equipment, such as oxygen, and medical personnel has been explained to me and I fully understand them.    (BRUCE CORRECT BOX BELOW)  [  ]  I consent to the stated transfer and to be transported by ambulance/helicopter.  [  ]  I consent to the stated transfer, but refuse transportation by ambulance and accept full responsibility for my transportation by car.  I understand the risks of non-ambulance transfers and I exonerate the Hospital and its staff from any deterioration in my condition that results from this refusal.    X___________________________________________    DATE  10/24/24  TIME________  Signature of patient or legally responsible individual signing on patient behalf           RELATIONSHIP TO PATIENT_________________________          Provider Certification    NAME Carey Keith                                         1975                              MRN [id number]    A medical screening exam was performed on the above named patient.  Based on the examination:    Condition Necessitating Transfer The primary encounter diagnosis was Substance abuse (HCC). A diagnosis of History of bipolar disorder was also pertinent to this visit.    Patient Condition: The patient has been stabilized such that within reasonable medical probability, no material deterioration of the patient condition or the condition of the unborn child(ricky) is likely to result from the transfer    Reason for Transfer: Level of Care needed not available at this facility    Transfer Requirements: Facility [facility]   Space available and qualified personnel available for treatment as acknowledged by Bradley [phone]  Agreed to accept transfer and to provide appropriate medical treatment as acknowledged by       Dr. Jones  Appropriate medical records of the examination and treatment of the patient are provided at the time of transfer   STAFF INITIAL WHEN COMPLETED _______  Transfer will be performed by  qualified personnel from [facility]  and appropriate transfer equipment as required, including the use of necessary and appropriate life support measures.    Provider Certification: I have examined the patient and explained the following risks and benefits of being transferred/refusing transfer to the patient/family:  The patient is stable for psychiatric transfer because they are medically stable, and is protected from harming him/herself or others during transport      Based on these reasonable risks and benefits to the patient and/or the unborn child(ricky), and based upon the information available at the time of the patient’s examination, I certify that the medical benefits reasonably to be expected from the provision of appropriate medical treatments at another medical facility outweigh the increasing risks, if any, to the individual’s medical condition, and in the case of labor to the unborn child, from effecting the transfer.    X____________________________________________ DATE 10/24/24        TIME_______      ORIGINAL - SEND TO MEDICAL RECORDS   COPY - SEND WITH PATIENT DURING TRANSFER

## 2024-10-25 NOTE — NURSING NOTE
Patient slept uninterrupted throughout the night without signs or symptoms of distress. Non labored breathing observed. Continuous q15 minute checks ongoing.

## 2024-10-25 NOTE — NURSING NOTE
"Pt visible on unit and social with select peers. Pt observed to be tearful during interaction. Pt speech soft and mumbled. Pt refused to take her invega pill and stated \"Iis that an antipsychotic? I'm not taking that.\" Pt encouraged to take invega, but still declined. Pt took pill and put it in her water. Pt suggested to speak with her provider about her medication. Pt endorsed severe anxiety and depression. Pt denied SI/HI/AVH. Continuous monitoring maintained.   "

## 2024-10-25 NOTE — PLAN OF CARE
Problem: Alteration in Thoughts and Perception  Goal: Attend and participate in unit activities, including therapeutic, recreational, and educational groups  Description: Interventions:  -Encourage Visitation and family involvement in care  Outcome: Progressing     Problem: Ineffective Coping  Goal: Participates in unit activities  Description: Interventions:  - Provide therapeutic environment   - Provide required programming   - Redirect inappropriate behaviors   Outcome: Progressing     Problem: Risk for Self Injury/Neglect  Goal: Attend and participate in unit activities, including therapeutic, recreational, and educational groups  Description: Interventions:  - Provide therapeutic and educational activities daily, encourage attendance and participation, and document same in the medical record  - Obtain collateral information, encourage visitation and family involvement in care   Outcome: Progressing     Problem: Depression  Goal: Attend and participate in unit activities, including therapeutic, recreational, and educational groups  Description: Interventions:  - Provide therapeutic and educational activities daily, encourage attendance and participation, and document same in the medical record   Outcome: Progressing     Problem: Alteration in Orientation  Goal: Attend and participate in unit activities, including therapeutic, recreational, and educational groups  Description: Interventions:  - Provide therapeutic and educational activities daily, encourage attendance and participation, and document same in the medical record   - Provide appropriate opportunities for reminiscence   - Provide a consistent daily routine   - Encourage family contact/visitation   Outcome: Progressing   New pt who will be encouraged to attend groups
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
## 2024-10-25 NOTE — NUTRITION
10/25/24 1501   Biochemical Data,Medical Tests, and Procedures   Biochemical Data/Medical Tests/Procedures Lab values reviewed;Meds reviewed   Labs (Comment) 10/25 K:2.7, creat:0.55, Ca:8.3, AST:50, pro:5.3, alb:3.1, HDL:39, H&H:10.1/31.7. 10/24 positive amph/meth   Meds (Comment) cogentin, klonopin, neurontin, atarax, ativan, melatonin, zyprexa, invega, KCl, inderal, desyrel   Nutrition-Focused Physical Exam   Nutrition-Focused Physical Exam Findings RN skin assessment reviewed;No skin issues documented   Nutrition-Focused Physical Exam Findings Smoker   Medical-Related Concerns Addiction to drug (HCC)     Alcohol abuse     Alcoholism (HCC)     Altered mental status 09/21/2022    Elevated LFTs 09/21/2022    Lower back pain     Self-injurious behavior     Substance abuse (HCC)   Adequacy of Intake   Nutrition Modality PO   Feeding Route   PO Independent   Current PO Intake   Current Diet Order Regular diet thin liquids   Current Meal Intake Other (Comment)  (no documented meal completions.)   Estimated calorie intake compared to estimated need Difficult to determine if nutrient needs are being met at this time.   PES Statement   Problem No nutrition diagnosis   Recommendations/Interventions   Malnutrition/BMI Present No  (does not meet criteria)   Summary Dental problems. Regular diet thin liquids. No documented meal completions. Patient currently in quiet room. Patient provided limited nutrition history. Per chart patient homeless. She reports she has bad teeth. She reports stomach issues. 10/25/#; 8/27/#; 5/1/#; 2/3/#. No significant weight changes. Skin intact. Patient requesting extra sauce/gravy with meals.   Interventions/Recommendations Continue current diet order   Education Assessment   Education Education not indicated at this time;Patient/caregiver not appropriate for education at this time   Nutrition Complexity Risk   Nutrition complexity level Moderate risk   Follow up date  11/04/24
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
## 2024-10-25 NOTE — ED NOTES
201 Signed and Dated- Rights explained, bed search explained- Emailed to [facility] Intake- Original on patient chart.     Patient would prefer to be referred to [facility] at this time. Patient verbalizes understanding that placement is contingent on bed availability.    Jeremy Pierre  Crisis Intervention Specialist II  10/24/24

## 2024-10-25 NOTE — ED NOTES
Patient is accepted at [facility].  Patient is accepted by Dr. Robert Da Silva in Intake.    Transportation is arranged with [facility].  Transportation is scheduled for TBD.  Patient may go to the floor at 2300.      Nurse report is to be called to [phone] prior to patient transfer.     Jeremy Pierre  Crisis Intervention Specialist II  10/24/24

## 2024-10-25 NOTE — TELEPHONE ENCOUNTER
Moe MONROY from [facility] called in to check on patient medication as she is admitted at the hospital. Krissy transferred the call to practice clinical call was answered by Heather and she took the call to assist. Thanks

## 2024-10-25 NOTE — ED NOTES
Patient presents to the Emergency Department for recreational drug use, and admits to using methamphetamines. [facility] became involved when patient utilized a pair of scissors to cut her left arm superficially. Patient has calmed down and is cooperative, and agrees to speak with this writer. Patient is alert and oriented across all spheres, has a labile affect, is crying and sobbing throughout assessment, is tangential at times, and is in a dysphoric mood. Patient reports she was kicked out of her home ten days ago by her significant other after she did not receive the inheritance he thought she would. Patient reports her father  in , and she was supposed to get an inheritance but did not. Patient reports her SO proceeded to kick her out, and she has been living on the streets since. Patient reports she started using meth once homeless, and has been using daily with last use being last night 10/23/24. Patient endorses suicidal ideation without current plan, and endorses daily self injurious behaviors via cutting. Patient's left arm has significant scarring and some cuts that appear to have scabbed over. Patient reports suicide attempt in the past years ago in which she attempted to cut her own throat. Patient reports auditory hallucinations that manifest as voices speaking in the background, denies that they are command in nature. Patient denies homicidal ideation and visual/tactile hallucinations. Patient denies other drug and alcohol consumption, reports smoking 5-6 cigarettes per day. Patient reports she has a therapist she speaks with weekly, and a psychiatrist through [facility]. It is unclear if the patient has been medication compliant, with the exception of suboxone which she adamantly reports she takes. Patient reports decreased appetite and poor sleep patterns since becoming homeless.    Patient reports she is willing to sign herself into the hospital for inpatient psychiatric treatment at  this time.    Jeremy Pierre  Crisis Intervention Specialist II  10/24/24

## 2024-10-25 NOTE — ED NOTES
RN informed pt that pt is discharged and good to go. Pt grabbed scissors out of personal belongings and cut her wrist with them. Belongings taken away immediately. Provider made aware.       Renay Wright RN  10/24/24 2017

## 2024-10-25 NOTE — PROGRESS NOTES
Attempted to complete with Carey her admission self assessment. She was labile, tangential focused on her situation in the community. She appeared angry with her friends for their behaviors. She knows she has a problem with drugs and uses them to mask her pain. We were not able to complete her assessment but will try again on Monday.

## 2024-10-25 NOTE — NURSING NOTE
49 year old female, arrived to unit from [place] ED on a 201. Per ED report, patient was being seen in the ED and once she was cleared for discharge, she started threatening suicide. Patient cut her left forearm with scissors she had in her bag. Patient is a poor historian and unable to provide why she was in the ED.     Her UDS is positive for methamphetamine and benzodiazepine, patient admitted to meth usage yesterday. Patient has a history of substance abuse. Patient is disorganized, unable to sit still, uncooperative with the admission process. Patient whispers when talking and becomes irritable when asked to repeat herself/speak louder. She reports not sleeping for 1 week and just wanting to go to sleep. Patient escorted into room 243, unsteady on her feet. Patient is very tearful. Patient endorsing fleeting SI and can have impulsive behaviors. Unable to access if patient is hallucinating.     Unable to complete PTA medication list and C-SSRS lifetime/recent score. Skin assessment was unremarkable other than cuts on left forearm. Providers notified. Patient complaint of back pain and given Ibuprofen 600 mg. Also given Trazodone PRN and melatonin. Continuous q15 minute checks ongoing.

## 2024-10-25 NOTE — ASSESSMENT & PLAN NOTE
Monitor and give supportive care for withdrawal.  Patient is on Klonopin 1 mg 3 times a day as an outpatient.  We will continue Klonopin 0.5 mg 3 times daily, this will help with withdrawal symptoms.  We will taper and plan for discontinuation prior to discharge.

## 2024-10-25 NOTE — ED NOTES
Roundtrip initiated at this time.    SDM P/U 1822    Jeremy Pierre  Crisis Intervention Specialist II  10/24/24

## 2024-10-25 NOTE — H&P
"                              Momo Keith#  :1975 F  MRN:[id number]    CSN:[id number]  Adm Date: 10/25/2024 0048  12:48 AM   ATT PHY: Alesia Brunson Md  [facility]         Chief Complaint: suicidal ideation      History of Presenting Illness: Carey Keith is a(n) 49 y.o. year old female who is admitted to [facility] on 201 voluntary commitment basis.  Patient originally presented to [facility] ED on 10/24 due to suicidal ideation.     Patient examined at bedside.  Patient complaining of chronic back pain.  Also having urinary symptoms including frequency, urgency, and burning.  Superficial cuts located on L forearm.      Labs 10/25:  K 2.7  CBC, TSH, lipid panel normal.   Vitamins pending.   UA positive for UTI.      Allergies   Allergen Reactions    Haloperidol Other (See Comments)     oculogyr crisis    Abilify [Aripiprazole] Other (See Comments)     Pt reports increased agitation ? (stating last time she took this med \"she lost it\"        Current Facility-Administered Medications on File Prior to Encounter   Medication Dose Route Frequency Provider Last Rate Last Admin    [COMPLETED] buprenorphine-naloxone (Suboxone) film 2 mg  2 mg Sublingual Once Reny Lyons MD   2 mg at 10/24/24 2053    [COMPLETED] LORazepam (ATIVAN) injection 2 mg  2 mg Intravenous Once Reny Lyons MD   2 mg at 10/24/24 1834    [COMPLETED] LORazepam (ATIVAN) injection 2 mg  2 mg Intravenous Once Reny Lyons MD   2 mg at 10/24/24 1926    [DISCONTINUED] albuterol (PROVENTIL HFA,VENTOLIN HFA) inhaler 2 puff  2 puff Inhalation Q6H PRN Reny Lyons MD        [DISCONTINUED] buPROPion (WELLBUTRIN XL) 24 hr tablet 150 mg  150 mg Oral After Lunch Reny Lyons MD        [DISCONTINUED] buPROPion (WELLBUTRIN XL) 24 hr tablet 300 mg  300 mg Oral QAM Reny Lyons MD        [DISCONTINUED] clonazePAM (KlonoPIN) tablet 1 mg  1 mg Oral TID Reny Lyons MD   1 mg at 10/24/24 " 2053    [DISCONTINUED] topiramate (TOPAMAX) tablet 200 mg  200 mg Oral Daily Reny Lyons MD         Current Outpatient Medications on File Prior to Encounter   Medication Sig Dispense Refill    albuterol (Proventil HFA) 90 mcg/act inhaler Inhale 2 puffs every 6 (six) hours as needed for wheezing 6.7 g 0    buprenorphine-naloxone (Suboxone) 8-2 mg PLACE 1 FILM UNDER THE TONGUE 3 TIMES A DAY.      buPROPion (WELLBUTRIN XL) 150 mg 24 hr tablet Take 1 tablet (150 mg total) by mouth daily in the afternoon 90 tablet 0    buPROPion (WELLBUTRIN XL) 300 mg 24 hr tablet Take 1 tablet (300 mg total) by mouth daily 90 tablet 0    clonazePAM (KlonoPIN) 1 mg tablet Take 1 tablet (1 mg total) by mouth 3 (three) times a day 90 tablet 0    ergocalciferol (VITAMIN D2) 50,000 units Take 1 capsule (50,000 Units total) by mouth once a week for 5 doses 5 capsule 0    gabapentin (NEURONTIN) 300 mg capsule Take 2 capsules (600 mg total) by mouth 3 (three) times a day 180 capsule 5    naproxen (NAPROSYN) 500 mg tablet Take 500 mg by mouth      Narcan 4 MG/0.1ML nasal spray 4 mg into each nostril      ondansetron (ZOFRAN-ODT) 4 mg disintegrating tablet Take 1 tablet (4 mg total) by mouth every 6 (six) hours as needed for nausea or vomiting 30 tablet 0    polyethylene glycol (GLYCOLAX) 17 GM/SCOOP powder MIX 17 GRAMS WITH LIQUID AND DRINK DAILY      Symbicort 160-4.5 MCG/ACT inhaler Inhale 2 puffs 2 (two) times a day      topiramate (TOPAMAX) 200 MG tablet Take 1 tablet (200 mg total) by mouth daily 30 tablet 0    traZODone (DESYREL) 100 mg tablet Take 1 tablet (100 mg total) by mouth daily at bedtime 30 tablet 0    valACYclovir (VALTREX) 500 mg tablet Take 1 tablet (500 mg total) by mouth 2 (two) times a day for 3 days 6 tablet 0       Active Ambulatory Problems     Diagnosis Date Noted    Medical clearance for psychiatric admission 12/23/2021    Tobacco abuse 12/24/2021    Dyspepsia 01/02/2022    Hepatitis C antibody test positive  09/30/2022    Opioid use disorder, severe, on maintenance therapy ([facility]) 10/06/2022    Hyperlipidemia 02/07/2024    Bipolar disorder, current episode depressed, severe, without psychotic features ([facility]) 05/02/2024    Hilar adenopathy 05/12/2024    Oral herpes 05/12/2024     Resolved Ambulatory Problems     Diagnosis Date Noted    Rhabdomyolysis 12/23/2021    Unspecified mood (affective) disorder ([facility]) 12/24/2021    Encounter for monitoring opioid maintenance therapy 12/24/2021    Muscle spasm of shoulder region 01/02/2022    Acute respiratory failure with hypoxia ([facility]) 09/21/2022    Elevated LFTs 09/21/2022    Heavy tobacco smoker 09/21/2022    Substance Abuse Disorder  09/21/2022    Altered mental status 09/21/2022    Anemia 09/21/2022    Sepsis ([facility]) 09/21/2022    R/O CAP (community acquired pneumonia) 09/21/2022    Bradycardia 09/25/2022    Anxiety disorder, unspecified 10/06/2022    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    Mild protein-calorie malnutrition ([facility]) 10/08/2022    Pneumonitis 10/28/2022    Pulmonary edema 04/13/2023    Abnormal CT of the chest 04/13/2023    Acute right ventricular heart failure ([facility]) 04/14/2023    Dizziness 06/19/2023     Past Medical History:   Diagnosis Date    Addiction to drug ([facility])     Alcohol abuse     Alcoholism ([facility])     Lower back pain     Self-injurious behavior        Past Surgical History:   Procedure Laterality Date    CHOLECYSTECTOMY         Social History:   Social History     Socioeconomic History    Marital status: Single     Spouse name: None    Number of children: None    Years of education: None    Highest education level: None   Occupational History    None   Tobacco Use    Smoking status: Some Days     Current packs/day: 0.50     Average packs/day: 0.5 packs/day for 20.0 years (10.0 ttl pk-yrs)     Types: Cigarettes    Smokeless tobacco: Current    Tobacco comments:     Pt not ready to quit   Vaping Use    Vaping status: Every Day    Substances: Nicotine,  "THC, Flavoring   Substance and Sexual Activity    Alcohol use: Not Currently     Comment: MAGDALENO, pt historically inconsistent. Drinking  per [facility] 2    Drug use: Not Currently     Types: Heroin, \"Crack\" cocaine, Cocaine, LSD, Benzodiazepines, Marijuana     Comment: Pt unreliable historian    Sexual activity: Not Currently   Other Topics Concern    None   Social History Narrative    None     Social Determinants of Health     Financial Resource Strain: High Risk (5/3/2024)    Overall Financial Resource Strain (CARDIA)     Difficulty of Paying Living Expenses: Very hard   Food Insecurity: Patient Unable To Answer (10/25/2024)    Nursing - Inadequate Food Risk Classification     Worried About Running Out of Food in the Last Year: Sometimes true     Ran Out of Food in the Last Year: Sometimes true     Ran Out of Food in the Last Year: 97   Transportation Needs: Patient Unable To Answer (10/25/2024)    Nursing - Transportation Risk Classification     Lack of Transportation: Not on file     Lack of Transportation: 97   Physical Activity: Not on file   Stress: Not on file   Social Connections: Unknown (2024)    Received from [employer]    Social Connections     How often do you feel lonely or isolated from those around you? (Adult - for ages 18 years and over): Not on file   Intimate Partner Violence: Patient Unable To Answer (10/25/2024)    Nursing IPS     Feels Physically and Emotionally Safe: Not on file     Physically Hurt by Someone: Not on file     Humiliated or Emotionally Abused by Someone: Not on file     Physically Hurt by Someone: 97     Hurt or Threatened by Someone: 97   Housing Stability: Patient Unable To Answer (10/25/2024)    Nursing: Inadequate Housing Risk Classification     Has Housing: Not on file     Worried About Losing Housing: Not on file     Unable to Get Utilities: Not on file     Unable to Pay for Housing in the Last Year: 97     Has Housin       Family History:   Family " History   Problem Relation Age of Onset    Autism Mother     Heart disease Father     Stroke Father     Anxiety disorder Father     Heart disease Maternal Grandmother        Review of Systems   Constitutional:  Negative for chills, fatigue and fever.   HENT: Negative.     Eyes: Negative.    Respiratory:  Negative for shortness of breath.    Cardiovascular:  Negative for chest pain, palpitations and leg swelling.   Gastrointestinal: Negative.    Genitourinary:  Positive for dysuria, frequency and urgency.   Musculoskeletal:  Positive for arthralgias and back pain.   Neurological:  Negative for dizziness and light-headedness.       Physical Exam   Vitals: Blood pressure 97/60, pulse 70, temperature (!) 97.2 °F (36.2 °C), temperature source Temporal, resp. rate 16, height 5' (1.524 m), SpO2 98%.,Body mass index is 31.78 kg/m².  Constitutional: Awake, alert, in no acute distress.  Head: Normocephalic and atraumatic.   Mouth/Throat: Oropharynx is clear and moist.    Eyes: Conjunctivae and EOM are normal.   Neck: Neck supple. No thyromegaly present.   Cardiovascular: Normal rate, regular rhythm and normal heart sounds.    Pulmonary/Chest: Effort normal and breath sounds normal.   Abdominal: Soft. Bowel sounds are normal. There is no tenderness. There is no rebound and no guarding.   Neurological: No focal deficits.   Musculoskeletal:  Nontender spine.  Skin: Skin is warm and dry. No edema.     Assessment     Carey Keith is a(n) 49 y.o. female with MDD, bipolar.     Asthma.  Continue Symbicort 160-4.5 mcg/act twice daily.  Albuterol as needed.   Acute UTI.  UA pos in ED 10/24.  Macrobid 100 mg twice daily x 5 days.   Chronic Hep C.  Liver function stable.   Back pain w/ neuropathy.  Continue gabapentin 600 mg TID.  Patient uses ibuprofen as needed.   Substance use disorder.  Patient on Suboxone 8 mg film TID.   Tobacco use disorder.  NRT.   Hypokalemia.  K 2.7 10/25.  Potassium chloride 40 mEq twice daily x 3 days.   Recheck BMP Monday.   Hx Vit D deficiency.  Recheck levels.   MDD, bipolar.   Managed by psych.     Prognosis: Fair.    Discharge Plan: In progress.    Advanced Directives: I have discussed in detail with the patient the advanced directives. The patient does not have an appointed POA or living will. When discussing cardiac and pulmonary resuscitation efforts with the patient, the patient wishes to be  FULL CODE.    I have spent more than 50 minutes gathering data, doing physical examination, and discussing the advanced directives, which was witnessed by caring staff.    The patient was discussed with Dr. Mark and he is in agreement with the above note.

## 2024-10-25 NOTE — ED NOTES
RN informed pt she is discharged and can leave. Pt threatened suicide if she leaves. Rn made provider aware.         Renya Wright RN  10/24/24 2014

## 2024-10-26 PROCEDURE — 99233 SBSQ HOSP IP/OBS HIGH 50: CPT | Performed by: HOSPITALIST

## 2024-10-26 RX ORDER — ACETAMINOPHEN 325 MG/1
650 TABLET ORAL EVERY 4 HOURS PRN
Status: DISCONTINUED | OUTPATIENT
Start: 2024-10-26 | End: 2024-11-07

## 2024-10-26 RX ORDER — ERGOCALCIFEROL 1.25 MG/1
50000 CAPSULE, LIQUID FILLED ORAL WEEKLY
Status: DISCONTINUED | OUTPATIENT
Start: 2024-10-27 | End: 2024-11-14 | Stop reason: HOSPADM

## 2024-10-26 RX ORDER — KETOROLAC TROMETHAMINE 30 MG/ML
15 INJECTION, SOLUTION INTRAMUSCULAR; INTRAVENOUS EVERY 6 HOURS PRN
Status: DISPENSED | OUTPATIENT
Start: 2024-10-26 | End: 2024-10-30

## 2024-10-26 RX ORDER — ACETAMINOPHEN 325 MG/1
975 TABLET ORAL EVERY 6 HOURS PRN
Status: DISCONTINUED | OUTPATIENT
Start: 2024-10-26 | End: 2024-11-07

## 2024-10-26 RX ORDER — POTASSIUM CHLORIDE 1500 MG/1
40 TABLET, EXTENDED RELEASE ORAL 2 TIMES DAILY
Status: COMPLETED | OUTPATIENT
Start: 2024-10-26 | End: 2024-10-27

## 2024-10-26 RX ADMIN — NICOTINE 21 MG: 21 PATCH, EXTENDED RELEASE TRANSDERMAL at 08:56

## 2024-10-26 RX ADMIN — ACETAMINOPHEN 975 MG: 325 TABLET ORAL at 20:21

## 2024-10-26 RX ADMIN — OLANZAPINE 5 MG: 10 INJECTION, POWDER, FOR SOLUTION INTRAMUSCULAR at 11:16

## 2024-10-26 RX ADMIN — BUPRENORPHINE AND NALOXONE 8 MG: 8; 2 FILM BUCCAL; SUBLINGUAL at 15:17

## 2024-10-26 RX ADMIN — KETOROLAC TROMETHAMINE 15 MG: 30 INJECTION, SOLUTION INTRAMUSCULAR; INTRAVENOUS at 15:17

## 2024-10-26 RX ADMIN — NICOTINE POLACRILEX 4 MG: 4 GUM, CHEWING BUCCAL at 11:09

## 2024-10-26 RX ADMIN — BUPRENORPHINE AND NALOXONE 8 MG: 8; 2 FILM BUCCAL; SUBLINGUAL at 08:56

## 2024-10-26 RX ADMIN — CLONAZEPAM 0.5 MG: 0.5 TABLET ORAL at 08:56

## 2024-10-26 RX ADMIN — KETOROLAC TROMETHAMINE 15 MG: 30 INJECTION, SOLUTION INTRAMUSCULAR; INTRAVENOUS at 21:38

## 2024-10-26 RX ADMIN — NICOTINE POLACRILEX 4 MG: 4 GUM, CHEWING BUCCAL at 09:23

## 2024-10-26 RX ADMIN — POTASSIUM CHLORIDE 40 MEQ: 1500 TABLET, EXTENDED RELEASE ORAL at 08:56

## 2024-10-26 RX ADMIN — POTASSIUM CHLORIDE 40 MEQ: 1500 TABLET, EXTENDED RELEASE ORAL at 17:39

## 2024-10-26 RX ADMIN — NITROFURANTOIN (MONOHYDRATE/MACROCRYSTALS) 100 MG: 25; 75 CAPSULE ORAL at 08:56

## 2024-10-26 RX ADMIN — Medication 3 MG: at 20:53

## 2024-10-26 RX ADMIN — CLONAZEPAM 0.5 MG: 0.5 TABLET ORAL at 15:17

## 2024-10-26 RX ADMIN — GABAPENTIN 600 MG: 300 CAPSULE ORAL at 20:53

## 2024-10-26 RX ADMIN — SERTRALINE HYDROCHLORIDE 50 MG: 50 TABLET ORAL at 08:56

## 2024-10-26 RX ADMIN — GABAPENTIN 600 MG: 300 CAPSULE ORAL at 08:56

## 2024-10-26 RX ADMIN — BUPRENORPHINE AND NALOXONE 8 MG: 8; 2 FILM BUCCAL; SUBLINGUAL at 20:53

## 2024-10-26 RX ADMIN — NITROFURANTOIN (MONOHYDRATE/MACROCRYSTALS) 100 MG: 25; 75 CAPSULE ORAL at 17:39

## 2024-10-26 RX ADMIN — CLONAZEPAM 0.5 MG: 0.5 TABLET ORAL at 20:22

## 2024-10-26 RX ADMIN — TRAZODONE HYDROCHLORIDE 50 MG: 50 TABLET ORAL at 21:38

## 2024-10-26 RX ADMIN — IBUPROFEN 800 MG: 800 TABLET, FILM COATED ORAL at 09:23

## 2024-10-26 RX ADMIN — GABAPENTIN 600 MG: 300 CAPSULE ORAL at 15:17

## 2024-10-26 RX ADMIN — NICOTINE POLACRILEX 4 MG: 4 GUM, CHEWING BUCCAL at 19:32

## 2024-10-26 NOTE — NURSING NOTE
Patient compliant with meds and meals. Patient refused Invega provider aware. Patient states she wants to die and that she is in so much pain no one would understand. Patient is tearful, labile and irritable. Patient blames others such as sister and states her sister took everything from her after she helped care for her sisters child who was autistic. Patient states she started doing drugs so she could kill her self but was unsuccessful with that attempt. States after her dad  in 2020 she feels she has nothing to live for. Patient states she would remain safe on unit and would not hurt herself here. Patient ripped up a bible when requested to give to this writer patient refused stated she needed to do that. Patient not social with peers withdrawn to her room. Q 15 min behavioral and safety checks in place.

## 2024-10-26 NOTE — PROGRESS NOTES
Progress Note - Behavioral Health   Name: Carey Keith 49 y.o. female I MRN: [id number]  Unit/Bed#: U 256-01 I Date of Admission: 10/25/2024   Date of Service: 10/26/2024 I Hospital Day: 1  Encounter: [id number]        Assessment & Plan  Bipolar disorder, current episode depressed, severe, without psychotic features (HCC)  Patient admitted on a voluntary 201 with thoughts of self-harm and increased depression in the context of homelessness, relapsed to meth use.  Continue Invega 3 mg daily for mood stability and thought disorder.  Continue Zoloft 50 mg daily for depression.    Suicidal ideations  Patient able to contract for safety on the unit  Opioid use disorder, severe, on maintenance therapy (HCC)  On Suboxone maintenance treatment verified by PDMP.  Continue Suboxone 8/2 mg 3 times a day.  Methamphetamine abuse (HCC)  Monitor and give supportive care for withdrawal.  Patient is on Klonopin 1 mg 3 times a day as an outpatient.  Continue Klonopin 0.5 mg 3 times daily, this will help with withdrawal symptoms.  We will taper and plan for discontinuation prior to discharge.      Risks / Benefits of Treatment:  Risks, benefits, and possible side effects of medications explained to patient including risk of parkinsonian symptoms, Tardive Dyskinesia and metabolic syndrome related to treatment with antipsychotic medications, risk of cardiovascular events in elderly related to treatment with antipsychotic medications, and risk of suicidality and serotonin syndrome related to treatment with antidepressants. Patient has limited understanding of risks and benefits treatment at this time, but agrees to take medications as prescribed.      Subjective:  The patient was evaluated this morning for continuity of care and no acute distress noted throughout the evaluation. Over the past 24 hours staff noted that Carey is labile, tearful, was making bizarre statements, refused Invega. Patient has been medication  "non-adherent.  Today on evaluation,Carey is sitting in her bed, she is tearful, loud, looks dysphoric, intermittently screaming, has irritable edge, ruminating, uses profane language during conversation intermittently, she is only partially cooperative. On the floor at the bedside there are piles of ripped and torn paper, which he is pages from Bible the patient asked for earlier, and when asked about it, the patient says \"it is art. The art of hate\" , and she goes on expressing her frustration with and hatred to God, Amado.ans explains that by loosing her hope from not getting help for he problems and misfortunes.  The patient is showing symptoms of thought disorder, she is tangential, jumps from [id number] without any connection, it is difficult to follow the train of her thoughts.   Carey reports somatic symptoms of back pain, Left leg pain and asks fo toradol to alleviate her pain which she states \"is the only medication that helps\".   When told that she is having withdrawal symptoms from illicit drugs, the patient minimizes her substance use problems, saying that she was only experimenting with them.   The patient refused to take Invega.  Zyprexa 5 mg IM injection was given per protocol.  At this time it is not very clear if the patient's bizarre statements are due to paranoia/delusions or if they are caused by withdrawal from substances due to lack of cooperation from the patient.    Psychiatric Review of Systems:  Behavior over the last 24 hours: unchanged.   Sleep: slept off and on  Appetite: fair  Medication side effects: None reported   ROS: reports back pain, body aches, and leg pain    Mental Status Examination:  Appearance:  disheveled, marginal hygiene, dressed in hospital attire, looks older than stated age   Behavior:  demanding, psychomotor agitation, restless   Speech:  pressured, garbled   Mood:  anxious, labile, irritable, angry   Affect:  labile, increased in intensity   Thought " Process:  disorganized, illogical, tangential   Associations: perseverative   Thought Content:  no overt delusions   Perceptual Disturbances: denies auditory or visual hallucinations when asked   Risk Potential: Suicidal ideation - Yes  Homicidal ideation - None   Sensorium:  oriented to person, place, and time/date   Memory:  recent and remote memory grossly intact   Consciousness:  alert and awake   Attention/Concentration: attention span and concentration are age appropriate   Insight:  impaired   Judgment: impaired   Gait/Station: in bed, unable to assess   Motor Activity: no abnormal movements       Vital signs in last 24 hours:  Temp:  [97.8 °F (36.6 °C)-98.1 °F (36.7 °C)] 97.8 °F (36.6 °C)  HR:  [60] 60  BP: ()/(60-67) 90/60  Resp:  [16] 16  SpO2:  [95 %-100 %] 95 %  O2 Device: None (Room air)       Laboratory results: I have personally reviewed all pertinent laboratory/tests results      Progress Toward Goals: insight remains poor, no visible progress noted    Current Medications:  Current Facility-Administered Medications   Medication Dose Route Frequency Provider Last Rate    acetaminophen  650 mg Oral Q4H PRN Mercedes Brand PA-C      acetaminophen  975 mg Oral Q6H PRN Mercedes Brand PA-C      albuterol  2 puff Inhalation Q6H PRN Namrata Duran PA-C      aluminum-magnesium hydroxide-simethicone  30 mL Oral Q4H PRN Angie Jones MD      benztropine  1 mg Intramuscular Q4H PRN Max 6/day Angie Jones MD      benztropine  1 mg Oral Q4H PRN Max 6/day Angie Jones MD      bisacodyl  10 mg Rectal Daily PRN Angie Jones MD      budesonide-formoterol  2 puff Inhalation BID Namrata Duran PA-C      buprenorphine-naloxone  8 mg Sublingual TID Alesia Brunson MD      [START ON 10/27/2024] ergocalciferol  50,000 Units Oral Weekly Mercedes Brand PA-C      Followed by    [START ON 12/22/2024] Cholecalciferol  1,000 Units Oral Daily Mercedes Brand PA-C       clonazePAM  0.5 mg Oral TID Alesia Brunson MD      hydrOXYzine HCL  50 mg Oral Q6H PRN Max 4/day Angie Jones MD      Or    diphenhydrAMINE  50 mg Intramuscular Q6H PRN Angie Jones MD      gabapentin  600 mg Oral TID Namrata Duran PA-C      hydrOXYzine HCL  100 mg Oral Q6H PRN Max 4/day Angie Jones MD      Or    LORazepam  2 mg Intramuscular Q6H PRN Angie Jones MD      hydrOXYzine HCL  25 mg Oral Q6H PRN Max 4/day Angie Jones MD      ketorolac  15 mg Intramuscular Q6H PRN Mercedes Brand, PA-C      melatonin  3 mg Oral HS Angie Jones MD      nicotine  21 mg Transdermal Daily Namrata Duran PA-C      nicotine polacrilex  4 mg Oral Q2H PRN Angie Jones MD      nitrofurantoin  100 mg Oral BID With Meals Namrata Duran PA-C      OLANZapine  5 mg Oral Q4H PRN Max 3/day Angie Jones MD      Or    OLANZapine  2.5 mg Intramuscular Q4H PRN Max 3/day Angie Jonse MD      OLANZapine  5 mg Oral Q3H PRN Max 3/day Angie Jones MD      Or    OLANZapine  5 mg Intramuscular Q3H PRN Max 3/day Angie Jones MD      OLANZapine  2.5 mg Oral Q4H PRN Max 6/day Angie Jones MD      paliperidone  3 mg Oral Daily Alesia Brunson MD      polyethylene glycol  17 g Oral Daily PRN Angie Jones MD      potassium chloride  40 mEq Oral BID Mercedes Brand, PA-C      propranolol  10 mg Oral Q8H PRN Angie Jones MD      senna-docusate sodium  1 tablet Oral Daily PRN Angie Jones MD      sertraline  50 mg Oral Daily Alesia Brunson MD      tiZANidine  4 mg Oral Q8H PRN Mercedes L Dagjose, PA-C      traZODone  50 mg Oral HS PRN Angie Jones MD         Counseling / Coordination of Care:  Patient's progress discussed with staff in treatment team meeting.      This note has been constructed using a voice recognition system. There may be translation, syntax,  or grammatical errors. If you have any questions, please contact the dictating  provider.     Lemuel Azevedo MD    10/26/24  Psychiatry Resident, PGY-2

## 2024-10-26 NOTE — PROGRESS NOTES
Progress Note - Carey Keith 49 y.o. female MRN: [id number]    Unit/Bed#: [facility] 256-01 Encounter: [id number]        Subjective:   Patient seen and examined at bedside after reviewing the chart and discussing the case with the caring staff.      Patient examined at bedside.  Patient complaining of chronic back pain and does not feel ibuprofen is helping.  She has tried Toradol in the past which helped and is requesting this.     Physical Exam   Vitals: Blood pressure 90/60, pulse 60, temperature 97.8 °F (36.6 °C), temperature source Temporal, resp. rate 16, height 5' (1.524 m), weight 73.5 kg (162 lb), SpO2 95%.,Body mass index is 31.64 kg/m².  Constitutional: Patient in no acute distress.  HEENT: PERR, EOMI, MMM.  Cardiovascular: Normal rate and regular rhythm.    Pulmonary/Chest: Effort normal and breath sounds normal.   Abdomen: Soft, + BS, NT.    Assessment/Plan:  Carey Keith is a(n) 49 y.o. female with bipolar disorder.     Asthma.  Continue Symbicort 160-4.5 mcg/act twice daily.  Albuterol as needed.   Acute UTI.  UA pos in ED 10/24.  Macrobid 100 mg twice daily x 5 days.   Chronic Hep C.  Liver function stable.   Back pain/neuropathy.  Continue gabapentin 600 mg TID.  Toradol IM 15mg q6h prn for severe pain x4 days.  Tylenol for mild/mod pain.  Tried Robaxin in the past without relief, trial Zanaflex 4mg q8h prn.  Substance use disorder.  Patient on Suboxone 8 mg film TID.   Tobacco use disorder.  NRT.   Hypokalemia.  K 2.7 10/25.  Potassium chloride 40 mEq twice daily x 3 days.  Recheck BMP in AM.   Dementia D deficiency.  Patient started on vitamin D2 50,000 units weekly for 8 weeks followed by vitamin D3 1000 units daily.    The patient was discussed with Dr. Mark and he is in agreement with the above note.

## 2024-10-26 NOTE — NURSING NOTE
Assumed care of this patient from prior shift nurse at 0315.  Nursing staff inventoried medications that were apparently brought to the unit via  on 10/25/2024.  The following medications were inventoried and placed in a sealed security envelope (#[id number]) and [facility]:    1 bottle Pleyaderm- 6 tabs  1 bottle Topiramate 200 mg tabs  1 bottle Valacyclovir 500 mg tabs  1 Albuterol inhaler  2 bottles of Bupropion tabs 150 mg  1 bottle of miscellaneous, unidentified pills    Patient sleeping at this time; unable to sign for medication inventory.

## 2024-10-26 NOTE — TREATMENT TEAM
10/26/24 1116   Broset Violence Checklist   Assessment type Shift   Irritability 1   Confusion 1   Boisterousness 1   Threatening physical violence 0   Verbal threats 0   Violence 0   Broset score 3     Patient tearful, crying and yelling, ripping up a book. Cont to ruminate on her life and people using her and losing her dad stating she wants to die. Administered 5mg IM zyprexa will monitor for effectiveness

## 2024-10-26 NOTE — ASSESSMENT & PLAN NOTE
Monitor and give supportive care for withdrawal.  Patient is on Klonopin 1 mg 3 times a day as an outpatient.  Continue Klonopin 0.5 mg 3 times daily, this will help with withdrawal symptoms.  We will taper and plan for discontinuation prior to discharge.

## 2024-10-26 NOTE — PLAN OF CARE
Problem: Ineffective Coping  Goal: Participates in unit activities  Description: Interventions:  - Provide therapeutic environment   - Provide required programming   - Redirect inappropriate behaviors   Outcome: Progressing     Problem: Risk for Self Injury/Neglect  Goal: Refrain from harming self  Description: Interventions:  - Monitor patient closely, per order  - Develop a trusting relationship  - Supervise medication ingestion, monitor effects and side effects   Outcome: Progressing     Problem: Depression  Goal: Refrain from isolation  Description: Interventions:  - Develop a trusting relationship   - Encourage socialization   Outcome: Progressing     Problem: Alteration in Orientation  Goal: Interact with staff daily  Description: Interventions:  - Assess and re-assess patient's level of orientation  - Engage patient in 1 on 1 interactions, daily, for a minimum of 15 minutes   - Establish rapport/trust with patient   Outcome: Progressing     Problem: Nutrition/Hydration-ADULT  Goal: Nutrient/Hydration intake appropriate for improving, restoring or maintaining nutritional needs  Description: Monitor and assess patient's nutrition/hydration status for malnutrition. Collaborate with interdisciplinary team and initiate plan and interventions as ordered.  Monitor patient's weight and dietary intake as ordered or per policy. Utilize nutrition screening tool and intervene as necessary. Determine patient's food preferences and provide high-protein, high-caloric foods as appropriate.     INTERVENTIONS:  - Monitor oral intake, urinary output, labs, and treatment plans  - Assess nutrition and hydration status and recommend course of action  - Evaluate amount of meals eaten  - Assist patient with eating if necessary   - Allow adequate time for meals  - Recommend/ encourage appropriate diets, oral nutritional supplements, and vitamin/mineral supplements  - Order, calculate, and assess calorie counts as needed  -  Recommend, monitor, and adjust tube feedings and TPN/PPN based on assessed needs  - Assess need for intravenous fluids  - Provide specific nutrition/hydration education as appropriate  - Include patient/family/caregiver in decisions related to nutrition  Outcome: Progressing

## 2024-10-27 LAB
ANION GAP SERPL CALCULATED.3IONS-SCNC: 8 MMOL/L (ref 4–13)
BUN SERPL-MCNC: 8 MG/DL (ref 5–25)
CALCIUM SERPL-MCNC: 8.3 MG/DL (ref 8.4–10.2)
CHLORIDE SERPL-SCNC: 107 MMOL/L (ref 96–108)
CO2 SERPL-SCNC: 21 MMOL/L (ref 21–32)
CREAT SERPL-MCNC: 0.63 MG/DL (ref 0.6–1.3)
GFR SERPL CREATININE-BSD FRML MDRD: 107 ML/MIN/1.73SQ M
GLUCOSE P FAST SERPL-MCNC: 95 MG/DL (ref 65–99)
GLUCOSE SERPL-MCNC: 95 MG/DL (ref 65–140)
MAGNESIUM SERPL-MCNC: 2.1 MG/DL (ref 1.9–2.7)
POTASSIUM SERPL-SCNC: 4.9 MMOL/L (ref 3.5–5.3)
SODIUM SERPL-SCNC: 136 MMOL/L (ref 135–147)

## 2024-10-27 PROCEDURE — 80048 BASIC METABOLIC PNL TOTAL CA: CPT

## 2024-10-27 PROCEDURE — 99233 SBSQ HOSP IP/OBS HIGH 50: CPT | Performed by: HOSPITALIST

## 2024-10-27 PROCEDURE — 83735 ASSAY OF MAGNESIUM: CPT

## 2024-10-27 RX ORDER — POTASSIUM CHLORIDE 1500 MG/1
40 TABLET, EXTENDED RELEASE ORAL 2 TIMES DAILY
Status: COMPLETED | OUTPATIENT
Start: 2024-10-27 | End: 2024-10-28

## 2024-10-27 RX ORDER — PALIPERIDONE 6 MG/1
6 TABLET, EXTENDED RELEASE ORAL DAILY
Status: DISCONTINUED | OUTPATIENT
Start: 2024-10-28 | End: 2024-10-31

## 2024-10-27 RX ADMIN — CLONAZEPAM 0.5 MG: 0.5 TABLET ORAL at 08:02

## 2024-10-27 RX ADMIN — NITROFURANTOIN (MONOHYDRATE/MACROCRYSTALS) 100 MG: 25; 75 CAPSULE ORAL at 18:04

## 2024-10-27 RX ADMIN — NICOTINE POLACRILEX 4 MG: 4 GUM, CHEWING BUCCAL at 09:10

## 2024-10-27 RX ADMIN — BUPRENORPHINE AND NALOXONE 8 MG: 8; 2 FILM BUCCAL; SUBLINGUAL at 20:07

## 2024-10-27 RX ADMIN — TRAZODONE HYDROCHLORIDE 50 MG: 50 TABLET ORAL at 20:08

## 2024-10-27 RX ADMIN — GABAPENTIN 600 MG: 300 CAPSULE ORAL at 20:06

## 2024-10-27 RX ADMIN — GABAPENTIN 600 MG: 300 CAPSULE ORAL at 08:02

## 2024-10-27 RX ADMIN — BUPRENORPHINE AND NALOXONE 8 MG: 8; 2 FILM BUCCAL; SUBLINGUAL at 08:02

## 2024-10-27 RX ADMIN — CLONAZEPAM 0.5 MG: 0.5 TABLET ORAL at 15:04

## 2024-10-27 RX ADMIN — BUPRENORPHINE AND NALOXONE 8 MG: 8; 2 FILM BUCCAL; SUBLINGUAL at 15:04

## 2024-10-27 RX ADMIN — GABAPENTIN 600 MG: 300 CAPSULE ORAL at 15:04

## 2024-10-27 RX ADMIN — ERGOCALCIFEROL 50000 UNITS: 1.25 CAPSULE, LIQUID FILLED ORAL at 08:02

## 2024-10-27 RX ADMIN — PALIPERIDONE 3 MG: 3 TABLET, EXTENDED RELEASE ORAL at 08:02

## 2024-10-27 RX ADMIN — NICOTINE 21 MG: 21 PATCH, EXTENDED RELEASE TRANSDERMAL at 08:02

## 2024-10-27 RX ADMIN — NICOTINE POLACRILEX 4 MG: 4 GUM, CHEWING BUCCAL at 11:20

## 2024-10-27 RX ADMIN — KETOROLAC TROMETHAMINE 15 MG: 30 INJECTION, SOLUTION INTRAMUSCULAR; INTRAVENOUS at 08:05

## 2024-10-27 RX ADMIN — CLONAZEPAM 0.5 MG: 0.5 TABLET ORAL at 20:06

## 2024-10-27 RX ADMIN — POTASSIUM CHLORIDE 40 MEQ: 1500 TABLET, EXTENDED RELEASE ORAL at 08:02

## 2024-10-27 RX ADMIN — POTASSIUM CHLORIDE 40 MEQ: 1500 TABLET, EXTENDED RELEASE ORAL at 18:04

## 2024-10-27 RX ADMIN — NITROFURANTOIN (MONOHYDRATE/MACROCRYSTALS) 100 MG: 25; 75 CAPSULE ORAL at 08:02

## 2024-10-27 RX ADMIN — HYDROXYZINE HYDROCHLORIDE 50 MG: 50 TABLET, FILM COATED ORAL at 11:18
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
## 2024-10-27 NOTE — TREATMENT TEAM
10/27/24 1118   Lundberg Anxiety Scale   Anxious Mood 3   Tension 3   Fears 2   Insomnia 0   Intellectual 2   Depressed Mood 3   Somatic Complaints: Muscular 1   Somatic Complaints: Sensory 1   Cardiovascular Symptoms 0   Respiratory Symptoms 0   Gastrointestinal Symptoms 0   Genitourinary Symptoms 0   Autonomic Symptoms 2   Behavior at Interview 2   Lundberg Anxiety Score 19     Patient c/o high anxiety is tearful and restless administered 50mg atarax will monitor for effectiveness

## 2024-10-27 NOTE — TREATMENT TEAM
"   10/27/24 0808   Broset Violence Checklist   Assessment type Shift   Irritability 0   Confusion 1   Boisterousness 0   Threatening physical violence 0   Verbal threats 0   Violence 0   Broset score 1     Patient requested medication that was given yesterday stated it helped \"stabilize my mood\" noted patient taking to self. administered 2.5mg zyprexa will monitor for effectiveness.   "

## 2024-10-27 NOTE — ASSESSMENT & PLAN NOTE
Patient admitted on a voluntary 201 with thoughts of self-harm and increased depression in the context of homelessness, relapsed to meth use.    Plan:  Increase Invega to 6 mg daily, first dose after modification, Mon, 10/28/2024, for mood stability and thought disorder.  Continue Zoloft 50 mg daily for depression.
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
## 2024-10-27 NOTE — PROGRESS NOTES
Progress Note - Carey Keith 49 y.o. female MRN: [id number]    Unit/Bed#: [facility] 256-01 Encounter: [id number]        Subjective:   Patient seen and examined at bedside after reviewing the chart and discussing the case with the caring staff.      Patient examined at bedside.  Patient states Toradol has been helping with her chronic pains.  She asked for Symbicort to be ordered which she uses twice daily at home however this is already ordered which she has been refusing.     BMP and mag level pending.    Physical Exam   Vitals: Blood pressure 95/58, pulse 66, temperature 97.8 °F (36.6 °C), temperature source Temporal, resp. rate 18, height 5' (1.524 m), weight 73.5 kg (162 lb), SpO2 98%.,Body mass index is 31.64 kg/m².  Constitutional: Patient in no acute distress.  HEENT: PERR, EOMI, MMM.  Cardiovascular: Normal rate and regular rhythm.    Pulmonary/Chest: Effort normal and breath sounds normal.   Abdomen: Soft, + BS, NT.    Assessment/Plan:  Carey Keith is a(n) 49 y.o. female with bipolar disorder.     Asthma.  Continue Symbicort 160-4.5 mcg/act twice daily.  Albuterol as needed.   Substance use disorder.  Patient on Suboxone 8 mg film TID.   Chronic hep C.  Liver functions stable.  Follow-up with GI on outpt basis.   Tobacco use disorder.  NRT.   Vitamin D deficiency.  Patient started on vitamin D2 50,000 units weekly for 8 weeks followed by vitamin D3 1000 units daily.  Back pain/neuropathy.  Continue gabapentin 600 mg TID.  Toradol IM 15mg q6h prn for severe pain x4 days.  Tylenol for mild/mod pain.  Tried Robaxin in the past without relief, trial Zanaflex 4mg q8h prn.  Acute UTI.  UA pos in ED 10/24.  Macrobid 100 mg twice daily x 5 days (thru 10/30).   Hypokalemia.  K 2.7 10/25.  Potassium chloride 40 mEq twice daily x 3 days.  BMP and magnesium level pending.    The patient was discussed with Dr. Mark and he is in agreement with the above note.

## 2024-10-27 NOTE — NURSING NOTE
Prior med given moderately effective patient appears less anxious but cont to appears restless and mood is labile

## 2024-10-27 NOTE — PLAN OF CARE
Problem: Alteration in Thoughts and Perception  Goal: Agree to be compliant with medication regime, as prescribed and report medication side effects  Description: Interventions:  - Offer appropriate PRN medication and supervise ingestion; conduct AIMS, as needed   Outcome: Progressing     Problem: Ineffective Coping  Goal: Free from restraint events  Description: - Utilize least restrictive measures   - Provide behavioral interventions   - Redirect inappropriate behaviors   Outcome: Progressing     Problem: Risk for Self Injury/Neglect  Goal: Treatment Goal: Remain safe during length of stay, learn and adopt new coping skills, and be free of self-injurious ideation, impulses and acts at the time of discharge  Outcome: Progressing  Goal: Refrain from harming self  Description: Interventions:  - Monitor patient closely, per order  - Develop a trusting relationship  - Supervise medication ingestion, monitor effects and side effects   Outcome: Progressing   See chart note

## 2024-10-28 PROCEDURE — 99232 SBSQ HOSP IP/OBS MODERATE 35: CPT | Performed by: PSYCHIATRY & NEUROLOGY

## 2024-10-28 RX ADMIN — NITROFURANTOIN (MONOHYDRATE/MACROCRYSTALS) 100 MG: 25; 75 CAPSULE ORAL at 08:10

## 2024-10-28 RX ADMIN — KETOROLAC TROMETHAMINE 15 MG: 30 INJECTION, SOLUTION INTRAMUSCULAR; INTRAVENOUS at 21:46

## 2024-10-28 RX ADMIN — NICOTINE POLACRILEX 4 MG: 4 GUM, CHEWING BUCCAL at 14:13

## 2024-10-28 RX ADMIN — BUPRENORPHINE AND NALOXONE 8 MG: 8; 2 FILM BUCCAL; SUBLINGUAL at 15:22

## 2024-10-28 RX ADMIN — BUPRENORPHINE AND NALOXONE 8 MG: 8; 2 FILM BUCCAL; SUBLINGUAL at 20:38

## 2024-10-28 RX ADMIN — NICOTINE POLACRILEX 4 MG: 4 GUM, CHEWING BUCCAL at 16:46

## 2024-10-28 RX ADMIN — NITROFURANTOIN (MONOHYDRATE/MACROCRYSTALS) 100 MG: 25; 75 CAPSULE ORAL at 15:48

## 2024-10-28 RX ADMIN — BENZTROPINE MESYLATE 1 MG: 1 TABLET ORAL at 10:43

## 2024-10-28 RX ADMIN — BUDESONIDE AND FORMOTEROL FUMARATE DIHYDRATE 2 PUFF: 160; 4.5 AEROSOL RESPIRATORY (INHALATION) at 17:59

## 2024-10-28 RX ADMIN — PALIPERIDONE 6 MG: 6 TABLET, EXTENDED RELEASE ORAL at 08:10

## 2024-10-28 RX ADMIN — BUDESONIDE AND FORMOTEROL FUMARATE DIHYDRATE 2 PUFF: 160; 4.5 AEROSOL RESPIRATORY (INHALATION) at 08:10

## 2024-10-28 RX ADMIN — NICOTINE 21 MG: 21 PATCH, EXTENDED RELEASE TRANSDERMAL at 08:19

## 2024-10-28 RX ADMIN — ACETAMINOPHEN 975 MG: 325 TABLET ORAL at 11:32

## 2024-10-28 RX ADMIN — NICOTINE POLACRILEX 4 MG: 4 GUM, CHEWING BUCCAL at 10:07

## 2024-10-28 RX ADMIN — CLONAZEPAM 0.5 MG: 0.5 TABLET ORAL at 08:10

## 2024-10-28 RX ADMIN — CLONAZEPAM 0.5 MG: 0.5 TABLET ORAL at 15:22

## 2024-10-28 RX ADMIN — ACETAMINOPHEN 975 MG: 325 TABLET ORAL at 19:27

## 2024-10-28 RX ADMIN — GABAPENTIN 600 MG: 300 CAPSULE ORAL at 08:10

## 2024-10-28 RX ADMIN — CLONAZEPAM 0.5 MG: 0.5 TABLET ORAL at 20:37

## 2024-10-28 RX ADMIN — BUPRENORPHINE AND NALOXONE 8 MG: 8; 2 FILM BUCCAL; SUBLINGUAL at 08:14

## 2024-10-28 RX ADMIN — GABAPENTIN 600 MG: 300 CAPSULE ORAL at 15:22

## 2024-10-28 RX ADMIN — TRAZODONE HYDROCHLORIDE 50 MG: 50 TABLET ORAL at 20:37

## 2024-10-28 RX ADMIN — GABAPENTIN 600 MG: 300 CAPSULE ORAL at 20:37

## 2024-10-28 RX ADMIN — POTASSIUM CHLORIDE 40 MEQ: 1500 TABLET, EXTENDED RELEASE ORAL at 08:10

## 2024-10-28 NOTE — NURSING NOTE
Patient med and meal compliant visible on unit disheveled, rambles at times cooperative calm selectively social Denies SI HI AVH at this time Safety checks maintained.

## 2024-10-28 NOTE — PLAN OF CARE
Problem: Ineffective Coping  Goal: Participates in unit activities  Description: Interventions:  - Provide therapeutic environment   - Provide required programming   - Redirect inappropriate behaviors   Outcome: Progressing     Problem: Risk for Self Injury/Neglect  Goal: Attend and participate in unit activities, including therapeutic, recreational, and educational groups  Description: Interventions:  - Provide therapeutic and educational activities daily, encourage attendance and participation, and document same in the medical record  - Obtain collateral information, encourage visitation and family involvement in care   Outcome: Progressing     Problem: Alteration in Orientation  Goal: Attend and participate in unit activities, including therapeutic, recreational, and educational groups  Description: Interventions:  - Provide therapeutic and educational activities daily, encourage attendance and participation, and document same in the medical record   - Provide appropriate opportunities for reminiscence   - Provide a consistent daily routine   - Encourage family contact/visitation   Outcome: Progressing

## 2024-10-28 NOTE — PROGRESS NOTES
10/28/24    Team Meeting   Meeting Type Daily Rounds   Team Members Present   Team Members Present Physician;Nurse;;Occupational Therapist   Physician Team Member Eliana VILLALOBOS, Casimiro VILLALOBOS, Jolly VILLALOBOS, Naresh PERDUE   Nursing Team Member Tabatha MONROY   Social Work Team Member Arya MUIR, Mick KINGW   OT Team Member Pope ANJEL   Patient/Family Present   Patient Present No   Patient's Family Present No     201, UDS pos on admission, on the unit, labile, angry, refusing meds, med compliant, disposition to be determined, ruminating, negative thinking, no dc date due to med management

## 2024-10-28 NOTE — NURSING NOTE
Patient is extremely labile and angry this evening.  Demanded her medications at 2000 and then continued on an agitated rant for approximately a half hour before calming down.  The rest of the evening she had brief periods of hostility.  Slamming her door and yelling in the hallway before going to bed.  Patient is very upset over her medications.  Says she has been on klonopin for 20 years and she is not getting the correct dose.  Says if her klonopin is taken away she will just restart it when she leaves.  Also angry she is no longer on Wellbutrin. Angry at the hospital.  Feels she does not have enough to eat although she did not want a meal plan with increased portions.  Also feels as though there is not enough coffee.  Requesting to be transferred to another hospital.  Reassurance provided. Explained to patient she needs to discuss any issues with her medications with the providers

## 2024-10-28 NOTE — PLAN OF CARE
Problem: Risk for Self Injury/Neglect  Goal: Treatment Goal: Remain safe during length of stay, learn and adopt new coping skills, and be free of self-injurious ideation, impulses and acts at the time of discharge  Outcome: Progressing     Problem: Depression  Goal: Treatment Goal: Demonstrate behavioral control of depressive symptoms, verbalize feelings of improved mood/affect, and adopt new coping skills prior to discharge  Outcome: Not Progressing   See chart note

## 2024-10-28 NOTE — PROGRESS NOTES
Progress Note - Carey Keith 49 y.o. female MRN: [id number]    Unit/Bed#: [facility] 256-01 Encounter: [id number]        Subjective:   Patient seen and examined at bedside after reviewing the chart and discussing the case with the caring staff.      Patient examined at bedside.  Patient denies any acute symptoms.  States she is on her period now.  It is regular, denies pain.     BMP and mag level normal.     Physical Exam   Vitals: Blood pressure 111/78, pulse 93, temperature 97.5 °F (36.4 °C), temperature source Temporal, resp. rate 16, height 5' (1.524 m), weight 73.5 kg (162 lb), SpO2 97%.,Body mass index is 31.64 kg/m².  Constitutional: Patient in no acute distress.  HEENT: PERR, EOMI, MMM.  Cardiovascular: Normal rate and regular rhythm.    Pulmonary/Chest: Effort normal and breath sounds normal.   Abdomen: Soft, + BS, NT.    Assessment/Plan:  Carey Keith is a(n) 49 y.o. female with bipolar disorder.     Asthma.  Continue Symbicort 160-4.5 mcg/act twice daily.  Albuterol as needed.   Substance use disorder.  Patient on Suboxone 8 mg film TID.   Chronic hep C.  Liver functions stable.  Follow-up with GI on outpt basis.   Tobacco use disorder.  NRT.   Vitamin D deficiency.  Patient started on vitamin D2 50,000 units weekly for 8 weeks followed by vitamin D3 1000 units daily.  Back pain/neuropathy.  Continue gabapentin 600 mg TID.  Toradol IM 15mg q6h prn for severe pain x4 days.  Tylenol for mild/mod pain.  Tried Robaxin in the past without relief, trial Zanaflex 4mg q8h prn.  Acute UTI.  UA pos in ED 10/24.  Macrobid 100 mg twice daily x 5 days (thru 10/30).   Hypokalemia.  K 2.7 -->  4.9 on 10/27.  Resolved with potassium chloride 40 mEq twice daily x 3 days.     The patient was discussed with Dr. Mark and he is in agreement with the above note.

## 2024-10-28 NOTE — PROGRESS NOTES
Progress Note - Behavioral Health   Name: Carey Keith 49 y.o. female I MRN: [id number]   Unit/Bed#: U 256-01 I Date of Admission: 10/25/2024   Date of Service: 10/28/2024 I Hospital Day: 3         Assessment & Plan  Bipolar disorder, current episode depressed, severe, without psychotic features (HCC)  Patient admitted on a voluntary 201 with thoughts of self-harm and increased depression in the context of homelessness, relapsed to meth use.    Plan:  Increase Invega to 6 mg daily, first dose after modification, Mon, 10/28/2024, for mood stability and thought disorder.  Continue Zoloft 50 mg daily for depression.    Suicidal ideations  Patient able to contract for safety on the unit  Opioid use disorder, severe, on maintenance therapy (HCC)  On Suboxone maintenance treatment verified by PDMP.  Continue Suboxone 8/2 mg 3 times a day.  Methamphetamine abuse (HCC)  Monitor and give supportive care for withdrawal.  Patient is on Klonopin 1 mg 3 times a day as an outpatient.  Continue Klonopin 0.5 mg 3 times daily, this will help with withdrawal symptoms.  We will taper and plan for discontinuation prior to discharge.        Recommended Treatment:   Treatment plan and medication changes discussed and per the attending physician the plan is:     1.Continue with group therapy, milieu therapy and occupational therapy  2.Behavioral Health checks every 15 minutes  3.Continue frequent safety checks and vitals per unit protocol  4.Continue with SLIM medical management as indicated  5.Continue with current medication regimen  6.Will review labs in the a.m.  7.Disposition Planning: Discharge planning and efforts remain ongoing     Planned medication and treatment changes:    All current active medications have been reviewed  Encourage group therapy, milieu therapy and occupational therapy  Behavioral Health checks for safety monitoring  Continue treatment with group therapy, milieu therapy and occupational  therapy  Discharge planning  Continue current medications:    Current medications:  Current Facility-Administered Medications   Medication Dose Route Frequency Provider Last Rate    acetaminophen  650 mg Oral Q4H PRN Mercedes Brand PA-C      acetaminophen  975 mg Oral Q6H PRN Mercedes Brand PA-C      albuterol  2 puff Inhalation Q6H PRN Namrata Duran PA-C      aluminum-magnesium hydroxide-simethicone  30 mL Oral Q4H PRN Angie Jones MD      benztropine  1 mg Intramuscular Q4H PRN Max 6/day Angie Jones MD      benztropine  1 mg Oral Q4H PRN Max 6/day Angie Jones MD      bisacodyl  10 mg Rectal Daily PRN Angie Jones MD      budesonide-formoterol  2 puff Inhalation BID Namrata Duran PA-C      buprenorphine-naloxone  8 mg Sublingual TID Alesia Brunson MD      ergocalciferol  50,000 Units Oral Weekly Mercedes Brand PA-C      Followed by    [START ON 12/22/2024] Cholecalciferol  1,000 Units Oral Daily Mercedes Brand PA-C      clonazePAM  0.5 mg Oral TID Alesia Brunson MD      hydrOXYzine HCL  50 mg Oral Q6H PRN Max 4/day Angie Jones MD      Or    diphenhydrAMINE  50 mg Intramuscular Q6H PRN Angie Jones MD      gabapentin  600 mg Oral TID Namrata Duran PA-C      hydrOXYzine HCL  100 mg Oral Q6H PRN Max 4/day Agnie Jones MD      Or    LORazepam  2 mg Intramuscular Q6H PRN Angie Jones MD      hydrOXYzine HCL  25 mg Oral Q6H PRN Max 4/day Angie Jones MD      ketorolac  15 mg Intramuscular Q6H PRN Mercedes Brand PA-C      melatonin  3 mg Oral HS Angie Jones MD      nicotine  21 mg Transdermal Daily Namrata Duran PA-C      nicotine polacrilex  4 mg Oral Q2H PRN Angie Jones MD      nitrofurantoin  100 mg Oral BID With Meals Namrata Luis Duran, PA-C      OLANZapine  5 mg Oral Q4H PRN Max 3/day Angie Jones MD      Or    OLANZapine  2.5 mg Intramuscular Q4H PRN Max 3/day Angie Jones MD       "OLANZapine  5 mg Oral Q3H PRN Max 3/day Angei Jones MD      Or    OLANZapine  5 mg Intramuscular Q3H PRN Max 3/day Angie Jones MD      OLANZapine  2.5 mg Oral Q4H PRN Max 6/day Angie Jones MD      paliperidone  6 mg Oral Daily Lemuel Azevedo MD      polyethylene glycol  17 g Oral Daily PRN Angie Jones MD      propranolol  10 mg Oral Q8H PRN Angie Jones MD      senna-docusate sodium  1 tablet Oral Daily PRN Angie Jones MD      tiZANidine  4 mg Oral Q8H PRN Mercedes Brand PA-C      traZODone  50 mg Oral HS PRN Angie Jones MD         Risks / Benefits of Treatment:    Risks, benefits, and possible side effects of medications explained to patient and patient verbalizes understanding and agreement for treatment.    Subjective:    Behavior over the last 24 hours: unchanged.     Per staff, Carey has been  labile and agitated at times on the unit.  She continues to be upset about taper of Klonopin.  She continues with negative thought process.      Carey was seen in this writers office for psychiatric follow up.  She continues to be focused on her medications.  She is requesting to go back on Wellbutrin and come off invega.  She reported Invega makes \"me not able to talk\".  Patient was able to speak in appropriate manner during the interview.  She does ramble and perseverate at times.  Thought process continues to be negative.  She reports continued anxiety and depression.  She was educated on rational for Invega including mood stabilization, she was receptive.  We also discussed Klonopin taper and risk associated with continued use including, dependence, respiratory depression especially in setting of scheduled suboxone dosing, and abuse potential.  She was somewhat receptive to education.  She is current agreeable to stay on continued medications.      Sleep: slept better  Appetite: normal  Medication side effects: none observed  ROS: no complaints    Mental Status " Evaluation:    Appearance:  disheveled, marginal hygiene, wearing hospital clothes, looks older than stated age   Behavior:  cooperative, restless and fidgety   Speech:  increased rate, circumstantial, rambles   Mood:  labile   Affect:  labile   Thought Process:  circumstantial, perseverative   Associations: circumstantial associations   Thought Content:  negative thinking, ruminations   Perceptual Disturbances: none   Risk Potential: Suicidal ideation - None at present, contracts for safety on the unit, would talk to staff if not feeling safe on the unit  Homicidal ideation - None  Potential for aggression - No   Sensorium:  oriented to person, place, and situation   Memory:  recent memory intact   Consciousness:  alert and awake   Attention/Concentration: attention span and concentration appear shorter than expected for age   Insight:  limited   Judgment: limited   Gait/Station: normal gait/station   Motor Activity: no abnormal movements     Vital signs in last 24 hours:    Temp:  [97.5 °F (36.4 °C)-97.9 °F (36.6 °C)] 97.5 °F (36.4 °C)  HR:  [61-93] 93  BP: (102-111)/(59-78) 111/78  Resp:  [16] 16  SpO2:  [97 %-100 %] 97 %  O2 Device: None (Room air)         Laboratory results: I have personally reviewed all pertinent laboratory/tests results    Results from the past 24 hours: No results found for this or any previous visit (from the past 24 hour(s)).  Most Recent Labs:   Lab Results   Component Value Date    WBC 6.17 10/25/2024    RBC 3.55 (L) 10/25/2024    HGB 10.1 (L) 10/25/2024    HCT 31.7 (L) 10/25/2024     10/25/2024    RDW 13.0 10/25/2024    NEUTROABS 3.38 10/25/2024    TOTANEUTABS 10.60 (H) 04/18/2023    SODIUM 136 10/27/2024    K 4.9 10/27/2024     10/27/2024    CO2 21 10/27/2024    BUN 8 10/27/2024    CREATININE 0.63 10/27/2024    GLUC 95 10/27/2024    CALCIUM 8.3 (L) 10/27/2024    AST 50 (H) 10/25/2024    ALT 39 10/25/2024    ALKPHOS 53 10/25/2024    TP 5.3 (L) 10/25/2024    ALB 3.1 (L)  10/25/2024    TBILI 0.60 10/25/2024    CHOLESTEROL 129 10/25/2024    HDL 39 (L) 10/25/2024    TRIG 58 10/25/2024    LDLCALC 78 10/25/2024    NONHDLC 90 10/25/2024    UVK9CFKCQDEW 1.423 10/25/2024    FREET4 0.82 09/16/2022    PREGUR Negative 10/20/2022    PREGSERUM Negative 10/25/2024    SYPHILISAB Non-reactive 10/25/2024    HGBA1C 5.2 12/25/2021     12/25/2021       Suicide/Homicide Risk Assessment:    Risk of Harm to Self:   Nursing Suicide Risk Assessment Last 24 hours: C-SSRS Risk (Since Last Contact)  Calculated C-SSRS Risk Score (Since Last Contact): No Risk Indicated  Current Specific Risk Factors include: current unstable mood  Protective Factors: no current suicidal ideation, ability to communicate with staff on the unit, able to contract for safety on the unit, taking medications as ordered on the unit, responds to redirection  Based on today's assessment, Carey presents the following risk of harm to self: low    Risk of Harm to Others:  Nursing Homicide Risk Assessment: Violence Risk to Others: Unable to assess- Unwilling or unable to answer questions (Comment)  Current Specific Risk Factors include: social difficulties  Protective Factors: no current homicidal ideation, no current psychotic symptoms, compliant with medications on the unit as ordered, compliant with unit milieu, follows staff redirection  Based on today's assessment, Carey presents the following risk of harm to others: low    The following interventions are recommended: Behavioral Health checks for safety monitoring, continued hospitalization on locked unit    Progress Toward Goals: progressing, participates in milieu therapy    Counseling / Coordination of Care:    Total floor / unit time spent today 15 minutes. Greater than 50% of total time was spent with the patient and / or family counseling and / or coordination of care. A description of counseling / coordination of care:    Administrative Statements   I have spent a  total time of 30 minutes in caring for this patient on the day of the visit/encounter including Documenting in the medical record, Reviewing / ordering tests, medicine, procedures  , Obtaining or reviewing history  , and Communicating with other healthcare professionals .    NETTE Lo 10/28/24

## 2024-10-29 PROCEDURE — 99232 SBSQ HOSP IP/OBS MODERATE 35: CPT | Performed by: PSYCHIATRY & NEUROLOGY

## 2024-10-29 RX ORDER — CLONAZEPAM 0.5 MG/1
0.25 TABLET ORAL 2 TIMES DAILY
Status: DISCONTINUED | OUTPATIENT
Start: 2024-10-29 | End: 2024-11-04

## 2024-10-29 RX ORDER — CLONAZEPAM 0.5 MG/1
0.5 TABLET ORAL
Status: DISCONTINUED | OUTPATIENT
Start: 2024-10-29 | End: 2024-11-14 | Stop reason: HOSPADM

## 2024-10-29 RX ADMIN — TIZANIDINE 4 MG: 4 TABLET ORAL at 20:48

## 2024-10-29 RX ADMIN — KETOROLAC TROMETHAMINE 15 MG: 30 INJECTION, SOLUTION INTRAMUSCULAR; INTRAVENOUS at 20:09

## 2024-10-29 RX ADMIN — NITROFURANTOIN (MONOHYDRATE/MACROCRYSTALS) 100 MG: 25; 75 CAPSULE ORAL at 15:34

## 2024-10-29 RX ADMIN — CLONAZEPAM 0.5 MG: 0.5 TABLET ORAL at 09:04

## 2024-10-29 RX ADMIN — TRAZODONE HYDROCHLORIDE 50 MG: 50 TABLET ORAL at 20:48

## 2024-10-29 RX ADMIN — NICOTINE 21 MG: 21 PATCH, EXTENDED RELEASE TRANSDERMAL at 09:06

## 2024-10-29 RX ADMIN — GABAPENTIN 600 MG: 300 CAPSULE ORAL at 20:11

## 2024-10-29 RX ADMIN — NITROFURANTOIN (MONOHYDRATE/MACROCRYSTALS) 100 MG: 25; 75 CAPSULE ORAL at 09:04

## 2024-10-29 RX ADMIN — PALIPERIDONE 6 MG: 6 TABLET, EXTENDED RELEASE ORAL at 09:04

## 2024-10-29 RX ADMIN — ACETAMINOPHEN 975 MG: 325 TABLET ORAL at 09:25

## 2024-10-29 RX ADMIN — CLONAZEPAM 0.5 MG: 0.5 TABLET ORAL at 20:48

## 2024-10-29 RX ADMIN — NICOTINE POLACRILEX 4 MG: 4 GUM, CHEWING BUCCAL at 13:50

## 2024-10-29 RX ADMIN — BUDESONIDE AND FORMOTEROL FUMARATE DIHYDRATE 2 PUFF: 160; 4.5 AEROSOL RESPIRATORY (INHALATION) at 09:07

## 2024-10-29 RX ADMIN — BUPRENORPHINE AND NALOXONE 8 MG: 8; 2 FILM BUCCAL; SUBLINGUAL at 09:05

## 2024-10-29 RX ADMIN — Medication 3 MG: at 20:48

## 2024-10-29 RX ADMIN — NICOTINE POLACRILEX 4 MG: 4 GUM, CHEWING BUCCAL at 09:35

## 2024-10-29 RX ADMIN — GABAPENTIN 600 MG: 300 CAPSULE ORAL at 09:04

## 2024-10-29 RX ADMIN — CLONAZEPAM 0.25 MG: 0.5 TABLET ORAL at 15:34

## 2024-10-29 RX ADMIN — GABAPENTIN 600 MG: 300 CAPSULE ORAL at 15:34

## 2024-10-29 RX ADMIN — BUDESONIDE AND FORMOTEROL FUMARATE DIHYDRATE 2 PUFF: 160; 4.5 AEROSOL RESPIRATORY (INHALATION) at 17:17

## 2024-10-29 RX ADMIN — BUPRENORPHINE AND NALOXONE 8 MG: 8; 2 FILM BUCCAL; SUBLINGUAL at 15:34

## 2024-10-29 RX ADMIN — BUPRENORPHINE AND NALOXONE 8 MG: 8; 2 FILM BUCCAL; SUBLINGUAL at 20:11

## 2024-10-29 NOTE — NURSING NOTE
Patient med and meal compliant visible on unit social with select peers calm cooperative focused on wellbutrin, and decrease in klonopin states she will just get it back when she leaves. Denies SI HI AVH at this time Safety check maintained.

## 2024-10-29 NOTE — ASSESSMENT & PLAN NOTE
Patient admitted on a voluntary 201 with thoughts of self-harm and increased depression in the context of homelessness, relapsed to meth use.    Plan:  Continue Invega to 6 mg daily for mood stability and thought disorder.

## 2024-10-29 NOTE — NURSING NOTE
Patient has been visible on the unit.  Quiet and subdued.  Completely different mood than last evening.  No anger or irritability.  Patient is very pleasant and cooperative.  Says she is feeling better today.  Requested prn tylenol for back pain.

## 2024-10-29 NOTE — ASSESSMENT & PLAN NOTE
Monitor and give supportive care for withdrawal.  Patient is on Klonopin 1 mg 3 times a day as an outpatient.  It was initially decreased to on 10/25/24 Klonopin 0.5 mg 3 times daily.   Today decrease Klonopin by 0.5mg daily.  With new dosing Klonopin PO 0.25mg BID and 0.5mg PO at HS, continue taper as tolerated with goal to discontinue before discharge

## 2024-10-29 NOTE — PROGRESS NOTES
Progress Note - Carey Keith 49 y.o. female MRN: [id number]    Unit/Bed#: [facility] 256-01 Encounter: [id number]        Subjective:   Patient seen and examined at bedside after reviewing the chart and discussing the case with the caring staff.      Patient examined at bedside.  Patient denies any acute symptoms.  Patient is requesting double portions of her meal.    Physical Exam   Vitals: Blood pressure 104/67, pulse 68, temperature (!) 97.1 °F (36.2 °C), temperature source Temporal, resp. rate 17, height 5' (1.524 m), weight 73.5 kg (162 lb), SpO2 97%.,Body mass index is 31.64 kg/m².  Constitutional: Patient in no acute distress.  HEENT: PERR, EOMI, MMM.  Cardiovascular: Normal rate and regular rhythm.    Pulmonary/Chest: Effort normal and breath sounds normal.   Abdomen: Soft, + BS, NT.    Assessment/Plan:  Carey Keith is a(n) 49 y.o. female with bipolar disorder.     Asthma.  Continue Symbicort 160-4.5 mcg/act twice daily.  Albuterol as needed.   Substance use disorder.  Patient on Suboxone 8 mg film TID.   Chronic hep C.  Liver functions stable.  Follow-up with GI on outpt basis.   Tobacco use disorder.  NRT.   Vitamin D deficiency.  Patient started on vitamin D2 50,000 units weekly for 8 weeks followed by vitamin D3 1000 units daily.  Back pain/neuropathy.  Continue gabapentin 600 mg TID.  Toradol IM 15mg q6h prn for severe pain x4 days.  Tylenol for mild/mod pain.  Tried Robaxin in the past without relief, trial Zanaflex 4mg q8h prn.  Acute UTI.  UA pos in ED 10/24.  Macrobid 100 mg twice daily x 5 days (thru 10/30).   Hypokalemia.  K 2.7 -->  4.9 on 10/27.  Resolved with potassium chloride 40 mEq twice daily x 3 days.

## 2024-10-29 NOTE — NURSING NOTE
Patient compliant with meds and meals. Patient states after she took invega she had side effects and states it makes her feel agitated and does not want to take it. Patient is visible at times but mostly withdrawn to self and room. Talks to self in room and ruminates on her past and issues with family. Q 15 min behavioral and safety checks in place.

## 2024-10-29 NOTE — PLAN OF CARE
Problem: Alteration in Thoughts and Perception  Goal: Agree to be compliant with medication regime, as prescribed and report medication side effects  Description: Interventions:  - Offer appropriate PRN medication and supervise ingestion; conduct AIMS, as needed   Outcome: Progressing     Problem: Risk for Self Injury/Neglect  Goal: Treatment Goal: Remain safe during length of stay, learn and adopt new coping skills, and be free of self-injurious ideation, impulses and acts at the time of discharge  Outcome: Progressing  Goal: Refrain from harming self  Description: Interventions:  - Monitor patient closely, per order  - Develop a trusting relationship  - Supervise medication ingestion, monitor effects and side effects   Outcome: Progressing   See chart note

## 2024-10-29 NOTE — PROGRESS NOTES
10/29/24    Team Meeting   Meeting Type Daily Rounds   Team Members Present   Team Members Present Physician;Nurse;;Occupational Therapist   Physician Team Member Eliana VILLALOBOS, Casimiro VILLALOBOS, Jolly VILLALOBOS, Naresh PERDUE   Nursing Team Member Christi MONROY   Social Work Team Member Arya BREWERW, Hanny KINGW   OT Team Member Pope ANJEL   Patient/Family Present   Patient Present No   Patient's Family Present No     201, disorganized, labile, calm and cooperative, fixating, ruminating, talks to self, wants to be part of morning therapy meeting, med compliant, upset over med changes but receptive to education, meds adjusted and monitored

## 2024-10-29 NOTE — PROGRESS NOTES
Progress Note - Behavioral Health   Name: Carey Keith 49 y.o. female I MRN: [id number]   Unit/Bed#: U 256-01 I Date of Admission: 10/25/2024   Date of Service: 10/29/2024 I Hospital Day: 4         Assessment & Plan  Bipolar disorder, current episode depressed, severe, without psychotic features (HCC)  Patient admitted on a voluntary 201 with thoughts of self-harm and increased depression in the context of homelessness, relapsed to meth use.    Plan:  Continue Invega to 6 mg daily for mood stability and thought disorder.    Suicidal ideations  Patient able to contract for safety on the unit  Opioid use disorder, severe, on maintenance therapy (HCC)  On Suboxone maintenance treatment verified by PDMP.  Continue Suboxone 8/2 mg 3 times a day.  Methamphetamine abuse (HCC)  Monitor and give supportive care for withdrawal.  Patient is on Klonopin 1 mg 3 times a day as an outpatient.  It was initially decreased to on 10/25/24 Klonopin 0.5 mg 3 times daily.   Today decrease Klonopin by 0.5mg daily.  With new dosing Klonopin PO 0.25mg BID and 0.5mg PO at HS, continue taper as tolerated with goal to discontinue before discharge       Recommended Treatment:     Treatment plan and medication changes discussed and per the attending physician the plan is:     1.Continue with group therapy, milieu therapy and occupational therapy  2.Behavioral Health checks every 15 minutes  3.Continue frequent safety checks and vitals per unit protocol  4.Continue with SLIM medical management as indicated  5.Continue with current medication regimen  6.Will review labs in the a.m.  7.Disposition Planning: Discharge planning and efforts remain ongoing     Planned medication and treatment changes:    All current active medications have been reviewed  Encourage group therapy, milieu therapy and occupational therapy  Behavioral Health checks for safety monitoring  Continue treatment with group therapy, milieu therapy and occupational  therapy  Discharge planning  Decrease Klonopin by 0.5mg daily.  With new dosing 0.25mg BID and 0.5mg PO at HS, continue taper as tolerated with goal to discontinue before discharge    Current medications:  Current Facility-Administered Medications   Medication Dose Route Frequency Provider Last Rate    acetaminophen  650 mg Oral Q4H PRN Mercedes Brand PA-C      acetaminophen  975 mg Oral Q6H PRN Mercedes Brand PA-C      albuterol  2 puff Inhalation Q6H PRN Namrata Duran PA-C      aluminum-magnesium hydroxide-simethicone  30 mL Oral Q4H PRN Angie Jones MD      benztropine  1 mg Intramuscular Q4H PRN Max 6/day Angie Jones MD      benztropine  1 mg Oral Q4H PRN Max 6/day Angie Jones MD      bisacodyl  10 mg Rectal Daily PRN Angie Jones MD      budesonide-formoterol  2 puff Inhalation BID Namrata Duran PA-C      buprenorphine-naloxone  8 mg Sublingual TID Alesia Brunson MD      ergocalciferol  50,000 Units Oral Weekly Mercedes Brand PA-C      Followed by    [START ON 12/22/2024] Cholecalciferol  1,000 Units Oral Daily Mercedes Brand PA-C      clonazePAM  0.5 mg Oral TID Alesia Brunson MD      hydrOXYzine HCL  50 mg Oral Q6H PRN Max 4/day Angie Jones MD      Or    diphenhydrAMINE  50 mg Intramuscular Q6H PRN Angie Jones MD      gabapentin  600 mg Oral TID Namrata Duran PA-C      hydrOXYzine HCL  100 mg Oral Q6H PRN Max 4/day Angie Jones MD      Or    LORazepam  2 mg Intramuscular Q6H PRN Angie Jones MD      hydrOXYzine HCL  25 mg Oral Q6H PRN Max 4/day Angie Jones MD      ketorolac  15 mg Intramuscular Q6H PRN Mercedes Brand PA-C      melatonin  3 mg Oral HS Angie Jones MD      nicotine  21 mg Transdermal Daily Namrata Duran PA-C      nicotine polacrilex  4 mg Oral Q2H PRN Angie Jones MD      nitrofurantoin  100 mg Oral BID With Meals Namrata Duran PA-C      OLANZapine  5 mg Oral Q4H PRN  Max 3/day Angie Jones MD      Or    OLANZapine  2.5 mg Intramuscular Q4H PRN Max 3/day Angie Jones MD      OLANZapine  5 mg Oral Q3H PRN Max 3/day Angie Jones MD      Or    OLANZapine  5 mg Intramuscular Q3H PRN Max 3/day Angie Jones MD      OLANZapine  2.5 mg Oral Q4H PRN Max 6/day Angie Jones MD      paliperidone  6 mg Oral Daily Lemuel Azevedo MD      polyethylene glycol  17 g Oral Daily PRN Angie Jones MD      propranolol  10 mg Oral Q8H PRN Angie Jones MD      senna-docusate sodium  1 tablet Oral Daily PRN Angie Jones MD      tiZANidine  4 mg Oral Q8H PRN Mercedes Brand PA-C      traZODone  50 mg Oral HS PRN Angie Jones MD         Risks / Benefits of Treatment:    Risks, benefits, and possible side effects of medications explained to patient and patient verbalizes understanding and agreement for treatment.    Subjective:    Behavior over the last 24 hours: some improvement.     Per staff, Carey has been meal and medication compliant.  Less agitation yesterday. She continues to be labile and disorganized.      Carey was seen in this writers office for psychiatric follow up today.  She continues to focus on medication and wanting to be back on Wellbutrin.  We discussed again that Wellbutrin is an inappropriate medications based on current symptoms and diagnosis. She was somewhat receptive.  She is less disorganized today but is perseverative about medications.  She is redirectable.  She is less labile today as well but becomes tearful at times talking about her relapse on drugs.  Carey denies AH/VH, reporting AH only when she was using meth.  She does not present as internally preoccupied today.  She denies suicidal or homicidal ideation but endorses continued depression about life circumstances.  She is reporting leg cramping, will check electrolytes in am.     Sleep: improved  Appetite: normal  Medication side effects: No   ROS:  leg  cramping.     Mental Status Evaluation:    Appearance:  casually dressed, adequate grooming, looks older than stated age   Behavior:  Less restless, cooperative   Speech:  perseverative, slightly less circumstantial   Mood:  dysphoric, labile   Affect:  tearful, labile   Thought Process:  circumstantial, perseverative, negative thinking   Associations: circumstantial associations   Thought Content:  negative thinking, ruminations   Perceptual Disturbances: denies auditory or visual hallucinations when asked, does not appear responding to internal stimuli   Risk Potential: Suicidal ideation - None at present  Homicidal ideation - None  Potential for aggression - No   Sensorium:  oriented to person, place, and situation   Memory:  recent memory intact   Consciousness:  alert and awake   Attention/Concentration: attention span and concentration appear shorter than expected for age   Insight:  poor   Judgment: poor   Gait/Station: normal gait/station   Motor Activity: no abnormal movements     Vital signs in last 24 hours:    Temp:  [97.1 °F (36.2 °C)-98.3 °F (36.8 °C)] 97.1 °F (36.2 °C)  HR:  [68-85] 68  BP: (104-111)/(67-70) 104/67  Resp:  [17] 17  SpO2:  [96 %-97 %] 97 %         Laboratory results: I have personally reviewed all pertinent laboratory/tests results    Results from the past 24 hours: No results found for this or any previous visit (from the past 24 hour(s)).  Most Recent Labs:   Lab Results   Component Value Date    WBC 6.17 10/25/2024    RBC 3.55 (L) 10/25/2024    HGB 10.1 (L) 10/25/2024    HCT 31.7 (L) 10/25/2024     10/25/2024    RDW 13.0 10/25/2024    NEUTROABS 3.38 10/25/2024    TOTANEUTABS 10.60 (H) 04/18/2023    SODIUM 136 10/27/2024    K 4.9 10/27/2024     10/27/2024    CO2 21 10/27/2024    BUN 8 10/27/2024    CREATININE 0.63 10/27/2024    GLUC 95 10/27/2024    CALCIUM 8.3 (L) 10/27/2024    AST 50 (H) 10/25/2024    ALT 39 10/25/2024    ALKPHOS 53 10/25/2024    TP 5.3 (L) 10/25/2024     ALB 3.1 (L) 10/25/2024    TBILI 0.60 10/25/2024    CHOLESTEROL 129 10/25/2024    HDL 39 (L) 10/25/2024    TRIG 58 10/25/2024    LDLCALC 78 10/25/2024    NONHDLC 90 10/25/2024    LPS0HWIZPHZD 1.423 10/25/2024    FREET4 0.82 09/16/2022    PREGUR Negative 10/20/2022    PREGSERUM Negative 10/25/2024    SYPHILISAB Non-reactive 10/25/2024    HGBA1C 5.2 12/25/2021     12/25/2021       Suicide/Homicide Risk Assessment:    Risk of Harm to Self:   Nursing Suicide Risk Assessment Last 24 hours: C-SSRS Risk (Since Last Contact)  Calculated C-SSRS Risk Score (Since Last Contact): No Risk Indicated  Current Specific Risk Factors include: diagnosis of mood disorder, current depressive symptoms, current anxiety symptoms, poor reasoning  Protective Factors: no current suicidal ideation, ability to communicate with staff on the unit, able to contract for safety on the unit, taking medications as ordered on the unit, responds to redirection  Based on today's assessment, Carey presents the following risk of harm to self: low    Risk of Harm to Others:  Nursing Homicide Risk Assessment: Violence Risk to Others: Unable to assess- Unwilling or unable to answer questions (Comment)  Current Specific Risk Factors include: multiple stressors, social difficulties  Protective Factors: no current homicidal ideation, able to communicate with staff on the unit, compliant with medications on the unit as ordered, compliant with unit milieu, follows staff redirection  Based on today's assessment, Carey presents the following risk of harm to others: low    The following interventions are recommended: Behavioral Health checks for safety monitoring, continued hospitalization on locked unit    Progress Toward Goals: attends groups, participates in milieu therapy, discharge planning, poor insight    Counseling / Coordination of Care:    Total floor / unit time spent today 20 minutes. Greater than 50% of total time was spent with the  patient and / or family counseling and / or coordination of care. A description of counseling / coordination of care:    Administrative Statements   I have spent a total time of 40 minutes in caring for this patient on the day of the visit/encounter including Diagnostic results, Documenting in the medical record, Reviewing / ordering tests, medicine, procedures  , Obtaining or reviewing history  , and Communicating with other healthcare professionals .    NETTE Lo 10/29/24

## 2024-10-30 PROCEDURE — 99232 SBSQ HOSP IP/OBS MODERATE 35: CPT | Performed by: PSYCHIATRY & NEUROLOGY

## 2024-10-30 RX ORDER — TOPIRAMATE 25 MG/1
25 TABLET, FILM COATED ORAL 2 TIMES DAILY
Status: DISCONTINUED | OUTPATIENT
Start: 2024-10-30 | End: 2024-10-31

## 2024-10-30 RX ORDER — TRAZODONE HYDROCHLORIDE 100 MG/1
100 TABLET ORAL
Status: DISCONTINUED | OUTPATIENT
Start: 2024-10-30 | End: 2024-11-06

## 2024-10-30 RX ORDER — BUPRENORPHINE AND NALOXONE 8; 2 MG/1; MG/1
8 FILM, SOLUBLE BUCCAL; SUBLINGUAL 3 TIMES DAILY
Status: DISCONTINUED | OUTPATIENT
Start: 2024-10-30 | End: 2024-11-14 | Stop reason: HOSPADM

## 2024-10-30 RX ADMIN — CLONAZEPAM 0.5 MG: 0.5 TABLET ORAL at 20:16

## 2024-10-30 RX ADMIN — GABAPENTIN 600 MG: 300 CAPSULE ORAL at 16:45

## 2024-10-30 RX ADMIN — CLONAZEPAM 0.25 MG: 0.5 TABLET ORAL at 16:45

## 2024-10-30 RX ADMIN — BUPRENORPHINE AND NALOXONE 8 MG: 8; 2 FILM BUCCAL; SUBLINGUAL at 13:20

## 2024-10-30 RX ADMIN — BUPRENORPHINE AND NALOXONE 8 MG: 8; 2 FILM BUCCAL; SUBLINGUAL at 19:27

## 2024-10-30 RX ADMIN — NICOTINE POLACRILEX 4 MG: 4 GUM, CHEWING BUCCAL at 10:43

## 2024-10-30 RX ADMIN — Medication 3 MG: at 20:16

## 2024-10-30 RX ADMIN — BUPRENORPHINE AND NALOXONE 8 MG: 8; 2 FILM BUCCAL; SUBLINGUAL at 08:11

## 2024-10-30 RX ADMIN — NICOTINE POLACRILEX 4 MG: 4 GUM, CHEWING BUCCAL at 08:57

## 2024-10-30 RX ADMIN — CLONAZEPAM 0.25 MG: 0.5 TABLET ORAL at 08:10

## 2024-10-30 RX ADMIN — BUDESONIDE AND FORMOTEROL FUMARATE DIHYDRATE 2 PUFF: 160; 4.5 AEROSOL RESPIRATORY (INHALATION) at 17:33

## 2024-10-30 RX ADMIN — NITROFURANTOIN (MONOHYDRATE/MACROCRYSTALS) 100 MG: 25; 75 CAPSULE ORAL at 08:10

## 2024-10-30 RX ADMIN — ACETAMINOPHEN 975 MG: 325 TABLET ORAL at 20:40

## 2024-10-30 RX ADMIN — GABAPENTIN 600 MG: 300 CAPSULE ORAL at 08:10

## 2024-10-30 RX ADMIN — TOPIRAMATE 25 MG: 25 TABLET, FILM COATED ORAL at 20:16

## 2024-10-30 RX ADMIN — NICOTINE POLACRILEX 4 MG: 4 GUM, CHEWING BUCCAL at 15:42

## 2024-10-30 RX ADMIN — TIZANIDINE 4 MG: 4 TABLET ORAL at 20:40

## 2024-10-30 RX ADMIN — NICOTINE 21 MG: 21 PATCH, EXTENDED RELEASE TRANSDERMAL at 08:09

## 2024-10-30 RX ADMIN — TRAZODONE HYDROCHLORIDE 100 MG: 100 TABLET ORAL at 20:15

## 2024-10-30 RX ADMIN — KETOROLAC TROMETHAMINE 15 MG: 30 INJECTION, SOLUTION INTRAMUSCULAR; INTRAVENOUS at 11:29

## 2024-10-30 RX ADMIN — GABAPENTIN 600 MG: 300 CAPSULE ORAL at 20:15

## 2024-10-30 RX ADMIN — HYDROXYZINE HYDROCHLORIDE 50 MG: 50 TABLET, FILM COATED ORAL at 10:05

## 2024-10-30 NOTE — PROGRESS NOTES
10/30/24    Team Meeting   Meeting Type Daily Rounds   Team Members Present   Team Members Present Physician;Nurse;;Occupational Therapist   Physician Team Member Eliana VILLALOBOS, Casimiro VILLALOBOS, Jolly Villalobos, Deyanira PERDUE   Nursing Team Member Tabatha MONROY   Social Work Team Member Arya MUIR   OT Team Member Pope ANJEL   Patient/Family Present   Patient Present No   Patient's Family Present No     201, med compliant, pleasant and cooperative, angry regarding meds, refused meds this am, mumbles, soft spoken, disorganized in conversation, med monitoring, no dc date due to med monitoring

## 2024-10-30 NOTE — ASSESSMENT & PLAN NOTE
Monitor and give supportive care for withdrawal.  Klonopin tapered to 0.25 mg PO BID and 0.5mg PO QHS

## 2024-10-30 NOTE — PLAN OF CARE
Problem: Ineffective Coping  Goal: Participates in unit activities  Description: Interventions:  - Provide therapeutic environment   - Provide required programming   - Redirect inappropriate behaviors   Outcome: Progressing     Problem: Risk for Self Injury/Neglect  Goal: Attend and participate in unit activities, including therapeutic, recreational, and educational groups  Description: Interventions:  - Provide therapeutic and educational activities daily, encourage attendance and participation, and document same in the medical record  - Obtain collateral information, encourage visitation and family involvement in care   Outcome: Progressing     Problem: Alteration in Orientation  Goal: Attend and participate in unit activities, including therapeutic, recreational, and educational groups  Description: Interventions:  - Provide therapeutic and educational activities daily, encourage attendance and participation, and document same in the medical record   - Provide appropriate opportunities for reminiscence   - Provide a consistent daily routine   - Encourage family contact/visitation   Outcome: Progressing   Patient is attending groups and is getting better with her participation

## 2024-10-30 NOTE — NURSING NOTE
Patient presented to nurses station tearful and reporting severe anxiety. Patient would not elaborate on cause. Ham 19. Atarax 50mg PO given. Will reassess.

## 2024-10-30 NOTE — NURSING NOTE
Patient cooperative with staff, minimal during conversation; soft spoken and rambles. Denies SI/HI or AVH. Reports anxiety and becomes tearful at times during conversation. Patient frequently paces the halls and selectively social with peers. Patient med compliant and making needs known. Will continue to monitor. Continual q15 min rounding and safety checks in place.

## 2024-10-30 NOTE — NURSING NOTE
"During AM medication pass patient refused Invega 6 mg PO. Stating,\"I am not psychotic I do not need an antipsychotic.\" Patient educated on medications and encouraged to take scheduled medications. Patient took all medications but Invega. Will continue to monitor.    "

## 2024-10-30 NOTE — NURSING NOTE
Patient visible in dayroom socializing with select peers. Pleasant and cooperative. Zanaflex 4 mg and Trazodone 50 mg given at 2048 with positive results. Denies SI,HI,AVH.

## 2024-10-30 NOTE — ASSESSMENT & PLAN NOTE
Patient admitted on a voluntary 201 with thoughts of self-harm and increased depression in the context of homelessness, relapsed to meth use.  Continue Invega to 6 mg daily for mood stability and thought disorder; refused dose on 10/30/24  Add Topamax 25 mg PO BID and slowly titrate for mood control and to reduce weight gain associated with SGA therapy.

## 2024-10-30 NOTE — ASSESSMENT & PLAN NOTE
Patient able to contract for safety on the unit   Tetracycline Counseling: Patient counseled regarding possible photosensitivity and increased risk for sunburn.  Patient instructed to avoid sunlight, if possible.  When exposed to sunlight, patients should wear protective clothing, sunglasses, and sunscreen.  The patient was instructed to call the office immediately if the following severe adverse effects occur:  hearing changes, easy bruising/bleeding, severe headache, or vision changes.  The patient verbalized understanding of the proper use and possible adverse effects of tetracycline.  All of the patient's questions and concerns were addressed. Patient understands to avoid pregnancy while on therapy due to potential birth defects. Dapsone Pregnancy And Lactation Text: This medication is Pregnancy Category C and is not considered safe during pregnancy or breast feeding. Tazorac Counseling:  Patient advised that medication is irritating and drying.  Patient may need to apply sparingly and wash off after an hour before eventually leaving it on overnight.  The patient verbalized understanding of the proper use and possible adverse effects of tazorac.  All of the patient's questions and concerns were addressed. Topical Retinoid Pregnancy And Lactation Text: This medication is Pregnancy Category C. It is unknown if this medication is excreted in breast milk. Bactrim Counseling:  I discussed with the patient the risks of sulfa antibiotics including but not limited to GI upset, allergic reaction, drug rash, diarrhea, dizziness, photosensitivity, and yeast infections.  Rarely, more serious reactions can occur including but not limited to aplastic anemia, agranulocytosis, methemoglobinemia, blood dyscrasias, liver or kidney failure, lung infiltrates or desquamative/blistering drug rashes. Tazorac Pregnancy And Lactation Text: This medication is not safe during pregnancy. It is unknown if this medication is excreted in breast milk. Tetracycline Pregnancy And Lactation Text: This medication is Pregnancy Category D and not consider safe during pregnancy. It is also excreted in breast milk. Birth Control Pills Pregnancy And Lactation Text: This medication should be avoided if pregnant and for the first 30 days post-partum. Azithromycin Pregnancy And Lactation Text: This medication is considered safe during pregnancy and is also secreted in breast milk. Topical Retinoid counseling:  Patient advised to apply a pea-sized amount only at bedtime and wait 30 minutes after washing their face before applying.  If too drying, patient may add a non-comedogenic moisturizer. The patient verbalized understanding of the proper use and possible adverse effects of retinoids.  All of the patient's questions and concerns were addressed. Bactrim Pregnancy And Lactation Text: This medication is Pregnancy Category D and is known to cause fetal risk.  It is also excreted in breast milk. Topical Clindamycin Pregnancy And Lactation Text: This medication is Pregnancy Category B and is considered safe during pregnancy. It is unknown if it is excreted in breast milk. Include Pregnancy/Lactation Warning?: No Erythromycin Counseling:  I discussed with the patient the risks of erythromycin including but not limited to GI upset, allergic reaction, drug rash, diarrhea, increase in liver enzymes, and yeast infections. Topical Clindamycin Counseling: Patient counseled that this medication may cause skin irritation or allergic reactions.  In the event of skin irritation, the patient was advised to reduce the amount of the drug applied or use it less frequently.   The patient verbalized understanding of the proper use and possible adverse effects of clindamycin.  All of the patient's questions and concerns were addressed. Detail Level: Zone Spironolactone Pregnancy And Lactation Text: This medication can cause feminization of the male fetus and should be avoided during pregnancy. The active metabolite is also found in breast milk. Benzoyl Peroxide Pregnancy And Lactation Text: This medication is Pregnancy Category C. It is unknown if benzoyl peroxide is excreted in breast milk. Isotretinoin Counseling: Patient should get monthly blood tests, not donate blood, not drive at night if vision affected, not share medication, and not undergo elective surgery for 6 months after tx completed. Side effects reviewed, pt to contact office should one occur. Birth Control Pills Counseling: Birth Control Pill Counseling: I discussed with the patient the potential side effects of OCPs including but not limited to increased risk of stroke, heart attack, thrombophlebitis, deep venous thrombosis, hepatic adenomas, breast changes, GI upset, headaches, and depression.  The patient verbalized understanding of the proper use and possible adverse effects of OCPs. All of the patient's questions and concerns were addressed. Minocycline Counseling: Patient advised regarding possible photosensitivity and discoloration of the teeth, skin, lips, tongue and gums.  Patient instructed to avoid sunlight, if possible.  When exposed to sunlight, patients should wear protective clothing, sunglasses, and sunscreen.  The patient was instructed to call the office immediately if the following severe adverse effects occur:  hearing changes, easy bruising/bleeding, severe headache, or vision changes.  The patient verbalized understanding of the proper use and possible adverse effects of minocycline.  All of the patient's questions and concerns were addressed. Topical Sulfur Applications Pregnancy And Lactation Text: This medication is Pregnancy Category C and has an unknown safety profile during pregnancy. It is unknown if this topical medication is excreted in breast milk. Doxycycline Counseling:  Patient counseled regarding possible photosensitivity and increased risk for sunburn.  Patient instructed to avoid sunlight, if possible.  When exposed to sunlight, patients should wear protective clothing, sunglasses, and sunscreen.  The patient was instructed to call the office immediately if the following severe adverse effects occur:  hearing changes, easy bruising/bleeding, severe headache, or vision changes.  The patient verbalized understanding of the proper use and possible adverse effects of doxycycline.  All of the patient's questions and concerns were addressed. Topical Sulfur Applications Counseling: Topical Sulfur Counseling: Patient counseled that this medication may cause skin irritation or allergic reactions.  In the event of skin irritation, the patient was advised to reduce the amount of the drug applied or use it less frequently.   The patient verbalized understanding of the proper use and possible adverse effects of topical sulfur application.  All of the patient's questions and concerns were addressed. Benzoyl Peroxide Counseling: Patient counseled that medicine may cause skin irritation and bleach clothing.  In the event of skin irritation, the patient was advised to reduce the amount of the drug applied or use it less frequently.   The patient verbalized understanding of the proper use and possible adverse effects of benzoyl peroxide.  All of the patient's questions and concerns were addressed. Dapsone Counseling: I discussed with the patient the risks of dapsone including but not limited to hemolytic anemia, agranulocytosis, rashes, methemoglobinemia, kidney failure, peripheral neuropathy, headaches, GI upset, and liver toxicity.  Patients who start dapsone require monitoring including baseline LFTs and weekly CBCs for the first month, then every month thereafter.  The patient verbalized understanding of the proper use and possible adverse effects of dapsone.  All of the patient's questions and concerns were addressed. High Dose Vitamin A Pregnancy And Lactation Text: High dose vitamin A therapy is contraindicated during pregnancy and breast feeding. High Dose Vitamin A Counseling: Side effects reviewed, pt to contact office should one occur. Azithromycin Counseling:  I discussed with the patient the risks of azithromycin including but not limited to GI upset, allergic reaction, drug rash, diarrhea, and yeast infections. Isotretinoin Pregnancy And Lactation Text: This medication is Pregnancy Category X and is considered extremely dangerous during pregnancy. It is unknown if it is excreted in breast milk. Doxycycline Pregnancy And Lactation Text: This medication is Pregnancy Category D and not consider safe during pregnancy. It is also excreted in breast milk but is considered safe for shorter treatment courses. Spironolactone Counseling: Patient advised regarding risks of diarrhea, abdominal pain, hyperkalemia, birth defects (for female patients), liver toxicity and renal toxicity. The patient may need blood work to monitor liver and kidney function and potassium levels while on therapy. The patient verbalized understanding of the proper use and possible adverse effects of spironolactone.  All of the patient's questions and concerns were addressed. Sarecycline Counseling: Patient advised regarding possible photosensitivity and discoloration of the teeth, skin, lips, tongue and gums.  Patient instructed to avoid sunlight, if possible.  When exposed to sunlight, patients should wear protective clothing, sunglasses, and sunscreen.  The patient was instructed to call the office immediately if the following severe adverse effects occur:  hearing changes, easy bruising/bleeding, severe headache, or vision changes.  The patient verbalized understanding of the proper use and possible adverse effects of sarecycline.  All of the patient's questions and concerns were addressed. Erythromycin Pregnancy And Lactation Text: This medication is Pregnancy Category B and is considered safe during pregnancy. It is also excreted in breast milk.

## 2024-10-30 NOTE — PROGRESS NOTES
Progress Note - Carey Ketih 49 y.o. female MRN: [id number]    Unit/Bed#: [facility] 256-01 Encounter: [id number]        Subjective:   Patient seen and examined at bedside after reviewing the chart and discussing the case with the caring staff.      Patient examined at bedside.  Patient denies any acute symptoms.      Physical Exam   Vitals: Blood pressure 109/73, pulse 70, temperature 97.6 °F (36.4 °C), temperature source Temporal, resp. rate 16, height 5' (1.524 m), weight 73.5 kg (162 lb), SpO2 97%.,Body mass index is 31.64 kg/m².  Constitutional: Patient in no acute distress.  HEENT: PERR, EOMI, MMM.  Cardiovascular: Normal rate and regular rhythm.    Pulmonary/Chest: Effort normal and breath sounds normal.   Abdomen: Soft, + BS, NT.    Assessment/Plan:  Carey Keith is a(n) 49 y.o. female with bipolar disorder.     Asthma.  Continue Symbicort 160-4.5 mcg/act twice daily.  Albuterol as needed.   Substance use disorder.  Patient on Suboxone 8 mg film TID.   Chronic hep C.  Liver functions stable.  Follow-up with GI on outpt basis.   Tobacco use disorder.  NRT.   Vitamin D deficiency.  Patient started on vitamin D2 50,000 units weekly for 8 weeks followed by vitamin D3 1000 units daily.  Back pain/neuropathy.  Continue gabapentin 600 mg TID.  Toradol IM 15mg q6h prn for severe pain x4 days.  Tylenol for mild/mod pain.  Tried Robaxin in the past without relief, trial Zanaflex 4mg q8h prn.  Acute UTI.  UA pos in ED 10/24.  Macrobid 100 mg twice daily x 5 days (thru 10/30).   Hypokalemia.  K 2.7 -->  4.9 on 10/27.  Resolved with potassium chloride 40 mEq twice daily x 3 days.    The patient was discussed with Dr. Mark and he is in agreement with the above note.

## 2024-10-30 NOTE — PROGRESS NOTES
Progress Note - Behavioral Health   Name: Carey Keith 49 y.o. female I MRN: [id number]  Unit/Bed#: [facility] 256-01 I Date of Admission: 10/25/2024   Date of Service: 10/30/2024 I Hospital Day: 5    Assessment & Plan  Bipolar disorder, current episode depressed, severe, without psychotic features (HCC)  Patient admitted on a voluntary 201 with thoughts of self-harm and increased depression in the context of homelessness, relapsed to meth use.  Continue Invega to 6 mg daily for mood stability and thought disorder; refused dose on 10/30/24  Add Topamax 25 mg PO BID and slowly titrate for mood control and to reduce weight gain associated with SGA therapy.   Suicidal ideations  Patient able to contract for safety on the unit  Opioid use disorder, severe, on maintenance therapy (HCC)  On Suboxone maintenance treatment verified by PDMP.  Continue Suboxone 8/2 mg 3 times a day.  Methamphetamine abuse (HCC)  Monitor and give supportive care for withdrawal.  Klonopin tapered to 0.25 mg PO BID and 0.5mg PO QHS    Progress Toward Goals: Maintaining safety with no return of SI, otherwise mood remains labile with negative and ruminating thoughts.  Tearful and reports depression.  Requesting administration time of Suboxone be changed to 8 AM, 1 PM, and 7 PM reflecting how she took medication at home.  Poorly receptive to medication education.  Refused Invega this morning.  Discussed option of adding either Lamictal or lithium to assist with mood control and treatment of depression, however patient refused.  Also discussed possibility of adding Abilify or Seroquel for treatment of MDD associated with mood instability and ruminations; refused.  Will restart Topamax 25 mg PO BID and titrate for mood control and to reduce weight gain associated with SGA therapy..  No discharge date at this time.    Recommended Treatment: Continue with group therapy, milieu therapy and occupational therapy.      Risks, benefits and possible side  "effects of Medications:   Carey has limited understanding of risk versus benefits of medications, but agrees to take as prescribed.    History of Present Illness   Behavior over the last 24 hours:  unchanged  Sleep: normal  Appetite: normal  Medication side effects: No  ROS: no complaints and all other systems are negative    Subjective: Carey is tearful, labile, restless, and ruminating.  Discusses hopelessness and worsening depression.  Ruminating on the death of her father 4 years ago and how she used to look; \"before I lost my teeth, I was beautiful and had long blonde hair.\"  Negative and perseverative on medications.  Maintaining safety with no return of SI.  Wants Klonopin increased back to outpatient dosing.  Requested multiple times to be put on SSRI for depression.  Poorly receptive to medication education.  Refused Lamictal, lithium, Abilify, and Seroquel for treatment of depression, mood control, and ruminations.  Reports Topamax helping in the past for her mood, but not depression.    Objective   Mental Status Evaluation:  Appearance:  disheveled and older than stated age   Behavior:  psychomotor agitation and restless and fidgety, tearful   Speech:  Pressured, hyperverbal, tangential   Mood:  \"Depressed\"   Affect:  increased in intensity, increased in range, and labile   Thought Process:  perserverative   Associations: perseverative   Thought Content:  Negative and ruminating thoughts   Perceptual Disturbances: Denied AVH, did not appear internally preoccupied   Risk Potential: Suicidal Ideations none at present  Homicidal Ideations none  Potential for Aggression No   Sensorium:  person, place, and time/date   Memory:  recent and remote memory grossly intact   Consciousness:  alert and awake    Attention: attention span appeared shorter than expected for age   Insight:  Impaired   Judgment: limited   Gait/Station: normal gait/station and normal balance   Motor Activity: no abnormal movements "     Medications: all current active meds have been reviewed, continue current psychiatric medications, and planned medication changes: Change administration time of Suboxone to 8 AM, 1 PM, and 7 PM per patient request .      Lab Results: I have reviewed the following results:  CMP:   Lab Results   Component Value Date    SODIUM 136 10/27/2024    K 4.9 10/27/2024     10/27/2024    CO2 21 10/27/2024    AGAP 8 10/27/2024    BUN 8 10/27/2024    CREATININE 0.63 10/27/2024    GLUC 95 10/27/2024    GLUF 95 10/27/2024    CALCIUM 8.3 (L) 10/27/2024    AST 50 (H) 10/25/2024    ALT 39 10/25/2024    ALKPHOS 53 10/25/2024    TP 5.3 (L) 10/25/2024    ALB 3.1 (L) 10/25/2024    TBILI 0.60 10/25/2024    EGFR 107 10/27/2024     Drug Screen:   Lab Results   Component Value Date    AMPMETHUR Positive (A) 10/24/2024    BARBTUR Negative 10/24/2024    BDZUR Positive (A) 10/24/2024    THCUR Negative 10/24/2024    COCAINEUR Negative 10/24/2024    METHADONEUR Negative 10/24/2024    OPIATEUR Negative 10/24/2024    PCPUR Negative 10/24/2024

## 2024-10-31 PROCEDURE — 99232 SBSQ HOSP IP/OBS MODERATE 35: CPT | Performed by: PSYCHIATRY & NEUROLOGY

## 2024-10-31 RX ORDER — GABAPENTIN 300 MG/1
600 CAPSULE ORAL 3 TIMES DAILY
Status: DISCONTINUED | OUTPATIENT
Start: 2024-10-31 | End: 2024-10-31

## 2024-10-31 RX ORDER — GABAPENTIN 400 MG/1
800 CAPSULE ORAL 3 TIMES DAILY
Status: DISCONTINUED | OUTPATIENT
Start: 2024-10-31 | End: 2024-11-14 | Stop reason: HOSPADM

## 2024-10-31 RX ORDER — KETOROLAC TROMETHAMINE 30 MG/ML
15 INJECTION, SOLUTION INTRAMUSCULAR; INTRAVENOUS ONCE
Status: COMPLETED | OUTPATIENT
Start: 2024-10-31 | End: 2024-10-31

## 2024-10-31 RX ORDER — TOPIRAMATE 25 MG/1
50 TABLET, FILM COATED ORAL EVERY 12 HOURS SCHEDULED
Status: DISCONTINUED | OUTPATIENT
Start: 2024-11-01 | End: 2024-11-06

## 2024-10-31 RX ORDER — TOPIRAMATE 25 MG/1
25 TABLET, FILM COATED ORAL 2 TIMES DAILY
Status: COMPLETED | OUTPATIENT
Start: 2024-10-31 | End: 2024-10-31

## 2024-10-31 RX ORDER — LAMOTRIGINE 25 MG/1
25 TABLET ORAL DAILY
Status: DISCONTINUED | OUTPATIENT
Start: 2024-10-31 | End: 2024-11-14 | Stop reason: HOSPADM

## 2024-10-31 RX ADMIN — CLONAZEPAM 0.5 MG: 0.5 TABLET ORAL at 21:10

## 2024-10-31 RX ADMIN — Medication 3 MG: at 21:10

## 2024-10-31 RX ADMIN — GABAPENTIN 800 MG: 400 CAPSULE ORAL at 18:27

## 2024-10-31 RX ADMIN — BUPRENORPHINE AND NALOXONE 8 MG: 8; 2 FILM BUCCAL; SUBLINGUAL at 13:10

## 2024-10-31 RX ADMIN — BUPRENORPHINE AND NALOXONE 8 MG: 8; 2 FILM BUCCAL; SUBLINGUAL at 18:27

## 2024-10-31 RX ADMIN — NICOTINE POLACRILEX 4 MG: 4 GUM, CHEWING BUCCAL at 19:38

## 2024-10-31 RX ADMIN — CLONAZEPAM 0.25 MG: 0.5 TABLET ORAL at 08:38

## 2024-10-31 RX ADMIN — TOPIRAMATE 25 MG: 25 TABLET, FILM COATED ORAL at 08:38

## 2024-10-31 RX ADMIN — GABAPENTIN 800 MG: 400 CAPSULE ORAL at 13:10

## 2024-10-31 RX ADMIN — CLONAZEPAM 0.25 MG: 0.5 TABLET ORAL at 15:15

## 2024-10-31 RX ADMIN — LAMOTRIGINE 25 MG: 25 TABLET ORAL at 11:39

## 2024-10-31 RX ADMIN — GABAPENTIN 600 MG: 300 CAPSULE ORAL at 08:38

## 2024-10-31 RX ADMIN — BUDESONIDE AND FORMOTEROL FUMARATE DIHYDRATE 2 PUFF: 160; 4.5 AEROSOL RESPIRATORY (INHALATION) at 08:38

## 2024-10-31 RX ADMIN — NICOTINE POLACRILEX 4 MG: 4 GUM, CHEWING BUCCAL at 17:36

## 2024-10-31 RX ADMIN — ACETAMINOPHEN 975 MG: 325 TABLET ORAL at 09:22

## 2024-10-31 RX ADMIN — ACETAMINOPHEN 975 MG: 325 TABLET ORAL at 17:46

## 2024-10-31 RX ADMIN — BUPRENORPHINE AND NALOXONE 8 MG: 8; 2 FILM BUCCAL; SUBLINGUAL at 08:38

## 2024-10-31 RX ADMIN — NICOTINE 21 MG: 21 PATCH, EXTENDED RELEASE TRANSDERMAL at 08:38

## 2024-10-31 RX ADMIN — TOPIRAMATE 25 MG: 25 TABLET, FILM COATED ORAL at 21:10

## 2024-10-31 RX ADMIN — BUDESONIDE AND FORMOTEROL FUMARATE DIHYDRATE 2 PUFF: 160; 4.5 AEROSOL RESPIRATORY (INHALATION) at 17:47

## 2024-10-31 RX ADMIN — TIZANIDINE 4 MG: 4 TABLET ORAL at 11:39

## 2024-10-31 RX ADMIN — TIZANIDINE 4 MG: 4 TABLET ORAL at 21:13

## 2024-10-31 RX ADMIN — KETOROLAC TROMETHAMINE 15 MG: 30 INJECTION, SOLUTION INTRAMUSCULAR at 21:26

## 2024-10-31 RX ADMIN — NICOTINE POLACRILEX 4 MG: 4 GUM, CHEWING BUCCAL at 09:22

## 2024-10-31 NOTE — PROGRESS NOTES
10/31/24    Team Meeting   Meeting Type Daily Rounds   Team Members Present   Team Members Present Physician;Nurse;;Occupational Therapist   Physician Team Member Eliana VILLALOBOS, Casimiro VILLALOBOS, Jolly VILLALOBOS, Deyanira PERDUE   Nursing Team Member Edgar MONROY   Social Work Team Member Arya MUIR   OT Team Member Pope ANJEL   Patient/Family Present   Patient Present No   Patient's Family Present No     201, denies all, irritable, refused invega, reports severe pain, utilizes prns, labile, tearful, ruminating, anx/dep, declining all med recommendations, meds adjusted, no dc date due to med management

## 2024-10-31 NOTE — ASSESSMENT & PLAN NOTE
Patient admitted on a voluntary 201 with thoughts of self-harm and increased depression in the context of homelessness, relapsed to meth use.  Discontinue Invega 6mg PO QD; refused dose on 10/30/24 & 10/31/24  Increase Topamax 50 mg PO BID starting 11/1/24 and slowly titrate for mood control and to reduce weight gain associated with SGA therapy.   Start Lamictal 25mg PO QD for depression and mood instability

## 2024-10-31 NOTE — PROGRESS NOTES
Progress Note - Carey Keith 49 y.o. female MRN: [id number]    Unit/Bed#: [facility] 256-01 Encounter: [id number]        Subjective:   Patient seen and examined at bedside after reviewing the chart and discussing the case with the caring staff.      Patient examined at bedside.  Patient continues to endorse chronic back pain and neuropathy but states the Toradol helped a lot.  Medication not recommended for than 5 days.  Gabapentin also helps, patient wondering if that could be increased.     Physical Exam   Vitals: Blood pressure 114/66, pulse 88, temperature 97.9 °F (36.6 °C), temperature source Temporal, resp. rate 17, height 5' (1.524 m), weight 73.5 kg (162 lb), SpO2 96%.,Body mass index is 31.64 kg/m².  Constitutional: Patient in no acute distress.  HEENT: PERR, EOMI, MMM.  Cardiovascular: Normal rate and regular rhythm.    Pulmonary/Chest: Effort normal and breath sounds normal.   Abdomen: Soft, + BS, NT.    Assessment/Plan:  Carey Keith is a(n) 49 y.o. female with bipolar disorder.     Asthma.  Continue Symbicort 160-4.5 mcg/act twice daily.  Albuterol as needed.   Substance use disorder.  Patient on Suboxone 8 mg film TID.   Chronic hep C.  Liver functions stable.  Follow-up with GI on outpt basis.   Tobacco use disorder.  NRT.   Vitamin D deficiency.  Patient started on vitamin D2 50,000 units weekly for 8 weeks followed by vitamin D3 1000 units daily.  Back pain/neuropathy.  Continue gabapentin 800 mg TID (incr from 600 mg TID 10/31).  Toradol IM 15mg q6h prn for severe pain x4 days.  Tylenol for mild/mod pain.  Tried Robaxin in the past without relief, trial Zanaflex 4mg q8h prn.  Acute UTI.  UA pos in ED 10/24.  Macrobid 100 mg twice daily x 5 days (thru 10/30).   Hypokalemia.  K 2.7 -->  4.9 on 10/27.  Resolved with potassium chloride 40 mEq twice daily x 3 days.    The patient was discussed with Dr. Mark and he is in agreement with the above note.

## 2024-10-31 NOTE — NURSING NOTE
Patient noted to be visible and social w/ select peers on the milieu. Pleasant and cooperative throughout interaction. Appears depressed. Minimal in conversation. No tearful episodes noted this evening. Compliant w/ hs med regimen. Utilized prn Trazodone 100 mg @ 2015 for issues w/ sleep along with prn Tylenol 975 mg and Zanaflex 4 mg @ 2040 for complaints of back pain/spasms. Q 15 min safety checks continuous and maintained.
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
## 2024-10-31 NOTE — NURSING NOTE
Patient meal compliant refused Invega. visible on unit social with select peers calm cooperative endorses ongoing depression and feeling helpless/hopeless Denies SI HI AVH at this time Safety checks maintained

## 2024-10-31 NOTE — PLAN OF CARE
Problem: Alteration in Thoughts and Perception  Goal: Agree to be compliant with medication regime, as prescribed and report medication side effects  Description: Interventions:  - Offer appropriate PRN medication and supervise ingestion; conduct AIMS, as needed   Outcome: Progressing     Problem: Risk for Self Injury/Neglect  Goal: Treatment Goal: Remain safe during length of stay, learn and adopt new coping skills, and be free of self-injurious ideation, impulses and acts at the time of discharge  Outcome: Progressing  Goal: Refrain from harming self  Description: Interventions:  - Monitor patient closely, per order  - Develop a trusting relationship  - Supervise medication ingestion, monitor effects and side effects   Outcome: Progressing

## 2024-11-01 PROCEDURE — 99232 SBSQ HOSP IP/OBS MODERATE 35: CPT | Performed by: PSYCHIATRY & NEUROLOGY

## 2024-11-01 RX ADMIN — TIZANIDINE 4 MG: 4 TABLET ORAL at 11:59

## 2024-11-01 RX ADMIN — NICOTINE POLACRILEX 4 MG: 4 GUM, CHEWING BUCCAL at 16:33

## 2024-11-01 RX ADMIN — BUDESONIDE AND FORMOTEROL FUMARATE DIHYDRATE 2 PUFF: 160; 4.5 AEROSOL RESPIRATORY (INHALATION) at 09:30

## 2024-11-01 RX ADMIN — TOPIRAMATE 50 MG: 25 TABLET, FILM COATED ORAL at 09:25

## 2024-11-01 RX ADMIN — Medication 3 MG: at 20:36

## 2024-11-01 RX ADMIN — LAMOTRIGINE 25 MG: 25 TABLET ORAL at 09:25

## 2024-11-01 RX ADMIN — GABAPENTIN 800 MG: 400 CAPSULE ORAL at 14:50

## 2024-11-01 RX ADMIN — GABAPENTIN 800 MG: 400 CAPSULE ORAL at 09:25

## 2024-11-01 RX ADMIN — BUPRENORPHINE AND NALOXONE 8 MG: 8; 2 FILM BUCCAL; SUBLINGUAL at 09:28

## 2024-11-01 RX ADMIN — TRAZODONE HYDROCHLORIDE 100 MG: 100 TABLET ORAL at 20:36

## 2024-11-01 RX ADMIN — BUPRENORPHINE AND NALOXONE 8 MG: 8; 2 FILM BUCCAL; SUBLINGUAL at 14:17

## 2024-11-01 RX ADMIN — NICOTINE POLACRILEX 4 MG: 4 GUM, CHEWING BUCCAL at 10:41

## 2024-11-01 RX ADMIN — ACETAMINOPHEN 975 MG: 325 TABLET ORAL at 16:00

## 2024-11-01 RX ADMIN — CLONAZEPAM 0.25 MG: 0.5 TABLET ORAL at 15:58

## 2024-11-01 RX ADMIN — CLONAZEPAM 0.5 MG: 0.5 TABLET ORAL at 20:36

## 2024-11-01 RX ADMIN — HYDROXYZINE HYDROCHLORIDE 100 MG: 50 TABLET, FILM COATED ORAL at 03:54

## 2024-11-01 RX ADMIN — NICOTINE 21 MG: 21 PATCH, EXTENDED RELEASE TRANSDERMAL at 09:30

## 2024-11-01 RX ADMIN — GABAPENTIN 800 MG: 400 CAPSULE ORAL at 19:15

## 2024-11-01 RX ADMIN — BUPRENORPHINE AND NALOXONE 8 MG: 8; 2 FILM BUCCAL; SUBLINGUAL at 19:15

## 2024-11-01 RX ADMIN — CLONAZEPAM 0.25 MG: 0.5 TABLET ORAL at 09:25

## 2024-11-01 RX ADMIN — TOPIRAMATE 50 MG: 25 TABLET, FILM COATED ORAL at 20:36

## 2024-11-01 RX ADMIN — TIZANIDINE 4 MG: 4 TABLET ORAL at 20:36

## 2024-11-01 RX ADMIN — BUDESONIDE AND FORMOTEROL FUMARATE DIHYDRATE 2 PUFF: 160; 4.5 AEROSOL RESPIRATORY (INHALATION) at 17:51

## 2024-11-01 NOTE — ASSESSMENT & PLAN NOTE
Patient admitted on a voluntary 201 with thoughts of self-harm and increased depression in the context of homelessness, relapsed to meth use.  Continue Topamax 50 mg PO BID; slowly titrate for mood control and to reduce weight gain associated with SGA therapy.   Continue Lamictal 25mg PO QD for depression and mood instability; started 10/31/2024

## 2024-11-01 NOTE — PROGRESS NOTES
Progress Note - Behavioral Health   Name: Carey Keith 49 y.o. female I MRN: [id number]  Unit/Bed#: [facility] 256-01 I Date of Admission: 10/25/2024   Date of Service: 11/1/2024 I Hospital Day: 7    Assessment & Plan  Bipolar disorder, current episode depressed, severe, without psychotic features (HCC)  Patient admitted on a voluntary 201 with thoughts of self-harm and increased depression in the context of homelessness, relapsed to meth use.  Continue Topamax 50 mg PO BID; slowly titrate for mood control and to reduce weight gain associated with SGA therapy.   Continue Lamictal 25mg PO QD for depression and mood instability; started 10/31/2024  Suicidal ideations  Patient able to contract for safety on the unit  Opioid use disorder, severe, on maintenance therapy (HCC)  On Suboxone maintenance treatment verified by PDMP.  Continue Suboxone 8/2 mg 3 times a day.  Methamphetamine abuse (HCC)  Monitor and give supportive care for withdrawal.  Klonopin tapered to 0.25 mg PO BID and 0.5mg PO QHS    Progress Toward Goals: Slowly improving.  Mood is less labile, no longer tearful.  Continues to endorse some ruminations and negative thinking.  More redirectable.  Some grandiosity persists.  Encouraged utilization of PRN Zyprexa for racing thoughts and intermittent agitation; refusing routine dosing at this time.  Believe patient will be amenable to adding Zyprexa routinely at at bedtime at low dose for psychosis and mood stabilization by the end of the weekend.  In the meantime will continue with current psychotropic regimen; patient tolerating well.  No discharge date at this time.    Recommended Treatment: Continue with group therapy, milieu therapy and occupational therapy.      Risks, benefits and possible side effects of Medications:   Carey has limited understanding of risk versus benefits of medications, but agrees to take as prescribed.    History of Present Illness   Behavior over the last 24 hours: Some  "improvement  Sleep: Difficulty falling asleep due to racing thoughts  Appetite: normal  Medication side effects: No  ROS:  \"My stomach hurts and I feel achy today\" and all other systems are negative    Subjective: Carey has been withdrawn today due to complaints of generalized aches and abdominal pain.  Has otherwise been showing improvement with mood control, less labile and no longer tearful today.  Continues to endorse delusional content and ongoing symptoms of depression; refusing antipsychotic therapy for treatment of depression, thought disorder, mood control.  Partially receptive to education and agreeable to utilize PRN Zyprexa this evening for racing thoughts.  Maintaining safety with no return of SI.  Self-care also appears to be improving.    Objective   Mental Status Evaluation:  Appearance:  casually dressed, older than stated age, and blonde hair   Behavior:  Withdrawn, redirectable, less tearful   Speech:  normal pitch, normal volume, and less pressured   Mood:  depressed   Affect:  mood-congruent and less labile   Thought Process:  perserverative   Associations: perseverative   Thought Content:  Grandiose delusions, negative thinking   Perceptual Disturbances: Denied AVH, did not appear internally preoccupied   Risk Potential: Suicidal Ideations none  Homicidal Ideations none  Potential for Aggression No   Sensorium:  person, place, time/date, and situation   Memory:  recent and remote memory grossly intact   Consciousness:  alert and awake    Attention: attention span appeared shorter than expected for age   Insight:  Impaired   Judgment: Limited   Gait/Station: normal gait/station and normal balance   Motor Activity: no abnormal movements     Medications: all current active meds have been reviewed and continue current psychiatric medications.      Lab Results: I have reviewed the following results:  CMP:   Lab Results   Component Value Date    SODIUM 136 10/27/2024    K 4.9 10/27/2024     " 10/27/2024    CO2 21 10/27/2024    AGAP 8 10/27/2024    BUN 8 10/27/2024    CREATININE 0.63 10/27/2024    GLUC 95 10/27/2024    GLUF 95 10/27/2024    CALCIUM 8.3 (L) 10/27/2024    AST 50 (H) 10/25/2024    ALT 39 10/25/2024    ALKPHOS 53 10/25/2024    TP 5.3 (L) 10/25/2024    ALB 3.1 (L) 10/25/2024    TBILI 0.60 10/25/2024    EGFR 107 10/27/2024     Drug Screen:   Lab Results   Component Value Date    AMPMETHUR Positive (A) 10/24/2024    BARBTUR Negative 10/24/2024    BDZUR Positive (A) 10/24/2024    THCUR Negative 10/24/2024    COCAINEUR Negative 10/24/2024    METHADONEUR Negative 10/24/2024    OPIATEUR Negative 10/24/2024    PCPUR Negative 10/24/2024

## 2024-11-01 NOTE — NURSING NOTE
Patient was awake multiple times throughout the night. Non labored breathing observed while sleeping. No signs or symptoms of distress. Continuous q15 minute checks ongoing.

## 2024-11-01 NOTE — PROGRESS NOTES
Progress Note - Carey Keith 49 y.o. female MRN: [id number]    Unit/Bed#: [facility] 256-01 Encounter: [id number]        Subjective:   Patient seen and examined at bedside after reviewing the chart and discussing the case with the caring staff.      Patient examined at bedside.  Patient complaining of headache today.  Has not taken anything for it yet.  Encouraged patient to keep up with her fluids.     Physical Exam   Vitals: Blood pressure 98/77, pulse 85, temperature 98.4 °F (36.9 °C), temperature source Temporal, resp. rate 18, height 5' (1.524 m), weight 73.5 kg (162 lb), SpO2 96%.,Body mass index is 31.64 kg/m².  Constitutional: Patient in no acute distress.  HEENT: PERR, EOMI, MMM.  Cardiovascular: Normal rate and regular rhythm.    Pulmonary/Chest: Effort normal and breath sounds normal.   Abdomen: Soft, + BS, NT.    Assessment/Plan:  Carey Keith is a(n) 49 y.o. female with bipolar disorder.     Asthma.  Continue Symbicort 160-4.5 mcg/act twice daily.  Albuterol as needed.   Substance use disorder.  Patient on Suboxone 8 mg film TID.   Chronic hep C.  Liver functions stable.  Follow-up with GI on outpt basis.   Tobacco use disorder.  NRT.   Vitamin D deficiency.  Patient started on vitamin D2 50,000 units weekly for 8 weeks followed by vitamin D3 1000 units daily.  Back pain/neuropathy.  Continue gabapentin 800 mg TID (incr from 600 mg TID 10/31).  Toradol IM 15mg q6h prn for severe pain x4 days.  Tylenol for mild/mod pain.  Tried Robaxin in the past without relief, trial Zanaflex 4mg q8h prn.  Acute UTI.  UA pos in ED 10/24.  Macrobid 100 mg twice daily x 5 days (thru 10/30).   Hypokalemia.  K 2.7 -->  4.9 on 10/27.  Resolved with potassium chloride 40 mEq twice daily x 3 days.    The patient was discussed with Dr. Mark and he is in agreement with the above note.

## 2024-11-01 NOTE — NURSING NOTE
Patient  visible  on  milieu.Labile .Somatic. Agitated  at times  offer PRN Zyprexa patient  decline. Agree to take  Zyprexa  @ HS  if  feels  agitated. Pleasant and cooperative.Underlining  irritable  edge .Schedule PO medication administered as ordered. Denies hallucination, HI, SI.Appetite good. Attend group.Continue on safety check

## 2024-11-01 NOTE — NURSING NOTE
One time dose of Toradol 15mg IM given in left deltoid at 2126 for chronic back pain. Patient tolerated well.

## 2024-11-01 NOTE — TREATMENT TEAM
11/01/24 0354   Flores Anxiety Scale   Anxious Mood 3   Tension 3   Fears 3   Insomnia 2   Intellectual 2   Depressed Mood 3   Somatic Complaints: Muscular 1   Somatic Complaints: Sensory 1   Cardiovascular Symptoms 2   Respiratory Symptoms 1   Gastrointestinal Symptoms 0   Genitourinary Symptoms 0   Autonomic Symptoms 3   Behavior at Interview 3   Flores Anxiety Score 27     Patient had a nightmare and approached nurses station visibly anxious. Atarax 100mg PO given at 0354 for a flores score of 27. Will continue to monitor.  Noted as effective

## 2024-11-01 NOTE — PROGRESS NOTES
11/01/24    Team Meeting   Meeting Type Daily Rounds   Team Members Present   Team Members Present Physician;Nurse;;Occupational Therapist   Physician Team Member Eliana VILLALOBOS, Casimiro VILLALOBOS, Jolly VILLALOBOS, Deyanira PERDUE   Nursing Team Member Porter MONROY   Social Work Team Member Arya MUIR   OT Team Member Pope ANJEL   Patient/Family Present   Patient Present No   Patient's Family Present No     201, endorses anx, labile, tearful, prn for pain, refusing Seroquel, meds adjusted, delusional content with grandiosity, no dc date due to med management and monitoring
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
## 2024-11-01 NOTE — PLAN OF CARE
Problem: Alteration in Thoughts and Perception  Goal: Agree to be compliant with medication regime, as prescribed and report medication side effects  Description: Interventions:  - Offer appropriate PRN medication and supervise ingestion; conduct AIMS, as needed   Outcome: Progressing     Problem: Ineffective Coping  Goal: Participates in unit activities  Description: Interventions:  - Provide therapeutic environment   - Provide required programming   - Redirect inappropriate behaviors   Outcome: Progressing     Problem: Risk for Self Injury/Neglect  Goal: Attend and participate in unit activities, including therapeutic, recreational, and educational groups  Description: Interventions:  - Provide therapeutic and educational activities daily, encourage attendance and participation, and document same in the medical record  - Obtain collateral information, encourage visitation and family involvement in care   Outcome: Progressing     Problem: Alteration in Orientation  Goal: Attend and participate in unit activities, including therapeutic, recreational, and educational groups  Description: Interventions:  - Provide therapeutic and educational activities daily, encourage attendance and participation, and document same in the medical record   - Provide appropriate opportunities for reminiscence   - Provide a consistent daily routine   - Encourage family contact/visitation   Outcome: Progressing   Patient attends groups and participates. She continues to have negative ruminating thoughts which she has difficulty stopping or slowing down.

## 2024-11-02 PROCEDURE — 99232 SBSQ HOSP IP/OBS MODERATE 35: CPT | Performed by: NURSE PRACTITIONER

## 2024-11-02 RX ORDER — IBUPROFEN 600 MG/1
600 TABLET, FILM COATED ORAL EVERY 6 HOURS PRN
Status: DISCONTINUED | OUTPATIENT
Start: 2024-11-02 | End: 2024-11-07

## 2024-11-02 RX ADMIN — GABAPENTIN 800 MG: 400 CAPSULE ORAL at 13:06

## 2024-11-02 RX ADMIN — CLONAZEPAM 0.25 MG: 0.5 TABLET ORAL at 16:16

## 2024-11-02 RX ADMIN — BUPRENORPHINE AND NALOXONE 8 MG: 8; 2 FILM BUCCAL; SUBLINGUAL at 19:29

## 2024-11-02 RX ADMIN — CLONAZEPAM 0.5 MG: 0.5 TABLET ORAL at 20:54

## 2024-11-02 RX ADMIN — HYDROXYZINE HYDROCHLORIDE 50 MG: 50 TABLET, FILM COATED ORAL at 05:58

## 2024-11-02 RX ADMIN — TIZANIDINE 4 MG: 4 TABLET ORAL at 20:56

## 2024-11-02 RX ADMIN — TOPIRAMATE 50 MG: 25 TABLET, FILM COATED ORAL at 08:54

## 2024-11-02 RX ADMIN — CLONAZEPAM 0.25 MG: 0.5 TABLET ORAL at 08:54

## 2024-11-02 RX ADMIN — BUPRENORPHINE AND NALOXONE 8 MG: 8; 2 FILM BUCCAL; SUBLINGUAL at 13:06

## 2024-11-02 RX ADMIN — BUPRENORPHINE AND NALOXONE 8 MG: 8; 2 FILM BUCCAL; SUBLINGUAL at 08:54

## 2024-11-02 RX ADMIN — GABAPENTIN 800 MG: 400 CAPSULE ORAL at 19:29

## 2024-11-02 RX ADMIN — Medication 3 MG: at 20:54

## 2024-11-02 RX ADMIN — OLANZAPINE 5 MG: 5 TABLET, FILM COATED ORAL at 22:27

## 2024-11-02 RX ADMIN — ACETAMINOPHEN 975 MG: 325 TABLET ORAL at 08:54

## 2024-11-02 RX ADMIN — TOPIRAMATE 50 MG: 25 TABLET, FILM COATED ORAL at 20:55

## 2024-11-02 RX ADMIN — NICOTINE POLACRILEX 4 MG: 4 GUM, CHEWING BUCCAL at 08:56

## 2024-11-02 RX ADMIN — NICOTINE 21 MG: 21 PATCH, EXTENDED RELEASE TRANSDERMAL at 08:58

## 2024-11-02 RX ADMIN — NICOTINE POLACRILEX 4 MG: 4 GUM, CHEWING BUCCAL at 13:09

## 2024-11-02 RX ADMIN — GABAPENTIN 800 MG: 400 CAPSULE ORAL at 08:54

## 2024-11-02 RX ADMIN — TIZANIDINE 4 MG: 4 TABLET ORAL at 08:54

## 2024-11-02 RX ADMIN — IBUPROFEN 600 MG: 600 TABLET, FILM COATED ORAL at 16:16

## 2024-11-02 RX ADMIN — LAMOTRIGINE 25 MG: 25 TABLET ORAL at 08:54

## 2024-11-02 NOTE — NURSING NOTE
Patient visible on unit. Socializing with staff and peers. Denies SI,HI,AVH. Safety checks continue Q 15 minutes.

## 2024-11-02 NOTE — NURSING NOTE
Patient visible on milieu.Somatic. Labile. Pressured.Pleasant and cooperative.Schedule PO medication administered as ordered.Denies hallucination, HI, SI.  Appetite good. Attend group.Continue on safety check

## 2024-11-02 NOTE — PROGRESS NOTES
Progress Note - Carey Keith 49 y.o. female MRN: [id number]    Unit/Bed#: [facility] 256-01 Encounter: [id number]        Subjective:   Patient seen and examined at bedside after reviewing the chart and discussing the case with the caring staff.      Patient examined at bedside.  Patient complaining of headache and continued shoulder pain and muscle spasm today.  Patient is requesting ibuprofen as Tylenol does not help her shoulder pain.    Physical Exam   Vitals: Blood pressure 103/60, pulse 68, temperature 98.4 °F (36.9 °C), temperature source Temporal, resp. rate 16, height 5' (1.524 m), weight 73.5 kg (162 lb), SpO2 96%.,Body mass index is 31.64 kg/m².  Constitutional: Patient in no acute distress.  HEENT: PERR, EOMI, MMM.  Cardiovascular: Normal rate and regular rhythm.    Pulmonary/Chest: Effort normal and breath sounds normal.   Abdomen: Soft, + BS, NT.    Assessment/Plan:  Carey Keith is a(n) 49 y.o. female with bipolar disorder.     Asthma.  Continue Symbicort 160-4.5 mcg/act twice daily.  Albuterol as needed.   Substance use disorder.  Patient on Suboxone 8 mg film TID.   Chronic hep C.  Liver functions stable.  Follow-up with GI on outpt basis.   Tobacco use disorder.  NRT.   Vitamin D deficiency.  Patient started on vitamin D2 50,000 units weekly for 8 weeks followed by vitamin D3 1000 units daily.  Back pain/neuropathy/headache.  Continue gabapentin 800 mg TID (incr from 600 mg TID 10/31).  Toradol IM 15mg q6h prn for severe pain x4 days.  May get ibuprofen as needed for headache along with trial Zanaflex 4mg q8h prn.  Acute UTI.  UA pos in ED 10/24.  Macrobid 100 mg twice daily x 5 days (thru 10/30).   Hypokalemia.  K 2.7 -->  4.9 on 10/27.  Resolved with potassium chloride 40 mEq twice daily x 3 days.

## 2024-11-02 NOTE — PROGRESS NOTES
Progress Note - Behavioral Health   Name: Carey Keith 49 y.o. female I MRN: [id number]  Unit/Bed#: [facility] 256-01 I Date of Admission: 10/25/2024   Date of Service: 11/2/2024 I Hospital Day: 8    Assessment & Plan  Bipolar disorder, current episode depressed, severe, without psychotic features (HCC)  Patient admitted on a voluntary 201 with thoughts of self-harm and increased depression in the context of homelessness, relapsed to meth use.  Continue Topamax 50 mg PO BID; slowly titrate for mood control and to reduce weight gain associated with SGA therapy.   Continue Lamictal 25mg PO QD for depression and mood instability; started 10/31/2024  Suicidal ideations  Patient able to contract for safety on the unit  Opioid use disorder, severe, on maintenance therapy (HCC)  On Suboxone maintenance treatment verified by PDMP.  Continue Suboxone 8/2 mg 3 times a day.  Methamphetamine abuse (HCC)  Monitor and give supportive care for withdrawal.  Klonopin tapered to 0.25 mg PO BID and 0.5mg PO QHS    Progress Toward Goals: Slowly improving.  Mood less labile.  Responds well to redirection.  Some racing thoughts and ruminations persists.  Depression improving.  Less grandiose.  Believe patient would continue to benefit from addition of antipsychotic for thought disorder and mood control.  Patient contemplating utilization of Zyprexa.  For now we will continue with current psychotropic regimen with plan to further titrate Topamax over the next 24 to 48 hours.  Tolerating addition of Lamictal well.  No discharge date at this time.    Recommended Treatment: Continue with group therapy, milieu therapy and occupational therapy.      Risks, benefits and possible side effects of Medications:   Carey has limited understanding of risk versus benefits of medications, but agrees to take as prescribed.    History of Present Illness   Behavior over the last 24 hours: Slowly improving  Sleep: Improved  Appetite: normal  Medication  "side effects: No  ROS: no complaints and all other systems are negative    Subjective: Carey has been visible and social with peers in the milieu.  Mood remains somewhat elevated but less labile.  No longer tearful.  Maintaining safety with no return of SI.  Speech tangential and at times hyperverbal.  Responds well to redirection.  Endorses racing thoughts during the evening but overall sleep has improved.  Agreeable to trial PRN Zyprexa for ruminations with plan to add routinely of effective.  No overt delusions verbalized today.  Depression and anxiety improving.    Objective   Mental Status Evaluation:  Appearance:  casually dressed, older than stated age, and blonde hair, poor dentition   Behavior:  Hypomanic, redirectable, cooperative , no tearfulness   Speech:  tangential and hyperverbal at times   Mood:  \"A little better\", less depressed   Affect:  Less labile   Thought Process:  circumstantial, illogical at times   Associations: tangential associations   Thought Content:  Less grandiose, some negative and ruminating thoughts   Perceptual Disturbances: Denied AVH, did not appear internally preoccupied   Risk Potential: Suicidal Ideations none  Homicidal Ideations none  Potential for Aggression No   Sensorium:  person, place, time/date, and situation   Memory:  recent and remote memory grossly intact   Consciousness:  alert and awake    Attention: attention span appeared shorter than expected for age   Insight:  Impaired   Judgment: limited   Gait/Station: normal gait/station and normal balance   Motor Activity: no abnormal movements     Medications: all current active meds have been reviewed and continue current psychiatric medications.      Lab Results: I have reviewed the following results:  CMP:   Lab Results   Component Value Date    SODIUM 136 10/27/2024    K 4.9 10/27/2024     10/27/2024    CO2 21 10/27/2024    AGAP 8 10/27/2024    BUN 8 10/27/2024    CREATININE 0.63 10/27/2024    GLUC 95 " 10/27/2024    GLUF 95 10/27/2024    CALCIUM 8.3 (L) 10/27/2024    AST 50 (H) 10/25/2024    ALT 39 10/25/2024    ALKPHOS 53 10/25/2024    TP 5.3 (L) 10/25/2024    ALB 3.1 (L) 10/25/2024    TBILI 0.60 10/25/2024    EGFR 107 10/27/2024     Drug Screen:   Lab Results   Component Value Date    AMPMETHUR Positive (A) 10/24/2024    BARBTUR Negative 10/24/2024    BDZUR Positive (A) 10/24/2024    THCUR Negative 10/24/2024    COCAINEUR Negative 10/24/2024    METHADONEUR Negative 10/24/2024    OPIATEUR Negative 10/24/2024    PCPUR Negative 10/24/2024

## 2024-11-03 PROCEDURE — 99232 SBSQ HOSP IP/OBS MODERATE 35: CPT | Performed by: NURSE PRACTITIONER

## 2024-11-03 RX ORDER — OLANZAPINE 5 MG/1
5 TABLET ORAL
Status: DISCONTINUED | OUTPATIENT
Start: 2024-11-03 | End: 2024-11-04

## 2024-11-03 RX ORDER — KETOROLAC TROMETHAMINE 30 MG/ML
15 INJECTION, SOLUTION INTRAMUSCULAR; INTRAVENOUS EVERY 6 HOURS PRN
Status: ACTIVE | OUTPATIENT
Start: 2024-11-03 | End: 2024-11-06

## 2024-11-03 RX ADMIN — GABAPENTIN 800 MG: 400 CAPSULE ORAL at 19:49

## 2024-11-03 RX ADMIN — Medication 3 MG: at 20:38

## 2024-11-03 RX ADMIN — BUPRENORPHINE AND NALOXONE 8 MG: 8; 2 FILM BUCCAL; SUBLINGUAL at 19:49

## 2024-11-03 RX ADMIN — BUPRENORPHINE AND NALOXONE 8 MG: 8; 2 FILM BUCCAL; SUBLINGUAL at 09:09

## 2024-11-03 RX ADMIN — TOPIRAMATE 50 MG: 25 TABLET, FILM COATED ORAL at 20:38

## 2024-11-03 RX ADMIN — IBUPROFEN 600 MG: 600 TABLET, FILM COATED ORAL at 06:49

## 2024-11-03 RX ADMIN — NICOTINE POLACRILEX 4 MG: 4 GUM, CHEWING BUCCAL at 10:13

## 2024-11-03 RX ADMIN — NICOTINE POLACRILEX 4 MG: 4 GUM, CHEWING BUCCAL at 15:45

## 2024-11-03 RX ADMIN — TIZANIDINE 4 MG: 4 TABLET ORAL at 12:24

## 2024-11-03 RX ADMIN — ERGOCALCIFEROL 50000 UNITS: 1.25 CAPSULE, LIQUID FILLED ORAL at 09:10

## 2024-11-03 RX ADMIN — TOPIRAMATE 50 MG: 25 TABLET, FILM COATED ORAL at 09:11

## 2024-11-03 RX ADMIN — LAMOTRIGINE 25 MG: 25 TABLET ORAL at 09:10

## 2024-11-03 RX ADMIN — TRAZODONE HYDROCHLORIDE 100 MG: 100 TABLET ORAL at 20:41

## 2024-11-03 RX ADMIN — CLONAZEPAM 0.25 MG: 0.5 TABLET ORAL at 09:10

## 2024-11-03 RX ADMIN — NICOTINE POLACRILEX 4 MG: 4 GUM, CHEWING BUCCAL at 06:51

## 2024-11-03 RX ADMIN — GABAPENTIN 800 MG: 400 CAPSULE ORAL at 12:24

## 2024-11-03 RX ADMIN — BUPRENORPHINE AND NALOXONE 8 MG: 8; 2 FILM BUCCAL; SUBLINGUAL at 12:24

## 2024-11-03 RX ADMIN — OLANZAPINE 5 MG: 5 TABLET, FILM COATED ORAL at 20:38

## 2024-11-03 RX ADMIN — POLYETHYLENE GLYCOL 3350 17 G: 17 POWDER, FOR SOLUTION ORAL at 15:18

## 2024-11-03 RX ADMIN — CLONAZEPAM 0.25 MG: 0.5 TABLET ORAL at 15:18

## 2024-11-03 RX ADMIN — TIZANIDINE 4 MG: 4 TABLET ORAL at 20:41

## 2024-11-03 RX ADMIN — CLONAZEPAM 0.5 MG: 0.5 TABLET ORAL at 20:38

## 2024-11-03 RX ADMIN — GABAPENTIN 800 MG: 400 CAPSULE ORAL at 09:11

## 2024-11-03 RX ADMIN — NICOTINE 21 MG: 21 PATCH, EXTENDED RELEASE TRANSDERMAL at 09:11

## 2024-11-03 RX ADMIN — IBUPROFEN 600 MG: 600 TABLET, FILM COATED ORAL at 17:45

## 2024-11-03 RX ADMIN — OLANZAPINE 2.5 MG: 2.5 TABLET, FILM COATED ORAL at 10:11

## 2024-11-03 NOTE — PROGRESS NOTES
Progress Note - Behavioral Health   Name: Carey Keith 49 y.o. female I MRN: [id number]  Unit/Bed#: [email or web address] 256-01 I Date of Admission: 10/25/2024   Date of Service: 11/3/2024 I Hospital Day: 9    Assessment & Plan  Bipolar disorder, current episode depressed, severe, without psychotic features (HCC)  Patient admitted on a voluntary 201 with thoughts of self-harm and increased depression in the context of homelessness, relapsed to meth use.  Continue Topamax 50 mg PO BID; slowly titrate for mood control and to reduce weight gain associated with SGA therapy.   Continue Lamictal 25mg PO QD for depression and mood instability; started 10/31/2024  Start Zyprexa 5mg PO QHS for ruminations, depression, mood control, and suspected psychosis  Suicidal ideations  Patient able to contract for safety on the unit  Opioid use disorder, severe, on maintenance therapy (HCC)  On Suboxone maintenance treatment verified by PDMP.  Continue Suboxone 8/2 mg 3 times a day.  Methamphetamine abuse ([facility])  Monitor and give supportive care for withdrawal.  Klonopin tapered to 0.25 mg PO BID and 0.5mg PO QHS    Progress Toward Goals: Mood less labile and more controlled with no tearfulness.  Depression appears to be improving.  Speech hyperverbal.  Suspect patient experiencing psychosis and was responding to internal preoccupation this morning.  Agreeable to initiate Zyprexa 5 mg PO QHS for psychosis, ruminations, depression, and mood control.  Continue remainder of psychotropic regimen as ordered.  No discharge date at this time.    Recommended Treatment: Continue with group therapy, milieu therapy and occupational therapy.      Risks, benefits and possible side effects of Medications:   Carey has limited understanding of risk versus benefits of medications, but agrees to take as prescribed.    History of Present Illness   Behavior over the last 24 hours: Some improvement  Sleep: Improved  Appetite: normal  Medication side effects:  "No  ROS: no complaints and all other systems are negative    Subjective: Carey remains visible in the milieu and social with peers.  Mood less elevated and no longer tearful. Speech remains hyperverbal and tangential.  Depression improving.  According nursing staff, patient was responding to internal stimuli early this morning and required PRN Zyprexa 2.5mg for mild agitation.  Less ruminating.  Maintaining safety with no return of SI.  Forward thinking with plan to leave the hospital and \"go to narcotic meetings.\"    Objective   Mental Status Evaluation:  Appearance:  casually dressed, older than stated age, and blonde hair, poor dentition   Behavior:  Hypomanic, bizarre, redirectable   Speech:  tangential and hyperverbal   Mood:  Less depressed, anxious   Affect:  mood-congruent and less labile, elevated at times   Thought Process:  circumstantial   Associations: tangential associations   Thought Content:  Less ruminating   Perceptual Disturbances: Denied AVH, but responding to internal preoccupation this morning   Risk Potential: Suicidal Ideations none  Homicidal Ideations none  Potential for Aggression No   Sensorium:  person, place, time/date, and situation   Memory:  recent and remote memory grossly intact   Consciousness:  alert and awake    Attention: attention span appeared shorter than expected for age   Insight:  Impaired   Judgment: limited   Gait/Station: normal gait/station and normal balance   Motor Activity: no abnormal movements     Medications: all current active meds have been reviewed, continue current psychiatric medications, and planned medication changes: Add Zyprexa 5 mg PO QHS .      Lab Results: I have reviewed the following results:  Drug Screen:   Lab Results   Component Value Date    AMPMETHUR Positive (A) 10/24/2024    BARBTUR Negative 10/24/2024    BDZUR Positive (A) 10/24/2024    THCUR Negative 10/24/2024    COCAINEUR Negative 10/24/2024    METHADONEUR Negative 10/24/2024    " OPIATEUR Negative 10/24/2024    PCPUR Negative 10/24/2024     Medical alcohol level   Lab Results   Component Value Date    ETOH <10 10/24/2024

## 2024-11-03 NOTE — TREATMENT TEAM
11/03/24 1012   Broset Violence Checklist   Assessment type Shift   Irritability 1   Confusion 0   Boisterousness 0   Threatening physical violence 0   Verbal threats 0   Violence 0   Broset score 1     Patient  observe agitation  and  talking  to self. Zyprexa 2.5 mg  given.

## 2024-11-03 NOTE — NURSING NOTE
Patient visible  on milieu. Labile.Pleasant and  cooperative.Discharge  focus.Schedule PO medication administered as ordered.Denies hallucination, HI, SI. Appetite good. Attend group.Continue on safety check

## 2024-11-03 NOTE — NURSING NOTE
Pt was visible on the unit and social with peers. Compliant with medication. Cooperative with staff during care. Endorsed mild to moderate anxiety and depression. Denied SI, HI, or AVH. Affect was flat. Eye contact was fair. Speech pattern was normal and relaxed. Appearance and hygiene  was disheveled. Follow up Zyprexa 5 mg, effective. Pt observed resting in bed. No sign of pain or discomfort. Breathing calm and even. No SOB or labored breathing. Will continue to monitor. Safety checks continued. Made no c/o pain or discomfort. Safety checks continued.

## 2024-11-03 NOTE — ASSESSMENT & PLAN NOTE
Patient admitted on a voluntary 201 with thoughts of self-harm and increased depression in the context of homelessness, relapsed to meth use.  Continue Topamax 50 mg PO BID; slowly titrate for mood control and to reduce weight gain associated with SGA therapy.   Continue Lamictal 25mg PO QD for depression and mood instability; started 10/31/2024  Start Zyprexa 5mg PO QHS for ruminations, depression, mood control, and suspected psychosis

## 2024-11-03 NOTE — PROGRESS NOTES
Progress Note - Carey Keith 49 y.o. female MRN: [id number]    Unit/Bed#: [facility] 256-01 Encounter: [id number]        Subjective:   Patient seen and examined at bedside after reviewing the chart and discussing the case with the caring staff.      Patient examined at bedside.  Patient reports poorly controlled lower back pain and left shoulder pain.  Patient was on Toradol IM injections which have .    Physical Exam   Vitals: Blood pressure 116/71, pulse 84, temperature 98 °F (36.7 °C), temperature source Temporal, resp. rate 17, height 5' (1.524 m), weight 73.5 kg (162 lb), SpO2 99%.,Body mass index is 31.64 kg/m².  Constitutional: Patient in no acute distress.  HEENT: PERR, EOMI, MMM.  Cardiovascular: Normal rate and regular rhythm.    Pulmonary/Chest: Effort normal and breath sounds normal.   Abdomen: Soft, + BS, NT.    Assessment/Plan:  Carey Keith is a(n) 49 y.o. female with bipolar disorder.     Asthma.  Continue Symbicort 160-4.5 mcg/act twice daily.  Albuterol as needed.   Substance use disorder.  Patient on Suboxone 8 mg film TID.   Chronic hep C.  Liver functions stable.  Follow-up with GI on outpt basis.   Tobacco use disorder.  NRT.   Vitamin D deficiency.  Patient started on vitamin D2 50,000 units weekly for 8 weeks followed by vitamin D3 1000 units daily.  Back pain/neuropathy/headache.  Continue gabapentin 800 mg TID (incr from 600 mg TID 10/31).  I will renew Toradol IM 15mg q8h prn for severe pain x3 days 11/3/2024.  May get ibuprofen as needed for headache along with trial Zanaflex 4mg q8h prn.  Acute UTI.  UA pos in ED 10/24.  Macrobid 100 mg twice daily x 5 days (thru 10/30).   Hypokalemia.  K 2.7 -->  4.9 on 10/27.  Resolved with potassium chloride 40 mEq twice daily x 3 days.

## 2024-11-03 NOTE — TREATMENT TEAM
11/02/24 2226   Broset Violence Checklist   Assessment type Shift   Irritability 1   Confusion 0   Boisterousness 0   Threatening physical violence 0   Verbal threats 0   Violence 0   Broset score 1   Agitated Behavior Scale   Short Attention Span, Easy Distractibility, Inability to Concentrate 3   Impulsive, Impatient, Low Tolerance for Pain or Frustration 3   Uncooperative, Resistant to Care, Demanding 3   Violent and/or Threatening Violence Toward People or Property 1   Explosive and/or Unpredictable Anger 3   Rocking, Rubbing, Moaning, Other Self-Stimulating Behavior 1   Pulling at Tubes, Restraints, etc. 1   Wandering from Treatment Area 3   Restlessness, Pacing, Excessive Movement 3   Repetitive Behaviors, Motor and/or Verbal 2   Rapid, Loud or Excessive Talking 1   Sudden Changes of Mood 3   Easily Initiated - Excessive Crying and/or Laughter 1   Self-Abusiveness, Physical and/or Verbal 1   Agitated Behavior Scale Total Score  29     5 mg Zyprexa administered as ordered per request for moderate agitation r/t night mensah.  Pt c/o restless pacing the unit.  Appears irritable speech loud at periods with sudden changes in mood. Will continue to monitor. Safety checks continued.

## 2024-11-04 PROCEDURE — 99232 SBSQ HOSP IP/OBS MODERATE 35: CPT | Performed by: NURSE PRACTITIONER

## 2024-11-04 RX ORDER — OLANZAPINE 10 MG/1
10 TABLET ORAL
Status: DISCONTINUED | OUTPATIENT
Start: 2024-11-04 | End: 2024-11-08

## 2024-11-04 RX ADMIN — TOPIRAMATE 50 MG: 25 TABLET, FILM COATED ORAL at 08:36

## 2024-11-04 RX ADMIN — BUPRENORPHINE AND NALOXONE 8 MG: 8; 2 FILM BUCCAL; SUBLINGUAL at 13:06

## 2024-11-04 RX ADMIN — BUDESONIDE AND FORMOTEROL FUMARATE DIHYDRATE 2 PUFF: 160; 4.5 AEROSOL RESPIRATORY (INHALATION) at 08:38

## 2024-11-04 RX ADMIN — LAMOTRIGINE 25 MG: 25 TABLET ORAL at 08:36

## 2024-11-04 RX ADMIN — TOPIRAMATE 50 MG: 25 TABLET, FILM COATED ORAL at 20:56

## 2024-11-04 RX ADMIN — BUPRENORPHINE AND NALOXONE 8 MG: 8; 2 FILM BUCCAL; SUBLINGUAL at 18:23

## 2024-11-04 RX ADMIN — BUPRENORPHINE AND NALOXONE 8 MG: 8; 2 FILM BUCCAL; SUBLINGUAL at 08:36

## 2024-11-04 RX ADMIN — GABAPENTIN 800 MG: 400 CAPSULE ORAL at 12:20

## 2024-11-04 RX ADMIN — GABAPENTIN 800 MG: 400 CAPSULE ORAL at 18:23

## 2024-11-04 RX ADMIN — CLONAZEPAM 0.25 MG: 0.5 TABLET ORAL at 08:35

## 2024-11-04 RX ADMIN — GABAPENTIN 800 MG: 400 CAPSULE ORAL at 08:36

## 2024-11-04 RX ADMIN — IBUPROFEN 600 MG: 600 TABLET, FILM COATED ORAL at 09:26

## 2024-11-04 RX ADMIN — CLONAZEPAM 0.5 MG: 0.5 TABLET ORAL at 20:56

## 2024-11-04 RX ADMIN — IBUPROFEN 600 MG: 600 TABLET, FILM COATED ORAL at 15:39

## 2024-11-04 RX ADMIN — BUDESONIDE AND FORMOTEROL FUMARATE DIHYDRATE 2 PUFF: 160; 4.5 AEROSOL RESPIRATORY (INHALATION) at 18:22

## 2024-11-04 RX ADMIN — NICOTINE POLACRILEX 4 MG: 4 GUM, CHEWING BUCCAL at 16:04

## 2024-11-04 RX ADMIN — NICOTINE POLACRILEX 4 MG: 4 GUM, CHEWING BUCCAL at 09:33

## 2024-11-04 RX ADMIN — Medication 3 MG: at 20:56

## 2024-11-04 RX ADMIN — NICOTINE 21 MG: 21 PATCH, EXTENDED RELEASE TRANSDERMAL at 08:38

## 2024-11-04 RX ADMIN — TRAZODONE HYDROCHLORIDE 100 MG: 100 TABLET ORAL at 20:55

## 2024-11-04 RX ADMIN — NICOTINE POLACRILEX 4 MG: 4 GUM, CHEWING BUCCAL at 20:55

## 2024-11-04 RX ADMIN — TIZANIDINE 4 MG: 4 TABLET ORAL at 13:06

## 2024-11-04 RX ADMIN — OLANZAPINE 10 MG: 10 TABLET, FILM COATED ORAL at 20:56

## 2024-11-04 NOTE — NUTRITION
11/04/24 1435   Biochemical Data,Medical Tests, and Procedures   Biochemical Data/Medical Tests/Procedures Lab values reviewed;Meds reviewed   Labs (Comment) No new labs   Meds (Comment) cogentin, Vit D, klonopin, atarax, neurontin, atarax, toradol, ativan, melatonin, zyprexa, desyrel   Nutrition-Focused Physical Exam   Nutrition-Focused Physical Exam Findings RN skin assessment reviewed;No skin issues documented   Medical-Related Concerns PMH reviewed   Adequacy of Intake   Nutrition Modality PO   Feeding Route   PO Independent   Current PO Intake   Current Diet Order Regular diet, double protein thin liquids extra sauce/gravy   Current Meal Intake %   Estimated calorie intake compared to estimated need Nutrient needs met.   PES Statement   Problem No nutrition diagnosis   Recommendations/Interventions   Malnutrition/BMI Present No  (does not meet criteria)   Summary Regular diet, double protein thin liquids. Extra sauce/gravy. Meal completions %. No new weights. Medications reviewed. No new labs. Skin intact. Reports appetite is very good. Requesting double vegetables. Discussed double vegetables can be added at this time, however if weight starts to trend up, diet will be adjusted; patient agreeable.   Interventions/Recommendations Adjust diet order   Intervention Comments add double vegetables   Education Assessment   Education Education not indicated at this time;Patient/caregiver not appropriate for education at this time   Nutrition Complexity Risk   Nutrition complexity level Low risk   Follow up date 11/18/24

## 2024-11-04 NOTE — ASSESSMENT & PLAN NOTE
Patient admitted on a voluntary 201 with thoughts of self-harm and increased depression in the context of homelessness, relapsed to meth use.  Continue Topamax 50 mg PO BID; slowly titrate for mood control and to reduce weight gain associated with SGA therapy.   Continue Lamictal 25mg PO QD for depression and mood instability; started 10/31/2024  Increase Zyprexa 5mg PO QHS for ruminations, residual depression, mood instability

## 2024-11-04 NOTE — PROGRESS NOTES
Progress Note - Behavioral Health   Name: Carey Keith 49 y.o. female I MRN: [id number]  Unit/Bed#: [facility] 256-01 I Date of Admission: 10/25/2024   Date of Service: 11/4/2024 I Hospital Day: 10    Assessment & Plan  Bipolar disorder, current episode depressed, severe, without psychotic features (HCC)  Patient admitted on a voluntary 201 with thoughts of self-harm and increased depression in the context of homelessness, relapsed to meth use.  Continue Topamax 50 mg PO BID; slowly titrate for mood control and to reduce weight gain associated with SGA therapy.   Continue Lamictal 25mg PO QD for depression and mood instability; started 10/31/2024  Increase Zyprexa 5mg PO QHS for ruminations, residual depression, mood instability  Suicidal ideations  Patient able to contract for safety on the unit  Opioid use disorder, severe, on maintenance therapy (HCC)  On Suboxone maintenance treatment verified by PDMP.  Continue Suboxone 8/2 mg 3 times a day.  Methamphetamine abuse (HCC)  Monitor and give supportive care for withdrawal.  Decrease Klonopin 0.5 mg PO QHS    Progress Toward Goals: Showing improvement with reality testing.  Mood no longer depressed but exhibiting symptoms of hypomania.  Less psychotic today and more organized in thought process.  Plan is to titrate Zyprexa 10 mg PO QHS mood control and residual psychosis.  Will also taper Klonopin 0.5 mg PO QHS.  Continue remainder of psychotropic regimen as ordered.  Potential discharge by end of week.    Recommended Treatment: Continue with group therapy, milieu therapy and occupational therapy.      Risks, benefits and possible side effects of Medications:   Carey has limited understanding of risk versus benefits of medications, but agrees to take as prescribed.    History of Present Illness   Behavior over the last 24 hours:  improved  Sleep: Intermittent  Appetite: Fair  Medication side effects: No  ROS: no complaints and all other systems are  "negative    Subjective: Carey continues to exhibit symptoms of mood lability; hypomanic today.  Sleep intermittent.  Perseverative on discharge.  States depression has resolved and maintaining safety on unit with no SI.  No longer tearful.  Speech remains hyperverbal and tangential.  Responds well to redirection.  Receptive to medication education.  Signed 72-hour notice this morning but shortly rescinded thereafter for ongoing medication adjustments.    Objective   Mental Status Evaluation:  Appearance:  Improved hygiene, casually dressed, curly blond hair, poor dentition     Behavior:  restless and fidgety and hypomanic, redirectable   Speech:  tangential and hyperverbal at times   Mood:  \"I feel good\"   Affect:  Elevated   Thought Process:  More organized, goal directed   Associations: circumstantial associations   Thought Content:  Less ruminating, no overt delusions or paranoia   Perceptual Disturbances: Denied AVH, did not appear internally preoccupied   Risk Potential: Suicidal Ideations none  Homicidal Ideations none  Potential for Aggression No   Sensorium:  person, place, time/date, and situation   Memory:  recent and remote memory grossly intact   Consciousness:  alert and awake    Attention: attention span appeared shorter than expected for age   Insight:  Improving     Judgment: Improving   Gait/Station: normal gait/station and normal balance   Motor Activity: no abnormal movements     Medications: all current active meds have been reviewed, continue current psychiatric medications, and planned medication changes: Increase Zyprexa 10 mg PO QHS for mood control and residual psychosis/depression, taper Klonopin 0.5 mg PO QHS .      Lab Results: I have reviewed the following results:  CMP:   Lab Results   Component Value Date    SODIUM 136 10/27/2024    K 4.9 10/27/2024     10/27/2024    CO2 21 10/27/2024    AGAP 8 10/27/2024    BUN 8 10/27/2024    CREATININE 0.63 10/27/2024    GLUC 95 10/27/2024 "    GLUF 95 10/27/2024    CALCIUM 8.3 (L) 10/27/2024    AST 50 (H) 10/25/2024    ALT 39 10/25/2024    ALKPHOS 53 10/25/2024    TP 5.3 (L) 10/25/2024    ALB 3.1 (L) 10/25/2024    TBILI 0.60 10/25/2024    EGFR 107 10/27/2024     Drug Screen:   Lab Results   Component Value Date    AMPMETHUR Positive (A) 10/24/2024    BARBTUR Negative 10/24/2024    BDZUR Positive (A) 10/24/2024    THCUR Negative 10/24/2024    COCAINEUR Negative 10/24/2024    METHADONEUR Negative 10/24/2024    OPIATEUR Negative 10/24/2024    PCPUR Negative 10/24/2024

## 2024-11-04 NOTE — PROGRESS NOTES
Progress Note - Carey Keith 49 y.o. female MRN: [id number]    Unit/Bed#: [facility] 256-01 Encounter: [id number]        Subjective:   Patient seen and examined at bedside after reviewing the chart and discussing the case with the caring staff.      Patient examined at bedside.  Patient denies any acute symptoms.     Physical Exam   Vitals: Blood pressure 111/76, pulse 63, temperature 97.9 °F (36.6 °C), temperature source Temporal, resp. rate 17, height 5' (1.524 m), weight 73.5 kg (162 lb), SpO2 95%.,Body mass index is 31.64 kg/m².  Constitutional: Patient in no acute distress.  HEENT: PERR, EOMI, MMM.  Cardiovascular: Normal rate and regular rhythm.    Pulmonary/Chest: Effort normal and breath sounds normal.   Abdomen: Soft, + BS, NT.    Assessment/Plan:  Carey Keith is a(n) 49 y.o. female with bipolar disorder.     Asthma.  Continue Symbicort 160-4.5 mcg/act twice daily.  Albuterol as needed.   Substance use disorder.  Patient on Suboxone 8 mg film TID.   Chronic hep C.  Liver functions stable.  Follow-up with GI on outpt basis.   Tobacco use disorder.  NRT.   Vitamin D deficiency.  Patient started on vitamin D2 50,000 units weekly for 8 weeks followed by vitamin D3 1000 units daily.  Back pain/neuropathy/headache.  Continue gabapentin 800 mg TID (incr from 600 mg TID 10/31).  Toradol IM 15mg q8h prn for severe pain x3 days 11/3/2024 per Dr. Mark.  May get ibuprofen as needed for headache along with trial Zanaflex 4mg q8h prn.  Acute UTI.  UA pos in ED 10/24.  Macrobid 100 mg twice daily x 5 days (thru 10/30).   Hypokalemia.  K 2.7 -->  4.9 on 10/27.  Resolved with potassium chloride 40 mEq twice daily x 3 days.    The patient was discussed with Dr. Mark and he is in agreement with the above note.

## 2024-11-04 NOTE — PROGRESS NOTES
11/04/24 1539   Pain Assessment   Pain Assessment Tool 0-10   Pain Score 10 - Worst Possible Pain   Pain Location/Orientation Location: Head   Hospital Pain Intervention(s) Medication (See MAR)     600mg ibuprofen administered at 1539 for severe headache.

## 2024-11-04 NOTE — PROGRESS NOTES
11/04/24    Team Meeting   Meeting Type Daily Rounds   Team Members Present   Team Members Present Physician;Nurse;;Occupational Therapist   Physician Team Member Nannette VILLALOBOS, Casimiro VILLALOBOS, Deyanira PERDUE   Nursing Team Member Patrizia MONROY   Social Work Team Member Arya MUIR   OT Team Member Pope ANJEL   Patient/Family Present   Patient Present No   Patient's Family Present No     201, signed 72 today 0802, focused on medications, appears internally preoccupied over the weekend, bizarre delusions Friday, mood more controlled, meds adjusted, dc Thursday on 72

## 2024-11-04 NOTE — NURSING NOTE
Pt was visible on the unit and social with peers. Pleasant and cooperative with staff during care. Endorsed mild to moderate depression and anxiety. Denied SI, HI, or AVH. Pt's affect was flate. Eye contact was fair. Speech pattern was normal and relaxed. Appearance and hygiene was disheveled. Made no c/o pain or discomfort. Safety checks continued.

## 2024-11-04 NOTE — PROGRESS NOTES
11/04/24 1639   Pain Assessment Post Intervention   Pain Assessment Tool Used: 0-10   Post Intervention Pain Score 5   Post Intervention Pain Location/Orientation Location: Head   Response to Interventions Patient reports relief     Medication effective.

## 2024-11-04 NOTE — NURSING NOTE
Patient visible in the milieu. She attends groups. She is polite with staff. She endorses anxiety. Denies all other psychiatric symptoms. No complaints of pain at this time. Compliant with medications.

## 2024-11-05 PROCEDURE — 99232 SBSQ HOSP IP/OBS MODERATE 35: CPT | Performed by: NURSE PRACTITIONER

## 2024-11-05 RX ORDER — OLANZAPINE 2.5 MG/1
2.5 TABLET, FILM COATED ORAL DAILY
Status: DISCONTINUED | OUTPATIENT
Start: 2024-11-05 | End: 2024-11-07

## 2024-11-05 RX ADMIN — BUDESONIDE AND FORMOTEROL FUMARATE DIHYDRATE 2 PUFF: 160; 4.5 AEROSOL RESPIRATORY (INHALATION) at 08:46

## 2024-11-05 RX ADMIN — IBUPROFEN 600 MG: 600 TABLET, FILM COATED ORAL at 18:08

## 2024-11-05 RX ADMIN — BUPRENORPHINE AND NALOXONE 8 MG: 8; 2 FILM BUCCAL; SUBLINGUAL at 18:03

## 2024-11-05 RX ADMIN — NICOTINE POLACRILEX 4 MG: 4 GUM, CHEWING BUCCAL at 13:16

## 2024-11-05 RX ADMIN — GABAPENTIN 800 MG: 400 CAPSULE ORAL at 08:41

## 2024-11-05 RX ADMIN — BUDESONIDE AND FORMOTEROL FUMARATE DIHYDRATE 2 PUFF: 160; 4.5 AEROSOL RESPIRATORY (INHALATION) at 18:03

## 2024-11-05 RX ADMIN — NICOTINE POLACRILEX 4 MG: 4 GUM, CHEWING BUCCAL at 20:21

## 2024-11-05 RX ADMIN — GABAPENTIN 800 MG: 400 CAPSULE ORAL at 13:14

## 2024-11-05 RX ADMIN — IBUPROFEN 600 MG: 600 TABLET, FILM COATED ORAL at 08:40

## 2024-11-05 RX ADMIN — TIZANIDINE 4 MG: 4 TABLET ORAL at 20:48

## 2024-11-05 RX ADMIN — OLANZAPINE 2.5 MG: 2.5 TABLET, FILM COATED ORAL at 04:37

## 2024-11-05 RX ADMIN — Medication 3 MG: at 20:47

## 2024-11-05 RX ADMIN — NICOTINE POLACRILEX 4 MG: 4 GUM, CHEWING BUCCAL at 13:14

## 2024-11-05 RX ADMIN — BUPRENORPHINE AND NALOXONE 8 MG: 8; 2 FILM BUCCAL; SUBLINGUAL at 13:15

## 2024-11-05 RX ADMIN — TOPIRAMATE 50 MG: 25 TABLET, FILM COATED ORAL at 20:47

## 2024-11-05 RX ADMIN — OLANZAPINE 10 MG: 10 TABLET, FILM COATED ORAL at 20:47

## 2024-11-05 RX ADMIN — NICOTINE POLACRILEX 4 MG: 4 GUM, CHEWING BUCCAL at 15:34

## 2024-11-05 RX ADMIN — NICOTINE POLACRILEX 4 MG: 4 GUM, CHEWING BUCCAL at 09:32

## 2024-11-05 RX ADMIN — LAMOTRIGINE 25 MG: 25 TABLET ORAL at 08:41

## 2024-11-05 RX ADMIN — OLANZAPINE 2.5 MG: 2.5 TABLET, FILM COATED ORAL at 13:15

## 2024-11-05 RX ADMIN — BUPRENORPHINE AND NALOXONE 8 MG: 8; 2 FILM BUCCAL; SUBLINGUAL at 08:40

## 2024-11-05 RX ADMIN — NICOTINE 21 MG: 21 PATCH, EXTENDED RELEASE TRANSDERMAL at 08:40

## 2024-11-05 RX ADMIN — GABAPENTIN 800 MG: 400 CAPSULE ORAL at 18:03

## 2024-11-05 RX ADMIN — TOPIRAMATE 50 MG: 25 TABLET, FILM COATED ORAL at 08:41

## 2024-11-05 RX ADMIN — CLONAZEPAM 0.5 MG: 0.5 TABLET ORAL at 20:47

## 2024-11-05 NOTE — NURSING NOTE
Patient med and meal compliant visible on unit pleasant cooperative social with select peers rambling at times about family and money Denies SI HI AVH at this time Safety checks maintained.

## 2024-11-05 NOTE — NURSING NOTE
Patient awake numerous times during the night. Easy non labored breathing noted.No changes in medical condition. No behaviors noted. Monitoring continues.

## 2024-11-05 NOTE — PROGRESS NOTES
Progress Note - Behavioral Health   Name: Carey Keith 49 y.o. female I MRN: [id number]  Unit/Bed#: [facility] 258-02 I Date of Admission: 10/25/2024   Date of Service: 11/5/2024 I Hospital Day: 11    Assessment & Plan  Bipolar disorder, current episode depressed, severe, without psychotic features (HCC)  Patient admitted on a voluntary 201 with thoughts of self-harm and increased depression in the context of homelessness, relapsed to meth use.  Continue Topamax 50 mg PO BID; slowly titrate for mood control and to reduce weight gain associated with SGA therapy.   Continue Lamictal 25mg PO QD for depression and mood instability; started 10/31/2024  Increase Zyprexa 2.5mg PO QD and continue 10mg PO QHS for ruminations, residual depression, mood instability  Suicidal ideations  Patient able to contract for safety on the unit  Opioid use disorder, severe, on maintenance therapy (HCC)  On Suboxone maintenance treatment verified by PDMP.  Continue Suboxone 8/2 mg 3 times a day.  Methamphetamine abuse ([facility])  Monitor and give supportive care for withdrawal.  Decrease Klonopin 0.5 mg PO QHS    Progress Toward Goals: Improving.  No longer exhibiting symptoms of depression with SI.  Maintaining safety.  Hypomanic with pressured speech and elevated mood.  Sleep intermittent.  Showing some improvement with insight and judgment.  Plan is to add Zyprexa 2.5 mg PO QD in addition to nightly dosing for ongoing mood control and residual psychosis.  No discharge date at this time.    Recommended Treatment: Continue with group therapy, milieu therapy and occupational therapy.      Risks, benefits and possible side effects of Medications:   Carey has limited understanding of risk versus benefits of medications, but agrees to take as prescribed.    History of Present Illness   Behavior over the last 24 hours: Slowly improving  Sleep: Intermittent  Appetite: normal  Medication side effects: No  ROS: no complaints and all other systems  "are negative    Subjective: Carey is visible and social in the milieu.  Hygiene improving.  Denies depression and maintaining safety with no return of SI.  Mood elevated with pressured speech.  Thoughts loosely associated.  Showing some improvement with insight and judgment.  Less paranoid.  Agreeable to medication changes and rescinded 72-hour notice yesterday for further medication adjustment.    Objective   Mental Status Evaluation:  Appearance:  older than stated age and blond curly hair, casually dressed, poor dentition   Behavior:  Hypomanic, anxious, redirectable   Speech:  pressured and tangential   Mood:  anxious and \"good\"   Affect:  Hypomanic, elevated, redirectable   Thought Process:  goal directed   Associations: loose associations   Thought Content:  Less ruminating, less negative   Perceptual Disturbances: Denied AVH, did not appear internally preoccupied   Risk Potential: Suicidal Ideations none  Homicidal Ideations none  Potential for Aggression No   Sensorium:  person, place, time/date, and situation   Memory:  recent and remote memory grossly intact   Consciousness:  alert and awake    Attention: attention span appeared shorter than expected for age   Insight:  limited, improving   Judgment: limited, improving   Gait/Station: normal gait/station and normal balance   Motor Activity: no abnormal movements     Medications: all current active meds have been reviewed, continue current psychiatric medications, and planned medication changes: Add Zyprexa 2.5 mg PO QD (0900) .      Lab Results: I have reviewed the following results:  CMP:   Lab Results   Component Value Date    SODIUM 136 10/27/2024    K 4.9 10/27/2024     10/27/2024    CO2 21 10/27/2024    AGAP 8 10/27/2024    BUN 8 10/27/2024    CREATININE 0.63 10/27/2024    GLUC 95 10/27/2024    GLUF 95 10/27/2024    CALCIUM 8.3 (L) 10/27/2024    AST 50 (H) 10/25/2024    ALT 39 10/25/2024    ALKPHOS 53 10/25/2024    TP 5.3 (L) 10/25/2024    " ALB 3.1 (L) 10/25/2024    TBILI 0.60 10/25/2024    EGFR 107 10/27/2024     Drug Screen:   Lab Results   Component Value Date    AMPMETHUR Positive (A) 10/24/2024    BARBTUR Negative 10/24/2024    BDZUR Positive (A) 10/24/2024    THCUR Negative 10/24/2024    COCAINEUR Negative 10/24/2024    METHADONEUR Negative 10/24/2024    OPIATEUR Negative 10/24/2024    PCPUR Negative 10/24/2024

## 2024-11-05 NOTE — TREATMENT TEAM
11/05/24 0435   Broset Violence Checklist   Assessment type Reassessment   Irritability 0   Confusion 0   Boisterousness 0   Threatening physical violence 0   Verbal threats 0   Violence 0   Broset score 0     Patient administered PRN 2.5 zyprexa for mild agitation related to racing thoughts.

## 2024-11-05 NOTE — PROGRESS NOTES
Progress Note - Carey Keith 49 y.o. female MRN: [id number]    Unit/Bed#: [facility] 256-01 Encounter: [id number]        Subjective:   Patient seen and examined at bedside after reviewing the chart and discussing the case with the caring staff.      Patient examined at bedside.  Patient denies any acute symptoms.     Physical Exam   Vitals: Blood pressure 108/62, pulse 82, temperature 98.1 °F (36.7 °C), temperature source Temporal, resp. rate 18, height 5' (1.524 m), weight 73.5 kg (162 lb), SpO2 97%.,Body mass index is 31.64 kg/m².  Constitutional: Patient in no acute distress.  HEENT: PERR, EOMI, MMM.  Cardiovascular: Normal rate and regular rhythm.    Pulmonary/Chest: Effort normal and breath sounds normal.   Abdomen: Soft, + BS, NT.    Assessment/Plan:  Carey Keith is a(n) 49 y.o. female with bipolar disorder.     Asthma.  Continue Symbicort 160-4.5 mcg/act twice daily.  Albuterol as needed.   Substance use disorder.  Patient on Suboxone 8 mg film TID.   Chronic hep C.  Liver functions stable.  Follow-up with GI on outpt basis.   Tobacco use disorder.  NRT.   Vitamin D deficiency.  Patient started on vitamin D2 50,000 units weekly for 8 weeks followed by vitamin D3 1000 units daily.  Back pain/neuropathy/headache.  Continue gabapentin 800 mg TID (incr from 600 mg TID 10/31).  Toradol IM 15mg q8h prn for severe pain x3 days 11/3/2024 per Dr. Mark.  May get ibuprofen as needed for headache along with trial Zanaflex 4mg q8h prn.  Acute UTI.  UA pos in ED 10/24.  Macrobid 100 mg twice daily x 5 days (thru 10/30).   Hypokalemia.  K 2.7 -->  4.9 on 10/27.  Resolved with potassium chloride 40 mEq twice daily x 3 days.

## 2024-11-05 NOTE — PLAN OF CARE
Problem: Alteration in Thoughts and Perception  Goal: Refrain from acting on delusional thinking/internal stimuli  Description: Interventions:  - Monitor patient closely, per order   - Utilize least restrictive measures   - Set reasonable limits, give positive feedback for acceptable   - Administer medications as ordered and monitor of potential side effects  Outcome: Progressing  Goal: Agree to be compliant with medication regime, as prescribed and report medication side effects  Description: Interventions:  - Offer appropriate PRN medication and supervise ingestion; conduct AIMS, as needed   Outcome: Progressing

## 2024-11-05 NOTE — NURSING NOTE
Patient visible on unit. Pleasant and cooperative. Social with staff. Denies SI,HI,AVH. Does endorse anxiety. Medication compliant. Trazodone 100 mg given for sleep at 2055 with positive results. Safety checks continue Q 15 minutes.

## 2024-11-05 NOTE — PROGRESS NOTES
11/05/24    Team Meeting   Meeting Type Daily Rounds   Team Members Present   Team Members Present Physician;Nurse;;Occupational Therapist   Physician Team Member Nannette VILLALOBOS, Casimiro VILLALOBOS, Deyanira PERDUE   Nursing Team Member Patrizia RN   Social Work Team Member Arya MUIR   OT Team Member Pope ANJEL   Patient/Family Present   Patient Present No   Patient's Family Present No     201, screaming this am was in the wrong room and apologetic, better ADLs, no longer depressed, low frustration tolerance, labile, manic symptoms, meds adjusted, no dc date due to med management

## 2024-11-05 NOTE — SOCIAL WORK
AYAZ returned call to Henry Mccloud CM, ([phone]) requesting pt update. Sw provided update. CM requested 211 referral. CM plans to come to unit to visit with pt by the end of the week. Call ended mutually.     AYAZ placed call to pt's mother Susanna ([phone]) to provide update. Mother reports pt has consistently been med non compliant thinking she doesn't need medications, mother expressed worry concerning this. Reports has extensive hx of rehab but leaves due to paranoia.  Mother reports pt becoming manipulative on the phone. Pt cannot live with mother in [place], mother reports pt can return to previous living situation. Mother is hopeful for outpatient treatment. Call ended mutually.

## 2024-11-06 PROCEDURE — 99232 SBSQ HOSP IP/OBS MODERATE 35: CPT | Performed by: NURSE PRACTITIONER

## 2024-11-06 RX ORDER — DOCUSATE SODIUM 100 MG/1
100 CAPSULE, LIQUID FILLED ORAL DAILY
Status: DISCONTINUED | OUTPATIENT
Start: 2024-11-06 | End: 2024-11-14 | Stop reason: HOSPADM

## 2024-11-06 RX ORDER — TRAZODONE HYDROCHLORIDE 150 MG/1
150 TABLET ORAL
Status: DISCONTINUED | OUTPATIENT
Start: 2024-11-06 | End: 2024-11-12

## 2024-11-06 RX ORDER — TOPIRAMATE 100 MG/1
100 TABLET, FILM COATED ORAL EVERY 12 HOURS SCHEDULED
Status: DISCONTINUED | OUTPATIENT
Start: 2024-11-06 | End: 2024-11-07

## 2024-11-06 RX ADMIN — GABAPENTIN 800 MG: 400 CAPSULE ORAL at 08:06

## 2024-11-06 RX ADMIN — GABAPENTIN 800 MG: 400 CAPSULE ORAL at 19:34

## 2024-11-06 RX ADMIN — NICOTINE POLACRILEX 4 MG: 4 GUM, CHEWING BUCCAL at 09:32

## 2024-11-06 RX ADMIN — GABAPENTIN 800 MG: 400 CAPSULE ORAL at 12:54

## 2024-11-06 RX ADMIN — TIZANIDINE 4 MG: 4 TABLET ORAL at 20:48

## 2024-11-06 RX ADMIN — Medication 3 MG: at 20:46

## 2024-11-06 RX ADMIN — OLANZAPINE 10 MG: 10 TABLET, FILM COATED ORAL at 20:47

## 2024-11-06 RX ADMIN — BUPRENORPHINE AND NALOXONE 8 MG: 8; 2 FILM BUCCAL; SUBLINGUAL at 12:54

## 2024-11-06 RX ADMIN — NICOTINE POLACRILEX 4 MG: 4 GUM, CHEWING BUCCAL at 17:23

## 2024-11-06 RX ADMIN — NICOTINE POLACRILEX 4 MG: 4 GUM, CHEWING BUCCAL at 06:39

## 2024-11-06 RX ADMIN — NICOTINE POLACRILEX 4 MG: 4 GUM, CHEWING BUCCAL at 20:53

## 2024-11-06 RX ADMIN — HYDROXYZINE HYDROCHLORIDE 100 MG: 50 TABLET, FILM COATED ORAL at 02:32

## 2024-11-06 RX ADMIN — BUPRENORPHINE AND NALOXONE 8 MG: 8; 2 FILM BUCCAL; SUBLINGUAL at 08:10

## 2024-11-06 RX ADMIN — TOPIRAMATE 100 MG: 100 TABLET, FILM COATED ORAL at 20:46

## 2024-11-06 RX ADMIN — CLONAZEPAM 0.5 MG: 0.5 TABLET ORAL at 20:46

## 2024-11-06 RX ADMIN — TIZANIDINE 4 MG: 4 TABLET ORAL at 09:30

## 2024-11-06 RX ADMIN — TOPIRAMATE 50 MG: 25 TABLET, FILM COATED ORAL at 08:06

## 2024-11-06 RX ADMIN — OLANZAPINE 2.5 MG: 2.5 TABLET, FILM COATED ORAL at 08:06

## 2024-11-06 RX ADMIN — BUPRENORPHINE AND NALOXONE 8 MG: 8; 2 FILM BUCCAL; SUBLINGUAL at 19:32

## 2024-11-06 RX ADMIN — BUDESONIDE AND FORMOTEROL FUMARATE DIHYDRATE 2 PUFF: 160; 4.5 AEROSOL RESPIRATORY (INHALATION) at 17:24

## 2024-11-06 RX ADMIN — ACETAMINOPHEN 975 MG: 325 TABLET ORAL at 16:34

## 2024-11-06 RX ADMIN — LAMOTRIGINE 25 MG: 25 TABLET ORAL at 08:06

## 2024-11-06 RX ADMIN — NICOTINE 21 MG: 21 PATCH, EXTENDED RELEASE TRANSDERMAL at 08:09

## 2024-11-06 RX ADMIN — NICOTINE POLACRILEX 4 MG: 4 GUM, CHEWING BUCCAL at 12:55

## 2024-11-06 RX ADMIN — ACETAMINOPHEN 975 MG: 325 TABLET ORAL at 08:06

## 2024-11-06 RX ADMIN — TRAZODONE HYDROCHLORIDE 150 MG: 150 TABLET ORAL at 20:49

## 2024-11-06 NOTE — PROGRESS NOTES
Progress Note - Behavioral Health   Name: Carey Keith 49 y.o. female I MRN: [id number]  Unit/Bed#: [facility] 258-02 I Date of Admission: 10/25/2024   Date of Service: 11/6/2024 I Hospital Day: 12    Assessment & Plan  Bipolar disorder, current episode depressed, severe, without psychotic features (HCC)  Patient admitted on a voluntary 201 with thoughts of self-harm and increased depression in the context of homelessness, relapsed to meth use.  Increase Topamax 100 mg PO BID; slowly titrate for mood control and to reduce weight gain associated with SGA therapy.   Continue Lamictal 25mg PO QD for depression and mood instability; started 10/31/2024  Continue Zyprexa 2.5mg PO QD and continue 10mg PO QHS for ruminations, residual depression, mood instability  Suicidal ideations  Patient able to contract for safety on the unit  Opioid use disorder, severe, on maintenance therapy (HCC)  On Suboxone maintenance treatment verified by PDMP.  Continue Suboxone 8/2 mg 3 times a day.  Methamphetamine abuse (HCC)  Monitor and give supportive care for withdrawal.  Continue Klonopin 0.5 mg PO QHS    Progress Toward Goals: Improving.  No longer endorsing SI and maintaining safety on unit.  Mood remains elevated with pressured speech.  Thoughts more organized and goal directed with ability to reality test.  Compliant with medications.  Verbalizing readiness for discharge and states she has been regularly speaking with friends/family.  Plan is to titrate Topamax 100 mg PO BID for ongoing mood control.  Will require collateral regarding patient progress and safety/disposition planning.  Potential discharge by early next week.    Recommended Treatment: Continue with group therapy, milieu therapy and occupational therapy.      Risks, benefits and possible side effects of Medications:   Carey has limited understanding of risk versus benefits of medications, but agrees to take as prescribed.    History of Present Illness   Behavior  "over the last 24 hours: Slowly improving  Sleep: Intermittent  Appetite: normal  Medication side effects: No  ROS: no complaints and all other systems are negative    Subjective: Carey is perseverative on discharge and continues to exhibit symptoms of hypomania.  Speech pressured.  Thoughts more organized and goal directed, less illogical with no delusions verbalized.  Able to reality test.  Denies depression and has been maintaining safety with no SI.  Forward thinking.  Reports having intermittent sleep last evening due to \"the election and having leg cramps.\"  Also reports drinking \"too many fluids and having to pee a lot overnight.\"    Objective   Mental Status Evaluation:  Appearance:  Improving hygiene, older than stated age, poor dentition, blond curly hair, casually dressed   Behavior:  Hypomanic, cooperative, redirectable   Speech:  pressured   Mood:  euthymic   Affect:  increased in intensity, mood-congruent, and elevated   Thought Process:  goal directed   Associations: circumstantial associations   Thought Content:  No overt delusions or paranoia verbalized, able to reality test   Perceptual Disturbances: Denied AVH, did not appear internally preoccupied   Risk Potential: Suicidal Ideations none  Homicidal Ideations none  Potential for Aggression No   Sensorium:  person, place, time/date, and situation   Memory:  recent and remote memory grossly intact   Consciousness:  alert and awake    Attention: attention span appeared shorter than expected for age   Insight:  limited, improving   Judgment: limited, improving   Gait/Station: normal gait/station and normal balance   Motor Activity: no abnormal movements     Medications: all current active meds have been reviewed, continue current psychiatric medications, and planned medication changes: Increase Topamax 100 mg PO BID .      Lab Results: I have reviewed the following results:  BMP:   Lab Results   Component Value Date    SODIUM 136 10/27/2024    K " 4.9 10/27/2024     10/27/2024    CO2 21 10/27/2024    AGAP 8 10/27/2024    BUN 8 10/27/2024    CREATININE 0.63 10/27/2024    GLUC 95 10/27/2024    GLUF 95 10/27/2024    CALCIUM 8.3 (L) 10/27/2024    EGFR 107 10/27/2024     Drug Screen:   Lab Results   Component Value Date    AMPMETHUR Positive (A) 10/24/2024    BARBTUR Negative 10/24/2024    BDZUR Positive (A) 10/24/2024    THCUR Negative 10/24/2024    COCAINEUR Negative 10/24/2024    METHADONEUR Negative 10/24/2024    OPIATEUR Negative 10/24/2024    PCPUR Negative 10/24/2024

## 2024-11-06 NOTE — NURSING NOTE
Patient in hallway walking around, c/o anxiety. Lundberg score 28. Atarax 100 mg given at 0232 with positive results.

## 2024-11-06 NOTE — PROGRESS NOTES
Progress Note - Carey Keith 49 y.o. female MRN: [id number]    Unit/Bed#: [facility] 258-02 Encounter: [id number]        Subjective:   Patient seen and examined at bedside after reviewing the chart and discussing the case with the caring staff.      Patient examined at bedside.  Patient reports frequent muscle spasms.  Zanaflex does help.  Encouraged patient to drink more fluids.  She has been doing yoga in room to help.  Patient also reports constipation.  Last BM 4 days ago.  Denies nausea, vomiting, constipation.  She has no taken anything for this.     Physical Exam   Vitals: Blood pressure 119/79, pulse 104, temperature 97.5 °F (36.4 °C), temperature source Temporal, resp. rate 18, height 5' (1.524 m), weight 73.5 kg (162 lb), SpO2 92%.,Body mass index is 31.64 kg/m².  Constitutional: Patient in no acute distress.  HEENT: PERR, EOMI, MMM.  Cardiovascular: Normal rate and regular rhythm.    Pulmonary/Chest: Effort normal and breath sounds normal.   Abdomen: Soft, + BS, NT.    Assessment/Plan:  Carey Keith is a(n) 49 y.o. female with bipolar disorder.     Asthma.  Continue Symbicort 160-4.5 mcg/act twice daily.  Albuterol as needed.   Substance use disorder.  Patient on Suboxone 8 mg film TID.   Chronic hep C.  Liver functions stable.  Follow-up with GI on outpt basis.   Tobacco use disorder.  NRT.   Vitamin D deficiency.  Patient started on vitamin D2 50,000 units weekly for 8 weeks followed by vitamin D3 1000 units daily.  Back pain/neuropathy/headache.  Continue gabapentin 800 mg TID (incr from 600 mg TID 10/31).  Toradol IM 15mg q8h prn for severe pain x3 days 11/3/2024 per Dr. Mark.  May get ibuprofen as needed for headache along with trial Zanaflex 4mg q8h prn.  Acute UTI.  UA pos in ED 10/24.  Macrobid 100 mg twice daily x 5 days (thru 10/30).   Hypokalemia.  K 2.7 -->  4.9 on 10/27.  Resolved with potassium chloride 40 mEq twice daily x 3 days.  Constipation.  Schedule Colace 100 mg daily.  Continue  Miralax or Senokot as needed.     The patient was discussed with Dr. Mark and he is in agreement with the above note.

## 2024-11-06 NOTE — ASSESSMENT & PLAN NOTE
Patient admitted on a voluntary 201 with thoughts of self-harm and increased depression in the context of homelessness, relapsed to meth use.  Increase Topamax 100 mg PO BID; slowly titrate for mood control and to reduce weight gain associated with SGA therapy.   Continue Lamictal 25mg PO QD for depression and mood instability; started 10/31/2024  Continue Zyprexa 2.5mg PO QD and continue 10mg PO QHS for ruminations, residual depression, mood instability

## 2024-11-06 NOTE — NURSING NOTE
Patient visible in dayroom, social with select peers. Pleasant and cooperative. Denies SI,HI,AVH. Safety checks continue Q 15 minutes.

## 2024-11-06 NOTE — NURSING NOTE
Patient is cooperative with staff; labile and irritable at times related to discharge. Denies SI/HI or AVH, endorses anxiety. Patient is visible and social on the unit; attended a group. Patient med compliant and making needs known. Will continue to monitor. Continual q15 min rounding and safety checks in place.

## 2024-11-06 NOTE — PROGRESS NOTES
11/06/24    Team Meeting   Meeting Type Daily Rounds   Team Members Present   Team Members Present Physician;;Occupational Therapist;Nurse   Physician Team Member Casimiro Albrecht DO, MD, Deyanira PERDUE   Nursing Team Member Patrizia MONROY   Social Work Team Member Arya MUIR   Patient/Family Present   Patient Present No   Patient's Family Present No     201, pressured in speech needs redirection, med compliant, labile, meds adjusted and increased, improving insight, scattered, dc next week due to med management

## 2024-11-06 NOTE — PLAN OF CARE
Problem: Alteration in Orientation  Goal: Attend and participate in unit activities, including therapeutic, recreational, and educational groups  Description: Interventions:  - Provide therapeutic and educational activities daily, encourage attendance and participation, and document same in the medical record   - Provide appropriate opportunities for reminiscence   - Provide a consistent daily routine   - Encourage family contact/visitation   Outcome: Progressing     Problem: Risk for Self Injury/Neglect  Goal: Attend and participate in unit activities, including therapeutic, recreational, and educational groups  Description: Interventions:  - Provide therapeutic and educational activities daily, encourage attendance and participation, and document same in the medical record  - Obtain collateral information, encourage visitation and family involvement in care   Outcome: Progressing     Problem: Ineffective Coping  Goal: Participates in unit activities  Description: Interventions:  - Provide therapeutic environment   - Provide required programming   - Redirect inappropriate behaviors   Outcome: Progressing   Patient is active in group therapy

## 2024-11-07 LAB
ANION GAP SERPL CALCULATED.3IONS-SCNC: 7 MMOL/L (ref 4–13)
BUN SERPL-MCNC: 19 MG/DL (ref 5–25)
CALCIUM SERPL-MCNC: 8.7 MG/DL (ref 8.4–10.2)
CHLORIDE SERPL-SCNC: 106 MMOL/L (ref 96–108)
CO2 SERPL-SCNC: 24 MMOL/L (ref 21–32)
CREAT SERPL-MCNC: 0.77 MG/DL (ref 0.6–1.3)
GFR SERPL CREATININE-BSD FRML MDRD: 92 ML/MIN/1.73SQ M
GLUCOSE SERPL-MCNC: 94 MG/DL (ref 65–140)
MAGNESIUM SERPL-MCNC: 2.2 MG/DL (ref 1.9–2.7)
POTASSIUM SERPL-SCNC: 4 MMOL/L (ref 3.5–5.3)
SODIUM SERPL-SCNC: 137 MMOL/L (ref 135–147)

## 2024-11-07 PROCEDURE — 80048 BASIC METABOLIC PNL TOTAL CA: CPT

## 2024-11-07 PROCEDURE — 83735 ASSAY OF MAGNESIUM: CPT

## 2024-11-07 PROCEDURE — 99232 SBSQ HOSP IP/OBS MODERATE 35: CPT | Performed by: NURSE PRACTITIONER

## 2024-11-07 RX ORDER — TOPIRAMATE 25 MG/1
50 TABLET, FILM COATED ORAL EVERY 12 HOURS SCHEDULED
Status: DISCONTINUED | OUTPATIENT
Start: 2024-11-07 | End: 2024-11-11

## 2024-11-07 RX ORDER — IBUPROFEN 400 MG/1
400 TABLET, FILM COATED ORAL EVERY 6 HOURS PRN
Status: DISCONTINUED | OUTPATIENT
Start: 2024-11-07 | End: 2024-11-14 | Stop reason: HOSPADM

## 2024-11-07 RX ORDER — IBUPROFEN 600 MG/1
600 TABLET, FILM COATED ORAL EVERY 6 HOURS PRN
Status: DISCONTINUED | OUTPATIENT
Start: 2024-11-07 | End: 2024-11-14 | Stop reason: HOSPADM

## 2024-11-07 RX ORDER — BISACODYL 5 MG/1
5 TABLET, DELAYED RELEASE ORAL DAILY PRN
Status: DISCONTINUED | OUTPATIENT
Start: 2024-11-07 | End: 2024-11-08

## 2024-11-07 RX ORDER — OLANZAPINE 5 MG/1
5 TABLET ORAL DAILY
Status: DISCONTINUED | OUTPATIENT
Start: 2024-11-08 | End: 2024-11-08

## 2024-11-07 RX ORDER — IBUPROFEN 800 MG/1
800 TABLET, FILM COATED ORAL EVERY 6 HOURS PRN
Status: DISCONTINUED | OUTPATIENT
Start: 2024-11-07 | End: 2024-11-14 | Stop reason: HOSPADM

## 2024-11-07 RX ADMIN — GABAPENTIN 800 MG: 400 CAPSULE ORAL at 14:19

## 2024-11-07 RX ADMIN — CLONAZEPAM 0.5 MG: 0.5 TABLET ORAL at 21:24

## 2024-11-07 RX ADMIN — TRAZODONE HYDROCHLORIDE 150 MG: 150 TABLET ORAL at 21:27

## 2024-11-07 RX ADMIN — IBUPROFEN 600 MG: 600 TABLET, FILM COATED ORAL at 09:54

## 2024-11-07 RX ADMIN — TOPIRAMATE 100 MG: 100 TABLET, FILM COATED ORAL at 08:06

## 2024-11-07 RX ADMIN — LAMOTRIGINE 25 MG: 25 TABLET ORAL at 08:06

## 2024-11-07 RX ADMIN — BUPRENORPHINE AND NALOXONE 8 MG: 8; 2 FILM BUCCAL; SUBLINGUAL at 18:40

## 2024-11-07 RX ADMIN — DOCUSATE SODIUM 100 MG: 100 CAPSULE, LIQUID FILLED ORAL at 08:06

## 2024-11-07 RX ADMIN — TIZANIDINE 4 MG: 4 TABLET ORAL at 15:33

## 2024-11-07 RX ADMIN — BUPRENORPHINE AND NALOXONE 8 MG: 8; 2 FILM BUCCAL; SUBLINGUAL at 08:07

## 2024-11-07 RX ADMIN — TOPIRAMATE 50 MG: 25 TABLET, FILM COATED ORAL at 21:25

## 2024-11-07 RX ADMIN — GABAPENTIN 800 MG: 400 CAPSULE ORAL at 18:40

## 2024-11-07 RX ADMIN — NICOTINE POLACRILEX 4 MG: 4 GUM, CHEWING BUCCAL at 17:27

## 2024-11-07 RX ADMIN — Medication 3 MG: at 21:25

## 2024-11-07 RX ADMIN — OLANZAPINE 10 MG: 10 TABLET, FILM COATED ORAL at 21:24

## 2024-11-07 RX ADMIN — BUPRENORPHINE AND NALOXONE 8 MG: 8; 2 FILM BUCCAL; SUBLINGUAL at 14:19

## 2024-11-07 RX ADMIN — OLANZAPINE 5 MG: 5 TABLET, FILM COATED ORAL at 23:53

## 2024-11-07 RX ADMIN — NICOTINE POLACRILEX 4 MG: 4 GUM, CHEWING BUCCAL at 09:55

## 2024-11-07 RX ADMIN — GABAPENTIN 800 MG: 400 CAPSULE ORAL at 08:06

## 2024-11-07 RX ADMIN — TIZANIDINE 4 MG: 4 TABLET ORAL at 08:06

## 2024-11-07 RX ADMIN — OLANZAPINE 2.5 MG: 2.5 TABLET, FILM COATED ORAL at 08:06

## 2024-11-07 RX ADMIN — NICOTINE 21 MG: 21 PATCH, EXTENDED RELEASE TRANSDERMAL at 08:06

## 2024-11-07 RX ADMIN — IBUPROFEN 800 MG: 800 TABLET, FILM COATED ORAL at 15:33

## 2024-11-07 RX ADMIN — BUDESONIDE AND FORMOTEROL FUMARATE DIHYDRATE 2 PUFF: 160; 4.5 AEROSOL RESPIRATORY (INHALATION) at 17:27

## 2024-11-07 RX ADMIN — BUDESONIDE AND FORMOTEROL FUMARATE DIHYDRATE 2 PUFF: 160; 4.5 AEROSOL RESPIRATORY (INHALATION) at 09:56

## 2024-11-07 NOTE — PROGRESS NOTES
Progress Note - Carey Keith 49 y.o. female MRN: [id number]    Unit/Bed#: [facility] 258-02 Encounter: [id number]        Subjective:   Patient seen and examined at bedside after reviewing the chart and discussing the case with the caring staff.      Patient examined at bedside.  Patient continues to have muscle spasms in legs.  Patient observe falling near nurses station due to this.  Labs ordered to check electrolytes.     Labs pending.     Physical Exam   Vitals: Blood pressure 112/65, pulse 73, temperature 98.1 °F (36.7 °C), temperature source Temporal, resp. rate 18, height 5' (1.524 m), weight 73.5 kg (162 lb), SpO2 98%.,Body mass index is 31.64 kg/m².  Constitutional: Patient in no acute distress.  HEENT: PERR, EOMI, MMM.  Cardiovascular: Normal rate and regular rhythm.    Pulmonary/Chest: Effort normal and breath sounds normal.   Abdomen: Soft, + BS, NT.    Assessment/Plan:  Carey Keith is a(n) 49 y.o. female with bipolar disorder.     Asthma.  Continue Symbicort 160-4.5 mcg/act twice daily.  Albuterol as needed.   Substance use disorder.  Patient on Suboxone 8 mg film TID.   Chronic hep C.  Liver functions stable.  Follow-up with GI on outpt basis.   Tobacco use disorder.  NRT.   Vitamin D deficiency.  Patient started on vitamin D2 50,000 units weekly for 8 weeks followed by vitamin D3 1000 units daily.  Back pain/neuropathy/headache.  Continue gabapentin 800 mg TID (incr from 600 mg TID 10/31).  Toradol IM 15mg q8h prn for severe pain x3 days 11/3/2024 per Dr. Mark.  May get ibuprofen as needed for headache along with trial Zanaflex 4mg q8h prn.  Acute UTI.  UA pos in ED 10/24.  Macrobid 100 mg twice daily x 5 days (thru 10/30).   Hypokalemia.  K 2.7 -->  4.9 on 10/27.  Resolved with potassium chloride 40 mEq twice daily x 3 days.  Constipation.  Schedule Colace 100 mg daily.  Continue Miralax or Senokot as needed.     The patient was discussed with Dr. Mark and he is in agreement with the above  note.

## 2024-11-07 NOTE — PLAN OF CARE
Problem: Alteration in Thoughts and Perception  Goal: Refrain from acting on delusional thinking/internal stimuli  Description: Interventions:  - Monitor patient closely, per order   - Utilize least restrictive measures   - Set reasonable limits, give positive feedback for acceptable   - Administer medications as ordered and monitor of potential side effects  Outcome: Progressing     Problem: Alteration in Thoughts and Perception  Goal: Agree to be compliant with medication regime, as prescribed and report medication side effects  Description: Interventions:  - Offer appropriate PRN medication and supervise ingestion; conduct AIMS, as needed   Outcome: Progressing     Problem: Ineffective Coping  Goal: Understands least restrictive measures  Description: Interventions:  - Utilize least restrictive behavior  Outcome: Progressing     Problem: Risk for Self Injury/Neglect  Goal: Refrain from harming self  Description: Interventions:  - Monitor patient closely, per order  - Develop a trusting relationship  - Supervise medication ingestion, monitor effects and side effects   Outcome: Progressing     Problem: Risk for Self Injury/Neglect  Goal: Attend and participate in unit activities, including therapeutic, recreational, and educational groups  Description: Interventions:  - Provide therapeutic and educational activities daily, encourage attendance and participation, and document same in the medical record  - Obtain collateral information, encourage visitation and family involvement in care   Outcome: Progressing

## 2024-11-07 NOTE — ASSESSMENT & PLAN NOTE
Patient admitted on a voluntary 201 with thoughts of self-harm and increased depression in the context of homelessness, relapsed to meth use.  Decreased Topamax 50 mg PO BID; slowly titrate for mood control and to reduce weight gain associated with SGA therapy.   Continue Lamictal 25mg PO QD for depression and mood instability; started 10/31/2024  Increase Zyprexa 5 mg PO QD and continue 10mg PO QHS for ruminations, residual depression, mood instability

## 2024-11-07 NOTE — NURSING NOTE
Patient cooperative with staff; labile. Denies SI/HI or AVH. Reports ongoing anxiety and muscle spasms. PRNs offered and educated to come to staff if symptoms worsen. Patient visible and social on the unit; did not attend groups. Patient med compliant and making needs known. Will continue to monitor. Continual q15 min rounding and safety checks in place.

## 2024-11-07 NOTE — NURSING NOTE
Patient utilized prn Trazodone @ hs and noted to have broken sleep overnight. Patient was up @ approximately 0000  requesting prn for anxiety. Prn not given due to patient presenting lethargic and was noted to be stumbling/unsteady on her feet. Patient redirected back to bed to promote sleep. Non labored breathing noted while asleep. Q 15 min safety checks maintained.

## 2024-11-07 NOTE — PROGRESS NOTES
11/07/24    Team Meeting   Meeting Type Daily Rounds   Team Members Present   Team Members Present Physician;Nurse;;Occupational Therapist   Physician Team Member Nannette VILLALOBOS, Casimiro VILLALOBOS, Deyanira PERDUE   Nursing Team Member Tabatha RN, Israel MoodyD   Social Work Team Member Arya MUIR   OT Team Member Pope ANJEL   Patient/Family Present   Patient Present No   Patient's Family Present No     201, [place] CM meeting with pt today, rescinded 72, upset yesterday after speaking with providers, now pleasant and cooperative, denies all but anx, broken sleep, bizarre statement over night, stumbling prn seeking, fell this morning, levels drawn, has been elevated, meds adjusted, no dc date due to med management and monitoring

## 2024-11-07 NOTE — PROGRESS NOTES
Progress Note - Behavioral Health   Name: Carey Keith 49 y.o. female I MRN: [id number]  Unit/Bed#: U 258-02 I Date of Admission: 10/25/2024   Date of Service: 11/7/2024 I Hospital Day: 13    Assessment & Plan  Bipolar disorder, current episode depressed, severe, without psychotic features (HCC)  Patient admitted on a voluntary 201 with thoughts of self-harm and increased depression in the context of homelessness, relapsed to meth use.  Decreased Topamax 50 mg PO BID; slowly titrate for mood control and to reduce weight gain associated with SGA therapy.   Continue Lamictal 25mg PO QD for depression and mood instability; started 10/31/2024  Increase Zyprexa 5 mg PO QD and continue 10mg PO QHS for ruminations, residual depression, mood instability  Suicidal ideations  Patient able to contract for safety on the unit  Opioid use disorder, severe, on maintenance therapy (HCC)  On Suboxone maintenance treatment verified by PDMP.  Continue Suboxone 8/2 mg 3 times a day.  Methamphetamine abuse ([facility])  Monitor and give supportive care for withdrawal.  Continue Klonopin 0.5 mg PO QHS    Progress Toward Goals: Slowly improving.  Perseverative on discharge however able to maintain mood control with no behavioral outbursts.  Maintaining safety with no return of SI.  Showing improvement with reality testing.  Unable to tolerate titration of Topamax; plan is to decrease Topamax 50 mg PO BID. Will increase Zyprexa 5 mg PO QD (0900) for further mood control.  Continue remainder psychotropic regimen as ordered.  Anticipated discharge early next week.    Recommended Treatment: Continue with group therapy, milieu therapy and occupational therapy.      Risks, benefits and possible side effects of Medications:   Carey has limited understanding of risk versus benefits of medications, but agrees to take as prescribed.    History of Present Illness   Behavior over the last 24 hours:  unchanged  Sleep: Some improvement  Appetite:  "normal  Medication side effects: Yes complaints of intermittent dizziness, confusion with titration of Topamax  ROS: no complaints and all other systems are negative    Subjective: Carey continues to exhibit improvement with mood control and self-care.  Sleep improved.  Endorses some confusion and dizziness with titration of Topamax; agreeable to taper back to 50 mg twice daily.  Maintaining safety with no return of SI.  Reports improved depression and presents with congruent affect.  Mood less labile.  Identifies readiness for discharge.    Objective   Mental Status Evaluation:  Appearance:  older than stated age and poor dentition, curly blond hair, casually dressed   Behavior:  Less hypomanic, cooperative, redirectable   Speech:  normal pitch and normal volume   Mood:  \"I want to go home\"   Affect:  irritable edge , redirectable   Thought Process:  goal directed   Associations: perseverative   Thought Content:  No overt delusions or paranoia verbalized   Perceptual Disturbances: Denied AVH, did not appear internally preoccupied   Risk Potential: Suicidal Ideations none  Homicidal Ideations none  Potential for Aggression No   Sensorium:  person, place, time/date, and situation   Memory:  recent and remote memory grossly intact   Consciousness:  alert and awake    Attention: attention span appeared shorter than expected for age   Insight:  Limited, but improving   Judgment: Limited, but improving     Gait/Station: normal gait/station and normal balance   Motor Activity: no abnormal movements     Medications: all current active meds have been reviewed, continue current psychiatric medications, and planned medication changes: Increase Zyprexa 5 mg PO QD @ 0900, decrease Topamax 50 mg PO BID .      Lab Results: I have reviewed the following results:  BMP:   Lab Results   Component Value Date    SODIUM 137 11/07/2024    K 4.0 11/07/2024     11/07/2024    CO2 24 11/07/2024    AGAP 7 11/07/2024    BUN 19 " 11/07/2024    CREATININE 0.77 11/07/2024    GLUC 94 11/07/2024    GLUF 95 10/27/2024    CALCIUM 8.7 11/07/2024    EGFR 92 11/07/2024     Drug Screen:   Lab Results   Component Value Date    AMPMETHUR Positive (A) 10/24/2024    BARBTUR Negative 10/24/2024    BDZUR Positive (A) 10/24/2024    THCUR Negative 10/24/2024    COCAINEUR Negative 10/24/2024    METHADONEUR Negative 10/24/2024    OPIATEUR Negative 10/24/2024    PCPUR Negative 10/24/2024

## 2024-11-07 NOTE — NURSING NOTE
Presents with rapid,rambling,slightly elevated volume of speech.Carey endorses high anxiety:denies depression,SI,HI,AH,VH.She is visible,social,makes needs known,is compliant with medications,unit rules,appetite is adequate is independent with ambulation and ADLs.We discussed the importance of taking prescribed medication as prescribed and not co ingesting illegal substances.Will continue to educate,monitor,and provide safe,therapeutic milieu.

## 2024-11-07 NOTE — NURSING NOTE
Patient observed falling near nurses station due to muscle cramps in legs. No injuries acquired during event. Will continue to monitor.

## 2024-11-08 PROCEDURE — 99232 SBSQ HOSP IP/OBS MODERATE 35: CPT | Performed by: HOSPITALIST

## 2024-11-08 RX ORDER — CLONAZEPAM 0.5 MG/1
0.5 TABLET ORAL
Qty: 10 TABLET | Refills: 0 | Status: CANCELLED | OUTPATIENT
Start: 2024-11-08 | End: 2024-11-18

## 2024-11-08 RX ORDER — MUSCLE RUB CREAM 100; 150 MG/G; MG/G
CREAM TOPICAL 4 TIMES DAILY PRN
Status: DISCONTINUED | OUTPATIENT
Start: 2024-11-08 | End: 2024-11-14 | Stop reason: HOSPADM

## 2024-11-08 RX ORDER — OLANZAPINE 10 MG/1
10 TABLET ORAL 2 TIMES DAILY
Qty: 60 TABLET | Refills: 0 | Status: CANCELLED | OUTPATIENT
Start: 2024-11-08 | End: 2024-12-08

## 2024-11-08 RX ORDER — LAMOTRIGINE 25 MG/1
25 TABLET ORAL DAILY
Qty: 30 TABLET | Refills: 0 | Status: CANCELLED | OUTPATIENT
Start: 2024-11-09 | End: 2024-12-09

## 2024-11-08 RX ORDER — GINSENG 100 MG
1 CAPSULE ORAL DAILY
Status: DISCONTINUED | OUTPATIENT
Start: 2024-11-08 | End: 2024-11-14 | Stop reason: HOSPADM

## 2024-11-08 RX ORDER — TOPIRAMATE 50 MG/1
50 TABLET, FILM COATED ORAL EVERY 12 HOURS SCHEDULED
Qty: 60 TABLET | Refills: 0 | Status: CANCELLED | OUTPATIENT
Start: 2024-11-08 | End: 2024-12-08

## 2024-11-08 RX ORDER — OLANZAPINE 10 MG/1
10 TABLET ORAL 2 TIMES DAILY
Status: DISCONTINUED | OUTPATIENT
Start: 2024-11-08 | End: 2024-11-11

## 2024-11-08 RX ORDER — BISACODYL 10 MG
10 SUPPOSITORY, RECTAL RECTAL DAILY PRN
Status: DISCONTINUED | OUTPATIENT
Start: 2024-11-08 | End: 2024-11-14 | Stop reason: HOSPADM

## 2024-11-08 RX ADMIN — Medication 3 MG: at 20:34

## 2024-11-08 RX ADMIN — CLONAZEPAM 0.5 MG: 0.5 TABLET ORAL at 20:34

## 2024-11-08 RX ADMIN — LAMOTRIGINE 25 MG: 25 TABLET ORAL at 08:31

## 2024-11-08 RX ADMIN — BUPRENORPHINE AND NALOXONE 8 MG: 8; 2 FILM BUCCAL; SUBLINGUAL at 08:34

## 2024-11-08 RX ADMIN — BUDESONIDE AND FORMOTEROL FUMARATE DIHYDRATE 2 PUFF: 160; 4.5 AEROSOL RESPIRATORY (INHALATION) at 08:34

## 2024-11-08 RX ADMIN — ALBUTEROL SULFATE 2 PUFF: 90 AEROSOL, METERED RESPIRATORY (INHALATION) at 17:33

## 2024-11-08 RX ADMIN — GABAPENTIN 800 MG: 400 CAPSULE ORAL at 19:36

## 2024-11-08 RX ADMIN — NICOTINE POLACRILEX 4 MG: 4 GUM, CHEWING BUCCAL at 15:39

## 2024-11-08 RX ADMIN — BACITRACIN ZINC 1 SMALL APPLICATION: 500 OINTMENT TOPICAL at 12:53

## 2024-11-08 RX ADMIN — IBUPROFEN 800 MG: 800 TABLET, FILM COATED ORAL at 15:08

## 2024-11-08 RX ADMIN — IBUPROFEN 800 MG: 800 TABLET, FILM COATED ORAL at 05:42

## 2024-11-08 RX ADMIN — BUPRENORPHINE AND NALOXONE 8 MG: 8; 2 FILM BUCCAL; SUBLINGUAL at 12:52

## 2024-11-08 RX ADMIN — TIZANIDINE 4 MG: 4 TABLET ORAL at 05:56

## 2024-11-08 RX ADMIN — GABAPENTIN 800 MG: 400 CAPSULE ORAL at 08:31

## 2024-11-08 RX ADMIN — TOPIRAMATE 50 MG: 25 TABLET, FILM COATED ORAL at 20:34

## 2024-11-08 RX ADMIN — NICOTINE 21 MG: 21 PATCH, EXTENDED RELEASE TRANSDERMAL at 08:31

## 2024-11-08 RX ADMIN — TRAZODONE HYDROCHLORIDE 150 MG: 150 TABLET ORAL at 20:34

## 2024-11-08 RX ADMIN — BUDESONIDE AND FORMOTEROL FUMARATE DIHYDRATE 2 PUFF: 160; 4.5 AEROSOL RESPIRATORY (INHALATION) at 17:32

## 2024-11-08 RX ADMIN — OLANZAPINE 5 MG: 5 TABLET, FILM COATED ORAL at 08:31

## 2024-11-08 RX ADMIN — TOPIRAMATE 50 MG: 25 TABLET, FILM COATED ORAL at 08:31

## 2024-11-08 RX ADMIN — TIZANIDINE 4 MG: 4 TABLET ORAL at 22:25

## 2024-11-08 RX ADMIN — OLANZAPINE 10 MG: 10 TABLET, FILM COATED ORAL at 20:34

## 2024-11-08 RX ADMIN — TIZANIDINE 4 MG: 4 TABLET ORAL at 13:53

## 2024-11-08 RX ADMIN — GABAPENTIN 800 MG: 400 CAPSULE ORAL at 12:52

## 2024-11-08 RX ADMIN — BISACODYL 10 MG: 10 SUPPOSITORY RECTAL at 16:37

## 2024-11-08 RX ADMIN — BUPRENORPHINE AND NALOXONE 8 MG: 8; 2 FILM BUCCAL; SUBLINGUAL at 19:36

## 2024-11-08 RX ADMIN — NICOTINE POLACRILEX 4 MG: 4 GUM, CHEWING BUCCAL at 09:10

## 2024-11-08 NOTE — NURSING NOTE
Patient came to window irritated and complaining that she wanted more snacks. Patient given snacks and came back wanting juice and more snack. Pacing in hallways complaining about staff monitoring snack when she's hungry. Patient requesting all prns that she could have. Agitation score 36 broset 3. Zyprexa 5 mg given at 2353 with positive results.

## 2024-11-08 NOTE — NURSING NOTE
Presents with slight irritable under tone,rapid,slightly pressured,rambling speech.She denies depression,endorses high anxiety:denies SI,HI,AH,VH.She divides her free time between her room and common areas,she makes needs known,is compliant with medications,unit rules,appetite is adequate,is independent with ambulation and ADLs.We discussed ways to increase coping skills/decrease anxiety.Will continue to educate,monitor,and provide safe,therapeutic milieu.

## 2024-11-08 NOTE — NURSING NOTE
"Patient cooperative with staff; rambling and pressured during conversation. Ruminating on \"I lost so much money because if people, you would be angry too.\" Patient remains discharged focused, signed 72 hr notice @ 1101 11/8/2024. Denies SI/HI or AVH. Patient endorses ongoing anxiety, declining PRN. Patient encouraged to use coping skills and attend groups; patient attending groups. Patient visible and selectively social on the unit. Patient med compliant and making needs known. Will continue to monitor. Continual q15 min rounding and safety checks in place.  "

## 2024-11-08 NOTE — PROGRESS NOTES
11/08/24    Team Meeting   Meeting Type Daily Rounds   Team Members Present   Team Members Present Physician;Nurse;;Occupational Therapist   Physician Team Member Nannette VILLALOBOS, Casimiro VILLALOBOS, Deyanira PERDUE   Nursing Team Member Porter MONROY   Social Work Team Member Arya MUIR   OT Team Member Renea HOBBS   Patient/Family Present   Patient Present No   Patient's Family Present No     201, manic, intrusive, hyper, asking for 72 tomorrow, approaching baseline, not comprehending, meds adjusted and increased, dc Tuesday due to med management

## 2024-11-08 NOTE — PLAN OF CARE
Problem: Alteration in Thoughts and Perception  Goal: Refrain from acting on delusional thinking/internal stimuli  Description: Interventions:  - Monitor patient closely, per order   - Utilize least restrictive measures   - Set reasonable limits, give positive feedback for acceptable   - Administer medications as ordered and monitor of potential side effects  Outcome: Progressing     Problem: Ineffective Coping  Goal: Participates in unit activities  Description: Interventions:  - Provide therapeutic environment   - Provide required programming   - Redirect inappropriate behaviors   Outcome: Progressing     Problem: Ineffective Coping  Goal: Free from restraint events  Description: - Utilize least restrictive measures   - Provide behavioral interventions   - Redirect inappropriate behaviors   Outcome: Progressing     Problem: Risk for Self Injury/Neglect  Goal: Refrain from harming self  Description: Interventions:  - Monitor patient closely, per order  - Develop a trusting relationship  - Supervise medication ingestion, monitor effects and side effects   Outcome: Progressing     Problem: Depression  Goal: Refrain from isolation  Description: Interventions:  - Develop a trusting relationship   - Encourage socialization   Outcome: Progressing

## 2024-11-08 NOTE — PLAN OF CARE
Problem: Potential for Falls  Goal: Patient will remain free of falls  Description: INTERVENTIONS:  - Educate patient/family on patient safety including physical limitations  - Instruct patient to call for assistance with activity   - Consult OT/PT to assist with strengthening/mobility   - Keep Call bell within reach  - Keep bed low and locked with side rails adjusted as appropriate  - Keep care items and personal belongings within reach  - Initiate and maintain comfort rounds  - Make Fall Risk Sign visible to staff  - Offer Toileting every 2 Hours, in advance of need         - Apply yellow socks and bracelet for high fall risk patients  - Consider moving patient to room near nurses station  Outcome: Not Progressing

## 2024-11-08 NOTE — NURSING NOTE
Patient awake numerous times during the night, patient irritable and argumentive concerning snacks, short and dismissive to staff.

## 2024-11-08 NOTE — NURSING NOTE
Patient fell on her knee as she was hurrying to use the toilet.Witnessed after the fall only,landed on her R knee.Denies pain,full ROM,assessed site for a dis stacie scab on her R knee approximately 1/2 in. and 1/2 in. with scant amount of sanguineous noted.Cleansed/flushed area with NS,applied DSD.Medical notified and placed on board to be assessed in the AM. She denies hitting her head or any area other than her R knee.Will monitor.

## 2024-11-08 NOTE — PROGRESS NOTES
Progress Note - Behavioral Health   Name: Carey Keith 49 y.o. female I MRN: [id number]  Unit/Bed#: [facility] 258-02 I Date of Admission: 10/25/2024   Date of Service: 11/8/2024 I Hospital Day: 14    Assessment & Plan  Bipolar disorder, current episode depressed, severe, without psychotic features (HCC)  Patient admitted on a voluntary 201 with thoughts of self-harm and increased depression in the context of homelessness, relapsed to meth use.  Continue Topamax 50 mg PO BID; slowly titrate for mood control and to reduce weight gain associated with SGA therapy.   Continue Lamictal 25mg PO QD for depression and mood instability; started 10/31/2024  Increase Zyprexa 10mg PO BID for ongoing mood control  Suicidal ideations  Patient able to contract for safety on the unit  Opioid use disorder, severe, on maintenance therapy (HCC)  On Suboxone maintenance treatment verified by PDMP.  Continue Suboxone 8/2 mg 3 times a day.  Methamphetamine abuse (HCC)  Monitor and give supportive care for withdrawal.  Continue Klonopin 0.5 mg PO QHS    Progress Toward Goals: Improving.  Perseverative on discharge but otherwise has been maintaining mood control with no SI.  Depression resolved.  Exhibits forward thought process.  Slightly elevated, but agreeable to further titration of Zyprexa.  Tolerating taper of Klonopin well with no adverse effects.  Plan is to increase Zyprexa 10 mg PO BID ongoing mood stabilization.  Continue remainder of psychotropic regimen as ordered.  Anticipated discharge early next week.  Patient signed 72-hour notice after provider encounter 11/8/2024 at 1101.    Recommended Treatment: Continue with group therapy, milieu therapy and occupational therapy.      Risks, benefits and possible side effects of Medications:   Carey has limited understanding of risk versus benefits of medications, but agrees to take as prescribed.    History of Present Illness   Behavior over the last 24 hours:  improved  Sleep:  "normal  Appetite: normal  Medication side effects: No  ROS: no complaints and all other systems are negative    Subjective: Carey has been maintaining safety and mood control with no agitation or aggression.  Remains slightly elevated, but responds well to redirection.  Self-care and sleep improved.  Thoughts are no longer negative or ruminating, but forward thinking.  Identifies adequate safety plan and protective factors against suicide.  Denies depression and endorses mild anxiety due to length of inpatient stay.  Cooperative with medications and meals.    Objective   Mental Status Evaluation:  Appearance:  casually dressed, older than stated age, and wearing make-up, blonde hair   Behavior:  Cooperative, calm, redirectable   Speech:  Hyperverbal at times   Mood:  \"I feel good\"   Affect:  mood-congruent and slightly elevated, redirectable   Thought Process:  goal directed and forward thinking   Associations: circumstantial associations   Thought Content:  No overt delusions or paranoia   Perceptual Disturbances: Denied AVH, did not appear internally preoccupied   Risk Potential: Suicidal Ideations none  Homicidal Ideations none  Potential for Aggression No   Sensorium:  person, place, time/date, and situation   Memory:  recent and remote memory grossly intact   Consciousness:  alert and awake    Attention: attention span appeared shorter than expected for age   Insight:  Limited, but improving   Judgment: Limited, but improving   Gait/Station: normal gait/station and normal balance   Motor Activity: no abnormal movements     Medications: all current active meds have been reviewed, continue current psychiatric medications, and planned medication changes: Increase Zyprexa 10 mg PO BID .      Lab Results: I have reviewed the following results:  Drug Screen:   Lab Results   Component Value Date    AMPMETHUR Positive (A) 10/24/2024    BARBTUR Negative 10/24/2024    BDZUR Positive (A) 10/24/2024    THCUR Negative " 10/24/2024    COCAINEUR Negative 10/24/2024    METHADONEUR Negative 10/24/2024    OPIATEUR Negative 10/24/2024    PCPUR Negative 10/24/2024

## 2024-11-08 NOTE — PROGRESS NOTES
Met with Carey completed her relapse prevention. Her concentration was poor and she continue to ramble with pressured speech.She did identify her protective factors, warning signs, stressors, coping skills and support people. We reviewed the community supports.

## 2024-11-08 NOTE — PLAN OF CARE
Problem: Alteration in Orientation  Goal: Attend and participate in unit activities, including therapeutic, recreational, and educational groups  Description: Interventions:  - Provide therapeutic and educational activities daily, encourage attendance and participation, and document same in the medical record   - Provide appropriate opportunities for reminiscence   - Provide a consistent daily routine   - Encourage family contact/visitation   Outcome: Progressing     Problem: Depression  Goal: Refrain from isolation  Description: Interventions:  - Develop a trusting relationship   - Encourage socialization   Outcome: Progressing     Problem: Ineffective Coping  Goal: Participates in unit activities  Description: Interventions:  - Provide therapeutic environment   - Provide required programming   - Redirect inappropriate behaviors   Outcome: Progressing

## 2024-11-08 NOTE — PROGRESS NOTES
Progress Note - Carey Keith 49 y.o. female MRN: [id number]    Unit/Bed#: [facility] 258-02 Encounter: [id number]        Subjective:   Patient seen and examined at bedside after reviewing the chart and discussing the case with the caring staff.      Patient examined at bedside.  Patient with another witnessed fall.  Landed on R knee and old scab was removed.  No other knee pain.  Patient states it is because of her pants and decreased range of motion in back.  Also continued muscle spasms.      BMP and Mg normal.     Patient likely discharge Tuesday, 11/12/24.    Physical Exam   Vitals: Blood pressure 100/61, pulse 75, temperature 97.6 °F (36.4 °C), temperature source Temporal, resp. rate 18, height 5' (1.524 m), weight 73.5 kg (162 lb), SpO2 96%.,Body mass index is 31.64 kg/m².  Constitutional: Patient in no acute distress.  HEENT: PERR, EOMI, MMM.  Cardiovascular: Normal rate and regular rhythm.    Pulmonary/Chest: Effort normal and breath sounds normal.   Abdomen: Soft, + BS, NT.    Assessment/Plan:  Carey Keith is a(n) 49 y.o. female with bipolar disorder.     Asthma.  Continue Symbicort 160-4.5 mcg/act twice daily.  Albuterol as needed.   Substance use disorder.  Patient on Suboxone 8 mg film TID.   Chronic hep C.  Liver functions stable.  Follow-up with GI on outpt basis.   Tobacco use disorder.  NRT.   Vitamin D deficiency.  Patient started on vitamin D2 50,000 units weekly for 8 weeks followed by vitamin D3 1000 units daily.  Back pain/neuropathy/headache.  Continue gabapentin 800 mg TID (incr from 600 mg TID 10/31).  Toradol IM 15mg q8h prn for severe pain x3 days 11/3/2024 per Dr. Mark.  May get ibuprofen as needed for headache along with trial Zanaflex 4mg q8h prn.  Add Bengay cream 11/8.   Acute UTI.  UA pos in ED 10/24.  Macrobid 100 mg twice daily x 5 days (thru 10/30).   Hypokalemia.  K 2.7 -->  4.9 on 10/27.  Resolved with potassium chloride 40 mEq twice daily x 3 days.  Constipation.  Schedule  Colace 100 mg daily.  Continue Miralax or Senokot as needed.   R knee wound.  Apply bacitracin ointment daily with DSD.     The patient was discussed with Dr. Mark and he is in agreement with the above note.

## 2024-11-08 NOTE — ASSESSMENT & PLAN NOTE
Patient admitted on a voluntary 201 with thoughts of self-harm and increased depression in the context of homelessness, relapsed to meth use.  Continue Topamax 50 mg PO BID; slowly titrate for mood control and to reduce weight gain associated with SGA therapy.   Continue Lamictal 25mg PO QD for depression and mood instability; started 10/31/2024  Increase Zyprexa 10mg PO BID for ongoing mood control

## 2024-11-09 PROCEDURE — 99232 SBSQ HOSP IP/OBS MODERATE 35: CPT

## 2024-11-09 RX ORDER — KETOROLAC TROMETHAMINE 30 MG/ML
15 INJECTION, SOLUTION INTRAMUSCULAR; INTRAVENOUS ONCE AS NEEDED
Status: DISPENSED | OUTPATIENT
Start: 2024-11-09 | End: 2024-11-11

## 2024-11-09 RX ORDER — SIMETHICONE 80 MG
80 TABLET,CHEWABLE ORAL EVERY 6 HOURS PRN
Status: DISCONTINUED | OUTPATIENT
Start: 2024-11-09 | End: 2024-11-14 | Stop reason: HOSPADM

## 2024-11-09 RX ADMIN — BUPRENORPHINE AND NALOXONE 8 MG: 8; 2 FILM BUCCAL; SUBLINGUAL at 13:13

## 2024-11-09 RX ADMIN — GABAPENTIN 800 MG: 400 CAPSULE ORAL at 21:04

## 2024-11-09 RX ADMIN — BUDESONIDE AND FORMOTEROL FUMARATE DIHYDRATE 2 PUFF: 160; 4.5 AEROSOL RESPIRATORY (INHALATION) at 08:55

## 2024-11-09 RX ADMIN — NICOTINE POLACRILEX 4 MG: 4 GUM, CHEWING BUCCAL at 20:29

## 2024-11-09 RX ADMIN — LAMOTRIGINE 25 MG: 25 TABLET ORAL at 08:51

## 2024-11-09 RX ADMIN — Medication 3 MG: at 21:05

## 2024-11-09 RX ADMIN — OLANZAPINE 10 MG: 10 TABLET, FILM COATED ORAL at 21:05

## 2024-11-09 RX ADMIN — OLANZAPINE 10 MG: 10 TABLET, FILM COATED ORAL at 08:51

## 2024-11-09 RX ADMIN — IBUPROFEN 800 MG: 800 TABLET, FILM COATED ORAL at 05:54

## 2024-11-09 RX ADMIN — BUPRENORPHINE AND NALOXONE 8 MG: 8; 2 FILM BUCCAL; SUBLINGUAL at 19:54

## 2024-11-09 RX ADMIN — CLONAZEPAM 0.5 MG: 0.5 TABLET ORAL at 21:04

## 2024-11-09 RX ADMIN — TOPIRAMATE 50 MG: 25 TABLET, FILM COATED ORAL at 08:51

## 2024-11-09 RX ADMIN — TOPIRAMATE 50 MG: 25 TABLET, FILM COATED ORAL at 21:04

## 2024-11-09 RX ADMIN — IBUPROFEN 800 MG: 800 TABLET, FILM COATED ORAL at 12:09

## 2024-11-09 RX ADMIN — ALBUTEROL SULFATE 2 PUFF: 90 AEROSOL, METERED RESPIRATORY (INHALATION) at 16:31

## 2024-11-09 RX ADMIN — KETOROLAC TROMETHAMINE 15 MG: 30 INJECTION, SOLUTION INTRAMUSCULAR at 19:48

## 2024-11-09 RX ADMIN — HYDROXYZINE HYDROCHLORIDE 50 MG: 50 TABLET, FILM COATED ORAL at 06:40

## 2024-11-09 RX ADMIN — TIZANIDINE 4 MG: 4 TABLET ORAL at 08:51

## 2024-11-09 RX ADMIN — BUDESONIDE AND FORMOTEROL FUMARATE DIHYDRATE 2 PUFF: 160; 4.5 AEROSOL RESPIRATORY (INHALATION) at 18:17

## 2024-11-09 RX ADMIN — NICOTINE 21 MG: 21 PATCH, EXTENDED RELEASE TRANSDERMAL at 08:52

## 2024-11-09 RX ADMIN — GABAPENTIN 800 MG: 400 CAPSULE ORAL at 08:51

## 2024-11-09 RX ADMIN — SIMETHICONE 80 MG: 80 TABLET, CHEWABLE ORAL at 16:23

## 2024-11-09 RX ADMIN — BACITRACIN ZINC 1 SMALL APPLICATION: 500 OINTMENT TOPICAL at 08:51

## 2024-11-09 RX ADMIN — HYDROXYZINE HYDROCHLORIDE 100 MG: 50 TABLET, FILM COATED ORAL at 17:48

## 2024-11-09 RX ADMIN — NICOTINE POLACRILEX 4 MG: 4 GUM, CHEWING BUCCAL at 08:51

## 2024-11-09 RX ADMIN — NICOTINE POLACRILEX 4 MG: 4 GUM, CHEWING BUCCAL at 11:52

## 2024-11-09 RX ADMIN — TRAZODONE HYDROCHLORIDE 150 MG: 150 TABLET ORAL at 21:04

## 2024-11-09 RX ADMIN — GABAPENTIN 800 MG: 400 CAPSULE ORAL at 13:13

## 2024-11-09 RX ADMIN — DOCUSATE SODIUM 100 MG: 100 CAPSULE, LIQUID FILLED ORAL at 08:51

## 2024-11-09 RX ADMIN — NICOTINE POLACRILEX 4 MG: 4 GUM, CHEWING BUCCAL at 18:16

## 2024-11-09 RX ADMIN — BUPRENORPHINE AND NALOXONE 8 MG: 8; 2 FILM BUCCAL; SUBLINGUAL at 08:51

## 2024-11-09 NOTE — PROGRESS NOTES
Progress Note - Carey Keith 49 y.o. female MRN: [id number]    Unit/Bed#: [facility] 258-02 Encounter: [id number]        Subjective:   Patient seen and examined at bedside after reviewing the chart and discussing the case with the caring staff.      Patient examined at bedside.  Patient with continued complaints of chronic back pain.     Patient likely discharge Tuesday, 11/12/24.    Physical Exam   Vitals: Blood pressure 115/70, pulse 83, temperature 97.6 °F (36.4 °C), temperature source Temporal, resp. rate 18, height 5' (1.524 m), weight 73.5 kg (162 lb), SpO2 93%.,Body mass index is 31.64 kg/m².  Constitutional: Patient in no acute distress.  HEENT: PERR, EOMI, MMM.  Cardiovascular: Normal rate and regular rhythm.    Pulmonary/Chest: Effort normal and breath sounds normal.   Abdomen: Soft, + BS, NT.    Assessment/Plan:  Carey Keith is a(n) 49 y.o. female with bipolar disorder.     Asthma.  Continue Symbicort 160-4.5 mcg/act twice daily.  Albuterol as needed.   Substance use disorder.  Patient on Suboxone 8 mg film TID.   Chronic hep C.  Liver functions stable.  Follow-up with GI on outpt basis.   Tobacco use disorder.  NRT.   Vitamin D deficiency.  Patient started on vitamin D2 50,000 units weekly for 8 weeks followed by vitamin D3 1000 units daily.  Back pain/neuropathy/headache.  Continue gabapentin 800 mg TID (incr from 600 mg TID 10/31).  Toradol IM 15mg q8h prn for severe pain x3 days 11/3/2024 per Dr. Mark.  May get ibuprofen as needed for headache along with trial Zanaflex 4mg q8h prn.  Add Bengay cream 11/8.   Acute UTI.  UA pos in ED 10/24.  Macrobid 100 mg twice daily x 5 days (thru 10/30).   Hypokalemia.  K 2.7 -->  4.9 on 10/27.  Resolved with potassium chloride 40 mEq twice daily x 3 days.  Constipation.  Schedule Colace 100 mg daily.  Continue Miralax or Senokot as needed.   R knee wound.  Apply bacitracin ointment daily with DSD.     The patient was discussed with Dr. Mark and he is in  agreement with the above note.

## 2024-11-09 NOTE — ASSESSMENT & PLAN NOTE
Recommend referral for outpatient psychotherapy.  Currently prescribed Klonopin 0.5 mg at bedtime for anxiety symptoms in the setting of methamphetamine use disorder.  PDMP reviewed.  Discussed with patient the risks of sedation, respiratory depression, impairment in judgment and motor function. Depression, mood changes, GI changes, and others discussed. Patient was also informed of risks of being on or starting opioid medications due to drug interactions and potential for serious respiratory depression and death.     No associated orders from this encounter found during lookback period of 72 hours.

## 2024-11-09 NOTE — PROGRESS NOTES
Progress Note - Behavioral Health   Carey Keith 49 y.o. female MRN: [id number]  Unit/Bed#: [facility] 258-02 Encounter: [id number]    Assessment & Plan   Principal Problem:    Bipolar disorder, current episode depressed, severe, without psychotic features (HCC)  Active Problems:    Opioid use disorder, severe, on maintenance therapy (HCC)    Suicidal ideations    Methamphetamine abuse (HCC)      Recommended Treatment:     Assessment & Plan  Bipolar disorder, current episode depressed, severe, without psychotic features (HCC)  Lamictal 25 mg daily for mood stabilization.  Lamotrigine PARQ completed including dizziness, headaches, ataxia, vision problems, somnolence, sleep changes, cognitive difficulties, rash (including Win-Stan rash), and others, risk of teratogenicity for females.   Neurontin 800 mg 3 times daily for mood stabilization.  PARQ for neurontin including depression/suicidality, allergic reactions (SJS, angioedema, rhabdomyolysis, eosinophilia, anaphylaxis), dizziness and somnolence, diarrhea, xerostomia, headaches, drug interactions and others.   Zyprexa 10 mg twice daily for mood stabilization.  Topamax 50 mg every 12 hours for increased appetite and cardiometabolic syndrome associated with antipsychotic therapy.  Colace 100 mg daily for standing bowel regimen.  PARQ of topiramate discussed including electrolyte disturbances, liver disease, nephrolithiasis, hyperammonemia, psychosis, suicidality, risk related to angle closure glaucoma (idiopathic and usually within the first few weeks) and other eye disorder issues, SJS and allergic reactions, bone density effects, dizziness, somnolence and fatigue, weight loss, GI upset, and others. For females, cautioned of pregnancy risks and to discuss need to discuss pregnancy planning.  No recent A1c, will place order for morning lab, most recent lipid panel notable for decreased HDL as of 10/25/2024.  PARQ for second generation antipsychotics discussed  with patient which includes but is not limited to cardiometabolic syndrome, extrapyramidal symptoms, weight gain, akathisia, sedation, orthostatic hypotension, liver and kidney impairment, neuroleptic malignant syndrome, myocarditis, agranulocytosis and decreased white blood cell count, anticholinergic symptoms, hyperprolactinemia, sexual dysfunction, and seizures.  No associated orders from this encounter found during lookback period of 72 hours.  Opioid use disorder, severe, on maintenance therapy ([facility])  Suboxone 8 mg sublingual film 3 times daily for MAT for OUD.  PDMP reviewed.    No associated orders from this encounter found during lookback period of 72 hours.  Methamphetamine abuse ([facility])  Recommend referral for outpatient psychotherapy.  Currently prescribed Klonopin 0.5 mg at bedtime for anxiety symptoms in the setting of methamphetamine use disorder.  PDMP reviewed.  Discussed with patient the risks of sedation, respiratory depression, impairment in judgment and motor function. Depression, mood changes, GI changes, and others discussed. Patient was also informed of risks of being on or starting opioid medications due to drug interactions and potential for serious respiratory depression and death.     No associated orders from this encounter found during lookback period of 72 hours.  Continue with group therapy, milieu therapy and occupational therapy.    Continue frequent safety checks and vitals per unit protocol.  Continue with SLIM medical management as indicated  Continue coordinating with case management regarding disposition    Legal Status: 201  Disposition: To be determined, coordinating with case management    Case discussed with treatment team.  Risks, benefits and possible side effects of Medications: Risks, benefits, and possible side effects of medications have been explained to the patient, who verbalizes  "understanding    ------------------------------------------------------------    Subjective: Patient's chart was reviewed, and patient's progress and plan was discussed with treatment team. Per nursing report, Carey has been rambling and intrusive at times but ultimately redirectable and within behavioral control and cooperative on the unit and compliant with medications. Patient has remained in behavioral control for the last 24 hours. Last night patient was documented to have slept throughout the night.    Carey was evaluated this morning for continuity of care. On examination, Carey is intrusive, redirectable, pleasant and cooperative. She states her mood is \" much better.\" She reports sleeping somewhat poorly and her energy level today is adequate. Her appetite has been improving. She denies adverse effects from medications. She denies suicidal ideation, homicidal ideation, auditory hallucinations, and visual hallucinations. Her goals for today are to remain in behavioral control, medication compliant, and continue working toward discharge.    VS: Reviewed,  BMI noted to be 31.64, otherwise within normal limits    Progress Toward Goals: Continued improvement    Psychiatric Review of Systems:  Behavior over the last 24 hours: unchanged  Sleep: insomnia  Appetite: adequate  Medication side effects: none verbalized  Medical ROS: Complete review of systems is negative except as noted above.    Vital signs in last 24 hours:  Temp:  [97.6 °F (36.4 °C)-97.7 °F (36.5 °C)] 97.6 °F (36.4 °C)  HR:  [80-83] 83  BP: ()/(60-70) 115/70  Resp:  [18] 18  SpO2:  [93 %-96 %] 93 %  O2 Device: None (Room air)    Mental Status Exam:    Appearance:  alert, good eye contact, appears stated age, casually dressed, and marginal grooming/hygiene   Behavior:  calm, cooperative, and redirectable, intrusive at times   Speech:  spontaneous, clear, normal rate, normal volume, and coherent   Mood:  \"Much better\"   Affect:  " reactive, mood-congruent   Thought Process:  circumstantial   Associations: circumstantial associations   Thought Content:  no verbalized delusions or overt paranoia   Perceptual Disturbances: no reported hallucinations and does not appear to be responding to internal stimuli at this time   Risk Potential: Suicidal ideation - None at present  Homicidal ideation - None at present  Potential for aggression - Not at present   Sensorium:  oriented to person, place, and time/date   Memory:  recent and remote memory grossly intact   Consciousness:  alert and awake   Attention/Concentration: attention span and concentration appear shorter than expected for age   Insight:  partial   Judgment: partial   Gait/Station: normal gait/station   Motor Activity: no abnormal movements     Current Medications:  Current Facility-Administered Medications   Medication Dose Route Frequency Provider Last Rate    albuterol  2 puff Inhalation Q6H PRN Namrata Duran PA-C      aluminum-magnesium hydroxide-simethicone  30 mL Oral Q4H PRN Angie Jones MD      bacitracin  1 small application Topical Daily Namrata Duran PA-C      benztropine  1 mg Intramuscular Q4H PRN Max 6/day Angie Jones MD      benztropine  1 mg Oral Q4H PRN Max 6/day Angie Jones MD      bisacodyl  10 mg Rectal Daily PRN Namrata Duran PA-C      budesonide-formoterol  2 puff Inhalation BID Namrata Duran PA-C      buprenorphine-naloxone  8 mg Sublingual TID NETTE Burns      ergocalciferol  50,000 Units Oral Weekly Mercedes Brand PA-C      Followed by    [START ON 12/22/2024] Cholecalciferol  1,000 Units Oral Daily Mercedes Brand PA-C      clonazePAM  0.5 mg Oral HS NETTE Lo      hydrOXYzine HCL  50 mg Oral Q6H PRN Max 4/day Angie Jones MD      Or    diphenhydrAMINE  50 mg Intramuscular Q6H PRN Angie Jones MD      docusate sodium  100 mg Oral Daily Namrata Duran PA-C       gabapentin  800 mg Oral TID Namrata Duran PA-C      hydrOXYzine HCL  100 mg Oral Q6H PRN Max 4/day Angie Jones MD      Or    LORazepam  2 mg Intramuscular Q6H PRN Angie Jones MD      hydrOXYzine HCL  25 mg Oral Q6H PRN Max 4/day Angie Jones MD      ibuprofen  400 mg Oral Q6H PRN Namrata Duran PA-C      ibuprofen  600 mg Oral Q6H PRN Namrata Duran PA-C      ibuprofen  800 mg Oral Q6H PRN Namrata Duran PA-C      lamoTRIgine  25 mg Oral Daily NETTE Burns      melatonin  3 mg Oral HS Angie Jones MD      menthol-methyl salicylate   Apply externally 4x Daily PRN Namrata Duran PA-C      [START ON 11/11/2024] naloxone -Provider supplied  4 mg Does not apply Once NETTE Burns      nicotine  21 mg Transdermal Daily Namrata Duran PA-C      nicotine polacrilex  4 mg Oral Q2H PRN Angie Jones MD      OLANZapine  5 mg Oral Q4H PRN Max 3/day Angie Jones MD      Or    OLANZapine  2.5 mg Intramuscular Q4H PRN Max 3/day Angie Jones MD      OLANZapine  5 mg Oral Q3H PRN Max 3/day Angie Jones MD      Or    OLANZapine  5 mg Intramuscular Q3H PRN Max 3/day Angie Jones MD      OLANZapine  10 mg Oral BID NETTE Burns      OLANZapine  2.5 mg Oral Q4H PRN Max 6/day Angie Jones MD      polyethylene glycol  17 g Oral Daily PRN Angie Jones MD      propranolol  10 mg Oral Q8H PRN Angie Jones MD      senna-docusate sodium  1 tablet Oral Daily PRN Angie Jones MD      tiZANidine  4 mg Oral Q8H PRN Mercedes Brand PA-C      topiramate  50 mg Oral Q12H CHRISTIANE NETTE Burns      traZODone  150 mg Oral HS PRN NETTE Burns         Behavioral Health Medications: all current active meds have been reviewed. Changes as in plan section above.    Laboratory results:  I have personally reviewed all pertinent laboratory/tests results.   No results found for this or any previous visit  (from the past 48 hour(s)).     This note has been constructed using a voice recognition system. There may be translation, syntax, or grammatical errors. If you have any questions, please contact the dictating author.    Peter Combs, DO  Psychiatry Residency, PGY-3

## 2024-11-09 NOTE — NURSING NOTE
Patient visible on milieu.Blunted affect.Ramble speech.Disheveled. Disorganize.Irritable edge.Somatic compliant.Intrusive.Schedule PO medication administered as ordered.Denies hallucination, HI, SI.Appetite good.Attend group.Continue on safety check

## 2024-11-09 NOTE — ASSESSMENT & PLAN NOTE
Patient able to contract for safety on the unit    No associated orders from this encounter found during lookback period of 72 hours.

## 2024-11-09 NOTE — TREATMENT TEAM
11/09/24 1747   Lundberg Anxiety Scale   Anxious Mood 4   Tension 4   Fears 4   Insomnia 0   Intellectual 4   Depressed Mood 4   Somatic Complaints: Muscular 0   Somatic Complaints: Sensory 0   Cardiovascular Symptoms 0   Respiratory Symptoms 0   Gastrointestinal Symptoms 0   Genitourinary Symptoms 0   Autonomic Symptoms 4   Behavior at Interview 4   Lundberg Anxiety Score 28     Patient  report severe anxiety.  Administered Atarax  100   mg

## 2024-11-09 NOTE — ASSESSMENT & PLAN NOTE
Suboxone 8 mg sublingual film 3 times daily for MAT for OUD.  PDMP reviewed.    No associated orders from this encounter found during lookback period of 72 hours.

## 2024-11-09 NOTE — NURSING NOTE
"Patient given Atarax 50 mg PO @ 0640 for increased anxiety. Patient stated she was having severe anxiety and was asked to try and use her coping skills first. Patient proceeded to get irritated and said \"I have been using my coping skills\". Lundberg score: 20.  Follow up: Atarax 50 mg PO effective. Patient appears relaxed.   "

## 2024-11-09 NOTE — ASSESSMENT & PLAN NOTE
Lamictal 25 mg daily for mood stabilization.  Lamotrigine PARQ completed including dizziness, headaches, ataxia, vision problems, somnolence, sleep changes, cognitive difficulties, rash (including Win-Stan rash), and others, risk of teratogenicity for females.   Neurontin 800 mg 3 times daily for mood stabilization.  PARQ for neurontin including depression/suicidality, allergic reactions (SJS, angioedema, rhabdomyolysis, eosinophilia, anaphylaxis), dizziness and somnolence, diarrhea, xerostomia, headaches, drug interactions and others.   Zyprexa 10 mg twice daily for mood stabilization.  Topamax 50 mg every 12 hours for increased appetite and cardiometabolic syndrome associated with antipsychotic therapy.  Colace 100 mg daily for standing bowel regimen.  PARQ of topiramate discussed including electrolyte disturbances, liver disease, nephrolithiasis, hyperammonemia, psychosis, suicidality, risk related to angle closure glaucoma (idiopathic and usually within the first few weeks) and other eye disorder issues, SJS and allergic reactions, bone density effects, dizziness, somnolence and fatigue, weight loss, GI upset, and others. For females, cautioned of pregnancy risks and to discuss need to discuss pregnancy planning.  No recent A1c, will place order for morning lab, most recent lipid panel notable for decreased HDL as of 10/25/2024.  PARQ for second generation antipsychotics discussed with patient which includes but is not limited to cardiometabolic syndrome, extrapyramidal symptoms, weight gain, akathisia, sedation, orthostatic hypotension, liver and kidney impairment, neuroleptic malignant syndrome, myocarditis, agranulocytosis and decreased white blood cell count, anticholinergic symptoms, hyperprolactinemia, sexual dysfunction, and seizures.  No associated orders from this encounter found during lookback period of 72 hours.

## 2024-11-09 NOTE — NURSING NOTE
Patient had difficulty falling asleep with broken sleep on and off throughout the night. Patient awake most of the night with minimal sleep periods.No s/s of distress.  Monitoring continues

## 2024-11-09 NOTE — NURSING NOTE
Patient visible for majority of the night. She is labile and has poor concentration. Patient is repeatedly asking for PRNs and then walking away from the nurses station. She continues with pressured speech and rambling during conversation. She called this writer a bitch and mean because she was not given pain medication when demanded. Education provided about not being able to receive multiple doses within a short time frame. Patient continues to be intrusive at the nurses station, requesting snacks and PRNs.   Patient reports having an awful day. Reports moderate anxiety. No SI/HI or hallucinations. She is compliant with scheduled medications and able to make her needs known. Continuous q15 minute checks ongoing.

## 2024-11-10 PROCEDURE — 99232 SBSQ HOSP IP/OBS MODERATE 35: CPT

## 2024-11-10 RX ADMIN — OLANZAPINE 10 MG: 10 TABLET, FILM COATED ORAL at 08:53

## 2024-11-10 RX ADMIN — NICOTINE POLACRILEX 4 MG: 4 GUM, CHEWING BUCCAL at 18:08

## 2024-11-10 RX ADMIN — DOCUSATE SODIUM 100 MG: 100 CAPSULE, LIQUID FILLED ORAL at 08:53

## 2024-11-10 RX ADMIN — OLANZAPINE 5 MG: 5 TABLET, FILM COATED ORAL at 13:14

## 2024-11-10 RX ADMIN — IBUPROFEN 800 MG: 800 TABLET, FILM COATED ORAL at 08:58

## 2024-11-10 RX ADMIN — IBUPROFEN 800 MG: 800 TABLET, FILM COATED ORAL at 21:27

## 2024-11-10 RX ADMIN — IBUPROFEN 800 MG: 800 TABLET, FILM COATED ORAL at 03:28

## 2024-11-10 RX ADMIN — BUPRENORPHINE AND NALOXONE 8 MG: 8; 2 FILM BUCCAL; SUBLINGUAL at 20:25

## 2024-11-10 RX ADMIN — SIMETHICONE 80 MG: 80 TABLET, CHEWABLE ORAL at 08:00

## 2024-11-10 RX ADMIN — BUPRENORPHINE AND NALOXONE 8 MG: 8; 2 FILM BUCCAL; SUBLINGUAL at 13:15

## 2024-11-10 RX ADMIN — MUSCLE RUB CREAM: 100; 150 CREAM TOPICAL at 22:18

## 2024-11-10 RX ADMIN — CLONAZEPAM 0.5 MG: 0.5 TABLET ORAL at 21:25

## 2024-11-10 RX ADMIN — HYDROXYZINE HYDROCHLORIDE 100 MG: 50 TABLET, FILM COATED ORAL at 13:14

## 2024-11-10 RX ADMIN — OLANZAPINE 10 MG: 10 TABLET, FILM COATED ORAL at 21:24

## 2024-11-10 RX ADMIN — GABAPENTIN 800 MG: 400 CAPSULE ORAL at 08:53

## 2024-11-10 RX ADMIN — BUPRENORPHINE AND NALOXONE 8 MG: 8; 2 FILM BUCCAL; SUBLINGUAL at 08:54

## 2024-11-10 RX ADMIN — TIZANIDINE 4 MG: 4 TABLET ORAL at 21:24

## 2024-11-10 RX ADMIN — PROPRANOLOL HYDROCHLORIDE 10 MG: 10 TABLET ORAL at 18:25

## 2024-11-10 RX ADMIN — TOPIRAMATE 50 MG: 25 TABLET, FILM COATED ORAL at 08:54

## 2024-11-10 RX ADMIN — NICOTINE POLACRILEX 4 MG: 4 GUM, CHEWING BUCCAL at 06:38

## 2024-11-10 RX ADMIN — ERGOCALCIFEROL 50000 UNITS: 1.25 CAPSULE, LIQUID FILLED ORAL at 08:53

## 2024-11-10 RX ADMIN — LAMOTRIGINE 25 MG: 25 TABLET ORAL at 08:53

## 2024-11-10 RX ADMIN — SENNOSIDES AND DOCUSATE SODIUM 1 TABLET: 8.6; 5 TABLET ORAL at 13:43

## 2024-11-10 RX ADMIN — BUDESONIDE AND FORMOTEROL FUMARATE DIHYDRATE 2 PUFF: 160; 4.5 AEROSOL RESPIRATORY (INHALATION) at 08:53

## 2024-11-10 RX ADMIN — GABAPENTIN 800 MG: 400 CAPSULE ORAL at 13:15

## 2024-11-10 RX ADMIN — HYDROXYZINE HYDROCHLORIDE 50 MG: 50 TABLET, FILM COATED ORAL at 02:57

## 2024-11-10 RX ADMIN — Medication 3 MG: at 21:24

## 2024-11-10 RX ADMIN — NICOTINE 21 MG: 21 PATCH, EXTENDED RELEASE TRANSDERMAL at 08:55

## 2024-11-10 RX ADMIN — TIZANIDINE 4 MG: 4 TABLET ORAL at 03:13

## 2024-11-10 RX ADMIN — NICOTINE POLACRILEX 4 MG: 4 GUM, CHEWING BUCCAL at 20:53

## 2024-11-10 RX ADMIN — TOPIRAMATE 50 MG: 25 TABLET, FILM COATED ORAL at 21:24

## 2024-11-10 RX ADMIN — GABAPENTIN 800 MG: 400 CAPSULE ORAL at 20:25

## 2024-11-10 RX ADMIN — NICOTINE POLACRILEX 4 MG: 4 GUM, CHEWING BUCCAL at 14:54

## 2024-11-10 RX ADMIN — NICOTINE POLACRILEX 4 MG: 4 GUM, CHEWING BUCCAL at 10:43

## 2024-11-10 RX ADMIN — IBUPROFEN 800 MG: 800 TABLET, FILM COATED ORAL at 15:30

## 2024-11-10 NOTE — PROGRESS NOTES
Progress Note - Carey Keith 49 y.o. female MRN: [id number]    Unit/Bed#: [facility] 258-02 Encounter: [id number]        Subjective:   Patient seen and examined at bedside after reviewing the chart and discussing the case with the caring staff.      Patient examined at bedside.  Patient reports menstrual bleeding.  It is light.  Patient states she should be due for period.     Patient likely discharge Tuesday, 11/12/24.    Physical Exam   Vitals: Blood pressure 110/65, pulse 73, temperature 97.9 °F (36.6 °C), temperature source Temporal, resp. rate 18, height 5' (1.524 m), weight 73.5 kg (162 lb), SpO2 98%.,Body mass index is 31.64 kg/m².  Constitutional: Patient in no acute distress.  HEENT: PERR, EOMI, MMM.  Cardiovascular: Normal rate and regular rhythm.    Pulmonary/Chest: Effort normal and breath sounds normal.   Abdomen: Soft, + BS, NT.    Assessment/Plan:  Carey Keith is a(n) 49 y.o. female with bipolar disorder.     Asthma.  Continue Symbicort 160-4.5 mcg/act twice daily.  Albuterol as needed.   Substance use disorder.  Patient on Suboxone 8 mg film TID.   Chronic hep C.  Liver functions stable.  Follow-up with GI on outpt basis.   Tobacco use disorder.  NRT.   Vitamin D deficiency.  Patient started on vitamin D2 50,000 units weekly for 8 weeks followed by vitamin D3 1000 units daily.  Back pain/neuropathy/headache.  Continue gabapentin 800 mg TID (incr from 600 mg TID 10/31).  Toradol IM 15mg q8h prn for severe pain x3 days 11/3/2024 per Dr. Mark.  May get ibuprofen as needed for headache along with trial Zanaflex 4mg q8h prn.  Add Bengay cream 11/8.   Acute UTI.  UA pos in ED 10/24.  Macrobid 100 mg twice daily x 5 days (thru 10/30).   Hypokalemia.  K 2.7 -->  4.9 on 10/27.  Resolved with potassium chloride 40 mEq twice daily x 3 days.  Constipation.  Schedule Colace 100 mg daily.  Continue Miralax or Senokot as needed.   R knee wound.  Apply bacitracin ointment daily with DSD.     The patient was  discussed with Dr. Mark and he is in agreement with the above note.

## 2024-11-10 NOTE — TREATMENT TEAM
11/10/24 1825   Lundberg Anxiety Scale   Anxious Mood 4   Tension 4   Fears 4   Insomnia 0   Intellectual 0   Depressed Mood 4   Somatic Complaints: Muscular 0   Somatic Complaints: Sensory 3   Cardiovascular Symptoms 0   Respiratory Symptoms 0   Gastrointestinal Symptoms 0   Genitourinary Symptoms 0   Autonomic Symptoms 4   Behavior at Interview 4   Lundberg Anxiety Score 27   CIWA-Ar   Blood Pressure 113/65   Pulse 78     Patient  report increase anxiety. Propanolol 10 mg  given

## 2024-11-10 NOTE — PROGRESS NOTES
Progress Note - Behavioral Health   Carey Keith 49 y.o. female MRN: [id number]  Unit/Bed#: [facility] 258-02 Encounter: [id number]    Assessment & Plan   Principal Problem:    Bipolar disorder, current episode depressed, severe, without psychotic features (HCC)  Active Problems:    Opioid use disorder, severe, on maintenance therapy (HCC)    Suicidal ideations    Methamphetamine abuse (HCC)      Recommended Treatment:     Assessment & Plan  Bipolar disorder, current episode depressed, severe, without psychotic features (HCC)  Lamictal 25 mg daily for mood stabilization.  Lamotrigine PARQ completed including dizziness, headaches, ataxia, vision problems, somnolence, sleep changes, cognitive difficulties, rash (including Win-Stan rash), and others, risk of teratogenicity for females.   Neurontin 800 mg 3 times daily for mood stabilization.  PARQ for neurontin including depression/suicidality, allergic reactions (SJS, angioedema, rhabdomyolysis, eosinophilia, anaphylaxis), dizziness and somnolence, diarrhea, xerostomia, headaches, drug interactions and others.   Zyprexa 10 mg twice daily for mood stabilization.  Topamax 50 mg every 12 hours for increased appetite and cardiometabolic syndrome associated with antipsychotic therapy.  Colace 100 mg daily for standing bowel regimen.  PARQ of topiramate discussed including electrolyte disturbances, liver disease, nephrolithiasis, hyperammonemia, psychosis, suicidality, risk related to angle closure glaucoma (idiopathic and usually within the first few weeks) and other eye disorder issues, SJS and allergic reactions, bone density effects, dizziness, somnolence and fatigue, weight loss, GI upset, and others. For females, cautioned of pregnancy risks and to discuss need to discuss pregnancy planning.  No recent A1c, will place order for morning lab, most recent lipid panel notable for decreased HDL as of 10/25/2024.  PARQ for second generation antipsychotics discussed  with patient which includes but is not limited to cardiometabolic syndrome, extrapyramidal symptoms, weight gain, akathisia, sedation, orthostatic hypotension, liver and kidney impairment, neuroleptic malignant syndrome, myocarditis, agranulocytosis and decreased white blood cell count, anticholinergic symptoms, hyperprolactinemia, sexual dysfunction, and seizures.  No associated orders from this encounter found during lookback period of 72 hours.  Opioid use disorder, severe, on maintenance therapy (Formerly [facility])  Suboxone 8 mg sublingual film 3 times daily for MAT for OUD.  PDMP reviewed.    No associated orders from this encounter found during lookback period of 72 hours.  Methamphetamine abuse (Formerly [facility])  Recommend referral for outpatient psychotherapy.  Currently prescribed Klonopin 0.5 mg at bedtime for anxiety symptoms in the setting of methamphetamine use disorder.  PDMP reviewed.  Discussed with patient the risks of sedation, respiratory depression, impairment in judgment and motor function. Depression, mood changes, GI changes, and others discussed. Patient was also informed of risks of being on or starting opioid medications due to drug interactions and potential for serious respiratory depression and death.     No associated orders from this encounter found during lookback period of 72 hours.  Continue with group therapy, milieu therapy and occupational therapy.    Continue frequent safety checks and vitals per unit protocol.  Continue with SLIM medical management as indicated  Continue coordinating with case management regarding disposition    Legal Status: 201  Disposition: To be determined, coordinating with case management    Case discussed with treatment team.  Risks, benefits and possible side effects of Medications: Risks, benefits, and possible side effects of medications have been explained to the patient, who verbalizes  "understanding    ------------------------------------------------------------    Subjective: Patient's chart was reviewed, and patient's progress and plan was discussed with treatment team. Per nursing report, Carey has been intrusive, irritable, later noted to be apologetic and somewhat tangential but ultimately redirectable and cooperative on the unit and compliant with medications. Overnight, the patient requested numerous PRNs which were ultimately effective but she continued to remain with frequent awakenings throughout the evening.    Carey was evaluated this morning for continuity of care. On examination, Carey is slightly tangential, intrusive, redirectable but reporting feeling overall well and preoccupied with discharge at which time she was directed to speak with primary team regarding any discharge planning. She states her mood is \" good.\" She reports sleeping poorly and her energy level today is low to adequate. Her appetite has been good. She denies adverse effects from medications. She denies suicidal ideation, homicidal ideation, auditory hallucinations, and visual hallucinations. Her goals for today are to remain in behavioral control and medication compliant.    VS: Reviewed,  BMI of 31.64, otherwise within normal limits    Progress Toward Goals: Little improvement    Psychiatric Review of Systems:  Behavior over the last 24 hours: unchanged  Sleep: frequent awakenings  Appetite: adequate  Medication side effects: none verbalized  Medical ROS: Complete review of systems is negative except as noted above.    Vital signs in last 24 hours:  Temp:  [97.6 °F (36.4 °C)-97.9 °F (36.6 °C)] 97.9 °F (36.6 °C)  HR:  [73-92] 73  BP: (110-117)/(65-73) 110/65  Resp:  [16-18] 18  SpO2:  [90 %-98 %] 98 %  O2 Device: None (Room air)    Mental Status Exam:    Appearance:  alert, good eye contact, appears stated age, casually dressed, and marginal grooming/hygiene   Behavior:  calm, cooperative, and " "redirectable   Speech:  spontaneous, clear, increased rate, normal volume, and coherent   Mood:  \"Good\"   Affect:  expansive   Thought Process:  tangential   Associations: circumstantial associations   Thought Content:  no verbalized delusions or overt paranoia, somatic preoccupation   Perceptual Disturbances: no reported hallucinations and does not appear to be responding to internal stimuli at this time   Risk Potential: Suicidal ideation - None at present  Homicidal ideation - None at present  Potential for aggression - Yes, due to poor impulse control   Sensorium:  oriented to person, place, and time/date   Memory:  recent and remote memory grossly intact   Consciousness:  alert and awake   Attention/Concentration: attention span and concentration appear shorter than expected for age   Insight:  partial   Judgment: partial   Gait/Station: normal gait/station   Motor Activity: no abnormal movements     Current Medications:  Current Facility-Administered Medications   Medication Dose Route Frequency Provider Last Rate    albuterol  2 puff Inhalation Q6H PRN Namrata Duran PA-C      aluminum-magnesium hydroxide-simethicone  30 mL Oral Q4H PRN Angie Jones MD      bacitracin  1 small application Topical Daily Namrata Duran PA-C      benztropine  1 mg Intramuscular Q4H PRN Max 6/day Angie Jones MD      benztropine  1 mg Oral Q4H PRN Max 6/day Angie Jones MD      bisacodyl  10 mg Rectal Daily PRN Namrata Duran PA-C      budesonide-formoterol  2 puff Inhalation BID Namrata Duran PA-C      buprenorphine-naloxone  8 mg Sublingual TID NETTE Burns      ergocalciferol  50,000 Units Oral Weekly Mercedes Brand PA-C      Followed by    [START ON 12/22/2024] Cholecalciferol  1,000 Units Oral Daily Mercedes Brand PA-C      clonazePAM  0.5 mg Oral HS NETTE Lo      hydrOXYzine HCL  50 mg Oral Q6H PRN Max 4/day Angie Jones MD      Or    " diphenhydrAMINE  50 mg Intramuscular Q6H PRN Angie Jones MD      docusate sodium  100 mg Oral Daily Namrata Duran PA-C      gabapentin  800 mg Oral TID Namrata Duran PA-C      hydrOXYzine HCL  100 mg Oral Q6H PRN Max 4/day Angie Jones MD      Or    LORazepam  2 mg Intramuscular Q6H PRN Angie Jones MD      hydrOXYzine HCL  25 mg Oral Q6H PRN Max 4/day Angie Jones MD      ibuprofen  400 mg Oral Q6H PRN MAIKEL MontielC      ibuprofen  600 mg Oral Q6H PRN Namrata Duran PA-C      ibuprofen  800 mg Oral Q6H PRN Namrata Duran PA-C      ketorolac  15 mg Intramuscular Once PRN Namrata Duran PA-C      lamoTRIgine  25 mg Oral Daily NETTE Burns      melatonin  3 mg Oral HS Angie Jones MD      menthol-methyl salicylate   Apply externally 4x Daily PRN Nmarata Duran PA-C      [START ON 11/11/2024] naloxone -Provider supplied  4 mg Does not apply Once NETTE Burns      nicotine  21 mg Transdermal Daily Namrata Duran PA-C      nicotine polacrilex  4 mg Oral Q2H PRN Angie Jones MD      OLANZapine  5 mg Oral Q4H PRN Max 3/day Angie Jones MD      Or    OLANZapine  2.5 mg Intramuscular Q4H PRN Max 3/day Angie Jones MD      OLANZapine  5 mg Oral Q3H PRN Max 3/day Angie Jones MD      Or    OLANZapine  5 mg Intramuscular Q3H PRN Max 3/day Angie Jones MD      OLANZapine  10 mg Oral BID NETTE Burns      OLANZapine  2.5 mg Oral Q4H PRN Max 6/day Angie Jones MD      polyethylene glycol  17 g Oral Daily PRN Angie Jones MD      propranolol  10 mg Oral Q8H PRN Angie Jones MD      senna-docusate sodium  1 tablet Oral Daily PRN Angie Jones MD      simethicone  80 mg Oral Q6H PRN MAIKEL MontielC      tiZANidine  4 mg Oral Q8H PRN Mercedes Brand PA-C      topiramate  50 mg Oral Q12H NETTE Hubbard      traZODone  150 mg Oral HS PRN  NETTE Burns         Behavioral Health Medications: all current active meds have been reviewed. Changes as in plan section above.    Laboratory results:  I have personally reviewed all pertinent laboratory/tests results.   No results found for this or any previous visit (from the past 48 hour(s)).     This note has been constructed using a voice recognition system. There may be translation, syntax, or grammatical errors. If you have any questions, please contact the dictating author.    Peter Combs, DO  Psychiatry Residency, PGY-3

## 2024-11-10 NOTE — NURSING NOTE
Patient requested prn Zanaflex and ibuprofen at 0330.  Patient reassessed for effectiveness of prn atarax, Zanaflex, and ibuprofen at 0400.  Medications effective.  Patient asleep

## 2024-11-10 NOTE — NURSING NOTE
This nurse responded  to yelling in the hallway at  staff  because she  was  inform that she's  not  able  to bring  food back to  her room. Patient  unable  to  redirected.Patient then throw 12 packet of  sugar on the  floor and walk  to her room with more food.Still unable to be  redirected. Patient offer room 243 to decrease stimuli. PO  Zyprexa  5 mg and  atarax  100 mg Patient tolerated without complication. Staff able to verbally de-escalate patient and medication administered was effective. Patient  return to  [facility]. Continue  of  safety.

## 2024-11-10 NOTE — NURSING NOTE
Patient has been given numerous snacks throughout the evening and night.  Patient was told last time she received a snack that she would need to wait until breakfast for anymore food items.  Patient out again asking for snack.  Patient reminded of previous conversation and rationale behind limiting snacks.  Patient became upset and threatened to throw her drink.  Requested prn for anxiety.  Atarax 50mg given at 0300 for Lundberg score 24

## 2024-11-10 NOTE — NURSING NOTE
Patient's mood tonight is labile.  Irritable and ngry and then apologetic.  Patient initially c/o angry mood due to her mother but then went on a tangent a whole host of unrelated topics.  Muttering angrily to herself. Also c/o extreme L shoulder pain for which she received IM torodol.  Torodol effective. Some delusional thoughts.  Patient was overheard telling another patient she has received $[id number] in a previous tax refund

## 2024-11-10 NOTE — NURSING NOTE
Patient awake several times throughout the night but has been sleeping since approximately 0400 without interruption

## 2024-11-11 PROCEDURE — 99232 SBSQ HOSP IP/OBS MODERATE 35: CPT | Performed by: NURSE PRACTITIONER

## 2024-11-11 RX ORDER — OLANZAPINE 15 MG/1
15 TABLET ORAL
Status: DISCONTINUED | OUTPATIENT
Start: 2024-11-11 | End: 2024-11-12

## 2024-11-11 RX ORDER — TOPIRAMATE 25 MG/1
75 TABLET, FILM COATED ORAL EVERY 12 HOURS SCHEDULED
Status: DISCONTINUED | OUTPATIENT
Start: 2024-11-11 | End: 2024-11-14 | Stop reason: HOSPADM

## 2024-11-11 RX ORDER — OLANZAPINE 10 MG/1
10 TABLET ORAL DAILY
Status: DISCONTINUED | OUTPATIENT
Start: 2024-11-12 | End: 2024-11-12

## 2024-11-11 RX ORDER — TOPIRAMATE 25 MG/1
25 TABLET, FILM COATED ORAL ONCE
Status: DISCONTINUED | OUTPATIENT
Start: 2024-11-11 | End: 2024-11-11

## 2024-11-11 RX ADMIN — NICOTINE POLACRILEX 4 MG: 4 GUM, CHEWING BUCCAL at 19:53

## 2024-11-11 RX ADMIN — TOPIRAMATE 75 MG: 25 TABLET, FILM COATED ORAL at 20:59

## 2024-11-11 RX ADMIN — Medication 3 MG: at 20:59

## 2024-11-11 RX ADMIN — OLANZAPINE 10 MG: 10 TABLET, FILM COATED ORAL at 08:04

## 2024-11-11 RX ADMIN — NICOTINE 21 MG: 21 PATCH, EXTENDED RELEASE TRANSDERMAL at 08:05

## 2024-11-11 RX ADMIN — IBUPROFEN 800 MG: 800 TABLET, FILM COATED ORAL at 04:44

## 2024-11-11 RX ADMIN — GABAPENTIN 800 MG: 400 CAPSULE ORAL at 12:54

## 2024-11-11 RX ADMIN — PROPRANOLOL HYDROCHLORIDE 10 MG: 10 TABLET ORAL at 19:37

## 2024-11-11 RX ADMIN — GABAPENTIN 800 MG: 400 CAPSULE ORAL at 19:33

## 2024-11-11 RX ADMIN — IBUPROFEN 800 MG: 800 TABLET, FILM COATED ORAL at 09:11

## 2024-11-11 RX ADMIN — TIZANIDINE 4 MG: 4 TABLET ORAL at 20:59

## 2024-11-11 RX ADMIN — LAMOTRIGINE 25 MG: 25 TABLET ORAL at 08:04

## 2024-11-11 RX ADMIN — NICOTINE POLACRILEX 4 MG: 4 GUM, CHEWING BUCCAL at 13:49

## 2024-11-11 RX ADMIN — MUSCLE RUB CREAM: 100; 150 CREAM TOPICAL at 21:13

## 2024-11-11 RX ADMIN — CLONAZEPAM 0.5 MG: 0.5 TABLET ORAL at 20:59

## 2024-11-11 RX ADMIN — GABAPENTIN 800 MG: 400 CAPSULE ORAL at 08:04

## 2024-11-11 RX ADMIN — MUSCLE RUB CREAM 1 APPLICATION: 100; 150 CREAM TOPICAL at 16:24

## 2024-11-11 RX ADMIN — BUPRENORPHINE AND NALOXONE 8 MG: 8; 2 FILM BUCCAL; SUBLINGUAL at 08:05

## 2024-11-11 RX ADMIN — DOCUSATE SODIUM 100 MG: 100 CAPSULE, LIQUID FILLED ORAL at 08:04

## 2024-11-11 RX ADMIN — NICOTINE POLACRILEX 4 MG: 4 GUM, CHEWING BUCCAL at 15:34

## 2024-11-11 RX ADMIN — TIZANIDINE 4 MG: 4 TABLET ORAL at 09:12

## 2024-11-11 RX ADMIN — TOPIRAMATE 50 MG: 25 TABLET, FILM COATED ORAL at 08:04

## 2024-11-11 RX ADMIN — IBUPROFEN 600 MG: 600 TABLET, FILM COATED ORAL at 16:25

## 2024-11-11 RX ADMIN — BUPRENORPHINE AND NALOXONE 8 MG: 8; 2 FILM BUCCAL; SUBLINGUAL at 12:54

## 2024-11-11 RX ADMIN — TRAZODONE HYDROCHLORIDE 150 MG: 150 TABLET ORAL at 23:20

## 2024-11-11 RX ADMIN — BUPRENORPHINE AND NALOXONE 8 MG: 8; 2 FILM BUCCAL; SUBLINGUAL at 19:33

## 2024-11-11 RX ADMIN — BACITRACIN ZINC 1 SMALL APPLICATION: 500 OINTMENT TOPICAL at 08:05

## 2024-11-11 RX ADMIN — OLANZAPINE 15 MG: 15 TABLET, FILM COATED ORAL at 21:00

## 2024-11-11 RX ADMIN — SIMETHICONE 80 MG: 80 TABLET, CHEWABLE ORAL at 13:07

## 2024-11-11 RX ADMIN — HYDROXYZINE HYDROCHLORIDE 50 MG: 50 TABLET, FILM COATED ORAL at 17:43

## 2024-11-11 RX ADMIN — NICOTINE POLACRILEX 4 MG: 4 GUM, CHEWING BUCCAL at 10:58

## 2024-11-11 RX ADMIN — OLANZAPINE 5 MG: 5 TABLET, FILM COATED ORAL at 10:42

## 2024-11-11 NOTE — ASSESSMENT & PLAN NOTE
Lamictal 25 mg daily for mood stabilization.  Neurontin 800 mg 3 times daily for mood stabilization.  Increase Zyprexa 10 mg PO QD and 15mg PO QHS for paranoia, agitation, and mood instability  Increase Topamax 75 mg every 12 hours for mood control, increased appetite, and cardiometabolic syndrome associated with antipsychotic therapy.  No associated orders from this encounter found during lookback period of 72 hours.

## 2024-11-11 NOTE — NURSING NOTE
"PRN atarax mildly effective, patient continues to c/o \"anxiety\". Q 15 minute checks maintained.   "

## 2024-11-11 NOTE — PROGRESS NOTES
11/11/24    Team Meeting   Meeting Type Daily Rounds   Team Members Present   Team Members Present Physician;Nurse;;Occupational Therapist   Physician Team Member Eliana VILLALOBOS, Cristhian VILLALOBOS, Deyanira PERDUE   Nursing Team Member Patrizia MONROY   Social Work Team Member Arya MUIR   OT Team Member Pope ANJEL   Patient/Family Present   Patient Present No   Patient's Family Present No     201, 72 hr expires at 11:00 today, significant decline since last week, med seeking, irritable, hoarding food and clothes over the weekend, verbally aggressive with staff over the weekend, tangential, delusional, no frustration tolerance, potential for aggression, meds adjusted

## 2024-11-11 NOTE — PROGRESS NOTES
Progress Note - Carey Keith 49 y.o. female MRN: [id number]    Unit/Bed#: [facility] 258-02 Encounter: [id number]        Subjective:   Patient seen and examined at bedside after reviewing the chart and discussing the case with the caring staff.      Patient examined at bedside.  Patient in quiet room due to current frustration/outburst.  Unhappy with decision to discontinue Klonopin.  Patient removed mattress from bed and slamming fist/elbow against wood.  No acute injury, skin intact.     Physical Exam   Vitals: Blood pressure 107/67, pulse 71, temperature (!) 97.4 °F (36.3 °C), temperature source Temporal, resp. rate 18, height 5' (1.524 m), weight 73.5 kg (162 lb), SpO2 97%.,Body mass index is 31.64 kg/m².  Constitutional: Patient in no acute distress.  HEENT: PERR, EOMI, MMM.  Cardiovascular: Normal rate and regular rhythm.    Pulmonary/Chest: Effort normal and breath sounds normal.   Abdomen: Soft, + BS, NT.    Assessment/Plan:  Carey Keith is a(n) 49 y.o. female with bipolar disorder.     Asthma.  Continue Symbicort 160-4.5 mcg/act twice daily.  Albuterol as needed.   Substance use disorder.  Patient on Suboxone 8 mg film TID.   Chronic hep C.  Liver functions stable.  Follow-up with GI on outpt basis.   Tobacco use disorder.  NRT.   Vitamin D deficiency.  Patient started on vitamin D2 50,000 units weekly for 8 weeks followed by vitamin D3 1000 units daily.  Back pain/neuropathy/headache.  Continue gabapentin 800 mg TID (incr from 600 mg TID 10/31).  Toradol IM 15mg q8h prn for severe pain x3 days 11/3/2024 per Dr. Mark.  May get ibuprofen as needed for headache along with trial Zanaflex 4mg q8h prn.  Add Bengay cream 11/8.   Acute UTI.  UA pos in ED 10/24.  Macrobid 100 mg twice daily x 5 days (thru 10/30).   Hypokalemia.  K 2.7 -->  4.9 on 10/27.  Resolved with potassium chloride 40 mEq twice daily x 3 days.  Constipation.  Schedule Colace 100 mg daily.  Continue Miralax or Senokot as needed.   R knee  wound.  Apply bacitracin ointment daily with DSD.     The patient was discussed with Dr. Mark and he is in agreement with the above note.

## 2024-11-11 NOTE — NURSING NOTE
Patient has been visible on the unit. Mood remains labile but less so than yesterday evening.  Underlying irritable edge and low frustration tolerance.  Patient reports having a bad day bc she feels as though the staff was picking on her.  Patient has poor insight and frequently blames others.  Patient went on a long tangent about how evil her sister and stepmother are.  Patient expressed sadness over the upcoming anniversary of her father's death.  Talked about her father being the creator of [employer]. Without redirection and refocusing, patient will go on a tangent for very long period of time without stopping.  Received prn Zanaflex, ibuprofen, and muscle rub with her HS meds

## 2024-11-11 NOTE — NURSING NOTE
"Patient screaming & punching walls in her room, patient stating \"I'm angry I need my klonipin that's the only thing\". Patient agreed to take PRN zyprexa. Broset 3, Zyprexa 5mg PO administered with positive effect, patient no longer punching things or screaming.   "

## 2024-11-11 NOTE — PLAN OF CARE
Problem: Alteration in Thoughts and Perception  Goal: Agree to be compliant with medication regime, as prescribed and report medication side effects  Description: Interventions:  - Offer appropriate PRN medication and supervise ingestion; conduct AIMS, as needed   Outcome: Progressing     Problem: Ineffective Coping  Goal: Participates in unit activities  Description: Interventions:  - Provide therapeutic environment   - Provide required programming   - Redirect inappropriate behaviors   Outcome: Progressing     Problem: Risk for Self Injury/Neglect  Goal: Attend and participate in unit activities, including therapeutic, recreational, and educational groups  Description: Interventions:  - Provide therapeutic and educational activities daily, encourage attendance and participation, and document same in the medical record  - Obtain collateral information, encourage visitation and family involvement in care   Outcome: Progressing     Problem: Alteration in Orientation  Goal: Attend and participate in unit activities, including therapeutic, recreational, and educational groups  Description: Interventions:  - Provide therapeutic and educational activities daily, encourage attendance and participation, and document same in the medical record   - Provide appropriate opportunities for reminiscence   - Provide a consistent daily routine   - Encourage family contact/visitation   Outcome: Progressing   Patient attends groups.

## 2024-11-11 NOTE — ASSESSMENT & PLAN NOTE
Continue Klonopin 0.5 mg PO QHS for anxiety symptoms in the setting of methamphetamine use disorder.    No associated orders from this encounter found during lookback period of 72 hours.

## 2024-11-11 NOTE — NURSING NOTE
Patient continues with med seeking behaviors.  Asking for Zanaflex when she cannot keep her eyes open.  Currently sitting in the hallway asleep with her mouth open.  Patient is very sedated and does not appear to need Zanaflex at this time

## 2024-11-11 NOTE — ASSESSMENT & PLAN NOTE
Suboxone 8 mg sublingual film 3 times daily for MAT for OUD.  No associated orders from this encounter found during lookback period of 72 hours.

## 2024-11-11 NOTE — PROGRESS NOTES
Progress Note - Behavioral Health   Name: Carey Keith 49 y.o. female I MRN: [id number]  Unit/Bed#: [facility] 258-02 I Date of Admission: 10/25/2024   Date of Service: 11/11/2024 I Hospital Day: 17    Assessment & Plan  Bipolar disorder, current episode depressed, severe, without psychotic features (HCC)  Lamictal 25 mg daily for mood stabilization.  Neurontin 800 mg 3 times daily for mood stabilization.  Increase Zyprexa 10 mg PO QD and 15mg PO QHS for paranoia, agitation, and mood instability  Increase Topamax 75 mg every 12 hours for mood control, increased appetite, and cardiometabolic syndrome associated with antipsychotic therapy.  No associated orders from this encounter found during lookback period of 72 hours.  Opioid use disorder, severe, on maintenance therapy (HCC)  Suboxone 8 mg sublingual film 3 times daily for MAT for OUD.  No associated orders from this encounter found during lookback period of 72 hours.  Methamphetamine abuse (HCC)    Continue Klonopin 0.5 mg PO QHS for anxiety symptoms in the setting of methamphetamine use disorder.    No associated orders from this encounter found during lookback period of 72 hours.  Suicidal ideations  Patient able to contract for safety on the unit    Progress Toward Goals: Regressed.  Mood labile, disorganized, paranoid.  Exhibits low frustration tolerance.  Demanding titration of Klonopin.  Rescinded 72-hour notice for further medication adjustment.  Plan is to slowly increase Topamax 75 mg PO BID for mood control and to avoid side effects.  Increase Zyprexa 15 mg nightly with plan to maximize to 30 mg daily for paranoia, agitation, and mood control.  Rescinded and signed another 72-hour notice today, 11/11/2024 at 12:12 PM.    Recommended Treatment: Continue with group therapy, milieu therapy and occupational therapy.      Risks, benefits and possible side effects of Medications:   Patient does not verbalize understanding at this time and will require further  "explanation.      History of Present Illness   Behavior over the last 24 hours:  regressed  Sleep: Poor  Appetite: normal  Medication side effects: yes, \"lamictal is making me paranoid!\"  ROS: no complaints and all other systems are negative    Subjective: Carey was seen today with attending psychiatrist for follow-up.  Mood is labile, demanding, agitated, and exhibits extremely low frustration tolerance.  Required multiple PRNs over the weekend for agitation and anxiety.  Demanding increase in Klonopin and states Lamictal is making her paranoid.  I/J impaired.  Perseverative on discharge.  Threatening to stop all of her medications.  Has poor reasoning ability and unable to reality test. Directed to quiet room after provider conversation to which she punched a hole in the wall.      Objective   Mental Status Evaluation:  Appearance:  disheveled   Behavior:  uncooperative and belligerent, intrusive, tearful, angry   Speech:  loud and profane   Mood:  angry and labile   Affect:  increased in intensity, increased in range, and labile   Thought Process:  illogical and perserverative   Associations: circumstantial associations   Thought Content:  Paranoid   Perceptual Disturbances: Denied AVH, did not appear internally preoccupied   Risk Potential: Suicidal Ideations none  Homicidal Ideations none but endorses angry and hostile feelings toward providers  Potential for Aggression Yes due to level of agitation, mood lability, and paranoia   Sensorium:  person and place   Memory:  recent and remote memory: unable to assess due to lack of cooperation   Consciousness:  alert and awake    Attention: Poor attention/concentration   Insight:  impaired due to psychosis/jeff   Judgment: impaired due to psychosis/jeff   Gait/Station: normal gait/station and normal balance   Motor Activity: no abnormal movements     Medications: all current active meds have been reviewed, continue current psychiatric medications, and planned " medication changes: Increase Topamax 75 mg PO BID,Increase Zyprexa 15mg PO QHS .      Lab Results: I have reviewed the following results:  CMP:   Lab Results   Component Value Date    SODIUM 137 11/07/2024    K 4.0 11/07/2024     11/07/2024    CO2 24 11/07/2024    AGAP 7 11/07/2024    BUN 19 11/07/2024    CREATININE 0.77 11/07/2024    GLUC 94 11/07/2024    GLUF 95 10/27/2024    CALCIUM 8.7 11/07/2024    AST 50 (H) 10/25/2024    ALT 39 10/25/2024    ALKPHOS 53 10/25/2024    TP 5.3 (L) 10/25/2024    ALB 3.1 (L) 10/25/2024    TBILI 0.60 10/25/2024    EGFR 92 11/07/2024     Drug Screen:   Lab Results   Component Value Date    AMPMETHUR Positive (A) 10/24/2024    BARBTUR Negative 10/24/2024    BDZUR Positive (A) 10/24/2024    THCUR Negative 10/24/2024    COCAINEUR Negative 10/24/2024    METHADONEUR Negative 10/24/2024    OPIATEUR Negative 10/24/2024    PCPUR Negative 10/24/2024

## 2024-11-11 NOTE — NURSING NOTE
"Patient labile, irritable, argumentative with staff all throughout the day. Focused on PRN's and \"klonipin withdrawal\". No s/s of withdrawal noted. Required verbal redirection multiple times throughout the day for yelling in hallways. Taking medications as ordered. Patient c/o feeling anxious, flores score 18. Atarax 50mg PO administered. Will monitor effect.   "

## 2024-11-12 PROCEDURE — 99232 SBSQ HOSP IP/OBS MODERATE 35: CPT | Performed by: PSYCHIATRY & NEUROLOGY

## 2024-11-12 RX ORDER — TRAZODONE HYDROCHLORIDE 100 MG/1
100 TABLET ORAL
Status: DISCONTINUED | OUTPATIENT
Start: 2024-11-12 | End: 2024-11-14 | Stop reason: HOSPADM

## 2024-11-12 RX ORDER — OLANZAPINE 15 MG/1
15 TABLET ORAL 2 TIMES DAILY
Status: DISCONTINUED | OUTPATIENT
Start: 2024-11-12 | End: 2024-11-14 | Stop reason: HOSPADM

## 2024-11-12 RX ORDER — NYSTATIN 100000 U/G
CREAM TOPICAL 2 TIMES DAILY
Status: DISCONTINUED | OUTPATIENT
Start: 2024-11-12 | End: 2024-11-14 | Stop reason: HOSPADM

## 2024-11-12 RX ADMIN — NICOTINE 21 MG: 21 PATCH, EXTENDED RELEASE TRANSDERMAL at 08:23

## 2024-11-12 RX ADMIN — TOPIRAMATE 75 MG: 25 TABLET, FILM COATED ORAL at 08:22

## 2024-11-12 RX ADMIN — IBUPROFEN 800 MG: 800 TABLET, FILM COATED ORAL at 05:49

## 2024-11-12 RX ADMIN — NICOTINE POLACRILEX 4 MG: 4 GUM, CHEWING BUCCAL at 11:45

## 2024-11-12 RX ADMIN — PROPRANOLOL HYDROCHLORIDE 10 MG: 10 TABLET ORAL at 10:53

## 2024-11-12 RX ADMIN — TOPIRAMATE 75 MG: 25 TABLET, FILM COATED ORAL at 20:35

## 2024-11-12 RX ADMIN — GABAPENTIN 800 MG: 400 CAPSULE ORAL at 08:22

## 2024-11-12 RX ADMIN — LAMOTRIGINE 25 MG: 25 TABLET ORAL at 08:21

## 2024-11-12 RX ADMIN — IBUPROFEN 600 MG: 600 TABLET, FILM COATED ORAL at 12:51

## 2024-11-12 RX ADMIN — OLANZAPINE 15 MG: 15 TABLET, FILM COATED ORAL at 20:36

## 2024-11-12 RX ADMIN — BUPRENORPHINE AND NALOXONE 8 MG: 8; 2 FILM BUCCAL; SUBLINGUAL at 08:53

## 2024-11-12 RX ADMIN — OLANZAPINE 10 MG: 10 TABLET, FILM COATED ORAL at 08:21

## 2024-11-12 RX ADMIN — MUSCLE RUB CREAM: 100; 150 CREAM TOPICAL at 02:08

## 2024-11-12 RX ADMIN — GABAPENTIN 800 MG: 400 CAPSULE ORAL at 12:51

## 2024-11-12 RX ADMIN — IBUPROFEN 800 MG: 800 TABLET, FILM COATED ORAL at 19:24

## 2024-11-12 RX ADMIN — NICOTINE POLACRILEX 4 MG: 4 GUM, CHEWING BUCCAL at 17:21

## 2024-11-12 RX ADMIN — BUPRENORPHINE AND NALOXONE 8 MG: 8; 2 FILM BUCCAL; SUBLINGUAL at 19:24

## 2024-11-12 RX ADMIN — NICOTINE POLACRILEX 4 MG: 4 GUM, CHEWING BUCCAL at 14:02

## 2024-11-12 RX ADMIN — BACITRACIN ZINC 1 SMALL APPLICATION: 500 OINTMENT TOPICAL at 08:23

## 2024-11-12 RX ADMIN — GABAPENTIN 800 MG: 400 CAPSULE ORAL at 19:24

## 2024-11-12 RX ADMIN — CLONAZEPAM 0.5 MG: 0.5 TABLET ORAL at 20:36

## 2024-11-12 RX ADMIN — TRAZODONE HYDROCHLORIDE 100 MG: 100 TABLET ORAL at 23:36

## 2024-11-12 RX ADMIN — NICOTINE POLACRILEX 4 MG: 4 GUM, CHEWING BUCCAL at 06:26

## 2024-11-12 RX ADMIN — PROPRANOLOL HYDROCHLORIDE 10 MG: 10 TABLET ORAL at 20:36

## 2024-11-12 RX ADMIN — BUPRENORPHINE AND NALOXONE 8 MG: 8; 2 FILM BUCCAL; SUBLINGUAL at 12:51

## 2024-11-12 RX ADMIN — Medication 3 MG: at 20:36

## 2024-11-12 NOTE — TREATMENT TEAM
11/12/24 9067   Activity/Group Checklist   Group Community meeting   Attendance Attended   Attendance Duration (min) 16-30   Interactions Disorganized interaction   Affect/Mood Angry   Goals Achieved Able to listen to others;Able to engage in interactions;Able to self-disclose;Able to recieve feedback     Patient got into an argument with a peer and she postured towards a peer and staff stood up to stop her from lunging.

## 2024-11-12 NOTE — PROGRESS NOTES
Progress Note - Behavioral Health   Name: Carey Keith 49 y.o. female I MRN: [id number]  Unit/Bed#: U 258-02 I Date of Admission: 10/25/2024   Date of Service: 11/12/2024 I Hospital Day: 18    Assessment & Plan  Bipolar disorder, current episode depressed, severe, without psychotic features (HCC)  Lamictal 25 mg daily for mood stabilization.  Neurontin 800 mg 3 times daily for mood stabilization.  Increase Zyprexa 15mg PO BID for mood control and residual agitation  Continue Topamax 75 mg every 12 hours for mood control, increased appetite, and cardiometabolic syndrome associated with antipsychotic therapy.  Opioid use disorder, severe, on maintenance therapy (HCC)  Suboxone 8 mg sublingual film 3 times daily for MAT for OUD.  Methamphetamine abuse ([facility])    Continue Klonopin 0.5 mg PO QHS for anxiety symptoms in the setting of methamphetamine use disorder.    No associated orders from this encounter found during lookback period of 72 hours.  Suicidal ideations  Patient able to contract for safety on the unit; resolved    Progress Toward Goals: Improving.  No agitated or aggressive outbursts today.  Mood less labile.  Somatically preoccupied.  Plan is to increase Zyprexa 15 mg PO BID for ongoing mood control and residual agitation.  Maintaining safety with no return of SI.  Continue remainder of psychotropic regimen as ordered.  Patient signed 72-hour notice 11/11/2024 at 1212.    Recommended Treatment: Continue with group therapy, milieu therapy and occupational therapy.      Risks, benefits and possible side effects of Medications:   Carey has limited understanding of risk versus benefits of medications, but agrees to take as prescribed.    History of Present Illness   Behavior over the last 24 hours:  improved  Sleep: Improved  Appetite: normal  Medication side effects: No  ROS:  Chronic back pain and all other systems are negative    Subjective: Carey is showing improvement with mood control and  "frustration tolerance today.  Less labile.  Maintaining safety with no return of SI.  Cooperative during provider encounter.  Intrusive at times but responds well to redirection.  Sleep improved.  Somatically preoccupied and frequently seeking medication for complaints of chronic back pain.  Less perseverative on discharge but hopeful to leave upon expiration of 72-hour notice.  Thoughts are tangential and less paranoid today.  Verbalized her desire to maintain sobriety in the outpatient setting.  Also states her friend PASTORA and mom are strong supports.    Objective   Mental Status Evaluation:  Appearance:  age appropriate and casually dressed   Behavior:  Cooperative, redirectable   Speech:  normal pitch and normal volume, tangential   Mood:  \"Better than yesterday\", less anxious, less agitated   Affect:  mood-congruent, less labile   Thought Process:  circumstantial   Associations: circumstantial associations   Thought Content:  Less paranoid   Perceptual Disturbances: Denied AVH, did not appear internally preoccupied   Risk Potential: Suicidal Ideations none  Homicidal Ideations none  Potential for Aggression No   Sensorium:  person, place, time/date, and situation   Memory:  recent and remote memory grossly intact   Consciousness:  alert and awake    Attention: attention span appeared shorter than expected for age   Insight:  limited   Judgment: limited   Gait/Station: normal gait/station and normal balance   Motor Activity: no abnormal movements     Medications: all current active meds have been reviewed, continue current psychiatric medications, and planned medication changes: Increase Zyprexa 15 mg PO BID .      Lab Results: I have reviewed the following results:  Drug Screen:   Lab Results   Component Value Date    AMPMETHUR Positive (A) 10/24/2024    BARBTUR Negative 10/24/2024    BDZUR Positive (A) 10/24/2024    THCUR Negative 10/24/2024    COCAINEUR Negative 10/24/2024    METHADONEUR Negative 10/24/2024    " OPIATEUR Negative 10/24/2024    PCPUR Negative 10/24/2024

## 2024-11-12 NOTE — PROGRESS NOTES
This writer attempted to collect blood work. Pt refused at this time. Nurse made aware and will pass along to oncoming shift.

## 2024-11-12 NOTE — PROGRESS NOTES
Progress Note - Carey Keith 49 y.o. female MRN: [id number]    Unit/Bed#: [facility] 258-02 Encounter: [id number]        Subjective:   Patient seen and examined at bedside after reviewing the chart and discussing the case with the caring staff.      Patient examined at bedside.  Patient reports no acute symptoms except right sided lower back pain.  Patient also thinks that she has athlete feet and is requesting cream    Physical Exam   Vitals: Blood pressure 105/72, pulse 66, temperature 98.1 °F (36.7 °C), temperature source Temporal, resp. rate 17, height 5' (1.524 m), weight 73.5 kg (162 lb), SpO2 95%.,Body mass index is 31.64 kg/m².  Constitutional: Patient in no acute distress.  HEENT: PERR, EOMI, MMM.  Cardiovascular: Normal rate and regular rhythm.    Pulmonary/Chest: Effort normal and breath sounds normal.   Abdomen: Soft, + BS, NT.    Assessment/Plan:  Carey Keith is a(n) 49 y.o. female with bipolar disorder.     Asthma.  Continue Symbicort 160-4.5 mcg/act twice daily.  Albuterol as needed.   Substance use disorder.  Patient on Suboxone 8 mg film TID.   Chronic hep C.  Liver functions stable.  Follow-up with GI on outpt basis.   Tobacco use disorder.  NRT.   Vitamin D deficiency.  Patient started on vitamin D2 50,000 units weekly for 8 weeks followed by vitamin D3 1000 units daily.  Back pain/neuropathy/headache.  Continue gabapentin 800 mg TID (incr from 600 mg TID 10/31).  Toradol IM 15mg q8h prn for severe pain x3 days 11/3/2024 per Dr. Mark.  May get ibuprofen as needed for headache along with trial Zanaflex 4mg q8h prn.  Add Bengay cream 11/8.   Acute UTI.  UA pos in ED 10/24.  Macrobid 100 mg twice daily x 5 days (thru 10/30).   Hypokalemia.  K 2.7 -->  4.9 on 10/27.  Resolved with potassium chloride 40 mEq twice daily x 3 days.  Constipation.  Schedule Colace 100 mg daily.  Continue Miralax or Senokot as needed.   R knee wound.  Apply bacitracin ointment daily with DSD.   Athlete foot.  I will  treat the patient with nystatin cream twice daily.

## 2024-11-12 NOTE — PLAN OF CARE
Problem: Alteration in Thoughts and Perception  Goal: Treatment Goal: Gain control of psychotic behaviors/thinking, reduce/eliminate presenting symptoms and demonstrate improved reality functioning upon discharge  Outcome: Progressing  Goal: Agree to be compliant with medication regime, as prescribed and report medication side effects  Description: Interventions:  - Offer appropriate PRN medication and supervise ingestion; conduct AIMS, as needed   Outcome: Progressing     Problem: Risk for Self Injury/Neglect  Goal: Treatment Goal: Remain safe during length of stay, learn and adopt new coping skills, and be free of self-injurious ideation, impulses and acts at the time of discharge  Outcome: Progressing  Goal: Refrain from harming self  Description: Interventions:  - Monitor patient closely, per order  - Develop a trusting relationship  - Supervise medication ingestion, monitor effects and side effects   Outcome: Progressing   See chart note

## 2024-11-12 NOTE — ASSESSMENT & PLAN NOTE
Lamictal 25 mg daily for mood stabilization.  Neurontin 800 mg 3 times daily for mood stabilization.  Increase Zyprexa 15mg PO BID for mood control and residual agitation  Continue Topamax 75 mg every 12 hours for mood control, increased appetite, and cardiometabolic syndrome associated with antipsychotic therapy.

## 2024-11-12 NOTE — PROGRESS NOTES
11/12/24    Team Meeting   Meeting Type Daily Rounds   Team Members Present   Team Members Present Physician;Nurse;;Occupational Therapist   Physician Team Member Eliana Villalobos, Casimiro VILLALOBOS, Deyanira PERDUE   Nursing Team Member Patrizia MONROY   Social Work Team Member Arya MUIR   OT Team Member Pope ANJEL   Patient/Family Present   Patient Present No   Patient's Family Present No     201, 72 signed 11/11 at 1212, irritable, labile, punched whole in wall yesterday, altercation with peer this morning, childlike, prn focused, dc date due to med management and monitoring

## 2024-11-12 NOTE — ASSESSMENT & PLAN NOTE
Hematology Oncology Progress Note    Patient Name: Tara Lockwood   YOB: 1937   Medical Record Number: [id number]   CSN: [id number]   Attending Physician: Yamilex Ceron MD     Subjective:   Patient seen with family at the bedside. She remains weak and tired. Still with loose stools with no blood or melena. Feels short of breath with any activity. Leg swelling. No pain. Resumed warfarin.   Feels anxious and overwhelmed.     Objective:  Vitals:  Vitals:    11/11/24 2010 11/12/24 0324 11/12/24 0536 11/12/24 0944   BP: 129/53 144/51  122/50   BP Location: Right arm Right arm  Right arm   Pulse: 75 84  86   Resp: 16 16  18   Temp: 98.4 °F (36.9 °C) 99.2 °F (37.3 °C)  98.4 °F (36.9 °C)   TempSrc: Oral Oral  Oral   SpO2: 95% 92%  96%   Weight:   101.4 kg (223 lb 9.6 oz)    Height:           Current Medications:    Current Facility-Administered Medications:     furosemide (Lasix) tab 40 mg, 40 mg, Oral, Daily    warfarin (Coumadin) tab 5 mg, 5 mg, Oral, Nightly    heparin (Porcine) 03762 units/250mL infusion ACS/AFIB CONTINUOUS, 200-3,000 Units/hr, Intravenous, Continuous    spironolactone (Aldactone) tab 25 mg, 25 mg, Oral, Daily    empagliflozin (Jardiance) tab 10 mg, 10 mg, Oral, Daily    vancomycin (Vancocin) cap 125 mg, 125 mg, Oral, QID    melatonin tab 3 mg, 3 mg, Oral, Nightly    gadoterate meglumine (Dotarem) 10 MMOL/20ML injection 20 mL, 20 mL, Intravenous, ONCE PRN    dilTIAZem ER (CardIZEM CD) 24 hr cap 180 mg, 180 mg, Oral, Daily    atorvastatin (Lipitor) tab 80 mg, 80 mg, Oral, Nightly    hydroxyurea (Hydrea) cap 500 mg, 500 mg, Oral, Daily    metoprolol succinate ER (Toprol XL) 24 hr tab 25 mg, 25 mg, Oral, QAM    acetaminophen (Tylenol Extra Strength) tab 500 mg, 500 mg, Oral, Q4H PRN    ondansetron (Zofran) 4 MG/2ML injection 4 mg, 4 mg, Intravenous, Q6H PRN    metoclopramide (Reglan) 5 mg/mL injection 5 mg, 5 mg, Intravenous, Q8H PRN    pantoprazole (Protonix) 40 mg in sodium chloride 0.9%  Patient admitted on a voluntary 201 with thoughts of self-harm and increased depression in the context of homelessness, relapsed to meth use.  Continue Invega 3 mg daily for mood stability and thought disorder.  Continue Zoloft 50 mg daily for depression.     PF 10 mL IV push, 40 mg, Intravenous, Daily    influenza virus trivalent high dose PF (Fluzone HD) 0.5 mL injection (ages >/= 65 years) 0.5 mL, 0.5 mL, Intramuscular, Prior to discharge    Physical Examination:  General: Patient is alert and oriented x 3, not in acute distress.  HEENT: Oropharynx is clear.   Chest: Clear to auscultation.  Heart: Regular   Abdomen: Soft, non tender  Extremities: Pedal pulses are present. No edema.  Neurological: Grossly intact.   Psych: Appropriate mood and affect.     Labs:  Recent Labs   Lab 11/09/24  0352 11/10/24  0402 11/11/24  0507 11/12/24  0506   RBC 3.17* 3.17*  --  3.19*   HGB 11.3* 11.7*  --  11.3*   HCT 34.4* 35.1  --  34.4*   .5* 110.7*  --  107.8*   MCH 35.6* 36.9*  --  35.4*   MCHC 32.8 33.3  --  32.8   RDW 14.1 14.2  --  14.4   NEPRELIM 4.96 5.47  --   --    WBC 6.5 7.6  --  9.5   .0  289.0 252.0 301.0 291.0  291.0     Recent Labs   Lab 11/09/24  0352 11/10/24  0534 11/11/24  0507 11/12/24  0506   GLU 99 98 106* 93   BUN 24* 24* 33* 35*   CREATSERUM 1.03* 1.20* 1.33* 1.29*   EGFRCR 53* 44* 39* 41*   CA 9.1 9.2 9.3 9.2   ALB 3.0*  --  3.2 3.1*    136 137 134*   K 4.0 4.9 4.3 4.3    101 101 100   CO2 27.0 27.0 28.0 28.0   ALKPHO 112  --   --   --    *  --   --   --    ALT 51*  --   --   --    BILT 1.1  --   --   --    TP 5.3*  --   --   --       Recent Labs   Lab 11/10/24  0534 11/10/24  0941 11/11/24  0507 11/11/24  1237 11/11/24 2003 11/12/24  0506   INR 1.14  --  1.18  --   --  1.12   PTT  --    < > 47.6* 48.9* 61.4* 69.1*    < > = values in this interval not displayed.        Radiology:  CT ABDOMEN+PELVIS(CONTRAST ONLY)(CPT=74177)    Result Date: 11/12/2024  CONCLUSION:     16.5 cm right hepatic mass consistent with patient's biopsy-proven hepatocellular carcinoma.  No metastatic disease within the abdomen or pelvis.  Diverticulosis without diverticulitis.  Multiple other incidental findings as described in the body of the report.   Preliminary report was submitted by the [facility] teleradiologist and there are no significant discrepancies.   Dictated by (CST): Trent Alcala MD on 11/12/2024 at 8:33 AM     Finalized by (CST): Trent Alcala MD on 11/12/2024 at 8:44 AM          IR BIOPSY    Result Date: 11/8/2024  CONCLUSION:  1. Successful sonographic guided core right lobe liver lesion biopsy    Dictated by (CST): Fco Bloom MD on 11/08/2024 at 4:05 PM     Finalized by (CST): Fco Bloom MD on 11/08/2024 at 4:07 PM          MRI LIVER (W+WO) (CPT=74183)    Result Date: 11/4/2024  CONCLUSION:  1. Large centrally necrotic mass again noted arising from the right hepatic lobe that measures 15.7 cm with multiple similar appearing, more homogeneous enhancing smaller satellite lesions.  The constellation of these findings is highly concerning for a malignant process.  Differential considerations favor multifocal hepatocellular carcinoma over metastatic cholangiocarcinoma or other metastatic malignancy given the history of elevated alpha fetoprotein levels.  However, consider correlation with percutaneous biopsy if warranted. 2. Mild hepatic steatosis.  Note the liver is not grossly cirrhotic morphologically and there is no evidence of portal venous hypertension. 3. Small pleural effusions, increased in size on the left and new on the right, which probably relate to mild CHF or fluid overload in this patient with a cardiac pacemaker. 4. Stable small bilateral adrenal adenomas. 5. Status post cholecystectomy.  No biliary ductal dilatation.    Elm-remote  Dictated by (CST): Jordon Haney MD on 11/04/2024 at 2:18 PM     Finalized by (CST): Jordon Haney MD on 11/04/2024 at 2:34 PM          XR CHEST AP PORTABLE  (CPT=71045)    Result Date: 11/3/2024  CONCLUSION:   Small to moderate left pleural effusion, slightly increased from prior.  Adjacent left basilar opacity, also increased from prior, which may represent subsegmental atelectasis, edema,  and/or pneumonia in the appropriate clinical setting.  Trace right pleural effusion with right basilar opacity, slightly increased from prior, may represent the same process.  Recommend radiographic follow-up to resolution.      Dictated by (CST): Estefani Martinez MD on 11/03/2024 at 10:38 AM     Finalized by (CST): Estefani Martinez MD on 11/03/2024 at 10:40 AM          US LIVER (CPT=76705)    Result Date: 11/3/2024  CONCLUSION:   Large 16.7 cm solid mass of the inferior right hepatic lobe, most compatible with hepatocellular carcinoma.  Recommend further evaluation with contrast enhanced MRI of the abdomen and/or staging scans.  Secure message regarding this finding sent to Antonia Williamson on 11/03/2024 at 9:09 a.m.   Status post cholecystectomy.  Mild extrahepatic common bile duct dilation, but with normal distal tapering, thought to be post operative/senescent appearance.  But no intrahepatic biliary ductal dilation.     Dictated by (CST): Estefani Martinez MD on 11/03/2024 at 9:02 AM     Finalized by (CST): Estefani Martinez MD on 11/03/2024 at 9:09 AM            Pathology:    Liver lesion; ultrasound-guided core biopsies:  Multiple fragments of liver parenchyma with moderately to poorly differentiated hepatocellular carcinoma with extensive necrosis.         Impression and Plan:    Hepatocellular carcinoma  - MRI revealed a large mass in the right hepatic lobe ~16 cm with multiple smaller satellite lesions highly concerning for malignancy. No liver cirrhosis. No lymphadenopathy. . CEA and  normal. HBV and HVC testing negative.   - US guided biopsy 11/8/24 consistent with hepatocellular carcinoma.   - recent CT chest and CT abd/pelvis with no evidence of metastatic disease.   - case reviewed at the GI tumor board today; not a candidate for resection or transplant. Recommend Yttrium 90 radio-embolization given liver-isolated, unresectable disease and preserved liver function, followed by systemic therapy  (immunotherapy).   - I discussed again the diagnosis, staging, prognosis and treatment options with the patient and her family. I explained the incurable nature of this disease and the goals of therapy to control the disease, prolong survival and improve symptoms/quality of life.   - Y90 is generally well tolerated with minimal side effects and provides high rates of durable local control for HCC. Patient will be referred to IR outpatient for consultation.   - also reviewed systemic therapy options and recommended first line durvalumab + tremelimumab q28 days (STRIDE regimen) based on the HIMALAYA trial. We discussed immunotherapy mechanism of action and potential immune-related adverse events, with plan for more detailed discussion in clinic after completion of Y90 treatment.     History of stage IB adenocarcinoma of the left lung  - s/p left upper lobectomy 6/2016. Recent CT chest with no evidence of disease.      History of recurrent DVT/PE  - on indefinite anticoagulation with warfarin- currently on hold for bx  - warfarin held prior to biopsy, switched to IV heparin  - ok to continue warfarin without heparin bridge      Polycythemia vera  - high risk due to her age and history of thrombosis  - on hydrea     Mild anemia   - secondary to hydrea and recent GI bleed     C diff colitis   - po vancomycin, improved.       Ok to discharge from oncology standpoint if medically stable. Will follow up in clinic for further management.           Yamilex Ceron MD

## 2024-11-12 NOTE — NURSING NOTE
Patient up a few times throughout the night, however her sleep overall this evening has been better

## 2024-11-12 NOTE — NURSING NOTE
"Patient mood this evening is labile and irritable.  Becoming nasty with peers.  C/o withdrawal and \"feeling like shit\"  Comparing her medications to other patients.  Patient was advised to focus on her own treatment and not take her anger out on others.  Patient begrudgingly accepting of this recommendation. Requested and received prn propranolol for \"anxiety, restlessness, and withdrawal\"  "

## 2024-11-12 NOTE — NURSING NOTE
Patient visible on the unit, social with peers. Pleasant & cooperative to staff. Patient less focused on PRN medications throughout the day. Less labile, no outbursts throughout the day. Taking medications as ordered. Denies SI/HI/AVH. Q15 minute checks maintained.

## 2024-11-13 PROBLEM — Z72.821 HISTORY OF DIFFICULTY SLEEPING: Status: ACTIVE | Noted: 2024-11-13

## 2024-11-13 PROCEDURE — 99232 SBSQ HOSP IP/OBS MODERATE 35: CPT | Performed by: PSYCHIATRY & NEUROLOGY

## 2024-11-13 RX ORDER — LAMOTRIGINE 25 MG/1
25 TABLET ORAL DAILY
Qty: 30 TABLET | Refills: 0 | Status: SHIPPED | OUTPATIENT
Start: 2024-11-14 | End: 2024-12-14

## 2024-11-13 RX ORDER — TOPIRAMATE 25 MG/1
75 TABLET, FILM COATED ORAL EVERY 12 HOURS SCHEDULED
Qty: 180 TABLET | Refills: 0 | Status: SHIPPED | OUTPATIENT
Start: 2024-11-13 | End: 2024-11-19 | Stop reason: SDUPTHER

## 2024-11-13 RX ORDER — ERGOCALCIFEROL 1.25 MG/1
50000 CAPSULE, LIQUID FILLED ORAL WEEKLY
Qty: 6 CAPSULE | Refills: 0 | Status: CANCELLED | OUTPATIENT
Start: 2024-11-17 | End: 2024-12-23

## 2024-11-13 RX ORDER — OLANZAPINE 15 MG/1
15 TABLET ORAL 2 TIMES DAILY
Qty: 60 TABLET | Refills: 0 | Status: SHIPPED | OUTPATIENT
Start: 2024-11-13 | End: 2024-12-13

## 2024-11-13 RX ORDER — CLONAZEPAM 0.5 MG/1
0.5 TABLET ORAL
Qty: 10 TABLET | Refills: 0 | Status: SHIPPED | OUTPATIENT
Start: 2024-11-13 | End: 2024-11-23

## 2024-11-13 RX ORDER — DOCUSATE SODIUM 100 MG/1
100 CAPSULE, LIQUID FILLED ORAL DAILY
Qty: 30 CAPSULE | Refills: 0 | Status: CANCELLED | OUTPATIENT
Start: 2024-11-14

## 2024-11-13 RX ORDER — NYSTATIN 100000 U/G
CREAM TOPICAL 2 TIMES DAILY
Qty: 15 G | Refills: 0 | Status: CANCELLED | OUTPATIENT
Start: 2024-11-13

## 2024-11-13 RX ORDER — KETOROLAC TROMETHAMINE 30 MG/ML
15 INJECTION, SOLUTION INTRAMUSCULAR; INTRAVENOUS ONCE AS NEEDED
Status: ACTIVE | OUTPATIENT
Start: 2024-11-13 | End: 2024-11-14

## 2024-11-13 RX ADMIN — IBUPROFEN 800 MG: 800 TABLET, FILM COATED ORAL at 15:32

## 2024-11-13 RX ADMIN — NICOTINE POLACRILEX 4 MG: 4 GUM, CHEWING BUCCAL at 20:54

## 2024-11-13 RX ADMIN — DOCUSATE SODIUM 100 MG: 100 CAPSULE, LIQUID FILLED ORAL at 08:09

## 2024-11-13 RX ADMIN — IBUPROFEN 600 MG: 600 TABLET, FILM COATED ORAL at 08:23

## 2024-11-13 RX ADMIN — TOPIRAMATE 75 MG: 25 TABLET, FILM COATED ORAL at 08:09

## 2024-11-13 RX ADMIN — NICOTINE 21 MG: 21 PATCH, EXTENDED RELEASE TRANSDERMAL at 08:12

## 2024-11-13 RX ADMIN — OLANZAPINE 15 MG: 15 TABLET, FILM COATED ORAL at 08:09

## 2024-11-13 RX ADMIN — LAMOTRIGINE 25 MG: 25 TABLET ORAL at 08:09

## 2024-11-13 RX ADMIN — OLANZAPINE 15 MG: 15 TABLET, FILM COATED ORAL at 20:48

## 2024-11-13 RX ADMIN — TOPIRAMATE 75 MG: 25 TABLET, FILM COATED ORAL at 20:47

## 2024-11-13 RX ADMIN — BUPRENORPHINE AND NALOXONE 8 MG: 8; 2 FILM BUCCAL; SUBLINGUAL at 20:47

## 2024-11-13 RX ADMIN — GABAPENTIN 800 MG: 400 CAPSULE ORAL at 12:58

## 2024-11-13 RX ADMIN — BUPRENORPHINE AND NALOXONE 8 MG: 8; 2 FILM BUCCAL; SUBLINGUAL at 08:12

## 2024-11-13 RX ADMIN — NICOTINE POLACRILEX 4 MG: 4 GUM, CHEWING BUCCAL at 17:19

## 2024-11-13 RX ADMIN — GABAPENTIN 800 MG: 400 CAPSULE ORAL at 20:47

## 2024-11-13 RX ADMIN — NICOTINE POLACRILEX 4 MG: 4 GUM, CHEWING BUCCAL at 10:37

## 2024-11-13 RX ADMIN — TRAZODONE HYDROCHLORIDE 100 MG: 100 TABLET ORAL at 20:59

## 2024-11-13 RX ADMIN — BUPRENORPHINE AND NALOXONE 8 MG: 8; 2 FILM BUCCAL; SUBLINGUAL at 12:58

## 2024-11-13 RX ADMIN — GABAPENTIN 800 MG: 400 CAPSULE ORAL at 08:09

## 2024-11-13 RX ADMIN — HYDROXYZINE HYDROCHLORIDE 50 MG: 50 TABLET, FILM COATED ORAL at 16:08

## 2024-11-13 RX ADMIN — BACITRACIN ZINC 1 SMALL APPLICATION: 500 OINTMENT TOPICAL at 08:09

## 2024-11-13 RX ADMIN — TIZANIDINE 4 MG: 4 TABLET ORAL at 20:47

## 2024-11-13 RX ADMIN — CLONAZEPAM 0.5 MG: 0.5 TABLET ORAL at 20:47

## 2024-11-13 RX ADMIN — NICOTINE POLACRILEX 4 MG: 4 GUM, CHEWING BUCCAL at 14:06

## 2024-11-13 RX ADMIN — Medication 3 MG: at 20:48

## 2024-11-13 RX ADMIN — NICOTINE POLACRILEX 4 MG: 4 GUM, CHEWING BUCCAL at 07:08

## 2024-11-13 RX ADMIN — BUDESONIDE AND FORMOTEROL FUMARATE DIHYDRATE 2 PUFF: 160; 4.5 AEROSOL RESPIRATORY (INHALATION) at 18:20

## 2024-11-13 NOTE — NURSING NOTE
Patient visible on unit. Pleasant and cooperative. Social with staff and peers. Motrin 800 mg given at 1924 for 9/10 back pain with positive results. Denies IS,HI,AVH, anxiety and depression. Safety checks continue Q 15 minutes.

## 2024-11-13 NOTE — PROGRESS NOTES
Progress Note - Behavioral Health   Name: Carey Keith 49 y.o. female I MRN: [id number]  Unit/Bed#: U 258-02 I Date of Admission: 10/25/2024   Date of Service: 11/13/2024 I Hospital Day: 19    Assessment & Plan  Bipolar disorder, current episode depressed, severe, without psychotic features (HCC)  Lamictal 25 mg daily for mood stabilization.  Neurontin 800 mg 3 times daily for mood stabilization.  Continue Zyprexa 15mg PO BID for mood control and residual agitation  Continue Topamax 75 mg every 12 hours for mood control, increased appetite, and cardiometabolic syndrome associated with antipsychotic therapy.  Opioid use disorder, severe, on maintenance therapy (HCC)  Suboxone 8 mg sublingual film 3 times daily for MAT for OUD.  Methamphetamine abuse (HCC)    Continue Klonopin 0.5 mg PO QHS for anxiety symptoms in the setting of methamphetamine use disorder.  Suicidal ideations  Patient able to contract for safety on the unit; resolved    Progress Toward Goals: Improving.  Maintaining safety and behavioral control with no further outbursts.  Tolerating titration of Zyprexa 15 mg PO BID well.  Continue remainder of psychotropic regimen as ordered.  Should patient continue to maintain safety and mood control, plan is to discharge tomorrow upon expiration of 72-hour notice.  Patient also expressed interest in PHP upon discharge; referral submitted.    Recommended Treatment: Continue with group therapy, milieu therapy and occupational therapy.      Risks, benefits and possible side effects of Medications:   Carey has limited understanding of risk versus benefits of medications, but agrees to take as prescribed.    History of Present Illness   Behavior over the last 24 hours:  improved  Sleep: Intermittent, PRN trazodone effective  Appetite: normal  Medication side effects: No  ROS: no complaints and all other systems are negative    Subjective: Carey has been visible and appropriate in the milieu with no  "further outbursts.  Showing improvement with insight and judgment.  Cooperative.  Maintaining ADLs and compliance with medications.  Identifies adequate safety plan and protective factors against suicide.  Thoughts are less tangential and goal directed with no delusional content.  Using less PRN medication and less somatically preoccupied.  Denies depression or anxiety and presents with congruent affect.  Less intrusive.  Maintaining safety with no return of SI.    Objective   Mental Status Evaluation:  Appearance:  casually dressed, older than stated age, and blonde hair   Behavior:  Cooperative, redirectable   Speech:  normal pitch, normal volume, and less tangential   Mood:  \"Good\"   Affect:  mood-congruent and less labile   Thought Process:  goal directed   Associations: circumstantial associations   Thought Content:  No overt delusions or paranoia verbalized   Perceptual Disturbances: Denied AVH, did not appear internally preoccupied   Risk Potential: Suicidal Ideations none  Homicidal Ideations none  Potential for Aggression No   Sensorium:  person, place, time/date, and situation   Memory:  recent and remote memory grossly intact   Consciousness:  alert and awake    Attention: attention span appeared shorter than expected for age   Insight:  limited   Judgment: limited   Gait/Station: normal gait/station and normal balance   Motor Activity: no abnormal movements     Medications: all current active meds have been reviewed and continue current psychiatric medications.      Lab Results: I have reviewed the following results:  BMP:   Lab Results   Component Value Date    SODIUM 137 11/07/2024    K 4.0 11/07/2024     11/07/2024    CO2 24 11/07/2024    AGAP 7 11/07/2024    BUN 19 11/07/2024    CREATININE 0.77 11/07/2024    GLUC 94 11/07/2024    GLUF 95 10/27/2024    CALCIUM 8.7 11/07/2024    EGFR 92 11/07/2024     Drug Screen:   Lab Results   Component Value Date    AMPMETHUR Positive (A) 10/24/2024    " BARBTUR Negative 10/24/2024    BDZUR Positive (A) 10/24/2024    THCUR Negative 10/24/2024    COCAINEUR Negative 10/24/2024    METHADONEUR Negative 10/24/2024    OPIATEUR Negative 10/24/2024    PCPUR Negative 10/24/2024

## 2024-11-13 NOTE — PROGRESS NOTES
Progress Note - Carey Keith 49 y.o. female MRN: [id number]    Unit/Bed#: [facility] 258-02 Encounter: [id number]        Subjective:   Patient seen and examined at bedside after reviewing the chart and discussing the case with the caring staff.      Patient examined at bedside.  Patient denies any symptoms other than chronic back pain.     Patient is likely being discharged tomorrow, 11/14/24.    Physical Exam   Vitals: Blood pressure 107/67, pulse 76, temperature 97.5 °F (36.4 °C), temperature source Temporal, resp. rate 18, height 5' (1.524 m), weight 73.5 kg (162 lb), SpO2 96%.,Body mass index is 31.64 kg/m².  Constitutional: Patient in no acute distress.  HEENT: PERR, EOMI, MMM.  Cardiovascular: Normal rate and regular rhythm.    Pulmonary/Chest: Effort normal and breath sounds normal.   Abdomen: Soft, + BS, NT.    Assessment/Plan:  Carey Keith is a(n) 49 y.o. female with bipolar disorder.     Medical Clearance: Patient is medically cleared for discharge. All prescriptions have been sent to pharmacy.     Asthma.  Continue Symbicort 160-4.5 mcg/act twice daily.  Albuterol as needed.   Substance use disorder.  Patient on Suboxone 8 mg film TID.   Chronic hep C.  Liver functions stable.  Follow-up with GI on outpt basis.   Tobacco use disorder.  NRT.   Vitamin D deficiency.  Patient started on vitamin D2 50,000 units weekly for 8 weeks followed by vitamin D3 1000 units daily.  Back pain/neuropathy/headache.  Continue gabapentin 800 mg TID (incr from 600 mg TID 10/31).  Toradol IM 15mg q8h prn for severe pain x3 days 11/3/2024 per Dr. Mark.  May get ibuprofen as needed for headache along with trial Zanaflex 4mg q8h prn.  Add Bengay cream 11/8.   Acute UTI.  UA pos in ED 10/24.  Macrobid 100 mg twice daily x 5 days (thru 10/30).   Hypokalemia.  K 2.7 -->  4.9 on 10/27.  Resolved with potassium chloride 40 mEq twice daily x 3 days.  Constipation.  Schedule Colace 100 mg daily.  Continue Miralax or Senokot as  needed.   R knee wound.  Apply bacitracin ointment daily with DSD.   Athlete foot.  I will treat the patient with nystatin cream twice daily.    The patient was discussed with Dr. Mark and he is in agreement with the above note.

## 2024-11-13 NOTE — ASSESSMENT & PLAN NOTE
Continue Klonopin 0.5 mg PO QHS for anxiety symptoms in the setting of methamphetamine use disorder.

## 2024-11-13 NOTE — PLAN OF CARE
Problem: Ineffective Coping  Goal: Identifies ineffective coping skills  Outcome: Not Progressing  Goal: Demonstrates healthy coping skills  Outcome: Not Progressing  Goal: Participates in unit activities  Description: Interventions:  - Provide therapeutic environment   - Provide required programming   - Redirect inappropriate behaviors   Outcome: Progressing     Problem: Risk for Self Injury/Neglect  Goal: Attend and participate in unit activities, including therapeutic, recreational, and educational groups  Description: Interventions:  - Provide therapeutic and educational activities daily, encourage attendance and participation, and document same in the medical record  - Obtain collateral information, encourage visitation and family involvement in care   Outcome: Progressing     Problem: Alteration in Orientation  Goal: Attend and participate in unit activities, including therapeutic, recreational, and educational groups  Description: Interventions:  - Provide therapeutic and educational activities daily, encourage attendance and participation, and document same in the medical record   - Provide appropriate opportunities for reminiscence   - Provide a consistent daily routine   - Encourage family contact/visitation   Outcome: Progressing   Patient attends groups but is in denial of her mental health. She can be irritable, blaming others vs taking responsibility for her recovery.

## 2024-11-13 NOTE — SOCIAL WORK
[place] PARTIAL PROGRAM REFERRAL     [facility]    Name and Date of Birth:  Carey Keith 49 y.o. 1975    Date of Referral: November 13, 2024    Referral Source (Agency/Name): [facility]    Correct Demographics on file:  Yes     Emergency contact on file: Yes     Insurance on file: Yes     _____________________________________________      Presenting Symptoms and Stressors:  Patient presents with increased anxiety and depression due to life stressors.    Please describe the reason for Referral: Patient would benefit from a step down to PHP while returning to community.     Stressors: drug use problems, family conflict, family issues, financial problems, occupational problems, health issues, medical problems, chronic pain, limited support, social difficulties, everyday stressors, and chronic anxiety    Symptoms:  depression, depressive symptoms, anxiety, anxiety symptoms, mood instability, and manic symptoms    Suicidal Ideation: None at present    Homicidal Ideation: None at present    Depressed Mood: depressed mood, ruminations    Ct/Hypomania: None    Psychosis: None    Agitation: Irritable edge at times    Appetite Changes: normal appetite    Sleep Disturbance: difficulty sleeping    Access to Weapons:  No    Smoking Status: occasionally    Substance Use:  Meth  If Use : Last use prior to admission   If Use: Current SA treatment: MAT    Current Psychiatrist or Therapist:    Psychiatrist: [facility]  Therapist: [facility]    Past Psychiatric Treatment: multiple IP and rehab stays    If currently Inpatient - Date of Anticipated discharge: 11/14/24    Diagnoses:  Bipolar Disorder, type I, depressed, severe without psychotic features    Do they Require Ambulatory Assistance: No    Communication Assistance: not required     Legal Issues: No current legal problems        Anabelle Koenig

## 2024-11-13 NOTE — NURSING NOTE
Patient c/o feeling anxious r/t discharge tomorrow. Lundberg score 19. Atarax 50mg PO administered. Medication effective. Q 15 maintained.

## 2024-11-13 NOTE — ASSESSMENT & PLAN NOTE
Lamictal 25 mg daily for mood stabilization.  Neurontin 800 mg 3 times daily for mood stabilization.  Continue Zyprexa 15mg PO BID for mood control and residual agitation  Continue Topamax 75 mg every 12 hours for mood control, increased appetite, and cardiometabolic syndrome associated with antipsychotic therapy.

## 2024-11-13 NOTE — SOCIAL WORK
AYAZ placed call to [facility] to notify of discharge tomorrow and PHP referral. Nila will visit pt at home on Friday 12:30. AYAZ to fax dc paperwork to [phone].    AYAZ placed PHP referral. Pt stated she will coordinate MAT appointment upon discharge. Pt rescinded MINNA for mother.     Pt scheduled to start PHP [facility] on 11/15/24.    Roundtrip transportation requested. The [facility] you requested for Carey RO in unit/room [facility] 258 on 11/14/2024 is scheduled to arrive at 1:00pm EST! [facility] is handling this ride and you can contact them at [phone].

## 2024-11-13 NOTE — DISCHARGE INSTR - AVS FIRST PAGE
A 30-day supply of psychotropic medication was sent to [facility] with the exception of Klonopin 0.5 mg which was sent in for a 10-day supply only.    Plan is to continue Klonopin taper on outpatient basis; your outpatient psychiatric provider will manage this

## 2024-11-13 NOTE — NURSING NOTE
Patient visible on the unit, social with peers. Pleasant & cooperative with staff. No outbursts/agitation noted. Taking medications as ordered. Denies SI/HI/AVH. Q 15 minute checks maintained.

## 2024-11-13 NOTE — PROGRESS NOTES
11/13/24    Team Meeting   Meeting Type Daily Rounds   Team Members Present   Team Members Present Physician;;Nurse;Occupational Therapist   Physician Team Member Susan VILLALOBOS, Casimiro VILLALOBOS, Deyanira PERDUE   Nursing Team Member Patrizia MONROY   Social Work Team Member Arya MUIR   OT Team Member Pope ANJEL   Patient/Family Present   Patient Present No   Patient's Family Present No     201, 72 hour expires tomorrow, since Monday more calm, less intrusive, prn seeking, redirectable, dc tomorrow home with PHP referral

## 2024-11-14 VITALS
OXYGEN SATURATION: 92 % | HEART RATE: 86 BPM | SYSTOLIC BLOOD PRESSURE: 109 MMHG | WEIGHT: 162 LBS | RESPIRATION RATE: 19 BRPM | BODY MASS INDEX: 31.8 KG/M2 | HEIGHT: 60 IN | TEMPERATURE: 98 F | DIASTOLIC BLOOD PRESSURE: 74 MMHG

## 2024-11-14 PROBLEM — R45.851 SUICIDAL IDEATIONS: Status: RESOLVED | Noted: 2024-10-25 | Resolved: 2024-11-14

## 2024-11-14 PROCEDURE — 99238 HOSP IP/OBS DSCHRG MGMT 30/<: CPT | Performed by: NURSE PRACTITIONER

## 2024-11-14 RX ORDER — ERGOCALCIFEROL 1.25 MG/1
50000 CAPSULE, LIQUID FILLED ORAL WEEKLY
Qty: 6 CAPSULE | Refills: 0 | Status: SHIPPED | OUTPATIENT
Start: 2024-11-17 | End: 2024-12-23

## 2024-11-14 RX ORDER — GABAPENTIN 400 MG/1
800 CAPSULE ORAL 3 TIMES DAILY
Qty: 180 CAPSULE | Refills: 0 | Status: SHIPPED | OUTPATIENT
Start: 2024-11-14

## 2024-11-14 RX ORDER — BUPRENORPHINE AND NALOXONE 8; 2 MG/1; MG/1
8 FILM, SOLUBLE BUCCAL; SUBLINGUAL 3 TIMES DAILY
Qty: 42 FILM | Refills: 0 | Status: SHIPPED | OUTPATIENT
Start: 2024-11-14 | End: 2024-11-28

## 2024-11-14 RX ORDER — DOCUSATE SODIUM 100 MG/1
100 CAPSULE, LIQUID FILLED ORAL DAILY
Qty: 30 CAPSULE | Refills: 0 | Status: SHIPPED | OUTPATIENT
Start: 2024-11-15

## 2024-11-14 RX ADMIN — GABAPENTIN 800 MG: 400 CAPSULE ORAL at 08:46

## 2024-11-14 RX ADMIN — BACITRACIN ZINC 1 SMALL APPLICATION: 500 OINTMENT TOPICAL at 08:46

## 2024-11-14 RX ADMIN — OLANZAPINE 15 MG: 15 TABLET, FILM COATED ORAL at 08:45

## 2024-11-14 RX ADMIN — NICOTINE POLACRILEX 4 MG: 4 GUM, CHEWING BUCCAL at 10:41

## 2024-11-14 RX ADMIN — IBUPROFEN 800 MG: 800 TABLET, FILM COATED ORAL at 04:37

## 2024-11-14 RX ADMIN — LAMOTRIGINE 25 MG: 25 TABLET ORAL at 08:46

## 2024-11-14 RX ADMIN — GABAPENTIN 800 MG: 400 CAPSULE ORAL at 12:42

## 2024-11-14 RX ADMIN — DOCUSATE SODIUM 100 MG: 100 CAPSULE, LIQUID FILLED ORAL at 08:46

## 2024-11-14 RX ADMIN — TOPIRAMATE 75 MG: 25 TABLET, FILM COATED ORAL at 08:45

## 2024-11-14 RX ADMIN — HYDROXYZINE HYDROCHLORIDE 50 MG: 50 TABLET, FILM COATED ORAL at 09:52

## 2024-11-14 RX ADMIN — IBUPROFEN 800 MG: 800 TABLET, FILM COATED ORAL at 09:52

## 2024-11-14 RX ADMIN — NICOTINE 21 MG: 21 PATCH, EXTENDED RELEASE TRANSDERMAL at 08:48

## 2024-11-14 RX ADMIN — BUPRENORPHINE AND NALOXONE 8 MG: 8; 2 FILM BUCCAL; SUBLINGUAL at 08:46

## 2024-11-14 RX ADMIN — NICOTINE POLACRILEX 4 MG: 4 GUM, CHEWING BUCCAL at 07:13

## 2024-11-14 RX ADMIN — BUPRENORPHINE AND NALOXONE 8 MG: 8; 2 FILM BUCCAL; SUBLINGUAL at 12:42

## 2024-11-14 RX ADMIN — ALBUTEROL SULFATE 2 PUFF: 90 AEROSOL, METERED RESPIRATORY (INHALATION) at 11:57

## 2024-11-14 NOTE — DISCHARGE INSTR - OTHER ORDERS
You are being discharged to [address] [place] [address]. Patient phone: [phone]     Triggers you have identified during your hospitalization that led to your admission includes a distressed mood and ineffective coping skills. Coping skills you have identified during your hospitalization include music and art therapy. If you are unable to deal with your distressed mood alone please contact PASTORA Cantu (Friend)   [phone]. If that is not effective and you continue to have a distressed mood, are overwhelmed, or are in crisis please contact (Crisis #) [facility] Crisis Hotline: 739.827.1591 dial 911 or go to the nearest emergency center.      *[facility] Hotline: 304.365.3942  * Alcohol Anonymous: 222.795.6188  *[facility] Drug and Alcohol Commision ([phone]  *National [place] on Mental Illness (AJ) HELPLINE: 405.535.7843/Website: www.aj.org  *Substance Abuse and Mental Health Services Administration([facility]) National Helpline, which is a confidential, free, 24-hour-a-day, 365-day-a-year, information service for individuals and family members facing mental health and/or substance use disorders. This service provides referrals to local treatment facilities, support groups, and community-based organizations. Callers can also order free publications and other information.  Call 1-912.152.2104/Website: www.[facility].gov  *United Way 2-1-1: This is a toll free, confidential, 24-hour-a-day service which connects you to a community  in your area who can help you find services and resources that are available to you locally and provide critical services that can improve and save lives.  Call: 211  /Website: http://www.Intapporg/       Sakina, or Lissa, our Behavioral Health Nurse Navigators, will be calling you after your discharge, on the phone number that you provided.  They will be available as an additional support, if needed.   If you wish to speak with one  of them, you may contact Sakina at [phone] or Lissa at [phone].        [facility] Crisis Phone Number : [phone].        If you have no insurance for outpatient Mental Health services you will need to have a   liability appointment with [facility] to assess you to qualify for funding.  [facility] does NOT provide funding for Medications.    WALK IN HOURS:    Monday, Tuesday, Thursday  - 9:00am - 11:30am  Wednesday - 9:00am - 11:30am and 1:00pm - 3:00pm    Our address: [facility], [address] [place] [address], [place]  [address]   (Red brick building diagonally across from the [facility] Secret[employer] arena-entrance on [address] [place] [address])  Our phone number: [phone]    To be seen during walk in hours, you must bring:  *** Photo identification  *** Social Security Card (if you do not have your SS card, then please bring something else                                              with your name and address listed on it.)    If you have INCOME and do not have services through medical assistance (which may include food stamps, OB/GYN coverage, etc.) you must also bring:   Proof of income:  *** Current paystub from employer  *** SSD/SSI letter for the current year - or copy of bank statement  *** Unemployment compensation statement  *** Interest or dividends from banking accounts    If your net income is over $10,080, you must also bring:  *** Doctor/Hospital Bills that are paid or unpaid from within the last year  *** Pharmacy print out of prescriptions paid for the past year  *** Child support, alimony  *** Real Estate tax statement        Sincerely,     [facility]  Department of Adult Mental Health For assistance in obtaining Substance Use treatment:    [facility]: [phone]  [place]: [phone]  [place]: [phone]  [place]: [phone]  [place]: [phone]  [place]: [phone]  [place], [place]: [phone]  [place] Addictions Hotline: 792.809.7453  [place] Help Line:  "223.803.2647    Alcoholics Anonymous: [phone]  Narcotics Anonymous: [phone]    [facility]  The [facility] ([address] [place] [address]) offers persons with a mental illness a safe, healing environment to explore their personal and vocational potential and receive support in achieving their goals. Instead of traditional therapy, the work of the house is the rehabilitation. As members contribute meaningful work to the house, they build confidence and a sense of purpose.    Be a Member - It’s Free  Membership in the [facility] is open to any [facility] resident who is 18 or older and has a history of mental illness. Membership is free for life.    [facility] Guarantees  A right to have a place to come  A right to meaningful relationships  A right to meaningful work  A right to belong    [address] [address]  Hours: Monday - Friday: 8 a.m. - 4 p.m.   With varying hours on holidays    [phone]          Drop-In Center  The Drop-In [facility] ([address] [place] [address]) provides a stress-free atmosphere for persons 18 and older who have experienced mental health issues.  What The Drop-In Center Offers  Activities  Games  Outings  Holiday gatherings  Recovery  Employment  Education  Community Resources  Empowerment  Helps participants achieve their goals  Enhances quality of life  Encourages leadership    The Drop-In Center   [address] [place] [address]  Hours: Monday through Friday: 4 p.m. - 9 p.m.  Saturday: 2 p.m. - 8 p.m.  Closed Sundays  Varying hours on holidays    Contact [phone]      [facility]      \"Treating Addiction...Improving Our Community...\"  [facility] utilizes a multidisciplinary approach to assessment & treatment for men, women and adolescents (over the age of 12) who are struggling with problems of substance abuse, addiction and associated " issues. Treatment is individualized to meet client's needs and goals. Eritrean-speaking services are available (both individual and groups).  Services are provided by Certified Recovery Specialists for adults who desire support to maintain recovery from addiction.  [facility] provides drug & alcohol assessments (in Eritrean and English) to determine the level of care that is needed.  Walk-in assessment hours are Monday-Friday, 8:30 a.m. - 2:00 p.m. This is available for both adolescents and adults.  Groups include:  Psycho-dynamic/Process Therapy Group (Men's & Women's)  Relapse Prevention and Building Recovery  Motivational Enhancement  Eritrean Groups  Seeking Safety (Trauma/Substance Abuse)  Addiction Awareness  Conflict Resolution  Criminality  Intervention Group  Criminal thinking & behavior are proactively addressed. Fathering & Restorative Parenting Groups are offered periodically. Basic life skills training such as budgeting, resume preparation, employment interviewing, & vocational referrals are also part of the [facility] program. Daily yoga is available.    [address] [place] [address], Phone: [phone]   Fax: [phone]    HOW TO GET SUBSTANCE ABUSE HELP:  If you or someone you know has a drug or alcohol problem, there is help:  [facility] Drug & Alcohol Abuse Services: [phone]  [facility] Drug & Alcohol Abuse Services: [phone]  An assessment is the first step.   In addition to those listed there are other programs available in the area but assessment is best to determine an appropriate level of care.  If you DO NOT have Medical Assistance (MA) or Private Insurance, an assessment can be scheduled at one of these providers:  [facility]  [address] [place] [address]  [phone]   [address] [address]  [phone]   [facility] [address] [place], [place] [address] [phone]   [facility]  Healthcare  [address] [place] [address]  [phone]   [facility]  [address] [place] [address]  [phone]   [facility]  [address] [place] [address]  [phone]   [employer]  [address], [place] [address]  [phone]     If you HAVE Medical Assistance, an assessment can be scheduled at one of these providers:  [place] on Alcohol & Drug Abuse  [address] [place] [address]  [phone]   [facility]  [address] [place] [address]  [phone]   [facility] D&A Intake Unit  [address] [facility], 1st Floor, [place] [address]  [phone]  [address], [place] [address] [phone]   [place] [address] [place] [address]  [phone]   [facility] Services  [address] [place] [address] [place] [address]  [phone]   Critical access hospital ([facility])  [address] [place] [address]  [phone]   [facility]  [address] [place] [address]  [phone]   [facility]  [address] [place] [address]  [phone]   Treatment Trends - [facility]  [address] [place] [address], [place] [address]  [phone]   [employer]  [address], [place] [address]  [phone]     If you HAVE Private Insurance, an assessment can be scheduled at one of these providers.  Please contact these Providers to determine if they are in your network plan:  [facility] [facility] D[facility]A Intake Unit  [address] [facility], 1st Floor, [place] [address]  [phone]   [facility] [place] [address] [address] [place] [address]  [phone]   [facility] Rehabilitations Services  [address] [facility] [place] [address]  [phone]   NET ([facility])  [address] [place] [address]  [phone]   [facility]  [address] [facility] [address] [place] [address]  [phone]   [place]  "[employer].  [address] [facility], [place] [address]  [phone]       [facility]    Walk-In [facility]  [address] [facility] 1st [place] [address]  Fridays 10am to 12pm  [phone]     Our [place]  Abstinence from mind/mood altering chemicals is only one part of recovery. We recognize the need for a holistic approach, which promotes both physical and mental wellness. We utilize a collaborative process which allows each individual to have a voice in their client-centered recovery plan. We understand that recovery is not a \"one size fits all\" concept. Therefore, each recovery plan is customized to the specific needs of the individual. Our goal is to assist in removing obstacles which may prevent a lifestyle of recovery. By traveling your path of recovery with you, we will help motivate engagement in the treatment process, assist in developing and enhancing life skills, and facilitate independence through positive change.       What is RSS (Recovery Support Services)?   Recovery Support Services is a peer to peer service offered to individuals seeking recovery from addiction. Certified Recovery Specialists (CRS) help guide individuals in establishing recovery supports, accessing treatment, and getting their basic needs met. Certified Recovery Specialists draw on their personal experience in collaborating with individuals in the office, in treatment, and in the community to removed obstacles to getting and staying clean and sober. The collaborative journey between the individual and their CRS will address basic needs, as well as enhance the individual's efforts to get and stay clean. They will provide you with support and guidance, and advocate for you when needed. They can introduce you to recovery supports within the local community (12-step groups, yoga, martial arts, Catholic groups, art classes, etc.), and even participate in these activities with you. There are " many pathways to recovery and a CRS will explore them with you.       What is a Certified  (CRS)?   These are individuals who have a shared recovery experience, either by being in recovery themselves, or by having a loved one in recovery. They are certified through [place] Certification Board after numerous trainings and an exam. Their goal is to collaborate with you in identifying the supports you need to achieve recovery for yourself.     Areas in which they may be able to assist you:  Accessing support groups  Getting linked to recovery supportive activities  Basic needs (food, clothing, etc.)  Applying for health insurance  Employment  Literacy classes  Bus passes  Housing assistance  Etc.               Why should I work with a ?   If you have tried to get engaged in recovery before and haven't been able to, or feel you could really use some extra support and guidance with the journey, then working with a CRS is for you.     Am I eligible for RSS?   Ask your current counselor and any staff at [facility] Drug and Alcohol Intake Unit or the  [facility].     How much does the service cost?   Currently, this service is at no cost to those who have [facility] or [facility] or if you are a [facility] resident.       Local Food Bank Locations & Contact Information:  [facility] Food Hood  Municipalities:  [place], [place], [place], [place], [place], [place], [place], [place],  [place], and [place]  Emergency Food Pantries  [address]  Address: [address] [place] [address]  Phone: [phone]  Hours of Operation: Fridays 10:00AM-1:00PM  [facility]  Address: [address] [place] [address]  Phone: [phone]  Hours of Operation: Thursdays 5:30PM-6:30PM    [facility] [address]  [facility] [employer]  Address: [address] [facility] [place] [address]  Phone: [phone]  Hours of Operation: Monday-Friday 9:30AM-12:00PM  [facility]  Address: [address] [address]  Phone: [phone]  Hours of Operation: By appointment -- Mondays, Tuesdays, Thursdays, & Fridays 8:30AM-4:00PM;  Wednesdays 8:30AM-12:00PM  [place] Judy  Address: [address] [address]  Phone: [phone]  Hours of Operation: 1st & 3rd Saturdays 10:00AM-12:00PM  [facility]  Address: [address] [place] [address]  Phone: [phone]  Hours of Operation: 2nd & 4th Thursdays 4:00PM-5:30PM (Only 4th Thursdays during the summer)  Yazidi Buddhist Faroese Hoahaoism  Address: [address] [place] [address]  Phone: [phone]  Hours of Operation: By appointment -- Wednesdays 6:00PM  [facility]  Address: [address] [place] [address]  Phone: [phone]  Hours of Operation: Thursdays 11:00AM-2:00PM or By appointment  [facility]  Address: [address] [address]  Phone: [phone]  Hours of Operation: Saturdays 9:00AM-11:30AM  Debo Moravian Hoahaoism  Address: [address] [address]  Phone: [phone]  Hours of Operation: Fridays, 3rd & 4th Saturdays 9:00AM-11:00AM  [place] Oreillyo Pentecostales  Address: [address] [place] [address]  Phone: [phone]  Hours of Operation: Tuesdays 3:00PM-5:00PM or By appointment  [facility]  Address: [address] [place] [address]  Phone: [phone]  Hours of Operation: By appointment  [facility]  Address: [address] [place] [address]  Phone: [phone]  Hours of Operation: 3rd Saturday, 8:00AM-11:00AM  RIPPLE  Address: [address] [place] [address]  Phone: [phone]  Hours of Operation: 3rd Sunday 4:00PM-5:30PM    Lao [place] [employer] ([facility])  Address: [address] [place] [address]  [place] [address]  Phone: [phone]  Hours of Operation: 3rd Wednesday 9:00AM-4:00PM  [facility]  Address: [address] [place] [address]  Phone: [phone]  Hours of Operation: 1st & 3rd Saturdays 9:00AM-11:30AM  [facility]  Address: [address] [address]  Phone: [phone]  Hours of Operation: By appointment  [facility]eka [facility]  [facility] [employer]  Address: [address] [address]  Phone: [phone]  Hours of Operation: 3rd Sunday 12:00PM-1:30PM  [facility]  Address: [address] [facility] [place] [address]  Phone: [phone]  Hours of Operation: 1st & 3rd Thursdays 6:30PM-7:30PM; By appointment -- Mondays  [facility]  Address: [address] [place] [address]  Phone: [phone]  Hours of Operation: By appointment  [facility]  Address: [address] [place] [address]  Phone: [phone]  Hours of Operation: 1st & 3rd Wednesdays 4:00PM-5:30PM  [facility]  Address: [address] [address]  Phone: [phone]  Hours of Operation: 4th Wednesday 6:30PM-7:30PM  [employer]  Address: [address] [place] [address]  Phone: [phone]  Hours of Operation: 2nd Tuesday 10:00AM-12:00PM; 4th Thursday 4:00PM-6:00PM  Beka [place] [address]  [facility] (Kosher [employer] Non-Kosher)  Address: [address] [facility], [address]  Phone: [phone]  Hours of Operation: By appointment -- Monday-Friday 9:00AM-5:00PM  [place]eka [place] [address]  [facility]  Address: [address] [facility] [place] [address]  Phone: [phone]  Hours of Operation: 1st & 3rd Tuesdays 9:00AM-10:30AM; Thursdays 6:00PM-8:00PM    [facility]  Address: [address] [place] [address]  Phone: [phone]  Hours of Operation: Every other Saturday 10:00AM-12:00PM  [facility]  [facility]  Address: [address] [place] [address]  Phone: [phone]  Hours of Operation: 3rd Monday 6:00PM-7:30PM  [place]ek[place]  [address]  [facility]  Address: [address] [place] [address]  Phone: [phone]  Hours of Operation: 4th Sunday 9:00AM-11:00AM  First [place], [place]  Address: [address] [place] [address]  Phone: [phone]  Hours of Operation: Saturdays 10:30AM-12:30PM  [facility]  Address: [address] [place] [address]  Phone: [phone]  Hours of Operation: By appointment -- Tuesday-Thursday 8:30AM-4:30PM  [facility]  Address: [address] [place] [address]  Phone: [phone]  Hours of Operation: 1st & 3rd Wednesdays 6:00PM-8:00PM  [place] - [address]  [facility] Food Bank  Address: [address] [place] [address]  Phone: [phone]  Hours of Operation: Wednesdays & Fridays 3:00PM-4:00PM  [place]  [address]  Hallie Danielles Pantry  Address: [address] [place] [address]  Phone: [phone]  Hours of Operation: Every other Saturday 10:30AM-12:30PM  [place] - [address]  Froilan Demar[facility]  Address: [address] [place] [address] [place] [address]  Phone: [phone]  Hours of Operation: Tuesdays & Wednesdays 10:00AM-2:00PM; Closed last week of the month  Jimenez Meier - [address]  [facility] [place] [facility]  Address: Health system, [place] [address]  Phone: [phone]  Hours of Operation: 1st Saturday 9:00AM-12:00PM; 3rd Thursday 4:00PM-7:00PM    [employer] - [address]  [facility] Food Bank  Address: [address] [place] [address]  Phone: [phone]  Hours of Operation: 3rd Monday & 3rd Wednesday 4:00PM-6:00PM; 3rd Saturday 10:00AM-12:00PM  [place] [id number]  [facility]  Address: [address] [place], [place] [address]  Phone: [phone]  Hours of Operation: 1st & 3rd Mondays 9:00AM-11:30AM  [address]  [facility]  Address: [address] [place] [address]  Phone: [phone]  Hours of Operation: 2nd Saturday 9:00AM-11:00AM  [address] Food Bank  Address: [address] [place] [address]  Phone: [phone]  Hours of Operation: 1st, 2nd, & 3rd Thursdays 4:00PM-7:00PM; Last Saturday 9:30AM-12:00PM  [address]  Address: [address] [place] [address]  Phone: [phone]  Hours of Operation: Tuesdays 6:00PM-7:00PM; Saturdays 4:00PM-5:00PM; Sundays 12:30PM-1:30PM  [place] [employer]  Address: [address] [place] [address]  Phone: [phone]  Hours of Operation: By appointment -- Mondays 6:00PM      [place]oup [place]  [facility]  Address: [address] [place] [address]  Phone: [phone]  Hours of Operation: Monday-Friday 1:30PM-2:30PM  [facility] ([facility])  Address: [address] [place] [address]  Phone: [phone]  Hours of Operation: Monday-Friday 9:00AM-5:00PM  [facility] [employer]  Address: [address] [address]  Phone: [phone]  Hours of Operation: Tuesday-Thursday 12:00PM-1:00PM    [facility] [employer]  Address: [address] [address]  Phone: [phone]  Hours of Operation: Sundays 8:00AM-9:00AM; Some holidays  [facility] Food Hood  Municipalities:  [place], [facility], [place], [place], [place], [place], and [place]  Emergency Food Pantries  [place] - [address]  [facility] Food Bank  Address: [address] [place] [address]  Phone: [phone]  Hours of Operation: 2nd Tuesday 10:00AM-12:00PM  [place] [facility]  [facility] [place]  Address: [address] [facility] [place] [address]  Phone: [phone]  Hours of Operation: Wednesdays 10:00AM-12:00PM  Marilee Bejarano [facility]  Address: [address] [place] [address], [place] [address]  Phone: [phone]  Hours of Operation: 1st & 3rd Tuesdays 5:00PM-6:00PM  Anthony Gaona  [facility]  Address: [address] [facility], [place], [address]  Phone: [phone]  Hours of Operation: Tuesdays 6:00PM-7:00PM  Holy Froilan Gnosticism  Address: [address], [place] [address]  Phone: [phone]  Hours of Operation: 1st & 2nd Saturdays 12:00PM- 4:00PM  [facility] Pantry  Address: [address], [place] [address]  Phone: [phone]  Hours of Operation: Monday-Friday 10:30AM-11:30AM  [employer] [facility]  Address: [address], [place] [address]  Phone: [phone]  Hours of Operation: 3rd & 4th Saturdays 9:00AM-11:00AM; [facility] residents only    [place] - [address]  [place] [facility]  Address: [address] [facility], [place] [address]  Phone: [phone]  Hours of Operation: 2nd Thursday 10:00AM-12:00PM  [facility]ry  Address: [address] [facility], [place] [address]  Phone: [phone]  Hours of Operation: Monday & Tuesday of the first full week of the month 9:00AM-11:00AM  [facility]  Address: [address] [facility] [place] [facility], [place] [address]  Phone: [phone]  Hours of Operation: Mondays, Wednesdays, & Thursdays 9:30AM-11:30AM  [facility]  Address: [address] [facility], [place] [address]  Phone: [phone]  Hours of Operation: 2nd & 4th Saturdays 10:00AM-12:00PM  [place] - [address]  [place] [facility]  Address: [address] [facility], [place] [address]  Phone: [phone]  Hours of Operation: By appointment -- Tuesdays & Wednesdays 10:00AM-4:00PM, Thursdays 10:00AM-  12:00PM, & Sundays 4:00PM-7:00PM  Mandaeism Handseeing Information  Address: [address] [place] [address]  Phone: [phone]  Hours of Operation: 3rd Saturday 10:00AM-12:00PM  SHARATH Webster  Address: [address] [place] [address]  Phone: [phone]  Hours of Operation: 3rd Saturday 9:00AM-11:30AM or by appointment  [facility]  Address: [address] [place]  [address]  Phone: [phone]  Hours of Operation: 2nd & 4thTuesdays 10:00AM-12:00PM  [facility]  Address: [address] [place] [address]  Phone: [phone]  Hours of Operation: 1st, 2nd, & 4th Wednesdays 11:00AM-1:00PM; 3rd Wednesday 5:00PM-7:00PM  [facility]  Address: [address] [place] [address] [place] [address]  Phone: [phone]  Hours of Operation: Wednesdays 10:00AM-12:00PM; Last Wednesday 6:00PM-8:00PM  [facility]  [facility]  Address: [address] [place] [address]  Phone: [phone]  Hours of Operation: Fridays 8:30AM-11:30AM & 1:30PM-3:30PM    [facility]  Address: [address] [place] [address]  Phone: [phone]  Hours of Operation: By appointment--Mondays-Fridays 9:00AM -4:30PM.  [facility]  Address: [address] [place] [address]  Phone: [phone]  Hours of Operation: 1st & 3rd Tuesdays 6:00PM-7:30PM  [facility]  Address: [address] [place] [address]  Phone: [phone]  Hours of Operation: By appointment -- Mondays-Fridays 9:00AM-5:00PM  [facility] [place]  Address: [address] [place] [address]  Phone: [phone]  Hours of Operation: 3rd, 4th, & 5th Thursdays 5:00PM-7:00PM  Project  [facility]  Address: [address] [place] [address]  Phone: [phone]  Hours of Operation: Mondays 10:00AM-12:30PM; Thursdays 10:00AM-12:30PM & 1:00PM-3:30 PM  [employer]  Address: [address] [facility], [address], [place] [address]  Phone: [phone]  Hours of Operation: Tuesdays 5:00PM-6:00PM  [facility]  Address: [address] [place] [address]  Phone: [phone]  Hours of Operation: Thursdays 6:00PM-7:30PM; Closed 5th Thursday [place] - [id number]  [facility]  Address: [address] [place] [address], [place] [address]  Phone: [phone]  Hours of Operation: For last names beginning with A-M: 2nd Tuesday 8:00AM-10:00AM  or 6:00PM-7:00PM;  For last names beginning with N-Z: 2nd Wednesday 8:00AM-10:00AM  [place] - [id number]  [facility]  Address: [address] [place] [address]  Phone: [phone]  Hours of Operation: Wednesdays 9:30AM-12:00PM; 1st, 2nd, & 3rd Thursdays 6:00PM-8:00PM; 2nd & 3rd Saturdays 9:30AM-12:00PM  [place] - [id number]  [facility]  Address: [address] [facility], [place] [address]  Phone: [phone]  Hours of Operation: 3rd Saturday 9:00AM-11:00AM    [facility] [place]  Address: [address] [facility], [place] [address]  Phone: [phone]  Hours of Operation: Tuesdays 10:00AM-2:00PM  [place] [facility]  Address: [address] [facility], [place] [address]  Phone: [phone]  Hours of Operation: Tuesdays 10:00AM-12:00PM; Closed 5th Tuesday  [place] - [id number]  [employer]  Address: [address] [place] [address]  Phone: [phone]  Hours of Operation: Mondays 11:00AM-12:30PM & 7:00PM-8:30PM  [place] - [id number]  [facility]  Address: [address] [facility] [place] [address]  Phone: [phone]  Hours of Operation: Mondays 5:00PM-7:00PM  [address]  Address: [address] [facility] [place] [address]  Phone: [phone]  Hours of Operation: 2nd & 4th Saturdays 9:00AM-10:00AM  [place]  [facility] [place]  Address: [address] [facility] [place] [address]  Phone: [phone]  Hours of Operation: 2nd Saturday - Lunch (Call for times)  [place] [facility]  Address: [address] [facility], [place] [address]  Phone: [phone]  Ours of Operation: Sundays 1:00PM-2:00PM  [facility]  Address: [address] [facility] [place] [address]  Phone: [phone]  Hours of Operation: Saturdays 12:00PM-1:00PM  [facility]  Address: [address] [place] [address] [place] [address]  Phone: [phone]  Hours of Operation: Monday-Friday 8:00AM-4:00PM  [facility]  Address: [address] [place] [address]  [facility] [place] [address]  Phone: [phone]  Hours of Operation: Monday-Friday 12:00PM-1:00PM    [facility]  Address: [address] [place] [address]  Phone: [phone]  Hours of Operation: [place] [facility]  Address: [address] [place] [address]  Phone: [phone]  Hours of Operation: Monday-Friday 12:00PM-1:00PM

## 2024-11-14 NOTE — PROGRESS NOTES
11/14/24    Team Meeting   Meeting Type Daily Rounds   Team Members Present   Team Members Present Physician;;Nurse;Occupational Therapist   Physician Team Member Susan VILLALOBOS, Cristhian VILLALOBOS, Deyanira PERDUE   Nursing Team Member Tabatha MONROY   Social Work Team Member Arya MUIR   OT Team Member Pope ANJEL   Patient/Family Present   Patient Present No   Patient's Family Present No     201, dc home today, prn seeking, denies all, starting PHP tomorrow

## 2024-11-14 NOTE — PLAN OF CARE
Problem: Alteration in Thoughts and Perception  Goal: Treatment Goal: Gain control of psychotic behaviors/thinking, reduce/eliminate presenting symptoms and demonstrate improved reality functioning upon discharge  Outcome: Adequate for Discharge  Goal: Refrain from acting on delusional thinking/internal stimuli  Description: Interventions:  - Monitor patient closely, per order   - Utilize least restrictive measures   - Set reasonable limits, give positive feedback for acceptable   - Administer medications as ordered and monitor of potential side effects  Outcome: Adequate for Discharge  Goal: Agree to be compliant with medication regime, as prescribed and report medication side effects  Description: Interventions:  - Offer appropriate PRN medication and supervise ingestion; conduct AIMS, as needed   Outcome: Adequate for Discharge  Goal: Recognize dysfunctional thoughts, communicate reality-based thoughts at the time of discharge  Description: Interventions:  - Provide medication and psycho-education to assist patient in compliance and developing insight into his/her illness   Outcome: Adequate for Discharge     Problem: Ineffective Coping  Goal: Cooperates with admission process  Description: Interventions:   - Complete admission process  Outcome: Adequate for Discharge  Goal: Identifies ineffective coping skills  Outcome: Adequate for Discharge  Goal: Identifies healthy coping skills  Outcome: Adequate for Discharge  Goal: Demonstrates healthy coping skills  Outcome: Adequate for Discharge  Goal: Participates in unit activities  Description: Interventions:  - Provide therapeutic environment   - Provide required programming   - Redirect inappropriate behaviors   Outcome: Adequate for Discharge  Goal: Patient/Family participate in treatment and DC plans  Description: Interventions:  - Provide therapeutic environment  Outcome: Adequate for Discharge  Goal: Patient/Family verbalizes awareness of resources  Outcome:  Adequate for Discharge  Goal: Understands least restrictive measures  Description: Interventions:  - Utilize least restrictive behavior  Outcome: Adequate for Discharge  Goal: Free from restraint events  Description: - Utilize least restrictive measures   - Provide behavioral interventions   - Redirect inappropriate behaviors   Outcome: Adequate for Discharge     Problem: Risk for Self Injury/Neglect  Goal: Treatment Goal: Remain safe during length of stay, learn and adopt new coping skills, and be free of self-injurious ideation, impulses and acts at the time of discharge  Outcome: Adequate for Discharge  Goal: Refrain from harming self  Description: Interventions:  - Monitor patient closely, per order  - Develop a trusting relationship  - Supervise medication ingestion, monitor effects and side effects   Outcome: Adequate for Discharge  Goal: Attend and participate in unit activities, including therapeutic, recreational, and educational groups  Description: Interventions:  - Provide therapeutic and educational activities daily, encourage attendance and participation, and document same in the medical record  - Obtain collateral information, encourage visitation and family involvement in care   Outcome: Adequate for Discharge  Goal: Recognize maladaptive responses and adopt new coping mechanisms  Outcome: Adequate for Discharge     Problem: Depression  Goal: Treatment Goal: Demonstrate behavioral control of depressive symptoms, verbalize feelings of improved mood/affect, and adopt new coping skills prior to discharge  Outcome: Adequate for Discharge  Goal: Verbalize thoughts and feelings  Description: Interventions:  - Assess and re-assess patient's level of risk   - Engage patient in 1:1 interactions, daily, for a minimum of 15 minutes   - Encourage patient to express feelings, fears, frustrations, hopes   Outcome: Adequate for Discharge  Goal: Refrain from isolation  Description: Interventions:  - Develop a  trusting relationship   - Encourage socialization   Outcome: Adequate for Discharge  Goal: Refrain from self-neglect  Outcome: Adequate for Discharge  Goal: Complete daily ADLs, including personal hygiene independently, as able  Description: Interventions:  - Observe, teach, and assist patient with ADLS  -  Monitor and promote a balance of rest/activity, with adequate nutrition and elimination   Outcome: Adequate for Discharge     Problem: Anxiety  Goal: Anxiety is at manageable level  Description: Interventions:  - Assess and monitor patient's anxiety level.   - Monitor for signs and symptoms (heart palpitations, chest pain, shortness of breath, headaches, nausea, feeling jumpy, restlessness, irritable, apprehensive).   - Collaborate with interdisciplinary team and initiate plan and interventions as ordered.  - Ruf[place] patient to unit/surroundings  - Explain treatment plan  - Encourage participation in care  - Encourage verbalization of concerns/fears  - Identify coping mechanisms  - Assist in developing anxiety-reducing skills  - Administer/offer alternative therapies  - Limit or eliminate stimulants  Outcome: Adequate for Discharge     Problem: Alteration in Orientation  Goal: Treatment Goal: Demonstrate a reduction of confusion and improved orientation to person, place, time and/or situation upon discharge, according to optimum baseline/ability  Outcome: Adequate for Discharge  Goal: Interact with staff daily  Description: Interventions:  - Assess and re-assess patient's level of orientation  - Engage patient in 1 on 1 interactions, daily, for a minimum of 15 minutes   - Establish rapport/trust with patient   Outcome: Adequate for Discharge  Goal: Allow medical examinations, as recommended  Description: Interventions:  - Provide physical/neurological exams and/or referrals, per provider   Outcome: Adequate for Discharge  Goal: Cooperate with recommended testing/procedures  Description: Interventions:  - Determine  need for ancillary testing  - Observe for mental status changes  - Implement falls/precaution protocol   Outcome: Adequate for Discharge  Goal: Attend and participate in unit activities, including therapeutic, recreational, and educational groups  Description: Interventions:  - Provide therapeutic and educational activities daily, encourage attendance and participation, and document same in the medical record   - Provide appropriate opportunities for reminiscence   - Provide a consistent daily routine   - Encourage family contact/visitation   Outcome: Adequate for Discharge     Problem: DISCHARGE PLANNING - CARE MANAGEMENT  Goal: Discharge to post-acute care or home with appropriate resources  Description: INTERVENTIONS:  - Conduct assessment to determine patient/family and health care team treatment goals, and need for post-acute services based on payer coverage, community resources, and patient preferences, and barriers to discharge  - Address psychosocial, clinical, and financial barriers to discharge as identified in assessment in conjunction with the patient/family and health care team  - Arrange appropriate level of post-acute services according to patient’s   needs and preference and payer coverage in collaboration with the physician and health care team  - Communicate with and update the patient/family, physician, and health care team regarding progress on the discharge plan  - Arrange appropriate transportation to post-acute venues  Outcome: Adequate for Discharge     Problem: Nutrition/Hydration-ADULT  Goal: Nutrient/Hydration intake appropriate for improving, restoring or maintaining nutritional needs  Description: Monitor and assess patient's nutrition/hydration status for malnutrition. Collaborate with interdisciplinary team and initiate plan and interventions as ordered.  Monitor patient's weight and dietary intake as ordered or per policy. Utilize nutrition screening tool and intervene as necessary.  Determine patient's food preferences and provide high-protein, high-caloric foods as appropriate.     INTERVENTIONS:  - Monitor oral intake, urinary output, labs, and treatment plans  - Assess nutrition and hydration status and recommend course of action  - Evaluate amount of meals eaten  - Assist patient with eating if necessary   - Allow adequate time for meals  - Recommend/ encourage appropriate diets, oral nutritional supplements, and vitamin/mineral supplements  - Order, calculate, and assess calorie counts as needed  - Recommend, monitor, and adjust tube feedings and TPN/PPN based on assessed needs  - Assess need for intravenous fluids  - Provide specific nutrition/hydration education as appropriate  - Include patient/family/caregiver in decisions related to nutrition  Outcome: Adequate for Discharge     Problem: Potential for Falls  Goal: Patient will remain free of falls  Description: INTERVENTIONS:  - Educate patient/family on patient safety including physical limitations  - Instruct patient to call for assistance with activity   - Consult OT/PT to assist with strengthening/mobility   - Keep Call bell within reach  - Keep bed low and locked with side rails adjusted as appropriate  - Keep care items and personal belongings within reach  - Initiate and maintain comfort rounds  - Make Fall Risk Sign visible to staff  - Apply yellow socks and bracelet for high fall risk patients  - Consider moving patient to room near nurses station  Outcome: Adequate for Discharge

## 2024-11-14 NOTE — PROGRESS NOTES
Progress Note - Carey Keith 49 y.o. female MRN: [id number]    Unit/Bed#: [facility] 258-02 Encounter: [id number]        Subjective:   Patient seen and examined at bedside after reviewing the chart and discussing the case with the caring staff.      Patient examined at bedside.  Patient denies any acute symptoms.    Patient is likely being discharged today, 11/14/24.    Physical Exam   Vitals: Blood pressure 109/74, pulse 86, temperature 98 °F (36.7 °C), temperature source Temporal, resp. rate 19, height 5' (1.524 m), weight 73.5 kg (162 lb), SpO2 92%.,Body mass index is 31.64 kg/m².  Constitutional: Patient in no acute distress.  HEENT: PERR, EOMI, MMM.  Cardiovascular: Normal rate and regular rhythm.    Pulmonary/Chest: Effort normal and breath sounds normal.   Abdomen: Soft, + BS, NT.    Assessment/Plan:  Carey Keith is a(n) 49 y.o. female with bipolar disorder.     Medical Clearance: Patient is medically cleared for discharge. All prescriptions have been sent to pharmacy.     Asthma.  Continue Symbicort 160-4.5 mcg/act twice daily.  Albuterol as needed.   Substance use disorder.  Patient on Suboxone 8 mg film TID.   Chronic hep C.  Liver functions stable.  Follow-up with GI on outpt basis.   Tobacco use disorder.  NRT.   Vitamin D deficiency.  Patient started on vitamin D2 50,000 units weekly for 8 weeks followed by vitamin D3 1000 units daily.  Back pain/neuropathy/headache.  Continue gabapentin 800 mg TID (incr from 600 mg TID 10/31).  Toradol IM 15mg q8h prn for severe pain x3 days 11/3/2024 per Dr. Mark.  May get ibuprofen as needed for headache along with trial Zanaflex 4mg q8h prn.  Add Bengay cream 11/8.   Acute UTI.  UA pos in ED 10/24.  Macrobid 100 mg twice daily x 5 days (thru 10/30).   Hypokalemia.  K 2.7 -->  4.9 on 10/27.  Resolved with potassium chloride 40 mEq twice daily x 3 days.  Constipation.  Schedule Colace 100 mg daily.  Continue Miralax or Senokot as needed.   R knee wound.  Apply  bacitracin ointment daily with DSD.   Athlete foot.  I will treat the patient with nystatin cream twice daily.    The patient was discussed with Dr. Mark and he is in agreement with the above note.

## 2024-11-14 NOTE — ASSESSMENT & PLAN NOTE
Continue Lamictal 25 mg PO QD upon discharge  Continue Zyprexa 15mg PO BID upon discharge  Continue Topamax 75 mg PO BID upon discharge

## 2024-11-14 NOTE — NURSING NOTE
Patient visible on unit. Pleasant and cooperative. Social with staff and peers. . Denies IS,HI,AVH, anxiety and depression. Safety checks continue Q 15 minutes.

## 2024-11-14 NOTE — SOCIAL WORK
Ok Carey has been scheduled to start PHP at [facility] tomorrow, 11/15 Friday - reporting time 8.15 AM and she will start the group after the intakes. Monday onwards groups 8.30 AM to 2.30 PM.     ADDRESS: [address] [place] [address], [address] [facility], [place] [address]. TELE: [phone]     Please remind pt to bring her insurance card, photo ID and her PCP details.

## 2024-11-14 NOTE — TREATMENT TEAM
11/14/24 0952   Lundberg Anxiety Scale   Anxious Mood 3   Tension 3   Fears 3   Insomnia 0   Intellectual 0   Depressed Mood 3   Somatic Complaints: Muscular 0   Somatic Complaints: Sensory 0   Cardiovascular Symptoms 0   Respiratory Symptoms 0   Gastrointestinal Symptoms 0   Genitourinary Symptoms 0   Autonomic Symptoms 4   Behavior at Interview 4   Lundberg Anxiety Score 20     Patient reported moderate anxiety. Atarax 50 mg PO given.

## 2024-11-14 NOTE — NURSING NOTE
Patient reports no longer feeling anxious. Visible and social on the unit. Atarax 50 mg given @ 0952 effective.

## 2024-11-14 NOTE — DISCHARGE SUMMARY
Discharge Summary - Behavioral Health   Name: Carey Keith 49 y.o. female I MRN: [id number]  Unit/Bed#: -02 I Date of Admission: 10/25/2024   Date of Service: 11/14/2024 I Hospital Day: 20    Discharge Summary - Behavioral Health   Carey Keith 49 y.o. female MRN: [id number]  Unit/Bed#: -02 Encounter: [id number]     Assessment & Plan  Bipolar disorder, current episode depressed, severe, without psychotic features (HCC)  Continue Lamictal 25 mg PO QD upon discharge  Continue Zyprexa 15mg PO BID upon discharge  Continue Topamax 75 mg PO BID upon discharge  Opioid use disorder, severe, on maintenance therapy (HCC)  Suboxone 8 mg sublingual film 3 times daily for MAT for OUD- continue upon discharge  Methamphetamine abuse (HCC)  Continue Klonopin 0.5 mg PO QHS upon discharge with plan to taper to discontinuation      Admission Date: 10/25/2024         Discharge Date: 11/14/2024  1:00 PM    Attending Psychiatrist: Dr. Danny Davis     Reason for Admission/HPI: Depression [F32.A]  Major depressive disorder [F32.9]    According to H&P by Dr. Brunson 10/25/24:    History of Present Illness  Carey Keith is a 49 y.o. female with a history of Bipolar Disorder who was admitted to the inpatient adult psychiatric unit on a voluntary 201 commitment basis due to depression, anxiety, unstable mood, and suicidal ideation.  Patient presented to the ED at [facility] on October 24 brought in by EMS reporting having used meth the night prior to presentation.  In the ED patient was initially sedated, when more awake and possible discharge discussed with her she became agitated, apparently grabbed scissors and superficially cut her wrist.  Seen by [facility], patient reported social stressors causing her to have suicidal ideation and signed a 201 for voluntary admission.  On evaluation in the inpatient psychiatric unit Carey is an extremely poor historian.  She reports feeling highly  anxious, distressed and is struggling due to social issues of being homeless and having lost all of her finances.  Reports her partner kicked her out of the house.  Endorses symptoms of significant depression, feelings of hopelessness and poor functioning.  Patient relapsed into using amphetamines, using daily for the last several.  Reports having thoughts of self-harm and wish for death.  Is able to contract for safety on the unit.  Patient reports history of bipolar disorder treated with multiple medications.  Reports she is currently taking Wellbutrin, Neurontin and Klonopin as an outpatient however is unable to be clear about when she last took the medications.  UDS is positive for benzodiazepines, PDMP checked and is being prescribed benzodiazepines.  Also positive for methamphetamine.    Hospital Course: The patient was admitted to the inpatient psychiatric unit and started on every 15 minutes precautions. During the hospitalization the patient was attending individual therapy, group therapy, milieu therapy and occupational therapy.    Psychiatric medications were titrated over the hospital stay to address depression, depressive symptoms, mood instability, agitation, and polysubstance abuse and suicidal ideation.  Carey was continued on Suboxone, clonazepam, and gabapentin and started on mood stabilizer Lamictal and Topamax, antipsychotic medication Zyprexa, and hypnotic medication Melatonin. Medication doses were titrated during the hospital course. Prior to beginning of treatment medications risks and benefits and possible side effects including risk of rash related to treatment with Lamictal, risk of parkinsonian symptoms, Tardive Dyskinesia and metabolic syndrome related to treatment with antipsychotic medications, risk of cardiovascular events in elderly related to treatment with antipsychotic medications, and risks of misuse, abuse or dependence, sedation and respiratory depression related to  treatment with benzodiazepine medications were reviewed with the patient. Carey verbalized understanding and agreement for treatment.  Medication education remained ongoing throughout inpatient stay.    Upon admission, Carey was labile and continued to endorse depression with passive SI.  Sleep and self-care were poor.  Thoughts negative and ruminating.  She was continued on outpatient Suboxone and gabapentin and started on a Klonopin taper.  Invega was also initiated for mood stability and thought disorder.  Zoloft 50 mg daily was added for treatment of depression.  Carey continued to exhibit ongoing mood lability and intermittent agitation at which point Zoloft was discontinued.  Patient required extensive education regarding medications for treatment of depression and mood instability to which she refused most recommendations. Refused Invega consistently and was ultimately agreeable to transition to Zyprexa for mood control, agitation, psychosis, and depression.  Lamictal 25 mg daily was also added for depression and mood control. Carey also verbalized her desire to restart Topamax which had been effective for mood control and weight gain in the past with antipsychotic therapy.     During her inpatient stay, Klonopin was slowly tapered to 0.5 mg PO QHS; she tolerated well with no signs or symptoms of withdrawal.  Carey did sign a 72-hour notice 3 times during her inpatient stay, however rescinded twice for further ongoing medication adjustment and once to accommodate time of transport at discharge.  She was also extensively educated on the risks of polysubstance abuse; agreeable to community dispense narcan at discharge.  Carey expressed her understanding and desire to maintain sobriety upon discharge.  She was also able to identify an adequate safety plan which includes talking with her mother, friend AJ, or returning to the hospital.  She was also in agreement to utilize crisis hotline  "if need be.  Carey also described her family and desire to maintain sobriety as strong protective factors against suicide.  Forward thinking with plan to continue outpatient psychiatric follow-up including PHP starting 11/15/2024, attend NA meetings, and \"enjoy the fall weather.\"     With ongoing medication adjustments, Carey maintained improvement with mood control and maintained safety with no return of SI.  Self-care and appetite were also improved and maintained.  Upon expiration of 72-hour notice, Carey did not present as a danger to herself or others and exhibited no criteria to pursue involuntary admission.  Discharged under premise of 72-hour notice however did rescind to accommodate transport time.    Carey signed 72 hour notice and requested discharge. At the time of the 72 hour notice expiration she had no criteria for involuntary commitment and denied any suicidal or homicidal ideation.    The outpatient follow up 11/15/24 with [facility], family physician Dr. Dawn, and Intensive  with [facility] (Nila Quinones) was arranged by the unit  upon discharge.  A 30-day supply of psychotropic medication was submitted to patient's pharmacy of choice with the exception of Klonopin 0.5 mg tablets which was sent in for 10-day supply only.  Patient expressed her understanding to follow up with her outpatient psychiatric team ([facility] or [facility]- established) for further taper of medication/refill if required.     Carey was stabilized and discharged on the following psychotropic regimen: Clonazepam 0.5mg PO QHS, Lamictal 25mg PO QD, Zyprexa 15mg PO BID, Topamax 75mg PO BID, Melatonin 3mg PO QHS, gabapentin 800mg PO TID, and suboxone 8mg SL TID. She was tolerating medications well and denied any side effects at time of discharge.     Recommend further taper of clonazepam to discontinuation on " outpatient basis to reduce polypharmacy and history of polysubstance abuse.  Also recommend further titration of Lamictal for ongoing mood stabilization and history of depression.    Mental Status at time of Discharge:     Appearance:  casually dressed, older than stated age, and wearing make-up, blond curly hair   Behavior:  Cooperative   Speech:  Hyperverbal   Mood:  euthymic   Affect:  mood-congruent, irritable edge, but redirectable   Thought Process:  goal directed and linear, forward thinking   Thought Content:  No overt delusions or paranoia verbalized   Perceptual Disturbances: Denied AVH, did not appear internally preoccupied   Risk Potential: Suicidal Ideations none, Homicidal Ideations none, and Potential for Aggression No   Sensorium:  person, place, time/date, and situation   Cognition:  recent and remote memory grossly intact   Consciousness:  alert and awake    Attention: attention span appeared shorter than expected for age   Insight:  limited   Judgment: limited   Gait/Station: normal gait/station and normal balance   Motor Activity: no abnormal movements     Admission Diagnosis:Depression [F32.A]  Major depressive disorder [F32.9]    Discharge Diagnosis:   Principal Problem:    Bipolar disorder, current episode depressed, severe, without psychotic features ([facility])  Active Problems:    Opioid use disorder, severe, on maintenance therapy ([facility])    Methamphetamine abuse ([facility])  Resolved Problems:    Suicidal ideations    Lab results:  Admission on 10/25/2024, Discharged on 11/14/2024   Component Date Value    Sodium 10/25/2024 139     Potassium 10/25/2024 2.7 (L)     Chloride 10/25/2024 107     CO2 10/25/2024 24     ANION GAP 10/25/2024 8     BUN 10/25/2024 12     Creatinine 10/25/2024 0.55 (L)     Glucose 10/25/2024 96     Glucose, Fasting 10/25/2024 96     Calcium 10/25/2024 8.3 (L)     Corrected Calcium 10/25/2024 9.0     AST 10/25/2024 50 (H)     ALT 10/25/2024 39     Alkaline Phosphatase 10/25/2024  53     Total Protein 10/25/2024 5.3 (L)     Albumin 10/25/2024 3.1 (L)     Total Bilirubin 10/25/2024 0.60     eGFR 10/25/2024 112     WBC 10/25/2024 6.17     RBC 10/25/2024 3.55 (L)     Hemoglobin 10/25/2024 10.1 (L)     Hematocrit 10/25/2024 31.7 (L)     MCV 10/25/2024 89     MCH 10/25/2024 28.5     MCHC 10/25/2024 31.9     RDW 10/25/2024 13.0     MPV 10/25/2024 8.7 (L)     Platelets 10/25/2024 315     nRBC 10/25/2024 0     Segmented % 10/25/2024 54     Immature Grans % 10/25/2024 1     Lymphocytes % 10/25/2024 34     Monocytes % 10/25/2024 7     Eosinophils Relative 10/25/2024 3     Basophils Relative 10/25/2024 1     Absolute Neutrophils 10/25/2024 3.38     Absolute Immature Grans 10/25/2024 0.04     Absolute Lymphocytes 10/25/2024 2.07     Absolute Monocytes 10/25/2024 0.44     Eosinophils Absolute 10/25/2024 0.17     Basophils Absolute 10/25/2024 0.07     Preg, Serum 10/25/2024 Negative     TSH 3RD GENERATON 10/25/2024 1.423     Vitamin B-12 10/25/2024 586     Folate 10/25/2024 7.6     Vit D, 25-Hydroxy 10/25/2024 16.8 (L)     Cholesterol 10/25/2024 129     Triglycerides 10/25/2024 58     HDL, Direct 10/25/2024 39 (L)     LDL Calculated 10/25/2024 78     Non-HDL-Chol (CHOL-HDL) 10/25/2024 90     Syphilis Total Antibody 10/25/2024 Non-reactive     Sodium 10/27/2024 136     Potassium 10/27/2024 4.9     Chloride 10/27/2024 107     CO2 10/27/2024 21     ANION GAP 10/27/2024 8     BUN 10/27/2024 8     Creatinine 10/27/2024 0.63     Glucose 10/27/2024 95     Glucose, Fasting 10/27/2024 95     Calcium 10/27/2024 8.3 (L)     eGFR 10/27/2024 107     Magnesium 10/27/2024 2.1     Sodium 11/07/2024 137     Potassium 11/07/2024 4.0     Chloride 11/07/2024 106     CO2 11/07/2024 24     ANION GAP 11/07/2024 7     BUN 11/07/2024 19     Creatinine 11/07/2024 0.77     Glucose 11/07/2024 94     Calcium 11/07/2024 8.7     eGFR 11/07/2024 92     Magnesium 11/07/2024 2.2        Discharge Medications:  Discharge Medication List as  of 11/14/2024 11:00 AM        START taking these medications    Details   Diclofenac Sodium (VOLTAREN) 1 % Apply 2 g topically 4 (four) times a day, Starting Thu 11/14/2024, Normal      docusate sodium (COLACE) 100 mg capsule Take 1 capsule (100 mg total) by mouth daily, Starting Fri 11/15/2024, Normal      lamoTRIgine (LaMICtal) 25 mg tablet Take 1 tablet (25 mg total) by mouth daily, Starting Thu 11/14/2024, Until Sat 12/14/2024, Normal      melatonin 3 mg Take 1 tablet (3 mg total) by mouth daily at bedtime, Starting Wed 11/13/2024, Until Fri 12/13/2024, Normal      OLANZapine (ZyPREXA) 15 mg tablet Take 1 tablet (15 mg total) by mouth 2 (two) times a day, Starting Wed 11/13/2024, Until Fri 12/13/2024, Normal      tiZANidine (ZANAFLEX) 4 mg tablet Take 1 tablet (4 mg total) by mouth every 8 (eight) hours as needed for muscle spasms, Starting Thu 11/14/2024, Normal              Discharge Medication List as of 11/14/2024 11:00 AM        STOP taking these medications       buPROPion (WELLBUTRIN XL) 150 mg 24 hr tablet Comments:   Reason for Stopping:         buPROPion (WELLBUTRIN XL) 300 mg 24 hr tablet Comments:   Reason for Stopping:         Narcan 4 MG/0.1ML nasal spray Comments:   Reason for Stopping:         ondansetron (ZOFRAN-ODT) 4 mg disintegrating tablet Comments:   Reason for Stopping:         traZODone (DESYREL) 100 mg tablet Comments:   Reason for Stopping:         valACYclovir (VALTREX) 500 mg tablet Comments:   Reason for Stopping:                Discharge Medication List as of 11/14/2024 11:00 AM        CONTINUE these medications which have CHANGED    Details   buprenorphine-naloxone (Suboxone) 8-2 mg Place 1 Film (8 mg total) under the tongue 3 (three) times a day for 14 days, Starting Thu 11/14/2024, Until Thu 11/28/2024, Normal      clonazePAM (KlonoPIN) 0.5 mg tablet Take 1 tablet (0.5 mg total) by mouth daily at bedtime for 10 days, Starting Wed 11/13/2024, Until Sat 11/23/2024, Normal       ergocalciferol (VITAMIN D2) 50,000 units Take 1 capsule (50,000 Units total) by mouth once a week for 6 doses, Starting Sun 11/17/2024, Until Mon 12/23/2024, Normal      gabapentin (NEURONTIN) 400 mg capsule Take 2 capsules (800 mg total) by mouth 3 (three) times a day, Starting Thu 11/14/2024, Normal      topiramate (TOPAMAX) 25 mg tablet Take 3 tablets (75 mg total) by mouth every 12 (twelve) hours, Starting Wed 11/13/2024, Until Fri 12/13/2024, Normal              Discharge Medication List as of 11/14/2024 11:00 AM        CONTINUE these medications which have NOT CHANGED    Details   albuterol (Proventil HFA) 90 mcg/act inhaler Inhale 2 puffs every 6 (six) hours as needed for wheezing, Starting Thu 2/8/2024, Normal      naproxen (NAPROSYN) 500 mg tablet Take 500 mg by mouth, Starting Wed 7/3/2024, Until Thu 7/3/2025 at 2359, Historical Med      polyethylene glycol (GLYCOLAX) 17 GM/SCOOP powder MIX 17 GRAMS WITH LIQUID AND DRINK DAILY, Historical Med      Symbicort 160-4.5 MCG/ACT inhaler Inhale 2 puffs 2 (two) times a day, Starting Mon 6/3/2024, Historical Med              Discharge instructions/Information to patient and family:   See after visit summary for information provided to patient and family.      Provisions for Follow-Up Care:  See after visit summary for information related to follow-up care and any pertinent home health orders.      Discharge Statement:    I spent 30 minutes discharging the patient. This time was spent on the day of discharge. I had direct contact with the patient on the day of discharge.     Additional documentation is required if more than 30 minutes were spent on discharge:    I reviewed with Carey importance of compliance with medications and outpatient treatment after discharge.  I discussed the medication regimen and possible side effects of the medications with Carey prior to discharge. At the time of discharge she was tolerating psychiatric medications.  I discussed  outpatient follow up with Carey.  I reviewed with Carey crisis plan and safety plan upon discharge.  I discussed with Carey recommendation to follow up with outpatient drug and alcohol counseling and AA meetings.  Carey agreed to abstain from drug and alcohol use after discharge.  Carey has been filing controlled prescriptions on time as prescribed according to [place] Prescription Drug Monitoring Program.  I extensively discussed with Carey recommendation of continuing Klonopin taper on outpatient basis in addition to risks associated with concomitant drug use.  Provided on education regarding community dispensed naloxone; Carey expressed her understanding and verbalized her desire to maintain sobriety in the outpatient setting.    NETTE Burns 11/14/24

## 2024-11-14 NOTE — BH TRANSITION RECORD
Contact Information: If you have any questions, concerns, pended studies, tests and/or procedures, or emergencies regarding your inpatient behavioral health visit. Please contact [facility] # [phone] and ask to speak to a , nurse or physician. A contact is available 24 hours/ 7 days a week at this number.     Summary of Procedures Performed During your Stay:  Below is a list of major procedures performed during your hospital stay and a summary of results:  - No major procedures performed.    Pending Studies (From admission, onward)       Start     Ordered    11/10/24 0600  Hemoglobin A1C  Morning draw         11/09/24 1205                  Please follow up on the above pending studies with your PCP and/or referring provider.

## 2024-11-14 NOTE — NURSING NOTE
Patient awake during the night. No distress noted. Motrin 800 mg given at 0437 for 10/10 back pain.

## 2024-11-14 NOTE — NURSING NOTE
Kumar,  -The urine protein test was normal  - The PSA level (prostate cancer screening test) was negative/normal.   - The fasting blood glucose is elevated in the pre-diabetic range (100-125). This puts you at risk for developing diabetes in the future.  Lifestyle measures will help to lower your blood glucose levels and lower the risks of diabetes. These measures include regular exercise, weight loss/maintaining a healthy body weight, and moderating/decreasing carbohydrates (sugar, bread, pasta, rice, baked goods, potatoes, etc) in your diet. We will continue to monitor your blood glucose annually, but please be seen sooner  if you develop any new signs or symptoms of diabetes (increase thirst or urination, fatigue, blurry vision, unexplained weight loss).   - the kidney, liver and electrolyte panel was otherwise normal.  Your kidney function looks great this time.  It is fine to continue the terbinafine given the normal liver tests.  Consider repeat liver test testing in 6 months if you are still taking the terbinafine.  -Your cholesterol looks excellent    Please MyChart or call if you have any concerns or questions.   Sincerely,  Suzanne Torres MD     Positive results from Motrin noted

## 2024-11-14 NOTE — NURSING NOTE
Patient pleasant and cooperative with staff. A&Ox4. Denies SI/HI or AVH. Patient reports she plans on using drugs upon discharge. Providers made aware; patient sent home with narcan. AVS and belongings reviewed with patient. Patient walked off unit with belongings in hand. Patient discharge to [facility]. Face sheet provided.

## 2024-11-15 ENCOUNTER — HOSPITAL ENCOUNTER (EMERGENCY)
Facility: HOSPITAL | Age: 49
Discharge: HOME | End: 2024-11-15
Attending: EMERGENCY MEDICINE
Payer: COMMERCIAL

## 2024-11-15 ENCOUNTER — TELEPHONE (OUTPATIENT)
Dept: INTERNAL MEDICINE CLINIC | Facility: CLINIC | Age: 49
End: 2024-11-15

## 2024-11-15 ENCOUNTER — TELEPHONE (OUTPATIENT)
Age: 49
End: 2024-11-15

## 2024-11-15 VITALS
TEMPERATURE: 98.2 F | WEIGHT: 156.31 LBS | RESPIRATION RATE: 17 BRPM | BODY MASS INDEX: 30.53 KG/M2 | DIASTOLIC BLOOD PRESSURE: 64 MMHG | HEART RATE: 104 BPM | SYSTOLIC BLOOD PRESSURE: 126 MMHG | OXYGEN SATURATION: 97 %

## 2024-11-15 DIAGNOSIS — F41.9 ANXIETY: Primary | ICD-10-CM

## 2024-11-15 PROCEDURE — 99282 EMERGENCY DEPT VISIT SF MDM: CPT

## 2024-11-15 PROCEDURE — 99283 EMERGENCY DEPT VISIT LOW MDM: CPT | Performed by: PHYSICIAN ASSISTANT

## 2024-11-15 NOTE — ED PROVIDER NOTES
"Time reflects when diagnosis was documented in both MDM as applicable and the Disposition within this note       Time User Action Codes Description Comment    11/15/2024  1:28 PM Shikha Tony Add [F41.9] Anxiety           ED Disposition       ED Disposition   Discharge    Condition   Stable    Date/Time   Fri Nov 15, 2024  1:28 PM    Comment   Carey Keith discharge to home/self care.                   Assessment & Plan       Medical Decision Making  Amount and/or Complexity of Data Reviewed  External Data Reviewed: notes.     Details: Psych notes reviewed   Discussion of management or test interpretation with external provider(s): DR Moore         ED Course as of 11/15/24 1332   Fri Nov 15, 2024   1328 Discussed Klonopin with patient she was admitted before 24th October discharged yesterday try to get November 14.  She was tapered from 1 mg of Klonopin 3 times daily to Klonopin nightly.  She has a prescription for the Klonopin.  Discussed with patient that her more she was just tapered and polysubstance abuse on OCPs.  She is asking for phone follow-up.  Discussed getting a portable phone here for her to call her family doctor there is a phone in the waiting room and she decided she wanted to use that and she walked out prior to any discharge instructions.       Medications - No data to display    ED Risk Strat Scores                                               History of Present Illness       Chief Complaint   Patient presents with    Medication Refill     Pt was recently discharged from psych facility. States that she normally takes 1mg of klonopin daily but states they recently decreased her dose. States that she needs \" a refill of the 1mg klonopin for like 3 days so I can get an appointment with the doctor that prescribed them\" Pt reports she still has klonopin but she has been taking 1mg instead of the prescribed 0.5 mg. 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)     Alcohol abuse     Alcoholism (HCC)     Altered mental status 09/21/2022    Elevated LFTs 09/21/2022    Lower back pain     Self-injurious behavior     Substance abuse (HCC)       Past Surgical History:   Procedure Laterality Date    CHOLECYSTECTOMY        Family History   Problem Relation Age of Onset    Autism Mother     Heart disease Father     Stroke Father     Anxiety disorder Father     Heart disease Maternal Grandmother       Social History     Tobacco Use    Smoking status: Some Days     Current packs/day: 0.50     Average packs/day: 0.5 packs/day for 20.0 years (10.0 ttl pk-yrs)     Types: Cigarettes    Smokeless tobacco: Current    Tobacco comments:     Pt not ready to quit   Vaping Use    Vaping status: Every Day    Substances: Nicotine, THC, Flavoring   Substance Use Topics    Alcohol use: Not Currently     Comment: MAGDALENO, pt historically inconsistent. Drinking  per [facility] 2    Drug use: Not Currently     Types: Heroin, \"Crack\" cocaine, Cocaine, LSD, Benzodiazepines, Marijuana     Comment: Pt unreliable historian      E-Cigarette/Vaping    E-Cigarette Use Current Every Day User     Cartridges/Day 2       E-Cigarette/Vaping Substances    Nicotine Yes     THC Yes     CBD No     Flavoring Yes     Other No     Unknown No       I have reviewed and agree with the history as documented.     Patient presents to the emergency department requesting Klonopin.  She states that she was admitted psychiatrically and was against her will tapered off of her Klonopin.  She states she really think she needs it.  She states she was discharged with 10 tablets of 0.5 mg to take at night.  She took the last night has not taken another dose today.  Patient is requesting a 3-day prescription to get her through until Monday when she can follow-up with her doctor.  She is tried to get in touch with her doctor but she does not have a phone.  Patient denies any SI or " HI.        Review of Systems   Constitutional:  Negative for fever.   Respiratory: Negative.     Cardiovascular: Negative.    Gastrointestinal: Negative.    Psychiatric/Behavioral:  The patient is nervous/anxious.    All other systems reviewed and are negative.          Objective       ED Triage Vitals   Temperature Pulse Blood Pressure Respirations SpO2 Patient Position - Orthostatic VS   11/15/24 1232 11/15/24 1236 11/15/24 1236 11/15/24 1236 11/15/24 1236 11/15/24 1236   98.2 °F (36.8 °C) 104 126/64 17 97 % Sitting      Temp Source Heart Rate Source BP Location FiO2 (%) Pain Score    11/15/24 1232 11/15/24 1236 11/15/24 1236 -- --    Oral Monitor Right arm        Vitals      Date and Time Temp Pulse SpO2 Resp BP Pain Score FACES Pain Rating User   11/15/24 1236 -- 104 97 % 17 126/64 -- -- LP   11/15/24 1232 98.2 °F (36.8 °C) -- -- -- -- -- -- LP            Physical Exam  Vitals and nursing note reviewed.   Constitutional:       Appearance: She is well-developed.   HENT:      Head: Normocephalic and atraumatic.      Right Ear: External ear normal.      Left Ear: External ear normal.   Eyes:      Conjunctiva/sclera: Conjunctivae normal.   Cardiovascular:      Rate and Rhythm: Normal rate and regular rhythm.      Heart sounds: Normal heart sounds.   Pulmonary:      Effort: Pulmonary effort is normal.      Breath sounds: Normal breath sounds.   Abdominal:      Palpations: Abdomen is soft.   Musculoskeletal:         General: Normal range of motion.      Cervical back: Neck supple.   Lymphadenopathy:      Cervical: No cervical adenopathy.   Skin:     General: Skin is warm.      Findings: No rash.   Neurological:      Mental Status: She is alert.   Psychiatric:         Behavior: Behavior normal.         Results Reviewed       None            No orders to display       Procedures    ED Medication and Procedure Management   Prior to Admission Medications   Prescriptions Last Dose Informant Patient Reported? Taking?    Diclofenac Sodium (VOLTAREN) 1 %   No No   Sig: Apply 2 g topically 4 (four) times a day   OLANZapine (ZyPREXA) 15 mg tablet   No No   Sig: Take 1 tablet (15 mg total) by mouth 2 (two) times a day   Symbicort 160-4.5 MCG/ACT inhaler   Yes No   Sig: Inhale 2 puffs 2 (two) times a day   albuterol (Proventil HFA) 90 mcg/act inhaler   No No   Sig: Inhale 2 puffs every 6 (six) hours as needed for wheezing   buprenorphine-naloxone (Suboxone) 8-2 mg   No No   Sig: Place 1 Film (8 mg total) under the tongue 3 (three) times a day for 14 days   clonazePAM (KlonoPIN) 0.5 mg tablet   No No   Sig: Take 1 tablet (0.5 mg total) by mouth daily at bedtime for 10 days   docusate sodium (COLACE) 100 mg capsule   No No   Sig: Take 1 capsule (100 mg total) by mouth daily   ergocalciferol (VITAMIN D2) 50,000 units   No No   Sig: Take 1 capsule (50,000 Units total) by mouth once a week for 6 doses   gabapentin (NEURONTIN) 400 mg capsule   No No   Sig: Take 2 capsules (800 mg total) by mouth 3 (three) times a day   lamoTRIgine (LaMICtal) 25 mg tablet   No No   Sig: Take 1 tablet (25 mg total) by mouth daily   melatonin 3 mg   No No   Sig: Take 1 tablet (3 mg total) by mouth daily at bedtime   naproxen (NAPROSYN) 500 mg tablet   Yes No   Sig: Take 500 mg by mouth   polyethylene glycol (GLYCOLAX) 17 GM/SCOOP powder   Yes No   Sig: MIX 17 GRAMS WITH LIQUID AND DRINK DAILY   tiZANidine (ZANAFLEX) 4 mg tablet   No No   Sig: Take 1 tablet (4 mg total) by mouth every 8 (eight) hours as needed for muscle spasms   topiramate (TOPAMAX) 25 mg tablet   No No   Sig: Take 3 tablets (75 mg total) by mouth every 12 (twelve) hours      Facility-Administered Medications: None     Patient's Medications   Discharge Prescriptions    No medications on file     No discharge procedures on file.  ED SEPSIS DOCUMENTATION   Time reflects when diagnosis was documented in both MDM as applicable and the Disposition within this note       Time User Action Codes  Description Comment    11/15/2024  1:28 PM Shikha Tony Add [F41.9] Anxiety                  Shikha Tony PA-C  11/15/24 9955

## 2024-11-15 NOTE — TELEPHONE ENCOUNTER
Phone call from Carey.  She is asking for a refill for clonazepam.  She was discharged from [facility] 11.14.2024 with instructions to use Clonazepam 0.5 mg daily at bedtime.  She has been using 1 tablet 3 times a day and is running out.  She was advised to Follow up with [facility]: Nila Quinones; [facility]  was notified of your discharge. Nila will visit you at home Friday at 12:30 as per the discharge summary. She was also advised that Dr. Julio is out of the office until 11.18.24.

## 2024-11-15 NOTE — TELEPHONE ENCOUNTER
Patient called the office requesting for the provider to refill her Klonopin 0.5mg. as per pt she has been taking this medication 2 tablets 3 times a day. Patient states she hasn't been sleeping and has been doubling it up her medication. Call was transferred to the office for assistance

## 2024-11-18 ENCOUNTER — TELEPHONE (OUTPATIENT)
Age: 49
End: 2024-11-18

## 2024-11-18 NOTE — TELEPHONE ENCOUNTER
Patient called inquiring about the status of her refill request. She called earlier today at 1:34 pm and requested 2 week of refills with a representative on the RX refill line. I advised her of the previous note and she said Nila never came to her house and never contacted her. She called Nila's phone number [phone] and it wasn't working. She stated her therapist quit a couple of weeks ago. When she called to make an appointment she was told she needs to see the new therapist 3 times before they will refill any medications. She stated she is going through withdrawal from being unable to get her medication. She is experiencing anxiety, nausea, chills, muscle spasms, night sweats, low appetite, and it is hard to drink. I advised I will send all this information to Dr. Christian Julio as a clinical update. She is requesting call back for an update on her refill request.

## 2024-11-19 ENCOUNTER — TELEPHONE (OUTPATIENT)
Dept: INTERNAL MEDICINE CLINIC | Facility: CLINIC | Age: 49
End: 2024-11-19

## 2024-11-19 NOTE — TELEPHONE ENCOUNTER
Pt called with  Nila tel [phone]   Carey tel [phone] (PASTORA) a friend    Pt is requesting a two weeks klonopin refill until she gets in hopefully next Thursday to see therapist .  Per  he would like pt to call the therapist so they can fill her klonopin since he did not prescribed this medication for pt     Pt and  are aware

## 2024-11-28 ENCOUNTER — HOSPITAL ENCOUNTER (EMERGENCY)
Facility: HOSPITAL | Age: 49
End: 2024-11-29
Attending: EMERGENCY MEDICINE
Payer: COMMERCIAL

## 2024-11-28 DIAGNOSIS — T07.XXXA MULTIPLE LACERATIONS: ICD-10-CM
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
## 2024-11-28 DIAGNOSIS — F43.89 CHRONIC SORROW: ICD-10-CM

## 2024-11-28 DIAGNOSIS — F15.10 METHAMPHETAMINE ABUSE (HCC): ICD-10-CM

## 2024-11-28 LAB — ETHANOL EXG-MCNC: 0 MG/DL

## 2024-11-28 PROCEDURE — 82075 ASSAY OF BREATH ETHANOL: CPT | Performed by: EMERGENCY MEDICINE

## 2024-11-28 PROCEDURE — 99285 EMERGENCY DEPT VISIT HI MDM: CPT

## 2024-11-28 PROCEDURE — 99285 EMERGENCY DEPT VISIT HI MDM: CPT | Performed by: EMERGENCY MEDICINE

## 2024-11-28 PROCEDURE — 90471 IMMUNIZATION ADMIN: CPT

## 2024-11-28 PROCEDURE — 90715 TDAP VACCINE 7 YRS/> IM: CPT | Performed by: EMERGENCY MEDICINE

## 2024-11-28 RX ADMIN — TETANUS TOXOID, REDUCED DIPHTHERIA TOXOID AND ACELLULAR PERTUSSIS VACCINE, ADSORBED 0.5 ML: 5; 2.5; 8; 8; 2.5 SUSPENSION INTRAMUSCULAR at 21:56

## 2024-11-29 ENCOUNTER — HOSPITAL ENCOUNTER (INPATIENT)
Facility: HOSPITAL | Age: 49
LOS: 10 days | Discharge: HOME | DRG: 753 | End: 2024-12-09
Attending: PSYCHIATRY & NEUROLOGY | Admitting: PSYCHIATRY & NEUROLOGY
Payer: COMMERCIAL

## 2024-11-29 VITALS
TEMPERATURE: 98.3 F | RESPIRATION RATE: 18 BRPM | SYSTOLIC BLOOD PRESSURE: 109 MMHG | DIASTOLIC BLOOD PRESSURE: 68 MMHG | HEART RATE: 66 BPM | OXYGEN SATURATION: 99 %

## 2024-11-29 DIAGNOSIS — F11.20 OPIOID USE DISORDER, SEVERE, ON MAINTENANCE THERAPY (HCC): ICD-10-CM
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
## 2024-11-29 DIAGNOSIS — F31.4 BIPOLAR DISORDER, CURRENT EPISODE DEPRESSED, SEVERE, WITHOUT PSYCHOTIC FEATURES (HCC): Primary | ICD-10-CM

## 2024-11-29 PROBLEM — M54.9 BACK PAIN: Status: ACTIVE | Noted: 2024-11-29

## 2024-11-29 PROBLEM — J45.909 ASTHMA: Status: ACTIVE | Noted: 2024-11-29

## 2024-11-29 LAB
AMPHETAMINES SERPL QL SCN: POSITIVE
BARBITURATES UR QL: NEGATIVE
BENZODIAZ UR QL: NEGATIVE
COCAINE UR QL: NEGATIVE
EXT PREGNANCY TEST URINE: NEGATIVE
EXT. CONTROL: NORMAL
FENTANYL UR QL SCN: NEGATIVE
HYDROCODONE UR QL SCN: NEGATIVE
METHADONE UR QL: NEGATIVE
OPIATES UR QL SCN: NEGATIVE
OXYCODONE+OXYMORPHONE UR QL SCN: NEGATIVE
PCP UR QL: NEGATIVE
THC UR QL: NEGATIVE

## 2024-11-29 PROCEDURE — 80307 DRUG TEST PRSMV CHEM ANLYZR: CPT | Performed by: EMERGENCY MEDICINE

## 2024-11-29 PROCEDURE — 99222 1ST HOSP IP/OBS MODERATE 55: CPT | Performed by: STUDENT IN AN ORGANIZED HEALTH CARE EDUCATION/TRAINING PROGRAM

## 2024-11-29 PROCEDURE — NC001 PR NO CHARGE: Performed by: EMERGENCY MEDICINE

## 2024-11-29 PROCEDURE — 81025 URINE PREGNANCY TEST: CPT | Performed by: EMERGENCY MEDICINE

## 2024-11-29 RX ORDER — ACETAMINOPHEN 325 MG/1
650 TABLET ORAL ONCE
Status: COMPLETED | OUTPATIENT
Start: 2024-11-29 | End: 2024-11-29

## 2024-11-29 RX ORDER — IBUPROFEN 600 MG/1
600 TABLET, FILM COATED ORAL EVERY 8 HOURS PRN
Status: DISCONTINUED | OUTPATIENT
Start: 2024-11-29 | End: 2024-12-09 | Stop reason: HOSPADM

## 2024-11-29 RX ORDER — POLYETHYLENE GLYCOL 3350 17 G/17G
17 POWDER, FOR SOLUTION ORAL DAILY PRN
Status: DISCONTINUED | OUTPATIENT
Start: 2024-11-29 | End: 2024-12-09 | Stop reason: HOSPADM

## 2024-11-29 RX ORDER — IBUPROFEN 400 MG/1
800 TABLET, FILM COATED ORAL EVERY 8 HOURS PRN
Status: DISCONTINUED | OUTPATIENT
Start: 2024-11-29 | End: 2024-12-09 | Stop reason: HOSPADM

## 2024-11-29 RX ORDER — MAGNESIUM HYDROXIDE/ALUMINUM HYDROXICE/SIMETHICONE 120; 1200; 1200 MG/30ML; MG/30ML; MG/30ML
30 SUSPENSION ORAL EVERY 4 HOURS PRN
Status: CANCELLED | OUTPATIENT
Start: 2024-11-29

## 2024-11-29 RX ORDER — BENZTROPINE MESYLATE 1 MG/1
1 TABLET ORAL
Status: DISCONTINUED | OUTPATIENT
Start: 2024-11-29 | End: 2024-12-09 | Stop reason: HOSPADM

## 2024-11-29 RX ORDER — BUPRENORPHINE AND NALOXONE 8; 2 MG/1; MG/1
8 FILM, SOLUBLE BUCCAL; SUBLINGUAL 3 TIMES DAILY
Status: DISCONTINUED | OUTPATIENT
Start: 2024-11-29 | End: 2024-12-09 | Stop reason: HOSPADM

## 2024-11-29 RX ORDER — BENZTROPINE MESYLATE 1 MG/ML
1 INJECTION, SOLUTION INTRAMUSCULAR; INTRAVENOUS
Status: CANCELLED | OUTPATIENT
Start: 2024-11-29

## 2024-11-29 RX ORDER — OLANZAPINE 5 MG/1
5 TABLET ORAL
Status: DISCONTINUED | OUTPATIENT
Start: 2024-11-29 | End: 2024-12-09 | Stop reason: HOSPADM

## 2024-11-29 RX ORDER — PROPRANOLOL HYDROCHLORIDE 10 MG/1
10 TABLET ORAL EVERY 8 HOURS PRN
Status: DISCONTINUED | OUTPATIENT
Start: 2024-11-29 | End: 2024-12-09 | Stop reason: HOSPADM

## 2024-11-29 RX ORDER — GABAPENTIN 400 MG/1
800 CAPSULE ORAL ONCE
Status: COMPLETED | OUTPATIENT
Start: 2024-11-29 | End: 2024-11-29

## 2024-11-29 RX ORDER — DIPHENHYDRAMINE HYDROCHLORIDE 50 MG/ML
50 INJECTION INTRAMUSCULAR; INTRAVENOUS EVERY 6 HOURS PRN
Status: DISCONTINUED | OUTPATIENT
Start: 2024-11-29 | End: 2024-12-09 | Stop reason: HOSPADM

## 2024-11-29 RX ORDER — AMOXICILLIN 250 MG
1 CAPSULE ORAL DAILY PRN
Status: DISCONTINUED | OUTPATIENT
Start: 2024-11-29 | End: 2024-12-09 | Stop reason: HOSPADM

## 2024-11-29 RX ORDER — LORAZEPAM 2 MG/ML
2 INJECTION INTRAMUSCULAR EVERY 6 HOURS PRN
Status: DISCONTINUED | OUTPATIENT
Start: 2024-11-29 | End: 2024-12-09 | Stop reason: HOSPADM

## 2024-11-29 RX ORDER — POLYETHYLENE GLYCOL 3350 17 G/17G
17 POWDER, FOR SOLUTION ORAL DAILY PRN
Status: CANCELLED | OUTPATIENT
Start: 2024-11-29

## 2024-11-29 RX ORDER — OLANZAPINE 10 MG/2ML
5 INJECTION, POWDER, FOR SOLUTION INTRAMUSCULAR
Status: DISCONTINUED | OUTPATIENT
Start: 2024-11-29 | End: 2024-12-09 | Stop reason: HOSPADM

## 2024-11-29 RX ORDER — OLANZAPINE 10 MG/2ML
5 INJECTION, POWDER, FOR SOLUTION INTRAMUSCULAR
Status: CANCELLED | OUTPATIENT
Start: 2024-11-29

## 2024-11-29 RX ORDER — BISACODYL 10 MG
10 SUPPOSITORY, RECTAL RECTAL DAILY PRN
Status: CANCELLED | OUTPATIENT
Start: 2024-11-29

## 2024-11-29 RX ORDER — OLANZAPINE 5 MG/1
5 TABLET ORAL
Status: CANCELLED | OUTPATIENT
Start: 2024-11-29

## 2024-11-29 RX ORDER — BISACODYL 10 MG
10 SUPPOSITORY, RECTAL RECTAL DAILY PRN
Status: DISCONTINUED | OUTPATIENT
Start: 2024-11-29 | End: 2024-12-09 | Stop reason: HOSPADM

## 2024-11-29 RX ORDER — HYDROXYZINE HYDROCHLORIDE 25 MG/1
100 TABLET, FILM COATED ORAL
Status: CANCELLED | OUTPATIENT
Start: 2024-11-29

## 2024-11-29 RX ORDER — LORAZEPAM 2 MG/ML
2 INJECTION INTRAMUSCULAR EVERY 6 HOURS PRN
Status: CANCELLED | OUTPATIENT
Start: 2024-11-29

## 2024-11-29 RX ORDER — MAGNESIUM HYDROXIDE/ALUMINUM HYDROXICE/SIMETHICONE 120; 1200; 1200 MG/30ML; MG/30ML; MG/30ML
30 SUSPENSION ORAL EVERY 4 HOURS PRN
Status: DISCONTINUED | OUTPATIENT
Start: 2024-11-29 | End: 2024-12-09 | Stop reason: HOSPADM

## 2024-11-29 RX ORDER — OLANZAPINE 5 MG/1
2.5 TABLET ORAL
Status: CANCELLED | OUTPATIENT
Start: 2024-11-29

## 2024-11-29 RX ORDER — NICOTINE 21 MG/24HR
14 PATCH, TRANSDERMAL 24 HOURS TRANSDERMAL ONCE
Status: DISCONTINUED | OUTPATIENT
Start: 2024-11-29 | End: 2024-11-29 | Stop reason: HOSPADM

## 2024-11-29 RX ORDER — IBUPROFEN 600 MG/1
600 TABLET, FILM COATED ORAL EVERY 6 HOURS PRN
Status: CANCELLED | OUTPATIENT
Start: 2024-11-29

## 2024-11-29 RX ORDER — IBUPROFEN 400 MG/1
800 TABLET, FILM COATED ORAL EVERY 8 HOURS PRN
Status: CANCELLED | OUTPATIENT
Start: 2024-11-29

## 2024-11-29 RX ORDER — BACITRACIN, NEOMYCIN, POLYMYXIN B 400; 3.5; 5 [USP'U]/G; MG/G; [USP'U]/G
1 OINTMENT TOPICAL 2 TIMES DAILY
Status: DISPENSED | OUTPATIENT
Start: 2024-11-29 | End: 2024-12-06

## 2024-11-29 RX ORDER — OLANZAPINE 2.5 MG/1
2.5 TABLET, FILM COATED ORAL
Status: DISCONTINUED | OUTPATIENT
Start: 2024-11-29 | End: 2024-12-09 | Stop reason: HOSPADM

## 2024-11-29 RX ORDER — IBUPROFEN 400 MG/1
400 TABLET, FILM COATED ORAL EVERY 4 HOURS PRN
Status: DISCONTINUED | OUTPATIENT
Start: 2024-11-29 | End: 2024-11-29

## 2024-11-29 RX ORDER — IBUPROFEN 400 MG/1
800 TABLET, FILM COATED ORAL EVERY 8 HOURS PRN
Status: DISCONTINUED | OUTPATIENT
Start: 2024-11-29 | End: 2024-11-29

## 2024-11-29 RX ORDER — HYDROXYZINE HYDROCHLORIDE 25 MG/1
50 TABLET, FILM COATED ORAL
Status: CANCELLED | OUTPATIENT
Start: 2024-11-29

## 2024-11-29 RX ORDER — HYDROXYZINE HYDROCHLORIDE 25 MG/1
25 TABLET, FILM COATED ORAL
Status: CANCELLED | OUTPATIENT
Start: 2024-11-29

## 2024-11-29 RX ORDER — HYDROXYZINE HYDROCHLORIDE 50 MG/1
50 TABLET, FILM COATED ORAL
Status: DISCONTINUED | OUTPATIENT
Start: 2024-11-29 | End: 2024-12-09 | Stop reason: HOSPADM

## 2024-11-29 RX ORDER — PROPRANOLOL HCL 20 MG
10 TABLET ORAL EVERY 8 HOURS PRN
Status: CANCELLED | OUTPATIENT
Start: 2024-11-29

## 2024-11-29 RX ORDER — IBUPROFEN 400 MG/1
400 TABLET, FILM COATED ORAL EVERY 4 HOURS PRN
Status: DISCONTINUED | OUTPATIENT
Start: 2024-11-29 | End: 2024-12-09 | Stop reason: HOSPADM

## 2024-11-29 RX ORDER — BUPRENORPHINE AND NALOXONE 8; 2 MG/1; MG/1
8 FILM, SOLUBLE BUCCAL; SUBLINGUAL 3 TIMES DAILY
Status: DISCONTINUED | OUTPATIENT
Start: 2024-11-29 | End: 2024-11-29 | Stop reason: HOSPADM

## 2024-11-29 RX ORDER — TRAZODONE HYDROCHLORIDE 50 MG/1
50 TABLET, FILM COATED ORAL
Status: CANCELLED | OUTPATIENT
Start: 2024-11-29

## 2024-11-29 RX ORDER — LORAZEPAM 1 MG/1
1 TABLET ORAL ONCE
Status: COMPLETED | OUTPATIENT
Start: 2024-11-29 | End: 2024-11-29

## 2024-11-29 RX ORDER — IBUPROFEN 600 MG/1
600 TABLET, FILM COATED ORAL EVERY 6 HOURS PRN
Status: DISCONTINUED | OUTPATIENT
Start: 2024-11-29 | End: 2024-12-09 | Stop reason: HOSPADM

## 2024-11-29 RX ORDER — ONDANSETRON 4 MG/1
4 TABLET, ORALLY DISINTEGRATING ORAL EVERY 8 HOURS PRN
Status: DISCONTINUED | OUTPATIENT
Start: 2024-11-29 | End: 2024-12-09 | Stop reason: HOSPADM

## 2024-11-29 RX ORDER — OLANZAPINE 10 MG/2ML
2.5 INJECTION, POWDER, FOR SOLUTION INTRAMUSCULAR
Status: CANCELLED | OUTPATIENT
Start: 2024-11-29

## 2024-11-29 RX ORDER — OLANZAPINE 10 MG/2ML
2.5 INJECTION, POWDER, FOR SOLUTION INTRAMUSCULAR
Status: DISCONTINUED | OUTPATIENT
Start: 2024-11-29 | End: 2024-12-09 | Stop reason: HOSPADM

## 2024-11-29 RX ORDER — DIPHENHYDRAMINE HYDROCHLORIDE 50 MG/ML
50 INJECTION INTRAMUSCULAR; INTRAVENOUS EVERY 6 HOURS PRN
Status: CANCELLED | OUTPATIENT
Start: 2024-11-29

## 2024-11-29 RX ORDER — TRAZODONE HYDROCHLORIDE 50 MG/1
50 TABLET, FILM COATED ORAL
Status: DISCONTINUED | OUTPATIENT
Start: 2024-11-29 | End: 2024-12-06

## 2024-11-29 RX ORDER — HYDROXYZINE HYDROCHLORIDE 50 MG/1
100 TABLET, FILM COATED ORAL
Status: DISCONTINUED | OUTPATIENT
Start: 2024-11-29 | End: 2024-12-09 | Stop reason: HOSPADM

## 2024-11-29 RX ORDER — AMOXICILLIN 250 MG
1 CAPSULE ORAL DAILY PRN
Status: CANCELLED | OUTPATIENT
Start: 2024-11-29

## 2024-11-29 RX ORDER — ACETAMINOPHEN 325 MG/1
650 TABLET ORAL EVERY 6 HOURS PRN
Status: DISCONTINUED | OUTPATIENT
Start: 2024-11-29 | End: 2024-12-09 | Stop reason: HOSPADM

## 2024-11-29 RX ORDER — ACETAMINOPHEN 325 MG/1
975 TABLET ORAL EVERY 8 HOURS PRN
Status: DISCONTINUED | OUTPATIENT
Start: 2024-11-29 | End: 2024-12-09 | Stop reason: HOSPADM

## 2024-11-29 RX ORDER — HYDROXYZINE HYDROCHLORIDE 25 MG/1
25 TABLET, FILM COATED ORAL
Status: DISCONTINUED | OUTPATIENT
Start: 2024-11-29 | End: 2024-12-09 | Stop reason: HOSPADM

## 2024-11-29 RX ORDER — IBUPROFEN 600 MG/1
600 TABLET, FILM COATED ORAL EVERY 6 HOURS PRN
Status: DISCONTINUED | OUTPATIENT
Start: 2024-11-29 | End: 2024-11-29

## 2024-11-29 RX ORDER — ALBUTEROL SULFATE 90 UG/1
2 INHALANT RESPIRATORY (INHALATION) EVERY 4 HOURS PRN
Status: DISCONTINUED | OUTPATIENT
Start: 2024-11-29 | End: 2024-12-09 | Stop reason: HOSPADM

## 2024-11-29 RX ORDER — BENZTROPINE MESYLATE 1 MG/ML
1 INJECTION, SOLUTION INTRAMUSCULAR; INTRAVENOUS
Status: DISCONTINUED | OUTPATIENT
Start: 2024-11-29 | End: 2024-12-09 | Stop reason: HOSPADM

## 2024-11-29 RX ORDER — IBUPROFEN 400 MG/1
400 TABLET, FILM COATED ORAL EVERY 4 HOURS PRN
Status: CANCELLED | OUTPATIENT
Start: 2024-11-29

## 2024-11-29 RX ORDER — BENZTROPINE MESYLATE 1 MG/1
1 TABLET ORAL
Status: CANCELLED | OUTPATIENT
Start: 2024-11-29

## 2024-11-29 RX ORDER — CLONAZEPAM 0.5 MG/1
1 TABLET ORAL ONCE
Status: COMPLETED | OUTPATIENT
Start: 2024-11-29 | End: 2024-11-29

## 2024-11-29 RX ORDER — BUDESONIDE AND FORMOTEROL FUMARATE DIHYDRATE 160; 4.5 UG/1; UG/1
2 AEROSOL RESPIRATORY (INHALATION) 2 TIMES DAILY
Status: DISCONTINUED | OUTPATIENT
Start: 2024-11-29 | End: 2024-12-09 | Stop reason: HOSPADM

## 2024-11-29 RX ADMIN — LORAZEPAM 1 MG: 1 TABLET ORAL at 01:07

## 2024-11-29 RX ADMIN — IBUPROFEN 800 MG: 400 TABLET, FILM COATED ORAL at 18:06

## 2024-11-29 RX ADMIN — TRAZODONE HYDROCHLORIDE 50 MG: 50 TABLET ORAL at 22:01

## 2024-11-29 RX ADMIN — POLYMYXIN B SULFATE, BACITRACIN ZINC, NEOMYCIN SULFATE 1 LARGE APPLICATION: 5000; 3.5; 4 OINTMENT TOPICAL at 13:00

## 2024-11-29 RX ADMIN — BUPRENORPHINE AND NALOXONE 8 MG: 8; 2 FILM BUCCAL; SUBLINGUAL at 20:17

## 2024-11-29 RX ADMIN — ONDANSETRON 4 MG: 4 TABLET, ORALLY DISINTEGRATING ORAL at 13:40

## 2024-11-29 RX ADMIN — BUPRENORPHINE AND NALOXONE 8 MG: 8; 2 FILM BUCCAL; SUBLINGUAL at 14:51

## 2024-11-29 RX ADMIN — GABAPENTIN 800 MG: 400 CAPSULE ORAL at 08:25

## 2024-11-29 RX ADMIN — POLYMYXIN B SULFATE, BACITRACIN ZINC, NEOMYCIN SULFATE 1 LARGE APPLICATION: 5000; 3.5; 4 OINTMENT TOPICAL at 18:06

## 2024-11-29 RX ADMIN — DIPHENHYDRAMINE HYDROCHLORIDE 50 MG: 50 INJECTION, SOLUTION INTRAMUSCULAR; INTRAVENOUS at 17:10

## 2024-11-29 RX ADMIN — TIZANIDINE 4 MG: 4 TABLET ORAL at 15:48

## 2024-11-29 RX ADMIN — BUPRENORPHINE AND NALOXONE 8 MG: 8; 2 FILM BUCCAL; SUBLINGUAL at 08:25

## 2024-11-29 RX ADMIN — ACETAMINOPHEN 650 MG: 325 TABLET, FILM COATED ORAL at 09:59

## 2024-11-29 RX ADMIN — BUDESONIDE AND FORMOTEROL FUMARATE DIHYDRATE 2 PUFF: 160; 4.5 AEROSOL RESPIRATORY (INHALATION) at 18:06

## 2024-11-29 RX ADMIN — BUDESONIDE AND FORMOTEROL FUMARATE DIHYDRATE 2 PUFF: 160; 4.5 AEROSOL RESPIRATORY (INHALATION) at 13:00

## 2024-11-29 RX ADMIN — HYDROXYZINE HYDROCHLORIDE 100 MG: 50 TABLET, FILM COATED ORAL at 20:53

## 2024-11-29 RX ADMIN — CLONAZEPAM 1 MG: 0.5 TABLET ORAL at 08:25

## 2024-11-29 RX ADMIN — NICOTINE POLACRILEX 4 MG: 4 GUM, CHEWING BUCCAL at 18:08

## 2024-11-29 RX ADMIN — Medication 14 MG: at 10:00

## 2024-11-29 RX ADMIN — MELATONIN TAB 3 MG 3 MG: 3 TAB at 22:01

## 2024-11-29 RX ADMIN — NICOTINE POLACRILEX 4 MG: 4 GUM, CHEWING BUCCAL at 13:07

## 2024-11-29 NOTE — DISCHARGE INSTR - OTHER ORDERS
CRISIS INFORMATION  If you are experiencing a mental health emergency, you may call the [facility] Crisis Intervention Office 24 hours a day, 7 days per week at [phone].    In [facility], call [phone].    Warmline is a confidential 24/7 telephone support service manned by trained mental health consumers.  [facility] provides support, a listening ear and can provide information about available services.[facility] specializes in the concerns of mental health consumers, their families and friends.  However, we are also here for anyone who has a mental health concern, is confused about or just doesn't know anything about mental health or where to get information.  To reach [facility], call [phone].    HOW TO GET SUBSTANCE ABUSE HELP:  If you or someone you know has a drug or alcohol problem, there is help:  [facility] Drug & Alcohol Abuse Services: [phone]  [facility] Drug & Alcohol Abuse Services: [phone]  An assessment is the first step.   In addition to those listed there are other programs available in the area but assessment is best to determine an appropriate level of care.  If you DO NOT have Medical Assistance (MA) or Private Insurance, an assessment can be scheduled at one of these providers:  [facility]  [address] [place] [address]  [phone]   [facility]  [address] [place] [address]  [phone]   [address] [place] [address]  [phone]   [facility]  [address] [place] [address]  [phone]   [facility]  [address] [place] [address]  [phone]   [facility]  [address] [place] [address]  [phone]   [facility]  [address], [place] [address]  [phone]     If you HAVE Medical Assistance, an assessment can be scheduled at one of these providers:  [facility]  [address] [place] [address]  [phone]   [facility]  [address] [place] [address]  [phone]   [facility]  [address], 1st [address], [place] [address]  [phone]  [address] [place] [address], [address], [place] [address] [phone]   [address] [address]  [phone]   [facility]  [address] [place] [address]  [phone]   [facility] ([facility])  [address] [place] [address]  [phone]   [facility]  [address] [facility] [address] [address] [place] [address]  [phone]   [facility]  [address] [place] [address]  [phone]   [facility]  [address] [place] [address]  [phone]   [facility]  [address], [place] [address]  [phone]     If you HAVE Private Insurance, an assessment can be scheduled at one of these providers.  Please contact these Providers to determine if they are in your network plan:  [facility]  [address], [place] [address]  [phone]   [address] [address]  [phone]   [facility]  [address] [place] [address]  [phone]   [facility] ([facility])  [address] [place] [address]  [phone]   [facility]  [address] [place] [address]  [phone]   [facility]  [address], [place] [address]  [phone]     From the [facility] website [place]guides[place]opioid-epidemic/#GetNaloxone    How do I get naloxone?  Family members and friends can access naloxone by:    Obtaining a prescription from their family doctor  Using the standing order issued by Acting Physician General Marisa Pierce. A standing order is a prescription written  for the general public, rather than specifically for an individual.  To use the standing order, print it and take it with you to the pharmacy or have the digital version on your phone. Download the standing order from the Department of [facility] (PDF).    If you are unable to print it or use the digital version, the standing order is kept on file at many pharmacies. If a pharmacy does not have it on file, they may have the ability to look it up.    Naloxone prescriptions can be filled at most pharmacies. Although the medication might not be available for same-day pickup, it often can be ordered and available within a day or two.

## 2024-11-29 NOTE — ED NOTES
201 faxed and emailed to [facility] ED RN for Pt and ED attending signatures to initiate bed search.

## 2024-11-29 NOTE — NURSING NOTE
Pt reported muscles spasms in her back and was given PRN ZANAFLEX 4 mg PO at 1548.   Pt reporting significant reducing back spasms and currently sitting in the small TV room quietly watching TV,  PRN ZANAFLEX effective.    Pt presently denies HI/SI/AVH.  She is anxious a preoccupied with scheduled medication regiment.  Pt was reassured that she will be seen in the morning by a physician.
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
## 2024-11-29 NOTE — ED PROVIDER NOTES
Medically cleared by previous physician    Crisis evaluated.  201 signed     Jerry Epstein Jr., DO  11/29/24 0119       Jerry Epstein Jr., DO  11/29/24 0126

## 2024-11-29 NOTE — CONSULTS
"Consultation - Hospitalist   Name: Carey Keith 49 y.o. female I MRN: [id number]  Unit/Bed#: [facility] 344-01 I Date of Admission: 11/29/2024   Date of Service: 11/29/2024 I Hospital Day: 0   Inpatient consult for Medical Clearance for  patient  Consult performed by: NETTE Saldivar  Consult ordered by: Angie Jones MD        Physician Requesting Evaluation: Benitez Wiggins MD   Reason for Evaluation / Principal Problem: [facility]      Assessment & Plan  Medical clearance for psychiatric admission  Admission labs: CBC, CMP, lipid panel, TSH pending   Folate, B12, Vitamin D 25 hydroxy labs pending  Vitals stable   UA pending   UDS positive for amp/meth  EKG pending   Patient is medically cleared for admission to [facility] and treatment of underlying psychiatric illness based on available results  Please contact [facility] with any questions or concerns  Back pain  History of back pain, neuropathy, headache recently maintained on gabapentin 800 mg 3 times daily, ibuprofen and Zanaflex as needed  Continue Tylenol, ibuprofen, Zanaflex, and Bengay as needed  Opioid use disorder, severe, on maintenance therapy (HCC)  Continue Suboxone 8 mg TID  Asthma  Stable, no signs of exacerbation  Continue Symbicort and albuterol as needed  Tobacco abuse  Smoking cessation education and counseling   Nicotine patch while hospitalized   Hepatitis C antibody test positive  Chronic Hep C  CMP pending   Follow-up with GI as outpatient  Bipolar disorder, current episode depressed, severe, without psychotic features (HCC)  [facility]  Management per primary service   Methamphetamine abuse (HCC)  UDS (+) for meth  Substance cessation education and counseling   Please contact the [employer] role,\" \", with any questions/concerns.   psychiatry medical provider     Collaboration of Care: Were Recommendations Directly Discussed with Primary Treatment Team? - Yes     History of Present Illness   Carey Keith " is a 49 y.o. female with a past medical history including asthma, back pain, neuropathy, headaches, substance use disorder, chronic hepatitis C, tobacco use disorder who is originally admitted to the psychiatry service due to suicide attempt by self-inflicted cuts to her arms with knife. We are consulted for medical clearance for admission to Behavioral Health Unit and treatment of underlying psychiatric illness.  Patient presented to the emergency department via EMS.  Patient attempted suicide by cutting her forearms with a knife.  She stated that she would slit her throat next time.  She was given a tetanus shot.  She was medically cleared for inpatient psych and was discharged to [facility] for inpatient behavioral health.  Currently, patient is calm and cooperative and offers no complaints.    Review of Systems   Constitutional:  Negative for appetite change and chills.   HENT:  Negative for congestion and sore throat.    Eyes:  Negative for visual disturbance.   Respiratory:  Negative for cough and shortness of breath.    Cardiovascular:  Negative for chest pain.   Gastrointestinal:  Negative for abdominal pain, constipation, diarrhea, nausea and vomiting.   Genitourinary:  Negative for difficulty urinating.   Musculoskeletal:  Negative for gait problem.   Skin:  Positive for wound (bilateral forearms).   Neurological:  Negative for dizziness, light-headedness and headaches.     Historical Information   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)     Alcohol abuse     Alcoholism (HCC)     Altered mental status 09/21/2022    Elevated LFTs 09/21/2022    Lower back pain     Self-injurious behavior     Substance abuse (HCC)      Past Surgical History:   Procedure Laterality Date    CHOLECYSTECTOMY       Social History     Tobacco Use    Smoking status: Some Days     Current packs/day: 0.50     Average packs/day: 0.5 packs/day for 20.0 years (10.0 ttl pk-yrs)     Types:  "Cigarettes    Smokeless tobacco: Current    Tobacco comments:     Pt not ready to quit   Vaping Use    Vaping status: Every Day    Substances: Nicotine, THC, Flavoring   Substance and Sexual Activity    Alcohol use: Not Currently     Comment: MAGDALENO, pt historically inconsistent. Drinking  per [facility] 2    Drug use: Not Currently     Types: Heroin, \"Crack\" cocaine, Cocaine, LSD, Benzodiazepines, Marijuana     Comment: Pt unreliable historian    Sexual activity: Not Currently     E-Cigarette/Vaping    E-Cigarette Use Current Every Day User     Cartridges/Day 2      E-Cigarette/Vaping Substances    Nicotine Yes     THC Yes     CBD No     Flavoring Yes     Other No     Unknown No      Family history non-contributory  Marital Status: Single    Meds/Allergies   I have reviewed home medications using recent Epic encounter.  Prior to Admission medications    Medication Sig Start Date End Date Taking? Authorizing Provider   albuterol (Proventil HFA) 90 mcg/act inhaler Inhale 2 puffs every 6 (six) hours as needed for wheezing  Patient not taking: Reported on 11/29/2024 2/8/24   Benitez Wiggins MD   buprenorphine-naloxone (Suboxone) 8-2 mg Place 1 Film (8 mg total) under the tongue 3 (three) times a day for 14 days 11/14/24 11/28/24  Namrata Duran PA-C   clonazePAM (KlonoPIN) 0.5 mg tablet Take 1 tablet (0.5 mg total) by mouth daily at bedtime for 10 days 11/13/24 11/23/24  NETTE Burns   Diclofenac Sodium (VOLTAREN) 1 % Apply 2 g topically 4 (four) times a day  Patient not taking: Reported on 11/29/2024 11/14/24   Namrata Duran PA-C   docusate sodium (COLACE) 100 mg capsule Take 1 capsule (100 mg total) by mouth daily  Patient not taking: Reported on 11/29/2024 11/15/24   Namrata Duran PA-C   ergocalciferol (VITAMIN D2) 50,000 units Take 1 capsule (50,000 Units total) by mouth once a week for 6 doses  Patient not taking: Reported on 11/29/2024 11/17/24 12/23/24  Namrata Smith" KALEIGH Duran   gabapentin (NEURONTIN) 400 mg capsule Take 2 capsules (800 mg total) by mouth 3 (three) times a day  Patient not taking: Reported on 11/29/2024 11/14/24   Namrata Durna PA-C   ibuprofen (MOTRIN) 600 mg tablet Take 1 tablet (600 mg total) by mouth every 6 (six) hours as needed for moderate pain  Patient not taking: Reported on 11/29/2024 11/19/24   Christian Julio DO   lamoTRIgine (LaMICtal) 25 mg tablet Take 1 tablet (25 mg total) by mouth daily  Patient not taking: Reported on 11/29/2024 11/14/24 12/14/24  NETTE Burns   melatonin 3 mg Take 1 tablet (3 mg total) by mouth daily at bedtime  Patient not taking: Reported on 11/29/2024 11/13/24 12/13/24  NETTE Burns   OLANZapine (ZyPREXA) 15 mg tablet Take 1 tablet (15 mg total) by mouth 2 (two) times a day  Patient not taking: Reported on 11/29/2024 11/13/24 12/13/24  NETTE Burns   polyethylene glycol (GLYCOLAX) 17 GM/SCOOP powder MIX 17 GRAMS WITH LIQUID AND DRINK DAILY  Patient not taking: Reported on 11/29/2024 7/31/24   Historical Provider, MD   Symbicort 160-4.5 MCG/ACT inhaler Inhale 2 puffs 2 (two) times a day  Patient not taking: Reported on 11/29/2024 6/3/24   Historical Provider, MD   tiZANidine (ZANAFLEX) 4 mg tablet Take 1 tablet (4 mg total) by mouth every 8 (eight) hours as needed for muscle spasms  Patient not taking: Reported on 11/29/2024 11/14/24   Namrata Duran PA-C   topiramate (TOPAMAX) 25 mg tablet Take 3 tablets (75 mg total) by mouth every 12 (twelve) hours  Patient not taking: Reported on 11/29/2024 11/19/24 12/19/24  Christian Julio DO   vitamin B-12 (VITAMIN B-12) 1,000 mcg tablet Take 1 tablet (1,000 mcg total) by mouth daily  Patient not taking: Reported on 11/29/2024 11/19/24   Christian Julio, DO     Allergies   Allergen Reactions    Haloperidol Other (See Comments)     oculogyr crisis    Abilify [Aripiprazole] Other (See Comments)     Pt reports increased agitation ?  "(stating last time she took this med \"she lost it\"        Objective :  Temp:  [98.3 °F (36.8 °C)] 98.3 °F (36.8 °C)  HR:  [66-81] 66  BP: ()/(66-74) 109/68  Resp:  [18] 18  SpO2:  [97 %-99 %] 99 %  O2 Device: None (Room air)       Physical Exam  Constitutional:       General: She is not in acute distress.     Appearance: She is not toxic-appearing or diaphoretic.   HENT:      Head: Normocephalic.      Mouth/Throat:      Mouth: Mucous membranes are moist.   Cardiovascular:      Rate and Rhythm: Normal rate.   Pulmonary:      Effort: Pulmonary effort is normal. No respiratory distress.      Breath sounds: Normal breath sounds.   Abdominal:      General: Abdomen is flat. Bowel sounds are normal. There is no distension.      Palpations: Abdomen is soft.      Tenderness: There is no abdominal tenderness. There is no guarding or rebound.   Musculoskeletal:         General: Normal range of motion.      Cervical back: Normal range of motion.      Right lower leg: No edema.      Left lower leg: No edema.   Skin:     General: Skin is warm and dry.      Capillary Refill: Capillary refill takes less than 2 seconds.      Comments: Bilateral forearms with superficial self-inflicted scratches/abrasions, no signs of infection   Neurological:      Mental Status: She is alert and oriented to person, place, and time.   Psychiatric:         Behavior: Behavior normal.           Lab Results: I have reviewed the following results:                  Lab Results   Component Value Date/Time    HGBA1C 5.2 12/25/2021 08:09 AM           Imaging Results Review: No pertinent imaging studies reviewed.  Other Study Results Review: No additional pertinent studies reviewed.    Administrative Statements   I have spent a total time of   minutes in caring for this patient on the day of the visit/encounter including Diagnostic results, Patient and family education, Counseling / Coordination of care, Documenting in the medical record, Reviewing / " ordering tests, medicine, procedures  , Obtaining or reviewing history  , and Communicating with other healthcare professionals .  ** Please Note: This note has been constructed using a voice recognition system. **

## 2024-11-29 NOTE — ED NOTES
Patient is accepted at [facility].  Patient is accepted by Dr. Jones per Ciara.     Transportation is arranged with [employer].     Transportation is scheduled for 12PM via [facility].   Patient may go to the floor at 10AM or later.          Nurse report is to be called to [phone] prior to patient transfer.

## 2024-11-29 NOTE — ED CARE HANDOFF
Emergency Department Sign Out Note        Sign out and transfer of care from Dr. Epstein. See Separate Emergency Department note.     The patient, Carey Keith, was evaluated by the previous provider for SI.    Workup Completed:  UDS, Upreg, BAT.    ED Course / Workup Pending (followup):  Medically stable. Signed 201. Awaiting transfer to  at approximately 1200 today.                                  ED Course as of 11/29/24 1234   Fri Nov 29, 2024   0814 Home meds of suboxone, gabapentin, clonazepam ordered after discussion with pt and review of home meds/pdmp     Procedures  Medical Decision Making          Disposition  Final diagnoses:   Suicide attempt by inadequate means, initial encounter (HCC)   Chronic sorrow   Multiple lacerations   Methamphetamine abuse (HCC)     Time reflects when diagnosis was documented in both MDM as applicable and the Disposition within this note       Time User Action Codes Description Comment    11/28/2024  9:48 PM Luis F Tillman Add [X83.8XXA] Suicide attempt by inadequate means, initial encounter (HCC)     11/28/2024  9:48 PM Luis F Tillman Add [F43.89] Chronic sorrow     11/28/2024  9:48 PM Luis F Tillman Add [T07.XXXA] Multiple lacerations     11/28/2024  9:58 PM Luis F Tillman Add [F15.10] Methamphetamine abuse ([facility])           ED Disposition       ED Disposition   Transfer to Behavioral Health Condition   --    Date/Time   Thu Nov 28, 2024  9:40 PM    Comment                  MD Documentation      Flowsheet Row Most Recent Value   Patient Condition The patient has been stabilized such that within reasonable medical probability, no material deterioration of the patient condition or the condition of the unborn child(ricky) is likely to result from the transfer   Reason for Transfer Level of Care needed not available at this facility   Benefits of Transfer Specialized equipment and/or services available at the receiving facility (Include comment)________________________    Risks of Transfer Potential for delay in receiving treatment   Accepting Physician Dr. Jones   Accepting Facility Name, [address], [place], [place]    (Name & Tel number) Danielle MENDOZA,  [phone]   Transported by (Company and Unit #) [facility]   Sending MD Dr. Jerry Epstein   Provider Certification The patient is stable for psychiatric transfer because they are medically stable, and is protected from harming him/herself or others during transport, General risk, such as traffic hazards, adverse weather conditions, rough terrain or turbulence, possible failure of equipment (including vehicle or aircraft), or consequences of actions of persons outside the control of the transport personnel, Risk of worsening condition, The possibility of a transport vehicle being unavailable, Unanticipated needs of medical equipment and personnel during transport          RN Documentation      Flowsheet Row Most Recent Value   Accepting Facility Name, [address], [place], [place]    (Name & Tel number) Danielle NIKIJon,  [phone]   Transported by (Company and Unit #) [facility]          Follow-up Information    None       Discharge Medication List as of 11/29/2024 11:37 AM        CONTINUE these medications which have NOT CHANGED    Details   albuterol (Proventil HFA) 90 mcg/act inhaler Inhale 2 puffs every 6 (six) hours as needed for wheezing, Starting Thu 2/8/2024, Normal      clonazePAM (KlonoPIN) 0.5 mg tablet Take 1 tablet (0.5 mg total) by mouth daily at bedtime for 10 days, Starting Wed 11/13/2024, Until Sat 11/23/2024, Normal      gabapentin (NEURONTIN) 400 mg capsule Take 2 capsules (800 mg total) by mouth 3 (three) times a day, Starting Thu 11/14/2024, Normal      Symbicort 160-4.5 MCG/ACT inhaler Inhale 2 puffs 2 (two) times a day, Starting Mon 6/3/2024, Historical Med      tiZANidine (ZANAFLEX) 4 mg tablet Take 1 tablet (4 mg total) by  mouth every 8 (eight) hours as needed for muscle spasms, Starting Thu 11/14/2024, Normal      Diclofenac Sodium (VOLTAREN) 1 % Apply 2 g topically 4 (four) times a day, Starting Thu 11/14/2024, Normal      docusate sodium (COLACE) 100 mg capsule Take 1 capsule (100 mg total) by mouth daily, Starting Fri 11/15/2024, Normal      ergocalciferol (VITAMIN D2) 50,000 units Take 1 capsule (50,000 Units total) by mouth once a week for 6 doses, Starting Sun 11/17/2024, Until Mon 12/23/2024, Normal      ibuprofen (MOTRIN) 600 mg tablet Take 1 tablet (600 mg total) by mouth every 6 (six) hours as needed for moderate pain, Starting Tue 11/19/2024, Normal      lamoTRIgine (LaMICtal) 25 mg tablet Take 1 tablet (25 mg total) by mouth daily, Starting Thu 11/14/2024, Until Sat 12/14/2024, Normal      melatonin 3 mg Take 1 tablet (3 mg total) by mouth daily at bedtime, Starting Wed 11/13/2024, Until Fri 12/13/2024, Normal      OLANZapine (ZyPREXA) 15 mg tablet Take 1 tablet (15 mg total) by mouth 2 (two) times a day, Starting Wed 11/13/2024, Until Fri 12/13/2024, Normal      polyethylene glycol (GLYCOLAX) 17 GM/SCOOP powder MIX 17 GRAMS WITH LIQUID AND DRINK DAILY, Historical Med      topiramate (TOPAMAX) 25 mg tablet Take 3 tablets (75 mg total) by mouth every 12 (twelve) hours, Starting Tue 11/19/2024, Until Thu 12/19/2024, Normal      vitamin B-12 (VITAMIN B-12) 1,000 mcg tablet Take 1 tablet (1,000 mcg total) by mouth daily, Starting Tue 11/19/2024, Normal           STOP taking these medications       buprenorphine-naloxone (Suboxone) 8-2 mg Comments:   Reason for Stopping:             No discharge procedures on file.       ED Provider  Electronically Signed by     Abelardo Koenig DO  11/29/24 [id number]

## 2024-11-29 NOTE — ED NOTES
Insurance Authorization for admission:   Phone call placed to [employer]ui  Phone number:      Spoke to Betty JOHNSON      4 days approved.  Level of care: 201  Review on 12/ 2  Authorization # Call when pt arrives to facility         Eligibility Verification System checked - ([phone]).  Online system / automated system indicates: **    Insurance Authorization for Transportation:    Phone call placed to **.   Phone number **.   Spoke to **.   Authorization #: **

## 2024-11-29 NOTE — CERTIFIED RECOVERY SPECIALIST
"   Certified  Note    Patient name: Carey Keith  Location: ED-14/ED-14  [place]: [facility]  Attending:  Abelardo Ramos [facility]* MRN [id number]  : 1975  Age: 49 y.o.    Sex: female Date 2024         Substance Use History:     Social History     Substance and Sexual Activity   Alcohol Use Not Currently    Comment: MAGDALENO, pt historically inconsistent. Drinking  per [facility] 2        Social History     Substance and Sexual Activity   Drug Use Not Currently    Types: Heroin, \"Crack\" cocaine, Cocaine, LSD, Benzodiazepines, Marijuana    Comment: Pt unreliable historian      Time spent with Patient 20    CRS engaged with patient to check in and offer support is desired.  CRS made introduction and  explained CRS services provided at hospital    Patient reports she is struggling this time of year because she \"can not get over the death of her dad\" (3 years ago) and that she has \"nothing to live for Since I lsdt my teeth, without my looks I am nothing\" Patient stated she doesn't use meth regularly but did to help her feel better \"since lehighton took me off all of my meds\"      Patient does have multiple cuts as well as scaring that I could see on her forearms. Patient had multiple outbursts of crying. Patient does not have psychiatrist at this time.     CRS and patient had conversation that supports and encourages drug cessation and healthy/mindful lifestyle vchoices which includes following up with providers and dentists,     Contact information given       Lulú Koneig       "

## 2024-11-29 NOTE — NURSING NOTE
Pt given PRN ZOFRAN  4 mg PO at 1340 for nausea which was effective.  Pt no longer reporting nausea.

## 2024-11-29 NOTE — ED PROVIDER NOTES
Time reflects when diagnosis was documented in both MDM as applicable and the Disposition within this note       Time User Action Codes Description Comment    11/28/2024  9:48 PM Luis F Tillman Add [X83.8XXA] Suicide attempt by inadequate means, initial encounter (HCC)     11/28/2024  9:48 PM Luis F Tillman Add [F43.89] Chronic sorrow     11/28/2024  9:48 PM Luis F Tillman Add [T07.XXXA] Multiple lacerations     11/28/2024  9:58 PM Luis F Tillman [F15.10] Methamphetamine abuse (HCC)           ED Disposition       ED Disposition   Transfer to Behavioral Health Condition   --    Date/Time   Thu Nov 28, 2024  9:40 PM    Comment                  Assessment & Plan       Medical Decision Making  Suicide attempt -will consult crisis for mental health treatment/admission.  Multiple lacerations-will do a tetanus, Steri-Strips    Amount and/or Complexity of Data Reviewed  Labs: ordered.    Risk  Prescription drug management.  Decision regarding hospitalization.             Medications   tetanus-diphtheria-acellular pertussis (BOOSTRIX) IM injection 0.5 mL (0.5 mL Intramuscular Given 11/28/24 2156)       ED Risk Strat Scores                           SBIRT 22yo+      Flowsheet Row Most Recent Value   Initial Alcohol Screen: US AUDIT-C     1. How often do you have a drink containing alcohol? 0 Filed at: 11/28/2024 2121   2. How many drinks containing alcohol do you have on a typical day you are drinking?  0 Filed at: 11/28/2024 2121   3a. Male UNDER 65: How often do you have five or more drinks on one occasion? 0 Filed at: 11/28/2024 2121   3b. FEMALE Any Age, or MALE 65+: How often do you have 4 or more drinks on one occassion? 0 Filed at: 11/28/2024 2121   Audit-C Score 0 Filed at: 11/28/2024 2121   LYNN: How many times in the past year have you...    Used an illegal drug or used a prescription medication for non-medical reasons? Never Filed at: 11/28/2024 2121                            History of Present Illness  "      Chief Complaint   Patient presents with    Suicidal     Pt brought by EMS. PT attempted suicide by cutting her arm with a knife. Pt states she should be on meds but has hard time getting them. Pt states she wishes she was dead and next time wants to \"slit her throat\" instead of her arm.        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)     Alcohol abuse     Alcoholism (HCC)     Altered mental status 09/21/2022    Elevated LFTs 09/21/2022    Lower back pain     Self-injurious behavior     Substance abuse (HCC)       Past Surgical History:   Procedure Laterality Date    CHOLECYSTECTOMY        Family History   Problem Relation Age of Onset    Autism Mother     Heart disease Father     Stroke Father     Anxiety disorder Father     Heart disease Maternal Grandmother       Social History     Tobacco Use    Smoking status: Some Days     Current packs/day: 0.50     Average packs/day: 0.5 packs/day for 20.0 years (10.0 ttl pk-yrs)     Types: Cigarettes    Smokeless tobacco: Current    Tobacco comments:     Pt not ready to quit   Vaping Use    Vaping status: Every Day    Substances: Nicotine, THC, Flavoring   Substance Use Topics    Alcohol use: Not Currently     Comment: MAGDALENO, pt historically inconsistent. Drinking  per [facility] 2    Drug use: Not Currently     Types: Heroin, \"Crack\" cocaine, Cocaine, LSD, Benzodiazepines, Marijuana     Comment: Pt unreliable historian      E-Cigarette/Vaping    E-Cigarette Use Current Every Day User     Cartridges/Day 2       E-Cigarette/Vaping Substances    Nicotine Yes     THC Yes     CBD No     Flavoring Yes     Other No     Unknown No       I have reviewed and agree with the history as documented.     49- year-old male presents for evaluation of suicide attempt.  Patient states that she tried to kill herself by cutting her son with a sharp object on her right forearm.  She states  She does that she will slit her throat.  Denies homicidal thoughts, " hallucinations, cough, flu symptoms.      History provided by:  Patient  Suicidal  Presenting symptoms: suicidal thoughts    Presenting symptoms: no agitation and no hallucinations    Associated symptoms: no abdominal pain, no appetite change, no chest pain and no fatigue        Review of Systems   Constitutional:  Negative for activity change, appetite change, fatigue and fever.   HENT:  Negative for congestion, dental problem, ear pain, rhinorrhea and sore throat.    Eyes:  Negative for pain and redness.   Respiratory:  Negative for chest tightness, shortness of breath and wheezing.    Cardiovascular:  Negative for chest pain and palpitations.   Gastrointestinal:  Negative for abdominal pain, blood in stool, constipation, diarrhea, nausea and vomiting.   Endocrine: Negative for cold intolerance and heat intolerance.   Genitourinary:  Negative for dysuria, frequency and hematuria.   Musculoskeletal:  Negative for arthralgias and myalgias.   Skin:  Positive for wound. Negative for color change, pallor and rash.   Neurological:  Negative for weakness and numbness.   Hematological:  Does not bruise/bleed easily.   Psychiatric/Behavioral:  Positive for dysphoric mood and suicidal ideas. Negative for agitation and hallucinations.            Objective       ED Triage Vitals [11/28/24 2118]   Temperature Pulse Blood Pressure Resp SpO2 Patient Position - Orthostatic VS   98.3 °F (36.8 °C) 81 113/69 -- 97 % Sitting      Temp Source Heart Rate Source BP Location FiO2 (%) Pain Score    Oral Monitor Left arm -- --      Vitals      Date and Time Temp Pulse SpO2 Resp BP Pain Score FACES Pain Rating User   11/28/24 2118 98.3 °F (36.8 °C) 81 97 % -- 113/69 -- -- AB            Physical Exam  Vitals and nursing note reviewed.   Constitutional:       Appearance: Normal appearance. She is well-developed.   HENT:      Head: Normocephalic and atraumatic.   Eyes:      Pupils: Pupils are equal, round, and reactive to light.   Neck:       Vascular: No JVD.      Trachea: No tracheal deviation.   Cardiovascular:      Rate and Rhythm: Normal rate and regular rhythm.   Pulmonary:      Effort: No tachypnea, accessory muscle usage or respiratory distress.   Abdominal:      General: There is no distension.   Musculoskeletal:      Right lower leg: Normal.      Left lower leg: Normal.   Skin:     General: Skin is warm.      Capillary Refill: Capillary refill takes less than 2 seconds.      Comments: Multiple superfifical lacerations on the flexor surface of the left forearm that do not require closures, pt has a 3cm, 2.5,cm and 2 cm laceration     Neurological:      Mental Status: She is alert and oriented to person, place, and time.   Psychiatric:         Attention and Perception: Attention and perception normal.         Mood and Affect: Mood is depressed. Affect is tearful.         Behavior: Behavior is withdrawn. Behavior is cooperative.         Thought Content: Thought content is not paranoid or delusional. Thought content includes suicidal ideation. Thought content does not include homicidal ideation. Thought content includes suicidal plan.         Judgment: Judgment is impulsive.         Results Reviewed       Procedure Component Value Units Date/Time    POCT alcohol breath test [[id number]]  (Normal) Resulted: 11/28/24 2145    Lab Status: Final result Updated: 11/28/24 2146     EXTBreath Alcohol 0.00    Rapid drug screen, urine [[id number]]     Lab Status: No result Specimen: Urine             No orders to display       Universal Protocol:  procedure performed by consultantConsent: Verbal consent obtained.  Risks and benefits: risks, benefits and alternatives were discussed  Consent given by: patient  Timeout called at: 11/28/2024 11:02 PM.  Patient understanding: patient states understanding of the procedure being performed  Patient consent: the patient's understanding of the procedure matches consent given  Required items: required blood products,  implants, devices, and special equipment available  Patient identity confirmed: verbally with patient and arm band  Laceration repair    Date/Time: 11/28/2024 11:02 PM    Performed by: Luis F Tillman MD  Authorized by: Luis F Tillman MD  Location: L forearm.  Wound length (cm): 3cm, 2.5 cm, 2cm.  Contamination: The wound is contaminated.  Foreign bodies: no foreign bodies  Tendon involvement: none  Nerve involvement: none  Vascular damage: no    Sedation:  Patient sedated: no      Wound Dehiscence:  Superficial Wound Dehiscence: simple closure      Procedure Details:  Preparation: Patient was prepped and draped in the usual sterile fashion.  Irrigation solution: saline  Irrigation method: syringe  Amount of cleaning: extensive  Skin closure: Steri-Strips          ED Medication and Procedure Management   Prior to Admission Medications   Prescriptions Last Dose Informant Patient Reported? Taking?   Diclofenac Sodium (VOLTAREN) 1 %   No No   Sig: Apply 2 g topically 4 (four) times a day   OLANZapine (ZyPREXA) 15 mg tablet   No No   Sig: Take 1 tablet (15 mg total) by mouth 2 (two) times a day   Symbicort 160-4.5 MCG/ACT inhaler   Yes No   Sig: Inhale 2 puffs 2 (two) times a day   albuterol (Proventil HFA) 90 mcg/act inhaler   No No   Sig: Inhale 2 puffs every 6 (six) hours as needed for wheezing   buprenorphine-naloxone (Suboxone) 8-2 mg   No No   Sig: Place 1 Film (8 mg total) under the tongue 3 (three) times a day for 14 days   clonazePAM (KlonoPIN) 0.5 mg tablet   No No   Sig: Take 1 tablet (0.5 mg total) by mouth daily at bedtime for 10 days   docusate sodium (COLACE) 100 mg capsule   No No   Sig: Take 1 capsule (100 mg total) by mouth daily   ergocalciferol (VITAMIN D2) 50,000 units   No No   Sig: Take 1 capsule (50,000 Units total) by mouth once a week for 6 doses   gabapentin (NEURONTIN) 400 mg capsule   No No   Sig: Take 2 capsules (800 mg total) by mouth 3 (three) times a day   ibuprofen (MOTRIN) 600 mg  tablet   No No   Sig: Take 1 tablet (600 mg total) by mouth every 6 (six) hours as needed for moderate pain   lamoTRIgine (LaMICtal) 25 mg tablet   No No   Sig: Take 1 tablet (25 mg total) by mouth daily   melatonin 3 mg   No No   Sig: Take 1 tablet (3 mg total) by mouth daily at bedtime   polyethylene glycol (GLYCOLAX) 17 GM/SCOOP powder   Yes No   Sig: MIX 17 GRAMS WITH LIQUID AND DRINK DAILY   tiZANidine (ZANAFLEX) 4 mg tablet   No No   Sig: Take 1 tablet (4 mg total) by mouth every 8 (eight) hours as needed for muscle spasms   topiramate (TOPAMAX) 25 mg tablet   No No   Sig: Take 3 tablets (75 mg total) by mouth every 12 (twelve) hours   vitamin B-12 (VITAMIN B-12) 1,000 mcg tablet   No No   Sig: Take 1 tablet (1,000 mcg total) by mouth daily      Facility-Administered Medications: None     Patient's Medications   Discharge Prescriptions    No medications on file     No discharge procedures on file.  ED SEPSIS DOCUMENTATION   Time reflects when diagnosis was documented in both MDM as applicable and the Disposition within this note       Time User Action Codes Description Comment    11/28/2024  9:48 PM Luis F Tillman [X83.8XXA] Suicide attempt by inadequate means, initial encounter (HCC)     11/28/2024  9:48 PM Luis F Tillman [F43.89] Chronic sorrow     11/28/2024  9:48 PM Luis F Tillman [T07.XXXA] Multiple lacerations     11/28/2024  9:58 PM Luis F Tillman [F15.10] Methamphetamine abuse (HCC)                  Luis F Tillman MD  11/28/24 3028

## 2024-11-29 NOTE — EMTALA/ACUTE CARE TRANSFER
[facility] EMERGENCY DEPARTMENT  [address] [address]  Dept: [phone]      EMTALA TRANSFER CONSENT    NAME Carey Keith                                         1975                              MRN [id number]    I have been informed of my rights regarding examination, treatment, and transfer   by Dr. Jerry Epstein Jr., DO    Benefits: Specialized equipment and/or services available at the receiving facility (Include comment)________________________    Risks: Potential for delay in receiving treatment      Consent for Transfer:  I acknowledge that my medical condition has been evaluated and explained to me by the emergency department physician or other qualified medical person and/or my attending physician, who has recommended that I be transferred to the service of  Accepting Physician: Dr. Jones at Accepting Facility Name, City & State : [address] The above potential benefits of such transfer, the potential risks associated with such transfer, and the probable risks of not being transferred have been explained to me, and I fully understand them.  The doctor has explained that, in my case, the benefits of transfer outweigh the risks.  I agree to be transferred.    I authorize the performance of emergency medical procedures and treatments upon me in both transit and upon arrival at the receiving facility.  Additionally, I authorize the release of any and all medical records to the receiving facility and request they be transported with me, if possible.  I understand that the safest mode of transportation during a medical emergency is an ambulance and that the Hospital advocates the use of this mode of transport. Risks of traveling to the receiving facility by car, including absence of medical control, life sustaining equipment, such as oxygen, and medical personnel has been explained to me and I fully understand them.    (BRUCE CORRECT  BOX BELOW)  [  ]  I consent to the stated transfer and to be transported by ambulance/helicopter.  [  ]  I consent to the stated transfer, but refuse transportation by ambulance and accept full responsibility for my transportation by car.  I understand the risks of non-ambulance transfers and I exonerate the Hospital and its staff from any deterioration in my condition that results from this refusal.    X___________________________________________    DATE  24  TIME________  Signature of patient or legally responsible individual signing on patient behalf           RELATIONSHIP TO PATIENT_________________________          Provider Certification    NAME Carey Keith                                         1975                              MRN [id number]    A medical screening exam was performed on the above named patient.  Based on the examination:    Condition Necessitating Transfer The primary encounter diagnosis was Suicide attempt by inadequate means, initial encounter ([facility]). Diagnoses of Chronic sorrow, Multiple lacerations, and Methamphetamine abuse ([facility]) were also pertinent to this visit.    Patient Condition: The patient has been stabilized such that within reasonable medical probability, no material deterioration of the patient condition or the condition of the unborn child(ricky) is likely to result from the transfer    Reason for Transfer: Level of Care needed not available at this facility    Transfer Requirements: Facility [address]   Space available and qualified personnel available for treatment as acknowledged by Danielle MENDOZA; [phone]  Agreed to accept transfer and to provide appropriate medical treatment as acknowledged by       Dr. Jones  Appropriate medical records of the examination and treatment of the patient are provided at the time of transfer   STAFF INITIAL WHEN COMPLETED _______  Transfer will be performed by qualified personnel from [facility]   and appropriate transfer equipment as required, including the use of necessary and appropriate life support measures.    Provider Certification: I have examined the patient and explained the following risks and benefits of being transferred/refusing transfer to the patient/family:  The patient is stable for psychiatric transfer because they are medically stable, and is protected from harming him/herself or others during transport, General risk, such as traffic hazards, adverse weather conditions, rough terrain or turbulence, possible failure of equipment (including vehicle or aircraft), or consequences of actions of persons outside the control of the transport personnel, Risk of worsening condition, The possibility of a transport vehicle being unavailable, Unanticipated needs of medical equipment and personnel during transport      Based on these reasonable risks and benefits to the patient and/or the unborn child(ricky), and based upon the information available at the time of the patient’s examination, I certify that the medical benefits reasonably to be expected from the provision of appropriate medical treatments at another medical facility outweigh the increasing risks, if any, to the individual’s medical condition, and in the case of labor to the unborn child, from effecting the transfer.    X____________________________________________ DATE 11/29/24        TIME_______      ORIGINAL - SEND TO MEDICAL RECORDS   COPY - SEND WITH PATIENT DURING TRANSFER

## 2024-11-29 NOTE — ED NOTES
Patient refusing to go to [facility] at this time. RN messaged the crisis worker and provider made aware     Belkis Salgado RN  11/29/24 7600

## 2024-11-29 NOTE — NURSING NOTE
"Pt has become overly concerned and preoccupied with her PRN medications,  specifically whether or not she will receive Klonopin with her scheduled medications.  Pt reports anxiety,  she appears restless and reports reoccurring nausea.  Pt urged speak with providers in the morning regarding scheduled medications.  Pt is frustrated and refused ATARAX stating\"that doesn't work\" . Pt offered IM BENADRYL and agreed to to receive 50 mg at 1710 for moderate anxiety.  Pt now appears less restless but reports back pain.  PRN BENADRYL effective,  Pt received PRN IBUPROFIN 800 mg PO for severe back pain  at 1806.  Along with scheduled Symbicort and Neosporin.    "

## 2024-11-29 NOTE — PLAN OF CARE
Problem: Ineffective Coping  Goal: Participates in unit activities  Description: Interventions:  - Provide therapeutic environment   - Provide required programming   - Redirect inappropriate behaviors   Outcome: Progressing     Problem: DISCHARGE PLANNING  Goal: Discharge to home or other facility with appropriate resources  Description: INTERVENTIONS:  - Identify barriers to discharge w/patient and caregiver  - Arrange for needed discharge resources and transportation as appropriate  - Identify discharge learning needs (meds, wound care, etc.)  - Arrange for interpretive services to assist at discharge as needed  - Refer to Case Management Department for coordinating discharge planning if the patient needs post-hospital services based on physician/advanced practitioner order or complex needs related to functional status, cognitive ability, or social support system  Outcome: Progressing     Problem: SELF HARM/SUICIDALITY  Goal: Will have no self-injury during hospital stay  Description: INTERVENTIONS:  - Q 15 MINUTES: Routine safety checks  - Q WAKING SHIFT & PRN: Assess risk to determine if routine checks are adequate to maintain patient safety  - Encourage patient to participate actively in care by formulating a plan to combat response to suicidal ideation, identify supports and resources  Outcome: Progressing     Problem: DEPRESSION  Goal: Will be euthymic at discharge  Description: INTERVENTIONS:  - Administer medication as ordered  - Provide emotional support via 1:1 interaction with staff  - Encourage involvement in milieu/groups/activities  - Monitor for social isolation  Outcome: Progressing     Problem: ANXIETY  Goal: Will report anxiety at manageable levels  Description: INTERVENTIONS:  - Administer medication as ordered  - Teach and encourage coping skills  - Provide emotional support  - Assess patient/family for anxiety and ability to cope  Outcome: Progressing  Goal: By discharge: Patient will  verbalize 2 strategies to deal with anxiety  Description: Interventions:  - Identify any obvious source/trigger to anxiety  - Staff will assist patient in applying identified coping technique/skills  - Encourage attendance of scheduled groups and activities  Outcome: Progressing     Problem: SUBSTANCE USE/ABUSE  Goal: Will have no detox symptoms and will verbalize plan for changing substance-related behavior  Description: INTERVENTIONS:  - Monitor physical status and assess for symptoms of withdrawal  - Administer medication as ordered  - Provide emotional support with 1 on 1 interaction with staff  - Encourage recovery focused program/ addiction education  - Assess for verbalization of changing behaviors related to substance abuse  - Initiate consults and referrals as appropriate (Case Management, Spiritual Care, etc.)  Outcome: Progressing  Goal: By discharge, will develop insight into their chemical dependency and sustain motivation to continue in recovery  Description: INTERVENTIONS:  - Attends all daily group sessions and scheduled AA groups  - Actively practices coping skills through participation in the therapeutic community and adherence to program rules  - Reviews and completes assignments from individual treatment plan  - Assist patient development of understanding of their personal cycle of addiction and relapse triggers  Outcome: Progressing  Goal: By discharge, patient will have ongoing treatment plan addressing chemical dependency  Description: INTERVENTIONS:  - Assist patient with resources and/or appointments for ongoing recovery based living  Outcome: Progressing

## 2024-11-29 NOTE — PROGRESS NOTES
11/29/24 1433   Team Meeting   Meeting Type Tx Team Meeting   Initial Conference Date 11/29/24   Team Members Present   Team Members Present Physician;Nurse;   Physician Team Member Rosalie   Nursing Team Member Zac   Care Management Team Member Harsha   Patient/Family Present   Patient Present No   Patient's Family Present No     Tx team attempted to review tx plan with pt. Pt refused to meet, requesting to review later.

## 2024-11-29 NOTE — ASSESSMENT & PLAN NOTE
History of back pain, neuropathy, headache recently maintained on gabapentin 800 mg 3 times daily, ibuprofen and Zanaflex as needed  Continue Tylenol, ibuprofen, Zanaflex, and Bengay as needed

## 2024-11-29 NOTE — DISCHARGE INSTR - APPOINTMENTS
Behavioral Health Nurse Navigator, Sakina or Lissa will be calling you after your discharge, on the phone number that you provided.  They will be available as an additional support, if needed.   If you wish to speak with Sakina, you may contact her at [phone].

## 2024-11-29 NOTE — ASSESSMENT & PLAN NOTE
Admission labs: CBC, CMP, lipid panel, TSH pending   Folate, B12, Vitamin D 25 hydroxy labs pending  Vitals stable   UA pending   UDS positive for amp/meth  EKG pending   Patient is medically cleared for admission to [facility] and treatment of underlying psychiatric illness based on available results  Please contact [facility] with any questions or concerns

## 2024-11-29 NOTE — ED NOTES
Pt presented to the ED via EMS after a reported suicide attempt via cutting. Pt was assessed virtually and was agreeable to same. Pt reports that she has been having difficulty since her last discharge due to the holiday seasons and her father passing away a few years ago. She typically has difficulty dealing with her grief this time of year. She reports having thoughts of slitting her neck instead of her arm next time to make sure she dies. She is vague regarding the last time she took any medications since discharge due to not picking them up. She reports decreased appetite and decreased sleep. She denies homicidal ideations/psychosis/hallucinations. She reports the suicidal ideation has been getting worse over the last couple of days. She denies access to firearms or legal involvement. Based on EMR review Pt has history of meth use, but has not provided a urine sample as of yet. Pt remained calm and cooperative throughout assessment. Pt is agreeable to signing a 201 and appears to understand her treatment rights. ED provider in agreement with plan.

## 2024-11-30 PROCEDURE — 99223 1ST HOSP IP/OBS HIGH 75: CPT | Performed by: PSYCHIATRY & NEUROLOGY

## 2024-11-30 RX ORDER — OLANZAPINE 5 MG/1
5 TABLET ORAL
Status: DISCONTINUED | OUTPATIENT
Start: 2024-11-30 | End: 2024-12-01

## 2024-11-30 RX ORDER — GABAPENTIN 400 MG/1
400 CAPSULE ORAL 3 TIMES DAILY
Status: DISCONTINUED | OUTPATIENT
Start: 2024-11-30 | End: 2024-12-02

## 2024-11-30 RX ORDER — WATER 10 ML/10ML
INJECTION INTRAMUSCULAR; INTRAVENOUS; SUBCUTANEOUS
Status: DISCONTINUED
Start: 2024-11-30 | End: 2024-12-09 | Stop reason: HOSPADM

## 2024-11-30 RX ORDER — CLONAZEPAM 0.5 MG/1
0.25 TABLET ORAL 2 TIMES DAILY
Status: COMPLETED | OUTPATIENT
Start: 2024-11-30 | End: 2024-12-03

## 2024-11-30 RX ORDER — NICOTINE 21 MG/24HR
14 PATCH, TRANSDERMAL 24 HOURS TRANSDERMAL DAILY
Status: DISCONTINUED | OUTPATIENT
Start: 2024-11-30 | End: 2024-12-09 | Stop reason: HOSPADM

## 2024-11-30 RX ADMIN — BUPRENORPHINE AND NALOXONE 8 MG: 8; 2 FILM BUCCAL; SUBLINGUAL at 15:16

## 2024-11-30 RX ADMIN — NICOTINE POLACRILEX 4 MG: 4 GUM, CHEWING BUCCAL at 18:25

## 2024-11-30 RX ADMIN — NICOTINE POLACRILEX 4 MG: 4 GUM, CHEWING BUCCAL at 09:42

## 2024-11-30 RX ADMIN — BUDESONIDE AND FORMOTEROL FUMARATE DIHYDRATE 2 PUFF: 160; 4.5 AEROSOL RESPIRATORY (INHALATION) at 18:10

## 2024-11-30 RX ADMIN — GABAPENTIN 400 MG: 400 CAPSULE ORAL at 09:40

## 2024-11-30 RX ADMIN — OLANZAPINE 5 MG: 5 TABLET, FILM COATED ORAL at 21:00

## 2024-11-30 RX ADMIN — OLANZAPINE 5 MG: 10 INJECTION, POWDER, LYOPHILIZED, FOR SOLUTION INTRAMUSCULAR at 08:11

## 2024-11-30 RX ADMIN — TIZANIDINE 4 MG: 4 TABLET ORAL at 20:16

## 2024-11-30 RX ADMIN — NICOTINE POLACRILEX 4 MG: 4 GUM, CHEWING BUCCAL at 20:25

## 2024-11-30 RX ADMIN — NICOTINE POLACRILEX 4 MG: 4 GUM, CHEWING BUCCAL at 11:48

## 2024-11-30 RX ADMIN — NICOTINE POLACRILEX 4 MG: 4 GUM, CHEWING BUCCAL at 14:27

## 2024-11-30 RX ADMIN — GABAPENTIN 400 MG: 400 CAPSULE ORAL at 20:16

## 2024-11-30 RX ADMIN — IBUPROFEN 800 MG: 400 TABLET, FILM COATED ORAL at 15:16

## 2024-11-30 RX ADMIN — TRAZODONE HYDROCHLORIDE 50 MG: 50 TABLET ORAL at 21:00

## 2024-11-30 RX ADMIN — MELATONIN TAB 3 MG 3 MG: 3 TAB at 21:00

## 2024-11-30 RX ADMIN — CLONAZEPAM 0.25 MG: 0.5 TABLET ORAL at 09:40

## 2024-11-30 RX ADMIN — GABAPENTIN 400 MG: 400 CAPSULE ORAL at 15:15

## 2024-11-30 RX ADMIN — Medication 14 MG: at 11:47

## 2024-11-30 RX ADMIN — HYDROXYZINE HYDROCHLORIDE 100 MG: 50 TABLET, FILM COATED ORAL at 12:51

## 2024-11-30 RX ADMIN — NICOTINE POLACRILEX 4 MG: 4 GUM, CHEWING BUCCAL at 16:31

## 2024-11-30 RX ADMIN — BUPRENORPHINE AND NALOXONE 8 MG: 8; 2 FILM BUCCAL; SUBLINGUAL at 08:24

## 2024-11-30 RX ADMIN — CLONAZEPAM 0.25 MG: 0.5 TABLET ORAL at 17:19

## 2024-11-30 RX ADMIN — HYDROXYZINE HYDROCHLORIDE 100 MG: 50 TABLET, FILM COATED ORAL at 19:21

## 2024-11-30 RX ADMIN — BUPRENORPHINE AND NALOXONE 8 MG: 8; 2 FILM BUCCAL; SUBLINGUAL at 20:16
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
## 2024-11-30 NOTE — NURSING NOTE
Patient visible on unit. Calm and cooperative at this time. Patient reported medication effectiveness. PRN Zyprexa 5 mg IM for severe agitation effective at this time.

## 2024-11-30 NOTE — NURSING NOTE
"Pt became frustrated and restless after she demanded ATIVAN and was denied.  Pt exclaimed,  Why does everyone around me get Ativan and I can't? Pt informed that ATIVAN was to be used for symptoms much more severe than she was presenting originally refused the Atarax but shortly after changed her mind and was PRN ATARAX 100 mg PO for severe anxiety at 2053.  Up reassessment pt stated \"I'm still anxious, Atarax doesn nothing for me.  Pt then stated \"theres no Way I'll be able to fall asleep\".   Pt was given PRN TRAZODONE 50 mg PO along with scheduled MELATONIN at 2201 for insomnia.     "

## 2024-11-30 NOTE — NURSING NOTE
"PTA meds complete, Dr. Jones Notified via Secure Chat at 2009.  Originally Lamictal requested however pt went back on an original statement she made during the admission process that she had a seizure \" 2-weeks ago\" and now states she \"has never had any seizures,  I passed out 2-weeks ago but it wasn't a seizure. \" I refused the meds they gave me at the other place.\" Pt was not taking medication other than KLONOPIN prior to admission.    "

## 2024-11-30 NOTE — CMS CERTIFICATION NOTE
Certification: Based upon physical, mental and social evaluations, I certify that inpatient psychiatric services continue to be medically necessary for this patient for a duration of greater than 2 midnights for the treatment of  Bipolar disorder, current episode depressed, severe, without psychotic features (HCC) Available alternative community resources still do not meet the patient's mental health care needs. I further attest that an established written individualized plan of care has been updated and is outlined in the patient's medical records.    Jordan Christopher Holter, DO

## 2024-11-30 NOTE — TREATMENT TEAM
11/30/24 0130   Status of Admission   72 Hour Notice Initiated   Date patient signed 72h Notice 11/30/24   Time patient signed 72h Notice 0130

## 2024-11-30 NOTE — TREATMENT PLAN
TREATMENT PLAN REVIEW - Behavioral Health Carey Keith 49 y.o. 1975 female MRN: [id number]    [facility] 3B BEHAVIORAL [facility] Room / Bed: [email or web address] 344/[email or web address] 344-01 Encounter: [id number]          Admit Date/Time:  11/29/2024 11:56 AM    Treatment Team:   MD Joselin Quinteros CRNP Sarah Alderfer Anita Feliz, RN Jowal Simons Ruby Serraty, RN Brooke Rickert Elana Roman    Diagnosis: Principal Problem:    Bipolar disorder, current episode depressed, severe, without psychotic features (HCC)  Active Problems:    Medical clearance for psychiatric admission    Tobacco abuse    Hepatitis C antibody test positive    Opioid use disorder, severe, on maintenance therapy (HCC)    Methamphetamine abuse (HCC)    Asthma    Back pain      Patient Strengths/Assets: ability for insight, average or above intelligence, capable of independent living    Patient Barriers/Limitations: difficulty adapting, limited support system, substance abuse    Short Term Goals: decrease in depressive symptoms, decrease in anxiety symptoms, decrease in suicidal thoughts, improvement in insight, mood stabilization, increase in group attendance, increase in socialization with peers on the unit, acceptance of need for psychiatric treatment, acceptance of psychiatric medications    Long Term Goals: improvement in depression, improvement in anxiety, resolution of depressive symptoms, stabilization of mood, free of suicidal thoughts, improved insight, acceptance of need for psychiatric medications, acceptance of need for psychiatric treatment, acceptance of need for psychiatric follow up after discharge, acceptance of psychiatric diagnosis, appropriate interaction with peers, stable living arrangements upon discharge, establishment of family support upon discharge    Progress Towards Goals: starting psychiatric medications as prescribed, continue psychiatric medications as prescribed    Recommended  Treatment: medication management, patient medication education, group therapy, milieu therapy, continued Behavioral Health psychiatric evaluation/assessment process    Treatment Frequency: daily medication monitoring, group and milieu therapy daily, monitoring through interdisciplinary rounds, monitoring through weekly patient care conferences    Expected Discharge Date:  TBD    Discharge Plan: referrals as indicated, return to previous living arrangement    Treatment Plan Created/Updated By: Jordan Christopher Holter, DO
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
## 2024-11-30 NOTE — QUICK NOTE
Pt stated she didn't want to get blood work done this morning. She will do it later today. Pass on to next shift.

## 2024-11-30 NOTE — NURSING NOTE
Patient is visible on unit, isolative to self. Patient is calm and cooperative at this time. Patient denies SI/HI/AH/VH. Patient refused scheduled inhaler and ointment. Patient compliant with unit and meals.

## 2024-11-30 NOTE — NURSING NOTE
"At appox. 0800, patient approached nurses station, requesting gabapentin. Staff reassured patient about current medications and no gabapentin scheduled at this time. Patient became agitated and went into room. Patient came back to nurses station and yelled, \"You dont understand I'm going through withdrawals from gabapentin, I need it now!\" Patient redirected to room. Security called.      0811- PRN Zyprexa 5 mg IM for severe agitation given. Broset score 3. Will reassess in an hr.   "

## 2024-11-30 NOTE — NURSING NOTE
Patient visible in room, laying down. No signs of anxiety or distress. PRN Atarax 100 mg po for severe anxiety, effective.

## 2024-11-30 NOTE — NURSING NOTE
Patient approached nurses station agitated and reporting she cannot sleep due to her roommate, light being on and feeling uncomfortable. This nurse spoke with roommate in regards to light being turned off and was not agreeable. Soon after patient came to nurses station verbally abusive with staff and demanding to be changed to another room. Patient refused prn medication at this time and was redirected to room by herself.

## 2024-11-30 NOTE — NURSING NOTE
Patient is in the community and social with peers. Patient frequents the nurse's station requesting medication. Patient appeared sedated with slurred speech after 1600 scheduled medications. During this time, patient came to nurse's station requesting a muscle relaxer. This writer explained that administering the medications is not advised due to patient's presentation. This writer explained scheduled klonopin at 1800 could be administered. Patient receptive. Patient compliant with scheduled medications. Patient denied thoughts of self harm at this time. Patient denies AVH/HI. Q15 observation maintained.

## 2024-11-30 NOTE — PLAN OF CARE
Problem: SELF HARM/SUICIDALITY  Goal: Will have no self-injury during hospital stay  Description: INTERVENTIONS:  - Q 15 MINUTES: Routine safety checks  - Q WAKING SHIFT & PRN: Assess risk to determine if routine checks are adequate to maintain patient safety  - Encourage patient to participate actively in care by formulating a plan to combat response to suicidal ideation, identify supports and resources  Outcome: Progressing     Problem: Ineffective Coping  Goal: Participates in unit activities  Description: Interventions:  - Provide therapeutic environment   - Provide required programming   - Redirect inappropriate behaviors   Outcome: Not Progressing     Problem: DISCHARGE PLANNING  Goal: Discharge to home or other facility with appropriate resources  Description: INTERVENTIONS:  - Identify barriers to discharge w/patient and caregiver  - Arrange for needed discharge resources and transportation as appropriate  - Identify discharge learning needs (meds, wound care, etc.)  - Arrange for interpretive services to assist at discharge as needed  - Refer to Case Management Department for coordinating discharge planning if the patient needs post-hospital services based on physician/advanced practitioner order or complex needs related to functional status, cognitive ability, or social support system  Outcome: Not Progressing     Problem: DEPRESSION  Goal: Will be euthymic at discharge  Description: INTERVENTIONS:  - Administer medication as ordered  - Provide emotional support via 1:1 interaction with staff  - Encourage involvement in milieu/groups/activities  - Monitor for social isolation  Outcome: Not Progressing     Problem: ANXIETY  Goal: Will report anxiety at manageable levels  Description: INTERVENTIONS:  - Administer medication as ordered  - Teach and encourage coping skills  - Provide emotional support  - Assess patient/family for anxiety and ability to cope  Outcome: Not Progressing  Goal: By discharge:  Patient will verbalize 2 strategies to deal with anxiety  Description: Interventions:  - Identify any obvious source/trigger to anxiety  - Staff will assist patient in applying identified coping technique/skills  - Encourage attendance of scheduled groups and activities  Outcome: Not Progressing     Problem: SUBSTANCE USE/ABUSE  Goal: Will have no detox symptoms and will verbalize plan for changing substance-related behavior  Description: INTERVENTIONS:  - Monitor physical status and assess for symptoms of withdrawal  - Administer medication as ordered  - Provide emotional support with 1 on 1 interaction with staff  - Encourage recovery focused program/ addiction education  - Assess for verbalization of changing behaviors related to substance abuse  - Initiate consults and referrals as appropriate (Case Management, Spiritual Care, etc.)  Outcome: Not Progressing  Goal: By discharge, will develop insight into their chemical dependency and sustain motivation to continue in recovery  Description: INTERVENTIONS:  - Attends all daily group sessions and scheduled AA groups  - Actively practices coping skills through participation in the therapeutic community and adherence to program rules  - Reviews and completes assignments from individual treatment plan  - Assist patient development of understanding of their personal cycle of addiction and relapse triggers  Outcome: Not Progressing  Goal: By discharge, patient will have ongoing treatment plan addressing chemical dependency  Description: INTERVENTIONS:  - Assist patient with resources and/or appointments for ongoing recovery based living  Outcome: Not Progressing     Problem: Nutrition/Hydration-ADULT  Goal: Nutrient/Hydration intake appropriate for improving, restoring or maintaining nutritional needs  Description: Monitor and assess patient's nutrition/hydration status for malnutrition. Collaborate with interdisciplinary team and initiate plan and interventions as  ordered.  Monitor patient's weight and dietary intake as ordered or per policy. Utilize nutrition screening tool and intervene as necessary. Determine patient's food preferences and provide high-protein, high-caloric foods as appropriate.     INTERVENTIONS:  - Monitor oral intake, urinary output, labs, and treatment plans  - Assess nutrition and hydration status and recommend course of action  - Evaluate amount of meals eaten  - Assist patient with eating if necessary   - Allow adequate time for meals  - Recommend/ encourage appropriate diets, oral nutritional supplements, and vitamin/mineral supplements  - Order, calculate, and assess calorie counts as needed  - Recommend, monitor, and adjust tube feedings and TPN/PPN based on assessed needs  - Assess need for intravenous fluids  - Provide specific nutrition/hydration education as appropriate  - Include patient/family/caregiver in decisions related to nutrition  Outcome: Not Progressing

## 2024-11-30 NOTE — ASSESSMENT & PLAN NOTE
Reintroduce Zyprexa 5 mg nightly to confer mood stability. Upward titration as necessary and as tolerated.    Reintroduce Gabapentin 400 mg three times daily to address anxiety and pain as off label agent. Upward titration as necessary and as tolerated.     Reintroduce Klonopin at 0.25 mg twice daily to address refractory anxiety. Anticipate possible downward taper and discontinuation in presence of substance abuse, seeing as aforementioned agent has propensity of misuse/abuse.     No associated orders from this encounter found during lookback period of 72 hours.

## 2024-12-01 LAB
ATRIAL RATE: 63 BPM
P AXIS: -11 DEGREES
PR INTERVAL: 166 MS
QRS AXIS: 30 DEGREES
QRSD INTERVAL: 82 MS
QT INTERVAL: 414 MS
QTC INTERVAL: 424 MS
T WAVE AXIS: 34 DEGREES
VENTRICULAR RATE: 63 BPM

## 2024-12-01 PROCEDURE — 99232 SBSQ HOSP IP/OBS MODERATE 35: CPT | Performed by: PSYCHIATRY & NEUROLOGY

## 2024-12-01 PROCEDURE — 93010 ELECTROCARDIOGRAM REPORT: CPT

## 2024-12-01 PROCEDURE — 93005 ELECTROCARDIOGRAM TRACING: CPT

## 2024-12-01 RX ORDER — OLANZAPINE 10 MG/1
10 TABLET ORAL
Status: DISCONTINUED | OUTPATIENT
Start: 2024-12-01 | End: 2024-12-02

## 2024-12-01 RX ADMIN — NICOTINE POLACRILEX 4 MG: 4 GUM, CHEWING BUCCAL at 09:26

## 2024-12-01 RX ADMIN — NICOTINE POLACRILEX 4 MG: 4 GUM, CHEWING BUCCAL at 11:35

## 2024-12-01 RX ADMIN — NICOTINE POLACRILEX 4 MG: 4 GUM, CHEWING BUCCAL at 19:40

## 2024-12-01 RX ADMIN — TIZANIDINE 4 MG: 4 TABLET ORAL at 21:04

## 2024-12-01 RX ADMIN — BUPRENORPHINE AND NALOXONE 8 MG: 8; 2 FILM BUCCAL; SUBLINGUAL at 21:03

## 2024-12-01 RX ADMIN — POLYMYXIN B SULFATE, BACITRACIN ZINC, NEOMYCIN SULFATE 1 LARGE APPLICATION: 5000; 3.5; 4 OINTMENT TOPICAL at 17:16

## 2024-12-01 RX ADMIN — HYDROXYZINE HYDROCHLORIDE 100 MG: 50 TABLET, FILM COATED ORAL at 18:23

## 2024-12-01 RX ADMIN — IBUPROFEN 800 MG: 400 TABLET, FILM COATED ORAL at 08:47

## 2024-12-01 RX ADMIN — NICOTINE POLACRILEX 4 MG: 4 GUM, CHEWING BUCCAL at 21:22

## 2024-12-01 RX ADMIN — CLONAZEPAM 0.25 MG: 0.5 TABLET ORAL at 17:16

## 2024-12-01 RX ADMIN — Medication 14 MG: at 08:47

## 2024-12-01 RX ADMIN — TRAZODONE HYDROCHLORIDE 50 MG: 50 TABLET ORAL at 21:03

## 2024-12-01 RX ADMIN — BUDESONIDE AND FORMOTEROL FUMARATE DIHYDRATE 2 PUFF: 160; 4.5 AEROSOL RESPIRATORY (INHALATION) at 08:46

## 2024-12-01 RX ADMIN — BUPRENORPHINE AND NALOXONE 8 MG: 8; 2 FILM BUCCAL; SUBLINGUAL at 08:47

## 2024-12-01 RX ADMIN — CLONAZEPAM 0.25 MG: 0.5 TABLET ORAL at 08:46

## 2024-12-01 RX ADMIN — NICOTINE POLACRILEX 4 MG: 4 GUM, CHEWING BUCCAL at 14:34

## 2024-12-01 RX ADMIN — NICOTINE POLACRILEX 4 MG: 4 GUM, CHEWING BUCCAL at 00:37

## 2024-12-01 RX ADMIN — OLANZAPINE 10 MG: 10 TABLET, FILM COATED ORAL at 21:02

## 2024-12-01 RX ADMIN — BUPRENORPHINE AND NALOXONE 8 MG: 8; 2 FILM BUCCAL; SUBLINGUAL at 14:34

## 2024-12-01 RX ADMIN — BUDESONIDE AND FORMOTEROL FUMARATE DIHYDRATE 2 PUFF: 160; 4.5 AEROSOL RESPIRATORY (INHALATION) at 17:15

## 2024-12-01 RX ADMIN — GABAPENTIN 400 MG: 400 CAPSULE ORAL at 21:02

## 2024-12-01 RX ADMIN — TIZANIDINE 4 MG: 4 TABLET ORAL at 10:36

## 2024-12-01 RX ADMIN — MELATONIN TAB 3 MG 3 MG: 3 TAB at 21:03

## 2024-12-01 RX ADMIN — POLYMYXIN B SULFATE, BACITRACIN ZINC, NEOMYCIN SULFATE 1 LARGE APPLICATION: 5000; 3.5; 4 OINTMENT TOPICAL at 08:46

## 2024-12-01 RX ADMIN — GABAPENTIN 400 MG: 400 CAPSULE ORAL at 08:46

## 2024-12-01 RX ADMIN — GABAPENTIN 400 MG: 400 CAPSULE ORAL at 16:37

## 2024-12-01 NOTE — NURSING NOTE
Patient c/o severe anxiety. Patient came to nurse's station requesting a benadryl shot for anxiety. This writer offered PRN atarax. Patient reported anxiety to be 8/10. PRN atarax 100mg was administered.     On reassessment, patient observed in room resting. Atarax 100mg was effective and patient remained in behavioral control.

## 2024-12-01 NOTE — PLAN OF CARE
Problem: Ineffective Coping  Goal: Participates in unit activities  Description: Interventions:  - Provide therapeutic environment   - Provide required programming   - Redirect inappropriate behaviors   Outcome: Progressing     Problem: SELF HARM/SUICIDALITY  Goal: Will have no self-injury during hospital stay  Description: INTERVENTIONS:  - Q 15 MINUTES: Routine safety checks  - Q WAKING SHIFT & PRN: Assess risk to determine if routine checks are adequate to maintain patient safety  - Encourage patient to participate actively in care by formulating a plan to combat response to suicidal ideation, identify supports and resources  Outcome: Progressing     Problem: Nutrition/Hydration-ADULT  Goal: Nutrient/Hydration intake appropriate for improving, restoring or maintaining nutritional needs  Description: Monitor and assess patient's nutrition/hydration status for malnutrition. Collaborate with interdisciplinary team and initiate plan and interventions as ordered.  Monitor patient's weight and dietary intake as ordered or per policy. Utilize nutrition screening tool and intervene as necessary. Determine patient's food preferences and provide high-protein, high-caloric foods as appropriate.     INTERVENTIONS:  - Monitor oral intake, urinary output, labs, and treatment plans  - Assess nutrition and hydration status and recommend course of action  - Evaluate amount of meals eaten  - Assist patient with eating if necessary   - Allow adequate time for meals  - Recommend/ encourage appropriate diets, oral nutritional supplements, and vitamin/mineral supplements  - Order, calculate, and assess calorie counts as needed  - Recommend, monitor, and adjust tube feedings and TPN/PPN based on assessed needs  - Assess need for intravenous fluids  - Provide specific nutrition/hydration education as appropriate  - Include patient/family/caregiver in decisions related to nutrition  Outcome: Progressing     Problem: DISCHARGE  PLANNING  Goal: Discharge to home or other facility with appropriate resources  Description: INTERVENTIONS:  - Identify barriers to discharge w/patient and caregiver  - Arrange for needed discharge resources and transportation as appropriate  - Identify discharge learning needs (meds, wound care, etc.)  - Arrange for interpretive services to assist at discharge as needed  - Refer to Case Management Department for coordinating discharge planning if the patient needs post-hospital services based on physician/advanced practitioner order or complex needs related to functional status, cognitive ability, or social support system  Outcome: Not Progressing     Problem: DEPRESSION  Goal: Will be euthymic at discharge  Description: INTERVENTIONS:  - Administer medication as ordered  - Provide emotional support via 1:1 interaction with staff  - Encourage involvement in milieu/groups/activities  - Monitor for social isolation  Outcome: Not Progressing     Problem: ANXIETY  Goal: Will report anxiety at manageable levels  Description: INTERVENTIONS:  - Administer medication as ordered  - Teach and encourage coping skills  - Provide emotional support  - Assess patient/family for anxiety and ability to cope  Outcome: Not Progressing  Goal: By discharge: Patient will verbalize 2 strategies to deal with anxiety  Description: Interventions:  - Identify any obvious source/trigger to anxiety  - Staff will assist patient in applying identified coping technique/skills  - Encourage attendance of scheduled groups and activities  Outcome: Not Progressing     Problem: SUBSTANCE USE/ABUSE  Goal: Will have no detox symptoms and will verbalize plan for changing substance-related behavior  Description: INTERVENTIONS:  - Monitor physical status and assess for symptoms of withdrawal  - Administer medication as ordered  - Provide emotional support with 1 on 1 interaction with staff  - Encourage recovery focused program/ addiction education  - Assess  for verbalization of changing behaviors related to substance abuse  - Initiate consults and referrals as appropriate (Case Management, Spiritual Care, etc.)  Outcome: Not Progressing  Goal: By discharge, will develop insight into their chemical dependency and sustain motivation to continue in recovery  Description: INTERVENTIONS:  - Attends all daily group sessions and scheduled AA groups  - Actively practices coping skills through participation in the therapeutic community and adherence to program rules  - Reviews and completes assignments from individual treatment plan  - Assist patient development of understanding of their personal cycle of addiction and relapse triggers  Outcome: Not Progressing  Goal: By discharge, patient will have ongoing treatment plan addressing chemical dependency  Description: INTERVENTIONS:  - Assist patient with resources and/or appointments for ongoing recovery based living  Outcome: Not Progressing     Problem: DISCHARGE PLANNING - CARE MANAGEMENT  Goal: Discharge to post-acute care or home with appropriate resources  Description: INTERVENTIONS:  - Conduct assessment to determine patient/family and health care team treatment goals, and need for post-acute services based on payer coverage, community resources, and patient preferences, and barriers to discharge  - Address psychosocial, clinical, and financial barriers to discharge as identified in assessment in conjunction with the patient/family and health care team  - Arrange appropriate level of post-acute services according to patient’s   needs and preference and payer coverage in collaboration with the physician and health care team  - Communicate with and update the patient/family, physician, and health care team regarding progress on the discharge plan  - Arrange appropriate transportation to post-acute venues  Outcome: Not Progressing

## 2024-12-01 NOTE — NURSING NOTE
Patient c/o muscle spasms in legs. PRN zanaflex 4mg was administered at 2016.     On reassessment at 2116, patient reported medication to be effective.     Patient came to nurse's station c/o difficulty sleeping. PRN trazodone 50mg was administered at 2100.    On reassessment at 2200, patient was resting in room. Medication effective.

## 2024-12-01 NOTE — NURSING NOTE
"Patient displeased with realizing klonopin was .25mg as she believed it was at .5mg patient educated about dosages. Patient requested IM benadryl for severe anxiety or \"Something stronger\" patient informed she must attempt PO atarax 100mgs first. Patient given PO PRN 100mgs atarax for severe anxiety.   "

## 2024-12-01 NOTE — PROGRESS NOTES
Psychiatric Progress Note - Department of Behavioral Health   Carey Keith 49 y.o. female MRN: [id number]  Unit/Bed#: [facility] 344-01 Encounter: [id number]    ASSESSMENT & PLAN     Assessment & Plan  Medical clearance for psychiatric admission    No associated orders from this encounter found during lookback period of 72 hours.  Tobacco abuse    No associated orders from this encounter found during lookback period of 72 hours.  Hepatitis C antibody test positive    No associated orders from this encounter found during lookback period of 72 hours.  Opioid use disorder, severe, on maintenance therapy (HCC)    No associated orders from this encounter found during lookback period of 72 hours.  Bipolar disorder, current episode depressed, severe, without psychotic features (HCC)  Reintroduce Zyprexa 5 mg nightly to confer mood stability. Upward titration as necessary and as tolerated.    Reintroduce Gabapentin 400 mg three times daily to address anxiety and pain as off label agent. Upward titration as necessary and as tolerated.     Reintroduce Klonopin at 0.25 mg twice daily to address refractory anxiety. Anticipate possible downward taper and discontinuation in presence of substance abuse, seeing as aforementioned agent has propensity of misuse/abuse.     No associated orders from this encounter found during lookback period of 72 hours.  Methamphetamine abuse (HCC)    No associated orders from this encounter found during lookback period of 72 hours.  Asthma    No associated orders from this encounter found during lookback period of 72 hours.  Back pain    No associated orders from this encounter found during lookback period of 72 hours.    Treatment Recommendations/Precautions:  Continue to promote patient participation in therapeutic milieu.  Continue medical management per medicine.  Continue previously prescribed psychotropic medication regimen; see below.  Continue behavioral health checks q.15 minutes.    Continue vitals per behavioral health unit protocol.  Discharge date per primary team; 201 commitment status.    SUBJECTIVE     Patient evaluated this a.m. for continuity of care. Patient was discussed at length with nursing and treatment team. Per nursing, patient remains calm, cooperative, intermittently interactive in the milieu, adherent to his medications without any acute adverse effects. No acute distress is noted throughout evaluation. Carey Keith denies suicidal/homicidal ideation in addition to thoughts of self-injury, receptive to crisis planning provided by this writer, contacting for safety in the inpatient setting, admitting to an ability to appropriately confide in staff including this writer. Patient appears irritable on approach, admitting to depressed and anxious mood, perseverating on previously prescribed Klonopin, marginally receptive to psychoeducation and supportive therapy provided by this writer    PSYCHIATRIC REVIEW OF SYSTEMS     Behavior over the last 24 hours:  unchanged  Sleep: OK  Appetite: OK  Medication side effects: none    REVIEW OF SYSTEMS   Review of systems: no complaints    OBJECTIVE     Vital Signs in Past 24 Hours:  Temp:  [97.2 °F (36.2 °C)-97.4 °F (36.3 °C)] 97.4 °F (36.3 °C)  HR:  [69-72] 69  BP: (109-114)/(60-68) 114/68  Resp:  [16] 16  SpO2:  [98 %] 98 %  O2 Device: None (Room air)    Intake/Output in Past 24 hours:  No intake/output data recorded.  No intake/output data recorded.        Laboratory Results:  I have personally reviewed all pertinent laboratory/tests results.  Most Recent Labs:   Lab Results   Component Value Date    WBC 5.80 12/02/2024    RBC 4.06 12/02/2024    HGB 11.5 12/02/2024    HCT 36.5 12/02/2024     12/02/2024    RDW 13.2 12/02/2024    TOTANEUTABS 10.60 (H) 04/18/2023    NEUTROABS 2.37 12/02/2024    SODIUM 140 12/02/2024    K 4.3 12/02/2024     12/02/2024    CO2 30 12/02/2024    BUN 14 12/02/2024    CREATININE 0.77 12/02/2024     GLUC 89 12/02/2024    GLUF 89 12/02/2024    CALCIUM 8.7 12/02/2024    AST 19 12/02/2024    ALT 15 12/02/2024    ALKPHOS 45 12/02/2024    TP 5.4 (L) 12/02/2024    ALB 3.5 12/02/2024    TBILI 0.28 12/02/2024    CHOLESTEROL 160 12/02/2024    HDL 54 12/02/2024    TRIG 64 12/02/2024    LDLCALC 93 12/02/2024    NONHDLC 106 12/02/2024    [id number] 5.113 (H) 12/02/2024    FREET4 0.83 12/02/2024    PREGUR Negative 10/20/2022    PREGSERUM Negative 12/02/2024    RPR Non-Reactive 12/25/2021    HGBA1C 5.2 12/25/2021     12/25/2021       Behavioral Health Medications: all current active meds have been reviewed and current meds:   Current Facility-Administered Medications:     acetaminophen (TYLENOL) tablet 650 mg, Q6H PRN    acetaminophen (TYLENOL) tablet 975 mg, Q8H PRN    albuterol (PROVENTIL HFA,VENTOLIN HFA) inhaler 2 puff, Q4H PRN    aluminum-magnesium hydroxide-simethicone (MAALOX) oral suspension 30 mL, Q4H PRN    benztropine (COGENTIN) injection 1 mg, Q4H PRN Max 6/day    benztropine (COGENTIN) tablet 1 mg, Q4H PRN Max 6/day    bisacodyl (DULCOLAX) rectal suppository 10 mg, Daily PRN    budesonide-formoterol (SYMBICORT) 160-4.5 mcg/act inhaler 2 puff, BID    buprenorphine-naloxone (Suboxone) film 8 mg, TID    clonazePAM (KlonoPIN) tablet 0.25 mg, BID    hydrOXYzine HCL (ATARAX) tablet 50 mg, Q6H PRN Max 4/day **OR** diphenhydrAMINE (BENADRYL) injection 50 mg, Q6H PRN    gabapentin (NEURONTIN) capsule 600 mg, TID    hydrOXYzine HCL (ATARAX) tablet 100 mg, Q6H PRN Max 4/day **OR** LORazepam (ATIVAN) injection 2 mg, Q6H PRN    hydrOXYzine HCL (ATARAX) tablet 25 mg, Q6H PRN Max 4/day    ibuprofen (MOTRIN) tablet 400 mg, Q4H PRN    ibuprofen (MOTRIN) tablet 600 mg, Q8H PRN    ibuprofen (MOTRIN) tablet 600 mg, Q6H PRN    ibuprofen (MOTRIN) tablet 800 mg, Q8H PRN    melatonin tablet 6 mg, HS    neomycin-bacitracin-polymyxin b (NEOSPORIN) ointment 1 large application, BID    nicotine (NICODERM CQ) 14 mg/24hr TD 24 hr  patch 14 mg, Daily    nicotine polacrilex (NICORETTE) gum 4 mg, Q2H PRN    OLANZapine (ZyPREXA) tablet 5 mg, Q4H PRN Max 3/day **OR** OLANZapine (ZyPREXA) IM injection 2.5 mg, Q4H PRN Max 3/day    OLANZapine (ZyPREXA) tablet 5 mg, Q3H PRN Max 3/day **OR** OLANZapine (ZyPREXA) IM injection 5 mg, Q3H PRN Max 3/day    OLANZapine (ZyPREXA) tablet 2.5 mg, Q4H PRN Max 6/day    OLANZapine (ZyPREXA) tablet 5 mg, BID    ondansetron (ZOFRAN-ODT) dispersible tablet 4 mg, Q8H PRN    polyethylene glycol (MIRALAX) packet 17 g, Daily PRN    propranolol (INDERAL) tablet 10 mg, Q8H PRN    senna-docusate sodium (SENOKOT S) 8.6-50 mg per tablet 1 tablet, Daily PRN    sterile water injection **ADS Override Pull**,     tiZANidine (ZANAFLEX) tablet 4 mg, Q8H PRN    traZODone (DESYREL) tablet 50 mg, HS PRN.    Risks, benefits and possible side effects of Medications:   Risks, benefits, and possible side effects of medications explained to patient and patient verbalizes understanding.      Mental Status Evaluation:  Appearance:  age appropriate, casually dressed, and disheveled   Behavior:  restless and fidgety   Speech:  normal pitch and normal volume   Mood:  anxious, depressed, and dysthymic   Affect:  Anxious, irritable   Language sparse   Thought Process:  perserverative   Thought Content:  No overt obsessions or delusions   Perceptual Disturbances: None   Risk Potential: Suicidal Ideations none, Homicidal Ideations none, and Potential for Aggression No   Sensorium:  person, place, and time/date   Cognition:  recent and remote memory grossly intact   Consciousness:  alert and awake    Attention: attention span appeared shorter than expected for age   Insight:  limited   Judgment: limited   Intellect Not assessed   Gait/Station: normal gait/station and normal balance   Motor Activity: no abnormal movements     Memory: Short and long term memory  fair     Progress Toward Goals: unchanged, as evidenced by their participation (or lack  thereof) in individual, social and therapeutic milieu in addition to adherence to their medication regimen.    Recommended Treatment:   See above for assessment and plan.    Inpatient Psychiatric Certification: Based upon physical, mental and social evaluations, I certify that inpatient psychiatric services are medically necessary for this patient for a duration of greater than 2 midnights for the treatment of Bipolar disorder, current episode depressed, severe, without psychotic features (HCC) including psychotropic medication management, participation in the therapeutic milieu and referrals as indicated. Available alternative community resources do not meet the patient's mental health care needs. I further attest that an established written individualized plan of care has been implemented and is outlined in the patient's medical records.    Counseling/Coordination of Care    I have expended greater than 15 minutes in which more than 50% of this time was expended in counseling/coordination of patient care relating to diagnostic results, prognosis, potential risks and benefits of management options, instructions for appropriate management, patient and/or collateral education, importance of adherence to management and/or risk factor reductions. Patient's rights, confidentiality, exceptions to confidentiality, use of electronic medical record including appropriate staff access to medical record regarding behavioral health services and consent to treatment were reviewed.    Note Share:     This note was not shared with the patient due to reasonable likelihood of causing patient harm     This note has been constructed using a voice recognition system. There may be translation, syntax,  or grammatical errors. If you have any questions, please contact the dictating provider.    Jordan Christopher Holter, DO 12/01/24

## 2024-12-01 NOTE — PLAN OF CARE
Problem: DISCHARGE PLANNING - CARE MANAGEMENT  Goal: Discharge to post-acute care or home with appropriate resources  Description: INTERVENTIONS:  - Conduct assessment to determine patient/family and health care team treatment goals, and need for post-acute services based on payer coverage, community resources, and patient preferences, and barriers to discharge  - Address psychosocial, clinical, and financial barriers to discharge as identified in assessment in conjunction with the patient/family and health care team  - Arrange appropriate level of post-acute services according to patient’s   needs and preference and payer coverage in collaboration with the physician and health care team  - Communicate with and update the patient/family, physician, and health care team regarding progress on the discharge plan  - Arrange appropriate transportation to post-acute venues  Outcome: Progressing

## 2024-12-01 NOTE — NURSING NOTE
(ZANAFLEX) tablet 4 mg PO PRN at 1036am given for muscle spasms.    1136: Patient reports muscle spasms have stopped. Zanaflex 4mg po prn effective.

## 2024-12-01 NOTE — NURSING NOTE
Patient asked to receive zanaflex this AM PRN with scheduled Am medications. Patient informed she would have to give time in between the scheduled and PRN due to risk of oversedation. Patient agreeable and not argumentative. Patient denies AVH/SI/HI. She appears depressed, is not currently labile. She is mumbled in speech, but does not appear sedated at this time. Patient is cooperative. Wound care was performed as patient reported scratching her forearm lacerations accidentally in her sleep. Patient's lacerations began bleeding as she was touching them (scant amount of blood.)     Wound care performed with 4x4 guaze and paper tape, in addition to scheduled wound care medication order.

## 2024-12-01 NOTE — PLAN OF CARE
Problem: SAFETY ADULT  Goal: Patient will remain free of falls  Description: INTERVENTIONS:  - Educate patient/family on patient safety including physical limitations  - Instruct patient to call for assistance with activity   - Keep care items and personal belongings within reach  - Initiate and maintain comfort rounds  - Make Fall Risk Sign visible to staff  - Apply yellow socks and bracelet for high fall risk patients  - Consider moving patient to room near nurses station  Outcome: Progressing

## 2024-12-02 LAB
25(OH)D3 SERPL-MCNC: 22.7 NG/ML (ref 30–100)
ALBUMIN SERPL BCG-MCNC: 3.5 G/DL (ref 3.5–5)
ALP SERPL-CCNC: 45 U/L (ref 34–104)
ALT SERPL W P-5'-P-CCNC: 15 U/L (ref 7–52)
ANION GAP SERPL CALCULATED.3IONS-SCNC: 5 MMOL/L (ref 4–13)
AST SERPL W P-5'-P-CCNC: 19 U/L (ref 13–39)
BACTERIA UR QL AUTO: ABNORMAL /HPF
BASOPHILS # BLD AUTO: 0.06 THOUSANDS/ÂΜL (ref 0–0.1)
BASOPHILS NFR BLD AUTO: 1 % (ref 0–1)
BILIRUB SERPL-MCNC: 0.28 MG/DL (ref 0.2–1)
BILIRUB UR QL STRIP: NEGATIVE
BUN SERPL-MCNC: 14 MG/DL (ref 5–25)
CALCIUM SERPL-MCNC: 8.7 MG/DL (ref 8.4–10.2)
CHLORIDE SERPL-SCNC: 105 MMOL/L (ref 96–108)
CHOLEST SERPL-MCNC: 160 MG/DL (ref ?–200)
CLARITY UR: CLEAR
CO2 SERPL-SCNC: 30 MMOL/L (ref 21–32)
COLOR UR: YELLOW
CREAT SERPL-MCNC: 0.77 MG/DL (ref 0.6–1.3)
EOSINOPHIL # BLD AUTO: 0.09 THOUSAND/ÂΜL (ref 0–0.61)
EOSINOPHIL NFR BLD AUTO: 2 % (ref 0–6)
ERYTHROCYTE [DISTWIDTH] IN BLOOD BY AUTOMATED COUNT: 13.2 % (ref 11.6–15.1)
FOLATE SERPL-MCNC: 4.9 NG/ML
GFR SERPL CREATININE-BSD FRML MDRD: 92 ML/MIN/1.73SQ M
GLUCOSE P FAST SERPL-MCNC: 89 MG/DL (ref 65–99)
GLUCOSE SERPL-MCNC: 89 MG/DL (ref 65–140)
GLUCOSE UR STRIP-MCNC: NEGATIVE MG/DL
HCG SERPL QL: NEGATIVE
HCT VFR BLD AUTO: 36.5 % (ref 34.8–46.1)
HDLC SERPL-MCNC: 54 MG/DL
HGB BLD-MCNC: 11.5 G/DL (ref 11.5–15.4)
HGB UR QL STRIP.AUTO: 10
IMM GRANULOCYTES # BLD AUTO: 0.02 THOUSAND/UL (ref 0–0.2)
IMM GRANULOCYTES NFR BLD AUTO: 0 % (ref 0–2)
KETONES UR STRIP-MCNC: NEGATIVE MG/DL
LDLC SERPL CALC-MCNC: 93 MG/DL (ref 0–100)
LEUKOCYTE ESTERASE UR QL STRIP: 25
LYMPHOCYTES # BLD AUTO: 2.71 THOUSANDS/ÂΜL (ref 0.6–4.47)
LYMPHOCYTES NFR BLD AUTO: 46 % (ref 14–44)
MCH RBC QN AUTO: 28.3 PG (ref 26.8–34.3)
MCHC RBC AUTO-ENTMCNC: 31.5 G/DL (ref 31.4–37.4)
MCV RBC AUTO: 90 FL (ref 82–98)
MONOCYTES # BLD AUTO: 0.55 THOUSAND/ÂΜL (ref 0.17–1.22)
MONOCYTES NFR BLD AUTO: 10 % (ref 4–12)
NEUTROPHILS # BLD AUTO: 2.37 THOUSANDS/ÂΜL (ref 1.85–7.62)
NEUTS SEG NFR BLD AUTO: 41 % (ref 43–75)
NITRITE UR QL STRIP: NEGATIVE
NON-SQ EPI CELLS URNS QL MICRO: ABNORMAL /HPF
NONHDLC SERPL-MCNC: 106 MG/DL
NRBC BLD AUTO-RTO: 0 /100 WBCS
PH UR STRIP.AUTO: 7 [PH]
PLATELET # BLD AUTO: 206 THOUSANDS/UL (ref 149–390)
PMV BLD AUTO: 8.7 FL (ref 8.9–12.7)
POTASSIUM SERPL-SCNC: 4.3 MMOL/L (ref 3.5–5.3)
PROT SERPL-MCNC: 5.4 G/DL (ref 6.4–8.4)
PROT UR STRIP-MCNC: ABNORMAL MG/DL
RBC # BLD AUTO: 4.06 MILLION/UL (ref 3.81–5.12)
RBC #/AREA URNS AUTO: ABNORMAL /HPF
SODIUM SERPL-SCNC: 140 MMOL/L (ref 135–147)
SP GR UR STRIP.AUTO: 1 (ref 1–1.04)
T4 FREE SERPL-MCNC: 0.83 NG/DL (ref 0.61–1.12)
TREPONEMA PALLIDUM IGG+IGM AB [PRESENCE] IN SERUM OR PLASMA BY IMMUNOASSAY: NORMAL
TRIGL SERPL-MCNC: 64 MG/DL (ref ?–150)
TSH SERPL DL<=0.05 MIU/L-ACNC: 5.11 UIU/ML (ref 0.45–4.5)
UROBILINOGEN UA: NEGATIVE MG/DL
VIT B12 SERPL-MCNC: 277 PG/ML (ref 180–914)
WBC # BLD AUTO: 5.8 THOUSAND/UL (ref 4.31–10.16)
WBC #/AREA URNS AUTO: ABNORMAL /HPF

## 2024-12-02 PROCEDURE — 84439 ASSAY OF FREE THYROXINE: CPT | Performed by: PSYCHIATRY & NEUROLOGY

## 2024-12-02 PROCEDURE — 99232 SBSQ HOSP IP/OBS MODERATE 35: CPT | Performed by: PSYCHIATRY & NEUROLOGY

## 2024-12-02 PROCEDURE — 84703 CHORIONIC GONADOTROPIN ASSAY: CPT | Performed by: PSYCHIATRY & NEUROLOGY

## 2024-12-02 PROCEDURE — 82306 VITAMIN D 25 HYDROXY: CPT | Performed by: PSYCHIATRY & NEUROLOGY

## 2024-12-02 PROCEDURE — 82746 ASSAY OF FOLIC ACID SERUM: CPT | Performed by: PSYCHIATRY & NEUROLOGY

## 2024-12-02 PROCEDURE — 82607 VITAMIN B-12: CPT | Performed by: PSYCHIATRY & NEUROLOGY

## 2024-12-02 PROCEDURE — 86780 TREPONEMA PALLIDUM: CPT | Performed by: PSYCHIATRY & NEUROLOGY

## 2024-12-02 PROCEDURE — 84443 ASSAY THYROID STIM HORMONE: CPT | Performed by: PSYCHIATRY & NEUROLOGY

## 2024-12-02 PROCEDURE — 81001 URINALYSIS AUTO W/SCOPE: CPT | Performed by: PSYCHIATRY & NEUROLOGY

## 2024-12-02 PROCEDURE — 85025 COMPLETE CBC W/AUTO DIFF WBC: CPT | Performed by: PSYCHIATRY & NEUROLOGY

## 2024-12-02 PROCEDURE — 80061 LIPID PANEL: CPT | Performed by: PSYCHIATRY & NEUROLOGY

## 2024-12-02 PROCEDURE — 80053 COMPREHEN METABOLIC PANEL: CPT | Performed by: PSYCHIATRY & NEUROLOGY

## 2024-12-02 RX ORDER — GABAPENTIN 300 MG/1
600 CAPSULE ORAL 3 TIMES DAILY
Status: DISCONTINUED | OUTPATIENT
Start: 2024-12-02 | End: 2024-12-09 | Stop reason: HOSPADM

## 2024-12-02 RX ORDER — OLANZAPINE 5 MG/1
5 TABLET ORAL 2 TIMES DAILY
Status: DISCONTINUED | OUTPATIENT
Start: 2024-12-02 | End: 2024-12-03

## 2024-12-02 RX ADMIN — MELATONIN TAB 3 MG 6 MG: 3 TAB at 21:15

## 2024-12-02 RX ADMIN — BUDESONIDE AND FORMOTEROL FUMARATE DIHYDRATE 2 PUFF: 160; 4.5 AEROSOL RESPIRATORY (INHALATION) at 08:17

## 2024-12-02 RX ADMIN — NICOTINE POLACRILEX 4 MG: 4 GUM, CHEWING BUCCAL at 08:42

## 2024-12-02 RX ADMIN — TRAZODONE HYDROCHLORIDE 50 MG: 50 TABLET ORAL at 21:41

## 2024-12-02 RX ADMIN — CLONAZEPAM 0.25 MG: 0.5 TABLET ORAL at 17:28

## 2024-12-02 RX ADMIN — GABAPENTIN 400 MG: 400 CAPSULE ORAL at 08:15

## 2024-12-02 RX ADMIN — IBUPROFEN 800 MG: 400 TABLET, FILM COATED ORAL at 11:06

## 2024-12-02 RX ADMIN — TIZANIDINE 4 MG: 4 TABLET ORAL at 21:15

## 2024-12-02 RX ADMIN — NICOTINE POLACRILEX 4 MG: 4 GUM, CHEWING BUCCAL at 19:55

## 2024-12-02 RX ADMIN — NICOTINE POLACRILEX 4 MG: 4 GUM, CHEWING BUCCAL at 13:53

## 2024-12-02 RX ADMIN — OLANZAPINE 5 MG: 5 TABLET, FILM COATED ORAL at 21:15

## 2024-12-02 RX ADMIN — SENNOSIDES AND DOCUSATE SODIUM 1 TABLET: 50; 8.6 TABLET ORAL at 11:06

## 2024-12-02 RX ADMIN — BUPRENORPHINE AND NALOXONE 8 MG: 8; 2 FILM BUCCAL; SUBLINGUAL at 15:12

## 2024-12-02 RX ADMIN — TIZANIDINE 4 MG: 4 TABLET ORAL at 08:42

## 2024-12-02 RX ADMIN — CLONAZEPAM 0.25 MG: 0.5 TABLET ORAL at 08:15

## 2024-12-02 RX ADMIN — BUPRENORPHINE AND NALOXONE 8 MG: 8; 2 FILM BUCCAL; SUBLINGUAL at 21:16

## 2024-12-02 RX ADMIN — HYDROXYZINE HYDROCHLORIDE 100 MG: 50 TABLET, FILM COATED ORAL at 13:55

## 2024-12-02 RX ADMIN — OLANZAPINE 5 MG: 5 TABLET, FILM COATED ORAL at 10:40

## 2024-12-02 RX ADMIN — GABAPENTIN 600 MG: 300 CAPSULE ORAL at 21:15

## 2024-12-02 RX ADMIN — NICOTINE POLACRILEX 4 MG: 4 GUM, CHEWING BUCCAL at 16:02

## 2024-12-02 RX ADMIN — POLYMYXIN B SULFATE, BACITRACIN ZINC, NEOMYCIN SULFATE 1 LARGE APPLICATION: 5000; 3.5; 4 OINTMENT TOPICAL at 17:28

## 2024-12-02 RX ADMIN — BUPRENORPHINE AND NALOXONE 8 MG: 8; 2 FILM BUCCAL; SUBLINGUAL at 08:15

## 2024-12-02 RX ADMIN — Medication 14 MG: at 08:16

## 2024-12-02 RX ADMIN — NICOTINE POLACRILEX 4 MG: 4 GUM, CHEWING BUCCAL at 11:12

## 2024-12-02 RX ADMIN — GABAPENTIN 600 MG: 300 CAPSULE ORAL at 15:22

## 2024-12-02 RX ADMIN — BUDESONIDE AND FORMOTEROL FUMARATE DIHYDRATE 2 PUFF: 160; 4.5 AEROSOL RESPIRATORY (INHALATION) at 17:30

## 2024-12-02 NOTE — PROGRESS NOTES
12/02/24 0850   Team Meeting   Meeting Type Daily Rounds   Team Members Present   Team Members Present Physician;Nurse;   Physician Team Member Tomer   [facility]   Care Management Team Member Harsha   Patient/Family Present   Patient Present No   Patient's Family Present No     Readmit score 29. Pt is a new admit here on a 201. Signed 72 hour notice on 12/2 at 0804. Pt med seeking, requiring frequent redirection away from nurses station. Pt requesting PRN Gabapentin, stating she needs it now and is going through gabapentin withdrawal. PRN Atarax for anxiety given. Requesting numerous PRN medications. Pt unhappy with change in Klonopin dose. Requesting IM Benadryl, received PRN Atarax.

## 2024-12-02 NOTE — ED NOTES
Insurance Authorization for admission:   Phone call received from [facility]  Phone number: [phone]     Spoke to Jemima Arroyo on 12/3   Authorization # [id number]

## 2024-12-02 NOTE — ASSESSMENT & PLAN NOTE
Carey has an irritable edge and is medication seeking at times, perseverating on Klonopin dosing. She signed a 72 hour notice on 12/2/2024 at 0804 and rescinded at 1041. She is denying all other symptoms and is agreeable to further medication titration.    Medication regimen as follows with any changes bolded:  Change Zyprexa to 5 mg twice daily (previously 10 mg at bedtime) for mood stabilization and agitation   Further titration as tolerated   Increase Gabapentin to 600 mg three times daily for anxiety, agitation, and reported withdrawal symptoms   Continue Klonopin 0.25 mg twice daily for refractory anxiety   Chart and PDMP reviewed, patient was previously discharged from [facility] with 0.5 mg at bed time   Further downward taper as tolerated due to history of substance abuse    No associated orders from this encounter found during lookback period of 72 hours.

## 2024-12-02 NOTE — NURSING NOTE
Patient is in the community and social with peers. Patient participates in unit activities. Patient reports high anxiety r/t medication changes. Patient denies SI/HI/AVH at this time. Patient reports muscle spasms in legs and difficulty sleeping. PRN zanaflex 4mg and trazodone 50mg were administered at 2104.     Patient observed lying in bed with no further complaints. Medication was effective. Q15 observation maintained.

## 2024-12-02 NOTE — NURSING NOTE
Patient came to nurse's station requesting anxiety medication. Patient reminded atarax 100mg was administered under an hr ago and patient reported medication to be ineffective.

## 2024-12-02 NOTE — NURSING NOTE
Pt denies SI/HI/AH/VH.  Present in dayroom and milieu. Medication and meal compliant. Pt refused UA. Pt frequently at nurses station demanding more medication. Present in dayroom and milieu. Social with select peers. Pt is anxious and restless. 0842 PRN Zanaflex 4 mg Po given for c/o muscle spasms. 0942 No further c/o muscle spasms at this time.  1106 PRN senokot given ( last BM reported 11/29). Pt instructed to notify staff once she has a bowel movement. Compliant with Lunch. No further concerns as of present. Plan of care ongoing.

## 2024-12-02 NOTE — NURSING NOTE
Pt signed 72 hr notice on 12/2/24 @0804- nurse notified.    Pt rescinded 72 hr notice on 12/2/24 @1041- nurse notified.

## 2024-12-02 NOTE — NURSING NOTE
Pt c/o severe anxiety. Pt is tense,anxious, restless. 1355 PRN Hydroxyzine 100 mg PO given. 1455 No further c/o anxiety reported. Pt appears less anxious after taking Hydroxyzine.

## 2024-12-02 NOTE — PLAN OF CARE
Problem: DISCHARGE PLANNING  Goal: Discharge to home or other facility with appropriate resources  Description: INTERVENTIONS:  - Identify barriers to discharge w/patient and caregiver  - Arrange for needed discharge resources and transportation as appropriate  - Identify discharge learning needs (meds, wound care, etc.)  - Arrange for interpretive services to assist at discharge as needed  - Refer to Case Management Department for coordinating discharge planning if the patient needs post-hospital services based on physician/advanced practitioner order or complex needs related to functional status, cognitive ability, or social support system  Outcome: Progressing     Problem: SELF HARM/SUICIDALITY  Goal: Will have no self-injury during hospital stay  Description: INTERVENTIONS:  - Q 15 MINUTES: Routine safety checks  - Q WAKING SHIFT & PRN: Assess risk to determine if routine checks are adequate to maintain patient safety  - Encourage patient to participate actively in care by formulating a plan to combat response to suicidal ideation, identify supports and resources  Outcome: Progressing     Problem: DEPRESSION  Goal: Will be euthymic at discharge  Description: INTERVENTIONS:  - Administer medication as ordered  - Provide emotional support via 1:1 interaction with staff  - Encourage involvement in milieu/groups/activities  - Monitor for social isolation  Outcome: Progressing     Problem: ANXIETY  Goal: Will report anxiety at manageable levels  Description: INTERVENTIONS:  - Administer medication as ordered  - Teach and encourage coping skills  - Provide emotional support  - Assess patient/family for anxiety and ability to cope  Outcome: Progressing  Goal: By discharge: Patient will verbalize 2 strategies to deal with anxiety  Description: Interventions:  - Identify any obvious source/trigger to anxiety  - Staff will assist patient in applying identified coping technique/skills  - Encourage attendance of scheduled  groups and activities  Outcome: Progressing     Problem: SUBSTANCE USE/ABUSE  Goal: Will have no detox symptoms and will verbalize plan for changing substance-related behavior  Description: INTERVENTIONS:  - Monitor physical status and assess for symptoms of withdrawal  - Administer medication as ordered  - Provide emotional support with 1 on 1 interaction with staff  - Encourage recovery focused program/ addiction education  - Assess for verbalization of changing behaviors related to substance abuse  - Initiate consults and referrals as appropriate (Case Management, Spiritual Care, etc.)  Outcome: Progressing  Goal: By discharge, will develop insight into their chemical dependency and sustain motivation to continue in recovery  Description: INTERVENTIONS:  - Attends all daily group sessions and scheduled AA groups  - Actively practices coping skills through participation in the therapeutic community and adherence to program rules  - Reviews and completes assignments from individual treatment plan  - Assist patient development of understanding of their personal cycle of addiction and relapse triggers  Outcome: Progressing  Goal: By discharge, patient will have ongoing treatment plan addressing chemical dependency  Description: INTERVENTIONS:  - Assist patient with resources and/or appointments for ongoing recovery based living  Outcome: Progressing     Problem: Nutrition/Hydration-ADULT  Goal: Nutrient/Hydration intake appropriate for improving, restoring or maintaining nutritional needs  Description: Monitor and assess patient's nutrition/hydration status for malnutrition. Collaborate with interdisciplinary team and initiate plan and interventions as ordered.  Monitor patient's weight and dietary intake as ordered or per policy. Utilize nutrition screening tool and intervene as necessary. Determine patient's food preferences and provide high-protein, high-caloric foods as appropriate.     INTERVENTIONS:  - Monitor  oral intake, urinary output, labs, and treatment plans  - Assess nutrition and hydration status and recommend course of action  - Evaluate amount of meals eaten  - Assist patient with eating if necessary   - Allow adequate time for meals  - Recommend/ encourage appropriate diets, oral nutritional supplements, and vitamin/mineral supplements  - Order, calculate, and assess calorie counts as needed  - Recommend, monitor, and adjust tube feedings and TPN/PPN based on assessed needs  - Assess need for intravenous fluids  - Provide specific nutrition/hydration education as appropriate  - Include patient/family/caregiver in decisions related to nutrition  Outcome: Progressing     Problem: DISCHARGE PLANNING - CARE MANAGEMENT  Goal: Discharge to post-acute care or home with appropriate resources  Description: INTERVENTIONS:  - Conduct assessment to determine patient/family and health care team treatment goals, and need for post-acute services based on payer coverage, community resources, and patient preferences, and barriers to discharge  - Address psychosocial, clinical, and financial barriers to discharge as identified in assessment in conjunction with the patient/family and health care team  - Arrange appropriate level of post-acute services according to patient’s   needs and preference and payer coverage in collaboration with the physician and health care team  - Communicate with and update the patient/family, physician, and health care team regarding progress on the discharge plan  - Arrange appropriate transportation to post-acute venues  Outcome: Progressing     Problem: SAFETY ADULT  Goal: Patient will remain free of falls  Description: INTERVENTIONS:  - Educate patient/family on patient safety including physical limitations  - Instruct patient to call for assistance with activity   - Keep care items and personal belongings within reach  - Initiate and maintain comfort rounds  - Make Fall Risk Sign visible to  staff  - Apply yellow socks and bracelet for high fall risk patients  - Consider moving patient to room near nurses station  Outcome: Progressing     Problem: Ineffective Coping  Goal: Participates in unit activities  Description: Interventions:  - Provide therapeutic environment   - Provide required programming   - Redirect inappropriate behaviors   Outcome: Not Progressing

## 2024-12-02 NOTE — ASSESSMENT & PLAN NOTE
PDMP reviewed, patient appears compliant, last filled on 11/14/2024  Continue Suboxone 8 mg three times daily   No associated orders from this encounter found during lookback period of 72 hours.

## 2024-12-02 NOTE — ASSESSMENT & PLAN NOTE
Encourage cessation   Nicotine patch   No associated orders from this encounter found during lookback period of 72 hours.

## 2024-12-02 NOTE — PROGRESS NOTES
Progress Note - Behavioral Health   Name: Carey Keith 49 y.o. female I MRN: [id number]  Unit/Bed#: -01 I Date of Admission: 11/29/2024   Date of Service: 12/2/2024 I Hospital Day: 3     Assessment & Plan  Bipolar disorder, current episode depressed, severe, without psychotic features (Formerly [facility])  Carey has an irritable edge and is medication seeking at times, perseverating on Klonopin dosing. She signed a 72 hour notice on 12/2/2024 at 0804 and rescinded at 1041. She is denying all other symptoms and is agreeable to further medication titration.    Medication regimen as follows with any changes bolded:  Change Zyprexa to 5 mg twice daily (previously 10 mg at bedtime) for mood stabilization and agitation   Further titration as tolerated   Increase Gabapentin to 600 mg three times daily for anxiety, agitation, and reported withdrawal symptoms   Continue Klonopin 0.25 mg twice daily for refractory anxiety   Chart and PDMP reviewed, patient was previously discharged from [facility] with 0.5 mg at bed time   Further downward taper as tolerated due to history of substance abuse    No associated orders from this encounter found during lookback period of 72 hours.  Tobacco abuse  Encourage cessation   Nicotine patch   No associated orders from this encounter found during lookback period of 72 hours.  Opioid use disorder, severe, on maintenance therapy (Formerly [facility])  PDMP reviewed, patient appears compliant, last filled on 11/14/2024  Continue Suboxone 8 mg three times daily   No associated orders from this encounter found during lookback period of 72 hours.        ------------------------------------------------------------    Recommended Treatment Plan:   Behavioral checks every 15 minutes  Encourage participation in group therapy, milieu therapy and occupational therapy.  Assess for side effects of medications.  Collaboration with ALMAZ for medical co-morbidities as indicated.  Continue discussion with CM/SW to assist  "with obtaining collateral, disposition planning, and the implementation of patient-centered individualized plan of care.  Estimated Discharge Date: 12/6/2024    Risks, benefits and possible side effects of Medications: Risks, benefits, and possible side effects of medications have previously been explained. No new medications at this time.    Legal status: 201, rescinded 72 hour notice    Disposition: likely to previous residence      Subjective: Patient's chart was reviewed. Patient's progress and plan was discussed with treatment team. Per nursing report, Carey has been irritable and demanding on the unit. She is medication seeking at times. She remains compliant with medications.     Prn medications over the past 24 hours: Atarax 100 mg at 1823, effective. Nicotine gum. Trazodone 50 mg at 2103, effective. Tizanidine 4 mg at 0842, effective.    Carey was evaluated this morning for continuity of care. On examination, Carey has an irritable edge but willing to speak to this writer. Her speech was notably disarticulate at times and quiet, requiring frequent requests to repeat. She states her mood is \"agitated.\" She reports sleeping poorly and that she woke up at least 5 times. Her appetite has been normal. She was notably perseverative on the dose of her Klonopin and stated that she is going through \"withdrawal\" which she described as feeling agitated. This writer educated her extensively on the risks associated with benzodiazepine therapy and reiterated that the dose would not be raised as she was receiving 0.5 mg daily as an outpatient. She was agreeable to further titration of gabapentin to help with this reported agitation and anxiety, as she was previously on 800 mg three times daily without issue. She was also offered discharge today due to 72 hour notice in place and the fact that she has outpatient follow-up, but she preferred to adjust medications further and likely discharge at the end of this " "week. She denies active or passive suicidal ideation and homicidal ideation. She denies auditory or visual hallucinations.    VS: Reviewed, within normal limits    Progress Toward Goals: medication and meal compliant, irritable, medication seeking    Psychiatric Review of Systems:  Behavior over the last 24 hours: unchanged  Sleep: frequent awakenings  Appetite: adequate  Medication side effects: none verbalized  Medical ROS: Complete review of systems is negative except as noted above.    Vital signs in last 24 hours:  Temp:  [97.4 °F (36.3 °C)] 97.4 °F (36.3 °C)  HR:  [70] 70  BP: (95)/(52) 95/52  Resp:  [16] 16  SpO2:  [98 %] 98 %  O2 Device: None (Room air)    Mental Status Exam:    Appearance:  alert, marginal grooming and hygiene, blonde hair , casually dressed, intermittent eye contact, appears older than stated age, overweight, short , and poor dentition   Behavior:  calm, cooperative, and superficial    Speech:  Spontaneous, slow, soft, disarticulate at times   Mood:  \"Agitated\"   Affect:  Constricted, irritable   Thought Process:  perseverative, circumstantial   Associations: circumstantial associations   Thought Content:  no verbalized delusions or overt paranoia, somatic preoccupation   Perceptual Disturbances: denies current auditory or visual hallucinations and does not appear to be responding to internal stimuli at this time   Risk Potential: Suicidal ideation -  Denies at present   Homicidal ideation - Denies at present  Potential for aggression - Not at present   Sensorium:  oriented to person and place   Memory:  recent and remote memory grossly intact   Consciousness:  alert and awake   Attention/Concentration: attention span and concentration appear shorter than expected for age   Insight:  limited   Judgment: limited   Gait/Station: normal gait/station, normal balance   Motor Activity: no abnormal movements     Current Medications:  Current Facility-Administered Medications   Medication Dose " Route Frequency Provider Last Rate    acetaminophen  650 mg Oral Q6H PRN Joselin Mayberrygolden Colindres, NETTE      acetaminophen  975 mg Oral Q8H PRN NETTE Saldivar      albuterol  2 puff Inhalation Q4H PRN Joselin Colindres, NETTE      aluminum-magnesium hydroxide-simethicone  30 mL Oral Q4H PRN Angie Jones MD      benztropine  1 mg Intramuscular Q4H PRN Max 6/day Angie Jones MD      benztropine  1 mg Oral Q4H PRN Max 6/day Angie Jones MD      bisacodyl  10 mg Rectal Daily PRN Angie Jones MD      budesonide-formoterol  2 puff Inhalation BID Joselin Bharati Colindres, NETTE      buprenorphine-naloxone  8 mg Sublingual TID NETTE Saldivar      clonazePAM  0.25 mg Oral BID Jordan C Holter, DO      hydrOXYzine HCL  50 mg Oral Q6H PRN Max 4/day Angie Jones MD      Or    diphenhydrAMINE  50 mg Intramuscular Q6H PRN Angie Jones MD      gabapentin  600 mg Oral TID Paz Narvaez,       hydrOXYzine HCL  100 mg Oral Q6H PRN Max 4/day Angie Jones MD      Or    LORazepam  2 mg Intramuscular Q6H PRN Angie Jones MD      hydrOXYzine HCL  25 mg Oral Q6H PRN Max 4/day Angie Jones MD      ibuprofen  400 mg Oral Q4H PRN Angie Jones MD      ibuprofen  600 mg Oral Q8H PRN NETTE Saldivar      ibuprofen  600 mg Oral Q6H PRN Angie Jones MD      ibuprofen  800 mg Oral Q8H PRN Angie Jones MD      melatonin  6 mg Oral HS Paz Narvaez,       neomycin-bacitracin-polymyxin b  1 large application Topical BID NETTE Saldivar      nicotine  14 mg Transdermal Daily Norm Bailey MD      nicotine polacrilex  4 mg Oral Q2H PRN Angie Jones MD      OLANZapine  5 mg Oral Q4H PRN Max 3/day Angie Jones MD      Or    OLANZapine  2.5 mg Intramuscular Q4H PRN Max 3/day Angie Jones MD      OLANZapine  5 mg Oral Q3H PRN Max 3/day Angie Jones MD      Or    OLANZapine  5 mg Intramuscular Q3H PRN Max 3/day  Angie Jones MD      OLANZapine  2.5 mg Oral Q4H PRN Max 6/day Angie Jones MD      OLANZapine  5 mg Oral BID Paz Narvaez DO      ondansetron  4 mg Oral Q8H PRN NETTE Saldivar      polyethylene glycol  17 g Oral Daily PRN Angie Jones MD      propranolol  10 mg Oral Q8H PRN Angie Jones MD      senna-docusate sodium  1 tablet Oral Daily PRN Angie Jones MD      sterile water           tiZANidine  4 mg Oral Q8H PRN NETTE Saldivar      traZODone  50 mg Oral HS PRN Angie Jones MD         Behavioral Health Medications: all current active meds have been reviewed. Changes as in plan section above.    Laboratory results:  I have personally reviewed all pertinent laboratory/tests results.   Recent Results (from the past 48 hours)   ECG 12 lead    Collection Time: 12/01/24 12:05 PM   Result Value Ref Range    Ventricular Rate 63 BPM    Atrial Rate 63 BPM    KY Interval 166 ms    QRSD Interval 82 ms    QT Interval 414 ms    QTC Interval 424 ms    P Axis -11 degrees    QRS Axis 30 degrees    T Wave Axis 34 degrees   CBC and differential    Collection Time: 12/02/24  6:37 AM   Result Value Ref Range    WBC 5.80 4.31 - 10.16 Thousand/uL    RBC 4.06 3.81 - 5.12 Million/uL    Hemoglobin 11.5 11.5 - 15.4 g/dL    Hematocrit 36.5 34.8 - 46.1 %    MCV 90 82 - 98 fL    MCH 28.3 26.8 - 34.3 pg    MCHC 31.5 31.4 - 37.4 g/dL    RDW 13.2 11.6 - 15.1 %    MPV 8.7 (L) 8.9 - 12.7 fL    Platelets 206 149 - 390 Thousands/uL    nRBC 0 /100 WBCs    Segmented % 41 (L) 43 - 75 %    Immature Grans % 0 0 - 2 %    Lymphocytes % 46 (H) 14 - 44 %    Monocytes % 10 4 - 12 %    Eosinophils Relative 2 0 - 6 %    Basophils Relative 1 0 - 1 %    Absolute Neutrophils 2.37 1.85 - 7.62 Thousands/µL    Absolute Immature Grans 0.02 0.00 - 0.20 Thousand/uL    Absolute Lymphocytes 2.71 0.60 - 4.47 Thousands/µL    Absolute Monocytes 0.55 0.17 - 1.22 Thousand/µL    Eosinophils Absolute 0.09 0.00 - 0.61  Thousand/µL    Basophils Absolute 0.06 0.00 - 0.10 Thousands/µL   Comprehensive metabolic panel    Collection Time: 12/02/24  6:37 AM   Result Value Ref Range    Sodium 140 135 - 147 mmol/L    Potassium 4.3 3.5 - 5.3 mmol/L    Chloride 105 96 - 108 mmol/L    CO2 30 21 - 32 mmol/L    ANION GAP 5 4 - 13 mmol/L    BUN 14 5 - 25 mg/dL    Creatinine 0.77 0.60 - 1.30 mg/dL    Glucose 89 65 - 140 mg/dL    Glucose, Fasting 89 65 - 99 mg/dL    Calcium 8.7 8.4 - 10.2 mg/dL    AST 19 13 - 39 U/L    ALT 15 7 - 52 U/L    Alkaline Phosphatase 45 34 - 104 U/L    Total Protein 5.4 (L) 6.4 - 8.4 g/dL    Albumin 3.5 3.5 - 5.0 g/dL    Total Bilirubin 0.28 0.20 - 1.00 mg/dL    eGFR 92 ml/min/1.73sq m   hCG, serum, qualitative    Collection Time: 12/02/24  6:37 AM   Result Value Ref Range    Preg, Serum Negative Negative   Lipid panel    Collection Time: 12/02/24  6:37 AM   Result Value Ref Range    Cholesterol 160 See Comment mg/dL    Triglycerides 64 See Comment mg/dL    HDL, Direct 54 >=50 mg/dL    LDL Calculated 93 0 - 100 mg/dL    Non-HDL-Chol (CHOL-HDL) 106 mg/dl   TSH, 3rd generation with Free T4 reflex    Collection Time: 12/02/24  6:37 AM   Result Value Ref Range    TSH 3RD GENERATON 5.113 (H) 0.450 - 4.500 uIU/mL        Counseling / Coordination of Care:  Patient's progress discussed with staff in treatment team meeting.  Medication changes discussed with patient.  Medication education provided to patient.  Supportive therapy provided to patient.  Cognitive techniques utilized during the session.      Paz Narvaez DO 12/02/24  Psychiatry Residency, PGY-II  This note has been constructed using a voice recognition system. There may be translation, syntax, or grammatical errors. If you have any questions, please contact the dictating author.

## 2024-12-03 PROCEDURE — 99232 SBSQ HOSP IP/OBS MODERATE 35: CPT | Performed by: PSYCHIATRY & NEUROLOGY

## 2024-12-03 RX ORDER — BISACODYL 5 MG/1
10 TABLET, DELAYED RELEASE ORAL DAILY PRN
Status: DISCONTINUED | OUTPATIENT
Start: 2024-12-03 | End: 2024-12-04

## 2024-12-03 RX ORDER — CLONAZEPAM 0.5 MG/1
0.5 TABLET ORAL
Status: DISCONTINUED | OUTPATIENT
Start: 2024-12-04 | End: 2024-12-05

## 2024-12-03 RX ORDER — FOLIC ACID 1 MG/1
1 TABLET ORAL DAILY
Status: DISCONTINUED | OUTPATIENT
Start: 2024-12-03 | End: 2024-12-09 | Stop reason: HOSPADM

## 2024-12-03 RX ADMIN — TIZANIDINE 4 MG: 4 TABLET ORAL at 09:53

## 2024-12-03 RX ADMIN — BUPRENORPHINE AND NALOXONE 8 MG: 8; 2 FILM BUCCAL; SUBLINGUAL at 20:42

## 2024-12-03 RX ADMIN — CLONAZEPAM 0.25 MG: 0.5 TABLET ORAL at 08:39

## 2024-12-03 RX ADMIN — NICOTINE POLACRILEX 4 MG: 4 GUM, CHEWING BUCCAL at 11:11

## 2024-12-03 RX ADMIN — POLYMYXIN B SULFATE, BACITRACIN ZINC, NEOMYCIN SULFATE 1 LARGE APPLICATION: 5000; 3.5; 4 OINTMENT TOPICAL at 18:34

## 2024-12-03 RX ADMIN — GABAPENTIN 600 MG: 300 CAPSULE ORAL at 15:30

## 2024-12-03 RX ADMIN — Medication 2000 UNITS: at 15:30

## 2024-12-03 RX ADMIN — OLANZAPINE 7.5 MG: 2.5 TABLET, FILM COATED ORAL at 20:42

## 2024-12-03 RX ADMIN — FOLIC ACID 1 MG: 1 TABLET ORAL at 15:30

## 2024-12-03 RX ADMIN — NICOTINE POLACRILEX 4 MG: 4 GUM, CHEWING BUCCAL at 09:06

## 2024-12-03 RX ADMIN — BUPRENORPHINE AND NALOXONE 8 MG: 8; 2 FILM BUCCAL; SUBLINGUAL at 15:53

## 2024-12-03 RX ADMIN — MELATONIN TAB 3 MG 6 MG: 3 TAB at 21:12

## 2024-12-03 RX ADMIN — TRAZODONE HYDROCHLORIDE 50 MG: 50 TABLET ORAL at 21:12

## 2024-12-03 RX ADMIN — HYDROXYZINE HYDROCHLORIDE 100 MG: 50 TABLET, FILM COATED ORAL at 12:55

## 2024-12-03 RX ADMIN — POLYMYXIN B SULFATE, BACITRACIN ZINC, NEOMYCIN SULFATE 1 LARGE APPLICATION: 5000; 3.5; 4 OINTMENT TOPICAL at 08:39

## 2024-12-03 RX ADMIN — GABAPENTIN 600 MG: 300 CAPSULE ORAL at 08:39

## 2024-12-03 RX ADMIN — IBUPROFEN 800 MG: 400 TABLET, FILM COATED ORAL at 09:53

## 2024-12-03 RX ADMIN — FLUOXETINE HYDROCHLORIDE 20 MG: 20 CAPSULE ORAL at 09:50

## 2024-12-03 RX ADMIN — NICOTINE POLACRILEX 4 MG: 4 GUM, CHEWING BUCCAL at 14:12

## 2024-12-03 RX ADMIN — CYANOCOBALAMIN TAB 1000 MCG 1000 MCG: 1000 TAB at 15:30

## 2024-12-03 RX ADMIN — BUPRENORPHINE AND NALOXONE 8 MG: 8; 2 FILM BUCCAL; SUBLINGUAL at 08:39

## 2024-12-03 RX ADMIN — BUDESONIDE AND FORMOTEROL FUMARATE DIHYDRATE 2 PUFF: 160; 4.5 AEROSOL RESPIRATORY (INHALATION) at 17:47

## 2024-12-03 RX ADMIN — GABAPENTIN 600 MG: 300 CAPSULE ORAL at 20:42

## 2024-12-03 RX ADMIN — IBUPROFEN 600 MG: 600 TABLET, FILM COATED ORAL at 19:23

## 2024-12-03 RX ADMIN — Medication 14 MG: at 08:39

## 2024-12-03 RX ADMIN — BUDESONIDE AND FORMOTEROL FUMARATE DIHYDRATE 2 PUFF: 160; 4.5 AEROSOL RESPIRATORY (INHALATION) at 08:39

## 2024-12-03 RX ADMIN — OLANZAPINE 5 MG: 5 TABLET, FILM COATED ORAL at 08:39

## 2024-12-03 RX ADMIN — CLONAZEPAM 0.25 MG: 0.5 TABLET ORAL at 17:48

## 2024-12-03 RX ADMIN — NICOTINE POLACRILEX 4 MG: 4 GUM, CHEWING BUCCAL at 18:34

## 2024-12-03 RX ADMIN — PROPRANOLOL HYDROCHLORIDE 10 MG: 10 TABLET ORAL at 14:11

## 2024-12-03 RX ADMIN — POLYETHYLENE GLYCOL 3350 17 G: 17 POWDER, FOR SOLUTION ORAL at 08:38

## 2024-12-03 RX ADMIN — TIZANIDINE 4 MG: 4 TABLET ORAL at 19:23

## 2024-12-03 NOTE — PROGRESS NOTES
Progress Note - Behavioral Health   Name: Carey Keith 49 y.o. female I MRN: [id number]  Unit/Bed#: -01 I Date of Admission: 11/29/2024   Date of Service: 12/3/2024 I Hospital Day: 4     Assessment & Plan  Bipolar disorder, current episode depressed, severe, without psychotic features (HCC)  Carey continues to report increased depression and expressing interest in restarting Prozac due to previous benefit. She still perseverates on medications but is more receptive to education. She denies SI, HI as well as AVH.     Medication regimen as follows with any changes bolded:  Increase Zyprexa to 7.5 mg twice daily for mood stabilization and agitation   Start Prozac 20 mg daily for depressive symptoms and due to good past treatment response  Continue Gabapentin 600 mg three times daily for anxiety, and agitation  Change Klonopin to 0.5 mg daily at bed time per patient request - first date of administration on 12/4/2024 - patient will receive 0.25 mg twice today, 12/3/2024  Chart and PDMP reviewed, patient was previously discharged from [facility] with 0.5 mg at bed time     No associated orders from this encounter found during lookback period of 72 hours.  Tobacco abuse  Encourage cessation   Nicotine patch   No associated orders from this encounter found during lookback period of 72 hours.  Opioid use disorder, severe, on maintenance therapy (HCC)  PDMP reviewed, patient appears compliant, last filled on 11/14/2024  Continue Suboxone 8 mg three times daily   No associated orders from this encounter found during lookback period of 72 hours.        ------------------------------------------------------------    Recommended Treatment Plan:   Behavioral checks every 15 minutes  Encourage participation in group therapy, milieu therapy and occupational therapy.  Assess for side effects of medications.  Collaboration with ALMA[facility] for medical co-morbidities as indicated.  Start Ducolax prn for  "constipation  Continue discussion with CM/SW to assist with obtaining collateral, disposition planning, and the implementation of patient-centered individualized plan of care.  Estimated Discharge Date: 12/6/2024    Risks, benefits and possible side effects of Medications: Risks, benefits, and possible side effects of medications have been explained to the patient, who verbalizes understanding    Legal status: 201    Disposition: to be determined, likely previous living arrangement      Subjective: Patient's chart was reviewed. Patient's progress and plan was discussed with treatment team. Per nursing report, Carey has been cooperative on the unit, but somatically preoccupied at times. She remains compliant with medications.     Prn medications over the past 24 hours: Atarax 100 mg at 1355, effective. Motrin 800 mg at 0953. Nicotine gum. Miralax at 0838. Senokot at 1106. Tizanidine 4 mg. Trazodone at 2141     Carey was evaluated this morning for continuity of care. On examination, Carey appears dysphoric but has a less irritable edge compared to yesterday. She continues to perseverative on medications, but is not as focused on her Klonopin today. She states her mood is \"very depressed\" and she asks about starting an antidepressant. She reports being on Prozac in the past with good effect. She is agreeable to starting this medication again and educated that it will also be helpful for her anxiety. She is also agreeable to further titration of her Zyprexa as she feels it helps her daytime irritability. She reports sleeping okay, but states that she is disturbed by her roommate at times and hopes for a room change. We discussed changing the administration of her Klonopin to all at nighttime to better help with sleep and she was agreeable. Her appetite has been normal. She denies adverse effects from medications. She denies active or passive suicidal ideation and homicidal ideation. She denies auditory or " "visual hallucinations.    VS: Reviewed, within normal limits    Progress Toward Goals: medication and meal compliant, calm, cooperative, less irritable, somatically preoccupied    Psychiatric Review of Systems:  Behavior over the last 24 hours:  slight improvement  Sleep: normal and improving  Appetite: adequate  Medication side effects: none verbalized  Medical ROS: Complete review of systems is negative except as noted above.    Vital signs in last 24 hours:  Temp:  [97.8 °F (36.6 °C)] 97.8 °F (36.6 °C)  HR:  [72-89] 74  BP: (109-125)/(58-78) 109/78  Resp:  [16] 16  SpO2:  [97 %-98 %] 97 %  O2 Device: None (Room air)    Mental Status Exam:    Appearance:  alert, marginal grooming and hygiene, blonde hair , good eye contact, casually dressed, appears older than stated age, and overweight   Behavior:  calm and cooperative   Speech:  spontaneous, normal rate, soft, mumbling and disarticulate at times   Mood:  \"Very depressed\"   Affect:  Dysphoric, constricted   Thought Process:  perseverative, circumstantial   Associations: circumstantial associations   Thought Content:  no verbalized delusions or overt paranoia, somatic preoccupation   Perceptual Disturbances: denies current auditory or visual hallucinations and does not appear to be responding to internal stimuli at this time   Risk Potential: Suicidal ideation -  Denies at present   Homicidal ideation - Denies at present  Potential for aggression - Not at present   Sensorium:  oriented to person and place   Memory:  recent and remote memory grossly intact   Consciousness:  alert and awake   Attention/Concentration: attention span and concentration appear shorter than expected for age   Insight:  limited,but improving   Judgment: limited   Gait/Station: normal gait/station, normal balance   Motor Activity: no abnormal movements     Current Medications:  Current Facility-Administered Medications   Medication Dose Route Frequency Provider Last Rate    acetaminophen  " 650 mg Oral Q6H PRN Joselin Mayberrygolden Colindres, NETTE      acetaminophen  975 mg Oral Q8H PRN Joselin Calabrese Jens, NETTE      albuterol  2 puff Inhalation Q4H PRN Joselin Mayberrygolden Colindres, NETTE      aluminum-magnesium hydroxide-simethicone  30 mL Oral Q4H PRN Angie Jones MD      benztropine  1 mg Intramuscular Q4H PRN Max 6/day Angie Jones MD      benztropine  1 mg Oral Q4H PRN Max 6/day Angie Jones MD      bisacodyl  10 mg Oral Daily PRN Paz Narvaez, DO      bisacodyl  10 mg Rectal Daily PRN Angie Jones MD      budesonide-formoterol  2 puff Inhalation BID Joselin Mayberrygolden Colindres, NETTE      buprenorphine-naloxone  8 mg Sublingual TID Joselinkiki Colindres, NETTE      clonazePAM  0.25 mg Oral BID Paz Narvaez DO      [START ON 12/4/2024] clonazePAM  0.5 mg Oral HS Paz Narvaez,       hydrOXYzine HCL  50 mg Oral Q6H PRN Max 4/day Angie Jones MD      Or    diphenhydrAMINE  50 mg Intramuscular Q6H PRN Angie Jones MD      FLUoxetine  20 mg Oral Daily Paz Narvaez,       gabapentin  600 mg Oral TID Paz Narvaez,       hydrOXYzine HCL  100 mg Oral Q6H PRN Max 4/day Agnie Jones MD      Or    LORazepam  2 mg Intramuscular Q6H PRN Angie Jones MD      hydrOXYzine HCL  25 mg Oral Q6H PRN Max 4/day Angie Jones MD      ibuprofen  400 mg Oral Q4H PRN Angie Jones MD      ibuprofen  600 mg Oral Q8H PRN Joselin Colindres, NETTE      ibuprofen  600 mg Oral Q6H PRN Angie Jones MD      ibuprofen  800 mg Oral Q8H PRN Angie Jones MD      melatonin  6 mg Oral HS Paz Narvaez,       neomycin-bacitracin-polymyxin b  1 large application Topical BID Joselin Bharati Colindres, NETTE      nicotine  14 mg Transdermal Daily Norm Bailey MD      nicotine polacrilex  4 mg Oral Q2H PRN Angie Jones MD      OLANZapine  5 mg Oral Q4H PRN Max 3/day Angie Jones MD      Or    OLANZapine  2.5 mg Intramuscular Q4H PRN Max 3/day Angie Jones MD       OLANZapine  5 mg Oral Q3H PRN Max 3/day Angie Jones MD      Or    OLANZapine  5 mg Intramuscular Q3H PRN Max 3/day Angie Jones MD      OLANZapine  2.5 mg Oral Q4H PRN Max 6/day Angie Jones MD      OLANZapine  7.5 mg Oral BID Paz Narvaez DO      ondansetron  4 mg Oral Q8H PRN NETTE Saldivar      polyethylene glycol  17 g Oral Daily PRN Angie Jones MD      propranolol  10 mg Oral Q8H PRN Angie Jones MD      senna-docusate sodium  1 tablet Oral Daily PRN Angie Jones MD      sterile water           tiZANidine  4 mg Oral Q8H PRN NETTE Saldivar      traZODone  50 mg Oral HS PRN Angie Jones MD         Behavioral Health Medications: all current active meds have been reviewed. Changes as in plan section above.    Laboratory results:  I have personally reviewed all pertinent laboratory/tests results.   Recent Results (from the past 48 hours)   ECG 12 lead    Collection Time: 12/01/24 12:05 PM   Result Value Ref Range    Ventricular Rate 63 BPM    Atrial Rate 63 BPM    AZ Interval 166 ms    QRSD Interval 82 ms    QT Interval 414 ms    QTC Interval 424 ms    P Axis -11 degrees    QRS Axis 30 degrees    T Wave Axis 34 degrees   CBC and differential    Collection Time: 12/02/24  6:37 AM   Result Value Ref Range    WBC 5.80 4.31 - 10.16 Thousand/uL    RBC 4.06 3.81 - 5.12 Million/uL    Hemoglobin 11.5 11.5 - 15.4 g/dL    Hematocrit 36.5 34.8 - 46.1 %    MCV 90 82 - 98 fL    MCH 28.3 26.8 - 34.3 pg    MCHC 31.5 31.4 - 37.4 g/dL    RDW 13.2 11.6 - 15.1 %    MPV 8.7 (L) 8.9 - 12.7 fL    Platelets 206 149 - 390 Thousands/uL    nRBC 0 /100 WBCs    Segmented % 41 (L) 43 - 75 %    Immature Grans % 0 0 - 2 %    Lymphocytes % 46 (H) 14 - 44 %    Monocytes % 10 4 - 12 %    Eosinophils Relative 2 0 - 6 %    Basophils Relative 1 0 - 1 %    Absolute Neutrophils 2.37 1.85 - 7.62 Thousands/µL    Absolute Immature Grans 0.02 0.00 - 0.20 Thousand/uL    Absolute Lymphocytes  2.71 0.60 - 4.47 Thousands/µL    Absolute Monocytes 0.55 0.17 - 1.22 Thousand/µL    Eosinophils Absolute 0.09 0.00 - 0.61 Thousand/µL    Basophils Absolute 0.06 0.00 - 0.10 Thousands/µL   Comprehensive metabolic panel    Collection Time: 12/02/24  6:37 AM   Result Value Ref Range    Sodium 140 135 - 147 mmol/L    Potassium 4.3 3.5 - 5.3 mmol/L    Chloride 105 96 - 108 mmol/L    CO2 30 21 - 32 mmol/L    ANION GAP 5 4 - 13 mmol/L    BUN 14 5 - 25 mg/dL    Creatinine 0.77 0.60 - 1.30 mg/dL    Glucose 89 65 - 140 mg/dL    Glucose, Fasting 89 65 - 99 mg/dL    Calcium 8.7 8.4 - 10.2 mg/dL    AST 19 13 - 39 U/L    ALT 15 7 - 52 U/L    Alkaline Phosphatase 45 34 - 104 U/L    Total Protein 5.4 (L) 6.4 - 8.4 g/dL    Albumin 3.5 3.5 - 5.0 g/dL    Total Bilirubin 0.28 0.20 - 1.00 mg/dL    eGFR 92 ml/min/1.73sq m   Folate    Collection Time: 12/02/24  6:37 AM   Result Value Ref Range    Folate 4.9 (L) >5.9 ng/mL   hCG, serum, qualitative    Collection Time: 12/02/24  6:37 AM   Result Value Ref Range    Preg, Serum Negative Negative   Lipid panel    Collection Time: 12/02/24  6:37 AM   Result Value Ref Range    Cholesterol 160 See Comment mg/dL    Triglycerides 64 See Comment mg/dL    HDL, Direct 54 >=50 mg/dL    LDL Calculated 93 0 - 100 mg/dL    Non-HDL-Chol (CHOL-HDL) 106 mg/dl   Total Syphilis (IgG/IgM) Screening    Collection Time: 12/02/24  6:37 AM   Result Value Ref Range    Syphilis Total Antibody Non-reactive Non-Reactive   TSH, 3rd generation with Free T4 reflex    Collection Time: 12/02/24  6:37 AM   Result Value Ref Range    TSH 3RD GENERATON 5.113 (H) 0.450 - 4.500 uIU/mL   Vitamin B12    Collection Time: 12/02/24  6:37 AM   Result Value Ref Range    Vitamin B-12 277 180 - 914 pg/mL   Vitamin D 25 hydroxy    Collection Time: 12/02/24  6:37 AM   Result Value Ref Range    Vit D, 25-Hydroxy 22.7 (L) 30.0 - 100.0 ng/mL   T4, free    Collection Time: 12/02/24  6:37 AM   Result Value Ref Range    Free T4 0.83 0.61 - 1.12  ng/dL   Urinalysis    Collection Time: 12/02/24 10:11 AM   Result Value Ref Range    Clarity, UA Clear Clear, Other    Color, UA Yellow Straw, Yellow, Pale Yellow    Specific Gravity, UA 1.005 1.003 - 1.040    pH, UA 7.0 4.5, 5.0, 5.5, 6.0, 6.5, 7.0, 7.5, 8.0    Glucose, UA Negative Negative mg/dl    Ketones, UA Negative Negative mg/dl    Occult Blood, UA 10.0 (A) Negative    Protein, UA 15 (Trace) (A) Negative mg/dl    Nitrite, UA Negative Negative    Bilirubin, UA Negative Negative    Leukocytes, UA 25.0 (A) Negative    WBC, UA 1-2 (A) None Seen, 2-4, 5-60 /hpf    RBC, UA 1-2 (A) None Seen, 2-4 /hpf    Bacteria, UA Occasional None Seen, Occasional /hpf    Epithelial Cells Occasional None Seen, Occasional /hpf    UROBILINOGEN UA Negative 1.0, Negative mg/dL        Counseling / Coordination of Care:  Patient's progress discussed with staff in treatment team meeting.  Medication changes discussed with patient.  Medication education provided to patient.  Supportive therapy provided to patient.  Cognitive techniques utilized during the session.  Encouraged participation in milieu and group therapy on the unit.      Paz Narvaez DO 12/03/24  Psychiatry Residency, PGY-II  This note has been constructed using a voice recognition system. There may be translation, syntax, or grammatical errors. If you have any questions, please contact the dictating author.

## 2024-12-03 NOTE — NURSING NOTE
"Pt denies SI/HI/AH/VH. Pt present in dayroom and milieu. Medication and meal compliant. Social with select peers. Pt is frequently at nurses station asking for multiple things and requesting them when they are not given right away. Pt has c/o ongoing back pain. 0838 PRN Miralax 17 g given for constipation. 0853 PRN Ibuprofen and Zanaflex 4 mg PO given.0953 No further c/o pain or muscle spasms at this time. Pt states \" its more tolerable\". Compliant with Lunch. Pt remains anxious with pressured speech. No bowel movement reported at this time. Pt refused Suppository when offered.No further concerns as of present. Plan of care ongoing.   "

## 2024-12-03 NOTE — PROGRESS NOTES
12/03/24 [id number]   Team Meeting   Meeting Type Daily Rounds   Team Members Present   Team Members Present Physician;;Nurse   Physician Team Member Tomer   [facility] Kat   Patient/Family Present   Patient Present No   Patient's Family Present No     Pt rescinded her 72 hour notice. Pt is medication and meal compliant. Pt was given several PRNs. Pt's discharge is pending for the end of the week.   Scribe Attestation (For Scribes USE Only)... Attending Attestation (For Attendings USE Only).../Scribe Attestation (For Scribes USE Only)...

## 2024-12-03 NOTE — NURSING NOTE
Pt c/o severe anxiety. Pt is tense and anxious and restless. Pt is intrusive with staff when then are trying to assist other peers. 1255 PRN Hydroxyzine 100 mg PO given. 1355 Pt states she is still anxious. Pt was observed sitting in her room prior to coming up to nurses station. Pt reports feeling like she is going to have a panic attack and was requesting IM medication. Pt refused to work on coping skills. Pt offered PRN Propanolol and refused.

## 2024-12-03 NOTE — ASSESSMENT & PLAN NOTE
Carey continues to report increased depression and expressing interest in restarting Prozac due to previous benefit. She still perseverates on medications but is more receptive to education. She denies SI, HI as well as AVH.     Medication regimen as follows with any changes bolded:  Increase Zyprexa to 7.5 mg twice daily for mood stabilization and agitation   Start Prozac 20 mg daily for depressive symptoms and due to good past treatment response  Continue Gabapentin 600 mg three times daily for anxiety, and agitation  Change Klonopin to 0.5 mg daily at bed time per patient request - first date of administration on 12/4/2024 - patient will receive 0.25 mg twice today, 12/3/2024  Chart and PDMP reviewed, patient was previously discharged from [facility] with 0.5 mg at bed time     No associated orders from this encounter found during lookback period of 72 hours.

## 2024-12-03 NOTE — PLAN OF CARE
Problem: Ineffective Coping  Goal: Participates in unit activities  Description: Interventions:  - Provide therapeutic environment   - Provide required programming   - Redirect inappropriate behaviors   Outcome: Progressing     Problem: SAFETY ADULT  Goal: Patient will remain free of falls  Description: INTERVENTIONS:  - Educate patient/family on patient safety including physical limitations  - Instruct patient to call for assistance with activity   - Keep care items and personal belongings within reach  - Initiate and maintain comfort rounds  - Make Fall Risk Sign visible to staff  - Apply yellow socks and bracelet for high fall risk patients  - Consider moving patient to room near nurses station  Outcome: Progressing     Problem: SELF HARM/SUICIDALITY  Goal: Will have no self-injury during hospital stay  Description: INTERVENTIONS:  - Q 15 MINUTES: Routine safety checks  - Q WAKING SHIFT & PRN: Assess risk to determine if routine checks are adequate to maintain patient safety  - Encourage patient to participate actively in care by formulating a plan to combat response to suicidal ideation, identify supports and resources  Outcome: Progressing     Problem: ANXIETY  Goal: Will report anxiety at manageable levels  Description: INTERVENTIONS:  - Administer medication as ordered  - Teach and encourage coping skills  - Provide emotional support  - Assess patient/family for anxiety and ability to cope  Outcome: Progressing  Goal: By discharge: Patient will verbalize 2 strategies to deal with anxiety  Description: Interventions:  - Identify any obvious source/trigger to anxiety  - Staff will assist patient in applying identified coping technique/skills  - Encourage attendance of scheduled groups and activities  Outcome: Progressing     Problem: SUBSTANCE USE/ABUSE  Goal: Will have no detox symptoms and will verbalize plan for changing substance-related behavior  Description: INTERVENTIONS:  - Monitor physical status and  assess for symptoms of withdrawal  - Administer medication as ordered  - Provide emotional support with 1 on 1 interaction with staff  - Encourage recovery focused program/ addiction education  - Assess for verbalization of changing behaviors related to substance abuse  - Initiate consults and referrals as appropriate (Case Management, Spiritual Care, etc.)  Outcome: Progressing  Goal: By discharge, will develop insight into their chemical dependency and sustain motivation to continue in recovery  Description: INTERVENTIONS:  - Attends all daily group sessions and scheduled AA groups  - Actively practices coping skills through participation in the therapeutic community and adherence to program rules  - Reviews and completes assignments from individual treatment plan  - Assist patient development of understanding of their personal cycle of addiction and relapse triggers  Outcome: Progressing  Goal: By discharge, patient will have ongoing treatment plan addressing chemical dependency  Description: INTERVENTIONS:  - Assist patient with resources and/or appointments for ongoing recovery based living  Outcome: Progressing     Problem: Nutrition/Hydration-ADULT  Goal: Nutrient/Hydration intake appropriate for improving, restoring or maintaining nutritional needs  Description: Monitor and assess patient's nutrition/hydration status for malnutrition. Collaborate with interdisciplinary team and initiate plan and interventions as ordered.  Monitor patient's weight and dietary intake as ordered or per policy. Utilize nutrition screening tool and intervene as necessary. Determine patient's food preferences and provide high-protein, high-caloric foods as appropriate.     INTERVENTIONS:  - Monitor oral intake, urinary output, labs, and treatment plans  - Assess nutrition and hydration status and recommend course of action  - Evaluate amount of meals eaten  - Assist patient with eating if necessary   - Allow adequate time for  meals  - Recommend/ encourage appropriate diets, oral nutritional supplements, and vitamin/mineral supplements  - Order, calculate, and assess calorie counts as needed  - Recommend, monitor, and adjust tube feedings and TPN/PPN based on assessed needs  - Assess need for intravenous fluids  - Provide specific nutrition/hydration education as appropriate  - Include patient/family/caregiver in decisions related to nutrition  Outcome: Progressing

## 2024-12-03 NOTE — NURSING NOTE
Pt came back to nurses station and stated she would try Propanolol. Pt has mumbled speech and was talking about how she was thinking about her Mother and that made her anxious. 1412 PRN Propanolol 10 mg PO given. Pt encouraged to work on coping mechanisms.

## 2024-12-03 NOTE — NURSING NOTE
Pt approached nurse c/o insomnia. PRN PO 50mg trazodone given @ 2141 - effective, pt observed sleeping in bed at this time.

## 2024-12-03 NOTE — NURSING NOTE
Pt calm and cooperative upon approach. Denies any SI/HI/AVH at this time. Pt visible in milieu, isolative mainly to self. Pt c/o muscle spasms in her back during HS med pass, 4mg PRN PO zanaflex administered @ 2115 - effective. Pt offers no complaints of discomfort at this time. Pt is adherent with scheduled medications, currently denies any unmet needs. Pt reports no BM this evening. Q15 minute checks maintained for safety.

## 2024-12-04 PROCEDURE — 99233 SBSQ HOSP IP/OBS HIGH 50: CPT | Performed by: PSYCHIATRY & NEUROLOGY

## 2024-12-04 RX ORDER — BISACODYL 5 MG/1
10 TABLET, DELAYED RELEASE ORAL DAILY PRN
Status: DISCONTINUED | OUTPATIENT
Start: 2024-12-04 | End: 2024-12-09 | Stop reason: HOSPADM

## 2024-12-04 RX ADMIN — TIZANIDINE 4 MG: 4 TABLET ORAL at 19:28

## 2024-12-04 RX ADMIN — GABAPENTIN 600 MG: 300 CAPSULE ORAL at 08:02

## 2024-12-04 RX ADMIN — NICOTINE POLACRILEX 4 MG: 4 GUM, CHEWING BUCCAL at 14:25

## 2024-12-04 RX ADMIN — OLANZAPINE 7.5 MG: 2.5 TABLET, FILM COATED ORAL at 08:01

## 2024-12-04 RX ADMIN — Medication 2000 UNITS: at 08:01

## 2024-12-04 RX ADMIN — POLYMYXIN B SULFATE, BACITRACIN ZINC, NEOMYCIN SULFATE 1 LARGE APPLICATION: 5000; 3.5; 4 OINTMENT TOPICAL at 08:00

## 2024-12-04 RX ADMIN — CLONAZEPAM 0.5 MG: 0.5 TABLET ORAL at 21:00

## 2024-12-04 RX ADMIN — BUPRENORPHINE AND NALOXONE 8 MG: 8; 2 FILM BUCCAL; SUBLINGUAL at 14:25

## 2024-12-04 RX ADMIN — BUPRENORPHINE AND NALOXONE 8 MG: 8; 2 FILM BUCCAL; SUBLINGUAL at 21:00

## 2024-12-04 RX ADMIN — POLYMYXIN B SULFATE, BACITRACIN ZINC, NEOMYCIN SULFATE 1 LARGE APPLICATION: 5000; 3.5; 4 OINTMENT TOPICAL at 18:48

## 2024-12-04 RX ADMIN — BUDESONIDE AND FORMOTEROL FUMARATE DIHYDRATE 2 PUFF: 160; 4.5 AEROSOL RESPIRATORY (INHALATION) at 08:00

## 2024-12-04 RX ADMIN — FOLIC ACID 1 MG: 1 TABLET ORAL at 08:01

## 2024-12-04 RX ADMIN — OLANZAPINE 7.5 MG: 2.5 TABLET, FILM COATED ORAL at 21:00

## 2024-12-04 RX ADMIN — GABAPENTIN 600 MG: 300 CAPSULE ORAL at 21:00

## 2024-12-04 RX ADMIN — NICOTINE POLACRILEX 4 MG: 4 GUM, CHEWING BUCCAL at 18:05

## 2024-12-04 RX ADMIN — NICOTINE POLACRILEX 4 MG: 4 GUM, CHEWING BUCCAL at 12:01

## 2024-12-04 RX ADMIN — CYANOCOBALAMIN TAB 1000 MCG 1000 MCG: 1000 TAB at 08:02

## 2024-12-04 RX ADMIN — BUDESONIDE AND FORMOTEROL FUMARATE DIHYDRATE 2 PUFF: 160; 4.5 AEROSOL RESPIRATORY (INHALATION) at 18:48

## 2024-12-04 RX ADMIN — HYDROXYZINE HYDROCHLORIDE 100 MG: 50 TABLET, FILM COATED ORAL at 12:58

## 2024-12-04 RX ADMIN — GABAPENTIN 600 MG: 300 CAPSULE ORAL at 15:19

## 2024-12-04 RX ADMIN — FLUOXETINE HYDROCHLORIDE 20 MG: 20 CAPSULE ORAL at 08:01

## 2024-12-04 RX ADMIN — TIZANIDINE 4 MG: 4 TABLET ORAL at 08:16

## 2024-12-04 RX ADMIN — NICOTINE POLACRILEX 4 MG: 4 GUM, CHEWING BUCCAL at 08:02

## 2024-12-04 RX ADMIN — BUPRENORPHINE AND NALOXONE 8 MG: 8; 2 FILM BUCCAL; SUBLINGUAL at 08:02

## 2024-12-04 RX ADMIN — MELATONIN TAB 3 MG 6 MG: 3 TAB at 21:00

## 2024-12-04 RX ADMIN — IBUPROFEN 800 MG: 400 TABLET, FILM COATED ORAL at 08:16

## 2024-12-04 RX ADMIN — Medication 14 MG: at 08:01

## 2024-12-04 NOTE — NURSING NOTE
Pt is visible,  attending groups.  She is anxious but cooperative,  and pleasant during staff interactions.  Pt is difficult to understand due to soft rambling speech, but clarifies when asked. She denies HI/SI/AVH,  and is compliant with scheduled medications.  Pt reported pain and muscle spasms in her lower back,  she was given PRN IBUPROFEN 600 mg PO along with ZANAFLEX 4 mg PO for back pain and muscle spasms at 1923.

## 2024-12-04 NOTE — PROGRESS NOTES
Progress Note - Behavioral Health   Name: Carey Keith 49 y.o. female I MRN: [id number]  Unit/Bed#: -01 I Date of Admission: 11/29/2024   Date of Service: 12/4/2024 I Hospital Day: 5     Assessment & Plan  Bipolar disorder, current episode depressed, severe, without psychotic features (HCC)  Carey continues to endorse depression but states she feels like it is improving. She is tolerating the addition of the Prozac to her regimen. Reports passive death wishes last evening, no plan, and none currently. Denying all other symptoms.     Medication regimen as follows, no changes as of 12/4/2024:  Continue Zyprexa 7.5 mg twice daily for mood stabilization and agitation   Continue Prozac 20 mg daily for depressive symptoms  Continue Gabapentin 600 mg three times daily for anxiety, and agitation  Continue Klonopin 0.5 mg at bedtime for anxiety   Chart and PDMP reviewed, patient was previously discharged from [facility] with 0.5 mg at bed time     No associated orders from this encounter found during lookback period of 72 hours.  Tobacco abuse  Encourage cessation   Nicotine patch   No associated orders from this encounter found during lookback period of 72 hours.  Opioid use disorder, severe, on maintenance therapy (HCC)  PDMP reviewed, patient appears compliant, last filled on 11/14/2024  Continue Suboxone 8 mg three times daily   No associated orders from this encounter found during lookback period of 72 hours.        ------------------------------------------------------------    Recommended Treatment Plan:   Behavioral checks every 15 minutes  Encourage participation in group therapy, milieu therapy and occupational therapy.  Assess for side effects of medications.  Collaboration with [facility] for medical co-morbidities as indicated.  Continue discussion with CM/SW to assist with obtaining collateral, disposition planning, and the implementation of patient-centered individualized plan of care.  Estimated  "Discharge Date: 12/6/2024    Risks, benefits and possible side effects of Medications: Risks, benefits, and possible side effects of medications have previously been explained. No new medications at this time.    Legal status: 201    Disposition: likely to previous residence       Subjective: Patient's chart was reviewed. Patient's progress and plan was discussed with treatment team. Per nursing report, Carey has been cooperative on the unit, but remains somatically preoccupied. She remains compliant with medications.     Prn medications over the past 24 hours: Atarax 100 mg at 1255, Motrin 600 mg at 1923 and 0816, Nicotine gum, Zanaflex at 0816, Trazodone at 2112    Carey was evaluated this morning for continuity of care. On examination, Carey is cooperative with the assessment, but continues to have soft, fast, mumbling speech. She states her mood is \"depressed.\" She reports sleeping well with the Trazodone and asks to have it scheduled. She was reminded of the change in her Klonopin dosing and was agreeable to waiting for any further changes. Her appetite has been normal. She denies adverse effects from medications. She reports that she had passive suicidal ideations last evening, but none at current. She denies active suicidal ideation and homicidal ideation. She denies auditory or visual hallucinations.    VS: Reviewed, within normal limits    Progress Toward Goals: medication and meal compliant, calm, cooperative, somatically focused, preoccupied with medications, intermittent passive SI    Psychiatric Review of Systems:  Behavior over the last 24 hours: unchanged  Sleep: normal and improving  Appetite: adequate  Medication side effects: none verbalized  Medical ROS: Complete review of systems is negative except as noted above.    Vital signs in last 24 hours:  Temp:  [97.2 °F (36.2 °C)-97.6 °F (36.4 °C)] 97.6 °F (36.4 °C)  HR:  [59-78] 59  BP: (105-126)/(62-71) 108/64  Resp:  [16] 16  SpO2:  [96 " "%-98 %] 98 %  O2 Device: None (Room air)    Mental Status Exam:    Appearance:  alert, marginal grooming and hygiene, blonde hair , dressed in hospital attire, intermittent eye contact, appears older than stated age, overweight, and poor dentition   Behavior:  calm and cooperative   Speech:  Spontaneous, increased rate, soft, mumbles   Mood:  \"Depressed\"   Affect:  Dysphoric, mood congruent   Thought Process:  generally linear and goal-directed but perseverative at times   Associations: intact associations   Thought Content:  no verbalized delusions or overt paranoia, somatic preoccupation   Perceptual Disturbances: denies current auditory or visual hallucinations and does not appear to be responding to internal stimuli at this time   Risk Potential: Suicidal ideation -  Denies at present , but fleeting death wishes  Homicidal ideation - Denies at present  Potential for aggression - Not at present   Sensorium:  oriented to person and place   Memory:  recent and remote memory grossly intact   Consciousness:  alert and awake   Attention/Concentration: attention span and concentration appear shorter than expected for age   Insight:  limited   Judgment: limited   Gait/Station: normal gait/station, normal balance   Motor Activity: no abnormal movements     Current Medications:  Current Facility-Administered Medications   Medication Dose Route Frequency Provider Last Rate    acetaminophen  650 mg Oral Q6H PRN NETTE Saldivar      acetaminophen  975 mg Oral Q8H PRN NETTE Saldivar      albuterol  2 puff Inhalation Q4H PRN NETTE Saldivar      aluminum-magnesium hydroxide-simethicone  30 mL Oral Q4H PRN Angie Jones MD      benztropine  1 mg Intramuscular Q4H PRN Max 6/day Angie Jones MD      benztropine  1 mg Oral Q4H PRN Max 6/day Angie Jones MD      bisacodyl  10 mg Oral Daily PRN Paz Narvaez DO      bisacodyl  10 mg Rectal Daily PRN Angie Jones MD      " budesonide-formoterol  2 puff Inhalation BID Joselin Colindres, CRANJANA      buprenorphine-naloxone  8 mg Sublingual TID Joselin Valverdejanee, CRANJANA      Cholecalciferol  2,000 Units Oral Daily Joselin Blackwoodduglas, CRANJANA      clonazePAM  0.5 mg Oral HS Paz Narvaez, DO      cyanocobalamin  1,000 mcg Oral Daily Joselin Blackwoodduglas, CRNP      hydrOXYzine HCL  50 mg Oral Q6H PRN Max 4/day Angie Jones MD      Or    diphenhydrAMINE  50 mg Intramuscular Q6H PRN Angie Jones MD      FLUoxetine  20 mg Oral Daily Paz Narvaez, DO      folic acid  1 mg Oral Daily Joselin BlackwoodNETTE ardon      gabapentin  600 mg Oral TID Paz Narvaez, DO      hydrOXYzine HCL  100 mg Oral Q6H PRN Max 4/day Angie Jones MD      Or    LORazepam  2 mg Intramuscular Q6H PRN Angie Jones MD      hydrOXYzine HCL  25 mg Oral Q6H PRN Max 4/day Angie Jones MD      ibuprofen  400 mg Oral Q4H PRN Angie Jones MD      ibuprofen  600 mg Oral Q8H PRN Joselin BlackwoodNETTE ardon      ibuprofen  600 mg Oral Q6H PRN Angie Jones MD      ibuprofen  800 mg Oral Q8H PRN Angie Jones MD      melatonin  6 mg Oral HS Paz Narvaez, DO      neomycin-bacitracin-polymyxin b  1 large application Topical BID Joselin BlackwoodNETTE ardon      nicotine  14 mg Transdermal Daily Norm Bailey MD      nicotine polacrilex  4 mg Oral Q2H PRN Angie Jones MD      OLANZapine  5 mg Oral Q4H PRN Max 3/day Angie Jones MD      Or    OLANZapine  2.5 mg Intramuscular Q4H PRN Max 3/day Angie Jones MD      OLANZapine  5 mg Oral Q3H PRN Max 3/day Angie Jones MD      Or    OLANZapine  5 mg Intramuscular Q3H PRN Max 3/day Angie Jones MD      OLANZapine  2.5 mg Oral Q4H PRN Max 6/day Angie Jones MD      OLANZapine  7.5 mg Oral BID Paz Narvaez DO      ondansetron  4 mg Oral Q8H PRN NETTE Saldivar      polyethylene glycol  17 g Oral Daily PRN Angie Jones MD       propranolol  10 mg Oral Q8H PRN Angie Jones MD      senna-docusate sodium  1 tablet Oral Daily PRN Angie Jones MD      sterile water           tiZANidine  4 mg Oral Q8H PRN NETTE Saldivar      traZODone  50 mg Oral HS PRN Angie Jones MD         Behavioral Health Medications: all current active meds have been reviewed. Changes as in plan section above.    Laboratory results:  I have personally reviewed all pertinent laboratory/tests results.   No results found for this or any previous visit (from the past 48 hours).     Counseling / Coordination of Care:  Patient's progress discussed with staff in treatment team meeting.  Medication education provided to patient.  Encouraged participation in milieu and group therapy on the unit.      Paz Narvaez DO 12/04/24  Psychiatry Residency, PGY-II  This note has been constructed using a voice recognition system. There may be translation, syntax, or grammatical errors. If you have any questions, please contact the dictating author.

## 2024-12-04 NOTE — ASSESSMENT & PLAN NOTE
Carey continues to endorse depression but states she feels like it is improving. She is tolerating the addition of the Prozac to her regimen. Reports passive death wishes last evening, no plan, and none currently. Denying all other symptoms.     Medication regimen as follows, no changes as of 12/4/2024:  Continue Zyprexa 7.5 mg twice daily for mood stabilization and agitation   Continue Prozac 20 mg daily for depressive symptoms  Continue Gabapentin 600 mg three times daily for anxiety, and agitation  Continue Klonopin 0.5 mg at bedtime for anxiety   Chart and PDMP reviewed, patient was previously discharged from [facility] with 0.5 mg at bed time     No associated orders from this encounter found during lookback period of 72 hours.

## 2024-12-04 NOTE — PLAN OF CARE
Pt has been starting to attend groups more frequently. Pt remains with irritable affect, but is more participatory and cooperative.

## 2024-12-04 NOTE — NURSING NOTE
About the unit. Attending some groups. Cooperative, no behavioral issues. Less preoccupied with prn medications. Compliant. Denies SI/HI, denies hallucinations.

## 2024-12-04 NOTE — NURSING NOTE
Patient c/o anxiety. Given PRN of Atarax 100 mg po for severe anxiety. Will monitor effectiveness.

## 2024-12-04 NOTE — PROGRESS NOTES
12/04/24 0906   Team Meeting   Meeting Type Daily Rounds   Team Members Present   Team Members Present Physician;Nurse;   Physician Team Member Petra   [facility] Team Member Harsha   Patient/Family Present   Patient Present No   Patient's Family Present No     Pt med/meal compliant. PRN Atarax for anxiety. Requesting IM medication, received PRN Propanolol. Pt's Zyprexa split into BID dosing. Restarted Prozac yesterday. Pt remains on 0.5mg of Klonopin. Discharge possible Friday.

## 2024-12-05 PROCEDURE — 99232 SBSQ HOSP IP/OBS MODERATE 35: CPT | Performed by: PSYCHIATRY & NEUROLOGY

## 2024-12-05 RX ORDER — CLONAZEPAM 0.5 MG/1
0.5 TABLET ORAL
Status: DISCONTINUED | OUTPATIENT
Start: 2024-12-05 | End: 2024-12-09 | Stop reason: HOSPADM

## 2024-12-05 RX ADMIN — TIZANIDINE 4 MG: 4 TABLET ORAL at 07:28

## 2024-12-05 RX ADMIN — GABAPENTIN 600 MG: 300 CAPSULE ORAL at 20:54

## 2024-12-05 RX ADMIN — BUDESONIDE AND FORMOTEROL FUMARATE DIHYDRATE 2 PUFF: 160; 4.5 AEROSOL RESPIRATORY (INHALATION) at 08:11

## 2024-12-05 RX ADMIN — GABAPENTIN 600 MG: 300 CAPSULE ORAL at 08:12

## 2024-12-05 RX ADMIN — OLANZAPINE 7.5 MG: 2.5 TABLET, FILM COATED ORAL at 08:12

## 2024-12-05 RX ADMIN — GABAPENTIN 600 MG: 300 CAPSULE ORAL at 17:03

## 2024-12-05 RX ADMIN — CLONAZEPAM 0.5 MG: 0.5 TABLET ORAL at 17:03

## 2024-12-05 RX ADMIN — POLYMYXIN B SULFATE, BACITRACIN ZINC, NEOMYCIN SULFATE 1 LARGE APPLICATION: 5000; 3.5; 4 OINTMENT TOPICAL at 08:12

## 2024-12-05 RX ADMIN — BUPRENORPHINE AND NALOXONE 8 MG: 8; 2 FILM BUCCAL; SUBLINGUAL at 20:54

## 2024-12-05 RX ADMIN — PROPRANOLOL HYDROCHLORIDE 10 MG: 10 TABLET ORAL at 10:31

## 2024-12-05 RX ADMIN — NICOTINE POLACRILEX 4 MG: 4 GUM, CHEWING BUCCAL at 18:30

## 2024-12-05 RX ADMIN — BUDESONIDE AND FORMOTEROL FUMARATE DIHYDRATE 2 PUFF: 160; 4.5 AEROSOL RESPIRATORY (INHALATION) at 17:05

## 2024-12-05 RX ADMIN — TRAZODONE HYDROCHLORIDE 50 MG: 50 TABLET ORAL at 23:26

## 2024-12-05 RX ADMIN — ACETAMINOPHEN 650 MG: 325 TABLET ORAL at 20:54

## 2024-12-05 RX ADMIN — Medication 14 MG: at 08:12

## 2024-12-05 RX ADMIN — NICOTINE POLACRILEX 4 MG: 4 GUM, CHEWING BUCCAL at 14:46

## 2024-12-05 RX ADMIN — IBUPROFEN 800 MG: 400 TABLET, FILM COATED ORAL at 07:28

## 2024-12-05 RX ADMIN — MELATONIN TAB 3 MG 6 MG: 3 TAB at 21:30

## 2024-12-05 RX ADMIN — FOLIC ACID 1 MG: 1 TABLET ORAL at 08:12

## 2024-12-05 RX ADMIN — FLUOXETINE HYDROCHLORIDE 20 MG: 20 CAPSULE ORAL at 08:12

## 2024-12-05 RX ADMIN — BUPRENORPHINE AND NALOXONE 8 MG: 8; 2 FILM BUCCAL; SUBLINGUAL at 08:12

## 2024-12-05 RX ADMIN — CYANOCOBALAMIN TAB 1000 MCG 1000 MCG: 1000 TAB at 08:12

## 2024-12-05 RX ADMIN — TIZANIDINE 4 MG: 4 TABLET ORAL at 20:54

## 2024-12-05 RX ADMIN — NICOTINE POLACRILEX 4 MG: 4 GUM, CHEWING BUCCAL at 10:22

## 2024-12-05 RX ADMIN — Medication 2000 UNITS: at 08:12

## 2024-12-05 RX ADMIN — NICOTINE POLACRILEX 4 MG: 4 GUM, CHEWING BUCCAL at 07:28

## 2024-12-05 RX ADMIN — OLANZAPINE 7.5 MG: 2.5 TABLET, FILM COATED ORAL at 20:54

## 2024-12-05 RX ADMIN — BUPRENORPHINE AND NALOXONE 8 MG: 8; 2 FILM BUCCAL; SUBLINGUAL at 14:17

## 2024-12-05 NOTE — ASSESSMENT & PLAN NOTE
Carey appears bright and reports overall improvement in mood, but states that she feels like she is coming down with a cold. She denies active or passive SI or HI as well as AVH. Tolerating medications without side effects.    Medication regimen as follows, no changes as of 12/5/2024:  Continue Zyprexa 7.5 mg twice daily for mood stabilization and agitation   Continue Prozac 20 mg daily for depressive symptoms  Continue Gabapentin 600 mg three times daily for anxiety, and agitation  Continue Klonopin 0.5 mg once daily for anxiety   Chart and PDMP reviewed, patient was previously discharged from [facility] with 0.5 mg at bed time     No associated orders from this encounter found during lookback period of 72 hours.

## 2024-12-05 NOTE — NURSING NOTE
Patient c/o muscle spasms in her back. Given PRN of Zanaflex 4 mg po. Will monitor effectiveness.

## 2024-12-05 NOTE — PROGRESS NOTES
12/05/24 0848   Team Meeting   Meeting Type Daily Rounds   Team Members Present   Team Members Present Physician;Nurse;   Physician Team Member Tomer   [facility]   Care Management Team Member Harsha   Patient/Family Present   Patient Present No   Patient's Family Present No     Pt med/meal compliant. Visible on the unit. Attended some groups yesterday. Discharge possible Monday.

## 2024-12-05 NOTE — NURSING NOTE
Pt is visible on the milieu,  present during med-pass but not intrusive and has maintained behavioral control throughout the shift.  She is polite and pleasant with this writer during interaction,  denies HI/SI/AVH,  and compliant with scheduled medications.  Pt received PRN ZANAFLEX 4 mg PO at 1928 for muscle spasms in her back.  She currently reports total relief from these spasms at this point,  ZANAFLEX effective.  Pt became a helpful advocate for her roommate during an incident toward the end of the night and was resourceful to staff as well.

## 2024-12-05 NOTE — NURSING NOTE
Pleasant, calm and cooperative. Interacting appropriately with peers. Attending some groups. Compliant with medications. No current complaints. Denies symptoms.

## 2024-12-05 NOTE — PROGRESS NOTES
Progress Note - Behavioral Health   Name: Carey Keith 49 y.o. female I MRN: [id number]  Unit/Bed#: -01 I Date of Admission: 11/29/2024   Date of Service: 12/5/2024 I Hospital Day: 6     Assessment & Plan  Bipolar disorder, current episode depressed, severe, without psychotic features (HCC)  Carey appears bright and reports overall improvement in mood, but states that she feels like she is coming down with a cold. She denies active or passive SI or HI as well as AVH. Tolerating medications without side effects.    Medication regimen as follows, no changes as of 12/5/2024:  Continue Zyprexa 7.5 mg twice daily for mood stabilization and agitation   Continue Prozac 20 mg daily for depressive symptoms  Continue Gabapentin 600 mg three times daily for anxiety, and agitation  Continue Klonopin 0.5 mg once daily for anxiety   Chart and PDMP reviewed, patient was previously discharged from [facility] with 0.5 mg at bed time     No associated orders from this encounter found during lookback period of 72 hours.  Tobacco abuse  Encourage cessation   Nicotine patch   No associated orders from this encounter found during lookback period of 72 hours.  Opioid use disorder, severe, on maintenance therapy (HCC)  PDMP reviewed, patient appears compliant, last filled on 11/14/2024  Continue Suboxone 8 mg three times daily   No associated orders from this encounter found during lookback period of 72 hours.        ------------------------------------------------------------    Recommended Treatment Plan:   Behavioral checks every 15 minutes  Encourage participation in group therapy, milieu therapy and occupational therapy.  Assess for side effects of medications.  Collaboration with [facility] for medical co-morbidities as indicated.  Continue discussion with CM/SW to assist with obtaining collateral, disposition planning, and the implementation of patient-centered individualized plan of care.  Estimated Discharge Date:  "12/9/2024    Risks, benefits and possible side effects of Medications: Risks, benefits, and possible side effects of medications have previously been explained. No new medications at this time.    Legal status: 201    Disposition: return to previous residence, tentative discharge for Monday       Subjective: Patient's chart was reviewed. Patient's progress and plan was discussed with treatment team. Per nursing report, Carey has been calm and cooperative on the unit. She is less focused on prn medications. She remains compliant with medications.     Prn medications over the past 24 hours: Atarax 100 mg at 1258, Motrin 800 mg at 0728, Nicotine gum, Inderal 10 mg at 1031, and Tizanidine 4 mg at 0728    Carey was evaluated this morning for continuity of care. On examination, Carey appears brighter and shares with this writer about her cat, Parish. She states her mood is \"alright.\" She reports sleeping okay but that she woke up a few times. Her appetite has been normal but she believes she is eating too much. She denies adverse effects from medications. She denies active or passive suicidal ideation and homicidal ideation. She denies auditory or visual hallucinations.    She complains that she feels that she is coming down with a cold. Educated to inform staff is symptoms worsen and to utilize prns as indicated.     VS: Reviewed, within normal limits    Progress Toward Goals: medication and meal compliant, calm, cooperative, less irritable, less dysphoric    Psychiatric Review of Systems:  Behavior over the last 24 hours: improved  Sleep: normal  Appetite: adequate  Medication side effects: none verbalized  Medical ROS: Complete review of systems is negative except as noted above.    Vital signs in last 24 hours:  Temp:  [97.3 °F (36.3 °C)-98 °F (36.7 °C)] 98 °F (36.7 °C)  HR:  [58-73] 73  BP: (108-124)/(59-80) 124/59  Resp:  [16] 16  SpO2:  [97 %-98 %] 97 %  O2 Device: None (Room air)    Mental Status " "Exam:    Appearance:  alert, marginal grooming and hygiene, blonde hair , casually dressed, intermittent eye contact, appears older than stated age, overweight, and poor dentition   Behavior:  calm and cooperative   Speech:  Spontaneous, soft, increased rate, mumbles at times   Mood:  \"Alright\"   Affect:  Constricted, more reactive than previous   Thought Process:  generally linear and goal-directed but perseverative at times   Associations: intact associations   Thought Content:  no verbalized delusions or overt paranoia, somatic preoccupation   Perceptual Disturbances: denies current auditory or visual hallucinations and does not appear to be responding to internal stimuli at this time   Risk Potential: Suicidal ideation -  Denies at present   Homicidal ideation - Denies at present  Potential for aggression - Not at present   Sensorium:  oriented to person and place   Memory:  recent and remote memory grossly intact   Consciousness:  alert and awake   Attention/Concentration: attention span and concentration appear shorter than expected for age   Insight:  partial   Judgment: limited   Gait/Station: normal gait/station, normal balance   Motor Activity: no abnormal movements     Current Medications:  Current Facility-Administered Medications   Medication Dose Route Frequency Provider Last Rate    acetaminophen  650 mg Oral Q6H PRN NETTE Saldivar      acetaminophen  975 mg Oral Q8H PRN NETTE Saldivar      albuterol  2 puff Inhalation Q4H PRN NETTE Saldivar      aluminum-magnesium hydroxide-simethicone  30 mL Oral Q4H PRN Angie Jones MD      benztropine  1 mg Intramuscular Q4H PRN Max 6/day Angie Jones MD      benztropine  1 mg Oral Q4H PRN Max 6/day Angie Jones MD      bisacodyl  10 mg Oral Daily PRN Paz Narvaez DO      bisacodyl  10 mg Rectal Daily PRN Angie Jones MD      budesonide-formoterol  2 puff Inhalation BID NETTE Saldivar   "    buprenorphine-naloxone  8 mg Sublingual TID Joselin Colindres, CRNP      Cholecalciferol  2,000 Units Oral Daily Joselin Valverdejanee, CRNP      clonazePAM  0.5 mg Oral HS Paz Narvaez, DO      cyanocobalamin  1,000 mcg Oral Daily Joselin Valverdejanee, CRNP      hydrOXYzine HCL  50 mg Oral Q6H PRN Max 4/day Angie Jones MD      Or    diphenhydrAMINE  50 mg Intramuscular Q6H PRN Angie Jones MD      FLUoxetine  20 mg Oral Daily Paz Narvaez, DO      folic acid  1 mg Oral Daily Joselin Blackwoodduglas, CRANJANA      gabapentin  600 mg Oral TID Paz Narvaez, DO      hydrOXYzine HCL  100 mg Oral Q6H PRN Max 4/day Angie Jones MD      Or    LORazepam  2 mg Intramuscular Q6H PRN Angie Jones MD      hydrOXYzine HCL  25 mg Oral Q6H PRN Max 4/day Angie Jones MD      ibuprofen  400 mg Oral Q4H PRN Angie Jones MD      ibuprofen  600 mg Oral Q8H PRN Joselin Bharati BlackwoodNETTE ardon      ibuprofen  600 mg Oral Q6H PRN Angie Jones MD      ibuprofen  800 mg Oral Q8H PRN Angie Jones MD      melatonin  6 mg Oral HS Paz Narvaez, DO      neomycin-bacitracin-polymyxin b  1 large application Topical BID Joselin Valverdejanee, CRNP      nicotine  14 mg Transdermal Daily Norm Bailey MD      nicotine polacrilex  4 mg Oral Q2H PRN Angie Jones MD      OLANZapine  5 mg Oral Q4H PRN Max 3/day Angie Jones MD      Or    OLANZapine  2.5 mg Intramuscular Q4H PRN Max 3/day Angie Jones MD      OLANZapine  5 mg Oral Q3H PRN Max 3/day Angie Jones MD      Or    OLANZapine  5 mg Intramuscular Q3H PRN Max 3/day Angie Jones MD      OLANZapine  2.5 mg Oral Q4H PRN Max 6/day Angie Jones MD      OLANZapine  7.5 mg Oral BID Paz Narvaez, DO      ondansetron  4 mg Oral Q8H PRN NETTE Saldivar      polyethylene glycol  17 g Oral Daily PRN Angie Jones MD      propranolol  10 mg Oral Q8H PRN Angie Jones MD      senna-docusate sodium  1  tablet Oral Daily PRN Angie Jones MD      sterile water           tiZANidine  4 mg Oral Q8H PRN NETTE Saldivar      traZODone  50 mg Oral HS PRN Angie Jones MD         Behavioral Health Medications: all current active meds have been reviewed. Changes as in plan section above.    Laboratory results:  I have personally reviewed all pertinent laboratory/tests results.   No results found for this or any previous visit (from the past 48 hours).     Counseling / Coordination of Care:  Patient's progress discussed with staff in treatment team meeting.  Medication changes discussed with patient.  Medication education provided to patient.  Supportive therapy provided to patient.  Encouraged participation in milieu and group therapy on the unit.      Paz Narvaez DO 12/05/24  Psychiatry Residency, PGY-II  This note has been constructed using a voice recognition system. There may be translation, syntax, or grammatical errors. If you have any questions, please contact the dictating author.

## 2024-12-05 NOTE — PLAN OF CARE
Problem: Ineffective Coping  Goal: Participates in unit activities  Description: Interventions:  - Provide therapeutic environment   - Provide required programming   - Redirect inappropriate behaviors   Outcome: Progressing     Problem: DISCHARGE PLANNING  Goal: Discharge to home or other facility with appropriate resources  Description: INTERVENTIONS:  - Identify barriers to discharge w/patient and caregiver  - Arrange for needed discharge resources and transportation as appropriate  - Identify discharge learning needs (meds, wound care, etc.)  - Arrange for interpretive services to assist at discharge as needed  - Refer to Case Management Department for coordinating discharge planning if the patient needs post-hospital services based on physician/advanced practitioner order or complex needs related to functional status, cognitive ability, or social support system  Outcome: Progressing     Problem: SAFETY ADULT  Goal: Patient will remain free of falls  Description: INTERVENTIONS:  - Educate patient/family on patient safety including physical limitations  - Instruct patient to call for assistance with activity   - Keep care items and personal belongings within reach  - Initiate and maintain comfort rounds  - Make Fall Risk Sign visible to staff  - Apply yellow socks and bracelet for high fall risk patients  - Consider moving patient to room near nurses station  Outcome: Progressing     Problem: SELF HARM/SUICIDALITY  Goal: Will have no self-injury during hospital stay  Description: INTERVENTIONS:  - Q 15 MINUTES: Routine safety checks  - Q WAKING SHIFT & PRN: Assess risk to determine if routine checks are adequate to maintain patient safety  - Encourage patient to participate actively in care by formulating a plan to combat response to suicidal ideation, identify supports and resources  Outcome: Progressing     Problem: DEPRESSION  Goal: Will be euthymic at discharge  Description: INTERVENTIONS:  - Administer  medication as ordered  - Provide emotional support via 1:1 interaction with staff  - Encourage involvement in milieu/groups/activities  - Monitor for social isolation  Outcome: Progressing     Problem: ANXIETY  Goal: Will report anxiety at manageable levels  Description: INTERVENTIONS:  - Administer medication as ordered  - Teach and encourage coping skills  - Provide emotional support  - Assess patient/family for anxiety and ability to cope  Outcome: Progressing  Goal: By discharge: Patient will verbalize 2 strategies to deal with anxiety  Description: Interventions:  - Identify any obvious source/trigger to anxiety  - Staff will assist patient in applying identified coping technique/skills  - Encourage attendance of scheduled groups and activities  Outcome: Progressing     Problem: SUBSTANCE USE/ABUSE  Goal: Will have no detox symptoms and will verbalize plan for changing substance-related behavior  Description: INTERVENTIONS:  - Monitor physical status and assess for symptoms of withdrawal  - Administer medication as ordered  - Provide emotional support with 1 on 1 interaction with staff  - Encourage recovery focused program/ addiction education  - Assess for verbalization of changing behaviors related to substance abuse  - Initiate consults and referrals as appropriate (Case Management, Spiritual Care, etc.)  Outcome: Progressing  Goal: By discharge, will develop insight into their chemical dependency and sustain motivation to continue in recovery  Description: INTERVENTIONS:  - Attends all daily group sessions and scheduled AA groups  - Actively practices coping skills through participation in the therapeutic community and adherence to program rules  - Reviews and completes assignments from individual treatment plan  - Assist patient development of understanding of their personal cycle of addiction and relapse triggers  Outcome: Progressing  Goal: By discharge, patient will have ongoing treatment plan  addressing chemical dependency  Description: INTERVENTIONS:  - Assist patient with resources and/or appointments for ongoing recovery based living  Outcome: Progressing     Problem: Nutrition/Hydration-ADULT  Goal: Nutrient/Hydration intake appropriate for improving, restoring or maintaining nutritional needs  Description: Monitor and assess patient's nutrition/hydration status for malnutrition. Collaborate with interdisciplinary team and initiate plan and interventions as ordered.  Monitor patient's weight and dietary intake as ordered or per policy. Utilize nutrition screening tool and intervene as necessary. Determine patient's food preferences and provide high-protein, high-caloric foods as appropriate.     INTERVENTIONS:  - Monitor oral intake, urinary output, labs, and treatment plans  - Assess nutrition and hydration status and recommend course of action  - Evaluate amount of meals eaten  - Assist patient with eating if necessary   - Allow adequate time for meals  - Recommend/ encourage appropriate diets, oral nutritional supplements, and vitamin/mineral supplements  - Order, calculate, and assess calorie counts as needed  - Recommend, monitor, and adjust tube feedings and TPN/PPN based on assessed needs  - Assess need for intravenous fluids  - Provide specific nutrition/hydration education as appropriate  - Include patient/family/caregiver in decisions related to nutrition  Outcome: Progressing     Problem: DISCHARGE PLANNING - CARE MANAGEMENT  Goal: Discharge to post-acute care or home with appropriate resources  Description: INTERVENTIONS:  - Conduct assessment to determine patient/family and health care team treatment goals, and need for post-acute services based on payer coverage, community resources, and patient preferences, and barriers to discharge  - Address psychosocial, clinical, and financial barriers to discharge as identified in assessment in conjunction with the patient/family and health care  team  - Arrange appropriate level of post-acute services according to patient’s   needs and preference and payer coverage in collaboration with the physician and health care team  - Communicate with and update the patient/family, physician, and health care team regarding progress on the discharge plan  - Arrange appropriate transportation to post-acute venues  Outcome: Progressing

## 2024-12-06 PROCEDURE — 99232 SBSQ HOSP IP/OBS MODERATE 35: CPT | Performed by: PSYCHIATRY & NEUROLOGY

## 2024-12-06 RX ORDER — TRAZODONE HYDROCHLORIDE 100 MG/1
100 TABLET ORAL
Status: DISCONTINUED | OUTPATIENT
Start: 2024-12-06 | End: 2024-12-09 | Stop reason: HOSPADM

## 2024-12-06 RX ORDER — OLANZAPINE 5 MG/1
5 TABLET ORAL 2 TIMES DAILY
Status: DISCONTINUED | OUTPATIENT
Start: 2024-12-06 | End: 2024-12-09 | Stop reason: HOSPADM

## 2024-12-06 RX ADMIN — MELATONIN TAB 3 MG 6 MG: 3 TAB at 21:26

## 2024-12-06 RX ADMIN — NICOTINE POLACRILEX 4 MG: 4 GUM, CHEWING BUCCAL at 17:50

## 2024-12-06 RX ADMIN — NICOTINE POLACRILEX 4 MG: 4 GUM, CHEWING BUCCAL at 06:29

## 2024-12-06 RX ADMIN — TIZANIDINE 4 MG: 4 TABLET ORAL at 08:20

## 2024-12-06 RX ADMIN — FLUOXETINE HYDROCHLORIDE 20 MG: 20 CAPSULE ORAL at 08:20

## 2024-12-06 RX ADMIN — BUDESONIDE AND FORMOTEROL FUMARATE DIHYDRATE 2 PUFF: 160; 4.5 AEROSOL RESPIRATORY (INHALATION) at 08:19

## 2024-12-06 RX ADMIN — CLONAZEPAM 0.5 MG: 0.5 TABLET ORAL at 17:21

## 2024-12-06 RX ADMIN — BUDESONIDE AND FORMOTEROL FUMARATE DIHYDRATE 2 PUFF: 160; 4.5 AEROSOL RESPIRATORY (INHALATION) at 17:21

## 2024-12-06 RX ADMIN — NICOTINE POLACRILEX 4 MG: 4 GUM, CHEWING BUCCAL at 19:56

## 2024-12-06 RX ADMIN — NICOTINE POLACRILEX 4 MG: 4 GUM, CHEWING BUCCAL at 08:21

## 2024-12-06 RX ADMIN — GABAPENTIN 600 MG: 300 CAPSULE ORAL at 17:00

## 2024-12-06 RX ADMIN — OLANZAPINE 5 MG: 5 TABLET, FILM COATED ORAL at 20:53

## 2024-12-06 RX ADMIN — TIZANIDINE 4 MG: 4 TABLET ORAL at 19:56

## 2024-12-06 RX ADMIN — BUPRENORPHINE AND NALOXONE 8 MG: 8; 2 FILM BUCCAL; SUBLINGUAL at 08:21

## 2024-12-06 RX ADMIN — BUPRENORPHINE AND NALOXONE 8 MG: 8; 2 FILM BUCCAL; SUBLINGUAL at 20:54

## 2024-12-06 RX ADMIN — Medication 2000 UNITS: at 08:19

## 2024-12-06 RX ADMIN — CYANOCOBALAMIN TAB 1000 MCG 1000 MCG: 1000 TAB at 08:20

## 2024-12-06 RX ADMIN — GABAPENTIN 600 MG: 300 CAPSULE ORAL at 08:19

## 2024-12-06 RX ADMIN — HYDROXYZINE HYDROCHLORIDE 50 MG: 50 TABLET, FILM COATED ORAL at 06:23

## 2024-12-06 RX ADMIN — OLANZAPINE 7.5 MG: 2.5 TABLET, FILM COATED ORAL at 08:20

## 2024-12-06 RX ADMIN — FOLIC ACID 1 MG: 1 TABLET ORAL at 08:21

## 2024-12-06 RX ADMIN — NICOTINE POLACRILEX 4 MG: 4 GUM, CHEWING BUCCAL at 14:51

## 2024-12-06 RX ADMIN — BUPRENORPHINE AND NALOXONE 8 MG: 8; 2 FILM BUCCAL; SUBLINGUAL at 14:51

## 2024-12-06 RX ADMIN — Medication 14 MG: at 08:20

## 2024-12-06 RX ADMIN — IBUPROFEN 800 MG: 400 TABLET, FILM COATED ORAL at 08:20

## 2024-12-06 RX ADMIN — GABAPENTIN 600 MG: 300 CAPSULE ORAL at 20:54

## 2024-12-06 RX ADMIN — ACETAMINOPHEN 975 MG: 325 TABLET, FILM COATED ORAL at 19:55

## 2024-12-06 NOTE — NURSING NOTE
Patient reports lower right back pain 6/10, given tylenol 650mg po and Tizanidine 4mg po prn at 2054.

## 2024-12-06 NOTE — NURSING NOTE
Patient is calm and cooperative upon approach. Patient isolative to room and self. Pt denies SI/HI/AH/VH. Patient is compliant with meds and meals.

## 2024-12-06 NOTE — PLAN OF CARE
Problem: SUBSTANCE USE/ABUSE  Goal: Will have no detox symptoms and will verbalize plan for changing substance-related behavior  Description: INTERVENTIONS:  - Monitor physical status and assess for symptoms of withdrawal  - Administer medication as ordered  - Provide emotional support with 1 on 1 interaction with staff  - Encourage recovery focused program/ addiction education  - Assess for verbalization of changing behaviors related to substance abuse  - Initiate consults and referrals as appropriate (Case Management, Spiritual Care, etc.)  12/6/2024 0057 by Lisa Worrell RN  Outcome: Progressing  12/6/2024 0056 by Lisa Worrell RN  Outcome: Progressing  Goal: By discharge, will develop insight into their chemical dependency and sustain motivation to continue in recovery  Description: INTERVENTIONS:  - Attends all daily group sessions and scheduled AA groups  - Actively practices coping skills through participation in the therapeutic community and adherence to program rules  - Reviews and completes assignments from individual treatment plan  - Assist patient development of understanding of their personal cycle of addiction and relapse triggers  12/6/2024 0057 by Lisa Worrell RN  Outcome: Progressing  12/6/2024 0056 by Lisa Worrell RN  Outcome: Progressing  Goal: By discharge, patient will have ongoing treatment plan addressing chemical dependency  Description: INTERVENTIONS:  - Assist patient with resources and/or appointments for ongoing recovery based living  12/6/2024 0057 by Lisa Worrell RN  Outcome: Progressing  12/6/2024 0056 by Lisa Worrell RN  Outcome: Progressing     Problem: Nutrition/Hydration-ADULT  Goal: Nutrient/Hydration intake appropriate for improving, restoring or maintaining nutritional needs  Description: Monitor and assess patient's nutrition/hydration status for malnutrition. Collaborate with interdisciplinary team and initiate plan and interventions as ordered.   Monitor patient's weight and dietary intake as ordered or per policy. Utilize nutrition screening tool and intervene as necessary. Determine patient's food preferences and provide high-protein, high-caloric foods as appropriate.     INTERVENTIONS:  - Monitor oral intake, urinary output, labs, and treatment plans  - Assess nutrition and hydration status and recommend course of action  - Evaluate amount of meals eaten  - Assist patient with eating if necessary   - Allow adequate time for meals  - Recommend/ encourage appropriate diets, oral nutritional supplements, and vitamin/mineral supplements  - Order, calculate, and assess calorie counts as needed  - Recommend, monitor, and adjust tube feedings and TPN/PPN based on assessed needs  - Assess need for intravenous fluids  - Provide specific nutrition/hydration education as appropriate  - Include patient/family/caregiver in decisions related to nutrition  12/6/2024 0057 by Lisa Worrell RN  Outcome: Progressing  12/6/2024 0056 by Lisa Worrell RN  Outcome: Progressing     Problem: DISCHARGE PLANNING - CARE MANAGEMENT  Goal: Discharge to post-acute care or home with appropriate resources  Description: INTERVENTIONS:  - Conduct assessment to determine patient/family and health care team treatment goals, and need for post-acute services based on payer coverage, community resources, and patient preferences, and barriers to discharge  - Address psychosocial, clinical, and financial barriers to discharge as identified in assessment in conjunction with the patient/family and health care team  - Arrange appropriate level of post-acute services according to patient’s   needs and preference and payer coverage in collaboration with the physician and health care team  - Communicate with and update the patient/family, physician, and health care team regarding progress on the discharge plan  - Arrange appropriate transportation to post-acute venues  12/6/2024 0057 by  Lisa Worrell, RN  Outcome: Progressing  12/6/2024 0056 by Lisa Worrell, RN  Outcome: Progressing

## 2024-12-06 NOTE — PLAN OF CARE
Problem: DISCHARGE PLANNING  Goal: Discharge to home or other facility with appropriate resources  Description: INTERVENTIONS:  - Identify barriers to discharge w/patient and caregiver  - Arrange for needed discharge resources and transportation as appropriate  - Identify discharge learning needs (meds, wound care, etc.)  - Arrange for interpretive services to assist at discharge as needed  - Refer to Case Management Department for coordinating discharge planning if the patient needs post-hospital services based on physician/advanced practitioner order or complex needs related to functional status, cognitive ability, or social support system  Outcome: Progressing     Problem: SAFETY ADULT  Goal: Patient will remain free of falls  Description: INTERVENTIONS:  - Educate patient/family on patient safety including physical limitations  - Instruct patient to call for assistance with activity   - Keep care items and personal belongings within reach  - Initiate and maintain comfort rounds  - Make Fall Risk Sign visible to staff  - Apply yellow socks and bracelet for high fall risk patients  - Consider moving patient to room near nurses station  Outcome: Progressing     Problem: SELF HARM/SUICIDALITY  Goal: Will have no self-injury during hospital stay  Description: INTERVENTIONS:  - Q 15 MINUTES: Routine safety checks  - Q WAKING SHIFT & PRN: Assess risk to determine if routine checks are adequate to maintain patient safety  - Encourage patient to participate actively in care by formulating a plan to combat response to suicidal ideation, identify supports and resources  Outcome: Progressing     Problem: DEPRESSION  Goal: Will be euthymic at discharge  Description: INTERVENTIONS:  - Administer medication as ordered  - Provide emotional support via 1:1 interaction with staff  - Encourage involvement in milieu/groups/activities  - Monitor for social isolation  Outcome: Progressing     Problem: ANXIETY  Goal: Will report  anxiety at manageable levels  Description: INTERVENTIONS:  - Administer medication as ordered  - Teach and encourage coping skills  - Provide emotional support  - Assess patient/family for anxiety and ability to cope  Outcome: Progressing  Goal: By discharge: Patient will verbalize 2 strategies to deal with anxiety  Description: Interventions:  - Identify any obvious source/trigger to anxiety  - Staff will assist patient in applying identified coping technique/skills  - Encourage attendance of scheduled groups and activities  Outcome: Progressing     Problem: SUBSTANCE USE/ABUSE  Goal: Will have no detox symptoms and will verbalize plan for changing substance-related behavior  Description: INTERVENTIONS:  - Monitor physical status and assess for symptoms of withdrawal  - Administer medication as ordered  - Provide emotional support with 1 on 1 interaction with staff  - Encourage recovery focused program/ addiction education  - Assess for verbalization of changing behaviors related to substance abuse  - Initiate consults and referrals as appropriate (Case Management, Spiritual Care, etc.)  Outcome: Progressing  Goal: By discharge, will develop insight into their chemical dependency and sustain motivation to continue in recovery  Description: INTERVENTIONS:  - Attends all daily group sessions and scheduled AA groups  - Actively practices coping skills through participation in the therapeutic community and adherence to program rules  - Reviews and completes assignments from individual treatment plan  - Assist patient development of understanding of their personal cycle of addiction and relapse triggers  Outcome: Progressing  Goal: By discharge, patient will have ongoing treatment plan addressing chemical dependency  Description: INTERVENTIONS:  - Assist patient with resources and/or appointments for ongoing recovery based living  Outcome: Progressing     Problem: Nutrition/Hydration-ADULT  Goal: Nutrient/Hydration  intake appropriate for improving, restoring or maintaining nutritional needs  Description: Monitor and assess patient's nutrition/hydration status for malnutrition. Collaborate with interdisciplinary team and initiate plan and interventions as ordered.  Monitor patient's weight and dietary intake as ordered or per policy. Utilize nutrition screening tool and intervene as necessary. Determine patient's food preferences and provide high-protein, high-caloric foods as appropriate.     INTERVENTIONS:  - Monitor oral intake, urinary output, labs, and treatment plans  - Assess nutrition and hydration status and recommend course of action  - Evaluate amount of meals eaten  - Assist patient with eating if necessary   - Allow adequate time for meals  - Recommend/ encourage appropriate diets, oral nutritional supplements, and vitamin/mineral supplements  - Order, calculate, and assess calorie counts as needed  - Recommend, monitor, and adjust tube feedings and TPN/PPN based on assessed needs  - Assess need for intravenous fluids  - Provide specific nutrition/hydration education as appropriate  - Include patient/family/caregiver in decisions related to nutrition  Outcome: Progressing     Problem: DISCHARGE PLANNING - CARE MANAGEMENT  Goal: Discharge to post-acute care or home with appropriate resources  Description: INTERVENTIONS:  - Conduct assessment to determine patient/family and health care team treatment goals, and need for post-acute services based on payer coverage, community resources, and patient preferences, and barriers to discharge  - Address psychosocial, clinical, and financial barriers to discharge as identified in assessment in conjunction with the patient/family and health care team  - Arrange appropriate level of post-acute services according to patient’s   needs and preference and payer coverage in collaboration with the physician and health care team  - Communicate with and update the patient/family,  physician, and health care team regarding progress on the discharge plan  - Arrange appropriate transportation to post-acute venues  Outcome: Progressing

## 2024-12-06 NOTE — ASSESSMENT & PLAN NOTE
Carey reports overall improvement in her mood. She had interrupted sleep due to disturbance from a peer. She reports drowsiness from the Zyprexa and would like to be decreased to 5 mg BID. She denies all other symptoms. Looking forward to discharge on Monday.    Medication regimen as follows with any changes bolded below:  Decrease Zyprexa 5 mg twice daily for mood stabilization and psychosis - decreased due to reported sedation  Continue Prozac 20 mg daily for depressive symptoms  Continue Gabapentin 600 mg three times daily for anxiety, and agitation  Continue Klonopin 0.5 mg once daily for anxiety   Chart and PDMP reviewed, patient was previously discharged from [facility] with 0.5 mg at bed time     No associated orders from this encounter found during lookback period of 72 hours.

## 2024-12-06 NOTE — NURSING NOTE
"Patient approached nursing station with complaints about her roommate. Patient stated, \"My roommate woke up and she is making me really anxious. Can I have an Atarax?\" Patient received PRN Atarax 50mg PO at 0623 for HAM score of 18.  "

## 2024-12-06 NOTE — PROGRESS NOTES
12/06/24 0847   Team Meeting   Meeting Type Daily Rounds   Team Members Present   Team Members Present Physician;Nurse;   Physician Team Member Petra   [facility] Harsha   Patient/Family Present   Patient Present No   Patient's Family Present No     Pt with numerous PRN medications. Pt c/o anxiety r/t her roommate, PRN Atarax given and effective. Discharge Monday.

## 2024-12-06 NOTE — NURSING NOTE
Pleasant, calm and cooperative on approach. Visible intermittently. Denies symptoms. Compliant with medications. No complaints.

## 2024-12-06 NOTE — PROGRESS NOTES
Progress Note - Behavioral Health   Name: Carey Keith 49 y.o. female I MRN: [id number]  Unit/Bed#: -01 I Date of Admission: 11/29/2024   Date of Service: 12/6/2024 I Hospital Day: 7     Assessment & Plan  Bipolar disorder, current episode depressed, severe, without psychotic features (HCC)  Carey reports overall improvement in her mood. She had interrupted sleep due to disturbance from a peer. She reports drowsiness from the Zyprexa and would like to be decreased to 5 mg BID. She denies all other symptoms. Looking forward to discharge on Monday.    Medication regimen as follows with any changes bolded below:  Decrease Zyprexa 5 mg twice daily for mood stabilization and psychosis - decreased due to reported sedation  Continue Prozac 20 mg daily for depressive symptoms  Continue Gabapentin 600 mg three times daily for anxiety, and agitation  Continue Klonopin 0.5 mg once daily for anxiety   Chart and PDMP reviewed, patient was previously discharged from [facility] with 0.5 mg at bed time     No associated orders from this encounter found during lookback period of 72 hours.  Tobacco abuse  Encourage cessation   Nicotine patch   No associated orders from this encounter found during lookback period of 72 hours.  Opioid use disorder, severe, on maintenance therapy (HCC)  PDMP reviewed, patient appears compliant, last filled on 11/14/2024  Continue Suboxone 8 mg three times daily   No associated orders from this encounter found during lookback period of 72 hours.        ------------------------------------------------------------    Recommended Treatment Plan:   Behavioral checks every 15 minutes  Encourage participation in group therapy, milieu therapy and occupational therapy.  Assess for side effects of medications.  Collaboration with [facility] for medical co-morbidities as indicated.  Continue discussion with CM/SW to assist with obtaining collateral, disposition planning, and the implementation of  "patient-centered individualized plan of care.  Estimated Discharge Date: 12/9/2024    Risks, benefits and possible side effects of Medications: Risks, benefits, and possible side effects of medications have been explained to the patient, who verbalizes understanding    Legal status: 201    Disposition: discharge Monday to previous residence      Subjective: Patient's chart was reviewed. Patient's progress and plan was discussed with treatment team. Per nursing report, Carey has been calm and cooperative on the unit. At times, she is medication focused. She remains compliant with medications.     Prn medications over the past 24 hours: Tylenol 650 mg at 2054, Atarax 50 mg at 0623, Motrin 800 mg at 0820, Nicotine gum, Zanaflex 4 mg at 0820    Carey was evaluated this morning for continuity of care. On examination, Carey is calm and cooperative. She states her mood is \"alright.\" She reports sleeping poorly due to disruption from her peer. She was encouraged to use as needed medications and coping skills when situations arise. Her appetite has been normal. She reports that she is feeling sedated from the Zyprexa and would like the dose decreased. She denies active or passive suicidal ideation and homicidal ideation. She denies auditory or visual hallucinations.    VS: Reviewed, within normal limits    Progress Toward Goals: medication and meal compliant, calm, cooperative, tentative discharge planned for Monday    Psychiatric Review of Systems:  Behavior over the last 24 hours: improved  Sleep:  interrupted due to peer  Appetite: adequate  Medication side effects: fatigue  Medical ROS: Complete review of systems is negative except as noted above.    Vital signs in last 24 hours:  Temp:  [97.4 °F (36.3 °C)-97.9 °F (36.6 °C)] 97.4 °F (36.3 °C)  HR:  [60-66] 60  BP: (107-110)/(75-82) 110/75  Resp:  [16] 16  SpO2:  [97 %-98 %] 98 %  O2 Device: None (Room air)    Mental Status Exam:    Appearance:  alert, " "marginal grooming and hygiene, blonde hair , good eye contact, casually dressed, appears older than stated age, overweight, short , and poor dentition   Behavior:  calm and cooperative   Speech:  Spontaneous, soft, fast at times but able to be interrupted, mumbles   Mood:  \"Alright\"   Affect:  constricted   Thought Process:  generally linear and goal-directed but circumstantial at times   Associations: intact associations   Thought Content:  no verbalized delusions or overt paranoia   Perceptual Disturbances: denies current auditory or visual hallucinations and does not appear to be responding to internal stimuli at this time   Risk Potential: Suicidal ideation -  Denies at present   Homicidal ideation - Denies at present  Potential for aggression - Not at present   Sensorium:  oriented to person and place   Memory:  recent and remote memory grossly intact   Consciousness:  alert and awake   Attention/Concentration: attention span and concentration are improving   Insight:  limited   Judgment: limited   Gait/Station: normal gait/station, normal balance   Motor Activity: no abnormal movements     Current Medications:  Current Facility-Administered Medications   Medication Dose Route Frequency Provider Last Rate    acetaminophen  650 mg Oral Q6H PRN NETTE Saldivar      acetaminophen  975 mg Oral Q8H PRN NETTE Saldivar      albuterol  2 puff Inhalation Q4H PRN NETTE Saldivar      aluminum-magnesium hydroxide-simethicone  30 mL Oral Q4H PRN Angie Jones MD      benztropine  1 mg Intramuscular Q4H PRN Max 6/day Angie Jones MD      benztropine  1 mg Oral Q4H PRN Max 6/day Angie Jones MD      bisacodyl  10 mg Oral Daily PRN Paz Narvaez DO      bisacodyl  10 mg Rectal Daily PRN Angie Jones MD      budesonide-formoterol  2 puff Inhalation BID NETTE Saldivar      buprenorphine-naloxone  8 mg Sublingual TID NETTE Saldivar      " Cholecalciferol  2,000 Units Oral Daily NETTE Saldivar      clonazePAM  0.5 mg Oral HS Paz Narvaez, DO      cyanocobalamin  1,000 mcg Oral Daily NETTE Saldivar      hydrOXYzine HCL  50 mg Oral Q6H PRN Max 4/day Angie Jones MD      Or    diphenhydrAMINE  50 mg Intramuscular Q6H PRN Angie Jones MD      FLUoxetine  20 mg Oral Daily Paz Narvaez, DO      folic acid  1 mg Oral Daily NETTE Saldivar      gabapentin  600 mg Oral TID Paz Narvaez, DO      hydrOXYzine HCL  100 mg Oral Q6H PRN Max 4/day Angie Jones MD      Or    LORazepam  2 mg Intramuscular Q6H PRN Angie Jones MD      hydrOXYzine HCL  25 mg Oral Q6H PRN Max 4/day Angie Jones MD      ibuprofen  400 mg Oral Q4H PRN Angie Jones MD      ibuprofen  600 mg Oral Q8H PRN MENDOZA SaldivarNP      ibuprofen  600 mg Oral Q6H PRN Angie Jones MD      ibuprofen  800 mg Oral Q8H PRN Angie Jones MD      melatonin  6 mg Oral HS Paz Narvaez, DO      nicotine  14 mg Transdermal Daily Norm Bailey MD      nicotine polacrilex  4 mg Oral Q2H PRN Angie Jones MD      OLANZapine  5 mg Oral Q4H PRN Max 3/day Angie Jones MD      Or    OLANZapine  2.5 mg Intramuscular Q4H PRN Max 3/day Angie Jones MD      OLANZapine  5 mg Oral Q3H PRN Max 3/day Angie Jones MD      Or    OLANZapine  5 mg Intramuscular Q3H PRN Max 3/day Angie Jones MD      OLANZapine  2.5 mg Oral Q4H PRN Max 6/day Angie Jones MD      OLANZapine  5 mg Oral BID Paz Narvaez, DO      ondansetron  4 mg Oral Q8H PRN NETTE Saldivar      polyethylene glycol  17 g Oral Daily PRN Angie Jones MD      propranolol  10 mg Oral Q8H PRN Angie Jones MD      senna-docusate sodium  1 tablet Oral Daily PRN Angie Jones MD      sterile water           tiZANidine  4 mg Oral Q8H PRN NETTE Saldivar      traZODone  100 mg Oral HS PRN Paz Narvaez,  DO         Behavioral Health Medications: all current active meds have been reviewed. Changes as in plan section above.    Laboratory results:  I have personally reviewed all pertinent laboratory/tests results.   No results found for this or any previous visit (from the past 48 hours).     Counseling / Coordination of Care:  Patient's progress discussed with staff in treatment team meeting.  Medication changes discussed with patient.  Medication education provided to patient.  Supportive therapy provided to patient.  Encouraged participation in milieu and group therapy on the unit.      Paz Narvaez DO 12/06/24  Psychiatry Residency, PGY-II  This note has been constructed using a voice recognition system. There may be translation, syntax, or grammatical errors. If you have any questions, please contact the dictating author.

## 2024-12-07 PROCEDURE — 99232 SBSQ HOSP IP/OBS MODERATE 35: CPT | Performed by: STUDENT IN AN ORGANIZED HEALTH CARE EDUCATION/TRAINING PROGRAM

## 2024-12-07 RX ADMIN — TIZANIDINE 4 MG: 4 TABLET ORAL at 19:45

## 2024-12-07 RX ADMIN — NICOTINE POLACRILEX 4 MG: 4 GUM, CHEWING BUCCAL at 16:44

## 2024-12-07 RX ADMIN — IBUPROFEN 600 MG: 600 TABLET, FILM COATED ORAL at 05:49

## 2024-12-07 RX ADMIN — MELATONIN TAB 3 MG 6 MG: 3 TAB at 21:10

## 2024-12-07 RX ADMIN — TRAZODONE HYDROCHLORIDE 100 MG: 100 TABLET ORAL at 21:10

## 2024-12-07 RX ADMIN — NICOTINE POLACRILEX 4 MG: 4 GUM, CHEWING BUCCAL at 08:25

## 2024-12-07 RX ADMIN — FOLIC ACID 1 MG: 1 TABLET ORAL at 08:25

## 2024-12-07 RX ADMIN — BUDESONIDE AND FORMOTEROL FUMARATE DIHYDRATE 2 PUFF: 160; 4.5 AEROSOL RESPIRATORY (INHALATION) at 08:45

## 2024-12-07 RX ADMIN — BUDESONIDE AND FORMOTEROL FUMARATE DIHYDRATE 2 PUFF: 160; 4.5 AEROSOL RESPIRATORY (INHALATION) at 17:06

## 2024-12-07 RX ADMIN — HYDROXYZINE HYDROCHLORIDE 100 MG: 50 TABLET, FILM COATED ORAL at 04:37

## 2024-12-07 RX ADMIN — BUPRENORPHINE AND NALOXONE 8 MG: 8; 2 FILM BUCCAL; SUBLINGUAL at 14:12

## 2024-12-07 RX ADMIN — GABAPENTIN 600 MG: 300 CAPSULE ORAL at 17:06

## 2024-12-07 RX ADMIN — GABAPENTIN 600 MG: 300 CAPSULE ORAL at 20:26

## 2024-12-07 RX ADMIN — FLUOXETINE HYDROCHLORIDE 20 MG: 20 CAPSULE ORAL at 08:25

## 2024-12-07 RX ADMIN — NICOTINE POLACRILEX 4 MG: 4 GUM, CHEWING BUCCAL at 11:38

## 2024-12-07 RX ADMIN — CLONAZEPAM 0.5 MG: 0.5 TABLET ORAL at 17:06

## 2024-12-07 RX ADMIN — Medication 2000 UNITS: at 08:25

## 2024-12-07 RX ADMIN — NICOTINE POLACRILEX 4 MG: 4 GUM, CHEWING BUCCAL at 04:37

## 2024-12-07 RX ADMIN — BUPRENORPHINE AND NALOXONE 8 MG: 8; 2 FILM BUCCAL; SUBLINGUAL at 20:27

## 2024-12-07 RX ADMIN — GABAPENTIN 600 MG: 300 CAPSULE ORAL at 08:25

## 2024-12-07 RX ADMIN — NICOTINE POLACRILEX 4 MG: 4 GUM, CHEWING BUCCAL at 14:37

## 2024-12-07 RX ADMIN — BUPRENORPHINE AND NALOXONE 8 MG: 8; 2 FILM BUCCAL; SUBLINGUAL at 08:26

## 2024-12-07 RX ADMIN — NICOTINE POLACRILEX 4 MG: 4 GUM, CHEWING BUCCAL at 18:44

## 2024-12-07 RX ADMIN — Medication 14 MG: at 08:25

## 2024-12-07 RX ADMIN — TIZANIDINE 4 MG: 4 TABLET ORAL at 05:51

## 2024-12-07 RX ADMIN — OLANZAPINE 5 MG: 5 TABLET, FILM COATED ORAL at 20:26

## 2024-12-07 RX ADMIN — CYANOCOBALAMIN TAB 1000 MCG 1000 MCG: 1000 TAB at 08:25

## 2024-12-07 NOTE — NURSING NOTE
"Patient visible, irritable. Yelled and cursed this morning over the coffee being \"too watery\". Redirected by staff, did not require any PRN medications. Reports feeling anxious, denies all other psych symptoms.    Refused scheduled zyprexa this morning, compliant with the rest of their scheduled medications. Says they want to speak with the doctor and do not want the zyprexa because it is \"making me fat\". This writer attempted to educate patient on the importance of physical activity and making healthy food  choices.     Encouraged groups and hygiene.    Staff availability reinforced.   "

## 2024-12-07 NOTE — NURSING NOTE
"Pt approached nurse stating \"I need to get swabbed\", endorses body aches and slight congestion. VS taken, stable. No raised temperature. During VS assessment, pt began to speak rapidly, displaying tangential thought process. Pt stated \"They make the best alcohol out there, imagine that? I can't believe he did that. It will be four years\". When asked what will make four years - pt stated her fathers death. Pt became very preoccupied on this, stating he  [place] . This RN provided reassurance, pt appears brighter and stated \"thank you for listening\" - currently reading in day room. No signs of distress.   "

## 2024-12-07 NOTE — NURSING NOTE
Pt reporting muscle spasms, PRN PO zanaflex 4mg given @ 0551 - effective. Pt observed sleeping in bed, no signs of discomfort/pain.

## 2024-12-07 NOTE — NURSING NOTE
"Pt approached nurse c/o severe anxiety - pt pacing, displaying pressured speech. HAM score 26. PRN PO atarax 100mg given @ 0437 - moderately effective. Pt resting in room, reading, states \"I feel a little better\".   "

## 2024-12-07 NOTE — NURSING NOTE
Patient reported muscle spasms. PRN Zanaflex 4 mg PO for muscle spasms given. Will reassess in an hr.

## 2024-12-07 NOTE — PLAN OF CARE
Problem: SELF HARM/SUICIDALITY  Goal: Will have no self-injury during hospital stay  Description: INTERVENTIONS:  - Q 15 MINUTES: Routine safety checks  - Q WAKING SHIFT & PRN: Assess risk to determine if routine checks are adequate to maintain patient safety  - Encourage patient to participate actively in care by formulating a plan to combat response to suicidal ideation, identify supports and resources  Outcome: Progressing     Problem: DEPRESSION  Goal: Will be euthymic at discharge  Description: INTERVENTIONS:  - Administer medication as ordered  - Provide emotional support via 1:1 interaction with staff  - Encourage involvement in milieu/groups/activities  - Monitor for social isolation  Outcome: Progressing     Problem: ANXIETY  Goal: Will report anxiety at manageable levels  Description: INTERVENTIONS:  - Administer medication as ordered  - Teach and encourage coping skills  - Provide emotional support  - Assess patient/family for anxiety and ability to cope  Outcome: Progressing  Goal: By discharge: Patient will verbalize 2 strategies to deal with anxiety  Description: Interventions:  - Identify any obvious source/trigger to anxiety  - Staff will assist patient in applying identified coping technique/skills  - Encourage attendance of scheduled groups and activities  Outcome: Progressing     Problem: SUBSTANCE USE/ABUSE  Goal: Will have no detox symptoms and will verbalize plan for changing substance-related behavior  Description: INTERVENTIONS:  - Monitor physical status and assess for symptoms of withdrawal  - Administer medication as ordered  - Provide emotional support with 1 on 1 interaction with staff  - Encourage recovery focused program/ addiction education  - Assess for verbalization of changing behaviors related to substance abuse  - Initiate consults and referrals as appropriate (Case Management, Spiritual Care, etc.)  Outcome: Progressing  Goal: By discharge, will develop insight into their  chemical dependency and sustain motivation to continue in recovery  Description: INTERVENTIONS:  - Attends all daily group sessions and scheduled AA groups  - Actively practices coping skills through participation in the therapeutic community and adherence to program rules  - Reviews and completes assignments from individual treatment plan  - Assist patient development of understanding of their personal cycle of addiction and relapse triggers  Outcome: Progressing  Goal: By discharge, patient will have ongoing treatment plan addressing chemical dependency  Description: INTERVENTIONS:  - Assist patient with resources and/or appointments for ongoing recovery based living  Outcome: Progressing     Problem: Nutrition/Hydration-ADULT  Goal: Nutrient/Hydration intake appropriate for improving, restoring or maintaining nutritional needs  Description: Monitor and assess patient's nutrition/hydration status for malnutrition. Collaborate with interdisciplinary team and initiate plan and interventions as ordered.  Monitor patient's weight and dietary intake as ordered or per policy. Utilize nutrition screening tool and intervene as necessary. Determine patient's food preferences and provide high-protein, high-caloric foods as appropriate.     INTERVENTIONS:  - Monitor oral intake, urinary output, labs, and treatment plans  - Assess nutrition and hydration status and recommend course of action  - Evaluate amount of meals eaten  - Assist patient with eating if necessary   - Allow adequate time for meals  - Recommend/ encourage appropriate diets, oral nutritional supplements, and vitamin/mineral supplements  - Order, calculate, and assess calorie counts as needed  - Recommend, monitor, and adjust tube feedings and TPN/PPN based on assessed needs  - Assess need for intravenous fluids  - Provide specific nutrition/hydration education as appropriate  - Include patient/family/caregiver in decisions related to nutrition  Outcome:  Progressing     Problem: DISCHARGE PLANNING - CARE MANAGEMENT  Goal: Discharge to post-acute care or home with appropriate resources  Description: INTERVENTIONS:  - Conduct assessment to determine patient/family and health care team treatment goals, and need for post-acute services based on payer coverage, community resources, and patient preferences, and barriers to discharge  - Address psychosocial, clinical, and financial barriers to discharge as identified in assessment in conjunction with the patient/family and health care team  - Arrange appropriate level of post-acute services according to patient’s   needs and preference and payer coverage in collaboration with the physician and health care team  - Communicate with and update the patient/family, physician, and health care team regarding progress on the discharge plan  - Arrange appropriate transportation to post-acute venues  Outcome: Progressing     Problem: SAFETY ADULT  Goal: Patient will remain free of falls  Description: INTERVENTIONS:  - Educate patient/family on patient safety including physical limitations  - Instruct patient to call for assistance with activity   - Keep care items and personal belongings within reach  - Initiate and maintain comfort rounds  - Make Fall Risk Sign visible to staff  - Apply yellow socks and bracelet for high fall risk patients  - Consider moving patient to room near nurses station  Outcome: Progressing     Problem: Ineffective Coping  Goal: Participates in unit activities  Description: Interventions:  - Provide therapeutic environment   - Provide required programming   - Redirect inappropriate behaviors   Outcome: Not Progressing     Problem: DISCHARGE PLANNING  Goal: Discharge to home or other facility with appropriate resources  Description: INTERVENTIONS:  - Identify barriers to discharge w/patient and caregiver  - Arrange for needed discharge resources and transportation as appropriate  - Identify discharge  learning needs (meds, wound care, etc.)  - Arrange for interpretive services to assist at discharge as needed  - Refer to Case Management Department for coordinating discharge planning if the patient needs post-hospital services based on physician/advanced practitioner order or complex needs related to functional status, cognitive ability, or social support system  Outcome: Progressing     Problem: SELF HARM/SUICIDALITY  Goal: Will have no self-injury during hospital stay  Description: INTERVENTIONS:  - Q 15 MINUTES: Routine safety checks  - Q WAKING SHIFT & PRN: Assess risk to determine if routine checks are adequate to maintain patient safety  - Encourage patient to participate actively in care by formulating a plan to combat response to suicidal ideation, identify supports and resources  Outcome: Progressing     Problem: DEPRESSION  Goal: Will be euthymic at discharge  Description: INTERVENTIONS:  - Administer medication as ordered  - Provide emotional support via 1:1 interaction with staff  - Encourage involvement in milieu/groups/activities  - Monitor for social isolation  Outcome: Progressing     Problem: ANXIETY  Goal: Will report anxiety at manageable levels  Description: INTERVENTIONS:  - Administer medication as ordered  - Teach and encourage coping skills  - Provide emotional support  - Assess patient/family for anxiety and ability to cope  Outcome: Progressing  Goal: By discharge: Patient will verbalize 2 strategies to deal with anxiety  Description: Interventions:  - Identify any obvious source/trigger to anxiety  - Staff will assist patient in applying identified coping technique/skills  - Encourage attendance of scheduled groups and activities  Outcome: Progressing     Problem: SUBSTANCE USE/ABUSE  Goal: Will have no detox symptoms and will verbalize plan for changing substance-related behavior  Description: INTERVENTIONS:  - Monitor physical status and assess for symptoms of withdrawal  - Administer  medication as ordered  - Provide emotional support with 1 on 1 interaction with staff  - Encourage recovery focused program/ addiction education  - Assess for verbalization of changing behaviors related to substance abuse  - Initiate consults and referrals as appropriate (Case Management, Spiritual Care, etc.)  Outcome: Progressing  Goal: By discharge, will develop insight into their chemical dependency and sustain motivation to continue in recovery  Description: INTERVENTIONS:  - Attends all daily group sessions and scheduled AA groups  - Actively practices coping skills through participation in the therapeutic community and adherence to program rules  - Reviews and completes assignments from individual treatment plan  - Assist patient development of understanding of their personal cycle of addiction and relapse triggers  Outcome: Progressing  Goal: By discharge, patient will have ongoing treatment plan addressing chemical dependency  Description: INTERVENTIONS:  - Assist patient with resources and/or appointments for ongoing recovery based living  Outcome: Progressing     Problem: Nutrition/Hydration-ADULT  Goal: Nutrient/Hydration intake appropriate for improving, restoring or maintaining nutritional needs  Description: Monitor and assess patient's nutrition/hydration status for malnutrition. Collaborate with interdisciplinary team and initiate plan and interventions as ordered.  Monitor patient's weight and dietary intake as ordered or per policy. Utilize nutrition screening tool and intervene as necessary. Determine patient's food preferences and provide high-protein, high-caloric foods as appropriate.     INTERVENTIONS:  - Monitor oral intake, urinary output, labs, and treatment plans  - Assess nutrition and hydration status and recommend course of action  - Evaluate amount of meals eaten  - Assist patient with eating if necessary   - Allow adequate time for meals  - Recommend/ encourage appropriate diets,  oral nutritional supplements, and vitamin/mineral supplements  - Order, calculate, and assess calorie counts as needed  - Recommend, monitor, and adjust tube feedings and TPN/PPN based on assessed needs  - Assess need for intravenous fluids  - Provide specific nutrition/hydration education as appropriate  - Include patient/family/caregiver in decisions related to nutrition  Outcome: Progressing     Problem: DISCHARGE PLANNING - CARE MANAGEMENT  Goal: Discharge to post-acute care or home with appropriate resources  Description: INTERVENTIONS:  - Conduct assessment to determine patient/family and health care team treatment goals, and need for post-acute services based on payer coverage, community resources, and patient preferences, and barriers to discharge  - Address psychosocial, clinical, and financial barriers to discharge as identified in assessment in conjunction with the patient/family and health care team  - Arrange appropriate level of post-acute services according to patient’s   needs and preference and payer coverage in collaboration with the physician and health care team  - Communicate with and update the patient/family, physician, and health care team regarding progress on the discharge plan  - Arrange appropriate transportation to post-acute venues  Outcome: Progressing     Problem: SAFETY ADULT  Goal: Patient will remain free of falls  Description: INTERVENTIONS:  - Educate patient/family on patient safety including physical limitations  - Instruct patient to call for assistance with activity   - Keep care items and personal belongings within reach  - Initiate and maintain comfort rounds  - Make Fall Risk Sign visible to staff  - Apply yellow socks and bracelet for high fall risk patients  - Consider moving patient to room near nurses station  Outcome: Progressing     Problem: Ineffective Coping  Goal: Participates in unit activities  Description: Interventions:  - Provide therapeutic environment   -  Provide required programming   - Redirect inappropriate behaviors   Outcome: Not Progressing

## 2024-12-07 NOTE — NURSING NOTE
Patient visible laying in bed, no signs of discomfort or pain. PRN Zanaflex 4 mg PO for muscle spasms effective.

## 2024-12-07 NOTE — PROGRESS NOTES
"Progress Note - Behavioral Health   Carey Keith 49 y.o. female MRN: [id number]  Unit/Bed#: [facility] 344-01 Encounter: [id number]    All documentation, nursing notes, labs, and vitals reviewed.  The patient's medication reconciliation chart was also analyzed for medication adherence.  I personally evaluated Carey Keith and discussed current care with treatment team.    On examination, Carey is intrusive, argumentative, and pugilistic in behavior. She lacks insight into her disease process and externalizes blame to treatment team regarding her admission. She is help-rejecting and evasive. She repeatedly requested \"more Klonopin\" throughout the session and was informed that this was inappropriate. She then shares that she wants a \"Benadryl shot\" which was also denied as Carey is not objectively nervous nor does she appear to be having a panic attack. She processed to curse and use vulgar terms towards this writer. Carey proudly shares that she did not take her Zyprexa this AM as she believes that she \"does not need it\". In a condescending fashion, she shares that she \"won't take Zyprexa when she leaves here\" and both psychoeducation and counseling were provided. Carey shares that Zyprea BID makes her fatigued. I offered to consolidate the dose to QHS, to which she refused. Again, the main objective of our evaluation today, from Carey's perspective, was to \"get more Klonopin\". Acutely, her sleep and appetite are erratic. She is without SI/HI. Her energy and motivation are poor. She reports apathy and anhedonia on the unit. She denies hopelessness and is future-oriented, sharing that she \"will go to NA meetings\" upon discharge, suggestive of self preservation. Subjectively, Carey endorses acute anxiety that is pathologic in nature. Carey reports excessive nervousness, irrational worry, and overt anxiousness. Carey is shares that she feels tense, keyed-up, and on-edge. However, " objectively, she does not appear in acute distress. During today's examination, Carey does not exhibit objective evidence of psychosis. Carey is without perceptual disturbances, such as A/V hallucinations, paranoia, ideas of reference, or delusional beliefs. Carey offers no further concerns.           Mental Status Evaluation:    Appearance:  marginal hygiene, looks older than stated age   Behavior:  agitated, guarded, uncooperative, good eye contact, evasive, sarcastic   Speech:  normal rate, normal volume, normal pitch   Mood:  labile, irritable   Affect:  labile   Thought Process:  tangential   Associations: tangential associations   Thought Content:  no overt delusions   Perceptual Disturbances: no auditory hallucinations, no visual hallucinations   Risk Potential: Suicidal ideation - None at present  Homicidal ideation - None at present  Potential for aggression - Yes, due to agitation   Sensorium:  oriented to person and place   Memory:  recent and remote memory: unable to assess due to lack of cooperation   Consciousness:  alert and awake    Attention: attention span and concentration appear shorter than expected for age   Insight:  poor   Judgment: poor   Gait/Station: normal gait/station   Motor Activity: no abnormal movements       Assessment:     Principal Problem:    Bipolar disorder, current episode depressed, severe, without psychotic features ([facility])  Active Problems:    Medical clearance for psychiatric admission    Tobacco abuse    Hepatitis C antibody test positive    Opioid use disorder, severe, on maintenance therapy (HCC)    Methamphetamine abuse ([facility])    Asthma    Back pain      Plan/Recommended Treatment:     - Continue with pharmacotherapy, group therapy, milieu therapy and occupational therapy.    - Risks/benefits/alternatives to treatment discussed and Carey Keith continues to verbalize understanding   - No psychopharmacologic changes necessary at this juncture, continue  scheduled psychotropic agents at current doses (see below)   - Will consider further optimization of psychotropic medication regimen as hospital course progresses   - Continue to assess for adverse medication side effects.  - Medical management as per SLIM recs  - Encourage Carey Keith to participate in nonverbal forms of therapy including journaling and art/music therapy  - Continue precautionary Q7-minute safety checks.  - Continue to engage case management/SW to assist with collateral information, discharge planning, and the implementation of an individualized, patient-centered plan of care.  - The patient will be maintained on the following medications:    Current Facility-Administered Medications   Medication Dose Route Frequency Provider Last Rate    acetaminophen  650 mg Oral Q6H PRN NETTE Saldivar      acetaminophen  975 mg Oral Q8H PRN NETTE Saldivar      albuterol  2 puff Inhalation Q4H PRN NETTE Saldivar      aluminum-magnesium hydroxide-simethicone  30 mL Oral Q4H PRN Angie Jones MD      benztropine  1 mg Intramuscular Q4H PRN Max 6/day Angie Jones MD      benztropine  1 mg Oral Q4H PRN Max 6/day Angie Jones MD      bisacodyl  10 mg Oral Daily PRN Paz Narvaez,       bisacodyl  10 mg Rectal Daily PRN Angie Jones MD      budesonide-formoterol  2 puff Inhalation BID NETTE Saldivar      buprenorphine-naloxone  8 mg Sublingual TID NETTE Saldivar      Cholecalciferol  2,000 Units Oral Daily NETTE Saldivar      clonazePAM  0.5 mg Oral HS Paz Narvaez, DO      cyanocobalamin  1,000 mcg Oral Daily NETTE Saldivar      hydrOXYzine HCL  50 mg Oral Q6H PRN Max 4/day Angie Jones MD      Or    diphenhydrAMINE  50 mg Intramuscular Q6H PRN Angie Jones MD      FLUoxetine  20 mg Oral Daily Paz Narvaez,       folic acid  1 mg Oral Daily NETTE Saldivar       gabapentin  600 mg Oral TID Paz Narvaez DO      hydrOXYzine HCL  100 mg Oral Q6H PRN Max 4/day Angie Jones MD      Or    LORazepam  2 mg Intramuscular Q6H PRN Angie Jones MD      hydrOXYzine HCL  25 mg Oral Q6H PRN Max 4/day Angie Jones MD      ibuprofen  400 mg Oral Q4H PRN Angie Jones MD      ibuprofen  600 mg Oral Q8H PRN NETTE Saldivar      ibuprofen  600 mg Oral Q6H PRN Angie Jones MD      ibuprofen  800 mg Oral Q8H PRN Angie Jones MD      melatonin  6 mg Oral HS Paz Narvaez DO      nicotine  14 mg Transdermal Daily Norm Bailey MD      nicotine polacrilex  4 mg Oral Q2H PRN Angie Jones MD      OLANZapine  5 mg Oral Q4H PRN Max 3/day Angie Jones MD      Or    OLANZapine  2.5 mg Intramuscular Q4H PRN Max 3/day Angie Jones MD      OLANZapine  5 mg Oral Q3H PRN Max 3/day Angie Jones MD      Or    OLANZapine  5 mg Intramuscular Q3H PRN Max 3/day Angie Jones MD      OLANZapine  2.5 mg Oral Q4H PRN Max 6/day Angie Jones MD      OLANZapine  5 mg Oral BID Paz Narvaez DO      ondansetron  4 mg Oral Q8H PRN NETTE Saldivar      polyethylene glycol  17 g Oral Daily PRN Angie Jones MD      propranolol  10 mg Oral Q8H PRN Angie Jones MD      senna-docusate sodium  1 tablet Oral Daily PRN Angie Jones MD      sterile water           tiZANidine  4 mg Oral Q8H PRN NETTE Saldivar      traZODone  100 mg Oral HS PRN Paz Narvaez DO

## 2024-12-08 PROCEDURE — 99232 SBSQ HOSP IP/OBS MODERATE 35: CPT | Performed by: STUDENT IN AN ORGANIZED HEALTH CARE EDUCATION/TRAINING PROGRAM

## 2024-12-08 RX ADMIN — BUPRENORPHINE AND NALOXONE 8 MG: 8; 2 FILM BUCCAL; SUBLINGUAL at 14:03

## 2024-12-08 RX ADMIN — BUDESONIDE AND FORMOTEROL FUMARATE DIHYDRATE 2 PUFF: 160; 4.5 AEROSOL RESPIRATORY (INHALATION) at 08:01

## 2024-12-08 RX ADMIN — BUDESONIDE AND FORMOTEROL FUMARATE DIHYDRATE 2 PUFF: 160; 4.5 AEROSOL RESPIRATORY (INHALATION) at 17:54

## 2024-12-08 RX ADMIN — NICOTINE POLACRILEX 4 MG: 4 GUM, CHEWING BUCCAL at 21:32

## 2024-12-08 RX ADMIN — ACETAMINOPHEN 975 MG: 325 TABLET, FILM COATED ORAL at 20:57

## 2024-12-08 RX ADMIN — HYDROXYZINE HYDROCHLORIDE 100 MG: 50 TABLET, FILM COATED ORAL at 01:58

## 2024-12-08 RX ADMIN — MELATONIN TAB 3 MG 6 MG: 3 TAB at 21:00

## 2024-12-08 RX ADMIN — NICOTINE POLACRILEX 4 MG: 4 GUM, CHEWING BUCCAL at 08:02

## 2024-12-08 RX ADMIN — NICOTINE POLACRILEX 4 MG: 4 GUM, CHEWING BUCCAL at 13:12

## 2024-12-08 RX ADMIN — GABAPENTIN 600 MG: 300 CAPSULE ORAL at 15:38

## 2024-12-08 RX ADMIN — GABAPENTIN 600 MG: 300 CAPSULE ORAL at 08:02

## 2024-12-08 RX ADMIN — OLANZAPINE 5 MG: 5 TABLET, FILM COATED ORAL at 20:57

## 2024-12-08 RX ADMIN — FLUOXETINE HYDROCHLORIDE 20 MG: 20 CAPSULE ORAL at 08:02

## 2024-12-08 RX ADMIN — TRAZODONE HYDROCHLORIDE 100 MG: 100 TABLET ORAL at 21:05

## 2024-12-08 RX ADMIN — TIZANIDINE 4 MG: 4 TABLET ORAL at 20:57

## 2024-12-08 RX ADMIN — FOLIC ACID 1 MG: 1 TABLET ORAL at 08:02

## 2024-12-08 RX ADMIN — Medication 14 MG: at 08:05

## 2024-12-08 RX ADMIN — GABAPENTIN 600 MG: 300 CAPSULE ORAL at 20:57

## 2024-12-08 RX ADMIN — CYANOCOBALAMIN TAB 1000 MCG 1000 MCG: 1000 TAB at 08:02

## 2024-12-08 RX ADMIN — Medication 2000 UNITS: at 08:02

## 2024-12-08 RX ADMIN — CLONAZEPAM 0.5 MG: 0.5 TABLET ORAL at 17:54

## 2024-12-08 RX ADMIN — TIZANIDINE 4 MG: 4 TABLET ORAL at 10:12

## 2024-12-08 RX ADMIN — NICOTINE POLACRILEX 4 MG: 4 GUM, CHEWING BUCCAL at 10:16

## 2024-12-08 RX ADMIN — NICOTINE POLACRILEX 4 MG: 4 GUM, CHEWING BUCCAL at 15:38

## 2024-12-08 RX ADMIN — NICOTINE POLACRILEX 4 MG: 4 GUM, CHEWING BUCCAL at 02:05

## 2024-12-08 RX ADMIN — BUPRENORPHINE AND NALOXONE 8 MG: 8; 2 FILM BUCCAL; SUBLINGUAL at 08:02

## 2024-12-08 RX ADMIN — BUPRENORPHINE AND NALOXONE 8 MG: 8; 2 FILM BUCCAL; SUBLINGUAL at 20:56

## 2024-12-08 RX ADMIN — IBUPROFEN 600 MG: 600 TABLET, FILM COATED ORAL at 10:12

## 2024-12-08 RX ADMIN — NICOTINE POLACRILEX 4 MG: 4 GUM, CHEWING BUCCAL at 18:36

## 2024-12-08 NOTE — NURSING NOTE
Patient visible at times this evening, mostly isolative to self. Endorses anxiety, received scheduled medication. Denies SI, HI, and hallucinations of any kind.    Medication compliant.     Denies any unmet needs or concerns at this time.

## 2024-12-08 NOTE — NURSING NOTE
Upon reassessment one hour later at 2210 from the PRN trazodone 100 mg patient received at 2110 patient observed resting in bed, respirations even and unlabored. Medication effective.

## 2024-12-08 NOTE — NURSING NOTE
Patient requested and received PRN Motrin 600 mg for 6/10 back pain and Tizanidine 4 mg for muscle spasms. Will follow up.

## 2024-12-08 NOTE — PROGRESS NOTES
"Progress Note - Behavioral Health   Carey Keith 49 y.o. female MRN: [id number]  Unit/Bed#: [facility] 344-01 Encounter: [id number]    All documentation, nursing notes, labs, and vitals reviewed.  The patient's medication reconciliation chart was also analyzed for medication adherence.  I personally evaluated Carey Keith and discussed current care with treatment team.    Last evening, Carey endorsed worsening anxiety and insomnia. She was given RPN Atarax 100mg with good effect. On examination today, Carey is irritable and dismissive. She proudly shares that she refused her AM Zyprexa dose and that she will \"stop taking it the second she leaves this place\". Psychoeducation and counseling provided. Carey once again requests higher doses of Klonopin, which was denied. She was argumentative following this decision. Her chief concern is fatigue, lethargy, and inattentiveness. She requests coffee with lunch and dinner, which was granted. Carey's appetite is fair. She denies SI/HI currently. She is future-oriented, discussing plans to go to [place] upon discharge, as that's where her partner lives. They have a \"complicated\" relationship. Supportive therapy provided. Carey continues to report physical tension, excessive worry, nervousness, and overwhelming anxiety. When I attempted to engage Carey in a discussion about non-pharmacological ways to manage anxiety, she was flippant. Daniella continues to report daily panic symptoms. During today's examination, Carey does exhibit objective evidence of hypomania. As mentioned previously, she is irritable, labile, and easily agitated, as her frustration tolerance is low. Her speech is also pressured at times, likely a product of refusing half of her Zyprexa dose per day. Carey subjectively denies A/V hallucinations, paranoia, ideas of reference, or delusional beliefs. However, I have observed her engaging in self-talk while alone in her room and while " walking on the unit. Carey offers no further concerns.     Mental Status Evaluation:    Appearance:  disheveled, looks older than stated age   Behavior:  bizarre, demanding, guarded, good eye contact, evasive   Speech:  pressured, loud   Mood:  labile, irritable   Affect:  labile   Thought Process:  tangential   Associations: tangential associations   Thought Content:  no overt delusions   Perceptual Disturbances: no auditory hallucinations, no visual hallucinations, appears preoccupied, talks to self at times   Risk Potential: Suicidal ideation - None at present  Homicidal ideation - None at present  Potential for aggression - No   Sensorium:  oriented to person, place, and time/date   Memory:  recent and remote memory: unable to assess due to lack of cooperation   Consciousness:  alert and awake    Attention: attention span and concentration appear shorter than expected for age   Insight:  poor   Judgment: poor   Gait/Station: normal gait/station   Motor Activity: no abnormal movements       Assessment:     Principal Problem:    Bipolar disorder, current episode depressed, severe, without psychotic features ([facility])  Active Problems:    Medical clearance for psychiatric admission    Tobacco abuse    Hepatitis C antibody test positive    Opioid use disorder, severe, on maintenance therapy (HCC)    Methamphetamine abuse (HCC)    Asthma    Back pain      Plan/Recommended Treatment:     - Continue with pharmacotherapy, group therapy, milieu therapy and occupational therapy.    - Risks/benefits/alternatives to treatment discussed and Carey Keith continues to verbalize understanding    - Offered to optimize or even change neuroleptic regimen, she refused  - No psychopharmacologic changes necessary at this juncture, continue scheduled psychotropic agents at current doses (see below)   - Will consider further optimization of psychotropic medication regimen as hospital course progresses   - Continue to assess for  adverse medication side effects.  - Medical management as per SLIM recs  - Encourage Carey Keith to participate in nonverbal forms of therapy including journaling and art/music therapy  - Continue precautionary Q7-minute safety checks.  - Continue to engage case management/SW to assist with collateral information, discharge planning, and the implementation of an individualized, patient-centered plan of care.  - The patient will be maintained on the following medications:    Current Facility-Administered Medications   Medication Dose Route Frequency Provider Last Rate    acetaminophen  650 mg Oral Q6H PRN NETTE Saldivar      acetaminophen  975 mg Oral Q8H PRN NETTE Saldivar      albuterol  2 puff Inhalation Q4H PRN NETTE Saldivar      aluminum-magnesium hydroxide-simethicone  30 mL Oral Q4H PRN Angie Jones MD      benztropine  1 mg Intramuscular Q4H PRN Max 6/day Angie Jones MD      benztropine  1 mg Oral Q4H PRN Max 6/day Angie Jones MD      bisacodyl  10 mg Oral Daily PRN Paz Narvaez, DO      bisacodyl  10 mg Rectal Daily PRN Angie Jones MD      budesonide-formoterol  2 puff Inhalation BID NETTE Saldivar      buprenorphine-naloxone  8 mg Sublingual TID NETTE Saldivar      Cholecalciferol  2,000 Units Oral Daily NETTE Saldivar      clonazePAM  0.5 mg Oral HS Paz Narvaez, DO      cyanocobalamin  1,000 mcg Oral Daily NETTE Saldivar      hydrOXYzine HCL  50 mg Oral Q6H PRN Max 4/day Angie Jones MD      Or    diphenhydrAMINE  50 mg Intramuscular Q6H PRN Angie Jones MD      FLUoxetine  20 mg Oral Daily Paz Narvaez, DO      folic acid  1 mg Oral Daily NETTE Saldivar      gabapentin  600 mg Oral TID Paz Narvaez, DO      hydrOXYzine HCL  100 mg Oral Q6H PRN Max 4/day Angie Jones MD      Or    LORazepam  2 mg Intramuscular Q6H PRN Angie Jones MD       hydrOXYzine HCL  25 mg Oral Q6H PRN Max 4/day Angie Jones MD      ibuprofen  400 mg Oral Q4H PRN Angie Jones MD      ibuprofen  600 mg Oral Q8H PRN NETTE Saldivar      ibuprofen  600 mg Oral Q6H PRN Angie Jones MD      ibuprofen  800 mg Oral Q8H PRN Angie Jones MD      melatonin  6 mg Oral HS Paz Narvaez DO      nicotine  14 mg Transdermal Daily Norm Bailey MD      nicotine polacrilex  4 mg Oral Q2H PRN Angie Jones MD      OLANZapine  5 mg Oral Q4H PRN Max 3/day Angie Jones MD      Or    OLANZapine  2.5 mg Intramuscular Q4H PRN Max 3/day Angie Jones MD      OLANZapine  5 mg Oral Q3H PRN Max 3/day Angie Jones MD      Or    OLANZapine  5 mg Intramuscular Q3H PRN Max 3/day Angie Jones MD      OLANZapine  2.5 mg Oral Q4H PRN Max 6/day Angie Jones MD      OLANZapine  5 mg Oral BID Paz Narvaez,       ondansetron  4 mg Oral Q8H PRN NETTE Saldivar      polyethylene glycol  17 g Oral Daily PRN Angie Jones MD      propranolol  10 mg Oral Q8H PRN Angie Jones MD      senna-docusate sodium  1 tablet Oral Daily PRN Angie Jones MD      sterile water           tiZANidine  4 mg Oral Q8H PRN NETTE Saldivar      traZODone  100 mg Oral HS PRN Paz Narvaez DO

## 2024-12-08 NOTE — PLAN OF CARE
Problem: Ineffective Coping  Goal: Participates in unit activities  Description: Interventions:  - Provide therapeutic environment   - Provide required programming   - Redirect inappropriate behaviors   Outcome: Progressing     Problem: DISCHARGE PLANNING  Goal: Discharge to home or other facility with appropriate resources  Description: INTERVENTIONS:  - Identify barriers to discharge w/patient and caregiver  - Arrange for needed discharge resources and transportation as appropriate  - Identify discharge learning needs (meds, wound care, etc.)  - Arrange for interpretive services to assist at discharge as needed  - Refer to Case Management Department for coordinating discharge planning if the patient needs post-hospital services based on physician/advanced practitioner order or complex needs related to functional status, cognitive ability, or social support system  Outcome: Progressing     Problem: SELF HARM/SUICIDALITY  Goal: Will have no self-injury during hospital stay  Description: INTERVENTIONS:  - Q 15 MINUTES: Routine safety checks  - Q WAKING SHIFT & PRN: Assess risk to determine if routine checks are adequate to maintain patient safety  - Encourage patient to participate actively in care by formulating a plan to combat response to suicidal ideation, identify supports and resources  Outcome: Progressing     Problem: DEPRESSION  Goal: Will be euthymic at discharge  Description: INTERVENTIONS:  - Administer medication as ordered  - Provide emotional support via 1:1 interaction with staff  - Encourage involvement in milieu/groups/activities  - Monitor for social isolation  Outcome: Progressing     Problem: ANXIETY  Goal: Will report anxiety at manageable levels  Description: INTERVENTIONS:  - Administer medication as ordered  - Teach and encourage coping skills  - Provide emotional support  - Assess patient/family for anxiety and ability to cope  Outcome: Progressing  Goal: By discharge: Patient will  verbalize 2 strategies to deal with anxiety  Description: Interventions:  - Identify any obvious source/trigger to anxiety  - Staff will assist patient in applying identified coping technique/skills  - Encourage attendance of scheduled groups and activities  Outcome: Progressing     Problem: SUBSTANCE USE/ABUSE  Goal: Will have no detox symptoms and will verbalize plan for changing substance-related behavior  Description: INTERVENTIONS:  - Monitor physical status and assess for symptoms of withdrawal  - Administer medication as ordered  - Provide emotional support with 1 on 1 interaction with staff  - Encourage recovery focused program/ addiction education  - Assess for verbalization of changing behaviors related to substance abuse  - Initiate consults and referrals as appropriate (Case Management, Spiritual Care, etc.)  Outcome: Progressing  Goal: By discharge, will develop insight into their chemical dependency and sustain motivation to continue in recovery  Description: INTERVENTIONS:  - Attends all daily group sessions and scheduled AA groups  - Actively practices coping skills through participation in the therapeutic community and adherence to program rules  - Reviews and completes assignments from individual treatment plan  - Assist patient development of understanding of their personal cycle of addiction and relapse triggers  Outcome: Progressing  Goal: By discharge, patient will have ongoing treatment plan addressing chemical dependency  Description: INTERVENTIONS:  - Assist patient with resources and/or appointments for ongoing recovery based living  Outcome: Progressing     Problem: Nutrition/Hydration-ADULT  Goal: Nutrient/Hydration intake appropriate for improving, restoring or maintaining nutritional needs  Description: Monitor and assess patient's nutrition/hydration status for malnutrition. Collaborate with interdisciplinary team and initiate plan and interventions as ordered.  Monitor patient's  weight and dietary intake as ordered or per policy. Utilize nutrition screening tool and intervene as necessary. Determine patient's food preferences and provide high-protein, high-caloric foods as appropriate.     INTERVENTIONS:  - Monitor oral intake, urinary output, labs, and treatment plans  - Assess nutrition and hydration status and recommend course of action  - Evaluate amount of meals eaten  - Assist patient with eating if necessary   - Allow adequate time for meals  - Recommend/ encourage appropriate diets, oral nutritional supplements, and vitamin/mineral supplements  - Order, calculate, and assess calorie counts as needed  - Recommend, monitor, and adjust tube feedings and TPN/PPN based on assessed needs  - Assess need for intravenous fluids  - Provide specific nutrition/hydration education as appropriate  - Include patient/family/caregiver in decisions related to nutrition  Outcome: Progressing     Problem: DISCHARGE PLANNING - CARE MANAGEMENT  Goal: Discharge to post-acute care or home with appropriate resources  Description: INTERVENTIONS:  - Conduct assessment to determine patient/family and health care team treatment goals, and need for post-acute services based on payer coverage, community resources, and patient preferences, and barriers to discharge  - Address psychosocial, clinical, and financial barriers to discharge as identified in assessment in conjunction with the patient/family and health care team  - Arrange appropriate level of post-acute services according to patient’s   needs and preference and payer coverage in collaboration with the physician and health care team  - Communicate with and update the patient/family, physician, and health care team regarding progress on the discharge plan  - Arrange appropriate transportation to post-acute venues  Outcome: Progressing     Problem: SAFETY ADULT  Goal: Patient will remain free of falls  Description: INTERVENTIONS:  - Educate patient/family  on patient safety including physical limitations  - Instruct patient to call for assistance with activity   - Keep care items and personal belongings within reach  - Initiate and maintain comfort rounds  - Make Fall Risk Sign visible to staff  - Apply yellow socks and bracelet for high fall risk patients  - Consider moving patient to room near nurses station  Outcome: Progressing

## 2024-12-08 NOTE — NURSING NOTE
Patient denies Avh/Si/HI. She reports her anxiety and depression are at controllable levels in general and denies feeling any presently.  She is medication compliant, and offers no complaints at this time.

## 2024-12-08 NOTE — NURSING NOTE
At 2045 patient reports that the PRN zanaflex 4 mg they received at 1945 helped their muscle spasms, medication effective.

## 2024-12-08 NOTE — NURSING NOTE
Patient is calm and cooperative. Denies SI/HI/AVH. Visible on unit social with selective peers. Patient refused her scheduled Zyprexa 5 mg this morning. Patient reports that her pain is 2/10 since taking PRN Motrin 600 mg and she is no longer having muscle spasms since taking Tizanidine 4 mg.

## 2024-12-08 NOTE — NURSING NOTE
Patient complained of trouble falling asleep. Asked for PRN medication. Received PRN trazodone 100 mg PO for insomnia at 2110

## 2024-12-08 NOTE — NURSING NOTE
Patient complained of muscle discomfort. Received PRN zanaflex 4 mg PO for muscle spasms at 1945

## 2024-12-09 VITALS
OXYGEN SATURATION: 98 % | SYSTOLIC BLOOD PRESSURE: 138 MMHG | TEMPERATURE: 97.6 F | BODY MASS INDEX: 27 KG/M2 | HEIGHT: 61 IN | RESPIRATION RATE: 16 BRPM | DIASTOLIC BLOOD PRESSURE: 71 MMHG | HEART RATE: 79 BPM | WEIGHT: 143 LBS

## 2024-12-09 PROBLEM — Z00.8 MEDICAL CLEARANCE FOR PSYCHIATRIC ADMISSION: Status: RESOLVED | Noted: 2021-12-23 | Resolved: 2024-12-09

## 2024-12-09 PROCEDURE — 99238 HOSP IP/OBS DSCHRG MGMT 30/<: CPT | Performed by: PSYCHIATRY & NEUROLOGY

## 2024-12-09 RX ORDER — CLONAZEPAM 0.5 MG/1
0.5 TABLET ORAL
Qty: 10 TABLET | Refills: 0 | Status: CANCELLED | OUTPATIENT
Start: 2024-12-09 | End: 2024-12-19

## 2024-12-09 RX ORDER — BUPRENORPHINE AND NALOXONE 8; 2 MG/1; MG/1
8 FILM, SOLUBLE BUCCAL; SUBLINGUAL 3 TIMES DAILY
Qty: 30 FILM | Refills: 0 | Status: SHIPPED | OUTPATIENT
Start: 2024-12-09 | End: 2024-12-19

## 2024-12-09 RX ORDER — CLONAZEPAM 0.5 MG/1
0.5 TABLET ORAL
Qty: 10 TABLET | Refills: 0 | Status: SHIPPED | OUTPATIENT
Start: 2024-12-09 | End: 2024-12-19

## 2024-12-09 RX ORDER — OLANZAPINE 5 MG/1
5 TABLET ORAL 2 TIMES DAILY
Qty: 60 TABLET | Refills: 0 | Status: SHIPPED | OUTPATIENT
Start: 2024-12-09 | End: 2025-01-08

## 2024-12-09 RX ORDER — FOLIC ACID 1 MG/1
1 TABLET ORAL DAILY
Qty: 30 TABLET | Refills: 0 | Status: SHIPPED | OUTPATIENT
Start: 2024-12-10 | End: 2025-01-09

## 2024-12-09 RX ORDER — GABAPENTIN 300 MG/1
600 CAPSULE ORAL 3 TIMES DAILY
Qty: 180 CAPSULE | Refills: 0 | Status: SHIPPED | OUTPATIENT
Start: 2024-12-09 | End: 2025-01-08

## 2024-12-09 RX ORDER — TRAZODONE HYDROCHLORIDE 100 MG/1
100 TABLET ORAL
Qty: 10 TABLET | Refills: 0 | Status: SHIPPED | OUTPATIENT
Start: 2024-12-09

## 2024-12-09 RX ORDER — BUPRENORPHINE AND NALOXONE 8; 2 MG/1; MG/1
8 FILM, SOLUBLE BUCCAL; SUBLINGUAL 3 TIMES DAILY
Qty: 30 FILM | Refills: 0 | Status: CANCELLED | OUTPATIENT
Start: 2024-12-09 | End: 2024-12-19

## 2024-12-09 RX ADMIN — NICOTINE POLACRILEX 4 MG: 4 GUM, CHEWING BUCCAL at 11:22

## 2024-12-09 RX ADMIN — CYANOCOBALAMIN TAB 1000 MCG 1000 MCG: 1000 TAB at 08:12

## 2024-12-09 RX ADMIN — GABAPENTIN 600 MG: 300 CAPSULE ORAL at 08:12

## 2024-12-09 RX ADMIN — NICOTINE POLACRILEX 4 MG: 4 GUM, CHEWING BUCCAL at 08:55

## 2024-12-09 RX ADMIN — FOLIC ACID 1 MG: 1 TABLET ORAL at 08:12

## 2024-12-09 RX ADMIN — IBUPROFEN 800 MG: 400 TABLET, FILM COATED ORAL at 08:55

## 2024-12-09 RX ADMIN — BUPRENORPHINE AND NALOXONE 8 MG: 8; 2 FILM BUCCAL; SUBLINGUAL at 08:12

## 2024-12-09 RX ADMIN — FLUOXETINE HYDROCHLORIDE 20 MG: 20 CAPSULE ORAL at 08:12

## 2024-12-09 RX ADMIN — BUDESONIDE AND FORMOTEROL FUMARATE DIHYDRATE 2 PUFF: 160; 4.5 AEROSOL RESPIRATORY (INHALATION) at 08:11

## 2024-12-09 RX ADMIN — Medication 14 MG: at 08:13

## 2024-12-09 RX ADMIN — TIZANIDINE 4 MG: 4 TABLET ORAL at 08:55

## 2024-12-09 RX ADMIN — Medication 2000 UNITS: at 08:12

## 2024-12-09 NOTE — NURSING NOTE
Patient awoke and completed all morning routines.  AVS printed and reviewed with Patient.  Patient verbalized understanding of discharge instructions and agreed to be compliant with their medication regimen.  When discussing smoking cessation Patient stated that they are not ready to quit smoking but they are aware of the resources available to them if they choose to do so.  Patient discharged to home at 1324.  Patient was discharged with all of their belongings at time of discharge.

## 2024-12-09 NOTE — PLAN OF CARE
Problem: Ineffective Coping  Goal: Participates in unit activities  Description: Interventions:  - Provide therapeutic environment   - Provide required programming   - Redirect inappropriate behaviors   Outcome: Completed     Problem: DISCHARGE PLANNING  Goal: Discharge to home or other facility with appropriate resources  Description: INTERVENTIONS:  - Identify barriers to discharge w/patient and caregiver  - Arrange for needed discharge resources and transportation as appropriate  - Identify discharge learning needs (meds, wound care, etc.)  - Arrange for interpretive services to assist at discharge as needed  - Refer to Case Management Department for coordinating discharge planning if the patient needs post-hospital services based on physician/advanced practitioner order or complex needs related to functional status, cognitive ability, or social support system  Outcome: Completed     Problem: SELF HARM/SUICIDALITY  Goal: Will have no self-injury during hospital stay  Description: INTERVENTIONS:  - Q 15 MINUTES: Routine safety checks  - Q WAKING SHIFT & PRN: Assess risk to determine if routine checks are adequate to maintain patient safety  - Encourage patient to participate actively in care by formulating a plan to combat response to suicidal ideation, identify supports and resources  Outcome: Completed     Problem: DEPRESSION  Goal: Will be euthymic at discharge  Description: INTERVENTIONS:  - Administer medication as ordered  - Provide emotional support via 1:1 interaction with staff  - Encourage involvement in milieu/groups/activities  - Monitor for social isolation  Outcome: Completed     Problem: ANXIETY  Goal: Will report anxiety at manageable levels  Description: INTERVENTIONS:  - Administer medication as ordered  - Teach and encourage coping skills  - Provide emotional support  - Assess patient/family for anxiety and ability to cope  Outcome: Completed  Goal: By discharge: Patient will verbalize 2  strategies to deal with anxiety  Description: Interventions:  - Identify any obvious source/trigger to anxiety  - Staff will assist patient in applying identified coping technique/skills  - Encourage attendance of scheduled groups and activities  Outcome: Completed     Problem: SUBSTANCE USE/ABUSE  Goal: Will have no detox symptoms and will verbalize plan for changing substance-related behavior  Description: INTERVENTIONS:  - Monitor physical status and assess for symptoms of withdrawal  - Administer medication as ordered  - Provide emotional support with 1 on 1 interaction with staff  - Encourage recovery focused program/ addiction education  - Assess for verbalization of changing behaviors related to substance abuse  - Initiate consults and referrals as appropriate (Case Management, Spiritual Care, etc.)  Outcome: Completed  Goal: By discharge, will develop insight into their chemical dependency and sustain motivation to continue in recovery  Description: INTERVENTIONS:  - Attends all daily group sessions and scheduled AA groups  - Actively practices coping skills through participation in the therapeutic community and adherence to program rules  - Reviews and completes assignments from individual treatment plan  - Assist patient development of understanding of their personal cycle of addiction and relapse triggers  Outcome: Completed  Goal: By discharge, patient will have ongoing treatment plan addressing chemical dependency  Description: INTERVENTIONS:  - Assist patient with resources and/or appointments for ongoing recovery based living  Outcome: Completed     Problem: Nutrition/Hydration-ADULT  Goal: Nutrient/Hydration intake appropriate for improving, restoring or maintaining nutritional needs  Description: Monitor and assess patient's nutrition/hydration status for malnutrition. Collaborate with interdisciplinary team and initiate plan and interventions as ordered.  Monitor patient's weight and dietary intake  as ordered or per policy. Utilize nutrition screening tool and intervene as necessary. Determine patient's food preferences and provide high-protein, high-caloric foods as appropriate.     INTERVENTIONS:  - Monitor oral intake, urinary output, labs, and treatment plans  - Assess nutrition and hydration status and recommend course of action  - Evaluate amount of meals eaten  - Assist patient with eating if necessary   - Allow adequate time for meals  - Recommend/ encourage appropriate diets, oral nutritional supplements, and vitamin/mineral supplements  - Order, calculate, and assess calorie counts as needed  - Recommend, monitor, and adjust tube feedings and TPN/PPN based on assessed needs  - Assess need for intravenous fluids  - Provide specific nutrition/hydration education as appropriate  - Include patient/family/caregiver in decisions related to nutrition  Outcome: Completed     Problem: DISCHARGE PLANNING - CARE MANAGEMENT  Goal: Discharge to post-acute care or home with appropriate resources  Description: INTERVENTIONS:  - Conduct assessment to determine patient/family and health care team treatment goals, and need for post-acute services based on payer coverage, community resources, and patient preferences, and barriers to discharge  - Address psychosocial, clinical, and financial barriers to discharge as identified in assessment in conjunction with the patient/family and health care team  - Arrange appropriate level of post-acute services according to patient’s   needs and preference and payer coverage in collaboration with the physician and health care team  - Communicate with and update the patient/family, physician, and health care team regarding progress on the discharge plan  - Arrange appropriate transportation to post-acute venues  Outcome: Completed     Problem: SAFETY ADULT  Goal: Patient will remain free of falls  Description: INTERVENTIONS:  - Educate patient/family on patient safety including  physical limitations  - Instruct patient to call for assistance with activity   - Keep care items and personal belongings within reach  - Initiate and maintain comfort rounds  - Make Fall Risk Sign visible to staff  - Apply yellow socks and bracelet for high fall risk patients  - Consider moving patient to room near nurses station  Outcome: Completed

## 2024-12-09 NOTE — BH TRANSITION RECORD
Contact Information: If you have any questions, concerns, pended studies, tests and/or procedures, or emergencies regarding your inpatient behavioral health visit. Please contact [facility] [phone]  and ask to speak to a , nurse or physician. A contact is available 24 hours/ 7 days a week at this number.     Summary of Procedures Performed During your Stay:  Below is a list of major procedures performed during your hospital stay and a summary of results:  - Cardiac Procedures/Studies: EKG on 12/1/2024: Normal sinus rhythm. Ventricular 63 bpm. QTC interval 424 msec.    Pending Studies (From admission, onward)      None          Please follow up on the above pending studies with your PCP and/or referring provider.

## 2024-12-09 NOTE — NURSING NOTE
0855- Patient reported muscle spasms. PRN Zanaflex 4 mg PO given.      0955- Patient reported medication effectiveness. PRN Zanaflex 4 mg PO for muscle spasms effective

## 2024-12-09 NOTE — NURSING NOTE
Pt came to nurses station with complaint of discomfort to lower back. PRN Zanaflex 4mg administered po @2057.

## 2024-12-09 NOTE — DISCHARGE SUMMARY
"Discharge Summary - Behavioral Health   Name: Carey Keith 49 y.o. female I MRN: [id number]  Unit/Bed#: -01 I Date of Admission: 11/29/2024   Date of Service: 12/9/2024 I Hospital Day: 10    Assessment & Plan  Bipolar disorder, current episode depressed, severe, without psychotic features (HCC)  Carey reports improvement in her overall mood. She is future oriented in thinking and expresses desire to get a job after discharge. She is denying SI, HI, and AVH. Outpatient follow-up arranged by .    Discharge medication regimen:  Zyprexa 5 mg twice daily for mood stabilization  Prozac 20 mg daily for depressive symptoms  Gabapentin 600 mg three times daily for anxiety, and agitation  Klonopin 0.5 mg once daily for anxiety   Patient has follow-up with outpatient psychiatrist on 12/17, provided with 10 day supply of medication to last until appointment    Tobacco abuse  Encourage cessation   Nicotine patch   No associated orders from this encounter found during lookback period of 72 hours.  Opioid use disorder, severe, on maintenance therapy (HCC)  PDMP reviewed, patient appears compliant, last filled on 11/14/2024  Suboxone 8 mg three times daily   Patient provided with 10 supply of Suboxone and instructed to follow-up with [facility] at their walk-in hours in the coming week  No associated orders from this encounter found during lookback period of 72 hours.        Admission Date: 11/29/2024         Discharge Date: 12/9/2024    Attending Psychiatrist: Benitez Wiggins MD    Reason for Admission/HPI:     Per H&P by Jordan C. Holter, DO on 12/9/2024:  \"Carey Keith is a 49 y.o., overtly appearing  female, possessing pertinent psychiatric history of bipolar affective disorder and previous instances of polysubstance abuse, presenting to [facility] behavioral health as a voluntary 201 commitment, subsequent to worsening dysphoric " "mood including depression, depressive signs/symptoms (see below) including suicidal ideation with particular plan to \" slit her throat\" in addition to anxiety in the absence of consistent adherence to previously prescribed psychotropic medications     Per case management (Danielle Goldstein) on 11/29: \"Pt presented to the ED via EMS after a reported suicide attempt via cutting. Pt was assessed virtually and was agreeable to same. Pt reports that she has been having difficulty since her last discharge due to the holiday seasons and her father passing away a few years ago. She typically has difficulty dealing with her grief this time of year. She reports having thoughts of slitting her neck instead of her arm next time to make sure she dies. She is vague regarding the last time she took any medications since discharge due to not picking them up. She reports decreased appetite and decreased sleep. She denies homicidal ideations/psychosis/hallucinations. She reports the suicidal ideation has been getting worse over the last couple of days. She denies access to firearms or legal involvement. Based on EMR review Pt has history of meth use, but has not provided a urine sample as of yet. Pt remained calm and cooperative throughout assessment. Pt is agreeable to signing a 201 and appears to understand her treatment rights. ED provider in agreement with plan .\"      Presently, patient denies suicidal/homicidal ideation in addition to thoughts of self-injury, daina for safety in the inpatient setting, though although alludes to the possibility of worsening suicidal ideation in the outpatient setting, appearing receptive to crisis planning provided by this writer, admitting to depressed and anxious mood, denying instances of panic, signs/symptoms of jeff/hypomania and psychosis, appearing perseverative on previously prescribed Klonopin and gabapentin, reiterating that she is \" in withdrawal\", agreeable to reintroduction at " "lower dosing, somewhat receptive to psychoeducation and supportive therapy provided by this writer.  Initially, patient completed a 72-hour petition on 11/30, although was agreeable to rescinding said petition to pursue further psychotropic medication adjustment to adequately address her psychiatric symptomatology.\"      Social History       Tobacco History       Smoking Status  Some Days Current Packs/Day  0.5 packs/day Average Packs/Day  0.5 packs/day for 20.0 years (10.0 ttl pk-yrs) Smoking Tobacco Type  Cigarettes   Pack Year History     Packs/Day From To Years    0.5   20.0      Smokeless Tobacco Use  Current      Tobacco Comments  Pt not ready to quit              Alcohol History       Alcohol Use Status  Not Currently Comment  MAGDALENO, pt historically inconsistent. Drinking  per [facility] 2              Drug Use       Drug Use Status  Not Currently Types  \"Crack\" cocaine, Benzodiazepines, Cocaine, Heroin, LSD, Marijuana Comment  Pt unreliable historian              Sexual Activity       Sexually Active  Not Currently              Other Factors    Not Asked                 Additional Substance Use Detail       Questions Responses    Problems Due to Past Use of Alcohol? No    Problems Due to Past Use of Substances? Yes    Substance Use Assessment Denies substance use within the past 12 months    Alcohol Use Frequency Past abuse    Cannabis frequency Past occasional use    Comment:  Past regular use on 10/6/2022 Past regular use -> Past occasional use on 11/29/2024     Heroin Frequency Past abuse    Cocaine frequency Past regular use    Comment:  Past regular use on 10/6/2022     Crack Cocaine Frequency Past abuse    Methamphetamine Frequency Experimented    Narcotic Frequency Experimented    Benzodiazepine Frequency Daily    Amphetamine frequency Denies use in past 12 months    Barbituate Frequency Denies use use in past 12 months    Inhalant frequency Never used    Comment:  Past regular use on 10/6/2022 " "Never used on 10/6/2022     Hallucinogen frequency Past regular use    Comment: Never used on 10/6/2022 Past regular use on 10/6/2022 LSD     Ecstasy frequency Never used    Comment:  Never used on 10/6/2022     Other drug frequency Never used    Comment:  Never used on 10/6/2022     Opiate frequency Past abuse    Last reviewed by Peter Frank RN on 11/29/2024            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)     Alcohol abuse     Alcoholism (HCC)     Altered mental status 09/21/2022    Elevated LFTs 09/21/2022    Lower back pain     Self-injurious behavior     Substance abuse (HCC)      Past Surgical History:   Procedure Laterality Date    CHOLECYSTECTOMY         Medications:    All current active medications have been reviewed.    Allergies:     Allergies   Allergen Reactions    Haloperidol Other (See Comments)     oculogyr crisis    Abilify [Aripiprazole] Other (See Comments)     Pt reports increased agitation ? (stating last time she took this med \"she lost it\"        Objective     Vital signs in last 24 hours:    Temp:  [97.6 °F (36.4 °C)-98.1 °F (36.7 °C)] 97.6 °F (36.4 °C)  HR:  [67-79] 79  BP: (118-138)/(71-77) 138/71  Resp:  [16-18] 16  SpO2:  [98 %] 98 %  O2 Device: None (Room air)    No intake or output data in the 24 hours ending 12/09/24 1158    Hospital Course:     Carey was admitted to the inpatient psychiatric unit and started on Behavioral Health checks for safety monitoring. During the hospitalization she was attending individual therapy, group therapy, milieu therapy and occupational therapy..    Psychiatric medications were restarted during the hospital stay. To address depression, mood instability, irritability, impulsivity, agitation, and anxiety symptoms, Carey was treated with antidepressant Prozac, antipsychotic medication Zyprexa, and anxiolytic medication Klonopin and Neurontin. Medication doses were adjusted during the hospital course.  Prozac was " started at 20 mg due to good past treatment response. Zyprexa was increased to 10 mg at bed time, but then changed to 5 mg twice daily to help with day time agitation. It was then increased to 7.5 mg twice to better control symptoms, but due to sedation was decreased back to 5 mg twice daily. Gabapentin was increased to 600 mg three times daily. Klonopin was initially started as 0.25 mg twice daily and then switched to 0.5 mg at bedtime per patient preference. She was also continued on her PTA Suboxone 8 mg three times daily, as PDMP was reviewed and she was compliant. Prior to beginning of treatment medications risks and benefits and possible side effects including risk of parkinsonian symptoms, Tardive Dyskinesia and metabolic syndrome related to treatment with antipsychotic medications, risk of cardiovascular events in elderly related to treatment with antipsychotic medications, risk of suicidality and serotonin syndrome related to treatment with antidepressants, risks of misuse, abuse or dependence, sedation and respiratory depression related to treatment with benzodiazepine medications, risk of impaired next-day mental alertness, complex sleep-related behavior and dependence related to treatment with hypnotic medications, and risks and benefits of treatment with medications in pregnancy were reviewed with Carey. She verbalized understanding and agreement for treatment. Upon admission Carey was seen by medical service for medical clearance for inpatient treatment and medical follow up.    Carey's symptoms gradually improved over the hospital course. Initially after admission she was still feeling depressed, anxious, overwhelmed, and agitated at times. With adjustment of medications and therapeutic milieu her symptoms slowly improved. At the end of treatment Carey was doing much better. Her mood was stable at the time of discharge. Carey denied suicidal ideation, intent or plan at the time of  discharge and denied homicidal ideation, intent or plan at the time of discharge. There was no overt psychosis at the time of discharge. She was participating appropriately in milieu at the time of discharge. Behavior was appropriate on the unit at the time of discharge. Sleep and appetite were improved.    Since Carey was doing well at the end of the hospitalization, treatment team felt that she could be safely discharged to outpatient care. We felt that at the end of the hospital stay She was at baseline and was ready for discharge. The outpatient follow up with therapist and psychiatrist at [facility] and Suboxone maintenance at [facility]  was arranged by the unit  upon discharge. Controlled substances, Klonopin and Suboxone were filled as 10 day supplies until patient can be seen as an outpatient. She was also given a 30 day supply of all other medications, filled at the [facility] pharmacy and sent with patient in hand.     Mental Status at Time of Discharge:     Appearance:  alert, marginal grooming and hygiene, blonde hair , good eye contact, casually dressed, appears older than stated age, short , and poor dentition   Behavior:  cooperative, calm   Speech:  Spontaneous, increased rate at times, soft, mumbling   Mood:  euthymic   Affect:  constricted   Thought Process:  organized, logical, coherent, goal directed, linear, normal rate of thoughts, normal abstract reasoning   Associations: intact associations   Thought Content:  normal, no overt delusions   Perceptual Disturbances: no auditory hallucinations, no visual hallucinations, does not appear responding to internal stimuli   Risk Potential: Suicidal ideation - None at present  Homicidal ideation - None at present  Potential for aggression - No   Sensorium:  oriented to person, place, time/date, and situation   Memory:  recent and remote memory grossly intact   Consciousness:  alert and awake   Attention/Concentration:  attention span and concentration appear shorter than expected for age   Insight:  fair   Judgment: fair   Gait/Station: normal gait/station, normal balance   Motor Activity: no abnormal movements       Admission Diagnosis:    Principal Problem:    Bipolar disorder, current episode depressed, severe, without psychotic features (HCC)  Active Problems:    Tobacco abuse    Hepatitis C antibody test positive    Opioid use disorder, severe, on maintenance therapy (HCC)    Methamphetamine abuse (HCC)    Asthma    Back pain      Discharge Diagnosis:     Principal Problem:    Bipolar disorder, current episode depressed, severe, without psychotic features (HCC)  Active Problems:    Tobacco abuse    Hepatitis C antibody test positive    Opioid use disorder, severe, on maintenance therapy (HCC)    Methamphetamine abuse (HCC)    Asthma    Back pain  Resolved Problems:    Medical clearance for psychiatric admission      Lab Results: I have personally reviewed all pertinent laboratory/tests results.  Most Recent Labs:   Lab Results   Component Value Date    WBC 5.80 12/02/2024    RBC 4.06 12/02/2024    HGB 11.5 12/02/2024    HCT 36.5 12/02/2024     12/02/2024    RDW 13.2 12/02/2024    TOTANEUTABS 10.60 (H) 04/18/2023    NEUTROABS 2.37 12/02/2024    SODIUM 140 12/02/2024    K 4.3 12/02/2024     12/02/2024    CO2 30 12/02/2024    BUN 14 12/02/2024    CREATININE 0.77 12/02/2024    GLUC 89 12/02/2024    GLUF 89 12/02/2024    CALCIUM 8.7 12/02/2024    AST 19 12/02/2024    ALT 15 12/02/2024    ALKPHOS 45 12/02/2024    TP 5.4 (L) 12/02/2024    ALB 3.5 12/02/2024    TBILI 0.28 12/02/2024    CHOLESTEROL 160 12/02/2024    HDL 54 12/02/2024    TRIG 64 12/02/2024    LDLCALC 93 12/02/2024    NONHDLC 106 12/02/2024    [id number] 5.113 (H) 12/02/2024    FREET4 0.83 12/02/2024    PREGUR Negative 10/20/2022    PREGSERUM Negative 12/02/2024    RPR Non-Reactive 12/25/2021    HGBA1C 5.2 12/25/2021     12/25/2021       Discharge  Medications:    See after visit summary for all reconciled discharge medications provided to patient and family.      Discharge instructions/Information to patient and family:     See after visit summary for information provided to patient and family.      Provisions for Follow-Up Care:    See after visit summary for information related to follow-up care and any pertinent home health orders.      Risk Potential Screening:  Risk of Harm to Self:   The following ratings are based on assessment at the time of discharge  Demographic risk factors include: , lowest socioeconomic class  Historical Risk Factors include: history of depression, chronic anxiety symptoms, history of mood disorder, self-mutilating behaviors, history of substance use, history of impulsive behaviors  Current Specific Risk Factors include: recent inpatient psychiatric admission - being discharged today, recent suicidal gesture, diagnosis of mood disorder, chronic anxiety symptoms, health problems, substance use  Protective Factors: no current suicidal ideation, no current depressive symptoms, stable mood, no current psychotic symptoms, improved anxiety symptoms, ability to adapt to change, ability to make plans for the future, no current suicidal plan or intent, outpatient psychiatric follow up established, stable housing, having pets, restricted access to lethal means, safe and stable living environment, ability to contract for safety with staff, ability to communicate with staff  Weapons/Firearms: none. The following steps have been taken to ensure weapons are properly secured: not applicable  Based on today's assessment, Carey presents the following risk of harm to self: low    Risk of Harm to Others:  The following ratings are based on assessment at the time of discharge  Demographic Risk Factors include: male.  Historical Risk Factors include: history of substance use.  Current Specific Risk Factors include: recent episode of mood  instability  Protective Factors: no current homicidal ideation, stable mood, improved mood, no current psychotic symptoms, compliant with medications, compliant with treatment, willing to continue psychiatric treatment, no prior history of violence, stable living environment, restricted access to lethal means, access to mental health treatment  Weapons/Firearms: none. The following steps have been taken to ensure weapons are properly secured: not applicable  Based on today's assessment, Carey presents the following risk of harm to others: minimal      Discharge Statement:    I spent 35 minutes discharging the patient. This time was spent on the day of discharge. I had direct contact with the patient on the day of discharge.     Additional documentation is required if more than 30 minutes were spent on discharge:    I reviewed with Carey importance of compliance with medications and outpatient treatment after discharge.  I discussed the medication regimen and possible side effects of the medications with Carey prior to discharge. At the time of discharge she was tolerating psychiatric medications.  I discussed outpatient follow up with Carey.  I reviewed with Carey crisis plan and safety plan upon discharge.  Carey was competent to understand risks and benefits of withholding information and risks and benefits of her actions.  Carey has been filing controlled prescriptions on time as prescribed according to [place] Prescription Drug Monitoring Program.  Carey is aware of risks, benefits, and possible side effects of treatment with Prozac, Zyprexa, and Klonopin in pregnancy. She understands risks of treatment with those medications in pregnancy, understands treatment is only relatively safe and agrees to take the above medications. At this time benefits of treatment outweigh risks.    Discharge on Two Antipsychotic Medications : No    Paz Narvaez DO 12/09/24  Psychiatry  Residency, PGY-II

## 2024-12-09 NOTE — NURSING NOTE
"Patient is calm and cooperative upon approach. Patient visible for meals on unit. Patient denies SI/HI/AH/VH. Patient refused to take scheduled morning zyprexa 5 mg PO. Patient stated, \"I dont want the zyprexa right now because it makes me tired, I will take it when I get home.\" Patient compliant with unit and meals at this time.   "

## 2024-12-09 NOTE — PLAN OF CARE
Problem: Ineffective Coping  Goal: Participates in unit activities  Description: Interventions:  - Provide therapeutic environment   - Provide required programming   - Redirect inappropriate behaviors   Outcome: Progressing     Problem: DISCHARGE PLANNING  Goal: Discharge to home or other facility with appropriate resources  Description: INTERVENTIONS:  - Identify barriers to discharge w/patient and caregiver  - Arrange for needed discharge resources and transportation as appropriate  - Identify discharge learning needs (meds, wound care, etc.)  - Arrange for interpretive services to assist at discharge as needed  - Refer to Case Management Department for coordinating discharge planning if the patient needs post-hospital services based on physician/advanced practitioner order or complex needs related to functional status, cognitive ability, or social support system  Outcome: Progressing     Problem: SELF HARM/SUICIDALITY  Goal: Will have no self-injury during hospital stay  Description: INTERVENTIONS:  - Q 15 MINUTES: Routine safety checks  - Q WAKING SHIFT & PRN: Assess risk to determine if routine checks are adequate to maintain patient safety  - Encourage patient to participate actively in care by formulating a plan to combat response to suicidal ideation, identify supports and resources  Outcome: Progressing     Problem: DEPRESSION  Goal: Will be euthymic at discharge  Description: INTERVENTIONS:  - Administer medication as ordered  - Provide emotional support via 1:1 interaction with staff  - Encourage involvement in milieu/groups/activities  - Monitor for social isolation  Outcome: Progressing     Problem: ANXIETY  Goal: Will report anxiety at manageable levels  Description: INTERVENTIONS:  - Administer medication as ordered  - Teach and encourage coping skills  - Provide emotional support  - Assess patient/family for anxiety and ability to cope  Outcome: Progressing     Problem: SUBSTANCE USE/ABUSE  Goal:  2023  EMPLOYEE INFORMATION: EMPLOYER INFORMATION:   NAME: Gabrielle Jernigan     : 1987 [phone]    DATE OF INJURY/EVENT:             Treating Provider: Mary Aleman MD  Time In:  4:47 PM Time Out:  5:48 PM      DIAGNOSIS:   1. Irritation of both eyes    2. Exposure to blood or body fluid        RETURN TO WORK: Employee may return to work without restrictions.               RESTRICTIONS:  No Restrictions    TREATMENT PLAN:   Medications for this injury/condition: None   Referral/Consult: None  Diagnostic Testing: HIV 1/HIV 2 ANTIGEN/ANTIBODY SCREEN  HEPATITIS B SURFACE ANTIBODY  HEPATITIS B SURFACE ANTIGEN  HEPATITIS C ANTIBODY WITH REFLEX       Instructions: Avoid contact lens wear for the next 2 to 3 days.  Recommend using artificial tears such as Visine for now.  Await blood test results.  No restrictions for now.    NEXT RETURN VISIT: Recheck in 1 week.  Thank you for the privilege of providing medical care for this injury/condition.  If there are any questions, please call the occupational health clinic at Dept: [phone].      Electronically signed on 2023 at 5:48 PM by:   Mary Aleman MD   [facility]       Will have no detox symptoms and will verbalize plan for changing substance-related behavior  Description: INTERVENTIONS:  - Monitor physical status and assess for symptoms of withdrawal  - Administer medication as ordered  - Provide emotional support with 1 on 1 interaction with staff  - Encourage recovery focused program/ addiction education  - Assess for verbalization of changing behaviors related to substance abuse  - Initiate consults and referrals as appropriate (Case Management, Spiritual Care, etc.)  Outcome: Progressing  Goal: By discharge, will develop insight into their chemical dependency and sustain motivation to continue in recovery  Description: INTERVENTIONS:  - Attends all daily group sessions and scheduled AA groups  - Actively practices coping skills through participation in the therapeutic community and adherence to program rules  - Reviews and completes assignments from individual treatment plan  - Assist patient development of understanding of their personal cycle of addiction and relapse triggers  Outcome: Progressing  Goal: By discharge, patient will have ongoing treatment plan addressing chemical dependency  Description: INTERVENTIONS:  - Assist patient with resources and/or appointments for ongoing recovery based living  Outcome: Progressing     Problem: Nutrition/Hydration-ADULT  Goal: Nutrient/Hydration intake appropriate for improving, restoring or maintaining nutritional needs  Description: Monitor and assess patient's nutrition/hydration status for malnutrition. Collaborate with interdisciplinary team and initiate plan and interventions as ordered.  Monitor patient's weight and dietary intake as ordered or per policy. Utilize nutrition screening tool and intervene as necessary. Determine patient's food preferences and provide high-protein, high-caloric foods as appropriate.     INTERVENTIONS:  - Monitor oral intake, urinary output, labs, and treatment plans  - Assess nutrition and  hydration status and recommend course of action  - Evaluate amount of meals eaten  - Assist patient with eating if necessary   - Allow adequate time for meals  - Recommend/ encourage appropriate diets, oral nutritional supplements, and vitamin/mineral supplements  - Order, calculate, and assess calorie counts as needed  - Recommend, monitor, and adjust tube feedings and TPN/PPN based on assessed needs  - Assess need for intravenous fluids  - Provide specific nutrition/hydration education as appropriate  - Include patient/family/caregiver in decisions related to nutrition  Outcome: Progressing     Problem: SAFETY ADULT  Goal: Patient will remain free of falls  Description: INTERVENTIONS:  - Educate patient/family on patient safety including physical limitations  - Instruct patient to call for assistance with activity   - Keep care items and personal belongings within reach  - Initiate and maintain comfort rounds  - Make Fall Risk Sign visible to staff  - Apply yellow socks and bracelet for high fall risk patients  - Consider moving patient to room near nurses station  Outcome: Progressing

## 2024-12-09 NOTE — PLAN OF CARE
Problem: Ineffective Coping  Goal: Participates in unit activities  Description: Interventions:  - Provide therapeutic environment   - Provide required programming   - Redirect inappropriate behaviors   12/9/2024 0132 by Abbey Gaffney RN  Outcome: Progressing  12/9/2024 0126 by Abbey Gaffney RN  Outcome: Progressing     Problem: DISCHARGE PLANNING  Goal: Discharge to home or other facility with appropriate resources  Description: INTERVENTIONS:  - Identify barriers to discharge w/patient and caregiver  - Arrange for needed discharge resources and transportation as appropriate  - Identify discharge learning needs (meds, wound care, etc.)  - Arrange for interpretive services to assist at discharge as needed  - Refer to Case Management Department for coordinating discharge planning if the patient needs post-hospital services based on physician/advanced practitioner order or complex needs related to functional status, cognitive ability, or social support system  12/9/2024 0132 by Abbey Gaffney RN  Outcome: Progressing  12/9/2024 0126 by Abbey Gaffney RN  Outcome: Progressing     Problem: SELF HARM/SUICIDALITY  Goal: Will have no self-injury during hospital stay  Description: INTERVENTIONS:  - Q 15 MINUTES: Routine safety checks  - Q WAKING SHIFT & PRN: Assess risk to determine if routine checks are adequate to maintain patient safety  - Encourage patient to participate actively in care by formulating a plan to combat response to suicidal ideation, identify supports and resources  12/9/2024 0132 by Abbey Gaffney RN  Outcome: Progressing  12/9/2024 0126 by Abbey Gaffney RN  Outcome: Progressing     Problem: DEPRESSION  Goal: Will be euthymic at discharge  Description: INTERVENTIONS:  - Administer medication as ordered  - Provide emotional support via 1:1 interaction with staff  - Encourage involvement in milieu/groups/activities  - Monitor for social isolation  12/9/2024 0132 by Abbey Gaffney RN  Outcome:  Progressing  12/9/2024 0126 by Abbey Gaffney RN  Outcome: Progressing     Problem: ANXIETY  Goal: Will report anxiety at manageable levels  Description: INTERVENTIONS:  - Administer medication as ordered  - Teach and encourage coping skills  - Provide emotional support  - Assess patient/family for anxiety and ability to cope  12/9/2024 0132 by Abbey Gaffney RN  Outcome: Progressing  12/9/2024 0126 by Abbey Gaffney RN  Outcome: Progressing     Problem: SUBSTANCE USE/ABUSE  Goal: Will have no detox symptoms and will verbalize plan for changing substance-related behavior  Description: INTERVENTIONS:  - Monitor physical status and assess for symptoms of withdrawal  - Administer medication as ordered  - Provide emotional support with 1 on 1 interaction with staff  - Encourage recovery focused program/ addiction education  - Assess for verbalization of changing behaviors related to substance abuse  - Initiate consults and referrals as appropriate (Case Management, Spiritual Care, etc.)  12/9/2024 0132 by Abbey Gaffney RN  Outcome: Progressing  12/9/2024 0126 by Abbey Gaffney RN  Outcome: Progressing  Goal: By discharge, will develop insight into their chemical dependency and sustain motivation to continue in recovery  Description: INTERVENTIONS:  - Attends all daily group sessions and scheduled AA groups  - Actively practices coping skills through participation in the therapeutic community and adherence to program rules  - Reviews and completes assignments from individual treatment plan  - Assist patient development of understanding of their personal cycle of addiction and relapse triggers  12/9/2024 0132 by Abbey Gaffney RN  Outcome: Progressing  12/9/2024 0126 by Abbey Gaffney RN  Outcome: Progressing  Goal: By discharge, patient will have ongoing treatment plan addressing chemical dependency  Description: INTERVENTIONS:  - Assist patient with resources and/or appointments for ongoing recovery based  living  12/9/2024 0132 by Abbey Gaffney RN  Outcome: Progressing  12/9/2024 0126 by Abbey Gaffney RN  Outcome: Progressing     Problem: Nutrition/Hydration-ADULT  Goal: Nutrient/Hydration intake appropriate for improving, restoring or maintaining nutritional needs  Description: Monitor and assess patient's nutrition/hydration status for malnutrition. Collaborate with interdisciplinary team and initiate plan and interventions as ordered.  Monitor patient's weight and dietary intake as ordered or per policy. Utilize nutrition screening tool and intervene as necessary. Determine patient's food preferences and provide high-protein, high-caloric foods as appropriate.     INTERVENTIONS:  - Monitor oral intake, urinary output, labs, and treatment plans  - Assess nutrition and hydration status and recommend course of action  - Evaluate amount of meals eaten  - Assist patient with eating if necessary   - Allow adequate time for meals  - Recommend/ encourage appropriate diets, oral nutritional supplements, and vitamin/mineral supplements  - Order, calculate, and assess calorie counts as needed  - Recommend, monitor, and adjust tube feedings and TPN/PPN based on assessed needs  - Assess need for intravenous fluids  - Provide specific nutrition/hydration education as appropriate  - Include patient/family/caregiver in decisions related to nutrition  Outcome: Progressing     Problem: SAFETY ADULT  Goal: Patient will remain free of falls  Description: INTERVENTIONS:  - Educate patient/family on patient safety including physical limitations  - Instruct patient to call for assistance with activity   - Keep care items and personal belongings within reach  - Initiate and maintain comfort rounds  - Make Fall Risk Sign visible to staff  - Apply yellow socks and bracelet for high fall risk patients  - Consider moving patient to room near nurses station  12/9/2024 0132 by Abbey Gaffney RN  Outcome: Progressing  12/9/2024 0126 by  Abbey Gaffney, RN  Outcome: Progressing

## 2024-12-09 NOTE — ASSESSMENT & PLAN NOTE
PDMP reviewed, patient appears compliant, last filled on 11/14/2024  Suboxone 8 mg three times daily   Patient provided with 10 supply of Suboxone and instructed to follow-up with [facility] at their walk-in hours in the coming week  No associated orders from this encounter found during lookback period of 72 hours.

## 2024-12-09 NOTE — SOCIAL WORK
Phone call placed to [facility]. Pt scheduled for therapy appointment on 12/12 at 1130 and psychiatry on 12/17 at 1500.     Phone call placed to [facility] to schedule appointment with Dr. Fernandez for Suboxone. Voicemail left requesting return phone call at [phone]. Follow up phone call made at 1130 to request appointment. SW again left voicemail requesting return phone call.

## 2024-12-09 NOTE — SOCIAL WORK
Pt to D/C today. Pt denies SI/HI/AVH. Pt oriented x3. Pt to d/c to her friend's home via lyft.  Pt to follow up with [facility] on 12/12 and 12/17. Pt to follow up with [facility]. Pt discharged with medication in hand.     Discharge Address: [address] [place] [id number]  Phone: [phone]

## 2024-12-09 NOTE — PLAN OF CARE
Problem: Ineffective Coping  Goal: Participates in unit activities  Description: Interventions:  - Provide therapeutic environment   - Provide required programming   - Redirect inappropriate behaviors   12/9/2024 0132 by Abbey Gaffney RN  Outcome: Progressing       Problem: DISCHARGE PLANNING  Goal: Discharge to home or other facility with appropriate resources  Description: INTERVENTIONS:  - Identify barriers to discharge w/patient and caregiver  - Arrange for needed discharge resources and transportation as appropriate  - Identify discharge learning needs (meds, wound care, etc.)  - Arrange for interpretive services to assist at discharge as needed  - Refer to Case Management Department for coordinating discharge planning if the patient needs post-hospital services based on physician/advanced practitioner order or complex needs related to functional status, cognitive ability, or social support system  12/9/2024 0132 by Abbey Gaffney RN  Outcome: Progressing       Problem: SELF HARM/SUICIDALITY  Goal: Will have no self-injury during hospital stay  Description: INTERVENTIONS:  - Q 15 MINUTES: Routine safety checks  - Q WAKING SHIFT & PRN: Assess risk to determine if routine checks are adequate to maintain patient safety  - Encourage patient to participate actively in care by formulating a plan to combat response to suicidal ideation, identify supports and resources  12/9/2024 0132 by Abbey Gaffney RN  Outcome: Progressing     Problem: DEPRESSION  Goal: Will be euthymic at discharge  Description: INTERVENTIONS:  - Administer medication as ordered  - Provide emotional support via 1:1 interaction with staff  - Encourage involvement in milieu/groups/activities  - Monitor for social isolation  12/9/2024 0132 by Abeby Gaffney RN  Outcome: Progressing  12/9/2024 0126 by Abbey Gaffney RN  Outcome: Progressing     Problem: ANXIETY  Goal: Will report anxiety at manageable levels  Description: INTERVENTIONS:  -  Administer medication as ordered  - Teach and encourage coping skills  - Provide emotional support  - Assess patient/family for anxiety and ability to cope  12/9/2024 0132 by Abbey Gaffney RN  Outcome: Progressing       Problem: SUBSTANCE USE/ABUSE  Goal: Will have no detox symptoms and will verbalize plan for changing substance-related behavior  Description: INTERVENTIONS:  - Monitor physical status and assess for symptoms of withdrawal  - Administer medication as ordered  - Provide emotional support with 1 on 1 interaction with staff  - Encourage recovery focused program/ addiction education  - Assess for verbalization of changing behaviors related to substance abuse  - Initiate consults and referrals as appropriate (Case Management, Spiritual Care, etc.)  12/9/2024 0132 by Abbey Gaffney RN  Outcome: Progressing  Goal: By discharge, will develop insight into their chemical dependency and sustain motivation to continue in recovery  Description: INTERVENTIONS:  - Attends all daily group sessions and scheduled AA groups  - Actively practices coping skills through participation in the therapeutic community and adherence to program rules  - Reviews and completes assignments from individual treatment plan  - Assist patient development of understanding of their personal cycle of addiction and relapse triggers  12/9/2024 0132 by Abbey Gaffney RN  Outcome: Progressing    Goal: By discharge, patient will have ongoing treatment plan addressing chemical dependency  Description: INTERVENTIONS:  - Assist patient with resources and/or appointments for ongoing recovery based living  12/9/2024 0132 by Abbey Gaffney RN  Outcome: Progressing       Problem: Nutrition/Hydration-ADULT  Goal: Nutrient/Hydration intake appropriate for improving, restoring or maintaining nutritional needs  Description: Monitor and assess patient's nutrition/hydration status for malnutrition. Collaborate with interdisciplinary team and initiate plan  and interventions as ordered.  Monitor patient's weight and dietary intake as ordered or per policy. Utilize nutrition screening tool and intervene as necessary. Determine patient's food preferences and provide high-protein, high-caloric foods as appropriate.     INTERVENTIONS:  - Monitor oral intake, urinary output, labs, and treatment plans  - Assess nutrition and hydration status and recommend course of action  - Evaluate amount of meals eaten  - Assist patient with eating if necessary   - Allow adequate time for meals  - Recommend/ encourage appropriate diets, oral nutritional supplements, and vitamin/mineral supplements  - Order, calculate, and assess calorie counts as needed  - Recommend, monitor, and adjust tube feedings and TPN/PPN based on assessed needs  - Assess need for intravenous fluids  - Provide specific nutrition/hydration education as appropriate  - Include patient/family/caregiver in decisions related to nutrition  Outcome: Progressing     Problem: SAFETY ADULT  Goal: Patient will remain free of falls  Description: INTERVENTIONS:  - Educate patient/family on patient safety including physical limitations  - Instruct patient to call for assistance with activity   - Keep care items and personal belongings within reach  - Initiate and maintain comfort rounds  - Make Fall Risk Sign visible to staff  - Apply yellow socks and bracelet for high fall risk patients  - Consider moving patient to room near nurses station  12/9/2024 0132 by Abbey Gaffney RN  Outcome: Progressing

## 2024-12-09 NOTE — PROGRESS NOTES
12/09/24 0918   Team Meeting   Meeting Type Daily Rounds   Team Members Present   Team Members Present Physician;Nurse;   Physician Team Member Rosalie   [facility]   Patient/Family Present   Patient Present No   Patient's Family Present No     Discharge today.

## 2024-12-09 NOTE — PLAN OF CARE
Problem: Ineffective Coping  Goal: Participates in unit activities  Description: Interventions:  - Provide therapeutic environment   - Provide required programming   - Redirect inappropriate behaviors   12/9/2024 0132 by Abbey Gaffney RN  Outcome: Progressing  12/9/2024 0126 by Abbey Gaffney RN  Outcome: Progressing     Problem: DISCHARGE PLANNING  Goal: Discharge to home or other facility with appropriate resources  Description: INTERVENTIONS:  - Identify barriers to discharge w/patient and caregiver  - Arrange for needed discharge resources and transportation as appropriate  - Identify discharge learning needs (meds, wound care, etc.)  - Arrange for interpretive services to assist at discharge as needed  - Refer to Case Management Department for coordinating discharge planning if the patient needs post-hospital services based on physician/advanced practitioner order or complex needs related to functional status, cognitive ability, or social support system  12/9/2024 0132 by Abbey Gaffney RN  Outcome: Progressing  12/9/2024 0126 by Abbey Gaffney RN  Outcome: Progressing     Problem: SELF HARM/SUICIDALITY  Goal: Will have no self-injury during hospital stay  Description: INTERVENTIONS:  - Q 15 MINUTES: Routine safety checks  - Q WAKING SHIFT & PRN: Assess risk to determine if routine checks are adequate to maintain patient safety  - Encourage patient to participate actively in care by formulating a plan to combat response to suicidal ideation, identify supports and resources  12/9/2024 0132 by Abbey Gaffney RN  Outcome: Progressing  12/9/2024 0126 by Abbey Gaffney RN  Outcome: Progressing     Problem: DEPRESSION  Goal: Will be euthymic at discharge  Description: INTERVENTIONS:  - Administer medication as ordered  - Provide emotional support via 1:1 interaction with staff  - Encourage involvement in milieu/groups/activities  - Monitor for social isolation  12/9/2024 0132 by Abbey Gaffney RN  Outcome:  Progressing  12/9/2024 0126 by Abbey Gaffney RN  Outcome: Progressing     Problem: ANXIETY  Goal: Will report anxiety at manageable levels  Description: INTERVENTIONS:  - Administer medication as ordered  - Teach and encourage coping skills  - Provide emotional support  - Assess patient/family for anxiety and ability to cope  12/9/2024 0132 by Abbey Gaffney RN  Outcome: Progressing  12/9/2024 0126 by Abbey Gaffney RN  Outcome: Progressing     Problem: SUBSTANCE USE/ABUSE  Goal: Will have no detox symptoms and will verbalize plan for changing substance-related behavior  Description: INTERVENTIONS:  - Monitor physical status and assess for symptoms of withdrawal  - Administer medication as ordered  - Provide emotional support with 1 on 1 interaction with staff  - Encourage recovery focused program/ addiction education  - Assess for verbalization of changing behaviors related to substance abuse  - Initiate consults and referrals as appropriate (Case Management, Spiritual Care, etc.)  12/9/2024 0132 by Abbey Gaffney RN  Outcome: Progressing  12/9/2024 0126 by Abbey Gaffney RN  Outcome: Progressing  Goal: By discharge, will develop insight into their chemical dependency and sustain motivation to continue in recovery  Description: INTERVENTIONS:  - Attends all daily group sessions and scheduled AA groups  - Actively practices coping skills through participation in the therapeutic community and adherence to program rules  - Reviews and completes assignments from individual treatment plan  - Assist patient development of understanding of their personal cycle of addiction and relapse triggers  12/9/2024 0132 by Abbey Gaffney RN  Outcome: Progressing  12/9/2024 0126 by Abbey Gaffney RN  Outcome: Progressing  Goal: By discharge, patient will have ongoing treatment plan addressing chemical dependency  Description: INTERVENTIONS:  - Assist patient with resources and/or appointments for ongoing recovery based  living  12/9/2024 0132 by Abbey Gaffney RN  Outcome: Progressing  12/9/2024 0126 by Abbey Gaffney RN  Outcome: Progressing     Problem: Nutrition/Hydration-ADULT  Goal: Nutrient/Hydration intake appropriate for improving, restoring or maintaining nutritional needs  Description: Monitor and assess patient's nutrition/hydration status for malnutrition. Collaborate with interdisciplinary team and initiate plan and interventions as ordered.  Monitor patient's weight and dietary intake as ordered or per policy. Utilize nutrition screening tool and intervene as necessary. Determine patient's food preferences and provide high-protein, high-caloric foods as appropriate.     INTERVENTIONS:  - Monitor oral intake, urinary output, labs, and treatment plans  - Assess nutrition and hydration status and recommend course of action  - Evaluate amount of meals eaten  - Assist patient with eating if necessary   - Allow adequate time for meals  - Recommend/ encourage appropriate diets, oral nutritional supplements, and vitamin/mineral supplements  - Order, calculate, and assess calorie counts as needed  - Recommend, monitor, and adjust tube feedings and TPN/PPN based on assessed needs  - Assess need for intravenous fluids  - Provide specific nutrition/hydration education as appropriate  - Include patient/family/caregiver in decisions related to nutrition  Outcome: Progressing     Problem: SAFETY ADULT  Goal: Patient will remain free of falls  Description: INTERVENTIONS:  - Educate patient/family on patient safety including physical limitations  - Instruct patient to call for assistance with activity   - Keep care items and personal belongings within reach  - Initiate and maintain comfort rounds  - Make Fall Risk Sign visible to staff  - Apply yellow socks and bracelet for high fall risk patients  - Consider moving patient to room near nurses station  12/9/2024 0132 by Abbey Gaffney RN  Outcome: Progressing  12/9/2024 0126 by  Abbey Gaffney RN  Outcome: Progressing     Problem: DISCHARGE PLANNING  Goal: Discharge to home or other facility with appropriate resources  Description: INTERVENTIONS:  - Identify barriers to discharge w/patient and caregiver  - Arrange for needed discharge resources and transportation as appropriate  - Identify discharge learning needs (meds, wound care, etc.)  - Arrange for interpretive services to assist at discharge as needed  - Refer to Case Management Department for coordinating discharge planning if the patient needs post-hospital services based on physician/advanced practitioner order or complex needs related to functional status, cognitive ability, or social support system  12/9/2024 0132 by Abbey Gaffney RN  Outcome: Progressing  12/9/2024 0126 by Abbey Gaffney RN  Outcome: Progressing     Problem: SELF HARM/SUICIDALITY  Goal: Will have no self-injury during hospital stay  Description: INTERVENTIONS:  - Q 15 MINUTES: Routine safety checks  - Q WAKING SHIFT & PRN: Assess risk to determine if routine checks are adequate to maintain patient safety  - Encourage patient to participate actively in care by formulating a plan to combat response to suicidal ideation, identify supports and resources  12/9/2024 0132 by Abbey Gaffney RN  Outcome: Progressing  12/9/2024 0126 by Abbey Gaffney RN  Outcome: Progressing     Problem: DEPRESSION  Goal: Will be euthymic at discharge  Description: INTERVENTIONS:  - Administer medication as ordered  - Provide emotional support via 1:1 interaction with staff  - Encourage involvement in milieu/groups/activities  - Monitor for social isolation  12/9/2024 0132 by Abbey Gaffney RN  Outcome: Progressing  12/9/2024 0126 by Abbey Gaffney RN  Outcome: Progressing     Problem: ANXIETY  Goal: Will report anxiety at manageable levels  Description: INTERVENTIONS:  - Administer medication as ordered  - Teach and encourage coping skills  - Provide emotional support  - Assess  patient/family for anxiety and ability to cope  12/9/2024 0132 by Abbey Gaffney RN  Outcome: Progressing  12/9/2024 0126 by Abbey Gaffney RN  Outcome: Progressing     Problem: SUBSTANCE USE/ABUSE  Goal: Will have no detox symptoms and will verbalize plan for changing substance-related behavior  Description: INTERVENTIONS:  - Monitor physical status and assess for symptoms of withdrawal  - Administer medication as ordered  - Provide emotional support with 1 on 1 interaction with staff  - Encourage recovery focused program/ addiction education  - Assess for verbalization of changing behaviors related to substance abuse  - Initiate consults and referrals as appropriate (Case Management, Spiritual Care, etc.)  12/9/2024 0132 by Abbey Gaffney RN  Outcome: Progressing  12/9/2024 0126 by Abbey Gaffney RN  Outcome: Progressing     Problem: SUBSTANCE USE/ABUSE  Goal: By discharge, will develop insight into their chemical dependency and sustain motivation to continue in recovery  Description: INTERVENTIONS:  - Attends all daily group sessions and scheduled AA groups  - Actively practices coping skills through participation in the therapeutic community and adherence to program rules  - Reviews and completes assignments from individual treatment plan  - Assist patient development of understanding of their personal cycle of addiction and relapse triggers  12/9/2024 0132 by Abbey Gaffney RN  Outcome: Progressing  12/9/2024 0126 by Abbey Gaffney RN  Outcome: Progressing

## 2024-12-09 NOTE — ASSESSMENT & PLAN NOTE
Carey reports improvement in her overall mood. She is future oriented in thinking and expresses desire to get a job after discharge. She is denying SI, HI, and AVH. Outpatient follow-up arranged by .    Discharge medication regimen:  Zyprexa 5 mg twice daily for mood stabilization  Prozac 20 mg daily for depressive symptoms  Gabapentin 600 mg three times daily for anxiety, and agitation  Klonopin 0.5 mg once daily for anxiety   Patient has follow-up with outpatient psychiatrist on 12/17, provided with 10 day supply of medication to last until appointment

## 2024-12-09 NOTE — NURSING NOTE
"Pt is cooperative and med compliant. Pt in her room when not at the nurses station. Pt stated \"I'm ok\" when writer asked about depression or anxiety. Pt denies SI/HI and AH/VH. Will continue to monitor.  "

## 2024-12-10 ENCOUNTER — TELEPHONE (OUTPATIENT)
Age: 49
End: 2024-12-10

## 2024-12-10 DIAGNOSIS — G89.29 CHRONIC BACK PAIN: ICD-10-CM

## 2024-12-10 DIAGNOSIS — M54.9 CHRONIC BACK PAIN: ICD-10-CM

## 2024-12-10 NOTE — TELEPHONE ENCOUNTER
Patient calling to schedule s/p hospital d/c visit. Krissy transferred patient to [facility] for further assistance scheduling TCM visit.

## 2024-12-24 ENCOUNTER — HOSPITAL ENCOUNTER (EMERGENCY)
Facility: HOSPITAL | Age: 49
Discharge: HOME | End: 2024-12-24
Attending: EMERGENCY MEDICINE
Payer: COMMERCIAL

## 2024-12-24 VITALS
HEART RATE: 67 BPM | OXYGEN SATURATION: 99 % | DIASTOLIC BLOOD PRESSURE: 79 MMHG | SYSTOLIC BLOOD PRESSURE: 129 MMHG | TEMPERATURE: 98.5 F | RESPIRATION RATE: 16 BRPM

## 2024-12-24 DIAGNOSIS — G89.29 CHRONIC BACK PAIN: ICD-10-CM

## 2024-12-24 DIAGNOSIS — Z76.0 MEDICATION REFILL: Primary | ICD-10-CM

## 2024-12-24 DIAGNOSIS — M54.9 CHRONIC BACK PAIN: ICD-10-CM

## 2024-12-24 DIAGNOSIS — F11.90 OPIOID USE DISORDER: ICD-10-CM

## 2024-12-24 PROCEDURE — 99282 EMERGENCY DEPT VISIT SF MDM: CPT

## 2024-12-24 PROCEDURE — 99284 EMERGENCY DEPT VISIT MOD MDM: CPT | Performed by: PHYSICIAN ASSISTANT

## 2024-12-24 RX ORDER — BUPRENORPHINE AND NALOXONE 8; 2 MG/1; MG/1
8 FILM, SOLUBLE BUCCAL; SUBLINGUAL 3 TIMES DAILY
Status: CANCELLED | OUTPATIENT
Start: 2024-12-24

## 2024-12-24 RX ORDER — BUPRENORPHINE AND NALOXONE 8; 2 MG/1; MG/1
8 FILM, SOLUBLE BUCCAL; SUBLINGUAL DAILY
Qty: 7 FILM | Refills: 0 | Status: SHIPPED | OUTPATIENT
Start: 2024-12-24 | End: 2024-12-31

## 2024-12-24 RX ORDER — BUPRENORPHINE AND NALOXONE 8; 2 MG/1; MG/1
8 FILM, SOLUBLE BUCCAL; SUBLINGUAL DAILY
Qty: 7 FILM | Refills: 0 | Status: SHIPPED | OUTPATIENT
Start: 2024-12-24 | End: 2024-12-24

## 2024-12-24 RX ORDER — BUPRENORPHINE AND NALOXONE 8; 2 MG/1; MG/1
8 FILM, SOLUBLE BUCCAL; SUBLINGUAL DAILY
Status: DISCONTINUED | OUTPATIENT
Start: 2024-12-24 | End: 2024-12-24 | Stop reason: HOSPADM

## 2024-12-24 RX ADMIN — BUPRENORPHINE AND NALOXONE 8 MG: 8; 2 FILM BUCCAL; SUBLINGUAL at 15:04

## 2024-12-24 NOTE — ED PROVIDER NOTES
Time reflects when diagnosis was documented in both MDM as applicable and the Disposition within this note       Time User Action Codes Description Comment    12/24/2024  3:28 PM Adela Santiago [Z76.0] Medication refill     12/24/2024  3:29 PM Adela Santiago Add [F11.90] Opioid use disorder     12/24/2024  3:30 PM Adela Santiago Add [M54.9,  G89.29] Chronic back pain           ED Disposition       ED Disposition   Discharge    Condition   Stable    Date/Time   Tue Dec 24, 2024  3:28 PM    Comment   Carey Keith discharge to home/self care.                   Assessment & Plan       Medical Decision Making  Patient is a 49-year-old female with past medical history of opioid use disorder and bipolar disorder presenting to the ER for medication refill.  Patient reports that she has been on Suboxone for the past 3 years and has been following with [facility].  Was unable to get refill due to scheduling issues as her doctor is on vacation.  On exam patient well-appearing in no acute stress.  Vital signs are within normal limits.  Physical examination is unremarkable.  I did confirm patient's prescription and strength with the [place] PDMP.  It does appear that most of her Suboxone prescriptions were sent by Dr. [facility] and the last prescription was sent by psychiatrist who she was admitted under while at inpatient psych.  Will provide patient with a dose of Suboxone in ER and 7-day supply.  Will also provide patient with Zanaflex refill as she has also been on this medication for quite some time as confirmed by her chart.  Discussed with patient risks of these medications.  She is understanding and agreeable with plan.  Patient has remained stable throughout stay and are stable for discharge.    Problems Addressed:  Medication refill: acute illness or injury  Opioid use disorder: acute illness or injury    Risk  Prescription drug management.             Medications - No data to  "display    ED Risk Strat Scores               CIWA-Ar Score       Row Name 12/24/24 1424             CIWA-Ar    Nausea and Vomiting 1      Tactile Disturbances 0      Tremor 1      Auditory Disturbances 0      Paroxysmal Sweats 1      Visual Disturbances 0      Anxiety 1      Headache, Fullness in Head 0      Agitation 0      Orientation and Clouding of Sensorium 0      CIWA-Ar Total 4                                                  History of Present Illness       Chief Complaint   Patient presents with    Medication Refill     Pt unable to get Suboxone refill.  Pt dr on vacation.  Pt has appt 12/27       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)     Alcohol abuse     Alcoholism (HCC)     Altered mental status 09/21/2022    Elevated LFTs 09/21/2022    Lower back pain     Self-injurious behavior     Substance abuse (HCC)       Past Surgical History:   Procedure Laterality Date    CHOLECYSTECTOMY        Family History   Problem Relation Age of Onset    Autism Mother     Heart disease Father     Stroke Father     Anxiety disorder Father     Heart disease Maternal Grandmother       Social History     Tobacco Use    Smoking status: Some Days     Current packs/day: 0.50     Average packs/day: 0.5 packs/day for 20.0 years (10.0 ttl pk-yrs)     Types: Cigarettes    Smokeless tobacco: Current    Tobacco comments:     Pt not ready to quit   Vaping Use    Vaping status: Every Day    Substances: Nicotine, THC, Flavoring   Substance Use Topics    Alcohol use: Not Currently     Comment: MAGDALENO, pt historically inconsistent. Drinking  per [facility] 2    Drug use: Not Currently     Types: Heroin, \"Crack\" cocaine, Cocaine, LSD, Benzodiazepines, Marijuana     Comment: Pt unreliable historian      E-Cigarette/Vaping    E-Cigarette Use Current Every Day User     Cartridges/Day 2       E-Cigarette/Vaping Substances    Nicotine Yes     THC Yes     CBD No     Flavoring Yes     Other No     Unknown No     "   I have reviewed and agree with the history as documented.     Patient is a 49-year-old female with past medical history of opioid use disorder and bipolar disorder presenting to the ER for medication refill.  Patient reports that she has been on Suboxone for the past 3 years and has been following with [facility].  Patient reports that she was admitted to inpatient psych over 2 weeks ago and when she was discharged she was provided with a 10-day supply of Suboxone by the psychiatrist until she was able to follow-up with her doctor at the [facility] for further Suboxone prescriptions.  She reports that today when she called to schedule follow-up appointment the provider was away on vacation for the holidays and she was unable to obtain a prescription.  Patient is also reporting that she is out of her Zanaflex.  Patient reports that she has been on both these medications for a long time.  Denies any complaints at this time.        Review of Systems   Constitutional:  Negative for chills and fever.   HENT:  Negative for ear pain and sore throat.    Eyes:  Negative for pain and visual disturbance.   Respiratory:  Negative for cough and shortness of breath.    Cardiovascular:  Negative for chest pain and palpitations.   Gastrointestinal:  Negative for abdominal pain and vomiting.   Genitourinary:  Negative for dysuria and hematuria.   Musculoskeletal:  Negative for arthralgias and back pain.   Skin:  Negative for color change and rash.   Neurological:  Negative for seizures and syncope.   All other systems reviewed and are negative.          Objective       ED Triage Vitals   Temperature Pulse Blood Pressure Respirations SpO2 Patient Position - Orthostatic VS   12/24/24 1404 12/24/24 1404 12/24/24 1406 12/24/24 1404 12/24/24 1404 --   98.5 °F (36.9 °C) 67 129/79 16 99 %       Temp src Heart Rate Source BP Location FiO2 (%) Pain Score    -- -- -- -- --              Vitals      Date and Time  Temp Pulse SpO2 Resp BP Pain Score FACES Pain Rating User   12/24/24 1406 -- -- -- -- 129/79 -- --    12/24/24 1404 98.5 °F (36.9 °C) 67 99 % 16 -- -- --             Physical Exam  Vitals and nursing note reviewed.   Constitutional:       General: She is not in acute distress.     Appearance: She is well-developed.   HENT:      Head: Normocephalic and atraumatic.   Eyes:      Conjunctiva/sclera: Conjunctivae normal.   Cardiovascular:      Rate and Rhythm: Normal rate and regular rhythm.      Heart sounds: No murmur heard.  Pulmonary:      Effort: Pulmonary effort is normal. No respiratory distress.      Breath sounds: Normal breath sounds.   Abdominal:      Palpations: Abdomen is soft.      Tenderness: There is no abdominal tenderness.   Musculoskeletal:         General: No swelling.      Cervical back: Neck supple.   Skin:     General: Skin is warm and dry.      Capillary Refill: Capillary refill takes less than 2 seconds.   Neurological:      Mental Status: She is alert.   Psychiatric:         Mood and Affect: Mood normal.         Results Reviewed       None            No orders to display       Procedures    ED Medication and Procedure Management   Prior to Admission Medications   Prescriptions Last Dose Informant Patient Reported? Taking?   Cholecalciferol (VITAMIN D3) 1,000 units tablet   No No   Sig: Take 2 tablets (2,000 Units total) by mouth daily   FLUoxetine (PROzac) 20 mg capsule   No No   Sig: Take 1 capsule (20 mg total) by mouth daily   OLANZapine (ZyPREXA) 5 mg tablet   No No   Sig: Take 1 tablet (5 mg total) by mouth 2 (two) times a day   Symbicort 160-4.5 MCG/ACT inhaler   Yes No   Sig: Inhale 2 puffs 2 (two) times a day   albuterol (Proventil HFA) 90 mcg/act inhaler   No No   Sig: Inhale 2 puffs every 6 (six) hours as needed for wheezing   clonazePAM (KlonoPIN) 0.5 mg tablet   No No   Sig: Take 1 tablet (0.5 mg total) by mouth daily at bedtime for 10 days   folic acid (FOLVITE) 1 mg tablet   No  No   Sig: Take 1 tablet (1 mg total) by mouth daily   gabapentin (NEURONTIN) 300 mg capsule   No No   Sig: Take 2 capsules (600 mg total) by mouth 3 (three) times a day   tiZANidine (ZANAFLEX) 4 mg tablet   No No   Sig: Take 1 tablet (4 mg total) by mouth every 8 (eight) hours as needed for muscle spasms   tiZANidine (ZANAFLEX) 4 mg tablet   No Yes   Sig: Take 1 tablet (4 mg total) by mouth every 8 (eight) hours as needed for muscle spasms   tiZANidine (ZANAFLEX) 4 mg tablet   No Yes   Sig: Take 1 tablet (4 mg total) by mouth every 8 (eight) hours as needed for muscle spasms   traZODone (DESYREL) 100 mg tablet   No No   Sig: Take 1 tablet (100 mg total) by mouth daily at bedtime as needed for sleep for up to 10 doses      Facility-Administered Medications: None     Discharge Medication List as of 12/24/2024  3:45 PM        CONTINUE these medications which have CHANGED    Details   buprenorphine-naloxone (Suboxone) 8-2 mg Place 1 Film (8 mg total) under the tongue daily for 7 days, Starting Tue 12/24/2024, Until Tue 12/31/2024, Normal      tiZANidine (ZANAFLEX) 4 mg tablet Take 1 tablet (4 mg total) by mouth every 8 (eight) hours as needed for muscle spasms, Starting Tue 12/24/2024, Normal           CONTINUE these medications which have NOT CHANGED    Details   albuterol (Proventil HFA) 90 mcg/act inhaler Inhale 2 puffs every 6 (six) hours as needed for wheezing, Starting Thu 2/8/2024, Normal      Cholecalciferol (VITAMIN D3) 1,000 units tablet Take 2 tablets (2,000 Units total) by mouth daily, Starting Tue 12/10/2024, Until Thu 1/9/2025, Normal      clonazePAM (KlonoPIN) 0.5 mg tablet Take 1 tablet (0.5 mg total) by mouth daily at bedtime for 10 days, Starting Mon 12/9/2024, Until Thu 12/19/2024, Normal      FLUoxetine (PROzac) 20 mg capsule Take 1 capsule (20 mg total) by mouth daily, Starting Tue 12/10/2024, Until Thu 1/9/2025, Normal      folic acid (FOLVITE) 1 mg tablet Take 1 tablet (1 mg total) by mouth daily,  Starting Tue 12/10/2024, Until Thu 1/9/2025, Normal      gabapentin (NEURONTIN) 300 mg capsule Take 2 capsules (600 mg total) by mouth 3 (three) times a day, Starting Mon 12/9/2024, Until Wed 1/8/2025, Normal      OLANZapine (ZyPREXA) 5 mg tablet Take 1 tablet (5 mg total) by mouth 2 (two) times a day, Starting Mon 12/9/2024, Until Wed 1/8/2025, Normal      Symbicort 160-4.5 MCG/ACT inhaler Inhale 2 puffs 2 (two) times a day, Starting Mon 6/3/2024, Historical Med      traZODone (DESYREL) 100 mg tablet Take 1 tablet (100 mg total) by mouth daily at bedtime as needed for sleep for up to 10 doses, Starting Mon 12/9/2024, Normal           No discharge procedures on file.  ED SEPSIS DOCUMENTATION   Time reflects when diagnosis was documented in both MDM as applicable and the Disposition within this note       Time User Action Codes Description Comment    12/24/2024  3:28 PM Adela Santiago [Z76.0] Medication refill     12/24/2024  3:29 PM Adela Santiago [F11.90] Opioid use disorder     12/24/2024  3:30 PM Adela Santiago [M54.9,  G89.29] Chronic back pain                  Adela Santiago PA-C  12/24/24 9520

## 2025-01-07 DIAGNOSIS — M54.9 CHRONIC BACK PAIN: ICD-10-CM

## 2025-01-07 DIAGNOSIS — G89.29 CHRONIC BACK PAIN: ICD-10-CM

## 2025-01-07 NOTE — TELEPHONE ENCOUNTER
Medication: tiZANidine (ZANAFLEX) 4 mg tablet     Dose/Frequency:   Take 1 tablet (4 mg total) by mouth every 8 (eight) hours as needed for muscle spasms    Quantity: 30 tablet     Pharmacy: [facility]/pharmacy #[id number] - [place] - [address] [place] [address]     Office:   [x] PCP/Provider - Christian Julio  [] Speciality/Provider -     Does the patient have enough for 3 days?   [] Yes   [x] No - Send as HP to POD       Patient states this was previously prescribed by PCP office, but most recent prescription was from the ED. Patient has ED f/u for 1/10/25 but does not have enough medication to last until appointment.

## 2025-01-10 ENCOUNTER — OFFICE VISIT (OUTPATIENT)
Dept: INTERNAL MEDICINE CLINIC | Facility: CLINIC | Age: 50
End: 2025-01-10
Payer: COMMERCIAL

## 2025-01-10 VITALS
WEIGHT: 168 LBS | HEART RATE: 88 BPM | DIASTOLIC BLOOD PRESSURE: 80 MMHG | BODY MASS INDEX: 31.72 KG/M2 | RESPIRATION RATE: 12 BRPM | SYSTOLIC BLOOD PRESSURE: 130 MMHG | TEMPERATURE: 97.6 F | HEIGHT: 61 IN

## 2025-01-10 DIAGNOSIS — Z12.12 SCREENING FOR COLORECTAL CANCER: Primary | ICD-10-CM

## 2025-01-10 DIAGNOSIS — Z12.31 ENCOUNTER FOR SCREENING MAMMOGRAM FOR BREAST CANCER: ICD-10-CM

## 2025-01-10 DIAGNOSIS — M54.9 CHRONIC BACK PAIN: ICD-10-CM

## 2025-01-10 DIAGNOSIS — Z12.11 SCREENING FOR COLORECTAL CANCER: Primary | ICD-10-CM

## 2025-01-10 DIAGNOSIS — G89.29 CHRONIC BACK PAIN: ICD-10-CM

## 2025-01-10 PROCEDURE — 99213 OFFICE O/P EST LOW 20 MIN: CPT | Performed by: INTERNAL MEDICINE

## 2025-01-10 RX ORDER — BUPRENORPHINE AND NALOXONE 8; 2 MG/1; MG/1
FILM, SOLUBLE BUCCAL; SUBLINGUAL
COMMUNITY
Start: 2024-12-27

## 2025-01-10 RX ORDER — PRENATAL VIT 91/IRON/FOLIC/DHA 28-975-200
COMBINATION PACKAGE (EA) ORAL
COMMUNITY
Start: 2024-12-27

## 2025-01-10 RX ORDER — BUPROPION HYDROCHLORIDE 150 MG/1
1 TABLET, EXTENDED RELEASE ORAL 2 TIMES DAILY
COMMUNITY
Start: 2024-12-24

## 2025-01-10 NOTE — PROGRESS NOTES
"Name: Carey Keith      : 1975      MRN: [id number]  Encounter Provider: Christian Julio DO  Encounter Date: 1/10/2025   Encounter department: [facility] INTERNAL MEDICINE [place]  :  Medical history of bipolar 1 disorder, anxiety/depression, opioid use disorder on Suboxone   Assessment & Plan  Chronic back pain  Seen in the ER on 2024.  Complaining of acute on chronic back pain.  She was sent prescription for tizanidine which she has responded well to and tolerating without issue.  She has been taking up to 2 times a day as needed.  She requests refill to pharmacy.  She does not appear to have any significant weakness, gross sensory deficits, or myelopathic signs    Continue with activity as tolerated  Refill for tizanidine as needed sent to pharmacy      Orders:  •  tiZANidine (ZANAFLEX) 4 mg tablet; Take 1 tablet (4 mg total) by mouth every 8 (eight) hours as needed for muscle spasms    Screening for colorectal cancer  Patient due for colorectal cancer screening.  She is agreeable to referral to gastroenterology today  Orders:  •  Ambulatory Referral to Gastroenterology; Future    Encounter for screening mammogram for breast cancer  Patient due for screening mammogram.  Referral placed  Orders:  •  Mammo screening bilateral w 3d and cad; Future           History of Present Illness     HPI  Review of Systems    Objective   /80 (BP Location: Left arm, Patient Position: Sitting, Cuff Size: Standard)   Pulse 88   Temp 97.6 °F (36.4 °C) (Tympanic)   Resp 12   Ht 5' 1\" (1.549 m)   Wt 76.2 kg (168 lb)   BMI 31.74 kg/m²      Physical Exam  Cardiovascular:      Rate and Rhythm: Normal rate and regular rhythm.      Heart sounds: Normal heart sounds. No murmur heard.  Pulmonary:      Effort: Pulmonary effort is normal.      Breath sounds: Normal breath sounds. No wheezing, rhonchi or rales.         "

## 2025-01-16 ENCOUNTER — TELEPHONE (OUTPATIENT)
Age: 50
End: 2025-01-16

## 2025-01-16 NOTE — TELEPHONE ENCOUNTER
Patient called in regarding a refill on ondansetron - advised that I didn't see it on her meds list and went back to 2023. Patient stated that she thinks it's a reaction to one of her meds that her psychiatrist. Advised patient to call her psychiatrist and let them know and also advised that the ondansetron medication may have been from another doctor. Patient is going to to call her psychiatrist and let them know about the side effects from the meds. I offered and appt and patient declined.

## 2025-02-08 DIAGNOSIS — M54.9 CHRONIC BACK PAIN: ICD-10-CM

## 2025-02-08 DIAGNOSIS — G89.29 CHRONIC BACK PAIN: ICD-10-CM

## 2025-02-10 ENCOUNTER — HOSPITAL ENCOUNTER (EMERGENCY)
Facility: HOSPITAL | Age: 50
Discharge: HOME | End: 2025-02-10
Attending: EMERGENCY MEDICINE | Admitting: EMERGENCY MEDICINE
Payer: COMMERCIAL

## 2025-02-10 ENCOUNTER — APPOINTMENT (OUTPATIENT)
Dept: LAB | Facility: HOSPITAL | Age: 50
End: 2025-02-10
Payer: COMMERCIAL

## 2025-02-10 VITALS
TEMPERATURE: 97.5 F | DIASTOLIC BLOOD PRESSURE: 66 MMHG | SYSTOLIC BLOOD PRESSURE: 116 MMHG | OXYGEN SATURATION: 97 % | RESPIRATION RATE: 18 BRPM | HEART RATE: 75 BPM

## 2025-02-10 DIAGNOSIS — Z79.899 NEED FOR PROPHYLACTIC CHEMOTHERAPY: ICD-10-CM

## 2025-02-10 DIAGNOSIS — F31.62 MODERATE MIXED BIPOLAR I DISORDER (HCC): ICD-10-CM

## 2025-02-10 DIAGNOSIS — M54.9 BACK PAIN: Primary | ICD-10-CM

## 2025-02-10 DIAGNOSIS — F41.1 GENERALIZED ANXIETY DISORDER: ICD-10-CM

## 2025-02-10 LAB
ALBUMIN SERPL BCG-MCNC: 4.4 G/DL (ref 3.5–5)
ALP SERPL-CCNC: 65 U/L (ref 34–104)
ALT SERPL W P-5'-P-CCNC: 12 U/L (ref 7–52)
ANION GAP SERPL CALCULATED.3IONS-SCNC: 10 MMOL/L (ref 4–13)
AST SERPL W P-5'-P-CCNC: 16 U/L (ref 13–39)
BASOPHILS # BLD AUTO: 0.03 THOUSANDS/ÂΜL (ref 0–0.1)
BASOPHILS NFR BLD AUTO: 0 % (ref 0–1)
BILIRUB SERPL-MCNC: 0.6 MG/DL (ref 0.2–1)
BUN SERPL-MCNC: 18 MG/DL (ref 5–25)
CALCIUM SERPL-MCNC: 9.4 MG/DL (ref 8.4–10.2)
CHLORIDE SERPL-SCNC: 103 MMOL/L (ref 96–108)
CHOLEST SERPL-MCNC: 215 MG/DL (ref ?–200)
CO2 SERPL-SCNC: 25 MMOL/L (ref 21–32)
CREAT SERPL-MCNC: 0.83 MG/DL (ref 0.6–1.3)
EOSINOPHIL # BLD AUTO: 0 THOUSAND/ÂΜL (ref 0–0.61)
EOSINOPHIL NFR BLD AUTO: 0 % (ref 0–6)
ERYTHROCYTE [DISTWIDTH] IN BLOOD BY AUTOMATED COUNT: 12.9 % (ref 11.6–15.1)
GFR SERPL CREATININE-BSD FRML MDRD: 84 ML/MIN/1.73SQ M
GLUCOSE P FAST SERPL-MCNC: 84 MG/DL (ref 65–99)
HCT VFR BLD AUTO: 41.6 % (ref 34.8–46.1)
HDLC SERPL-MCNC: 78 MG/DL
HGB BLD-MCNC: 13.5 G/DL (ref 11.5–15.4)
IMM GRANULOCYTES # BLD AUTO: 0.03 THOUSAND/UL (ref 0–0.2)
IMM GRANULOCYTES NFR BLD AUTO: 0 % (ref 0–2)
LDLC SERPL CALC-MCNC: 124 MG/DL (ref 0–100)
LYMPHOCYTES # BLD AUTO: 2.32 THOUSANDS/ÂΜL (ref 0.6–4.47)
LYMPHOCYTES NFR BLD AUTO: 27 % (ref 14–44)
MCH RBC QN AUTO: 27.2 PG (ref 26.8–34.3)
MCHC RBC AUTO-ENTMCNC: 32.5 G/DL (ref 31.4–37.4)
MCV RBC AUTO: 84 FL (ref 82–98)
MONOCYTES # BLD AUTO: 0.48 THOUSAND/ÂΜL (ref 0.17–1.22)
MONOCYTES NFR BLD AUTO: 6 % (ref 4–12)
NEUTROPHILS # BLD AUTO: 5.67 THOUSANDS/ÂΜL (ref 1.85–7.62)
NEUTS SEG NFR BLD AUTO: 67 % (ref 43–75)
NONHDLC SERPL-MCNC: 137 MG/DL
NRBC BLD AUTO-RTO: 0 /100 WBCS
PLATELET # BLD AUTO: 287 THOUSANDS/UL (ref 149–390)
PMV BLD AUTO: 8.6 FL (ref 8.9–12.7)
POTASSIUM SERPL-SCNC: 4.2 MMOL/L (ref 3.5–5.3)
PROT SERPL-MCNC: 7.3 G/DL (ref 6.4–8.4)
RBC # BLD AUTO: 4.96 MILLION/UL (ref 3.81–5.12)
SODIUM SERPL-SCNC: 138 MMOL/L (ref 135–147)
TRIGL SERPL-MCNC: 64 MG/DL (ref ?–150)
WBC # BLD AUTO: 8.53 THOUSAND/UL (ref 4.31–10.16)

## 2025-02-10 PROCEDURE — 80053 COMPREHEN METABOLIC PANEL: CPT

## 2025-02-10 PROCEDURE — 99282 EMERGENCY DEPT VISIT SF MDM: CPT

## 2025-02-10 PROCEDURE — 36415 COLL VENOUS BLD VENIPUNCTURE: CPT

## 2025-02-10 PROCEDURE — 80061 LIPID PANEL: CPT

## 2025-02-10 PROCEDURE — 99284 EMERGENCY DEPT VISIT MOD MDM: CPT

## 2025-02-10 PROCEDURE — 85025 COMPLETE CBC W/AUTO DIFF WBC: CPT

## 2025-02-10 RX ORDER — GABAPENTIN 300 MG/1
300 CAPSULE ORAL 3 TIMES DAILY
Qty: 90 CAPSULE | Refills: 0 | Status: SHIPPED | OUTPATIENT
Start: 2025-02-10 | End: 2025-03-12

## 2025-02-10 RX ORDER — NAPROXEN 500 MG/1
500 TABLET ORAL 2 TIMES DAILY WITH MEALS
Qty: 30 TABLET | Refills: 0 | Status: SHIPPED | OUTPATIENT
Start: 2025-02-10

## 2025-02-10 RX ORDER — LIDOCAINE 50 MG/G
1 PATCH TOPICAL DAILY
Qty: 7 PATCH | Refills: 0 | Status: SHIPPED | OUTPATIENT
Start: 2025-02-10

## 2025-02-10 NOTE — DISCHARGE INSTRUCTIONS
Follow up with [facility]     Take Naprosyn as needed for pain. Do not take on an empty stomach. Do not combine with Motrin, Ibuprofen, or Advil (it is the same class of medication)

## 2025-02-10 NOTE — ED NOTES
See assessment done by provider.  Pt discharged by ED provider.  This RN not at the bedside.     Bharati Curry, PORFIRIO  02/10/25 5845

## 2025-02-10 NOTE — ED PROVIDER NOTES
Time reflects when diagnosis was documented in both MDM as applicable and the Disposition within this note       Time User Action Codes Description Comment    2/10/2025  4:33 PM Esteban Burton Add [M54.9] Back pain           ED Disposition       ED Disposition   Discharge    Condition   Stable    Date/Time   Mon Feb 10, 2025  4:33 PM    Comment   Carey Keith discharge to home/self care.                   Assessment & Plan       Medical Decision Making  Do not feel further imaging is warranted at this time.  Will change gabapentin prescription to 600 mg.  Will also prescribe Naprosyn and lidocaine patches.  Referral placed to comprehensive spine program.    I have discussed the plan to discharge pt from ED. The patient was discharged in stable condition.  Patient ambulated off the department.  Extensive return to emergency department precautions were discussed.  Follow up with appropriate providers including primary care physician was discussed.  Patient and/or their  primary decision maker expressed understanding.  Patient remained stable during entire emergency department stay.      Risk  Prescription drug management.             Medications - No data to display    ED Risk Strat Scores                                              History of Present Illness       Chief Complaint   Patient presents with    Back Pain     Pt c/o right sided back pain and tingling for months per pt.        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)     Alcohol abuse     Alcoholism (HCC)     Altered mental status 09/21/2022    Elevated LFTs 09/21/2022    Lower back pain     Self-injurious behavior     Substance abuse (HCC)       Past Surgical History:   Procedure Laterality Date    CHOLECYSTECTOMY        Family History   Problem Relation Age of Onset    Autism Mother     Heart disease Father     Stroke Father     Anxiety disorder Father     Heart disease Maternal Grandmother       Social History     Tobacco Use  "   Smoking status: Some Days     Current packs/day: 0.50     Average packs/day: 0.5 packs/day for 20.0 years (10.0 ttl pk-yrs)     Types: Cigarettes    Smokeless tobacco: Current    Tobacco comments:     Pt not ready to quit   Vaping Use    Vaping status: Every Day    Substances: Nicotine, THC, Flavoring   Substance Use Topics    Alcohol use: Not Currently     Comment: MAGDALENO, pt historically inconsistent. Drinking  per [facility] 2    Drug use: Not Currently     Types: Heroin, \"Crack\" cocaine, Cocaine, LSD, Benzodiazepines, Marijuana     Comment: Pt unreliable historian      E-Cigarette/Vaping    E-Cigarette Use Current Every Day User     Cartridges/Day 2       E-Cigarette/Vaping Substances    Nicotine Yes     THC Yes     CBD No     Flavoring Yes     Other No     Unknown No       I have reviewed and agree with the history as documented.     49-year-old female with past medical history of disc herniation status postlaminectomy presents today with right sided lower back pain for the past 4 months.  States that it starts on the right side lower back and radiates into her buttocks and then wraps around the front of her leg and goes down to her ankle.  States that Motrin does help with the pain.  Patient reports that she is also on gabapentin but is only taking 300 or 400 mg.  States that when she was on 600 it helped more.  Patient denies any saddle anesthesia, bowel or urinary incontinence or retention.  Her gait is not affected.          Review of Systems   Musculoskeletal:  Positive for back pain.           Objective       ED Triage Vitals [02/10/25 1529]   Temperature Pulse Blood Pressure Respirations SpO2 Patient Position - Orthostatic VS   97.5 °F (36.4 °C) 75 116/66 18 97 % --      Temp Source Heart Rate Source BP Location FiO2 (%) Pain Score    Oral Monitor Right arm -- --      Vitals      Date and Time Temp Pulse SpO2 Resp BP Pain Score FACES Pain Rating User   02/10/25 1529 97.5 °F (36.4 °C) 75 97 % 18 " 116/66 -- -- [facility]            Physical Exam  Vitals and nursing note reviewed.   Constitutional:       General: She is not in acute distress.     Appearance: Normal appearance. She is well-developed.   HENT:      Head: Normocephalic and atraumatic.   Eyes:      Conjunctiva/sclera: Conjunctivae normal.   Cardiovascular:      Rate and Rhythm: Normal rate.   Pulmonary:      Effort: Pulmonary effort is normal.   Musculoskeletal:         General: Tenderness present. No swelling. Normal range of motion.      Cervical back: Normal range of motion and neck supple.        Back:       Comments: Strength 5/5, sensation slightly decreased per pt.    Skin:     General: Skin is warm and dry.   Neurological:      Mental Status: She is alert.   Psychiatric:         Mood and Affect: Mood normal.         Behavior: Behavior normal.         Results Reviewed       None            No orders to display       Procedures    ED Medication and Procedure Management   Prior to Admission Medications   Prescriptions Last Dose Informant Patient Reported? Taking?   Cholecalciferol (VITAMIN D3) 1,000 units tablet   No No   Sig: Take 2 tablets (2,000 Units total) by mouth daily   OLANZapine (ZyPREXA) 5 mg tablet   No No   Sig: Take 1 tablet (5 mg total) by mouth 2 (two) times a day   Symbicort 160-4.5 MCG/ACT inhaler   Yes No   Sig: Inhale 2 puffs 2 (two) times a day   albuterol (Proventil HFA) 90 mcg/act inhaler   No No   Sig: Inhale 2 puffs every 6 (six) hours as needed for wheezing   buPROPion (WELLBUTRIN SR) 150 mg 12 hr tablet   Yes No   Sig: Take 1 tablet by mouth 2 (two) times a day   buprenorphine-naloxone (Suboxone) 8-2 mg   Yes No   clonazePAM (KlonoPIN) 0.5 mg tablet   No No   Sig: Take 1 tablet (0.5 mg total) by mouth daily at bedtime for 10 days   folic acid (FOLVITE) 1 mg tablet   No No   Sig: Take 1 tablet (1 mg total) by mouth daily   terbinafine (LamISIL) 1 % cream   Yes No   tiZANidine (ZANAFLEX) 4 mg tablet   No No   Sig: Take 1  tablet (4 mg total) by mouth every 8 (eight) hours as needed for muscle spasms   traZODone (DESYREL) 100 mg tablet   No No   Sig: Take 1 tablet (100 mg total) by mouth daily at bedtime as needed for sleep for up to 10 doses      Facility-Administered Medications: None     Patient's Medications   Discharge Prescriptions    GABAPENTIN (NEURONTIN) 300 MG CAPSULE    Take 1 capsule (300 mg total) by mouth 3 (three) times a day       Start Date: 2/10/2025 End Date: 3/12/2025       Order Dose: 300 mg       Quantity: 90 capsule    Refills: 0    LIDOCAINE (LIDODERM) 5 %    Apply 1 patch topically over 12 hours daily Remove & Discard patch within 12 hours or as directed by MD       Start Date: 2/10/2025 End Date: --       Order Dose: 1 patch       Quantity: 7 patch    Refills: 0    NAPROXEN (NAPROSYN) 500 MG TABLET    Take 1 tablet (500 mg total) by mouth 2 (two) times a day with meals       Start Date: 2/10/2025 End Date: --       Order Dose: 500 mg       Quantity: 30 tablet    Refills: 0       ED SEPSIS DOCUMENTATION   Time reflects when diagnosis was documented in both MDM as applicable and the Disposition within this note       Time User Action Codes Description Comment    2/10/2025  4:33 PM Esteban Burton Add [M54.9] Back pain                  Esteban Burton PA-C  02/10/25 1635       Esteban Burton PA-C  02/10/25 1647

## 2025-02-11 ENCOUNTER — TELEPHONE (OUTPATIENT)
Dept: PHYSICAL THERAPY | Facility: OTHER | Age: 50
End: 2025-02-11

## 2025-02-11 NOTE — TELEPHONE ENCOUNTER
Call placed to the patient per [facility] referral.    Left a vague message with [facility] name, phone number and hours. Outgoing message stated it was for AJ    Phone number in chart is listed as temp    This is the 1st attempt to reach the patient.  Will defer per protocol.

## 2025-02-18 NOTE — TELEPHONE ENCOUNTER
Call placed to the patient per [facility] referral.     V/M left for patient to call back. Phone number and hours of business provided.     Phone number in chart is listed as temporary.     This is the 2nd attempt to reach the patient.  Will defer per protocol.

## 2025-02-26 ENCOUNTER — OFFICE VISIT (OUTPATIENT)
Dept: INTERNAL MEDICINE CLINIC | Facility: CLINIC | Age: 50
End: 2025-02-26
Payer: COMMERCIAL

## 2025-02-26 VITALS
HEART RATE: 86 BPM | TEMPERATURE: 97.5 F | DIASTOLIC BLOOD PRESSURE: 70 MMHG | WEIGHT: 184.2 LBS | OXYGEN SATURATION: 96 % | BODY MASS INDEX: 34.78 KG/M2 | SYSTOLIC BLOOD PRESSURE: 118 MMHG | HEIGHT: 61 IN

## 2025-02-26 DIAGNOSIS — Z12.31 ENCOUNTER FOR SCREENING MAMMOGRAM FOR BREAST CANCER: ICD-10-CM

## 2025-02-26 DIAGNOSIS — Z00.00 ANNUAL PHYSICAL EXAM: Primary | ICD-10-CM

## 2025-02-26 DIAGNOSIS — E78.5 DYSLIPIDEMIA: ICD-10-CM

## 2025-02-26 DIAGNOSIS — M54.9 CHRONIC BACK PAIN: ICD-10-CM

## 2025-02-26 DIAGNOSIS — G89.29 CHRONIC BACK PAIN: ICD-10-CM

## 2025-02-26 DIAGNOSIS — R79.89 ABNORMAL TSH: ICD-10-CM

## 2025-02-26 DIAGNOSIS — R11.0 NAUSEA: ICD-10-CM

## 2025-02-26 DIAGNOSIS — J45.909 PERSISTENT ASTHMA WITHOUT COMPLICATION, UNSPECIFIED ASTHMA SEVERITY: ICD-10-CM

## 2025-02-26 PROCEDURE — 99396 PREV VISIT EST AGE 40-64: CPT | Performed by: INTERNAL MEDICINE

## 2025-02-26 RX ORDER — GABAPENTIN 600 MG/1
600 TABLET ORAL 3 TIMES DAILY
COMMUNITY
Start: 2025-02-12

## 2025-02-26 RX ORDER — BUDESONIDE AND FORMOTEROL FUMARATE DIHYDRATE 160; 4.5 UG/1; UG/1
2 AEROSOL RESPIRATORY (INHALATION) 2 TIMES DAILY
Qty: 10.2 G | Refills: 2 | Status: SHIPPED | OUTPATIENT
Start: 2025-02-26

## 2025-02-26 RX ORDER — ONDANSETRON 4 MG/1
4 TABLET, ORALLY DISINTEGRATING ORAL EVERY 6 HOURS PRN
COMMUNITY
Start: 2025-01-22 | End: 2025-02-26 | Stop reason: SDUPTHER

## 2025-02-26 RX ORDER — ONDANSETRON 4 MG/1
4 TABLET, ORALLY DISINTEGRATING ORAL EVERY 6 HOURS PRN
Qty: 30 TABLET | Refills: 1 | Status: SHIPPED | OUTPATIENT
Start: 2025-02-26

## 2025-02-26 NOTE — PROGRESS NOTES
"Adult Annual Physical  Name: Carey Keith      : 1975      MRN: [id number]  Encounter Provider: Christian Julio DO  Encounter Date: 2025   Encounter department: [facility] INTERNAL MEDICINE [place]    Medical history of bipolar 1 disorder, anxiety/depression, opioid use disorder on Suboxone     Here today for annual physical    Due for colorectal cancer screening.  Previously referred to GI.  She states that she has \"kit\" at home, presumably Cologuard.  I encouraged her to complete this    Due for breast cancer screening.  Order again placed for screening mammogram    Encouraged her to follow-up with gynecology for well woman exam/Pap smear    No findings of concern on exam today    Reviewed labs from 2/10/2025.  Mildly elevated LDL at 124.  Recommend heart healthy diet, regular exercise.  Will reassess prior to follow-up in the summer  Assessment & Plan  Chronic back pain    Orders:  •  tiZANidine (ZANAFLEX) 4 mg tablet; Take 1 tablet (4 mg total) by mouth every 8 (eight) hours as needed for muscle spasms    Annual physical exam         Persistent asthma without complication, unspecified asthma severity  Stable  Continue Symbicort.  Refill sent to pharmacy   Continue albuterol as needed    Orders:  •  Symbicort 160-4.5 MCG/ACT inhaler; Inhale 2 puffs 2 (two) times a day    Nausea    Orders:  •  ondansetron (ZOFRAN-ODT) 4 mg disintegrating tablet; Take 1 tablet (4 mg total) by mouth every 6 (six) hours as needed for nausea    Encounter for screening mammogram for breast cancer    Orders:  •  Mammo screening bilateral w 3d and cad; Future    Abnormal TSH  TSH 5.2024.  Free T4 within normal limits.  TSH had previously been in the normal range.  She reports a family history of thyroid disease    Recheck TSH prior to follow-up       Dyslipidemia  Continue to monitor off statin.  10-year ASCVD risk score is low  Orders:  •  TSH, 3rd generation with Free T4 reflex; Future  •  Basic " "metabolic panel; Future  •  Lipid Panel with Direct LDL reflex; Future      Immunizations and preventive care screenings were discussed with patient today. Appropriate education was printed on patient's after visit summary.    Counseling:  Alcohol/drug use: discussed moderation in alcohol intake, the recommendations for healthy alcohol use, and avoidance of illicit drug use.  Dental Health: discussed importance of regular tooth brushing, flossing, and dental visits.  Exercise: the importance of regular exercise/physical activity was discussed. Recommend exercise 3-5 times per week for at least 30 minutes.          History of Present Illness     Adult Annual Physical:  Patient presents for annual physical.     Diet and Physical Activity:  - Diet/Nutrition: well balanced diet.  - Exercise: walking.    General Health:  - Sleep: sleeps well.  - Hearing: normal hearing right ear and normal hearing left ear.  - Vision: no vision problems.  - Dental: regular dental visits.    Review of Systems      Objective   /70 (BP Location: Left arm, Patient Position: Sitting)   Pulse 86   Temp 97.5 °F (36.4 °C) (Tympanic)   Ht 5' 1\" (1.549 m)   Wt 83.6 kg (184 lb 3.2 oz)   SpO2 96%   BMI 34.80 kg/m²     Physical Exam  Constitutional:       Appearance: Normal appearance. She is not ill-appearing.   HENT:      Head: Normocephalic and atraumatic.   Eyes:      General: No scleral icterus.        Right eye: No discharge.         Left eye: No discharge.   Cardiovascular:      Rate and Rhythm: Normal rate and regular rhythm.      Heart sounds: No murmur heard.     No friction rub.   Pulmonary:      Effort: Pulmonary effort is normal.      Breath sounds: Normal breath sounds. No wheezing or rales.   Abdominal:      General: Abdomen is flat. There is no distension.      Palpations: Abdomen is soft.      Tenderness: There is no abdominal tenderness.   Musculoskeletal:         General: No swelling, tenderness or deformity.   Skin:    "  General: Skin is warm and dry.      Findings: No erythema.   Neurological:      Mental Status: She is alert and oriented to person, place, and time. Mental status is at baseline.      Motor: No weakness.   Psychiatric:         Mood and Affect: Mood normal.         Behavior: Behavior normal.

## 2025-02-26 NOTE — ASSESSMENT & PLAN NOTE
Stable  Continue Symbicort.  Refill sent to pharmacy   Continue albuterol as needed    Orders:  •  Symbicort 160-4.5 MCG/ACT inhaler; Inhale 2 puffs 2 (two) times a day

## 2025-02-26 NOTE — PATIENT INSTRUCTIONS
"Patient Education     Routine physical for adults   The Basics   Written by the doctors and editors at [facility]   What is a physical? -- A physical is a routine visit, or \"check-up,\" with your doctor. You might also hear it called a \"wellness visit\" or \"preventive visit.\"  During each visit, the doctor will:   Ask about your physical and mental health   Ask about your habits, behaviors, and lifestyle   Do an exam   Give you vaccines if needed   Talk to you about any medicines you take   Give advice about your health   Answer your questions  Getting regular check-ups is an important part of taking care of your health. It can help your doctor find and treat any problems you have. But it's also important for preventing health problems.  A routine physical is different from a \"sick visit.\" A sick visit is when you see a doctor because of a health concern or problem. Since physicals are scheduled ahead of time, you can think about what you want to ask the doctor.  How often should I get a physical? -- It depends on your age and health. In general, for people age 21 years and older:   If you are younger than 50 years, you might be able to get a physical every 3 years.   If you are 50 years or older, your doctor might recommend a physical every year.  If you have an ongoing health condition, like diabetes or high blood pressure, your doctor will probably want to see you more often.  What happens during a physical? -- In general, each visit will include:   Physical exam - The doctor or nurse will check your height, weight, heart rate, and blood pressure. They will also look at your eyes and ears. They will ask about how you are feeling and whether you have any symptoms that bother you.   Medicines - It's a good idea to bring a list of all the medicines you take to each doctor visit. Your doctor will talk to you about your medicines and answer any questions. Tell them if you are having any side effects that bother you. You " "should also tell them if you are having trouble paying for any of your medicines.   Habits and behaviors - This includes:   Your diet   Your exercise habits   Whether you smoke, drink alcohol, or use drugs   Whether you are sexually active   Whether you feel safe at home  Your doctor will talk to you about things you can do to improve your health and lower your risk of health problems. They will also offer help and support. For example, if you want to quit smoking, they can give you advice and might prescribe medicines. If you want to improve your diet or get more physical activity, they can help you with this, too.   Lab tests, if needed - The tests you get will depend on your age and situation. For example, your doctor might want to check your:   Cholesterol   Blood sugar   Iron level   Vaccines - The recommended vaccines will depend on your age, health, and what vaccines you already had. Vaccines are very important because they can prevent certain serious or deadly infections.   Discussion of screening - \"Screening\" means checking for diseases or other health problems before they cause symptoms. Your doctor can recommend screening based on your age, risk, and preferences. This might include tests to check for:   Cancer, such as breast, prostate, cervical, ovarian, colorectal, prostate, lung, or skin cancer   Sexually transmitted infections, such as chlamydia and gonorrhea   Mental health conditions like depression and anxiety  Your doctor will talk to you about the different types of screening tests. They can help you decide which screenings to have. They can also explain what the results might mean.   Answering questions - The physical is a good time to ask the doctor or nurse questions about your health. If needed, they can refer you to other doctors or specialists, too.  Adults older than 65 years often need other care, too. As you get older, your doctor will talk to you about:   How to prevent falling at " home   Hearing or vision tests   Memory testing   How to take your medicines safely   Making sure that you have the help and support you need at home  All topics are updated as new evidence becomes available and our peer review process is complete.  This topic retrieved from [employer] on: May 02, 2024.  Topic 038334 Version 1.0  Release: 32.4.3 - C32.122  © 2024 UpToDate, Inc. and/or its affiliates. All rights reserved.  Consumer Information Use and Disclaimer   Disclaimer: This generalized information is a limited summary of diagnosis, treatment, and/or medication information. It is not meant to be comprehensive and should be used as a tool to help the user understand and/or assess potential diagnostic and treatment options. It does NOT include all information about conditions, treatments, medications, side effects, or risks that may apply to a specific patient. It is not intended to be medical advice or a substitute for the medical advice, diagnosis, or treatment of a health care provider based on the health care provider's examination and assessment of a patient's specific and unique circumstances. Patients must speak with a health care provider for complete information about their health, medical questions, and treatment options, including any risks or benefits regarding use of medications. This information does not endorse any treatments or medications as safe, effective, or approved for treating a specific patient. Up[employer]Date, Inc. and its affiliates disclaim any warranty or liability relating to this information or the use thereof.The use of this information is governed by the Terms of Use, available at https://www.woltersGuardlyuwer.com/en/know/clinical-effectiveness-terms. 2024© UpToDate, Inc. and its affiliates and/or licensors. All rights reserved.  Copyright   © 2024 Up[employer]Date, Inc. and/or its affiliates. All rights reserved.

## 2025-03-01 ENCOUNTER — APPOINTMENT (EMERGENCY)
Dept: CT IMAGING | Facility: HOSPITAL | Age: 50
End: 2025-03-01
Payer: COMMERCIAL

## 2025-03-01 ENCOUNTER — HOSPITAL ENCOUNTER (EMERGENCY)
Facility: HOSPITAL | Age: 50
Discharge: HOME | End: 2025-03-01
Attending: EMERGENCY MEDICINE | Admitting: EMERGENCY MEDICINE
Payer: COMMERCIAL

## 2025-03-01 VITALS
BODY MASS INDEX: 34.62 KG/M2 | HEART RATE: 86 BPM | RESPIRATION RATE: 18 BRPM | DIASTOLIC BLOOD PRESSURE: 75 MMHG | WEIGHT: 183.2 LBS | SYSTOLIC BLOOD PRESSURE: 124 MMHG | OXYGEN SATURATION: 94 % | TEMPERATURE: 99 F

## 2025-03-01 DIAGNOSIS — R59.0 MEDIASTINAL LYMPHADENOPATHY: ICD-10-CM

## 2025-03-01 DIAGNOSIS — J18.9 PNEUMONIA: Primary | ICD-10-CM

## 2025-03-01 LAB
ALBUMIN SERPL BCG-MCNC: 3.6 G/DL (ref 3.5–5)
ALP SERPL-CCNC: 113 U/L (ref 34–104)
ALT SERPL W P-5'-P-CCNC: 20 U/L (ref 7–52)
AMPHETAMINES SERPL QL SCN: NEGATIVE
ANION GAP SERPL CALCULATED.3IONS-SCNC: 8 MMOL/L (ref 4–13)
AST SERPL W P-5'-P-CCNC: 16 U/L (ref 13–39)
BACTERIA UR QL AUTO: ABNORMAL /HPF
BARBITURATES UR QL: NEGATIVE
BASOPHILS # BLD AUTO: 0.04 THOUSANDS/ÂΜL (ref 0–0.1)
BASOPHILS NFR BLD AUTO: 0 % (ref 0–1)
BENZODIAZ UR QL: NEGATIVE
BILIRUB SERPL-MCNC: 0.61 MG/DL (ref 0.2–1)
BILIRUB UR QL STRIP: NEGATIVE
BUN SERPL-MCNC: 11 MG/DL (ref 5–25)
CALCIUM SERPL-MCNC: 8.9 MG/DL (ref 8.4–10.2)
CHLORIDE SERPL-SCNC: 102 MMOL/L (ref 96–108)
CLARITY UR: CLEAR
CO2 SERPL-SCNC: 25 MMOL/L (ref 21–32)
COCAINE UR QL: NEGATIVE
COLOR UR: YELLOW
CREAT SERPL-MCNC: 0.66 MG/DL (ref 0.6–1.3)
D DIMER PPP FEU-MCNC: 1.35 UG/ML FEU
EOSINOPHIL # BLD AUTO: 0 THOUSAND/ÂΜL (ref 0–0.61)
EOSINOPHIL NFR BLD AUTO: 0 % (ref 0–6)
ERYTHROCYTE [DISTWIDTH] IN BLOOD BY AUTOMATED COUNT: 13.3 % (ref 11.6–15.1)
EXT PREGNANCY TEST URINE: NEGATIVE
EXT. CONTROL: NORMAL
FENTANYL UR QL SCN: NEGATIVE
FLUAV RNA RESP QL NAA+PROBE: NEGATIVE
FLUBV RNA RESP QL NAA+PROBE: NEGATIVE
GFR SERPL CREATININE-BSD FRML MDRD: 105 ML/MIN/1.73SQ M
GLUCOSE SERPL-MCNC: 82 MG/DL (ref 65–140)
GLUCOSE UR STRIP-MCNC: NEGATIVE MG/DL
HCT VFR BLD AUTO: 34.4 % (ref 34.8–46.1)
HGB BLD-MCNC: 11 G/DL (ref 11.5–15.4)
HGB UR QL STRIP.AUTO: ABNORMAL
HYDROCODONE UR QL SCN: NEGATIVE
IMM GRANULOCYTES # BLD AUTO: 0.07 THOUSAND/UL (ref 0–0.2)
IMM GRANULOCYTES NFR BLD AUTO: 1 % (ref 0–2)
KETONES UR STRIP-MCNC: NEGATIVE MG/DL
LEUKOCYTE ESTERASE UR QL STRIP: NEGATIVE
LIPASE SERPL-CCNC: <6 U/L (ref 11–82)
LYMPHOCYTES # BLD AUTO: 1.83 THOUSANDS/ÂΜL (ref 0.6–4.47)
LYMPHOCYTES NFR BLD AUTO: 13 % (ref 14–44)
MCH RBC QN AUTO: 26.9 PG (ref 26.8–34.3)
MCHC RBC AUTO-ENTMCNC: 32 G/DL (ref 31.4–37.4)
MCV RBC AUTO: 84 FL (ref 82–98)
METHADONE UR QL: NEGATIVE
MONOCYTES # BLD AUTO: 0.98 THOUSAND/ÂΜL (ref 0.17–1.22)
MONOCYTES NFR BLD AUTO: 7 % (ref 4–12)
NEUTROPHILS # BLD AUTO: 11.69 THOUSANDS/ÂΜL (ref 1.85–7.62)
NEUTS SEG NFR BLD AUTO: 79 % (ref 43–75)
NITRITE UR QL STRIP: NEGATIVE
NON-SQ EPI CELLS URNS QL MICRO: ABNORMAL /HPF
NRBC BLD AUTO-RTO: 0 /100 WBCS
OPIATES UR QL SCN: NEGATIVE
OXYCODONE+OXYMORPHONE UR QL SCN: NEGATIVE
PCP UR QL: NEGATIVE
PH UR STRIP.AUTO: 7 [PH]
PLATELET # BLD AUTO: 372 THOUSANDS/UL (ref 149–390)
PMV BLD AUTO: 8.1 FL (ref 8.9–12.7)
POTASSIUM SERPL-SCNC: 4.2 MMOL/L (ref 3.5–5.3)
PROT SERPL-MCNC: 7.2 G/DL (ref 6.4–8.4)
PROT UR STRIP-MCNC: ABNORMAL MG/DL
RBC # BLD AUTO: 4.09 MILLION/UL (ref 3.81–5.12)
RBC #/AREA URNS AUTO: ABNORMAL /HPF
RSV RNA RESP QL NAA+PROBE: NEGATIVE
SARS-COV-2 RNA RESP QL NAA+PROBE: NEGATIVE
SODIUM SERPL-SCNC: 135 MMOL/L (ref 135–147)
SP GR UR STRIP.AUTO: 1.02 (ref 1–1.03)
THC UR QL: NEGATIVE
UROBILINOGEN UR STRIP-ACNC: 4 MG/DL
WBC # BLD AUTO: 14.61 THOUSAND/UL (ref 4.31–10.16)
WBC #/AREA URNS AUTO: ABNORMAL /HPF

## 2025-03-01 PROCEDURE — 81001 URINALYSIS AUTO W/SCOPE: CPT | Performed by: EMERGENCY MEDICINE

## 2025-03-01 PROCEDURE — 85379 FIBRIN DEGRADATION QUANT: CPT | Performed by: EMERGENCY MEDICINE

## 2025-03-01 PROCEDURE — 80053 COMPREHEN METABOLIC PANEL: CPT | Performed by: EMERGENCY MEDICINE

## 2025-03-01 PROCEDURE — 96374 THER/PROPH/DIAG INJ IV PUSH: CPT

## 2025-03-01 PROCEDURE — 0241U HB NFCT DS VIR RESP RNA 4 TRGT: CPT | Performed by: EMERGENCY MEDICINE

## 2025-03-01 PROCEDURE — 80307 DRUG TEST PRSMV CHEM ANLYZR: CPT | Performed by: EMERGENCY MEDICINE

## 2025-03-01 PROCEDURE — 99285 EMERGENCY DEPT VISIT HI MDM: CPT

## 2025-03-01 PROCEDURE — 96361 HYDRATE IV INFUSION ADD-ON: CPT

## 2025-03-01 PROCEDURE — 99285 EMERGENCY DEPT VISIT HI MDM: CPT | Performed by: EMERGENCY MEDICINE

## 2025-03-01 PROCEDURE — 36415 COLL VENOUS BLD VENIPUNCTURE: CPT | Performed by: EMERGENCY MEDICINE

## 2025-03-01 PROCEDURE — 96375 TX/PRO/DX INJ NEW DRUG ADDON: CPT

## 2025-03-01 PROCEDURE — 81025 URINE PREGNANCY TEST: CPT | Performed by: EMERGENCY MEDICINE

## 2025-03-01 PROCEDURE — 85025 COMPLETE CBC W/AUTO DIFF WBC: CPT | Performed by: EMERGENCY MEDICINE

## 2025-03-01 PROCEDURE — 71275 CT ANGIOGRAPHY CHEST: CPT

## 2025-03-01 PROCEDURE — 83690 ASSAY OF LIPASE: CPT | Performed by: EMERGENCY MEDICINE

## 2025-03-01 RX ORDER — CLONAZEPAM 1 MG/1
1 TABLET ORAL ONCE
Status: COMPLETED | OUTPATIENT
Start: 2025-03-01 | End: 2025-03-01

## 2025-03-01 RX ORDER — CLONAZEPAM 0.5 MG/1
0.5 TABLET ORAL ONCE
Status: DISCONTINUED | OUTPATIENT
Start: 2025-03-01 | End: 2025-03-01

## 2025-03-01 RX ORDER — KETOROLAC TROMETHAMINE 30 MG/ML
15 INJECTION, SOLUTION INTRAMUSCULAR; INTRAVENOUS ONCE
Status: COMPLETED | OUTPATIENT
Start: 2025-03-01 | End: 2025-03-01

## 2025-03-01 RX ORDER — LORAZEPAM 2 MG/ML
1 INJECTION INTRAMUSCULAR ONCE
Status: COMPLETED | OUTPATIENT
Start: 2025-03-01 | End: 2025-03-01

## 2025-03-01 RX ADMIN — AMOXICILLIN AND CLAVULANATE POTASSIUM 1 TABLET: 875; 125 TABLET, COATED ORAL at 21:09

## 2025-03-01 RX ADMIN — SODIUM CHLORIDE 1000 ML: 0.9 INJECTION, SOLUTION INTRAVENOUS at 16:54

## 2025-03-01 RX ADMIN — KETOROLAC TROMETHAMINE 15 MG: 30 INJECTION, SOLUTION INTRAMUSCULAR; INTRAVENOUS at 16:57

## 2025-03-01 RX ADMIN — CLONAZEPAM 1 MG: 1 TABLET ORAL at 18:39

## 2025-03-01 RX ADMIN — IOHEXOL 85 ML: 350 INJECTION, SOLUTION INTRAVENOUS at 18:57

## 2025-03-01 RX ADMIN — LORAZEPAM 1 MG: 2 INJECTION INTRAMUSCULAR; INTRAVENOUS at 16:59

## 2025-03-01 NOTE — ED PROVIDER NOTES
Time reflects when diagnosis was documented in both MDM as applicable and the Disposition within this note       Time User Action Codes Description Comment    3/1/2025  8:58 PM Radha Ang Add [R59.0] Mediastinal lymphadenopathy     3/1/2025  8:58 PM Radha Ang Add [J18.9] Pneumonia     3/1/2025  8:58 PM Radha Ang Modify [R59.0] Mediastinal lymphadenopathy     3/1/2025  8:58 PM Radha Ang Modify [J18.9] Pneumonia           ED Disposition       ED Disposition   Discharge    Condition   Stable    Date/Time   Sat Mar 1, 2025  8:58 PM    Comment   Carey Keith discharge to home/self care.                   Assessment & Plan       Medical Decision Making  Amount and/or Complexity of Data Reviewed  Labs: ordered. Decision-making details documented in ED Course.  Radiology: ordered.    Risk  Prescription drug management.      Amount and/or Complexity of Data Reviewed  Clinical lab tests: ordered and reviewed  Reviewed past medical records: yes    History Provided by patient    Differential considered: bronchitis vs PE vs PNA    Consideration of tests:  Labs looking for any acute changes to wbc count or any acute electrolyte abnormalities. D Dimer for concern for PE    D dimer positive. PE study negative, but shows mediastinal lymphadenopathy and a pneumonia. Treated with abx. Well appearing otherwise, stable for outpatient follow up at this time. Pulm (for mediastinal lymphadenopathy / concern for malignancy) and PCP follow up.    The patient was instructed to follow up as documented. Strict return precautions were discussed with the patient and the patient was instructed to return to the emergency department immediately if symptoms worsen. The patient/patient family member acknowledged and were in agreement with plan.       ED Course as of 03/02/25 0044   Sat Mar 01, 2025   1834 Blood, UA(!): Large  On her period   2035 WBC(!): 14.61       Medications   LORazepam (ATIVAN) injection 1 mg (1 mg Intravenous Given  3/1/25 1659)   sodium chloride 0.9 % bolus 1,000 mL (0 mL Intravenous Stopped 3/1/25 1754)   ketorolac (TORADOL) injection 15 mg (15 mg Intravenous Given 3/1/25 1657)   clonazePAM (KlonoPIN) tablet 1 mg (1 mg Oral Given 3/1/25 1839)   iohexol (OMNIPAQUE) 350 MG/ML injection (MULTI-DOSE) 85 mL (85 mL Intravenous Given 3/1/25 1857)   amoxicillin-clavulanate (AUGMENTIN) 875-125 mg per tablet 1 tablet (1 tablet Oral Given 3/1/25 2109)       ED Risk Strat Scores                                                History of Present Illness       Chief Complaint   Patient presents with    Hemoptysis     Reports cough and headache x2 this am coughing up blood     50 yo F hx of substance abuse, alcohol abuse, depression anxiety, presents to ed for eval of cough and blood in sputum. Coughing for about one week. Just noticed blood in sputum today, so presented to ed. Right now she feels like her normal self. No fevers, but has hot flashes. + congestion. Tried claritan with no relief.   On her period    Smokes 10 cigs a day.  Last alcohol use a year ago    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)     Alcohol abuse     Alcoholism (HCC)     Altered mental status 09/21/2022    Elevated LFTs 09/21/2022    Lower back pain     Self-injurious behavior     Substance abuse (HCC)       Past Surgical History:   Procedure Laterality Date    CHOLECYSTECTOMY        Family History   Problem Relation Age of Onset    Autism Mother     Heart disease Father     Stroke Father     Anxiety disorder Father     Heart disease Maternal Grandmother       Social History     Tobacco Use    Smoking status: Some Days     Current packs/day: 0.50     Average packs/day: 0.5 packs/day for 20.0 years (10.0 ttl pk-yrs)     Types: Cigarettes     Passive exposure: Never    Smokeless tobacco: Current    Tobacco comments:     Pt not ready to quit   Vaping Use    Vaping status: Every Day    Substances: Nicotine, THC, Flavoring   Substance Use  "Topics    Alcohol use: Not Currently     Comment: MAGDALENO, pt historically inconsistent. Drinking  per [facility] 2    Drug use: Not Currently     Types: Heroin, \"Crack\" cocaine, Cocaine, LSD, Benzodiazepines, Marijuana     Comment: Pt unreliable historian      E-Cigarette/Vaping    E-Cigarette Use Current Every Day User     Cartridges/Day 2       E-Cigarette/Vaping Substances    Nicotine Yes     THC Yes     CBD No     Flavoring Yes     Other No     Unknown No       I have reviewed and agree with the history as documented.     HPI    Review of Systems   Constitutional:  Negative for chills, fatigue and fever.   HENT:  Positive for congestion and sore throat.    Eyes:  Negative for redness and visual disturbance.   Respiratory:  Positive for cough. Negative for shortness of breath.    Cardiovascular:  Negative for chest pain.   Gastrointestinal:  Negative for abdominal pain, diarrhea and nausea.   Genitourinary:  Negative for difficulty urinating, dysuria and pelvic pain.   Musculoskeletal:  Negative for back pain.   Skin:  Negative for rash.   Neurological:  Negative for syncope, weakness and headaches.   All other systems reviewed and are negative.          Objective       ED Triage Vitals [03/01/25 1538]   Temperature Pulse Blood Pressure Respirations SpO2 Patient Position - Orthostatic VS   99 °F (37.2 °C) 88 118/91 18 97 % Sitting      Temp Source Heart Rate Source BP Location FiO2 (%) Pain Score    Oral Monitor Right arm -- 7      Vitals      Date and Time Temp Pulse SpO2 Resp BP Pain Score FACES Pain Rating User   03/01/25 1919 -- 86 94 % -- 124/75 -- -- HI   03/01/25 1657 -- -- -- -- -- 5 -- EC   03/01/25 1538 99 °F (37.2 °C) 88 97 % 18 118/91 7 -- LAP            Physical Exam  Vitals and nursing note reviewed.   Constitutional:       General: She is not in acute distress.  HENT:      Head: Normocephalic and atraumatic.      Right Ear: External ear normal.      Left Ear: External ear normal.   Eyes:      " Extraocular Movements: Extraocular movements intact.      Conjunctiva/sclera: Conjunctivae normal.   Cardiovascular:      Rate and Rhythm: Normal rate and regular rhythm.      Heart sounds: Normal heart sounds.   Pulmonary:      Effort: Pulmonary effort is normal. No respiratory distress.      Breath sounds: Normal breath sounds.   Abdominal:      General: Abdomen is flat.      Tenderness: There is no abdominal tenderness.   Musculoskeletal:         General: Normal range of motion.      Cervical back: Normal range of motion.   Skin:     General: Skin is warm and dry.   Neurological:      Mental Status: She is alert and oriented to person, place, and time.      Cranial Nerves: No cranial nerve deficit.      Motor: No abnormal muscle tone.      Coordination: Coordination normal.         Results Reviewed       Procedure Component Value Units Date/Time    FLU/RSV/COVID - if FLU/RSV clinically relevant (2hr TAT) [[id number]]  (Normal) Collected: 03/01/25 1638    Lab Status: Final result Specimen: Nares from Nose Updated: 03/01/25 1724     SARS-CoV-2 Negative     INFLUENZA A PCR Negative     INFLUENZA B PCR Negative     RSV PCR Negative    Narrative:      This test has been performed using the CoV-2/Flu/RSV plus assay on the [employer] GeneXpert platform. This test has been validated by the  and verified by the performing laboratory.     This test is designed to amplify and detect the following: nucleocapsid (N), envelope (E), and RNA-dependent RNA polymerase (RdRP) genes of the SARS-CoV-2 genome; matrix (M), basic polymerase (PB2), and acidic protein (PA) segments of the influenza A genome; matrix (M) and non-structural protein (NS) segments of the influenza B genome, and the nucleocapsid genes of RSV A and RSV B.     Positive results are indicative of the presence of Flu A, Flu B, RSV, and/or SARS-CoV-2 RNA. Positive results for SARS-CoV-2 or suspected novel influenza should be reported to state, local, or  [facility] health departments according to local reporting requirements.      All results should be assessed in conjunction with clinical presentation and other laboratory markers for clinical management.     FOR PEDIATRIC PATIENTS - copy/paste COVID Guidelines URL to browser: [email or web address]       Comprehensive metabolic panel [[id number]]  (Abnormal) Collected: 03/01/25 1653    Lab Status: Final result Specimen: Blood from Arm, Left Updated: 03/01/25 1721     Sodium 135 mmol/L      Potassium 4.2 mmol/L      Chloride 102 mmol/L      CO2 25 mmol/L      ANION GAP 8 mmol/L      BUN 11 mg/dL      Creatinine 0.66 mg/dL      Glucose 82 mg/dL      Calcium 8.9 mg/dL      AST 16 U/L      ALT 20 U/L      Alkaline Phosphatase 113 U/L      Total Protein 7.2 g/dL      Albumin 3.6 g/dL      Total Bilirubin 0.61 mg/dL      eGFR 105 ml/min/1.73sq m     Narrative:      National Kidney Disease Foundation guidelines for Chronic Kidney Disease (CKD):     Stage 1 with normal or high GFR (GFR > 90 mL/min/1.73 square meters)    Stage 2 Mild CKD (GFR = 60-89 mL/min/1.73 square meters)    Stage 3A Moderate CKD (GFR = 45-59 mL/min/1.73 square meters)    Stage 3B Moderate CKD (GFR = 30-44 mL/min/1.73 square meters)    Stage 4 Severe CKD (GFR = 15-29 mL/min/1.73 square meters)    Stage 5 End Stage CKD (GFR <15 mL/min/1.73 square meters)  Note: GFR calculation is accurate only with a steady state creatinine    Lipase [[id number]]  (Abnormal) Collected: 03/01/25 1653    Lab Status: Final result Specimen: Blood from Arm, Left Updated: 03/01/25 1721     Lipase <6 u/L     D-Dimer [[id number]]  (Abnormal) Collected: 03/01/25 1653    Lab Status: Final result Specimen: Blood from Arm, Left Updated: 03/01/25 1720     D-Dimer, Quant 1.35 ug/ml FEU     Urine Microscopic [[id number]]  (Abnormal) Collected: 03/01/25 1638    Lab Status: Final result Specimen: Urine, Clean Catch Updated: 03/01/25 1714      RBC, UA 10-20 /hpf      WBC, UA 2-4 /hpf      Epithelial Cells Occasional /hpf      Bacteria, UA Occasional /hpf     CBC and differential [[id number]]  (Abnormal) Collected: 03/01/25 1653    Lab Status: Final result Specimen: Blood from Arm, Right Updated: 03/01/25 1705     WBC 14.61 Thousand/uL      RBC 4.09 Million/uL      Hemoglobin 11.0 g/dL      Hematocrit 34.4 %      MCV 84 fL      MCH 26.9 pg      MCHC 32.0 g/dL      RDW 13.3 %      MPV 8.1 fL      Platelets 372 Thousands/uL      nRBC 0 /100 WBCs      Segmented % 79 %      Immature Grans % 1 %      Lymphocytes % 13 %      Monocytes % 7 %      Eosinophils Relative 0 %      Basophils Relative 0 %      Absolute Neutrophils 11.69 Thousands/µL      Absolute Immature Grans 0.07 Thousand/uL      Absolute Lymphocytes 1.83 Thousands/µL      Absolute Monocytes 0.98 Thousand/µL      Eosinophils Absolute 0.00 Thousand/µL      Basophils Absolute 0.04 Thousands/µL     Rapid drug screen, urine [[id number]]  (Normal) Collected: 03/01/25 1638    Lab Status: Final result Specimen: Urine, Clean Catch Updated: 03/01/25 1704     Amph/Meth UR Negative     Barbiturate Ur Negative     Benzodiazepine Urine Negative     Cocaine Urine Negative     Methadone Urine Negative     Opiate Urine Negative     PCP Ur Negative     THC Urine Negative     Oxycodone Urine Negative     Fentanyl Urine Negative     HYDROCODONE URINE Negative    Narrative:      FOR MEDICAL PURPOSES ONLY.   IF CONFIRMATION NEEDED PLEASE CONTACT THE LAB WITHIN 5 DAYS.    Drug Screen Cutoff Levels:  AMPHETAMINE/METHAMPHETAMINES  1000 ng/mL  BARBITURATES     200 ng/mL  BENZODIAZEPINES     200 ng/mL  COCAINE      300 ng/mL  METHADONE      300 ng/mL  OPIATES      300 ng/mL  PHENCYCLIDINE     25 ng/mL  THC       50 ng/mL  OXYCODONE      100 ng/mL  FENTANYL      5 ng/mL  HYDROCODONE     300 ng/mL    UA (URINE) with reflex to Scope [[id number]]  (Abnormal) Collected: 03/01/25 1638    Lab Status: Final result Specimen: Urine, Clean  Catch Updated: 03/01/25 1701     Color, UA Yellow     Clarity, UA Clear     Specific Gravity, UA 1.016     pH, UA 7.0     Leukocytes, UA Negative     Nitrite, UA Negative     Protein, UA 70 (1+) mg/dl      Glucose, UA Negative mg/dl      Ketones, UA Negative mg/dl      Urobilinogen, UA 4.0 mg/dl      Bilirubin, UA Negative     Occult Blood, UA Large    POCT pregnancy, urine [[id number]]  (Normal) Collected: 03/01/25 1641    Lab Status: Final result Updated: 03/01/25 1645     EXT Preg Test, Ur Negative     Control Valid            CTA chest pe study   Final Interpretation by Oliver Robison MD (03/01 2027)      1.  No acute pulmonary embolism.   2.  Lingular consolidation with adjacent patchy groundglass opacities, likely pneumonia. Recommend follow-up chest CT in 3 months to assess for resolution.   3.  Nonspecific mediastinal and hilar lymphadenopathy, increased from prior. Possibly reactive in the setting of infection although underlying inflammatory etiologies such as granulomatous disease are also considered, as is malignancy. Recommend    attention on follow-up.      The study was marked in EPIC for immediate notification.                  Workstation performed: [id number]             Procedures    ED Medication and Procedure Management   Prior to Admission Medications   Prescriptions Last Dose Informant Patient Reported? Taking?   Cholecalciferol (VITAMIN D3) 1,000 units tablet  Self No No   Sig: Take 2 tablets (2,000 Units total) by mouth daily   OLANZapine (ZyPREXA) 5 mg tablet  Self No No   Sig: Take 1 tablet (5 mg total) by mouth 2 (two) times a day   Symbicort 160-4.5 MCG/ACT inhaler   No No   Sig: Inhale 2 puffs 2 (two) times a day   albuterol (Proventil HFA) 90 mcg/act inhaler  Self No No   Sig: Inhale 2 puffs every 6 (six) hours as needed for wheezing   buPROPion (WELLBUTRIN SR) 150 mg 12 hr tablet  Self Yes No   Sig: Take 1 tablet by mouth 2 (two) times a day   buprenorphine-naloxone (Suboxone) 8-2  mg  Self Yes No   clonazePAM (KlonoPIN) 0.5 mg tablet  Self No No   Sig: Take 1 tablet (0.5 mg total) by mouth daily at bedtime for 10 days   folic acid (FOLVITE) 1 mg tablet  Self No No   Sig: Take 1 tablet (1 mg total) by mouth daily   gabapentin (NEURONTIN) 600 MG tablet  Self Yes No   Sig: Take 600 mg by mouth 3 (three) times a day   lidocaine (Lidoderm) 5 %  Self No No   Sig: Apply 1 patch topically over 12 hours daily Remove & Discard patch within 12 hours or as directed by MD   naproxen (Naprosyn) 500 mg tablet  Self No No   Sig: Take 1 tablet (500 mg total) by mouth 2 (two) times a day with meals   nicotine polacrilex (NICORETTE) 4 mg gum  Self Yes No   Sig: Chew 4 mg   ondansetron (ZOFRAN-ODT) 4 mg disintegrating tablet   No No   Sig: Take 1 tablet (4 mg total) by mouth every 6 (six) hours as needed for nausea   terbinafine (LamISIL) 1 % cream  Self Yes No   tiZANidine (ZANAFLEX) 4 mg tablet   No No   Sig: Take 1 tablet (4 mg total) by mouth every 8 (eight) hours as needed for muscle spasms   traZODone (DESYREL) 100 mg tablet  Self No No   Sig: Take 1 tablet (100 mg total) by mouth daily at bedtime as needed for sleep for up to 10 doses      Facility-Administered Medications: None     Discharge Medication List as of 3/1/2025  9:04 PM        START taking these medications    Details   amoxicillin-clavulanate (AUGMENTIN) 875-125 mg per tablet Take 1 tablet by mouth every 12 (twelve) hours for 7 days, Starting Sat 3/1/2025, Until Sat 3/8/2025, Normal           CONTINUE these medications which have NOT CHANGED    Details   albuterol (Proventil HFA) 90 mcg/act inhaler Inhale 2 puffs every 6 (six) hours as needed for wheezing, Starting Thu 2/8/2024, Normal      buprenorphine-naloxone (Suboxone) 8-2 mg Historical Med      buPROPion (WELLBUTRIN SR) 150 mg 12 hr tablet Take 1 tablet by mouth 2 (two) times a day, Starting Tue 12/24/2024, Historical Med      Cholecalciferol (VITAMIN D3) 1,000 units tablet Take 2  tablets (2,000 Units total) by mouth daily, Starting Tue 12/10/2024, Until Wed 2/26/2025, Normal      clonazePAM (KlonoPIN) 0.5 mg tablet Take 1 tablet (0.5 mg total) by mouth daily at bedtime for 10 days, Starting Mon 12/9/2024, Until Wed 2/26/2025, Normal      folic acid (FOLVITE) 1 mg tablet Take 1 tablet (1 mg total) by mouth daily, Starting Tue 12/10/2024, Until Wed 2/26/2025, Normal      gabapentin (NEURONTIN) 600 MG tablet Take 600 mg by mouth 3 (three) times a day, Starting Wed 2/12/2025, Historical Med      lidocaine (Lidoderm) 5 % Apply 1 patch topically over 12 hours daily Remove & Discard patch within 12 hours or as directed by MD, Starting Mon 2/10/2025, Normal      naproxen (Naprosyn) 500 mg tablet Take 1 tablet (500 mg total) by mouth 2 (two) times a day with meals, Starting Mon 2/10/2025, Normal      nicotine polacrilex (NICORETTE) 4 mg gum Chew 4 mg, Starting Wed 2/19/2025, Until Fri 3/21/2025 at 2359, Historical Med      OLANZapine (ZyPREXA) 5 mg tablet Take 1 tablet (5 mg total) by mouth 2 (two) times a day, Starting Mon 12/9/2024, Until Wed 2/26/2025, Normal      ondansetron (ZOFRAN-ODT) 4 mg disintegrating tablet Take 1 tablet (4 mg total) by mouth every 6 (six) hours as needed for nausea, Starting Wed 2/26/2025, Normal      Symbicort 160-4.5 MCG/ACT inhaler Inhale 2 puffs 2 (two) times a day, Starting Wed 2/26/2025, Normal      terbinafine (LamISIL) 1 % cream Historical Med      tiZANidine (ZANAFLEX) 4 mg tablet Take 1 tablet (4 mg total) by mouth every 8 (eight) hours as needed for muscle spasms, Starting Wed 2/26/2025, Normal      traZODone (DESYREL) 100 mg tablet Take 1 tablet (100 mg total) by mouth daily at bedtime as needed for sleep for up to 10 doses, Starting Mon 12/9/2024, Normal             ED SEPSIS DOCUMENTATION   Time reflects when diagnosis was documented in both MDM as applicable and the Disposition within this note       Time User Action Codes Description Comment    3/1/2025   8:58 PM Radha Ang Add [R59.0] Mediastinal lymphadenopathy     3/1/2025  8:58 PM Radha Ang Add [J18.9] Pneumonia     3/1/2025  8:58 PM Radha Ang Modify [R59.0] Mediastinal lymphadenopathy     3/1/2025  8:58 PM Radha Ang Modify [J18.9] Pneumonia                  Radha Ang MD  03/02/25 0045

## 2025-03-01 NOTE — ED NOTES
Pt ringing telling this RN that she is feeling increasingly anxious and requesting more medication. Provider made aware.     Jolly Otero RN  03/01/25 4754

## 2025-03-09 ENCOUNTER — HOSPITAL ENCOUNTER (EMERGENCY)
Facility: HOSPITAL | Age: 50
Discharge: HOME | End: 2025-03-09
Attending: EMERGENCY MEDICINE
Payer: COMMERCIAL

## 2025-03-09 VITALS
SYSTOLIC BLOOD PRESSURE: 139 MMHG | HEART RATE: 96 BPM | RESPIRATION RATE: 20 BRPM | TEMPERATURE: 98.2 F | BODY MASS INDEX: 33.55 KG/M2 | OXYGEN SATURATION: 95 % | WEIGHT: 177.69 LBS | HEIGHT: 61 IN | DIASTOLIC BLOOD PRESSURE: 81 MMHG

## 2025-03-09 DIAGNOSIS — M54.9 BACK PAIN: ICD-10-CM

## 2025-03-09 DIAGNOSIS — G89.29 ACUTE EXACERBATION OF CHRONIC LOW BACK PAIN: Primary | ICD-10-CM

## 2025-03-09 DIAGNOSIS — G89.29 CHRONIC BACK PAIN: ICD-10-CM

## 2025-03-09 DIAGNOSIS — M54.16 LUMBAR RADICULOPATHY: ICD-10-CM

## 2025-03-09 DIAGNOSIS — M54.50 ACUTE EXACERBATION OF CHRONIC LOW BACK PAIN: Primary | ICD-10-CM

## 2025-03-09 DIAGNOSIS — M54.9 CHRONIC BACK PAIN: ICD-10-CM

## 2025-03-09 LAB
BACTERIA UR QL AUTO: ABNORMAL /HPF
BILIRUB UR QL STRIP: NEGATIVE
CLARITY UR: CLEAR
COLOR UR: YELLOW
EXT PREGNANCY TEST URINE: NEGATIVE
EXT. CONTROL: NORMAL
GLUCOSE UR STRIP-MCNC: NEGATIVE MG/DL
HGB UR QL STRIP.AUTO: ABNORMAL
HYALINE CASTS #/AREA URNS LPF: ABNORMAL /LPF
KETONES UR STRIP-MCNC: NEGATIVE MG/DL
LEUKOCYTE ESTERASE UR QL STRIP: NEGATIVE
NITRITE UR QL STRIP: NEGATIVE
NON-SQ EPI CELLS URNS QL MICRO: ABNORMAL /HPF
PH UR STRIP.AUTO: 6 [PH]
PROT UR STRIP-MCNC: ABNORMAL MG/DL
RBC #/AREA URNS AUTO: ABNORMAL /HPF
SP GR UR STRIP.AUTO: 1.02 (ref 1–1.03)
UROBILINOGEN UR STRIP-ACNC: <2 MG/DL
WBC #/AREA URNS AUTO: ABNORMAL /HPF

## 2025-03-09 PROCEDURE — 96372 THER/PROPH/DIAG INJ SC/IM: CPT

## 2025-03-09 PROCEDURE — 81025 URINE PREGNANCY TEST: CPT | Performed by: EMERGENCY MEDICINE

## 2025-03-09 PROCEDURE — 81001 URINALYSIS AUTO W/SCOPE: CPT | Performed by: EMERGENCY MEDICINE

## 2025-03-09 PROCEDURE — 99283 EMERGENCY DEPT VISIT LOW MDM: CPT

## 2025-03-09 PROCEDURE — 99284 EMERGENCY DEPT VISIT MOD MDM: CPT | Performed by: EMERGENCY MEDICINE

## 2025-03-09 RX ORDER — KETOROLAC TROMETHAMINE 30 MG/ML
30 INJECTION, SOLUTION INTRAMUSCULAR; INTRAVENOUS ONCE
Status: COMPLETED | OUTPATIENT
Start: 2025-03-09 | End: 2025-03-09

## 2025-03-09 RX ORDER — LIDOCAINE 50 MG/G
1 PATCH TOPICAL ONCE
Status: DISCONTINUED | OUTPATIENT
Start: 2025-03-09 | End: 2025-03-09 | Stop reason: HOSPADM

## 2025-03-09 RX ORDER — NAPROXEN 500 MG/1
500 TABLET ORAL 2 TIMES DAILY WITH MEALS
Qty: 10 TABLET | Refills: 0 | Status: SHIPPED | OUTPATIENT
Start: 2025-03-09 | End: 2025-03-14

## 2025-03-09 RX ORDER — METHYLPREDNISOLONE 4 MG/1
TABLET ORAL
Qty: 21 TABLET | Refills: 0 | Status: SHIPPED | OUTPATIENT
Start: 2025-03-09

## 2025-03-09 RX ORDER — GABAPENTIN 300 MG/1
300 CAPSULE ORAL ONCE
Status: COMPLETED | OUTPATIENT
Start: 2025-03-09 | End: 2025-03-09

## 2025-03-09 RX ORDER — LIDOCAINE 50 MG/G
1 PATCH TOPICAL DAILY
Qty: 7 PATCH | Refills: 0 | Status: SHIPPED | OUTPATIENT
Start: 2025-03-09

## 2025-03-09 RX ORDER — TIZANIDINE 2 MG/1
2 TABLET ORAL ONCE
Status: COMPLETED | OUTPATIENT
Start: 2025-03-09 | End: 2025-03-09

## 2025-03-09 RX ORDER — NICOTINE 21 MG/24HR
21 PATCH, TRANSDERMAL 24 HOURS TRANSDERMAL ONCE
Status: DISCONTINUED | OUTPATIENT
Start: 2025-03-09 | End: 2025-03-09 | Stop reason: HOSPADM

## 2025-03-09 RX ORDER — GABAPENTIN 300 MG/1
300 CAPSULE ORAL 3 TIMES DAILY
Qty: 15 CAPSULE | Refills: 0 | Status: SHIPPED | OUTPATIENT
Start: 2025-03-09 | End: 2025-03-14

## 2025-03-09 RX ADMIN — LIDOCAINE 5% 1 PATCH: 700 PATCH TOPICAL at 15:12

## 2025-03-09 RX ADMIN — Medication 21 MG: at 15:11

## 2025-03-09 RX ADMIN — GABAPENTIN 300 MG: 300 CAPSULE ORAL at 15:13

## 2025-03-09 RX ADMIN — TIZANIDINE 2 MG: 2 TABLET ORAL at 15:13

## 2025-03-09 RX ADMIN — KETOROLAC TROMETHAMINE 30 MG: 30 INJECTION, SOLUTION INTRAMUSCULAR; INTRAVENOUS at 15:11

## 2025-03-09 RX ADMIN — DEXAMETHASONE SODIUM PHOSPHATE 10 MG: 10 INJECTION, SOLUTION INTRAMUSCULAR; INTRAVENOUS at 15:13

## 2025-03-09 NOTE — ED PROVIDER NOTES
Time reflects when diagnosis was documented in both MDM as applicable and the Disposition within this note       Time User Action Codes Description Comment    3/9/2025  2:29 PM Cammy Jasso Add [M54.50,  G89.29] Acute exacerbation of chronic low back pain     3/9/2025  2:30 PM BendCammy richardson L Add [M54.16] Lumbar radiculopathy     3/9/2025  2:30 PM BendCammy richardson L Add [M54.9] Back pain     3/9/2025  2:30 PM BendCammy richardson L Add [M54.9,  G89.29] Chronic back pain           ED Disposition       ED Disposition   Discharge    Condition   Stable    Date/Time   Sun Mar 9, 2025  2:29 PM    Comment   Carey Keith discharge to home/self care.                   Assessment & Plan       Medical Decision Making      DDx including but not limited to: sciatica, herniated disc, arthritis, spinal stenosis, strain, sprain, fracture, cauda equina syndrome, epidural abscess, AAA.     Patient meets Back pain low risk criteria, no trauma, no fever chills or weight loss, no neurological deficit, no history of cancer, no history of injecting drugs, pain improved with rest.      I had a long discussion and she declines x-ray.  There is no trauma.  She has no other red flags.  I did discuss with her as well as importance of follow-up with comprehensive [facility].  Will place referral and number as well as first        Amount and/or Complexity of Data Reviewed  External Data Reviewed: notes.     Details:   Patient was last seen in the ER for her acute on chronic back pain on February 10.  Gabapentin was increased to 600 mg as well as given naproxen and Lidoderm patches.  Referral placed to comprehensive [facility].  The office had attempted reaching out to patient without success          Labs: ordered. Decision-making details documented in ED Course.     Details:     Pregnancy negative  PVR 24 cc      Risk  OTC drugs.  Prescription drug management.        ED Course as of 03/09/25 1525   Sun Mar 09, 2025   0172 Patient is a 49-year-old  "female coming in today with an acute flare of low back pain.  On exam she is resting in bed in no distress.  She remains neurologically intact.  There is no focal neurologic deficit of weakness and/or sensory changes throughout the right lower extremity.  Reviewed chart as well as .  Will give Neurontin, Toradol, Lidoderm patch as well as Zanaflex.  Will also replace the referral to comprehensive spine.      Disclosure: Voice to text software was used in the preparation of this document and could have resulted in translational errors.      Occasional wrong word or \"sound a like\" substitutions may have occurred due to the inherent limitations of voice recognition software.  Read the chart carefully and recognize, using context, where substitutions have occurred.       I have independently reviewed external records are available to me to the level of detail possible within the time constraints of my patient care responsibilities in the ED.       1455 PVR 24 cc       1459 POCT pregnancy, urine  Negative           Medications   lidocaine (LIDODERM) 5 % patch 1 patch (1 patch Topical Medication Applied 3/9/25 1512)   nicotine (NICODERM CQ) 21 mg/24 hr TD 24 hr patch 21 mg (21 mg Transdermal Medication Applied 3/9/25 1511)   ketorolac (TORADOL) injection 30 mg (30 mg Intramuscular Given 3/9/25 1511)   gabapentin (NEURONTIN) capsule 300 mg (300 mg Oral Given 3/9/25 1513)   tiZANidine (ZANAFLEX) tablet 2 mg (2 mg Oral Given 3/9/25 1513)   dexamethasone oral liquid 10 mg 1 mL (10 mg Oral Given 3/9/25 1513)       ED Risk Strat Scores                                                  History of Present Illness       Chief Complaint   Patient presents with    Back Pain     Patient reports lower left back pain radiating down into leg. Denies injury        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)     Alcohol abuse     Alcoholism (HCC)     Altered mental status 09/21/2022    Elevated LFTs 09/21/2022    " "Lower back pain     Self-injurious behavior     Substance abuse (HCC)       Past Surgical History:   Procedure Laterality Date    CHOLECYSTECTOMY        Family History   Problem Relation Age of Onset    Autism Mother     Heart disease Father     Stroke Father     Anxiety disorder Father     Heart disease Maternal Grandmother       Social History     Tobacco Use    Smoking status: Some Days     Current packs/day: 0.50     Average packs/day: 0.5 packs/day for 20.0 years (10.0 ttl pk-yrs)     Types: Cigarettes     Passive exposure: Never    Smokeless tobacco: Current    Tobacco comments:     Pt not ready to quit   Vaping Use    Vaping status: Every Day    Substances: Nicotine, THC, Flavoring   Substance Use Topics    Alcohol use: Not Currently     Comment: MAGDALENO, pt historically inconsistent. Drinking  per [facility] 2    Drug use: Not Currently     Types: Heroin, \"Crack\" cocaine, Cocaine, LSD, Benzodiazepines, Marijuana     Comment: Pt unreliable historian      E-Cigarette/Vaping    E-Cigarette Use Current Every Day User     Cartridges/Day 2       E-Cigarette/Vaping Substances    Nicotine Yes     THC Yes     CBD No     Flavoring Yes     Other No     Unknown No       I have reviewed and agree with the history as documented.         Patient is a 49-year-old female with a history of chronic back pain, opioid disorder on Suboxone, bipolar disorder, anxiety, depression, coming in today with an acute flare of her chronic low back pain.  Patient states that she has had this several times in the past.  She had spinal surgery several years to decades ago to the left lower spine and states that the left side of her pelvis and leg are chronically numb.  She reports that since last time she was here and was very sick with pneumonia and laid in bed for several days.  She reports that since that laying down and decreased activity has flared her back pain.  She denies any fevers, chills, focal weakness and or paresthesias " throughout the bilateral lower extremities.  She states that with certain movements she will get a sharp pain down the back of her leg with tingling.  She has no dysuria, urinary retention, saddle anesthesia or loss of bowel or bladder.        History provided by:  Patient and medical records   used: No    Back Pain  Location:  Lumbar spine  Quality:  Aching, shooting, stabbing and stiffness  Radiates to:  R posterior upper leg  Pain severity:  Moderate  Onset quality:  Gradual  Timing:  Constant  Progression:  Waxing and waning  Chronicity:  Recurrent  Context: recent illness    Context: not emotional stress, not falling, not jumping from heights, not lifting heavy objects, not MCA, not MVA, not occupational injury, not pedestrian accident, not physical stress, not recent injury and not twisting    Relieved by:  Nothing  Worsened by:  Movement  Ineffective treatments:  OTC medications  Associated symptoms: numbness and paresthesias    Associated symptoms: no abdominal pain, no abdominal swelling, no bladder incontinence, no bowel incontinence, no chest pain, no dysuria, no fever, no headaches, no leg pain, no pelvic pain, no perianal numbness, no tingling, no weakness and no weight loss    Risk factors: lack of exercise and obesity    Risk factors: no hx of cancer, no hx of osteoporosis, no recent surgery, no steroid use and no vascular disease        Review of Systems   Constitutional: Negative.  Negative for chills, fever and weight loss.   HENT: Negative.  Negative for ear pain and sore throat.    Eyes: Negative.  Negative for pain and visual disturbance.   Respiratory: Negative.  Negative for cough and shortness of breath.    Cardiovascular: Negative.  Negative for chest pain and palpitations.   Gastrointestinal: Negative.  Negative for abdominal pain, bowel incontinence and vomiting.   Genitourinary: Negative.  Negative for bladder incontinence, dysuria, hematuria and pelvic pain.    Musculoskeletal:  Positive for back pain. Negative for arthralgias.   Skin: Negative.  Negative for color change and rash.   Neurological:  Positive for numbness and paresthesias. Negative for tingling, seizures, syncope, weakness and headaches.   Hematological: Negative.    Psychiatric/Behavioral: Negative.     All other systems reviewed and are negative.          Objective       ED Triage Vitals [03/09/25 1348]   Temperature Pulse Blood Pressure Respirations SpO2 Patient Position - Orthostatic VS   98.2 °F (36.8 °C) 96 139/81 20 95 % Sitting      Temp Source Heart Rate Source BP Location FiO2 (%) Pain Score    Oral Monitor Right arm -- 6      Vitals      Date and Time Temp Pulse SpO2 Resp BP Pain Score FACES Pain Rating User   03/09/25 1348 98.2 °F (36.8 °C) 96 95 % 20 139/81 6 -- AMB            Physical Exam  Vitals and nursing note reviewed.   Constitutional:       General: She is awake. She is not in acute distress.     Appearance: She is well-developed.   HENT:      Head: Normocephalic and atraumatic.      Right Ear: External ear normal.      Left Ear: External ear normal.      Mouth/Throat:      Mouth: Mucous membranes are moist.   Eyes:      Extraocular Movements: Extraocular movements intact.      Pupils: Pupils are equal, round, and reactive to light.   Pulmonary:      Effort: Pulmonary effort is normal.   Musculoskeletal:         General: No swelling.      Cervical back: Neck supple.        Back:    Skin:     General: Skin is warm and dry.      Capillary Refill: Capillary refill takes less than 2 seconds.   Neurological:      General: No focal deficit present.      Mental Status: She is alert and oriented to person, place, and time.      Cranial Nerves: Cranial nerves 2-12 are intact.      Sensory: Sensation is intact.      Motor: Motor function is intact.      Coordination: Coordination is intact.      Gait: Gait is intact.      Comments: Patient with full active range of motion of bilateral hips, his  knees and ankles pain-free.  Manual muscle grade 5 out of 5 throughout bilateral lower extremities.  Patient ambulated throughout the ER with a nonantalgic gait   Psychiatric:         Mood and Affect: Mood normal.         Behavior: Behavior is cooperative.         Results Reviewed       Procedure Component Value Units Date/Time    POCT pregnancy, urine [[id number]]  (Normal) Collected: 03/09/25 1456    Lab Status: Final result Updated: 03/09/25 1456     EXT Preg Test, Ur Negative     Control Valid    UA (URINE) with reflex to Scope [[id number]] Collected: 03/09/25 1451    Lab Status: In process Specimen: Urine, Clean Catch Updated: 03/09/25 1453            No orders to display       Procedures    ED Medication and Procedure Management   Prior to Admission Medications   Prescriptions Last Dose Informant Patient Reported? Taking?   Cholecalciferol (VITAMIN D3) 1,000 units tablet  Self No No   Sig: Take 2 tablets (2,000 Units total) by mouth daily   OLANZapine (ZyPREXA) 5 mg tablet  Self No No   Sig: Take 1 tablet (5 mg total) by mouth 2 (two) times a day   Symbicort 160-4.5 MCG/ACT inhaler   No No   Sig: Inhale 2 puffs 2 (two) times a day   albuterol (Proventil HFA) 90 mcg/act inhaler  Self No No   Sig: Inhale 2 puffs every 6 (six) hours as needed for wheezing   amoxicillin-clavulanate (AUGMENTIN) 875-125 mg per tablet   No No   Sig: Take 1 tablet by mouth every 12 (twelve) hours for 7 days   buPROPion (WELLBUTRIN SR) 150 mg 12 hr tablet  Self Yes No   Sig: Take 1 tablet by mouth 2 (two) times a day   buprenorphine-naloxone (Suboxone) 8-2 mg  Self Yes No   clonazePAM (KlonoPIN) 0.5 mg tablet  Self No No   Sig: Take 1 tablet (0.5 mg total) by mouth daily at bedtime for 10 days   folic acid (FOLVITE) 1 mg tablet  Self No No   Sig: Take 1 tablet (1 mg total) by mouth daily   gabapentin (NEURONTIN) 600 MG tablet  Self Yes No   Sig: Take 600 mg by mouth 3 (three) times a day   lidocaine (Lidoderm) 5 %  Self No No   Sig:  Apply 1 patch topically over 12 hours daily Remove & Discard patch within 12 hours or as directed by MD   lidocaine (Lidoderm) 5 %   No Yes   Sig: Apply 1 patch topically over 12 hours daily Remove & Discard patch within 12 hours or as directed by MD   naproxen (Naprosyn) 500 mg tablet  Self No No   Sig: Take 1 tablet (500 mg total) by mouth 2 (two) times a day with meals   naproxen (Naprosyn) 500 mg tablet   No Yes   Sig: Take 1 tablet (500 mg total) by mouth 2 (two) times a day with meals for 5 days   nicotine polacrilex (NICORETTE) 4 mg gum  Self Yes No   Sig: Chew 4 mg   ondansetron (ZOFRAN-ODT) 4 mg disintegrating tablet   No No   Sig: Take 1 tablet (4 mg total) by mouth every 6 (six) hours as needed for nausea   terbinafine (LamISIL) 1 % cream  Self Yes No   tiZANidine (ZANAFLEX) 4 mg tablet   No No   Sig: Take 1 tablet (4 mg total) by mouth every 8 (eight) hours as needed for muscle spasms   tiZANidine (ZANAFLEX) 4 mg tablet   No Yes   Sig: Take 1 tablet (4 mg total) by mouth every 8 (eight) hours as needed for muscle spasms for up to 5 days   traZODone (DESYREL) 100 mg tablet  Self No No   Sig: Take 1 tablet (100 mg total) by mouth daily at bedtime as needed for sleep for up to 10 doses      Facility-Administered Medications: None     Discharge Medication List as of 3/9/2025  3:01 PM        START taking these medications    Details   gabapentin (Neurontin) 300 mg capsule Take 1 capsule (300 mg total) by mouth 3 (three) times a day for 5 days For post-herpetic neuralgia: Take 1 tablet on day 1,  Then take 2 tablets on day 2, Then take 3 tablets on day 3 and every day after that as instructed by your doctor., Starting Sun  3/9/2025, Until Fri 3/14/2025, Normal      methylPREDNISolone 4 MG tablet therapy pack Use as directed on package, Normal           CONTINUE these medications which have CHANGED    Details   lidocaine (Lidoderm) 5 % Apply 1 patch topically over 12 hours daily Remove & Discard patch within 12  hours or as directed by MD, Starting Sun 3/9/2025, Normal      naproxen (Naprosyn) 500 mg tablet Take 1 tablet (500 mg total) by mouth 2 (two) times a day with meals for 5 days, Starting Sun 3/9/2025, Until Fri 3/14/2025, Normal      tiZANidine (ZANAFLEX) 4 mg tablet Take 1 tablet (4 mg total) by mouth every 8 (eight) hours as needed for muscle spasms for up to 5 days, Starting Sun 3/9/2025, Until Fri 3/14/2025 at 2359, Normal           CONTINUE these medications which have NOT CHANGED    Details   albuterol (Proventil HFA) 90 mcg/act inhaler Inhale 2 puffs every 6 (six) hours as needed for wheezing, Starting Thu 2/8/2024, Normal      amoxicillin-clavulanate (AUGMENTIN) 875-125 mg per tablet Take 1 tablet by mouth every 12 (twelve) hours for 7 days, Starting Sun 3/2/2025, Until Sun 3/9/2025, Normal      buprenorphine-naloxone (Suboxone) 8-2 mg Historical Med      buPROPion (WELLBUTRIN SR) 150 mg 12 hr tablet Take 1 tablet by mouth 2 (two) times a day, Starting Tue 12/24/2024, Historical Med      Cholecalciferol (VITAMIN D3) 1,000 units tablet Take 2 tablets (2,000 Units total) by mouth daily, Starting Tue 12/10/2024, Until Wed 2/26/2025, Normal      clonazePAM (KlonoPIN) 0.5 mg tablet Take 1 tablet (0.5 mg total) by mouth daily at bedtime for 10 days, Starting Mon 12/9/2024, Until Wed 2/26/2025, Normal      folic acid (FOLVITE) 1 mg tablet Take 1 tablet (1 mg total) by mouth daily, Starting Tue 12/10/2024, Until Wed 2/26/2025, Normal      gabapentin (NEURONTIN) 600 MG tablet Take 600 mg by mouth 3 (three) times a day, Starting Wed 2/12/2025, Historical Med      nicotine polacrilex (NICORETTE) 4 mg gum Chew 4 mg, Starting Wed 2/19/2025, Until Fri 3/21/2025 at 2359, Historical Med      OLANZapine (ZyPREXA) 5 mg tablet Take 1 tablet (5 mg total) by mouth 2 (two) times a day, Starting Mon 12/9/2024, Until Wed 2/26/2025, Normal      ondansetron (ZOFRAN-ODT) 4 mg disintegrating tablet Take 1 tablet (4 mg total) by mouth  every 6 (six) hours as needed for nausea, Starting Wed 2/26/2025, Normal      Symbicort 160-4.5 MCG/ACT inhaler Inhale 2 puffs 2 (two) times a day, Starting Wed 2/26/2025, Normal      terbinafine (LamISIL) 1 % cream Historical Med      traZODone (DESYREL) 100 mg tablet Take 1 tablet (100 mg total) by mouth daily at bedtime as needed for sleep for up to 10 doses, Starting Mon 12/9/2024, Normal             ED SEPSIS DOCUMENTATION   Time reflects when diagnosis was documented in both MDM as applicable and the Disposition within this note       Time User Action Codes Description Comment    3/9/2025  2:29 PM Cammy Jasso Add [M54.50,  G89.29] Acute exacerbation of chronic low back pain     3/9/2025  2:30 PM Cammy Jasso Add [M54.16] Lumbar radiculopathy     3/9/2025  2:30 PM Cammy Jasso Add [M54.9] Back pain     3/9/2025  2:30 PM Cammy Jasso Add [M54.9,  G89.29] Chronic back pain                  Cammy Jasso, DO  03/09/25 1626

## 2025-03-11 ENCOUNTER — TELEPHONE (OUTPATIENT)
Dept: PHYSICAL THERAPY | Facility: OTHER | Age: 50
End: 2025-03-11

## 2025-03-11 NOTE — TELEPHONE ENCOUNTER
Call placed to the patient per [facility] referral.    Spoke with patient, explained program and reason for the call.    Patient is interested but not right now. Patient will call back to proceed with triage questions.    [facility] phone number and hours of business provided.    Referral closed per protocol. Will further assist patient when she calls back.

## 2025-03-20 DIAGNOSIS — M54.9 CHRONIC BACK PAIN: ICD-10-CM

## 2025-03-20 DIAGNOSIS — G89.29 CHRONIC BACK PAIN: ICD-10-CM

## 2025-04-02 ENCOUNTER — TELEPHONE (OUTPATIENT)
Age: 50
End: 2025-04-02

## 2025-04-02 NOTE — TELEPHONE ENCOUNTER
Patient called and canceled visit today at 2:00 with ALEX Bone. Patient stated she has covid and is not feeling well. Patient is rescheduled for 4/28/25 at 10:40 with Dr Anguiano. Due to nature of appointment please advise if patient should be seen sooner.

## 2025-04-18 DIAGNOSIS — G89.29 CHRONIC BACK PAIN: ICD-10-CM

## 2025-04-18 DIAGNOSIS — M54.9 CHRONIC BACK PAIN: ICD-10-CM

## 2025-05-09 ENCOUNTER — HOSPITAL ENCOUNTER (EMERGENCY)
Facility: HOSPITAL | Age: 50
End: 2025-05-10
Attending: EMERGENCY MEDICINE
Payer: COMMERCIAL

## 2025-05-09 DIAGNOSIS — S61.512A SELF-INFLICTED LACERATION OF LEFT WRIST (HCC): Primary | ICD-10-CM
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
## 2025-05-09 DIAGNOSIS — F31.9 BIPOLAR DISORDER (HCC): ICD-10-CM

## 2025-05-09 PROCEDURE — 12002 RPR S/N/AX/GEN/TRNK2.6-7.5CM: CPT | Performed by: EMERGENCY MEDICINE

## 2025-05-09 PROCEDURE — 82075 ASSAY OF BREATH ETHANOL: CPT | Performed by: EMERGENCY MEDICINE

## 2025-05-09 PROCEDURE — 99285 EMERGENCY DEPT VISIT HI MDM: CPT | Performed by: EMERGENCY MEDICINE

## 2025-05-09 PROCEDURE — 99285 EMERGENCY DEPT VISIT HI MDM: CPT

## 2025-05-09 PROCEDURE — 81025 URINE PREGNANCY TEST: CPT | Performed by: EMERGENCY MEDICINE

## 2025-05-09 PROCEDURE — 99245 OFF/OP CONSLTJ NEW/EST HI 55: CPT | Performed by: GENERAL PRACTICE

## 2025-05-09 PROCEDURE — 80307 DRUG TEST PRSMV CHEM ANLYZR: CPT | Performed by: EMERGENCY MEDICINE

## 2025-05-09 RX ORDER — RISPERIDONE 0.25 MG/1
0.25 TABLET ORAL
Status: CANCELLED | OUTPATIENT
Start: 2025-05-09

## 2025-05-09 RX ORDER — BENZTROPINE MESYLATE 1 MG/1
1 TABLET ORAL
Status: CANCELLED | OUTPATIENT
Start: 2025-05-09

## 2025-05-09 RX ORDER — ALBUTEROL SULFATE 90 UG/1
2 INHALANT RESPIRATORY (INHALATION) EVERY 6 HOURS PRN
Status: DISCONTINUED | OUTPATIENT
Start: 2025-05-09 | End: 2025-05-10 | Stop reason: HOSPADM

## 2025-05-09 RX ORDER — PROPRANOLOL HCL 20 MG
10 TABLET ORAL EVERY 8 HOURS PRN
Status: CANCELLED | OUTPATIENT
Start: 2025-05-09

## 2025-05-09 RX ORDER — BUPROPION HYDROCHLORIDE 150 MG/1
300 TABLET ORAL DAILY
Status: DISCONTINUED | OUTPATIENT
Start: 2025-05-10 | End: 2025-05-10 | Stop reason: HOSPADM

## 2025-05-09 RX ORDER — FOLIC ACID 1 MG/1
1 TABLET ORAL DAILY
Status: DISCONTINUED | OUTPATIENT
Start: 2025-05-10 | End: 2025-05-10 | Stop reason: HOSPADM

## 2025-05-09 RX ORDER — HYDROXYZINE HYDROCHLORIDE 25 MG/1
100 TABLET, FILM COATED ORAL
Status: CANCELLED | OUTPATIENT
Start: 2025-05-09

## 2025-05-09 RX ORDER — BUPRENORPHINE AND NALOXONE 8; 2 MG/1; MG/1
8 FILM, SOLUBLE BUCCAL; SUBLINGUAL DAILY
Status: DISCONTINUED | OUTPATIENT
Start: 2025-05-10 | End: 2025-05-10 | Stop reason: HOSPADM

## 2025-05-09 RX ORDER — HYDROXYZINE HYDROCHLORIDE 25 MG/1
25 TABLET, FILM COATED ORAL
Status: CANCELLED | OUTPATIENT
Start: 2025-05-09

## 2025-05-09 RX ORDER — LIDOCAINE 40 MG/G
CREAM TOPICAL ONCE
Status: COMPLETED | OUTPATIENT
Start: 2025-05-09 | End: 2025-05-09

## 2025-05-09 RX ORDER — CLONAZEPAM 0.5 MG/1
1 TABLET ORAL ONCE
Status: COMPLETED | OUTPATIENT
Start: 2025-05-09 | End: 2025-05-09

## 2025-05-09 RX ORDER — LORAZEPAM 1 MG/1
1 TABLET ORAL ONCE
Status: COMPLETED | OUTPATIENT
Start: 2025-05-09 | End: 2025-05-09

## 2025-05-09 RX ORDER — BISACODYL 10 MG
10 SUPPOSITORY, RECTAL RECTAL DAILY PRN
Status: CANCELLED | OUTPATIENT
Start: 2025-05-09

## 2025-05-09 RX ORDER — GABAPENTIN 300 MG/1
300 CAPSULE ORAL 3 TIMES DAILY
Status: DISCONTINUED | OUTPATIENT
Start: 2025-05-09 | End: 2025-05-09

## 2025-05-09 RX ORDER — HYDROXYZINE HYDROCHLORIDE 25 MG/1
50 TABLET, FILM COATED ORAL
Status: CANCELLED | OUTPATIENT
Start: 2025-05-09

## 2025-05-09 RX ORDER — IBUPROFEN 400 MG/1
800 TABLET, FILM COATED ORAL EVERY 8 HOURS PRN
Status: CANCELLED | OUTPATIENT
Start: 2025-05-09

## 2025-05-09 RX ORDER — AMOXICILLIN 250 MG
1 CAPSULE ORAL DAILY PRN
Status: CANCELLED | OUTPATIENT
Start: 2025-05-09

## 2025-05-09 RX ORDER — IBUPROFEN 400 MG/1
400 TABLET, FILM COATED ORAL EVERY 4 HOURS PRN
Status: CANCELLED | OUTPATIENT
Start: 2025-05-09

## 2025-05-09 RX ORDER — CLONAZEPAM 0.5 MG/1
0.5 TABLET ORAL
Status: DISCONTINUED | OUTPATIENT
Start: 2025-05-10 | End: 2025-05-10 | Stop reason: HOSPADM

## 2025-05-09 RX ORDER — POLYETHYLENE GLYCOL 3350 17 G/17G
17 POWDER, FOR SOLUTION ORAL DAILY PRN
Status: CANCELLED | OUTPATIENT
Start: 2025-05-09

## 2025-05-09 RX ORDER — MAGNESIUM HYDROXIDE/ALUMINUM HYDROXICE/SIMETHICONE 120; 1200; 1200 MG/30ML; MG/30ML; MG/30ML
30 SUSPENSION ORAL EVERY 4 HOURS PRN
Status: CANCELLED | OUTPATIENT
Start: 2025-05-09

## 2025-05-09 RX ORDER — GABAPENTIN 300 MG/1
600 CAPSULE ORAL 3 TIMES DAILY
Status: DISCONTINUED | OUTPATIENT
Start: 2025-05-09 | End: 2025-05-10 | Stop reason: HOSPADM

## 2025-05-09 RX ORDER — IBUPROFEN 600 MG/1
600 TABLET, FILM COATED ORAL EVERY 6 HOURS PRN
Status: CANCELLED | OUTPATIENT
Start: 2025-05-09

## 2025-05-09 RX ORDER — TRAZODONE HYDROCHLORIDE 50 MG/1
50 TABLET ORAL
Status: CANCELLED | OUTPATIENT
Start: 2025-05-09

## 2025-05-09 RX ORDER — RISPERIDONE 1 MG/1
0.5 TABLET ORAL
Status: CANCELLED | OUTPATIENT
Start: 2025-05-09

## 2025-05-09 RX ORDER — TRAZODONE HYDROCHLORIDE 50 MG/1
100 TABLET ORAL
Status: DISCONTINUED | OUTPATIENT
Start: 2025-05-09 | End: 2025-05-10 | Stop reason: HOSPADM

## 2025-05-09 RX ORDER — BENZTROPINE MESYLATE 1 MG/ML
1 INJECTION, SOLUTION INTRAMUSCULAR; INTRAVENOUS
Status: CANCELLED | OUTPATIENT
Start: 2025-05-09

## 2025-05-09 RX ORDER — RISPERIDONE 1 MG/1
1 TABLET ORAL
Status: CANCELLED | OUTPATIENT
Start: 2025-05-09

## 2025-05-09 RX ORDER — LORAZEPAM 2 MG/ML
2 INJECTION INTRAMUSCULAR EVERY 6 HOURS PRN
Status: CANCELLED | OUTPATIENT
Start: 2025-05-09

## 2025-05-09 RX ORDER — DIPHENHYDRAMINE HYDROCHLORIDE 50 MG/ML
50 INJECTION, SOLUTION INTRAMUSCULAR; INTRAVENOUS EVERY 6 HOURS PRN
Status: CANCELLED | OUTPATIENT
Start: 2025-05-09

## 2025-05-09 RX ADMIN — NICOTINE POLACRILEX 2 MG: 2 GUM, CHEWING BUCCAL at 20:39

## 2025-05-09 RX ADMIN — LORAZEPAM 1 MG: 1 TABLET ORAL at 20:25

## 2025-05-09 RX ADMIN — CLONAZEPAM 1 MG: 0.5 TABLET ORAL at 21:21

## 2025-05-09 RX ADMIN — LIDOCAINE 1 APPLICATION: 40 CREAM TOPICAL at 20:28

## 2025-05-10 ENCOUNTER — HOSPITAL ENCOUNTER (INPATIENT)
Facility: HOSPITAL | Age: 50
LOS: 3 days | Discharge: HOME | DRG: 753 | End: 2025-05-13
Attending: STUDENT IN AN ORGANIZED HEALTH CARE EDUCATION/TRAINING PROGRAM | Admitting: PSYCHIATRY & NEUROLOGY
Payer: COMMERCIAL

## 2025-05-10 VITALS
SYSTOLIC BLOOD PRESSURE: 132 MMHG | HEART RATE: 72 BPM | DIASTOLIC BLOOD PRESSURE: 73 MMHG | OXYGEN SATURATION: 95 % | RESPIRATION RATE: 18 BRPM | TEMPERATURE: 97.8 F

## 2025-05-10 DIAGNOSIS — F31.9 BIPOLAR 1 DISORDER (HCC): Primary | ICD-10-CM

## 2025-05-10 DIAGNOSIS — Z72.0 TOBACCO ABUSE: ICD-10-CM

## 2025-05-10 DIAGNOSIS — S61.512A SELF-INFLICTED LACERATION OF LEFT WRIST (HCC): ICD-10-CM
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
## 2025-05-10 DIAGNOSIS — F11.20 OPIOID USE DISORDER, SEVERE, ON MAINTENANCE THERAPY (HCC): ICD-10-CM

## 2025-05-10 PROBLEM — F41.1 GAD (GENERALIZED ANXIETY DISORDER): Status: ACTIVE | Noted: 2022-10-06

## 2025-05-10 PROCEDURE — 99223 1ST HOSP IP/OBS HIGH 75: CPT

## 2025-05-10 RX ORDER — HYDROXYZINE HYDROCHLORIDE 50 MG/1
50 TABLET, FILM COATED ORAL
Status: DISCONTINUED | OUTPATIENT
Start: 2025-05-10 | End: 2025-05-13 | Stop reason: HOSPADM

## 2025-05-10 RX ORDER — CLONAZEPAM 0.5 MG/1
0.5 TABLET ORAL DAILY
Status: DISCONTINUED | OUTPATIENT
Start: 2025-05-10 | End: 2025-05-13 | Stop reason: HOSPADM

## 2025-05-10 RX ORDER — BENZTROPINE MESYLATE 1 MG/ML
1 INJECTION, SOLUTION INTRAMUSCULAR; INTRAVENOUS
Status: DISCONTINUED | OUTPATIENT
Start: 2025-05-10 | End: 2025-05-13 | Stop reason: HOSPADM

## 2025-05-10 RX ORDER — IBUPROFEN 600 MG/1
600 TABLET, FILM COATED ORAL EVERY 6 HOURS PRN
Status: DISCONTINUED | OUTPATIENT
Start: 2025-05-10 | End: 2025-05-13 | Stop reason: HOSPADM

## 2025-05-10 RX ORDER — BENZTROPINE MESYLATE 1 MG/1
1 TABLET ORAL
Status: DISCONTINUED | OUTPATIENT
Start: 2025-05-10 | End: 2025-05-13 | Stop reason: HOSPADM

## 2025-05-10 RX ORDER — TRAZODONE HYDROCHLORIDE 50 MG/1
50 TABLET ORAL
Status: DISCONTINUED | OUTPATIENT
Start: 2025-05-10 | End: 2025-05-13 | Stop reason: HOSPADM

## 2025-05-10 RX ORDER — RISPERIDONE 1 MG/1
1 TABLET ORAL
Status: DISCONTINUED | OUTPATIENT
Start: 2025-05-10 | End: 2025-05-13 | Stop reason: HOSPADM

## 2025-05-10 RX ORDER — DIPHENHYDRAMINE HYDROCHLORIDE 50 MG/ML
50 INJECTION, SOLUTION INTRAMUSCULAR; INTRAVENOUS EVERY 6 HOURS PRN
Status: DISCONTINUED | OUTPATIENT
Start: 2025-05-10 | End: 2025-05-13 | Stop reason: HOSPADM

## 2025-05-10 RX ORDER — CLONAZEPAM 0.5 MG/1
0.5 TABLET ORAL ONCE
Status: COMPLETED | OUTPATIENT
Start: 2025-05-10 | End: 2025-05-10

## 2025-05-10 RX ORDER — RISPERIDONE 0.25 MG/1
0.25 TABLET ORAL
Status: DISCONTINUED | OUTPATIENT
Start: 2025-05-10 | End: 2025-05-13 | Stop reason: HOSPADM

## 2025-05-10 RX ORDER — BISACODYL 10 MG
10 SUPPOSITORY, RECTAL RECTAL DAILY PRN
Status: DISCONTINUED | OUTPATIENT
Start: 2025-05-10 | End: 2025-05-13 | Stop reason: HOSPADM

## 2025-05-10 RX ORDER — PROPRANOLOL HYDROCHLORIDE 10 MG/1
10 TABLET ORAL EVERY 8 HOURS PRN
Status: DISCONTINUED | OUTPATIENT
Start: 2025-05-10 | End: 2025-05-13 | Stop reason: HOSPADM

## 2025-05-10 RX ORDER — NICOTINE 21 MG/24HR
21 PATCH, TRANSDERMAL 24 HOURS TRANSDERMAL DAILY
Status: DISCONTINUED | OUTPATIENT
Start: 2025-05-11 | End: 2025-05-13 | Stop reason: HOSPADM

## 2025-05-10 RX ORDER — LORAZEPAM 1 MG/1
1 TABLET ORAL ONCE
Status: COMPLETED | OUTPATIENT
Start: 2025-05-10 | End: 2025-05-10

## 2025-05-10 RX ORDER — IBUPROFEN 400 MG/1
400 TABLET, FILM COATED ORAL EVERY 4 HOURS PRN
Status: DISCONTINUED | OUTPATIENT
Start: 2025-05-10 | End: 2025-05-13 | Stop reason: HOSPADM

## 2025-05-10 RX ORDER — LOXAPINE SUCCINATE 10 MG/1
10 TABLET ORAL 3 TIMES DAILY
Status: DISCONTINUED | OUTPATIENT
Start: 2025-05-10 | End: 2025-05-11

## 2025-05-10 RX ORDER — AMOXICILLIN 250 MG
1 CAPSULE ORAL DAILY PRN
Status: DISCONTINUED | OUTPATIENT
Start: 2025-05-10 | End: 2025-05-13 | Stop reason: HOSPADM

## 2025-05-10 RX ORDER — HYDROXYZINE HYDROCHLORIDE 50 MG/1
100 TABLET, FILM COATED ORAL
Status: DISCONTINUED | OUTPATIENT
Start: 2025-05-10 | End: 2025-05-13 | Stop reason: HOSPADM

## 2025-05-10 RX ORDER — GABAPENTIN 400 MG/1
800 CAPSULE ORAL 3 TIMES DAILY
Status: DISCONTINUED | OUTPATIENT
Start: 2025-05-10 | End: 2025-05-13 | Stop reason: HOSPADM

## 2025-05-10 RX ORDER — HYDROXYZINE HYDROCHLORIDE 25 MG/1
25 TABLET, FILM COATED ORAL
Status: DISCONTINUED | OUTPATIENT
Start: 2025-05-10 | End: 2025-05-12

## 2025-05-10 RX ORDER — TRAZODONE HYDROCHLORIDE 100 MG/1
100 TABLET ORAL
Status: DISCONTINUED | OUTPATIENT
Start: 2025-05-10 | End: 2025-05-11

## 2025-05-10 RX ORDER — BUPRENORPHINE AND NALOXONE 8; 2 MG/1; MG/1
8 FILM, SOLUBLE BUCCAL; SUBLINGUAL 3 TIMES DAILY
Status: DISCONTINUED | OUTPATIENT
Start: 2025-05-10 | End: 2025-05-13 | Stop reason: HOSPADM

## 2025-05-10 RX ORDER — BUPROPION HYDROCHLORIDE 300 MG/1
300 TABLET ORAL DAILY
Status: DISCONTINUED | OUTPATIENT
Start: 2025-05-10 | End: 2025-05-10

## 2025-05-10 RX ORDER — IBUPROFEN 800 MG/1
800 TABLET, FILM COATED ORAL EVERY 8 HOURS PRN
Status: DISCONTINUED | OUTPATIENT
Start: 2025-05-10 | End: 2025-05-13 | Stop reason: HOSPADM

## 2025-05-10 RX ORDER — MAGNESIUM HYDROXIDE/ALUMINUM HYDROXICE/SIMETHICONE 120; 1200; 1200 MG/30ML; MG/30ML; MG/30ML
30 SUSPENSION ORAL EVERY 4 HOURS PRN
Status: DISCONTINUED | OUTPATIENT
Start: 2025-05-10 | End: 2025-05-13 | Stop reason: HOSPADM

## 2025-05-10 RX ORDER — RISPERIDONE 0.5 MG/1
0.5 TABLET ORAL
Status: DISCONTINUED | OUTPATIENT
Start: 2025-05-10 | End: 2025-05-13 | Stop reason: HOSPADM

## 2025-05-10 RX ORDER — POLYETHYLENE GLYCOL 3350 17 G/17G
17 POWDER, FOR SOLUTION ORAL DAILY PRN
Status: DISCONTINUED | OUTPATIENT
Start: 2025-05-10 | End: 2025-05-13 | Stop reason: HOSPADM

## 2025-05-10 RX ORDER — BUPROPION HYDROCHLORIDE 150 MG/1
150 TABLET ORAL 2 TIMES DAILY
Status: DISCONTINUED | OUTPATIENT
Start: 2025-05-11 | End: 2025-05-11

## 2025-05-10 RX ORDER — LORAZEPAM 2 MG/ML
2 INJECTION INTRAMUSCULAR EVERY 6 HOURS PRN
Status: DISCONTINUED | OUTPATIENT
Start: 2025-05-10 | End: 2025-05-13 | Stop reason: HOSPADM

## 2025-05-10 RX ORDER — IBUPROFEN 400 MG/1
400 TABLET, FILM COATED ORAL ONCE
Status: COMPLETED | OUTPATIENT
Start: 2025-05-10 | End: 2025-05-10

## 2025-05-10 RX ADMIN — TRAZODONE HYDROCHLORIDE 100 MG: 50 TABLET ORAL at 00:03

## 2025-05-10 RX ADMIN — LOXAPINE SUCCINATE 10 MG: 10 TABLET ORAL at 20:14

## 2025-05-10 RX ADMIN — BUPRENORPHINE AND NALOXONE 8 MG: 8; 2 FILM BUCCAL; SUBLINGUAL at 15:02

## 2025-05-10 RX ADMIN — CLONAZEPAM 0.5 MG: 0.5 TABLET ORAL at 12:59

## 2025-05-10 RX ADMIN — NICOTINE POLACRILEX 2 MG: 2 GUM, CHEWING BUCCAL at 03:06

## 2025-05-10 RX ADMIN — IBUPROFEN 400 MG: 400 TABLET, FILM COATED ORAL at 09:53

## 2025-05-10 RX ADMIN — BUPROPION HYDROCHLORIDE 300 MG: 150 TABLET, EXTENDED RELEASE ORAL at 08:46

## 2025-05-10 RX ADMIN — NICOTINE POLACRILEX 4 MG: 4 GUM, CHEWING BUCCAL at 21:45

## 2025-05-10 RX ADMIN — LORAZEPAM 1 MG: 1 TABLET ORAL at 08:48

## 2025-05-10 RX ADMIN — BUPRENORPHINE AND NALOXONE 8 MG: 8; 2 FILM BUCCAL; SUBLINGUAL at 08:47

## 2025-05-10 RX ADMIN — CLONAZEPAM 0.5 MG: 0.5 TABLET ORAL at 03:48

## 2025-05-10 RX ADMIN — LOXAPINE SUCCINATE 10 MG: 10 TABLET ORAL at 13:57

## 2025-05-10 RX ADMIN — NICOTINE POLACRILEX 4 MG: 4 GUM, CHEWING BUCCAL at 12:33

## 2025-05-10 RX ADMIN — LORAZEPAM 1 MG: 1 TABLET ORAL at 03:01

## 2025-05-10 RX ADMIN — HYDROXYZINE HYDROCHLORIDE 100 MG: 50 TABLET ORAL at 21:45

## 2025-05-10 RX ADMIN — MELATONIN 3 MG: 3 TAB ORAL at 04:09

## 2025-05-10 RX ADMIN — GABAPENTIN 600 MG: 300 CAPSULE ORAL at 00:02

## 2025-05-10 RX ADMIN — BUPRENORPHINE AND NALOXONE 8 MG: 8; 2 FILM BUCCAL; SUBLINGUAL at 20:14

## 2025-05-10 RX ADMIN — NICOTINE POLACRILEX 4 MG: 4 GUM, CHEWING BUCCAL at 15:23

## 2025-05-10 RX ADMIN — GABAPENTIN 600 MG: 300 CAPSULE ORAL at 08:46

## 2025-05-10 RX ADMIN — FLUOXETINE HYDROCHLORIDE 20 MG: 20 CAPSULE ORAL at 12:59

## 2025-05-10 RX ADMIN — GABAPENTIN 800 MG: 400 CAPSULE ORAL at 15:02

## 2025-05-10 RX ADMIN — NICOTINE POLACRILEX 4 MG: 4 GUM, CHEWING BUCCAL at 17:28

## 2025-05-10 RX ADMIN — NICOTINE POLACRILEX 2 MG: 2 GUM, CHEWING BUCCAL at 08:52

## 2025-05-10 RX ADMIN — NICOTINE POLACRILEX 2 MG: 2 GUM, CHEWING BUCCAL at 00:17

## 2025-05-10 RX ADMIN — FOLIC ACID 1 MG: 1 TABLET ORAL at 08:46

## 2025-05-10 RX ADMIN — TRAZODONE HYDROCHLORIDE 100 MG: 100 TABLET ORAL at 21:44

## 2025-05-10 RX ADMIN — Medication 2000 UNITS: at 08:46

## 2025-05-10 RX ADMIN — NICOTINE POLACRILEX 4 MG: 4 GUM, CHEWING BUCCAL at 19:56

## 2025-05-10 RX ADMIN — GABAPENTIN 800 MG: 400 CAPSULE ORAL at 20:14

## 2025-05-10 RX ADMIN — NICOTINE POLACRILEX 2 MG: 2 GUM, CHEWING BUCCAL at 10:29

## 2025-05-10 NOTE — PLAN OF CARE
Problem: SAFETY ADULT  Goal: Patient will remain free of falls  Description: INTERVENTIONS:- Educate patient/family on patient safety including physical limitations- Instruct patient to call for assistance with activity - Consult OT/PT to assist with strengthening/mobility - Keep Call bell within reach- Keep bed low and locked with side rails adjusted as appropriate- Keep care items and personal belongings within reach- Initiate and maintain comfort rounds- Make Fall Risk Sign visible to staff- Apply yellow socks and bracelet for high fall risk patients- Consider moving patient to room near nurses station  Outcome: Progressing  Goal: Maintain or return to baseline ADL function  Description: INTERVENTIONS:-  Assess patient's ability to carry out ADLs; assess patient's baseline for ADL function and identify physical deficits which impact ability to perform ADLs (bathing, care of mouth/teeth, toileting, grooming, dressing, etc.)- Assess/evaluate cause of self-care deficits - Assess range of motion- Assess patient's mobility; develop plan if impaired- Assess patient's need for assistive devices and provide as appropriate- Encourage maximum independence but intervene and supervise when necessary- Involve family in performance of ADLs- Assess for home care needs following discharge - Consider OT consult to assist with ADL evaluation and planning for discharge- Provide patient education as appropriate  Outcome: Progressing  Goal: Maintains/Returns to pre admission functional level  Description: INTERVENTIONS:- Perform AM-PAC 6 Click Basic Mobility/ Daily Activity assessment daily.- Set and communicate daily mobility goal to care team and patient/family/caregiver. -   Outcome: Progressing     Problem: Ineffective Coping  Goal: Cooperates with admission process  Description: Interventions: - Complete admission process  Outcome: Progressing  Goal: Identifies ineffective coping skills  Outcome: Progressing  Goal: Identifies  healthy coping skills  Outcome: Progressing  Goal: Demonstrates healthy coping skills  Outcome: Progressing  Goal: Participates in unit activities  Description: Interventions:- Provide therapeutic environment - Provide required programming - Redirect inappropriate behaviors   Outcome: Progressing  Goal: Patient/Family participate in treatment and DC plans  Description: Interventions:- Provide therapeutic environment  Outcome: Progressing  Goal: Patient/Family verbalizes awareness of resources  Outcome: Progressing  Goal: Understands least restrictive measures  Description: Interventions:- Utilize least restrictive behavior  Outcome: Progressing  Goal: Free from restraint events  Description: - Utilize least restrictive measures - Provide behavioral interventions - Redirect inappropriate behaviors   Outcome: Progressing     Problem: Risk for Self Injury/Neglect  Goal: Treatment Goal: Remain safe during length of stay, learn and adopt new coping skills, and be free of self-injurious ideation, impulses and acts at the time of discharge  Outcome: Progressing  Goal: Verbalize thoughts and feelings  Description: Interventions:- Assess and re-assess patient's lethality and potential for self-injury- Engage patient in 1:1 interactions, daily, for a minimum of 15 minutes- Encourage patient to express feelings, fears, frustrations, hopes- Establish rapport/trust with patient   Outcome: Progressing  Goal: Refrain from harming self  Description: Interventions:- Monitor patient closely, per order- Develop a trusting relationship- Supervise medication ingestion, monitor effects and side effects   Outcome: Progressing  Goal: Attend and participate in unit activities, including therapeutic, recreational, and educational groups  Description: Interventions:- Provide therapeutic and educational activities daily, encourage attendance and participation, and document same in the medical record- Obtain collateral information, encourage  visitation and family involvement in care   Outcome: Progressing  Goal: Recognize maladaptive responses and adopt new coping mechanisms  Outcome: Progressing  Goal: Complete daily ADLs, including personal hygiene independently, as able  Description: Interventions:- Observe, teach, and assist patient with ADLS- Monitor and promote a balance of rest/activity, with adequate nutrition and elimination  Outcome: Progressing     Problem: Anxiety  Goal: Anxiety is at manageable level  Description: Interventions:- Assess and monitor patient's anxiety level. - Monitor for signs and symptoms (heart palpitations, chest pain, shortness of breath, headaches, nausea, feeling jumpy, restlessness, irritable, apprehensive). - Collaborate with interdisciplinary team and initiate plan and interventions as ordered.- [place] patient to unit/surroundings- Explain treatment plan- Encourage participation in care- Encourage verbalization of concerns/fears- Identify coping mechanisms- Assist in developing anxiety-reducing skills- Administer/offer alternative therapies- Limit or eliminate stimulants  Outcome: Progressing

## 2025-05-10 NOTE — ED NOTES
Insurance Authorization for Admission:  Phone Call Placed to [facility].  Phone Number [phone].  Spoke to Belinda.  4 Days Approved.  Level of Care AIP- 201.  Review on TBD.  Authorization #Accepting facility to call upon admission.    EVS (Eligibility Verification System) Called- [phone]  Automated System Indicates Active with [facility] (ID: [id number])    Jeremy Pierre  Crisis Intervention Specialist II  05/09/25

## 2025-05-10 NOTE — NURSING NOTE
"Pt approached this writer, visibly upset. Asked to \"sign a 48.\" Writer offered pt a 72hr, explained the process. Pt verbally upset, yelling that the provider will not change pt's medications. Writer inquired about which medication they were referring to. Pt states previously being on Klonopin 1mg, three x day. \"[place] lowered it to 0.5mg.\" Pt states that they were taking two pills, three times a day to make 1mg. Writer explained that when pt's take more medications that pt's often run out, and certain medications will not be filled early. Pt signed 72hr, complained about weekend psychiatrist. States wanting to meet with Primary Team on Monday and plans to ask them for med change. Pt made phone call to mother, yelling, started to punch the wall by phone. Pt was verbally redirected. Remains labile, loud, yelling. Medication-focused.  "
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
## 2025-05-10 NOTE — ED NOTES
Patient accepted to [facility] 2W by Dr. Jones. She may arrive at/after 09:00 today. Transportation scheduled through [employer]. She will be transported by [facility] with an 11:00am pickup. Danielle from Intake notified of transport time. Nurse to Nurse report to be called to the unit prior to patient pickup. [phone].

## 2025-05-10 NOTE — ED NOTES
Patient permitted to have call bell present at this time. Patient instructed not to throw call bell at anyone or anything or it would be taken away. Patient agreeable, stating she will not throw the call bell. Patient sitting on bed with TV on at this time.     Reanna Meraz RN  05/09/25 [id number]

## 2025-05-10 NOTE — NURSING NOTE
Pt arrived on unit via [facility] at approx 1330. During Skin Assessment, pt was found to have two open packages of Suboxone films hidden in their bra. Writer took medication from pt and placed in bag to be documented with pt's personal belongings.

## 2025-05-10 NOTE — H&P
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    HCT 34.4 (L) 03/01/2025     03/01/2025    RDW 13.3 03/01/2025    NEUTROABS 11.69 (H) 03/01/2025    TOTANEUTABS 10.60 (H) 04/18/2023    SODIUM 135 03/01/2025    K 4.2 03/01/2025     03/01/2025    CO2 25 03/01/2025    BUN 11 03/01/2025    CREATININE 0.66 03/01/2025    GLUC 82 03/01/2025    CALCIUM 8.9 03/01/2025    AST 16 03/01/2025    ALT 20 03/01/2025    ALKPHOS 113 (H) 03/01/2025    TP 7.2 03/01/2025    ALB 3.6 03/01/2025    TBILI 0.61 03/01/2025    CHOLESTEROL 215 (H) 02/10/2025    HDL 78 02/10/2025    TRIG 64 02/10/2025    LDLCALC 124 (H) 02/10/2025    NONHDLC 137 02/10/2025    YBE3MJJTWDUJ 5.113 (H) 12/02/2024    FREET4 0.83 12/02/2024    PREGUR Negative 10/20/2022    PREGSERUM Negative 12/02/2024    SYPHILISAB Non-reactive 12/02/2024    HGBA1C 5.2 12/25/2021     12/25/2021       Imaging Results Review: No pertinent imaging studies reviewed.  Other Study Results Review: No additional pertinent studies reviewed.    Code Status: Level 1 - Full Code  Advance Directive and Living Will: <no information>  Next of Kin: Extended Emergency Contact Information  Primary Emergency Contact: Susanna Yousif  Address: [address] [place] [address]       "     [place] [address] [facility]  Mobile Phone: [phone]  Relation: Mother  Secondary Emergency Contact: PASTORA Cantu  Mobile Phone: [phone]  Relation: Friend    Administrative Statements     Counseling / Coordination of Care:   Patient's progress discussed with staff in treatment team meeting.  Medication changes reviewed with staff in treatment team meeting.    Inpatient Psychiatric Certification:  Estimated length of stay: 6 midnights   Based upon physical, mental and social evaluations, I certify that inpatient psychiatric services are medically necessary for this patient for a duration of 6 midnights for the treatment of Bipolar 1 disorder (HCC)  Available alternative community resources do not meet the patient's mental health care needs.  I further attest that an established written individualized plan of care has been implemented and is outlined in the patient's medical records.    Portions of the record may have been created with voice recognition software. Occasional wrong word or \"sound a like\" substitutions may have occurred due to the inherent limitations of voice recognition software. Read the chart carefully and recognize, using context, where substitutions have occurred.    Jeremy Iqbal MD 05/10/25  "

## 2025-05-10 NOTE — ED NOTES
"Patient stormed out of room telling RN, \"I need something to calm down.\" RN asked patient to please wait in room while talking to doctor. Patient stated she had to use the bathroom and stormed off, shutting door loudly in front of 1:1's face. Patient informed by 1:1 that door must remain opened slightly so she is visible. Patient slammed door shut once again in front of 1:1's face. Door kept ajar with no other issue. Awaiting response from provider.      Reanna Meraz RN  05/10/25 0319    "
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
## 2025-05-10 NOTE — ED NOTES
Pt reports she wants to kill herself. And if she could find a sharp object she would      Da Cole RN  05/10/25 1264

## 2025-05-10 NOTE — ED NOTES
Patient closed curtain to room completely. 1:1 told patient the curtain needs to be left open slightly so the patient is visible at all times. Patient closed curtain anyway. RN informed patient again what 1:1 had just told her. No response from patient. 1:1 at bedside observing patient with curtain slightly open.     Reanna Meraz RN  05/10/25 0419

## 2025-05-10 NOTE — NURSING NOTE
RN will meet with patient at least twice per day to assess for any concerns. Teach about prescribed medication and diagnosis. Patient will be taught and encouraged to utilize healthy coping skills.

## 2025-05-10 NOTE — TREATMENT PLAN
TREATMENT PLAN REVIEW - Behavioral Health Carey Keith 50 y.o. 1975 female MRN: [id number]    [facility] QU IP BEHAVIORAL HLTH Room / Bed: [facility] 207-01 Encounter: [id number]          Admit Date/Time:  5/10/2025 11:33 AM    Treatment Team:   MD Reanna Shaikh RN Elizabeth Rinehart, RN Jonathan Demis Nicole Ronceros Emalene Ujvary, LPN    Diagnosis: Principal Problem:    Bipolar 1 disorder (HCC)  Active Problems:    KATY (generalized anxiety disorder)    Opioid use disorder, severe, on maintenance therapy (HCC)      Patient Strengths/Assets: Capable of independent living, communication skills, motivation for treatment and growth, negotiates basic needs, patient is on a voluntary commitment, patient is willing to work on problems, self-reliant    Patient Barriers/Limitations: Difficulty adapting, financial instability, lack of financial means, lack of social family support, lack of stable employment, limited support system, resistance to treatment    Short Term Goals: decrease in depressive symptoms, decrease in anxiety symptoms, decrease in suicidal thoughts, ability to stay safe on the unit, improvement in insight, improvement in self care, mood stabilization    Long Term Goals: improvement in depression, improvement in anxiety, stabilization of mood, free of suicidal thoughts, improved insight, able to express basic needs, acceptance of need for psychiatric medications, acceptance of need for psychiatric treatment, acceptance of need for psychiatric follow up after discharge, adequate self care, appropriate interaction with peers    Progress Towards Goals: starting psychiatric medications as prescribed    Recommended Treatment: medication management, patient medication education, group therapy, milieu therapy, continued Behavioral Health psychiatric evaluation/assessment process    Treatment Frequency: daily medication monitoring,  group and milieu therapy daily, monitoring through interdisciplinary rounds, monitoring through weekly patient care conferences    Expected Discharge Date:  5/16/25    Discharge Plan: referral for outpatient medication management with a psychiatrist, referral for outpatient psychotherapy, return to previous living arrangement    Treatment Plan Created/Updated By: Jeremy Iqbal MD
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
## 2025-05-10 NOTE — ED NOTES
"Patient asking how much Klonopin she is getting. RN informed her of 0.5mg PO dose provider ordered. Patient stated, \"that isn't going to do anything for me. Can't he change that?\" RN told patient the doctor is not going to change the order and that is what he prescribed. Patient asked, \"can't I have something for sleep?\" To which RN responded, \"like what?\" Patient replied, \"like Trazodone.\" RN educated patient that it is not safe to give more Trazodone when she took her prescribed bedtime dose around midnight and it also is not safe to take so close to Ativan and Klonopin. Patient stated, \"this is so stupid.\" Patient has no other questions at this time.     Reanna Meraz RN  05/10/25 5219    "

## 2025-05-11 LAB
25(OH)D3 SERPL-MCNC: 14 NG/ML (ref 30–100)
ALBUMIN SERPL BCG-MCNC: 3.8 G/DL (ref 3.5–5)
ALP SERPL-CCNC: 69 U/L (ref 34–104)
ALT SERPL W P-5'-P-CCNC: 15 U/L (ref 7–52)
ANION GAP SERPL CALCULATED.3IONS-SCNC: 4 MMOL/L (ref 4–13)
AST SERPL W P-5'-P-CCNC: 17 U/L (ref 13–39)
BACTERIA UR QL AUTO: ABNORMAL /HPF
BASOPHILS # BLD AUTO: 0.01 THOUSANDS/ÂΜL (ref 0–0.1)
BASOPHILS NFR BLD AUTO: 0 % (ref 0–1)
BILIRUB SERPL-MCNC: 0.37 MG/DL (ref 0.2–1)
BILIRUB UR QL STRIP: NEGATIVE
BUN SERPL-MCNC: 17 MG/DL (ref 5–25)
CALCIUM SERPL-MCNC: 9 MG/DL (ref 8.4–10.2)
CHLORIDE SERPL-SCNC: 102 MMOL/L (ref 96–108)
CHOLEST SERPL-MCNC: 205 MG/DL (ref ?–200)
CLARITY UR: CLEAR
CO2 SERPL-SCNC: 32 MMOL/L (ref 21–32)
COLOR UR: YELLOW
CREAT SERPL-MCNC: 0.85 MG/DL (ref 0.6–1.3)
EOSINOPHIL # BLD AUTO: 0.01 THOUSAND/ÂΜL (ref 0–0.61)
EOSINOPHIL NFR BLD AUTO: 0 % (ref 0–6)
ERYTHROCYTE [DISTWIDTH] IN BLOOD BY AUTOMATED COUNT: 14.7 % (ref 11.6–15.1)
FOLATE SERPL-MCNC: 15.9 NG/ML
GFR SERPL CREATININE-BSD FRML MDRD: 81 ML/MIN/1.73SQ M
GLUCOSE P FAST SERPL-MCNC: 98 MG/DL (ref 65–99)
GLUCOSE SERPL-MCNC: 98 MG/DL (ref 65–140)
GLUCOSE UR STRIP-MCNC: NEGATIVE MG/DL
HCG SERPL QL: NEGATIVE
HCT VFR BLD AUTO: 39.2 % (ref 34.8–46.1)
HDLC SERPL-MCNC: 46 MG/DL
HGB BLD-MCNC: 12.4 G/DL (ref 11.5–15.4)
HGB UR QL STRIP.AUTO: ABNORMAL
IMM GRANULOCYTES # BLD AUTO: 0.01 THOUSAND/UL (ref 0–0.2)
IMM GRANULOCYTES NFR BLD AUTO: 0 % (ref 0–2)
KETONES UR STRIP-MCNC: NEGATIVE MG/DL
LDLC SERPL CALC-MCNC: 104 MG/DL (ref 0–100)
LEUKOCYTE ESTERASE UR QL STRIP: NEGATIVE
LYMPHOCYTES # BLD AUTO: 2.54 THOUSANDS/ÂΜL (ref 0.6–4.47)
LYMPHOCYTES NFR BLD AUTO: 37 % (ref 14–44)
MCH RBC QN AUTO: 27.7 PG (ref 26.8–34.3)
MCHC RBC AUTO-ENTMCNC: 31.6 G/DL (ref 31.4–37.4)
MCV RBC AUTO: 88 FL (ref 82–98)
MONOCYTES # BLD AUTO: 0.74 THOUSAND/ÂΜL (ref 0.17–1.22)
MONOCYTES NFR BLD AUTO: 11 % (ref 4–12)
NEUTROPHILS # BLD AUTO: 3.53 THOUSANDS/ÂΜL (ref 1.85–7.62)
NEUTS SEG NFR BLD AUTO: 52 % (ref 43–75)
NITRITE UR QL STRIP: NEGATIVE
NON-SQ EPI CELLS URNS QL MICRO: ABNORMAL /HPF
NONHDLC SERPL-MCNC: 159 MG/DL
NRBC BLD AUTO-RTO: 0 /100 WBCS
PH UR STRIP.AUTO: 6 [PH]
PLATELET # BLD AUTO: 215 THOUSANDS/UL (ref 149–390)
PMV BLD AUTO: 8.9 FL (ref 8.9–12.7)
POTASSIUM SERPL-SCNC: 4.5 MMOL/L (ref 3.5–5.3)
PROT SERPL-MCNC: 6.2 G/DL (ref 6.4–8.4)
PROT UR STRIP-MCNC: NEGATIVE MG/DL
RBC # BLD AUTO: 4.48 MILLION/UL (ref 3.81–5.12)
RBC #/AREA URNS AUTO: ABNORMAL /HPF
SODIUM SERPL-SCNC: 138 MMOL/L (ref 135–147)
SP GR UR STRIP.AUTO: 1.02 (ref 1–1.03)
T4 FREE SERPL-MCNC: 0.77 NG/DL (ref 0.61–1.12)
TRIGL SERPL-MCNC: 277 MG/DL (ref ?–150)
TSH SERPL DL<=0.05 MIU/L-ACNC: 5.4 UIU/ML (ref 0.45–4.5)
UROBILINOGEN UR STRIP-ACNC: <2 MG/DL
VIT B12 SERPL-MCNC: 227 PG/ML (ref 180–914)
WBC # BLD AUTO: 6.84 THOUSAND/UL (ref 4.31–10.16)
WBC #/AREA URNS AUTO: ABNORMAL /HPF

## 2025-05-11 PROCEDURE — 80061 LIPID PANEL: CPT | Performed by: STUDENT IN AN ORGANIZED HEALTH CARE EDUCATION/TRAINING PROGRAM

## 2025-05-11 PROCEDURE — 84439 ASSAY OF FREE THYROXINE: CPT | Performed by: STUDENT IN AN ORGANIZED HEALTH CARE EDUCATION/TRAINING PROGRAM

## 2025-05-11 PROCEDURE — 82746 ASSAY OF FOLIC ACID SERUM: CPT | Performed by: STUDENT IN AN ORGANIZED HEALTH CARE EDUCATION/TRAINING PROGRAM

## 2025-05-11 PROCEDURE — 99221 1ST HOSP IP/OBS SF/LOW 40: CPT | Performed by: PHYSICIAN ASSISTANT

## 2025-05-11 PROCEDURE — 85025 COMPLETE CBC W/AUTO DIFF WBC: CPT | Performed by: STUDENT IN AN ORGANIZED HEALTH CARE EDUCATION/TRAINING PROGRAM

## 2025-05-11 PROCEDURE — 82607 VITAMIN B-12: CPT | Performed by: STUDENT IN AN ORGANIZED HEALTH CARE EDUCATION/TRAINING PROGRAM

## 2025-05-11 PROCEDURE — 84443 ASSAY THYROID STIM HORMONE: CPT | Performed by: STUDENT IN AN ORGANIZED HEALTH CARE EDUCATION/TRAINING PROGRAM

## 2025-05-11 PROCEDURE — 81001 URINALYSIS AUTO W/SCOPE: CPT | Performed by: PSYCHIATRY & NEUROLOGY

## 2025-05-11 PROCEDURE — 82306 VITAMIN D 25 HYDROXY: CPT | Performed by: STUDENT IN AN ORGANIZED HEALTH CARE EDUCATION/TRAINING PROGRAM

## 2025-05-11 PROCEDURE — 84703 CHORIONIC GONADOTROPIN ASSAY: CPT | Performed by: STUDENT IN AN ORGANIZED HEALTH CARE EDUCATION/TRAINING PROGRAM

## 2025-05-11 PROCEDURE — 86780 TREPONEMA PALLIDUM: CPT | Performed by: STUDENT IN AN ORGANIZED HEALTH CARE EDUCATION/TRAINING PROGRAM

## 2025-05-11 PROCEDURE — 80053 COMPREHEN METABOLIC PANEL: CPT | Performed by: STUDENT IN AN ORGANIZED HEALTH CARE EDUCATION/TRAINING PROGRAM

## 2025-05-11 PROCEDURE — 99232 SBSQ HOSP IP/OBS MODERATE 35: CPT

## 2025-05-11 RX ORDER — BUDESONIDE AND FORMOTEROL FUMARATE DIHYDRATE 160; 4.5 UG/1; UG/1
2 AEROSOL RESPIRATORY (INHALATION) 2 TIMES DAILY
Status: DISCONTINUED | OUTPATIENT
Start: 2025-05-11 | End: 2025-05-13 | Stop reason: HOSPADM

## 2025-05-11 RX ORDER — TRAZODONE HYDROCHLORIDE 150 MG/1
150 TABLET ORAL
Status: DISCONTINUED | OUTPATIENT
Start: 2025-05-11 | End: 2025-05-13 | Stop reason: HOSPADM

## 2025-05-11 RX ORDER — ALBUTEROL SULFATE 90 UG/1
2 INHALANT RESPIRATORY (INHALATION) EVERY 4 HOURS PRN
Status: DISCONTINUED | OUTPATIENT
Start: 2025-05-11 | End: 2025-05-13 | Stop reason: HOSPADM

## 2025-05-11 RX ORDER — LOXAPINE SUCCINATE 25 MG/1
25 TABLET ORAL 2 TIMES DAILY
Status: DISCONTINUED | OUTPATIENT
Start: 2025-05-11 | End: 2025-05-13 | Stop reason: HOSPADM

## 2025-05-11 RX ORDER — BUPROPION HYDROCHLORIDE 300 MG/1
300 TABLET ORAL ONCE
Status: COMPLETED | OUTPATIENT
Start: 2025-05-11 | End: 2025-05-11

## 2025-05-11 RX ADMIN — GABAPENTIN 800 MG: 400 CAPSULE ORAL at 15:13

## 2025-05-11 RX ADMIN — NICOTINE POLACRILEX 4 MG: 4 GUM, CHEWING BUCCAL at 09:45

## 2025-05-11 RX ADMIN — BUDESONIDE AND FORMOTEROL FUMARATE DIHYDRATE 2 PUFF: 160; 4.5 AEROSOL RESPIRATORY (INHALATION) at 11:03

## 2025-05-11 RX ADMIN — BUPRENORPHINE AND NALOXONE 8 MG: 8; 2 FILM BUCCAL; SUBLINGUAL at 20:09

## 2025-05-11 RX ADMIN — IBUPROFEN 800 MG: 800 TABLET, FILM COATED ORAL at 08:08

## 2025-05-11 RX ADMIN — BUPROPION HYDROCHLORIDE 150 MG: 150 TABLET, EXTENDED RELEASE ORAL at 08:08

## 2025-05-11 RX ADMIN — BUPRENORPHINE AND NALOXONE 8 MG: 8; 2 FILM BUCCAL; SUBLINGUAL at 15:13

## 2025-05-11 RX ADMIN — LOXAPINE SUCCINATE 10 MG: 10 TABLET ORAL at 08:08

## 2025-05-11 RX ADMIN — GABAPENTIN 800 MG: 400 CAPSULE ORAL at 20:09

## 2025-05-11 RX ADMIN — BUPROPION HYDROCHLORIDE 300 MG: 300 TABLET, EXTENDED RELEASE ORAL at 11:00

## 2025-05-11 RX ADMIN — BUPRENORPHINE AND NALOXONE 8 MG: 8; 2 FILM BUCCAL; SUBLINGUAL at 08:08

## 2025-05-11 RX ADMIN — NICOTINE POLACRILEX 4 MG: 4 GUM, CHEWING BUCCAL at 17:05

## 2025-05-11 RX ADMIN — BUDESONIDE AND FORMOTEROL FUMARATE DIHYDRATE 2 PUFF: 160; 4.5 AEROSOL RESPIRATORY (INHALATION) at 17:05

## 2025-05-11 RX ADMIN — NICOTINE POLACRILEX 4 MG: 4 GUM, CHEWING BUCCAL at 14:23

## 2025-05-11 RX ADMIN — GABAPENTIN 800 MG: 400 CAPSULE ORAL at 08:08

## 2025-05-11 RX ADMIN — LOXAPINE 15 MG: 5 CAPSULE ORAL at 11:00

## 2025-05-11 RX ADMIN — TIZANIDINE 4 MG: 4 TABLET ORAL at 11:00

## 2025-05-11 RX ADMIN — TRAZODONE HYDROCHLORIDE 150 MG: 150 TABLET ORAL at 21:57

## 2025-05-11 RX ADMIN — FLUOXETINE HYDROCHLORIDE 20 MG: 20 CAPSULE ORAL at 08:08

## 2025-05-11 RX ADMIN — CLONAZEPAM 0.5 MG: 0.5 TABLET ORAL at 08:08

## 2025-05-11 RX ADMIN — NICOTINE POLACRILEX 4 MG: 4 GUM, CHEWING BUCCAL at 06:57

## 2025-05-11 RX ADMIN — NICOTINE POLACRILEX 4 MG: 4 GUM, CHEWING BUCCAL at 18:54

## 2025-05-11 RX ADMIN — NICOTINE POLACRILEX 4 MG: 4 GUM, CHEWING BUCCAL at 20:54

## 2025-05-11 RX ADMIN — LOXAPINE 25 MG: 25 CAPSULE ORAL at 21:57

## 2025-05-11 RX ADMIN — NICOTINE 21 MG: 21 PATCH, EXTENDED RELEASE TRANSDERMAL at 08:08

## 2025-05-11 RX ADMIN — IBUPROFEN 600 MG: 600 TABLET, FILM COATED ORAL at 17:30

## 2025-05-11 NOTE — PROGRESS NOTES
Progress Note - Behavioral Health   Name: Carey Keith 50 y.o. female I MRN: [id number]  Unit/Bed#: [facility] 207-01 I Date of Admission: 5/10/2025   Date of Service: 5/11/2025 I Hospital Day: 1    Assessment & Plan  Bipolar 1 disorder (HCC)  Increased Wellbutrin to 450 mg XL daily for symptoms of depression  Continue Prozac at 20 mg daily for symptoms of depression and anxiety  Increased loxapine to 25 mg twice daily for mood stability as well as anxiety    Patient signed a 72 hour notice 5/10/25 - At this time there are no observable 302 behaviors and will continue to monitor patient while hospitalized  KATY (generalized anxiety disorder)  Continue Klonopin 0.5 mg daily for generalized anxiety  Dosage of Klonopin was confirmed via PDMP  Continue gabapentin to 800 mg 3 times daily for generalized anxiety  Increased trazodone to 150 mg nightly for nighttime anxiety and insomnia  Opioid use disorder, severe, on maintenance therapy (HCC)  Continue Suboxone 8 mg sublingual 3 times daily for history of opioid use disorder  Dosage of Suboxone was confirmed via PDMP  Asthma  Management per medical team  Medical clearance for psychiatric admission  Completed by medical team during current inpatient hospitalization    Current Medications:    Current Facility-Administered Medications:     budesonide-formoterol (SYMBICORT) 160-4.5 mcg/act inhaler 2 puff, Inhalation, BID    buprenorphine-naloxone (Suboxone) film 8 mg, Sublingual, TID    [START ON 5/12/2025] buPROPion (WELLBUTRIN XL) 24 hr tablet 450 mg, Oral, Daily    clonazePAM (KlonoPIN) tablet 0.5 mg, Oral, Daily    FLUoxetine (PROzac) capsule 20 mg, Oral, Daily    gabapentin (NEURONTIN) capsule 800 mg, Oral, TID    loxapine (LOXITANE) capsule 25 mg, Oral, BID    nicotine (NICODERM CQ) 21 mg/24 hr TD 24 hr patch 21 mg, Transdermal, Daily    traZODone (DESYREL) tablet 150 mg, Oral, HS    Risks/Benefits of Treatment:     Risks, benefits, and possible side effects of medications  "explained to patient and patient verbalizes understanding and agreement for treatment.    Progress Toward Goals: Slow improvements    Treatment Planning:     All current active medications have been reviewed.  Continue to monitor response to treatment and assess for potential side effects of medications.  Encourage group therapy, milieu therapy and occupational therapy.  Collaboration with medical service for medical comorbidities as indicated.  Behavioral Health checks for safety monitoring.  Estimated Discharge Day:  5/16/2025  Legal Status : Voluntary 201 commitment.  Long Stay Certification : Not Applicable    Subjective     Behavior over the last 24 hours: Slow improvements    Today, Carey appeared visibly calmer than previous interview.  Overall, she continues to present with a depressed mood. She is notably less irritable at the time of interview today.    Today, Carey reports feeling \"miserable.\"  She reports continued symptoms of depression and anxiety on the inpatient unit.  At the time of interview, she did bring up the topic of increasing the dose of clonazepam, however was not as preoccupied with increasing klonopin as during previous assessment.     On the inpatient unit Carey reports that her symptoms of depression and anxiety have been slowly improving.  She reports her current medication regimen has been helping to address her symptoms.  She denies side effects to her current medication regimen.    At this time, on the inpatient unit Carey reports she has been spending her time calling her mother and watching TV.    On the inpatient unit, Carey reports she has been having difficulty sleeping.  She reports appropriate appetite.  She has been taking her medications as directed and denies medication side effects.    Medication management options were discussed with Carey in detail.  She states that loxapine has been helpful for anxiety and mood stability and is agreeable to an " "increase in the dosage.  She reports in the past she has been on higher doses of Wellbutrin and in this context also requests an increase in Wellbutrin.  She reports continued struggles with insomnia and requests an increase in trazodone in this context as well.    At the time of interview, Carey denies suicidal ideation, homicidal ideation, visual and auditory hallucinations.    Sleep: Reports difficulty falling asleep  Appetite: normal  Medication side effects: No  ROS: Patient reports back pain, arthralgias and myalgias review of systems as noted above in HPI/Subjective report, all other systems are negative    Objective :  Temp:  [96.6 °F (35.9 °C)-98 °F (36.7 °C)] 97.1 °F (36.2 °C)  HR:  [65-77] 77  BP: (114-125)/(87-99) 114/91  Resp:  [17-18] 18  SpO2:  [97 %-100 %] 97 %  O2 Device: None (Room air)    Mental Status Evaluation:    Appearance:  Alert, appears older than stated age, casually dressed, marginal grooming hygiene, fair eye contact, no acute distress, is noted to have multiple lacerations on left forearm   Behavior:  Guarded, polite,  less irritable on interview and more cooperative with interview   Speech:  Normal rate, normal volume, fluent, clear, coherent   Mood:  \"Miserable\"   Affect:  Less labile, less irritable   Thought Process:  Circumstantial, negative thinking   Thought Content:  No overt delusions, no overt paranoia, remains with ongoing negative thoughts   Perceptual Disturbances: Denies visual auditory hallucinations, does not appear responding to internal stimuli   Risk Potential: Suicidal Ideation --denies  Homicidal Ideation --denies  Potential for Aggression --no, not at present   Sensorium:  oriented to person, place, and time/date   Memory:  recent and remote memory grossly intact   Consciousness:  alert and awake   Attention/Concentration: attention span and concentration appear shorter than expected for age   Insight:  limited   Judgment: limited   Gait/Station: normal " gait/station, normal balance   Motor Activity: no abnormal movements       Lab Results: I have reviewed the following results:  Results from the past 24 hours:   Recent Results (from the past 24 hours)   Urinalysis    Collection Time: 05/11/25  6:24 AM   Result Value Ref Range    Color, UA Yellow     Clarity, UA Clear     Specific Gravity, UA 1.025 1.005 - 1.030    pH, UA 6.0 4.5, 5.0, 5.5, 6.0, 6.5, 7.0, 7.5, 8.0    Leukocytes, UA Negative Negative    Nitrite, UA Negative Negative    Protein, UA Negative Negative mg/dl    Glucose, UA Negative Negative mg/dl    Ketones, UA Negative Negative mg/dl    Urobilinogen, UA <2.0 <2.0 mg/dl mg/dl    Bilirubin, UA Negative Negative    Occult Blood, UA Trace (A) Negative    RBC, UA 2-4 None Seen, 2-4 /hpf    WBC, UA None Seen None Seen, 2-4, 5-60 /hpf    Epithelial Cells Occasional None Seen, Occasional /hpf    Bacteria, UA Occasional None Seen, Occasional /hpf   CBC and differential    Collection Time: 05/11/25  6:25 AM   Result Value Ref Range    WBC 6.84 4.31 - 10.16 Thousand/uL    RBC 4.48 3.81 - 5.12 Million/uL    Hemoglobin 12.4 11.5 - 15.4 g/dL    Hematocrit 39.2 34.8 - 46.1 %    MCV 88 82 - 98 fL    MCH 27.7 26.8 - 34.3 pg    MCHC 31.6 31.4 - 37.4 g/dL    RDW 14.7 11.6 - 15.1 %    MPV 8.9 8.9 - 12.7 fL    Platelets 215 149 - 390 Thousands/uL    nRBC 0 /100 WBCs    Segmented % 52 43 - 75 %    Immature Grans % 0 0 - 2 %    Lymphocytes % 37 14 - 44 %    Monocytes % 11 4 - 12 %    Eosinophils Relative 0 0 - 6 %    Basophils Relative 0 0 - 1 %    Absolute Neutrophils 3.53 1.85 - 7.62 Thousands/µL    Absolute Immature Grans 0.01 0.00 - 0.20 Thousand/uL    Absolute Lymphocytes 2.54 0.60 - 4.47 Thousands/µL    Absolute Monocytes 0.74 0.17 - 1.22 Thousand/µL    Eosinophils Absolute 0.01 0.00 - 0.61 Thousand/µL    Basophils Absolute 0.01 0.00 - 0.10 Thousands/µL   Comprehensive metabolic panel    Collection Time: 05/11/25  6:25 AM   Result Value Ref Range    Sodium 138 135 - 147  "mmol/L    Potassium 4.5 3.5 - 5.3 mmol/L    Chloride 102 96 - 108 mmol/L    CO2 32 21 - 32 mmol/L    ANION GAP 4 4 - 13 mmol/L    BUN 17 5 - 25 mg/dL    Creatinine 0.85 0.60 - 1.30 mg/dL    Glucose 98 65 - 140 mg/dL    Glucose, Fasting 98 65 - 99 mg/dL    Calcium 9.0 8.4 - 10.2 mg/dL    AST 17 13 - 39 U/L    ALT 15 7 - 52 U/L    Alkaline Phosphatase 69 34 - 104 U/L    Total Protein 6.2 (L) 6.4 - 8.4 g/dL    Albumin 3.8 3.5 - 5.0 g/dL    Total Bilirubin 0.37 0.20 - 1.00 mg/dL    eGFR 81 ml/min/1.73sq m   hCG, serum, qualitative    Collection Time: 05/11/25  6:25 AM   Result Value Ref Range    Preg, Serum Negative Negative   Lipid panel    Collection Time: 05/11/25  6:25 AM   Result Value Ref Range    Cholesterol 205 (H) See Comment mg/dL    Triglycerides 277 (H) See Comment mg/dL    HDL, Direct 46 (L) >=50 mg/dL    LDL Calculated 104 (H) 0 - 100 mg/dL    Non-HDL-Chol (CHOL-HDL) 159 mg/dl   TSH, 3rd generation with Free T4 reflex    Collection Time: 05/11/25  6:25 AM   Result Value Ref Range    TSH 3RD GENERATON 5.397 (H) 0.450 - 4.500 uIU/mL       Administrative Statements     Counseling / Coordination of Care:   Patient's progress discussed with staff in treatment team meeting.  Medication changes reviewed with staff in treatment team meeting.    Portions of the record may have been created with voice recognition software. Occasional wrong word or \"sound a like\" substitutions may have occurred due to the inherent limitations of voice recognition software. Read the chart carefully and recognize, using context, where substitutions have occurred.    Jeremy Iqbal MD 05/11/25  "

## 2025-05-11 NOTE — NURSING NOTE
Pt Belongings    Grey pants  Black hoodie  1 pair of beige sneakers  Black book bags  Library card  [employer] card   2 packets of sugar  2 Buprenorphine packets( empty)  1 buprenorphine packet (at med room)    At bed side    Bey pants

## 2025-05-11 NOTE — NURSING NOTE
"Carey is irritable upon approach, redirected for walking into a peer's room, states, \"I just wanted to look out the window.\" Unit rules and expectations reviewed with patient, who rolled her eyes. Patient is very focused on medications, discussed feeling angry r/t current Klonopin dosage, requesting that Suboxone be given early r/t \"I don't want a bad taste in my mouth at snack time, that's not fair to me.\" Carey was educated r/t scheduled medication times, remains irritable, perseverating on events that led to admission. Carey states, \"This is not my fault that I'm here. I got thrown out because people got greedy and want more and more of my disability money. Nothing is going to make me feel better except my Klonopin and I need the right fucking dose.\" Active listening provided, reviewed that prior nursing staff had already discussed medications and dosages with patient. Carey was encouraged to attend evening group, but declined.   "
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
## 2025-05-11 NOTE — PLAN OF CARE
Problem: SAFETY ADULT  Goal: Patient will remain free of falls  Description: INTERVENTIONS:- Educate patient/family on patient safety including physical limitations- Instruct patient to call for assistance with activity - Consult OT/PT to assist with strengthening/mobility - Keep Call bell within reach- Keep bed low and locked with side rails adjusted as appropriate- Keep care items and personal belongings within reach- Initiate and maintain comfort rounds- Make Fall Risk Sign visible to staff- Offer Toileting every  Hours, in advance of need- Initiate/Maintain alarm- Obtain necessary fall risk management equipment: - Apply yellow socks and bracelet for high fall risk patients- Consider moving patient to room near nurses station  Outcome: Progressing  Goal: Maintain or return to baseline ADL function  Description: INTERVENTIONS:-  Assess patient's ability to carry out ADLs; assess patient's baseline for ADL function and identify physical deficits which impact ability to perform ADLs (bathing, care of mouth/teeth, toileting, grooming, dressing, etc.)- Assess/evaluate cause of self-care deficits - Assess range of motion- Assess patient's mobility; develop plan if impaired- Assess patient's need for assistive devices and provide as appropriate- Encourage maximum independence but intervene and supervise when necessary- Involve family in performance of ADLs- Assess for home care needs following discharge - Consider OT consult to assist with ADL evaluation and planning for discharge- Provide patient education as appropriate  Outcome: Progressing  Goal: Maintains/Returns to pre admission functional level  Description: INTERVENTIONS:- Perform AM-PAC 6 Click Basic Mobility/ Daily Activity assessment daily.- Set and communicate daily mobility goal to care team and patient/family/caregiver. - Collaborate with rehabilitation services on mobility goals if consulted- Perform Range of Motion  times a day.- Reposition patient  every  hours.- Dangle patient  times a day- Stand patient  times a day- Ambulate patient  times a day- Out of bed to chair  times a day - Out of bed for meals   Problem: Ineffective Coping  Goal: Cooperates with admission process  Description: Interventions: - Complete admission process  Outcome: Not Progressing  Goal: Identifies ineffective coping skills  Outcome: Not Progressing  Goal: Identifies healthy coping skills  Outcome: Not Progressing  Goal: Demonstrates healthy coping skills  Outcome: Not Progressing  Goal: Participates in unit activities  Description: Interventions:- Provide therapeutic environment - Provide required programming - Redirect inappropriate behaviors   Outcome: Not Progressing  Goal: Patient/Family participate in treatment and DC plans  Description: Interventions:- Provide therapeutic environment  Outcome: Not Progressing  Goal: Patient/Family verbalizes awareness of resources  Outcome: Not Progressing  Goal: Understands least restrictive measures  Description: Interventions:- Utilize least restrictive behavior  Outcome: Progressing  Goal: Free from restraint events  Description: - Utilize least restrictive measures - Provide behavioral interventions - Redirect inappropriate behaviors   Outcome: Progressing     Problem: Risk for Self Injury/Neglect  Goal: Treatment Goal: Remain safe during length of stay, learn and adopt new coping skills, and be free of self-injurious ideation, impulses and acts at the time of discharge  Outcome: Progressing  Goal: Verbalize thoughts and feelings  Description: Interventions:- Assess and re-assess patient's lethality and potential for self-injury- Engage patient in 1:1 interactions, daily, for a minimum of 15 minutes- Encourage patient to express feelings, fears, frustrations, hopes- Establish rapport/trust with patient   Outcome: Progressing  Goal: Refrain from harming self  Description: Interventions:- Monitor patient closely, per order- Develop a  trusting relationship- Supervise medication ingestion, monitor effects and side effects   Outcome: Progressing  Goal: Attend and participate in unit activities, including therapeutic, recreational, and educational groups  Description: Interventions:- Provide therapeutic and educational activities daily, encourage attendance and participation, and document same in the medical record- Obtain collateral information, encourage visitation and family involvement in care   Outcome: Progressing  Goal: Recognize maladaptive responses and adopt new coping mechanisms  Outcome: Not Progressing  Goal: Complete daily ADLs, including personal hygiene independently, as able  Description: Interventions:- Observe, teach, and assist patient with ADLS- Monitor and promote a balance of rest/activity, with adequate nutrition and elimination  Outcome: Progressing     Problem: Anxiety  Goal: Anxiety is at manageable level  Description: Interventions:- Assess and monitor patient's anxiety level. - Monitor for signs and symptoms (heart palpitations, chest pain, shortness of breath, headaches, nausea, feeling jumpy, restlessness, irritable, apprehensive). - Collaborate with interdisciplinary team and initiate plan and interventions as ordered.- [place] patient to unit/surroundings- Explain treatment plan- Encourage participation in care- Encourage verbalization of concerns/fears- Identify coping mechanisms- Assist in developing anxiety-reducing skills- Administer/offer alternative therapies- Limit or eliminate stimulants  Outcome: Not Progressing    times a day- Out of bed for toileting- Record patient progress and toleration of activity level   Outcome: Progressing

## 2025-05-11 NOTE — NURSING NOTE
At 11:00 RN administered Zanaflex 4 mg PO for spasm. Upon follow-up, patient endorses medication effectiveness.
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
## 2025-05-11 NOTE — NURSING NOTE
"Carey approached nurse's station, requesting HS medications, states, \"Give me something to knock me out.\" Patient given scheduled Trazodone 100 mg. Carey requested additional medication for anxiety, flores = 28. Patient given PRN Atarax 100 mg. Upon follow-up for reassessment, patient is observed resting in bed, PRNs appear to have been effective.  "

## 2025-05-11 NOTE — NURSING NOTE
"Carey began yelling that phones were off during group, stating, \"Well, I'm not fucking going!\" Patient then began hitting dresser and desk in room, stating, \"What? I'm not hurting anyone or myself!\" Patient educated again r/t boundaries and expectations.   "

## 2025-05-12 ENCOUNTER — TELEPHONE (OUTPATIENT)
Age: 50
End: 2025-05-12

## 2025-05-12 PROBLEM — Z00.8 MEDICAL CLEARANCE FOR PSYCHIATRIC ADMISSION: Status: RESOLVED | Noted: 2021-12-23 | Resolved: 2025-05-12

## 2025-05-12 LAB — TREPONEMA PALLIDUM IGG+IGM AB [PRESENCE] IN SERUM OR PLASMA BY IMMUNOASSAY: NORMAL

## 2025-05-12 PROCEDURE — 99232 SBSQ HOSP IP/OBS MODERATE 35: CPT | Performed by: PSYCHIATRY & NEUROLOGY

## 2025-05-12 PROCEDURE — 93005 ELECTROCARDIOGRAM TRACING: CPT

## 2025-05-12 RX ORDER — HYDROXYZINE HYDROCHLORIDE 25 MG/1
25 TABLET, FILM COATED ORAL
Status: DISCONTINUED | OUTPATIENT
Start: 2025-05-12 | End: 2025-05-13 | Stop reason: HOSPADM

## 2025-05-12 RX ADMIN — LOXAPINE 25 MG: 25 CAPSULE ORAL at 08:00

## 2025-05-12 RX ADMIN — BUPRENORPHINE AND NALOXONE 8 MG: 8; 2 FILM BUCCAL; SUBLINGUAL at 20:00

## 2025-05-12 RX ADMIN — BUPRENORPHINE AND NALOXONE 8 MG: 8; 2 FILM BUCCAL; SUBLINGUAL at 08:01

## 2025-05-12 RX ADMIN — NICOTINE POLACRILEX 4 MG: 4 GUM, CHEWING BUCCAL at 12:08

## 2025-05-12 RX ADMIN — GABAPENTIN 800 MG: 400 CAPSULE ORAL at 08:00

## 2025-05-12 RX ADMIN — NICOTINE POLACRILEX 4 MG: 4 GUM, CHEWING BUCCAL at 08:04

## 2025-05-12 RX ADMIN — NICOTINE POLACRILEX 4 MG: 4 GUM, CHEWING BUCCAL at 19:15

## 2025-05-12 RX ADMIN — GABAPENTIN 800 MG: 400 CAPSULE ORAL at 20:00

## 2025-05-12 RX ADMIN — NICOTINE POLACRILEX 4 MG: 4 GUM, CHEWING BUCCAL at 14:53

## 2025-05-12 RX ADMIN — BUDESONIDE AND FORMOTEROL FUMARATE DIHYDRATE 2 PUFF: 160; 4.5 AEROSOL RESPIRATORY (INHALATION) at 08:01

## 2025-05-12 RX ADMIN — NICOTINE POLACRILEX 4 MG: 4 GUM, CHEWING BUCCAL at 10:40

## 2025-05-12 RX ADMIN — IBUPROFEN 800 MG: 800 TABLET, FILM COATED ORAL at 16:21

## 2025-05-12 RX ADMIN — BENZTROPINE MESYLATE 1 MG: 1 TABLET ORAL at 12:08

## 2025-05-12 RX ADMIN — TRAZODONE HYDROCHLORIDE 150 MG: 150 TABLET ORAL at 21:57

## 2025-05-12 RX ADMIN — IBUPROFEN 800 MG: 800 TABLET, FILM COATED ORAL at 08:00

## 2025-05-12 RX ADMIN — NICOTINE POLACRILEX 4 MG: 4 GUM, CHEWING BUCCAL at 21:33

## 2025-05-12 RX ADMIN — BUPRENORPHINE AND NALOXONE 8 MG: 8; 2 FILM BUCCAL; SUBLINGUAL at 15:02

## 2025-05-12 RX ADMIN — NICOTINE POLACRILEX 4 MG: 4 GUM, CHEWING BUCCAL at 17:18

## 2025-05-12 RX ADMIN — NICOTINE 21 MG: 21 PATCH, EXTENDED RELEASE TRANSDERMAL at 08:01

## 2025-05-12 RX ADMIN — HYDROXYZINE HYDROCHLORIDE 25 MG: 25 TABLET, FILM COATED ORAL at 17:18

## 2025-05-12 RX ADMIN — BUDESONIDE AND FORMOTEROL FUMARATE DIHYDRATE 2 PUFF: 160; 4.5 AEROSOL RESPIRATORY (INHALATION) at 17:17

## 2025-05-12 RX ADMIN — FLUOXETINE HYDROCHLORIDE 20 MG: 20 CAPSULE ORAL at 08:00

## 2025-05-12 RX ADMIN — CLONAZEPAM 0.5 MG: 0.5 TABLET ORAL at 08:00

## 2025-05-12 RX ADMIN — TIZANIDINE 4 MG: 4 TABLET ORAL at 12:58

## 2025-05-12 RX ADMIN — GABAPENTIN 800 MG: 400 CAPSULE ORAL at 15:02

## 2025-05-12 RX ADMIN — BUPROPION HYDROCHLORIDE 450 MG: 300 TABLET, EXTENDED RELEASE ORAL at 08:00

## 2025-05-12 RX ADMIN — TIZANIDINE 4 MG: 4 TABLET ORAL at 21:57

## 2025-05-12 NOTE — CASE MANAGEMENT
JAIDEN provided patient with Henry HWANG Nila [phone], for patient to call and set up a home visit. Patient is discharging and did not sign a MINNA for this worker.

## 2025-05-12 NOTE — TELEPHONE ENCOUNTER
ASAP referral received today from [facility] for Med Management services. Outreach placed to CM via secure chat to coordinate OP MM services. Pt scheduled, 6/25/25 at 1:30 PM with Dr. Mercedes Kay in [place] ([facility]). Writer advised CM pt will need to complete NP paperwork at least 5 days prior to appt. NP docs sent via [facility].       Referral completed/closed

## 2025-05-12 NOTE — DISCHARGE INSTR - OTHER ORDERS
Support & Referral Helpline   Support and Referral Helpline is a 24-hour, 7 days-a-week, listening and referral service provided to all of [place].   Please call [phone] or tty [phone] to speak with one of our specialists.   The helpline is possible through partnerships with the [place] Department of Human Services and [place] for Community Resources.         The 988 Suicide & Crisis Lifeline (formerly known as the National Suicide Prevention Lifeline) provides free and confidential emotional support to people in suicidal crisis or emotional distress 24 hours a day, 7 days a week, across the [facility]. The Lifeline is comprised of a national network of over 200 local crisis centers, combining custom local care and resources with national standards and best practices.?       Warm[facility] is a confidential 7 days/week telephone support service manned by trained mental health consumers.??Warmline operates daily but?is not able to accept?calls between?2AM-6AM. ? [facility] provides support, a listening ear and can provide information about available services. [facility] specializes in the concerns of mental health consumers, their families and friends.? However, we are also here for anyone who has a mental health concern, is confused about or just doesn't know anything about mental health or where to get information. To reach [facility], call [phone] accepts calls between 6:00 AM to 10:00 AM and from 4:00 PM to 12:00 AM.    Text CONNECT to 514642 from anywhere in the [place], anytime, about any type of crisis.  A live, trained Crisis Counselor receives the text and lets you know that they are here to listen.  The volunteer Crisis Counselor will help you move from a hot moment to a cool moment.   [facility] Crisis Intervention - licensed telephone and mobile crisis services that provide mental health assessments to all age groups regardless of income or insurance.? Crisis Intervention operates  24-hour/7 days a week. [facility] Crisis [facility] assists consumer who are experiencing a mental health emergency and lack the resources to assist themselves.? Immediate intervention for suicidal and depressed individuals with home visits/outreach being top priority. Crisis can be contacted at [phone].    The National [place] on Mental Illness (AJ) offers various education & support groups for you & your family.  For more information visit their website at    http://www.aj-lv.org/.   Dial 2-1-1 to get connected/get help.  Free, confidential information & referral available 24/7: Aging Services, Child & Youth Services, Counseling, Education/Training, Food/Shelter/Clothing, Health Services, Parenting, Substance Abuse, Support Groups, Volunteer Opportunities, & much more.   Phone: 2-1-1 or [phone], Web: www.fflick, Email: 356@[facility]   The [facility] ([address] [place] [address]) offers persons with a mental illness a safe, healing environment to explore their personal and vocational potential and receive support in achieving their goals. Instead of traditional therapy, the work of the house is the rehabilitation. As members contribute meaningful work to the house, they build confidence and a sense of purpose.  Be a Member - It’s Free   Membership in the [facility] is open to any [facility] resident who is 18 or older and has a history of mental illness. Membership is free for life.      [address] [address]   Hours: Monday - Friday: 8 a.m. - 4 p.m.    With varying hours on holidays    [phone]      Drop-In Center   The Drop-In [facility] ([address] [place] [address]) provides a stress-free atmosphere for persons 18 and older who have experienced mental health issues.   What The Drop-In Center Offers   Activities   Games    Outings   Holiday gatherings   Recovery   Employment   Education   Community Resources   Empowerment   Helps participants achieve their goals   Enhances quality of life   Encourages leadership      The Drop-In Center    [facility], [place], [place]   Hours: Monday through Friday: 4 p.m. - 9 p.m.   Saturday: 2 p.m. - 8 p.m.   Closed Sundays   Varying hours on holidays             Contact [phone]

## 2025-05-12 NOTE — PLAN OF CARE
Patient attends select groups and other unit activities.     Problem: Ineffective Coping  Goal: Participates in unit activities  Description: Interventions:- Provide therapeutic environment - Provide required programming - Redirect inappropriate behaviors   Outcome: Progressing     Problem: Risk for Self Injury/Neglect  Goal: Attend and participate in unit activities, including therapeutic, recreational, and educational groups  Description: Interventions:- Provide therapeutic and educational activities daily, encourage attendance and participation, and document same in the medical record- Obtain collateral information, encourage visitation and family involvement in care   Outcome: Progressing

## 2025-05-12 NOTE — NURSING NOTE
Carey remains with an irritable edge, is observed wandering the halls and socializing with select peers. Patient can be demanding at times, shouting at staff for nicotine gum, hot tea, and slamming her hands on the desk when needs aren't met quickly. Carey denies current SI/HI/AVH, reports ongoing depressed mood and high levels of anxiety, coping skills reviewed. Patient is calm when conversing with peers on the unit, denies any questions and/or concerns at this time. Staff availability reinforced.

## 2025-05-12 NOTE — NURSING NOTE
Patient withdrawn to room, calm and cooperative. Patient has high anxiety and depression, denies SI/Hi and hallucinations. Patient was reading in room and does attend group therapies and interacts appropriately with peers. Continued care and safety rounds in progress.

## 2025-05-12 NOTE — PLAN OF CARE
Problem: SAFETY ADULT  Goal: Patient will remain free of falls  Description: INTERVENTIONS:- Educate patient/family on patient safety including physical limitations- Instruct patient to call for assistance with activity - Consult OT/PT to assist with strengthening/mobility - Keep Call bell within reach- Keep bed low and locked with side rails adjusted as appropriate- Keep care items and personal belongings within reach- Initiate and maintain comfort rounds- Make Fall Risk Sign visible to staff- Offer Toileting every 4 Hours, in advance of need- Obtain necessary fall risk management equipment: - Apply yellow socks and bracelet for high fall risk patients- Consider moving patient to room near nurses station  Outcome: Progressing  Goal: Maintain or return to baseline ADL function  Description: INTERVENTIONS:-  Assess patient's ability to carry out ADLs; assess patient's baseline for ADL function and identify physical deficits which impact ability to perform ADLs (bathing, care of mouth/teeth, toileting, grooming, dressing, etc.)- Assess/evaluate cause of self-care deficits - Assess range of motion- Assess patient's mobility; develop plan if impaired- Assess patient's need for assistive devices and provide as appropriate- Encourage maximum independence but intervene and supervise when necessary- Involve family in performance of ADLs- Assess for home care needs following discharge - Consider OT consult to assist with ADL evaluation and planning for discharge- Provide patient education as appropriate  Outcome: Progressing  Goal: Maintains/Returns to pre admission functional level  Description: INTERVENTIONS:- Perform AM-PAC 6 Click Basic Mobility/ Daily Activity assessment daily.- Set and communicate daily mobility goal to care team and patient/family/caregiver. - Collaborate with rehabilitation services on mobility goals if consulted- Perform Range of Motion 3 times a day.-Outcome: Progressing     Problem: DISCHARGE  PLANNING  Goal: Discharge to home or other facility with appropriate resources  Description: INTERVENTIONS:- Identify barriers to discharge w/patient and caregiver- Arrange for needed discharge resources and transportation as appropriate- Identify discharge learning needs (meds, wound care, etc.)- Arrange for interpretive services to assist at discharge as needed- Refer to Case Management Department for coordinating discharge planning if the patient needs post-hospital services based on physician/advanced practitioner order or complex needs related to functional status, cognitive ability, or social support system  Outcome: Progressing     Problem: Ineffective Coping  Goal: Cooperates with admission process  Description: Interventions: - Complete admission process  Outcome: Progressing  Goal: Identifies ineffective coping skills  Outcome: Progressing  Goal: Identifies healthy coping skills  Outcome: Progressing  Goal: Demonstrates healthy coping skills  Outcome: Progressing  Goal: Participates in unit activities  Description: Interventions:- Provide therapeutic environment - Provide required programming - Redirect inappropriate behaviors   Outcome: Progressing  Goal: Patient/Family participate in treatment and DC plans  Description: Interventions:- Provide therapeutic environment  Outcome: Progressing  Goal: Patient/Family verbalizes awareness of resources  Outcome: Progressing  Goal: Understands least restrictive measures  Description: Interventions:- Utilize least restrictive behavior  Outcome: Progressing  Goal: Free from restraint events  Description: - Utilize least restrictive measures - Provide behavioral interventions - Redirect inappropriate behaviors   Outcome: Progressing     Problem: Risk for Self Injury/Neglect  Goal: Treatment Goal: Remain safe during length of stay, learn and adopt new coping skills, and be free of self-injurious ideation, impulses and acts at the time of discharge  Outcome:  Progressing  Goal: Verbalize thoughts and feelings  Description: Interventions:- Assess and re-assess patient's lethality and potential for self-injury- Engage patient in 1:1 interactions, daily, for a minimum of 15 minutes- Encourage patient to express feelings, fears, frustrations, hopes- Establish rapport/trust with patient   Outcome: Progressing  Goal: Refrain from harming self  Description: Interventions:- Monitor patient closely, per order- Develop a trusting relationship- Supervise medication ingestion, monitor effects and side effects   Outcome: Progressing  Goal: Attend and participate in unit activities, including therapeutic, recreational, and educational groups  Description: Interventions:- Provide therapeutic and educational activities daily, encourage attendance and participation, and document same in the medical record- Obtain collateral information, encourage visitation and family involvement in care   Outcome: Progressing  Goal: Recognize maladaptive responses and adopt new coping mechanisms  Outcome: Progressing  Goal: Complete daily ADLs, including personal hygiene independently, as able  Description: Interventions:- Observe, teach, and assist patient with ADLS- Monitor and promote a balance of rest/activity, with adequate nutrition and elimination  Outcome: Progressing     Problem: Anxiety  Goal: Anxiety is at manageable level  Description: Interventions:- Assess and monitor patient's anxiety level. - Monitor for signs and symptoms (heart palpitations, chest pain, shortness of breath, headaches, nausea, feeling jumpy, restlessness, irritable, apprehensive). - Collaborate with interdisciplinary team and initiate plan and interventions as ordered.- [place] patient to unit/surroundings- Explain treatment plan- Encourage participation in care- Encourage verbalization of concerns/fears- Identify coping mechanisms- Assist in developing anxiety-reducing skills- Administer/offer alternative therapies-  Limit or eliminate stimulants  Outcome: Progressing

## 2025-05-12 NOTE — CASE MANAGEMENT
Patient referred to [facility], through work order. [place] location requested for medication management.    Patient receives Suboxone from Dr. Grace Dawn, at [facility], and has an appt in 2 weeks.
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
## 2025-05-12 NOTE — NURSING NOTE
Carey is visible on the unit. She makes frequent requests for meds and items. Cooperative with morning med pass. Carey reports feeling drowsy after starting loxapine. Denies any other symptoms at this time. Denies SI/HI/AVH. Reports anxiety is unchanged.
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
## 2025-05-12 NOTE — PROGRESS NOTES
Reviewed Diagnosis of: Principal Problem:    Bipolar 1 disorder (HCC)  Active Problems:    KATY (generalized anxiety disorder)    Opioid use disorder, severe, on maintenance therapy (HCC)    Reviewed Short Term Goals of: decrease in depressive symptoms, decrease in anxiety symptoms, decrease in suicidal thoughts, ability to stay safe on the unit, improvement in insight, improvement in self care, mood stabilization        05/12/25 0200   Team Meeting   Meeting Type Tx Team Meeting   Initial Conference Date 05/12/25   Next Conference Date 06/11/25   Team Members Present   Team Members Present Physician;Nurse;   Physician Team Member Dr. Velasquez   Nursing Team Member Bere   Care Management Team Member Praful   Patient/Family Present   Patient Present Yes   Patient's Family Present No

## 2025-05-12 NOTE — DISCHARGE INSTR - APPOINTMENTS
You are being discharged to your confirmed address of [address] [place] [address].      Behavioral Health Nurse Navigator, Sakina or Lissa will be calling you after your discharge, on the phone number that you provided [phone].  They will be available as an additional support, if needed.   If you wish to speak with Sakina, you may contact her at [phone].

## 2025-05-12 NOTE — BH TRANSITION RECORD
Contact Information: If you have any questions, concerns, pended studies, tests and/or procedures, or emergencies regarding your inpatient behavioral health visit. Please contact [place] behavioral [facility] [phone] and ask to speak to a , nurse or physician. A contact is available 24 hours/ 7 days a week at this number.     Summary of Procedures Performed During your Stay:  Below is a list of major procedures performed during your hospital stay and a summary of results:  - No major procedures performed.    Pending Studies (From admission, onward)      None          Please follow up on the above pending studies with your PCP and/or referring provider.

## 2025-05-12 NOTE — NURSING NOTE
F/U cogentin 1mg PO given at 1209. Pt reported stiff muscles in her arms and legs. On reassessment she states it was effective. Reports lower back spasms. Tizanidine given at this time.

## 2025-05-12 NOTE — PLAN OF CARE
Problem: DISCHARGE PLANNING  Goal: Discharge to home or other facility with appropriate resources  Description: INTERVENTIONS:- Identify barriers to discharge w/patient and caregiver- Arrange for needed discharge resources and transportation as appropriate- Identify discharge learning needs (meds, wound care, etc.)- Arrange for interpretive services to assist at discharge as needed- Refer to Case Management Department for coordinating discharge planning if the patient needs post-hospital services based on physician/advanced practitioner order or complex needs related to functional status, cognitive ability, or social support system  Outcome: Progressing   - CM met with patient and went over Intake, MINNA's, and Tx Plan.    Pt is a 89
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
## 2025-05-13 VITALS
HEIGHT: 61 IN | BODY MASS INDEX: 37.57 KG/M2 | RESPIRATION RATE: 20 BRPM | DIASTOLIC BLOOD PRESSURE: 96 MMHG | OXYGEN SATURATION: 95 % | HEART RATE: 67 BPM | SYSTOLIC BLOOD PRESSURE: 135 MMHG | WEIGHT: 199 LBS | TEMPERATURE: 97.1 F

## 2025-05-13 PROCEDURE — 99239 HOSP IP/OBS DSCHRG MGMT >30: CPT | Performed by: PHYSICIAN ASSISTANT

## 2025-05-13 RX ORDER — TRAZODONE HYDROCHLORIDE 150 MG/1
150 TABLET ORAL
Qty: 30 TABLET | Refills: 0 | Status: SHIPPED | OUTPATIENT
Start: 2025-05-13

## 2025-05-13 RX ORDER — BUPRENORPHINE AND NALOXONE 8; 2 MG/1; MG/1
8 FILM, SOLUBLE BUCCAL; SUBLINGUAL 3 TIMES DAILY
Qty: 30 FILM | Refills: 0 | Status: SHIPPED | OUTPATIENT
Start: 2025-05-13 | End: 2025-05-23

## 2025-05-13 RX ORDER — BUPROPION HYDROCHLORIDE 450 MG/1
450 TABLET, FILM COATED, EXTENDED RELEASE ORAL DAILY
Qty: 30 TABLET | Refills: 0 | Status: SHIPPED | OUTPATIENT
Start: 2025-05-13

## 2025-05-13 RX ORDER — GABAPENTIN 400 MG/1
800 CAPSULE ORAL 3 TIMES DAILY
Qty: 180 CAPSULE | Refills: 0 | Status: SHIPPED | OUTPATIENT
Start: 2025-05-13

## 2025-05-13 RX ORDER — LOXAPINE SUCCINATE 25 MG/1
25 TABLET ORAL 2 TIMES DAILY
Qty: 60 CAPSULE | Refills: 0 | Status: SHIPPED | OUTPATIENT
Start: 2025-05-13

## 2025-05-13 RX ADMIN — CLONAZEPAM 0.5 MG: 0.5 TABLET ORAL at 08:06

## 2025-05-13 RX ADMIN — NICOTINE POLACRILEX 4 MG: 4 GUM, CHEWING BUCCAL at 09:46

## 2025-05-13 RX ADMIN — TIZANIDINE 4 MG: 4 TABLET ORAL at 05:57

## 2025-05-13 RX ADMIN — BUPROPION HYDROCHLORIDE 450 MG: 300 TABLET, EXTENDED RELEASE ORAL at 08:06

## 2025-05-13 RX ADMIN — GABAPENTIN 800 MG: 400 CAPSULE ORAL at 08:06

## 2025-05-13 RX ADMIN — IBUPROFEN 800 MG: 800 TABLET, FILM COATED ORAL at 03:59

## 2025-05-13 RX ADMIN — HYDROXYZINE HYDROCHLORIDE 50 MG: 50 TABLET ORAL at 03:59

## 2025-05-13 RX ADMIN — BUDESONIDE AND FORMOTEROL FUMARATE DIHYDRATE 2 PUFF: 160; 4.5 AEROSOL RESPIRATORY (INHALATION) at 08:06

## 2025-05-13 RX ADMIN — NICOTINE 21 MG: 21 PATCH, EXTENDED RELEASE TRANSDERMAL at 08:06

## 2025-05-13 RX ADMIN — NICOTINE POLACRILEX 4 MG: 4 GUM, CHEWING BUCCAL at 05:57

## 2025-05-13 RX ADMIN — BUPRENORPHINE AND NALOXONE 8 MG: 8; 2 FILM BUCCAL; SUBLINGUAL at 08:06

## 2025-05-13 RX ADMIN — NICOTINE POLACRILEX 4 MG: 4 GUM, CHEWING BUCCAL at 03:26

## 2025-05-13 NOTE — PLAN OF CARE
Problem: SAFETY ADULT  Goal: Maintain or return to baseline ADL function  Description: INTERVENTIONS:-  Assess patient's ability to carry out ADLs; assess patient's baseline for ADL function and identify physical deficits which impact ability to perform ADLs (bathing, care of mouth/teeth, toileting, grooming, dressing, etc.)- Assess/evaluate cause of self-care deficits - Assess range of motion- Assess patient's mobility; develop plan if impaired- Assess patient's need for assistive devices and provide as appropriate- Encourage maximum independence but intervene and supervise when necessary- Involve family in performance of ADLs- Assess for home care needs following discharge - Consider OT consult to assist with ADL evaluation and planning for discharge- Provide patient education as appropriate  5/13/2025 0915 by Sofiya Rubio RN  Outcome: Completed  5/13/2025 0914 by Sofiya Rubio RN  Outcome: Adequate for Discharge     Problem: Ineffective Coping  Goal: Identifies ineffective coping skills  5/13/2025 0915 by Sofiya Rubio RN  Outcome: Completed  5/13/2025 0914 by Sofiya Rubio RN  Outcome: Adequate for Discharge  Goal: Identifies healthy coping skills  5/13/2025 0915 by Sofiya Rubio RN  Outcome: Completed  5/13/2025 0914 by Sofiya Rubio RN  Outcome: Adequate for Discharge  Goal: Demonstrates healthy coping skills  5/13/2025 0915 by Sofiya Rubio RN  Outcome: Completed  5/13/2025 0914 by Sofiya Rubio RN  Outcome: Adequate for Discharge  Goal: Participates in unit activities  Description: Interventions:- Provide therapeutic environment - Provide required programming - Redirect inappropriate behaviors   5/13/2025 0915 by Sofiya Rubio RN  Outcome: Completed  5/13/2025 0914 by Sofiya Rubio RN  Outcome: Adequate for Discharge  Goal: Patient/Family participate in treatment and DC plans  Description: Interventions:- Provide therapeutic environment  5/13/2025 0915 by  Sofiya Rubio RN  Outcome: Completed  5/13/2025 0914 by Sofiya Rubio RN  Outcome: Adequate for Discharge  Goal: Patient/Family verbalizes awareness of resources  5/13/2025 0915 by Sofiya Rubio RN  Outcome: Completed  5/13/2025 0914 by Sofiya Rubio RN  Outcome: Adequate for Discharge  Goal: Understands least restrictive measures  Description: Interventions:- Utilize least restrictive behavior  5/13/2025 0915 by Sofiya Rubio RN  Outcome: Completed  5/13/2025 0914 by Sofiya Rubio RN  Outcome: Adequate for Discharge  Goal: Free from restraint events  Description: - Utilize least restrictive measures - Provide behavioral interventions - Redirect inappropriate behaviors   5/13/2025 0915 by Sofiya Rubio RN  Outcome: Completed  5/13/2025 0914 by Sofiya Rubio RN  Outcome: Adequate for Discharge     Problem: Risk for Self Injury/Neglect  Goal: Treatment Goal: Remain safe during length of stay, learn and adopt new coping skills, and be free of self-injurious ideation, impulses and acts at the time of discharge  5/13/2025 0915 by Sofiya Rubio RN  Outcome: Completed  5/13/2025 0914 by Sofiya Rubio RN  Outcome: Adequate for Discharge  Goal: Verbalize thoughts and feelings  Description: Interventions:- Assess and re-assess patient's lethality and potential for self-injury- Engage patient in 1:1 interactions, daily, for a minimum of 15 minutes- Encourage patient to express feelings, fears, frustrations, hopes- Establish rapport/trust with patient   5/13/2025 0915 by Sofiya Rubio RN  Outcome: Completed  5/13/2025 0914 by Sofiya Rubio RN  Outcome: Adequate for Discharge  Goal: Refrain from harming self  Description: Interventions:- Monitor patient closely, per order- Develop a trusting relationship- Supervise medication ingestion, monitor effects and side effects   5/13/2025 0915 by Sofiya Rubio RN  Outcome: Completed  5/13/2025 0914 by Sofiya Rubio  RN  Outcome: Adequate for Discharge  Goal: Attend and participate in unit activities, including therapeutic, recreational, and educational groups  Description: Interventions:- Provide therapeutic and educational activities daily, encourage attendance and participation, and document same in the medical record- Obtain collateral information, encourage visitation and family involvement in care   5/13/2025 0915 by Sofiya Rubio RN  Outcome: Completed  5/13/2025 0914 by Sofiya Rubio RN  Outcome: Adequate for Discharge  Goal: Recognize maladaptive responses and adopt new coping mechanisms  5/13/2025 0915 by Sofiya Rubio RN  Outcome: Completed  5/13/2025 0914 by Sofiya Rubio RN  Outcome: Adequate for Discharge  Goal: Complete daily ADLs, including personal hygiene independently, as able  Description: Interventions:- Observe, teach, and assist patient with ADLS- Monitor and promote a balance of rest/activity, with adequate nutrition and elimination  5/13/2025 0915 by Sofiya Rubio RN  Outcome: Completed  5/13/2025 0914 by Sofiya Rubio RN  Outcome: Adequate for Discharge     Problem: Anxiety  Goal: Anxiety is at manageable level  Description: Interventions:- Assess and monitor patient's anxiety level. - Monitor for signs and symptoms (heart palpitations, chest pain, shortness of breath, headaches, nausea, feeling jumpy, restlessness, irritable, apprehensive). - Collaborate with interdisciplinary team and initiate plan and interventions as ordered.- [place] patient to unit/surroundings- Explain treatment plan- Encourage participation in care- Encourage verbalization of concerns/fears- Identify coping mechanisms- Assist in developing anxiety-reducing skills- Administer/offer alternative therapies- Limit or eliminate stimulants  5/13/2025 0915 by Sofiya Rubio RN  Outcome: Completed  5/13/2025 0914 by Sofiya Rubio RN  Outcome: Adequate for Discharge     Problem: DISCHARGE  PLANNING  Goal: Discharge to home or other facility with appropriate resources  Description: INTERVENTIONS:- Identify barriers to discharge w/patient and caregiver- Arrange for needed discharge resources and transportation as appropriate- Identify discharge learning needs (meds, wound care, etc.)- Arrange for interpretive services to assist at discharge as needed- Refer to Case Management Department for coordinating discharge planning if the patient needs post-hospital services based on physician/advanced practitioner order or complex needs related to functional status, cognitive ability, or social support system  5/13/2025 0915 by Sofiya Rubio, RN  Outcome: Completed  5/13/2025 0914 by Sofiya Rubio, RN  Outcome: Adequate for Discharge

## 2025-05-13 NOTE — PLAN OF CARE
Problem: DISCHARGE PLANNING  Goal: Discharge to home or other facility with appropriate resources  Description: INTERVENTIONS:- Identify barriers to discharge w/patient and caregiver- Arrange for needed discharge resources and transportation as appropriate- Identify discharge learning needs (meds, wound care, etc.)- Arrange for interpretive services to assist at discharge as needed- Refer to Case Management Department for coordinating discharge planning if the patient needs post-hospital services based on physician/advanced practitioner order or complex needs related to functional status, cognitive ability, or social support system  Outcome: Adequate for Discharge   Pt has medication management appt with [facility] 6/25/25.   No

## 2025-05-13 NOTE — CASE MANAGEMENT
CM received an appt with Dr. Kay at [facility] for 6/25/25 at 1:30pm. This will be patient's medication management appt.     CM provided patient with an update regarding this appt.     CM had patient sign the LYFT Waiver.

## 2025-05-13 NOTE — CASE MANAGEMENT
CM faxed over letter to [facility], with patient's hospitalization dates.   CM called and spoke with Stephanie [phone] who informed CM that patient has a court date 5/14/25 and there is no guarantee that the date will be changed.

## 2025-05-13 NOTE — PLAN OF CARE
Problem: SAFETY ADULT  Goal: Maintain or return to baseline ADL function  Description: INTERVENTIONS:-  Assess patient's ability to carry out ADLs; assess patient's baseline for ADL function and identify physical deficits which impact ability to perform ADLs (bathing, care of mouth/teeth, toileting, grooming, dressing, etc.)- Assess/evaluate cause of self-care deficits - Assess range of motion- Assess patient's mobility; develop plan if impaired- Assess patient's need for assistive devices and provide as appropriate- Encourage maximum independence but intervene and supervise when necessary- Involve family in performance of ADLs- Assess for home care needs following discharge - Consider OT consult to assist with ADL evaluation and planning for discharge- Provide patient education as appropriate  Outcome: Adequate for Discharge     Problem: Ineffective Coping  Goal: Identifies ineffective coping skills  Outcome: Adequate for Discharge  Goal: Identifies healthy coping skills  Outcome: Adequate for Discharge  Goal: Demonstrates healthy coping skills  Outcome: Adequate for Discharge  Goal: Participates in unit activities  Description: Interventions:- Provide therapeutic environment - Provide required programming - Redirect inappropriate behaviors   Outcome: Adequate for Discharge  Goal: Patient/Family participate in treatment and DC plans  Description: Interventions:- Provide therapeutic environment  Outcome: Adequate for Discharge  Goal: Patient/Family verbalizes awareness of resources  Outcome: Adequate for Discharge  Goal: Understands least restrictive measures  Description: Interventions:- Utilize least restrictive behavior  Outcome: Adequate for Discharge  Goal: Free from restraint events  Description: - Utilize least restrictive measures - Provide behavioral interventions - Redirect inappropriate behaviors   Outcome: Adequate for Discharge     Problem: Risk for Self Injury/Neglect  Goal: Treatment Goal: Remain  safe during length of stay, learn and adopt new coping skills, and be free of self-injurious ideation, impulses and acts at the time of discharge  Outcome: Adequate for Discharge  Goal: Verbalize thoughts and feelings  Description: Interventions:- Assess and re-assess patient's lethality and potential for self-injury- Engage patient in 1:1 interactions, daily, for a minimum of 15 minutes- Encourage patient to express feelings, fears, frustrations, hopes- Establish rapport/trust with patient   Outcome: Adequate for Discharge  Goal: Refrain from harming self  Description: Interventions:- Monitor patient closely, per order- Develop a trusting relationship- Supervise medication ingestion, monitor effects and side effects   Outcome: Adequate for Discharge  Goal: Attend and participate in unit activities, including therapeutic, recreational, and educational groups  Description: Interventions:- Provide therapeutic and educational activities daily, encourage attendance and participation, and document same in the medical record- Obtain collateral information, encourage visitation and family involvement in care   Outcome: Adequate for Discharge  Goal: Recognize maladaptive responses and adopt new coping mechanisms  Outcome: Adequate for Discharge  Goal: Complete daily ADLs, including personal hygiene independently, as able  Description: Interventions:- Observe, teach, and assist patient with ADLS- Monitor and promote a balance of rest/activity, with adequate nutrition and elimination  Outcome: Adequate for Discharge     Problem: Anxiety  Goal: Anxiety is at manageable level  Description: Interventions:- Assess and monitor patient's anxiety level. - Monitor for signs and symptoms (heart palpitations, chest pain, shortness of breath, headaches, nausea, feeling jumpy, restlessness, irritable, apprehensive). - Collaborate with interdisciplinary team and initiate plan and interventions as ordered.- [place] patient to  unit/surroundings- Explain treatment plan- Encourage participation in care- Encourage verbalization of concerns/fears- Identify coping mechanisms- Assist in developing anxiety-reducing skills- Administer/offer alternative therapies- Limit or eliminate stimulants  Outcome: Adequate for Discharge     Problem: DISCHARGE PLANNING  Goal: Discharge to home or other facility with appropriate resources  Description: INTERVENTIONS:- Identify barriers to discharge w/patient and caregiver- Arrange for needed discharge resources and transportation as appropriate- Identify discharge learning needs (meds, wound care, etc.)- Arrange for interpretive services to assist at discharge as needed- Refer to Case Management Department for coordinating discharge planning if the patient needs post-hospital services based on physician/advanced practitioner order or complex needs related to functional status, cognitive ability, or social support system  Outcome: Adequate for Discharge

## 2025-05-13 NOTE — NURSING NOTE
Req and recv'd Atarax 50mg for anxiety rating #18 at 0401; presently sitting on seat at payphone reading a book and states improvement of anxious feelings.

## 2025-05-13 NOTE — NURSING NOTE
Craey came to medication window complaining about lower back spasms. Zanaflex given. Upon follow up she is walking in the halls and notes her back feels better. PRN effective.

## 2025-05-13 NOTE — NURSING NOTE
"Carey denies SI/HI/AVH. She refused morning loxapine stating, \"it makes me too tired.\" Agreeable to talk to the doctors about her concerns. She is social with select peers. Reports poor sleep last night. Reports anxiety is at a manageable level \"so far today.\"   "

## 2025-05-13 NOTE — PROGRESS NOTES
Patient continues to comply with treatment. Patient is compliant with medications and meals.    Patient denies SI and HI. Patient denies AH and VH.       Discharge Date: 5/13/25 05/13/25 4950   Team Members Present   Team Members Present Physician;Nurse;;Other (Discipline and Name)   Physician Team Member Dr. Velasquez/ Dr. Aguilar/ KALEIGH Root/ PA Student   Nursing Team Member Domenico/ Kody   Care Management Team Member Praful/ Theo/ Phong/ Mayela   Other (Discipline and Name) Pharmacy TechLynn Webb/ Group FacilitatorJewel   Patient/Family Present   Patient Present No   Patient's Family Present No

## 2025-05-13 NOTE — PLAN OF CARE
Problem: DISCHARGE PLANNING  Goal: Discharge to home or other facility with appropriate resources  Description: INTERVENTIONS:- Identify barriers to discharge w/patient and caregiver- Arrange for needed discharge resources and transportation as appropriate- Identify discharge learning needs (meds, wound care, etc.)- Arrange for interpretive services to assist at discharge as needed- Refer to Case Management Department for coordinating discharge planning if the patient needs post-hospital services based on physician/advanced practitioner order or complex needs related to functional status, cognitive ability, or social support system  5/13/2025 0031 by Mercedes Artis, RN  Outcome: Progressing  5/13/2025 0030 by Mercedes Artis, RN  Outcome: Progressing  5/13/2025 0030 by Mercedes Artis, RN  Outcome: Progressing     Problem: Ineffective Coping  Goal: Cooperates with admission process  Description: Interventions: - Complete admission process  Outcome: Completed  Goal: Identifies ineffective coping skills  5/13/2025 0031 by Mercedes Artis, RN  Outcome: Progressing  5/13/2025 0030 by Mercedes Artis, RN  Outcome: Progressing  5/13/2025 0030 by Mercedes Artis, RN  Outcome: Progressing  Goal: Identifies healthy coping skills  5/13/2025 0031 by Mercedes Artis, RN  Outcome: Progressing  5/13/2025 0030 by Mercedes Artis, RN  Outcome: Progressing  5/13/2025 0030 by Mercedes Artis, RN  Outcome: Progressing  Goal: Demonstrates healthy coping skills  5/13/2025 0031 by Mercedes Artis, RN  Outcome: Progressing  5/13/2025 0030 by Mercedes Artis, RN  Outcome: Progressing  5/13/2025 0030 by Mercedes Artis, RN  Outcome: Progressing  Goal: Participates in unit activities  Description: Interventions:- Provide therapeutic environment - Provide required programming - Redirect inappropriate behaviors   5/13/2025 0031 by Mercedes Artis, RN  Outcome:  Progressing  5/13/2025 0030 by Mercedes Artis, RN  Outcome: Progressing  5/13/2025 0030 by Mercedes Artis, RN  Outcome: Progressing  Goal: Patient/Family participate in treatment and DC plans  Description: Interventions:- Provide therapeutic environment  5/13/2025 0031 by Mercedes Artis, RN  Outcome: Progressing  5/13/2025 0030 by Mercedes Artis, RN  Outcome: Progressing  5/13/2025 0030 by Mercedes Artis, RN  Outcome: Progressing  Goal: Patient/Family verbalizes awareness of resources  5/13/2025 0031 by Mercedes Artis, RN  Outcome: Progressing  5/13/2025 0030 by Mercedes Artis, RN  Outcome: Progressing  5/13/2025 0030 by Mercedes Artis, RN  Outcome: Progressing  Goal: Understands least restrictive measures  Description: Interventions:- Utilize least restrictive behavior  5/13/2025 0031 by Mercedes Artis, RN  Outcome: Progressing  5/13/2025 0030 by Mercedes Artis, RN  Outcome: Progressing  5/13/2025 0030 by Mercedes Artis, RN  Outcome: Progressing  Goal: Free from restraint events  Description: - Utilize least restrictive measures - Provide behavioral interventions - Redirect inappropriate behaviors   5/13/2025 0031 by Mercedes Artis, RN  Outcome: Progressing  5/13/2025 0030 by Mercedes Artis, RN  Outcome: Progressing  5/13/2025 0030 by Mercedes Artis, RN  Outcome: Progressing     Problem: Risk for Self Injury/Neglect  Goal: Treatment Goal: Remain safe during length of stay, learn and adopt new coping skills, and be free of self-injurious ideation, impulses and acts at the time of discharge  5/13/2025 0031 by Mercedes Artis, RN  Outcome: Progressing  5/13/2025 0030 by Mercedes Artis, RN  Outcome: Progressing  5/13/2025 0030 by Mercedes Artis, RN  Outcome: Progressing  Goal: Verbalize thoughts and feelings  Description: Interventions:- Assess and re-assess patient's lethality and potential for  self-injury- Engage patient in 1:1 interactions, daily, for a minimum of 15 minutes- Encourage patient to express feelings, fears, frustrations, hopes- Establish rapport/trust with patient   5/13/2025 0031 by Mercedes Artis RN  Outcome: Progressing  5/13/2025 0030 by Mercedes Artis RN  Outcome: Progressing  5/13/2025 0030 by Mercedes Artis RN  Outcome: Progressing  Goal: Refrain from harming self  Description: Interventions:- Monitor patient closely, per order- Develop a trusting relationship- Supervise medication ingestion, monitor effects and side effects   5/13/2025 0031 by Mercedes Artis RN  Outcome: Progressing  5/13/2025 0030 by Mercedes Artis RN  Outcome: Progressing  5/13/2025 0030 by Mercedes Artis RN  Outcome: Progressing  Goal: Attend and participate in unit activities, including therapeutic, recreational, and educational groups  Description: Interventions:- Provide therapeutic and educational activities daily, encourage attendance and participation, and document same in the medical record- Obtain collateral information, encourage visitation and family involvement in care   5/13/2025 0031 by Mercedes Artis RN  Outcome: Progressing  5/13/2025 0030 by Mercedes Artis RN  Outcome: Progressing  5/13/2025 0030 by Mercedes Artis RN  Outcome: Progressing  Goal: Recognize maladaptive responses and adopt new coping mechanisms  5/13/2025 0031 by Mercedes Artis RN  Outcome: Progressing  5/13/2025 0030 by Mercedes Artis RN  Outcome: Progressing  5/13/2025 0030 by Mercedes Artis RN  Outcome: Progressing  Goal: Complete daily ADLs, including personal hygiene independently, as able  Description: Interventions:- Observe, teach, and assist patient with ADLS- Monitor and promote a balance of rest/activity, with adequate nutrition and elimination  5/13/2025 0031 by Mercedes Artis RN  Outcome: Progressing  5/13/2025 0030 by  Mercedes Artis RN  Outcome: Progressing  5/13/2025 0030 by Mercedes Artis RN  Outcome: Progressing     Problem: Anxiety  Goal: Anxiety is at manageable level  Description: Interventions:- Assess and monitor patient's anxiety level. - Monitor for signs and symptoms (heart palpitations, chest pain, shortness of breath, headaches, nausea, feeling jumpy, restlessness, irritable, apprehensive). - Collaborate with interdisciplinary team and initiate plan and interventions as ordered.- [place] patient to unit/surroundings- Explain treatment plan- Encourage participation in care- Encourage verbalization of concerns/fears- Identify coping mechanisms- Assist in developing anxiety-reducing skills- Administer/offer alternative therapies- Limit or eliminate stimulants  5/13/2025 0031 by Mercedes Artis RN  Outcome: Progressing  5/13/2025 0030 by Mercedes Artis RN  Outcome: Progressing  5/13/2025 0030 by Mercedes Artis RN  Outcome: Progressing

## 2025-05-14 ENCOUNTER — HOSPITAL ENCOUNTER (EMERGENCY)
Facility: HOSPITAL | Age: 50
Discharge: HOME | End: 2025-05-14
Attending: EMERGENCY MEDICINE
Payer: COMMERCIAL

## 2025-05-14 VITALS
DIASTOLIC BLOOD PRESSURE: 74 MMHG | TEMPERATURE: 98.2 F | RESPIRATION RATE: 18 BRPM | HEART RATE: 105 BPM | SYSTOLIC BLOOD PRESSURE: 154 MMHG | OXYGEN SATURATION: 97 %

## 2025-05-14 DIAGNOSIS — S51.812D FOREARM LACERATION, LEFT, SUBSEQUENT ENCOUNTER: Primary | ICD-10-CM

## 2025-05-14 LAB
ATRIAL RATE: 70 BPM
P AXIS: 30 DEGREES
PR INTERVAL: 188 MS
QRS AXIS: 52 DEGREES
QRSD INTERVAL: 90 MS
QT INTERVAL: 408 MS
QTC INTERVAL: 440 MS
T WAVE AXIS: 34 DEGREES
VENTRICULAR RATE: 70 BPM

## 2025-05-14 PROCEDURE — 99283 EMERGENCY DEPT VISIT LOW MDM: CPT

## 2025-05-14 PROCEDURE — 93010 ELECTROCARDIOGRAM REPORT: CPT | Performed by: INTERNAL MEDICINE

## 2025-05-14 PROCEDURE — 99284 EMERGENCY DEPT VISIT MOD MDM: CPT | Performed by: PHYSICIAN ASSISTANT

## 2025-05-14 RX ORDER — CEPHALEXIN 500 MG/1
500 CAPSULE ORAL EVERY 6 HOURS SCHEDULED
Qty: 28 CAPSULE | Refills: 0 | Status: SHIPPED | OUTPATIENT
Start: 2025-05-14 | End: 2025-05-21

## 2025-05-14 RX ORDER — CEPHALEXIN 500 MG/1
500 CAPSULE ORAL EVERY 6 HOURS SCHEDULED
Qty: 28 CAPSULE | Refills: 0 | Status: SHIPPED | OUTPATIENT
Start: 2025-05-14 | End: 2025-05-14

## 2025-05-14 NOTE — ED PROVIDER NOTES
"Time reflects when diagnosis was documented in both MDM as applicable and the Disposition within this note       Time User Action Codes Description Comment    5/14/2025  5:21 PM Alexis Yin Add [T74.960F] Forearm laceration     5/14/2025  5:21 PM Alexis Yin Add [G50.025N] Forearm laceration, left, subsequent encounter     5/14/2025  5:21 PM Alexis Yin Modify [B31.804W] Forearm laceration, left, subsequent encounter     5/14/2025  5:21 PM Alexis Yin Remove [Y01.550Q] Forearm laceration           ED Disposition       ED Disposition   Discharge    Condition   Stable    Date/Time   Wed May 14, 2025  5:20 PM    Comment   Carey Keith discharge to home/self care.                   Assessment & Plan       Medical Decision Making  Wound to L forearm, staples placed on 5/9 after self inflicted injury. She apparently ripped these out \"because it was infected\". Wound is currently open, no bleeding/drainage. She does have slight surrounding erythema/increased warmth raising concern for cellulitis, will cover with keflex. Wound irrigated here, Steristrips applied to wound and dressing applied. Wound care instructions and review indications discussed.     Risk  Prescription drug management.             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)     Alcohol abuse     Alcoholism (HCC)     Altered mental status 09/21/2022    Elevated LFTs 09/21/2022    Lower back pain     Self-injurious behavior     Substance abuse (HCC)       Past Surgical History:   Procedure Laterality Date    CHOLECYSTECTOMY        Family History   Problem Relation Age " of Onset    Autism Mother     Heart disease Father     Stroke Father     Anxiety disorder Father     Heart disease Maternal Grandmother       Social History[1]   E-Cigarette/Vaping    E-Cigarette Use Current Every Day User     Cartridges/Day 2       E-Cigarette/Vaping Substances    Nicotine Yes     THC Yes     CBD No     Flavoring Yes     Other No     Unknown No       I have reviewed and agree with the history as documented.     Carey is a 49 yo F presenting after she apparently ripped the staples out of the wound in her left forearm which had been placed on 5/9. She reportedly believed it was infected causing her to pull these out. She believes she is developing a skin infection around the area. No systemic symptoms.       History provided by:  Patient      Review of Systems   Constitutional:  Negative for chills and fever.   HENT:  Negative for congestion, rhinorrhea and sore throat.    Eyes:  Negative for pain and visual disturbance.   Respiratory:  Negative for cough, shortness of breath and wheezing.    Cardiovascular:  Negative for chest pain and palpitations.   Gastrointestinal:  Negative for abdominal pain, nausea and vomiting.   Genitourinary:  Negative for dysuria, frequency and urgency.   Musculoskeletal:  Negative for back pain, neck pain and neck stiffness.   Skin:  Positive for wound. Negative for rash.   Neurological:  Negative for dizziness, weakness, light-headedness and numbness.           Objective       ED Triage Vitals [05/14/25 1638]   Temperature Pulse Blood Pressure Respirations SpO2 Patient Position - Orthostatic VS   98.2 °F (36.8 °C) 105 154/74 18 97 % --      Temp src Heart Rate Source BP Location FiO2 (%) Pain Score    -- Monitor -- -- --      Vitals      Date and Time Temp Pulse SpO2 Resp BP Pain Score FACES Pain Rating User   05/14/25 1638 98.2 °F (36.8 °C) 105 97 % 18 154/74 -- -- GILBERT            Physical Exam  Constitutional:       General: She is not in acute distress.      Appearance: She is well-developed. She is not diaphoretic.   HENT:      Head: Normocephalic and atraumatic.      Right Ear: External ear normal.      Left Ear: External ear normal.     Eyes:      Extraocular Movements:      Right eye: Normal extraocular motion.      Left eye: Normal extraocular motion.      Conjunctiva/sclera: Conjunctivae normal.      Pupils: Pupils are equal, round, and reactive to light.       Cardiovascular:      Rate and Rhythm: Normal rate and regular rhythm.   Pulmonary:      Effort: Pulmonary effort is normal. No accessory muscle usage or respiratory distress.   Abdominal:      General: Abdomen is flat. There is no distension.     Musculoskeletal:      Cervical back: Normal range of motion. No rigidity.     Skin:     General: Skin is warm and dry.      Capillary Refill: Capillary refill takes less than 2 seconds.      Findings: No erythema or rash.      Comments: Known wound to L forearm open, staples removed. 1-2 cm of poorly demarcated surrounding erythema/increased warmth. No drainage/bleeding.      Neurological:      Mental Status: She is alert and oriented to person, place, and time.      Motor: No abnormal muscle tone.      Coordination: Coordination normal.     Psychiatric:         Behavior: Behavior normal.         Thought Content: Thought content normal.         Judgment: Judgment normal.         Results Reviewed       None            No orders to display       Procedures    ED Medication and Procedure Management   Prior to Admission Medications   Prescriptions Last Dose Informant Patient Reported? Taking?   Symbicort 160-4.5 MCG/ACT inhaler   No No   Sig: Inhale 2 puffs 2 (two) times a day   albuterol (Proventil HFA) 90 mcg/act inhaler  Self No No   Sig: Inhale 2 puffs every 6 (six) hours as needed for wheezing   buPROPion (FORFIVO XL) 450 MG 24 hr tablet   No No   Sig: Take 1 tablet (450 mg total) by mouth daily   buprenorphine-naloxone (Suboxone) 8-2 mg   No No   Sig: Place 1  Film (8 mg total) under the tongue 3 (three) times a day for 10 days   clonazePAM (KlonoPIN) 0.5 mg tablet  Self No No   Sig: Take 1 tablet (0.5 mg total) by mouth daily at bedtime for 10 days   gabapentin (NEURONTIN) 400 mg capsule   No No   Sig: Take 2 capsules (800 mg total) by mouth 3 (three) times a day   loxapine (LOXITANE) 25 mg capsule   No No   Sig: Take 1 capsule (25 mg total) by mouth 2 (two) times a day   tiZANidine (ZANAFLEX) 4 mg tablet   No No   Sig: TAKE 1 TABLET (4 MG TOTAL) BY MOUTH EVERY 8 (EIGHT) HOURS AS NEEDED FOR MUSCLE SPASMS   traZODone (DESYREL) 150 mg tablet   No No   Sig: Take 1 tablet (150 mg total) by mouth daily at bedtime      Facility-Administered Medications: None     Discharge Medication List as of 5/14/2025  5:34 PM        START taking these medications    Details   cephalexin (KEFLEX) 500 mg capsule Take 1 capsule (500 mg total) by mouth every 6 (six) hours for 7 days, Starting Wed 5/14/2025, Until Wed 5/21/2025, Normal           CONTINUE these medications which have NOT CHANGED    Details   albuterol (Proventil HFA) 90 mcg/act inhaler Inhale 2 puffs every 6 (six) hours as needed for wheezing, Starting Thu 2/8/2024, Normal      buprenorphine-naloxone (Suboxone) 8-2 mg Place 1 Film (8 mg total) under the tongue 3 (three) times a day for 10 days, Starting Tue 5/13/2025, Until Fri 5/23/2025, Normal      buPROPion (FORFIVO XL) 450 MG 24 hr tablet Take 1 tablet (450 mg total) by mouth daily, Starting Tue 5/13/2025, Normal      clonazePAM (KlonoPIN) 0.5 mg tablet Take 1 tablet (0.5 mg total) by mouth daily at bedtime for 10 days, Starting Mon 12/9/2024, Until Sat 5/10/2025, Normal      gabapentin (NEURONTIN) 400 mg capsule Take 2 capsules (800 mg total) by mouth 3 (three) times a day, Starting Tue 5/13/2025, Normal      loxapine (LOXITANE) 25 mg capsule Take 1 capsule (25 mg total) by mouth 2 (two) times a day, Starting Tue 5/13/2025, Normal      Symbicort 160-4.5 MCG/ACT inhaler  "Inhale 2 puffs 2 (two) times a day, Starting Wed 2/26/2025, Normal      tiZANidine (ZANAFLEX) 4 mg tablet TAKE 1 TABLET (4 MG TOTAL) BY MOUTH EVERY 8 (EIGHT) HOURS AS NEEDED FOR MUSCLE SPASMS, Starting Sun 4/20/2025, Normal      traZODone (DESYREL) 150 mg tablet Take 1 tablet (150 mg total) by mouth daily at bedtime, Starting Tue 5/13/2025, Normal           No discharge procedures on file.  ED SEPSIS DOCUMENTATION   Time reflects when diagnosis was documented in both MDM as applicable and the Disposition within this note       Time User Action Codes Description Comment    5/14/2025  5:21 PM Alexis Yin Add [S51.811Y] Forearm laceration     5/14/2025  5:21 PM Alexis Yin Add [S51.812D] Forearm laceration, left, subsequent encounter     5/14/2025  5:21 PM Alexis Yin Modify [S51.192D] Forearm laceration, left, subsequent encounter     5/14/2025  5:21 PM Alexis Yin Remove [S51.811A] Forearm laceration                    [1]   Social History  Tobacco Use    Smoking status: Every Day     Current packs/day: 0.50     Average packs/day: 0.5 packs/day for 20.0 years (10.0 ttl pk-yrs)     Types: Cigarettes     Passive exposure: Never    Smokeless tobacco: Current    Tobacco comments:     Pt not ready to quit   Vaping Use    Vaping status: Every Day    Substances: Nicotine, THC, Flavoring   Substance Use Topics    Alcohol use: Not Currently     Comment: MAGDALENO, pt historically inconsistent. Drinking  per [facility] 2    Drug use: Not Currently     Types: Heroin, \"Crack\" cocaine, Cocaine, LSD, Benzodiazepines, Marijuana     Comment: Pt unreliable historian        Alexis Yin PA-C  05/14/25 3683    "

## 2025-05-14 NOTE — DISCHARGE INSTRUCTIONS
Please refer to the attached information for strict return instructions. If symptoms worsen or new symptoms develop please return to the ER.

## 2025-05-15 ENCOUNTER — TELEPHONE (OUTPATIENT)
Dept: PSYCHIATRY | Facility: CLINIC | Age: 50
End: 2025-05-15

## 2025-05-15 NOTE — TELEPHONE ENCOUNTER
Writer called and left message to reschedule their Hospital Discharge appointment on 6/25 with Dr. Diallo for he next 60min opening.

## 2025-05-30 ENCOUNTER — HOSPITAL ENCOUNTER (EMERGENCY)
Facility: HOSPITAL | Age: 50
Discharge: HOME | End: 2025-05-30
Attending: EMERGENCY MEDICINE
Payer: COMMERCIAL

## 2025-05-30 ENCOUNTER — APPOINTMENT (EMERGENCY)
Dept: RADIOLOGY | Facility: HOSPITAL | Age: 50
End: 2025-05-30
Payer: COMMERCIAL

## 2025-05-30 VITALS
BODY MASS INDEX: 36.66 KG/M2 | SYSTOLIC BLOOD PRESSURE: 107 MMHG | RESPIRATION RATE: 19 BRPM | OXYGEN SATURATION: 98 % | WEIGHT: 194 LBS | TEMPERATURE: 97.9 F | DIASTOLIC BLOOD PRESSURE: 79 MMHG | HEART RATE: 87 BPM

## 2025-05-30 DIAGNOSIS — M54.9 ACUTE ON CHRONIC BACK PAIN: Primary | ICD-10-CM

## 2025-05-30 DIAGNOSIS — M21.619 BUNION: ICD-10-CM

## 2025-05-30 DIAGNOSIS — G89.29 ACUTE ON CHRONIC BACK PAIN: Primary | ICD-10-CM

## 2025-05-30 LAB
EXT PREGNANCY TEST URINE: NEGATIVE
EXT. CONTROL: NORMAL

## 2025-05-30 PROCEDURE — 72100 X-RAY EXAM L-S SPINE 2/3 VWS: CPT

## 2025-05-30 PROCEDURE — 81025 URINE PREGNANCY TEST: CPT | Performed by: PHYSICIAN ASSISTANT

## 2025-05-30 PROCEDURE — 73630 X-RAY EXAM OF FOOT: CPT

## 2025-05-30 PROCEDURE — 99284 EMERGENCY DEPT VISIT MOD MDM: CPT | Performed by: PHYSICIAN ASSISTANT

## 2025-05-30 PROCEDURE — 99283 EMERGENCY DEPT VISIT LOW MDM: CPT

## 2025-05-30 RX ORDER — GABAPENTIN 300 MG/1
300 CAPSULE ORAL 3 TIMES DAILY
Qty: 15 CAPSULE | Refills: 0 | Status: SHIPPED | OUTPATIENT
Start: 2025-05-30

## 2025-05-30 RX ORDER — GABAPENTIN 300 MG/1
300 CAPSULE ORAL 3 TIMES DAILY
Qty: 15 CAPSULE | Refills: 0 | Status: SHIPPED | OUTPATIENT
Start: 2025-05-30 | End: 2025-05-30

## 2025-05-30 NOTE — ED PROVIDER NOTES
Time reflects when diagnosis was documented in both MDM as applicable and the Disposition within this note       Time User Action Codes Description Comment    5/30/2025  4:15 PM Earnestine Narvaez Add [M54.9,  G89.29] Acute on chronic back pain     5/30/2025  4:15 PM Earnestine Narvaez Add [M21.619] Bunion           ED Disposition       ED Disposition   Discharge    Condition   Stable    Date/Time   Fri May 30, 2025  4:15 PM    Comment   Carey Keith discharge to home/self care.                   Assessment & Plan       Medical Decision Making  The patient is a 50-year-old female presenting today for 2 separate complaints.  She reports worsening right foot pain from a bunion.  Reports that she has numbness and tingling in the foot that is normally relieved with gabapentin.  She also has acute on chronic left-sided low back pain.  States she has a history of scoliosis which causes this pain.  Has been seen in the ED for this multiple times.  Last ER visit was 3/9/2025.  Has been referred to comprehensive spine but has not yet gone.  I had an extensive conversation with the patient about proper follow-up.  We discussed that she cannot continue to get gabapentin as needed in the ED and will need to have a specialist follow-up.        No acute osseous abnormality of the lumbar x-ray per my initial read.  Pending official radiology report.  Patient does not have any red flag symptoms to suggest cauda equina.  She does not have fevers or chills to suggest epidural abscess.  She denies any IV drug use.  She does not have weight loss to suggest tumor.  Symptoms are consistent with her chronic low back pain.      Amount and/or Complexity of Data Reviewed  Labs: ordered.  Radiology: ordered.    Risk  Prescription drug management.             Medications - No data to display    ED Risk Strat Scores                    No data recorded                            History of Present Illness       Chief Complaint   Patient presents with     Foot Pain     Bilateral foot pain, reports bunions on both, right foot is worse    Back Pain     Left lower back pain, hx of back surgery       Past Medical History[1]   Past Surgical History[2]   Family History[3]   Social History[4]   E-Cigarette/Vaping    E-Cigarette Use Current Every Day User     Cartridges/Day 2       E-Cigarette/Vaping Substances    Nicotine Yes     THC Yes     CBD No     Flavoring Yes     Other No     Unknown No       I have reviewed and agree with the history as documented.     The patient is a 50-year-old female with a past medical history of bipolar disorder, substance abuse, alcohol abuse, low back pain, scoliosis and a bunion on her right foot presenting today for 2 complaints.  She first reports worsening pain in her right foot.  She states that she has numbness and tingling in her foot due to the bunion.  She also reports that she has acute on chronic left-sided low back pain.  She denies any saddle anesthesia.  Denies urinary incontinence.  Denies fevers or chills.  Denies radiation down the legs.  Denies URI symptoms.  Denies UTI symptoms.The patient was last seen in the ED on 3/9/2025.  At that time she presented for acute on chronic low back pain.  While in the ED she was given gabapentin 300 mg and Zanaflex.  She was also given a dose of prednisone.  She was sent home with 15 capsules of gabapentin 300 mg to be taken 3 times a day.  She was also given a Medrol Dosepak.  She was referred to [facility] pain but did not follow-up.  Prior to that she was seen in the ED on February 10, 2025.  At that time she was given naproxen, lidocaine patches and gabapentin.  Her gabapentin dose was increased to 600 mg 3 times a day.        Review of Systems   Constitutional:  Negative for activity change, appetite change, chills, fatigue and fever.   HENT:  Negative for congestion, rhinorrhea, sinus pressure, sinus pain and sore throat.    Respiratory:  Negative for cough, chest  tightness and shortness of breath.    Cardiovascular:  Negative for chest pain and palpitations.   Gastrointestinal:  Negative for diarrhea, nausea and vomiting.   Musculoskeletal:  Positive for arthralgias, back pain and gait problem. Negative for myalgias.   Skin:  Negative for color change and pallor.   Neurological:  Negative for headaches.           Objective       ED Triage Vitals [05/30/25 1446]   Temperature Pulse Blood Pressure Respirations SpO2 Patient Position - Orthostatic VS   97.9 °F (36.6 °C) 87 107/79 19 98 % --      Temp src Heart Rate Source BP Location FiO2 (%) Pain Score    -- -- -- -- 7      Vitals      Date and Time Temp Pulse SpO2 Resp BP Pain Score FACES Pain Rating User   05/30/25 1446 97.9 °F (36.6 °C) 87 98 % 19 107/79 7 -- EP            Physical Exam  Constitutional:       General: She is not in acute distress.     Appearance: Normal appearance. She is normal weight. She is not ill-appearing, toxic-appearing or diaphoretic.     Cardiovascular:      Rate and Rhythm: Normal rate and regular rhythm.   Pulmonary:      Effort: Pulmonary effort is normal.      Breath sounds: Normal breath sounds.     Musculoskeletal:         General: Tenderness present. No swelling. Normal range of motion.      Comments: Pain to palpation of the patient's lumbar spine.  Pain to palpation over the left lower side of her back.      Significant bunion deformity of the right foot.  Pain to palpation over the right metatarsal.     Neurological:      Mental Status: She is alert.         Results Reviewed       Procedure Component Value Units Date/Time    POCT pregnancy, urine [[id number]]  (Normal) Collected: 05/30/25 1528    Lab Status: Final result Updated: 05/30/25 1528     EXT Preg Test, Ur Negative     Control Valid            XR foot 3+ views RIGHT    (Results Pending)   XR spine lumbar 2 or 3 views injury    (Results Pending)       Procedures    ED Medication and Procedure Management   Prior to Admission  Medications   Prescriptions Last Dose Informant Patient Reported? Taking?   Symbicort 160-4.5 MCG/ACT inhaler   No No   Sig: Inhale 2 puffs 2 (two) times a day   albuterol (Proventil HFA) 90 mcg/act inhaler  Self No No   Sig: Inhale 2 puffs every 6 (six) hours as needed for wheezing   buPROPion (FORFIVO XL) 450 MG 24 hr tablet   No No   Sig: Take 1 tablet (450 mg total) by mouth daily   clonazePAM (KlonoPIN) 0.5 mg tablet  Self No No   Sig: Take 1 tablet (0.5 mg total) by mouth daily at bedtime for 10 days   gabapentin (NEURONTIN) 400 mg capsule   No No   Sig: Take 2 capsules (800 mg total) by mouth 3 (three) times a day   loxapine (LOXITANE) 25 mg capsule   No No   Sig: Take 1 capsule (25 mg total) by mouth 2 (two) times a day   tiZANidine (ZANAFLEX) 4 mg tablet   No No   Sig: TAKE 1 TABLET (4 MG TOTAL) BY MOUTH EVERY 8 (EIGHT) HOURS AS NEEDED FOR MUSCLE SPASMS   traZODone (DESYREL) 150 mg tablet   No No   Sig: Take 1 tablet (150 mg total) by mouth daily at bedtime      Facility-Administered Medications: None     Discharge Medication List as of 5/30/2025  4:24 PM        START taking these medications    Details   !! gabapentin (Neurontin) 300 mg capsule Take 1 capsule (300 mg total) by mouth 3 (three) times a day For post-herpetic neuralgia: Take 1 tablet on day 1,  Then take 2 tablets on day 2, Then take 3 tablets on day 3 and every day after that as instructed by your doctor., Starting Fri 5/30/2025,  Normal       !! - Potential duplicate medications found. Please discuss with provider.        CONTINUE these medications which have NOT CHANGED    Details   albuterol (Proventil HFA) 90 mcg/act inhaler Inhale 2 puffs every 6 (six) hours as needed for wheezing, Starting Thu 2/8/2024, Normal      buPROPion (FORFIVO XL) 450 MG 24 hr tablet Take 1 tablet (450 mg total) by mouth daily, Starting Tue 5/13/2025, Normal      clonazePAM (KlonoPIN) 0.5 mg tablet Take 1 tablet (0.5 mg total) by mouth daily at bedtime for 10  days, Starting Mon 12/9/2024, Until Sat 5/10/2025, Normal      !! gabapentin (NEURONTIN) 400 mg capsule Take 2 capsules (800 mg total) by mouth 3 (three) times a day, Starting Tue 5/13/2025, Normal      loxapine (LOXITANE) 25 mg capsule Take 1 capsule (25 mg total) by mouth 2 (two) times a day, Starting Tue 5/13/2025, Normal      Symbicort 160-4.5 MCG/ACT inhaler Inhale 2 puffs 2 (two) times a day, Starting Wed 2/26/2025, Normal      tiZANidine (ZANAFLEX) 4 mg tablet TAKE 1 TABLET (4 MG TOTAL) BY MOUTH EVERY 8 (EIGHT) HOURS AS NEEDED FOR MUSCLE SPASMS, Starting Sun 4/20/2025, Normal      traZODone (DESYREL) 150 mg tablet Take 1 tablet (150 mg total) by mouth daily at bedtime, Starting Tue 5/13/2025, Normal       !! - Potential duplicate medications found. Please discuss with provider.          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)     Alcohol abuse     Alcoholism (HCC)     Altered mental status 09/21/2022    Elevated LFTs 09/21/2022    Lower back pain     Scoliosis     Self-injurious behavior     Substance abuse (HCC)    [2]   Past Surgical History:  Procedure Laterality Date    CHOLECYSTECTOMY     [3]   Family History  Problem Relation Name Age of Onset    Autism Mother      Heart disease Father      Stroke Father      Anxiety disorder Father      Heart disease Maternal Grandmother     [4]   Social History  Tobacco Use    Smoking status: Every Day     Current packs/day: 0.50     Average packs/day: 0.5 packs/day for 20.0 years (10.0 ttl pk-yrs)     Types: Cigarettes     Passive exposure: Never    Smokeless tobacco: Current    Tobacco comments:  "    Pt not ready to quit   Vaping Use    Vaping status: Every Day    Substances: Nicotine, THC, Flavoring   Substance Use Topics    Alcohol use: Not Currently     Comment: MAGDALENO, pt historically inconsistent. Drinking  per [facility] 2    Drug use: Not Currently     Types: Heroin, \"Crack\" cocaine, Cocaine, LSD, Benzodiazepines, Marijuana     Comment: Pt unreliable historian        MAIKEL RodriguezC  05/30/25 5304    "

## 2025-05-30 NOTE — DISCHARGE INSTRUCTIONS
Follow-up with podiatry and orthopedics.  It is very important you follow-up with the appropriate specialist.  If symptoms worsen present back to the ER.

## 2025-06-02 ENCOUNTER — HOSPITAL ENCOUNTER (EMERGENCY)
Facility: HOSPITAL | Age: 50
Discharge: HOME | End: 2025-06-02
Attending: EMERGENCY MEDICINE | Admitting: EMERGENCY MEDICINE
Payer: COMMERCIAL

## 2025-06-02 ENCOUNTER — APPOINTMENT (EMERGENCY)
Dept: RADIOLOGY | Facility: HOSPITAL | Age: 50
End: 2025-06-02
Payer: COMMERCIAL

## 2025-06-02 VITALS
TEMPERATURE: 98.4 F | SYSTOLIC BLOOD PRESSURE: 132 MMHG | HEART RATE: 76 BPM | OXYGEN SATURATION: 96 % | RESPIRATION RATE: 18 BRPM | DIASTOLIC BLOOD PRESSURE: 85 MMHG

## 2025-06-02 DIAGNOSIS — G89.29 ACUTE ON CHRONIC BACK PAIN: Primary | ICD-10-CM

## 2025-06-02 DIAGNOSIS — J40 BRONCHITIS: ICD-10-CM

## 2025-06-02 DIAGNOSIS — G89.29 CHRONIC PAIN: ICD-10-CM

## 2025-06-02 DIAGNOSIS — M54.9 ACUTE ON CHRONIC BACK PAIN: Primary | ICD-10-CM

## 2025-06-02 PROCEDURE — 94640 AIRWAY INHALATION TREATMENT: CPT

## 2025-06-02 PROCEDURE — 99284 EMERGENCY DEPT VISIT MOD MDM: CPT | Performed by: EMERGENCY MEDICINE

## 2025-06-02 PROCEDURE — 71046 X-RAY EXAM CHEST 2 VIEWS: CPT

## 2025-06-02 PROCEDURE — 99284 EMERGENCY DEPT VISIT MOD MDM: CPT

## 2025-06-02 PROCEDURE — 0241U HB NFCT DS VIR RESP RNA 4 TRGT: CPT | Performed by: EMERGENCY MEDICINE

## 2025-06-02 RX ORDER — AZITHROMYCIN 250 MG/1
TABLET, FILM COATED ORAL
Qty: 6 TABLET | Refills: 0 | Status: SHIPPED | OUTPATIENT
Start: 2025-06-02 | End: 2025-06-06

## 2025-06-02 RX ORDER — GABAPENTIN 300 MG/1
600 CAPSULE ORAL ONCE
Status: COMPLETED | OUTPATIENT
Start: 2025-06-02 | End: 2025-06-02

## 2025-06-02 RX ORDER — GABAPENTIN 300 MG/1
600 CAPSULE ORAL 3 TIMES DAILY
Qty: 18 CAPSULE | Refills: 0 | Status: SHIPPED | OUTPATIENT
Start: 2025-06-02 | End: 2025-06-05

## 2025-06-02 RX ORDER — ALBUTEROL SULFATE 0.83 MG/ML
5 SOLUTION RESPIRATORY (INHALATION) ONCE
Status: COMPLETED | OUTPATIENT
Start: 2025-06-02 | End: 2025-06-02

## 2025-06-02 RX ADMIN — GABAPENTIN 600 MG: 300 CAPSULE ORAL at 12:53

## 2025-06-02 RX ADMIN — IPRATROPIUM BROMIDE 0.5 MG: 0.5 SOLUTION RESPIRATORY (INHALATION) at 12:28

## 2025-06-02 RX ADMIN — ALBUTEROL SULFATE 5 MG: 2.5 SOLUTION RESPIRATORY (INHALATION) at 12:28

## 2025-06-02 NOTE — ED PROVIDER NOTES
Time reflects when diagnosis was documented in both MDM as applicable and the Disposition within this note       Time User Action Codes Description Comment    6/2/2025 12:11 PM Heather Olmedo Add [M54.9,  G89.29] Acute on chronic back pain     6/2/2025  2:07 PM Heather Olmedo Add [J06.9] Viral upper respiratory tract infection     6/2/2025  2:08 PM Heather Olmedo Add [G89.29] Chronic pain     6/2/2025  2:08 PM Heather Olmedo Modify [M54.9,  G89.29] Acute on chronic back pain     6/2/2025  2:08 PM Heather Olmedo Remove [J06.9] Viral upper respiratory tract infection     6/2/2025  2:08 PM Heather Olmedo Add [J40] Bronchitis           ED Disposition       ED Disposition   Discharge    Condition   Good    Date/Time   Mon Jun 2, 2025  2:07 PM    Comment   Carey Keith discharge to home/self care.                   Assessment & Plan       Medical Decision Making  50-year-old female presents to the emergency department with 2 complaints.  Initially her only complaint was URI symptoms for the last 3 days consisting of cough productive of yellow sputum, subjective fevers, chills and runny nose.  No chest pain or shortness of breath.  She has been using her inhaler without relief at home.  She then asked to speak to me privately and states that she has been having shooting low back pain into her genitalia and down her legs as well.  This is a chronic problem for her.  She was seen here 2 days ago for this complaint and prescribed 15 gabapentin.  She states that it was not enough as she usually takes 600 mg 3 times a day.  Her usual prescriber is Dr. Grace Dumont she states from the [facility].  On exam here she is in no distress with no conversational dyspnea.  She does have some wheezing on exam but otherwise her physical exam is normal.  Will rule out COVID/RSV.  Will obtain x-ray to rule out pneumonia and give DuoNeb to treat wheezing.  Will attempt to reach out to her physician  regarding her prescription for gabapentin as well as patient's claims of having an appointment with her next week    Amount and/or Complexity of Data Reviewed  Labs:  Decision-making details documented in ED Course.  Radiology: ordered and independent interpretation performed.    Risk  OTC drugs.  Prescription drug management.        ED Course as of 06/02/25 1410   Mon Jun 02, 2025   1201 Blood Pressure: 132/85   1201 Temperature: 98.4 °F (36.9 °C)   1201 Pulse: 76   1201 Respirations: 18   1201 SpO2: 96 %   1225 Attempted to call the office of Dr. Grace Dumont at [phone].  Left a message to be called back   1230 Chest x-ray: No infiltrate   1350 Spoke with the health [facility].  Patient does have an upcoming appointment in 2 days.  Will prescribe enough gabapentin for 2 days   1407 SARS-COV-2: Negative   1407 INFLU A PCR: Negative   1407 INFLU B PCR: Negative   1407 RSV PCR: Negative       Medications   albuterol inhalation solution 5 mg (5 mg Nebulization Given 6/2/25 1228)   ipratropium (ATROVENT) 0.02 % inhalation solution 0.5 mg (0.5 mg Nebulization Given 6/2/25 1228)   gabapentin (NEURONTIN) capsule 600 mg (600 mg Oral Given 6/2/25 1253)       ED Risk Strat Scores                    No data recorded        SBIRT 22yo+      Flowsheet Row Most Recent Value   Initial Alcohol Screen: US AUDIT-C     1. How often do you have a drink containing alcohol? 0 Filed at: 06/02/2025 1211   2. How many drinks containing alcohol do you have on a typical day you are drinking?  0 Filed at: 06/02/2025 1211   3b. FEMALE Any Age, or MALE 65+: How often do you have 4 or more drinks on one occassion? 0 Filed at: 06/02/2025 1211   Audit-C Score 0 Filed at: 06/02/2025 1211   LYNN: How many times in the past year have you...    Used an illegal drug or used a prescription medication for non-medical reasons? Never Filed at: 06/02/2025 1211                            History of Present Illness       Chief Complaint   Patient presents  "with    Flu Symptoms     Pt reports productive cough, nasal congestion, and chills for 3 days.        Past Medical History[1]   Past Surgical History[2]   Family History[3]   Social History[4]   E-Cigarette/Vaping    E-Cigarette Use Current Every Day User     Cartridges/Day 2       E-Cigarette/Vaping Substances    Nicotine Yes     THC Yes     CBD No     Flavoring Yes     Other No     Unknown No       I have reviewed and agree with the history as documented.     50-year-old female with history of asthma, bipolar depression, chronic pain presents to the emergency department with complaints of productive cough of yellow sputum over the last 3 days as well as chills.  She has had subjective fevers as well.  No chest pain or shortness of breath.  No known ill contacts.  She has been using her inhaler at home without relief.  She does smoke.  Patient then asked to speak to me alone and states that she is having sharp pains in her low back that radiate into the genital region and then down her legs.  She states that this is a chronic problem for her.  She was seen here 2 days ago for that complaint as well and was prescribed 15 gabapentin.  She states that she takes 600 mg of gabapentin 3 times a day.  She admits to missing her appointment with her doctor \"Dr. Grace Dumont from the [facility]\".  She states she has an appointment with her next week.      History provided by:  Patient   used: No    Flu Symptoms  Presenting symptoms: cough, fever (Subjective) and rhinorrhea    Presenting symptoms: no diarrhea, no fatigue, no headaches, no myalgias, no nausea, no shortness of breath, no sore throat and no vomiting    Cough:     Cough characteristics:  Productive    Sputum characteristics:  Yellow    Onset quality:  Gradual    Duration:  3 days    Timing:  Intermittent    Progression:  Unchanged    Chronicity:  New  Relieved by:  None tried  Worsened by:  Nothing  Ineffective treatments:  None " tried  Associated symptoms: chills    Associated symptoms: no decreased appetite, no decrease in physical activity, no ear pain, no mental status change, no congestion, no neck stiffness and no syncope    Risk factors: no diabetes problem, no heart disease, no immunocompromised state, no kidney disease, not pregnant and no sick contacts        Review of Systems   Constitutional:  Positive for chills and fever (Subjective). Negative for activity change, appetite change, decreased appetite, diaphoresis and fatigue.   HENT:  Positive for rhinorrhea. Negative for congestion, ear pain and sore throat.    Eyes: Negative.  Negative for discharge, redness and itching.   Respiratory:  Positive for cough. Negative for apnea, chest tightness, shortness of breath and wheezing.    Cardiovascular:  Negative for chest pain, palpitations and leg swelling.   Gastrointestinal: Negative.  Negative for abdominal pain, diarrhea, nausea and vomiting.   Endocrine: Negative.    Genitourinary: Negative.  Negative for flank pain, frequency and urgency.   Musculoskeletal:  Positive for back pain. Negative for myalgias and neck stiffness.   Skin: Negative.    Allergic/Immunologic: Negative.    Neurological: Negative.  Negative for dizziness, syncope, weakness, light-headedness, numbness and headaches.   Hematological: Negative.    All other systems reviewed and are negative.          Objective       ED Triage Vitals [06/02/25 1159]   Temperature Pulse Blood Pressure Respirations SpO2 Patient Position - Orthostatic VS   98.4 °F (36.9 °C) 76 132/85 18 96 % --      Temp Source Heart Rate Source BP Location FiO2 (%) Pain Score    Oral Monitor Right arm -- --      Vitals      Date and Time Temp Pulse SpO2 Resp BP Pain Score FACES Pain Rating User   06/02/25 1159 98.4 °F (36.9 °C) 76 96 % 18 132/85 -- -- HMR            Physical Exam  Vitals and nursing note reviewed.   Constitutional:       General: She is awake. She is not in acute distress.      Appearance: Normal appearance. She is well-developed and overweight. She is not ill-appearing, toxic-appearing or diaphoretic.   HENT:      Head: Normocephalic and atraumatic.      Right Ear: Tympanic membrane and external ear normal.      Left Ear: Tympanic membrane and external ear normal.      Nose: Nose normal.      Mouth/Throat:      Mouth: Mucous membranes are moist.      Pharynx: No oropharyngeal exudate or posterior oropharyngeal erythema.     Eyes:      General: No scleral icterus.        Right eye: No discharge.         Left eye: No discharge.      Conjunctiva/sclera: Conjunctivae normal.      Pupils: Pupils are equal, round, and reactive to light.     Neck:      Thyroid: No thyromegaly.      Vascular: No JVD.      Trachea: No tracheal deviation.     Cardiovascular:      Rate and Rhythm: Normal rate and regular rhythm.      Heart sounds: Normal heart sounds. No murmur heard.     No friction rub. No gallop.   Pulmonary:      Effort: Pulmonary effort is normal. No tachypnea, accessory muscle usage or respiratory distress.      Breath sounds: No stridor. Examination of the right-upper field reveals wheezing. Examination of the left-upper field reveals wheezing. Examination of the right-middle field reveals wheezing. Examination of the left-middle field reveals wheezing. Examination of the right-lower field reveals wheezing. Examination of the left-lower field reveals wheezing. Wheezing present. No rhonchi or rales.   Chest:      Chest wall: No tenderness.   Abdominal:      General: Bowel sounds are normal.     Musculoskeletal:      Cervical back: Normal range of motion and neck supple. No rigidity.   Lymphadenopathy:      Cervical: No cervical adenopathy.     Skin:     General: Skin is warm and dry.      Coloration: Skin is not jaundiced or pale.      Findings: No bruising, erythema, lesion or rash.     Neurological:      General: No focal deficit present.      Mental Status: She is alert and oriented to  person, place, and time.      Motor: No weakness or abnormal muscle tone.      Deep Tendon Reflexes: Reflexes are normal and symmetric.     Psychiatric:         Mood and Affect: Mood normal.         Behavior: Behavior is cooperative.         Results Reviewed       Procedure Component Value Units Date/Time    FLU/RSV/COVID - if FLU/RSV clinically relevant (2hr TAT) [[id number]]  (Normal) Collected: 06/02/25 1228    Lab Status: Final result Specimen: Nares from Nose Updated: 06/02/25 1356     SARS-CoV-2 Negative     INFLUENZA A PCR Negative     INFLUENZA B PCR Negative     RSV PCR Negative    Narrative:      This test has been performed using the CoV-2/Flu/RSV plus assay on the [employer] GeneXpert platform. This test has been validated by the  and verified by the performing laboratory.     This test is designed to amplify and detect the following: nucleocapsid (N), envelope (E), and RNA-dependent RNA polymerase (RdRP) genes of the SARS-CoV-2 genome; matrix (M), basic polymerase (PB2), and acidic protein (PA) segments of the influenza A genome; matrix (M) and non-structural protein (NS) segments of the influenza B genome, and the nucleocapsid genes of RSV A and RSV B.     Positive results are indicative of the presence of Flu A, Flu B, RSV, and/or SARS-CoV-2 RNA. Positive results for SARS-CoV-2 or suspected novel influenza should be reported to state, local, or federal health departments according to local reporting requirements.      All results should be assessed in conjunction with clinical presentation and other laboratory markers for clinical management.     FOR PEDIATRIC PATIENTS - copy/paste COVID Guidelines URL to browser: [email or web address]               XR chest 2 views   ED Interpretation by Heather Olmedo DO (06/02 1230)   No infiltrate      Final Interpretation by Gaurav Woodson DO (06/02 1336)      No acute cardiopulmonary  disease.            Workstation performed: [id number]             Procedures    ED Medication and Procedure Management   Prior to Admission Medications   Prescriptions Last Dose Informant Patient Reported? Taking?   Symbicort 160-4.5 MCG/ACT inhaler   No No   Sig: Inhale 2 puffs 2 (two) times a day   albuterol (Proventil HFA) 90 mcg/act inhaler  Self No No   Sig: Inhale 2 puffs every 6 (six) hours as needed for wheezing   buPROPion (FORFIVO XL) 450 MG 24 hr tablet   No No   Sig: Take 1 tablet (450 mg total) by mouth daily   clonazePAM (KlonoPIN) 0.5 mg tablet  Self No No   Sig: Take 1 tablet (0.5 mg total) by mouth daily at bedtime for 10 days   gabapentin (NEURONTIN) 400 mg capsule   No No   Sig: Take 2 capsules (800 mg total) by mouth 3 (three) times a day   gabapentin (Neurontin) 300 mg capsule   No No   Sig: Take 1 capsule (300 mg total) by mouth 3 (three) times a day For post-herpetic neuralgia: Take 1 tablet on day 1,  Then take 2 tablets on day 2, Then take 3 tablets on day 3 and every day after that as instructed by your doctor.   loxapine (LOXITANE) 25 mg capsule   No No   Sig: Take 1 capsule (25 mg total) by mouth 2 (two) times a day   tiZANidine (ZANAFLEX) 4 mg tablet   No No   Sig: TAKE 1 TABLET (4 MG TOTAL) BY MOUTH EVERY 8 (EIGHT) HOURS AS NEEDED FOR MUSCLE SPASMS   traZODone (DESYREL) 150 mg tablet   No No   Sig: Take 1 tablet (150 mg total) by mouth daily at bedtime      Facility-Administered Medications: None     Patient's Medications   Discharge Prescriptions    AZITHROMYCIN (ZITHROMAX) 250 MG TABLET    Take 2 tablets today then 1 tablet daily x 4 days       Start Date: 6/2/2025  End Date: 6/6/2025       Order Dose: --       Quantity: 6 tablet    Refills: 0    DEXTROMETHORPHAN-GUAIFENESIN (MUCINEX DM)  MG PER 12 HR TABLET    Take 1 tablet by mouth every 12 (twelve) hours as needed for cough       Start Date: 6/2/2025  End Date: --       Order Dose: 1 tablet       Quantity: 15 tablet     Refills: 0    GABAPENTIN (NEURONTIN) 300 MG CAPSULE    Take 2 capsules (600 mg total) by mouth 3 (three) times a day for 3 days For post-herpetic neuralgia: Take 1 tablet on day 1,  Then take 2 tablets on day 2, Then take 3 tablets on day 3 and every day after that as instructed by your doctor.       Start Date: 6/2/2025  End Date: 6/5/2025       Order Dose: 600 mg       Quantity: 18 capsule    Refills: 0     No discharge procedures on file.  ED SEPSIS DOCUMENTATION   Time reflects when diagnosis was documented in both MDM as applicable and the Disposition within this note       Time User Action Codes Description Comment    6/2/2025 12:11 PM Heather Olmedo Add [M54.9,  G89.29] Acute on chronic back pain     6/2/2025  2:07 PM Heather Olmedo Add [J06.9] Viral upper respiratory tract infection     6/2/2025  2:08 PM Heather Olmedo Add [G89.29] Chronic pain     6/2/2025  2:08 PM Heather Olmedo Modify [M54.9,  G89.29] Acute on chronic back pain     6/2/2025  2:08 PM Heather Olmedo Remove [J06.9] Viral upper respiratory tract infection     6/2/2025  2:08 PM Heather Olmedo Add [J40] Bronchitis                    [1]   Past Medical History:  Diagnosis Date    Addiction to drug (HCC)     Alcohol abuse     Alcoholism (HCC)     Altered mental status 09/21/2022    Elevated LFTs 09/21/2022    Lower back pain     Scoliosis     Self-injurious behavior     Substance abuse (HCC)    [2]   Past Surgical History:  Procedure Laterality Date    CHOLECYSTECTOMY     [3]   Family History  Problem Relation Name Age of Onset    Autism Mother      Heart disease Father      Stroke Father      Anxiety disorder Father      Heart disease Maternal Grandmother     [4]   Social History  Tobacco Use    Smoking status: Every Day     Current packs/day: 0.50     Average packs/day: 0.5 packs/day for 20.0 years (10.0 ttl pk-yrs)     Types: Cigarettes     Passive exposure: Never    Smokeless tobacco: Current    Tobacco comments:  "    Pt not ready to quit   Vaping Use    Vaping status: Every Day    Substances: Nicotine, THC, Flavoring   Substance Use Topics    Alcohol use: Not Currently     Comment: MAGDALENO, pt historically inconsistent. Drinking  per [facility] 2    Drug use: Not Currently     Types: Heroin, \"Crack\" cocaine, Cocaine, LSD, Benzodiazepines, Marijuana     Comment: Pt unreliable historian        Heather Olmedo, DO  06/02/25 1410    "

## 2025-06-14 ENCOUNTER — HOSPITAL ENCOUNTER (EMERGENCY)
Facility: HOSPITAL | Age: 50
Discharge: HOME | End: 2025-06-14
Attending: EMERGENCY MEDICINE | Admitting: EMERGENCY MEDICINE
Payer: COMMERCIAL

## 2025-06-14 VITALS
HEART RATE: 94 BPM | RESPIRATION RATE: 18 BRPM | DIASTOLIC BLOOD PRESSURE: 62 MMHG | OXYGEN SATURATION: 96 % | HEIGHT: 61 IN | SYSTOLIC BLOOD PRESSURE: 113 MMHG | BODY MASS INDEX: 36.66 KG/M2 | TEMPERATURE: 98.5 F
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
## 2025-06-14 DIAGNOSIS — L03.90 CELLULITIS: ICD-10-CM

## 2025-06-14 PROCEDURE — 90471 IMMUNIZATION ADMIN: CPT

## 2025-06-14 PROCEDURE — 99283 EMERGENCY DEPT VISIT LOW MDM: CPT

## 2025-06-14 PROCEDURE — 99284 EMERGENCY DEPT VISIT MOD MDM: CPT | Performed by: EMERGENCY MEDICINE

## 2025-06-14 PROCEDURE — 90715 TDAP VACCINE 7 YRS/> IM: CPT | Performed by: EMERGENCY MEDICINE

## 2025-06-14 RX ORDER — LORAZEPAM 1 MG/1
1 TABLET ORAL ONCE
Status: COMPLETED | OUTPATIENT
Start: 2025-06-14 | End: 2025-06-14

## 2025-06-14 RX ADMIN — TETANUS TOXOID, REDUCED DIPHTHERIA TOXOID AND ACELLULAR PERTUSSIS VACCINE, ADSORBED 0.5 ML: 5; 2.5; 8; 8; 2.5 SUSPENSION INTRAMUSCULAR at 16:05

## 2025-06-14 RX ADMIN — LORAZEPAM 1 MG: 1 TABLET ORAL at 16:05

## 2025-06-14 RX ADMIN — AMOXICILLIN AND CLAVULANATE POTASSIUM 1 TABLET: 875; 125 TABLET, COATED ORAL at 16:05

## 2025-06-14 NOTE — ED PROVIDER NOTES
Time reflects when diagnosis was documented in both MDM as applicable and the Disposition within this note       Time User Action Codes Description Comment    6/14/2025  4:08 PM Norm Brown Add [W55.01XA] Cat bite     6/14/2025  4:09 PM Norm Brown Add [L03.90] Cellulitis           ED Disposition       ED Disposition   Discharge    Condition   Stable    Date/Time   Sat Jun 14, 2025  4:08 PM    Comment   Carey Keith discharge to home/self care.                   Assessment & Plan       Medical Decision Making  49 yo F here for evaluation of cellulitis from cat bite.  Will start Augmentin, update tetanus.  Fortunately patient is afebrile with reassuring vital signs, no clinical signs of flexor tenosynovitis.  Discussed with patient risks and benefits of rabies series.  Given that the animal is in her possession she is opting to observe the animal and defer the rabies series for now. We discussed the need for close OP fu and discussed return precautions.    Risk  Prescription drug management.             Medications   amoxicillin-clavulanate (AUGMENTIN) 875-125 mg per tablet 1 tablet (1 tablet Oral Given 6/14/25 1605)   tetanus-diphtheria-acellular pertussis (BOOSTRIX) IM injection 0.5 mL (0.5 mL Intramuscular Given 6/14/25 1605)   LORazepam (ATIVAN) tablet 1 mg (1 mg Oral Given 6/14/25 1605)       ED Risk Strat Scores                    No data recorded                            History of Present Illness       Chief Complaint   Patient presents with    Cat Bite     Patient reports getting bit by her cat last night on her left hand. Now has erythema and swelling.        Past Medical History[1]   Past Surgical History[2]   Family History[3]   Social History[4]   E-Cigarette/Vaping    E-Cigarette Use Current Every Day User     Cartridges/Day 2       E-Cigarette/Vaping Substances    Nicotine Yes     THC Yes     CBD No     Flavoring Yes     Other No     Unknown No       I have reviewed and agree with the  "history as documented.     Carey is a 51 yo F here for evaluation of swelling and discomfort to the dorsum of her left hand.  She says that last night she was  2 of her cats who were \"fighting\" when one of them bit and scratched her on her arms and hands bilaterally.  She has wounds to the dorsum of both hands as well as her forearms.  Has some erythema and swelling most significantly over the dorsum of the left hand.  She denies any fevers, nausea, vomiting, or other systemic symptoms.  She states that the cat is not vaccinated but is in her possession.  She is unsure of her last tetanus.      Cat Bite  Associated symptoms: rash    Associated symptoms: no fever        Review of Systems   Constitutional:  Negative for chills and fever.   HENT:  Negative for sore throat.    Eyes:  Negative for visual disturbance.   Respiratory:  Negative for cough and shortness of breath.    Cardiovascular:  Negative for chest pain and palpitations.   Gastrointestinal:  Negative for abdominal pain, nausea and vomiting.   Genitourinary:  Negative for hematuria.   Musculoskeletal:  Negative for arthralgias and back pain.   Skin:  Positive for rash and wound. Negative for color change.   Neurological:  Negative for syncope.   All other systems reviewed and are negative.          Objective       ED Triage Vitals [06/14/25 1448]   Temperature Pulse Blood Pressure Respirations SpO2 Patient Position - Orthostatic VS   98.5 °F (36.9 °C) 94 113/62 18 96 % Sitting      Temp Source Heart Rate Source BP Location FiO2 (%) Pain Score    Oral Monitor Right arm -- 7      Vitals      Date and Time Temp Pulse SpO2 Resp BP Pain Score FACES Pain Rating User   06/14/25 1448 98.5 °F (36.9 °C) 94 96 % 18 113/62 7 -- AMB            Physical Exam  Vitals and nursing note reviewed.   Constitutional:       General: She is not in acute distress.     Appearance: She is well-developed.   HENT:      Head: Normocephalic and atraumatic.     Eyes:      " Conjunctiva/sclera: Conjunctivae normal.       Cardiovascular:      Rate and Rhythm: Normal rate and regular rhythm.      Heart sounds: No murmur heard.  Pulmonary:      Effort: Pulmonary effort is normal. No respiratory distress.      Breath sounds: Normal breath sounds.   Abdominal:      Palpations: Abdomen is soft.      Tenderness: There is no abdominal tenderness.     Musculoskeletal:         General: No swelling.      Cervical back: Neck supple.     Skin:     General: Skin is warm and dry.      Capillary Refill: Capillary refill takes less than 2 seconds.      Findings: Erythema and rash present.      Comments: Patient with multiple healing wounds to the dorsum of her hands bilaterally as well as her forearms.  There is swelling and erythema primarily to the dorsum of the left hand.  There is no significant wounds, swelling, or erythema over the flexor surfaces of any of her fingers.  Has normal range of motion of all 5 digits in flexion and extension of both hands.  Normal distal sensation and cap refill.     Neurological:      General: No focal deficit present.      Mental Status: She is alert and oriented to person, place, and time.     Psychiatric:         Mood and Affect: Mood normal.         Results Reviewed       None            No orders to display       Procedures    ED Medication and Procedure Management   Prior to Admission Medications   Prescriptions Last Dose Informant Patient Reported? Taking?   Symbicort 160-4.5 MCG/ACT inhaler   No No   Sig: Inhale 2 puffs 2 (two) times a day   albuterol (Proventil HFA) 90 mcg/act inhaler  Self No No   Sig: Inhale 2 puffs every 6 (six) hours as needed for wheezing   buPROPion (FORFIVO XL) 450 MG 24 hr tablet   No No   Sig: Take 1 tablet (450 mg total) by mouth daily   clonazePAM (KlonoPIN) 0.5 mg tablet  Self No No   Sig: Take 1 tablet (0.5 mg total) by mouth daily at bedtime for 10 days   dextromethorphan-guaifenesin (MUCINEX DM)  MG per 12 hr tablet   No  No   Sig: Take 1 tablet by mouth every 12 (twelve) hours as needed for cough   gabapentin (NEURONTIN) 400 mg capsule   No No   Sig: Take 2 capsules (800 mg total) by mouth 3 (three) times a day   gabapentin (Neurontin) 300 mg capsule   No No   Sig: Take 1 capsule (300 mg total) by mouth 3 (three) times a day For post-herpetic neuralgia: Take 1 tablet on day 1,  Then take 2 tablets on day 2, Then take 3 tablets on day 3 and every day after that as instructed by your doctor.   gabapentin (Neurontin) 300 mg capsule   No No   Sig: Take 2 capsules (600 mg total) by mouth 3 (three) times a day for 3 days For post-herpetic neuralgia: Take 1 tablet on day 1,  Then take 2 tablets on day 2, Then take 3 tablets on day 3 and every day after that as instructed by your doctor.   loxapine (LOXITANE) 25 mg capsule   No No   Sig: Take 1 capsule (25 mg total) by mouth 2 (two) times a day   tiZANidine (ZANAFLEX) 4 mg tablet   No No   Sig: TAKE 1 TABLET (4 MG TOTAL) BY MOUTH EVERY 8 (EIGHT) HOURS AS NEEDED FOR MUSCLE SPASMS   traZODone (DESYREL) 150 mg tablet   No No   Sig: Take 1 tablet (150 mg total) by mouth daily at bedtime      Facility-Administered Medications: None     Current Discharge Medication List        START taking these medications    Details   amoxicillin-clavulanate (AUGMENTIN) 875-125 mg per tablet Take 1 tablet by mouth every 12 (twelve) hours for 7 days  Qty: 14 tablet, Refills: 0    Associated Diagnoses: Cat bite; Cellulitis           CONTINUE these medications which have NOT CHANGED    Details   albuterol (Proventil HFA) 90 mcg/act inhaler Inhale 2 puffs every 6 (six) hours as needed for wheezing  Qty: 6.7 g, Refills: 0    Comments: Substitution to a formulary equivalent within the same pharmaceutical class is authorized.  Associated Diagnoses: Acute bronchitis      buPROPion (FORFIVO XL) 450 MG 24 hr tablet Take 1 tablet (450 mg total) by mouth daily  Qty: 30 tablet, Refills: 0    Associated Diagnoses: Bipolar 1  disorder (HCC)      clonazePAM (KlonoPIN) 0.5 mg tablet Take 1 tablet (0.5 mg total) by mouth daily at bedtime for 10 days  Qty: 10 tablet, Refills: 0    Associated Diagnoses: Bipolar disorder, current episode depressed, severe, without psychotic features (HCC)      dextromethorphan-guaifenesin (MUCINEX DM)  MG per 12 hr tablet Take 1 tablet by mouth every 12 (twelve) hours as needed for cough  Qty: 15 tablet, Refills: 0    Associated Diagnoses: Bronchitis      !! gabapentin (Neurontin) 300 mg capsule Take 1 capsule (300 mg total) by mouth 3 (three) times a day For post-herpetic neuralgia: Take 1 tablet on day 1,  Then take 2 tablets on day 2, Then take 3 tablets on day 3 and every day after that as instructed by your doctor.  Qty: 15 capsule, Refills: 0    Associated Diagnoses: Acute on chronic back pain      !! gabapentin (NEURONTIN) 400 mg capsule Take 2 capsules (800 mg total) by mouth 3 (three) times a day  Qty: 180 capsule, Refills: 0    Associated Diagnoses: Bipolar 1 disorder ([facility])      loxapine (LOXITANE) 25 mg capsule Take 1 capsule (25 mg total) by mouth 2 (two) times a day  Qty: 60 capsule, Refills: 0    Associated Diagnoses: Bipolar 1 disorder (HCC)      Symbicort 160-4.5 MCG/ACT inhaler Inhale 2 puffs 2 (two) times a day  Qty: 10.2 g, Refills: 2    Associated Diagnoses: Persistent asthma without complication, unspecified asthma severity      tiZANidine (ZANAFLEX) 4 mg tablet TAKE 1 TABLET (4 MG TOTAL) BY MOUTH EVERY 8 (EIGHT) HOURS AS NEEDED FOR MUSCLE SPASMS  Qty: 270 tablet, Refills: 1    Associated Diagnoses: Chronic back pain      traZODone (DESYREL) 150 mg tablet Take 1 tablet (150 mg total) by mouth daily at bedtime  Qty: 30 tablet, Refills: 0    Associated Diagnoses: Bipolar 1 disorder (HCC)       !! - Potential duplicate medications found. Please discuss with provider.        No discharge procedures on file.  ED SEPSIS DOCUMENTATION   Time reflects when diagnosis was documented in both  "MDM as applicable and the Disposition within this note       Time User Action Codes Description Comment    6/14/2025  4:08 PM Norm Brown [W55.01XA] Cat bite     6/14/2025  4:09 PM Norm Brown [L03.90] Cellulitis                    Norm Brown MD  06/14/25 1608         [1]   Past Medical History:  Diagnosis Date    Addiction to drug (HCC)     Alcohol abuse     Alcoholism (HCC)     Altered mental status 09/21/2022    Elevated LFTs 09/21/2022    Lower back pain     Scoliosis     Self-injurious behavior     Substance abuse (HCC)    [2]   Past Surgical History:  Procedure Laterality Date    CHOLECYSTECTOMY     [3]   Family History  Problem Relation Name Age of Onset    Autism Mother      Heart disease Father      Stroke Father      Anxiety disorder Father      Heart disease Maternal Grandmother     [4]   Social History  Tobacco Use    Smoking status: Every Day     Current packs/day: 0.50     Average packs/day: 0.5 packs/day for 20.0 years (10.0 ttl pk-yrs)     Types: Cigarettes     Passive exposure: Never    Smokeless tobacco: Current    Tobacco comments:     Pt not ready to quit   Vaping Use    Vaping status: Every Day    Substances: Nicotine, THC, Flavoring   Substance Use Topics    Alcohol use: Not Currently     Comment: MAGDALENO, pt historically inconsistent. Drinking  per [facility] 2    Drug use: Not Currently     Types: Heroin, \"Crack\" cocaine, Cocaine, LSD, Benzodiazepines, Marijuana     Comment: Pt unreliable historian        Norm Brown MD  06/14/25 1610    "

## 2025-06-17 ENCOUNTER — TELEPHONE (OUTPATIENT)
Age: 50
End: 2025-06-17

## 2025-06-17 NOTE — TELEPHONE ENCOUNTER
Patient's friend called and requested to confirm an appt or make an appointment. However, there is no release on file to speak with him. Writer encouraged him to have pt. Call directly.

## 2025-06-23 ENCOUNTER — HOSPITAL ENCOUNTER (EMERGENCY)
Facility: HOSPITAL | Age: 50
End: 2025-06-24
Attending: EMERGENCY MEDICINE | Admitting: EMERGENCY MEDICINE
Payer: COMMERCIAL

## 2025-06-23 DIAGNOSIS — F31.9 BIPOLAR DISORDER (HCC): Primary | ICD-10-CM

## 2025-06-23 DIAGNOSIS — F32.A DEPRESSION: ICD-10-CM

## 2025-06-23 DIAGNOSIS — R45.851 SUICIDAL IDEATION: ICD-10-CM

## 2025-06-23 DIAGNOSIS — F41.9 ANXIETY: ICD-10-CM

## 2025-06-23 LAB
AMPHETAMINES SERPL QL SCN: NEGATIVE
BARBITURATES UR QL: NEGATIVE
BENZODIAZ UR QL: NEGATIVE
BILIRUB UR QL STRIP: ABNORMAL
CLARITY UR: CLEAR
COCAINE UR QL: NEGATIVE
COLOR UR: ABNORMAL
ETHANOL EXG-MCNC: 0 MG/DL
EXT PREGNANCY TEST URINE: NEGATIVE
EXT. CONTROL: NORMAL
FENTANYL UR QL SCN: NEGATIVE
GLUCOSE UR STRIP-MCNC: NEGATIVE MG/DL
HGB UR QL STRIP.AUTO: ABNORMAL
HYDROCODONE UR QL SCN: NEGATIVE
KETONES UR STRIP-MCNC: ABNORMAL MG/DL
LEUKOCYTE ESTERASE UR QL STRIP: ABNORMAL
METHADONE UR QL: NEGATIVE
NITRITE UR QL STRIP: NEGATIVE
OPIATES UR QL SCN: NEGATIVE
OXYCODONE+OXYMORPHONE UR QL SCN: NEGATIVE
PCP UR QL: NEGATIVE
PH UR STRIP.AUTO: 6 [PH] (ref 4.5–8)
PROT UR STRIP-MCNC: ABNORMAL MG/DL
SP GR UR STRIP.AUTO: >=1.03 (ref 1–1.03)
THC UR QL: NEGATIVE
UROBILINOGEN UR QL STRIP.AUTO: 2 E.U./DL

## 2025-06-23 PROCEDURE — 87077 CULTURE AEROBIC IDENTIFY: CPT

## 2025-06-23 PROCEDURE — 82075 ASSAY OF BREATH ETHANOL: CPT

## 2025-06-23 PROCEDURE — 87186 SC STD MICRODIL/AGAR DIL: CPT

## 2025-06-23 PROCEDURE — 99285 EMERGENCY DEPT VISIT HI MDM: CPT | Performed by: EMERGENCY MEDICINE

## 2025-06-23 PROCEDURE — 81001 URINALYSIS AUTO W/SCOPE: CPT

## 2025-06-23 PROCEDURE — 80307 DRUG TEST PRSMV CHEM ANLYZR: CPT

## 2025-06-23 PROCEDURE — 87086 URINE CULTURE/COLONY COUNT: CPT

## 2025-06-23 PROCEDURE — 81025 URINE PREGNANCY TEST: CPT

## 2025-06-23 PROCEDURE — 99283 EMERGENCY DEPT VISIT LOW MDM: CPT

## 2025-06-23 RX ORDER — ALPRAZOLAM 0.5 MG
2 TABLET ORAL ONCE
Status: COMPLETED | OUTPATIENT
Start: 2025-06-23 | End: 2025-06-23

## 2025-06-23 RX ADMIN — ALPRAZOLAM 2 MG: 0.5 TABLET ORAL at 23:44

## 2025-06-24 VITALS
RESPIRATION RATE: 18 BRPM | TEMPERATURE: 98.4 F | SYSTOLIC BLOOD PRESSURE: 114 MMHG | HEART RATE: 61 BPM | OXYGEN SATURATION: 98 % | DIASTOLIC BLOOD PRESSURE: 73 MMHG

## 2025-06-24 LAB
ALBUMIN SERPL BCG-MCNC: 3.7 G/DL (ref 3.5–5)
ALP SERPL-CCNC: 54 U/L (ref 34–104)
ALT SERPL W P-5'-P-CCNC: 14 U/L (ref 7–52)
ANION GAP SERPL CALCULATED.3IONS-SCNC: 4 MMOL/L (ref 4–13)
AST SERPL W P-5'-P-CCNC: 17 U/L (ref 13–39)
BACTERIA UR QL AUTO: ABNORMAL /HPF
BASOPHILS # BLD AUTO: 0.01 THOUSANDS/ÂΜL (ref 0–0.1)
BASOPHILS NFR BLD AUTO: 0 % (ref 0–1)
BILIRUB SERPL-MCNC: 0.37 MG/DL (ref 0.2–1)
BUN SERPL-MCNC: 17 MG/DL (ref 5–25)
CALCIUM SERPL-MCNC: 8.5 MG/DL (ref 8.4–10.2)
CHLORIDE SERPL-SCNC: 112 MMOL/L (ref 96–108)
CO2 SERPL-SCNC: 22 MMOL/L (ref 21–32)
CREAT SERPL-MCNC: 1.23 MG/DL (ref 0.6–1.3)
EOSINOPHIL # BLD AUTO: 0.02 THOUSAND/ÂΜL (ref 0–0.61)
EOSINOPHIL NFR BLD AUTO: 0 % (ref 0–6)
ERYTHROCYTE [DISTWIDTH] IN BLOOD BY AUTOMATED COUNT: 14.4 % (ref 11.6–15.1)
GFR SERPL CREATININE-BSD FRML MDRD: 51 ML/MIN/1.73SQ M
GLUCOSE SERPL-MCNC: 124 MG/DL (ref 65–140)
HCT VFR BLD AUTO: 37.7 % (ref 34.8–46.1)
HGB BLD-MCNC: 12.3 G/DL (ref 11.5–15.4)
IMM GRANULOCYTES # BLD AUTO: 0.02 THOUSAND/UL (ref 0–0.2)
IMM GRANULOCYTES NFR BLD AUTO: 0 % (ref 0–2)
LYMPHOCYTES # BLD AUTO: 2.97 THOUSANDS/ÂΜL (ref 0.6–4.47)
LYMPHOCYTES NFR BLD AUTO: 42 % (ref 14–44)
MCH RBC QN AUTO: 28.2 PG (ref 26.8–34.3)
MCHC RBC AUTO-ENTMCNC: 32.6 G/DL (ref 31.4–37.4)
MCV RBC AUTO: 87 FL (ref 82–98)
MONOCYTES # BLD AUTO: 0.64 THOUSAND/ÂΜL (ref 0.17–1.22)
MONOCYTES NFR BLD AUTO: 9 % (ref 4–12)
NEUTROPHILS # BLD AUTO: 3.5 THOUSANDS/ÂΜL (ref 1.85–7.62)
NEUTS SEG NFR BLD AUTO: 49 % (ref 43–75)
NON-SQ EPI CELLS URNS QL MICRO: ABNORMAL /HPF
NRBC BLD AUTO-RTO: 0 /100 WBCS
PLATELET # BLD AUTO: 209 THOUSANDS/UL (ref 149–390)
PMV BLD AUTO: 8.6 FL (ref 8.9–12.7)
POTASSIUM SERPL-SCNC: 4 MMOL/L (ref 3.5–5.3)
PROT SERPL-MCNC: 5.9 G/DL (ref 6.4–8.4)
RBC # BLD AUTO: 4.36 MILLION/UL (ref 3.81–5.12)
RBC #/AREA URNS AUTO: ABNORMAL /HPF
SODIUM SERPL-SCNC: 138 MMOL/L (ref 135–147)
WBC # BLD AUTO: 7.16 THOUSAND/UL (ref 4.31–10.16)
WBC #/AREA URNS AUTO: ABNORMAL /HPF

## 2025-06-24 PROCEDURE — 36415 COLL VENOUS BLD VENIPUNCTURE: CPT | Performed by: EMERGENCY MEDICINE

## 2025-06-24 PROCEDURE — 80053 COMPREHEN METABOLIC PANEL: CPT | Performed by: EMERGENCY MEDICINE

## 2025-06-24 PROCEDURE — 85025 COMPLETE CBC W/AUTO DIFF WBC: CPT | Performed by: EMERGENCY MEDICINE

## 2025-06-24 RX ORDER — IBUPROFEN 600 MG/1
600 TABLET, FILM COATED ORAL ONCE
Status: COMPLETED | OUTPATIENT
Start: 2025-06-24 | End: 2025-06-24

## 2025-06-24 RX ORDER — BUPRENORPHINE AND NALOXONE 8; 2 MG/1; MG/1
8 FILM, SOLUBLE BUCCAL; SUBLINGUAL 3 TIMES DAILY
Status: DISCONTINUED | OUTPATIENT
Start: 2025-06-24 | End: 2025-06-24 | Stop reason: HOSPADM

## 2025-06-24 RX ORDER — LORAZEPAM 1 MG/1
1 TABLET ORAL ONCE
Status: COMPLETED | OUTPATIENT
Start: 2025-06-24 | End: 2025-06-24

## 2025-06-24 RX ORDER — CLONAZEPAM 0.5 MG/1
0.5 TABLET ORAL
Status: DISCONTINUED | OUTPATIENT
Start: 2025-06-24 | End: 2025-06-24 | Stop reason: HOSPADM

## 2025-06-24 RX ORDER — ALBUTEROL SULFATE 90 UG/1
2 INHALANT RESPIRATORY (INHALATION) EVERY 6 HOURS PRN
Status: DISCONTINUED | OUTPATIENT
Start: 2025-06-24 | End: 2025-06-24 | Stop reason: HOSPADM

## 2025-06-24 RX ORDER — GABAPENTIN 400 MG/1
800 CAPSULE ORAL 3 TIMES DAILY
Status: DISCONTINUED | OUTPATIENT
Start: 2025-06-24 | End: 2025-06-24 | Stop reason: HOSPADM

## 2025-06-24 RX ORDER — BUPROPION HYDROCHLORIDE 150 MG/1
150 TABLET ORAL DAILY
Status: DISCONTINUED | OUTPATIENT
Start: 2025-06-24 | End: 2025-06-24 | Stop reason: HOSPADM

## 2025-06-24 RX ORDER — BUPRENORPHINE HYDROCHLORIDE AND NALOXONE HYDROCHLORIDE DIHYDRATE 8; 2 MG/1; MG/1
1 TABLET SUBLINGUAL DAILY
COMMUNITY

## 2025-06-24 RX ORDER — BUPROPION HYDROCHLORIDE 150 MG/1
300 TABLET ORAL DAILY
Status: DISCONTINUED | OUTPATIENT
Start: 2025-06-24 | End: 2025-06-24 | Stop reason: HOSPADM

## 2025-06-24 RX ORDER — BUPROPION HYDROCHLORIDE 450 MG/1
450 TABLET, FILM COATED, EXTENDED RELEASE ORAL DAILY
Status: DISCONTINUED | OUTPATIENT
Start: 2025-06-24 | End: 2025-06-24 | Stop reason: RX

## 2025-06-24 RX ORDER — BUDESONIDE AND FORMOTEROL FUMARATE DIHYDRATE 160; 4.5 UG/1; UG/1
2 AEROSOL RESPIRATORY (INHALATION)
Status: DISCONTINUED | OUTPATIENT
Start: 2025-06-24 | End: 2025-06-24 | Stop reason: HOSPADM

## 2025-06-24 RX ADMIN — FLUOXETINE HYDROCHLORIDE 20 MG: 20 CAPSULE ORAL at 13:04

## 2025-06-24 RX ADMIN — LORAZEPAM 1 MG: 1 TABLET ORAL at 10:26

## 2025-06-24 RX ADMIN — AMOXICILLIN AND CLAVULANATE POTASSIUM 1 TABLET: 875; 125 TABLET, COATED ORAL at 13:04

## 2025-06-24 RX ADMIN — BUPRENORPHINE AND NALOXONE 8 MG: 8; 2 FILM BUCCAL; SUBLINGUAL at 13:07

## 2025-06-24 RX ADMIN — GABAPENTIN 800 MG: 400 CAPSULE ORAL at 13:05

## 2025-06-24 RX ADMIN — BUDESONIDE AND FORMOTEROL FUMARATE DIHYDRATE 2 PUFF: 160; 4.5 AEROSOL RESPIRATORY (INHALATION) at 13:08

## 2025-06-24 RX ADMIN — IBUPROFEN 600 MG: 600 TABLET, FILM COATED ORAL at 11:03

## 2025-06-24 NOTE — ED NOTES
RN attempted to medicate. Pt requested that we wait until she finished lunch     Sarah Narvaez RN  06/24/25 7794

## 2025-06-24 NOTE — ED NOTES
Pt mentioned that she is also taking Augmenti - last day for the antibx for a cat bite.     Rocío Modi RN  06/24/25 3848

## 2025-06-24 NOTE — ED NOTES
Pt requesting bed search out of network be initiated at this time.    [facility]- [facility]- potential bed available, clinical can be faxed    Clinical faxed to [facility] for review. Bed search to continue if needed.

## 2025-06-24 NOTE — ED NOTES
Spoke with Leilani from [facility]. Pt would be able to be accepted pending CBC/CMP lab results are WNL. Requested labs with ED provider and ED RN. Crisis to fax lab results once complete.

## 2025-06-24 NOTE — EMTALA/ACUTE CARE TRANSFER
[facility] EMERGENCY DEPARTMENT  [address] [address]  Dept: [phone]      EMTALA TRANSFER CONSENT    NAME Carey Keith                                         1975                              MRN [id number]    I have been informed of my rights regarding examination, treatment, and transfer   by Dr. Alma Ramirez DO    Benefits: Specialized equipment and/or services available at the receiving facility (Include comment)________________________ (psychiatric care)    Risks: Increased discomfort during transfer      Consent for Transfer:  I acknowledge that my medical condition has been evaluated and explained to me by the emergency department physician or other qualified medical person and/or my attending physician, who has recommended that I be transferred to the service of  Accepting Physician: Dr. Loaiza at Accepting Facility Name, City & State : [facility] [place]. The above potential benefits of such transfer, the potential risks associated with such transfer, and the probable risks of not being transferred have been explained to me, and I fully understand them.  The doctor has explained that, in my case, the benefits of transfer outweigh the risks.  I agree to be transferred.    I authorize the performance of emergency medical procedures and treatments upon me in both transit and upon arrival at the receiving facility.  Additionally, I authorize the release of any and all medical records to the receiving facility and request they be transported with me, if possible.  I understand that the safest mode of transportation during a medical emergency is an ambulance and that the Hospital advocates the use of this mode of transport. Risks of traveling to the receiving facility by car, including absence of medical control, life sustaining equipment, such as oxygen, and medical personnel has been explained to me and I fully understand them.    (BRUCE CORRECT BOX  BELOW)  [ x ]  I consent to the stated transfer and to be transported by ambulance/helicopter.  [  ]  I consent to the stated transfer, but refuse transportation by ambulance and accept full responsibility for my transportation by car.  I understand the risks of non-ambulance transfers and I exonerate the Hospital and its staff from any deterioration in my condition that results from this refusal.    X___________________________________________    DATE  25  TIME________  Signature of patient or legally responsible individual signing on patient behalf           RELATIONSHIP TO PATIENT_________________________          Provider Certification    NAME Carey Keith                                         1975                              MRN [id number]    A medical screening exam was performed on the above named patient.  Based on the examination:    Condition Necessitating Transfer The primary encounter diagnosis was Bipolar disorder (HCC). Diagnoses of Anxiety, Suicidal ideation, and Depression were also pertinent to this visit.    Patient Condition: The patient has been stabilized such that within reasonable medical probability, no material deterioration of the patient condition or the condition of the unborn child(ricky) is likely to result from the transfer    Reason for Transfer: No bed available at level of patient's needs, Level of Care needed not available at this facility    Transfer Requirements: Facility [facility] [place]   Space available and qualified personnel available for treatment as acknowledged by Danielle MENDOZA; [phone]  Agreed to accept transfer and to provide appropriate medical treatment as acknowledged by       Dr. Loaiza  Appropriate medical records of the examination and treatment of the patient are provided at the time of transfer   STAFF INITIAL WHEN COMPLETED _______  Transfer will be performed by qualified personnel from    and appropriate transfer equipment as  required, including the use of necessary and appropriate life support measures.    Provider Certification: I have examined the patient and explained the following risks and benefits of being transferred/refusing transfer to the patient/family:  The patient is stable for psychiatric transfer because they are medically stable, and is protected from harming him/herself or others during transport, General risk, such as traffic hazards, adverse weather conditions, rough terrain or turbulence, possible failure of equipment (including vehicle or aircraft), or consequences of actions of persons outside the control of the transport personnel      Based on these reasonable risks and benefits to the patient and/or the unborn child(ricky), and based upon the information available at the time of the patient’s examination, I certify that the medical benefits reasonably to be expected from the provision of appropriate medical treatments at another medical facility outweigh the increasing risks, if any, to the individual’s medical condition, and in the case of labor to the unborn child, from effecting the transfer.    X____________________________________________ DATE 06/24/25        TIME_______      ORIGINAL - SEND TO MEDICAL RECORDS   COPY - SEND WITH PATIENT DURING TRANSFER

## 2025-06-24 NOTE — ED NOTES
Pt presented to the ED from home via [facility] due to depression, anxiety, and suicidal ideations. Pt reports multiple socio-stressors related to poor relationship with roommate and his home that she resides in. The living conditions have caused an increase in anxiety to the point that she has not been able to leave her room and spends most of the time rocking back and forth. Tonight she had an arguement with her roommate, resulting in her feeling increasingly suicidal with a plan to slit her throat. She does report prior suicide attempts via cutting on her wrists and neck. She reports a significant decline in her mental health since October 2024. She reports this was due to her Klonopin dosage being decreased. She is active in treatment through [facility], but wishes to be referred to a new provider for therapy upon dischage. EMR indicates that Pt was scheduled for an appointment through [facility] [place] tomorrow 6/25, which Pt reports she was not aware of. She reports she has been compliant with her appointments for her Suboxone. She was last admitted in May at [facility] on a 72hr hold. She denies HI/AH/VH. She reports poor appetite and sleep. She denies any illicit drug use. She report she also has negative self-esteem because of her tooth being messed up and wrinkles around her mouth. She reports coming from a good family in [place] and being raised well, but her current roommate is her only living option at this point due to strained relationship with her sister since her father's death. Pt is agreeable to signing a 201 and understands her treatment rights. ED provider in agreement with treatment plan. Pt is requesting bed search be done to facilities outside of [facility] due to negative past experiences.

## 2025-06-24 NOTE — ED NOTES
Patient is accepted at [facility].  Patient is accepted by Dr. Josse Duke.    Transportation is arranged with [facility].     Transportation is scheduled for TBD.   Patient may go to the floor at 1PM or later.          Nurse report is not needed prior to patient transfer.

## 2025-06-24 NOTE — ED PROVIDER NOTES
Time reflects when diagnosis was documented in both MDM as applicable and the Disposition within this note       Time User Action Codes Description Comment    6/24/2025 12:16 AM Alma Ramirez Add [F31.9] Bipolar disorder (HCC)     6/24/2025 12:16 AM Alma Ramirez Add [F41.9] Anxiety     6/24/2025 12:16 AM Alma Ramirez Add [R45.851] Suicidal ideation     6/24/2025 12:16 AM Alma Ramirez Add [F32.A] Depression           ED Disposition       ED Disposition   Transfer to Behavioral Health Condition   --    Date/Time   Tue Jun 24, 2025 12:16 AM    Comment   Carey Keith should be transferred out to psych facility and has been medically cleared.               Assessment & Plan       Medical Decision Making  Amount and/or Complexity of Data Reviewed  Labs: ordered.    Risk  Prescription drug management.  Decision regarding hospitalization.        ED Course as of 06/24/25 0254   Mon Jun 23, 2025   2305 Pt seen and examined.  Patient is a 50-year-old female with a history of bipolar, generalized anxiety and a distant history of methamphetamine abuse.  She states that she was raised in a wealthy family in [place] and after her father passed away during COVID she and her family were at odds and she ended up moving here from [place] and her current roommate is very dirty and the house is fully infested.  Patient states that her living situation exacerbates her anxiety.  She denies any current drug use but has a history of it.  States that her anxiety got so bad today that it made her suicidal and when asked if she has a plan she says why do not need to plan I would just lyse my neck with a razor.  Patient requesting something for anxiety.  Will give Xanax 2 mg as that is worked for her in the past, check breath alcohol test and urine drug screen and U. Prag and have ED crisis consult.  Patient to remain one-to-one observation secondary to suicidal ideation.   Tue Jun 24, 2025   0015 [id number] signed        Medications   ALPRAZolam (XANAX) tablet 2 mg (2 mg Oral Given 6/23/25 4413)       ED Risk Strat Scores                    No data recorded        SBIRT 20yo+      Flowsheet Row Most Recent Value   Initial Alcohol Screen: US AUDIT-C     1. How often do you have a drink containing alcohol? 0 Filed at: 06/23/2025 2249   2. How many drinks containing alcohol do you have on a typical day you are drinking?  0 Filed at: 06/23/2025 2249   3b. FEMALE Any Age, or MALE 65+: How often do you have 4 or more drinks on one occassion? 0 Filed at: 06/23/2025 2249   Audit-C Score 0 Filed at: 06/23/2025 2249                            History of Present Illness       Chief Complaint   Patient presents with    Psychiatric Evaluation     Pt reports she has been arguing with her room mate and is extremely anxious and now suicidal without a plan.  Denies HI, AH, VH       Past Medical History[1]   Past Surgical History[2]   Family History[3]   Social History[4]   E-Cigarette/Vaping    E-Cigarette Use Current Every Day User     Cartridges/Day 2       E-Cigarette/Vaping Substances    Nicotine Yes     THC Yes     CBD No     Flavoring Yes     Other No     Unknown No       I have reviewed and agree with the history as documented.     50-year-old female with a history of bipolar, generalized anxiety and a distant history of methamphetamine abuse.  She states that she was raised in a wealthy family in [place] and after her father passed away during COVID she and her family were at odds and she ended up moving here from [place] and her current roommate is very dirty and the house is fully infested.  Patient states that her living situation exacerbates her anxiety.  She denies any current drug use but has a history of it.  States that her anxiety got so bad today that it made her suicidal and when asked if she has a plan she says why do not need to plan I would just lyse my neck with a razor.  Patient requesting something for anxiety.   Will give Xanax 2 mg as that is worked for her in the past, check breath alcohol test and urine drug screen and U. Prag and have ED crisis consult.  Patient to remain one-to-one observation secondary to suicidal ideation.      History provided by:  Patient   used: No    Psychiatric Evaluation  Presenting symptoms: depression and suicidal thoughts    Presenting symptoms comment:  Anxiety  Degree of incapacity (severity):  Moderate  Onset quality:  Gradual  Duration:  1 day  Timing:  Constant  Progression:  Worsening  Chronicity:  Recurrent  Context: stressful life event (living situation)    Relieved by:  Nothing  Exacerbated by: interactions iwth roommate.  Associated symptoms: anxiety, feelings of worthlessness, irritability and poor judgment    Associated symptoms: no abdominal pain and no chest pain    Risk factors: hx of mental illness        Review of Systems   Constitutional:  Positive for irritability. Negative for chills and fever.   HENT:  Negative for ear pain and sore throat.    Eyes:  Negative for pain and visual disturbance.   Respiratory:  Negative for cough and shortness of breath.    Cardiovascular:  Negative for chest pain and palpitations.   Gastrointestinal:  Negative for abdominal pain and vomiting.   Genitourinary:  Negative for dysuria and hematuria.   Musculoskeletal:  Negative for arthralgias and back pain.   Skin:  Negative for color change and rash.   Neurological:  Negative for seizures and syncope.   Psychiatric/Behavioral:  Positive for sleep disturbance and suicidal ideas. The patient is nervous/anxious.    All other systems reviewed and are negative.          Objective       ED Triage Vitals   Temperature Pulse Blood Pressure Respirations SpO2 Patient Position - Orthostatic VS   06/23/25 2247 06/23/25 2247 06/23/25 2247 06/23/25 2247 06/23/25 2247 --   98.2 °F (36.8 °C) 79 115/84 18 100 %       Temp Source Heart Rate Source BP Location FiO2 (%) Pain Score     06/23/25 2247 06/24/25 0030 -- -- 06/23/25 2247    Oral Monitor   No Pain      Vitals      Date and Time Temp Pulse SpO2 Resp BP Pain Score FACES Pain Rating User   06/24/25 0030 -- 78 97 % 18 112/76 No Pain -- BRB   06/23/25 2247 98.2 °F (36.8 °C) 79 100 % 18 115/84 No Pain -- BRB            Physical Exam  Vitals and nursing note reviewed.   Constitutional:       General: She is not in acute distress.     Appearance: She is well-developed. She is not ill-appearing.   HENT:      Head: Normocephalic and atraumatic.      Mouth/Throat:      Mouth: Mucous membranes are moist.     Eyes:      Extraocular Movements: Extraocular movements intact.      Conjunctiva/sclera: Conjunctivae normal.      Pupils: Pupils are equal, round, and reactive to light.       Cardiovascular:      Rate and Rhythm: Normal rate and regular rhythm.      Heart sounds: No murmur heard.  Pulmonary:      Effort: Pulmonary effort is normal. No respiratory distress.      Breath sounds: Normal breath sounds.   Abdominal:      Palpations: Abdomen is soft.      Tenderness: There is no abdominal tenderness.     Musculoskeletal:         General: No swelling.      Cervical back: Neck supple.     Skin:     General: Skin is warm and dry.      Capillary Refill: Capillary refill takes less than 2 seconds.      Comments: Old and fresh healing superficial skin abrasions on L inner arm     Neurological:      General: No focal deficit present.      Mental Status: She is alert and oriented to person, place, and time.     Psychiatric:         Behavior: Behavior normal.      Comments: Very anxious         Results Reviewed       Procedure Component Value Units Date/Time    Urine Microscopic [[id number]]  (Abnormal) Collected: 06/23/25 2335    Lab Status: Final result Specimen: Urine, Other Updated: 06/24/25 0102     RBC, UA Innumerable /hpf      WBC, UA 30-50 /hpf      Epithelial Cells Occasional /hpf      Bacteria, UA None Seen /hpf     Urine culture [[id number]]  Collected: 06/23/25 2335    Lab Status: In process Specimen: Urine, Other Updated: 06/24/25 0102    Rapid drug screen, urine [[id number]]  (Normal) Collected: 06/23/25 2330    Lab Status: Final result Specimen: Urine, Clean Catch Updated: 06/23/25 2356     Amph/Meth UR Negative     Barbiturate Ur Negative     Benzodiazepine Urine Negative     Cocaine Urine Negative     Methadone Urine Negative     Opiate Urine Negative     PCP Ur Negative     THC Urine Negative     Oxycodone Urine Negative     Fentanyl Urine Negative     HYDROCODONE URINE Negative    Narrative:      FOR MEDICAL PURPOSES ONLY.   IF CONFIRMATION NEEDED PLEASE CONTACT THE LAB WITHIN 5 DAYS.    Drug Screen Cutoff Levels:  AMPHETAMINE/METHAMPHETAMINES  1000 ng/mL  BARBITURATES     200 ng/mL  BENZODIAZEPINES     200 ng/mL  COCAINE      300 ng/mL  METHADONE      300 ng/mL  OPIATES      300 ng/mL  PHENCYCLIDINE     25 ng/mL  THC       50 ng/mL  OXYCODONE      100 ng/mL  FENTANYL      5 ng/mL  HYDROCODONE     300 ng/mL    POCT pregnancy, urine [[id number]]  (Normal) Collected: 06/23/25 2337    Lab Status: Final result Specimen: Urine Updated: 06/23/25 2337     EXT Preg Test, Ur Negative     Control Valid    Urine Macroscopic, POC [[id number]]  (Abnormal) Collected: 06/23/25 2335    Lab Status: Final result Specimen: Urine Updated: 06/23/25 2337     Color, UA Leilani     Clarity, UA Clear     pH, UA 6.0     Leukocytes, UA Trace     Nitrite, UA Negative     Protein,  (2+) mg/dl      Glucose, UA Negative mg/dl      Ketones, UA Trace mg/dl      Urobilinogen, UA 2.0 E.U./dl      Bilirubin, UA Small     Occult Blood, UA Large     Specific Gravity, UA >=1.030    Narrative:      CLINITEK RESULT    POCT alcohol breath test [[id number]]  (Normal) Collected: 06/23/25 2324    Lab Status: Final result Updated: 06/23/25 2324     EXTBreath Alcohol 0.000            No orders to display       Procedures    ED Medication and Procedure Management   Prior to Admission  Medications   Prescriptions Last Dose Informant Patient Reported? Taking?   Symbicort 160-4.5 MCG/ACT inhaler   No No   Sig: Inhale 2 puffs 2 (two) times a day   albuterol (Proventil HFA) 90 mcg/act inhaler  Self No No   Sig: Inhale 2 puffs every 6 (six) hours as needed for wheezing   buPROPion (FORFIVO XL) 450 MG 24 hr tablet   No No   Sig: Take 1 tablet (450 mg total) by mouth daily   clonazePAM (KlonoPIN) 0.5 mg tablet  Self No No   Sig: Take 1 tablet (0.5 mg total) by mouth daily at bedtime for 10 days   dextromethorphan-guaifenesin (MUCINEX DM)  MG per 12 hr tablet   No No   Sig: Take 1 tablet by mouth every 12 (twelve) hours as needed for cough   gabapentin (NEURONTIN) 400 mg capsule   No No   Sig: Take 2 capsules (800 mg total) by mouth 3 (three) times a day   gabapentin (Neurontin) 300 mg capsule   No No   Sig: Take 1 capsule (300 mg total) by mouth 3 (three) times a day For post-herpetic neuralgia: Take 1 tablet on day 1,  Then take 2 tablets on day 2, Then take 3 tablets on day 3 and every day after that as instructed by your doctor.   gabapentin (Neurontin) 300 mg capsule   No No   Sig: Take 2 capsules (600 mg total) by mouth 3 (three) times a day for 3 days For post-herpetic neuralgia: Take 1 tablet on day 1,  Then take 2 tablets on day 2, Then take 3 tablets on day 3 and every day after that as instructed by your doctor.   loxapine (LOXITANE) 25 mg capsule   No No   Sig: Take 1 capsule (25 mg total) by mouth 2 (two) times a day   tiZANidine (ZANAFLEX) 4 mg tablet   No No   Sig: TAKE 1 TABLET (4 MG TOTAL) BY MOUTH EVERY 8 (EIGHT) HOURS AS NEEDED FOR MUSCLE SPASMS   traZODone (DESYREL) 150 mg tablet   No No   Sig: Take 1 tablet (150 mg total) by mouth daily at bedtime      Facility-Administered Medications: None     Patient's Medications   Discharge Prescriptions    No medications on file     No discharge procedures on file.  ED SEPSIS DOCUMENTATION   Time reflects when diagnosis was documented in  "both MDM as applicable and the Disposition within this note       Time User Action Codes Description Comment    6/24/2025 12:16 AM Alma Ramirez Add [F31.9] Bipolar disorder (HCC)     6/24/2025 12:16 AM Alma Ramirez Add [F41.9] Anxiety     6/24/2025 12:16 AM Alma Ramirez Add [R45.851] Suicidal ideation     6/24/2025 12:16 AM Alma Ramirez Add [F32.A] Depression                    [1]   Past Medical History:  Diagnosis Date    Addiction to drug (HCC)     Alcohol abuse     Alcoholism (HCC)     Altered mental status 09/21/2022    Elevated LFTs 09/21/2022    Lower back pain     Scoliosis     Self-injurious behavior     Substance abuse (HCC)    [2]   Past Surgical History:  Procedure Laterality Date    CHOLECYSTECTOMY     [3]   Family History  Problem Relation Name Age of Onset    Autism Mother      Heart disease Father      Stroke Father      Anxiety disorder Father      Heart disease Maternal Grandmother     [4]   Social History  Tobacco Use    Smoking status: Every Day     Current packs/day: 0.50     Average packs/day: 0.5 packs/day for 20.0 years (10.0 ttl pk-yrs)     Types: Cigarettes     Passive exposure: Never    Smokeless tobacco: Current    Tobacco comments:     Pt not ready to quit   Vaping Use    Vaping status: Every Day    Substances: Nicotine, THC, Flavoring   Substance Use Topics    Alcohol use: Not Currently     Comment: MAGDALENO, pt historically inconsistent. Drinking  per [facility] 2    Drug use: Not Currently     Types: Heroin, \"Crack\" cocaine, Cocaine, LSD, Benzodiazepines, Marijuana     Comment: Pt unreliable historian        Alma Ramirez, DO  06/24/25 0254    "

## 2025-06-24 NOTE — ED NOTES
Provider made aware of request for daily meds, augmentin (for cat bite) & motrin (for cramps)     Rocío Modi RN  06/24/25 3337

## 2025-06-26 LAB — BACTERIA UR CULT: ABNORMAL

## 2025-07-03 NOTE — DISCHARGE INSTRUCTIONS
Anxiety   WHAT YOU SHOULD KNOW:   Anxiety is a condition that causes you to feel excessive worry, uneasiness, or fear  Family or work stress, smoking, caffeine, and alcohol can increase your risk for anxiety  Certain medicines or health conditions can also increase your risk  Anxiety may begin gradually, and can become a long-term condition if it is not managed or treated  Follow up with your healthcare provider   Manage anxiety:   Go to counseling if needed  Cognitive behavioral therapy can help you understand and change how you react to events that trigger your symptoms  Find ways to manage your symptoms  Activities such as exercise, meditation, or listening to music can help you relax  Practice deep breathing  Breathing can change how your body reacts to stress  Focus on taking slow, deep breaths several times a day, or during an anxiety attack  Breathe in through your nose, and out through your mouth  Avoid caffeine  Caffeine can make your symptoms worse  Avoid foods or drinks that are meant to increase your energy level  Limit or avoid alcohol  Ask your healthcare provider if alcohol is safe for you  You may not be able to drink alcohol if you take certain anxiety or depression medicines  Limit alcohol to 1 drink per day if you are a woman  Limit alcohol to 2 drinks per day if you are a man  A drink of alcohol is 12 ounces of beer, 5 ounces of wine, or 1½ ounces of liquor  Contact your healthcare provider if:   Your symptoms get worse or do not get better with treatment  You think your medicine may be causing side effects  Your anxiety keeps you from doing your regular daily activities  You have new symptoms since your last visit  You have questions or concerns about your condition or care  Seek care immediately or call 911 if:   You have chest pain, tightness, or heaviness that may spread to your shoulders, arms, jaw, neck, or back      You feel like hurting yourself or someone else  You feel dizzy, lightheaded, or faint  never true

## 2025-07-15 NOTE — ASSESSMENT & PLAN NOTE
Completed by medical team during current inpatient hospitalization  
Completed by medical team during current inpatient hospitalization  
Continue Klonopin 0.5 mg daily for generalized anxiety  Dosage of Klonopin was confirmed via PDMP  Continue gabapentin to 800 mg 3 times daily for generalized anxiety  Increased trazodone to 150 mg nightly for nighttime anxiety and insomnia  
Continue Klonopin 0.5 mg daily for generalized anxiety  Dosage of Klonopin was confirmed via PDMP  Increase gabapentin to 800 mg 3 times daily for generalized anxiety  Restarted trazodone at 100 mg nightly for nighttime anxiety and insomnia  
Continue Suboxone 8 mg sublingual 3 times daily for history of opioid use disorder  Dosage of Suboxone was confirmed via PDMP  
Continue Wellbutrin 450 mg XL daily for symptoms of depression  Discontinue Prozac due to persistent anxiety and a history of bipolar 1.  Will also reduce polypharmacy risk.  Continue loxapine 25 mg twice daily for mood stability as well as anxiety    Patient signed a 72 hour notice 5/10/25 - At this time there are no observable 302 behaviors and we will proceed with discharge today 5/13/2025 prior to expiration of notice.  
Does not appear to be in acute exacerbation  Continue Symbicort  Albuterol as needed  
Further plan per psychiatry  
Further plan per psychiatry  
Increased Wellbutrin to 450 mg XL daily for symptoms of depression  Continue Prozac at 20 mg daily for symptoms of depression and anxiety  Increased loxapine to 25 mg twice daily for mood stability as well as anxiety    Patient signed a 72 hour notice 5/10/25 - At this time there are no observable 302 behaviors and will continue to monitor patient while hospitalized  
Increased Wellbutrin to 450 mg XL daily for symptoms of depression  Discontinue Prozac due to persistent anxiety and a history of bipolar 1.  We will also lessen polypharmacy risk.  Increased loxapine to 25 mg twice daily for mood stability as well as anxiety    Patient signed a 72 hour notice 5/10/25 - At this time there are no observable 302 behaviors and will continue to monitor patient while hospitalized.  Discharge planning for tomorrow 5/13/2025  
Maintained on Suboxone  
Management per medical team  
Restarted Prozac at 20 mg daily for symptoms of depression  Restarted Wellbutrin  mg twice daily for symptoms of depression and for mood stability  Started loxapine 10 mg 3 times daily for mood stability as well as depression  
Restarted Suboxone 8 mg sublingual 3 times daily for history of opioid use disorder  Dosage of Suboxone was confirmed via PDMP  
Vital signs stable at time of assessment  CBC, CMP WNL.  TSH minimally elevated, free T4 pending  EKG ordered not yet completed  Patient appears medically stable at this time for inpatient psychiatric treatment  
4

## 2025-07-20 ENCOUNTER — APPOINTMENT (EMERGENCY)
Dept: CT IMAGING | Facility: HOSPITAL | Age: 50
DRG: 872 | End: 2025-07-20
Payer: MEDICARE

## 2025-07-20 ENCOUNTER — HOSPITAL ENCOUNTER (INPATIENT)
Facility: HOSPITAL | Age: 50
LOS: 3 days | Discharge: HOME | DRG: 872 | End: 2025-07-24
Attending: EMERGENCY MEDICINE | Admitting: STUDENT IN AN ORGANIZED HEALTH CARE EDUCATION/TRAINING PROGRAM
Payer: MEDICARE

## 2025-07-20 DIAGNOSIS — Z13.9 ENCOUNTER FOR SCREENING INVOLVING SOCIAL DETERMINANTS OF HEALTH (SDOH): ICD-10-CM

## 2025-07-20 DIAGNOSIS — E87.6 HYPOKALEMIA: ICD-10-CM

## 2025-07-20 DIAGNOSIS — F41.9 ANXIETY: ICD-10-CM

## 2025-07-20 DIAGNOSIS — L03.90 CELLULITIS OF MULTIPLE SITES: Primary | ICD-10-CM

## 2025-07-20 DIAGNOSIS — F31.9 BIPOLAR 1 DISORDER (HCC): ICD-10-CM

## 2025-07-20 DIAGNOSIS — A41.9 SEPSIS (HCC): ICD-10-CM

## 2025-07-20 LAB
ALBUMIN SERPL BCG-MCNC: 3.9 G/DL (ref 3.5–5)
ALP SERPL-CCNC: 99 U/L (ref 34–104)
ALT SERPL W P-5'-P-CCNC: 122 U/L (ref 7–52)
ANION GAP SERPL CALCULATED.3IONS-SCNC: 8 MMOL/L (ref 4–13)
APTT PPP: 33 SECONDS (ref 23–34)
AST SERPL W P-5'-P-CCNC: 130 U/L (ref 13–39)
BASOPHILS # BLD AUTO: 0.02 THOUSANDS/ÂΜL (ref 0–0.1)
BASOPHILS NFR BLD AUTO: 0 % (ref 0–1)
BILIRUB SERPL-MCNC: 1.12 MG/DL (ref 0.2–1)
BUN SERPL-MCNC: 18 MG/DL (ref 5–25)
CALCIUM SERPL-MCNC: 8.3 MG/DL (ref 8.4–10.2)
CHLORIDE SERPL-SCNC: 107 MMOL/L (ref 96–108)
CO2 SERPL-SCNC: 21 MMOL/L (ref 21–32)
CREAT SERPL-MCNC: 0.72 MG/DL (ref 0.6–1.3)
EOSINOPHIL # BLD AUTO: 0 THOUSAND/ÂΜL (ref 0–0.61)
EOSINOPHIL NFR BLD AUTO: 0 % (ref 0–6)
ERYTHROCYTE [DISTWIDTH] IN BLOOD BY AUTOMATED COUNT: 14.1 % (ref 11.6–15.1)
GFR SERPL CREATININE-BSD FRML MDRD: 99 ML/MIN/1.73SQ M
GLUCOSE SERPL-MCNC: 120 MG/DL (ref 65–140)
HCT VFR BLD AUTO: 33.6 % (ref 34.8–46.1)
HGB BLD-MCNC: 11.4 G/DL (ref 11.5–15.4)
IMM GRANULOCYTES # BLD AUTO: 0.07 THOUSAND/UL (ref 0–0.2)
IMM GRANULOCYTES NFR BLD AUTO: 0 % (ref 0–2)
INR PPP: 1.09 (ref 0.85–1.19)
LACTATE SERPL-SCNC: 0.7 MMOL/L (ref 0.5–2)
LYMPHOCYTES # BLD AUTO: 0.93 THOUSANDS/ÂΜL (ref 0.6–4.47)
LYMPHOCYTES NFR BLD AUTO: 6 % (ref 14–44)
MCH RBC QN AUTO: 28.4 PG (ref 26.8–34.3)
MCHC RBC AUTO-ENTMCNC: 33.9 G/DL (ref 31.4–37.4)
MCV RBC AUTO: 84 FL (ref 82–98)
MONOCYTES # BLD AUTO: 1.01 THOUSAND/ÂΜL (ref 0.17–1.22)
MONOCYTES NFR BLD AUTO: 6 % (ref 4–12)
NEUTROPHILS # BLD AUTO: 14.99 THOUSANDS/ÂΜL (ref 1.85–7.62)
NEUTS SEG NFR BLD AUTO: 88 % (ref 43–75)
NRBC BLD AUTO-RTO: 0 /100 WBCS
PLATELET # BLD AUTO: 217 THOUSANDS/UL (ref 149–390)
PMV BLD AUTO: 8.8 FL (ref 8.9–12.7)
POTASSIUM SERPL-SCNC: 2.9 MMOL/L (ref 3.5–5.3)
PROCALCITONIN SERPL-MCNC: 0.08 NG/ML
PROT SERPL-MCNC: 6.6 G/DL (ref 6.4–8.4)
PROTHROMBIN TIME: 14.3 SECONDS (ref 12.3–15)
RBC # BLD AUTO: 4.02 MILLION/UL (ref 3.81–5.12)
SODIUM SERPL-SCNC: 136 MMOL/L (ref 135–147)
WBC # BLD AUTO: 17.02 THOUSAND/UL (ref 4.31–10.16)

## 2025-07-20 PROCEDURE — 96368 THER/DIAG CONCURRENT INF: CPT

## 2025-07-20 PROCEDURE — 99284 EMERGENCY DEPT VISIT MOD MDM: CPT

## 2025-07-20 PROCEDURE — 84145 PROCALCITONIN (PCT): CPT | Performed by: EMERGENCY MEDICINE

## 2025-07-20 PROCEDURE — 85610 PROTHROMBIN TIME: CPT | Performed by: EMERGENCY MEDICINE

## 2025-07-20 PROCEDURE — 99291 CRITICAL CARE FIRST HOUR: CPT | Performed by: EMERGENCY MEDICINE

## 2025-07-20 PROCEDURE — 36415 COLL VENOUS BLD VENIPUNCTURE: CPT | Performed by: EMERGENCY MEDICINE

## 2025-07-20 PROCEDURE — 87040 BLOOD CULTURE FOR BACTERIA: CPT | Performed by: EMERGENCY MEDICINE

## 2025-07-20 PROCEDURE — 70487 CT MAXILLOFACIAL W/DYE: CPT

## 2025-07-20 PROCEDURE — 83605 ASSAY OF LACTIC ACID: CPT | Performed by: EMERGENCY MEDICINE

## 2025-07-20 PROCEDURE — 80053 COMPREHEN METABOLIC PANEL: CPT | Performed by: EMERGENCY MEDICINE

## 2025-07-20 PROCEDURE — 96365 THER/PROPH/DIAG IV INF INIT: CPT

## 2025-07-20 PROCEDURE — 85730 THROMBOPLASTIN TIME PARTIAL: CPT | Performed by: EMERGENCY MEDICINE

## 2025-07-20 PROCEDURE — 96374 THER/PROPH/DIAG INJ IV PUSH: CPT

## 2025-07-20 PROCEDURE — 96361 HYDRATE IV INFUSION ADD-ON: CPT

## 2025-07-20 PROCEDURE — 85025 COMPLETE CBC W/AUTO DIFF WBC: CPT | Performed by: EMERGENCY MEDICINE

## 2025-07-20 PROCEDURE — 96375 TX/PRO/DX INJ NEW DRUG ADDON: CPT

## 2025-07-20 RX ORDER — CEFAZOLIN SODIUM 2 G/50ML
2000 SOLUTION INTRAVENOUS ONCE
Status: COMPLETED | OUTPATIENT
Start: 2025-07-20 | End: 2025-07-21

## 2025-07-20 RX ORDER — KETOROLAC TROMETHAMINE 30 MG/ML
30 INJECTION, SOLUTION INTRAMUSCULAR; INTRAVENOUS ONCE
Status: COMPLETED | OUTPATIENT
Start: 2025-07-20 | End: 2025-07-20

## 2025-07-20 RX ORDER — POTASSIUM CHLORIDE 1500 MG/1
40 TABLET, EXTENDED RELEASE ORAL ONCE
Status: COMPLETED | OUTPATIENT
Start: 2025-07-20 | End: 2025-07-20

## 2025-07-20 RX ORDER — DIAZEPAM 10 MG/2ML
5 INJECTION, SOLUTION INTRAMUSCULAR; INTRAVENOUS ONCE
Status: COMPLETED | OUTPATIENT
Start: 2025-07-20 | End: 2025-07-20

## 2025-07-20 RX ORDER — POTASSIUM CHLORIDE 14.9 MG/ML
20 INJECTION INTRAVENOUS ONCE
Status: COMPLETED | OUTPATIENT
Start: 2025-07-20 | End: 2025-07-21

## 2025-07-20 RX ADMIN — SODIUM CHLORIDE 1000 ML: 0.9 INJECTION, SOLUTION INTRAVENOUS at 21:46

## 2025-07-20 RX ADMIN — IOHEXOL 100 ML: 350 INJECTION, SOLUTION INTRAVENOUS at 22:55

## 2025-07-20 RX ADMIN — POTASSIUM CHLORIDE 40 MEQ: 1500 TABLET, EXTENDED RELEASE ORAL at 23:03

## 2025-07-20 RX ADMIN — DIAZEPAM 5 MG: 10 INJECTION, SOLUTION INTRAMUSCULAR; INTRAVENOUS at 22:20

## 2025-07-20 RX ADMIN — CEFAZOLIN SODIUM 2000 MG: 2 SOLUTION INTRAVENOUS at 23:39

## 2025-07-20 RX ADMIN — DIAZEPAM 5 MG: 10 INJECTION, SOLUTION INTRAMUSCULAR; INTRAVENOUS at 21:11

## 2025-07-20 RX ADMIN — POTASSIUM CHLORIDE 20 MEQ: 14.9 INJECTION, SOLUTION INTRAVENOUS at 23:17

## 2025-07-20 RX ADMIN — KETOROLAC TROMETHAMINE 30 MG: 30 INJECTION, SOLUTION INTRAMUSCULAR; INTRAVENOUS at 21:12

## 2025-07-20 RX ADMIN — SODIUM CHLORIDE 1000 ML: 0.9 INJECTION, SOLUTION INTRAVENOUS at 23:17

## 2025-07-20 NOTE — Clinical Note
Case was discussed with  and the patient's admission status was agreed to be  to the service of Dr. Webb

## 2025-07-21 ENCOUNTER — TELEPHONE (OUTPATIENT)
Dept: PSYCHIATRY | Facility: CLINIC | Age: 50
End: 2025-07-21

## 2025-07-21 PROBLEM — R74.01 TRANSAMINITIS: Status: ACTIVE | Noted: 2025-07-21

## 2025-07-21 PROBLEM — L03.90 CELLULITIS OF MULTIPLE SITES: Status: ACTIVE | Noted: 2025-07-21

## 2025-07-21 PROBLEM — M54.9 BACK PAIN: Status: RESOLVED | Noted: 2024-11-29 | Resolved: 2025-07-21

## 2025-07-21 PROBLEM — L03.213 PRESEPTAL CELLULITIS: Status: ACTIVE | Noted: 2025-07-21

## 2025-07-21 PROBLEM — F22 DELUSIONS OF PARASITOSIS (HCC): Status: ACTIVE | Noted: 2025-07-21

## 2025-07-21 PROBLEM — F15.11 HISTORY OF METHAMPHETAMINE ABUSE (HCC): Status: ACTIVE | Noted: 2024-10-25

## 2025-07-21 PROBLEM — A41.9 SEPSIS DUE TO CELLULITIS (HCC): Status: ACTIVE | Noted: 2025-07-21

## 2025-07-21 LAB
AMPHETAMINES SERPL QL SCN: POSITIVE
ANION GAP SERPL CALCULATED.3IONS-SCNC: 7 MMOL/L (ref 4–13)
APAP SERPL-MCNC: <2 UG/ML (ref 10–20)
BARBITURATES UR QL: NEGATIVE
BENZODIAZ UR QL: POSITIVE
BUN SERPL-MCNC: 14 MG/DL (ref 5–25)
CALCIUM SERPL-MCNC: 7.3 MG/DL (ref 8.4–10.2)
CHLORIDE SERPL-SCNC: 113 MMOL/L (ref 96–108)
CO2 SERPL-SCNC: 17 MMOL/L (ref 21–32)
COCAINE UR QL: NEGATIVE
CREAT SERPL-MCNC: 0.73 MG/DL (ref 0.6–1.3)
ERYTHROCYTE [DISTWIDTH] IN BLOOD BY AUTOMATED COUNT: 14.3 % (ref 11.6–15.1)
ETHANOL SERPL-MCNC: <10 MG/DL
FENTANYL UR QL SCN: NEGATIVE
GFR SERPL CREATININE-BSD FRML MDRD: 97 ML/MIN/1.73SQ M
GLUCOSE P FAST SERPL-MCNC: 114 MG/DL (ref 65–99)
GLUCOSE SERPL-MCNC: 114 MG/DL (ref 65–140)
HAV IGM SER QL: ABNORMAL
HBV CORE IGM SER QL: ABNORMAL
HBV SURFACE AG SER QL: ABNORMAL
HCT VFR BLD AUTO: 32.7 % (ref 34.8–46.1)
HCV AB SER QL: REACTIVE
HGB BLD-MCNC: 10.6 G/DL (ref 11.5–15.4)
HYDROCODONE UR QL SCN: NEGATIVE
MCH RBC QN AUTO: 28 PG (ref 26.8–34.3)
MCHC RBC AUTO-ENTMCNC: 32.4 G/DL (ref 31.4–37.4)
MCV RBC AUTO: 86 FL (ref 82–98)
METHADONE UR QL: NEGATIVE
OPIATES UR QL SCN: NEGATIVE
OXYCODONE+OXYMORPHONE UR QL SCN: NEGATIVE
PCP UR QL: NEGATIVE
PLATELET # BLD AUTO: 188 THOUSANDS/UL (ref 149–390)
PMV BLD AUTO: 8.7 FL (ref 8.9–12.7)
POTASSIUM SERPL-SCNC: 3.8 MMOL/L (ref 3.5–5.3)
RBC # BLD AUTO: 3.79 MILLION/UL (ref 3.81–5.12)
SALICYLATES SERPL-MCNC: <5 MG/DL (ref 5–20)
SODIUM SERPL-SCNC: 137 MMOL/L (ref 135–147)
THC UR QL: NEGATIVE
WBC # BLD AUTO: 17.82 THOUSAND/UL (ref 4.31–10.16)

## 2025-07-21 PROCEDURE — 80143 DRUG ASSAY ACETAMINOPHEN: CPT

## 2025-07-21 PROCEDURE — 80307 DRUG TEST PRSMV CHEM ANLYZR: CPT

## 2025-07-21 PROCEDURE — 80074 ACUTE HEPATITIS PANEL: CPT | Performed by: INTERNAL MEDICINE

## 2025-07-21 PROCEDURE — 99223 1ST HOSP IP/OBS HIGH 75: CPT

## 2025-07-21 PROCEDURE — 85027 COMPLETE CBC AUTOMATED: CPT | Performed by: INTERNAL MEDICINE

## 2025-07-21 PROCEDURE — 82077 ASSAY SPEC XCP UR&BREATH IA: CPT

## 2025-07-21 PROCEDURE — 80048 BASIC METABOLIC PNL TOTAL CA: CPT | Performed by: INTERNAL MEDICINE

## 2025-07-21 PROCEDURE — 80179 DRUG ASSAY SALICYLATE: CPT

## 2025-07-21 RX ORDER — POLYETHYLENE GLYCOL 3350 17 G/17G
17 POWDER, FOR SOLUTION ORAL DAILY PRN
Status: DISCONTINUED | OUTPATIENT
Start: 2025-07-21 | End: 2025-07-24 | Stop reason: HOSPADM

## 2025-07-21 RX ORDER — ENOXAPARIN SODIUM 100 MG/ML
40 INJECTION SUBCUTANEOUS DAILY
Status: DISCONTINUED | OUTPATIENT
Start: 2025-07-21 | End: 2025-07-24 | Stop reason: HOSPADM

## 2025-07-21 RX ORDER — BUDESONIDE AND FORMOTEROL FUMARATE DIHYDRATE 160; 4.5 UG/1; UG/1
2 AEROSOL RESPIRATORY (INHALATION) 2 TIMES DAILY
Status: DISCONTINUED | OUTPATIENT
Start: 2025-07-21 | End: 2025-07-24 | Stop reason: HOSPADM

## 2025-07-21 RX ORDER — ACETAMINOPHEN 325 MG/1
975 TABLET ORAL EVERY 8 HOURS PRN
Status: DISCONTINUED | OUTPATIENT
Start: 2025-07-21 | End: 2025-07-24 | Stop reason: HOSPADM

## 2025-07-21 RX ORDER — BUPRENORPHINE AND NALOXONE 8; 2 MG/1; MG/1
8 FILM, SOLUBLE BUCCAL; SUBLINGUAL 3 TIMES DAILY
Status: DISCONTINUED | OUTPATIENT
Start: 2025-07-21 | End: 2025-07-24 | Stop reason: HOSPADM

## 2025-07-21 RX ORDER — HYDROXYZINE HYDROCHLORIDE 25 MG/1
25 TABLET, FILM COATED ORAL EVERY 6 HOURS
Status: DISCONTINUED | OUTPATIENT
Start: 2025-07-21 | End: 2025-07-24 | Stop reason: HOSPADM

## 2025-07-21 RX ORDER — ONDANSETRON 2 MG/ML
4 INJECTION INTRAMUSCULAR; INTRAVENOUS EVERY 6 HOURS PRN
Status: DISCONTINUED | OUTPATIENT
Start: 2025-07-21 | End: 2025-07-24 | Stop reason: HOSPADM

## 2025-07-21 RX ORDER — GABAPENTIN 400 MG/1
800 CAPSULE ORAL 3 TIMES DAILY
Status: DISCONTINUED | OUTPATIENT
Start: 2025-07-21 | End: 2025-07-24 | Stop reason: HOSPADM

## 2025-07-21 RX ORDER — NICOTINE 21 MG/24HR
1 PATCH, TRANSDERMAL 24 HOURS TRANSDERMAL DAILY
Status: DISCONTINUED | OUTPATIENT
Start: 2025-07-21 | End: 2025-07-24 | Stop reason: HOSPADM

## 2025-07-21 RX ORDER — CEFAZOLIN SODIUM 1 G/50ML
1000 SOLUTION INTRAVENOUS EVERY 8 HOURS
Status: DISCONTINUED | OUTPATIENT
Start: 2025-07-21 | End: 2025-07-24 | Stop reason: HOSPADM

## 2025-07-21 RX ORDER — DIAZEPAM 10 MG/2ML
5 INJECTION, SOLUTION INTRAMUSCULAR; INTRAVENOUS EVERY 6 HOURS PRN
Status: DISCONTINUED | OUTPATIENT
Start: 2025-07-21 | End: 2025-07-22

## 2025-07-21 RX ORDER — BUPROPION HYDROCHLORIDE 150 MG/1
450 TABLET ORAL DAILY
Status: DISCONTINUED | OUTPATIENT
Start: 2025-07-21 | End: 2025-07-24 | Stop reason: HOSPADM

## 2025-07-21 RX ORDER — KETOROLAC TROMETHAMINE 30 MG/ML
15 INJECTION, SOLUTION INTRAMUSCULAR; INTRAVENOUS ONCE
Status: COMPLETED | OUTPATIENT
Start: 2025-07-21 | End: 2025-07-21

## 2025-07-21 RX ORDER — MAGNESIUM HYDROXIDE/ALUMINUM HYDROXICE/SIMETHICONE 120; 1200; 1200 MG/30ML; MG/30ML; MG/30ML
30 SUSPENSION ORAL EVERY 6 HOURS PRN
Status: DISCONTINUED | OUTPATIENT
Start: 2025-07-21 | End: 2025-07-24 | Stop reason: HOSPADM

## 2025-07-21 RX ORDER — CLONAZEPAM 0.5 MG/1
0.5 TABLET ORAL 2 TIMES DAILY
Status: DISCONTINUED | OUTPATIENT
Start: 2025-07-21 | End: 2025-07-22

## 2025-07-21 RX ADMIN — GABAPENTIN 800 MG: 400 CAPSULE ORAL at 08:30

## 2025-07-21 RX ADMIN — ENOXAPARIN SODIUM 40 MG: 40 INJECTION SUBCUTANEOUS at 08:35

## 2025-07-21 RX ADMIN — CLONAZEPAM 0.5 MG: 0.5 TABLET ORAL at 12:43

## 2025-07-21 RX ADMIN — BUPRENORPHINE AND NALOXONE 8 MG: 8; 2 FILM BUCCAL; SUBLINGUAL at 15:12

## 2025-07-21 RX ADMIN — NICOTINE POLACRILEX 2 MG: 2 GUM, CHEWING ORAL at 12:49

## 2025-07-21 RX ADMIN — CLONAZEPAM 0.5 MG: 0.5 TABLET ORAL at 17:54

## 2025-07-21 RX ADMIN — ACETAMINOPHEN 975 MG: 325 TABLET ORAL at 02:13

## 2025-07-21 RX ADMIN — HYDROXYZINE HYDROCHLORIDE 25 MG: 25 TABLET, FILM COATED ORAL at 12:43

## 2025-07-21 RX ADMIN — BUDESONIDE AND FORMOTEROL FUMARATE DIHYDRATE 2 PUFF: 160; 4.5 AEROSOL RESPIRATORY (INHALATION) at 17:50

## 2025-07-21 RX ADMIN — BUDESONIDE AND FORMOTEROL FUMARATE DIHYDRATE 2 PUFF: 160; 4.5 AEROSOL RESPIRATORY (INHALATION) at 08:27

## 2025-07-21 RX ADMIN — DIAZEPAM 5 MG: 10 INJECTION, SOLUTION INTRAMUSCULAR; INTRAVENOUS at 08:50

## 2025-07-21 RX ADMIN — CEFAZOLIN SODIUM 1000 MG: 1 SOLUTION INTRAVENOUS at 08:49

## 2025-07-21 RX ADMIN — BUPROPION HYDROCHLORIDE 450 MG: 150 TABLET, EXTENDED RELEASE ORAL at 08:31

## 2025-07-21 RX ADMIN — TRAZODONE HYDROCHLORIDE 150 MG: 100 TABLET ORAL at 22:28

## 2025-07-21 RX ADMIN — NICOTINE 1 PATCH: 14 PATCH, EXTENDED RELEASE TRANSDERMAL at 08:28

## 2025-07-21 RX ADMIN — BUPRENORPHINE AND NALOXONE 8 MG: 8; 2 FILM BUCCAL; SUBLINGUAL at 20:34

## 2025-07-21 RX ADMIN — TRAZODONE HYDROCHLORIDE 150 MG: 100 TABLET ORAL at 02:11

## 2025-07-21 RX ADMIN — GABAPENTIN 800 MG: 400 CAPSULE ORAL at 15:12

## 2025-07-21 RX ADMIN — GABAPENTIN 800 MG: 400 CAPSULE ORAL at 20:34

## 2025-07-21 RX ADMIN — NICOTINE POLACRILEX 2 MG: 2 GUM, CHEWING ORAL at 15:24

## 2025-07-21 RX ADMIN — DIAZEPAM 5 MG: 10 INJECTION, SOLUTION INTRAMUSCULAR; INTRAVENOUS at 22:26

## 2025-07-21 RX ADMIN — CEFAZOLIN SODIUM 1000 MG: 1 SOLUTION INTRAVENOUS at 15:18

## 2025-07-21 RX ADMIN — KETOROLAC TROMETHAMINE 15 MG: 30 INJECTION, SOLUTION INTRAMUSCULAR; INTRAVENOUS at 15:14

## 2025-07-21 RX ADMIN — HYDROXYZINE HYDROCHLORIDE 25 MG: 25 TABLET, FILM COATED ORAL at 17:50

## 2025-07-21 RX ADMIN — BUPRENORPHINE AND NALOXONE 8 MG: 8; 2 FILM BUCCAL; SUBLINGUAL at 08:49

## 2025-07-21 NOTE — H&P
H&P - Hospitalist   Name: Carey Keith 50 y.o. female I MRN: [id number]  Unit/Bed#: ED-14 I Date of Admission: 7/20/2025   Date of Service: 7/21/2025 I Hospital Day: 0     Assessment & Plan  Sepsis due to cellulitis (HCC)  Preseptal cellulitis  Presents with sites of excoriation with surrounding cellulitis  Labs: WBC 17, lactate 0.7, procal 0.08  Imaging: CT facial bones, mild/mod left preseptal/periorbital soft tissue swelling, no post septal inflammatory stranding, no focal fluid collections    Plan:  Empiric ABX d#1: Ancef  Multimodal analgesia  Trend infectious endpoints, monitor culture data   Bipolar 1 disorder (HCC)  KATY (generalized anxiety disorder)  Opioid use disorder, severe, on maintenance therapy (HCC)  Tobacco abuse  History of methamphetamine abuse (HCC)  Delusions of parasitosis (HCC)  Presents with anxiety, psychomotor agitation, pressured speech, tangential conversation. Responded to valium. Behavioral health admission in 05/2025. PDMP reviewed. Suspect non-compliance.   Outpatient regimen: Wellbutrin XL 450mg daily, Loxapine 25mg BID (appears this was not started), Klonopin 0.5mg daily, gabapentin 800mg TID, trazodone 150mg HS, Suboxone 8mg TID    Plan:  Continue outpatient regimen   S/p 10mg valium in ED  Substitute Klonopin for valium for now due to significant anxiety/psychomotor symptoms  UDS  Psychiatry consult   Transaminitis  Unclear etiology but hepatocellular pattern     Plan:  Coma panel negative  Acute hepatitis panel  Asthma  No acute exacerbation   Outpatient regimen: Symbicort     VTE Pharmacologic Prophylaxis: VTE Score: 3 Moderate Risk (Score 3-4) - Pharmacological DVT Prophylaxis Ordered: enoxaparin (Lovenox).  Code Status: Prior   Discussion with family:   Anticipated Length of Stay: Patient will be admitted on an observation basis with an anticipated length of stay of less than 2 midnights secondary to sepsis.    History of Present Illness   Chief Complaint:   Chief  "Complaint   Patient presents with    Urticaria    Anxiety     Arrives EMS, reports playing with bugs last night at 8:30. Pt covered in hives/blisters and pt squeezing them open because she \"sees blue things in them\". Pt reports she has OCD. Pt panicking in triage. Reports smoking, denies drug or alcohol use. Airway patent, reports dizziness.     Carey Kieth is a 50 y.o. female with a PMH of bipolar, polysubstance abuse, anxiety who presents with wounds.   History limited from patient due to somnolence from recent valium. Further information from ED attending and chart review. On exam she was resting in bed comfortably. Arrives tonight for evaluation of painful/itchy spots through her body. She has been trying to pop/squeeze and itch them constantly without any improvement. They are spreading. Per ED when she arrived she was very anxious with pressured, tangential speech. Significant psychomotor agitation and picking at various areas of her skin. On arrival to her inpatient room she remains very anxious and pacing around the room. Currently denies drug/alcohol use. Did have a note with her from roomate stating that she \"takes a purposely skips 2-3 days of medications then takes them all at once to experience less anxiety\"     Review of Systems   Unable to perform ROS: Psychiatric disorder       Historical Information   Past Medical History[1]  Past Surgical History[2]  Social History[3]  E-Cigarette/Vaping    E-Cigarette Use Current Every Day User     Cartridges/Day 2      E-Cigarette/Vaping Substances    Nicotine Yes     THC Yes     CBD No     Flavoring Yes     Other No     Unknown No      Family History[4]  Social History:  Marital Status: Single   Occupation:   Patient Pre-hospital Living Situation: Home  Patient Pre-hospital Level of Mobility: walks  Patient Pre-hospital Diet Restrictions:     Meds/Allergies   I have reviewed home medications with patient personally.  Prior to Admission medications  "   Medication Sig Start Date End Date Taking? Authorizing Provider   albuterol (Proventil HFA) 90 mcg/act inhaler Inhale 2 puffs every 6 (six) hours as needed for wheezing 2/8/24   Benitez Wiggins MD   buprenorphine-naloxone (SUBOXONE) 8-2 mg per SL tablet Place 1 tablet under the tongue daily Pt states 3 x daily    Historical Provider, MD   buPROPion (FORFIVO XL) 450 MG 24 hr tablet Take 1 tablet (450 mg total) by mouth daily 5/13/25   Fernandez Root PA-C   clonazePAM (KlonoPIN) 0.5 mg tablet Take 1 tablet (0.5 mg total) by mouth daily at bedtime for 10 days 12/9/24 6/24/25  Benitez Wiggins MD   dextromethorphan-guaifenesin (MUCINEX DM)  MG per 12 hr tablet Take 1 tablet by mouth every 12 (twelve) hours as needed for cough  Patient not taking: Reported on 6/24/2025 6/2/25   Heather Olmedo,    FLUoxetine (PROzac) 20 mg capsule Take 20 mg by mouth daily    Historical Provider, MD   gabapentin (Neurontin) 300 mg capsule Take 2 capsules (600 mg total) by mouth 3 (three) times a day for 3 days For post-herpetic neuralgia: Take 1 tablet on day 1,  Then take 2 tablets on day 2, Then take 3 tablets on day 3 and every day after that as instructed by your doctor. 6/2/25 6/5/25  Heather Olmedo DO   gabapentin (NEURONTIN) 400 mg capsule Take 2 capsules (800 mg total) by mouth 3 (three) times a day 5/13/25   Fernandez Root PA-C   Symbicort 160-4.5 MCG/ACT inhaler Inhale 2 puffs 2 (two) times a day 2/26/25   Christian Julio,    tiZANidine (ZANAFLEX) 4 mg tablet TAKE 1 TABLET (4 MG TOTAL) BY MOUTH EVERY 8 (EIGHT) HOURS AS NEEDED FOR MUSCLE SPASMS 4/20/25   Gordy Boyd MD   traZODone (DESYREL) 150 mg tablet Take 1 tablet (150 mg total) by mouth daily at bedtime 5/13/25   Fernandez Root PA-C   gabapentin (Neurontin) 300 mg capsule Take 1 capsule (300 mg total) by mouth 3 (three) times a day For post-herpetic neuralgia: Take 1 tablet on day 1,  Then take 2 tablets on day 2, Then take 3 tablets on  "day 3 and every day after that as instructed by your doctor. 5/30/25 7/21/25  Earnestine Narvaez PA-C   loxapine (LOXITANE) 25 mg capsule Take 1 capsule (25 mg total) by mouth 2 (two) times a day  Patient not taking: Reported on 6/24/2025 5/13/25 7/21/25  Fernandez Root PA-C     Allergies   Allergen Reactions    Seroquel [Quetiapine] Anaphylaxis     States this makes her feel like her throat is closing    Zyprexa [Olanzapine] Anaphylaxis     States this makes her feel like her throat is closing    Haloperidol Other (See Comments)     oculogyr crisis    Abilify [Aripiprazole] Other (See Comments)     Pt reports increased agitation ? (stating last time she took this med \"she lost it\"        Objective :  Temp:  [100.9 °F (38.3 °C)] 100.9 °F (38.3 °C)  HR:  [] 98  BP: (107-147)/(55-99) 108/55  Resp:  [18-20] 18  SpO2:  [93 %-98 %] 95 %  O2 Device: None (Room air)    Physical Exam  Vitals and nursing note reviewed.   Constitutional:       General: She is not in acute distress.     Appearance: She is well-developed.   HENT:      Head: Normocephalic and atraumatic.     Eyes:      Conjunctiva/sclera: Conjunctivae normal.       Cardiovascular:      Rate and Rhythm: Normal rate and regular rhythm.      Heart sounds: No murmur heard.  Pulmonary:      Effort: Pulmonary effort is normal. No respiratory distress.      Breath sounds: Normal breath sounds.   Abdominal:      Palpations: Abdomen is soft.      Tenderness: There is no abdominal tenderness.     Musculoskeletal:         General: No swelling.      Cervical back: Neck supple.     Skin:     General: Skin is warm and dry.      Capillary Refill: Capillary refill takes less than 2 seconds.      Comments: Areas of cellulitis through body, see media     Neurological:      Mental Status: She is alert.     Psychiatric:         Attention and Perception: She is inattentive.         Mood and Affect: Mood is anxious.         Speech: Speech is rapid and pressured.         " Behavior: Behavior is cooperative.          Lines/Drains:            Lab Results: I have reviewed the following results:  Results from last 7 days   Lab Units 07/20/25  2215   WBC Thousand/uL 17.02*   HEMOGLOBIN g/dL 11.4*   HEMATOCRIT % 33.6*   PLATELETS Thousands/uL 217   SEGS PCT % 88*   LYMPHO PCT % 6*   MONO PCT % 6   EOS PCT % 0     Results from last 7 days   Lab Units 07/20/25  2215   SODIUM mmol/L 136   POTASSIUM mmol/L 2.9*   CHLORIDE mmol/L 107   CO2 mmol/L 21   BUN mg/dL 18   CREATININE mg/dL 0.72   ANION GAP mmol/L 8   CALCIUM mg/dL 8.3*   ALBUMIN g/dL 3.9   TOTAL BILIRUBIN mg/dL 1.12*   ALK PHOS U/L 99   ALT U/L 122*   AST U/L 130*   GLUCOSE RANDOM mg/dL 120     Results from last 7 days   Lab Units 07/20/25  2246   INR  1.09         Lab Results   Component Value Date    HGBA1C 5.4 06/26/2025    HGBA1C 5.2 12/25/2021     Results from last 7 days   Lab Units 07/20/25  2246 07/20/25  2215   LACTIC ACID mmol/L 0.7  --    PROCALCITONIN ng/ml  --  0.08       Imaging Results Review: I reviewed radiology reports from this admission including: CT head.    Administrative Statements     ** Please Note: This note has been constructed using a voice recognition system. **         [1]   Past Medical History:  Diagnosis Date    Addiction to drug (HCC)     Alcohol abuse     Alcoholism (HCC)     Altered mental status 09/21/2022    Elevated LFTs 09/21/2022    Lower back pain     Scoliosis     Self-injurious behavior     Substance abuse (HCC)    [2]   Past Surgical History:  Procedure Laterality Date    BACK SURGERY      CHOLECYSTECTOMY     [3]   Social History  Tobacco Use    Smoking status: Every Day     Current packs/day: 0.50     Average packs/day: 0.5 packs/day for 20.0 years (10.0 ttl pk-yrs)     Types: Cigarettes     Passive exposure: Never    Smokeless tobacco: Current    Tobacco comments:     Pt not ready to quit   Vaping Use    Vaping status: Every Day    Substances: Nicotine, THC, Flavoring   Substance and  "Sexual Activity    Alcohol use: Not Currently     Comment: MAGDALENO, pt historically inconsistent. Drinking  per [facility] 2    Drug use: Not Currently     Types: Heroin, \"Crack\" cocaine, Cocaine, LSD, Benzodiazepines, Marijuana     Comment: Pt unreliable historian    Sexual activity: Not Currently   [4]   Family History  Problem Relation Name Age of Onset    Autism Mother      Heart disease Father      Stroke Father      Anxiety disorder Father      Heart disease Maternal Grandmother       "

## 2025-07-21 NOTE — SEPSIS NOTE
Sepsis Note   Carey Keith 50 y.o. female MRN: [id number]  Unit/Bed#: ED-14 Encounter: [id number]       Initial Sepsis Screening       Row Name 07/20/25 235                Is the patient's history suggestive of a new or worsening infection? Yes (Proceed)  -VL        Suspected source of infection soft tissue  -VL        Indicate SIRS criteria Hyperthemia > 38.3C (100.9F) OR Hypothermia <36C (96.8F);Leukocytosis (WBC > 70947 IJL) OR Leukopenia (WBC <4000 IJL) OR Bandemia (WBC >10% bands)  -VL        Are two or more of the above signs & symptoms of infection both present and new to the patient? Yes (Proceed)  -VL        Assess for evidence of organ dysfunction: Are any of the below criteria present within 6 hours of suspected infection and SIRS criteria that are NOT considered to be chronic conditions? --  None  -VL                  User Key  (r) = Recorded By, (t) = Taken By, (c) = Cosigned By      Initials Name Provider Type    OCHOA Alberto DO Physician                   Initial Sepsis Screening       Row Name 07/20/25 7084                   Initial Sepsis Assessment    Is the patient's history suggestive of a new or worsening infection? Yes (Proceed)  -VL        Suspected source of infection soft tissue  -VL        Indicate SIRS criteria Hyperthemia > 38.3C (100.9F) OR Hypothermia <36C (96.8F);Leukocytosis (WBC > 16497 IJL) OR Leukopenia (WBC <4000 IJL) OR Bandemia (WBC >10% bands)  -VL        Are two or more of the above signs & symptoms of infection both present and new to the patient? Yes (Proceed)  -VL           If the answer is yes to both above questions, suspicion of sepsis is present. Proceed with algorithm to determine if severe sepsis or septic shock criteria are met.    Assess for evidence of organ dysfunction: Are any of the below criteria present within 6 hours of suspected infection and SIRS criteria that are NOT considered to be chronic conditions? --  None  -VL                  User Key   (r) = Recorded By, (t) = Taken By, (c) = Cosigned By      Initials Name Provider Type    OCHOA Alberto DO Physician                         There is no height or weight on file to calculate BMI.  Wt Readings from Last 1 Encounters:   05/30/25 88 kg (194 lb 0.1 oz)        Ideal body weight: 47.8 kg (105 lb 6.1 oz)  Adjusted ideal body weight: 63.9 kg (140 lb 13.3 oz)

## 2025-07-21 NOTE — PLAN OF CARE
Problem: PAIN - ADULT  Goal: Verbalizes/displays adequate comfort level or baseline comfort level  Description: Interventions:  - Encourage patient to monitor pain and request assistance  - Assess pain using appropriate pain scale  - Administer analgesics as ordered based on type and severity of pain and evaluate response  - Implement non-pharmacological measures as appropriate and evaluate response  - Consider cultural and social influences on pain and pain management  - Notify physician/advanced practitioner if interventions unsuccessful or patient reports new pain  - Educate patient/family on pain management process including their role and importance of  reporting pain   - Provide non-pharmacologic/complimentary pain relief interventions  Outcome: Progressing     Problem: INFECTION - ADULT  Goal: Absence or prevention of progression during hospitalization  Description: INTERVENTIONS:  - Assess and monitor for signs and symptoms of infection  - Monitor lab/diagnostic results  - Monitor all insertion sites, i.e. indwelling lines, tubes, and drains  - Monitor endotracheal if appropriate and nasal secretions for changes in amount and color  - [place] appropriate cooling/warming therapies per order  - Administer medications as ordered  - Instruct and encourage patient and family to use good hand hygiene technique  - Identify and instruct in appropriate isolation precautions for identified infection/condition  Outcome: Progressing     Problem: SAFETY ADULT  Goal: Patient will remain free of falls  Description: INTERVENTIONS:  - Educate patient/family on patient safety including physical limitations  - Instruct patient to call for assistance with activity   - Consider consulting OT/PT to assist with strengthening/mobility based on AM PAC & JH-HLM score  - Consult OT/PT to assist with strengthening/mobility   - Keep Call bell within reach  - Keep bed low and locked with side rails adjusted as appropriate  - Keep  care items and personal belongings within reach  - Initiate and maintain comfort rounds  - Make Fall Risk Sign visible to staff  - Apply yellow socks and bracelet for high fall risk patients  - Consider moving patient to room near nurses station  Outcome: Progressing  Goal: Maintain or return to baseline ADL function  Description: INTERVENTIONS:  -  Assess patient's ability to carry out ADLs; assess patient's baseline for ADL function and identify physical deficits which impact ability to perform ADLs (bathing, care of mouth/teeth, toileting, grooming, dressing, etc.)  - Assess/evaluate cause of self-care deficits   - Assess range of motion  - Assess patient's mobility; develop plan if impaired  - Assess patient's need for assistive devices and provide as appropriate  - Encourage maximum independence but intervene and supervise when necessary  - Involve family in performance of ADLs  - Assess for home care needs following discharge   - Consider OT consult to assist with ADL evaluation and planning for discharge  - Provide patient education as appropriate  - Monitor functional capacity and physical performance, use of AM PAC & JH-HLM   - Monitor gait, balance and fatigue with ambulation    Outcome: Progressing  Goal: Maintains/Returns to pre admission functional level  Description: INTERVENTIONS:  - Perform AM-PAC 6 Click Basic Mobility/ Daily Activity assessment daily.  - Set and communicate daily mobility goal to care team and patient/family/caregiver.   - Collaborate with rehabilitation services on mobility goals if consulted  - Out of bed for toileting  - Record patient progress and toleration of activity level   Outcome: Progressing     Problem: DISCHARGE PLANNING  Goal: Discharge to home or other facility with appropriate resources  Description: INTERVENTIONS:  - Identify barriers to discharge w/patient and caregiver  - Arrange for needed discharge resources and transportation as appropriate  - Identify  discharge learning needs (meds, wound care, etc.)  - Arrange for interpretive services to assist at discharge as needed  - Refer to Case Management Department for coordinating discharge planning if the patient needs post-hospital services based on physician/advanced practitioner order or complex needs related to functional status, cognitive ability, or social support system  Outcome: Progressing     Problem: Knowledge Deficit  Goal: Patient/family/caregiver demonstrates understanding of disease process, treatment plan, medications, and discharge instructions  Description: Complete learning assessment and assess knowledge base.  Interventions:  - Provide teaching at level of understanding  - Provide teaching via preferred learning methods  Outcome: Progressing     Problem: SKIN/TISSUE INTEGRITY - ADULT  Goal: Incision(s), wounds(s) or drain site(s) healing without S/S of infection  Description: INTERVENTIONS  - Assess and document dressing, incision, wound bed, drain sites and surrounding tissue  - Provide patient and family education    Outcome: Progressing

## 2025-07-21 NOTE — CASE MANAGEMENT
Case Management Assessment & Discharge Planning Note    Patient name Carey Keith  [facility] /E[facility] -* MRN [id number]  : 1975 Date 2025       Current Admission Date: 2025  Current Admission Diagnosis:Sepsis due to cellulitis (HCC)   Patient Active Problem List    Diagnosis Date Noted    Sepsis due to cellulitis (HCC) 2025    Transaminitis 2025    Preseptal cellulitis 2025    Delusions of parasitosis (HCC) 2025    Asthma 2024    History of difficulty sleeping 2024    History of methamphetamine abuse ([facility]) 10/25/2024    Hilar adenopathy 2024    Oral herpes 2024    Bipolar 1 disorder ([facility]) 2024    Hyperlipidemia 2024    KATY (generalized anxiety disorder) 10/06/2022    Opioid use disorder, severe, on maintenance therapy ([facility]) 10/06/2022    Hepatitis C antibody test positive 2022    Dyspepsia 2022    Tobacco abuse 2021      LOS (days): 0  Geometric Mean LOS (GMLOS) (days):   Days to GMLOS:     OBJECTIVE:              Current admission status: Observation       Preferred Pharmacy:   [place] [address]  Phone: [phone] Fax: [phone]    Primary Care Provider: Christian Julio DO    Primary Insurance: [facility]  Secondary Insurance:     ASSESSMENT:  Active Health Care Proxies       Susanna Yousif Health Care Representative - Mother   Primary Phone: [phone] (Mobile)                           Readmission Root Cause  30 Day Readmission: No    Patient Information  Admitted from:: Home  Mental Status: Alert  During Assessment patient was accompanied by: Not accompanied during assessment  Assessment information provided by:: Patient  Primary Caregiver: Self  Support Systems: Self  County of Residence: [place]  What city do you live in?: [place]  Home entry access options. Select all that  apply.: Stairs  Number of steps to enter home.: One Flight  Do the steps have railings?: Yes  Type of Current Residence: 2 story home  Upon entering residence, is there a bedroom on the main floor (no further steps)?: No  A bedroom is located on the following floor levels of residence (select all that apply):: Basement  Upon entering residence, is there a bathroom on the main floor (no further steps)?: No  Indicate which floors of current residence have a bathroom (select all the apply):: Basement  Number of steps to basement from main floor: One Flight  Living Arrangements: Lives w/ Friend  Is patient a ?: No    Activities of Daily Living Prior to Admission  Functional Status: Independent  Completes ADLs independently?: Yes  Ambulates independently?: Yes  Does patient use assisted devices?: No  Does patient currently own DME?: No  Does the patient have a history of Short-Term Rehab?: No  Does patient have a history of HHC?: No  Does patient currently have HHC?: No         Patient Information Continued  Income Source: SSI/SSD  Does patient have prescription coverage?: Yes  Can the patient afford their medications and any related supplies (such as glucometers or test strips)?: No  Does patient receive dialysis treatments?: No  Does patient have a history of substance abuse?: No  Does patient have a history of Mental Health Diagnosis?: Yes  Is patient receiving treatment for mental health?: Yes (psychiatrist)  Has patient received inpatient treatment related to mental health in the last 2 years?: Yes         Means of Transportation  Means of Transport to hospitals:: Public Transportation - Bus          DISCHARGE DETAILS:    Discharge planning discussed with:: Patient  Freedom of Choice: Yes  Comments - Freedom of Choice: TBD  CM contacted family/caregiver?: No- see comments (patient alert)  Were Treatment Team discharge recommendations reviewed with patient/caregiver?: Yes  Did patient/caregiver verbalize  understanding of patient care needs?: Yes  Were patient/caregiver advised of the risks associated with not following Treatment Team discharge recommendations?: Yes    Contacts  Patient Contacts: Self  Relationship to Patient:: Other (Comment) (Self)  Contact Method: In Person  Reason/Outcome: Continuity of Care, Discharge Planning    Requested Home Health Care         Is the patient interested in HHC at discharge?: No    DME Referral Provided  Referral made for DME?: No          Additional Comments: CM received consult for SDOH needs. CM met with milo at bedside. Patient reported wants to see psych. CM advised that this is pending. Patient denied meth use reporting that she had taken a diet pill given to her from a friend. Declined need for resources. Patient cannot afford co-pays now that starting receiving Medicare. Patient confirmed still have medical assistance as secondary but still receiving co-pays. Patient indicated having an issue with SSDI checks. Patient does not have ID and cannot get a new one because misplaced SS card. In order to get new ID, patient needs certified medical records. CM provided information for Medical Records Department. Patient is worried because PCP does not accept new insurance. CM provided information via [employer] for [facility]. CM department to follow through discharge.

## 2025-07-21 NOTE — ED PROVIDER NOTES
"Time reflects when diagnosis was documented in both MDM as applicable and the Disposition within this note       Time User Action Codes Description Comment    7/20/2025 11:49 PM Gaye Alberto Add [L03.90] Cellulitis of multiple sites     7/20/2025 11:59 PM Gaye Alberto Add [E87.6] Hypokalemia     7/21/2025 12:00 AM Gaye Alberto Add [F41.9] Anxiety     7/21/2025 12:20 AM Gaye Alberto Add [A41.9] Sepsis (HCC)           ED Disposition       ED Disposition   Admit    Condition   Stable    Date/Time   Mon Jul 21, 2025 12:14 AM    Comment   Case was discussed with Tariq Chapman and the patient's admission status was agreed to be observation  to the service of Dr. Carvajal               Assessment & Plan       Medical Decision Making  50y F here for \"rash\" that appears to be areas of picking/excoriation that have become infected. Will get cbc to eval for sig leukocytosis/leukopenia, bandemia.  If present will order remainder of sepsis work up.  With the periorbital swelling, will get ct max/face to eval for any orbital involvement.   Will give diazepam for her anxiety. Will likely need admission for IV abx    Problems Addressed:  Anxiety: chronic illness or injury with exacerbation, progression, or side effects of treatment  Cellulitis of multiple sites: acute illness or injury that poses a threat to life or bodily functions     Details: IV abx given  Hypokalemia: acute illness or injury that poses a threat to life or bodily functions     Details: IV/po replacement  Sepsis (HCC): acute illness or injury that poses a threat to life or bodily functions     Details: Source and 2 SIRS  Doesn't meet any severe sepsis or sepitc shock criteria    Amount and/or Complexity of Data Reviewed  Labs: ordered. Decision-making details documented in ED Course.  Radiology: ordered.  Discussion of management or test interpretation with external provider(s): D/w SLIM    Risk  Prescription drug management.  Decision regarding " hospitalization.  Risk Details: Critical Care Time Statement: Upon my evaluation, this patient had a high probability of imminent or life-threatening deterioration due to sepsis/infection, which required my direct attention, intervention, and personal management.  I spent a total of 35 minutes directly providing critical care services, including interpretation of complex medical databases, evaluating for the presence of life-threatening injuries or illnesses, complex medical decision making (to support/prevent further life-threatening deterioration)., interpretation of hemodynamic data, and titration of continuous IV medications (drips). This time is exclusive of procedures, teaching, treating other patients, family meetings, and any prior time recorded by providers other than myself.            ED Course as of 07/21/25 0021   Sun Jul 20, 2025 2243 Pt w/ two SIRS (WBC, 100.9 temp)  Remainder of sepsis labs ordered   2244 Potassium(!): 2.9  Will give dose po and IV   2331 LACTIC ACID: 0.7   2331 Procalcitonin: 0.08       Medications   potassium chloride 20 mEq IVPB (premix) (20 mEq Intravenous New Bag 7/20/25 2317)   sodium chloride 0.9 % bolus 1,000 mL (0 mL Intravenous Stopped 7/20/25 2320)   diazepam (VALIUM) injection 5 mg (5 mg Intravenous Given 7/20/25 2111)   ketorolac (TORADOL) injection 30 mg (30 mg Intravenous Given 7/20/25 2112)   diazepam (VALIUM) injection 5 mg (5 mg Intravenous Given 7/20/25 2220)   potassium chloride (Klor-Con M20) CR tablet 40 mEq (40 mEq Oral Given 7/20/25 2303)   iohexol (OMNIPAQUE) 350 MG/ML injection (MULTI-DOSE) 100 mL (100 mL Intravenous Given 7/20/25 2255)   sodium chloride 0.9 % bolus 1,000 mL (1,000 mL Intravenous New Bag 7/20/25 2317)   ceFAZolin (ANCEF) IVPB (premix in dextrose) 2,000 mg 50 mL (2,000 mg Intravenous New Bag 7/20/25 2339)       ED Risk Strat Scores                    No data recorded        SBIRT 20yo+      Flowsheet Row Most Recent Value   Initial Alcohol  "Screen: US AUDIT-C     1. How often do you have a drink containing alcohol? 0 Filed at: 07/20/2025 1941   2. How many drinks containing alcohol do you have on a typical day you are drinking?  0 Filed at: 07/20/2025 1941   3a. Male UNDER 65: How often do you have five or more drinks on one occasion? 0 Filed at: 07/20/2025 1941   3b. FEMALE Any Age, or MALE 65+: How often do you have 4 or more drinks on one occassion? 0 Filed at: 07/20/2025 1941   Audit-C Score 0 Filed at: 07/20/2025 1941   LYNN: How many times in the past year have you...    Used an illegal drug or used a prescription medication for non-medical reasons? Never Filed at: 07/20/2025 1941                            History of Present Illness       Chief Complaint   Patient presents with    Urticaria    Anxiety     Arrives EMS, reports playing with bugs last night at 8:30. Pt covered in hives/blisters and pt squeezing them open because she \"sees blue things in them\". Pt reports she has OCD. Pt panicking in triage. Reports smoking, denies drug or alcohol use. Airway patent, reports dizziness.       Past Medical History[1]   Past Surgical History[2]   Family History[3]   Social History[4]   E-Cigarette/Vaping    E-Cigarette Use Current Every Day User     Cartridges/Day 2       E-Cigarette/Vaping Substances    Nicotine Yes     THC Yes     CBD No     Flavoring Yes     Other No     Unknown No       I have reviewed and agree with the history as documented.     Patient presents with:  Urticaria  Anxiety: Arrives EMS, reports playing with bugs last night at 8:30. Pt covered in hives/blisters and pt squeezing them open because she \"sees blue things in them\". Pt reports she has OCD. Pt panicking in triage. Reports smoking, denies drug or alcohol use. Airway patent, reports dizziness.    50y F here with multiple complaints. Pt very anxious / agitated - pacing in the room, picking at skin. Told triage something about bugs, but didn't mention that to me and just said " "these areas keep \"coming up\" - reports they are itchy / painful.  Tries to \"squeeze\" or \"pop\" any of the swollen areas, but some now have significant surrounding redness.  Has some clear drainage from some areas, no purulent drainage. No reported f/c/s. No hx of MRSA    Pt pacing in the room - states she has been trying to \"wean\" off of clonazepam and has been cutting down her dose but next stated to doctor lowered her dose and it's no enough to help her anxiety    Hx of polysubstance abuse however pt denied current use.       History provided by:  Patient and medical records   used: No    Urticaria  Anxiety      Review of Systems   All other systems reviewed and are negative.          Objective       ED Triage Vitals   Temperature Pulse Blood Pressure Respirations SpO2 Patient Position - Orthostatic VS   07/20/25 2111 07/20/25 1935 07/20/25 1935 07/20/25 1935 07/20/25 1935 07/20/25 1935   (!) 100.9 °F (38.3 °C) 58 147/99 18 98 % Sitting      Temp Source Heart Rate Source BP Location FiO2 (%) Pain Score    07/20/25 2111 07/20/25 1935 07/20/25 1935 -- 07/20/25 2112    Oral Monitor Right arm  5      Vitals      Date and Time Temp Pulse SpO2 Resp BP Pain Score FACES Pain Rating User   07/21/25 0000 -- 98 95 % 18 108/55 -- -- CG   07/20/25 2345 -- 101 93 % 20 107/55 -- -- CG   07/20/25 2315 -- 103 96 % 20 116/68 -- -- CG   07/20/25 2112 -- -- -- -- -- 5 -- DW   07/20/25 2111 100.9 °F (38.3 °C) -- -- -- -- -- --    07/20/25 1935 -- 58 98 % 18 147/99 -- -- DW            Physical Exam  Vitals and nursing note reviewed.   Constitutional:       Appearance: Normal appearance.   HENT:      Mouth/Throat:      Mouth: Mucous membranes are dry.     Eyes:      General:         Left eye: No discharge.      Conjunctiva/sclera: Conjunctivae normal.        Comments: Periorbital edema     Cardiovascular:      Rate and Rhythm: Regular rhythm. Tachycardia present.   Pulmonary:      Effort: Pulmonary effort is normal. "      Breath sounds: Normal breath sounds.     Musculoskeletal:      Cervical back: Normal range of motion.     Skin:     General: Skin is warm.      Comments: See media tab       Neurological:      Mental Status: She is alert.      Sensory: No sensory deficit.      Motor: No weakness.     Psychiatric:         Mood and Affect: Affect is labile.         Speech: Speech is rapid and pressured and tangential.         Behavior: Behavior is agitated and hyperactive.         Judgment: Judgment is impulsive.         Results Reviewed       Procedure Component Value Units Date/Time    Procalcitonin [[id number]]  (Normal) Collected: 07/20/25 2215    Lab Status: Final result Specimen: Blood from Arm, Right Updated: 07/20/25 2317     Procalcitonin 0.08 ng/ml     Protime-INR [[id number]]  (Normal) Collected: 07/20/25 2246    Lab Status: Final result Specimen: Blood from Arm, Left Updated: 07/20/25 2310     Protime 14.3 seconds      INR 1.09    Narrative:      INR Therapeutic Range    Indication                                             INR Range      Atrial Fibrillation                                               2.0-3.0  Hypercoagulable State                                    2.0.2.3  Left Ventricular Asist Device                            2.0-3.0  Mechanical Heart Valve                                  -    Aortic(with afib, MI, embolism, HF, LA enlargement,    and/or coagulopathy)                                     2.0-3.0 (2.5-3.5)     Mitral                                                             2.5-3.5  Prosthetic/Bioprosthetic Heart Valve               2.0-3.0  Venous thromboembolism (VTE: VT, PE        2.0-3.0    APTT [[id number]]  (Normal) Collected: 07/20/25 2246    Lab Status: Final result Specimen: Blood from Arm, Left Updated: 07/20/25 2310     PTT 33 seconds     Lactic acid, plasma (w/reflex if result > 2.0) [[id number]]  (Normal) Collected: 07/20/25 2246    Lab Status: Final result Specimen: Blood from Arm,  Left Updated: 07/20/25 2310     LACTIC ACID 0.7 mmol/L     Narrative:      Result may be elevated if tourniquet was used during collection.    Blood culture #2 [[id number]] Collected: 07/20/25 2246    Lab Status: In process Specimen: Blood from Arm, Left Updated: 07/20/25 2250    Blood culture #1 [[id number]] Collected: 07/20/25 2246    Lab Status: In process Specimen: Blood from Hand, Left Updated: 07/20/25 2250    Comprehensive metabolic panel [[id number]]  (Abnormal) Collected: 07/20/25 2215    Lab Status: Final result Specimen: Blood from Arm, Right Updated: 07/20/25 2234     Sodium 136 mmol/L      Potassium 2.9 mmol/L      Chloride 107 mmol/L      CO2 21 mmol/L      ANION GAP 8 mmol/L      BUN 18 mg/dL      Creatinine 0.72 mg/dL      Glucose 120 mg/dL      Calcium 8.3 mg/dL       U/L       U/L      Alkaline Phosphatase 99 U/L      Total Protein 6.6 g/dL      Albumin 3.9 g/dL      Total Bilirubin 1.12 mg/dL      eGFR 99 ml/min/1.73sq m     Narrative:      National Kid[employer] guidelines for Chronic Kidney Disease (CKD):     Stage 1 with normal or high GFR (GFR > 90 mL/min/1.73 square meters)    Stage 2 Mild CKD (GFR = 60-89 mL/min/1.73 square meters)    Stage 3A Moderate CKD (GFR = 45-59 mL/min/1.73 square meters)    Stage 3B Moderate CKD (GFR = 30-44 mL/min/1.73 square meters)    Stage 4 Severe CKD (GFR = 15-29 mL/min/1.73 square meters)    Stage 5 End Stage CKD (GFR <15 mL/min/1.73 square meters)  Note: GFR calculation is accurate only with a steady state creatinine    CBC and differential [[id number]]  (Abnormal) Collected: 07/20/25 2215    Lab Status: Final result Specimen: Blood from Arm, Right Updated: 07/20/25 2221     WBC 17.02 Thousand/uL      RBC 4.02 Million/uL      Hemoglobin 11.4 g/dL      Hematocrit 33.6 %      MCV 84 fL      MCH 28.4 pg      MCHC 33.9 g/dL      RDW 14.1 %      MPV 8.8 fL      Platelets 217 Thousands/uL      nRBC 0 /100 WBCs      Segmented % 88 %       Immature Grans % 0 %      Lymphocytes % 6 %      Monocytes % 6 %      Eosinophils Relative 0 %      Basophils Relative 0 %      Absolute Neutrophils 14.99 Thousands/µL      Absolute Immature Grans 0.07 Thousand/uL      Absolute Lymphocytes 0.93 Thousands/µL      Absolute Monocytes 1.01 Thousand/µL      Eosinophils Absolute 0.00 Thousand/µL      Basophils Absolute 0.02 Thousands/µL     Rapid drug screen, urine [[id number]]     Lab Status: No result Specimen: Urine             CT facial bones with contrast   Final Interpretation by Johny Millan MD (07/21 0011)      Mild to moderate left preseptal periorbital soft tissue swelling most compatible with a periorbital cellulitis. There is no post septal inflammatory stranding to suggest an orbital cellulitis.      No focal fluid collection to suggest an abscess.         Workstation performed: [id number]             Procedures    ED Medication and Procedure Management   Prior to Admission Medications   Prescriptions Last Dose Informant Patient Reported? Taking?   FLUoxetine (PROzac) 20 mg capsule   Yes No   Sig: Take 20 mg by mouth daily   Symbicort 160-4.5 MCG/ACT inhaler   No No   Sig: Inhale 2 puffs 2 (two) times a day   albuterol (Proventil HFA) 90 mcg/act inhaler  Self No No   Sig: Inhale 2 puffs every 6 (six) hours as needed for wheezing   buPROPion (FORFIVO XL) 450 MG 24 hr tablet   No No   Sig: Take 1 tablet (450 mg total) by mouth daily   buprenorphine-naloxone (SUBOXONE) 8-2 mg per SL tablet   Yes No   Sig: Place 1 tablet under the tongue daily Pt states 3 x daily   clonazePAM (KlonoPIN) 0.5 mg tablet  Self No No   Sig: Take 1 tablet (0.5 mg total) by mouth daily at bedtime for 10 days   dextromethorphan-guaifenesin (MUCINEX DM)  MG per 12 hr tablet   No No   Sig: Take 1 tablet by mouth every 12 (twelve) hours as needed for cough   Patient not taking: Reported on 6/24/2025   gabapentin (NEURONTIN) 400 mg capsule   No No   Sig: Take 2 capsules (800 mg  total) by mouth 3 (three) times a day   gabapentin (Neurontin) 300 mg capsule   No No   Sig: Take 1 capsule (300 mg total) by mouth 3 (three) times a day For post-herpetic neuralgia: Take 1 tablet on day 1,  Then take 2 tablets on day 2, Then take 3 tablets on day 3 and every day after that as instructed by your doctor.   gabapentin (Neurontin) 300 mg capsule   No No   Sig: Take 2 capsules (600 mg total) by mouth 3 (three) times a day for 3 days For post-herpetic neuralgia: Take 1 tablet on day 1,  Then take 2 tablets on day 2, Then take 3 tablets on day 3 and every day after that as instructed by your doctor.   loxapine (LOXITANE) 25 mg capsule   No No   Sig: Take 1 capsule (25 mg total) by mouth 2 (two) times a day   Patient not taking: Reported on 6/24/2025   tiZANidine (ZANAFLEX) 4 mg tablet   No No   Sig: TAKE 1 TABLET (4 MG TOTAL) BY MOUTH EVERY 8 (EIGHT) HOURS AS NEEDED FOR MUSCLE SPASMS   traZODone (DESYREL) 150 mg tablet   No No   Sig: Take 1 tablet (150 mg total) by mouth daily at bedtime      Facility-Administered Medications: None     Patient's Medications   Discharge Prescriptions    No medications on file     No discharge procedures on file.  ED SEPSIS DOCUMENTATION   Time reflects when diagnosis was documented in both MDM as applicable and the Disposition within this note       Time User Action Codes Description Comment    7/20/2025 11:49 PM Gaye Alberto Add [L03.90] Cellulitis of multiple sites     7/20/2025 11:59 PM Gaye Alberto Add [E87.6] Hypokalemia     7/21/2025 12:00 AM Gaye Alberto Add [F41.9] Anxiety     7/21/2025 12:20 AM Gaye Alberto Add [A41.9] Sepsis (HCC)            Initial Sepsis Screening       Row Name 07/20/25 9105                Is the patient's history suggestive of a new or worsening infection? Yes (Proceed)  -VL        Suspected source of infection soft tissue  -VL        Indicate SIRS criteria Hyperthemia > 38.3C (100.9F) OR Hypothermia <36C  "(96.8F);Leukocytosis (WBC > 21365 IJL) OR Leukopenia (WBC <4000 IJL) OR Bandemia (WBC >10% bands)  -VL        Are two or more of the above signs & symptoms of infection both present and new to the patient? Yes (Proceed)  -VL        Assess for evidence of organ dysfunction: Are any of the below criteria present within 6 hours of suspected infection and SIRS criteria that are NOT considered to be chronic conditions? --  None  -VL                  User Key  (r) = Recorded By, (t) = Taken By, (c) = Cosigned By      Initials Name Provider Type    VL Gaye Alberto DO Physician                           [1]   Past Medical History:  Diagnosis Date    Addiction to drug (HCC)     Alcohol abuse     Alcoholism (HCC)     Altered mental status 09/21/2022    Elevated LFTs 09/21/2022    Lower back pain     Scoliosis     Self-injurious behavior     Substance abuse (HCC)    [2]   Past Surgical History:  Procedure Laterality Date    BACK SURGERY      CHOLECYSTECTOMY     [3]   Family History  Problem Relation Name Age of Onset    Autism Mother      Heart disease Father      Stroke Father      Anxiety disorder Father      Heart disease Maternal Grandmother     [4]   Social History  Tobacco Use    Smoking status: Every Day     Current packs/day: 0.50     Average packs/day: 0.5 packs/day for 20.0 years (10.0 ttl pk-yrs)     Types: Cigarettes     Passive exposure: Never    Smokeless tobacco: Current    Tobacco comments:     Pt not ready to quit   Vaping Use    Vaping status: Every Day    Substances: Nicotine, THC, Flavoring   Substance Use Topics    Alcohol use: Not Currently     Comment: MAGDALENO, pt historically inconsistent. Drinking  per [facility] 2    Drug use: Not Currently     Types: Heroin, \"Crack\" cocaine, Cocaine, LSD, Benzodiazepines, Marijuana     Comment: Pt unreliable historian        Gaye Alberto DO  07/21/25 0021    "

## 2025-07-21 NOTE — ASSESSMENT & PLAN NOTE
Presents with sites of excoriation with surrounding cellulitis  Labs: WBC 17, lactate 0.7, procal 0.08  Imaging: CT facial bones, mild/mod left preseptal/periorbital soft tissue swelling, no post septal inflammatory stranding, no focal fluid collections    Plan:  Empiric ABX d#1: Ancef  Multimodal analgesia  Trend infectious endpoints, monitor culture data

## 2025-07-21 NOTE — PLAN OF CARE
Problem: PAIN - ADULT  Goal: Verbalizes/displays adequate comfort level or baseline comfort level  Description: Interventions:  - Encourage patient to monitor pain and request assistance  - Assess pain using appropriate pain scale  - Administer analgesics as ordered based on type and severity of pain and evaluate response  - Implement non-pharmacological measures as appropriate and evaluate response  - Consider cultural and social influences on pain and pain management  - Notify physician/advanced practitioner if interventions unsuccessful or patient reports new pain  - Educate patient/family on pain management process including their role and importance of  reporting pain   - Provide non-pharmacologic/complimentary pain relief interventions  Outcome: Progressing     Problem: INFECTION - ADULT  Goal: Absence or prevention of progression during hospitalization  Description: INTERVENTIONS:  - Assess and monitor for signs and symptoms of infection  - Monitor lab/diagnostic results  - Monitor all insertion sites, i.e. indwelling lines, tubes, and drains  - Monitor endotracheal if appropriate and nasal secretions for changes in amount and color  - [place] appropriate cooling/warming therapies per order  - Administer medications as ordered  - Instruct and encourage patient and family to use good hand hygiene technique  - Identify and instruct in appropriate isolation precautions for identified infection/condition  Outcome: Progressing  Goal: Absence of fever/infection during neutropenic period  Description: INTERVENTIONS:  - Monitor WBC  - Perform strict hand hygiene  - Limit to healthy visitors only  - No plants, dried, fresh or silk flowers with wilson in patient room  Outcome: Progressing     Problem: SAFETY ADULT  Goal: Patient will remain free of falls  Description: INTERVENTIONS:  - Educate patient/family on patient safety including physical limitations  - Instruct patient to call for assistance with activity   -  Consider consulting OT/PT to assist with strengthening/mobility based on AM PAC & JH-HLM score  - Consult OT/PT to assist with strengthening/mobility   - Keep Call bell within reach  - Keep bed low and locked with side rails adjusted as appropriate  - Keep care items and personal belongings within reach  - Initiate and maintain comfort rounds  - Make Fall Risk Sign visible to staff  - Apply yellow socks and bracelet for high fall risk patients  - Consider moving patient to room near nurses station  Outcome: Progressing  Goal: Maintain or return to baseline ADL function  Description: INTERVENTIONS:  -  Assess patient's ability to carry out ADLs; assess patient's baseline for ADL function and identify physical deficits which impact ability to perform ADLs (bathing, care of mouth/teeth, toileting, grooming, dressing, etc.)  - Assess/evaluate cause of self-care deficits   - Assess range of motion  - Assess patient's mobility; develop plan if impaired  - Assess patient's need for assistive devices and provide as appropriate  - Encourage maximum independence but intervene and supervise when necessary  - Involve family in performance of ADLs  - Assess for home care needs following discharge   - Consider OT consult to assist with ADL evaluation and planning for discharge  - Provide patient education as appropriate  - Monitor functional capacity and physical performance, use of AM PAC & JH-HLM   - Monitor gait, balance and fatigue with ambulation    Outcome: Progressing  Goal: Maintains/Returns to pre admission functional level  Description: INTERVENTIONS:  - Perform AM-PAC 6 Click Basic Mobility/ Daily Activity assessment daily.  - Set and communicate daily mobility goal to care team and patient/family/caregiver.   - Collaborate with rehabilitation services on mobility goals if consulted  - Out of bed for toileting  - Record patient progress and toleration of activity level   Outcome: Progressing     Problem: DISCHARGE  PLANNING  Goal: Discharge to home or other facility with appropriate resources  Description: INTERVENTIONS:  - Identify barriers to discharge w/patient and caregiver  - Arrange for needed discharge resources and transportation as appropriate  - Identify discharge learning needs (meds, wound care, etc.)  - Arrange for interpretive services to assist at discharge as needed  - Refer to Case Management Department for coordinating discharge planning if the patient needs post-hospital services based on physician/advanced practitioner order or complex needs related to functional status, cognitive ability, or social support system  Outcome: Progressing     Problem: Knowledge Deficit  Goal: Patient/family/caregiver demonstrates understanding of disease process, treatment plan, medications, and discharge instructions  Description: Complete learning assessment and assess knowledge base.  Interventions:  - Provide teaching at level of understanding  - Provide teaching via preferred learning methods  Outcome: Progressing     Problem: SKIN/TISSUE INTEGRITY - ADULT  Goal: Incision(s), wounds(s) or drain site(s) healing without S/S of infection  Description: INTERVENTIONS  - Assess and document dressing, incision, wound bed, drain sites and surrounding tissue  - Provide patient and family education    Outcome: Progressing

## 2025-07-21 NOTE — ASSESSMENT & PLAN NOTE
Presents with anxiety, psychomotor agitation, pressured speech, tangential conversation. Responded to valium. Behavioral health admission in 05/2025. PDMP reviewed. Suspect non-compliance.   Outpatient regimen: Wellbutrin XL 450mg daily, Loxapine 25mg BID (appears this was not started), Klonopin 0.5mg daily, gabapentin 800mg TID, trazodone 150mg HS, Suboxone 8mg TID    Plan:  Continue outpatient regimen   S/p 10mg valium in ED  Substitute Klonopin for valium for now due to significant anxiety/psychomotor symptoms  UDS  Psychiatry consult

## 2025-07-22 ENCOUNTER — TELEPHONE (OUTPATIENT)
Dept: PSYCHIATRY | Facility: CLINIC | Age: 50
End: 2025-07-22

## 2025-07-22 PROBLEM — B19.20 HEPATITIS C: Status: ACTIVE | Noted: 2025-07-21

## 2025-07-22 LAB
ALBUMIN SERPL BCG-MCNC: 3.4 G/DL (ref 3.5–5)
ALP SERPL-CCNC: 91 U/L (ref 34–104)
ALT SERPL W P-5'-P-CCNC: 77 U/L (ref 7–52)
ANION GAP SERPL CALCULATED.3IONS-SCNC: 7 MMOL/L (ref 4–13)
AST SERPL W P-5'-P-CCNC: 44 U/L (ref 13–39)
BILIRUB SERPL-MCNC: 0.38 MG/DL (ref 0.2–1)
BUN SERPL-MCNC: 8 MG/DL (ref 5–25)
CALCIUM ALBUM COR SERPL-MCNC: 8.4 MG/DL (ref 8.3–10.1)
CALCIUM SERPL-MCNC: 7.9 MG/DL (ref 8.4–10.2)
CHLORIDE SERPL-SCNC: 109 MMOL/L (ref 96–108)
CO2 SERPL-SCNC: 22 MMOL/L (ref 21–32)
CREAT SERPL-MCNC: 0.71 MG/DL (ref 0.6–1.3)
ERYTHROCYTE [DISTWIDTH] IN BLOOD BY AUTOMATED COUNT: 14.6 % (ref 11.6–15.1)
GFR SERPL CREATININE-BSD FRML MDRD: 101 ML/MIN/1.73SQ M
GLUCOSE SERPL-MCNC: 145 MG/DL (ref 65–140)
HCT VFR BLD AUTO: 32 % (ref 34.8–46.1)
HGB BLD-MCNC: 10.3 G/DL (ref 11.5–15.4)
MAGNESIUM SERPL-MCNC: 2.2 MG/DL (ref 1.9–2.7)
MCH RBC QN AUTO: 28.4 PG (ref 26.8–34.3)
MCHC RBC AUTO-ENTMCNC: 32.2 G/DL (ref 31.4–37.4)
MCV RBC AUTO: 88 FL (ref 82–98)
PLATELET # BLD AUTO: 182 THOUSANDS/UL (ref 149–390)
PMV BLD AUTO: 8.7 FL (ref 8.9–12.7)
POTASSIUM SERPL-SCNC: 3.7 MMOL/L (ref 3.5–5.3)
PROT SERPL-MCNC: 5.8 G/DL (ref 6.4–8.4)
RBC # BLD AUTO: 3.63 MILLION/UL (ref 3.81–5.12)
SODIUM SERPL-SCNC: 138 MMOL/L (ref 135–147)
WBC # BLD AUTO: 10.52 THOUSAND/UL (ref 4.31–10.16)

## 2025-07-22 PROCEDURE — G0427 INPT/ED TELECONSULT70: HCPCS | Performed by: PSYCHIATRY & NEUROLOGY

## 2025-07-22 PROCEDURE — 80053 COMPREHEN METABOLIC PANEL: CPT | Performed by: STUDENT IN AN ORGANIZED HEALTH CARE EDUCATION/TRAINING PROGRAM

## 2025-07-22 PROCEDURE — 83735 ASSAY OF MAGNESIUM: CPT | Performed by: STUDENT IN AN ORGANIZED HEALTH CARE EDUCATION/TRAINING PROGRAM

## 2025-07-22 PROCEDURE — 99232 SBSQ HOSP IP/OBS MODERATE 35: CPT | Performed by: STUDENT IN AN ORGANIZED HEALTH CARE EDUCATION/TRAINING PROGRAM

## 2025-07-22 PROCEDURE — 85027 COMPLETE CBC AUTOMATED: CPT | Performed by: STUDENT IN AN ORGANIZED HEALTH CARE EDUCATION/TRAINING PROGRAM

## 2025-07-22 RX ORDER — KETOROLAC TROMETHAMINE 30 MG/ML
15 INJECTION, SOLUTION INTRAMUSCULAR; INTRAVENOUS EVERY 6 HOURS SCHEDULED
Status: COMPLETED | OUTPATIENT
Start: 2025-07-22 | End: 2025-07-23

## 2025-07-22 RX ORDER — KETOROLAC TROMETHAMINE 30 MG/ML
15 INJECTION, SOLUTION INTRAMUSCULAR; INTRAVENOUS ONCE
Status: COMPLETED | OUTPATIENT
Start: 2025-07-22 | End: 2025-07-22

## 2025-07-22 RX ORDER — CLONAZEPAM 0.5 MG/1
0.5 TABLET ORAL ONCE
Status: COMPLETED | OUTPATIENT
Start: 2025-07-22 | End: 2025-07-22

## 2025-07-22 RX ORDER — HYDROXYZINE HYDROCHLORIDE 25 MG/1
25 TABLET, FILM COATED ORAL EVERY 6 HOURS PRN
Status: DISCONTINUED | OUTPATIENT
Start: 2025-07-22 | End: 2025-07-24 | Stop reason: HOSPADM

## 2025-07-22 RX ADMIN — GABAPENTIN 800 MG: 400 CAPSULE ORAL at 20:31

## 2025-07-22 RX ADMIN — HYDROXYZINE HYDROCHLORIDE 25 MG: 25 TABLET, FILM COATED ORAL at 05:45

## 2025-07-22 RX ADMIN — CLONAZEPAM 0.5 MG: 0.5 TABLET ORAL at 08:53

## 2025-07-22 RX ADMIN — GABAPENTIN 800 MG: 400 CAPSULE ORAL at 15:27

## 2025-07-22 RX ADMIN — HYDROXYZINE HYDROCHLORIDE 25 MG: 25 TABLET, FILM COATED ORAL at 18:19

## 2025-07-22 RX ADMIN — BUDESONIDE AND FORMOTEROL FUMARATE DIHYDRATE 2 PUFF: 160; 4.5 AEROSOL RESPIRATORY (INHALATION) at 18:19

## 2025-07-22 RX ADMIN — NICOTINE POLACRILEX 2 MG: 2 GUM, CHEWING ORAL at 18:19

## 2025-07-22 RX ADMIN — TRAZODONE HYDROCHLORIDE 150 MG: 100 TABLET ORAL at 22:44

## 2025-07-22 RX ADMIN — BUPRENORPHINE AND NALOXONE 8 MG: 8; 2 FILM BUCCAL; SUBLINGUAL at 08:48

## 2025-07-22 RX ADMIN — KETOROLAC TROMETHAMINE 15 MG: 30 INJECTION, SOLUTION INTRAMUSCULAR; INTRAVENOUS at 18:17

## 2025-07-22 RX ADMIN — CEFAZOLIN SODIUM 1000 MG: 1 SOLUTION INTRAVENOUS at 07:36

## 2025-07-22 RX ADMIN — BUDESONIDE AND FORMOTEROL FUMARATE DIHYDRATE 2 PUFF: 160; 4.5 AEROSOL RESPIRATORY (INHALATION) at 08:48

## 2025-07-22 RX ADMIN — BUPRENORPHINE AND NALOXONE 8 MG: 8; 2 FILM BUCCAL; SUBLINGUAL at 15:27

## 2025-07-22 RX ADMIN — CLONAZEPAM 0.5 MG: 0.5 TABLET ORAL at 20:31

## 2025-07-22 RX ADMIN — CEFAZOLIN SODIUM 1000 MG: 1 SOLUTION INTRAVENOUS at 00:08

## 2025-07-22 RX ADMIN — HYDROXYZINE HYDROCHLORIDE 25 MG: 25 TABLET, FILM COATED ORAL at 00:07

## 2025-07-22 RX ADMIN — KETOROLAC TROMETHAMINE 15 MG: 30 INJECTION, SOLUTION INTRAMUSCULAR; INTRAVENOUS at 10:02

## 2025-07-22 RX ADMIN — BUPRENORPHINE AND NALOXONE 8 MG: 8; 2 FILM BUCCAL; SUBLINGUAL at 20:31

## 2025-07-22 RX ADMIN — GABAPENTIN 800 MG: 400 CAPSULE ORAL at 08:53

## 2025-07-22 RX ADMIN — BUPROPION HYDROCHLORIDE 450 MG: 150 TABLET, EXTENDED RELEASE ORAL at 08:53

## 2025-07-22 RX ADMIN — NICOTINE 1 PATCH: 14 PATCH, EXTENDED RELEASE TRANSDERMAL at 08:49

## 2025-07-22 RX ADMIN — CEFAZOLIN SODIUM 1000 MG: 1 SOLUTION INTRAVENOUS at 15:32

## 2025-07-22 RX ADMIN — ENOXAPARIN SODIUM 40 MG: 40 INJECTION SUBCUTANEOUS at 08:50

## 2025-07-22 RX ADMIN — HYDROXYZINE HYDROCHLORIDE 25 MG: 25 TABLET, FILM COATED ORAL at 12:20

## 2025-07-22 RX ADMIN — NICOTINE POLACRILEX 2 MG: 2 GUM, CHEWING ORAL at 20:37

## 2025-07-22 NOTE — NURSING NOTE
This RN agrees with previous RN's assessment. Patient is resting in bed with alarm on and call bell within reach.

## 2025-07-22 NOTE — ASSESSMENT & PLAN NOTE
Presents with anxiety, psychomotor agitation, pressured speech, tangential conversation. Responded to valium. Behavioral health admission in 05/2025. PDMP reviewed. Suspect non-compliance.   Outpatient regimen: Wellbutrin XL 450mg daily, Loxapine 25mg BID (appears this was not started), Klonopin 0.5mg daily, gabapentin 800mg TID, trazodone 150mg HS, Suboxone 8mg TID  Appreciate psychiatry evaluation.  Recommending discontinue benzodiazepines as patient is currently on Suboxone  Continue Wellbutrin, consider adding Trileptal

## 2025-07-22 NOTE — TELEPHONE ENCOUNTER
Consult placed on [facility] queue at 0933 to be seen by an [facility] psychiatrist.

## 2025-07-22 NOTE — PLAN OF CARE
Problem: PAIN - ADULT  Goal: Verbalizes/displays adequate comfort level or baseline comfort level  Description: Interventions:  - Encourage patient to monitor pain and request assistance  - Assess pain using appropriate pain scale  - Administer analgesics as ordered based on type and severity of pain and evaluate response  - Implement non-pharmacological measures as appropriate and evaluate response  - Consider cultural and social influences on pain and pain management  - Notify physician/advanced practitioner if interventions unsuccessful or patient reports new pain  - Educate patient/family on pain management process including their role and importance of  reporting pain   - Provide non-pharmacologic/complimentary pain relief interventions  Outcome: Progressing     Problem: INFECTION - ADULT  Goal: Absence or prevention of progression during hospitalization  Description: INTERVENTIONS:  - Assess and monitor for signs and symptoms of infection  - Monitor lab/diagnostic results  - Monitor all insertion sites, i.e. indwelling lines, tubes, and drains  - Monitor endotracheal if appropriate and nasal secretions for changes in amount and color  - [place] appropriate cooling/warming therapies per order  - Administer medications as ordered  - Instruct and encourage patient and family to use good hand hygiene technique  - Identify and instruct in appropriate isolation precautions for identified infection/condition  Outcome: Progressing     Problem: SAFETY ADULT  Goal: Patient will remain free of falls  Description: INTERVENTIONS:  - Educate patient/family on patient safety including physical limitations  - Instruct patient to call for assistance with activity   - Consider consulting OT/PT to assist with strengthening/mobility based on AM PAC & JH-HLM score  - Consult OT/PT to assist with strengthening/mobility   - Keep Call bell within reach  - Keep bed low and locked with side rails adjusted as appropriate  - Keep  care items and personal belongings within reach  - Initiate and maintain comfort rounds  - Make Fall Risk Sign visible to staff  - Apply yellow socks and bracelet for high fall risk patients  - Consider moving patient to room near nurses station  Outcome: Progressing  Goal: Maintain or return to baseline ADL function  Description: INTERVENTIONS:  -  Assess patient's ability to carry out ADLs; assess patient's baseline for ADL function and identify physical deficits which impact ability to perform ADLs (bathing, care of mouth/teeth, toileting, grooming, dressing, etc.)  - Assess/evaluate cause of self-care deficits   - Assess range of motion  - Assess patient's mobility; develop plan if impaired  - Assess patient's need for assistive devices and provide as appropriate  - Encourage maximum independence but intervene and supervise when necessary  - Involve family in performance of ADLs  - Assess for home care needs following discharge   - Consider OT consult to assist with ADL evaluation and planning for discharge  - Provide patient education as appropriate  - Monitor functional capacity and physical performance, use of AM PAC & JH-HLM   - Monitor gait, balance and fatigue with ambulation    Outcome: Progressing  Goal: Maintains/Returns to pre admission functional level  Description: INTERVENTIONS:  - Perform AM-PAC 6 Click Basic Mobility/ Daily Activity assessment daily.  - Set and communicate daily mobility goal to care team and patient/family/caregiver.   - Collaborate with rehabilitation services on mobility goals if consulted  - Out of bed for toileting  - Record patient progress and toleration of activity level   Outcome: Progressing     Problem: DISCHARGE PLANNING  Goal: Discharge to home or other facility with appropriate resources  Description: INTERVENTIONS:  - Identify barriers to discharge w/patient and caregiver  - Arrange for needed discharge resources and transportation as appropriate  - Identify  discharge learning needs (meds, wound care, etc.)  - Arrange for interpretive services to assist at discharge as needed  - Refer to Case Management Department for coordinating discharge planning if the patient needs post-hospital services based on physician/advanced practitioner order or complex needs related to functional status, cognitive ability, or social support system  Outcome: Progressing     Problem: Knowledge Deficit  Goal: Patient/family/caregiver demonstrates understanding of disease process, treatment plan, medications, and discharge instructions  Description: Complete learning assessment and assess knowledge base.  Interventions:  - Provide teaching at level of understanding  - Provide teaching via preferred learning methods  Outcome: Progressing     Problem: SKIN/TISSUE INTEGRITY - ADULT  Goal: Incision(s), wounds(s) or drain site(s) healing without S/S of infection  Description: INTERVENTIONS  - Assess and document dressing, incision, wound bed, drain sites and surrounding tissue  - Provide patient and family education    Outcome: Progressing

## 2025-07-22 NOTE — ASSESSMENT & PLAN NOTE
Hepatitis panel positive for hepatitis C  Quantitative PCR ordered and pending  Recommended for outpatient ID or GI for definitive treatment
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
## 2025-07-22 NOTE — PROGRESS NOTES
Progress Note - Hospitalist   Name: Carey Keith 50 y.o. female I MRN: [id number]  Unit/Bed#: E5 -01 I Date of Admission: 7/20/2025   Date of Service: 7/22/2025 I Hospital Day: 1     Assessment & Plan  Sepsis due to cellulitis (HCC)  Preseptal cellulitis  Presents with sites of excoriation with surrounding cellulitis  Labs: WBC 17, lactate 0.7, procal 0.08  CT facial bones, mild/mod left preseptal/periorbital soft tissue swelling, no post septal inflammatory stranding, no focal fluid collections  Continue IV antibiotics with Ancef  Blood cultures no growth to date at 24 hours  Tentative plan for discharge home tomorrow, white count trending down the patient has been fever free  Bipolar 1 disorder ([facility])  KATY (generalized anxiety disorder)  Opioid use disorder, severe, on maintenance therapy (HCC)  Tobacco abuse  History of methamphetamine abuse ([facility])  Delusions of parasitosis (HCC)  Presents with anxiety, psychomotor agitation, pressured speech, tangential conversation. Responded to valium. Behavioral health admission in 05/2025. PDMP reviewed. Suspect non-compliance.   Outpatient regimen: Wellbutrin XL 450mg daily, Loxapine 25mg BID (appears this was not started), Klonopin 0.5mg daily, gabapentin 800mg TID, trazodone 150mg HS, Suboxone 8mg TID  Appreciate psychiatry evaluation.  Recommending discontinue benzodiazepines as patient is currently on Suboxone  Continue Wellbutrin, consider adding Trileptal  Hepatitis C  Hepatitis panel positive for hepatitis C  Quantitative PCR ordered and pending  Recommended for outpatient ID or GI for definitive treatment  Asthma  No acute exacerbation   Outpatient regimen: Symbicort     VTE Pharmacologic Prophylaxis:   Pharmacologic: Enoxaparin (Lovenox)  Mechanical VTE Prophylaxis in Place: Yes    Current Length of Stay: 1 day(s)    Current Patient Status: Inpatient   Certification Statement: The patient will continue to require additional inpatient hospital stay due to  IV antibiotics, blood cultures    Discharge Plan: 24 to 48 hours    Code Status: Level 1 - Full Code      Subjective:   Patient reportedly doing well today.  Has been afebrile.  Anxiety appears somewhat improved from day prior.    Objective:     Vitals:   Temp (24hrs), Av.6 °F (37 °C), Min:97.9 °F (36.6 °C), Max:98.9 °F (37.2 °C)    Temp:  [97.9 °F (36.6 °C)-98.9 °F (37.2 °C)] 98.9 °F (37.2 °C)  HR:  [82-85] 83  Resp:  [16-20] 18  BP: (105-124)/(68-88) 116/70  SpO2:  [93 %-100 %] 94 %  Body mass index is 36.66 kg/m².     Input and Output Summary (last 24 hours):     No intake or output data in the 24 hours ending 25 1230    Physical Exam:     Physical Exam  Vitals and nursing note reviewed.   HENT:      Head: Normocephalic.     Eyes:      Conjunctiva/sclera: Conjunctivae normal.       Cardiovascular:      Rate and Rhythm: Normal rate.   Pulmonary:      Effort: Pulmonary effort is normal.   Abdominal:      General: Bowel sounds are normal. There is no distension.      Palpations: Abdomen is soft.     Musculoskeletal:      Right lower leg: No edema.      Left lower leg: No edema.     Skin:     General: Skin is warm and dry.      Comments: Diffuse excoriations     Neurological:      General: No focal deficit present.      Mental Status: She is alert. Mental status is at baseline.       Additional Data:     Labs:    Results from last 7 days   Lab Units 25  0728 25  0220 25  2215   WBC Thousand/uL 10.52*   < > 17.02*   HEMOGLOBIN g/dL 10.3*   < > 11.4*   HEMATOCRIT % 32.0*   < > 33.6*   PLATELETS Thousands/uL 182   < > 217   SEGS PCT %  --   --  88*   LYMPHO PCT %  --   --  6*   MONO PCT %  --   --  6   EOS PCT %  --   --  0    < > = values in this interval not displayed.     Results from last 7 days   Lab Units 25  0557   SODIUM mmol/L 138   POTASSIUM mmol/L 3.7   CHLORIDE mmol/L 109*   CO2 mmol/L 22   BUN mg/dL 8   CREATININE mg/dL 0.71   ANION GAP mmol/L 7   CALCIUM mg/dL 7.9*    ALBUMIN g/dL 3.4*   TOTAL BILIRUBIN mg/dL 0.38   ALK PHOS U/L 91   ALT U/L 77*   AST U/L 44*   GLUCOSE RANDOM mg/dL 145*     Results from last 7 days   Lab Units 07/20/25 2246   INR  1.09             Results from last 7 days   Lab Units 07/20/25 2246 07/20/25  2215   LACTIC ACID mmol/L 0.7  --    PROCALCITONIN ng/ml  --  0.08           * I Have Reviewed All Lab Data Listed Above.  * Additional Pertinent Lab Tests Reviewed: All Labs For Current Hospital Admission Reviewed    Mobility:  Basic Mobility Inpatient Raw Score: 24  [facility] Goal: 8: Walk 250 feet or more  -[facility] Achieved: 7: Walk 25 feet or more    Lines:     Invasive Devices       Peripheral Intravenous Line  Duration             Peripheral IV 07/20/25 Right;Ventral (anterior) Forearm 1 day                       Imaging:    Imaging Reports Reviewed Today Include:     CT facial bones with contrast  Result Date: 7/21/2025  Impression: Mild to moderate left preseptal periorbital soft tissue swelling most compatible with a periorbital cellulitis. There is no post septal inflammatory stranding to suggest an orbital cellulitis. No focal fluid collection to suggest an abscess. Workstation performed: [id number]         Recent Cultures (last 7 days):     Results from last 7 days   Lab Units 07/20/25 2246   BLOOD CULTURE  No Growth at 24 hrs.  No Growth at 24 hrs.       Last 24 Hours Medication List:   Current Facility-Administered Medications   Medication Dose Route Frequency Provider Last Rate    acetaminophen  975 mg Oral Q8H PRN Tariq Chapman PA-C      aluminum-magnesium hydroxide-simethicone  30 mL Oral Q6H PRN Tariq Chapman PA-C      budesonide-formoterol  2 puff Inhalation BID Tariq Chapman PA-C      buprenorphine-naloxone  8 mg Sublingual TID Tariq Chapman PA-C      buPROPion  450 mg Oral Daily Tariq Chapman PA-C      cefazolin  1,000 mg Intravenous Q8H Tariq Chapman PA-C 1,000 mg (07/22/25 0736)    enoxaparin  40 mg Subcutaneous Daily Tariq Chapman PA-C       gabapentin  800 mg Oral TID Tariq Chapman PA-C      hydrOXYzine HCL  25 mg Oral Q6H Efrain Finney MD      hydrOXYzine HCL  25 mg Oral Q6H PRN Efrain Finney MD      nicotine  1 patch Transdermal Daily Tariq Chapman PA-C      nicotine polacrilex  2 mg Oral Q2H PRN Efrain Finney MD      ondansetron  4 mg Intravenous Q6H PRN Tariq Chapman PA-C      polyethylene glycol  17 g Oral Daily PRN Tariq Chapman PA-C      traZODone  150 mg Oral HS PRN Tariq Chapman PA-C          Today, Patient Was Seen By: Efrain Finney MD    ** Please Note: Dictation voice to text software may have been used in the creation of this document. **

## 2025-07-23 LAB
ALBUMIN SERPL BCG-MCNC: 3 G/DL (ref 3.5–5)
ALP SERPL-CCNC: 77 U/L (ref 34–104)
ALT SERPL W P-5'-P-CCNC: 46 U/L (ref 7–52)
ANION GAP SERPL CALCULATED.3IONS-SCNC: 5 MMOL/L (ref 4–13)
AST SERPL W P-5'-P-CCNC: 20 U/L (ref 13–39)
BILIRUB SERPL-MCNC: 0.21 MG/DL (ref 0.2–1)
BUN SERPL-MCNC: 11 MG/DL (ref 5–25)
CALCIUM ALBUM COR SERPL-MCNC: 8.9 MG/DL (ref 8.3–10.1)
CALCIUM SERPL-MCNC: 8.1 MG/DL (ref 8.4–10.2)
CHLORIDE SERPL-SCNC: 112 MMOL/L (ref 96–108)
CO2 SERPL-SCNC: 23 MMOL/L (ref 21–32)
CREAT SERPL-MCNC: 0.7 MG/DL (ref 0.6–1.3)
ERYTHROCYTE [DISTWIDTH] IN BLOOD BY AUTOMATED COUNT: 14.9 % (ref 11.6–15.1)
GFR SERPL CREATININE-BSD FRML MDRD: 102 ML/MIN/1.73SQ M
GLUCOSE SERPL-MCNC: 112 MG/DL (ref 65–140)
HCT VFR BLD AUTO: 30.6 % (ref 34.8–46.1)
HGB BLD-MCNC: 9.9 G/DL (ref 11.5–15.4)
MCH RBC QN AUTO: 28.4 PG (ref 26.8–34.3)
MCHC RBC AUTO-ENTMCNC: 32.4 G/DL (ref 31.4–37.4)
MCV RBC AUTO: 88 FL (ref 82–98)
PLATELET # BLD AUTO: 208 THOUSANDS/UL (ref 149–390)
PMV BLD AUTO: 8.9 FL (ref 8.9–12.7)
POTASSIUM SERPL-SCNC: 3.8 MMOL/L (ref 3.5–5.3)
PROT SERPL-MCNC: 5.5 G/DL (ref 6.4–8.4)
RBC # BLD AUTO: 3.48 MILLION/UL (ref 3.81–5.12)
SODIUM SERPL-SCNC: 140 MMOL/L (ref 135–147)
WBC # BLD AUTO: 7.16 THOUSAND/UL (ref 4.31–10.16)

## 2025-07-23 PROCEDURE — 85027 COMPLETE CBC AUTOMATED: CPT | Performed by: STUDENT IN AN ORGANIZED HEALTH CARE EDUCATION/TRAINING PROGRAM

## 2025-07-23 PROCEDURE — 99232 SBSQ HOSP IP/OBS MODERATE 35: CPT | Performed by: STUDENT IN AN ORGANIZED HEALTH CARE EDUCATION/TRAINING PROGRAM

## 2025-07-23 PROCEDURE — 80053 COMPREHEN METABOLIC PANEL: CPT | Performed by: STUDENT IN AN ORGANIZED HEALTH CARE EDUCATION/TRAINING PROGRAM

## 2025-07-23 RX ORDER — KETOROLAC TROMETHAMINE 30 MG/ML
15 INJECTION, SOLUTION INTRAMUSCULAR; INTRAVENOUS EVERY 6 HOURS SCHEDULED
Status: DISPENSED | OUTPATIENT
Start: 2025-07-23 | End: 2025-07-24

## 2025-07-23 RX ADMIN — CEFAZOLIN SODIUM 1000 MG: 1 SOLUTION INTRAVENOUS at 08:12

## 2025-07-23 RX ADMIN — BUPRENORPHINE AND NALOXONE 8 MG: 8; 2 FILM BUCCAL; SUBLINGUAL at 08:12

## 2025-07-23 RX ADMIN — HYDROXYZINE HYDROCHLORIDE 25 MG: 25 TABLET, FILM COATED ORAL at 17:02

## 2025-07-23 RX ADMIN — HYDROXYZINE HYDROCHLORIDE 25 MG: 25 TABLET, FILM COATED ORAL at 06:31

## 2025-07-23 RX ADMIN — KETOROLAC TROMETHAMINE 15 MG: 30 INJECTION, SOLUTION INTRAMUSCULAR; INTRAVENOUS at 17:03

## 2025-07-23 RX ADMIN — KETOROLAC TROMETHAMINE 15 MG: 30 INJECTION, SOLUTION INTRAMUSCULAR; INTRAVENOUS at 06:31

## 2025-07-23 RX ADMIN — CEFAZOLIN SODIUM 1000 MG: 1 SOLUTION INTRAVENOUS at 23:13

## 2025-07-23 RX ADMIN — BUPRENORPHINE AND NALOXONE 8 MG: 8; 2 FILM BUCCAL; SUBLINGUAL at 17:01

## 2025-07-23 RX ADMIN — GABAPENTIN 800 MG: 400 CAPSULE ORAL at 08:12

## 2025-07-23 RX ADMIN — GABAPENTIN 800 MG: 400 CAPSULE ORAL at 20:17

## 2025-07-23 RX ADMIN — ACETAMINOPHEN 975 MG: 325 TABLET ORAL at 23:13

## 2025-07-23 RX ADMIN — KETOROLAC TROMETHAMINE 15 MG: 30 INJECTION, SOLUTION INTRAMUSCULAR; INTRAVENOUS at 11:29

## 2025-07-23 RX ADMIN — NICOTINE POLACRILEX 2 MG: 2 GUM, CHEWING ORAL at 17:02

## 2025-07-23 RX ADMIN — BUDESONIDE AND FORMOTEROL FUMARATE DIHYDRATE 2 PUFF: 160; 4.5 AEROSOL RESPIRATORY (INHALATION) at 17:03

## 2025-07-23 RX ADMIN — CEFAZOLIN SODIUM 1000 MG: 1 SOLUTION INTRAVENOUS at 17:02

## 2025-07-23 RX ADMIN — HYDROXYZINE HYDROCHLORIDE 25 MG: 25 TABLET, FILM COATED ORAL at 11:29

## 2025-07-23 RX ADMIN — BUDESONIDE AND FORMOTEROL FUMARATE DIHYDRATE 2 PUFF: 160; 4.5 AEROSOL RESPIRATORY (INHALATION) at 08:12

## 2025-07-23 RX ADMIN — NICOTINE POLACRILEX 2 MG: 2 GUM, CHEWING ORAL at 11:29

## 2025-07-23 RX ADMIN — CEFAZOLIN SODIUM 1000 MG: 1 SOLUTION INTRAVENOUS at 00:29

## 2025-07-23 RX ADMIN — NICOTINE POLACRILEX 2 MG: 2 GUM, CHEWING ORAL at 08:15

## 2025-07-23 RX ADMIN — KETOROLAC TROMETHAMINE 15 MG: 30 INJECTION, SOLUTION INTRAMUSCULAR; INTRAVENOUS at 00:29

## 2025-07-23 RX ADMIN — TRAZODONE HYDROCHLORIDE 150 MG: 100 TABLET ORAL at 23:14

## 2025-07-23 RX ADMIN — GABAPENTIN 800 MG: 400 CAPSULE ORAL at 17:02

## 2025-07-23 RX ADMIN — NICOTINE 1 PATCH: 14 PATCH, EXTENDED RELEASE TRANSDERMAL at 08:11

## 2025-07-23 RX ADMIN — ENOXAPARIN SODIUM 40 MG: 40 INJECTION SUBCUTANEOUS at 08:12

## 2025-07-23 RX ADMIN — BUPRENORPHINE AND NALOXONE 8 MG: 8; 2 FILM BUCCAL; SUBLINGUAL at 20:17

## 2025-07-23 RX ADMIN — KETOROLAC TROMETHAMINE 15 MG: 30 INJECTION, SOLUTION INTRAMUSCULAR; INTRAVENOUS at 23:13

## 2025-07-23 RX ADMIN — BUPROPION HYDROCHLORIDE 450 MG: 150 TABLET, EXTENDED RELEASE ORAL at 08:12

## 2025-07-23 RX ADMIN — HYDROXYZINE HYDROCHLORIDE 25 MG: 25 TABLET, FILM COATED ORAL at 23:13

## 2025-07-23 NOTE — PLAN OF CARE
Problem: PAIN - ADULT  Goal: Verbalizes/displays adequate comfort level or baseline comfort level  Description: Interventions:  - Encourage patient to monitor pain and request assistance  - Assess pain using appropriate pain scale  - Administer analgesics as ordered based on type and severity of pain and evaluate response  - Implement non-pharmacological measures as appropriate and evaluate response  - Consider cultural and social influences on pain and pain management  - Notify physician/advanced practitioner if interventions unsuccessful or patient reports new pain  - Educate patient/family on pain management process including their role and importance of  reporting pain   - Provide non-pharmacologic/complimentary pain relief interventions  7/23/2025 0732 by Belkis Sands RN  Outcome: Progressing  7/23/2025 0732 by Belkis Sands RN  Outcome: Not Progressing     Problem: INFECTION - ADULT  Goal: Absence or prevention of progression during hospitalization  Description: INTERVENTIONS:  - Assess and monitor for signs and symptoms of infection  - Monitor lab/diagnostic results  - Monitor all insertion sites, i.e. indwelling lines, tubes, and drains  - Monitor endotracheal if appropriate and nasal secretions for changes in amount and color  - [place] appropriate cooling/warming therapies per order  - Administer medications as ordered  - Instruct and encourage patient and family to use good hand hygiene technique  - Identify and instruct in appropriate isolation precautions for identified infection/condition  7/23/2025 0732 by Belkis Sands RN  Outcome: Progressing  7/23/2025 0732 by Belkis Sands RN  Outcome: Not Progressing     Problem: SAFETY ADULT  Goal: Patient will remain free of falls  Description: INTERVENTIONS:  - Educate patient/family on patient safety including physical limitations  - Instruct patient to call for assistance with activity   - Consider consulting OT/PT to assist with strengthening/mobility  based on AM PAC & JH-HLM score  - Consult OT/PT to assist with strengthening/mobility   - Keep Call bell within reach  - Keep bed low and locked with side rails adjusted as appropriate  - Keep care items and personal belongings within reach  - Initiate and maintain comfort rounds  - Make Fall Risk Sign visible to staff  - Apply yellow socks and bracelet for high fall risk patients  - Consider moving patient to room near nurses station  7/23/2025 0732 by Belkis Sands RN  Outcome: Progressing  7/23/2025 0732 by Belkis Sands RN  Outcome: Not Progressing  Goal: Maintain or return to baseline ADL function  Description: INTERVENTIONS:  -  Assess patient's ability to carry out ADLs; assess patient's baseline for ADL function and identify physical deficits which impact ability to perform ADLs (bathing, care of mouth/teeth, toileting, grooming, dressing, etc.)  - Assess/evaluate cause of self-care deficits   - Assess range of motion  - Assess patient's mobility; develop plan if impaired  - Assess patient's need for assistive devices and provide as appropriate  - Encourage maximum independence but intervene and supervise when necessary  - Involve family in performance of ADLs  - Assess for home care needs following discharge   - Consider OT consult to assist with ADL evaluation and planning for discharge  - Provide patient education as appropriate  - Monitor functional capacity and physical performance, use of AM PAC & JH-HLM   - Monitor gait, balance and fatigue with ambulation    7/23/2025 0732 by Belkis Sands RN  Outcome: Progressing  7/23/2025 0732 by Belkis Sands RN  Outcome: Not Progressing  Goal: Maintains/Returns to pre admission functional level  Description: INTERVENTIONS:  - Perform AM-PAC 6 Click Basic Mobility/ Daily Activity assessment daily.  - Set and communicate daily mobility goal to care team and patient/family/caregiver.   - Collaborate with rehabilitation services on mobility goals if consulted  - Out of  bed for toileting  - Record patient progress and toleration of activity level   7/23/2025 0732 by Belkis Sands RN  Outcome: Progressing  7/23/2025 0732 by Belkis Sands RN  Outcome: Not Progressing     Problem: DISCHARGE PLANNING  Goal: Discharge to home or other facility with appropriate resources  Description: INTERVENTIONS:  - Identify barriers to discharge w/patient and caregiver  - Arrange for needed discharge resources and transportation as appropriate  - Identify discharge learning needs (meds, wound care, etc.)  - Arrange for interpretive services to assist at discharge as needed  - Refer to Case Management Department for coordinating discharge planning if the patient needs post-hospital services based on physician/advanced practitioner order or complex needs related to functional status, cognitive ability, or social support system  7/23/2025 0732 by Belkis Sands RN  Outcome: Progressing  7/23/2025 0732 by eBlkis Sands RN  Outcome: Not Progressing     Problem: Knowledge Deficit  Goal: Patient/family/caregiver demonstrates understanding of disease process, treatment plan, medications, and discharge instructions  Description: Complete learning assessment and assess knowledge base.  Interventions:  - Provide teaching at level of understanding  - Provide teaching via preferred learning methods  7/23/2025 0732 by Belkis Sands RN  Outcome: Progressing  7/23/2025 0732 by Belkis Sands RN  Outcome: Not Progressing     Problem: SKIN/TISSUE INTEGRITY - ADULT  Goal: Incision(s), wounds(s) or drain site(s) healing without S/S of infection  Description: INTERVENTIONS  - Assess and document dressing, incision, wound bed, drain sites and surrounding tissue  - Provide patient and family education    7/23/2025 0732 by Belkis Sands RN  Outcome: Progressing  7/23/2025 0732 by Belkis Sands RN  Outcome: Not Progressing

## 2025-07-23 NOTE — PLAN OF CARE
Problem: PAIN - ADULT  Goal: Verbalizes/displays adequate comfort level or baseline comfort level  Description: Interventions:  - Encourage patient to monitor pain and request assistance  - Assess pain using appropriate pain scale  - Administer analgesics as ordered based on type and severity of pain and evaluate response  - Implement non-pharmacological measures as appropriate and evaluate response  - Consider cultural and social influences on pain and pain management  - Notify physician/advanced practitioner if interventions unsuccessful or patient reports new pain  - Educate patient/family on pain management process including their role and importance of  reporting pain   - Provide non-pharmacologic/complimentary pain relief interventions  Outcome: Progressing     Problem: INFECTION - ADULT  Goal: Absence or prevention of progression during hospitalization  Description: INTERVENTIONS:  - Assess and monitor for signs and symptoms of infection  - Monitor lab/diagnostic results  - Monitor all insertion sites, i.e. indwelling lines, tubes, and drains  - Monitor endotracheal if appropriate and nasal secretions for changes in amount and color  - [place] appropriate cooling/warming therapies per order  - Administer medications as ordered  - Instruct and encourage patient and family to use good hand hygiene technique  - Identify and instruct in appropriate isolation precautions for identified infection/condition  Outcome: Progressing

## 2025-07-23 NOTE — PLAN OF CARE
Problem: PAIN - ADULT  Goal: Verbalizes/displays adequate comfort level or baseline comfort level  Description: Interventions:  - Encourage patient to monitor pain and request assistance  - Assess pain using appropriate pain scale  - Administer analgesics as ordered based on type and severity of pain and evaluate response  - Implement non-pharmacological measures as appropriate and evaluate response  - Consider cultural and social influences on pain and pain management  - Notify physician/advanced practitioner if interventions unsuccessful or patient reports new pain  - Educate patient/family on pain management process including their role and importance of  reporting pain   - Provide non-pharmacologic/complimentary pain relief interventions  7/23/2025 0619 by Marcie Hickman RN  Outcome: Progressing  7/23/2025 0126 by Marcie Hickman RN  Outcome: Progressing     Problem: INFECTION - ADULT  Goal: Absence or prevention of progression during hospitalization  Description: INTERVENTIONS:  - Assess and monitor for signs and symptoms of infection  - Monitor lab/diagnostic results  - Monitor all insertion sites, i.e. indwelling lines, tubes, and drains  - Monitor endotracheal if appropriate and nasal secretions for changes in amount and color  - [place] appropriate cooling/warming therapies per order  - Administer medications as ordered  - Instruct and encourage patient and family to use good hand hygiene technique  - Identify and instruct in appropriate isolation precautions for identified infection/condition  7/23/2025 0619 by Marcie Hickman RN  Outcome: Progressing  7/23/2025 0126 by Marcie Hickman RN  Outcome: Progressing

## 2025-07-23 NOTE — PROGRESS NOTES
Progress Note - Hospitalist   Name: Carey Keith 50 y.o. female I MRN: [id number]  Unit/Bed#: E5 -01 I Date of Admission: 7/20/2025   Date of Service: 7/23/2025 I Hospital Day: 2     Assessment & Plan  Sepsis due to cellulitis (HCC)  Preseptal cellulitis  Presents with sites of excoriation with surrounding cellulitis  Labs: WBC 17, lactate 0.7, procal 0.08  CT facial bones, mild/mod left preseptal/periorbital soft tissue swelling, no post septal inflammatory stranding, no focal fluid collections  Continue IV antibiotics with Ancef  Blood cultures no growth to date at 48 hours  Erythema, swelling appears to be improving  Discussed discharge, planning for tomorrow to complete 2 week course of abx  Bipolar 1 disorder (HCC)  KATY (generalized anxiety disorder)  Opioid use disorder, severe, on maintenance therapy (HCC)  Tobacco abuse  History of methamphetamine abuse ([facility])  Delusions of parasitosis ([facility])  Presents with anxiety, psychomotor agitation, pressured speech, tangential conversation. Responded to valium. Behavioral health admission in 05/2025. PDMP reviewed. Suspect non-compliance.   Outpatient regimen: Wellbutrin XL 450mg daily, Loxapine 25mg BID (appears this was not started), Klonopin 0.5mg daily, gabapentin 800mg TID, trazodone 150mg HS, Suboxone 8mg TID  Appreciate psychiatry evaluation.  Recommending discontinue benzodiazepines as patient is currently on Suboxone  Continue Wellbutrin, consider adding Trileptal  Hepatitis C  Hepatitis panel positive for hepatitis C  Quantitative PCR ordered and pending  Recommended for outpatient ID or GI for definitive treatment  Asthma  No acute exacerbation   Outpatient regimen: Symbicort     VTE Pharmacologic Prophylaxis:   Pharmacologic: Enoxaparin (Lovenox)  Mechanical VTE Prophylaxis in Place: Yes    Current Length of Stay: 2 day(s)    Current Patient Status: Inpatient   Certification Statement: The patient will continue to require additional inpatient  hospital stay due to IV abx    Discharge Plan: 24 hours    Code Status: Level 1 - Full Code    Subjective:   Patient reports doing well overall today.  Erythema, swelling has improved.  Denies any fevers or chills.     Objective:     Vitals:   Temp (24hrs), Av °F (36.7 °C), Min:97.7 °F (36.5 °C), Max:98.2 °F (36.8 °C)    Temp:  [97.7 °F (36.5 °C)-98.2 °F (36.8 °C)] 97.7 °F (36.5 °C)  HR:  [66-71] 66  Resp:  [18] 18  BP: (101-104)/(58-69) 101/58  SpO2:  [96 %-98 %] 96 %  Body mass index is 36.66 kg/m².     Input and Output Summary (last 24 hours):       Intake/Output Summary (Last 24 hours) at 2025 1243  Last data filed at 2025 0601  Gross per 24 hour   Intake 960 ml   Output --   Net 960 ml       Physical Exam:     Physical Exam  Vitals and nursing note reviewed.   HENT:      Head: Normocephalic.     Eyes:      Conjunctiva/sclera: Conjunctivae normal.       Cardiovascular:      Rate and Rhythm: Normal rate.   Pulmonary:      Effort: Pulmonary effort is normal.   Abdominal:      General: Bowel sounds are normal. There is no distension.      Palpations: Abdomen is soft.     Musculoskeletal:      Right lower leg: No edema.      Left lower leg: No edema.     Skin:     General: Skin is warm and dry.      Comments: Diffuse excoriations     Neurological:      General: No focal deficit present.      Mental Status: She is alert. Mental status is at baseline.       Additional Data:     Labs:    Results from last 7 days   Lab Units 25  0600 25  0220 25  2215   WBC Thousand/uL 7.16   < > 17.02*   HEMOGLOBIN g/dL 9.9*   < > 11.4*   HEMATOCRIT % 30.6*   < > 33.6*   PLATELETS Thousands/uL 208   < > 217   SEGS PCT %  --   --  88*   LYMPHO PCT %  --   --  6*   MONO PCT %  --   --  6   EOS PCT %  --   --  0    < > = values in this interval not displayed.     Results from last 7 days   Lab Units 25  0600   SODIUM mmol/L 140   POTASSIUM mmol/L 3.8   CHLORIDE mmol/L 112*   CO2 mmol/L 23   BUN mg/dL  11   CREATININE mg/dL 0.70   ANION GAP mmol/L 5   CALCIUM mg/dL 8.1*   ALBUMIN g/dL 3.0*   TOTAL BILIRUBIN mg/dL 0.21   ALK PHOS U/L 77   ALT U/L 46   AST U/L 20   GLUCOSE RANDOM mg/dL 112     Results from last 7 days   Lab Units 07/20/25  2246   INR  1.09             Results from last 7 days   Lab Units 07/20/25  2246 07/20/25  2215   LACTIC ACID mmol/L 0.7  --    PROCALCITONIN ng/ml  --  0.08       * I Have Reviewed All Lab Data Listed Above.  * Additional Pertinent Lab Tests Reviewed: All Labs For Current Hospital Admission Reviewed    Mobility:  Basic Mobility Inpatient Raw Score: 24  -[facility] Goal: 8: Walk 250 feet or more  -[facility] Achieved: 8: Walk 250 feet ot more    Lines:     Invasive Devices       Peripheral Intravenous Line  Duration             Peripheral IV 07/20/25 Right;Ventral (anterior) Forearm 2 days                       Imaging:    Imaging Reports Reviewed Today Include:     CT facial bones with contrast  Result Date: 7/21/2025  Impression: Mild to moderate left preseptal periorbital soft tissue swelling most compatible with a periorbital cellulitis. There is no post septal inflammatory stranding to suggest an orbital cellulitis. No focal fluid collection to suggest an abscess. Workstation performed: [id number]         Recent Cultures (last 7 days):     Results from last 7 days   Lab Units 07/20/25 2246   BLOOD CULTURE  No Growth at 48 hrs.  No Growth at 48 hrs.       Last 24 Hours Medication List:   Current Facility-Administered Medications   Medication Dose Route Frequency Provider Last Rate    acetaminophen  975 mg Oral Q8H PRN Tariq Chapman PA-C      aluminum-magnesium hydroxide-simethicone  30 mL Oral Q6H PRN Tariq Chapman PA-C      budesonide-formoterol  2 puff Inhalation BID Tariq Chapman PA-C      buprenorphine-naloxone  8 mg Sublingual TID Tariq Chapman PA-C      buPROPion  450 mg Oral Daily Tariq Chapman PA-C      cefazolin  1,000 mg Intravenous Q8H Tariq Chapman PA-C 1,000 mg (07/23/25 0812)     enoxaparin  40 mg Subcutaneous Daily Tariq Chapman PA-C      gabapentin  800 mg Oral TID Tariq Chapman PA-C      hydrOXYzine HCL  25 mg Oral Q6H Efrain Finney MD      hydrOXYzine HCL  25 mg Oral Q6H PRN Efrain Finney MD      ketorolac  15 mg Intravenous Q6H [facility] Efrain Finney MD      nicotine  1 patch Transdermal Daily Tariq Chapman PA-C      nicotine polacrilex  2 mg Oral Q2H PRN Efrain Finney MD      ondansetron  4 mg Intravenous Q6H PRN Tariq Chapman PA-C      polyethylene glycol  17 g Oral Daily PRN Tariq Chapman PA-C      traZODone  150 mg Oral HS PRN Tariq Chapman PA-C          Today, Patient Was Seen By: Efrain Finney MD    ** Please Note: Dictation voice to text software may have been used in the creation of this document. **

## 2025-07-23 NOTE — ASSESSMENT & PLAN NOTE
Presents with sites of excoriation with surrounding cellulitis  Labs: WBC 17, lactate 0.7, procal 0.08  CT facial bones, mild/mod left preseptal/periorbital soft tissue swelling, no post septal inflammatory stranding, no focal fluid collections  Continue IV antibiotics with Ancef  Blood cultures no growth to date at 48 hours  Erythema, swelling appears to be improving  Discussed discharge, planning for tomorrow to complete 2 week course of abx

## 2025-07-24 ENCOUNTER — PATIENT OUTREACH (OUTPATIENT)
Dept: CASE MANAGEMENT | Facility: OTHER | Age: 50
End: 2025-07-24

## 2025-07-24 VITALS
OXYGEN SATURATION: 95 % | BODY MASS INDEX: 36.63 KG/M2 | TEMPERATURE: 98.1 F | SYSTOLIC BLOOD PRESSURE: 127 MMHG | HEART RATE: 78 BPM | RESPIRATION RATE: 16 BRPM | HEIGHT: 61 IN | WEIGHT: 194 LBS | DIASTOLIC BLOOD PRESSURE: 83 MMHG

## 2025-07-24 DIAGNOSIS — Z59.82 TRANSPORTATION INSECURITY: ICD-10-CM

## 2025-07-24 DIAGNOSIS — Z59.41 FOOD INSECURITY: Primary | ICD-10-CM

## 2025-07-24 DIAGNOSIS — Z59.819 HOUSING INSECURITY: ICD-10-CM

## 2025-07-24 PROCEDURE — 99239 HOSP IP/OBS DSCHRG MGMT >30: CPT | Performed by: STUDENT IN AN ORGANIZED HEALTH CARE EDUCATION/TRAINING PROGRAM

## 2025-07-24 RX ORDER — CEPHALEXIN 500 MG/1
500 CAPSULE ORAL EVERY 6 HOURS SCHEDULED
Qty: 40 CAPSULE | Refills: 0 | Status: SHIPPED | OUTPATIENT
Start: 2025-07-24 | End: 2025-08-03

## 2025-07-24 RX ORDER — IBUPROFEN 600 MG/1
600 TABLET, FILM COATED ORAL EVERY 6 HOURS PRN
Qty: 30 TABLET | Refills: 0 | Status: SHIPPED | OUTPATIENT
Start: 2025-07-24

## 2025-07-24 RX ORDER — KETOROLAC TROMETHAMINE 30 MG/ML
30 INJECTION, SOLUTION INTRAMUSCULAR; INTRAVENOUS ONCE
Status: COMPLETED | OUTPATIENT
Start: 2025-07-24 | End: 2025-07-24

## 2025-07-24 RX ADMIN — NICOTINE POLACRILEX 2 MG: 2 GUM, CHEWING ORAL at 08:15

## 2025-07-24 RX ADMIN — HYDROXYZINE HYDROCHLORIDE 25 MG: 25 TABLET, FILM COATED ORAL at 05:38

## 2025-07-24 RX ADMIN — BUDESONIDE AND FORMOTEROL FUMARATE DIHYDRATE 2 PUFF: 160; 4.5 AEROSOL RESPIRATORY (INHALATION) at 08:15

## 2025-07-24 RX ADMIN — BUPRENORPHINE AND NALOXONE 8 MG: 8; 2 FILM BUCCAL; SUBLINGUAL at 08:15

## 2025-07-24 RX ADMIN — GABAPENTIN 800 MG: 400 CAPSULE ORAL at 08:15

## 2025-07-24 RX ADMIN — NICOTINE POLACRILEX 2 MG: 2 GUM, CHEWING ORAL at 00:07

## 2025-07-24 RX ADMIN — BUPROPION HYDROCHLORIDE 450 MG: 150 TABLET, EXTENDED RELEASE ORAL at 08:15

## 2025-07-24 RX ADMIN — NICOTINE 1 PATCH: 14 PATCH, EXTENDED RELEASE TRANSDERMAL at 08:15

## 2025-07-24 RX ADMIN — CEFAZOLIN SODIUM 1000 MG: 1 SOLUTION INTRAVENOUS at 08:15

## 2025-07-24 RX ADMIN — HYDROXYZINE HYDROCHLORIDE 25 MG: 25 TABLET, FILM COATED ORAL at 11:58

## 2025-07-24 RX ADMIN — KETOROLAC TROMETHAMINE 30 MG: 30 INJECTION, SOLUTION INTRAMUSCULAR; INTRAVENOUS at 11:58

## 2025-07-24 SDOH — ECONOMIC STABILITY - HOUSING INSECURITY: HOUSING INSTABILITY UNSPECIFIED: Z59.819

## 2025-07-24 SDOH — ECONOMIC STABILITY - FOOD INSECURITY: FOOD INSECURITY: Z59.41

## 2025-07-24 SDOH — ECONOMIC STABILITY - TRANSPORTATION SECURITY: TRANSPORTATION INSECURITY: Z59.82

## 2025-07-24 NOTE — ASSESSMENT & PLAN NOTE
Presents with sites of excoriation with surrounding cellulitis  Labs: WBC 17, lactate 0.7, procal 0.08  CT facial bones, mild/mod left preseptal/periorbital soft tissue swelling, no post septal inflammatory stranding, no focal fluid collections  Continue IV antibiotics with Ancef  Blood cultures no growth to date at 48 hours  Erythema, swelling appears to be improving  Discharged home on keflex to complete total 2 week course

## 2025-07-24 NOTE — ASSESSMENT & PLAN NOTE
Presents with anxiety, psychomotor agitation, pressured speech, tangential conversation. Responded to valium. Behavioral health admission in 05/2025. PDMP reviewed. Suspect non-compliance.   Outpatient regimen: Wellbutrin XL 450mg daily, Loxapine 25mg BID (appears this was not started), Klonopin 0.5mg daily, gabapentin 800mg TID, trazodone 150mg HS, Suboxone 8mg TID  Appreciate psychiatry evaluation.  Recommending discontinue benzodiazepines as patient is currently on Suboxone  Continue Wellbutrin, consider adding Trileptal   Detail Level: Simple

## 2025-07-24 NOTE — PLAN OF CARE
Problem: PAIN - ADULT  Goal: Verbalizes/displays adequate comfort level or baseline comfort level  Description: Interventions:  - Encourage patient to monitor pain and request assistance  - Assess pain using appropriate pain scale  - Administer analgesics as ordered based on type and severity of pain and evaluate response  - Implement non-pharmacological measures as appropriate and evaluate response  - Consider cultural and social influences on pain and pain management  - Notify physician/advanced practitioner if interventions unsuccessful or patient reports new pain  - Educate patient/family on pain management process including their role and importance of  reporting pain   - Provide non-pharmacologic/complimentary pain relief interventions  Outcome: Progressing     Problem: INFECTION - ADULT  Goal: Absence or prevention of progression during hospitalization  Description: INTERVENTIONS:  - Assess and monitor for signs and symptoms of infection  - Monitor lab/diagnostic results  - Monitor all insertion sites, i.e. indwelling lines, tubes, and drains  - Monitor endotracheal if appropriate and nasal secretions for changes in amount and color  - [place] appropriate cooling/warming therapies per order  - Administer medications as ordered  - Instruct and encourage patient and family to use good hand hygiene technique  - Identify and instruct in appropriate isolation precautions for identified infection/condition  Outcome: Progressing     Problem: SAFETY ADULT  Goal: Patient will remain free of falls  Description: INTERVENTIONS:  - Educate patient/family on patient safety including physical limitations  - Instruct patient to call for assistance with activity   - Consider consulting OT/PT to assist with strengthening/mobility based on AM PAC & JH-HLM score  - Consult OT/PT to assist with strengthening/mobility   - Keep Call bell within reach  - Keep bed low and locked with side rails adjusted as appropriate  - Keep  care items and personal belongings within reach  - Initiate and maintain comfort rounds  - Make Fall Risk Sign visible to staff  - Apply yellow socks and bracelet for high fall risk patients  - Consider moving patient to room near nurses station  Outcome: Progressing  Goal: Maintain or return to baseline ADL function  Description: INTERVENTIONS:  -  Assess patient's ability to carry out ADLs; assess patient's baseline for ADL function and identify physical deficits which impact ability to perform ADLs (bathing, care of mouth/teeth, toileting, grooming, dressing, etc.)  - Assess/evaluate cause of self-care deficits   - Assess range of motion  - Assess patient's mobility; develop plan if impaired  - Assess patient's need for assistive devices and provide as appropriate  - Encourage maximum independence but intervene and supervise when necessary  - Involve family in performance of ADLs  - Assess for home care needs following discharge   - Consider OT consult to assist with ADL evaluation and planning for discharge  - Provide patient education as appropriate  - Monitor functional capacity and physical performance, use of AM PAC & JH-HLM   - Monitor gait, balance and fatigue with ambulation    Outcome: Progressing  Goal: Maintains/Returns to pre admission functional level  Description: INTERVENTIONS:  - Perform AM-PAC 6 Click Basic Mobility/ Daily Activity assessment daily.  - Set and communicate daily mobility goal to care team and patient/family/caregiver.   - Collaborate with rehabilitation services on mobility goals if consulted  - Out of bed for toileting  - Record patient progress and toleration of activity level   Outcome: Progressing     Problem: DISCHARGE PLANNING  Goal: Discharge to home or other facility with appropriate resources  Description: INTERVENTIONS:  - Identify barriers to discharge w/patient and caregiver  - Arrange for needed discharge resources and transportation as appropriate  - Identify  discharge learning needs (meds, wound care, etc.)  - Arrange for interpretive services to assist at discharge as needed  - Refer to Case Management Department for coordinating discharge planning if the patient needs post-hospital services based on physician/advanced practitioner order or complex needs related to functional status, cognitive ability, or social support system  Outcome: Progressing     Problem: Knowledge Deficit  Goal: Patient/family/caregiver demonstrates understanding of disease process, treatment plan, medications, and discharge instructions  Description: Complete learning assessment and assess knowledge base.  Interventions:  - Provide teaching at level of understanding  - Provide teaching via preferred learning methods  Outcome: Progressing     Problem: SKIN/TISSUE INTEGRITY - ADULT  Goal: Incision(s), wounds(s) or drain site(s) healing without S/S of infection  Description: INTERVENTIONS  - Assess and document dressing, incision, wound bed, drain sites and surrounding tissue  - Provide patient and family education    Outcome: Progressing

## 2025-07-24 NOTE — PROGRESS NOTES
"Outpatient Care Management Note:  Referral for care management received from [facility].  Chart review completed.     History includes hilar adenopathy, asthma, anxiety, opioid use disorder, Bipolar 1 disorder, and hyperlipidemia.  Multiple recent ED visits.     7/20/2025-Arrived to ED via EMS.  Reports \"playing with bugs\".  Covered in hives/blisters that she has picked/squeezed open because she \"sees blue things\" in them.  Has OCD.  Anxious and agitated in ED.  Cellulitis of multiple sites, started on IV abx.  Potaassium replaced.  Admitted with sepsis.     Per [facility] note, MS. Keith lives with a friend and is independent. Uses public transportation for appointments. Requested to see Psych. States can not afford medication Co-pays now that she is on Medicare. [facility] referral also placed.  Concerns that PCP does not accept new insurance.  Information for [facility] provided.     7/22/2025-Psych eval completed. Medication changes made.  Ms. Keith did not want inpatient treatment, recently discharged from same 2 weeks prior.     7/24/2025-discharged to home on oral antibiotics.  No follow up appointments scheduled.     Outreach call scheduled.   "

## 2025-07-24 NOTE — PLAN OF CARE
Problem: PAIN - ADULT  Goal: Verbalizes/displays adequate comfort level or baseline comfort level  Description: Interventions:  - Encourage patient to monitor pain and request assistance  - Assess pain using appropriate pain scale  - Administer analgesics as ordered based on type and severity of pain and evaluate response  - Implement non-pharmacological measures as appropriate and evaluate response  - Consider cultural and social influences on pain and pain management  - Notify physician/advanced practitioner if interventions unsuccessful or patient reports new pain  - Educate patient/family on pain management process including their role and importance of  reporting pain   - Provide non-pharmacologic/complimentary pain relief interventions  Outcome: Adequate for Discharge     Problem: INFECTION - ADULT  Goal: Absence or prevention of progression during hospitalization  Description: INTERVENTIONS:  - Assess and monitor for signs and symptoms of infection  - Monitor lab/diagnostic results  - Monitor all insertion sites, i.e. indwelling lines, tubes, and drains  - Monitor endotracheal if appropriate and nasal secretions for changes in amount and color  - [place] appropriate cooling/warming therapies per order  - Administer medications as ordered  - Instruct and encourage patient and family to use good hand hygiene technique  - Identify and instruct in appropriate isolation precautions for identified infection/condition  Outcome: Adequate for Discharge     Problem: SAFETY ADULT  Goal: Patient will remain free of falls  Description: INTERVENTIONS:  - Educate patient/family on patient safety including physical limitations  - Instruct patient to call for assistance with activity   - Consider consulting OT/PT to assist with strengthening/mobility based on AM PAC & JH-HLM score  - Consult OT/PT to assist with strengthening/mobility   - Keep Call bell within reach  - Keep bed low and locked with side rails adjusted as  appropriate  - Keep care items and personal belongings within reach  - Initiate and maintain comfort rounds  - Make Fall Risk Sign visible to staff  - Apply yellow socks and bracelet for high fall risk patients  - Consider moving patient to room near nurses station  Outcome: Adequate for Discharge  Goal: Maintain or return to baseline ADL function  Description: INTERVENTIONS:  -  Assess patient's ability to carry out ADLs; assess patient's baseline for ADL function and identify physical deficits which impact ability to perform ADLs (bathing, care of mouth/teeth, toileting, grooming, dressing, etc.)  - Assess/evaluate cause of self-care deficits   - Assess range of motion  - Assess patient's mobility; develop plan if impaired  - Assess patient's need for assistive devices and provide as appropriate  - Encourage maximum independence but intervene and supervise when necessary  - Involve family in performance of ADLs  - Assess for home care needs following discharge   - Consider OT consult to assist with ADL evaluation and planning for discharge  - Provide patient education as appropriate  - Monitor functional capacity and physical performance, use of AM PAC & JH-HLM   - Monitor gait, balance and fatigue with ambulation    Outcome: Adequate for Discharge  Goal: Maintains/Returns to pre admission functional level  Description: INTERVENTIONS:  - Perform AM-PAC 6 Click Basic Mobility/ Daily Activity assessment daily.  - Set and communicate daily mobility goal to care team and patient/family/caregiver.   - Collaborate with rehabilitation services on mobility goals if consulted  - Out of bed for toileting  - Record patient progress and toleration of activity level   Outcome: Adequate for Discharge     Problem: DISCHARGE PLANNING  Goal: Discharge to home or other facility with appropriate resources  Description: INTERVENTIONS:  - Identify barriers to discharge w/patient and caregiver  - Arrange for needed discharge resources  and transportation as appropriate  - Identify discharge learning needs (meds, wound care, etc.)  - Arrange for interpretive services to assist at discharge as needed  - Refer to Case Management Department for coordinating discharge planning if the patient needs post-hospital services based on physician/advanced practitioner order or complex needs related to functional status, cognitive ability, or social support system  Outcome: Adequate for Discharge     Problem: Knowledge Deficit  Goal: Patient/family/caregiver demonstrates understanding of disease process, treatment plan, medications, and discharge instructions  Description: Complete learning assessment and assess knowledge base.  Interventions:  - Provide teaching at level of understanding  - Provide teaching via preferred learning methods  Outcome: Adequate for Discharge     Problem: SKIN/TISSUE INTEGRITY - ADULT  Goal: Incision(s), wounds(s) or drain site(s) healing without S/S of infection  Description: INTERVENTIONS  - Assess and document dressing, incision, wound bed, drain sites and surrounding tissue  - Provide patient and family education    Outcome: Adequate for Discharge

## 2025-07-24 NOTE — DISCHARGE SUMMARY
Discharge Summary - Hospitalist   Name: Carey Keith 50 y.o. female I MRN: [id number]  Unit/Bed#: E5 -01 I Date of Admission: 7/20/2025   Date of Service: 7/24/2025 I Hospital Day: 3     Assessment & Plan  Sepsis due to cellulitis (HCC)  Preseptal cellulitis  Presents with sites of excoriation with surrounding cellulitis  Labs: WBC 17, lactate 0.7, procal 0.08  CT facial bones, mild/mod left preseptal/periorbital soft tissue swelling, no post septal inflammatory stranding, no focal fluid collections  Continue IV antibiotics with Ancef  Blood cultures no growth to date at 48 hours  Erythema, swelling appears to be improving  Discharged home on keflex to complete total 2 week course  Bipolar 1 disorder (HCC)  KATY (generalized anxiety disorder)  Opioid use disorder, severe, on maintenance therapy ([facility])  Tobacco abuse  History of methamphetamine abuse ([facility])  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 Responded to valium. Behavioral health admission in 05/2025. PDMP reviewed. Suspect non-compliance.   Outpatient regimen: Wellbutrin XL 450mg daily, Loxapine 25mg BID (appears this was not started), Klonopin 0.5mg daily, gabapentin 800mg TID, trazodone 150mg HS, Suboxone 8mg TID  Appreciate psychiatry evaluation.  Recommending discontinue benzodiazepines as patient is currently on Suboxone  Continue Wellbutrin, consider adding Trileptal  Hepatitis C  Hepatitis panel positive for hepatitis C  Quantitative PCR ordered and pending  Recommended for outpatient ID or GI for definitive treatment  Asthma  No acute exacerbation   Outpatient regimen: Symbicort      Discharging Physician / Practitioner: Efrain Finney MD  PCP: Christian Julio DO  Admission Date:   Admission Orders (From admission, onward)       Ordered        07/21/25 1634  INPATIENT ADMISSION  Once            07/21/25 0015  Place in Observation  Once                           Discharge Date: 07/24/25    Medical Problems       Resolved Problems  Date Reviewed: 5/12/2025   None         Consultations During Hospital Stay:  Psychiatry    Procedures Performed:   none    Significant Findings / Test Results:   CT facial bones with contrast  Result Date: 7/21/2025  Impression: Mild to moderate left preseptal periorbital soft tissue swelling most compatible with a periorbital cellulitis. There is no post septal inflammatory stranding to suggest an orbital cellulitis. No focal fluid collection to suggest an abscess. Workstation performed: [id number]     Incidental Findings:   none     Test Results Pending at Discharge (will require follow up):   none     Outpatient Tests Requested:  none    Complications:  none    Reason for Admission: Sepsis    Hospital Course:     Carey Keith is a 50 y.o. female patient who originally presented to the hospital on 7/20/2025 due to preseptal cellulitis.  Patient presented with diffuse multiple excoriations throughout her body including her face.  CT imaging showing mild to moderate left preseptal soft tissue swelling consistent with periorbital cellulitis.  She does have a history of bipolar disorder, anxiety and methamphetamine use.  UDS was positive for methamphetamines and patient did endorse taking unknown substance 2 days prior to admission.  Meeting sepsis criteria with fevers, tachycardia she was treated empirically with IV antibiotics.  Her blood cultures were negative at 72 hours.  As patient's symptoms improved, she was transitioned to Keflex to complete total 2-week course.  Psychiatry did evaluate on admission regarding patient's known history of anxiety, did not recommend benzos at this time as patient is currently on Suboxone.  She will need to be reestablished with new psychiatrist.    Please see above list of diagnoses and related plan for additional information.     Condition at Discharge: fair     Discharge Day Visit / Exam:  "    Subjective:    No events overnight. Denies any fevers or chills. Tolerating diet. Ambulating independently.  Vitals: Blood Pressure: 127/83 (07/24/25 0736)  Pulse: 78 (07/24/25 0736)  Temperature: 98.1 °F (36.7 °C) (07/23/25 2353)  Temp Source: Oral (07/23/25 2353)  Respirations: 16 (07/23/25 2353)  Height: 5' 1\" (154.9 cm) (07/21/25 0159)  Weight - Scale: 88 kg (194 lb 0.1 oz) (07/21/25 0159)  SpO2: 95 % (07/23/25 2353)  Exam:   Physical Exam  Vitals and nursing note reviewed.   HENT:      Head: Normocephalic.      Comments: Left perioribtal swelling/erythema improving    Eyes:      Conjunctiva/sclera: Conjunctivae normal.       Cardiovascular:      Rate and Rhythm: Normal rate.   Pulmonary:      Effort: Pulmonary effort is normal.   Abdominal:      General: Bowel sounds are normal. There is no distension.      Palpations: Abdomen is soft.     Musculoskeletal:      Right lower leg: No edema.      Left lower leg: No edema.     Skin:     General: Skin is warm and dry.      Comments: Diffuse excoriations     Neurological:      Mental Status: She is alert. Mental status is at baseline.         Discharge instructions/Information to patient and family:   See after visit summary for information provided to patient and family.      Provisions for Follow-Up Care:  See after visit summary for information related to follow-up care and any pertinent home health orders.      Disposition:     Home     Discharge Statement:  I spent 40 minutes discharging the patient. This time was spent on the day of discharge. I had direct contact with the patient on the day of discharge. Greater than 50% of the total time was spent examining patient, answering all patient questions, arranging and discussing plan of care with patient as well as directly providing post-discharge instructions.  Additional time then spent on discharge activities.    Discharge Medications:  See after visit summary for reconciled discharge medications provided to " patient and family.      ** Please Note: This note has been constructed using a voice recognition system **

## 2025-07-24 NOTE — CASE MANAGEMENT
Case Management Discharge Planning Note    Patient name Carey Keith  Location [facility] /E5 -* MRN [id number]  : 1975 Date 2025       Current Admission Date: 2025  Current Admission Diagnosis:Sepsis due to cellulitis (HCC)   Patient Active Problem List    Diagnosis Date Noted    Sepsis due to cellulitis (HCC) 2025    Hepatitis C 2025    Preseptal cellulitis 2025    Delusions of parasitosis (HCC) 2025    Asthma 2024    History of difficulty sleeping 2024    History of methamphetamine abuse (HCC) 10/25/2024    Hilar adenopathy 2024    Oral herpes 2024    Bipolar 1 disorder (HCC) 2024    Hyperlipidemia 2024    KATY (generalized anxiety disorder) 10/06/2022    Opioid use disorder, severe, on maintenance therapy (HCC) 10/06/2022    Hepatitis C antibody test positive 2022    Dyspepsia 2022    Tobacco abuse 2021      LOS (days): 3  Geometric Mean LOS (GMLOS) (days): 3.5  Days to GMLOS:0.6     OBJECTIVE:  Risk of Unplanned Readmission Score: 38.62      Current admission status: Inpatient   Preferred Pharmacy:   [address] [address]  Phone: [phone] Fax: [phone]    Primary Care Provider: Christian Julio DO    Primary Insurance: MEDICARE  Secondary Insurance: [facility]    DISCHARGE DETAILS:    Discharge planning discussed with:: pt  Freedom of Choice: Yes     CM contacted family/caregiver?: No- see comments  Were Treatment Team discharge recommendations reviewed with patient/caregiver?: Yes  Did patient/caregiver verbalize understanding of patient care needs?: Yes  Were patient/caregiver advised of the risks associated with not following Treatment Team discharge recommendations?: Yes    Contacts  Patient Contacts: Self  Relationship to Patient:: Other (Comment)  Contact Method: In  Person  Reason/Outcome: Continuity of Care, Discharge Planning    Requested Home Health Care         Is the patient interested in HHC at discharge?: No    DME Referral Provided  Referral made for DME?: No    Treatment Team Recommendation: Home  Expected Discharge Disposition: Home or Self Care  Additional Discharge Dispositions: Home or Self Care  Transport at Discharge : Ride Share     Number/Name of Dispatcher: [employer]  Transported by (Company and Unit #): Lm  ETA of Transport (Date): 07/24/25  ETA of Transport (Time): 1320     Additional Comments: Pt signed [employer] Waiver. Placed in scan bin. Ride set up on [employer] for 1:20 pm. Pt taken down by staff to retrieve ride.

## 2025-07-25 ENCOUNTER — TELEPHONE (OUTPATIENT)
Dept: INTERNAL MEDICINE CLINIC | Facility: CLINIC | Age: 50
End: 2025-07-25

## 2025-07-25 ENCOUNTER — PATIENT OUTREACH (OUTPATIENT)
Dept: CASE MANAGEMENT | Facility: OTHER | Age: 50
End: 2025-07-25

## 2025-07-25 ENCOUNTER — TRANSITIONAL CARE MANAGEMENT (OUTPATIENT)
Dept: INTERNAL MEDICINE CLINIC | Facility: CLINIC | Age: 50
End: 2025-07-25

## 2025-07-25 NOTE — PROGRESS NOTES
Outpatient Care Management Note:  Outreach call attempted to Ms. Keith. Message left for patient to please return call.  Contact information left on message.     Case discussed with AYAZ HWANG.

## 2025-07-26 LAB
BACTERIA BLD CULT: NORMAL
BACTERIA BLD CULT: NORMAL

## 2025-07-28 ENCOUNTER — TELEPHONE (OUTPATIENT)
Dept: INTERNAL MEDICINE CLINIC | Facility: CLINIC | Age: 50
End: 2025-07-28

## 2025-07-28 ENCOUNTER — PATIENT OUTREACH (OUTPATIENT)
Dept: CASE MANAGEMENT | Facility: OTHER | Age: 50
End: 2025-07-28

## 2025-07-29 ENCOUNTER — PATIENT OUTREACH (OUTPATIENT)
Dept: CASE MANAGEMENT | Facility: OTHER | Age: 50
End: 2025-07-29